# Patient Record
Sex: MALE | Race: BLACK OR AFRICAN AMERICAN | NOT HISPANIC OR LATINO | Employment: OTHER | URBAN - METROPOLITAN AREA
[De-identification: names, ages, dates, MRNs, and addresses within clinical notes are randomized per-mention and may not be internally consistent; named-entity substitution may affect disease eponyms.]

---

## 2017-01-06 ENCOUNTER — OFFICE VISIT (OUTPATIENT)
Dept: SLEEP MEDICINE | Facility: CLINIC | Age: 67
End: 2017-01-06
Payer: MEDICARE

## 2017-01-06 VITALS
SYSTOLIC BLOOD PRESSURE: 114 MMHG | BODY MASS INDEX: 40.16 KG/M2 | WEIGHT: 265 LBS | DIASTOLIC BLOOD PRESSURE: 71 MMHG | HEART RATE: 58 BPM | HEIGHT: 68 IN

## 2017-01-06 DIAGNOSIS — G47.33 OBSTRUCTIVE SLEEP APNEA-HYPOPNEA SYNDROME: Primary | ICD-10-CM

## 2017-01-06 PROCEDURE — 3074F SYST BP LT 130 MM HG: CPT | Mod: S$GLB,,, | Performed by: NURSE PRACTITIONER

## 2017-01-06 PROCEDURE — 99499 UNLISTED E&M SERVICE: CPT | Mod: S$GLB,,, | Performed by: NURSE PRACTITIONER

## 2017-01-06 PROCEDURE — 99213 OFFICE O/P EST LOW 20 MIN: CPT | Mod: S$GLB,,, | Performed by: NURSE PRACTITIONER

## 2017-01-06 PROCEDURE — 3078F DIAST BP <80 MM HG: CPT | Mod: S$GLB,,, | Performed by: NURSE PRACTITIONER

## 2017-01-06 PROCEDURE — 99999 PR PBB SHADOW E&M-EST. PATIENT-LVL III: CPT | Mod: PBBFAC,,, | Performed by: NURSE PRACTITIONER

## 2017-01-06 PROCEDURE — 1157F ADVNC CARE PLAN IN RCRD: CPT | Mod: S$GLB,,, | Performed by: NURSE PRACTITIONER

## 2017-01-06 PROCEDURE — 1160F RVW MEDS BY RX/DR IN RCRD: CPT | Mod: S$GLB,,, | Performed by: NURSE PRACTITIONER

## 2017-01-06 PROCEDURE — 1126F AMNT PAIN NOTED NONE PRSNT: CPT | Mod: S$GLB,,, | Performed by: NURSE PRACTITIONER

## 2017-01-06 PROCEDURE — 1159F MED LIST DOCD IN RCRD: CPT | Mod: S$GLB,,, | Performed by: NURSE PRACTITIONER

## 2017-01-06 NOTE — PROGRESS NOTES
" Domonique Kellogg a 65-year-old male returns today for the management of obstructive sleep apnea. Last seen 16:    16:  Since last seen, he has been setup with apap (12/11/15). Acclimated very quickly to it. Using FFM. Denies mask leaks or oral or nasal drying. Using it faithfully every night. No more snoring or witnessed apneic pauses. Snoring heard only once in interim had cold symptoms. He does feel more refreshed since beginning therapy. No excessively sleepiness. He power naps occasionally during the daytime, mainly when sedentary or after lunch.  ESS=7    Interrogation: new machine condition. AHI 6.7-7.2, 30d avg 6.7h/n. Simplus mask M, 90%-tile 30d 14.1, 1d 11cm. 100% mask fit. Heat at 3      INTERVAL HISTORY:  17: He continues to use fixed cpap 13cm. AHI <5-6 with 4-8hr sleep (more TST when off). Wants to chagne from FFM due to nasal mask due to hair loss from mask straps. Symptoms continue to be resolved using cpap. Using nightly. 11# gain.     Interrogation- good machine condition, 30davg 6.9h/n. 100% mask fit. AHI 3.5, 30/30d>4h. Manometer 13cm      PS/24/15: AHI was 16.0 with an oxygen shivam of 83.0%. The supine AHI was 34.6 and the REM AHI was 40.6. The patient did not qualify for a split night study due to an insufficient number of events in the first half of the study. HR 40's.       REVIEW OF SYSTEMS: Sleep related symptoms as per HPI, otherwise a balance review of 10-systems is negative.   Denies interval medical change        PHYSICAL EXAM:     Visit Vitals    /71 (BP Location: Right arm, Patient Position: Sitting, BP Method: Automatic)    Pulse (!) 58    Ht 5' 8" (1.727 m)    Wt 120.2 kg (264 lb 15.9 oz)    BMI 40.29 kg/m2   GENERAL: W/D, obese male, well groomed       Impression:    RAHEEL, moderate--remains adherent with improvement of his symptoms  Medical comorbidities of HTN, CAD,  obesity (BMI>30).    PLAN:   1.Continue cpap 13cm.  Continue ex cellent nightly use (hard " to ever get more than 7h/sleep). New mask/supplies via [facility]. Discussed the etiology of obstructive sleep apnea as well as the potential ramifications of untreated sleep apnea, which could include daytime sleepiness, hypertension, heart disease and/or stroke.   2. RTC 12-mos adherence monitoring, SOONER if needed.

## 2017-01-06 NOTE — MR AVS SNAPSHOT
[facility]  [address]  [facility] [address]  Phone: [phone]                  Domonique Kellogg   2017 10:20 AM   Office Visit    Description:  Male : 1950   Provider:  Shilpa Martinez NP   Department:  [facility]           Reason for Visit     Sleep Apnea           Diagnoses this Visit        Comments    Obstructive sleep apnea-hypopnea syndrome    -  Primary            To Do List           Goals (5 Years of Data)     None      Follow-Up and Disposition     Return in about 1 year (around 2018).      [facility]     [facility] Nurse Care [facility] -  Assistance  Registered nurses in the [facility] provide clinical advisement, health education, appointment booking, and other advisory services.  Call for this free service at [phone].             Medications           Message regarding Medications     Verify the changes and/or additions to your medication regime listed below are the same as discussed with your clinician today.  If any of these changes or additions are incorrect, please notify your healthcare provider.             Verify that the below list of medications is an accurate representation of the medications you are currently taking.  If none reported, the list may be blank. If incorrect, please contact your healthcare provider. Carry this list with you in case of emergency.           Current Medications     aspirin (ECOTRIN) 81 MG EC tablet Take 81 mg by mouth once daily.    hydrochlorothiazide (HYDRODIURIL) 25 MG tablet Take 1 tablet (25 mg total) by mouth once daily.    potassium chloride SA (K-DUR,KLOR-CON) 20 MEQ tablet Take 1 tablet (20 mEq total) by mouth once daily.    pravastatin (PRAVACHOL) 20 MG tablet Take 1 tablet (20 mg total) by mouth once daily.           Clinical Reference Information           Vital Signs - Last Recorded  Most recent update: 2017 10:33 AM by Meli Khanna MA     "BP Pulse Ht Wt BMI    114/71 (BP Location: Right arm, Patient Position: Sitting, BP Method: Automatic) (!) 58 5' 8" (1.727 m) 120.2 kg (264 lb 15.9 oz) 40.29 kg/m2      Blood Pressure          Most Recent Value    BP  114/71      Allergies as of 1/6/2017     Indomethacin    Adhesive      Immunizations Administered on Date of Encounter - 1/6/2017     None      Orders Placed During Today's Visit      Normal Orders This Visit    CPAP/BIPAP SUPPLIES       "

## 2017-02-10 ENCOUNTER — PATIENT MESSAGE (OUTPATIENT)
Dept: RESEARCH | Facility: HOSPITAL | Age: 67
End: 2017-02-10

## 2017-02-20 ENCOUNTER — PATIENT MESSAGE (OUTPATIENT)
Dept: INTERNAL MEDICINE | Facility: CLINIC | Age: 67
End: 2017-02-20

## 2017-02-20 RX ORDER — HYDROCHLOROTHIAZIDE 25 MG/1
TABLET ORAL
Qty: 30 TABLET | Refills: 11 | Status: SHIPPED | OUTPATIENT
Start: 2017-02-20 | End: 2017-12-14 | Stop reason: SDUPTHER

## 2017-02-20 RX ORDER — POTASSIUM CHLORIDE 20 MEQ/1
TABLET, EXTENDED RELEASE ORAL
Qty: 30 TABLET | Refills: 11 | Status: SHIPPED | OUTPATIENT
Start: 2017-02-20 | End: 2017-12-14 | Stop reason: SDUPTHER

## 2017-02-21 ENCOUNTER — PATIENT MESSAGE (OUTPATIENT)
Dept: INTERNAL MEDICINE | Facility: CLINIC | Age: 67
End: 2017-02-21

## 2017-03-22 RX ORDER — PRAVASTATIN SODIUM 20 MG/1
TABLET ORAL
Qty: 30 TABLET | Refills: 11 | Status: SHIPPED | OUTPATIENT
Start: 2017-03-22 | End: 2017-12-14 | Stop reason: SDUPTHER

## 2017-04-01 ENCOUNTER — OFFICE VISIT (OUTPATIENT)
Dept: INTERNAL MEDICINE | Facility: CLINIC | Age: 67
End: 2017-04-01
Payer: MEDICARE

## 2017-04-01 VITALS
WEIGHT: 261.69 LBS | HEIGHT: 68 IN | TEMPERATURE: 98 F | SYSTOLIC BLOOD PRESSURE: 135 MMHG | BODY MASS INDEX: 39.66 KG/M2 | DIASTOLIC BLOOD PRESSURE: 75 MMHG | HEART RATE: 60 BPM | OXYGEN SATURATION: 98 %

## 2017-04-01 DIAGNOSIS — R20.2 PARESTHESIA OF RIGHT LEG: Primary | ICD-10-CM

## 2017-04-01 PROCEDURE — 1160F RVW MEDS BY RX/DR IN RCRD: CPT | Mod: S$GLB,,, | Performed by: INTERNAL MEDICINE

## 2017-04-01 PROCEDURE — 3075F SYST BP GE 130 - 139MM HG: CPT | Mod: S$GLB,,, | Performed by: INTERNAL MEDICINE

## 2017-04-01 PROCEDURE — 99213 OFFICE O/P EST LOW 20 MIN: CPT | Mod: S$GLB,,, | Performed by: INTERNAL MEDICINE

## 2017-04-01 PROCEDURE — 3078F DIAST BP <80 MM HG: CPT | Mod: S$GLB,,, | Performed by: INTERNAL MEDICINE

## 2017-04-01 PROCEDURE — 99999 PR PBB SHADOW E&M-EST. PATIENT-LVL III: CPT | Mod: PBBFAC,,, | Performed by: INTERNAL MEDICINE

## 2017-04-01 PROCEDURE — 1125F AMNT PAIN NOTED PAIN PRSNT: CPT | Mod: S$GLB,,, | Performed by: INTERNAL MEDICINE

## 2017-04-01 PROCEDURE — 1157F ADVNC CARE PLAN IN RCRD: CPT | Mod: S$GLB,,, | Performed by: INTERNAL MEDICINE

## 2017-04-01 PROCEDURE — 1159F MED LIST DOCD IN RCRD: CPT | Mod: S$GLB,,, | Performed by: INTERNAL MEDICINE

## 2017-04-01 NOTE — MR AVS SNAPSHOT
[facility]  [address]  [facility] [address]  Phone: [phone]  Fax: [phone]                  Domonique Kellogg   2017 3:20 PM   Office Visit    Description:  Male : 1950   Provider:  Kia Person MD   Department:  [facility]           Reason for Visit     Numbness           Diagnoses this Visit        Comments    Paresthesia of right leg    -  Primary            To Do List           Goals (5 Years of Data)     None      [facility]     [facility] Nurse Care Line -  Assistance  Unless otherwise directed by your provider, please contact [facility], our nurse care line that is available for  assistance.     Registered nurses in the [facility] provide: appointment scheduling, clinical advisement, health education, and other advisory services.  Call: [phone] (toll free)               Medications           Message regarding Medications     Verify the changes and/or additions to your medication regime listed below are the same as discussed with your clinician today.  If any of these changes or additions are incorrect, please notify your healthcare provider.             Verify that the below list of medications is an accurate representation of the medications you are currently taking.  If none reported, the list may be blank. If incorrect, please contact your healthcare provider. Carry this list with you in case of emergency.           Current Medications     aspirin (ECOTRIN) 81 MG EC tablet Take 81 mg by mouth once daily.    hydrochlorothiazide (HYDRODIURIL) 25 MG tablet take 1 tablet by mouth once daily    potassium chloride SA (K-DUR,KLOR-CON) 20 MEQ tablet take 1 tablet by mouth once daily    pravastatin (PRAVACHOL) 20 MG tablet take 1 tablet by mouth once daily           Clinical Reference Information           Your Vitals Were     BP Pulse Temp Height Weight SpO2    135/75 60 98.4 °F (36.9 °C) (Oral)  "5' 8" (1.727 m) 118.7 kg (261 lb 11 oz) 98%    BMI                39.79 kg/m2          Blood Pressure          Most Recent Value    BP  135/75      Allergies as of 4/1/2017     Indomethacin    Adhesive      Immunizations Administered on Date of Encounter - 4/1/2017     None      Orders Placed During Today's Visit     Future Labs/Procedures Expected by Expires    EMG w/ Ultrasound  As directed 6/30/2017      Language Assistance Services     ATTENTION: Language assistance services are available, free of charge. Please call [phone].      ATENCIÓN: Si habla español, tiene a hart disposición servicios gratuitos de asistencia lingüística. Llame al [phone].     DAGOBERTO Ý: N?u b?n nói Ti?ng Vi?t, có các d?ch v? h? tr? ngôn ng? mi?n phí dành cho b?n. G?i s? [phone].         [facility] complies with applicable Federal civil rights laws and does not discriminate on the basis of race, color, national origin, age, disability, or sex.        "

## 2017-04-01 NOTE — PROGRESS NOTES
Subjective:       Patient ID: Domonique Kellogg is a 66 y.o. male.    Chief Complaint: Numbness (Right Thigh)    HPI Comments: Worried about area of decreased sensation and tingling in mid anterolateral right thigh for the last 2 weeks.  No known injury    Review of Systems   Constitutional: Negative for activity change, appetite change and fever.   HENT: Negative for congestion, postnasal drip and sore throat.    Respiratory: Negative for cough, shortness of breath and wheezing.    Cardiovascular: Negative for chest pain and palpitations.   Gastrointestinal: Negative for abdominal pain, blood in stool, constipation, diarrhea, nausea and vomiting.   Genitourinary: Negative for decreased urine volume, difficulty urinating, flank pain and frequency.   Musculoskeletal: Negative for arthralgias.   Neurological: Negative for dizziness, weakness and headaches.       Objective:      Physical Exam   Musculoskeletal:        Legs:      Assessment:       1. Paresthesia of right leg        Plan:   Domonique was seen today for numbness.    Diagnoses and all orders for this visit:    Paresthesia of right leg  Comments:  ?superficial femoral nerve?  Orders:  -     EMG w/ Ultrasound; Future

## 2017-04-04 ENCOUNTER — PROCEDURE VISIT (OUTPATIENT)
Dept: NEUROLOGY | Facility: CLINIC | Age: 67
End: 2017-04-04
Payer: MEDICARE

## 2017-04-04 DIAGNOSIS — R20.2 PARESTHESIA OF RIGHT LEG: ICD-10-CM

## 2017-04-04 PROCEDURE — 95909 NRV CNDJ TST 5-6 STUDIES: CPT | Mod: S$GLB,,,

## 2017-04-04 PROCEDURE — 95886 MUSC TEST DONE W/N TEST COMP: CPT | Mod: S$GLB,,,

## 2017-04-04 NOTE — PROCEDURES
Procedures     See the copy of this report that has been scanned into patient's Epic Chart.     STEVE JEFFERSON III, MD

## 2017-04-06 ENCOUNTER — PATIENT MESSAGE (OUTPATIENT)
Dept: INTERNAL MEDICINE | Facility: CLINIC | Age: 67
End: 2017-04-06

## 2017-04-18 ENCOUNTER — PATIENT MESSAGE (OUTPATIENT)
Dept: INTERNAL MEDICINE | Facility: CLINIC | Age: 67
End: 2017-04-18

## 2017-04-18 DIAGNOSIS — R20.2 PARESTHESIA OF RIGHT LEG: Primary | ICD-10-CM

## 2017-04-20 ENCOUNTER — TELEPHONE (OUTPATIENT)
Dept: INTERNAL MEDICINE | Facility: CLINIC | Age: 67
End: 2017-04-20

## 2017-04-20 NOTE — TELEPHONE ENCOUNTER
Spoke with patient who stated that he would call to schedule an appt with neurology when he has his appt. Schedule with him.

## 2017-05-02 ENCOUNTER — OFFICE VISIT (OUTPATIENT)
Dept: NEUROLOGY | Facility: CLINIC | Age: 67
End: 2017-05-02
Payer: MEDICARE

## 2017-05-02 VITALS
HEIGHT: 68 IN | WEIGHT: 262.38 LBS | SYSTOLIC BLOOD PRESSURE: 133 MMHG | HEART RATE: 62 BPM | DIASTOLIC BLOOD PRESSURE: 78 MMHG | BODY MASS INDEX: 39.76 KG/M2

## 2017-05-02 DIAGNOSIS — G57.11 LATERAL FEMORAL CUTANEOUS ENTRAPMENT SYNDROME, RIGHT: ICD-10-CM

## 2017-05-02 PROBLEM — G57.10 LATERAL FEMORAL CUTANEOUS ENTRAPMENT SYNDROME: Status: ACTIVE | Noted: 2017-05-02

## 2017-05-02 PROCEDURE — 1159F MED LIST DOCD IN RCRD: CPT | Mod: S$GLB,,, | Performed by: PSYCHIATRY & NEUROLOGY

## 2017-05-02 PROCEDURE — 3078F DIAST BP <80 MM HG: CPT | Mod: S$GLB,,, | Performed by: PSYCHIATRY & NEUROLOGY

## 2017-05-02 PROCEDURE — 99999 PR PBB SHADOW E&M-EST. PATIENT-LVL III: CPT | Mod: PBBFAC,,, | Performed by: PSYCHIATRY & NEUROLOGY

## 2017-05-02 PROCEDURE — 3075F SYST BP GE 130 - 139MM HG: CPT | Mod: S$GLB,,, | Performed by: PSYCHIATRY & NEUROLOGY

## 2017-05-02 PROCEDURE — 99214 OFFICE O/P EST MOD 30 MIN: CPT | Mod: S$GLB,,, | Performed by: PSYCHIATRY & NEUROLOGY

## 2017-05-02 PROCEDURE — 1160F RVW MEDS BY RX/DR IN RCRD: CPT | Mod: S$GLB,,, | Performed by: PSYCHIATRY & NEUROLOGY

## 2017-05-02 RX ORDER — AMITRIPTYLINE HYDROCHLORIDE 50 MG/1
50 TABLET, FILM COATED ORAL NIGHTLY
Qty: 30 TABLET | Refills: 11 | Status: SHIPPED | OUTPATIENT
Start: 2017-05-02 | End: 2017-10-19

## 2017-05-02 RX ORDER — AMITRIPTYLINE HYDROCHLORIDE 25 MG/1
25 TABLET, FILM COATED ORAL NIGHTLY PRN
COMMUNITY
Start: 2017-05-02 | End: 2017-05-02

## 2017-05-02 NOTE — LETTER
May 2, 2017      Nicolas Pacheco MD  [address]  [place] [address]           [facility]  [address]  [facility] [address]  Phone: [phone]          Patient: Domonique Kellogg   MR Number: [id number]   YOB: 1950   Date of Visit: 5/2/2017       Dear Dr. Nicolas Pacheco:    Thank you for referring Domonique Kellogg to me for evaluation. Attached you will find relevant portions of my assessment and plan of care.    If you have questions, please do not hesitate to call me. I look forward to following Domonique Kellogg along with you.    Sincerely,    Rashida Hodges MD    Enclosure  CC:  No Recipients    If you would like to receive this communication electronically, please contact [email or web address] or [phone] to request more information on [facility] Link access.    For providers and/or their staff who would like to refer a patient to [facility], please contact us through our one-stop-shop provider referral line, [facility], at [phone].    If you feel you have received this communication in error or would no longer like to receive these types of communications, please e-mail [email or web address]          negative...

## 2017-05-02 NOTE — PROGRESS NOTES
Subjective:       Patient ID: Domonique Kellogg is a 67 y.o. male.    Chief Complaint:  Consult (Paresthesia of right leg ) and Numbness      History of Present Illness  HPI  Additional Complaints:  Neuropathy  He describes symptoms of numbness, burning, lancinating pain and hypersensitivity. Onset of symptoms was sudden, related to inversion. Mechanism of injury: no specific activity. Symptoms are currently of moderate and transient severity. Symptoms occur during sleep and last minutes. The patient denies cramping. Symptoms are worse in the right lower extremity. He also describes autonomic symptoms of  No autonomic symptoms. He denies  orthostasis, bloating, nausea, early satiety, diarrhea, constipation, dry eyes and dry mouth, blurred vision, lack of sweating and sexual dysfunction. Previous treatment has included none. Not diabetic. Has sleep apnea and is on CPAP with good results. Meralgia Paresthetica awakens him from sleep.        Review of Systems  Review of Systems   Constitutional: Negative.    HENT: Negative.    Eyes: Negative.    Respiratory: Negative.    Cardiovascular: Negative.    Gastrointestinal: Negative.    Endocrine: Negative.    Genitourinary: Negative.    Musculoskeletal: Negative.    Skin: Negative.    Allergic/Immunologic: Negative.    Neurological: Positive for numbness.   Hematological: Negative.    Psychiatric/Behavioral: Negative.        Objective:      Neurologic Exam     Mental Status   Oriented to person, place, and time.   Registration: recalls 3 of 3 objects. Recall at 5 minutes: recalls 3 of 3 objects. Follows 3 step commands.   Attention: normal. Concentration: normal.   Speech: speech is normal   Level of consciousness: alert  Knowledge: good.   Able to name object. Able to read. Able to repeat. Able to write. Normal comprehension.     Cranial Nerves   Cranial nerves II through XII intact.     CN III, IV, VI   Pupils are equal, round, and reactive to light.  Extraocular motions  are normal.     Motor Exam   Muscle bulk: normal  Overall muscle tone: normal    Strength   Strength 5/5 throughout.     Sensory Exam   Light touch normal.   Vibration normal.   Proprioception normal.   Pinprick normal.   Sensory deficit distribution on right: lateral cutaneous thigh    Gait, Coordination, and Reflexes     Gait  Gait: normal    Coordination   Romberg: negative  Finger to nose coordination: normal  Heel to shin coordination: normal  Tandem walking coordination: normal    Tremor   Resting tremor: absent  Intention tremor: absent  Action tremor: absent    Reflexes   Reflexes 2+ except as noted.   Right plantar: normal  Left plantar: normal  Right Schofield: absent  Left Schofield: absent  Right ankle clonus: absent  Left ankle clonus: absent  Right pendular knee jerk: absent  Left pendular knee jerk: absent      Physical Exam   Constitutional: He is oriented to person, place, and time. He appears well-developed and well-nourished.   HENT:   Head: Normocephalic and atraumatic.   Right Ear: External ear normal.   Left Ear: External ear normal.   Eyes: Conjunctivae and EOM are normal. Pupils are equal, round, and reactive to light.   Neck: Normal range of motion. Neck supple.   Cardiovascular: Normal rate, regular rhythm and normal heart sounds.    Pulmonary/Chest: Effort normal.   Musculoskeletal: Normal range of motion.   Neurological: He is alert and oriented to person, place, and time. He has normal strength and normal reflexes. He has a normal Finger-Nose-Finger Test, a normal Heel to Shin Test, a normal Romberg Test and a normal Tandem Gait Test. Gait normal.   Skin: Skin is warm and dry.   Psychiatric: His speech is normal.   Vitals reviewed.        Assessment:        1. Lateral femoral cutaneous entrapment syndrome, right          Plan:     Elavil 25 mg po q hs.    RTC 6 weeks.

## 2017-08-16 ENCOUNTER — PATIENT MESSAGE (OUTPATIENT)
Dept: INTERNAL MEDICINE | Facility: CLINIC | Age: 67
End: 2017-08-16

## 2017-08-16 DIAGNOSIS — Z00.00 ANNUAL PHYSICAL EXAM: Primary | ICD-10-CM

## 2017-08-16 DIAGNOSIS — E78.5 HYPERLIPIDEMIA, UNSPECIFIED HYPERLIPIDEMIA TYPE: ICD-10-CM

## 2017-08-16 DIAGNOSIS — I10 HYPERTENSION, ESSENTIAL: ICD-10-CM

## 2017-08-16 DIAGNOSIS — Z12.5 PROSTATE CANCER SCREENING: ICD-10-CM

## 2017-08-17 NOTE — TELEPHONE ENCOUNTER
Lab appt has been scheduled for pt. Pt has been notified and verbalized understanding of appt details.

## 2017-09-02 ENCOUNTER — PATIENT MESSAGE (OUTPATIENT)
Dept: INTERNAL MEDICINE | Facility: CLINIC | Age: 67
End: 2017-09-02

## 2017-10-17 ENCOUNTER — LAB VISIT (OUTPATIENT)
Dept: LAB | Facility: OTHER | Age: 67
End: 2017-10-17
Attending: FAMILY MEDICINE
Payer: MEDICARE

## 2017-10-17 DIAGNOSIS — Z00.00 ANNUAL PHYSICAL EXAM: ICD-10-CM

## 2017-10-17 DIAGNOSIS — I10 HYPERTENSION, ESSENTIAL: ICD-10-CM

## 2017-10-17 DIAGNOSIS — Z12.5 PROSTATE CANCER SCREENING: ICD-10-CM

## 2017-10-17 DIAGNOSIS — E78.5 HYPERLIPIDEMIA, UNSPECIFIED HYPERLIPIDEMIA TYPE: ICD-10-CM

## 2017-10-17 LAB
ALBUMIN SERPL BCP-MCNC: 4 G/DL
ALP SERPL-CCNC: 60 U/L
ALT SERPL W/O P-5'-P-CCNC: 26 U/L
ANION GAP SERPL CALC-SCNC: 9 MMOL/L
AST SERPL-CCNC: 29 U/L
BILIRUB SERPL-MCNC: 0.6 MG/DL
BUN SERPL-MCNC: 12 MG/DL
CALCIUM SERPL-MCNC: 10.1 MG/DL
CHLORIDE SERPL-SCNC: 106 MMOL/L
CHOLEST SERPL-MCNC: 151 MG/DL
CHOLEST/HDLC SERPL: 2.8 {RATIO}
CO2 SERPL-SCNC: 28 MMOL/L
COMPLEXED PSA SERPL-MCNC: 1.7 NG/ML
CREAT SERPL-MCNC: 1 MG/DL
EST. GFR  (AFRICAN AMERICAN): >60 ML/MIN/1.73 M^2
EST. GFR  (NON AFRICAN AMERICAN): >60 ML/MIN/1.73 M^2
GLUCOSE SERPL-MCNC: 108 MG/DL
HDLC SERPL-MCNC: 53 MG/DL
HDLC SERPL: 35.1 %
LDLC SERPL CALC-MCNC: 86.6 MG/DL
NONHDLC SERPL-MCNC: 98 MG/DL
POTASSIUM SERPL-SCNC: 3.5 MMOL/L
PROT SERPL-MCNC: 7.8 G/DL
SODIUM SERPL-SCNC: 143 MMOL/L
TRIGL SERPL-MCNC: 57 MG/DL

## 2017-10-17 PROCEDURE — 84153 ASSAY OF PSA TOTAL: CPT

## 2017-10-17 PROCEDURE — 80053 COMPREHEN METABOLIC PANEL: CPT

## 2017-10-17 PROCEDURE — 36415 COLL VENOUS BLD VENIPUNCTURE: CPT

## 2017-10-17 PROCEDURE — 80061 LIPID PANEL: CPT

## 2017-10-19 ENCOUNTER — OFFICE VISIT (OUTPATIENT)
Dept: INTERNAL MEDICINE | Facility: CLINIC | Age: 67
End: 2017-10-19
Payer: MEDICARE

## 2017-10-19 ENCOUNTER — PATIENT MESSAGE (OUTPATIENT)
Dept: INTERNAL MEDICINE | Facility: CLINIC | Age: 67
End: 2017-10-19

## 2017-10-19 ENCOUNTER — OFFICE VISIT (OUTPATIENT)
Dept: INTERNAL MEDICINE | Facility: CLINIC | Age: 67
End: 2017-10-19
Attending: FAMILY MEDICINE
Payer: MEDICARE

## 2017-10-19 ENCOUNTER — IMMUNIZATION (OUTPATIENT)
Dept: INTERNAL MEDICINE | Facility: CLINIC | Age: 67
End: 2017-10-19
Payer: MEDICARE

## 2017-10-19 VITALS
TEMPERATURE: 98 F | HEIGHT: 68 IN | WEIGHT: 263 LBS | HEART RATE: 59 BPM | BODY MASS INDEX: 39.86 KG/M2 | OXYGEN SATURATION: 98 % | DIASTOLIC BLOOD PRESSURE: 62 MMHG | SYSTOLIC BLOOD PRESSURE: 108 MMHG

## 2017-10-19 VITALS
TEMPERATURE: 98 F | HEIGHT: 68 IN | BODY MASS INDEX: 39.92 KG/M2 | SYSTOLIC BLOOD PRESSURE: 118 MMHG | DIASTOLIC BLOOD PRESSURE: 62 MMHG | HEART RATE: 71 BPM | WEIGHT: 263.44 LBS | OXYGEN SATURATION: 97 %

## 2017-10-19 DIAGNOSIS — E66.01 MORBID OBESITY DUE TO EXCESS CALORIES: ICD-10-CM

## 2017-10-19 DIAGNOSIS — Z00.00 ANNUAL PHYSICAL EXAM: Primary | ICD-10-CM

## 2017-10-19 DIAGNOSIS — G47.33 OSA (OBSTRUCTIVE SLEEP APNEA): ICD-10-CM

## 2017-10-19 DIAGNOSIS — G57.11 MERALGIA PARESTHETICA OF RIGHT SIDE: ICD-10-CM

## 2017-10-19 DIAGNOSIS — I25.83 CORONARY ARTERY DISEASE DUE TO LIPID RICH PLAQUE: ICD-10-CM

## 2017-10-19 DIAGNOSIS — M47.816 SPONDYLOSIS OF LUMBAR REGION WITHOUT MYELOPATHY OR RADICULOPATHY: ICD-10-CM

## 2017-10-19 DIAGNOSIS — E78.5 HYPERLIPIDEMIA, UNSPECIFIED HYPERLIPIDEMIA TYPE: ICD-10-CM

## 2017-10-19 DIAGNOSIS — M17.11 PRIMARY OSTEOARTHRITIS OF RIGHT KNEE: ICD-10-CM

## 2017-10-19 DIAGNOSIS — I27.20 PULMONARY HYPERTENSION: ICD-10-CM

## 2017-10-19 DIAGNOSIS — Z00.00 ENCOUNTER FOR PREVENTIVE HEALTH EXAMINATION: Primary | ICD-10-CM

## 2017-10-19 DIAGNOSIS — I77.9 BILATERAL CAROTID ARTERY DISEASE: ICD-10-CM

## 2017-10-19 DIAGNOSIS — I10 HYPERTENSION, ESSENTIAL: ICD-10-CM

## 2017-10-19 DIAGNOSIS — I25.10 CORONARY ARTERY DISEASE DUE TO LIPID RICH PLAQUE: ICD-10-CM

## 2017-10-19 DIAGNOSIS — Z87.898 H/O SYNCOPE: ICD-10-CM

## 2017-10-19 DIAGNOSIS — I67.2 CEREBRAL ATHEROSCLEROSIS: ICD-10-CM

## 2017-10-19 PROCEDURE — 99397 PER PM REEVAL EST PAT 65+ YR: CPT | Mod: S$GLB,,, | Performed by: FAMILY MEDICINE

## 2017-10-19 PROCEDURE — 99999 PR PBB SHADOW E&M-EST. PATIENT-LVL III: CPT | Mod: PBBFAC,,, | Performed by: FAMILY MEDICINE

## 2017-10-19 PROCEDURE — 99499 UNLISTED E&M SERVICE: CPT | Mod: S$GLB,,, | Performed by: FAMILY MEDICINE

## 2017-10-19 PROCEDURE — 99999 PR PBB SHADOW E&M-EST. PATIENT-LVL IV: CPT | Mod: PBBFAC,,, | Performed by: NURSE PRACTITIONER

## 2017-10-19 PROCEDURE — G0008 ADMIN INFLUENZA VIRUS VAC: HCPCS | Mod: S$GLB,,, | Performed by: FAMILY MEDICINE

## 2017-10-19 PROCEDURE — G0439 PPPS, SUBSEQ VISIT: HCPCS | Mod: S$GLB,,, | Performed by: NURSE PRACTITIONER

## 2017-10-19 PROCEDURE — 90662 IIV NO PRSV INCREASED AG IM: CPT | Mod: S$GLB,,, | Performed by: FAMILY MEDICINE

## 2017-10-19 PROCEDURE — 99499 UNLISTED E&M SERVICE: CPT | Mod: S$GLB,,, | Performed by: NURSE PRACTITIONER

## 2017-10-19 NOTE — PATIENT INSTRUCTIONS
Counseling and Referral of Other Preventative  (Italic type indicates deductible and co-insurance are waived)    Patient Name: Domonique Kellogg  Today's Date: 10/19/2017      SERVICE LIMITATIONS RECOMMENDATION    Vaccines    · Pneumococcal (once after 65)    · Influenza (annually)    · Hepatitis B (if medium/high risk)    · Prevnar 13      Hepatitis B medium/high risk factors:       - End-stage renal disease       - Hemophiliacs who received Factor VII or         IX concentrates       - Clients of institutions for the mentally             retarded       - Persons who live in the same house as          a HepB carrier       - Homosexual men       - Illicit injectable drug abusers     Pneumococcal: Done, no repeat necessary     Influenza: Done, repeat in one year     Hepatitis B: N/A     Prevnar 13: Done, no repeat necessary    Prostate cancer screening (annually to age 75)     Prostate specific antigen (PSA) Shared decision making with Provider. Sometimes a co-pay may be required if the patient decides to have this test. The USPSTF no longer recommends prostate cancer screening routinely in medicine: every 1 year    Colorectal cancer screening (to age 75)    · Fecal occult blood test (annual)  · Flexible sigmoidoscopy (5y)  · Screening colonoscopy (10y)  · Barium enema   Last done 2011, recommend to repeat every 10  years    Diabetes self-management training (no USPSTF recommendations)  Requires referral by treating physician for patient with diabetes or renal disease. 10 hours of initial DSMT sessions of no less than 30 minutes each in a continuous 12-month period. 2 hours of follow-up DSMT in subsequent years.  N/A    Glaucoma screening (no USPSTF recommendation)  Diabetes mellitus, family history   , age 50 or over    American, age 65 or over  Last done 12/2016, recommend to repeat every 1  years    Medical nutrition therapy for diabetes or renal disease (no recommended schedule)  Requires  referral by treating physician for patient with diabetes or renal disease or kidney transplant within the past 3 years.  Can be provided in same year as diabetes self-management training (DSMT), and CMS recommends medical nutrition therapy take place after DSMT. Up to 3 hours for initial year and 2 hours in subsequent years.  N/A    Cardiovascular screening blood tests (every 5 years)  · Fasting lipid panel  Order as a panel if possible  Done this year, repeat every year    Diabetes screening tests (at least every 3 years, Medicare covers annually or at 6-month intervals for prediabetic patients)  · Fasting blood sugar (FBS) or glucose tolerance test (GTT)  Patient must be diagnosed with one of the following:       - Hypertension       - Dyslipidemia       - Obesity (BMI 30kg/m2)       - Previous elevated impaired FBS or GTT       ... or any two of the following:       - Overweight (BMI 25 but <30)       - Family history of diabetes       - Age 65 or older       - History of gestational diabetes or birth of baby weighing more than 9 pounds  Done this year, repeat every year    Abdominal aortic aneurysm screening (once)  · Sonogram   Limited to patients who meet one of the following criteria:       - Men who are 65-75 years old and have smoked more than 100 cigarette in their lifetime       - Anyone with a family history of abdominal aortic aneurysm       - Anyone recommended for screening by the USPSTF  Done, no repeat necessary    HIV screening (annually for increased risk patients)  · HIV-1 and HIV-2 by EIA, or FARZAD, rapid antibody test or oral mucosa transudate  Patients must be at increased risk for HIV infection per USPSTF guidelines or pregnant. Tests covered annually for patient at increased risk or as requested by the patient. Pregnant patients may receive up to 3 tests during pregnancy.  Risks discussed, screening is not recommended    Smoking cessation counseling (up to 8 sessions per year)  Patients must  be asymptomatic of tobacco-related conditions to receive as a preventative service.  Non-smoker    Subsequent annual wellness visit  At least 12 months since last AWV  Return in one year     The following information is provided to all patients.  This information is to help you find resources for any of the problems found today that may be affecting your health:                Living healthy guide: www.[facility].[place].[facility]      Understanding Diabetes: www.diabetes.org      Eating healthy: www.cdc.gov/healthyweight      CDC home safety checklist: www.cdc.gov/steadi/patient.html      Agency on Aging: www.goea.[place].[facility]      Alcoholics anonymous (AA): www.aa.org      Physical Activity: www.kali.nih.gov/ly2gdvv      Tobacco use: www.quitwithusla.org

## 2017-10-19 NOTE — PROGRESS NOTES
Subjective:       Patient ID: Domonique Kellogg is a 67 y.o. male.    Chief Complaint: No chief complaint on file.    Established patient for an annual wellness check/physical exam. No specific complaints, please see ROS.        Review of Systems   Constitutional: Negative for activity change, chills, fatigue, fever and unexpected weight change.   HENT: Negative for congestion, hearing loss, rhinorrhea and trouble swallowing.    Eyes: Negative for discharge, redness and visual disturbance.   Respiratory: Negative for cough, chest tightness, shortness of breath and wheezing.    Cardiovascular: Negative for chest pain, palpitations and leg swelling.   Gastrointestinal: Negative for abdominal pain, blood in stool, constipation, diarrhea and vomiting.   Endocrine: Negative for polydipsia and polyuria.   Genitourinary: Negative for difficulty urinating, hematuria and urgency.   Musculoskeletal: Positive for arthralgias and gait problem. Negative for back pain, joint swelling, myalgias and neck pain.   Skin: Negative for color change and rash.   Neurological: Negative for tremors, speech difficulty, weakness, numbness and headaches.   Hematological: Negative for adenopathy. Does not bruise/bleed easily.   Psychiatric/Behavioral: Negative for behavioral problems, confusion, dysphoric mood and sleep disturbance. The patient is not nervous/anxious.        Objective:      Physical Exam   Constitutional: He is oriented to person, place, and time. He appears well-developed and well-nourished. No distress.   HENT:   Head: Normocephalic.   Right Ear: Tympanic membrane, external ear and ear canal normal.   Left Ear: Tympanic membrane, external ear and ear canal normal.   Mouth/Throat: Oropharynx is clear and moist.   Eyes: Pupils are equal, round, and reactive to light.   Neck: Normal range of motion. Neck supple. Carotid bruit is not present. No thyromegaly present.   Cardiovascular: Normal rate, regular rhythm, normal heart  sounds and intact distal pulses.  Exam reveals no gallop and no friction rub.    No murmur heard.  Pulmonary/Chest: Effort normal and breath sounds normal.   Abdominal: Soft. Bowel sounds are normal. He exhibits no distension and no mass. There is no tenderness. There is no rebound and no guarding.   Musculoskeletal: He exhibits no edema.        Right knee: He exhibits decreased range of motion. Tenderness found. Medial joint line tenderness noted.        Right lower leg: He exhibits no edema.        Left lower leg: He exhibits no edema.   Lymphadenopathy:     He has no cervical adenopathy.   Neurological: He is alert and oriented to person, place, and time. He has normal strength. He displays normal reflexes. No cranial nerve deficit or sensory deficit. Coordination and gait normal.   Skin: Skin is warm and dry. No rash noted.   Psychiatric: He has a normal mood and affect. His behavior is normal. Judgment and thought content normal.   Nursing note and vitals reviewed.      Assessment:       1. Annual physical exam    2. Coronary artery disease due to lipid rich plaque    3. Bilateral carotid artery disease    4. Hypertension, essential    5. Hyperlipidemia, unspecified hyperlipidemia type    6. Meralgia paresthetica of right side    7. Morbid obesity due to excess calories    8. Primary osteoarthritis of right knee    9. RAHEEL (obstructive sleep apnea)    10. Cerebral atherosclerosis         Plan:   Diagnoses and all orders for this visit:    Annual physical exam    Coronary artery disease due to lipid rich plaque    Bilateral carotid artery disease  -     CAR Ultrasound doppler carotid bliateral; Future    Hypertension, essential    Hyperlipidemia, unspecified hyperlipidemia type    Meralgia paresthetica of right side    Morbid obesity due to excess calories    Primary osteoarthritis of right knee  -     Prior Authorization Order    RAHEEL (obstructive sleep apnea)    Cerebral atherosclerosis   -     CAR Ultrasound  doppler carotid bliateral; Future      See meds, orders, follow up, routing and instructions sections of encounter.  A 67-year-old established male patient, is in for his annual physical   examination, complaining of right knee pain described as stiffness.  In 1996, he   had right knee surgery.  He has had viscosupplementation shots in the past.  He   had x-rays in 2013 demonstrating medial greater than lateral joint space   narrowing and significant osteoarthritis.  I suggest activity modification for   the time being.  We can do viscosupplementation shots again.  Orthopedic   consult, if not improving, see orders.  I did discuss his laboratory with him.      GUERLINE/JUANY  dd: 10/19/2017 12:43:18 (CDT)  td: 10/20/2017 01:15:58 (CDT)  Doc ID   #[id number]  Job ID #[id number]    CC:

## 2017-10-19 NOTE — PROGRESS NOTES
"Domonique Kellogg presented for a  Medicare AWV and comprehensive Health Risk Assessment today. The following components were reviewed and updated:    · Medical history  · Family History  · Social history  · Allergies and Current Medications  · Health Risk Assessment  · Health Maintenance  · Care Team     ** See Completed Assessments for Annual Wellness Visit within the encounter summary.**       The following assessments were completed:  · Living Situation  · CAGE  · Depression Screening  · Timed Get Up and Go  · Whisper Test  · Cognitive Function Screening  · Nutrition Screening  · ADL Screening  · PAQ Screening    Vitals:    10/19/17 1114   BP: 108/62   Pulse: (!) 59   Temp: 97.9 °F (36.6 °C)   SpO2: 98%   Weight: 119.3 kg (263 lb 0.1 oz)   Height: 5' 8" (1.727 m)     Body mass index is 39.99 kg/m².  Physical Exam   Constitutional: He is oriented to person, place, and time. He appears well-developed.   obese   HENT:   Head: Normocephalic and atraumatic.   Nose: Nose normal.   Eyes: Conjunctivae and EOM are normal.   Neck: Normal range of motion. Neck supple.   Cardiovascular: Regular rhythm, normal heart sounds and intact distal pulses.  Bradycardia present.    Pulmonary/Chest: Effort normal and breath sounds normal.   Musculoskeletal: Normal range of motion.   Neurological: He is alert and oriented to person, place, and time.   Skin: Skin is warm and dry.   Psychiatric: He has a normal mood and affect. His behavior is normal. Judgment and thought content normal.   Nursing note and vitals reviewed.        Diagnoses and health risks identified today and associated recommendations/orders:    1. Encounter for preventive health examination  Assessment performed. Health maintenance updated. Chart review completed.    2. Bilateral carotid artery disease  Impression:   RIGHT SIDE:   1 - 39 % right ICA stenosis.   Heterogeneous plaque in the right internal carotid artery.   Antegrade flow in the right vertebral artery. "     LEFT SIDE:  1 - 39% left ICA stenosis.   Heterogeneous plaque in the left internal carotid artery.   Antegrade flow in the left vertebral artery.  Chronic, stable on medication. Followed by PCP.    3. Morbid obesity due to excess calories  Stable. Discussed exercise and diet. Walks daily. Followed by PCP.    4. Coronary artery disease due to lipid rich plaque  Stable. Chronic. Followed by PCP.    5. Hyperlipidemia, unspecified hyperlipidemia type  Chronic. Stable on medication regimen. Followed by PCP.    6. Hypertension, essential  Chronic. Stable on medication regimen. Followed by PCP.    7. Pulmonary hypertension  Chronic. Stable. Followed by PCP.    8. Primary osteoarthritis of right knee  Chronic. Stable. Followed by PCP.  Is considering knee injections.    9. H/O syncope  Stable. Followed by PCP.    10. RAHEEL (obstructive sleep apnea)  Stable. Followed by PCP.    11. Spondylosis of lumbar region without myelopathy or radiculopathy  Chronic. Followed by Sports Medicine.      Provided [place] with a 5-10 year written screening schedule and personal prevention plan. Recommendations were developed using the USPSTF age appropriate recommendations. Education, counseling, and referrals were provided as needed. After Visit Summary printed and given to patient which includes a list of additional screenings\tests needed.    Return for follow up with Primary Care Provider as instructed, ;sooner if problems, HRA in 1 year.    LAVONNE Izquierdo

## 2017-10-19 NOTE — PATIENT INSTRUCTIONS
Flu shot today        Information about cholesterol, high blood pressure and healthy diet and activity recommendations can be found at the following links on the Internet.    http://www.nhlbi.nih.gov/health/health-topics/topics/hbc    http://www.nhlbi.nih.gov/health/educational/lose_wt/index.htm    http://www.nhlbi.nih.gov/files/docs/public/heart/hbp_low.pdf

## 2017-10-23 ENCOUNTER — CLINICAL SUPPORT (OUTPATIENT)
Dept: CARDIOLOGY | Facility: CLINIC | Age: 67
End: 2017-10-23
Attending: FAMILY MEDICINE
Payer: MEDICARE

## 2017-10-23 DIAGNOSIS — I77.9 BILATERAL CAROTID ARTERY DISEASE: ICD-10-CM

## 2017-10-23 DIAGNOSIS — I67.2 CEREBRAL ATHEROSCLEROSIS: ICD-10-CM

## 2017-10-23 LAB — INTERNAL CAROTID STENOSIS: NORMAL

## 2017-10-23 PROCEDURE — 93880 EXTRACRANIAL BILAT STUDY: CPT | Mod: S$GLB,,, | Performed by: INTERNAL MEDICINE

## 2017-11-08 ENCOUNTER — OFFICE VISIT (OUTPATIENT)
Dept: INTERNAL MEDICINE | Facility: CLINIC | Age: 67
End: 2017-11-08
Attending: FAMILY MEDICINE
Payer: MEDICARE

## 2017-11-08 VITALS
WEIGHT: 262.5 LBS | HEART RATE: 56 BPM | HEIGHT: 68 IN | SYSTOLIC BLOOD PRESSURE: 126 MMHG | BODY MASS INDEX: 39.78 KG/M2 | OXYGEN SATURATION: 97 % | DIASTOLIC BLOOD PRESSURE: 70 MMHG

## 2017-11-08 DIAGNOSIS — M17.11 PRIMARY OSTEOARTHRITIS OF RIGHT KNEE: Primary | ICD-10-CM

## 2017-11-08 PROCEDURE — 20610 DRAIN/INJ JOINT/BURSA W/O US: CPT | Mod: RT,S$GLB,, | Performed by: FAMILY MEDICINE

## 2017-11-08 PROCEDURE — 99499 UNLISTED E&M SERVICE: CPT | Mod: S$GLB,,, | Performed by: FAMILY MEDICINE

## 2017-11-08 PROCEDURE — 99999 PR PBB SHADOW E&M-EST. PATIENT-LVL III: CPT | Mod: PBBFAC,,, | Performed by: FAMILY MEDICINE

## 2017-11-08 RX ORDER — HYALURONATE SODIUM 20 MG/2 ML
20 SYRINGE (ML) INTRAARTICULAR WEEKLY
Status: COMPLETED | OUTPATIENT
Start: 2017-11-08 | End: 2017-11-24

## 2017-11-08 RX ADMIN — Medication 20 MG: at 02:11

## 2017-11-08 NOTE — PROGRESS NOTES
Subjective:       Patient ID: Domonique Kellogg is a 67 y.o. male.    Chief Complaint: Follow-up    HPI  Review of Systems   Constitutional: Negative for activity change, chills, fatigue, fever and unexpected weight change.   HENT: Negative for congestion, hearing loss, rhinorrhea and trouble swallowing.    Eyes: Negative for discharge, redness and visual disturbance.   Respiratory: Negative for cough, chest tightness, shortness of breath and wheezing.    Cardiovascular: Negative for chest pain, palpitations and leg swelling.   Gastrointestinal: Negative for abdominal pain, blood in stool, constipation, diarrhea and vomiting.   Endocrine: Negative for polydipsia and polyuria.   Genitourinary: Negative for difficulty urinating, hematuria and urgency.   Musculoskeletal: Positive for arthralgias. Negative for back pain, gait problem, joint swelling, myalgias and neck pain.   Skin: Negative for color change and rash.   Neurological: Negative for tremors, speech difficulty, weakness, numbness and headaches.   Hematological: Negative for adenopathy. Does not bruise/bleed easily.   Psychiatric/Behavioral: Negative for behavioral problems, confusion, dysphoric mood and sleep disturbance. The patient is not nervous/anxious.        Objective:      Physical Exam   Constitutional: He is oriented to person, place, and time. He appears well-developed and well-nourished. No distress.   Neck: Neck supple.   Pulmonary/Chest: Effort normal.   Musculoskeletal: He exhibits no edema.        Right lower leg: He exhibits no edema.        Left lower leg: He exhibits no edema.   Neurological: He is alert and oriented to person, place, and time.   Skin: Skin is warm and dry. No rash noted.   Psychiatric: He has a normal mood and affect. His behavior is normal. Judgment and thought content normal.   Nursing note and vitals reviewed.      Assessment:       1. Primary osteoarthritis of right knee        Plan:   Domonique was seen today for  follow-up.    Diagnoses and all orders for this visit:    Primary osteoarthritis of right knee    Other orders  -     EUFLEXXA 10 mg/mL(mw 2.4 -3.6 million) injection Syrg 20 mg; Inject 2 mLs (20 mg total) into the articular space once a week.      See meds, orders, follow up, routing and instructions sections of encounter.      BRIEF HISTORY:    Patient presents for therapeutic injections in the right Knee(s) for OA. No complaints expressed at this time. The patient was counseled about risks and benefits of knee Visco-supplementation and other injections in general, including but not limited to pain, infection even that requiring surgery to clean out, failure to provide relief, transient flare, tissue damage. Patient expressed understanding, was given the opportunity to ask questions and any questions answered and consented verbally. Time out performed for localization, medication and patient identification using multiple identifiers.    Procedure Note:    Following a standard, sterile 2-antiseptic prep and negative arthrocentesis, Euflexxa was instilled in the right knee(s) with no immediate discomfort. The procedure was tolerated well with no immediate adverse effects.    Follow up for next in series as scheduled.

## 2017-11-14 ENCOUNTER — PATIENT MESSAGE (OUTPATIENT)
Dept: INTERNAL MEDICINE | Facility: CLINIC | Age: 67
End: 2017-11-14

## 2017-11-14 ENCOUNTER — TELEPHONE (OUTPATIENT)
Dept: INTERNAL MEDICINE | Facility: CLINIC | Age: 67
End: 2017-11-14

## 2017-11-14 ENCOUNTER — OFFICE VISIT (OUTPATIENT)
Dept: INTERNAL MEDICINE | Facility: CLINIC | Age: 67
End: 2017-11-14
Payer: MEDICARE

## 2017-11-14 VITALS
WEIGHT: 258.63 LBS | SYSTOLIC BLOOD PRESSURE: 129 MMHG | DIASTOLIC BLOOD PRESSURE: 69 MMHG | BODY MASS INDEX: 39.2 KG/M2 | HEART RATE: 73 BPM | HEIGHT: 68 IN | TEMPERATURE: 100 F | RESPIRATION RATE: 16 BRPM

## 2017-11-14 DIAGNOSIS — I10 HTN, GOAL BELOW 130/80: ICD-10-CM

## 2017-11-14 DIAGNOSIS — J20.9 ACUTE BRONCHITIS, UNSPECIFIED ORGANISM: ICD-10-CM

## 2017-11-14 DIAGNOSIS — B97.89 VIRAL SINUSITIS: Primary | ICD-10-CM

## 2017-11-14 DIAGNOSIS — J32.9 VIRAL SINUSITIS: Primary | ICD-10-CM

## 2017-11-14 PROCEDURE — 99499 UNLISTED E&M SERVICE: CPT | Mod: S$GLB,,, | Performed by: INTERNAL MEDICINE

## 2017-11-14 PROCEDURE — 99999 PR PBB SHADOW E&M-EST. PATIENT-LVL III: CPT | Mod: PBBFAC,,, | Performed by: INTERNAL MEDICINE

## 2017-11-14 PROCEDURE — 96372 THER/PROPH/DIAG INJ SC/IM: CPT | Mod: S$GLB,,, | Performed by: INTERNAL MEDICINE

## 2017-11-14 PROCEDURE — 99214 OFFICE O/P EST MOD 30 MIN: CPT | Mod: 25,S$GLB,, | Performed by: INTERNAL MEDICINE

## 2017-11-14 RX ORDER — TRIAMCINOLONE ACETONIDE 40 MG/ML
40 INJECTION, SUSPENSION INTRA-ARTICULAR; INTRAMUSCULAR
Status: COMPLETED | OUTPATIENT
Start: 2017-11-14 | End: 2017-11-14

## 2017-11-14 RX ORDER — OSELTAMIVIR PHOSPHATE 75 MG/1
75 CAPSULE ORAL 2 TIMES DAILY
Qty: 10 CAPSULE | Refills: 0 | Status: SHIPPED | OUTPATIENT
Start: 2017-11-14 | End: 2017-11-19

## 2017-11-14 RX ADMIN — TRIAMCINOLONE ACETONIDE 40 MG: 40 INJECTION, SUSPENSION INTRA-ARTICULAR; INTRAMUSCULAR at 01:11

## 2017-11-14 NOTE — TELEPHONE ENCOUNTER
----- Message from Jaime Capps sent at 11/14/2017  9:59 AM CST -----  Contact: self/ [phone] home   Pt is calling to check the status of the request made this morning via the [facility] portal.  The pt would like to speak with Dr. Marlow today, if possible.  Please call and advise.    Thank you

## 2017-11-14 NOTE — TELEPHONE ENCOUNTER
----- Message from German Murillo sent at 11/14/2017 10:34 AM CST -----  Contact: [phone]  Patient stated he would see someone today.  We are out of urgent appointment today at the moment. Please call and advise , Thanks

## 2017-11-14 NOTE — PROGRESS NOTES
Subjective:       Patient ID: Domonique Kellogg is a 67 y.o. male.    Chief Complaint: URI    HPI   Pt with HTN is here for evaluation of 24 hours of slight worsening sinus/chest congestion, dry cough, post nasal drip, SOB, fevers, body aches. He has not tried any OTC medications for relief.   Review of Systems   Constitutional: Negative for activity change, appetite change, chills, diaphoresis, fatigue, fever and unexpected weight change.   HENT: Positive for congestion, postnasal drip, sinus pain, sinus pressure and sore throat. Negative for rhinorrhea, sneezing, trouble swallowing and voice change.    Respiratory: Positive for cough. Negative for shortness of breath and wheezing.    Cardiovascular: Negative for chest pain, palpitations and leg swelling.   Gastrointestinal: Negative for abdominal pain, blood in stool, constipation, diarrhea, nausea and vomiting.   Genitourinary: Negative for dysuria.   Musculoskeletal: Negative for arthralgias and myalgias.   Skin: Negative for rash and wound.   Allergic/Immunologic: Negative for environmental allergies and food allergies.   Hematological: Negative for adenopathy. Does not bruise/bleed easily.       Objective:      Physical Exam   Constitutional: He is oriented to person, place, and time. He appears well-developed and well-nourished. No distress.   HENT:   Head: Normocephalic and atraumatic.   Right Ear: External ear normal.   Left Ear: External ear normal.   Nose: Mucosal edema and rhinorrhea present.   Mouth/Throat: Oropharynx is clear and moist. No oropharyngeal exudate.   Eyes: Conjunctivae and EOM are normal. Pupils are equal, round, and reactive to light. Right eye exhibits no discharge. Left eye exhibits no discharge. No scleral icterus.   Neck: Normal range of motion. Neck supple. No JVD present.   Cardiovascular: Normal rate, regular rhythm and normal heart sounds.    No murmur heard.  Pulmonary/Chest: Effort normal and breath sounds normal. No respiratory  distress. He has no wheezes. He has no rales.   Abdominal: Soft. Bowel sounds are normal. There is no tenderness.   Musculoskeletal: He exhibits no edema.   Lymphadenopathy:     He has no cervical adenopathy.   Neurological: He is alert and oriented to person, place, and time.   Skin: Skin is warm and dry. No rash noted. He is not diaphoretic. No pallor.       Assessment:       1. Viral sinusitis    2. Acute bronchitis, unspecified organism    3. HTN, goal below 130/80        Plan:    1. Rx Tamiflu, Kenalog 40 mg IM       No decongestants 2/2 HTN   2. May use Mucinex DM PRN    3. Stable, CPT

## 2017-11-15 ENCOUNTER — PATIENT MESSAGE (OUTPATIENT)
Dept: INTERNAL MEDICINE | Facility: CLINIC | Age: 67
End: 2017-11-15

## 2017-11-15 RX ORDER — DOXYCYCLINE 100 MG/1
100 CAPSULE ORAL 2 TIMES DAILY
Qty: 14 CAPSULE | Refills: 0 | Status: SHIPPED | OUTPATIENT
Start: 2017-11-15 | End: 2018-01-09

## 2017-11-16 ENCOUNTER — TELEPHONE (OUTPATIENT)
Dept: INTERNAL MEDICINE | Facility: CLINIC | Age: 67
End: 2017-11-16

## 2017-11-16 ENCOUNTER — PATIENT MESSAGE (OUTPATIENT)
Dept: INTERNAL MEDICINE | Facility: CLINIC | Age: 67
End: 2017-11-16

## 2017-11-16 NOTE — TELEPHONE ENCOUNTER
----- Message from Jamila Conley sent at 11/16/2017 11:34 AM CST -----  Contact: Self [phone]  Pt would like to speak with the nurse regarding his medicine and did not give any further details,please call

## 2017-11-17 ENCOUNTER — TELEPHONE (OUTPATIENT)
Dept: INTERNAL MEDICINE | Facility: CLINIC | Age: 67
End: 2017-11-17

## 2017-11-17 ENCOUNTER — OFFICE VISIT (OUTPATIENT)
Dept: INTERNAL MEDICINE | Facility: CLINIC | Age: 67
End: 2017-11-17
Attending: FAMILY MEDICINE
Payer: MEDICARE

## 2017-11-17 DIAGNOSIS — M17.11 PRIMARY OSTEOARTHRITIS OF RIGHT KNEE: Primary | ICD-10-CM

## 2017-11-17 PROCEDURE — 20610 DRAIN/INJ JOINT/BURSA W/O US: CPT | Mod: 50,S$GLB,, | Performed by: FAMILY MEDICINE

## 2017-11-17 PROCEDURE — 99499 UNLISTED E&M SERVICE: CPT | Mod: S$GLB,,, | Performed by: FAMILY MEDICINE

## 2017-11-17 PROCEDURE — 99999 PR PBB SHADOW E&M-EST. PATIENT-LVL I: CPT | Mod: PBBFAC,,, | Performed by: FAMILY MEDICINE

## 2017-11-17 RX ADMIN — Medication 20 MG: at 05:11

## 2017-11-17 NOTE — TELEPHONE ENCOUNTER
----- Message from Sandie Bradford sent at 11/17/2017  1:27 PM CST -----  Contact: self/[phone]  Patient called in regards needing to talk with Dr Pacheco medical assistant about if he can coming earlier.   Please call and advise.         Thank you!!!

## 2017-11-17 NOTE — PROGRESS NOTES
BRIEF HISTORY:    Patient presents for therapeutic injections in the right Knee(s) for OA. No complaints expressed at this time. The patient was counseled about risks and benefits of knee Visco-supplementation and other injections in general, including but not limited to pain, infection even that requiring surgery to clean out, failure to provide relief, transient flare, tissue damage. Patient expressed understanding, was given the opportunity to ask questions and any questions answered and consented verbally. Time out performed for localization, medication and patient identification using multiple identifiers.    Procedure Note:    Following a standard, sterile 2-antiseptic prep and negative arthrocentesis, Euflexxa was instilled in the right knee(s) with no immediate discomfort. The procedure was tolerated well with no immediate adverse effects.    Follow up for 3rd in series as scheduled.

## 2017-11-24 ENCOUNTER — OFFICE VISIT (OUTPATIENT)
Dept: INTERNAL MEDICINE | Facility: CLINIC | Age: 67
End: 2017-11-24
Attending: FAMILY MEDICINE
Payer: MEDICARE

## 2017-11-24 DIAGNOSIS — M17.11 PRIMARY OSTEOARTHRITIS OF RIGHT KNEE: Primary | ICD-10-CM

## 2017-11-24 PROCEDURE — 99499 UNLISTED E&M SERVICE: CPT | Mod: S$GLB,,, | Performed by: FAMILY MEDICINE

## 2017-11-24 PROCEDURE — 20610 DRAIN/INJ JOINT/BURSA W/O US: CPT | Mod: RT,S$GLB,, | Performed by: FAMILY MEDICINE

## 2017-11-24 PROCEDURE — 99999 PR PBB SHADOW E&M-EST. PATIENT-LVL II: CPT | Mod: PBBFAC,,, | Performed by: FAMILY MEDICINE

## 2017-11-24 RX ADMIN — Medication 20 MG: at 10:11

## 2017-11-24 NOTE — PROGRESS NOTES
Subjective:       Patient ID: Domonique Kellogg is a 67 y.o. male.    Chief Complaint: Injections    HPI  Review of Systems   Constitutional: Negative for chills, fatigue and fever.   HENT: Negative for congestion and trouble swallowing.    Eyes: Negative for redness.   Respiratory: Negative for cough, chest tightness and shortness of breath.    Cardiovascular: Negative for chest pain, palpitations and leg swelling.   Gastrointestinal: Negative for abdominal pain and blood in stool.   Genitourinary: Negative for hematuria.   Musculoskeletal: Negative for arthralgias, back pain, gait problem, joint swelling, myalgias and neck pain.   Skin: Negative for color change and rash.   Neurological: Negative for tremors, speech difficulty, weakness, numbness and headaches.   Hematological: Negative for adenopathy. Does not bruise/bleed easily.   Psychiatric/Behavioral: Negative for behavioral problems, confusion and sleep disturbance. The patient is not nervous/anxious.        Objective:      Physical Exam   Constitutional: He is oriented to person, place, and time. He appears well-developed and well-nourished. No distress.   Neck: Neck supple.   Pulmonary/Chest: Effort normal.   Musculoskeletal: He exhibits no edema.        Right lower leg: He exhibits no edema.        Left lower leg: He exhibits no edema.   Neurological: He is alert and oriented to person, place, and time.   Skin: Skin is warm and dry. No rash noted.   Psychiatric: He has a normal mood and affect. His behavior is normal. Judgment and thought content normal.   Nursing note and vitals reviewed.      Assessment:       1. Primary osteoarthritis of right knee        Plan:   Domonique was seen today for injections.    Diagnoses and all orders for this visit:    Primary osteoarthritis of right knee      See meds, orders, follow up, routing and instructions sections of encounter.    BRIEF HISTORY:    Patient presents for therapeutic injections in the right Knee(s)  for OA. No complaints expressed at this time. The patient was counseled about risks and benefits of knee Visco-supplementation and other injections in general, including but not limited to pain, infection even that requiring surgery to clean out, failure to provide relief, transient flare, tissue damage. Patient expressed understanding, was given the opportunity to ask questions and any questions answered and consented verbally. Time out performed for localization, medication and patient identification using multiple identifiers.    Procedure Note:    Following a standard, sterile 2-antiseptic prep and negative arthrocentesis, Euflexxa was instilled in the right knee(s) with no immediate discomfort. The procedure was tolerated well with no immediate adverse effects.    Follow up prn.

## 2017-12-12 ENCOUNTER — PATIENT MESSAGE (OUTPATIENT)
Dept: INTERNAL MEDICINE | Facility: CLINIC | Age: 67
End: 2017-12-12

## 2017-12-14 RX ORDER — PRAVASTATIN SODIUM 20 MG/1
20 TABLET ORAL DAILY
Qty: 90 TABLET | Refills: 3 | Status: SHIPPED | OUTPATIENT
Start: 2017-12-14 | End: 2018-10-09

## 2017-12-14 RX ORDER — HYDROCHLOROTHIAZIDE 25 MG/1
25 TABLET ORAL DAILY
Qty: 90 TABLET | Refills: 3 | Status: SHIPPED | OUTPATIENT
Start: 2017-12-14 | End: 2018-05-25

## 2017-12-14 RX ORDER — POTASSIUM CHLORIDE 20 MEQ/1
20 TABLET, EXTENDED RELEASE ORAL DAILY
Qty: 90 TABLET | Refills: 3 | Status: SHIPPED | OUTPATIENT
Start: 2017-12-14 | End: 2018-12-19 | Stop reason: SDUPTHER

## 2018-01-02 ENCOUNTER — PATIENT MESSAGE (OUTPATIENT)
Dept: SLEEP MEDICINE | Facility: CLINIC | Age: 68
End: 2018-01-02

## 2018-01-09 ENCOUNTER — OFFICE VISIT (OUTPATIENT)
Dept: SLEEP MEDICINE | Facility: CLINIC | Age: 68
End: 2018-01-09
Payer: MEDICARE

## 2018-01-09 VITALS
DIASTOLIC BLOOD PRESSURE: 74 MMHG | BODY MASS INDEX: 39.36 KG/M2 | WEIGHT: 259.69 LBS | HEIGHT: 68 IN | SYSTOLIC BLOOD PRESSURE: 122 MMHG | HEART RATE: 76 BPM

## 2018-01-09 DIAGNOSIS — I25.83 CORONARY ARTERY DISEASE DUE TO LIPID RICH PLAQUE: ICD-10-CM

## 2018-01-09 DIAGNOSIS — I10 HYPERTENSION, ESSENTIAL: ICD-10-CM

## 2018-01-09 DIAGNOSIS — I25.10 CORONARY ARTERY DISEASE DUE TO LIPID RICH PLAQUE: ICD-10-CM

## 2018-01-09 DIAGNOSIS — G47.33 OBSTRUCTIVE SLEEP APNEA: Primary | ICD-10-CM

## 2018-01-09 PROCEDURE — 99214 OFFICE O/P EST MOD 30 MIN: CPT | Mod: S$GLB,,, | Performed by: NURSE PRACTITIONER

## 2018-01-09 PROCEDURE — 99499 UNLISTED E&M SERVICE: CPT | Mod: S$GLB,,, | Performed by: NURSE PRACTITIONER

## 2018-01-09 PROCEDURE — 99999 PR PBB SHADOW E&M-EST. PATIENT-LVL III: CPT | Mod: PBBFAC,,, | Performed by: NURSE PRACTITIONER

## 2018-01-09 NOTE — PROGRESS NOTES
CPAP set up Date :17  Overall CPAP use: Nightly  Is CPAP helping?  Yes, he sleeps much better with cpap.  Mask Style, Comfort, fit, chin strap:  Using a ffm Simplus medium. The straps are causing him some issues with his hair.  Pressure Tolerance:  Ok  Humidification: Uses nightly, no heat, setting is on 0.  No issues with oral or nasal drying    CPAP Device interrogation last 30 days:   Machine type : Dream station  Machine condition:  Good  Pressure setting and range: Cpap 13    Ramp 20/6.5   Flex 3  Total usage: 1176.6 hours     Blower  1176.8     Hours  Average daily usage 30 days:  7.3  Days > 4 Hours: 30 days  Predicted AHI: 3.3  Large leak 100 %  Periodic breathin %  Manometer reading 13.1 cm H20  Feels that on/off button at times is hard to operate.

## 2018-01-09 NOTE — PROGRESS NOTES
Domonique Kellogg a 67-year-old male returns today for the management of obstructive sleep apnea. Last seen 17    He continues to use cpap 13cm nightly. contniued hair loss top of head from mask straps. Denies oral drying or nasal drying, adds water but keeps heat level at 0. Sleeps better using therapy. No breakthrough snoring or apneic pauses. ESS=8. Denies interval medical change. Lost 5# in interim. BP stable. On button hard to turn machine on intermittently.     CPAP set up Date :17  Overall CPAP use: Nightly  Is CPAP helping?  Yes, he sleeps much better with cpap.  Mask Style, Comfort, fit, chin strap:  Using a ffm Simplus medium. The straps are causing him some issues with his hair.  Pressure Tolerance:  Ok  Humidification: Uses nightly, no heat, setting is on 0.  No issues with oral or nasal drying   filter dirty  CPAP Device interrogation last 30 days:   Machine type : Dream station  Machine condition:  Good  Pressure setting and range: Cpap 13    Ramp 20/6.5   Flex 3  Total usage: 1176.6 hours     Blower  1176.8     Hours  Average daily usage 30 days:  7.3  Days > 4 Hours: 30 days  Predicted AHI: 3.3  Large leak 100 %  Periodic breathin %  Manometer reading 13.1 cm H20  Feels that on/off button at times is hard to operate.      HISTORY:  16:  Since last seen, he has been setup with apap (12/11/15). Acclimated very quickly to it. Using FFM. Denies mask leaks or oral or nasal drying. Using it faithfully every night. No more snoring or witnessed apneic pauses. Snoring heard only once in interim had cold symptoms. He does feel more refreshed since beginning therapy. No excessively sleepiness. He power naps occasionally during the daytime, mainly when sedentary or after lunch.  ESS=7    Interrogation: new machine condition. AHI 6.7-7.2, 30d avg 6.7h/n. Simplus mask M, 90%-tile 30d 14.1, 1d 11cm. 100% mask fit. Heat at 3    17: He continues to use fixed cpap 13cm. AHI <5-6 with 4-8hr sleep  "(more TST when off). Wants to chagne from FFM due to nasal mask due to hair loss from mask straps. Symptoms continue to be resolved using cpap. Using nightly. 11# gain.     Interrogation- good machine condition, 30davg 6.9h/n. 100% mask fit. AHI 3.5, 30/30d>4h. Manometer 13cm      PS/24/15: AHI was 16.0 with an oxygen shivam of 83.0%. The supine AHI was 34.6 and the REM AHI was 40.6. The patient did not qualify for a split night study due to an insufficient number of events in the first half of the study. HR 40's. APAP >>CPAP      REVIEW OF SYSTEMS: Sleep related symptoms as per HPI, wgt loss, otherwise a balance review of 10-systems is negative.   Denies interval medical change        PHYSICAL EXAM:     /74   Pulse 76   Ht 5' 8" (1.727 m)   Wt 117.8 kg (259 lb 11.2 oz)   BMI 39.49 kg/m²    GENERAL: W/D, obese male, well groomed       Impression:    RAHEEL, moderate--remains adherent with improvement of his symptoms AHI<5.   Medical comorbidities of HTN, CAD,  obesity (BMI>30).    PLAN:   1.Continue cpap 13cm.  Continue excellent nightly use, supplies via [facility]. Discussed the effectiveness of therapy as well as the potential ramifications of untreated sleep apnea, which could include daytime sleepiness, hypertension, heart disease and/or stroke. Change filter and consider Carlie view mask, less top head straps  2. RTC 12-mos adherence monitoring, SOONER if needed. See cardiology as advised re: CAD (remain ASA/statin, last visit ) and PCP re: HTN mgt. Encouraged continued weight loss efforts for potential improvement of RAHEEL and overall health benefits         "

## 2018-01-11 ENCOUNTER — PATIENT MESSAGE (OUTPATIENT)
Dept: INTERNAL MEDICINE | Facility: CLINIC | Age: 68
End: 2018-01-11

## 2018-01-18 ENCOUNTER — PATIENT MESSAGE (OUTPATIENT)
Dept: INTERNAL MEDICINE | Facility: CLINIC | Age: 68
End: 2018-01-18

## 2018-02-06 ENCOUNTER — PATIENT MESSAGE (OUTPATIENT)
Dept: INTERNAL MEDICINE | Facility: CLINIC | Age: 68
End: 2018-02-06

## 2018-02-06 DIAGNOSIS — I10 HYPERTENSION, ESSENTIAL: Primary | ICD-10-CM

## 2018-02-09 ENCOUNTER — TELEPHONE (OUTPATIENT)
Dept: INTERNAL MEDICINE | Facility: CLINIC | Age: 68
End: 2018-02-09

## 2018-02-09 NOTE — TELEPHONE ENCOUNTER
Called pt no answer left message on voicemail requesting a call back in regards to scheduling lab appointment

## 2018-02-09 NOTE — TELEPHONE ENCOUNTER
----- Message from Sabra Mon sent at 2/7/2018  3:20 PM CST -----  Contact: self/[phone]  Pt is returning a call from the office. Please call and advise.    Thank You

## 2018-02-12 ENCOUNTER — LAB VISIT (OUTPATIENT)
Dept: LAB | Facility: HOSPITAL | Age: 68
End: 2018-02-12
Attending: FAMILY MEDICINE
Payer: MEDICARE

## 2018-02-12 DIAGNOSIS — I10 HYPERTENSION, ESSENTIAL: ICD-10-CM

## 2018-02-12 LAB
ANION GAP SERPL CALC-SCNC: 11 MMOL/L
BUN SERPL-MCNC: 14 MG/DL
CALCIUM SERPL-MCNC: 10.2 MG/DL
CHLORIDE SERPL-SCNC: 104 MMOL/L
CO2 SERPL-SCNC: 24 MMOL/L
CREAT SERPL-MCNC: 1 MG/DL
EST. GFR  (AFRICAN AMERICAN): >60 ML/MIN/1.73 M^2
EST. GFR  (NON AFRICAN AMERICAN): >60 ML/MIN/1.73 M^2
GLUCOSE SERPL-MCNC: 82 MG/DL
POTASSIUM SERPL-SCNC: 3.7 MMOL/L
SODIUM SERPL-SCNC: 139 MMOL/L

## 2018-02-12 PROCEDURE — 80048 BASIC METABOLIC PNL TOTAL CA: CPT

## 2018-02-12 PROCEDURE — 36415 COLL VENOUS BLD VENIPUNCTURE: CPT

## 2018-02-13 ENCOUNTER — PATIENT MESSAGE (OUTPATIENT)
Dept: INTERNAL MEDICINE | Facility: CLINIC | Age: 68
End: 2018-02-13

## 2018-04-04 ENCOUNTER — OFFICE VISIT (OUTPATIENT)
Dept: OPTOMETRY | Facility: CLINIC | Age: 68
End: 2018-04-04
Payer: MEDICARE

## 2018-04-04 DIAGNOSIS — I10 HYPERTENSION, ESSENTIAL: Primary | ICD-10-CM

## 2018-04-04 DIAGNOSIS — H25.13 NS (NUCLEAR SCLEROSIS), BILATERAL: ICD-10-CM

## 2018-04-04 DIAGNOSIS — H25.013 CORTICAL AGE-RELATED CATARACT OF BOTH EYES: ICD-10-CM

## 2018-04-04 DIAGNOSIS — H52.4 PRESBYOPIA: ICD-10-CM

## 2018-04-04 PROCEDURE — 99499 UNLISTED E&M SERVICE: CPT | Mod: S$GLB,,, | Performed by: OPTOMETRIST

## 2018-04-04 PROCEDURE — 92014 COMPRE OPH EXAM EST PT 1/>: CPT | Mod: S$GLB,,, | Performed by: OPTOMETRIST

## 2018-04-04 PROCEDURE — 99999 PR PBB SHADOW E&M-EST. PATIENT-LVL II: CPT | Mod: PBBFAC,,, | Performed by: OPTOMETRIST

## 2018-04-04 RX ORDER — MAGNESIUM 250 MG
TABLET ORAL ONCE
COMMUNITY
End: 2018-05-25

## 2018-04-04 NOTE — PROGRESS NOTES
HPI     Last eye exam was 12/28/16 with Dr. Daly.  Patient states decrease in distance > near vision with current glasses.   Hasn't updated glasses in over 3 years.  Patient denies diplopia, headaches, flashes/floaters, and pain.      Last edited by Sadaf Montano on 4/4/2018  3:06 PM. (History)            Assessment /Plan     For exam results, see Encounter Report.    Hypertension, essential    Cortical age-related cataract of both eyes    NS (nuclear sclerosis), bilateral    Presbyopia            1.  No retinopathy--monitor yearly.  BP control.  2-3.  Educated on cataracts and affects on vision.  Consult Dr. Hays for cataract surgery.  4.   Continue w/ current rx

## 2018-04-05 ENCOUNTER — PATIENT MESSAGE (OUTPATIENT)
Dept: INTERNAL MEDICINE | Facility: CLINIC | Age: 68
End: 2018-04-05

## 2018-04-05 DIAGNOSIS — H26.9 CATARACT, UNSPECIFIED CATARACT TYPE, UNSPECIFIED LATERALITY: Primary | ICD-10-CM

## 2018-05-08 ENCOUNTER — OFFICE VISIT (OUTPATIENT)
Dept: OPHTHALMOLOGY | Facility: CLINIC | Age: 68
End: 2018-05-08
Attending: OPHTHALMOLOGY
Payer: MEDICARE

## 2018-05-08 DIAGNOSIS — H25.13 NUCLEAR SCLEROSIS, BILATERAL: Primary | ICD-10-CM

## 2018-05-08 PROCEDURE — 99999 PR PBB SHADOW E&M-EST. PATIENT-LVL II: CPT | Mod: PBBFAC,,, | Performed by: OPHTHALMOLOGY

## 2018-05-08 PROCEDURE — 92136 OPHTHALMIC BIOMETRY: CPT | Mod: RT,S$GLB,, | Performed by: OPHTHALMOLOGY

## 2018-05-08 PROCEDURE — 92014 COMPRE OPH EXAM EST PT 1/>: CPT | Mod: S$GLB,,, | Performed by: OPHTHALMOLOGY

## 2018-05-08 RX ORDER — MOXIFLOXACIN 5 MG/ML
1 SOLUTION/ DROPS OPHTHALMIC
Status: CANCELLED | OUTPATIENT
Start: 2018-05-08

## 2018-05-08 RX ORDER — TROPICAMIDE 10 MG/ML
1 SOLUTION/ DROPS OPHTHALMIC
Status: CANCELLED | OUTPATIENT
Start: 2018-05-08

## 2018-05-08 RX ORDER — PHENYLEPHRINE HYDROCHLORIDE 25 MG/ML
1 SOLUTION/ DROPS OPHTHALMIC
Status: CANCELLED | OUTPATIENT
Start: 2018-05-08

## 2018-05-08 RX ORDER — TETRACAINE HYDROCHLORIDE 5 MG/ML
1 SOLUTION OPHTHALMIC
Status: CANCELLED | OUTPATIENT
Start: 2018-05-08

## 2018-05-08 NOTE — PROGRESS NOTES
HPI     Referred by Dr Mccoy    No eyedrops  No eye surgery    Pt referred by Dr Mccoy for cataract eval.  Pt states fuzzy VA OU off and   on. Pt states he has difficulty with glare when driving.      Last edited by Katelin Garcia MA on 5/8/2018  3:00 PM. (History)              Assessment /Plan     For exam results, see Encounter Report.    Nuclear sclerosis, bilateral      Visually Significant Cataract: Patient reports decreased vision consistent with the clinical amount of lenticular opacity, which reaches the level of visual significance and affects activities of daily living. Risks, benefits, and alternatives to cataract surgery were discussed and the consent reviewed. IOL options were discussed, including ATIOLs and the associated side effects and additional patient cost associated with them.   IOL Selections:   Right eye  IOL: pcboo 17.0     Left eye  IOL: pcboo 15,5    Pt wishes to have right eye done first.  OK with glasses if needed

## 2018-05-08 NOTE — LETTER
May 8, 2018      Nicolas Pacheco MD  [address]  [place] [address]           [facility]  [address]  [place] [address]  Phone: [phone]  Fax: [phone]          Patient: Domonique Kellogg   MR Number: [id number]   YOB: 1950   Date of Visit: 5/8/2018       Dear Dr. Nicolas Pacheco:    Thank you for referring Domonique Kellogg to me for evaluation. Attached you will find relevant portions of my assessment and plan of care.    If you have questions, please do not hesitate to call me. I look forward to following Domonique Kellogg along with you.    Sincerely,    Priscilla Hays MD    Enclosure  CC:  No Recipients    If you would like to receive this communication electronically, please contact [email or web address] or [phone] to request more information on [employer] Link access.    For providers and/or their staff who would like to refer a patient to [facility], please contact us through our one-stop-shop provider referral line, [facility] [place], at [phone].    If you feel you have received this communication in error or would no longer like to receive these types of communications, please e-mail [email or web address]

## 2018-05-10 ENCOUNTER — PATIENT MESSAGE (OUTPATIENT)
Dept: INTERNAL MEDICINE | Facility: CLINIC | Age: 68
End: 2018-05-10

## 2018-05-10 DIAGNOSIS — K62.5 RECTAL BLEEDING: Primary | ICD-10-CM

## 2018-05-23 ENCOUNTER — TELEPHONE (OUTPATIENT)
Dept: OPHTHALMOLOGY | Facility: CLINIC | Age: 68
End: 2018-05-23

## 2018-05-23 DIAGNOSIS — H25.11 NUCLEAR SCLEROTIC CATARACT OF RIGHT EYE: Primary | ICD-10-CM

## 2018-05-24 ENCOUNTER — TELEPHONE (OUTPATIENT)
Dept: OPHTHALMOLOGY | Facility: CLINIC | Age: 68
End: 2018-05-24

## 2018-05-24 ENCOUNTER — PES CALL (OUTPATIENT)
Dept: ADMINISTRATIVE | Facility: CLINIC | Age: 68
End: 2018-05-24

## 2018-05-24 DIAGNOSIS — H25.12 NUCLEAR SCLEROTIC CATARACT OF LEFT EYE: Primary | ICD-10-CM

## 2018-05-24 NOTE — PROGRESS NOTES
bv  CRS Office Visit History and Physical    Referring Md:   Nicolas Marlow Md  [address] [place] [address]    SUBJECTIVE:     Chief Complaint: rectal bleeding    History of Present Illness:  Patient is a 68 y.o. male presents with rectal bleeding. The patient is a new patient to this practice.   Course is as follows:  Intermittent blood on the toilet paper after wiping when constipated.  Denies pain.  This is happened 3 or 4 times over the past few months.  No mass or protrusion.  No family history of colorectal cancer.  No family history of inflammatory bowel disease.  He has intermittent constipation.  With constipation, he often spend prolonged period of time on the toilet.    Last Colonoscopy: 2011:  Normal.  10 year followup recommended    Review of patient's allergies indicates:   Allergen Reactions    Indomethacin      Headache dizziness    Adhesive Rash       Past Medical History:   Diagnosis Date    Anticoagulant long-term use     Arthritis     CAD (coronary artery disease) 3/2/2015    non-obstructive per [facility]     CAD (coronary artery disease) 3/26/2015    Cataract     Dry eye syndrome     Hypertension     Seizures      Past Surgical History:   Procedure Laterality Date    KNEE SURGERY      WISDOM TOOTH EXTRACTION       Family History   Problem Relation Age of Onset    Cancer Mother         pancreatic    Diabetes Mother     Hypertension Mother     Heart disease Father     No Known Problems Sister     Cancer Maternal Grandmother     Heart disease Paternal Grandfather     Heart disease Brother     No Known Problems Daughter     No Known Problems Son     No Known Problems Sister     No Known Problems Sister     No Known Problems Sister     No Known Problems Daughter     Blindness Neg Hx     Macular degeneration Neg Hx     Retinal detachment Neg Hx     Glaucoma Neg Hx      Social History   Substance Use Topics    Smoking status: Former Smoker     Quit date:  "1/1/1987    Smokeless tobacco: Never Used      Comment: quit 1987    Alcohol use 0.6 - 1.2 oz/week     1 - 2 Glasses of wine per week      Comment: 1-2 glasses wine several times weekly        Review of Systems:  Review of Systems   Constitutional: Negative for chills, diaphoresis, fever, malaise/fatigue and weight loss.   HENT: Negative for congestion.    Respiratory: Negative for shortness of breath.    Cardiovascular: Negative for chest pain and leg swelling.   Gastrointestinal: Positive for blood in stool and constipation. Negative for abdominal pain, nausea and vomiting.   Genitourinary: Negative for dysuria.   Musculoskeletal: Negative for back pain and myalgias.   Skin: Negative for rash.   Neurological: Negative for dizziness and weakness.   Endo/Heme/Allergies: Does not bruise/bleed easily.   Psychiatric/Behavioral: Negative for depression.       OBJECTIVE:     Vital Signs (Most Recent)  /73 (BP Location: Right arm, Patient Position: Sitting, BP Method: Large (Automatic))   Pulse (!) 52   Ht 5' 8" (1.727 m)   Wt 119.9 kg (264 lb 5.3 oz)   BMI 40.19 kg/m²     Physical Exam:  General: Black or  male in no distress   Neuro: alert and oriented x 4.  Moves all extremities.     HEENT: no icterus.  Trachea midline  Respiratory: respirations are even and unlabored  Cardiac: regular rate  Abdomen: soft, nontender, no masses  Extremities: Warm dry and intact  Skin: no rashes  Anorectal: External exam was normal.  Digital exam was performed and was normal.  No masses. Anoscopy was then performed. Grade 1 internal hemorrhoids seen in the right posterior location.  Otherwise normal.  No active bleeding    Labs: none    Imaging: none      ASSESSMENT/PLAN:     Domonique was seen today for rectal bleeding.    Diagnoses and all orders for this visit:    Grade I hemorrhoids    Rectal bleeding        68-year-old gentleman with intermittent rectal bleeding associated constipation grade 1 hemorrhoids. "  We discussed management of his constipation with increased fiber intake and avoid straining on the toilet.  He can follow-up with me as needed    HARRIETT Caceres MD  Staff Surgeon  Colon & Rectal Surgery

## 2018-05-25 ENCOUNTER — OFFICE VISIT (OUTPATIENT)
Dept: SURGERY | Facility: CLINIC | Age: 68
End: 2018-05-25
Attending: FAMILY MEDICINE
Payer: MEDICARE

## 2018-05-25 VITALS
HEART RATE: 52 BPM | DIASTOLIC BLOOD PRESSURE: 73 MMHG | BODY MASS INDEX: 40.06 KG/M2 | HEIGHT: 68 IN | WEIGHT: 264.31 LBS | SYSTOLIC BLOOD PRESSURE: 131 MMHG

## 2018-05-25 DIAGNOSIS — K62.5 RECTAL BLEEDING: ICD-10-CM

## 2018-05-25 DIAGNOSIS — K64.0 GRADE I HEMORRHOIDS: Primary | ICD-10-CM

## 2018-05-25 PROCEDURE — 99203 OFFICE O/P NEW LOW 30 MIN: CPT | Mod: 25,S$GLB,, | Performed by: COLON & RECTAL SURGERY

## 2018-05-25 PROCEDURE — 46600 DIAGNOSTIC ANOSCOPY SPX: CPT | Mod: S$GLB,,, | Performed by: COLON & RECTAL SURGERY

## 2018-05-25 PROCEDURE — 99999 PR PBB SHADOW E&M-EST. PATIENT-LVL III: CPT | Mod: PBBFAC,,, | Performed by: COLON & RECTAL SURGERY

## 2018-05-25 PROCEDURE — 3075F SYST BP GE 130 - 139MM HG: CPT | Mod: CPTII,S$GLB,, | Performed by: COLON & RECTAL SURGERY

## 2018-05-25 PROCEDURE — 3078F DIAST BP <80 MM HG: CPT | Mod: CPTII,S$GLB,, | Performed by: COLON & RECTAL SURGERY

## 2018-05-25 RX ORDER — HYDROCHLOROTHIAZIDE 12.5 MG/1
12.5 TABLET ORAL DAILY
COMMUNITY
End: 2018-12-20 | Stop reason: SDUPTHER

## 2018-05-25 NOTE — LETTER
May 25, 2018      Nicolas Pacheco MD  [address]  [facility] [address]           [facility]  [address]  [facility] [address]  Phone: [phone]          Patient: Domonique Kellogg   MR Number: [id number]   YOB: 1950   Date of Visit: 5/25/2018       Dear Dr. Nicolas Pacheco:    Thank you for referring Domonique Kellogg to me for evaluation. Attached you will find relevant portions of my assessment and plan of care.    If you have questions, please do not hesitate to call me. I look forward to following Domonique Kellogg along with you.    Sincerely,    Mickey Caceres MD    Enclosure  CC:  No Recipients    If you would like to receive this communication electronically, please contact [email or web address] or [phone] to request more information on [employer] Link access.    For providers and/or their staff who would like to refer a patient to [facility], please contact us through our one-stop-shop provider referral line, [facility], at [phone].    If you feel you have received this communication in error or would no longer like to receive these types of communications, please e-mail [email or web address]

## 2018-05-26 ENCOUNTER — PATIENT MESSAGE (OUTPATIENT)
Dept: SURGERY | Facility: CLINIC | Age: 68
End: 2018-05-26

## 2018-06-06 ENCOUNTER — OFFICE VISIT (OUTPATIENT)
Dept: INTERNAL MEDICINE | Facility: CLINIC | Age: 68
End: 2018-06-06
Payer: MEDICARE

## 2018-06-06 VITALS
OXYGEN SATURATION: 96 % | SYSTOLIC BLOOD PRESSURE: 126 MMHG | HEART RATE: 66 BPM | BODY MASS INDEX: 40.03 KG/M2 | TEMPERATURE: 98 F | DIASTOLIC BLOOD PRESSURE: 80 MMHG | HEIGHT: 68 IN | WEIGHT: 264.13 LBS

## 2018-06-06 DIAGNOSIS — E78.5 HYPERLIPIDEMIA, UNSPECIFIED HYPERLIPIDEMIA TYPE: ICD-10-CM

## 2018-06-06 DIAGNOSIS — I25.83 CORONARY ARTERY DISEASE DUE TO LIPID RICH PLAQUE: ICD-10-CM

## 2018-06-06 DIAGNOSIS — I10 HYPERTENSION, ESSENTIAL: ICD-10-CM

## 2018-06-06 DIAGNOSIS — M17.11 PRIMARY OSTEOARTHRITIS OF RIGHT KNEE: ICD-10-CM

## 2018-06-06 DIAGNOSIS — Z00.00 ENCOUNTER FOR PREVENTIVE HEALTH EXAMINATION: Primary | ICD-10-CM

## 2018-06-06 DIAGNOSIS — I25.10 CORONARY ARTERY DISEASE DUE TO LIPID RICH PLAQUE: ICD-10-CM

## 2018-06-06 DIAGNOSIS — E66.01 CLASS 2 SEVERE OBESITY DUE TO EXCESS CALORIES WITH SERIOUS COMORBIDITY AND BODY MASS INDEX (BMI) OF 39.0 TO 39.9 IN ADULT: ICD-10-CM

## 2018-06-06 DIAGNOSIS — I27.20 PULMONARY HYPERTENSION: ICD-10-CM

## 2018-06-06 DIAGNOSIS — I77.9 BILATERAL CAROTID ARTERY DISEASE: ICD-10-CM

## 2018-06-06 DIAGNOSIS — G47.33 OSA (OBSTRUCTIVE SLEEP APNEA): ICD-10-CM

## 2018-06-06 DIAGNOSIS — M47.816 SPONDYLOSIS OF LUMBAR REGION WITHOUT MYELOPATHY OR RADICULOPATHY: ICD-10-CM

## 2018-06-06 DIAGNOSIS — Z87.898 H/O SYNCOPE: ICD-10-CM

## 2018-06-06 PROCEDURE — G0439 PPPS, SUBSEQ VISIT: HCPCS | Mod: S$GLB,,, | Performed by: NURSE PRACTITIONER

## 2018-06-06 PROCEDURE — 3079F DIAST BP 80-89 MM HG: CPT | Mod: CPTII,S$GLB,, | Performed by: NURSE PRACTITIONER

## 2018-06-06 PROCEDURE — 99499 UNLISTED E&M SERVICE: CPT | Mod: S$GLB,,, | Performed by: NURSE PRACTITIONER

## 2018-06-06 PROCEDURE — 99999 PR PBB SHADOW E&M-EST. PATIENT-LVL IV: CPT | Mod: PBBFAC,,, | Performed by: NURSE PRACTITIONER

## 2018-06-06 PROCEDURE — 3074F SYST BP LT 130 MM HG: CPT | Mod: CPTII,S$GLB,, | Performed by: NURSE PRACTITIONER

## 2018-06-06 NOTE — PROGRESS NOTES
"Domonique Kellogg presented for a  Medicare AWV and comprehensive Health Risk Assessment today. The following components were reviewed and updated:    · Medical history  · Family History  · Social history  · Allergies and Current Medications  · Health Risk Assessment  · Health Maintenance  · Care Team     ** See Completed Assessments for Annual Wellness Visit within the encounter summary.**       The following assessments were completed:  · Living Situation  · CAGE  · Depression Screening  · Timed Get Up and Go  · Whisper Test  · Cognitive Function Screening  ·   ·   · Nutrition Screening  · ADL Screening  · PAQ Screening    Vitals:    06/06/18 1559   BP: 126/80   Pulse: 66   Temp: 98.3 °F (36.8 °C)   TempSrc: Oral   SpO2: 96%   Weight: 119.8 kg (264 lb 1.8 oz)   Height: 5' 8" (1.727 m)     Body mass index is 40.16 kg/m².  Physical Exam   Constitutional: He is oriented to person, place, and time. He appears well-developed and well-nourished.   HENT:   Head: Normocephalic and atraumatic.   Nose: Nose normal.   Eyes: Conjunctivae and EOM are normal.   Cardiovascular: Normal rate, regular rhythm, normal heart sounds and intact distal pulses.    Pulmonary/Chest: Effort normal and breath sounds normal.   Musculoskeletal: Normal range of motion.   Neurological: He is alert and oriented to person, place, and time.   Skin: Skin is warm and dry.   Psychiatric: He has a normal mood and affect. His behavior is normal. Judgment and thought content normal.   Nursing note and vitals reviewed.        Diagnoses and health risks identified today and associated recommendations/orders:    1. Encounter for preventive health examination  Assessment performed. Health maintenance updated. Chart review completed.    2. Bilateral carotid artery disease  Chronic. Stable on current regimen. Followed by PCP.    3. Class 2 severe obesity due to excess calories with serious comorbidity and body mass index (BMI) of 39.0 to 39.9 in adult  Chronic. " Stable on current regimen. Followed by PCP.    4. Coronary artery disease due to lipid rich plaque  Chronic. Stable. Followed by Cardiology.  5. Hyperlipidemia, unspecified hyperlipidemia type  Chronic. Stable on current regimen. Followed by PCP.    6. Hypertension, essential  Chronic. Stable on current regimen. Followed by PCP.    7. Primary osteoarthritis of right knee  Chronic. Followed by PCP.    8. Pulmonary hypertension  Chronic. Stable. Followed by Cardiology.    9. H/O syncope  Stable. Followed by PCP.    10. RAHEEL (obstructive sleep apnea)  Stable. Followed by Sleep Medicine.    11. Spondylosis of lumbar region without myelopathy or radiculopathy  Chronic. Stable. Followed by PCP.      Provided Domonique with a 5-10 year written screening schedule and personal prevention plan. Recommendations were developed using the USPSTF age appropriate recommendations. Education, counseling, and referrals were provided as needed. After Visit Summary printed and given to patient which includes a list of additional screenings\tests needed.    Follow-up for follow up with Primary Care Provider as instructed, ;sooner if problems, HRA in 1 year.    LAVONNE Izquierdo

## 2018-06-06 NOTE — PATIENT INSTRUCTIONS
Counseling and Referral of Other Preventative  (Italic type indicates deductible and co-insurance are waived)    Patient Name: Domonique Kellogg  Today's Date: 6/6/2018    Health Maintenance       Date Due Completion Date    Influenza Vaccine 08/01/2018 10/19/2017    High Dose Statin 05/25/2019 5/25/2018    Colonoscopy 02/14/2021 2/14/2011    Lipid Panel 10/17/2022 10/17/2017    TETANUS VACCINE 03/03/2026 3/3/2016        No orders of the defined types were placed in this encounter.    The following information is provided to all patients.  This information is to help you find resources for any of the problems found today that may be affecting your health:                Living healthy guide: www.[facility].[place].gov      Understanding Diabetes: www.diabetes.org      Eating healthy: www.cdc.gov/healthyweight      [facility] home safety checklist: www.cdc.gov/steadi/patient.html      Agency on Aging: www.goea.[place].[facility]      Alcoholics anonymous (AA): www.aa.org      Physical Activity: www.kali.nih.gov/uw6rzov      Tobacco use: www.quitwithusla.org

## 2018-07-05 ENCOUNTER — TELEPHONE (OUTPATIENT)
Dept: OPHTHALMOLOGY | Facility: CLINIC | Age: 68
End: 2018-07-05

## 2018-07-05 NOTE — TELEPHONE ENCOUNTER
----- Message from Halle Williamson MA sent at 7/5/2018 10:07 AM CDT -----  Melecio at [facility], has ? Re: prednisolone r/x-is allergic to indomethacin- is this ok?-call him at [phone]

## 2018-07-12 ENCOUNTER — TELEPHONE (OUTPATIENT)
Dept: OPTOMETRY | Facility: CLINIC | Age: 68
End: 2018-07-12

## 2018-07-12 NOTE — TELEPHONE ENCOUNTER
Spoke with the patient and gave surgery arrival time 10:00am, also do not eat anything after 9:00pm the night before surgery. Patient may have water, gatorade, or powerade from 9:00pm til you leave your home the morning of surgery.

## 2018-07-16 ENCOUNTER — ANESTHESIA EVENT (OUTPATIENT)
Dept: SURGERY | Facility: OTHER | Age: 68
End: 2018-07-16
Payer: MEDICARE

## 2018-07-16 ENCOUNTER — HOSPITAL ENCOUNTER (OUTPATIENT)
Facility: OTHER | Age: 68
Discharge: HOME | End: 2018-07-16
Attending: OPHTHALMOLOGY | Admitting: OPHTHALMOLOGY
Payer: MEDICARE

## 2018-07-16 ENCOUNTER — SURGERY (OUTPATIENT)
Age: 68
End: 2018-07-16

## 2018-07-16 ENCOUNTER — ANESTHESIA (OUTPATIENT)
Dept: SURGERY | Facility: OTHER | Age: 68
End: 2018-07-16
Payer: MEDICARE

## 2018-07-16 VITALS
HEIGHT: 68 IN | SYSTOLIC BLOOD PRESSURE: 113 MMHG | WEIGHT: 264 LBS | BODY MASS INDEX: 40.01 KG/M2 | RESPIRATION RATE: 16 BRPM | HEART RATE: 49 BPM | OXYGEN SATURATION: 99 % | DIASTOLIC BLOOD PRESSURE: 65 MMHG | TEMPERATURE: 98 F

## 2018-07-16 DIAGNOSIS — H25.13 NUCLEAR SCLEROSIS, BILATERAL: ICD-10-CM

## 2018-07-16 PROCEDURE — 66984 XCAPSL CTRC RMVL W/O ECP: CPT | Mod: RT,,, | Performed by: OPHTHALMOLOGY

## 2018-07-16 PROCEDURE — 37000008 HC ANESTHESIA 1ST 15 MINUTES: Performed by: OPHTHALMOLOGY

## 2018-07-16 PROCEDURE — 71000015 HC POSTOP RECOV 1ST HR: Performed by: OPHTHALMOLOGY

## 2018-07-16 PROCEDURE — 37000009 HC ANESTHESIA EA ADD 15 MINS: Performed by: OPHTHALMOLOGY

## 2018-07-16 PROCEDURE — V2632 POST CHMBR INTRAOCULAR LENS: HCPCS | Performed by: OPHTHALMOLOGY

## 2018-07-16 PROCEDURE — 36000707: Performed by: OPHTHALMOLOGY

## 2018-07-16 PROCEDURE — 36000706: Performed by: OPHTHALMOLOGY

## 2018-07-16 PROCEDURE — 63600175 PHARM REV CODE 636 W HCPCS: Performed by: NURSE ANESTHETIST, CERTIFIED REGISTERED

## 2018-07-16 PROCEDURE — 25000003 PHARM REV CODE 250: Performed by: OPHTHALMOLOGY

## 2018-07-16 DEVICE — LENS IOL ITEC PRELOAD 17.0D: Type: IMPLANTABLE DEVICE | Site: EYE | Status: FUNCTIONAL

## 2018-07-16 RX ORDER — MOXIFLOXACIN 5 MG/ML
1 SOLUTION/ DROPS OPHTHALMIC
Status: COMPLETED | OUTPATIENT
Start: 2018-07-16 | End: 2018-07-16

## 2018-07-16 RX ORDER — PHENYLEPHRINE HYDROCHLORIDE 25 MG/ML
1 SOLUTION/ DROPS OPHTHALMIC
Status: DISCONTINUED | OUTPATIENT
Start: 2018-07-16 | End: 2018-07-16 | Stop reason: HOSPADM

## 2018-07-16 RX ORDER — MOXIFLOXACIN 5 MG/ML
1 SOLUTION/ DROPS OPHTHALMIC
Status: DISCONTINUED | OUTPATIENT
Start: 2018-07-16 | End: 2018-07-16 | Stop reason: HOSPADM

## 2018-07-16 RX ORDER — LIDOCAINE HYDROCHLORIDE 40 MG/ML
INJECTION, SOLUTION RETROBULBAR
Status: DISCONTINUED | OUTPATIENT
Start: 2018-07-16 | End: 2018-07-16 | Stop reason: HOSPADM

## 2018-07-16 RX ORDER — MIDAZOLAM HYDROCHLORIDE 1 MG/ML
INJECTION INTRAMUSCULAR; INTRAVENOUS
Status: DISCONTINUED | OUTPATIENT
Start: 2018-07-16 | End: 2018-07-16

## 2018-07-16 RX ORDER — TETRACAINE HYDROCHLORIDE 5 MG/ML
1 SOLUTION OPHTHALMIC
Status: DISCONTINUED | OUTPATIENT
Start: 2018-07-16 | End: 2018-07-16 | Stop reason: HOSPADM

## 2018-07-16 RX ORDER — TETRACAINE HYDROCHLORIDE 5 MG/ML
SOLUTION OPHTHALMIC
Status: DISCONTINUED | OUTPATIENT
Start: 2018-07-16 | End: 2018-07-16 | Stop reason: HOSPADM

## 2018-07-16 RX ORDER — TROPICAMIDE 10 MG/ML
1 SOLUTION/ DROPS OPHTHALMIC
Status: DISCONTINUED | OUTPATIENT
Start: 2018-07-16 | End: 2018-07-16 | Stop reason: HOSPADM

## 2018-07-16 RX ORDER — ACETAMINOPHEN 325 MG/1
650 TABLET ORAL EVERY 4 HOURS PRN
Status: DISCONTINUED | OUTPATIENT
Start: 2018-07-16 | End: 2018-07-16 | Stop reason: HOSPADM

## 2018-07-16 RX ORDER — PROPARACAINE HYDROCHLORIDE 5 MG/ML
1 SOLUTION/ DROPS OPHTHALMIC
Status: DISCONTINUED | OUTPATIENT
Start: 2018-07-16 | End: 2018-07-16 | Stop reason: HOSPADM

## 2018-07-16 RX ORDER — MOXIFLOXACIN 5 MG/ML
SOLUTION/ DROPS OPHTHALMIC
Status: DISCONTINUED | OUTPATIENT
Start: 2018-07-16 | End: 2018-07-16 | Stop reason: HOSPADM

## 2018-07-16 RX ADMIN — MIDAZOLAM HYDROCHLORIDE 1 MG: 1 INJECTION, SOLUTION INTRAMUSCULAR; INTRAVENOUS at 12:07

## 2018-07-16 RX ADMIN — MIDAZOLAM HYDROCHLORIDE 2 MG: 1 INJECTION, SOLUTION INTRAMUSCULAR; INTRAVENOUS at 12:07

## 2018-07-16 RX ADMIN — MOXIFLOXACIN 1 DROP: 5 SOLUTION/ DROPS OPHTHALMIC at 01:07

## 2018-07-16 RX ADMIN — LIDOCAINE HYDROCHLORIDE 2 DROP: 40 INJECTION, SOLUTION RETROBULBAR; TOPICAL at 12:07

## 2018-07-16 RX ADMIN — PHENYLEPHRINE HYDROCHLORIDE 1 DROP: 25 SOLUTION/ DROPS OPHTHALMIC at 10:07

## 2018-07-16 RX ADMIN — TETRACAINE HYDROCHLORIDE 1 DROP: 5 SOLUTION OPHTHALMIC at 10:07

## 2018-07-16 RX ADMIN — MOXIFLOXACIN 1 DROP: 5 SOLUTION/ DROPS OPHTHALMIC at 10:07

## 2018-07-16 RX ADMIN — BALANCED SALT SOLUTION ENRICHED WITH BICARBONATE, DEXTROSE, AND GLUTATHIONE 500 ML: KIT at 12:07

## 2018-07-16 RX ADMIN — TROPICAMIDE 1 DROP: 10 SOLUTION/ DROPS OPHTHALMIC at 10:07

## 2018-07-16 RX ADMIN — TETRACAINE HYDROCHLORIDE 2 DROP: 5 SOLUTION OPHTHALMIC at 12:07

## 2018-07-16 RX ADMIN — MOXIFLOXACIN HYDROCHLORIDE 1 DROP: 5 SOLUTION/ DROPS OPHTHALMIC at 12:07

## 2018-07-16 NOTE — DISCHARGE INSTRUCTIONS
Priscilla Hays MD  [facility]  Department of Ophthalmology      AFTER: Cataract Surgery:  Relax at home and DO NOT exert yourself for the rest of the day.  Plan to see Dr. Hays tomorrow at the eye clinic:   [facility] [address]  [place] [address]    Refer to attached eye drop instruction sheet     Precautions:  DO NOT rub your eye.  You may resume moderate activity the day after surgery.  Wear protective sunglasses during the day and a shield at night for 1(one) week.  If you have pain, redness and decreased vision, call Dr. Hays (or the on-call doctor after hours) @[phone].            Home Care Instructions for Eye Surgeries    1. ACTIVITY:  Limit your activity today. Relax at home and DO NOT exert yourself for the rest of the day. Increase activity gradually. You may return to work or school as directed by your physician.    2. DIET:  Drink plenty of fluids. Resume your normal diet unless instructed otherwise.    3. PAIN:  Expect a moderate amount of pain. If a prescription for pain is not sent home with you, you may take your commonly used pain reliever as directed. If this is not sufficient, call your physician. You may resume any other prescription medication unless otherwise directed by your physician.     Discuss any problem with your physician as soon as it arises. Do not Delay.      EMERGENCY- If you are unable to contact your physician, please go to the nearest Emergency Room.       Anesthesia: Monitored Anesthesia Care (MAC)    Anesthesia Safety  · Have an adult family member or friend drive you home after the procedure.  · For the first 24 hours after your surgery:  · Do not drive or use heavy equipment.  · Do not make important decisions or sign documents.  · Avoid alcohol.  · Have someone stay with you, if possible. They can watch for problems and help keep you safe.

## 2018-07-16 NOTE — ANESTHESIA PREPROCEDURE EVALUATION
07/16/2018  Domonique Kellogg is a 68 y.o., male.    Anesthesia Evaluation    I have reviewed the Patient Summary Reports.    I have reviewed the Nursing Notes.   I have reviewed the Medications.     Review of Systems  Anesthesia Hx:  No problems with previous Anesthesia  Denies Family Hx of Anesthesia complications.   Denies Personal Hx of Anesthesia complications.   Social:  Non-Smoker    Hematology/Oncology:  Hematology Normal   Oncology Normal     EENT/Dental:EENT/Dental Normal   Cardiovascular:   Exercise tolerance: good Hypertension CAD      Pulmonary:   Sleep Apnea    Renal/:  Renal/ Normal     Musculoskeletal:   Arthritis     Neurological:   Denies Seizures.    Endocrine:  Endocrine Normal    Dermatological:  Skin Normal    Psych:  Psychiatric Normal           Physical Exam  General:  Morbid Obesity    Airway/Jaw/Neck:  Airway Findings: Mouth Opening: Normal Tongue: Normal  General Airway Assessment: Adult  Mallampati: I  TM Distance: Normal, at least 6 cm  Jaw/Neck Findings:  Neck ROM: Normal ROM      Dental:  Dental Findings: In tact             Anesthesia Plan  Type of Anesthesia, risks & benefits discussed:  Anesthesia Type:  MAC  Patient's Preference:   Intra-op Monitoring Plan:   Intra-op Monitoring Plan Comments:   Post Op Pain Control Plan:   Post Op Pain Control Plan Comments:   Induction:    Beta Blocker:         Informed Consent: Patient understands risks and agrees with Anesthesia plan.  Questions answered. Anesthesia consent signed with patient.  ASA Score: 3     Day of Surgery Review of History & Physical:    H&P update referred to the surgeon.         Ready For Surgery From Anesthesia Perspective.

## 2018-07-16 NOTE — TRANSFER OF CARE
"Anesthesia Transfer of Care Note    Patient: Domonique Kellogg    Procedure(s) Performed: Procedure(s) (LRB):  PHACOEMULSIFICATION-ASPIRATION-CATARACT (Right)  INSERTION-INTRAOCULAR LENS (IOL) (Right)    Patient location: [facility]    Anesthesia Type: MAC    Transport from OR: Transported from OR on room air with adequate spontaneous ventilation    Post pain: adequate analgesia    Post assessment: no apparent anesthetic complications and tolerated procedure well    Post vital signs: stable    Level of consciousness: awake, alert and oriented    Nausea/Vomiting: no nausea/vomiting    Complications: none    Transfer of care protocol was followed      Last vitals:   Visit Vitals  /73 (BP Location: Right arm, Patient Position: Lying)   Pulse (!) 54   Temp 36.7 °C (98.1 °F) (Oral)   Resp 18   Ht 5' 8" (1.727 m)   Wt 119.7 kg (264 lb)   SpO2 97%   BMI 40.14 kg/m²     "

## 2018-07-16 NOTE — OP NOTE
SURGEON:  Priscilla Hays M.D.    PREOPERATIVE DIAGNOSIS:    Nuclear Sclerotic Cataract Right Eye    POSTOPERATIVE DIAGNOSIS:    Nuclear Sclerotic Cataract Right Eye    PROCEDURES:    Phacoemulsification with  intraocular lens, Right eye (44803)    DATE OF SURGERY: 07/16/2018    IMPLANT: pcboo 17.0    ANESTHESIA:  MAC with topical Lidocaine    COMPLICATIONS:  None    ESTIMATED BLOOD LOSS: None    SPECIMENS: None    INDICATIONS:    The patient has a history of painless progressive visual loss and  difficulty with activities of daily living secondary to cataract formation.  After a thorough discussion of the risks, benefits, and alternatives to cataract surgery, including, but not limited to, the rare risks of infection, retinal detachment, hemorrhage, need for additional surgery, loss of vision, and even loss of the eye, the patient voices understanding and desires to proceed.    DESCRIPTION OF PROCEDURE:    The patients IOL calculations were reviewed, and the lens selection confirmed.   After verification and marking of the proper eye in the preop holding area, the patient was brought to the operating room in supine position where the eye was prepped and draped in standard sterile fashion with 5% Betadine and a lid speculum placed in the eye.   Topical 4% Lidocaine was used in addition to the preoperative anesthesia and the procedure was begun by the creation of a paracentesis incision through which viscoelastic was used to fill the anterior chamber.  Next, a keratome blade was used to create a triplanar temporal clear corneal incision and a cystotome and Utrata forceps used to fashion a continuous curvilinear capsulorrhexis.  Hydrodissection was carried out using the Schuler hydrodissection cannula and the nucleus was found to be mobile.  Phacoemulsification of the nucleus was carried out using a quick chop technique, and all remaining epinuclear and cortical material was removed.  The eye was then reformed with  Viscoelastic and the  intraocular lens was implanted into the capsular bag.  All remaining viscoelastics were removed from the eye and at the end of the case the pupil was round, the lens was well-centered within the capsular bag and all wounds were found to be water tight.  Drops of Vigamox and Pred Forte were instilled and a shield was placed over the eye. The patient will follow up with Dr. Hays in the morning.

## 2018-07-16 NOTE — PLAN OF CARE
Domonique Kellogg has met all discharge criteria from Phase II. Vital Signs are stable, ambulating  without difficulty. Discharge instructions given, patient verbalized understanding. Discharged from facility via wheelchair in stable condition.

## 2018-07-16 NOTE — ANESTHESIA POSTPROCEDURE EVALUATION
"Anesthesia Post Evaluation    Patient: Domonique Kellogg    Procedure(s) Performed: Procedure(s) (LRB):  PHACOEMULSIFICATION-ASPIRATION-CATARACT (Right)  INSERTION-INTRAOCULAR LENS (IOL) (Right)    Final Anesthesia Type: MAC  Patient location during evaluation: [facility]  Patient participation: Yes- Able to Participate  Level of consciousness: awake and alert and oriented  Post-procedure vital signs: reviewed and not stable  Pain management: adequate  Airway patency: patent  PONV status at discharge: No PONV  Anesthetic complications: no      Cardiovascular status: hemodynamically stable  Respiratory status: room air, spontaneous ventilation and unassisted  Hydration status: euvolemic  Follow-up not needed.        Visit Vitals  /73 (BP Location: Right arm, Patient Position: Lying)   Pulse (!) 54   Temp 36.7 °C (98.1 °F) (Oral)   Resp 18   Ht 5' 8" (1.727 m)   Wt 119.7 kg (264 lb)   SpO2 97%   BMI 40.14 kg/m²       Pain/Lalo Score: Pain Assessment Performed: Yes (7/16/2018 10:25 AM)  Presence of Pain: denies (7/16/2018 10:25 AM)      "

## 2018-07-17 ENCOUNTER — OFFICE VISIT (OUTPATIENT)
Dept: OPHTHALMOLOGY | Facility: CLINIC | Age: 68
End: 2018-07-17
Attending: OPHTHALMOLOGY
Payer: MEDICARE

## 2018-07-17 DIAGNOSIS — Z98.890 POST-OPERATIVE STATE: Primary | ICD-10-CM

## 2018-07-17 PROCEDURE — 99024 POSTOP FOLLOW-UP VISIT: CPT | Mod: S$GLB,,, | Performed by: OPHTHALMOLOGY

## 2018-07-17 PROCEDURE — 99999 PR PBB SHADOW E&M-EST. PATIENT-LVL II: CPT | Mod: PBBFAC,,, | Performed by: OPHTHALMOLOGY

## 2018-07-17 NOTE — PROGRESS NOTES
HPI     1 Day post op CE with IOL OD (7/16/18).  Pt states thaty vision is doing well in OD. Pt admits to seeing a   flickering inn the OD corner but denies any floaters. No pain or   discomfort OU.    Pt confirms using PGB TID OD.     Last edited by Libia Rueda on 7/17/2018  4:08 PM. (History)            Assessment /Plan     For exam results, see Encounter Report.    Post-operative state      Slit lamp exam:  L/L: nl  K: clear, wound sealed  AC: 1+ cell  Lens: IOL centered and stable    POD1 s/p Phaco/IOL  Appropriate precautions and post op medications reviewed.  Patient instructed to call or come in if symptoms of redness, decreased vision, or pain are experienced.

## 2018-08-07 ENCOUNTER — OFFICE VISIT (OUTPATIENT)
Dept: OPHTHALMOLOGY | Facility: CLINIC | Age: 68
End: 2018-08-07
Attending: OPHTHALMOLOGY
Payer: MEDICARE

## 2018-08-07 DIAGNOSIS — H25.13 NUCLEAR SCLEROSIS, BILATERAL: Primary | ICD-10-CM

## 2018-08-07 DIAGNOSIS — Z98.890 POST-OPERATIVE STATE: ICD-10-CM

## 2018-08-07 PROCEDURE — 99999 PR PBB SHADOW E&M-EST. PATIENT-LVL II: CPT | Mod: PBBFAC,,, | Performed by: OPHTHALMOLOGY

## 2018-08-07 PROCEDURE — 99024 POSTOP FOLLOW-UP VISIT: CPT | Mod: S$GLB,,, | Performed by: OPHTHALMOLOGY

## 2018-08-07 RX ORDER — TETRACAINE HYDROCHLORIDE 5 MG/ML
1 SOLUTION OPHTHALMIC
Status: CANCELLED | OUTPATIENT
Start: 2018-08-07

## 2018-08-07 RX ORDER — MOXIFLOXACIN 5 MG/ML
1 SOLUTION/ DROPS OPHTHALMIC
Status: CANCELLED | OUTPATIENT
Start: 2018-08-07

## 2018-08-07 RX ORDER — PHENYLEPHRINE HYDROCHLORIDE 25 MG/ML
1 SOLUTION/ DROPS OPHTHALMIC
Status: CANCELLED | OUTPATIENT
Start: 2018-08-07

## 2018-08-07 RX ORDER — TROPICAMIDE 10 MG/ML
1 SOLUTION/ DROPS OPHTHALMIC
Status: CANCELLED | OUTPATIENT
Start: 2018-08-07

## 2018-08-07 NOTE — H&P (VIEW-ONLY)
HPI     3 wk post op CE with IOL OD (7/16/18).    Pt states that vision is doing well OD. No flashes or floaters OU. No pain   but did have FB sensation this morning when he woke up.     Pt confirms palinh   PGB TID OD     Last edited by Libia Rueda on 8/7/2018  3:41 PM. (History)            Assessment /Plan     For exam results, see Encounter Report.    Nuclear sclerosis, bilateral    Post-operative state      Slit lamp exam:  L/L: nl  K: clear, wound sealed  AC: trace cell  Iris/Lens: IOL centered and stable    POW1 s/p phaco: Surgery healing well with no signs of infection or abnormal inflammation.    Patient wishes to proceed with surgery in the second eye. Risks, benefits, alternatives reviewed. IOL selection reviewed.     Left eye  IOL: pcboo 15.5    OK with glasses if needed

## 2018-08-09 ENCOUNTER — TELEPHONE (OUTPATIENT)
Dept: OPTOMETRY | Facility: CLINIC | Age: 68
End: 2018-08-09

## 2018-08-09 NOTE — TELEPHONE ENCOUNTER
Left a voicemail for the patient giving surgery arrival time 11:00am, also do not eat anything after 9:00pm the night before surgery. Patient may have water, Gatorade, or Powerade from 9:00pm until he/she leave their home the morning of surgery.

## 2018-08-13 ENCOUNTER — ANESTHESIA (OUTPATIENT)
Dept: SURGERY | Facility: OTHER | Age: 68
End: 2018-08-13
Payer: MEDICARE

## 2018-08-13 ENCOUNTER — ANESTHESIA EVENT (OUTPATIENT)
Dept: SURGERY | Facility: OTHER | Age: 68
End: 2018-08-13
Payer: MEDICARE

## 2018-08-13 ENCOUNTER — HOSPITAL ENCOUNTER (OUTPATIENT)
Facility: OTHER | Age: 68
Discharge: HOME | End: 2018-08-13
Attending: OPHTHALMOLOGY | Admitting: OPHTHALMOLOGY
Payer: MEDICARE

## 2018-08-13 VITALS
RESPIRATION RATE: 16 BRPM | HEART RATE: 54 BPM | TEMPERATURE: 98 F | WEIGHT: 264 LBS | BODY MASS INDEX: 40.01 KG/M2 | HEIGHT: 68 IN | SYSTOLIC BLOOD PRESSURE: 119 MMHG | DIASTOLIC BLOOD PRESSURE: 69 MMHG | OXYGEN SATURATION: 95 %

## 2018-08-13 DIAGNOSIS — H25.13 NUCLEAR SCLEROSIS, BILATERAL: ICD-10-CM

## 2018-08-13 PROCEDURE — 66984 XCAPSL CTRC RMVL W/O ECP: CPT | Mod: 79,LT,, | Performed by: OPHTHALMOLOGY

## 2018-08-13 PROCEDURE — 37000009 HC ANESTHESIA EA ADD 15 MINS: Performed by: OPHTHALMOLOGY

## 2018-08-13 PROCEDURE — 36000706: Performed by: OPHTHALMOLOGY

## 2018-08-13 PROCEDURE — 63600175 PHARM REV CODE 636 W HCPCS: Performed by: NURSE ANESTHETIST, CERTIFIED REGISTERED

## 2018-08-13 PROCEDURE — V2632 POST CHMBR INTRAOCULAR LENS: HCPCS | Performed by: OPHTHALMOLOGY

## 2018-08-13 PROCEDURE — 25000003 PHARM REV CODE 250: Performed by: OPHTHALMOLOGY

## 2018-08-13 PROCEDURE — 71000015 HC POSTOP RECOV 1ST HR: Performed by: OPHTHALMOLOGY

## 2018-08-13 PROCEDURE — 36000707: Performed by: OPHTHALMOLOGY

## 2018-08-13 PROCEDURE — 37000008 HC ANESTHESIA 1ST 15 MINUTES: Performed by: OPHTHALMOLOGY

## 2018-08-13 DEVICE — LENS IOL ITEC PRELOAD 15.5D: Type: IMPLANTABLE DEVICE | Site: EYE | Status: FUNCTIONAL

## 2018-08-13 RX ORDER — PROPARACAINE HYDROCHLORIDE 5 MG/ML
1 SOLUTION/ DROPS OPHTHALMIC
Status: DISCONTINUED | OUTPATIENT
Start: 2018-08-13 | End: 2018-08-13 | Stop reason: HOSPADM

## 2018-08-13 RX ORDER — TETRACAINE HYDROCHLORIDE 5 MG/ML
1 SOLUTION OPHTHALMIC
Status: COMPLETED | OUTPATIENT
Start: 2018-08-13 | End: 2018-08-13

## 2018-08-13 RX ORDER — LIDOCAINE HYDROCHLORIDE 20 MG/ML
INJECTION, SOLUTION EPIDURAL; INFILTRATION; INTRACAUDAL; PERINEURAL
Status: DISCONTINUED | OUTPATIENT
Start: 2018-08-13 | End: 2018-08-13 | Stop reason: HOSPADM

## 2018-08-13 RX ORDER — TROPICAMIDE 10 MG/ML
1 SOLUTION/ DROPS OPHTHALMIC
Status: COMPLETED | OUTPATIENT
Start: 2018-08-13 | End: 2018-08-13

## 2018-08-13 RX ORDER — MOXIFLOXACIN 5 MG/ML
1 SOLUTION/ DROPS OPHTHALMIC
Status: COMPLETED | OUTPATIENT
Start: 2018-08-13 | End: 2018-08-13

## 2018-08-13 RX ORDER — MOXIFLOXACIN 5 MG/ML
SOLUTION/ DROPS OPHTHALMIC
Status: DISCONTINUED | OUTPATIENT
Start: 2018-08-13 | End: 2018-08-13 | Stop reason: HOSPADM

## 2018-08-13 RX ORDER — ACETAMINOPHEN 325 MG/1
650 TABLET ORAL EVERY 4 HOURS PRN
Status: DISCONTINUED | OUTPATIENT
Start: 2018-08-13 | End: 2018-08-13 | Stop reason: HOSPADM

## 2018-08-13 RX ORDER — TETRACAINE HYDROCHLORIDE 5 MG/ML
SOLUTION OPHTHALMIC
Status: DISCONTINUED | OUTPATIENT
Start: 2018-08-13 | End: 2018-08-13 | Stop reason: HOSPADM

## 2018-08-13 RX ORDER — PHENYLEPHRINE HYDROCHLORIDE 25 MG/ML
1 SOLUTION/ DROPS OPHTHALMIC
Status: COMPLETED | OUTPATIENT
Start: 2018-08-13 | End: 2018-08-13

## 2018-08-13 RX ORDER — MIDAZOLAM HYDROCHLORIDE 1 MG/ML
INJECTION INTRAMUSCULAR; INTRAVENOUS
Status: DISCONTINUED | OUTPATIENT
Start: 2018-08-13 | End: 2018-08-13

## 2018-08-13 RX ADMIN — MOXIFLOXACIN 1 DROP: 5 SOLUTION/ DROPS OPHTHALMIC at 02:08

## 2018-08-13 RX ADMIN — PHENYLEPHRINE HYDROCHLORIDE 1 DROP: 25 SOLUTION/ DROPS OPHTHALMIC at 12:08

## 2018-08-13 RX ADMIN — TROPICAMIDE 1 DROP: 10 SOLUTION/ DROPS OPHTHALMIC at 12:08

## 2018-08-13 RX ADMIN — TETRACAINE HYDROCHLORIDE 1 DROP: 5 SOLUTION OPHTHALMIC at 12:08

## 2018-08-13 RX ADMIN — MIDAZOLAM HYDROCHLORIDE 1 MG: 1 INJECTION, SOLUTION INTRAMUSCULAR; INTRAVENOUS at 01:08

## 2018-08-13 RX ADMIN — MOXIFLOXACIN 1 DROP: 5 SOLUTION/ DROPS OPHTHALMIC at 12:08

## 2018-08-13 RX ADMIN — MIDAZOLAM HYDROCHLORIDE 2 MG: 1 INJECTION, SOLUTION INTRAMUSCULAR; INTRAVENOUS at 01:08

## 2018-08-13 NOTE — DISCHARGE SUMMARY
Outcome: Successful outpatient ophthalmic surgical procedure  Preprinted Instructions given to patient.  Regular diet.  Activity: No restrictions  Meds: see Med Rec  Condition: stable  Follow up: 1 day with Dr Hays  Disposition: Home  Diagnosis: s/p eye surgery

## 2018-08-13 NOTE — ANESTHESIA POSTPROCEDURE EVALUATION
"Anesthesia Post Evaluation    Patient: Domonique Kellogg    Procedure(s) Performed: Procedure(s) (LRB):  PHACOEMULSIFICATION, CATARACT (Left)  INSERTION, INTRAOCULAR LENS PROSTHESIS (Left)    Final Anesthesia Type: MAC  Patient location during evaluation: [facility]  Patient participation: Yes- Able to Participate  Level of consciousness: awake and alert  Post-procedure vital signs: reviewed and stable  Pain management: adequate  Airway patency: patent  PONV status at discharge: No PONV  Anesthetic complications: no      Cardiovascular status: blood pressure returned to baseline  Respiratory status: unassisted  Hydration status: euvolemic  Follow-up not needed.        Visit Vitals  /69 (BP Location: Left arm, Patient Position: Sitting)   Pulse (!) 49   Temp 36.6 °C (97.8 °F) (Oral)   Resp 16   Ht 5' 8" (1.727 m)   Wt 119.7 kg (264 lb)   SpO2 (!) 94%   BMI 40.14 kg/m²       Pain/Lalo Score: Pain Assessment Performed: Yes (8/13/2018 12:45 PM)  Presence of Pain: denies (8/13/2018 12:45 PM)        "

## 2018-08-13 NOTE — ANESTHESIA POSTPROCEDURE EVALUATION
"Anesthesia Post Evaluation    Patient: Domonique Kellogg    Procedure(s) Performed: Procedure(s) (LRB):  PHACOEMULSIFICATION, CATARACT (Left)  INSERTION, INTRAOCULAR LENS PROSTHESIS (Left)    Final Anesthesia Type: MAC  Patient location during evaluation: [facility]  Patient participation: Yes- Able to Participate  Level of consciousness: awake and alert  Post-procedure vital signs: reviewed and stable  Pain management: adequate  Airway patency: patent  PONV status at discharge: No PONV  Anesthetic complications: no      Cardiovascular status: blood pressure returned to baseline  Respiratory status: unassisted  Hydration status: euvolemic  Follow-up not needed.        Visit Vitals  /69 (BP Location: Left arm, Patient Position: Sitting)   Pulse (!) 49   Temp 36.6 °C (97.8 °F) (Oral)   Resp 16   Ht 5' 8" (1.727 m)   Wt 119.7 kg (264 lb)   SpO2 (!) 94%   BMI 40.14 kg/m²       Pain/Lalo Score: Pain Assessment Performed: Yes (8/13/2018 12:45 PM)  Presence of Pain: denies (8/13/2018 12:45 PM)        "

## 2018-08-13 NOTE — ANESTHESIA PREPROCEDURE EVALUATION
08/13/2018  Domonique Kellogg is a 68 y.o., male.    Pre-op Assessment    I have reviewed the Patient Summary Reports.     I have reviewed the Nursing Notes.   I have reviewed the Medications.     Review of Systems  Anesthesia Hx:  No problems with previous Anesthesia  Denies Family Hx of Anesthesia complications.   Denies Personal Hx of Anesthesia complications.   Social:  Non-Smoker    Hematology/Oncology:  Hematology Normal   Oncology Normal     EENT/Dental:EENT/Dental Normal   Cardiovascular:   Exercise tolerance: good Hypertension CAD      Pulmonary:   Sleep Apnea    Renal/:  Renal/ Normal     Musculoskeletal:   Arthritis     Neurological:   Denies Seizures.    Endocrine:  Endocrine Normal    Dermatological:  Skin Normal    Psych:  Psychiatric Normal           Physical Exam  General:  Morbid Obesity    Airway/Jaw/Neck:  Airway Findings: Mouth Opening: Normal Tongue: Normal  General Airway Assessment: Adult  Mallampati: I  TM Distance: Normal, at least 6 cm  Jaw/Neck Findings:  Neck ROM: Normal ROM      Dental:  Dental Findings: In tact             Anesthesia Plan  Type of Anesthesia, risks & benefits discussed:  Anesthesia Type:  MAC  Patient's Preference:   Intra-op Monitoring Plan:   Intra-op Monitoring Plan Comments:   Post Op Pain Control Plan:   Post Op Pain Control Plan Comments:   Induction:    Beta Blocker:         Informed Consent: Patient understands risks and agrees with Anesthesia plan.  Questions answered. Anesthesia consent signed with patient.  ASA Score: 3     Day of Surgery Review of History & Physical:    H&P update referred to the surgeon.         Ready For Surgery From Anesthesia Perspective.

## 2018-08-13 NOTE — OP NOTE
SURGEON:  Priscilla Hays M.D.    PREOPERATIVE DIAGNOSIS:    Nuclear Sclerotic Cataract Left Eye    POSTOPERATIVE DIAGNOSIS:    Nuclear Sclerotic Cataract Left Eye    PROCEDURES:    Phacoemulsification with  intraocular lens, Left eye (80041)    DATE OF SURGERY: 08/13/2018    IMPLANT: pcboo 15.5    ANESTHESIA:  MAC with topical Lidocaine    COMPLICATIONS:  None    ESTIMATED BLOOD LOSS: None    SPECIMENS: None    INDICATIONS:    The patient has a history of painless progressive visual loss and  difficulty with activities of daily living secondary to cataract formation.  After a thorough discussion of the risks, benefits, and alternatives to cataract surgery, including, but not limited to, the rare risks of infection, retinal detachment, hemorrhage, need for additional surgery, loss of vision, and even loss of the eye, the patient voices understanding and desires to proceed.    DESCRIPTION OF PROCEDURE:    The patients IOL calculations were reviewed, and the lens selection confirmed.   After verification and marking of the proper eye in the preop holding area, the patient was brought to the operating room in supine position where the eye was prepped and draped in standard sterile fashion with 5% Betadine and a lid speculum placed in the eye.   Topical 4% Lidocaine was used in addition to the preoperative anesthesia and the procedure was begun by the creation of a paracentesis incision through which viscoelastic was used to fill the anterior chamber.  Next, a keratome blade was used to create a triplanar temporal clear corneal incision and a cystotome and Utrata forceps used to fashion a continuous curvilinear capsulorrhexis.  Hydrodissection was carried out using the Schuler hydrodissection cannula and the nucleus was found to be mobile.  Phacoemulsification of the nucleus was carried out using a quick chop technique, and all remaining epinuclear and cortical material was removed.  The eye was then reformed with  Viscoelastic and the  intraocular lens was implanted into the capsular bag.  All remaining viscoelastics were removed from the eye and at the end of the case the pupil was round, the lens was well-centered within the capsular bag and all wounds were found to be water tight.  Drops of Vigamox and Pred Forte were instilled and a shield was placed over the eye. The patient will follow up with Dr. Hays in the morning.

## 2018-08-13 NOTE — ANESTHESIA POSTPROCEDURE EVALUATION
"Anesthesia Post Evaluation    Patient: Domonique Kellogg    Procedure(s) Performed: Procedure(s) (LRB):  PHACOEMULSIFICATION, CATARACT (Left)  INSERTION, INTRAOCULAR LENS PROSTHESIS (Left)    [facility] Anesthesia Post Op Evaluation    Visit Vitals  /69 (BP Location: Left arm, Patient Position: Sitting)   Pulse (!) 49   Temp 36.6 °C (97.8 °F) (Oral)   Resp 16   Ht 5' 8" (1.727 m)   Wt 119.7 kg (264 lb)   SpO2 (!) 94%   BMI 40.14 kg/m²       Pain/Lalo Score: Pain Assessment Performed: Yes (8/13/2018 12:45 PM)  Presence of Pain: denies (8/13/2018 12:45 PM)        "

## 2018-08-14 ENCOUNTER — OFFICE VISIT (OUTPATIENT)
Dept: OPHTHALMOLOGY | Facility: CLINIC | Age: 68
End: 2018-08-14
Attending: OPHTHALMOLOGY
Payer: MEDICARE

## 2018-08-14 DIAGNOSIS — Z98.890 POST-OPERATIVE STATE: Primary | ICD-10-CM

## 2018-08-14 DIAGNOSIS — H25.12 NUCLEAR SCLEROTIC CATARACT OF LEFT EYE: ICD-10-CM

## 2018-08-14 PROCEDURE — 99024 POSTOP FOLLOW-UP VISIT: CPT | Mod: S$GLB,,, | Performed by: OPHTHALMOLOGY

## 2018-08-14 PROCEDURE — 99999 PR PBB SHADOW E&M-EST. PATIENT-LVL III: CPT | Mod: PBBFAC,,, | Performed by: OPHTHALMOLOGY

## 2018-08-14 NOTE — PROGRESS NOTES
HPI     POD 1 Phaco w/IOL OS August 13, 2018    MEDS:    Pred/Gati/Nepaf TID OS    Patient states he is doing well with no complaints.    Last edited by Gregoria Burroughs on 8/14/2018  1:04 PM. (History)            Assessment /Plan     For exam results, see Encounter Report.    Post-operative state    Nuclear sclerotic cataract of left eye      Slit lamp exam:  L/L: nl  K: clear, wound sealed  AC: 1+ cell  Lens: IOL centered and stable    POD1 s/p Phaco/IOL  Appropriate precautions and post op medications reviewed.  Patient instructed to call or come in if symptoms of redness, decreased vision, or pain are experienced.

## 2018-08-21 ENCOUNTER — PATIENT MESSAGE (OUTPATIENT)
Dept: OPHTHALMOLOGY | Facility: CLINIC | Age: 68
End: 2018-08-21

## 2018-08-21 ENCOUNTER — PATIENT MESSAGE (OUTPATIENT)
Dept: SLEEP MEDICINE | Facility: CLINIC | Age: 68
End: 2018-08-21

## 2018-09-14 ENCOUNTER — OFFICE VISIT (OUTPATIENT)
Dept: OPTOMETRY | Facility: CLINIC | Age: 68
End: 2018-09-14
Payer: MEDICARE

## 2018-09-14 DIAGNOSIS — Z98.41 S/P BILATERAL CATARACT EXTRACTION: Primary | ICD-10-CM

## 2018-09-14 DIAGNOSIS — Z98.42 S/P BILATERAL CATARACT EXTRACTION: Primary | ICD-10-CM

## 2018-09-14 PROCEDURE — 92134 CPTRZ OPH DX IMG PST SGM RTA: CPT | Mod: PBBFAC | Performed by: OPTOMETRIST

## 2018-09-14 PROCEDURE — 99999 PR PBB SHADOW E&M-EST. PATIENT-LVL II: CPT | Mod: PBBFAC,,, | Performed by: OPTOMETRIST

## 2018-09-14 PROCEDURE — 99024 POSTOP FOLLOW-UP VISIT: CPT | Mod: ,,, | Performed by: OPTOMETRIST

## 2018-09-14 PROCEDURE — 99212 OFFICE O/P EST SF 10 MIN: CPT | Mod: PBBFAC,25 | Performed by: OPTOMETRIST

## 2018-09-14 RX ORDER — HYDROCHLOROTHIAZIDE 25 MG/1
25 TABLET ORAL DAILY
COMMUNITY
Start: 2018-09-07 | End: 2018-10-26 | Stop reason: DRUGHIGH

## 2018-09-14 RX ORDER — CALCIUM CARBONATE/VITAMIN D3 500-10/5ML
LIQUID (ML) ORAL
COMMUNITY
Start: 2018-06-25 | End: 2019-10-10

## 2018-09-14 NOTE — PROGRESS NOTES
HPI     Post-op Evaluation      Additional comments: 1 month phaco OS              Comments     Last eye exam was 8/14/18 with Dr. Hays.  Patient happy with overall vision since cataract sx. Has been using old   glasses and would like to get an updated glasses rx today. Patient states   sees parentheses temporal OU sometimes. Also OU feel dry and tender to   touch.     07/16/2018 IMPLANT: pcboo 17.0 OD  08/13/2018 IMPLANT: pcboo 15.5 OS          Last edited by Sadaf Montano on 9/14/2018  7:54 AM. (History)            Assessment /Plan     For exam results, see Encounter Report.    S/P bilateral cataract extraction  -     OCT- Retina          1.  Pt doing well.  Bifocal rx given.  Eye health normal OU.  Recommend artificial tears as needed.  RTC 1 year for routine exam.

## 2018-09-19 ENCOUNTER — PATIENT MESSAGE (OUTPATIENT)
Dept: OPTOMETRY | Facility: CLINIC | Age: 68
End: 2018-09-19

## 2018-09-28 ENCOUNTER — PATIENT MESSAGE (OUTPATIENT)
Dept: INTERNAL MEDICINE | Facility: CLINIC | Age: 68
End: 2018-09-28

## 2018-09-28 DIAGNOSIS — Z12.5 PROSTATE CANCER SCREENING: ICD-10-CM

## 2018-09-28 DIAGNOSIS — E78.5 HYPERLIPIDEMIA, UNSPECIFIED HYPERLIPIDEMIA TYPE: ICD-10-CM

## 2018-09-28 DIAGNOSIS — I10 HYPERTENSION, ESSENTIAL: ICD-10-CM

## 2018-09-28 DIAGNOSIS — Z00.00 ANNUAL PHYSICAL EXAM: Primary | ICD-10-CM

## 2018-10-04 ENCOUNTER — LAB VISIT (OUTPATIENT)
Dept: LAB | Facility: HOSPITAL | Age: 68
End: 2018-10-04
Attending: FAMILY MEDICINE
Payer: MEDICARE

## 2018-10-04 DIAGNOSIS — Z00.00 ANNUAL PHYSICAL EXAM: ICD-10-CM

## 2018-10-04 DIAGNOSIS — E78.5 HYPERLIPIDEMIA, UNSPECIFIED HYPERLIPIDEMIA TYPE: ICD-10-CM

## 2018-10-04 DIAGNOSIS — I10 HYPERTENSION, ESSENTIAL: ICD-10-CM

## 2018-10-04 DIAGNOSIS — Z12.5 PROSTATE CANCER SCREENING: ICD-10-CM

## 2018-10-04 LAB
ALBUMIN SERPL BCP-MCNC: 4 G/DL
ALP SERPL-CCNC: 53 U/L
ALT SERPL W/O P-5'-P-CCNC: 30 U/L
ANION GAP SERPL CALC-SCNC: 9 MMOL/L
AST SERPL-CCNC: 30 U/L
BILIRUB SERPL-MCNC: 0.8 MG/DL
BUN SERPL-MCNC: 13 MG/DL
CALCIUM SERPL-MCNC: 10.3 MG/DL
CHLORIDE SERPL-SCNC: 105 MMOL/L
CHOLEST SERPL-MCNC: 147 MG/DL
CHOLEST/HDLC SERPL: 2.8 {RATIO}
CO2 SERPL-SCNC: 26 MMOL/L
COMPLEXED PSA SERPL-MCNC: 1.3 NG/ML
CREAT SERPL-MCNC: 1 MG/DL
EST. GFR  (AFRICAN AMERICAN): >60 ML/MIN/1.73 M^2
EST. GFR  (NON AFRICAN AMERICAN): >60 ML/MIN/1.73 M^2
GLUCOSE SERPL-MCNC: 97 MG/DL
HDLC SERPL-MCNC: 52 MG/DL
HDLC SERPL: 35.4 %
LDLC SERPL CALC-MCNC: 80.2 MG/DL
NONHDLC SERPL-MCNC: 95 MG/DL
POTASSIUM SERPL-SCNC: 4 MMOL/L
PROT SERPL-MCNC: 7.5 G/DL
SODIUM SERPL-SCNC: 140 MMOL/L
TRIGL SERPL-MCNC: 74 MG/DL

## 2018-10-04 PROCEDURE — 80061 LIPID PANEL: CPT

## 2018-10-04 PROCEDURE — 84153 ASSAY OF PSA TOTAL: CPT

## 2018-10-04 PROCEDURE — 80053 COMPREHEN METABOLIC PANEL: CPT

## 2018-10-04 PROCEDURE — 36415 COLL VENOUS BLD VENIPUNCTURE: CPT

## 2018-10-09 ENCOUNTER — OFFICE VISIT (OUTPATIENT)
Dept: INTERNAL MEDICINE | Facility: CLINIC | Age: 68
End: 2018-10-09
Attending: FAMILY MEDICINE
Payer: MEDICARE

## 2018-10-09 ENCOUNTER — IMMUNIZATION (OUTPATIENT)
Dept: INTERNAL MEDICINE | Facility: CLINIC | Age: 68
End: 2018-10-09
Payer: MEDICARE

## 2018-10-09 VITALS
OXYGEN SATURATION: 97 % | HEIGHT: 68 IN | SYSTOLIC BLOOD PRESSURE: 118 MMHG | HEART RATE: 61 BPM | WEIGHT: 261.69 LBS | DIASTOLIC BLOOD PRESSURE: 70 MMHG | TEMPERATURE: 98 F | BODY MASS INDEX: 39.66 KG/M2

## 2018-10-09 DIAGNOSIS — E78.5 HYPERLIPIDEMIA, UNSPECIFIED HYPERLIPIDEMIA TYPE: ICD-10-CM

## 2018-10-09 DIAGNOSIS — I67.2 CEREBRAL ATHEROSCLEROSIS: ICD-10-CM

## 2018-10-09 DIAGNOSIS — I77.9 BILATERAL CAROTID ARTERY DISEASE, UNSPECIFIED TYPE: ICD-10-CM

## 2018-10-09 DIAGNOSIS — I25.10 CORONARY ARTERY DISEASE DUE TO LIPID RICH PLAQUE: ICD-10-CM

## 2018-10-09 DIAGNOSIS — N40.0 BENIGN PROSTATIC HYPERPLASIA, UNSPECIFIED WHETHER LOWER URINARY TRACT SYMPTOMS PRESENT: ICD-10-CM

## 2018-10-09 DIAGNOSIS — I10 HYPERTENSION, ESSENTIAL: ICD-10-CM

## 2018-10-09 DIAGNOSIS — Z00.00 ANNUAL PHYSICAL EXAM: Primary | ICD-10-CM

## 2018-10-09 DIAGNOSIS — I25.83 CORONARY ARTERY DISEASE DUE TO LIPID RICH PLAQUE: ICD-10-CM

## 2018-10-09 LAB
BILIRUB UR QL STRIP: NEGATIVE
CLARITY UR REFRACT.AUTO: CLEAR
COLOR UR AUTO: NORMAL
GLUCOSE UR QL STRIP: NEGATIVE
HGB UR QL STRIP: NEGATIVE
KETONES UR QL STRIP: NEGATIVE
LEUKOCYTE ESTERASE UR QL STRIP: NEGATIVE
MICROSCOPIC COMMENT: NORMAL
NITRITE UR QL STRIP: NEGATIVE
PH UR STRIP: 5 [PH] (ref 5–8)
PROT UR QL STRIP: NEGATIVE
SP GR UR STRIP: 1 (ref 1–1.03)
URN SPEC COLLECT METH UR: NORMAL
UROBILINOGEN UR STRIP-ACNC: NEGATIVE EU/DL

## 2018-10-09 PROCEDURE — 99214 OFFICE O/P EST MOD 30 MIN: CPT | Mod: PBBFAC | Performed by: FAMILY MEDICINE

## 2018-10-09 PROCEDURE — 81001 URINALYSIS AUTO W/SCOPE: CPT

## 2018-10-09 PROCEDURE — 3078F DIAST BP <80 MM HG: CPT | Mod: CPTII,,, | Performed by: FAMILY MEDICINE

## 2018-10-09 PROCEDURE — 90662 IIV NO PRSV INCREASED AG IM: CPT | Mod: PBBFAC

## 2018-10-09 PROCEDURE — 99397 PER PM REEVAL EST PAT 65+ YR: CPT | Mod: S$PBB,,, | Performed by: FAMILY MEDICINE

## 2018-10-09 PROCEDURE — 99999 PR PBB SHADOW E&M-EST. PATIENT-LVL IV: CPT | Mod: PBBFAC,,, | Performed by: FAMILY MEDICINE

## 2018-10-09 PROCEDURE — 3074F SYST BP LT 130 MM HG: CPT | Mod: CPTII,,, | Performed by: FAMILY MEDICINE

## 2018-10-09 RX ORDER — PRAVASTATIN SODIUM 40 MG/1
40 TABLET ORAL DAILY
Qty: 90 TABLET | Refills: 1 | Status: SHIPPED | OUTPATIENT
Start: 2018-10-09 | End: 2019-04-22 | Stop reason: SDUPTHER

## 2018-10-09 NOTE — PROGRESS NOTES
Subjective:       Patient ID: Domonique Kellogg is a 68 y.o. male.    Chief Complaint: Annual Exam    Established patient for an annual wellness check/physical exam and also chronic disease management. Specific complaints - see dictation and please see ROS.  P, S, Fm, Soc Hx's; Meds, allergies reviewed and reconciled.  Health maintenance file reviewed and addressed items due.        Review of Systems   Constitutional: Negative for activity change, chills, fatigue, fever and unexpected weight change.   HENT: Negative for congestion, hearing loss, rhinorrhea and trouble swallowing.    Eyes: Negative for discharge, redness and visual disturbance.   Respiratory: Negative for cough, chest tightness, shortness of breath and wheezing.    Cardiovascular: Negative for chest pain, palpitations and leg swelling.   Gastrointestinal: Negative for abdominal pain, blood in stool, constipation, diarrhea and vomiting.   Endocrine: Negative for polydipsia and polyuria.   Genitourinary: Positive for difficulty urinating and urgency. Negative for hematuria.   Musculoskeletal: Negative for arthralgias, back pain, gait problem, joint swelling, myalgias and neck pain.   Skin: Negative for color change and rash.   Neurological: Negative for tremors, speech difficulty, weakness, numbness and headaches.   Hematological: Negative for adenopathy. Does not bruise/bleed easily.   Psychiatric/Behavioral: Negative for behavioral problems, confusion, dysphoric mood and sleep disturbance. The patient is not nervous/anxious.        Objective:      Physical Exam   Genitourinary:             Assessment:       1. Annual physical exam    2. Hypertension, essential    3. Hyperlipidemia, unspecified hyperlipidemia type    4. Bilateral carotid artery disease, unspecified type    5. Coronary artery disease due to lipid rich plaque    6. Cerebral atherosclerosis     7. Benign prostatic hyperplasia, unspecified whether lower urinary tract symptoms present         Plan:   Domonique was seen today for annual exam.    Diagnoses and all orders for this visit:    Annual physical exam  -     Urinalysis Microscopic  -     Urinalysis    Hypertension, essential    Hyperlipidemia, unspecified hyperlipidemia type    Bilateral carotid artery disease, unspecified type  -     Ambulatory referral to Cardiology  -     CAR Ultrasound doppler carotid bilateral; Future    Coronary artery disease due to lipid rich plaque  -     Ambulatory referral to Cardiology    Cerebral atherosclerosis   -     CAR Ultrasound doppler carotid bilateral; Future    Benign prostatic hyperplasia, unspecified whether lower urinary tract symptoms present  -     Urinalysis Microscopic  -     Urinalysis    Other orders  -     pravastatin (PRAVACHOL) 40 MG tablet; Take 1 tablet (40 mg total) by mouth once daily.      See meds, orders, follow up, routing and instructions sections of encounter.  A 68-year-old established male patient.  He is in for an annual physical   examination.  He had multiple questions including his labs, nocturia.    In 2015, he had syncopal episode x2.  This prompted us to get an echo, which was   abnormal and he was referred for cath.  He had nonocclusive coronary artery   disease.  He had seen three different cardiologists either through getting a   cath or pretest and one pro-test, all were satisfied and stated no further   testing and at that time seems satisfied with his LDL cholesterol, being low on   pravastatin 20 mg.    The patient has been receiving insurance company flyers concerning high-dose   statin.  It is unclear why this is being sent out from the insurance company   when three cardiologists had not recommended it.  That said, I did discuss that   diagnosis is prompting that recommendation.  Given the confusion over this, I   would suggest increasing his pravastatin to 40 and also at his request   consulting with one of our cardiologists concerning the best way to manage this.     It may be in fact that they do recommend high-dose statin medication.  In any   event, he has no other new complaints at this time and continue his medications   with the changes noted.      GUERLINE/JUANY  dd: 10/09/2018 17:36:09 (CDT)  td: 10/10/2018 05:09:52 (CDT)  Doc ID   #[id number]  Job ID #[id number]    CC:

## 2018-10-09 NOTE — PATIENT INSTRUCTIONS
Flu shot today    Information about cholesterol, high blood pressure and healthy diet and activity recommendations can be found at the following links on the Internet:    http://www.nhlbi.nih.gov/health/health-topics/topics/hbc  http://www.nhlbi.nih.gov/health/educational/lose_wt/index.htm  Http://www.nhlbi.nih.gov/files/docs/public/heart/hbp_low.pdf  http://www.heart.org/HEARTORG/  http://diabetes.org/  https://www.cdc.gov/  https://healthfinder.gov/

## 2018-10-10 ENCOUNTER — CLINICAL SUPPORT (OUTPATIENT)
Dept: CARDIOLOGY | Facility: CLINIC | Age: 68
End: 2018-10-10
Attending: FAMILY MEDICINE
Payer: MEDICARE

## 2018-10-10 DIAGNOSIS — I77.9 BILATERAL CAROTID ARTERY DISEASE, UNSPECIFIED TYPE: ICD-10-CM

## 2018-10-10 DIAGNOSIS — I67.2 CEREBRAL ATHEROSCLEROSIS: ICD-10-CM

## 2018-10-10 LAB — INTERNAL CAROTID STENOSIS: NORMAL

## 2018-10-10 PROCEDURE — 93880 EXTRACRANIAL BILAT STUDY: CPT | Mod: PBBFAC | Performed by: INTERNAL MEDICINE

## 2018-10-26 ENCOUNTER — OFFICE VISIT (OUTPATIENT)
Dept: CARDIOLOGY | Facility: CLINIC | Age: 68
End: 2018-10-26
Attending: FAMILY MEDICINE
Payer: MEDICARE

## 2018-10-26 VITALS
HEIGHT: 68 IN | DIASTOLIC BLOOD PRESSURE: 76 MMHG | SYSTOLIC BLOOD PRESSURE: 116 MMHG | HEART RATE: 56 BPM | BODY MASS INDEX: 39.7 KG/M2 | WEIGHT: 261.94 LBS

## 2018-10-26 DIAGNOSIS — I25.10 ASCVD (ARTERIOSCLEROTIC CARDIOVASCULAR DISEASE): Primary | ICD-10-CM

## 2018-10-26 DIAGNOSIS — I65.21 CAROTID STENOSIS, ASYMPTOMATIC, RIGHT: ICD-10-CM

## 2018-10-26 DIAGNOSIS — F48.9 DEFERRED DIAGNOSIS ON AXIS I: ICD-10-CM

## 2018-10-26 DIAGNOSIS — I10 HYPERTENSION, ESSENTIAL: ICD-10-CM

## 2018-10-26 PROCEDURE — 93010 ELECTROCARDIOGRAM REPORT: CPT | Mod: ,,, | Performed by: INTERNAL MEDICINE

## 2018-10-26 PROCEDURE — 1101F PT FALLS ASSESS-DOCD LE1/YR: CPT | Mod: CPTII,,, | Performed by: INTERNAL MEDICINE

## 2018-10-26 PROCEDURE — 3078F DIAST BP <80 MM HG: CPT | Mod: CPTII,,, | Performed by: INTERNAL MEDICINE

## 2018-10-26 PROCEDURE — 3074F SYST BP LT 130 MM HG: CPT | Mod: CPTII,,, | Performed by: INTERNAL MEDICINE

## 2018-10-26 PROCEDURE — 99999 PR PBB SHADOW E&M-EST. PATIENT-LVL III: CPT | Mod: PBBFAC,,, | Performed by: INTERNAL MEDICINE

## 2018-10-26 PROCEDURE — 99213 OFFICE O/P EST LOW 20 MIN: CPT | Mod: PBBFAC,PO,25 | Performed by: INTERNAL MEDICINE

## 2018-10-26 PROCEDURE — 99214 OFFICE O/P EST MOD 30 MIN: CPT | Mod: S$PBB,,, | Performed by: INTERNAL MEDICINE

## 2018-10-26 PROCEDURE — 93005 ELECTROCARDIOGRAM TRACING: CPT | Mod: PBBFAC,PO | Performed by: FAMILY MEDICINE

## 2018-10-26 NOTE — LETTER
October 28, 2018      Nicolas Pacheco MD  [address]  [facility] [address]           [place] [facility] Cardiology  [address] [place] [address]  Phone: [phone]          Patient: Domonique Kellogg   MR Number: [id number]   YOB: 1950   Date of Visit: 10/26/2018       Dear Dr. Nicolas Pacheco:    Thank you for referring Domonique Kellogg to me for evaluation. Attached you will find relevant portions of my assessment and plan of care.    If you have questions, please do not hesitate to call me. I look forward to following Domonique Kellogg along with you.    Sincerely,    Marizol Montgomery MD    Enclosure  CC:  No Recipients    If you would like to receive this communication electronically, please contact [email or web address] or [phone] to request more information on [employer] access.    For providers and/or their staff who would like to refer a patient to [facility], please contact us through our one-stop-shop provider referral line, [facility], at [phone].    If you feel you have received this communication in error or would no longer like to receive these types of communications, please e-mail [email or web address]

## 2018-10-26 NOTE — PROGRESS NOTES
Subjective:     Patient ID:  Domonique Kellogg is a 68 y.o. male who presents for evaluation of Coronary Artery Disease and Carotid Artery Disease      Problem List:  CAD - luminal irregularities  R carotid artery - mild plaque  Hypercholesterolemia - mild    HPI:     Domonique Kellogg is being referred by Dr. Pacheco.  Following a coronary angiogram in 3/15 where he was found have luminal irregularities, he was started on 20 mg of pravastatin.  Recently his total cholesterol has been 127-151, LDL 71-86 mg/dl and HDL/total cholesterol 33-35%.  He also has a mild plaque in the right carotid artery.  He does not have a history of angina, TIA or stroke.  His insurance company suggested that he take a more potent statin.   Dr. Pacheco increased pravastatin from 20 mg to 40 mg.  He used to exercise regularly, walking 3.5-4 miles in 1 hour but stopped during the summer.      Review of Systems   Constitution: Positive for weight gain. Negative for weakness, malaise/fatigue and weight loss.   HENT: Negative for hearing loss and nosebleeds.    Eyes: Negative for visual disturbance.   Cardiovascular: Negative for chest pain, claudication, dyspnea on exertion, irregular heartbeat, leg swelling, orthopnea, palpitations, paroxysmal nocturnal dyspnea and syncope.   Respiratory: Negative for cough, hemoptysis, sputum production and wheezing.    Hematologic/Lymphatic: Does not bruise/bleed easily.   Musculoskeletal: Positive for arthritis and back pain. Negative for falls, joint pain, muscle cramps, muscle weakness and myalgias.   Gastrointestinal: Negative for abdominal pain, heartburn and melena.   Genitourinary: Negative for frequency, hematuria and nocturia.   Neurological: Negative for dizziness, headaches, light-headedness, loss of balance, numbness and paresthesias.   Psychiatric/Behavioral: Negative for depression. The patient is not nervous/anxious.          Current Outpatient Medications   Medication Sig    acetaminophen  (TYLENOL ORAL) Take 1 tablet by mouth once daily.     aspirin (ECOTRIN) 81 MG EC tablet Take 81 mg by mouth once daily.    hydroCHLOROthiazide (HYDRODIURIL) 12.5 MG Tab Take 12.5 mg by mouth once daily.    magnesium oxide 400 mg Cap     multivitamin with minerals tablet Take 1 tablet by mouth once daily.     potassium chloride SA (K-DUR,KLOR-CON) 20 MEQ tablet Take 1 tablet (20 mEq total) by mouth once daily.    pravastatin (PRAVACHOL) 40 MG tablet Take 1 tablet (40 mg total) by mouth once daily.           Past Medical History:   Diagnosis Date    Anticoagulant long-term use     Arthritis     CAD (coronary artery disease) 3/2/2015    non[facility]obstructive per [facility]     CAD (coronary artery disease) 3/26/2015    Cataract     Dry eye syndrome     Hypertension     Seizures       Past Surgical History:   Procedure Laterality Date    HEART CATH-LEFT Left 3/2/2015    [facility]    INSERTION, INTRAOCULAR LENS PROSTHESIS Left 8/13/2018    Performed by Priscilla [facility] at [facility] [place]    INSERTION-INTRAOCULAR LENS (IOL) Right 7/16/2018    Performed by [facility]iscilla [facility] at [facility] [place]    INTRAOCULAR PROSTHESES INSERTION Right 7/16/2018    Procedure: INSERTION-INTRAOCULAR LENS (IOL);  Surgeon: Priscilla Hays MD;  [facility];  Service: Ophthalmology;  Laterality: Right;    INTRAOCULAR PROSTHESES INSERTION Left 8/13/2018    Procedure: INSERTION, INTRAOCULAR LENS PROSTHESIS;  Surgeon: Priscilla Hays MD;  Location: [facility] [place];  Service: Ophthalmology;  Laterality: Left;    KNEE SURGERY      PHACOEMULSIFICATION OF CATARACT Right 7/16/2018    Procedure: PHACOEMULSIFICATION-ASPIRATION-CATARACT;  Surgeon: Priscilla Hays MD;  Location: [facility] [place];  Service: Ophthalmology;  Laterality: Right;    PHACOEMULSIFICATION OF CATARACT Left 8/13/2018    Procedure: PHACOEMULSIFICATION, CATARACT;  Surgeon: Priscilla Hays MD;  Location: [facility];  Service: Ophthalmology;  Laterality: Left;     "PHACOEMULSIFICATION, CATARACT Left 2018    Performed by [facility] at [facility]    PHACOEMULSIFICATION-ASPIRATION-CATARACT Right 2018    Performed by [facility] at [facility] [place]    WISDOM TOOTH EXTRACTION           Social History     Tobacco Use    Smoking status: Former Smoker     Last attempt to quit: 1987     Years since quittin.8    Smokeless tobacco: Never Used    Tobacco comment: quit    Substance Use Topics    Alcohol use: Yes     Alcohol/week: 0.6 - 1.2 oz     Types: 1 - 2 Glasses of wine per week     Comment: 1-2 glasses wine several times weekly    Drug use: No         Family History   Problem Relation Age of Onset    Cancer Mother         pancreatic    Diabetes Mother     Hypertension Mother     Heart disease Father     No Known Problems Sister     Cancer Maternal Grandmother     Heart disease Paternal Grandfather     Heart disease Brother          Objective:     Physical Exam   Constitutional: He is oriented to person, place, and time. He appears well-developed and well-nourished.   /76   Pulse (!) 56   Ht 5' 8" (1.727 m)   Wt 118.8 kg (261 lb 14.5 oz)   BMI 39.82 kg/m²      HENT:   Head: Normocephalic and atraumatic.   Neck: No JVD present. Carotid bruit is not present.   Cardiovascular: Normal rate, regular rhythm, S1 normal and S2 normal. Exam reveals no gallop.   No murmur heard.  Pulses:       Radial pulses are 2+ on the right side, and 2+ on the left side.        Posterior tibial pulses are 1+ on the right side, and 1+ on the left side.   Pulmonary/Chest: Effort normal. He has no wheezes. He has no rales. Chest wall is not dull to percussion.   Abdominal: Soft. There is no splenomegaly or hepatomegaly. There is no tenderness.   Musculoskeletal:        Right lower leg: He exhibits no edema.        Left lower leg: He exhibits no edema.   Neurological: He is alert and oriented to person, place, and time. Gait normal.   Skin: Skin is warm and dry. " No bruising noted. No cyanosis. Nails show no clubbing.   Psychiatric: He has a normal mood and affect. His speech is normal and behavior is normal. Judgment and thought content normal. Cognition and memory are normal.         Lab Results   Component Value Date    CHOL 147 10/04/2018    CHOL 151 10/17/2017    CHOL 127 10/18/2016     Lab Results   Component Value Date    HDL 52 10/04/2018    HDL 53 10/17/2017    HDL 44 10/18/2016     Lab Results   Component Value Date    LDLCALC 80.2 10/04/2018    LDLCALC 86.6 10/17/2017    LDLCALC 71.2 10/18/2016     Lab Results   Component Value Date    TRIG 74 10/04/2018    TRIG 57 10/17/2017    TRIG 59 10/18/2016     Lab Results   Component Value Date    CHOLHDL 35.4 10/04/2018    CHOLHDL 35.1 10/17/2017    CHOLHDL 34.6 10/18/2016     Lab Results   Component Value Date    ALT 30 10/04/2018     Lab Results   Component Value Date    ALT 30 10/04/2018    AST 30 10/04/2018    ALKPHOS 53 (L) 10/04/2018    BILITOT 0.8 10/04/2018      Lab Results   Component Value Date    CREATININE 1.0 10/04/2018    BUN 13 10/04/2018     10/04/2018    K 4.0 10/04/2018     10/04/2018    CO2 26 10/04/2018         Assessment and Plan:     1. ASCVD (arteriosclerotic cardiovascular disease)  No prior history of CAD or stroke.  The ACC/AHA guidelines do recommend a high potency statin such as atorvastatin 40 mg for patients with atherosclerotic cardiovascular disease, without taking the LDL cholesterol into consideration.  However in view of an excellent HDL/total cholesterol, I agree with Dr. Pacheco's choice of increasing pravastatin from 20 to 40 mg.    2. Hypertension, essential  Continue same meds.      3. Carotid stenosis, asymptomatic, right  Continue aspirin and 40 mg of pravastatin.      Follow-up if symptoms worsen or fail to improve.

## 2018-10-26 NOTE — PATIENT INSTRUCTIONS
LDL - bad type - improves with diet and medications: typically statins; most other medications that lower LDL have not been proven to prevent heart attacks.  May not improve significantly with exercise alone.  Ideally less than 100 mg/dl.   But the lower the better. Statins (cholesterol lowering meds) lower LDL. If you have coronary artery disease or blockages anywhere else in the body, it should be well below 70 mg/dl.    HDL - good type - improves with exercise.  Ideally greater than 50 mg/dl. The proportion of HDL to the total cholesterol is more important than the absolute HDL.  This means a HDL of 45 out of a total cholesterol of 130 mg'dl is pretty good, but the same HDL out of a total of  200 mg/dl is not quite as good. A level of 30% or higher is ideal.    TGs (triglycerides) - also bad - can change very quickly and considerably with certain foods. Improve with diet, exercise and high dose fish oil.  In some cases a low carbohydrate diet will lower TGs better than a low fat diet.  Ideal range  mg/dl.    Sugar, fat and cholesterol in food:     A sensible diet that limits the intake of sugars, saturated (bad) fats and trans fats while increasing the intake of unsaturated (good) fats and plant proteins is the basis of the current dietary recommendations.      We now recommend drastically reducing the intake of sugar. There is less emphasis on excluding fat.     Cholesterol in our food is no longer a significant concern, mainly because it is generally present in relatively small amounts. However please do not confuse this with the role of cholesterol in our blood and arteries. The liver converts certain foods into cholesterol.  It is this cholesterol and other fats that clogs up our arteries.       Most foods that are high in cholesterol are also high in saturated fat. But there is way more saturated fat than cholesterol in these foods. So its the saturated fat content that matters more than cholesterol.  On the other hand, there are a handful of foods that are high in cholesterol but do not contain much saturated fat: eggs, shrimp, crab legs and crawfish are OK to eat.       Saturated fat is the bad fat - you should limit your intake of this. Deep fried foods, meats and other animal fats are high in saturated fat. Cookies, donuts and most dessert and cakes are usually high in both saturated fat and sugar.       Unsaturated fat is the good fat. It contains the same number of calories as saturated fat but these fats do not get deposited in our arteries. The Mediterranean style diet encourages the intake of unsaturated fat - olive oil, avocado and unsalted nuts. So instead of baking a piece of fish, it may be better to pan-river it using olive oil.     You should eat a few servings of vegetables (and fruit as long as you are not diabetic) everyday. Substitute some plant proteins in place of meat: beans, lentils, quinoa and oatmeal. They are lean proteins.     Do not use stick butter or stick margarine. Butter that comes in a tub is soft butter. It consists of 1/2 butter and 1/2 vegetable oil., either canola or olive oil It is fine to use that.       Trans fats should definitely be avoided. Most foods that are labelled as containing 0 gms of trans fat may still contain several hundred milligrams of trans fat: creamer, margarine, dough, deep fried foods, ready made frosting, potato, corn and torilla chips, cakes, cookies, pie crusts and crackers containing shortening made with hydrogenated vegetable oil.

## 2018-11-14 ENCOUNTER — PATIENT MESSAGE (OUTPATIENT)
Dept: INTERNAL MEDICINE | Facility: CLINIC | Age: 68
End: 2018-11-14

## 2018-12-19 ENCOUNTER — PATIENT MESSAGE (OUTPATIENT)
Dept: INTERNAL MEDICINE | Facility: CLINIC | Age: 68
End: 2018-12-19

## 2018-12-19 RX ORDER — POTASSIUM CHLORIDE 20 MEQ/1
20 TABLET, EXTENDED RELEASE ORAL DAILY
Qty: 90 TABLET | Refills: 3 | Status: SHIPPED | OUTPATIENT
Start: 2018-12-19 | End: 2019-10-10

## 2018-12-20 ENCOUNTER — PATIENT MESSAGE (OUTPATIENT)
Dept: INTERNAL MEDICINE | Facility: CLINIC | Age: 68
End: 2018-12-20

## 2018-12-20 RX ORDER — HYDROCHLOROTHIAZIDE 25 MG/1
25 TABLET ORAL DAILY
Qty: 90 TABLET | Refills: 1 | Status: SHIPPED | OUTPATIENT
Start: 2018-12-20 | End: 2019-06-18 | Stop reason: SDUPTHER

## 2018-12-20 NOTE — TELEPHONE ENCOUNTER
Your requested medication was refilled and sent by our electronic prescription function. Please call and confirm with your pharmacy prior to picking up. Let us know if there are any problems with your refill.   Lakeisha

## 2019-01-08 ENCOUNTER — OFFICE VISIT (OUTPATIENT)
Dept: SLEEP MEDICINE | Facility: CLINIC | Age: 69
End: 2019-01-08
Payer: MEDICARE

## 2019-01-08 VITALS
HEIGHT: 68 IN | HEART RATE: 78 BPM | SYSTOLIC BLOOD PRESSURE: 126 MMHG | DIASTOLIC BLOOD PRESSURE: 72 MMHG | WEIGHT: 255.31 LBS | BODY MASS INDEX: 38.69 KG/M2

## 2019-01-08 DIAGNOSIS — I10 HYPERTENSION, ESSENTIAL: ICD-10-CM

## 2019-01-08 DIAGNOSIS — G47.33 OSA (OBSTRUCTIVE SLEEP APNEA): ICD-10-CM

## 2019-01-08 DIAGNOSIS — I25.10 CORONARY ARTERY DISEASE DUE TO LIPID RICH PLAQUE: Primary | ICD-10-CM

## 2019-01-08 DIAGNOSIS — I27.20 PULMONARY HYPERTENSION: ICD-10-CM

## 2019-01-08 DIAGNOSIS — I25.83 CORONARY ARTERY DISEASE DUE TO LIPID RICH PLAQUE: Primary | ICD-10-CM

## 2019-01-08 PROCEDURE — 99214 OFFICE O/P EST MOD 30 MIN: CPT | Mod: S$GLB,,, | Performed by: NURSE PRACTITIONER

## 2019-01-08 PROCEDURE — 3074F SYST BP LT 130 MM HG: CPT | Mod: CPTII,S$GLB,, | Performed by: NURSE PRACTITIONER

## 2019-01-08 PROCEDURE — 3074F PR MOST RECENT SYSTOLIC BLOOD PRESSURE < 130 MM HG: ICD-10-PCS | Mod: CPTII,S$GLB,, | Performed by: NURSE PRACTITIONER

## 2019-01-08 PROCEDURE — 3078F PR MOST RECENT DIASTOLIC BLOOD PRESSURE < 80 MM HG: ICD-10-PCS | Mod: CPTII,S$GLB,, | Performed by: NURSE PRACTITIONER

## 2019-01-08 PROCEDURE — 99499 RISK ADDL DX/OHS AUDIT: ICD-10-PCS | Mod: HCNC,S$GLB,, | Performed by: NURSE PRACTITIONER

## 2019-01-08 PROCEDURE — 99999 PR PBB SHADOW E&M-EST. PATIENT-LVL III: CPT | Mod: PBBFAC,,, | Performed by: NURSE PRACTITIONER

## 2019-01-08 PROCEDURE — 3078F DIAST BP <80 MM HG: CPT | Mod: CPTII,S$GLB,, | Performed by: NURSE PRACTITIONER

## 2019-01-08 PROCEDURE — 1101F PT FALLS ASSESS-DOCD LE1/YR: CPT | Mod: CPTII,S$GLB,, | Performed by: NURSE PRACTITIONER

## 2019-01-08 PROCEDURE — 99214 PR OFFICE/OUTPT VISIT, EST, LEVL IV, 30-39 MIN: ICD-10-PCS | Mod: S$GLB,,, | Performed by: NURSE PRACTITIONER

## 2019-01-08 PROCEDURE — 1101F PR PT FALLS ASSESS DOC 0-1 FALLS W/OUT INJ PAST YR: ICD-10-PCS | Mod: CPTII,S$GLB,, | Performed by: NURSE PRACTITIONER

## 2019-01-08 PROCEDURE — 99999 PR PBB SHADOW E&M-EST. PATIENT-LVL III: ICD-10-PCS | Mod: PBBFAC,,, | Performed by: NURSE PRACTITIONER

## 2019-01-08 PROCEDURE — 99499 UNLISTED E&M SERVICE: CPT | Mod: HCNC,S$GLB,, | Performed by: NURSE PRACTITIONER

## 2019-01-08 NOTE — PROGRESS NOTES
Domonique Kellogg a 67-year-old male returns today for the management of obstructive sleep apnea. Last seen 18    He continues to use cpap 13cm nightly. Snoring resolved.  Sleeps better using therapy. No apneic pauses. Denies interval medical change. Lost 4# in interim. BP stable. Once ahi was 10  CPAP set up Date :17  Overall CPAP use: Nightly  Is CPAP helping?  Yes, he sleeps much better with cpap.  Mask Style, Comfort, fit, chin strap:  Using a ffm Simplus medium  Pressure Tolerance:  Ok     filter clean  CPAP Device interrogation last 30 days:   Machine type : Dream station  Machine condition:  Good  Pressure setting and range: Cpap 13    Ramp 20/6.5   Flex 3  Average daily usage 30 days:  7.6  Days > 4 Hours: 30 days  Predicted AHI: 3.7  Large leak 100 %  Periodic breathin %  Manometer reading 13.1 cm H20      HISTORY:  16:  Since last seen, he has been setup with apap (12/11/15). Acclimated very quickly to it. Using FFM. Denies mask leaks or oral or nasal drying. Using it faithfully every night. No more snoring or witnessed apneic pauses. Snoring heard only once in interim had cold symptoms. He does feel more refreshed since beginning therapy. No excessively sleepiness. He power naps occasionally during the daytime, mainly when sedentary or after lunch.  ESS=7    Interrogation: new machine condition. AHI 6.7-7.2, 30d avg 6.7h/n. Simplus mask M, 90%-tile 30d 14.1, 1d 11cm. 100% mask fit. Heat at 3    17: He continues to use fixed cpap 13cm. AHI <5-6 with 4-8hr sleep (more TST when off). Wants to chagne from FFM due to nasal mask due to hair loss from mask straps. Symptoms continue to be resolved using cpap. Using nightly. 11# gain.     Interrogation- good machine condition, 30davg 6.9h/n. 100% mask fit. AHI 3.5, 30/30d>4h. Manometer 13cm      PS/24/15: AHI was 16.0 with an oxygen shivam of 83.0%. The supine AHI was 34.6 and the REM AHI was 40.6. The patient did not qualify for a split  "night study due to an insufficient number of events in the first half of the study. HR 40's. APAP >>CPAP      REVIEW OF SYSTEMS: Sleep related symptoms as per HPI, wgt loss, otherwise a balance review of 10-systems is negative.   Denies interval medical change        PHYSICAL EXAM:     /72   Pulse 78   Ht 5' 8" (1.727 m)   Wt 115.8 kg (255 lb 4.7 oz)   BMI 38.82 kg/m²    GENERAL: W/D, obese male, well groomed       Impression:    RAHEEL, moderate--remains adherent with improvement of his symptoms AHI<5.   Medical comorbidities of HTN (stable), CAD,  obesity (BMI>30), pulmonary hypertension.    PLAN:   1.Continue cpap 13cm.  Continue excellent nightly use, supplies via [facility]. Discussed the effectiveness of therapy as well as the potential ramifications of untreated sleep apnea, which could include daytime sleepiness, hypertension, heart disease and/or stroke.   2. RTC 12-mos adherence monitoring, SOONER if needed. See cardiology as advised re: CAD (reassess echo PAH?) and PCP re: HTN mgt.     "

## 2019-03-26 ENCOUNTER — TELEPHONE (OUTPATIENT)
Dept: INTERNAL MEDICINE | Facility: CLINIC | Age: 69
End: 2019-03-26

## 2019-03-26 NOTE — TELEPHONE ENCOUNTER
Spoke with  Valdez, Mr. Kellogg wife confirmed AWV appointment 03/27/19 @ 8am [facility]

## 2019-03-27 ENCOUNTER — OFFICE VISIT (OUTPATIENT)
Dept: INTERNAL MEDICINE | Facility: CLINIC | Age: 69
End: 2019-03-27
Payer: MEDICARE

## 2019-03-27 VITALS
HEART RATE: 78 BPM | WEIGHT: 262.38 LBS | DIASTOLIC BLOOD PRESSURE: 72 MMHG | BODY MASS INDEX: 39.76 KG/M2 | SYSTOLIC BLOOD PRESSURE: 108 MMHG | HEIGHT: 68 IN

## 2019-03-27 DIAGNOSIS — I77.9 BILATERAL CAROTID ARTERY DISEASE, UNSPECIFIED TYPE: ICD-10-CM

## 2019-03-27 DIAGNOSIS — I10 HYPERTENSION, ESSENTIAL: ICD-10-CM

## 2019-03-27 DIAGNOSIS — I27.9 CHRONIC PULMONARY HEART DISEASE: ICD-10-CM

## 2019-03-27 DIAGNOSIS — G47.33 OSA (OBSTRUCTIVE SLEEP APNEA): ICD-10-CM

## 2019-03-27 DIAGNOSIS — M47.816 SPONDYLOSIS OF LUMBAR REGION WITHOUT MYELOPATHY OR RADICULOPATHY: ICD-10-CM

## 2019-03-27 DIAGNOSIS — E66.01 CLASS 2 SEVERE OBESITY DUE TO EXCESS CALORIES WITH SERIOUS COMORBIDITY AND BODY MASS INDEX (BMI) OF 39.0 TO 39.9 IN ADULT: ICD-10-CM

## 2019-03-27 DIAGNOSIS — Z00.00 ENCOUNTER FOR PREVENTIVE HEALTH EXAMINATION: Primary | ICD-10-CM

## 2019-03-27 DIAGNOSIS — Z87.898 H/O SYNCOPE: ICD-10-CM

## 2019-03-27 DIAGNOSIS — I25.10 CORONARY ARTERY DISEASE INVOLVING NATIVE CORONARY ARTERY OF NATIVE HEART WITHOUT ANGINA PECTORIS: ICD-10-CM

## 2019-03-27 DIAGNOSIS — E78.5 HYPERLIPIDEMIA, UNSPECIFIED HYPERLIPIDEMIA TYPE: ICD-10-CM

## 2019-03-27 DIAGNOSIS — M17.11 PRIMARY OSTEOARTHRITIS OF RIGHT KNEE: ICD-10-CM

## 2019-03-27 PROBLEM — H25.13 NUCLEAR SCLEROSIS, BILATERAL: Status: RESOLVED | Noted: 2018-07-16 | Resolved: 2019-03-27

## 2019-03-27 PROCEDURE — 3074F SYST BP LT 130 MM HG: CPT | Mod: HCNC,CPTII,S$GLB, | Performed by: NURSE PRACTITIONER

## 2019-03-27 PROCEDURE — G0439 PR MEDICARE ANNUAL WELLNESS SUBSEQUENT VISIT: ICD-10-PCS | Mod: HCNC,S$GLB,, | Performed by: NURSE PRACTITIONER

## 2019-03-27 PROCEDURE — 3078F DIAST BP <80 MM HG: CPT | Mod: HCNC,CPTII,S$GLB, | Performed by: NURSE PRACTITIONER

## 2019-03-27 PROCEDURE — 99499 UNLISTED E&M SERVICE: CPT | Mod: HCNC,S$GLB,, | Performed by: NURSE PRACTITIONER

## 2019-03-27 PROCEDURE — 3078F PR MOST RECENT DIASTOLIC BLOOD PRESSURE < 80 MM HG: ICD-10-PCS | Mod: HCNC,CPTII,S$GLB, | Performed by: NURSE PRACTITIONER

## 2019-03-27 PROCEDURE — 99999 PR PBB SHADOW E&M-EST. PATIENT-LVL IV: ICD-10-PCS | Mod: PBBFAC,HCNC,, | Performed by: NURSE PRACTITIONER

## 2019-03-27 PROCEDURE — 3074F PR MOST RECENT SYSTOLIC BLOOD PRESSURE < 130 MM HG: ICD-10-PCS | Mod: HCNC,CPTII,S$GLB, | Performed by: NURSE PRACTITIONER

## 2019-03-27 PROCEDURE — G0439 PPPS, SUBSEQ VISIT: HCPCS | Mod: HCNC,S$GLB,, | Performed by: NURSE PRACTITIONER

## 2019-03-27 PROCEDURE — 99999 PR PBB SHADOW E&M-EST. PATIENT-LVL IV: CPT | Mod: PBBFAC,HCNC,, | Performed by: NURSE PRACTITIONER

## 2019-03-27 PROCEDURE — 99499 RISK ADDL DX/OHS AUDIT: ICD-10-PCS | Mod: HCNC,S$GLB,, | Performed by: NURSE PRACTITIONER

## 2019-03-27 RX ORDER — DEXTROMETHORPHAN HYDROBROMIDE, GUAIFENESIN 5; 100 MG/5ML; MG/5ML
1300 LIQUID ORAL DAILY
COMMUNITY
End: 2019-06-18

## 2019-03-27 NOTE — PROGRESS NOTES
"Domonique Kellogg presented for a  Medicare AWV and comprehensive Health Risk Assessment today. The following components were reviewed and updated:    · Medical history  · Family History  · Social history  · Allergies and Current Medications  · Health Risk Assessment  · Health Maintenance  · Care Team     ** See Completed Assessments for Annual Wellness Visit within the encounter summary.**       The following assessments were completed:  · Living Situation  · CAGE  · Depression Screening  · Timed Get Up and Go  · Whisper Test  · Cognitive Function Screening  ·   ·   ·   · Nutrition Screening  · ADL Screening  · PAQ Screening    Vitals:    03/27/19 0822   BP: 108/72   Pulse: 78   Weight: 119 kg (262 lb 5.6 oz)   Height: 5' 8" (1.727 m)     Body mass index is 39.89 kg/m².  Physical Exam   Constitutional: He is oriented to person, place, and time.   Obese   HENT:   Head: Normocephalic.   Cardiovascular: Normal rate and regular rhythm.   Pulmonary/Chest: Effort normal and breath sounds normal.   Abdominal: Soft. Bowel sounds are normal.   Obese   Musculoskeletal: Normal range of motion. He exhibits no edema.   Neurological: He is alert and oriented to person, place, and time.   Skin: Skin is warm and dry.   Psychiatric: He has a normal mood and affect.   Nursing note and vitals reviewed.        Diagnoses and health risks identified today and associated recommendations/orders:    1. Encounter for preventive health examination  Here for Health Risk Assessment/Annual Wellness Visit.  Health maintenance reviewed and updated. Follow up in one year.  Prescription given for Shingrix.    2. Hypertension, essential  Chronic, stable on current medications. Followed by PCP.    3. Coronary artery disease involving native coronary artery of native heart without angina pectoris  Chronic, stable on current medications. Followed by Cardiology, PCP.    4. Bilateral carotid artery disease, unspecified type  Chronic, stable on current " medications. 20-39% noted on U/S 10/10/18.Followed by PCP, Cardiology.    5. Hyperlipidemia, unspecified hyperlipidemia type  Chronic, stable on current medication. Followed by PCP, Cardiology.    6. Chronic pulmonary heart disease  Chronic, stable on current medications. Estimated PA Systolic pressure 41 noted on Stress ECHO 2/16/15. Followed by PCP.    7. Class 2 severe obesity due to excess calories with serious comorbidity and body mass index (BMI) of 39.0 to 39.9 in adult  Chronic, no recent weight loss. Reinforced diet/exercise per PCP recommendations. Followed by PCP.    8. H/O syncope  Stable. Followed by PCP.    9. RAHEEL (obstructive sleep apnea)  Chronic, stable with CPAP. Followed by PCP.    10. Primary osteoarthritis of right knee  Chronic, stable on current medications. Followed by PCP.    11. Spondylosis of lumbar region without myelopathy or radiculopathy  Chronic, stable on current medications. Followed by PCP.      Provided Domonique with a 5-10 year written screening schedule and personal prevention plan. Recommendations were developed using the USPSTF age appropriate recommendations. Education, counseling, and referrals were provided as needed. After Visit Summary printed and given to patient which includes a list of additional screenings\tests needed.    Follow up in about 6 months (around 10/9/2019).with PCP    Lula Ruiz NP

## 2019-03-27 NOTE — PATIENT INSTRUCTIONS
Counseling and Referral of Other Preventative  (Italic type indicates deductible and co-insurance are waived)    Patient Name: Domonique Kellogg  Today's Date: 3/27/2019    Health Maintenance       Date Due Completion Date    High Dose Statin 01/08/2020 1/8/2019    Aspirin/Antiplatelet Therapy 01/08/2020 1/8/2019    Colonoscopy 02/14/2021 2/14/2011    Lipid Panel 10/04/2023 10/4/2018    TETANUS VACCINE 03/03/2026 3/3/2016        Obtain new shingles vaccine -  SHINGRIX - when available    No orders of the defined types were placed in this encounter.    The following information is provided to all patients.  This information is to help you find resources for any of the problems found today that may be affecting your health:                Living healthy guide: www.[facility].[place].gov      Understanding Diabetes: www.diabetes.org      Eating healthy: www.cdc.gov/healthyweight      [facility] home safety checklist: www.cdc.gov/steadi/patient.html      Agency on Aging: www.goea.[place].gov      Alcoholics anonymous (AA): www.aa.org      Physical Activity: www.kali.nih.gov/vz3yich      Tobacco use: www.quitwithusla.org

## 2019-04-22 RX ORDER — PRAVASTATIN SODIUM 40 MG/1
TABLET ORAL
Qty: 90 TABLET | Refills: 1 | Status: SHIPPED | OUTPATIENT
Start: 2019-04-22 | End: 2019-10-22 | Stop reason: SDUPTHER

## 2019-05-20 ENCOUNTER — PATIENT MESSAGE (OUTPATIENT)
Dept: ADMINISTRATIVE | Facility: OTHER | Age: 69
End: 2019-05-20

## 2019-05-20 ENCOUNTER — PATIENT MESSAGE (OUTPATIENT)
Dept: INTERNAL MEDICINE | Facility: CLINIC | Age: 69
End: 2019-05-20

## 2019-05-20 DIAGNOSIS — I10 HYPERTENSION, ESSENTIAL: Primary | ICD-10-CM

## 2019-05-23 ENCOUNTER — PATIENT OUTREACH (OUTPATIENT)
Dept: OTHER | Facility: OTHER | Age: 69
End: 2019-05-23

## 2019-05-23 NOTE — LETTER
May 23, 2019     Domonique Kellogg  [address]  [facility] [address]       Dear Domonique,    Welcome to [facility]! Our goal is to make care effective, proactive and convenient by using data you send us from home to better treat your chronic conditions.          My name is Vero Abdi, and I am your dedicated Digital Medicine clinician. As an expert in medication management, I will help ensure that the medications you are taking continue to provide the intended benefits and help you reach your goals. You can reach me directly at [phone] or by sending me a message directly through your [facility] account.      I am Naseem Shelton and I will be your health . My job is to help you identify lifestyle changes to improve your disease control. We will talk about nutrition, exercise, and other ways you may be able to adjust your current habits to better your health. Additionally, we will help ensure you are completing the tests and screenings that are necessary to help manage your conditions. You can reach me directly at [phone] or by sending me a message directly through your [facility] account.    Most importantly, YOU are at the center of this team. Together, we will work to improve your overall health and encourage you to meet your goals for a healthier lifestyle.     What we expect from YOU:  · Please take frequent home blood pressure measurements. We ask that you take at least 1 blood pressure reading per week, but more information will better help us get you know you. Be sure you rest for a few minutes before taking the reading in a quiet, comfortable place.     Be available to receive phone calls or [facility] messages, when appropriate, from your care team. Please let us know if there are any specific days or times that work best for us to reach you via phone.     Complete routine tests and screenings. Dont worry, we will help keep you on track!           What you should  expect from your Digital Medicine Care Team:   We will work with you to create a personalized plan of care and provide you with encouragement and education, including regarding lifestyle changes, that could help you manage your disease states.     We will adjust your current medications, if needed, and continue to monitor your long-term progress.     We will provide you and your physician with monthly progress reports after you have been in the program for more than 30 days.     We will send you reminders through [facility] and text messages to help ensure you do not miss any testing deadlines to help manage your disease states.    You will be able to reach us by phone or through your [facility] account by clicking our names under Care Team on the right side of the home screen.    I look forward to working with you to achieve your blood pressure goals!    We look forward to working with you to help manage your health,    Sincerely,    Your Digital Medicine Team    Please visit our websites to learn more:   · Hypertension: [email or web address]      Remember, we are not available for emergencies. If you have an emergency, please contact your doctors office directly or call [facility] on-call ([phone] or [phone]) or [phone].

## 2019-05-23 NOTE — PROGRESS NOTES
Digital Medicine Enrollment Call    Introduced Mr. Domonique Kellogg to Digital Medicine.     Discussed program expectations and requirements.    Introduced digital medicine care team.     Reviewed the importance of self-monitoring for digital medicine participation.     Reviewed that the Digital Medicine team is not available for emergencies and instructed the patient to call 911 or [facility] ([phone] or [phone]) if one arises.    Pt reports that he and his wife are sharing the [employer] cuff. He states that the cuff is too small for him. Pt advised of the importance of the proper size of the cuff. He plans to return to the [facility] to discuss options. Pt does not want to purchase a new cuff.         Last 5 Patient Entered Readings                                      Current 30 Day Average: 128/79     Recent Readings 5/23/2019 5/22/2019 5/22/2019    SBP (mmHg) 129 127 120    DBP (mmHg) 79 78 80    Pulse 56 58 80

## 2019-05-29 ENCOUNTER — PATIENT MESSAGE (OUTPATIENT)
Dept: INTERNAL MEDICINE | Facility: CLINIC | Age: 69
End: 2019-05-29

## 2019-05-29 ENCOUNTER — PATIENT MESSAGE (OUTPATIENT)
Dept: SLEEP MEDICINE | Facility: CLINIC | Age: 69
End: 2019-05-29

## 2019-05-29 DIAGNOSIS — G47.33 OBSTRUCTIVE SLEEP APNEA: Primary | ICD-10-CM

## 2019-05-30 ENCOUNTER — TELEPHONE (OUTPATIENT)
Dept: INTERNAL MEDICINE | Facility: CLINIC | Age: 69
End: 2019-05-30

## 2019-05-30 NOTE — TELEPHONE ENCOUNTER
I have been having lower back pains for a couple of weeks now. I thought they were related to doing yard work a few weeks ago. However they have not stopped. I have done exercises that usually help but to no avail. I would like to schedule a visit to try to find out what is happening?     Additionally, my knee has become inflamed after walking several days in the last week. Can I get a referral to an Rheumatologist? My wife has been seeing Dr. Parks. She raves about her.     Scheduled pt for a  appt for tomorrow. Pt was given Rheumatology dept number for scheduling.

## 2019-05-31 ENCOUNTER — PATIENT OUTREACH (OUTPATIENT)
Dept: OTHER | Facility: OTHER | Age: 69
End: 2019-05-31

## 2019-05-31 ENCOUNTER — OFFICE VISIT (OUTPATIENT)
Dept: INTERNAL MEDICINE | Facility: CLINIC | Age: 69
End: 2019-05-31
Payer: MEDICARE

## 2019-05-31 VITALS
WEIGHT: 260 LBS | HEART RATE: 62 BPM | HEIGHT: 68 IN | TEMPERATURE: 99 F | OXYGEN SATURATION: 97 % | SYSTOLIC BLOOD PRESSURE: 124 MMHG | DIASTOLIC BLOOD PRESSURE: 84 MMHG | BODY MASS INDEX: 39.4 KG/M2

## 2019-05-31 DIAGNOSIS — M54.50 ACUTE BILATERAL LOW BACK PAIN WITHOUT SCIATICA: Primary | ICD-10-CM

## 2019-05-31 PROCEDURE — 3074F PR MOST RECENT SYSTOLIC BLOOD PRESSURE < 130 MM HG: ICD-10-PCS | Mod: HCNC,CPTII,S$GLB, | Performed by: INTERNAL MEDICINE

## 2019-05-31 PROCEDURE — 3074F SYST BP LT 130 MM HG: CPT | Mod: HCNC,CPTII,S$GLB, | Performed by: INTERNAL MEDICINE

## 2019-05-31 PROCEDURE — 99213 OFFICE O/P EST LOW 20 MIN: CPT | Mod: HCNC,S$GLB,, | Performed by: INTERNAL MEDICINE

## 2019-05-31 PROCEDURE — 99213 PR OFFICE/OUTPT VISIT, EST, LEVL III, 20-29 MIN: ICD-10-PCS | Mod: HCNC,S$GLB,, | Performed by: INTERNAL MEDICINE

## 2019-05-31 PROCEDURE — 99999 PR PBB SHADOW E&M-EST. PATIENT-LVL V: ICD-10-PCS | Mod: PBBFAC,HCNC,, | Performed by: INTERNAL MEDICINE

## 2019-05-31 PROCEDURE — 3079F DIAST BP 80-89 MM HG: CPT | Mod: HCNC,CPTII,S$GLB, | Performed by: INTERNAL MEDICINE

## 2019-05-31 PROCEDURE — 3079F PR MOST RECENT DIASTOLIC BLOOD PRESSURE 80-89 MM HG: ICD-10-PCS | Mod: HCNC,CPTII,S$GLB, | Performed by: INTERNAL MEDICINE

## 2019-05-31 PROCEDURE — 1101F PR PT FALLS ASSESS DOC 0-1 FALLS W/OUT INJ PAST YR: ICD-10-PCS | Mod: HCNC,CPTII,S$GLB, | Performed by: INTERNAL MEDICINE

## 2019-05-31 PROCEDURE — 99999 PR PBB SHADOW E&M-EST. PATIENT-LVL V: CPT | Mod: PBBFAC,HCNC,, | Performed by: INTERNAL MEDICINE

## 2019-05-31 PROCEDURE — 1101F PT FALLS ASSESS-DOCD LE1/YR: CPT | Mod: HCNC,CPTII,S$GLB, | Performed by: INTERNAL MEDICINE

## 2019-05-31 RX ORDER — TIZANIDINE 2 MG/1
4 TABLET ORAL EVERY 6 HOURS PRN
Qty: 40 TABLET | Refills: 1 | Status: SHIPPED | OUTPATIENT
Start: 2019-05-31 | End: 2019-06-10

## 2019-05-31 NOTE — PROGRESS NOTES
Subjective:       Patient ID: Domonique Kellogg is a 69 y.o. male.    Chief Complaint: Back Pain    69 year old man says he did yard work about 3 weeks ago and still has low back pain.  Usually his back recovers after 1-2 days when he does such activity.  Had MRI in 2014 that showed degenerative joint and disc disease but no nerve impingement    Review of Systems   Constitutional: Negative for activity change, appetite change and fever.   HENT: Negative for congestion, postnasal drip and sore throat.    Respiratory: Negative for cough, shortness of breath and wheezing.    Cardiovascular: Negative for chest pain and palpitations.   Gastrointestinal: Negative for abdominal pain, blood in stool, constipation, diarrhea, nausea and vomiting.   Genitourinary: Negative for decreased urine volume, difficulty urinating, flank pain and frequency.   Musculoskeletal: Negative for arthralgias.   Neurological: Negative for dizziness, weakness and headaches.       Objective:      Physical Exam   Musculoskeletal:        Arms:      Assessment:       1. Acute bilateral low back pain without sciatica        Plan:   Domonique was seen today for back pain.    Diagnoses and all orders for this visit:    Acute bilateral low back pain without sciatica  -     Ambulatory Consult to [facility]  -     Ambulatory consult to Physical Therapy    Other orders  -     tiZANidine (ZANAFLEX) 2 MG tablet; Take 2 tablets (4 mg total) by mouth every 6 (six) hours as needed.

## 2019-05-31 NOTE — PROGRESS NOTES
Last 5 Patient Entered Readings                                      Current 30 Day Average: 133/81     Recent Readings 5/31/2019 5/30/2019 5/30/2019 5/24/2019 5/24/2019    SBP (mmHg) 130 137 136 140 149    DBP (mmHg) 80 73 84 85 86    Pulse 53 58 59 57 58          Digital Medicine: Health  Follow Up    Lifestyle Modifications:    1.Dietary Modifications (Sodium intake <2,000mg/day, food labels, dining out): deferred    2.Physical Activity: deferred     3.Medication Therapy: Patient has been compliant with the medication regimen.    4.Patient has the following medication side effects/concerns:   (Frequency/Alleviating factors/Precipitating factors, etc.)     Patient was concerned that he is getting very different readings with the DM cuff compared to his other machine and the readings he gets in the physician's office. We discussed best practices around cuff technique and timing. He is using his wife's cuff instead of his own, and he will check the fit to see if it is ok (two fingers between the arm and the cuff), He also admitted to not always waiting 60 minutes after medication, or 5 minutes after the cuff is secured before taking his reading. I will check in with the patient in one week to see if his readings are more congruent with what his other readings have been.     Follow up with Bruno Domonique Kellogg completed. No further questions or concerns. Will continue to follow up to achieve health goals.

## 2019-06-07 ENCOUNTER — PATIENT OUTREACH (OUTPATIENT)
Dept: OTHER | Facility: OTHER | Age: 69
End: 2019-06-07

## 2019-06-07 NOTE — PROGRESS NOTES
Last 5 Patient Entered Readings                                      Current 30 Day Average: 134/81     Recent Readings 6/6/2019 5/31/2019 5/30/2019 5/30/2019 5/24/2019    SBP (mmHg) 140 130 137 136 140    DBP (mmHg) 80 80 73 84 85    Pulse 58 53 58 59 57        Attempted to call patient on preferred cell phone line - attempted 5 times but the phone didn't ring and disconnected me. DM has been having phone connectivity issues - I will try again later.  6/7 @ 3:29pm - spoke with patient briefly who reported that he was driving and asked if I could call him back in 20 minutes   6/7 @ 4:00pm - patient reports that he is still experiencing a 5-8 point difference in his DM cuff compared to his medline cuff, which he believes to be within range of the readings he has taken in the physician's office. He and his wife share the same cuff, it fits them both well, and she got a reading that was 20 points higher than her medline cuff. They are concerned that they are getting accurate readings. I offered to check in with the DM team to trouble shoot and to call him back in 1-2 business days.     6/11 @ 10:00am - spoke with patient who agreed to use his own, bigger cuff, for one week of at least readings every other day starting today, and check back in in a week, to determine if the readings are still very different from his other cuff.

## 2019-06-16 NOTE — PROGRESS NOTES
Subjective:      Patient ID: Domonique Kellogg is a 69 y.o. male.    Chief Complaint: Low-back Pain      HPI  ([place])    History of CAD, chronic pulmonary heart disease, obesity, HTN, hyperlipidemia, OA. History of lateral femoral cutaneous entrapment syndrome.    MRI of lumbar spine in 2014 showed lumbar spondylosis with no central/foraminal stenosis.     Seen by PCP on 5/31/19 and given zanaflex.     Chronic intermittent LBP x years. Had flare up of pain about 6 weeks ago after doing some yard work. He hac constant LBP with no leg pain. He's never had the pain last for so long. Stretching helped, but took more time than previously. Pain was worse with prolonged sitting. No numbness, tingling, or weakness. He has felt good in last week or so. He rates his pain as a 0 on a scale of 1-10, it was an 8 at its worst.     No PT, injections, or surgery on his back. He could not tolerate zanaflex. He takes prn motrin with improvement.       Review of Systems   Constitution: Negative for fever, malaise/fatigue, night sweats, weight gain and weight loss.   HENT: Negative for hearing loss, nosebleeds and odynophagia.    Eyes: Negative for blurred vision and double vision.   Cardiovascular: Negative for chest pain, irregular heartbeat and palpitations.   Respiratory: Negative for cough, hemoptysis, shortness of breath and wheezing.    Endocrine: Negative for cold intolerance and polydipsia.   Hematologic/Lymphatic: Does not bruise/bleed easily.   Skin: Negative for dry skin, poor wound healing, rash and suspicious lesions.   Musculoskeletal: Positive for back pain.        See HPI for pertinent positives.   Gastrointestinal: Negative for bloating, abdominal pain, constipation, diarrhea, hematochezia, melena, nausea and vomiting.   Genitourinary: Negative for bladder incontinence, dysuria, hematuria, hesitancy and incomplete emptying.   Neurological: Negative for disturbances in coordination, dizziness, focal weakness,  headaches, loss of balance, numbness, paresthesias, seizures and weakness.   Psychiatric/Behavioral: Negative for depression and hallucinations. The patient is not nervous/anxious.            Objective:        General: Domonique is well-developed, well-nourished, appears stated age, in no acute distress, alert and oriented to time, place and person.     General    Vitals reviewed.  Constitutional: He is oriented to person, place, and time. He appears well-developed and well-nourished.   Pulmonary/Chest: Effort normal.   Abdominal: He exhibits no distension.   Neurological: He is alert and oriented to person, place, and time.   Psychiatric: He has a normal mood and affect. His behavior is normal. Judgment and thought content normal.           Gait: normal, tandem walking is normal and he is able to heel/toe stand.     On exam of the lumbar spine, Inspection of back is normal, No tenderness noted    Skin in lumbar region is warm to the touch without visible rashes.     muscle tone normal without spasm, limited range of motion without pain  Patient denies pain with lumbar ROM.    Strength testing of the bilateral LEs shows  Right hip abduction:  +5/5  Left hip abduction:  +5/5  Right hip flexion:  +5/5  Left hip flexion:  +5/5  Right hip extensors:  +5/5  Left hip extensors:  +5/5  Right quadriceps:  +5/5  Left quadriceps:  +5/5  Right hamstring:  +5/5  Left hamstring:  +5/5  Right dorsiflexion:  +5/5  Left dorsiflexion:  +5/5  Right plantar flexion:  +5/5  Left plantar flexion:  +5/5   Right EHL:  +5/5   Left EHL:  +5/5    negative clonus of bilateral LEs.     negative straight leg raise on bilateral LEs.     DTRs:  Right patellar:  +2     Left patellar:  +2  Right achilles:  +2   Left achilles:  +2    Sensation is grossly intact in L2, L3, L4, L5, and S1 distribution.    Right hip has no pain with IR/ER. Left hip has no pain with IR/ER.      On exam of bilateral UEs, patient has full painfree ROM with no signs of  clubbing, laxity, cyanosis, edema, instability, weakness, or tenderness.         Assessment:       1. Bilateral low back pain without sciatica, unspecified chronicity    2. Spondylosis of lumbar region without myelopathy or radiculopathy           Plan:       Orders Placed This Encounter    Ambulatory referral to Physical Therapy - Lumbar       Chronic intermittent LBP x years. Had flare up of pain about 6 weeks ago after doing some yard work. He hac constant LBP with no leg pain. He's never had the pain last for so long. It has improved over last week or so and currently he has no pain. Known lumbar spondylosis from MRI in 2014 with no central or foraminal stenosis. Pain likely due to flare up of underlying degeneration with large myofascial component. Treatment options reviewed with patient and following plan made:     - PT for lumbar spine with good HEP. Internal [facility] orders done.   - Discussed lumbar XRs. Will hold for now since he has no pain. May revisit if pain returns.   - Stop zanaflex due to side effects.  - Continue prn OTC motrin. Take with food.     Follow-up: Follow up in 3 months (on 9/18/2019). If there are any questions prior to this, the patient was instructed to contact the office.

## 2019-06-18 ENCOUNTER — OFFICE VISIT (OUTPATIENT)
Dept: SPINE | Facility: CLINIC | Age: 69
End: 2019-06-18
Payer: MEDICARE

## 2019-06-18 VITALS
SYSTOLIC BLOOD PRESSURE: 134 MMHG | HEART RATE: 55 BPM | WEIGHT: 260 LBS | HEIGHT: 68 IN | DIASTOLIC BLOOD PRESSURE: 69 MMHG | BODY MASS INDEX: 39.4 KG/M2

## 2019-06-18 DIAGNOSIS — M47.816 SPONDYLOSIS OF LUMBAR REGION WITHOUT MYELOPATHY OR RADICULOPATHY: ICD-10-CM

## 2019-06-18 DIAGNOSIS — M54.50 BILATERAL LOW BACK PAIN WITHOUT SCIATICA, UNSPECIFIED CHRONICITY: Primary | ICD-10-CM

## 2019-06-18 PROCEDURE — 1101F PR PT FALLS ASSESS DOC 0-1 FALLS W/OUT INJ PAST YR: ICD-10-PCS | Mod: HCNC,CPTII,S$GLB, | Performed by: PHYSICIAN ASSISTANT

## 2019-06-18 PROCEDURE — 99204 OFFICE O/P NEW MOD 45 MIN: CPT | Mod: HCNC,S$GLB,, | Performed by: PHYSICIAN ASSISTANT

## 2019-06-18 PROCEDURE — 99204 PR OFFICE/OUTPT VISIT, NEW, LEVL IV, 45-59 MIN: ICD-10-PCS | Mod: HCNC,S$GLB,, | Performed by: PHYSICIAN ASSISTANT

## 2019-06-18 PROCEDURE — 99999 PR PBB SHADOW E&M-EST. PATIENT-LVL IV: ICD-10-PCS | Mod: PBBFAC,HCNC,, | Performed by: PHYSICIAN ASSISTANT

## 2019-06-18 PROCEDURE — 3075F PR MOST RECENT SYSTOLIC BLOOD PRESS GE 130-139MM HG: ICD-10-PCS | Mod: HCNC,CPTII,S$GLB, | Performed by: PHYSICIAN ASSISTANT

## 2019-06-18 PROCEDURE — 99999 PR PBB SHADOW E&M-EST. PATIENT-LVL IV: CPT | Mod: PBBFAC,HCNC,, | Performed by: PHYSICIAN ASSISTANT

## 2019-06-18 PROCEDURE — 3078F PR MOST RECENT DIASTOLIC BLOOD PRESSURE < 80 MM HG: ICD-10-PCS | Mod: HCNC,CPTII,S$GLB, | Performed by: PHYSICIAN ASSISTANT

## 2019-06-18 PROCEDURE — 3078F DIAST BP <80 MM HG: CPT | Mod: HCNC,CPTII,S$GLB, | Performed by: PHYSICIAN ASSISTANT

## 2019-06-18 PROCEDURE — 1101F PT FALLS ASSESS-DOCD LE1/YR: CPT | Mod: HCNC,CPTII,S$GLB, | Performed by: PHYSICIAN ASSISTANT

## 2019-06-18 PROCEDURE — 3075F SYST BP GE 130 - 139MM HG: CPT | Mod: HCNC,CPTII,S$GLB, | Performed by: PHYSICIAN ASSISTANT

## 2019-06-18 RX ORDER — HYDROCHLOROTHIAZIDE 25 MG/1
25 TABLET ORAL DAILY
Qty: 90 TABLET | Refills: 0 | Status: SHIPPED | OUTPATIENT
Start: 2019-06-18 | End: 2019-09-07 | Stop reason: SDUPTHER

## 2019-06-18 RX ORDER — IBUPROFEN 200 MG
400 TABLET ORAL EVERY 6 HOURS PRN
COMMUNITY
End: 2020-07-21

## 2019-06-18 NOTE — PATIENT INSTRUCTIONS
It was a pleasure to meet you today!    Your back pain was likely due to flare up of the wear and tear (arthritis) in your back. Going to physical therapy to strengthen and stretch out the muscles in your back will help prevent future pain.     I put orders in for [facility]. They should call you to set up. If not, call [phone].     Okay to hold off on xrays for now, we can always get later if needed.     I will see you back in 3 months. Email me if you need anything prior to this visit and you can cancel it if you are doing well.     Nela

## 2019-06-18 NOTE — LETTER
June 18, 2019      Kia Person MD  [address]  [facility] [address]           [address]  [address], [address]  [facility] [address]  Phone: [phone]  Fax: [phone]          Patient: Domonique Kellogg   MR Number: [id number]   YOB: 1950   Date of Visit: 6/18/2019       Dear Dr. Kia Person:    Thank you for referring Domonique Kellogg to me for evaluation. Attached you will find relevant portions of my assessment and plan of care.    If you have questions, please do not hesitate to call me. I look forward to following Domonique Kellogg along with you.    Sincerely,    CAROLINA Cadena  CC:  No Recipients    If you would like to receive this communication electronically, please contact [email or web address] or [phone] to request more information on [facility] access.    For providers and/or their staff who would like to refer a patient to [facility], please contact us through our one-stop-shop provider referral line, [facility] [place], at [phone].    If you feel you have received this communication in error or would no longer like to receive these types of communications, please e-mail [email or web address]

## 2019-06-24 ENCOUNTER — PATIENT OUTREACH (OUTPATIENT)
Dept: OTHER | Facility: OTHER | Age: 69
End: 2019-06-24

## 2019-06-24 NOTE — PROGRESS NOTES
"Last 5 Patient Entered Readings                                      Current 30 Day Average: 135/76     Recent Readings 6/21/2019 6/19/2019 6/18/2019 6/11/2019 6/6/2019    SBP (mmHg) 132 135 134 134 140    DBP (mmHg) 70 75 69 78 80    Pulse 61 57 80 48 58          Spoke with patient briefly who has taken more readings with his digital medicine cuff and is feeling more confident about using the cuff, though he still feels unsure about the accuracy of the manual cuffs versus the machine cuffs. He does feel pleased that his recent readings have been "around the low 130's over the under 80's". He discussed that he wants to be more "aggressive" about taking more frequent readings using his digital medicine cuff.  "

## 2019-07-02 ENCOUNTER — TELEPHONE (OUTPATIENT)
Dept: REHABILITATION | Facility: HOSPITAL | Age: 69
End: 2019-07-02

## 2019-07-03 ENCOUNTER — CLINICAL SUPPORT (OUTPATIENT)
Dept: REHABILITATION | Facility: HOSPITAL | Age: 69
End: 2019-07-03
Payer: MEDICARE

## 2019-07-03 DIAGNOSIS — M54.50 CHRONIC BILATERAL LOW BACK PAIN WITHOUT SCIATICA: ICD-10-CM

## 2019-07-03 DIAGNOSIS — M62.81 WEAKNESS OF TRUNK MUSCULATURE: ICD-10-CM

## 2019-07-03 DIAGNOSIS — M53.86 DECREASED ROM OF LUMBAR SPINE: ICD-10-CM

## 2019-07-03 DIAGNOSIS — G89.29 CHRONIC BILATERAL LOW BACK PAIN WITHOUT SCIATICA: ICD-10-CM

## 2019-07-03 PROCEDURE — 97161 PT EVAL LOW COMPLEX 20 MIN: CPT | Mod: HCNC,PO

## 2019-07-03 PROCEDURE — 97110 THERAPEUTIC EXERCISES: CPT | Mod: HCNC,PO

## 2019-07-03 PROCEDURE — G8979 MOBILITY GOAL STATUS: HCPCS | Mod: CI,HCNC,PO

## 2019-07-03 PROCEDURE — G8978 MOBILITY CURRENT STATUS: HCPCS | Mod: CJ,HCNC,PO

## 2019-07-03 NOTE — PLAN OF CARE
[facility]  Physical Therapy Initial Evaluation    Name: Domonique Kellogg  Clinic Number: [id number]    Therapy Diagnosis:   Encounter Diagnoses   Name Primary?    Weakness of trunk musculature     Chronic bilateral low back pain without sciatica     Decreased ROM of lumbar spine      Physician: Nela Pond, CAROLINA    Physician Orders: PT Eval and Treat   Medical Diagnosis from Referral: lumbar spondylosis  Evaluation Date: 7/3/2019  Authorization Period Expiration: 12/31/2019  Plan of Care Expiration: 8/14/2019  Visit # / Visits authorized: 1/ 20    Time In: 9:30  Time Out: 10:05  Total Billable Time: 35 minutes    Precautions: Standard    Subjective   Date of onset: mid-May while doing yardwork, performed repetitive stooping and bending and the pain persisted for 2-3 weeks before going way, whereas previously flare-ups would only last 3-4 days.   History of current condition - Domonique reports: hx of LBP that would intermittently flare-up.  When LBP occurs, pain is described as pain across his low back, feels like a pulled muscle, denies LE pain/parasthesias.  Had difficulty standing up straight after getting out of bed and was unable to find a comfortable position to sit or lie in.       Medical History:   Past Medical History:   Diagnosis Date    Anticoagulant long-term use     Arthritis     CAD (coronary artery disease) 3/2/2015    [facility]obstruct[facility] per [facility]     CAD (coronary artery disease) 3/26/2015    Cataract     Dry eye syndrome     Hypertension     Obesity     Seizures     Sleep apnea     + CPAP       Surgical History:   Domonique Kellogg  has a past surgical history that includes Knee surgery; [place] tooth extraction; Phacoemulsification of cataract (Right, 7/16/2018); Intraocular prosthesis insertion (Right, 7/16/2018); Phacoemulsification of cataract (Left, 8/13/2018); Intraocular prosthesis insertion (Left, 8/13/2018); and Eye surgery.    Medications:   Domonique has  a current medication list which includes the following prescription(s): aspirin, hydrochlorothiazide, ibuprofen, magnesium oxide, multivitamin with minerals, potassium chloride sa, and pravastatin.    Allergies:   Review of patient's allergies indicates:   Allergen Reactions    Indomethacin      Headache dizziness    Adhesive Rash        Significant medical hx: chronic R knee pain, previous surgery in 1990's, average pain 6/10  Prior Therapy: none  Social History: lives at home with spouse, one flight of stairs (18 steps), doesn't use reciprocal gait with descending due to R knee pain  Occupation: part-time work, sedentary at computer  Prior Level of Function: no pain or limitation with all daily activity; used to play golf but stopped due to LBP (hasn't played since 2014)  Current Level of Function:     Stairs: no limitation  Sitting: no limitation  Walking: can tolerate one hour without increased LBP  Bending/lifting: increased pain with repetition (I.e. Yardwork)      Pain:  Current 0/10, worst 8/10, best 0/10   Easing Factors: prone extensions    Pts goals: wants to be able to do heavier physical activity without worrying about causing increased LBP    Objective       Observation/Posture:  R knee remains slightly flexed through all phases of gait, genuvarum R; Increased lumbar lordosis in standing, uneven skin folds    Lumbar AROM:    Percent Tolerance   Flexion   100    Extension 75 Pain middle lsp   Left Side Bending 50 Pain L   Right Side Bending 50  Pain R   Left rotation 75 Pain L   Right Rotation 75      LOWER EXTREMITY STRENGTH:   RIGHT LEFT   Quadriceps 4/5 5/5   Hamstrings 4/5 5/5     Hip Flexion 4+/5 5/5   Hip Abduction 4/5 4+/5   Hip IR 5/5 5/5   Hip ER 5/5 5/5   Hip Ext 4/5 4+/5       Dermatomes: Sensation: Light Touch: Intact  Flexibility: moderate tightness B hip flexors, moderate tightness R HS and gastroc  Palpation: denies tenderness throughout B lsp; illiac crest equal in standing    Special  "Tests:   Left Right   Slump - -   SLR - -         CMS Impairment/Limitation/Restriction for FOTO lumbar Survey    Therapist reviewed FOTO scores for Domonique Kellogg on 7/3/2019.   FOTO documents entered into [employer] - see Media section.    Limitation Score: 28%  Category: Mobility    Current : CJ = at least 20% but < 40% impaired, limited or restricted  Goal: CI = at least 1% but < 20% impaired, limited or restricted         TREATMENT   Treatment Time In: 10:05  Treatment Time Out: 10:30  Total Treatment time separate from Evaluation: 25 minutes    Domonique received therapeutic exercises to develop strength, endurance, ROM, flexibility, posture and core stabilization for 25 minutes including:    LTR x 2 minutes  Pelvic tilt 2 minutes with 5" hold  Piriformis stretch 3 x 30" B  Supine hip flexor stretch 1 minute B  Quad sets on R 5" hold x 20  Seated HSS x 1 minute      Education provided:   - importance of consistency with HEP    Written Home Exercises Provided: yes.  Exercises were reviewed and Domonique was able to demonstrate them prior to the end of the session.  Domonique demonstrated good  understanding of the education provided.     See EMR under Patient Instructions for exercises provided 7/3/2019.    Assessment   Domonique is a 69 y.o. male referred to outpatient Physical Therapy with a medical diagnosis of spondylosis of lumbar spine. Pt presents with mild functional limitations secondary to chronic LBP that will get exacerbated intermittently. Pt demo mild ROM limitations, moderate core strength deficits, and postural imbalances with increased tightness B hip flexors and R HS that is also affected by R knee pain/tightness. Patient will benefit from progressive therex to improve lumopelvic mobility and core strength with additional stretching/strengthening of R LE due to lack of TKE related to previous R knee injury. Pt selina initial therex well and demo good understanding of HEP.    Pt prognosis is Good.   Pt " will benefit from skilled outpatient Physical Therapy to address the deficits stated above and in the chart below, provide pt/family education, and to maximize pt's level of independence.     Plan of care discussed with patient: Yes  Pt's spiritual, cultural and educational needs considered and patient is agreeable to the plan of care and goals as stated below:     Anticipated Barriers for therapy: none    Medical Necessity is demonstrated by the following  History  Co-morbidities and personal factors that may impact the plan of care Co-morbidities:   See PMH above    Personal Factors:   no deficits     low   Examination  Body Structures and Functions, activity limitations and participation restrictions that may impact the plan of care Body Regions:   back  lower extremities  trunk    Body Systems:    gross symmetry  ROM  strength  gait    Participation Restrictions:   none    Activity limitations:   Learning and applying knowledge  no deficits    General Tasks and Commands  no deficits    Communication  no deficits    Mobility  lifting and carrying objects  walking  driving (bike, car, motorcycle)    Self care  no deficits    Domestic Life  no deficits    Interactions/Relationships  no deficits    Life Areas  no deficits    Community and Social Life  no deficits         low   Clinical Presentation stable and uncomplicated low   Decision Making/ Complexity Score: low     Goals:  Short Term Goals:   1. Patient to consistently report 0/10 pain at rest within 3 weeks   2. Patient able to tolerate at least 15 minutes sustained standing/walking with low back pain less than 3/10 within 3 weeks to improve selina to functional activity    Long Term Goals:  1. Patient to demonstrate TKE R LE consistently at rest within 6 weeks for improved mechanics with ambulation  2. Patient able to tolerate at least 60 minutes sustained standing/walking with LBP less than 2/10 within 6 weeks for improved functional mobility  3. Increase B LE  strength to 5/5 throughout within 6 weeks  4.  Patient able to lift 15# from floor to waist without increased pain or compensation within 8 weeks to return to prior level of function  5. Patient able to perform yardwork with good body mechanics and without increased pain within 6 weeks    Plan   Plan of care Certification: 7/3/2019 to 8/14/2019    Outpatient Physical Therapy 2 times weekly for 6 weeks to include the following interventions: Manual Therapy and Therapeutic Exercise.     Belén Ortiz, PT

## 2019-07-05 ENCOUNTER — CLINICAL SUPPORT (OUTPATIENT)
Dept: REHABILITATION | Facility: HOSPITAL | Age: 69
End: 2019-07-05
Payer: MEDICARE

## 2019-07-05 DIAGNOSIS — G89.29 CHRONIC BILATERAL LOW BACK PAIN WITHOUT SCIATICA: ICD-10-CM

## 2019-07-05 DIAGNOSIS — M54.50 CHRONIC BILATERAL LOW BACK PAIN WITHOUT SCIATICA: ICD-10-CM

## 2019-07-05 DIAGNOSIS — M53.86 DECREASED ROM OF LUMBAR SPINE: ICD-10-CM

## 2019-07-05 DIAGNOSIS — M62.81 WEAKNESS OF TRUNK MUSCULATURE: ICD-10-CM

## 2019-07-05 PROCEDURE — 97110 THERAPEUTIC EXERCISES: CPT | Mod: HCNC,PO

## 2019-07-05 NOTE — PROGRESS NOTES
"  Physical Therapy Daily Treatment Note     Name: Domonique Kellogg  Clinic Number: [id number]    Therapy Diagnosis:   Encounter Diagnoses   Name Primary?    Weakness of trunk musculature     Chronic bilateral low back pain without sciatica     Decreased ROM of lumbar spine      Physician: Nela Pond PA-C    Visit Date: 7/5/2019       Physician Orders: PT Eval and Treat   Medical Diagnosis from Referral: lumbar spondylosis  Evaluation Date: 7/3/2019  Authorization Period Expiration: 12/31/2019  Plan of Care Expiration: 8/14/2019  Visit #/Visits authorized: 2/ 20    Time In: 1:00  Time Out: 1:53  Total Billable Time: 33 minutes    Precautions: Standard    Subjective     Pt reports: he's been doing his ex's and his R knee has been hurting more in the back of his knee.  Still walking for exercise most days and his knee will hurt after 1 mile but once he is finished, the pain will subside.   He was compliant with home exercise program.  Response to previous treatment: no soreness    Objective     Domonique received therapeutic exercises to develop strength, endurance, ROM, flexibility and core stabilization for 45 minutes including:    LTR x 2 minutes  Pelvic tilt 2 minutes with 5" hold  Piriformis stretch 3 x 30" B  Supine hip flexor stretch 2 minute B  Quad sets on R 5" hold x 20  Seated HSS x 1 minute  Pelvic tilt with march 2 x 10  Supine ball rolls  SLR abduction 3 x 10  Dead bug isometric 3 x 5 x 5" hold  Ball squats 2 x 10    Progression: seated UE/LE flexion, prone LE extension        Home Exercises Provided and Patient Education Provided     Education provided:   - options for hip flexor stretch    Written Home Exercises Provided: yes.  Exercises were reviewed and Domonique was able to demonstrate them prior to the end of the session.  Domonique demonstrated good  understanding of the education provided.     See EMR under Patient Instructions for exercises provided 7/5/2019.    Assessment     Patient with good " tolerance to core stabilization ex's.  Patient mitch Youssef is progressing well towards his goals.   Pt prognosis is Good.     Pt will continue to benefit from skilled outpatient physical therapy to address the deficits listed in the problem list box on initial evaluation, provide pt/family education and to maximize pt's level of independence in the home and community environment.     Pt's spiritual, cultural and educational needs considered and pt agreeable to plan of care and goals.     Anticipated barriers to physical therapy: none    Goals:   Short Term Goals:   1. Patient to consistently report 0/10 pain at rest within 3 weeks   2. Patient able to tolerate at least 15 minutes sustained standing/walking with low back pain less than 3/10 within 3 weeks to improve selina to functional activity     Long Term Goals:  1. Patient to demonstrate TKE R LE consistently at rest within 6 weeks for improved mechanics with ambulation  2. Patient able to tolerate at least 60 minutes sustained standing/walking with LBP less than 2/10 within 6 weeks for improved functional mobility  3. Increase B LE strength to 5/5 throughout within 6 weeks  4.  Patient able to lift 15# from floor to waist without increased pain or compensation within 8 weeks to return to prior level of function  5. Patient able to perform yardwork with good body mechanics and without increased pain within 6 weeks    Plan     Plan of care Certification: 7/3/2019 to 8/14/2019     Outpatient Physical Therapy 2 times weekly for 6 weeks to include the following interventions: Manual Therapy and Therapeutic Exercise.     Belén Ortiz, PT

## 2019-07-10 ENCOUNTER — CLINICAL SUPPORT (OUTPATIENT)
Dept: REHABILITATION | Facility: HOSPITAL | Age: 69
End: 2019-07-10
Payer: MEDICARE

## 2019-07-10 ENCOUNTER — PATIENT OUTREACH (OUTPATIENT)
Dept: OTHER | Facility: OTHER | Age: 69
End: 2019-07-10

## 2019-07-10 DIAGNOSIS — M54.50 CHRONIC BILATERAL LOW BACK PAIN WITHOUT SCIATICA: ICD-10-CM

## 2019-07-10 DIAGNOSIS — M62.81 WEAKNESS OF TRUNK MUSCULATURE: ICD-10-CM

## 2019-07-10 DIAGNOSIS — G89.29 CHRONIC BILATERAL LOW BACK PAIN WITHOUT SCIATICA: ICD-10-CM

## 2019-07-10 DIAGNOSIS — M53.86 DECREASED ROM OF LUMBAR SPINE: ICD-10-CM

## 2019-07-10 PROCEDURE — 97110 THERAPEUTIC EXERCISES: CPT | Mod: HCNC,PO

## 2019-07-10 NOTE — PROGRESS NOTES
"Last 5 Patient Entered Readings                                      Current 30 Day Average: 131/73     Recent Readings 7/5/2019 7/1/2019 6/21/2019 6/19/2019 6/18/2019    SBP (mmHg) 118 131 132 135 134    DBP (mmHg) 71 74 70 75 69    Pulse 59 58 61 57 80        Digital Medicine: Health  Follow Up    Lifestyle Modifications:    1.Dietary Modifications (Sodium intake <2,000mg/day, food labels, dining out): Patient has cut back on salty snacks and will snack on cashews. Tries to drink "plenty of water", although he admits that he probably doesn't drink 64 oz per day. He has cut back from drinking two "travel sized mugs" of coffee per day to "less" and we discussed that the caffeine can effect the systolic number, and that the other dietary choices often effect the diastolic value. He has "cut back on his cocktails", so that he waits until "after 9:00", and he "usually has less than two", when he does so.     2.Physical Activity: Patient and his wife usually walk Monday-Saturday for two miles, which takes then 45-60 minutes. We discussed the AHA recommendation to exercise at least 150 minutes per week to control the blood pressure.     3.Medication Therapy: Patient has been compliant with the medication regimen.    4.Patient has the following medication side effects/concerns: Patient did not mention any problems or concerns with his medications.  (Frequency/Alleviating factors/Precipitating factors, etc.)     Patient was "surprised" that his last digital medicine cuff reading was 118/71, as he discussed that the digital medicine cuff is "finally giving him readings" that are "readings that he is used to". We discussed that the digital medicine program goal is to manage his blood pressure below 130/80.     Follow up with Mr. Domonique Kellogg completed. No further questions or concerns. Will continue to follow up to achieve health goals.      "

## 2019-07-10 NOTE — PROGRESS NOTES
"  Physical Therapy Daily Treatment Note     Name: Domonique Kellogg  Clinic Number: [id number]    Therapy Diagnosis:   Encounter Diagnoses   Name Primary?    Weakness of trunk musculature     Chronic bilateral low back pain without sciatica     Decreased ROM of lumbar spine      Physician: Nela Pond PA-C    Visit Date: 7/10/2019       Physician Orders: PT Eval and Treat   Medical Diagnosis from Referral: lumbar spondylosis  Evaluation Date: 7/3/2019  Authorization Period Expiration: 12/31/2019  Plan of Care Expiration: 8/14/2019  Visit #/Visits authorized: 3/ 20    Time In: 3:25  Time Out:4:10  Total Billable Time: 45 minutes    Precautions: Standard    Subjective     Pt reports: he's been doing his ex's and his R knee has been hurting more in the back of his knee.  Still walking for exercise most days and his knee will hurt after 1 mile but once he is finished, the pain will subside.   He was compliant with home exercise program.  Response to previous treatment: no soreness    Objective     Domonique received therapeutic exercises to develop strength, endurance, ROM, flexibility and core stabilization for 45 minutes including:    LTR x 2 minutes  Piriformis stretch 3 x 30" B  Supine hip flexor stretch 2 minute B  Seated HSS x 1 minute  Pelvic tilt with march 2 x 10  SLR abduction 3 x 10  Dead bug isometric 2 x 10 x 5" hold  Ball squats 2 x 10  Supine abdominal isometric 5" hold x 2 minutes  Prone LE extension 2 x 10  Slow march on ball x 2 minutes  Prone quad stretch 2 x 1 minute B  UE/LE flexion on ball x 1 minutes    Home Exercises Provided and Patient Education Provided     Education provided:   - options for hip flexor stretch  -use of KT for R knee to decrease pain    Written Home Exercises Provided: yes.  Exercises were reviewed and Domonique was able to demonstrate them prior to the end of the session.  Domonique demonstrated good  understanding of the education provided.     See EMR under Patient " Instructions for exercises provided 7/5/2019.    Assessment     Patient with good tolerance to core stabilization ex's.  Patient with decreased quadricep flexibility R vs L  Domonique is progressing well towards his goals.   Pt prognosis is Good.     Pt will continue to benefit from skilled outpatient physical therapy to address the deficits listed in the problem list box on initial evaluation, provide pt/family education and to maximize pt's level of independence in the home and community environment.     Pt's spiritual, cultural and educational needs considered and pt agreeable to plan of care and goals.     Anticipated barriers to physical therapy: none    Goals:   Short Term Goals:   1. Patient to consistently report 0/10 pain at rest within 3 weeks   2. Patient able to tolerate at least 15 minutes sustained standing/walking with low back pain less than 3/10 within 3 weeks to improve selina to functional activity     Long Term Goals:  1. Patient to demonstrate TKE R LE consistently at rest within 6 weeks for improved mechanics with ambulation  2. Patient able to tolerate at least 60 minutes sustained standing/walking with LBP less than 2/10 within 6 weeks for improved functional mobility  3. Increase B LE strength to 5/5 throughout within 6 weeks  4.  Patient able to lift 15# from floor to waist without increased pain or compensation within 8 weeks to return to prior level of function  5. Patient able to perform yardwork with good body mechanics and without increased pain within 6 weeks    Plan     Plan of care Certification: 7/3/2019 to 8/14/2019     Outpatient Physical Therapy 2 times weekly for 6 weeks to include the following interventions: Manual Therapy and Therapeutic Exercise.     Belén Ortiz, PT

## 2019-07-12 ENCOUNTER — CLINICAL SUPPORT (OUTPATIENT)
Dept: REHABILITATION | Facility: HOSPITAL | Age: 69
End: 2019-07-12
Payer: MEDICARE

## 2019-07-12 DIAGNOSIS — G89.29 CHRONIC BILATERAL LOW BACK PAIN WITHOUT SCIATICA: ICD-10-CM

## 2019-07-12 DIAGNOSIS — M54.50 CHRONIC BILATERAL LOW BACK PAIN WITHOUT SCIATICA: ICD-10-CM

## 2019-07-12 DIAGNOSIS — M62.81 WEAKNESS OF TRUNK MUSCULATURE: ICD-10-CM

## 2019-07-12 DIAGNOSIS — M53.86 DECREASED ROM OF LUMBAR SPINE: ICD-10-CM

## 2019-07-12 PROCEDURE — 97110 THERAPEUTIC EXERCISES: CPT | Mod: HCNC,PO

## 2019-07-12 NOTE — PROGRESS NOTES
"  Physical Therapy Daily Treatment Note     Name: Domonique Kellogg  Clinic Number: [id number]    Therapy Diagnosis:   Encounter Diagnoses   Name Primary?    Weakness of trunk musculature     Chronic bilateral low back pain without sciatica     Decreased ROM of lumbar spine      Physician: Nela Pond PA-C    Visit Date: 7/12/2019       Physician Orders: PT Eval and Treat   Medical Diagnosis from Referral: lumbar spondylosis  Evaluation Date: 7/3/2019  Authorization Period Expiration: 12/31/2019  Plan of Care Expiration: 8/14/2019  Visit #/Visits authorized: 4/ 20    Time In: 7:55  Time Out:8:56  Total Billable Time: 35 minutes    Precautions: Standard    Subjective     Pt reports: his low back has only been hurting when he first gets out of bed in the morning. Rocktape seemed to help his knee.   He was compliant with home exercise program.  Response to previous treatment: no soreness or pain    Objective     Domonique received therapeutic exercises to develop strength, endurance, ROM, flexibility and core stabilization for 45 minutes including:    LTR x 2 minutes  Piriformis stretch 3 x 30" B  Supine hip flexor stretch 2 minute B  Seated HSS x 1 minute  Pelvic tilt with march x 15  SLR abduction 3 x 10  Dead bug isometric 2 x 10 x 5" hold  Ball squats 3 x 10  Prone LE extension 2 x 10  Prone quad stretch 2 x 1 minute B  UE/LE flexion on ball x 2 minutes  Supine heel digs 4 x 3  Standing oblique press on ball 10 x 3" hold    Home Exercises Provided and Patient Education Provided     Education provided:   - options for hip flexor stretch  -use of KT for R knee to decrease pain    Written Home Exercises Provided: yes.  Exercises were reviewed and Domonique was able to demonstrate them prior to the end of the session.  Domonique demonstrated good  understanding of the education provided.     See EMR under Patient Instructions for exercises provided 7/5/2019.    Assessment     Patient responding well to treatment with " good tolerance to progressive core strengthening/stabilization along with good return demonstration of HEPBruno Youssef is progressing well towards his goals.   Pt prognosis is Good.     Pt will continue to benefit from skilled outpatient physical therapy to address the deficits listed in the problem list box on initial evaluation, provide pt/family education and to maximize pt's level of independence in the home and community environment.     Pt's spiritual, cultural and educational needs considered and pt agreeable to plan of care and goals.     Anticipated barriers to physical therapy: none    Goals:   Short Term Goals:   1. Patient to consistently report 0/10 pain at rest within 3 weeks   2. Patient able to tolerate at least 15 minutes sustained standing/walking with low back pain less than 3/10 within 3 weeks to improve selina to functional activity     Long Term Goals:  1. Patient to demonstrate TKE R LE consistently at rest within 6 weeks for improved mechanics with ambulation  2. Patient able to tolerate at least 60 minutes sustained standing/walking with LBP less than 2/10 within 6 weeks for improved functional mobility  3. Increase B LE strength to 5/5 throughout within 6 weeks  4.  Patient able to lift 15# from floor to waist without increased pain or compensation within 8 weeks to return to prior level of function  5. Patient able to perform yardwork with good body mechanics and without increased pain within 6 weeks    Plan     Plan of care Certification: 7/3/2019 to 8/14/2019     Outpatient Physical Therapy 2 times weekly for 6 weeks to include the following interventions: Manual Therapy and Therapeutic Exercise.     Belén Ortiz, PT

## 2019-07-15 ENCOUNTER — CLINICAL SUPPORT (OUTPATIENT)
Dept: REHABILITATION | Facility: HOSPITAL | Age: 69
End: 2019-07-15
Payer: MEDICARE

## 2019-07-15 DIAGNOSIS — M53.86 DECREASED ROM OF LUMBAR SPINE: ICD-10-CM

## 2019-07-15 DIAGNOSIS — M54.50 CHRONIC BILATERAL LOW BACK PAIN WITHOUT SCIATICA: ICD-10-CM

## 2019-07-15 DIAGNOSIS — G89.29 CHRONIC BILATERAL LOW BACK PAIN WITHOUT SCIATICA: ICD-10-CM

## 2019-07-15 DIAGNOSIS — M62.81 WEAKNESS OF TRUNK MUSCULATURE: ICD-10-CM

## 2019-07-15 PROCEDURE — 97110 THERAPEUTIC EXERCISES: CPT | Mod: HCNC,PO

## 2019-07-15 NOTE — PROGRESS NOTES
"  Physical Therapy Daily Treatment Note     Name: Domonique Kellogg  Clinic Number: [id number]    Therapy Diagnosis:   Encounter Diagnoses   Name Primary?    Weakness of trunk musculature     Chronic bilateral low back pain without sciatica     Decreased ROM of lumbar spine      Physician: Nela Pond PA-C    Visit Date: 7/15/2019       Physician Orders: PT Eval and Treat   Medical Diagnosis from Referral: lumbar spondylosis  Evaluation Date: 7/3/2019  Authorization Period Expiration: 12/31/2019  Plan of Care Expiration: 8/14/2019  Visit #/Visits authorized: 5/ 20    Time In: 1:30  Time Out: 2:30  Total Billable Time: 60 minutes    Precautions: Standard    Subjective     Pt reports: he hasn't had any LBP in the last 2 days, including when he first gets out of bed in the morning.    He was compliant with home exercise program.  Response to previous treatment: no soreness or pain    Objective     Domonique received therapeutic exercises to develop strength, endurance, ROM, flexibility and core stabilization for 45 minutes including:    LTR x 2 minutes  Piriformis stretch 3 x 30" B  Supine hip flexor stretch 2 minute B  Seated HSS x 1 minute  Pelvic tilt with march x 15  SLR abduction 3 x 10 1#  Dead bug isometric 2 x 12 x 5" hold  Ball squats 3 x 10  Prone LE extension 2 x 10  Prone quad stretch 2 x 1 minute B  UE/LE flexion on ball x 2 minutes  Supine heel digs 3 x 5  Standing oblique press on ball 15 x 3" hold  Dead rows 8" x 15    Home Exercises Provided and Patient Education Provided     Education provided:   -proper lifting mechanics with neutral spine    Written Home Exercises Provided: yes.  Exercises were reviewed and Domonique was able to demonstrate them prior to the end of the session.  Domonique demonstrated good  understanding of the education provided.     See EMR under Patient Instructions for exercises provided 7/5/2019.    Assessment     Good mechanics demo with dead rows and pt demo good " understanding of body mechanics with lifting.    Domonique is progressing well towards his goals.   Pt prognosis is Good.     Pt will continue to benefit from skilled outpatient physical therapy to address the deficits listed in the problem list box on initial evaluation, provide pt/family education and to maximize pt's level of independence in the home and community environment.     Pt's spiritual, cultural and educational needs considered and pt agreeable to plan of care and goals.     Anticipated barriers to physical therapy: none    Goals:   Short Term Goals:   1. Patient to consistently report 0/10 pain at rest within 3 weeks   2. Patient able to tolerate at least 15 minutes sustained standing/walking with low back pain less than 3/10 within 3 weeks to improve selina to functional activity     Long Term Goals:  1. Patient to demonstrate TKE R LE consistently at rest within 6 weeks for improved mechanics with ambulation  2. Patient able to tolerate at least 60 minutes sustained standing/walking with LBP less than 2/10 within 6 weeks for improved functional mobility  3. Increase B LE strength to 5/5 throughout within 6 weeks  4.  Patient able to lift 15# from floor to waist without increased pain or compensation within 8 weeks to return to prior level of function  5. Patient able to perform yardwork with good body mechanics and without increased pain within 6 weeks    Plan     Plan of care Certification: 7/3/2019 to 8/14/2019     Outpatient Physical Therapy 2 times weekly for 6 weeks to include the following interventions: Manual Therapy and Therapeutic Exercise.     Belén Ortiz, PT

## 2019-07-17 ENCOUNTER — CLINICAL SUPPORT (OUTPATIENT)
Dept: REHABILITATION | Facility: HOSPITAL | Age: 69
End: 2019-07-17
Payer: MEDICARE

## 2019-07-17 DIAGNOSIS — M54.50 CHRONIC BILATERAL LOW BACK PAIN WITHOUT SCIATICA: ICD-10-CM

## 2019-07-17 DIAGNOSIS — G89.29 CHRONIC BILATERAL LOW BACK PAIN WITHOUT SCIATICA: ICD-10-CM

## 2019-07-17 DIAGNOSIS — M62.81 WEAKNESS OF TRUNK MUSCULATURE: ICD-10-CM

## 2019-07-17 DIAGNOSIS — M53.86 DECREASED ROM OF LUMBAR SPINE: ICD-10-CM

## 2019-07-17 PROCEDURE — 97110 THERAPEUTIC EXERCISES: CPT | Mod: HCNC,PO

## 2019-07-17 NOTE — PROGRESS NOTES
"  Physical Therapy Daily Treatment Note     Name: Domonique Kellogg  Clinic Number: [id number]    Therapy Diagnosis:   Encounter Diagnoses   Name Primary?    Weakness of trunk musculature     Chronic bilateral low back pain without sciatica     Decreased ROM of lumbar spine      Physician: Nela Pond PA-C    Visit Date: 7/17/2019       Physician Orders: PT Eval and Treat   Medical Diagnosis from Referral: lumbar spondylosis  Evaluation Date: 7/3/2019  Authorization Period Expiration: 12/31/2019  Plan of Care Expiration: 8/14/2019  Visit #/Visits authorized: 6/ 20    Time In: 1:35  Time Out: 2:30  Total Billable Time: 30 minutes    Precautions: Standard    Subjective     Pt reports: he hasn't had any LBP in the last 2 days, including when he first gets out of bed in the morning.    He was compliant with home exercise program.  Response to previous treatment: no soreness or pain    Objective     Domonique received therapeutic exercises to develop strength, endurance, ROM, flexibility and core stabilization for 45 minutes including:    LTR x 2 minutes  Piriformis stretch 3 x 30" B  Supine hip flexor stretch 2 minute B  Seated HSS x 1 minute  SLR abduction 3 x 10 1#  Dead bug isometric 2 x 12 x 5" hold  Ball squats 3 x 10  Prone LE extension 2 x 10  Prone quad stretch 2 x 1 minute B  UE/LE flexion on ball x 2 minutes  Supine heel digs 3 x 5  Standing oblique press on ball 15 x 3" hold  Dead rows 10# total 2 x 10    Home Exercises Provided and Patient Education Provided     Education provided:   -proper lifting mechanics with neutral spine    Written Home Exercises Provided: yes.  Exercises were reviewed and Domonique was able to demonstrate them prior to the end of the session.  Domonique demonstrated good  understanding of the education provided.     See EMR under Patient Instructions for exercises provided 7/5/2019.    Assessment     Patient with improved core stability with seated ex's on ball.  Continues to show " good motivation for exercise progression.    Domonique is progressing well towards his goals.   Pt prognosis is Good.     Pt will continue to benefit from skilled outpatient physical therapy to address the deficits listed in the problem list box on initial evaluation, provide pt/family education and to maximize pt's level of independence in the home and community environment.     Pt's spiritual, cultural and educational needs considered and pt agreeable to plan of care and goals.     Anticipated barriers to physical therapy: none    Goals:   Short Term Goals:   1. Patient to consistently report 0/10 pain at rest within 3 weeks   2. Patient able to tolerate at least 15 minutes sustained standing/walking with low back pain less than 3/10 within 3 weeks to improve selina to functional activity     Long Term Goals:  1. Patient to demonstrate TKE R LE consistently at rest within 6 weeks for improved mechanics with ambulation  2. Patient able to tolerate at least 60 minutes sustained standing/walking with LBP less than 2/10 within 6 weeks for improved functional mobility  3. Increase B LE strength to 5/5 throughout within 6 weeks  4.  Patient able to lift 15# from floor to waist without increased pain or compensation within 8 weeks to return to prior level of function  5. Patient able to perform yardwork with good body mechanics and without increased pain within 6 weeks    Plan     Plan of care Certification: 7/3/2019 to 8/14/2019     Outpatient Physical Therapy 2 times weekly for 6 weeks to include the following interventions: Manual Therapy and Therapeutic Exercise.     Belén Ortiz, PT

## 2019-07-22 ENCOUNTER — CLINICAL SUPPORT (OUTPATIENT)
Dept: REHABILITATION | Facility: HOSPITAL | Age: 69
End: 2019-07-22
Payer: MEDICARE

## 2019-07-22 DIAGNOSIS — M62.81 WEAKNESS OF TRUNK MUSCULATURE: ICD-10-CM

## 2019-07-22 DIAGNOSIS — G89.29 CHRONIC BILATERAL LOW BACK PAIN WITHOUT SCIATICA: ICD-10-CM

## 2019-07-22 DIAGNOSIS — M53.86 DECREASED ROM OF LUMBAR SPINE: ICD-10-CM

## 2019-07-22 DIAGNOSIS — M54.50 CHRONIC BILATERAL LOW BACK PAIN WITHOUT SCIATICA: ICD-10-CM

## 2019-07-22 PROCEDURE — 97110 THERAPEUTIC EXERCISES: CPT | Mod: HCNC,PO

## 2019-07-22 NOTE — PROGRESS NOTES
"  Physical Therapy Daily Treatment Note     Name: Domonique Kellogg  Clinic Number: [id number]    Therapy Diagnosis:   Encounter Diagnoses   Name Primary?    Weakness of trunk musculature     Chronic bilateral low back pain without sciatica     Decreased ROM of lumbar spine      Physician: Nela Pond PA-C    Visit Date: 7/22/2019       Physician Orders: PT Eval and Treat   Medical Diagnosis from Referral: lumbar spondylosis  Evaluation Date: 7/3/2019  Authorization Period Expiration: 12/31/2019  Plan of Care Expiration: 8/14/2019  Visit #/Visits authorized: 7/ 20    Time In: 1:35  Time Out: 2:30  Total Billable Time: 30 minutes    Precautions: Standard    Subjective     Pt reports: he hasn't had any LBP in the last 2 days, including when he first gets out of bed in the morning.    He was compliant with home exercise program.  Response to previous treatment: no soreness or pain    Objective     Domonique received therapeutic exercises to develop strength, endurance, ROM, flexibility and core stabilization for 45 minutes including:    LTR x 2 minutes  Piriformis stretch 3 x 30" B  Supine hip flexor stretch 2 minute B  Seated HSS x 1 minute  SLR abduction 3 x 10 1#  Ab iso with manual resistance 2 x 10 x 5" hold  Ball squats 3 x 10  Prone LE extension 2 x 10  Prone quad stretch 2 x 1 minute B  UE/LE flexion on ball x 2 minutes  Supine heel digs 3 x 5  Standing oblique press on ball 15 x 3" hold  Dead rows 10# total 2 x 10    Domonique received the following manual therapy techniques: Joint mobilizations were applied to the: lumbar spine for 10 minutes, including:    Lumbar AROM: c/o L lsp pain at range L rotation  PT performed MWM to L lsp in standing with active rotation      Home Exercises Provided and Patient Education Provided     Education provided:   -proper lifting mechanics with neutral spine    Written Home Exercises Provided: yes.  Exercises were reviewed and Domonique was able to demonstrate them prior to " the end of the session.  Domonique demonstrated good  understanding of the education provided.     See EMR under Patient Instructions for exercises provided 7/5/2019.    Assessment     Pt demo improved mobility with active rotation with decreased pain at end range following manual tx. Patient progressing well towards LTGs    Domonique is progressing well towards his goals.   Pt prognosis is Good.     Pt will continue to benefit from skilled outpatient physical therapy to address the deficits listed in the problem list box on initial evaluation, provide pt/family education and to maximize pt's level of independence in the home and community environment.     Pt's spiritual, cultural and educational needs considered and pt agreeable to plan of care and goals.     Anticipated barriers to physical therapy: none    Goals:   Short Term Goals:   1. Patient to consistently report 0/10 pain at rest within 3 weeks   2. Patient able to tolerate at least 15 minutes sustained standing/walking with low back pain less than 3/10 within 3 weeks to improve selina to functional activity     Long Term Goals:  1. Patient to demonstrate TKE R LE consistently at rest within 6 weeks for improved mechanics with ambulation  2. Patient able to tolerate at least 60 minutes sustained standing/walking with LBP less than 2/10 within 6 weeks for improved functional mobility  3. Increase B LE strength to 5/5 throughout within 6 weeks  4.  Patient able to lift 15# from floor to waist without increased pain or compensation within 8 weeks to return to prior level of function  5. Patient able to perform yardwork with good body mechanics and without increased pain within 6 weeks    Plan     Plan of care Certification: 7/3/2019 to 8/14/2019     Outpatient Physical Therapy 2 times weekly for 6 weeks to include the following interventions: Manual Therapy and Therapeutic Exercise.   Re-eval next session.    Belén Ortiz, PT

## 2019-07-24 ENCOUNTER — CLINICAL SUPPORT (OUTPATIENT)
Dept: REHABILITATION | Facility: HOSPITAL | Age: 69
End: 2019-07-24
Payer: MEDICARE

## 2019-07-24 DIAGNOSIS — M53.86 DECREASED ROM OF LUMBAR SPINE: ICD-10-CM

## 2019-07-24 DIAGNOSIS — M62.81 WEAKNESS OF TRUNK MUSCULATURE: ICD-10-CM

## 2019-07-24 DIAGNOSIS — M54.50 CHRONIC BILATERAL LOW BACK PAIN WITHOUT SCIATICA: ICD-10-CM

## 2019-07-24 DIAGNOSIS — G89.29 CHRONIC BILATERAL LOW BACK PAIN WITHOUT SCIATICA: ICD-10-CM

## 2019-07-24 PROCEDURE — G8979 MOBILITY GOAL STATUS: HCPCS | Mod: CJ,HCNC,PO

## 2019-07-24 PROCEDURE — 97140 MANUAL THERAPY 1/> REGIONS: CPT | Mod: HCNC,PO

## 2019-07-24 PROCEDURE — 97110 THERAPEUTIC EXERCISES: CPT | Mod: HCNC,PO

## 2019-07-24 PROCEDURE — G8978 MOBILITY CURRENT STATUS: HCPCS | Mod: CJ,HCNC,PO

## 2019-07-24 NOTE — PROGRESS NOTES
"  Physical Therapy Daily Treatment Note     Name: Domonique Kellogg  Clinic Number: [id number]    Therapy Diagnosis:   Encounter Diagnoses   Name Primary?    Weakness of trunk musculature     Chronic bilateral low back pain without sciatica     Decreased ROM of lumbar spine      Physician: Nela Pond PA-C    Visit Date: 7/24/2019       Physician Orders: PT Eval and Treat   Medical Diagnosis from Referral: lumbar spondylosis  Evaluation Date: 7/3/2019  Authorization Period Expiration: 12/31/2019  Plan of Care Expiration: 8/14/2019  Visit #/Visits authorized: 8/20    Time In: 1:35  Time Out: 2:30  Total Billable Time: 30 minutes    Precautions: Standard    Subjective     Pt reports: worst LBP this week was this morning 4/10 when getting out bed, subsided once he got up and started moving around. Patient states he hasn't been having that pain much at all in the last week.  States his R knee pain has been getting better.  Primary c/o is sit>stand after sustained stand. Has been taping his knee which is helping.    Stairs: no limitation  Sitting: no limitation  Walking: can tolerate one hour without increased LBP  Bending/lifting: increased pain with repetition (I.e. Yardwork)    He was compliant with home exercise program.  Response to previous treatment: no soreness or pain    Objective     Lumbar AROM:     Percent Tolerance   Flexion    100     Extension 75 Pain L    Left Side Bending 50 Pain L   Right Side Bending 50     Left rotation 75 Pain L   Right Rotation 75      * L LBP described as uncomfortable restriction       LOWER EXTREMITY STRENGTH:    RIGHT LEFT   Quadriceps 5/5 5/5   Hamstrings 5/5 5/5      Hip Flexion 5/5 5/5   Hip Abduction 4+/5 5/5   Hip IR 5/5 5/5   Hip ER 5/5 5/5   Hip Ext 5/5 5/5       Domonique received therapeutic exercises to develop strength, endurance, ROM, flexibility and core stabilization for 45 minutes including:    LTR x 2 minutes  Piriformis stretch 3 x 30" B  Supine hip flexor " "stretch 2 minute B  Seated HSS x 1 minute  SLR abduction 3 x 10 1#  Ab iso with manual resistance 2 x 10 x 5" hold  Ball squats 3 x 10  Prone LE extension 2 x 10  Prone quad stretch 2 x 1 minute B  UE/LE flexion on ball x 2 minutes  Supine heel digs 3 x 5  Standing oblique press on ball 15 x 3" hold  Dead rows 12# total 2 x 10    Domonique received the following manual therapy techniques: Joint mobilizations were applied to the: lumbar spine for 10 minutes, including:    Lumbar AROM: c/o L lsp pain at range L rotation  PT performed MWM to L lsp in standing with active rotation      Home Exercises Provided and Patient Education Provided     Education provided:   -proper lifting mechanics with neutral spine    Written Home Exercises Provided: yes.  Exercises were reviewed and Domonique was able to demonstrate them prior to the end of the session.  Domonique demonstrated good  understanding of the education provided.     See EMR under Patient Instructions for exercises provided 7/5/2019.    FOTO score: 31% impairment    Assessment     Pt has attended 8 sessions of physical therapy for treatment of LBP. Patient is responding well to treatment with improved strength and mobility and decreased pain with functional activity.  Pt continues with mild intermittent pain in L lsp, worse with L SB and rotation, with a mild segmental restriction persisting on L and will benefit from continued treatment x 3 more sessions for continued exercise progression and manual tx to decrease pain.    Domonique is progressing well towards his goals.   Pt prognosis is Good.     Pt will continue to benefit from skilled outpatient physical therapy to address the deficits listed in the problem list box on initial evaluation, provide pt/family education and to maximize pt's level of independence in the home and community environment.     Pt's spiritual, cultural and educational needs considered and pt agreeable to plan of care and goals.   "   Anticipated barriers to physical therapy: none    Goals:   Short Term Goals:   1. Patient to consistently report 0/10 pain at rest within 3 weeks  MET  2. Patient able to tolerate at least 15 minutes sustained standing/walking with low back pain less than 3/10 within 3 weeks to improve selina to functional activity MET     Long Term Goals:  1. Patient to demonstrate TKE R LE consistently at rest within 6 weeks for improved mechanics with ambulation IN PROGRESS  2. Patient able to tolerate at least 60 minutes sustained standing/walking with LBP less than 2/10 within 6 weeks for improved functional mobility MET  3. Increase B LE strength to 5/5 throughout within 6 weeks IN PROGRESS  4.  Patient able to lift 15# from floor to waist without increased pain or compensation within 8 weeks to return to prior level of function MET  5. Patient able to perform yardwork with good body mechanics and without increased pain within 6 weeks IN PROGRESS    Plan     Plan of care Certification: 7/3/2019 to 8/14/2019     Decrease freq to 1x/week for 3 more weeks then will plan to discharge to independent home exercise program at that time.    Belén Ortiz, PT

## 2019-07-26 ENCOUNTER — PATIENT OUTREACH (OUTPATIENT)
Dept: OTHER | Facility: OTHER | Age: 69
End: 2019-07-26

## 2019-07-26 NOTE — PROGRESS NOTES
"Last 5 Patient Entered Readings                                      Current 30 Day Average: 127/74     Recent Readings 7/25/2019 7/23/2019 7/19/2019 7/18/2019 7/10/2019    SBP (mmHg) 125 134 129 124 125    DBP (mmHg) 77 72 72 75 77    Pulse 54 54 53 53 52          LVM to complete coaching follow up call. Patient lvm while I was on the other line at 10:07am. Returned patient call@10:57am -     Digital Medicine: Health  Follow Up    Lifestyle Modifications:    1.Dietary Modifications (Sodium intake <2,000mg/day, food labels, dining out): Patient is still mindful of his diet, including waiting until around 9pm to have a cocktail, which is "usually one", as he "can sip a mayco for hours". We reviewed the aha guidelines for men - under 2 alcoholic drinks a day.     2.Physical Activity: Patient and his wife walk regularly at [employer] [place] starting at 6:45am and they are "back in the house by about 8:15". He discussed that he does his physical therapy exercises for his back at that time.     3.Medication Therapy: Patient has been compliant with the medication regimen.    4.Patient has the following medication side effects/concerns: Patient did not mention any concerns or problems with his medication.   (Frequency/Alleviating factors/Precipitating factors, etc.)     Patient discussed that he is "relaxed about things" and that he believes it effects his blood pressure. We discussed that stress management can effect the systolic value. He reports that he has a "positve outlook", he does crossword puzzles, and that he monitors his stress levels by using his personal device, as it measures his pulse rate/blood oxygen content and calculates it based on other variables. He reports that he "thinks that he was not comfortable with the digital medicine equipment at first but that he is now", and believes that it is making a difference in his average readings.     Follow up with Mr. Domonique Kellogg completed. No further questions " or concerns. Will continue to follow up to achieve health goals.

## 2019-07-30 NOTE — PROGRESS NOTES
"  Physical Therapy Daily Treatment Note     Name: Domonique Kellogg  Clinic Number: [id number]    Therapy Diagnosis:   Encounter Diagnoses   Name Primary?    Weakness of trunk musculature     Chronic bilateral low back pain without sciatica     Decreased ROM of lumbar spine      Physician: Nela Pond PA-C    Visit Date: 7/31/2019       Physician Orders: PT Eval and Treat   Medical Diagnosis from Referral: lumbar spondylosis  Evaluation Date: 7/3/2019  Authorization Period Expiration: 12/31/2019  Plan of Care Expiration: 8/14/2019  Visit #/Visits authorized: 9/20    Time In: 1:00  Time Out: 1:51  Total Billable Time: 25 minutes    Precautions: Standard    Subjective     Pt reports: that his symptoms are a lot better than when beginning therapy.  Pt is not having symptoms everyday but sometimes when he goes walking.    He was compliant with home exercise program.  Response to previous treatment: no soreness or pain    Objective     Domonique received therapeutic exercises to develop strength, endurance, ROM, flexibility and core stabilization for 51 minutes including:    LTR x 2 minutes-  Piriformis stretch 3 x 30" B-  Supine hip flexor stretch 2 minute B-  Seated HSS x 1 minute  SLR abduction 3 x 10 1#  Ab iso with manual resistance 2 x 10 x 5" hold  Ball squats 3 x 10   Prone LE extension 2 x 10  Prone quad stretch 2 x 1 minute B  UE/LE flexion on ball x 2 minutes  Supine heel digs 3 x 5  Standing oblique press on ball 15 x 3" hold  Dead rows 12# total 2 x 10    Domonique received the following manual therapy techniques: Joint mobilizations were applied to the: lumbar spine for 0 minutes, including: not performed today    Lumbar AROM: c/o L lsp pain at range L rotation  PT performed MWM to L lsp in standing with active rotation      Home Exercises Provided and Patient Education Provided     Education provided:   -proper lifting mechanics with neutral spine    Written Home Exercises Provided: yes.  Exercises were " reviewed and Domonique was able to demonstrate them prior to the end of the session.  Domonique demonstrated good  understanding of the education provided.     See EMR under Patient Instructions for exercises provided 7/5/2019.    FOTO score: 31% impairment    Assessment     Pt tolerated all interventions well without any complaints of pain or discomfort. Patient is responding well to treatment with improved strength and mobility and decreased pain with functional activity. Continue to progress based on pt's tolerance.   Domonique is progressing well towards his goals.   Pt prognosis is Good.     Pt will continue to benefit from skilled outpatient physical therapy to address the deficits listed in the problem list box on initial evaluation, provide pt/family education and to maximize pt's level of independence in the home and community environment.     Pt's spiritual, cultural and educational needs considered and pt agreeable to plan of care and goals.     Anticipated barriers to physical therapy: none    Goals:   Short Term Goals:   1. Patient to consistently report 0/10 pain at rest within 3 weeks  MET  2. Patient able to tolerate at least 15 minutes sustained standing/walking with low back pain less than 3/10 within 3 weeks to improve selina to functional activity MET     Long Term Goals:  1. Patient to demonstrate TKE R LE consistently at rest within 6 weeks for improved mechanics with ambulation IN PROGRESS  2. Patient able to tolerate at least 60 minutes sustained standing/walking with LBP less than 2/10 within 6 weeks for improved functional mobility MET  3. Increase B LE strength to 5/5 throughout within 6 weeks IN PROGRESS  4.  Patient able to lift 15# from floor to waist without increased pain or compensation within 8 weeks to return to prior level of function MET  5. Patient able to perform yardwork with good body mechanics and without increased pain within 6 weeks IN PROGRESS    Plan     Plan of care  Certification: 7/3/2019 to 8/14/2019     Decrease freq to 1x/week for 3 more weeks then will plan to discharge to independent home exercise program at that time.    Jake Webber, PT,DPT  7/31/2019

## 2019-07-31 ENCOUNTER — CLINICAL SUPPORT (OUTPATIENT)
Dept: REHABILITATION | Facility: HOSPITAL | Age: 69
End: 2019-07-31
Payer: MEDICARE

## 2019-07-31 DIAGNOSIS — M62.81 WEAKNESS OF TRUNK MUSCULATURE: ICD-10-CM

## 2019-07-31 DIAGNOSIS — M53.86 DECREASED ROM OF LUMBAR SPINE: ICD-10-CM

## 2019-07-31 DIAGNOSIS — M54.50 CHRONIC BILATERAL LOW BACK PAIN WITHOUT SCIATICA: ICD-10-CM

## 2019-07-31 DIAGNOSIS — G89.29 CHRONIC BILATERAL LOW BACK PAIN WITHOUT SCIATICA: ICD-10-CM

## 2019-07-31 PROCEDURE — 97110 THERAPEUTIC EXERCISES: CPT | Mod: HCNC,PO

## 2019-08-01 ENCOUNTER — TELEPHONE (OUTPATIENT)
Dept: OPTOMETRY | Facility: CLINIC | Age: 69
End: 2019-08-01

## 2019-08-01 NOTE — TELEPHONE ENCOUNTER
Called pt (8/1) to reschedule his appt. On 9/4/19 since  will be out. Pt stated he did not have his calendar in front of him so he will give us a call to reschedule his appt when ready.

## 2019-08-08 ENCOUNTER — CLINICAL SUPPORT (OUTPATIENT)
Dept: REHABILITATION | Facility: HOSPITAL | Age: 69
End: 2019-08-08
Payer: MEDICARE

## 2019-08-08 DIAGNOSIS — G89.29 CHRONIC BILATERAL LOW BACK PAIN WITHOUT SCIATICA: ICD-10-CM

## 2019-08-08 DIAGNOSIS — M54.50 CHRONIC BILATERAL LOW BACK PAIN WITHOUT SCIATICA: ICD-10-CM

## 2019-08-08 DIAGNOSIS — M53.86 DECREASED ROM OF LUMBAR SPINE: ICD-10-CM

## 2019-08-08 DIAGNOSIS — M62.81 WEAKNESS OF TRUNK MUSCULATURE: ICD-10-CM

## 2019-08-08 PROCEDURE — 97112 NEUROMUSCULAR REEDUCATION: CPT | Mod: HCNC,PO

## 2019-08-08 PROCEDURE — 97110 THERAPEUTIC EXERCISES: CPT | Mod: HCNC,PO

## 2019-08-08 PROCEDURE — 97150 GROUP THERAPEUTIC PROCEDURES: CPT | Mod: HCNC,PO

## 2019-08-08 NOTE — PROGRESS NOTES
"  Physical Therapy Daily Treatment Note     Name: Domonique Kellogg  Clinic Number: [id number]    Therapy Diagnosis:   Encounter Diagnoses   Name Primary?    Weakness of trunk musculature     Chronic bilateral low back pain without sciatica     Decreased ROM of lumbar spine      Physician: Nela Pond PA-C    Visit Date: 8/8/2019       Physician Orders: PT Eval and Treat   Medical Diagnosis from Referral: lumbar spondylosis  Evaluation Date: 7/3/2019  Authorization Period Expiration: 12/31/2019  Plan of Care Expiration: 8/14/2019  Visit #/Visits authorized: 10/20    Time In: 1:04  Time Out: 1:55  Total Billable Time: 51 minutes    Precautions: Standard    Subjective     Pt reports: knee sx noted this morning, inflammation s/p 1.5miles walking working hard on the "heel-ball-toe" and increase lBP (L) sx agg of fatigue s/p (R) circumduction swing.       He was compliant with home exercise program.  Response to previous treatment: no soreness or pain    Objective     Domonique received therapeutic exercises/NMRE to develop strength, endurance, ROM, flexibility and core stabilization for 51 minutes including:    LTR x 2 minutes-  Piriformis stretch 3 x 30" B HELD secondary to knee strain  Supine hip flexor stretch 2 minute B HELD  Seated HSS x 1 minute HELD  SLR abduction 3 x 10 1#; NMRE for gluteal MED isolation and core stabilization to prevent QL overuse  Ab iso with manual resistance 2 x 10 x 5" hold  Ball squats 3 x 10 MAX emphasis on need for equal LE wt distribution and core stabilization  Prone LE extension 2 x 10; modification prone physio core stabilization to prevent Lx hyperextension   Prone quad stretch 2 x 1 minute B  UE/LE flexion on ball x 2 minutes  Supine heel digs 3 x 5   Standing oblique press on ball 15 x 3"   Dead rows 12# total 2 x 10; core activation v/c as well as equal LE wt distribution   SLB 30sec     Domonique received the following manual therapy techniques: Joint mobilizations were applied " to the: lumbar spine for 0 minutes, including: not performed today  -pt education on skin prep techniques for home taping support to (R) knee     Lumbar AROM: c/o L lsp pain at range L rotation  PT performed MWM to L lsp in standing with active rotation      Home Exercises Provided and Patient Education Provided     Education provided:   -proper lifting mechanics with neutral spine    Written Home Exercises Provided: yes.  Exercises were reviewed and Domonique was able to demonstrate them prior to the end of the session.  Domonique demonstrated good  understanding of the education provided.     See EMR under Patient Instructions for exercises provided 7/5/2019.    FOTO score: 31% impairment    Assessment     Pt progressing extremely well with tx progression however LE alignment NMRE w/o (R) excessive ADD required throughout tx session as well as core stabilization corrections to prevent Lx hyperextension compensation.  Pt able to achieve pain free AMB w/ corrections above as well as increased gluteal control w/ modifications provided.        Domonique is progressing well towards his goals.   Pt prognosis is Good.     Pt will continue to benefit from skilled outpatient physical therapy to address the deficits listed in the problem list box on initial evaluation, provide pt/family education and to maximize pt's level of independence in the home and community environment.     Pt's spiritual, cultural and educational needs considered and pt agreeable to plan of care and goals.     Anticipated barriers to physical therapy: none    Goals:   Short Term Goals:   1. Patient to consistently report 0/10 pain at rest within 3 weeks  MET  2. Patient able to tolerate at least 15 minutes sustained standing/walking with low back pain less than 3/10 within 3 weeks to improve selina to functional activity MET     Long Term Goals:  1. Patient to demonstrate TKE R LE consistently at rest within 6 weeks for improved mechanics with ambulation  IN PROGRESS  2. Patient able to tolerate at least 60 minutes sustained standing/walking with LBP less than 2/10 within 6 weeks for improved functional mobility MET  3. Increase B LE strength to 5/5 throughout within 6 weeks IN PROGRESS  4.  Patient able to lift 15# from floor to waist without increased pain or compensation within 8 weeks to return to prior level of function MET  5. Patient able to perform yardwork with good body mechanics and without increased pain within 6 weeks IN PROGRESS    Plan     Plan of care Certification: 7/3/2019 to 8/14/2019     Decrease freq to 1x/week for 3 more weeks then will plan to discharge to independent home exercise program at that time.  Re-assessment by primary PT to ensure POC completion and goal achievement    Ned Salazar, PT,DPT  8/8/2019

## 2019-08-12 ENCOUNTER — PATIENT OUTREACH (OUTPATIENT)
Dept: OTHER | Facility: OTHER | Age: 69
End: 2019-08-12

## 2019-08-12 NOTE — PROGRESS NOTES
"Last 5 Patient Entered Readings                                      Current 30 Day Average: 130/73     Recent Readings 8/11/2019 8/5/2019 7/31/2019 7/25/2019 7/23/2019    SBP (mmHg) 134 130 131 125 134    DBP (mmHg) 64 79 75 77 72    Pulse 54 55 52 54 54        LVM to complete coaching follow up call. 8/14@9:45am - patient called lvm. 9:55am - returned patient's call -   Digital Medicine: Health  Follow Up    Lifestyle Modifications:    1.Dietary Modifications (Sodium intake <2,000mg/day, food labels, dining out): deferred    2.Physical Activity: Patient and his wife typically walk 45-60 minutes, 6 days a week in [place]. They enjoy going to the museum in the park.    3.Medication Therapy: Patient has been compliant with the medication regimen.    4.Patient has the following medication side effects/concerns: Patient didn't mention any problems or concerns.   (Frequency/Alleviating factors/Precipitating factors, etc.)     Patient mentioned that he got a text notification to re-take his blood pressure recently, and he realized that his cuff was place incorrectly on his arm, re-adjusted the cuff, and his reading was back within a "normal" range. He discussed that he was pleased to receive the text message. We talked about the meditation device "Muse" that measures brain waves and can be used as an assist to meditation - as Mr. Kellogg is interested in utilizing devices for his health and wellness.   Follow up with Mr. Domonique Kellogg completed. No further questions or concerns. Will continue to follow up to achieve health goals.      "

## 2019-08-14 ENCOUNTER — CLINICAL SUPPORT (OUTPATIENT)
Dept: REHABILITATION | Facility: HOSPITAL | Age: 69
End: 2019-08-14
Payer: MEDICARE

## 2019-08-14 ENCOUNTER — PATIENT MESSAGE (OUTPATIENT)
Dept: SPINE | Facility: CLINIC | Age: 69
End: 2019-08-14

## 2019-08-14 DIAGNOSIS — G89.29 CHRONIC BILATERAL LOW BACK PAIN WITHOUT SCIATICA: ICD-10-CM

## 2019-08-14 DIAGNOSIS — M62.81 WEAKNESS OF TRUNK MUSCULATURE: ICD-10-CM

## 2019-08-14 DIAGNOSIS — M53.86 DECREASED ROM OF LUMBAR SPINE: ICD-10-CM

## 2019-08-14 DIAGNOSIS — M54.50 CHRONIC BILATERAL LOW BACK PAIN WITHOUT SCIATICA: ICD-10-CM

## 2019-08-14 PROCEDURE — 97110 THERAPEUTIC EXERCISES: CPT | Mod: HCNC,PO

## 2019-08-14 PROCEDURE — 97140 MANUAL THERAPY 1/> REGIONS: CPT | Mod: HCNC,PO

## 2019-08-14 NOTE — PROGRESS NOTES
"  Physical Therapy Daily Treatment Note     Name: Domonique Kellogg  Clinic Number: [id number]    Therapy Diagnosis:   Encounter Diagnoses   Name Primary?    Weakness of trunk musculature     Chronic bilateral low back pain without sciatica     Decreased ROM of lumbar spine      Physician: Nela Pond PA-C    Visit Date: 8/14/2019       Physician Orders: PT Eval and Treat   Medical Diagnosis from Referral: lumbar spondylosis  Evaluation Date: 7/3/2019  Authorization Period Expiration: 12/31/2019  Plan of Care Expiration: 8/14/2019  Visit #/Visits authorized: 10/20    Time In: 1:04  Time Out: 1:55  Total Billable Time: 51 minutes    Precautions: Standard    Subjective     Pt reports: his back has been feeling better ,no longer having pain in L lsp.  Primary c/o at this time is R knee pain, has been trying to focus on the way he walks.    He was compliant with home exercise program.  Response to previous treatment: no soreness or pain    Objective     Domonique received therapeutic exercises/NMRE to develop strength, endurance, ROM, flexibility and core stabilization for 51 minutes including:    LTR x 2 minutes-  Piriformis stretch 3 x 30" B HELD secondary to knee strain  Supine hip flexor stretch 2 minute B HELD  Seated HSS x 1 minute HELD  SLR abduction 3 x 10 1#; NMRE for gluteal MED isolation and core stabilization to prevent QL overuse  Ab iso with manual resistance 2 x 10 x 5" hold  Ball squats 3 x 10 MAX emphasis on need for equal LE wt distribution and core stabilization  Prone LE extension 2 x 10; modification prone physio core stabilization to prevent Lx hyperextension   Prone quad stretch 2 x 1 minute B  UE/LE flexion on ball x 2 minutes  Supine heel digs 3 x 5   Standing oblique press on ball 15 x 3"   Dead rows 12# total 2 x 10; core activation v/c as well as equal LE wt distribution   SLB 30sec       Lumbar AROM: c/o L lsp pain at range L rotation  PT performed MWM to L lsp in standing with active " rotation-not performed    Manual tx to restore mobility and decrease pain x 8 minutes  MWM tib/fib IR with knee flexion f/b taping for carryover      Home Exercises Provided and Patient Education Provided     Education provided:   -recommended use of Spenco orthotics for B foot support    Written Home Exercises Provided: yes.  Exercises were reviewed and Domonique was able to demonstrate them prior to the end of the session.  Domonique demonstrated good  understanding of the education provided.     See EMR under Patient Instructions for exercises provided 7/5/2019.    FOTO score: 31% impairment    Assessment     Pt demo increased WB through lateral aspect of R foot during weight bearing as well as hindfoot pronation L foot and will benefit from obtaining and using orthotic inserts     Domonique is progressing well towards his goals.   Pt prognosis is Good.     Pt will continue to benefit from skilled outpatient physical therapy to address the deficits listed in the problem list box on initial evaluation, provide pt/family education and to maximize pt's level of independence in the home and community environment.     Pt's spiritual, cultural and educational needs considered and pt agreeable to plan of care and goals.     Anticipated barriers to physical therapy: none    Goals:   Short Term Goals:   1. Patient to consistently report 0/10 pain at rest within 3 weeks  MET  2. Patient able to tolerate at least 15 minutes sustained standing/walking with low back pain less than 3/10 within 3 weeks to improve selina to functional activity MET     Long Term Goals:  1. Patient to demonstrate TKE R LE consistently at rest within 6 weeks for improved mechanics with ambulation IN PROGRESS  2. Patient able to tolerate at least 60 minutes sustained standing/walking with LBP less than 2/10 within 6 weeks for improved functional mobility MET  3. Increase B LE strength to 5/5 throughout within 6 weeks IN PROGRESS  4.  Patient able to lift  15# from floor to waist without increased pain or compensation within 8 weeks to return to prior level of function MET  5. Patient able to perform yardwork with good body mechanics and without increased pain within 6 weeks IN PROGRESS    Plan     Plan of care Certification: 7/3/2019 to 8/14/2019     Decrease freq to 1x/week for 2 more weeks then will plan to discharge to independent home exercise program at that time.     Belén Ortiz, PT  8/14/2019

## 2019-08-21 ENCOUNTER — CLINICAL SUPPORT (OUTPATIENT)
Dept: REHABILITATION | Facility: HOSPITAL | Age: 69
End: 2019-08-21
Payer: MEDICARE

## 2019-08-21 DIAGNOSIS — G89.29 CHRONIC BILATERAL LOW BACK PAIN WITHOUT SCIATICA: ICD-10-CM

## 2019-08-21 DIAGNOSIS — M62.81 WEAKNESS OF TRUNK MUSCULATURE: ICD-10-CM

## 2019-08-21 DIAGNOSIS — M54.50 CHRONIC BILATERAL LOW BACK PAIN WITHOUT SCIATICA: ICD-10-CM

## 2019-08-21 DIAGNOSIS — M53.86 DECREASED ROM OF LUMBAR SPINE: ICD-10-CM

## 2019-08-21 PROCEDURE — 97110 THERAPEUTIC EXERCISES: CPT | Mod: HCNC,PO

## 2019-08-21 NOTE — PROGRESS NOTES
"  Physical Therapy Daily Treatment Note     Name: Domonique Kellogg  Clinic Number: [id number]    Therapy Diagnosis:   Encounter Diagnoses   Name Primary?    Weakness of trunk musculature     Chronic bilateral low back pain without sciatica     Decreased ROM of lumbar spine      Physician: Kia Person MD    Visit Date: 8/21/2019       Physician Orders: PT Eval and Treat   Medical Diagnosis from Referral: lumbar spondylosis  Evaluation Date: 7/3/2019  Authorization Period Expiration: 12/31/2019  Plan of Care Expiration: 8/14/2019  Visit #/Visits authorized: 12/20    Time In: 1:30  Time Out: 2:30  Total Billable Time: 30 minutes    Precautions: Standard    Subjective     Pt reports: hasn't been his ex's as much this past week.  Denies having LBP.    Objective     Domonique received therapeutic exercises/NMRE to develop strength, endurance, ROM, flexibility and core stabilization for 51 minutes including:    LTR x 2 minutes-  Piriformis stretch 3 x 30" B HELD secondary to knee strain  Supine hip flexor stretch 2 minute B HELD  Seated HSS x 1 minute HELD  SLR abduction 3 x 10 1#; NMRE for gluteal MED isolation and core stabilization to prevent QL overuse  Ab iso with manual resistance 2 x 10 x 5" hold  Ball squats 3 x 10 MAX emphasis on need for equal LE wt distribution and core stabilization  Prone LE extension 2 x 10; modification prone physio core stabilization to prevent Lx hyperextension   Prone quad stretch 2 x 1 minute B  UE/LE flexion on ball x 2 minutes  Supine heel digs 3 x 5   Standing oblique press on ball 15 x 3"   Dead rows 12# total 2 x 10; core activation v/c as well as equal LE wt distribution   SLB 30sec   Bridging 3 x 10      Manual tx to restore mobility and decrease pain x 8 minutes  Passive hip flexor stretch B  MWM tib/fib IR with knee flexion f/b taping for carryover      Home Exercises Provided and Patient Education Provided     Education provided:   -recommended use of Spenco orthotics for " B foot support    Written Home Exercises Provided: yes.  Exercises were reviewed and Domonique was able to demonstrate them prior to the end of the session.  Domonique demonstrated good  understanding of the education provided.     See EMR under Patient Instructions for exercises provided 7/5/2019.      Assessment     Lower abdominal strength steadily improving, tolerating functional activity well without LBP.     Domonique is progressing well towards his goals.   Pt prognosis is Good.     Pt will continue to benefit from skilled outpatient physical therapy to address the deficits listed in the problem list box on initial evaluation, provide pt/family education and to maximize pt's level of independence in the home and community environment.     Pt's spiritual, cultural and educational needs considered and pt agreeable to plan of care and goals.     Anticipated barriers to physical therapy: none    Goals:   Short Term Goals:   1. Patient to consistently report 0/10 pain at rest within 3 weeks  MET  2. Patient able to tolerate at least 15 minutes sustained standing/walking with low back pain less than 3/10 within 3 weeks to improve selina to functional activity MET     Long Term Goals:  1. Patient to demonstrate TKE R LE consistently at rest within 6 weeks for improved mechanics with ambulation IN PROGRESS  2. Patient able to tolerate at least 60 minutes sustained standing/walking with LBP less than 2/10 within 6 weeks for improved functional mobility MET  3. Increase B LE strength to 5/5 throughout within 6 weeks IN PROGRESS  4.  Patient able to lift 15# from floor to waist without increased pain or compensation within 8 weeks to return to prior level of function MET  5. Patient able to perform yardwork with good body mechanics and without increased pain within 6 weeks IN PROGRESS    Plan     Discharge to independent [facility] next session.     Belén Ortiz, PT  8/21/2019

## 2019-08-28 ENCOUNTER — CLINICAL SUPPORT (OUTPATIENT)
Dept: REHABILITATION | Facility: HOSPITAL | Age: 69
End: 2019-08-28
Payer: MEDICARE

## 2019-08-28 DIAGNOSIS — M62.81 WEAKNESS OF TRUNK MUSCULATURE: ICD-10-CM

## 2019-08-28 DIAGNOSIS — M53.86 DECREASED ROM OF LUMBAR SPINE: ICD-10-CM

## 2019-08-28 DIAGNOSIS — G89.29 CHRONIC BILATERAL LOW BACK PAIN WITHOUT SCIATICA: ICD-10-CM

## 2019-08-28 DIAGNOSIS — M54.50 CHRONIC BILATERAL LOW BACK PAIN WITHOUT SCIATICA: ICD-10-CM

## 2019-08-28 PROCEDURE — G8980 MOBILITY D/C STATUS: HCPCS | Mod: CI,HCNC,PO

## 2019-08-28 PROCEDURE — 97110 THERAPEUTIC EXERCISES: CPT | Mod: HCNC,PO

## 2019-08-28 PROCEDURE — G8979 MOBILITY GOAL STATUS: HCPCS | Mod: CI,HCNC,PO

## 2019-08-28 NOTE — PROGRESS NOTES
"  Physical Therapy Daily Treatment Note/DIsharge Note     Name: Domonique Kellogg  Clinic Number: [id number]    Therapy Diagnosis:   Encounter Diagnoses   Name Primary?    Weakness of trunk musculature     Chronic bilateral low back pain without sciatica     Decreased ROM of lumbar spine      Physician: Kia Person MD    Visit Date: 8/28/2019       Physician Orders: PT Eval and Treat   Medical Diagnosis from Referral: lumbar spondylosis  Evaluation Date: 7/3/2019  Authorization Period Expiration: 12/31/2019  Plan of Care Expiration: 8/14/2019  Visit #/Visits authorized: 12/20    Time In: 1:30  Time Out: 2:30  Total Billable Time: 30 minutes    Precautions: Standard    Subjective     Pt reports: no LBP in the past week including with daily walks.  He has been doing his ex's and feels they are helping. Also notes relief with KT to R knee and plans to continue to use on his own.    Objective           Lumbar AROM:     Percent Tolerance   Flexion    100     Extension 75    Left Side Bending 50    Right Side Bending 50      Left rotation 75 Pain L   Right Rotation 75      * L LBP described as uncomfortable restriction         LOWER EXTREMITY STRENGTH:    RIGHT LEFT   Quadriceps 5/5 5/5   Hamstrings 5/5 5/5      Hip Flexion 5/5 5/5   Hip Abduction 4+/5 5/5   Hip IR 5/5 5/5   Hip ER 5/5 5/5   Hip Ext 5/5 5/5      Palpation: moderate tightness R ITB  Mobility: B hip PROM limited IR and extension B, yet no pain at end range      Domonique received therapeutic exercises/NMRE to develop strength, endurance, ROM, flexibility and core stabilization for 58 minutes including:    LTR x 2 minutes-  Piriformis stretch 3 x 30" B HELD secondary to knee strain  Supine hip flexor stretch 2 minute B HELD  Seated HSS x 1 minute HELD  SLR abduction 3 x 10 1#; NMRE for gluteal MED isolation and core stabilization to prevent QL overuse  Ab iso with manual resistance 2 x 10 x 5" hold  Squat and reach red ball 2 x 10  Prone LE extension 2 x " "10; modification prone physio core stabilization to prevent Lx hyperextension   Prone quad stretch 2 x 1 minute B  UE/LE flexion on ball x 2 minutes  Supine heel digs 3 x 5   Standing oblique press on ball 15 x 3"   Dead rows 16# total 2 x 10; core activation v/c as well as equal LE wt distribution   Bridging 3 x 10      Manual tx to restore mobility and decrease pain x 8 minutes-not performed  Passive hip flexor stretch B-not performed  MWM tib/fib IR with knee flexion f/b taping for carryover    KT to increase R tib/fib IR, instructed pt for home      Home Exercises Provided and Patient Education Provided     Education provided:   -recommended use of Spenco orthotics for B foot support  -reviewed current HEP    Written Home Exercises Provided: yes.  Exercises were reviewed and Domonique was able to demonstrate them prior to the end of the session.  [place] demonstrated good  understanding of the education provided.     See EMR under Patient Instructions for exercises provided 7/5/2019.    FOTO score: 6% impairment    Assessment     Patient has attended 12 sessions of physical therapy and has made very good progress towards long term goals with elimination of LBP with functional activity, improved lumbar mobility, and increased core and LE strength.  He continues with mild L LBP and R knee pain consistent with OA.  He will benefit from continued stretching/strengthening as part of a home exercise program and pt shows good motivation to continue independently.       Goals:   Short Term Goals:   1. Patient to consistently report 0/10 pain at rest within 3 weeks  MET  2. Patient able to tolerate at least 15 minutes sustained standing/walking with low back pain less than 3/10 within 3 weeks to improve selina to functional activity MET     Long Term Goals:  1. Patient to demonstrate TKE R LE consistently at rest within 6 weeks for improved mechanics with ambulation MET  2. Patient able to tolerate at least 60 minutes " sustained standing/walking with LBP less than 2/10 within 6 weeks for improved functional mobility MET  3. Increase B LE strength to 5/5 throughout within 6 weeks IN PROGRESS  4.  Patient able to lift 15# from floor to waist without increased pain or compensation within 8 weeks to return to prior level of function MET  5. Patient able to perform yardwork with good body mechanics and without increased pain within 6 weeks MET    Plan     Discharge to independent [facility] this date.    Belén Ortiz, PT  8/28/2019

## 2019-09-01 ENCOUNTER — PATIENT MESSAGE (OUTPATIENT)
Dept: INTERNAL MEDICINE | Facility: CLINIC | Age: 69
End: 2019-09-01

## 2019-09-01 DIAGNOSIS — R73.9 ELEVATED BLOOD SUGAR: ICD-10-CM

## 2019-09-01 DIAGNOSIS — I10 HYPERTENSION, ESSENTIAL: ICD-10-CM

## 2019-09-01 DIAGNOSIS — Z00.00 ANNUAL PHYSICAL EXAM: Primary | ICD-10-CM

## 2019-09-01 DIAGNOSIS — E78.5 HYPERLIPIDEMIA, UNSPECIFIED HYPERLIPIDEMIA TYPE: ICD-10-CM

## 2019-09-01 DIAGNOSIS — Z12.5 PROSTATE CANCER SCREENING: ICD-10-CM

## 2019-09-01 DIAGNOSIS — R79.89 ABNORMAL COMPLETE BLOOD COUNT: ICD-10-CM

## 2019-09-05 ENCOUNTER — PATIENT OUTREACH (OUTPATIENT)
Dept: OTHER | Facility: OTHER | Age: 69
End: 2019-09-05

## 2019-09-05 NOTE — PROGRESS NOTES
Last 5 Patient Entered Readings                                      Current 30 Day Average: 130/77     Recent Readings 9/3/2019 8/28/2019 8/20/2019 8/19/2019 8/13/2019    SBP (mmHg) 110 131 128 146 128    DBP (mmHg) 64 85 77 81 74    Pulse 53 47 55 54 53        LVM to complete coaching follow up call

## 2019-09-08 RX ORDER — HYDROCHLOROTHIAZIDE 25 MG/1
25 TABLET ORAL DAILY
Qty: 90 TABLET | Refills: 0 | Status: SHIPPED | OUTPATIENT
Start: 2019-09-08 | End: 2019-09-17 | Stop reason: SDUPTHER

## 2019-09-11 ENCOUNTER — OFFICE VISIT (OUTPATIENT)
Dept: OPTOMETRY | Facility: CLINIC | Age: 69
End: 2019-09-11
Payer: MEDICARE

## 2019-09-11 DIAGNOSIS — Z96.1 PSEUDOPHAKIA OF BOTH EYES: ICD-10-CM

## 2019-09-11 DIAGNOSIS — I10 HYPERTENSION, ESSENTIAL: Primary | ICD-10-CM

## 2019-09-11 PROCEDURE — 92014 COMPRE OPH EXAM EST PT 1/>: CPT | Mod: HCNC,S$GLB,, | Performed by: OPTOMETRIST

## 2019-09-11 PROCEDURE — 99999 PR PBB SHADOW E&M-EST. PATIENT-LVL II: ICD-10-PCS | Mod: PBBFAC,HCNC,, | Performed by: OPTOMETRIST

## 2019-09-11 PROCEDURE — 99999 PR PBB SHADOW E&M-EST. PATIENT-LVL II: CPT | Mod: PBBFAC,HCNC,, | Performed by: OPTOMETRIST

## 2019-09-11 PROCEDURE — 92014 PR EYE EXAM, EST PATIENT,COMPREHESV: ICD-10-PCS | Mod: HCNC,S$GLB,, | Performed by: OPTOMETRIST

## 2019-09-11 RX ORDER — LANOLIN ALCOHOL/MO/W.PET/CERES
400 CREAM (GRAM) TOPICAL NIGHTLY
Status: ON HOLD | COMMUNITY
Start: 2019-07-01 | End: 2023-01-01 | Stop reason: HOSPADM

## 2019-09-11 NOTE — PROGRESS NOTES
HPI     Last eye exam was 9/14/18 with     Pt states his eyes are doing good, he states that he finds himself looking   at the bifocal aprt fo the glasses more.  Pt states he does not use any eye drops  Pt has had cataract sx OU.  Patient denies diplopia, headaches, flashes/floaters, and pain.      Hemoglobin A1C       Date                     Value               Ref Range             Status                01/14/2013               6.1                 4.0 - 6.2 %           Final                    Last edited by Erin Kellogg on 9/11/2019  9:02 AM. (History)            Assessment /Plan     For exam results, see Encounter Report.    Hypertension, essential    Pseudophakia of both eyes              1.  No retinopathy--monitor yearly.  BP control.  Eye health normal OU.  2.   Continue w/ current rx.  Educated pt on how to adjust using PAL with desktop computer.

## 2019-09-16 ENCOUNTER — PATIENT MESSAGE (OUTPATIENT)
Dept: INTERNAL MEDICINE | Facility: CLINIC | Age: 69
End: 2019-09-16

## 2019-09-17 RX ORDER — HYDROCHLOROTHIAZIDE 25 MG/1
25 TABLET ORAL DAILY
Qty: 90 TABLET | Refills: 0 | Status: SHIPPED | OUTPATIENT
Start: 2019-09-17 | End: 2020-01-15

## 2019-10-03 ENCOUNTER — LAB VISIT (OUTPATIENT)
Dept: LAB | Facility: OTHER | Age: 69
End: 2019-10-03
Payer: MEDICARE

## 2019-10-03 DIAGNOSIS — I10 HYPERTENSION, ESSENTIAL: ICD-10-CM

## 2019-10-03 DIAGNOSIS — E78.5 HYPERLIPIDEMIA, UNSPECIFIED HYPERLIPIDEMIA TYPE: ICD-10-CM

## 2019-10-03 DIAGNOSIS — R73.9 ELEVATED BLOOD SUGAR: ICD-10-CM

## 2019-10-03 DIAGNOSIS — Z12.5 PROSTATE CANCER SCREENING: ICD-10-CM

## 2019-10-03 DIAGNOSIS — Z00.00 ANNUAL PHYSICAL EXAM: ICD-10-CM

## 2019-10-03 LAB
ALBUMIN SERPL BCP-MCNC: 4 G/DL (ref 3.5–5.2)
ALP SERPL-CCNC: 56 U/L (ref 55–135)
ALT SERPL W/O P-5'-P-CCNC: 23 U/L (ref 10–44)
ANION GAP SERPL CALC-SCNC: 9 MMOL/L (ref 8–16)
AST SERPL-CCNC: 24 U/L (ref 10–40)
BILIRUB SERPL-MCNC: 0.8 MG/DL (ref 0.1–1)
BUN SERPL-MCNC: 11 MG/DL (ref 8–23)
CALCIUM SERPL-MCNC: 10.2 MG/DL (ref 8.7–10.5)
CHLORIDE SERPL-SCNC: 102 MMOL/L (ref 95–110)
CHOLEST SERPL-MCNC: 148 MG/DL (ref 120–199)
CHOLEST/HDLC SERPL: 3 {RATIO} (ref 2–5)
CO2 SERPL-SCNC: 26 MMOL/L (ref 23–29)
COMPLEXED PSA SERPL-MCNC: 1.7 NG/ML (ref 0–4)
CREAT SERPL-MCNC: 1 MG/DL (ref 0.5–1.4)
ERYTHROCYTE [DISTWIDTH] IN BLOOD BY AUTOMATED COUNT: 13.3 % (ref 11.5–14.5)
EST. GFR  (AFRICAN AMERICAN): >60 ML/MIN/1.73 M^2
EST. GFR  (NON AFRICAN AMERICAN): >60 ML/MIN/1.73 M^2
GLUCOSE SERPL-MCNC: 103 MG/DL (ref 70–110)
HCT VFR BLD AUTO: 41.3 % (ref 40–54)
HDLC SERPL-MCNC: 49 MG/DL (ref 40–75)
HDLC SERPL: 33.1 % (ref 20–50)
HGB BLD-MCNC: 13.2 G/DL (ref 14–18)
LDLC SERPL CALC-MCNC: 88 MG/DL (ref 63–159)
MCH RBC QN AUTO: 29.5 PG (ref 27–31)
MCHC RBC AUTO-ENTMCNC: 32 G/DL (ref 32–36)
MCV RBC AUTO: 92 FL (ref 82–98)
NONHDLC SERPL-MCNC: 99 MG/DL
PLATELET # BLD AUTO: 228 K/UL (ref 150–350)
PMV BLD AUTO: 11 FL (ref 9.2–12.9)
POTASSIUM SERPL-SCNC: 3.4 MMOL/L (ref 3.5–5.1)
PROT SERPL-MCNC: 7.4 G/DL (ref 6–8.4)
RBC # BLD AUTO: 4.48 M/UL (ref 4.6–6.2)
SODIUM SERPL-SCNC: 137 MMOL/L (ref 136–145)
TRIGL SERPL-MCNC: 55 MG/DL (ref 30–150)
WBC # BLD AUTO: 6.11 K/UL (ref 3.9–12.7)

## 2019-10-03 PROCEDURE — 84153 ASSAY OF PSA TOTAL: CPT | Mod: HCNC

## 2019-10-03 PROCEDURE — 36415 COLL VENOUS BLD VENIPUNCTURE: CPT | Mod: HCNC

## 2019-10-03 PROCEDURE — 80061 LIPID PANEL: CPT | Mod: HCNC

## 2019-10-03 PROCEDURE — 80053 COMPREHEN METABOLIC PANEL: CPT | Mod: HCNC

## 2019-10-03 PROCEDURE — 85027 COMPLETE CBC AUTOMATED: CPT | Mod: HCNC

## 2019-10-04 ENCOUNTER — IMMUNIZATION (OUTPATIENT)
Dept: PHARMACY | Facility: CLINIC | Age: 69
End: 2019-10-04
Payer: MEDICARE

## 2019-10-10 ENCOUNTER — OFFICE VISIT (OUTPATIENT)
Dept: INTERNAL MEDICINE | Facility: CLINIC | Age: 69
End: 2019-10-10
Attending: FAMILY MEDICINE
Payer: MEDICARE

## 2019-10-10 ENCOUNTER — HOSPITAL ENCOUNTER (OUTPATIENT)
Dept: RADIOLOGY | Facility: HOSPITAL | Age: 69
Discharge: HOME | End: 2019-10-10
Attending: FAMILY MEDICINE
Payer: MEDICARE

## 2019-10-10 VITALS
BODY MASS INDEX: 39.86 KG/M2 | WEIGHT: 263 LBS | DIASTOLIC BLOOD PRESSURE: 72 MMHG | HEIGHT: 68 IN | SYSTOLIC BLOOD PRESSURE: 130 MMHG | TEMPERATURE: 98 F | OXYGEN SATURATION: 97 % | HEART RATE: 59 BPM

## 2019-10-10 DIAGNOSIS — I10 HYPERTENSION, ESSENTIAL: ICD-10-CM

## 2019-10-10 DIAGNOSIS — E78.5 HYPERLIPIDEMIA, UNSPECIFIED HYPERLIPIDEMIA TYPE: ICD-10-CM

## 2019-10-10 DIAGNOSIS — M47.812 CERVICAL SPONDYLOSIS: ICD-10-CM

## 2019-10-10 DIAGNOSIS — I65.23 BILATERAL CAROTID ARTERY STENOSIS: ICD-10-CM

## 2019-10-10 DIAGNOSIS — Z00.00 ANNUAL PHYSICAL EXAM: Primary | ICD-10-CM

## 2019-10-10 DIAGNOSIS — I25.10 CAD IN NATIVE ARTERY: ICD-10-CM

## 2019-10-10 PROBLEM — M53.86 DECREASED ROM OF LUMBAR SPINE: Status: RESOLVED | Noted: 2019-07-03 | Resolved: 2019-10-10

## 2019-10-10 PROBLEM — M62.81 WEAKNESS OF TRUNK MUSCULATURE: Status: RESOLVED | Noted: 2019-07-03 | Resolved: 2019-10-10

## 2019-10-10 PROBLEM — G89.29 CHRONIC BILATERAL LOW BACK PAIN WITHOUT SCIATICA: Status: RESOLVED | Noted: 2019-07-03 | Resolved: 2019-10-10

## 2019-10-10 PROBLEM — M54.50 CHRONIC BILATERAL LOW BACK PAIN WITHOUT SCIATICA: Status: RESOLVED | Noted: 2019-07-03 | Resolved: 2019-10-10

## 2019-10-10 PROCEDURE — 99397 PER PM REEVAL EST PAT 65+ YR: CPT | Mod: HCNC,S$GLB,, | Performed by: FAMILY MEDICINE

## 2019-10-10 PROCEDURE — 72040 X-RAY EXAM NECK SPINE 2-3 VW: CPT | Mod: TC,HCNC

## 2019-10-10 PROCEDURE — 99397 PR PREVENTIVE VISIT,EST,65 & OVER: ICD-10-PCS | Mod: HCNC,S$GLB,, | Performed by: FAMILY MEDICINE

## 2019-10-10 PROCEDURE — 72040 X-RAY EXAM NECK SPINE 2-3 VW: CPT | Mod: 26,HCNC,, | Performed by: RADIOLOGY

## 2019-10-10 PROCEDURE — 3078F PR MOST RECENT DIASTOLIC BLOOD PRESSURE < 80 MM HG: ICD-10-PCS | Mod: HCNC,CPTII,S$GLB, | Performed by: FAMILY MEDICINE

## 2019-10-10 PROCEDURE — 99999 PR PBB SHADOW E&M-EST. PATIENT-LVL IV: ICD-10-PCS | Mod: PBBFAC,HCNC,, | Performed by: FAMILY MEDICINE

## 2019-10-10 PROCEDURE — 72040 XR CERVICAL SPINE AP LATERAL: ICD-10-PCS | Mod: 26,HCNC,, | Performed by: RADIOLOGY

## 2019-10-10 PROCEDURE — 3075F SYST BP GE 130 - 139MM HG: CPT | Mod: HCNC,CPTII,S$GLB, | Performed by: FAMILY MEDICINE

## 2019-10-10 PROCEDURE — 99999 PR PBB SHADOW E&M-EST. PATIENT-LVL IV: CPT | Mod: PBBFAC,HCNC,, | Performed by: FAMILY MEDICINE

## 2019-10-10 PROCEDURE — 3075F PR MOST RECENT SYSTOLIC BLOOD PRESS GE 130-139MM HG: ICD-10-PCS | Mod: HCNC,CPTII,S$GLB, | Performed by: FAMILY MEDICINE

## 2019-10-10 PROCEDURE — 3078F DIAST BP <80 MM HG: CPT | Mod: HCNC,CPTII,S$GLB, | Performed by: FAMILY MEDICINE

## 2019-10-10 RX ORDER — POTASSIUM CHLORIDE 20 MEQ/1
40 TABLET, EXTENDED RELEASE ORAL DAILY
Qty: 180 TABLET | Refills: 3 | Status: SHIPPED | OUTPATIENT
Start: 2019-10-10 | End: 2020-10-02

## 2019-10-10 NOTE — PATIENT INSTRUCTIONS
Carotid US in 1 year.    Physical Therapy/Occupational Therapy number to schedule appointments - [phone]      Information about cholesterol, high blood pressure and healthy diet and activity recommendations can be found at the following links on the Internet:    http://www.nhlbi.nih.gov/health/health-topics/topics/hbc  http://www.nhlbi.nih.gov/health/educational/lose_wt/index.htm  Http://www.nhlbi.nih.gov/files/docs/public/heart/hbp_low.pdf  http://www.heart.org/HEARTORG/  http://diabetes.org/  https://www.cdc.gov/  Https://healthfinder.gov/  https://health.gov/dietaryguidelines/2015/guidelines/  https://health.gov/paguidelines/second-edition/pdf/Physical_Activity_Guidelines_2nd_edition.pdf

## 2019-10-10 NOTE — PROGRESS NOTES
Subjective:       Patient ID: Domonique Kellogg is a 69 y.o. male.    Chief Complaint: Annual Exam    Established patient for an annual wellness check/physical exam and also chronic disease management. Specific complaints - see dictation and please see ROS.  P, S, Fm, Soc Hx's; Meds, allergies reviewed and reconciled.  Health maintenance file reviewed and addressed items due.    Neck pain and stiffness reported chronic.  Tingling in the right shoulder at times.  States it is worse after he gets fatigued from moving his neck around.  Symptoms are chronic.    Review of Systems   Constitutional: Positive for activity change. Negative for chills, fatigue, fever and unexpected weight change.   HENT: Negative for congestion, hearing loss, rhinorrhea and trouble swallowing.    Eyes: Negative for discharge, redness and visual disturbance.   Respiratory: Negative for cough, chest tightness, shortness of breath and wheezing.    Cardiovascular: Negative for chest pain, palpitations and leg swelling.   Gastrointestinal: Negative for abdominal pain, blood in stool, constipation, diarrhea and vomiting.   Endocrine: Negative for polydipsia and polyuria.   Genitourinary: Negative for difficulty urinating, hematuria and urgency.   Musculoskeletal: Positive for neck pain and neck stiffness. Negative for arthralgias, back pain, gait problem, joint swelling and myalgias.   Skin: Negative for color change and rash.   Neurological: Negative for tremors, speech difficulty, weakness, numbness and headaches.   Hematological: Negative for adenopathy. Does not bruise/bleed easily.   Psychiatric/Behavioral: Negative for behavioral problems, confusion, dysphoric mood and sleep disturbance. The patient is not nervous/anxious.        Objective:      Physical Exam   Constitutional: He is oriented to person, place, and time. He appears well-developed and well-nourished. No distress.   HENT:   Head: Normocephalic.   Right Ear: Tympanic membrane,  external ear and ear canal normal.   Left Ear: Tympanic membrane, external ear and ear canal normal.   Mouth/Throat: Oropharynx is clear and moist.   Eyes: Pupils are equal, round, and reactive to light.   Neck: Trachea normal, normal range of motion and full passive range of motion without pain. Neck supple. Carotid bruit is not present. No thyroid mass and no thyromegaly present.   Cardiovascular: Normal rate, regular rhythm, normal heart sounds and intact distal pulses. Exam reveals no gallop and no friction rub.   No murmur heard.  Pulmonary/Chest: Effort normal and breath sounds normal.   Abdominal: Soft. He exhibits no distension and no mass. There is no tenderness. There is no rebound and no guarding.   Musculoskeletal: Normal range of motion. He exhibits no edema or tenderness.        Right shoulder: He exhibits normal range of motion and no tenderness.        Left shoulder: He exhibits normal range of motion and no tenderness.        Cervical back: He exhibits normal range of motion and no tenderness.        Right hand: Normal sensation noted. Decreased sensation is not present in the ulnar distribution, is not present in the medial distribution and is not present in the radial distribution. Normal strength noted. He exhibits no finger abduction, no thumb/finger opposition and no wrist extension trouble.        Left hand: Normal sensation noted. Decreased sensation is not present in the ulnar distribution, is not present in the medial redistribution and is not present in the radial distribution. Normal strength noted. He exhibits no finger abduction, no thumb/finger opposition and no wrist extension trouble.   Lymphadenopathy:     He has no cervical adenopathy.   Neurological: He is alert and oriented to person, place, and time. He has normal strength. He displays normal reflexes. No cranial nerve deficit or sensory deficit. Coordination and gait normal.   Reflex Scores:       Tricep reflexes are 0 on the  right side and 0 on the left side.       Bicep reflexes are 0 on the right side and 0 on the left side.       Brachioradialis reflexes are 0 on the right side and 0 on the left side.  Skin: Skin is warm and dry. No rash noted.   Psychiatric: He has a normal mood and affect. His behavior is normal. Judgment and thought content normal.   Nursing note and vitals reviewed.      Assessment:       1. Annual physical exam    2. Hypertension, essential    3. Hyperlipidemia, unspecified hyperlipidemia type    4. CAD in native artery    5. Bilateral carotid artery stenosis    6. Cervical spondylosis        Plan:     Medication List with Changes/Refills   Current Medications    ASPIRIN (ECOTRIN) 81 MG EC TABLET    Take 81 mg by mouth once daily.    HYDROCHLOROTHIAZIDE (HYDRODIURIL) 25 MG TABLET    Take 1 tablet (25 mg total) by mouth once daily.    IBUPROFEN (ADVIL,MOTRIN) 200 MG TABLET    Take 200 mg by mouth every 6 (six) hours as needed for Pain.    MAGNESIUM OXIDE (MAG-OX) 400 MG (241.3 MG MAGNESIUM) TABLET        MULTIVITAMIN WITH MINERALS TABLET    Take 1 tablet by mouth once daily.     PRAVASTATIN (PRAVACHOL) 40 MG TABLET    TAKE 1 TABLET ONE TIME DAILY   Changed and/or Refilled Medications    Modified Medication Previous Medication    POTASSIUM CHLORIDE SA (K-DUR,KLOR-CON) 20 MEQ TABLET potassium chloride SA (K-DUR,KLOR-CON) 20 MEQ tablet       Take 2 tablets (40 mEq total) by mouth once daily.    Take 1 tablet (20 mEq total) by mouth once daily.   Discontinued Medications    MAGNESIUM OXIDE 400 MG ROBER Youssef was seen today for annual exam.    Diagnoses and all orders for this visit:    Annual physical exam    Hypertension, essential    Hyperlipidemia, unspecified hyperlipidemia type    CAD in native artery  Comments:  luminal irregularities previously evaluated by Dr. Montgomery    Bilateral carotid artery stenosis  -     CV Ultrasound Bilateral Doppler Carotid; Future    Cervical spondylosis  -     X-Ray  Cervical Spine AP And Lateral; Future  -     Ambulatory Referral to Physical/Occupational Therapy    Other orders  -     potassium chloride SA (K-DUR,KLOR-CON) 20 MEQ tablet; Take 2 tablets (40 mEq total) by mouth once daily.      See meds, orders, follow up, routing and instructions sections of encounter.    Lengthy discussion concerning test results.  His slightly low hemoglobin is present several years and he has had a workup in the past including up-to-date colonoscopy in 2011 with a follow-up 10 year recommendation, normal iron and ferritin in 2016 with a negative Hemoccult card.  Reassurance.    Increase potassium daily supplement of 40 mEq.

## 2019-10-21 ENCOUNTER — CLINICAL SUPPORT (OUTPATIENT)
Dept: REHABILITATION | Facility: HOSPITAL | Age: 69
End: 2019-10-21
Attending: FAMILY MEDICINE
Payer: MEDICARE

## 2019-10-21 DIAGNOSIS — R29.898 WEAKNESS OF BOTH UPPER EXTREMITIES: ICD-10-CM

## 2019-10-21 DIAGNOSIS — M53.82 DECREASED ROM OF INTERVERTEBRAL DISCS OF CERVICAL SPINE: ICD-10-CM

## 2019-10-21 PROCEDURE — 97161 PT EVAL LOW COMPLEX 20 MIN: CPT | Mod: HCNC,PO

## 2019-10-21 PROCEDURE — G8984 CARRY CURRENT STATUS: HCPCS | Mod: CJ,HCNC,PO

## 2019-10-21 PROCEDURE — 97110 THERAPEUTIC EXERCISES: CPT | Mod: HCNC,PO

## 2019-10-21 PROCEDURE — G8985 CARRY GOAL STATUS: HCPCS | Mod: CI,HCNC,PO

## 2019-10-21 NOTE — PLAN OF CARE
Physical Therapy Initial Evaluation     Name: Domonique Kellogg  [facility] Number: [id number]    Diagnosis:   Encounter Diagnoses   Name Primary?    Decreased ROM of intervertebral discs of cervical spine     Weakness of both upper extremities      Physician: Nicolas Pacheco MD  Treatment Orders: PT Eval and Treat  Past Medical History:   Diagnosis Date    Anticoagulant long-term use     Arthritis     CAD (coronary artery disease) 3/2/2015    [facility]     CAD (coronary artery disease) 3/26/2015    Cataract     Dry eye syndrome     Hypertension     Obesity     Seizures     Sleep apnea     + CPAP     Current Outpatient Medications   Medication Sig    aspirin (ECOTRIN) 81 MG EC tablet Take 81 mg by mouth once daily.    hydroCHLOROthiazide (HYDRODIURIL) 25 MG tablet Take 1 tablet (25 mg total) by mouth once daily.    ibuprofen (ADVIL,MOTRIN) 200 MG tablet Take 200 mg by mouth every 6 (six) hours as needed for Pain.    magnesium oxide (MAG-OX) 400 mg (241.3 mg magnesium) tablet     multivitamin with minerals tablet Take 1 tablet by mouth once daily.     potassium chloride SA (K-DUR,KLOR-CON) 20 MEQ tablet Take 2 tablets (40 mEq total) by mouth once daily.    pravastatin (PRAVACHOL) 40 MG tablet TAKE 1 TABLET ONE TIME DAILY     No current facility-administered medications for this visit.      Review of patient's allergies indicates:   Allergen Reactions    Indomethacin      Headache dizziness    Adhesive Rash       Time In: 3:00  Time Out: 3:45    Visit amount:113.2  Total amount:~1513.2    Evaluation Date: 10/21/2019  Visit # authorized: 1/30 (14 total for the year)  Authorization period: 12/31/19  Plan of care Expiration: 1/21/2020 or 12 visits  MD referral: M47.812 (ICD-10-CM) - Cervical spondylosis    Subjective     Patient reports that his neck has been bothering him for a few months. He sits at a computer screen for many hours  everyday.  Overtime, when he holds his head up, he has a radiating pain mostly down RUE c/ occasional radiating down LUE.  Looking up causes discomfort.  No surgeries to neck.  No previous MVA.  Duration of symptoms normally last about 10 minutes. Pt normally sleeps on back c/ 1 pillow.      Diagnostic Imaging: see EMR for imaging    Pain Scale: Domonique rates pain on a scale of 0-10 to be 10 at worst; 3 currently; 0 at best .    Aggravating factors:   Looking up  Easing factors:  Hot showers and medication  Prior Therapy: Yes for low back  Functional Deficits Leading to Referral: pain and limitations with functional mobility   Prior functional status: Independent  Occupation:  Desk job                       Pts goals:  Decrease pain level or be able to alleviate pain rather quickly to return to ADLs.    Objective     Posture Alignment: slouched posture, rounded shoulders    Palpation: denies TTP    CERVICAL SPINE AROM:   Flexion: 75%   Extension: 75%   Left Sidebend: 50%   Right Sidebend: 50%   Left Rotation: 75%   Right Rotation: 75%     SEGMENTAL MOBILITY: hypomobile throughout cervical spine    UPPER EXTREMITY STRENGTH:   Left Right   Shoulder Flexion 4/5 4/5   Shoulder Abduction 4/5 4/5     Shoulder Internal Rotation 4/5 4/5   Shoulder External Rotation 4/5 4/5   Elbow Flexion 4+/5 4+/5   Elbow Extension 4+/5 4+/5     Dermatomes: Sensation: Light Touch: Intact    FLEXIBILITY: decreased UT and LS length    Special Tests:      Compression (-)   Distraction (-)     Pt/family was provided educational information, including: role of PT, goals for PT, scheduling - pt verbalized understanding. Discussed insurance limitations with pt.     TREATMENT     PT Evaluation Completed? Yes  Discussed Plan of Care with patient: Yes    Domonique received 8 minutes of therapeutic exercise including:    UT stretch  LS stretch  Cervical extensions c/ towel    Written Home Exercises Provided: Yes (see handout)   Domonique demo good  understanding of the education provided. Patient demo good return demo of skill of exercises.    Assessment     History  Co-morbidities and personal factors that may impact the plan of care Examination  Body Structures and Functions, activity limitations and participation restrictions that may impact the plan of care    Clinical Presentation   Co-morbidities:   see PMH above        Personal Factors:   no deficits Body Regions:   neck  upper extremities    Body Systems:    ROM  strength  motor control  motor learning            Participation Restrictions:   exercise     Activity limitations:   Mobility  lifting and carrying objects    Self care  no deficits    Domestic Life  doing house work (cleaning house, washing dishes, laundry)             stable and uncomplicated                      low   low  low Decision Making/ Complexity Score:  low     Pt is a 69 year old male that presents to outpatient PT c/ a medical diagnosis of cervical spondylosis.  Pt demonstrates decreased cervical ROM and BUE strength.  Compression and distraction special tests were both (-).  Pt is highly motivated to improve postural awareness and return to PLOF.    Pt prognosis is Good.  Pt will benefit from skilled outpatient physical therapy to address the above stated deficits, provide pt/family education and to maximize pt's level of independence.     Medical necessity is demonstrated by the following IMPAIRMENTS/PROBLEMS:  1. Increased Pain  2. Decreased Segmental Mobility & Decreased ROM  3. Decreased Core & BLE strength  4. Decreased Flexibility BLE  5. Decreased Tolerance to Functional Activities    Pt's spiritual, cultural and educational needs considered and pt agreeable to plan of care and goals as stated below:     Anticipated Barriers for physical therapy: none    Short Term GOALS: 4 visits. Pt agrees with goals set.  1. Patient demonstrates independence with HEP.   2. Patient demonstrates independence with Postural Awareness.    3. Patient demonstrates independence with body mechanics.     Long Term GOALS: 12 visits. Pt agrees with goals set.  1. Patient demonstrates increased cervical ROM by 25% in all planes to improve tolerance to functional activities.   2. Patient demonstrates increased strength BUE's to 5/5 or greater to improve tolerance to functional activities.   3. Patient demonstrates improved overall function per Neck Disability Index to 10% or less.     Functional Limitations Reports - G Codes  Category: Mobility  Tool: Neck disability index Survey  Score: 20% Limitation   TEST SCORE  Modifier  Impairment Limitation Restriction   0  CH  0 % impaired, limited or restricted   1-9  CI  @ least 1% but less than 20% impaired, limited or restricted   10- 19  CJ  @ least 20%<40% impaired, limited or restricted   20- 29  CK  @ least 40%<60% impaired, limited or restricted   30- 39  CL  @ least 60% <80% impaired, limited or restricted   40- 49  CM  @ least 80%<100% impaired limited or restricted   50/50  CN  100% impaired, limited or restricted     Current/  20% Limitation  Goal/ :  10% Limitation    PLAN     Outpatient physical therapy 2 times weekly to include: pt ed, hep, therapeutic exercises, neuromuscular re-education/ balance exercises, joint mobilizations, aquatic therapy and modalities prn. Cont PT for 12 visits. Pt may be seen by PTA as part of the rehabilitation team.     Therapist: Jake Webber, PT,DPT  10/21/2019     Agree with PT/OT assessment and approve continuation of care. MD Lakeisha, MA

## 2019-10-21 NOTE — PROGRESS NOTES
Physical Therapy Initial Evaluation     Name: Domonique Kellogg  [facility] Number: [id number]    Diagnosis:   Encounter Diagnoses   Name Primary?    Decreased ROM of intervertebral discs of cervical spine     Weakness of both upper extremities      Physician: Nicolas Pacheco MD  Treatment Orders: PT Eval and Treat  Past Medical History:   Diagnosis Date    Anticoagulant long-term use     Arthritis     CAD (coronary artery disease) 3/2/2015    non-obstructive per [facility]     CAD (coronary artery disease) 3/26/2015    Cataract     Dry eye syndrome     Hypertension     Obesity     Seizures     Sleep apnea     + CPAP     Current Outpatient Medications   Medication Sig    aspirin (ECOTRIN) 81 MG EC tablet Take 81 mg by mouth once daily.    hydroCHLOROthiazide (HYDRODIURIL) 25 MG tablet Take 1 tablet (25 mg total) by mouth once daily.    ibuprofen (ADVIL,MOTRIN) 200 MG tablet Take 200 mg by mouth every 6 (six) hours as needed for Pain.    magnesium oxide (MAG-OX) 400 mg (241.3 mg magnesium) tablet     multivitamin with minerals tablet Take 1 tablet by mouth once daily.     potassium chloride SA (K-DUR,KLOR-CON) 20 MEQ tablet Take 2 tablets (40 mEq total) by mouth once daily.    pravastatin (PRAVACHOL) 40 MG tablet TAKE 1 TABLET ONE TIME DAILY     No current facility-administered medications for this visit.      Review of patient's allergies indicates:   Allergen Reactions    Indomethacin      Headache dizziness    Adhesive Rash       Time In: 3:00  Time Out: 3:45    Visit amount:113.2  Total amount:~1513.2    Evaluation Date: 10/21/2019  Visit # authorized: 1/30 (14 total for the year)  Authorization period: 12/31/19  Plan of care Expiration: 1/21/2020 or 12 visits  MD referral: M47.812 (ICD-10-CM) - Cervical spondylosis    Subjective     Patient reports that his neck has been bothering him for a few months. He sits at a computer screen for many hours  everyday.  Overtime, when he holds his head up, he has a radiating pain mostly down RUE c/ occasional radiating down LUE.  Looking up causes discomfort.  No surgeries to neck.  No previous MVA.  Duration of symptoms normally last about 10 minutes. Pt normally sleeps on back c/ 1 pillow.      Diagnostic Imaging: see EMR for imaging    Pain Scale: Domonique rates pain on a scale of 0-10 to be 10 at worst; 3 currently; 0 at best .    Aggravating factors:   Looking up  Easing factors:  Hot showers and medication  Prior Therapy: Yes for low back  Functional Deficits Leading to Referral: pain and limitations with functional mobility   Prior functional status: Independent  Occupation:  Desk job                       Pts goals:  Decrease pain level or be able to alleviate pain rather quickly to return to ADLs.    Objective     Posture Alignment: slouched posture, rounded shoulders    Palpation: denies TTP    CERVICAL SPINE AROM:   Flexion: 75%   Extension: 75%   Left Sidebend: 50%   Right Sidebend: 50%   Left Rotation: 75%   Right Rotation: 75%     SEGMENTAL MOBILITY: hypomobile throughout cervical spine    UPPER EXTREMITY STRENGTH:   Left Right   Shoulder Flexion 4/5 4/5   Shoulder Abduction 4/5 4/5     Shoulder Internal Rotation 4/5 4/5   Shoulder External Rotation 4/5 4/5   Elbow Flexion 4+/5 4+/5   Elbow Extension 4+/5 4+/5     Dermatomes: Sensation: Light Touch: Intact    FLEXIBILITY: decreased UT and LS length    Special Tests:      Compression (-)   Distraction (-)     Pt/family was provided educational information, including: role of PT, goals for PT, scheduling - pt verbalized understanding. Discussed insurance limitations with pt.     TREATMENT     PT Evaluation Completed? Yes  Discussed Plan of Care with patient: Yes    Domonique received 8 minutes of therapeutic exercise including:    UT stretch  LS stretch  Cervical extensions c/ towel    Written Home Exercises Provided: Yes (see handout)   Domonique demo good  understanding of the education provided. Patient demo good return demo of skill of exercises.    Assessment     History  Co-morbidities and personal factors that may impact the plan of care Examination  Body Structures and Functions, activity limitations and participation restrictions that may impact the plan of care    Clinical Presentation   Co-morbidities:   see PMH above        Personal Factors:   no deficits Body Regions:   neck  upper extremities    Body Systems:    ROM  strength  motor control  motor learning            Participation Restrictions:   exercise     Activity limitations:   Mobility  lifting and carrying objects    Self care  no deficits    Domestic Life  doing house work (cleaning house, washing dishes, laundry)             stable and uncomplicated                      low   low  low Decision Making/ Complexity Score:  low     Pt is a 69 year old male that presents to outpatient PT c/ a medical diagnosis of cervical spondylosis.  Pt demonstrates decreased cervical ROM and BUE strength.  Compression and distraction special tests were both (-).  Pt is highly motivated to improve postural awareness and return to PLOF.    Pt prognosis is Good.  Pt will benefit from skilled outpatient physical therapy to address the above stated deficits, provide pt/family education and to maximize pt's level of independence.     Medical necessity is demonstrated by the following IMPAIRMENTS/PROBLEMS:  1. Increased Pain  2. Decreased Segmental Mobility & Decreased ROM  3. Decreased Core & BLE strength  4. Decreased Flexibility BLE  5. Decreased Tolerance to Functional Activities    Pt's spiritual, cultural and educational needs considered and pt agreeable to plan of care and goals as stated below:     Anticipated Barriers for physical therapy: none    Short Term GOALS: 4 visits. Pt agrees with goals set.  1. Patient demonstrates independence with HEP.   2. Patient demonstrates independence with Postural Awareness.    3. Patient demonstrates independence with body mechanics.     Long Term GOALS: 12 visits. Pt agrees with goals set.  1. Patient demonstrates increased cervical ROM by 25% in all planes to improve tolerance to functional activities.   2. Patient demonstrates increased strength BUE's to 5/5 or greater to improve tolerance to functional activities.   3. Patient demonstrates improved overall function per Neck Disability Index to 10% or less.     Functional Limitations Reports - G Codes  Category: Mobility  Tool: Neck disability index Survey  Score: 20% Limitation   TEST SCORE  Modifier  Impairment Limitation Restriction   0  CH  0 % impaired, limited or restricted   1-9  CI  @ least 1% but less than 20% impaired, limited or restricted   10- 19  CJ  @ least 20%<40% impaired, limited or restricted   20- 29  CK  @ least 40%<60% impaired, limited or restricted   30- 39  CL  @ least 60% <80% impaired, limited or restricted   40- 49  CM  @ least 80%<100% impaired limited or restricted   50/50  CN  100% impaired, limited or restricted     Current/  20% Limitation  Goal/ :  10% Limitation    PLAN     Outpatient physical therapy 2 times weekly to include: pt ed, hep, therapeutic exercises, neuromuscular re-education/ balance exercises, joint mobilizations, aquatic therapy and modalities prn. Cont PT for 12 visits. Pt may be seen by PTA as part of the rehabilitation team.     Therapist: Jake Webber, PT,DPT  10/21/2019

## 2019-10-22 RX ORDER — PRAVASTATIN SODIUM 40 MG/1
TABLET ORAL
Qty: 90 TABLET | Refills: 1 | Status: SHIPPED | OUTPATIENT
Start: 2019-10-22 | End: 2020-04-07

## 2019-10-28 ENCOUNTER — CLINICAL SUPPORT (OUTPATIENT)
Dept: REHABILITATION | Facility: HOSPITAL | Age: 69
End: 2019-10-28
Attending: FAMILY MEDICINE
Payer: MEDICARE

## 2019-10-28 DIAGNOSIS — R29.898 WEAKNESS OF BOTH UPPER EXTREMITIES: ICD-10-CM

## 2019-10-28 DIAGNOSIS — M53.82 DECREASED ROM OF INTERVERTEBRAL DISCS OF CERVICAL SPINE: ICD-10-CM

## 2019-10-28 PROCEDURE — 97110 THERAPEUTIC EXERCISES: CPT | Mod: HCNC,PO

## 2019-10-28 NOTE — PROGRESS NOTES
"                                                    Physical Therapy Daily Note     Name: Domonique Kellogg  Clinic Number: [id number]  Diagnosis:   Encounter Diagnoses   Name Primary?    Decreased ROM of intervertebral discs of cervical spine     Weakness of both upper extremities      Physician: Nela Pond PA-C  Precautions: standard    Time In: 10:00  Time Out: 10:53     Visit amount: 121.28  Total amount:~1634.48     Evaluation Date: 10/21/2019  Visit # authorized: 2/30 (15 total for the year)  Authorization period: 12/31/19  Plan of care Expiration: 1/21/2020 or 12 visits  MD referral: M47.812 (ICD-10-CM) - Cervical spondylosis      Subjective     Pt reports that he has been compliant c/ HEP.  He feels a little better compared to initial evaluation.   Pain Scale: Domonique rates pain at neck pain on a scale of 0-10 to be 4 currently.    Objective     Domonique received individual therapeutic exercises to develop strength, endurance, ROM, flexibility and posture for 53 minutes including:    UT stretch 3 x 30"  LS stretch 3 x 30"  Cervical extensions c/ towel 20x5"  Cervical rotations c/ towel 20x B  Scapular retractions 2 x 10  BUE ER c/ GTB 2 x 10  Ball up wall 20x5"  Doorway pec stretch 10x10"  Standing rows c/ GTB 2 x 10  Standing shoulder extensions c/ BTB 2 x 10    Written Home Exercises Provided: at eval,  Updated on 10/28/19  Pt demo good understanding of the education provided. Domonique demonstrated good return demonstration of activities.     Education provided re: importance of HEP and role of PT  Domonique verbalized good understanding of education provided.   No spiritual or educational barriers to learning provided    Assessment     Patient tolerated all interventions well without any complaints of pain or discomfort.  Continue to progress based on pt's tolerance.  PT added multiple exercises at today's session and provided pt c/ an updated HEP.  This is a 69 y.o. male referred to outpatient " physical therapy and presents with a medical diagnosis of cervical spondylosis and demonstrates limitations as described in the problem list. Pt prognosis is Good. Pt will continue to benefit from skilled outpatient physical therapy to address the deficits listed in the problem list, provide pt/family education and to maximize pt's level of independence in the home and community environment.     Short Term GOALS: 4 visits. Pt agrees with goals set.  1. Patient demonstrates independence with HEP.   2. Patient demonstrates independence with Postural Awareness.   3. Patient demonstrates independence with body mechanics.      Long Term GOALS: 12 visits. Pt agrees with goals set.  1. Patient demonstrates increased cervical ROM by 25% in all planes to improve tolerance to functional activities.   2. Patient demonstrates increased strength BUE's to 5/5 or greater to improve tolerance to functional activities.   3. Patient demonstrates improved overall function per Neck Disability Index to 10% or less.      Plan     Continue with established Plan of Care towards PT goals.    Therapist: Jake Webber, PT,DPT  10/28/2019

## 2019-10-31 ENCOUNTER — PATIENT MESSAGE (OUTPATIENT)
Dept: INTERNAL MEDICINE | Facility: CLINIC | Age: 69
End: 2019-10-31

## 2019-11-01 ENCOUNTER — NURSE TRIAGE (OUTPATIENT)
Dept: ADMINISTRATIVE | Facility: CLINIC | Age: 69
End: 2019-11-01

## 2019-11-01 ENCOUNTER — OFFICE VISIT (OUTPATIENT)
Dept: INTERNAL MEDICINE | Facility: CLINIC | Age: 69
End: 2019-11-01
Payer: MEDICARE

## 2019-11-01 ENCOUNTER — HOSPITAL ENCOUNTER (OUTPATIENT)
Dept: RADIOLOGY | Facility: HOSPITAL | Age: 69
Discharge: HOME | End: 2019-11-01
Attending: NURSE PRACTITIONER
Payer: MEDICARE

## 2019-11-01 ENCOUNTER — TELEPHONE (OUTPATIENT)
Dept: INTERNAL MEDICINE | Facility: CLINIC | Age: 69
End: 2019-11-01

## 2019-11-01 VITALS
HEIGHT: 68 IN | HEART RATE: 67 BPM | DIASTOLIC BLOOD PRESSURE: 76 MMHG | BODY MASS INDEX: 40.26 KG/M2 | WEIGHT: 265.63 LBS | SYSTOLIC BLOOD PRESSURE: 120 MMHG | OXYGEN SATURATION: 94 %

## 2019-11-01 DIAGNOSIS — R42 VERTIGO: Primary | ICD-10-CM

## 2019-11-01 DIAGNOSIS — R05.9 COUGH: ICD-10-CM

## 2019-11-01 DIAGNOSIS — R68.89 OTHER GENERAL SYMPTOMS AND SIGNS: ICD-10-CM

## 2019-11-01 DIAGNOSIS — R42 VERTIGO: ICD-10-CM

## 2019-11-01 LAB
BILIRUB UR QL STRIP: NEGATIVE
CLARITY UR REFRACT.AUTO: ABNORMAL
COLOR UR AUTO: YELLOW
GLUCOSE UR QL STRIP: NEGATIVE
HGB UR QL STRIP: NEGATIVE
KETONES UR QL STRIP: NEGATIVE
LEUKOCYTE ESTERASE UR QL STRIP: NEGATIVE
NITRITE UR QL STRIP: NEGATIVE
PH UR STRIP: 5 [PH] (ref 5–8)
PROT UR QL STRIP: NEGATIVE
SP GR UR STRIP: 1.02 (ref 1–1.03)
URN SPEC COLLECT METH UR: ABNORMAL

## 2019-11-01 PROCEDURE — 99214 PR OFFICE/OUTPT VISIT, EST, LEVL IV, 30-39 MIN: ICD-10-PCS | Mod: HCNC,S$GLB,, | Performed by: NURSE PRACTITIONER

## 2019-11-01 PROCEDURE — 71046 X-RAY EXAM CHEST 2 VIEWS: CPT | Mod: 26,HCNC,, | Performed by: RADIOLOGY

## 2019-11-01 PROCEDURE — 71046 X-RAY EXAM CHEST 2 VIEWS: CPT | Mod: TC,HCNC

## 2019-11-01 PROCEDURE — 93010 EKG 12-LEAD: ICD-10-PCS | Mod: HCNC,S$GLB,, | Performed by: INTERNAL MEDICINE

## 2019-11-01 PROCEDURE — 93005 EKG 12-LEAD: ICD-10-PCS | Mod: HCNC,S$GLB,, | Performed by: NURSE PRACTITIONER

## 2019-11-01 PROCEDURE — 1101F PR PT FALLS ASSESS DOC 0-1 FALLS W/OUT INJ PAST YR: ICD-10-PCS | Mod: HCNC,CPTII,S$GLB, | Performed by: NURSE PRACTITIONER

## 2019-11-01 PROCEDURE — 99999 PR PBB SHADOW E&M-EST. PATIENT-LVL IV: CPT | Mod: PBBFAC,HCNC,, | Performed by: NURSE PRACTITIONER

## 2019-11-01 PROCEDURE — 3078F DIAST BP <80 MM HG: CPT | Mod: HCNC,CPTII,S$GLB, | Performed by: NURSE PRACTITIONER

## 2019-11-01 PROCEDURE — 1101F PT FALLS ASSESS-DOCD LE1/YR: CPT | Mod: HCNC,CPTII,S$GLB, | Performed by: NURSE PRACTITIONER

## 2019-11-01 PROCEDURE — 71046 XR CHEST PA AND LATERAL: ICD-10-PCS | Mod: 26,HCNC,, | Performed by: RADIOLOGY

## 2019-11-01 PROCEDURE — 99999 PR PBB SHADOW E&M-EST. PATIENT-LVL IV: ICD-10-PCS | Mod: PBBFAC,HCNC,, | Performed by: NURSE PRACTITIONER

## 2019-11-01 PROCEDURE — 3074F SYST BP LT 130 MM HG: CPT | Mod: HCNC,CPTII,S$GLB, | Performed by: NURSE PRACTITIONER

## 2019-11-01 PROCEDURE — 87086 URINE CULTURE/COLONY COUNT: CPT | Mod: HCNC

## 2019-11-01 PROCEDURE — 81003 URINALYSIS AUTO W/O SCOPE: CPT | Mod: HCNC

## 2019-11-01 PROCEDURE — 3074F PR MOST RECENT SYSTOLIC BLOOD PRESSURE < 130 MM HG: ICD-10-PCS | Mod: HCNC,CPTII,S$GLB, | Performed by: NURSE PRACTITIONER

## 2019-11-01 PROCEDURE — 99214 OFFICE O/P EST MOD 30 MIN: CPT | Mod: HCNC,S$GLB,, | Performed by: NURSE PRACTITIONER

## 2019-11-01 PROCEDURE — 93005 ELECTROCARDIOGRAM TRACING: CPT | Mod: HCNC,S$GLB,, | Performed by: NURSE PRACTITIONER

## 2019-11-01 PROCEDURE — 3078F PR MOST RECENT DIASTOLIC BLOOD PRESSURE < 80 MM HG: ICD-10-PCS | Mod: HCNC,CPTII,S$GLB, | Performed by: NURSE PRACTITIONER

## 2019-11-01 PROCEDURE — 93010 ELECTROCARDIOGRAM REPORT: CPT | Mod: HCNC,S$GLB,, | Performed by: INTERNAL MEDICINE

## 2019-11-01 NOTE — PROGRESS NOTES
"INTERNAL MEDICINE CLINIC - SAME DAY APPOINTMENT  Progress Note    PRESENTING HISTORY     PCP: Nicolas Marlow MD  Chief Complaint/Reason for Visit:   No chief complaint on file.      History of Present Illness & ROS : Mr. Domonique Kellogg is a 69 y.o. male.    Here for UC visit.   Reports that started "feeling dizzy on yesterday and experiencing a cough".   Denies headaches or falls. Happens with position changes.   Denies SOB or chest discomforts.     He is here today with his wife, whom I saw in my clinic on yesterday.     Review of Systems:  Eyes: denies visual changes at this time denies floaters   ENT: no nasal congestion or sore throat  Cardiovascular: no chest pain or palpitations  Gastrointestinal: no nausea or vomiting, no abdominal pain or change in bowel habits  Genitourinary: no hematuria or dysuria; denies frequency  Hematologic/Lymphatic: no easy bruising or lymphadenopathy  Musculoskeletal: no arthralgias or myalgias  Neurological: no seizures or tremors  Endocrine: no heat or cold intolerance      PAST HISTORY:     Past Medical History:   Diagnosis Date    Anticoagulant long-term use     Arthritis     CAD (coronary artery disease) 3/2/2015    [facility]     CAD (coronary artery disease) 3/26/2015    Cataract     Dry eye syndrome     Hypertension     Obesity     Seizures     Sleep apnea     + CPAP       Past Surgical History:   Procedure Laterality Date    EYE SURGERY      INTRAOCULAR PROSTHESES INSERTION Right 7/16/2018    Procedure: INSERTION-INTRAOCULAR LENS (IOL);  Surgeon: Priscilla Hays MD;  Location: [facility];  Service: Ophthalmology;  Laterality: Right;    INTRAOCULAR PROSTHESES INSERTION Left 8/13/2018    Procedure: INSERTION, INTRAOCULAR LENS PROSTHESIS;  Surgeon: Priscilla Hays MD;  Location: [facility];  Service: Ophthalmology;  Laterality: Left;    KNEE SURGERY      PHACOEMULSIFICATION OF CATARACT Right 7/16/2018    Procedure: " PHACOEMULSIFICATION-ASPIRATION-CATARACT;  Surgeon: Priscilla Hays MD;  Location: [facility];  Service: Ophthalmology;  Laterality: Right;    PHACOEMULSIFICATION OF CATARACT Left 2018    Procedure: PHACOEMULSIFICATION, CATARACT;  Surgeon: Priscilla Hays MD;  Location: [facility];  Service: Ophthalmology;  Laterality: Left;    WISDOM TOOTH EXTRACTION         Family History   Problem Relation Age of Onset    Cancer Mother         pancreatic    Diabetes Mother     Hypertension Mother     Heart disease Father     No Known Problems Sister     Cancer Maternal Grandmother     Heart disease Paternal Grandfather     Heart disease Brother     No Known Problems Daughter     No Known Problems Son     No Known Problems Sister     No Known Problems Sister     No Known Problems Sister     No Known Problems Daughter     Blindness Neg Hx     Macular degeneration Neg Hx     Retinal detachment Neg Hx     Glaucoma Neg Hx        Social History     Socioeconomic History    Marital status:      Spouse name: Estrellita    Number of children: 2    Years of education: Not on file    Highest education level: Not on file   Occupational History     Employer: [employer]   Social Needs    Financial resource strain: Not hard at all    Food insecurity:     Worry: Never true     Inability: Never true    Transportation needs:     Medical: No     Non-medical: No   Tobacco Use    Smoking status: Former Smoker     Last attempt to quit: 1987     Years since quittin.8    Smokeless tobacco: Never Used    Tobacco comment: quit    Substance and Sexual Activity    Alcohol use: Yes     Alcohol/week: 1.0 - 2.0 standard drinks     Types: 1 - 2 Glasses of wine per week     Frequency: 4 or more times a week     Drinks per session: 1 or 2     Binge frequency: Never     Comment: 1-2 glasses wine several times weekly    Drug use: No    Sexual activity: Yes     Partners: Female   Lifestyle    Physical  activity:     Days per week: 6 days     Minutes per session: 50 min    Stress: Not at all   Relationships    Social connections:     Talks on phone: Twice a week     Gets together: Once a week     Attends Protestant service: Not on file     Active member of club or organization: Yes     Attends meetings of clubs or organizations: More than 4 times per year     Relationship status:    Other Topics Concern    Not on file   Social History Narrative    Not on file       MEDICATIONS & ALLERGIES:     Current Outpatient Medications on File Prior to Visit   Medication Sig Dispense Refill    aspirin (ECOTRIN) 81 MG EC tablet Take 81 mg by mouth once daily.      hydroCHLOROthiazide (HYDRODIURIL) 25 MG tablet Take 1 tablet (25 mg total) by mouth once daily. 90 tablet 0    ibuprofen (ADVIL,MOTRIN) 200 MG tablet Take 200 mg by mouth every 6 (six) hours as needed for Pain.      magnesium oxide (MAG-OX) 400 mg (241.3 mg magnesium) tablet       multivitamin with minerals tablet Take 1 tablet by mouth once daily.       potassium chloride SA (K-DUR,KLOR-CON) 20 MEQ tablet Take 2 tablets (40 mEq total) by mouth once daily. 180 tablet 3    pravastatin (PRAVACHOL) 40 MG tablet TAKE 1 TABLET EVERY DAY 90 tablet 1     No current facility-administered medications on file prior to visit.         Review of patient's allergies indicates:   Allergen Reactions    Indomethacin      Headache dizziness    Adhesive Rash       Medications Reconciliation:   I have reconciled the patient's home medications with the patient/family. I have updated all changes.  Refer to After-Visit Medication List.    OBJECTIVE:     Vital Signs:  There were no vitals filed for this visit.  Wt Readings from Last 1 Encounters:   10/10/19 0849 119.3 kg (263 lb 0.1 oz)     There is no height or weight on file to calculate BMI.       Wt Readings from Last 3 Encounters:   11/01/19 120.5 kg (265 lb 10.5 oz)   10/10/19 119.3 kg (263 lb 0.1 oz)   06/18/19  117.9 kg (260 lb)     Temp Readings from Last 3 Encounters:   10/10/19 98.4 °F (36.9 °C)   05/31/19 98.7 °F (37.1 °C)   10/09/18 98.3 °F (36.8 °C)     BP Readings from Last 3 Encounters:   11/01/19 120/76   10/10/19 130/72   06/18/19 134/69     Pulse Readings from Last 3 Encounters:   11/01/19 67   10/10/19 (!) 59   06/18/19 (!) 55       Physical Exam:  General: Well developed, well nourished. No distress.  HEENT: Head is normocephalic, atraumatic; ears are normal.   Eyes: Clear conjunctiva.  Neck: Supple, symmetrical neck; trachea midline.  Lungs: Clear to auscultation bilaterally and normal respiratory effort.  Cardiovascular: Heart with regular rate and rhythm. No murmurs, gallops or rubs  Extremities: No LE edema. Pulses 2+ and symmetric.   Abdomen: Abdomen is soft, non-tender non-distended with normal bowel sounds.  Skin: Skin color, texture, turgor normal. No rashes.  Musculoskeletal: Normal gait.   Lymph Nodes: No cervical or supraclavicular adenopathy.  Neurologic: Normal strength and tone. No focal numbness or weakness.   Psychiatric: Not depressed.        Laboratory  Lab Results   Component Value Date    WBC 6.11 10/03/2019    HGB 13.2 (L) 10/03/2019    HCT 41.3 10/03/2019     10/03/2019    CHOL 148 10/03/2019    TRIG 55 10/03/2019    HDL 49 10/03/2019    ALT 23 10/03/2019    AST 24 10/03/2019     10/03/2019    K 3.4 (L) 10/03/2019     10/03/2019    CREATININE 1.0 10/03/2019    BUN 11 10/03/2019    CO2 26 10/03/2019    TSH 2.483 10/21/2016    PSA 1.7 10/03/2019    INR 1.0 03/02/2015    HGBA1C 6.1 01/14/2013     Carotid US  RIGHT SIDE:   1 - 39 % right ICA stenosis.   Heterogeneous plaque in the right internal carotid artery.   Antegrade flow in the right vertebral artery.     LEFT SIDE:  1 - 39% left ICA stenosis.   Heterogeneous plaque in the left internal carotid artery.   Antegrade flow in the left vertebral artery.    Sonographer: CHRISTOPHER Miranda    Electronically Signed by:   Dmitry Oliva MD [[id number]]                        On: 02/23/2016 13:07        TTE  CONCLUSIONS     1 - Normal left ventricular systolic function (EF 60-65%).     2 - Normal left ventricular diastolic function.     3 - Normal right ventricular systolic function .     4 - Trivial to mild tricuspid regurgitation.     5 - Trivial mitral regurgitation.     6 - Pulmonary hypertension.     Positive stress echocardiographic study demonstrating an ischemic response involving the mid anteroseptum, apical septum, mid inferoseptum, apical inferior wall. Non-specific finding demonstrating failure to augment involving the basal anteroseptum,   basal inferoseptum which may be associated with ischemia; clinical correlation required.     Although these results suggest ischemia in a 1-2 vessel distribution, it should be noted that the specificity of stress echo is reduced in the setting of a hypertensive response.          This document has been electronically    SIGNED BY: Roel Perez MD On: 02/16/2015 14:14    ASSESSMENT & PLAN:     Here for Same Day Appt.   Seen by his est'd PCP, Dr. Marlow on 10/10/2019.       Acute Vertigo / Cough:   *Clinical exam unremarkable  History of bilateral CAD with luminal irregularities. Seen by Dr. Montgomery on 10/26/2018. On higher dose statin since, at 40 mg, agree. Lipid Panel on 10/03/2019 normal.     *On HCTZ with K for repletion   ? Vol Depletion  ? Electrolyte Imbalance  ? Urine related   ? Underlying arrhythmia  -     URINALYSIS  -     Urine culture  -     IN OFFICE EKG 12-LEAD (to Muse): NSR, chronic abnorm non specific T wave   -     Basic metabolic panel; Future; Expected date: 11/01/2019  -     CBC auto differential; Future; Expected date: 11/01/2019  -     X-Ray Chest PA And Lateral; Future; Expected date: 11/01/2019    *Needs US of Carotids in 10/2020, sooner if indicated. Refer to Cardiology if labs are non revealing. Has high risk factors.     *Advised to follow up with his PCP if  symptoms should worsen or not improving.     Future Appointments   Date Time Provider Department Center   11/1/2019 11:10 AM LAB, APPOINTMENT [facility] IN[facility]ED [facility] IM Abdiel Babb PCW   11/1/2019 11:30 AM [facility] XRIM1 485 LB LIMIT [facility] XRAY IM [facility]iel [facility] PC[facility]   11/4/2019  2:00 PM Jake Webber, PT [facility]   11/7/2019  2:00 PM Jake Webber, PT [facility]   11/11/2019  2:00 PM Jake Webber, PT [facility]   11/14/2019  2:00 PM Jake Webber, PT [facility]   11/18/2019  2:00 PM Jake Webber, PT [facility]        Medication List           Accurate as of November 1, 2019 11:07 AM. If you have any questions, ask your nurse or doctor.               CONTINUE taking these medications    aspirin 81 MG EC tablet  Commonly known as:  ECOTRIN     hydroCHLOROthiazide 25 MG tablet  Commonly known as:  HYDRODIURIL  Take 1 tablet (25 mg total) by mouth once daily.     ibuprofen 200 MG tablet  Commonly known as:  ADVIL,MOTRIN     magnesium oxide 400 mg (241.3 mg magnesium) tablet  Commonly known as:  MAG-OX     multivitamin with minerals tablet     potassium chloride SA 20 MEQ tablet  Commonly known as:  K-DUR,KLOR-CON  Take 2 tablets (40 mEq total) by mouth once daily.     pravastatin 40 MG tablet  Commonly known as:  PRAVACHOL  TAKE 1 TABLET EVERY DAY          Signing Physician:  LAVONNE Lomeli

## 2019-11-01 NOTE — TELEPHONE ENCOUNTER
Reason for Disposition   Caller has already spoken with the PCP (or office), and has no further questions    Protocols used: NO CONTACT OR DUPLICATE CONTACT CALL-A-OH    Pt stated he already has an appointment and is on his way now. No further questions or concerns needed at present time.

## 2019-11-01 NOTE — TELEPHONE ENCOUNTER
"Work up in clinic is non revealing for symptoms of "Vertigo and cough".   Had a lengthy d/w with the patient, reassurance given that based on the findings from today, there was nothing concerning; however, if symptoms should persist over the next 2-3 days, to let Dr. Marlow or myself know, and will ask for more input from Cardiology as he is with risk factors in regards.   Additionally, advised that if symptoms should worsen, he should report to the ED immediately.     He voiced an understanding and agreed to the plan.     SDJ  "

## 2019-11-02 LAB — BACTERIA UR CULT: NO GROWTH

## 2019-11-08 ENCOUNTER — PATIENT OUTREACH (OUTPATIENT)
Dept: OTHER | Facility: OTHER | Age: 69
End: 2019-11-08

## 2019-11-08 NOTE — PROGRESS NOTES
Digital Medicine: Clinician Introduction    Domonique Kellogg is a 69 y.o. male who is newly enrolled in the Digital Medicine Clinic.    The following information was reviewed and updated:  Preferred pharmacy   [id number] - [place], [place] - [address] [facility]  [address] [facility] [address] [facility] [address]  Phone: [phone] Fax: [phone]    [facility] - [place], [place] - [address] [facility]  [address] [address]  Phone: [phone] Fax: [phone]    [id number] - [place], [place] - [address] [facility] [address]  [address] [facility] [address] [address]  Phone: [phone] Fax: [phone]    [id number] - [place], [place] - [address]  [address] [facility] [address]  Phone: [phone] Fax: [phone]      Patient prefers a 90 days supply.     Review of patient's allergies indicates:   Allergen Reactions    Indomethacin      Headache dizziness    Adhesive Rash       The history is provided by the patient.     HYPERTENSION  Our goal is to get BP to consistently below 130/80mmHg and make the process convenient so patient can avoid extra trips to the office. Getting your blood pressure below 130/80mmHg (definition of control) will reduce your risk for heart attack, kidney failure, stroke and death (as well as kidney failure, eye disease, & dementia)      Reviewed non-pharmacologic therapies and impact on BP      Explained that we expect patient to obtain several blood pressures per week at random times of day.  Instructed patient not to allow anyone else to use phone and monitoring device.  Confirmed appropriate BP monitoring technique.      Explained to patient that the digital medicine team is not available for emergencies.  Patient will call [facility] on-call ([phone] or [phone]) or [phone] if needed.    Patient's BP goal is 130/80. Patients BP average is 137/77 mmHg, which  is above goal, per 2017 ACC/AHA Hypertension Guidelines.    Asymptomatic when BP is high. Also denies hypotensive s/sx.     BP is elevated above goal, patient explains neck pain to be a contributing factor. He is working with physical therapy to reduce this pain, states it is much better recently.             Med Review complete.    Allergies reviewed.      Last 5 Patient Entered Readings                                      Current 30 Day Average: 137/77     Recent Readings 11/4/2019 10/30/2019 10/30/2019 10/16/2019 10/4/2019    SBP (mmHg) 132 143 151 136 122    DBP (mmHg) 69 80 80 77 65    Pulse 57 55 51 53 60            INTERVENTION(S)  recommended diet modifications, recommended physical activity, reviewed monitoring technique and encouragement/support    PLAN  patient verbalizes understanding, Health  follow up and continue monitoring    Will give patient 2-3 months to achieve goal without medication adjustment. If goal is not achieved with next outreach - will consider addition of amlodipine.     Continue to monitor. Next outreach scheduled in 2-3 months.       There are no preventive care reminders to display for this patient.    Current Medication Regimen:  Hypertension Medications             hydroCHLOROthiazide (HYDRODIURIL) 25 MG tablet Take 1 tablet (25 mg total) by mouth once daily.            Reviewed the importance of self-monitoring, medication adherence, and that the health  can be used as a resource for lifestyle modifications to help reduce or maintain a healthy lifestyle.    Sent link to [facility] webpages and my contact information via [employer] for future questions. Follow up scheduled.         Screenings

## 2019-11-11 ENCOUNTER — PATIENT MESSAGE (OUTPATIENT)
Dept: INTERNAL MEDICINE | Facility: CLINIC | Age: 69
End: 2019-11-11

## 2019-11-11 ENCOUNTER — CLINICAL SUPPORT (OUTPATIENT)
Dept: REHABILITATION | Facility: HOSPITAL | Age: 69
End: 2019-11-11
Payer: MEDICARE

## 2019-11-11 DIAGNOSIS — R32 URINARY INCONTINENCE, UNSPECIFIED TYPE: Primary | ICD-10-CM

## 2019-11-11 DIAGNOSIS — M53.82 DECREASED ROM OF INTERVERTEBRAL DISCS OF CERVICAL SPINE: ICD-10-CM

## 2019-11-11 DIAGNOSIS — R29.898 WEAKNESS OF BOTH UPPER EXTREMITIES: ICD-10-CM

## 2019-11-11 PROCEDURE — 97110 THERAPEUTIC EXERCISES: CPT | Mod: HCNC,PO

## 2019-11-11 NOTE — PROGRESS NOTES
"                                                    Physical Therapy Daily Note     Name: Domonique Kellogg  Clinic Number: [id number]  Diagnosis:   Encounter Diagnoses   Name Primary?    Decreased ROM of intervertebral discs of cervical spine     Weakness of both upper extremities      Physician: Nela Pond PA-C  Precautions: standard    Time In: 2:00  Time Out: 2:50     Visit amount: 60.64  Total amount:~1695.12     Evaluation Date: 10/21/2019  Visit # authorized: 3/30 (16 total for the year)  Authorization period: 12/31/19  Plan of care Expiration: 1/21/2020 or 12 visits  MD referral: M47.812 (ICD-10-CM) - Cervical spondylosis      Subjective     Pt reports that he has been sick and has not performed any HEP. Pt has been experiencing vertigo since being sick.  Pain Scale: Domonique rates pain at neck pain on a scale of 0-10 to be 4 currently.    Objective     Domonique received individual therapeutic exercises to develop strength, endurance, ROM, flexibility and posture for 50 minutes including:    UT stretch 3 x 30"  LS stretch 3 x 30"  Cervical extensions c/ towel 20x5"  Cervical rotations c/ towel 20x B  Scapular retractions 2 x 10  BUE ER c/ GTB 2 x 10  Ball up wall 20x5"  Doorway pec stretch 10x10"  Standing rows c/ GTB 2 x 10  Standing shoulder extensions c/ BTB 2 x 10    Written Home Exercises Provided: at eval,  Updated on 10/28/19  Pt demo good understanding of the education provided. Domonique demonstrated good return demonstration of activities.     Education provided re: importance of HEP and role of PT  Domonique verbalized good understanding of education provided.   No spiritual or educational barriers to learning provided    Assessment     Patient tolerated all interventions well without any complaints of pain or discomfort. Continue to progress based on pt's tolerance.     This is a 69 y.o. male referred to outpatient physical therapy and presents with a medical diagnosis of cervical spondylosis " and demonstrates limitations as described in the problem list. Pt prognosis is Good. Pt will continue to benefit from skilled outpatient physical therapy to address the deficits listed in the problem list, provide pt/family education and to maximize pt's level of independence in the home and community environment.     Short Term GOALS: 4 visits. Pt agrees with goals set.  1. Patient demonstrates independence with HEP.   2. Patient demonstrates independence with Postural Awareness.   3. Patient demonstrates independence with body mechanics.      Long Term GOALS: 12 visits. Pt agrees with goals set.  1. Patient demonstrates increased cervical ROM by 25% in all planes to improve tolerance to functional activities.   2. Patient demonstrates increased strength BUE's to 5/5 or greater to improve tolerance to functional activities.   3. Patient demonstrates improved overall function per Neck Disability Index to 10% or less.      Plan     Continue with established Plan of Care towards PT goals.    Therapist: Jake Webber, PT,DPT  11/11/2019

## 2019-11-13 NOTE — PROGRESS NOTES
"                                                    Physical Therapy Daily Note     Name: Domonique Kellogg  Clinic Number: [id number]  Diagnosis:   Encounter Diagnoses   Name Primary?    Decreased ROM of intervertebral discs of cervical spine     Weakness of both upper extremities      Physician: Nela Pond PA-C  Precautions: standard    Time In: 1:52  Time Out: 2:52  1:1 treatment time: 30 minutes     Visit amount: 60.64  Total amount:~1755.76     Evaluation Date: 10/21/2019  Visit # authorized: 3/30 (16 total for the year)  Authorization period: 12/31/19  Plan of care Expiration: 1/21/2020 or 12 visits  MD referral: M47.812 (ICD-10-CM) - Cervical spondylosis      Subjective     Pt reports that he has not been experiencing any vertigo since last visit.  Pt reports that he fell over his dog today but he is ok.  Pain Scale: Domonique rates pain at neck pain on a scale of 0-10 to be 4 currently.    Objective     Domonique received individual therapeutic exercises to develop strength, endurance, ROM, flexibility and posture for 60 minutes including:    UT stretch 3 x 30"  LS stretch 3 x 30"  Cervical extensions c/ towel 20x5"  Cervical rotations c/ towel 20x B  Scapular retractions 2 x 10  BUE ER c/ GTB 2 x 10  Ball up wall 20x5"  Doorway pec stretch 10x10"  Standing rows c/ BTB 3 x 10  Standing shoulder extensions c/ BTB 3 x 10  Wall stars c/ OTB 1 x 10  Ball circles x 20 CW/CCW B  ER c/ OTB 2 x 10    Written Home Exercises Provided: at eval,  Updated on 10/28/19  Pt demo good understanding of the education provided. Domonique demonstrated good return demonstration of activities.     Education provided re: importance of HEP and role of PT  Domonique verbalized good understanding of education provided.   No spiritual or educational barriers to learning provided    Assessment     Patient tolerated all interventions well without any complaints of pain or discomfort. PT added exercises focusing on scapular stabilizers at " today's session.  Continue to progress based on pt's tolerance.     This is a 69 y.o. male referred to outpatient physical therapy and presents with a medical diagnosis of cervical spondylosis and demonstrates limitations as described in the problem list. Pt prognosis is Good. Pt will continue to benefit from skilled outpatient physical therapy to address the deficits listed in the problem list, provide pt/family education and to maximize pt's level of independence in the home and community environment.     Short Term GOALS: 4 visits. Pt agrees with goals set.  1. Patient demonstrates independence with HEP.   2. Patient demonstrates independence with Postural Awareness.   3. Patient demonstrates independence with body mechanics.      Long Term GOALS: 12 visits. Pt agrees with goals set.  1. Patient demonstrates increased cervical ROM by 25% in all planes to improve tolerance to functional activities.   2. Patient demonstrates increased strength BUE's to 5/5 or greater to improve tolerance to functional activities.   3. Patient demonstrates improved overall function per Neck Disability Index to 10% or less.      Plan     Continue with established Plan of Care towards PT goals.    Therapist: Jake Webber, PT,DPT  11/14/2019

## 2019-11-14 ENCOUNTER — CLINICAL SUPPORT (OUTPATIENT)
Dept: REHABILITATION | Facility: HOSPITAL | Age: 69
End: 2019-11-14
Payer: MEDICARE

## 2019-11-14 DIAGNOSIS — R29.898 WEAKNESS OF BOTH UPPER EXTREMITIES: ICD-10-CM

## 2019-11-14 DIAGNOSIS — M53.82 DECREASED ROM OF INTERVERTEBRAL DISCS OF CERVICAL SPINE: ICD-10-CM

## 2019-11-14 PROCEDURE — 97110 THERAPEUTIC EXERCISES: CPT | Mod: HCNC,PO

## 2019-11-14 NOTE — PROGRESS NOTES
"Digital Medicine: Health  Follow-Up    Checked in with patient briefly and requested more digital medicine readings - he agreed to take a blood pressure reading later today.              INTERVENTION(S)  reviewed monitoring technique    PLAN  patient verbalizes understanding and additional monitoring needed    Patient discussed concern of the reliability of the digital medicine cuff as he recently had a lower blood pressure reading during an office visit. He discussed that when he feels concerned about the reliability of the cuff that it "stresses him out" to take further readings. We discussed that he was feeling similarly when he first enrolled in the program, yet after a few weeks of feeling that way he admitted that he just needed to "get comfortable" with it, which then followed an average that was usually within the digital medicine program guidelines.       There are no preventive care reminders to display for this patient.    Last 5 Patient Entered Readings                                      Current 30 Day Average: 137/77     Recent Readings 11/4/2019 10/30/2019 10/30/2019 10/16/2019 10/4/2019    SBP (mmHg) 132 143 151 136 122    DBP (mmHg) 69 80 80 77 65    Pulse 57 55 51 53 60                  Screenings    SDOH  "

## 2019-11-15 ENCOUNTER — OFFICE VISIT (OUTPATIENT)
Dept: UROLOGY | Facility: CLINIC | Age: 69
End: 2019-11-15
Attending: FAMILY MEDICINE
Payer: MEDICARE

## 2019-11-15 VITALS
WEIGHT: 257.94 LBS | HEART RATE: 50 BPM | HEIGHT: 68 IN | SYSTOLIC BLOOD PRESSURE: 115 MMHG | BODY MASS INDEX: 39.09 KG/M2 | DIASTOLIC BLOOD PRESSURE: 72 MMHG

## 2019-11-15 DIAGNOSIS — N39.41 URGE INCONTINENCE: Primary | ICD-10-CM

## 2019-11-15 DIAGNOSIS — N13.8 BPH WITH URINARY OBSTRUCTION: ICD-10-CM

## 2019-11-15 DIAGNOSIS — N40.1 BPH WITH URINARY OBSTRUCTION: ICD-10-CM

## 2019-11-15 LAB
BILIRUB SERPL-MCNC: NORMAL MG/DL
BLOOD URINE, POC: NORMAL
COLOR, POC UA: YELLOW
GLUCOSE UR QL STRIP: NORMAL
KETONES UR QL STRIP: NORMAL
LEUKOCYTE ESTERASE URINE, POC: NORMAL
NITRITE, POC UA: NORMAL
PH, POC UA: 5
POC RESIDUAL URINE VOLUME: 18 ML (ref 0–100)
PROTEIN, POC: NORMAL
SPECIFIC GRAVITY, POC UA: 1.01
UROBILINOGEN, POC UA: NORMAL

## 2019-11-15 PROCEDURE — 1101F PT FALLS ASSESS-DOCD LE1/YR: CPT | Mod: HCNC,CPTII,S$GLB, | Performed by: NURSE PRACTITIONER

## 2019-11-15 PROCEDURE — 99999 PR PBB SHADOW E&M-EST. PATIENT-LVL IV: CPT | Mod: PBBFAC,HCNC,, | Performed by: NURSE PRACTITIONER

## 2019-11-15 PROCEDURE — 99214 OFFICE O/P EST MOD 30 MIN: CPT | Mod: HCNC,25,S$GLB, | Performed by: NURSE PRACTITIONER

## 2019-11-15 PROCEDURE — 81002 POCT URINE DIPSTICK WITHOUT MICROSCOPE: ICD-10-PCS | Mod: HCNC,S$GLB,, | Performed by: NURSE PRACTITIONER

## 2019-11-15 PROCEDURE — 3078F DIAST BP <80 MM HG: CPT | Mod: HCNC,CPTII,S$GLB, | Performed by: NURSE PRACTITIONER

## 2019-11-15 PROCEDURE — 1101F PR PT FALLS ASSESS DOC 0-1 FALLS W/OUT INJ PAST YR: ICD-10-PCS | Mod: HCNC,CPTII,S$GLB, | Performed by: NURSE PRACTITIONER

## 2019-11-15 PROCEDURE — 99214 PR OFFICE/OUTPT VISIT, EST, LEVL IV, 30-39 MIN: ICD-10-PCS | Mod: HCNC,25,S$GLB, | Performed by: NURSE PRACTITIONER

## 2019-11-15 PROCEDURE — 3074F SYST BP LT 130 MM HG: CPT | Mod: HCNC,CPTII,S$GLB, | Performed by: NURSE PRACTITIONER

## 2019-11-15 PROCEDURE — 81002 URINALYSIS NONAUTO W/O SCOPE: CPT | Mod: HCNC,S$GLB,, | Performed by: NURSE PRACTITIONER

## 2019-11-15 PROCEDURE — 99999 PR PBB SHADOW E&M-EST. PATIENT-LVL IV: ICD-10-PCS | Mod: PBBFAC,HCNC,, | Performed by: NURSE PRACTITIONER

## 2019-11-15 PROCEDURE — 51798 PR MEAS,POST-VOID RES,US,NON-IMAGING: ICD-10-PCS | Mod: HCNC,S$GLB,, | Performed by: NURSE PRACTITIONER

## 2019-11-15 PROCEDURE — 3078F PR MOST RECENT DIASTOLIC BLOOD PRESSURE < 80 MM HG: ICD-10-PCS | Mod: HCNC,CPTII,S$GLB, | Performed by: NURSE PRACTITIONER

## 2019-11-15 PROCEDURE — 51798 US URINE CAPACITY MEASURE: CPT | Mod: HCNC,S$GLB,, | Performed by: NURSE PRACTITIONER

## 2019-11-15 PROCEDURE — 3074F PR MOST RECENT SYSTOLIC BLOOD PRESSURE < 130 MM HG: ICD-10-PCS | Mod: HCNC,CPTII,S$GLB, | Performed by: NURSE PRACTITIONER

## 2019-11-15 NOTE — LETTER
November 15, 2019      Nicolas Pacheco MD  [address]  [facility] [address]           [facility] 4th [facility]  [address]  [facility] [address]  Phone: [phone]          Patient: Domonique Kellogg   MR Number: [id number]   YOB: 1950   Date of Visit: 11/15/2019       Dear Dr. Nicolas Pacheco:    Thank you for referring Domonique Kellogg to me for evaluation. Attached you will find relevant portions of my assessment and plan of care.    If you have questions, please do not hesitate to call me. I look forward to following Domonique Kellogg along with you.    Sincerely,    Missy Watts, JACK    Enclosure  CC:  No Recipients    If you would like to receive this communication electronically, please contact [email or web address] or [phone] to request more information on [email or web address] Link access.    For providers and/or their staff who would like to refer a patient to [facility], please contact us through our one-stop-shop provider referral line, [facility] [place], at [phone].    If you feel you have received this communication in error or would no longer like to receive these types of communications, please e-mail [email or web address]

## 2019-11-15 NOTE — PROGRESS NOTES
Subjective:       Patient ID: Domonique Kellogg is a 69 y.o. male.    Chief Complaint: Benign Prostatic Hypertrophy    Domonique Kellogg is a 69 y.o. Male new to our Urology Clinic.    He is here today due having some issues with some urge incontinence.  He reports that at times he will have some post void dribbling.   He will get a strong urge at times and unable to hold it back.  He is a coffee drinker and he takes HCTZ 25mg.     No dysuria/hematuria.  Nocturia 0-1x    No issue with ED                            PSA                      1.7                 10/03/2019                          Past Medical History:  No date: Anticoagulant long-term use  No date: Arthritis  3/2/2015: CAD (coronary artery disease)      Comment:  non-obstructive per [facility]   3/26/2015: CAD (coronary artery disease)  No date: Cataract  No date: Dry eye syndrome  No date: Hypertension  No date: Obesity  No date: Seizures  No date: Sleep apnea      Comment:  + CPAP    Past Surgical History:  No date: EYE SURGERY  7/16/2018: INTRAOCULAR PROSTHESES INSERTION; Right      Comment:  Procedure: INSERTION-INTRAOCULAR LENS (IOL);  Surgeon:                Priscilla Hays MD;  Location: [facility];  Service:                Ophthalmology;  Laterality: Right;  8/13/2018: INTRAOCULAR PROSTHESES INSERTION; Left      Comment:  Procedure: INSERTION, INTRAOCULAR LENS PROSTHESIS;                 Surgeon: Priscilla Hays MD;  Location: [facility];  Service:               Ophthalmology;  Laterality: Left;  No date: KNEE SURGERY  7/16/2018: PHACOEMULSIFICATION OF CATARACT; Right      Comment:  Procedure: PHACOEMULSIFICATION-ASPIRATION-CATARACT;                 Surgeon: Priscilla Hays MD;  Location: [facility];  Service:               Ophthalmology;  Laterality: Right;  8/13/2018: PHACOEMULSIFICATION OF CATARACT; Left      Comment:  Procedure: PHACOEMULSIFICATION, CATARACT;  Surgeon:                Priscilla Hays MD;  Location: [facility];  Service:                Ophthalmology;   Laterality: Left;  No date: WISDOM TOOTH EXTRACTION    Review of patient's family history indicates:  Problem: Cancer      Relation: Mother          Age of Onset: (Not Specified)          Comment: pancreatic  Problem: Diabetes      Relation: Mother          Age of Onset: (Not Specified)  Problem: Hypertension      Relation: Mother          Age of Onset: (Not Specified)  Problem: Heart disease      Relation: Father          Age of Onset: (Not Specified)  Problem: No Known Problems      Relation: Sister          Age of Onset: (Not Specified)  Problem: Cancer      Relation: Maternal Grandmother          Age of Onset: (Not Specified)  Problem: Heart disease      Relation: Paternal Grandfather          Age of Onset: (Not Specified)  Problem: Heart disease      Relation: Brother          Age of Onset: (Not Specified)  Problem: No Known Problems      Relation: Daughter          Age of Onset: (Not Specified)  Problem: No Known Problems      Relation: Son          Age of Onset: (Not Specified)  Problem: No Known Problems      Relation: Sister          Age of Onset: (Not Specified)  Problem: No Known Problems      Relation: Sister          Age of Onset: (Not Specified)  Problem: No Known Problems      Relation: Sister          Age of Onset: (Not Specified)  Problem: No Known Problems      Relation: Daughter          Age of Onset: (Not Specified)  Problem: Blindness      Relation: Neg Hx          Age of Onset: (Not Specified)  Problem: Macular degeneration      Relation: Neg Hx          Age of Onset: (Not Specified)  Problem: Retinal detachment      Relation: Neg Hx          Age of Onset: (Not Specified)  Problem: Glaucoma      Relation: Neg Hx          Age of Onset: (Not Specified)      Social History    Socioeconomic History      Marital status:       Spouse name: Estrellita      Number of children: 2      Years of education: Not on file      Highest education level: Not on file    Occupational History        Employer:  [employer]    Social Needs      Financial resource strain: Not hard at all      Food insecurity:        Worry: Never true        Inability: Never true      Transportation needs:        Medical: No        Non-medical: No    Tobacco Use      Smoking status: Former Smoker        Quit date: 1987        Years since quittin.8      Smokeless tobacco: Never Used      Tobacco comment: quit     Substance and Sexual Activity      Alcohol use: Yes        Alcohol/week: 1.0 - 2.0 standard drinks        Types: 1 - 2 Glasses of wine per week        Frequency: 4 or more times a week        Drinks per session: 1 or 2        Binge frequency: Never        Comment: 1-2 glasses wine several times weekly      Drug use: No      Sexual activity: Yes        Partners: Female    Lifestyle      Physical activity:        Days per week: 6 days        Minutes per session: 50 min      Stress: Not at all    Relationships      Social connections:        Talks on phone: Twice a week        Gets together: Once a week        Attends [employer] service: Not on file        Active member of club or organization: Yes        Attends meetings of clubs or organizations: More than 4 times per year        Relationship status:     Other Topics      Concerns:        Not on file    Social History Narrative      Not on file      Allergies:  Indomethacin and Adhesive    Medications:  Current Outpatient Medications:   aspirin (ECOTRIN) 81 MG EC tablet, Take 81 mg by mouth once daily., Disp: , Rfl:   hydroCHLOROthiazide (HYDRODIURIL) 25 MG tablet, Take 1 tablet (25 mg total) by mouth once daily., Disp: 90 tablet, Rfl: 0  ibuprofen (ADVIL,MOTRIN) 200 MG tablet, Take 200 mg by mouth every 6 (six) hours as needed for Pain., Disp: , Rfl:   magnesium oxide (MAG-OX) 400 mg (241.3 mg magnesium) tablet, , Disp: , Rfl:   multivitamin with minerals tablet, Take 1 tablet by mouth once daily. , Disp: , Rfl:   potassium chloride SA  (K-DUR,KLOR-CON) 20 MEQ tablet, Take 2 tablets (40 mEq total) by mouth once daily., Disp: 180 tablet, Rfl: 3  pravastatin (PRAVACHOL) 40 MG tablet, TAKE 1 TABLET EVERY DAY, Disp: 90 tablet, Rfl: 1          Review of Systems    Objective:      Physical Exam   Nursing note and vitals reviewed.  Constitutional: He is oriented to person, place, and time. Vital signs are normal. He appears well-developed and well-nourished. He is active and cooperative.  Non-toxic appearance. He does not have a sickly appearance.   Urine dipped clear of infection in the office today.  PVR in the office today by the nurse was 18.     HENT:   Head: Normocephalic and atraumatic.   Right Ear: External ear normal.   Left Ear: External ear normal.   Nose: Nose normal.   Mouth/Throat: Mucous membranes are normal.   Eyes: Conjunctivae and lids are normal. No scleral icterus.   Neck: Trachea normal, normal range of motion and full passive range of motion without pain. Neck supple. No JVD present. No tracheal deviation present.   Cardiovascular: Normal rate, S1 normal and S2 normal.    Pulmonary/Chest: Effort normal. No respiratory distress. He exhibits no tenderness.   Abdominal: Soft. Normal appearance. There is no hepatosplenomegaly. There is no tenderness. There is no CVA tenderness.   Genitourinary: Rectum normal, testes normal and penis normal. Rectal exam shows no external hemorrhoid, no mass and no tenderness. Prostate is enlarged. Prostate is not tender. Right testis shows no mass, no swelling and no tenderness. Left testis shows no mass, no swelling and no tenderness. Uncircumcised. No phimosis, paraphimosis, hypospadias, penile erythema or penile tenderness. No discharge found.       Musculoskeletal: Normal range of motion.   Neurological: He is alert and oriented to person, place, and time. He has normal strength.   Skin: Skin is warm, dry and intact.     Psychiatric: He has a normal mood and affect. His behavior is normal. Judgment  and thought content normal.       Assessment:       1. Urge incontinence    2. BPH with urinary obstruction        Plan:         I spent 30 minutes with the patient of which more than half was spent in direct consultation with the patient in regards to our treatment and plan.    Education and recommendations of today's plan of care including home remedies.  We discussed the office findings.  Discussed normal male anatomy; prostate gland and normal growth.  No sign of infection and emptying bladder well.  We reviewed the other contributory factors for his LUTS.  Diet modifications; reducing bladder irritants; more bathroom breaks especially after taking fluid pill.  We discussed kegel exercises to strengthen pelvic floor.  Reviewed medical management with alpha blockers or anticholinergics, but he not interested in medical management.  He going to try life styl management.

## 2019-11-15 NOTE — PATIENT INSTRUCTIONS
BPH (Enlarged Prostate)  The prostate is a gland at the base of the bladder. As some men get older, the prostate may get bigger in size. This problem is called benign prostatic hyperplasia (BPH). BPH puts pressure on the urethra. This is the tube that carries urine from the bladder to the penis. It may interfere with the flow of urine. It may also keep the bladder from emptying fully.    Symptoms of BPH include trouble starting urination and feeling as though the bladder isnt emptying all the way. It also includes a weak urine stream, dribbling and leaking of urine, and frequent and urgent urination (especially at night). BPH can increase the risk of urinary infections. It can also block off urine flow completely. If this occurs, a thin tube (catheter) may be passed into the bladder to help drain urine.  If symptoms are mild, no treatment may be needed right now. If symptoms are more severe, treatment is likely needed. The goal of treatment is to improve urine flow and reduce symptoms. Treatments can include medicine and procedures. Your healthcare provider will discuss treatment options with you as needed.  Home care  The following guidelines will help you care for yourself at home:  · Urinate as soon as you feel the urge. Don't try to hold your urine.  · Don't limit your fluid intake during the day. Drink 6 to 8 glasses of water or liquids a day. This prevents bacteria from building up in the bladder.  · Avoid drinking fluids after dinner to help reduce urination during the night.  · Avoid medicines that can worsen your symptoms. These include certain cold and allergy medicines and antidepressants. Diuretics used for high blood pressure can also worsen symptoms. Talk to your doctor about the medicines you take. Other choices may work better for you.  Prostate cancer screening  BPH does not increase the risk of prostate cancer. But because prostate cancer is a common cancer in men, screening is sometimes  recommended. This may help detect the cancer in its early stages when treatment is most effective. Factors that can increase the risk of prostate cancer include being -American or having a father or brother who had prostate cancer. A high-fat diet may also increase the risk of prostate cancer. Talk to your healthcare provider to see whether you should be screened for prostate cancer.  Follow-up care  Follow up with your healthcare provider, or as advised  To learn more, go to:  · National Kidney & Urologic Diseases Information Clearinghouse  kidney.niddk.nih.gov, 366.582.8712  When to seek medical advice  Call your healthcare provider right away if any of these occur:  · Fever of 100.4°F (38.0°C) or higher, or as advised  · Unable to pass urine for 8 hours  · Increasing pressure or pain in your bladder (lower abdomen)  · Blood in the urine  · Increasing low back pain, not related to injury  · Symptoms of urinary infection (increased urge to urinate, burning when passing urine, foul-smelling urine)  Date Last Reviewed: 7/1/2016 © 2000-2017 [employer]. [address] [place] [address]. All rights reserved. This information is not intended as a substitute for professional medical care. Always follow your healthcare professional's instructions.        Kegel Exercises  Kegel exercises dont need special clothing or equipment. Theyre easy to learn and simple to do. And if you do them right, no one can tell youre doing them, so they can be done almost anywhere. Your healthcare provider, nurse, or physical therapist can answer any questions you have and help you get started.    A weak pelvic floor   The pelvic floor muscles may weaken due to aging, pregnancy and vaginal childbirth, injury, surgery, chronic cough, or lack of exercise. If the pelvic floor is weak, your bladder and other pelvic organs may sag out of place. The urethra may also open too easily and allow urine to leak out. Kegel  exercises can help you strengthen your pelvic floor muscles. Then they can better support the pelvic organs and control urine flow.  How Kegel exercises are done  Try each of the Kegel exercises described below. When youre doing them, try not to move your leg, buttock, or stomach muscles.  · Contract as if you were stopping your urine stream. But do it when youre not urinating.  · Tighten your rectum as if trying not to pass gas. Contract your anus, but dont move your buttocks.  · You may place a finger or 2 in the vagina and squeeze your finger with your vagina to learn which muscles to tighten.  Try to hold each Kegel for a slow count to 5. You probably wont be able to hold them for that long at first. But keep practicing. It will get easier as your pelvic floor gets stronger. Eventually, special weights that you place in your vagina may be recommended to help make your Kegels even more effective. Visit your healthcare provider if you have difficulties doing Kegel exercises.  Helpful hints  Here are some tips to follow:  · Do your Kegels as often as you can. The more you do them, the faster youll feel the results.  · Pick an activity you do often as a reminder. For instance, do your Kegels every time you sit down.  · Tighten your pelvic floor before you sneeze, get up from a chair, cough, laugh, or lift. This protects your pelvic floor from injury and can help prevent urine leakage.   Date Last Reviewed: 8/5/2015  © 5175-7209 [employer]. [address] [place] [address], [place] [address]. All rights reserved. This information is not intended as a substitute for professional medical care. Always follow your healthcare professional's instructions.        Prostate Problems and Related Urinary Symptoms    Many men have problems with the prostate at some time in their lives. The prostate gland is part of the male reproductive system. Its located just below the bladder. The prostate surrounds the urethra  (the tube that carries urine and semen out of the body). The main function of the prostate gland is to add fluid to the semen. When problems occur in the prostate, the bladder and urethra are often affected as well. The most common prostate problems are described below.  BPH  BPH (benign prostatic hyperplasia) develops when changing hormone levels cause the prostate to grow larger. This often starts around age 50. Excess tissue can block the urethra, making it harder for urine to flow. The enlarged prostate can also press on the bladder, so you may need to urinate more often. Other symptoms include straining during urination, a weak urine stream, urinating more at night, incontinence, dribbling at the end of urination, and feeling that the bladder isnt emptying all the way. Note that BPH is not cancer and does not cause cancer.  How BPH affects the bladder  Pushing to urinate through a narrowed urethra can cause the bladder walls to thicken or stretch out of shape. A stretched bladder may have problems emptying all the way. If urine stays in the bladder longer than it should, you may develop an infection or bladder stones. Also, the kidneys cant drain properly into a bladder that doesnt empty completely. This can lead to kidney failure. Pressure from urine buildup can also cause leaking of urine (called overflow incontinence).  Other prostate problems  · Prostatitis is an infection or inflammation that causes the prostate to become painful and swollen. The swelling narrows the urethra and can block the bladder neck. Prostatitis can cause a burning sensation during urination. You may also feel pressure or pain in the genital area. In some cases, prostatitis can cause fever and chills, and can make you very sick.  · Cancer occurs when abnormal cells form a tumor (a lump of cells that grow uncontrolled). Some tumors can be felt during a physical exam, others cant. Prostate cancer often causes no symptoms at all,  especially in its early stages. Prostate symptoms are more likely to be caused by a problem that is NOT cancer.   Date Last Reviewed: 1/1/2017 © 2000-2017 [employer]. [address] [place] [address], [place] [address]. All rights reserved. This information is not intended as a substitute for professional medical care. Always follow your healthcare professional's instructions.        Urinary Incontinence (Male)    Urinary incontinence means not being able to control the release of urine from the bladder.  Causes  Common causes of urinary incontinence in men include:  · Infection  · Certain medicines  · Aging  · Poor pelvic muscle tone  · Bladder spasms  · Obesity  · Urinary retention  Nervous system diseases, diabetes, sleep apnea, urinary tract infections, prostate surgery, and pelvic trauma can also cause incontinence. Constipation and smoking have also been identified as risk factors.  Symptoms  · Urge incontinence (also called overactive bladder) is a sudden urge to urinate even though there may not be much urine in the bladder. The need to urinate often during the night is common. It is due to bladder spasms.  · Stress incontinence is involuntary urine leakage that can occur with sneezing, coughing, and other actions that put stress on the bladder.  Treatment  Treatment of urinary incontinence depends on the cause. Infections of the bladder are treated with antibiotics. Urinary retention is treated with a bladder catheter.  Home care  Follow these guidelines when caring for yourself at home:  · Avoid foods and drinks that may irritate the bladder. These include drinks containing alcohol, caffeinate, or carbonation; chocolate; and acidic fruits and juices).  · Limit fluid intake to 6 to 8 cups a day.  · Lose weight if you are overweight. This will reduce your symptoms.  · If needed, wear absorbent pads to catch urine. Change pads frequently to maintain hygiene and prevent skin and bladder  infections.  · Bathe daily to maintain good hygiene.  · If an antibiotic was prescribed to treat a bladder infection, be sure to take it until finished, even if you are feeling better before then. This is to make sure your infection has cleared.  · If a catheter was left in place, it is important to keep bacteria from getting into the collection bag. Do not disconnect the catheter from the collection bag.  · Use a leg band to secure the catheter drainage tube, so it does not pull on the catheter. Drain the collection bag when it becomes full using the drain spout at the bottom of the bag. Do not disconnect the bag from the catheter.  · Do not pull on or try to remove a catheter. The catheter must be removed by a healthcare provider.  Follow-up care  Follow up with your healthcare provider, or as advised.  When to seek medical advice  Call your healthcare provider right away if any of these occur:  · Fever over 100.4ºF (38°C), or as directed by your healthcare provider  · Bladder pain or fullness  · Abdominal swelling, nausea, or vomiting  · Back pain  · Weakness, dizziness, or fainting  · If a catheter was left in place, return if:  ¨ Catheter falls out  ¨ Catheter stops draining for 6 hours  Date Last Reviewed: 7/26/2015  © 0907-4030 [employer]. [address] [place] [address], [place] [address]. All rights reserved. This information is not intended as a substitute for professional medical care. Always follow your healthcare professional's instructions.

## 2019-11-18 ENCOUNTER — CLINICAL SUPPORT (OUTPATIENT)
Dept: REHABILITATION | Facility: HOSPITAL | Age: 69
End: 2019-11-18
Payer: MEDICARE

## 2019-11-18 DIAGNOSIS — R29.898 WEAKNESS OF BOTH UPPER EXTREMITIES: ICD-10-CM

## 2019-11-18 DIAGNOSIS — M53.82 DECREASED ROM OF INTERVERTEBRAL DISCS OF CERVICAL SPINE: ICD-10-CM

## 2019-11-18 PROCEDURE — 97110 THERAPEUTIC EXERCISES: CPT | Mod: HCNC,PO

## 2019-11-18 NOTE — PROGRESS NOTES
"                                                    Physical Therapy Daily Note     Name: Domonique Kellogg  Clinic Number: [id number]  Diagnosis:   Encounter Diagnoses   Name Primary?    Decreased ROM of intervertebral discs of cervical spine     Weakness of both upper extremities      Physician: Nela Pond PA-C  Precautions: standard    Time In: 1:45 PM  Time Out: 2:55 PM  1:1 treatment time: 30 minutes     Visit amount: 60.64  Total amount:~1816.4     Evaluation Date: 10/21/2019  Visit # authorized: 4/30 (17 total for the year)  Authorization period: 12/31/19  Plan of care Expiration: 1/21/2020 or 12 visits  MD referral: M47.812 (ICD-10-CM) - Cervical spondylosis      Subjective     Pt reports that he is feeling a little bit better.  He had some symptoms over the weekend while he was driving.  Pain Scale: Domonique rates pain at neck pain on a scale of 0-10 to be 0 currently.    Objective     Domonique received individual therapeutic exercises to develop strength, endurance, ROM, flexibility and posture for 70 minutes including:    UT stretch 3 x 30"  LS stretch 3 x 30"  Cervical extensions c/ towel 20x5"  Cervical rotations c/ towel 20x B  Scapular retractions 2 x 10  BUE ER c/ GTB 2 x 10  Ball up wall 20x5"  Doorway pec stretch 10x10"  Standing rows c/ BTB 2 x 10  Standing shoulder extensions c/ BTB 2 x 10  Wall stars c/ OTB 1 x 10  Ball circles x 20 CW/CCW B  ER c/ OTB 2 x 10    Written Home Exercises Provided: at eval,  Updated on 10/28/19  Pt demo good understanding of the education provided. Domonique demonstrated good return demonstration of activities.     Education provided re: importance of HEP and role of PT  Domonique verbalized good understanding of education provided.   No spiritual or educational barriers to learning provided    Assessment     Patient tolerated all interventions well without any complaints of pain or discomfort. PT provided pt c/ OTB to use c/ HEP.  Continue to progress based on " pt's tolerance.     This is a 69 y.o. male referred to outpatient physical therapy and presents with a medical diagnosis of cervical spondylosis and demonstrates limitations as described in the problem list. Pt prognosis is Good. Pt will continue to benefit from skilled outpatient physical therapy to address the deficits listed in the problem list, provide pt/family education and to maximize pt's level of independence in the home and community environment.     Short Term GOALS: 4 visits. Pt agrees with goals set.  1. Patient demonstrates independence with HEP.   2. Patient demonstrates independence with Postural Awareness.   3. Patient demonstrates independence with body mechanics.      Long Term GOALS: 12 visits. Pt agrees with goals set.  1. Patient demonstrates increased cervical ROM by 25% in all planes to improve tolerance to functional activities.   2. Patient demonstrates increased strength BUE's to 5/5 or greater to improve tolerance to functional activities.   3. Patient demonstrates improved overall function per Neck Disability Index to 10% or less.      Plan     Continue with established Plan of Care towards PT goals.    Therapist: Jake Webber, PT,DPT  11/18/2019

## 2019-11-26 ENCOUNTER — CLINICAL SUPPORT (OUTPATIENT)
Dept: REHABILITATION | Facility: HOSPITAL | Age: 69
End: 2019-11-26
Payer: MEDICARE

## 2019-11-26 DIAGNOSIS — R29.898 WEAKNESS OF BOTH UPPER EXTREMITIES: ICD-10-CM

## 2019-11-26 DIAGNOSIS — M53.82 DECREASED ROM OF INTERVERTEBRAL DISCS OF CERVICAL SPINE: ICD-10-CM

## 2019-11-26 PROCEDURE — 97110 THERAPEUTIC EXERCISES: CPT | Mod: HCNC,PO | Performed by: PHYSICAL THERAPIST

## 2019-11-26 NOTE — PROGRESS NOTES
"                                                    Physical Therapy Daily Note     Name: Domonique Kellogg  Clinic Number: [id number]  Diagnosis:   Encounter Diagnoses   Name Primary?    Decreased ROM of intervertebral discs of cervical spine     Weakness of both upper extremities      Physician: Nicolas Pacheco MD  Precautions: standard    Time In: 0858  Time Out: 1000  1:1 treatment time: 62 minutes    1:1 with PT x 30 min      Evaluation Date: 10/21/2019  Visit # authorized:  (17 total for the year)  Authorization period: 19  Plan of care Expiration: 2020 or 12 visits  MD referral: M47.812 (ICD-10-CM) - Cervical spondylosis      Subjective     Pt reports that he is seeing improvement.  He has been experiencing more stiffness today.  Patient reports a lot of time working at the computer.  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  .    Objective     AROM  Prior to treatment session  Left rotation WNL  Right rotation 30% limited with pain right upper trap region  Lateral bend 50% limited pain on right upper trap  Post treatment  Right rotation 20% limited   Left rotation full  Lateral bend 30% limited    Domonique received individual therapeutic exercises to develop strength, endurance, ROM, flexibility and posture for 60 minutes includin:1 with PT x 30 min  Verbal cuing throughout session to minimize upper trap recruitment, use of correct posture during exercise    UT stretch 3 x 30"  LS stretch 3 x 30"  Cervical extensions c/ towel 20x5"  Cervical rotations c/ towel 20x B  Scapular retractions 2 x 10  BUE ER c/ GTB 2 x 10  Ball up wall 20x5"  Doorway pec stretch 10x10"  Standing rows c/ BTB 2 x 10  Standing shoulder extensions c/ BTB 2 x 10  Wall stars c/ OTB 1 x 10  Ball circles x 20 CW/CCW B  ER c/ OTB 2 x 10    Written Home Exercises Provided: " at [facility],  Updated on 10/28/19  Pt demo good understanding of the education provided. Domonique demonstrated good return demonstration of activities.     Education provided re: importance of HEP and role of PT  Domonique verbalized good understanding of education provided.   No spiritual or educational barriers to learning provided    Assessment     Domonique tolerated treatment session well today, with decreased stiffness noted following treatment session. Patient encourage to incorporate exercises into routine of the day, with postural corrections throughout day.  Continue to progress based on pt's tolerance.     This is a 69 y.o. male referred to outpatient physical therapy and presents with a medical diagnosis of cervical spondylosis and demonstrates limitations as described in the problem list. Pt prognosis is Good. Pt will continue to benefit from skilled outpatient physical therapy to address the deficits listed in the problem list, provide pt/family education and to maximize pt's level of independence in the home and community environment.     Short Term GOALS: 4 visits. Pt agrees with goals set.  1. Patient demonstrates independence with HEP.   2. Patient demonstrates independence with Postural Awareness.   3. Patient demonstrates independence with body mechanics.      Long Term GOALS: 12 visits. Pt agrees with goals set.  1. Patient demonstrates increased cervical ROM by 25% in all planes to improve tolerance to functional activities.   2. Patient demonstrates increased strength BUE's to 5/5 or greater to improve tolerance to functional activities.   3. Patient demonstrates improved overall function per Neck Disability Index to 10% or less.      Plan     Continue with established Plan of Care towards PT goals.    Therapist: Sana Avelar, PT,DPT  11/26/2019

## 2019-12-02 NOTE — PROGRESS NOTES
"                                                    Physical Therapy Daily Note/ Discharge     Name: Domonique Kellogg  Clinic Number: [id number]  Diagnosis:   Encounter Diagnoses   Name Primary?    Decreased ROM of intervertebral discs of cervical spine     Weakness of both upper extremities      Physician: Nicolas Pacheco MD  Precautions: standard    Time In: 1:00  Time Out: 1:40  1:1 treatment time: 25 minutes      Evaluation Date: 10/21/2019  Visit # authorized: 6/30 (18 total for the year)  Authorization period: 12/31/19  Plan of care Expiration: 1/21/2020 or 12 visits  MD referral: M47.812 (ICD-10-CM) - Cervical spondylosis      Subjective     Pt reports that he has noticed improvements since beginning therapy.  He is aware of his posture and what induces pain.  He feels that it is hard to avoid certain postures due to working at a desk while at home.  He feels that he is independent c/ HEP and will continue at home.    Pain Scale: Domonique rates pain at neck pain on a scale of 0-10 to be 5 c/ certain movements    Objective     CERVICAL SPINE AROM:   Flexion: 100%   Extension: 100%   Left Sidebend: 75%   Right Sidebend: 75%   Left Rotation: 100%   Right Rotation: 100%       UPPER EXTREMITY STRENGTH:    Left Right   Shoulder Flexion 5/5 5/5   Shoulder Abduction 5/5 5/5      Shoulder Internal Rotation 5/5 5/5   Shoulder External Rotation 5/5 5/5   Elbow Flexion 5/5 5/5   Elbow Extension 5/5 5/5       Domonique received individual therapeutic exercises and assessment to develop strength, endurance, ROM, flexibility and posture for 40 minutes including:    Verbal cuing throughout session to minimize upper trap recruitment, use of correct posture during exercise    UT stretch 3 x 30"  LS stretch 3 x 30"  Cervical extensions c/ towel 20x5"  Cervical rotations c/ towel 20x B  Scapular retractions 2 x 10  BUE ER c/ GTB 2 x 10  Ball up wall 20x5"  Doorway pec stretch 10x10"  Standing rows c/ BTB 2 x 10  Standing " shoulder extensions c/ BTB 2 x 10  Wall stars c/ OTB 1 x 10  Ball circles x 20 CW/CCW B  ER c/ OTB 2 x 10    Written Home Exercises Provided: at eval,  Updated on 10/28/19  Pt demo good understanding of the education provided. Domonique demonstrated good return demonstration of activities.     Education provided re: importance of HEP and role of PT  Domonique verbalized good understanding of education provided.   No spiritual or educational barriers to learning provided    Assessment     Pt has met all goals established by PT and is fully independent c/ HEP.  Pt is now discharged from outpatient PT.  PT encouraged pt to contact PT c/ any questions or concerns.    This is a 69 y.o. male referred to outpatient physical therapy and presents with a medical diagnosis of cervical spondylosis and demonstrates limitations as described in the problem list. Pt prognosis is Good. Pt will continue to benefit from skilled outpatient physical therapy to address the deficits listed in the problem list, provide pt/family education and to maximize pt's level of independence in the home and community environment.     Short Term GOALS: 4 visits. Pt agrees with goals set.  1. Patient demonstrates independence with HEP. (Met 12/3/19)  2. Patient demonstrates independence with Postural Awareness. (Met 12/3/19)  3. Patient demonstrates independence with body mechanics. (Met 12/3/19)     Long Term GOALS: 12 visits. Pt agrees with goals set.  1. Patient demonstrates increased cervical ROM by 25% in all planes to improve tolerance to functional activities. (Met 12/3/19)  2. Patient demonstrates increased strength BUE's to 5/5 or greater to improve tolerance to functional activities. (Met 12/3/19)  3. Patient demonstrates improved overall function per Neck Disability Index to 10% or less. (Met 12/3/19)    NDI score 6% limitation on 12/3/19     Plan     Discharged from outpatient PT    Therapist: Jake Webber, PT,DPT  12/3/2019

## 2019-12-03 ENCOUNTER — CLINICAL SUPPORT (OUTPATIENT)
Dept: REHABILITATION | Facility: HOSPITAL | Age: 69
End: 2019-12-03
Payer: MEDICARE

## 2019-12-03 DIAGNOSIS — M53.82 DECREASED ROM OF INTERVERTEBRAL DISCS OF CERVICAL SPINE: ICD-10-CM

## 2019-12-03 DIAGNOSIS — R29.898 WEAKNESS OF BOTH UPPER EXTREMITIES: ICD-10-CM

## 2019-12-03 PROCEDURE — G8980 MOBILITY D/C STATUS: HCPCS | Mod: CI,HCNC,PO

## 2019-12-03 PROCEDURE — 97110 THERAPEUTIC EXERCISES: CPT | Mod: HCNC,PO

## 2019-12-03 PROCEDURE — G8978 MOBILITY CURRENT STATUS: HCPCS | Mod: CI,HCNC,PO

## 2019-12-03 PROCEDURE — G8979 MOBILITY GOAL STATUS: HCPCS | Mod: CI,HCNC,PO

## 2019-12-12 ENCOUNTER — PATIENT OUTREACH (OUTPATIENT)
Dept: OTHER | Facility: OTHER | Age: 69
End: 2019-12-12

## 2019-12-12 NOTE — PROGRESS NOTES
"Digital Medicine: Health  Follow-Up    Patient reports that he is still feeling apprehensive about the accuracy of his digital medicine cuff, as his recent office readings have been lower than his home readings, though he admits that he doesn't take it as often as he would like at home.    We reviewed standards for a good fit of the cuff, and he believes that he may have been making the cuff "too tight", so he plans to review that the next time he takes a reading. I also advised him to take the cuff to an office visit to compare readings, if it remains a concern.           INTERVENTION(S)  reviewed monitoring technique    PLAN  additional monitoring needed      There are no preventive care reminders to display for this patient.    Last 5 Patient Entered Readings                                      Current 30 Day Average: 138/78     Recent Readings 12/11/2019 11/27/2019 11/21/2019 11/21/2019 11/21/2019    SBP (mmHg) 144 142 128 152 132    DBP (mmHg) 76 77 79 84 69    Pulse 59 60 57 62 61                  Screenings    SDOH  "

## 2019-12-22 ENCOUNTER — PATIENT MESSAGE (OUTPATIENT)
Dept: INTERNAL MEDICINE | Facility: CLINIC | Age: 69
End: 2019-12-22

## 2019-12-22 DIAGNOSIS — S83.90XA SPRAIN OF KNEE, UNSPECIFIED LATERALITY, UNSPECIFIED LIGAMENT, INITIAL ENCOUNTER: Primary | ICD-10-CM

## 2019-12-23 NOTE — TELEPHONE ENCOUNTER
Please see referral orders and please call patient to schedule a consult with orthopedics. Thank you.

## 2019-12-24 ENCOUNTER — OFFICE VISIT (OUTPATIENT)
Dept: SPORTS MEDICINE | Facility: CLINIC | Age: 69
End: 2019-12-24
Attending: FAMILY MEDICINE
Payer: MEDICARE

## 2019-12-24 ENCOUNTER — HOSPITAL ENCOUNTER (OUTPATIENT)
Dept: RADIOLOGY | Facility: HOSPITAL | Age: 69
Discharge: HOME | End: 2019-12-24
Attending: PHYSICIAN ASSISTANT
Payer: MEDICARE

## 2019-12-24 VITALS
HEART RATE: 54 BPM | DIASTOLIC BLOOD PRESSURE: 71 MMHG | BODY MASS INDEX: 38.95 KG/M2 | WEIGHT: 257 LBS | SYSTOLIC BLOOD PRESSURE: 121 MMHG | HEIGHT: 68 IN

## 2019-12-24 DIAGNOSIS — S83.90XA SPRAIN OF KNEE, UNSPECIFIED LATERALITY, UNSPECIFIED LIGAMENT, INITIAL ENCOUNTER: ICD-10-CM

## 2019-12-24 DIAGNOSIS — M17.0 PRIMARY OSTEOARTHRITIS OF BOTH KNEES: ICD-10-CM

## 2019-12-24 DIAGNOSIS — M25.562 ACUTE PAIN OF LEFT KNEE: ICD-10-CM

## 2019-12-24 DIAGNOSIS — M22.42 CHONDROMALACIA OF PATELLA, LEFT: Primary | ICD-10-CM

## 2019-12-24 PROCEDURE — 3078F DIAST BP <80 MM HG: CPT | Mod: HCNC,CPTII,S$GLB, | Performed by: PHYSICIAN ASSISTANT

## 2019-12-24 PROCEDURE — 3074F SYST BP LT 130 MM HG: CPT | Mod: HCNC,CPTII,S$GLB, | Performed by: PHYSICIAN ASSISTANT

## 2019-12-24 PROCEDURE — 99999 PR PBB SHADOW E&M-EST. PATIENT-LVL III: CPT | Mod: PBBFAC,HCNC,, | Performed by: PHYSICIAN ASSISTANT

## 2019-12-24 PROCEDURE — 99204 OFFICE O/P NEW MOD 45 MIN: CPT | Mod: HCNC,S$GLB,, | Performed by: PHYSICIAN ASSISTANT

## 2019-12-24 PROCEDURE — 1159F MED LIST DOCD IN RCRD: CPT | Mod: HCNC,S$GLB,, | Performed by: PHYSICIAN ASSISTANT

## 2019-12-24 PROCEDURE — 1125F AMNT PAIN NOTED PAIN PRSNT: CPT | Mod: HCNC,S$GLB,, | Performed by: PHYSICIAN ASSISTANT

## 2019-12-24 PROCEDURE — 73564 XR KNEE ORTHO BILAT WITH FLEXION: ICD-10-PCS | Mod: 26,50,HCNC, | Performed by: RADIOLOGY

## 2019-12-24 PROCEDURE — 1159F PR MEDICATION LIST DOCUMENTED IN MEDICAL RECORD: ICD-10-PCS | Mod: HCNC,S$GLB,, | Performed by: PHYSICIAN ASSISTANT

## 2019-12-24 PROCEDURE — 3078F PR MOST RECENT DIASTOLIC BLOOD PRESSURE < 80 MM HG: ICD-10-PCS | Mod: HCNC,CPTII,S$GLB, | Performed by: PHYSICIAN ASSISTANT

## 2019-12-24 PROCEDURE — 73564 X-RAY EXAM KNEE 4 OR MORE: CPT | Mod: TC,50,HCNC

## 2019-12-24 PROCEDURE — 99999 PR PBB SHADOW E&M-EST. PATIENT-LVL III: ICD-10-PCS | Mod: PBBFAC,HCNC,, | Performed by: PHYSICIAN ASSISTANT

## 2019-12-24 PROCEDURE — 1125F PR PAIN SEVERITY QUANTIFIED, PAIN PRESENT: ICD-10-PCS | Mod: HCNC,S$GLB,, | Performed by: PHYSICIAN ASSISTANT

## 2019-12-24 PROCEDURE — 3074F PR MOST RECENT SYSTOLIC BLOOD PRESSURE < 130 MM HG: ICD-10-PCS | Mod: HCNC,CPTII,S$GLB, | Performed by: PHYSICIAN ASSISTANT

## 2019-12-24 PROCEDURE — 73564 X-RAY EXAM KNEE 4 OR MORE: CPT | Mod: 26,50,HCNC, | Performed by: RADIOLOGY

## 2019-12-24 PROCEDURE — 1101F PT FALLS ASSESS-DOCD LE1/YR: CPT | Mod: HCNC,CPTII,S$GLB, | Performed by: PHYSICIAN ASSISTANT

## 2019-12-24 PROCEDURE — 1101F PR PT FALLS ASSESS DOC 0-1 FALLS W/OUT INJ PAST YR: ICD-10-PCS | Mod: HCNC,CPTII,S$GLB, | Performed by: PHYSICIAN ASSISTANT

## 2019-12-24 PROCEDURE — 99204 PR OFFICE/OUTPT VISIT, NEW, LEVL IV, 45-59 MIN: ICD-10-PCS | Mod: HCNC,S$GLB,, | Performed by: PHYSICIAN ASSISTANT

## 2019-12-24 NOTE — PROGRESS NOTES
CC: LEFT knee pain    69 y.o. Male who presents as a new patient to me. Complaint is left knee pain x 6 days with an atraumatic onset.  Patient states on last Wednesday he rolled over in bed and felt a popping sensation in his left knee along with immediate pain. He states he continued to ambulate over the next 2 days with moderate pain in the left knee. He reports on Saturday was when his knee was at its worst pain after walking on it all day is well. He decided on Saturday to ice his knee 20 min on 2 hr off which provided significant relief of knee pain. On Sunday he reports significant improvement on his knee pain and swelling. His knee has continued to improve to this date, however he wanted to come get assess for a possible sprain of the knee.  Pain localizes anterior/patellar region of knee and medial joint line. Reports mild swelling. No prominent mechanical symptoms. Denies instability.  Worse with prolonged standing or ambulation. Better with rest, ice, and ibuprofen.  Here today to discuss diagnosis and treatment options.      REVIEW OF SYSTEMS:   Constitution: Negative. Negative for chills, fever and night sweats.    Hematologic/Lymphatic: Negative for bleeding problem. Does not bruise/bleed easily.   Skin: Negative for dry skin, itching and rash.   Musculoskeletal: Negative for falls. Positive for left knee pain and muscle weakness.     All other review of symptoms were reviewed and found to be noncontributory.     PAST MEDICAL HISTORY:   Past Medical History:   Diagnosis Date    Anticoagulant long-term use     Arthritis     CAD (coronary artery disease) 3/2/2015    non-obstructive per [facility]     CAD (coronary artery disease) 3/26/2015    Cataract     Dry eye syndrome     Hypertension     Obesity     Seizures     Sleep apnea     + CPAP       PAST SURGICAL HISTORY:   Past Surgical History:   Procedure Laterality Date    EYE SURGERY      INTRAOCULAR PROSTHESES INSERTION Right 7/16/2018     Procedure: INSERTION-INTRAOCULAR LENS (IOL);  Surgeon: Priscilla Hays MD;  Location: [facility];  Service: Ophthalmology;  Laterality: Right;    INTRAOCULAR PROSTHESES INSERTION Left 8/13/2018    Procedure: INSERTION, INTRAOCULAR LENS PROSTHESIS;  Surgeon: Priscilla Hays MD;  Location: [facility];  Service: Ophthalmology;  Laterality: Left;    KNEE SURGERY      PHACOEMULSIFICATION OF CATARACT Right 7/16/2018    Procedure: PHACOEMULSIFICATION-ASPIRATION-CATARACT;  Surgeon: Priscilla Hays MD;  Location: [facility];  Service: Ophthalmology;  Laterality: Right;    PHACOEMULSIFICATION OF CATARACT Left 8/13/2018    Procedure: PHACOEMULSIFICATION, CATARACT;  Surgeon: Priscilla Hays MD;  Location: [facility];  Service: Ophthalmology;  Laterality: Left;    WISDOM TOOTH EXTRACTION         FAMILY HISTORY:   Family History   Problem Relation Age of Onset    Cancer Mother         pancreatic    Diabetes Mother     Hypertension Mother     Heart disease Father     No Known Problems Sister     Cancer Maternal Grandmother     Heart disease Paternal Grandfather     Heart disease Brother     No Known Problems Daughter     No Known Problems Son     No Known Problems Sister     No Known Problems Sister     No Known Problems Sister     No Known Problems Daughter     Blindness Neg Hx     Macular degeneration Neg Hx     Retinal detachment Neg Hx     Glaucoma Neg Hx        SOCIAL HISTORY:   Social History     Socioeconomic History    Marital status:      Spouse name: Estrellita    Number of children: 2    Years of education: Not on file    Highest education level: Not on file   Occupational History     Employer: [employer]   Social Needs    Financial resource strain: Not hard at all    Food insecurity:     Worry: Never true     Inability: Never true    Transportation needs:     Medical: No     Non-medical: No   Tobacco Use    Smoking status: Former Smoker     Last attempt to quit: 1/1/1987     Years since  quittin.0    Smokeless tobacco: Never Used    Tobacco comment: quit    Substance and Sexual Activity    Alcohol use: Yes     Alcohol/week: 1.0 - 2.0 standard drinks     Types: 1 - 2 Glasses of wine per week     Frequency: 4 or more times a week     Drinks per session: 1 or 2     Binge frequency: Never     Comment: 1-2 glasses wine several times weekly    Drug use: No    Sexual activity: Yes     Partners: Female   Lifestyle    Physical activity:     Days per week: 6 days     Minutes per session: 50 min    Stress: Not at all   Relationships    Social connections:     Talks on phone: Twice a week     Gets together: Once a week     Attends Hinduism service: Not on file     Active member of club or organization: Yes     Attends meetings of clubs or organizations: More than 4 times per year     Relationship status:    Other Topics Concern    Not on file   Social History Narrative    Not on file       MEDICATIONS:     Current Outpatient Medications:     aspirin (ECOTRIN) 81 MG EC tablet, Take 81 mg by mouth once daily., Disp: , Rfl:     hydroCHLOROthiazide (HYDRODIURIL) 25 MG tablet, Take 1 tablet (25 mg total) by mouth once daily., Disp: 90 tablet, Rfl: 0    ibuprofen (ADVIL,MOTRIN) 200 MG tablet, Take 200 mg by mouth every 6 (six) hours as needed for Pain., Disp: , Rfl:     magnesium oxide (MAG-OX) 400 mg (241.3 mg magnesium) tablet, , Disp: , Rfl:     multivitamin with minerals tablet, Take 1 tablet by mouth once daily. , Disp: , Rfl:     potassium chloride SA (K-DUR,KLOR-CON) 20 MEQ tablet, Take 2 tablets (40 mEq total) by mouth once daily., Disp: 180 tablet, Rfl: 3    pravastatin (PRAVACHOL) 40 MG tablet, TAKE 1 TABLET EVERY DAY, Disp: 90 tablet, Rfl: 1    ALLERGIES:   Review of patient's allergies indicates:   Allergen Reactions    Indomethacin      Headache dizziness    Adhesive Rash        PHYSICAL EXAMINATION:  There were no vitals taken for this visit.  General: Well-developed  well-nourished 69 y.o. malein no acute distress   Cardiovascular: Regular rhythm by palpation of distal pulse, normal color and temperature, no concerning varicosities on symptomatic side   Lungs: No labored breathing or wheezing appreciated   Neuro: Alert and oriented ×3   Psychiatric: well oriented to person, place and time, demonstrates normal mood and affect   Skin: No rashes, lesions or ulcers, normal temperature, turgor, and texture on involved extremity    Ortho/SPM Exam  Intact extensor mechanism. Minimal suprapatellar swelling. Lateral patellar tracking. No patellar apprehension. Normal patellar mobility. Full extension. Flexion to 95.  mild pain with forced flexion. Prominent tenderness along the medial joint line. Negative Wilton's. Negative Lachman. Stable to varus/valgus stress testing at 0 and 30 deg. Negative posterior drawer. Ligamentously stable. Positive patellar grind.    IMAGING:  X-rays including standing, weight bearing AP and flexion bilateral knees, LEFT knee lateral and sunrise views ordered and images reviewed by me show:    Moderate narrowing of medial tibiofemoral joint space of left knee along with narrowing over patellofemoral joint space.  Right knee demonstrates severe medial tibiofemoral joint space narrowing and moderate narrowing of patellofemoral joint space as well. No evidence of fracture or dislocation.    ASSESSMENT:      ICD-10-CM ICD-9-CM   1. Chondromalacia of patella, left M22.42 717.7   2. Primary osteoarthritis of both knees M17.0 715.16   3. Acute pain of left knee M25.562 719.46       PLAN:       -Findings and treatment options were discussed with the patient  -patient is currently taking 1 over-the-counter ibuprofen per day.  I advised that he can take 2-3 ibuprofen per day with food.  -recommended rest ice and compression of knee. We discussed knee compression sleeve in clinic today.  -RTC 2 weeks, if symptoms persist we will proceed with cortisone injection of  left knee.  -All questions answered      Procedures

## 2019-12-24 NOTE — LETTER
December 24, 2019      Nicolas Pacheco MD  [address]  [facility] [address]           [facility]  [address]  [facility] [address]  Phone: [phone]          Patient: Domonique Kellogg   MR Number: [id number]   YOB: 1950   Date of Visit: 12/24/2019       Dear Dr. Nicolas Pacheco:    Thank you for referring Domonique Kellogg to me for evaluation. Attached you will find relevant portions of my assessment and plan of care.    If you have questions, please do not hesitate to call me. I look forward to following Domonique Kellogg along with you.    Sincerely,    Jakob Santiago PA-C    Enclosure  CC:  No Recipients    If you would like to receive this communication electronically, please contact [email or web address] or [phone] to request more information on [employer] access.    For providers and/or their staff who would like to refer a patient to [facility], please contact us through our one-stop-shop provider referral line, [facility], at [phone].    If you feel you have received this communication in error or would no longer like to receive these types of communications, please e-mail [email or web address]

## 2020-01-09 ENCOUNTER — TELEPHONE (OUTPATIENT)
Dept: SLEEP MEDICINE | Facility: CLINIC | Age: 70
End: 2020-01-09

## 2020-01-10 ENCOUNTER — OFFICE VISIT (OUTPATIENT)
Dept: SLEEP MEDICINE | Facility: CLINIC | Age: 70
End: 2020-01-10
Payer: MEDICARE

## 2020-01-10 VITALS
WEIGHT: 253.06 LBS | BODY MASS INDEX: 38.35 KG/M2 | DIASTOLIC BLOOD PRESSURE: 77 MMHG | SYSTOLIC BLOOD PRESSURE: 120 MMHG | HEART RATE: 48 BPM | HEIGHT: 68 IN

## 2020-01-10 DIAGNOSIS — G47.33 OSA (OBSTRUCTIVE SLEEP APNEA): Primary | ICD-10-CM

## 2020-01-10 PROCEDURE — 99213 OFFICE O/P EST LOW 20 MIN: CPT | Mod: HCNC,S$GLB,, | Performed by: INTERNAL MEDICINE

## 2020-01-10 PROCEDURE — 1101F PT FALLS ASSESS-DOCD LE1/YR: CPT | Mod: HCNC,CPTII,S$GLB, | Performed by: INTERNAL MEDICINE

## 2020-01-10 PROCEDURE — 1126F PR PAIN SEVERITY QUANTIFIED, NO PAIN PRESENT: ICD-10-PCS | Mod: HCNC,S$GLB,, | Performed by: INTERNAL MEDICINE

## 2020-01-10 PROCEDURE — 99999 PR PBB SHADOW E&M-EST. PATIENT-LVL III: ICD-10-PCS | Mod: PBBFAC,HCNC,, | Performed by: INTERNAL MEDICINE

## 2020-01-10 PROCEDURE — 1159F MED LIST DOCD IN RCRD: CPT | Mod: HCNC,S$GLB,, | Performed by: INTERNAL MEDICINE

## 2020-01-10 PROCEDURE — 99213 PR OFFICE/OUTPT VISIT, EST, LEVL III, 20-29 MIN: ICD-10-PCS | Mod: HCNC,S$GLB,, | Performed by: INTERNAL MEDICINE

## 2020-01-10 PROCEDURE — 3078F DIAST BP <80 MM HG: CPT | Mod: HCNC,CPTII,S$GLB, | Performed by: INTERNAL MEDICINE

## 2020-01-10 PROCEDURE — 3074F PR MOST RECENT SYSTOLIC BLOOD PRESSURE < 130 MM HG: ICD-10-PCS | Mod: HCNC,CPTII,S$GLB, | Performed by: INTERNAL MEDICINE

## 2020-01-10 PROCEDURE — 3078F PR MOST RECENT DIASTOLIC BLOOD PRESSURE < 80 MM HG: ICD-10-PCS | Mod: HCNC,CPTII,S$GLB, | Performed by: INTERNAL MEDICINE

## 2020-01-10 PROCEDURE — 1101F PR PT FALLS ASSESS DOC 0-1 FALLS W/OUT INJ PAST YR: ICD-10-PCS | Mod: HCNC,CPTII,S$GLB, | Performed by: INTERNAL MEDICINE

## 2020-01-10 PROCEDURE — 1159F PR MEDICATION LIST DOCUMENTED IN MEDICAL RECORD: ICD-10-PCS | Mod: HCNC,S$GLB,, | Performed by: INTERNAL MEDICINE

## 2020-01-10 PROCEDURE — 3074F SYST BP LT 130 MM HG: CPT | Mod: HCNC,CPTII,S$GLB, | Performed by: INTERNAL MEDICINE

## 2020-01-10 PROCEDURE — 1126F AMNT PAIN NOTED NONE PRSNT: CPT | Mod: HCNC,S$GLB,, | Performed by: INTERNAL MEDICINE

## 2020-01-10 PROCEDURE — 99999 PR PBB SHADOW E&M-EST. PATIENT-LVL III: CPT | Mod: PBBFAC,HCNC,, | Performed by: INTERNAL MEDICINE

## 2020-01-10 NOTE — PROGRESS NOTES
Subjective:       Patient ID: Domonique Kellogg is a 69 y.o. male.    Chief Complaint: No chief complaint on file.    HPI   Domonique Kellogg returns for PAP follow up for compliance documentation    Domonique Kellogg has a history of moderate Obstructive Sleep Apnea diagnosed in 2/2015 (AHI 16).   The last PAP titration was NA with AHI NA at NA cm H20.   Device is 2 year(s) old.   Current setting 13 cm H20.   DME is [facility].      Domonique Kellogg is using CPAP 100% of nights and averages 6 hours and 44 minutes.   Device use greater than 4 hours is 100%.   Patient does not have problems with the pressure setting.   Mask interface issues are not experienced.   A full face mask interface is used.   The patient does not experience side effects from therapy including mouth dryness.   The patient experiences the following benefits of therapy:  more rested, reduced morbidity, reduced accident risk, reduced mortality and reduced cardiovascular benefit   There are not equipment issues.  Estimated AHI 4.1    Scheduled Meds:  Continuous Infusions:  PRN Meds:.    Patient Active Problem List   Diagnosis    CTS (carpal tunnel syndrome)    Class 2 severe obesity due to excess calories with serious comorbidity and body mass index (BMI) of 39.0 to 39.9 in adult    H/O syncope    RAHEEL (obstructive sleep apnea)    CAD in native artery    Hypertension, essential    Hyperlipidemia    Chronic pulmonary heart disease    Bilateral carotid artery disease    Spondylosis of lumbar region without myelopathy or radiculopathy    Primary osteoarthritis of right knee    Lateral femoral cutaneous entrapment syndrome    Cervical spondylosis    Decreased ROM of intervertebral discs of cervical spine    Weakness of both upper extremities          RAHEEL (obstructive sleep apnea)                  Review of Systems    Objective:      Physical Exam    Assessment:       1. RAHEEL (obstructive sleep apnea)        Plan:            Importance of PAP  compliance discussed.   Care and use of equipment discussed.   Download and relevant sleep studies reviewed with patient   Changed to auto mode 9-15  Discussed therapeutic goals for positive airway pressure therapy(CPAP or BiPAP).  Ideal is usage 100% of nights for at least 6 hours per night.  Minimum usage is 70% of night for at least 4 hours per night used    The patient was given open opportunity to ask questions and/or express concerns about treatment plan.   All questions/concerns were discussed.      16-minute visit. >50% spent counseling patient and coordination of care.

## 2020-01-13 ENCOUNTER — PATIENT OUTREACH (OUTPATIENT)
Dept: OTHER | Facility: OTHER | Age: 70
End: 2020-01-13

## 2020-01-13 ENCOUNTER — PES CALL (OUTPATIENT)
Dept: ADMINISTRATIVE | Facility: CLINIC | Age: 70
End: 2020-01-13

## 2020-01-13 NOTE — PROGRESS NOTES
Digital Medicine: Health  Follow-Up    Patient reports that he feels more confident about the reliability of the digital medicine cuff and that he hopes it continues. He explained that he is now using the larger cuff (instead of the smaller one, which is his wife's), and notices that his home readings with the larger cuff are more similar to his office visit readings, which he notes are also taken with a digital cuff.           INTERVENTION(S)  encouragement/support, denied support and denied questions      There are no preventive care reminders to display for this patient.    Last 5 Patient Entered Readings                                      Current 30 Day Average: 132/77     Recent Readings 1/7/2020 1/2/2020 12/27/2019 12/17/2019 12/11/2019    SBP (mmHg) 123 126 133 147 144    DBP (mmHg) 73 75 77 82 76    Pulse 58 47 56 59 59                  Screenings    SDOH

## 2020-01-15 NOTE — PROGRESS NOTES
Refill Authorization Note     is requesting a refill authorization.    Brief assessment and rationale for refill: APPROVE: prr                                         Comments:   Requested Prescriptions   Pending Prescriptions Disp Refills    hydroCHLOROthiazide (HYDRODIURIL) 25 MG tablet [Pharmacy Med Name: HYDROCHLOROTHIAZIDE 25 MG Tablet] 90 tablet 1     Sig: TAKE 1 TABLET EVERY DAY       Diuretics Protocol Passed - 1/14/2020  7:02 PM        Passed - Visit with authorizing provider in past 12 months or upcoming 90 days         Passed - Blood Pressure below 139/89 on file in past 12 months      BP Readings from Last 3 Encounters:   01/10/20 120/77   12/24/19 121/71   11/15/19 115/72             Passed - Serum Potassium between 3.5 to 5.2 on file in the past 6 months     Lab Results   Component Value Date    K 3.8 11/01/2019    K 3.4 (L) 10/03/2019    K 4.0 10/04/2018                  Passed - Serum Creatinine less than 1.4 on file in the past 6 months     Lab Results   Component Value Date    CREATININE 1.2 11/01/2019    CREATININE 1.0 10/03/2019    CREATININE 1.0 10/04/2018     Lab Results   Component Value Date    ESTGFRAFRICA >60 11/01/2019    ESTGFRAFRICA >60 10/03/2019    ESTGFRAFRICA >60 10/04/2018               Passed - Serum Sodium between 130 to 148 on file in the past 6 months      Lab Results   Component Value Date     11/01/2019                  Appointments  past 12m or future 3m with PCP    Date Provider   Last Visit   10/10/2019 Nicolas Pacheco MD   Next Visit   Visit date not found Nicolas Pacheco MD

## 2020-01-16 RX ORDER — HYDROCHLOROTHIAZIDE 25 MG/1
TABLET ORAL
Qty: 90 TABLET | Refills: 1 | Status: SHIPPED | OUTPATIENT
Start: 2020-01-16 | End: 2020-06-01

## 2020-01-24 ENCOUNTER — PATIENT MESSAGE (OUTPATIENT)
Dept: SLEEP MEDICINE | Facility: CLINIC | Age: 70
End: 2020-01-24

## 2020-02-07 ENCOUNTER — PATIENT MESSAGE (OUTPATIENT)
Dept: INTERNAL MEDICINE | Facility: CLINIC | Age: 70
End: 2020-02-07

## 2020-02-07 NOTE — TELEPHONE ENCOUNTER
See MO message below. Called pt to rule out any chest pains or SOB. Pt denied both.   Pt says symptoms have been on going for 6 weeks. it feels like he slammed his thumb in the car door. last night was the first time he felt the numbing in the back on his hand.  He says he saw you for tingling in his neck and shoulder a while back. He had therapy appts that he went to but he says symptoms come back after driving for a while.     Offered appt with Dagmar Bradford for next week. Pt denied. He would like to see PCP only (did not have any availability for me)

## 2020-02-11 ENCOUNTER — PATIENT MESSAGE (OUTPATIENT)
Dept: ADMINISTRATIVE | Facility: OTHER | Age: 70
End: 2020-02-11

## 2020-02-17 ENCOUNTER — OFFICE VISIT (OUTPATIENT)
Dept: INTERNAL MEDICINE | Facility: CLINIC | Age: 70
End: 2020-02-17
Attending: FAMILY MEDICINE
Payer: MEDICARE

## 2020-02-17 VITALS
OXYGEN SATURATION: 98 % | SYSTOLIC BLOOD PRESSURE: 120 MMHG | HEIGHT: 68 IN | DIASTOLIC BLOOD PRESSURE: 60 MMHG | WEIGHT: 207.69 LBS | HEART RATE: 61 BPM | TEMPERATURE: 98 F | BODY MASS INDEX: 31.48 KG/M2

## 2020-02-17 DIAGNOSIS — R20.2 NUMBNESS AND TINGLING IN BOTH HANDS: Primary | ICD-10-CM

## 2020-02-17 DIAGNOSIS — M54.2 NECK PAIN: ICD-10-CM

## 2020-02-17 DIAGNOSIS — R20.0 NUMBNESS AND TINGLING IN BOTH HANDS: Primary | ICD-10-CM

## 2020-02-17 DIAGNOSIS — L98.9 SKIN LESION: ICD-10-CM

## 2020-02-17 DIAGNOSIS — M50.90 CERVICAL DISC DISORDER: ICD-10-CM

## 2020-02-17 PROCEDURE — 1126F PR PAIN SEVERITY QUANTIFIED, NO PAIN PRESENT: ICD-10-PCS | Mod: HCNC,S$GLB,, | Performed by: FAMILY MEDICINE

## 2020-02-17 PROCEDURE — 3074F SYST BP LT 130 MM HG: CPT | Mod: HCNC,CPTII,S$GLB, | Performed by: FAMILY MEDICINE

## 2020-02-17 PROCEDURE — 1101F PT FALLS ASSESS-DOCD LE1/YR: CPT | Mod: HCNC,CPTII,S$GLB, | Performed by: FAMILY MEDICINE

## 2020-02-17 PROCEDURE — 99214 OFFICE O/P EST MOD 30 MIN: CPT | Mod: HCNC,S$GLB,, | Performed by: FAMILY MEDICINE

## 2020-02-17 PROCEDURE — 1101F PR PT FALLS ASSESS DOC 0-1 FALLS W/OUT INJ PAST YR: ICD-10-PCS | Mod: HCNC,CPTII,S$GLB, | Performed by: FAMILY MEDICINE

## 2020-02-17 PROCEDURE — 3074F PR MOST RECENT SYSTOLIC BLOOD PRESSURE < 130 MM HG: ICD-10-PCS | Mod: HCNC,CPTII,S$GLB, | Performed by: FAMILY MEDICINE

## 2020-02-17 PROCEDURE — 1126F AMNT PAIN NOTED NONE PRSNT: CPT | Mod: HCNC,S$GLB,, | Performed by: FAMILY MEDICINE

## 2020-02-17 PROCEDURE — 99999 PR PBB SHADOW E&M-EST. PATIENT-LVL IV: CPT | Mod: PBBFAC,HCNC,, | Performed by: FAMILY MEDICINE

## 2020-02-17 PROCEDURE — 3078F PR MOST RECENT DIASTOLIC BLOOD PRESSURE < 80 MM HG: ICD-10-PCS | Mod: HCNC,CPTII,S$GLB, | Performed by: FAMILY MEDICINE

## 2020-02-17 PROCEDURE — 3078F DIAST BP <80 MM HG: CPT | Mod: HCNC,CPTII,S$GLB, | Performed by: FAMILY MEDICINE

## 2020-02-17 PROCEDURE — 99999 PR PBB SHADOW E&M-EST. PATIENT-LVL IV: ICD-10-PCS | Mod: PBBFAC,HCNC,, | Performed by: FAMILY MEDICINE

## 2020-02-17 PROCEDURE — 1159F PR MEDICATION LIST DOCUMENTED IN MEDICAL RECORD: ICD-10-PCS | Mod: HCNC,S$GLB,, | Performed by: FAMILY MEDICINE

## 2020-02-17 PROCEDURE — 99214 PR OFFICE/OUTPT VISIT, EST, LEVL IV, 30-39 MIN: ICD-10-PCS | Mod: HCNC,S$GLB,, | Performed by: FAMILY MEDICINE

## 2020-02-17 PROCEDURE — 1159F MED LIST DOCD IN RCRD: CPT | Mod: HCNC,S$GLB,, | Performed by: FAMILY MEDICINE

## 2020-02-17 NOTE — PROGRESS NOTES
Subjective:       Patient ID: Domonique Kellogg is a 69 y.o. male.    Chief Complaint: Establish Care (both hand/ thumbs feels numb, right shoudler tingling)    Neck Pain    This is a chronic problem. The current episode started more than 1 month ago. The problem occurs intermittently. The problem has been waxing and waning. The pain is present in the occipital region. The quality of the pain is described as aching. The pain is mild. The symptoms are aggravated by position (driving with arms up). Associated symptoms include numbness and tingling. Pertinent negatives include no chest pain, fever, headaches, trouble swallowing or weakness.    Six-month history of thumb tip tingling and numbness.  Also in the posterior neck area.  Notes that when he takes a shower sometimes he has stinging or dysesthesia in the neck.  Seems to be worse when he looks up.  No motor weakness described.  No incontinence.  Approximately 6 months duration.  Thumb tip tingling is aggravated by hot and cold.  No skin color changes and patient does not feel that he has any circulatory changes, noting that his capillary refill has been normal.    Review of Systems   Constitutional: Negative for activity change, chills, fatigue, fever and unexpected weight change.   HENT: Negative for congestion, hearing loss, rhinorrhea and trouble swallowing.    Eyes: Negative for discharge, redness and visual disturbance.   Respiratory: Negative for cough, chest tightness, shortness of breath and wheezing.    Cardiovascular: Negative for chest pain, palpitations and leg swelling.   Gastrointestinal: Negative for abdominal pain, blood in stool, constipation, diarrhea and vomiting.   Endocrine: Negative for polydipsia and polyuria.   Genitourinary: Negative for difficulty urinating, hematuria and urgency.   Musculoskeletal: Positive for arthralgias, neck pain and neck stiffness. Negative for back pain, gait problem, joint swelling and myalgias.   Skin: Negative  for color change and rash.   Neurological: Positive for tingling and numbness. Negative for tremors, speech difficulty, weakness and headaches.   Hematological: Negative for adenopathy. Does not bruise/bleed easily.   Psychiatric/Behavioral: Negative for behavioral problems, confusion, dysphoric mood and sleep disturbance. The patient is not nervous/anxious.        Objective:      Physical Exam   Constitutional: He is oriented to person, place, and time. He appears well-developed and well-nourished. No distress.   Neck: Trachea normal, normal range of motion and full passive range of motion without pain. Neck supple. No thyroid mass present.   Pulmonary/Chest: Effort normal.   Musculoskeletal: He exhibits no edema.        Right shoulder: He exhibits normal range of motion and no tenderness.        Left shoulder: He exhibits normal range of motion and no tenderness.        Cervical back: He exhibits normal range of motion and no tenderness.        Right hand: Normal sensation noted. Decreased sensation is not present in the ulnar distribution, is not present in the medial distribution and is not present in the radial distribution. Normal strength noted. He exhibits no finger abduction, no thumb/finger opposition and no wrist extension trouble.        Left hand: Normal sensation noted. Decreased sensation is not present in the ulnar distribution, is not present in the medial redistribution and is not present in the radial distribution. Normal strength noted. He exhibits no finger abduction, no thumb/finger opposition and no wrist extension trouble.   Lymphadenopathy:     He has no cervical adenopathy.   Neurological: He is alert and oriented to person, place, and time. He has normal strength. No cranial nerve deficit or sensory deficit. Coordination and gait normal.   Reflex Scores:       Tricep reflexes are 1+ on the right side and 1+ on the left side.       Bicep reflexes are 1+ on the right side and 1+ on the left  side.       Brachioradialis reflexes are 1+ on the right side and 1+ on the left side.  Neg Phalen's   Skin: Skin is warm and dry. No rash noted.        Psychiatric: He has a normal mood and affect. His behavior is normal. Judgment and thought content normal.   Nursing note and vitals reviewed.      Assessment:       1. Numbness and tingling in both hands    2. Cervical disc disorder    3. Neck pain    4. Skin lesion        Plan:     Medication List with Changes/Refills   Current Medications    ASPIRIN (ECOTRIN) 81 MG EC TABLET    Take 81 mg by mouth once daily.    HYDROCHLOROTHIAZIDE (HYDRODIURIL) 25 MG TABLET    TAKE 1 TABLET EVERY DAY    IBUPROFEN (ADVIL,MOTRIN) 200 MG TABLET    Take 200 mg by mouth every 6 (six) hours as needed for Pain.    MAGNESIUM OXIDE (MAG-OX) 400 MG (241.3 MG MAGNESIUM) TABLET        MULTIVITAMIN WITH MINERALS TABLET    Take 1 tablet by mouth once daily.     POTASSIUM CHLORIDE SA (K-DUR,KLOR-CON) 20 MEQ TABLET    Take 2 tablets (40 mEq total) by mouth once daily.    PRAVASTATIN (PRAVACHOL) 40 MG TABLET    TAKE 1 TABLET EVERY DAY     Domonique was seen today for establish care.    Diagnoses and all orders for this visit:    Numbness and tingling in both hands  -     EMG W/ ULTRASOUND AND NERVE CONDUCTION TEST 2 Extremities; Future    Cervical disc disorder  -     MRI Cervical Spine Without Contrast; Future  -     EMG W/ ULTRASOUND AND NERVE CONDUCTION TEST 2 Extremities; Future    Neck pain  -     MRI Cervical Spine Without Contrast; Future    Skin lesion  -     Ambulatory referral/consult to Dermatology; Future      See meds, orders, follow up, routing and instructions sections of encounter and AVS. Discussed with patient and provided on AVS.

## 2020-02-18 ENCOUNTER — TELEPHONE (OUTPATIENT)
Dept: INTERNAL MEDICINE | Facility: CLINIC | Age: 70
End: 2020-02-18

## 2020-02-18 ENCOUNTER — HOSPITAL ENCOUNTER (OUTPATIENT)
Dept: RADIOLOGY | Facility: OTHER | Age: 70
Discharge: HOME | End: 2020-02-18
Attending: FAMILY MEDICINE
Payer: MEDICARE

## 2020-02-18 ENCOUNTER — PROCEDURE VISIT (OUTPATIENT)
Dept: NEUROLOGY | Facility: CLINIC | Age: 70
End: 2020-02-18
Attending: FAMILY MEDICINE
Payer: MEDICARE

## 2020-02-18 DIAGNOSIS — M50.90 CERVICAL DISC DISORDER: ICD-10-CM

## 2020-02-18 DIAGNOSIS — R20.0 NUMBNESS AND TINGLING IN BOTH HANDS: ICD-10-CM

## 2020-02-18 DIAGNOSIS — R20.2 NUMBNESS AND TINGLING IN BOTH HANDS: ICD-10-CM

## 2020-02-18 DIAGNOSIS — M54.2 NECK PAIN: ICD-10-CM

## 2020-02-18 PROCEDURE — 72141 MRI NECK SPINE W/O DYE: CPT | Mod: TC,HCNC

## 2020-02-18 PROCEDURE — 72141 MRI NECK SPINE W/O DYE: CPT | Mod: 26,HCNC,, | Performed by: RADIOLOGY

## 2020-02-18 PROCEDURE — 72141 MRI CERVICAL SPINE WITHOUT CONTRAST: ICD-10-PCS | Mod: 26,HCNC,, | Performed by: RADIOLOGY

## 2020-02-18 PROCEDURE — 95913 PR NERVE CONDUCTION STUDY; 13 OR MORE STUDIES: ICD-10-PCS | Mod: HCNC,S$GLB,, | Performed by: PSYCHIATRY & NEUROLOGY

## 2020-02-18 PROCEDURE — 95886 MUSC TEST DONE W/N TEST COMP: CPT | Mod: HCNC,S$GLB,, | Performed by: PSYCHIATRY & NEUROLOGY

## 2020-02-18 PROCEDURE — 95886 PR EMG COMPLETE, W/ NERVE CONDUCTION STUDIES, 5+ MUSCLES: ICD-10-PCS | Mod: HCNC,S$GLB,, | Performed by: PSYCHIATRY & NEUROLOGY

## 2020-02-18 PROCEDURE — 95913 NRV CNDJ TEST 13/> STUDIES: CPT | Mod: HCNC,S$GLB,, | Performed by: PSYCHIATRY & NEUROLOGY

## 2020-02-18 NOTE — TELEPHONE ENCOUNTER
Dr Gurrola, called from Radiology patient with abnormal MRI of Cervical Spine.  Edema Signal Abnormal proximal cervicac cord.  Dr. Pacheco notified, states he will call the patient.

## 2020-02-18 NOTE — TELEPHONE ENCOUNTER
Call placed to Neurosurgery Department, spoke with TREVON who works with Dr. Agrawal.  They do not see spines.  Will try to get in touch with Dr. Vides and call me back.

## 2020-02-19 ENCOUNTER — TELEPHONE (OUTPATIENT)
Dept: INTERNAL MEDICINE | Facility: CLINIC | Age: 70
End: 2020-02-19

## 2020-02-19 DIAGNOSIS — M48.02 SPINAL STENOSIS OF CERVICAL REGION: ICD-10-CM

## 2020-02-19 DIAGNOSIS — M54.2 NECK PAIN: ICD-10-CM

## 2020-02-19 DIAGNOSIS — G95.89 CERVICAL CORD MYELOMALACIA: Primary | ICD-10-CM

## 2020-02-19 NOTE — TELEPHONE ENCOUNTER
Contacted pateint about the MRI and EMG/NCS - we received a panic call from radiology. Explained that the MRI showed edema in cervical cord and foraminal narrowing, not eplained by cervical disc or stenosis.    EMG shows acute on cjhronic radiculopathy, CTS and ulnar neuropathy. Seems out of proportion to his subjective complaint.    Moses's current sx of dyesthesia and thumb numbness going on for about 6 weeks.not particularly prgressive and no motor sx at this time. Had neck pain in the fall and was referred to PT with some releif.    I called NS earlier in day and they have him on schedule for next week.

## 2020-02-19 NOTE — TELEPHONE ENCOUNTER
----- Message from Ricardo Ness MA sent at 2/19/2020  8:09 AM CST -----  Contact: TREVON Ness MA / Neurosurgery   Good morning Dr Pacheco, Per Dr Vides's request could you enter an order for the contrast MRI and we can schedule it on this end prior to his visit. Let me know. TREVON    ----- Message -----  From: Fabiola Vides MD  Sent: 2/18/2020  10:36 PM CST  To: Ricardo Ness MA, Natali Mora MA    Can we please get MRI with contrast prior to his appointment with me? Recommended in read per Dr. Gurrola. Hopefully his PCP can order. Thanks!

## 2020-02-24 ENCOUNTER — TELEPHONE (OUTPATIENT)
Dept: INTERNAL MEDICINE | Facility: CLINIC | Age: 70
End: 2020-02-24

## 2020-02-24 DIAGNOSIS — M54.2 NECK PAIN: Primary | ICD-10-CM

## 2020-02-24 NOTE — TELEPHONE ENCOUNTER
Please advise. Msg from Catie.    Good afternoon Ms Salvador. My name is Parisa. I work in the MRI department at [facility]. Do you work with Dr. Pacheco? If so we have a patient coming Wednesday for an MRI. MRN: [id number] last name: Valdez.  We will need a new STAT (Lab order) Basic Metabolic Panel/Creatinine order for the patient. Thank you.    Arnaldo Martinez

## 2020-02-26 ENCOUNTER — PATIENT MESSAGE (OUTPATIENT)
Dept: NEUROSURGERY | Facility: CLINIC | Age: 70
End: 2020-02-26

## 2020-02-26 ENCOUNTER — TELEPHONE (OUTPATIENT)
Dept: NEUROSURGERY | Facility: CLINIC | Age: 70
End: 2020-02-26

## 2020-02-26 ENCOUNTER — HOSPITAL ENCOUNTER (OUTPATIENT)
Dept: RADIOLOGY | Facility: OTHER | Age: 70
Discharge: HOME | End: 2020-02-26
Attending: FAMILY MEDICINE
Payer: MEDICARE

## 2020-02-26 DIAGNOSIS — T50.8X5A CONTRAST DYE INDUCED NEPHROPATHY: Primary | ICD-10-CM

## 2020-02-26 DIAGNOSIS — M48.02 SPINAL STENOSIS OF CERVICAL REGION: ICD-10-CM

## 2020-02-26 DIAGNOSIS — N14.11 CONTRAST DYE INDUCED NEPHROPATHY: Primary | ICD-10-CM

## 2020-02-26 DIAGNOSIS — M54.2 NECK PAIN: ICD-10-CM

## 2020-02-26 PROCEDURE — A9585 GADOBUTROL INJECTION: HCPCS

## 2020-02-26 PROCEDURE — 25500020 PHARM REV CODE 255

## 2020-02-26 PROCEDURE — 72156 MRI CERVICAL SPINE DEMYELINATING W W/O CONTRAST: ICD-10-PCS | Mod: 26,,, | Performed by: RADIOLOGY

## 2020-02-26 PROCEDURE — 72156 MRI NECK SPINE W/O & W/DYE: CPT | Mod: 26,,, | Performed by: RADIOLOGY

## 2020-02-26 PROCEDURE — 72156 MRI NECK SPINE W/O & W/DYE: CPT | Mod: TC

## 2020-02-27 ENCOUNTER — PATIENT OUTREACH (OUTPATIENT)
Dept: OTHER | Facility: OTHER | Age: 70
End: 2020-02-27

## 2020-03-03 ENCOUNTER — OFFICE VISIT (OUTPATIENT)
Dept: INTERNAL MEDICINE | Facility: CLINIC | Age: 70
End: 2020-03-03
Payer: MEDICARE

## 2020-03-03 ENCOUNTER — TELEPHONE (OUTPATIENT)
Dept: NEUROSURGERY | Facility: CLINIC | Age: 70
End: 2020-03-03

## 2020-03-03 ENCOUNTER — OFFICE VISIT (OUTPATIENT)
Dept: NEUROSURGERY | Facility: CLINIC | Age: 70
End: 2020-03-03
Payer: MEDICARE

## 2020-03-03 VITALS
HEART RATE: 54 BPM | WEIGHT: 250.88 LBS | BODY MASS INDEX: 38.02 KG/M2 | DIASTOLIC BLOOD PRESSURE: 80 MMHG | HEIGHT: 68 IN | SYSTOLIC BLOOD PRESSURE: 132 MMHG

## 2020-03-03 VITALS
HEIGHT: 68 IN | SYSTOLIC BLOOD PRESSURE: 136 MMHG | WEIGHT: 250.69 LBS | BODY MASS INDEX: 37.99 KG/M2 | HEART RATE: 56 BPM | TEMPERATURE: 98 F | DIASTOLIC BLOOD PRESSURE: 76 MMHG

## 2020-03-03 DIAGNOSIS — I25.10 CAD IN NATIVE ARTERY: ICD-10-CM

## 2020-03-03 DIAGNOSIS — M47.816 SPONDYLOSIS OF LUMBAR REGION WITHOUT MYELOPATHY OR RADICULOPATHY: ICD-10-CM

## 2020-03-03 DIAGNOSIS — I27.9 CHRONIC PULMONARY HEART DISEASE: ICD-10-CM

## 2020-03-03 DIAGNOSIS — I77.9 BILATERAL CAROTID ARTERY DISEASE, UNSPECIFIED TYPE: ICD-10-CM

## 2020-03-03 DIAGNOSIS — M47.812 CERVICAL SPONDYLOSIS: Primary | ICD-10-CM

## 2020-03-03 DIAGNOSIS — E78.5 HYPERLIPIDEMIA, UNSPECIFIED HYPERLIPIDEMIA TYPE: ICD-10-CM

## 2020-03-03 DIAGNOSIS — I10 HYPERTENSION, ESSENTIAL: ICD-10-CM

## 2020-03-03 DIAGNOSIS — Z00.00 ENCOUNTER FOR PREVENTIVE HEALTH EXAMINATION: Primary | ICD-10-CM

## 2020-03-03 DIAGNOSIS — M50.00 CERVICAL DISC DISEASE WITH MYELOPATHY: ICD-10-CM

## 2020-03-03 DIAGNOSIS — M17.11 PRIMARY OSTEOARTHRITIS OF RIGHT KNEE: ICD-10-CM

## 2020-03-03 DIAGNOSIS — G47.33 OSA (OBSTRUCTIVE SLEEP APNEA): ICD-10-CM

## 2020-03-03 DIAGNOSIS — M47.812 CERVICAL SPONDYLOSIS: ICD-10-CM

## 2020-03-03 DIAGNOSIS — E66.01 CLASS 2 SEVERE OBESITY DUE TO EXCESS CALORIES WITH SERIOUS COMORBIDITY AND BODY MASS INDEX (BMI) OF 38.0 TO 38.9 IN ADULT: ICD-10-CM

## 2020-03-03 PROCEDURE — 99204 OFFICE O/P NEW MOD 45 MIN: CPT | Mod: HCNC,S$GLB,, | Performed by: NEUROLOGICAL SURGERY

## 2020-03-03 PROCEDURE — 1159F PR MEDICATION LIST DOCUMENTED IN MEDICAL RECORD: ICD-10-PCS | Mod: HCNC,S$GLB,, | Performed by: NEUROLOGICAL SURGERY

## 2020-03-03 PROCEDURE — 1101F PR PT FALLS ASSESS DOC 0-1 FALLS W/OUT INJ PAST YR: ICD-10-PCS | Mod: HCNC,CPTII,S$GLB, | Performed by: NEUROLOGICAL SURGERY

## 2020-03-03 PROCEDURE — 99999 PR PBB SHADOW E&M-EST. PATIENT-LVL III: ICD-10-PCS | Mod: PBBFAC,HCNC,, | Performed by: NEUROLOGICAL SURGERY

## 2020-03-03 PROCEDURE — 3075F SYST BP GE 130 - 139MM HG: CPT | Mod: HCNC,CPTII,S$GLB, | Performed by: NEUROLOGICAL SURGERY

## 2020-03-03 PROCEDURE — 3079F DIAST BP 80-89 MM HG: CPT | Mod: CPTII,S$GLB,, | Performed by: NURSE PRACTITIONER

## 2020-03-03 PROCEDURE — 3078F PR MOST RECENT DIASTOLIC BLOOD PRESSURE < 80 MM HG: ICD-10-PCS | Mod: HCNC,CPTII,S$GLB, | Performed by: NEUROLOGICAL SURGERY

## 2020-03-03 PROCEDURE — G0439 PR MEDICARE ANNUAL WELLNESS SUBSEQUENT VISIT: ICD-10-PCS | Mod: S$GLB,,, | Performed by: NURSE PRACTITIONER

## 2020-03-03 PROCEDURE — 99999 PR PBB SHADOW E&M-EST. PATIENT-LVL III: CPT | Mod: PBBFAC,HCNC,, | Performed by: NEUROLOGICAL SURGERY

## 2020-03-03 PROCEDURE — 99204 PR OFFICE/OUTPT VISIT, NEW, LEVL IV, 45-59 MIN: ICD-10-PCS | Mod: HCNC,S$GLB,, | Performed by: NEUROLOGICAL SURGERY

## 2020-03-03 PROCEDURE — 99999 PR PBB SHADOW E&M-EST. PATIENT-LVL IV: CPT | Mod: PBBFAC,,, | Performed by: NURSE PRACTITIONER

## 2020-03-03 PROCEDURE — 99999 PR PBB SHADOW E&M-EST. PATIENT-LVL IV: ICD-10-PCS | Mod: PBBFAC,,, | Performed by: NURSE PRACTITIONER

## 2020-03-03 PROCEDURE — 1125F PR PAIN SEVERITY QUANTIFIED, PAIN PRESENT: ICD-10-PCS | Mod: HCNC,S$GLB,, | Performed by: NEUROLOGICAL SURGERY

## 2020-03-03 PROCEDURE — G0439 PPPS, SUBSEQ VISIT: HCPCS | Mod: S$GLB,,, | Performed by: NURSE PRACTITIONER

## 2020-03-03 PROCEDURE — 1159F MED LIST DOCD IN RCRD: CPT | Mod: HCNC,S$GLB,, | Performed by: NEUROLOGICAL SURGERY

## 2020-03-03 PROCEDURE — 99499 RISK ADDL DX/OHS AUDIT: ICD-10-PCS | Mod: HCNC,S$GLB,, | Performed by: NURSE PRACTITIONER

## 2020-03-03 PROCEDURE — 99499 RISK ADDL DX/OHS AUDIT: ICD-10-PCS | Mod: HCNC,S$GLB,, | Performed by: NEUROLOGICAL SURGERY

## 2020-03-03 PROCEDURE — 99499 UNLISTED E&M SERVICE: CPT | Mod: HCNC,S$GLB,, | Performed by: NEUROLOGICAL SURGERY

## 2020-03-03 PROCEDURE — 3079F PR MOST RECENT DIASTOLIC BLOOD PRESSURE 80-89 MM HG: ICD-10-PCS | Mod: CPTII,S$GLB,, | Performed by: NURSE PRACTITIONER

## 2020-03-03 PROCEDURE — 3078F DIAST BP <80 MM HG: CPT | Mod: HCNC,CPTII,S$GLB, | Performed by: NEUROLOGICAL SURGERY

## 2020-03-03 PROCEDURE — 1101F PT FALLS ASSESS-DOCD LE1/YR: CPT | Mod: HCNC,CPTII,S$GLB, | Performed by: NEUROLOGICAL SURGERY

## 2020-03-03 PROCEDURE — 3075F SYST BP GE 130 - 139MM HG: CPT | Mod: CPTII,S$GLB,, | Performed by: NURSE PRACTITIONER

## 2020-03-03 PROCEDURE — 3075F PR MOST RECENT SYSTOLIC BLOOD PRESS GE 130-139MM HG: ICD-10-PCS | Mod: CPTII,S$GLB,, | Performed by: NURSE PRACTITIONER

## 2020-03-03 PROCEDURE — 99499 UNLISTED E&M SERVICE: CPT | Mod: HCNC,S$GLB,, | Performed by: NURSE PRACTITIONER

## 2020-03-03 PROCEDURE — 1125F AMNT PAIN NOTED PAIN PRSNT: CPT | Mod: HCNC,S$GLB,, | Performed by: NEUROLOGICAL SURGERY

## 2020-03-03 PROCEDURE — 3075F PR MOST RECENT SYSTOLIC BLOOD PRESS GE 130-139MM HG: ICD-10-PCS | Mod: HCNC,CPTII,S$GLB, | Performed by: NEUROLOGICAL SURGERY

## 2020-03-03 NOTE — LETTER
March 5, 2020      Nicolas Pacheco MD  [address]  [facility] [address]           [facility] - Neurosurgery 7th Fl  [address]  [facility] [address]  Phone: [phone]  Fax: [phone]          Patient: Domonique Kellogg   MR Number: [id number]   YOB: 1950   Date of Visit: 3/3/2020       Dear Dr. Nicolas Pacheco:    Thank you for referring Domonique Kellogg to me for evaluation. Attached you will find relevant portions of my assessment and plan of care.    If you have questions, please do not hesitate to call me. I look forward to following Domonique Kellogg along with you.    Sincerely,    Fabiola Vides MD    Enclosure  CC:  No Recipients    If you would like to receive this communication electronically, please contact [email or web address] or [phone] to request more information on [employer] Link access.    For providers and/or their staff who would like to refer a patient to [facility], please contact us through our one-stop-shop provider referral line, [facility], at [phone].    If you feel you have received this communication in error or would no longer like to receive these types of communications, please e-mail [email or web address]

## 2020-03-03 NOTE — PROGRESS NOTES
"Domonique Kellogg presented for a  Medicare AWV and comprehensive Health Risk Assessment today. The following components were reviewed and updated:    · Medical history  · Family History  · Social history  · Allergies and Current Medications  · Health Risk Assessment  · Health Maintenance  · Care Team     ** See Completed Assessments for Annual Wellness Visit within the encounter summary.**       The following assessments were completed:  · Living Situation  · CAGE  · Depression Screening  · Timed Get Up and Go  · Whisper Test  · Cognitive Function Screening  · Nutrition Screening  · ADL Screening  · PAQ Screening        Vitals:    03/03/20 0835   BP: 132/80   BP Location: Left arm   Patient Position: Sitting   Pulse: (!) 54   Weight: 113.8 kg (250 lb 14.1 oz)   Height: 5' 8" (1.727 m)     Body mass index is 38.15 kg/m².     Physical Exam   Constitutional: He is oriented to person, place, and time. He appears well-developed and well-nourished. No distress.   Obese stature   HENT:   Head: Normocephalic and atraumatic.   Eyes: Conjunctivae and EOM are normal. No scleral icterus.   Neck: Normal range of motion. Neck supple.   Cardiovascular: Normal rate, regular rhythm, normal heart sounds and intact distal pulses.   Pulmonary/Chest: Effort normal and breath sounds normal. No respiratory distress.   Abdominal: He exhibits no distension.   Musculoskeletal: Normal range of motion. He exhibits no edema.   Neurological: He is alert and oriented to person, place, and time.   Skin: Skin is warm and dry. He is not diaphoretic.   Psychiatric: He has a normal mood and affect. His behavior is normal.       Diagnoses and health risks identified today and associated recommendations/orders:    1. Encounter for preventive health examination  Assessment completed  Preventive health recommendations reviewed    2. Chronic pulmonary heart disease  Stable.  Seen on echo from 02/16/2015  Controlled with current medical therapy  Evaluated " by Cardiology  Followed by PCP.     3. CAD in native artery  Stable.   Controlled with current medical therapy  Evaluated by Cardiology  Followed by PCP.     4. Hyperlipidemia, unspecified hyperlipidemia type  Stable.   Controlled with current medical therapy  Followed by PCP.     5. Hypertension, essential  Stable.   Controlled with current medical therapy  Followed by PCP.     6. Primary osteoarthritis of right knee  Stable.   Controlled with current medical therapy  Followed by Orthopedics.     7. Class 2 severe obesity due to excess calories with serious comorbidity and body mass index (BMI) of 38.0 to 38.9 in adult  Stable.   Exercise encouraged  Followed by PCP.     8. RAHEEL (obstructive sleep apnea)  Stable.   Patient uses a CPAP machine  Followed by Sleep Medicine.     9. Spondylosis of lumbar region without myelopathy or radiculopathy  Stable.   Controlled with current medical therapy  Followed by Spine Services.     10. Cervical spondylosis  Stable.   Controlled with current medical therapy  Followed by Spine Services.     11. Bilateral carotid artery disease, unspecified type  Stable.  Seen on ultrasound from 10/10/2018  20-39% stenosis bilaterally  Controlled with current medical therapy  Followed by PCP.     Provided [place] with a 5-10 year written screening schedule and personal prevention plan. Recommendations were developed using the USPSTF age appropriate recommendations. Education, counseling, and referrals were provided as needed. After Visit Summary printed and given to patient which includes a list of additional screenings\tests needed.    Follow up in about 1 year (around 3/3/2021) for a routine visit with your primary care provider or sooner if problems arise. .    Dagmar Bradford NP

## 2020-03-03 NOTE — PATIENT INSTRUCTIONS
Counseling and Referral of Other Preventative  (Italic type indicates deductible and co-insurance are waived)    Patient Name: Domonique Kellogg  Today's Date: 3/3/2020    Health Maintenance       Date Due Completion Date    Shingles Vaccine (3 of 3) 12/26/2019 10/31/2019    Colonoscopy 02/14/2021 2/14/2011    High Dose Statin 03/03/2021 3/3/2020    Aspirin/Antiplatelet Therapy 03/03/2021 3/3/2020    Lipid Panel 10/03/2024 10/3/2019    TETANUS VACCINE 03/03/2026 3/3/2016        No orders of the defined types were placed in this encounter.    The following information is provided to all patients.  This information is to help you find resources for any of the problems found today that may be affecting your health:                Living healthy guide: www.[facility].[place].gov      Understanding Diabetes: www.diabetes.org      Eating healthy: www.cdc.gov/healthyweight      [facility] home safety checklist: www.cdc.gov/steadi/patient.html      Agency on Aging: www.goea.[place].gov      Alcoholics anonymous (AA): www.aa.org      Physical Activity: www.kali.nih.gov/kf3imwy      Tobacco use: www.quitwithusla.org

## 2020-03-04 ENCOUNTER — OFFICE VISIT (OUTPATIENT)
Dept: INTERNAL MEDICINE | Facility: CLINIC | Age: 70
End: 2020-03-04
Attending: FAMILY MEDICINE
Payer: MEDICARE

## 2020-03-04 ENCOUNTER — HOSPITAL ENCOUNTER (OUTPATIENT)
Dept: RADIOLOGY | Facility: HOSPITAL | Age: 70
Discharge: HOME | End: 2020-03-04
Attending: NEUROLOGICAL SURGERY
Payer: MEDICARE

## 2020-03-04 VITALS
BODY MASS INDEX: 38.32 KG/M2 | HEIGHT: 68 IN | TEMPERATURE: 98 F | OXYGEN SATURATION: 97 % | WEIGHT: 252.88 LBS | DIASTOLIC BLOOD PRESSURE: 60 MMHG | HEART RATE: 58 BPM | SYSTOLIC BLOOD PRESSURE: 114 MMHG

## 2020-03-04 DIAGNOSIS — Z01.818 PREOPERATIVE CLEARANCE: Primary | ICD-10-CM

## 2020-03-04 DIAGNOSIS — M47.812 CERVICAL SPONDYLOSIS: ICD-10-CM

## 2020-03-04 DIAGNOSIS — I77.9 BILATERAL CAROTID ARTERY DISEASE, UNSPECIFIED TYPE: ICD-10-CM

## 2020-03-04 DIAGNOSIS — R79.1 ABNORMAL COAGULATION PROFILE: ICD-10-CM

## 2020-03-04 DIAGNOSIS — E78.5 HYPERLIPIDEMIA, UNSPECIFIED HYPERLIPIDEMIA TYPE: ICD-10-CM

## 2020-03-04 DIAGNOSIS — I10 HYPERTENSION, ESSENTIAL: ICD-10-CM

## 2020-03-04 PROCEDURE — 99499 UNLISTED E&M SERVICE: CPT | Mod: HCNC,S$GLB,, | Performed by: FAMILY MEDICINE

## 2020-03-04 PROCEDURE — 99499 RISK ADDL DX/OHS AUDIT: ICD-10-PCS | Mod: HCNC,S$GLB,, | Performed by: FAMILY MEDICINE

## 2020-03-04 PROCEDURE — 72040 X-RAY EXAM NECK SPINE 2-3 VW: CPT | Mod: TC

## 2020-03-04 PROCEDURE — 72040 XR CERVICAL SPINE FLEXION  AND EXTENSION ONLY: ICD-10-PCS | Mod: 26,,, | Performed by: RADIOLOGY

## 2020-03-04 PROCEDURE — 72125 CT NECK SPINE W/O DYE: CPT | Mod: TC

## 2020-03-04 PROCEDURE — 99999 PR PBB SHADOW E&M-EST. PATIENT-LVL IV: CPT | Mod: PBBFAC,,, | Performed by: FAMILY MEDICINE

## 2020-03-04 PROCEDURE — 99214 OFFICE O/P EST MOD 30 MIN: CPT | Mod: HCNC,S$GLB,, | Performed by: FAMILY MEDICINE

## 2020-03-04 PROCEDURE — 1159F PR MEDICATION LIST DOCUMENTED IN MEDICAL RECORD: ICD-10-PCS | Mod: HCNC,S$GLB,, | Performed by: FAMILY MEDICINE

## 2020-03-04 PROCEDURE — 99214 PR OFFICE/OUTPT VISIT, EST, LEVL IV, 30-39 MIN: ICD-10-PCS | Mod: HCNC,S$GLB,, | Performed by: FAMILY MEDICINE

## 2020-03-04 PROCEDURE — 1126F AMNT PAIN NOTED NONE PRSNT: CPT | Mod: HCNC,S$GLB,, | Performed by: FAMILY MEDICINE

## 2020-03-04 PROCEDURE — 3078F PR MOST RECENT DIASTOLIC BLOOD PRESSURE < 80 MM HG: ICD-10-PCS | Mod: HCNC,CPTII,S$GLB, | Performed by: FAMILY MEDICINE

## 2020-03-04 PROCEDURE — 1101F PR PT FALLS ASSESS DOC 0-1 FALLS W/OUT INJ PAST YR: ICD-10-PCS | Mod: HCNC,CPTII,S$GLB, | Performed by: FAMILY MEDICINE

## 2020-03-04 PROCEDURE — 72125 CT NECK SPINE W/O DYE: CPT | Mod: 26,,, | Performed by: RADIOLOGY

## 2020-03-04 PROCEDURE — 3078F DIAST BP <80 MM HG: CPT | Mod: HCNC,CPTII,S$GLB, | Performed by: FAMILY MEDICINE

## 2020-03-04 PROCEDURE — 72125 CT CERVICAL SPINE WITHOUT CONTRAST: ICD-10-PCS | Mod: 26,,, | Performed by: RADIOLOGY

## 2020-03-04 PROCEDURE — 3074F SYST BP LT 130 MM HG: CPT | Mod: HCNC,CPTII,S$GLB, | Performed by: FAMILY MEDICINE

## 2020-03-04 PROCEDURE — 1101F PT FALLS ASSESS-DOCD LE1/YR: CPT | Mod: HCNC,CPTII,S$GLB, | Performed by: FAMILY MEDICINE

## 2020-03-04 PROCEDURE — 99999 PR PBB SHADOW E&M-EST. PATIENT-LVL IV: ICD-10-PCS | Mod: PBBFAC,,, | Performed by: FAMILY MEDICINE

## 2020-03-04 PROCEDURE — 1126F PR PAIN SEVERITY QUANTIFIED, NO PAIN PRESENT: ICD-10-PCS | Mod: HCNC,S$GLB,, | Performed by: FAMILY MEDICINE

## 2020-03-04 PROCEDURE — 3074F PR MOST RECENT SYSTOLIC BLOOD PRESSURE < 130 MM HG: ICD-10-PCS | Mod: HCNC,CPTII,S$GLB, | Performed by: FAMILY MEDICINE

## 2020-03-04 PROCEDURE — 1159F MED LIST DOCD IN RCRD: CPT | Mod: HCNC,S$GLB,, | Performed by: FAMILY MEDICINE

## 2020-03-04 PROCEDURE — 72040 X-RAY EXAM NECK SPINE 2-3 VW: CPT | Mod: 26,,, | Performed by: RADIOLOGY

## 2020-03-04 NOTE — PROGRESS NOTES
Subjective:       Patient ID: Domonique Kellogg is a 69 y.o. male.    Chief Complaint: Pre-op Exam    Patient referred for a preoperative clearance. No acute complaints today.     Specific complaints - see dictation and please see ROS.    P, S, Fm, Soc Hx's; Meds, allergies - reviewed and reconciled.    Health maintenance file reviewed and addressed items due.       Preoperative clearance for presumed cervical decompressive surgery.  No neuro surgery assessment available at this time.  Scans reviewed.  BP discussion concerning those findings with him today and my interpretation is that this is above typical MRI findings with concern of nerve damage.  Patient states he would like to get a 2nd opinion.  He wants to get various opinions about surgery, he seems a bit hesitant and asked about other options.  My feeling was that due to the possible myelopathy findings on MRI that there was some urgency but I want him to get all of the questions that he has answered.  I will place a consult as he requests.    He can easily walk several blocks and navigate stairs with no chest pain or dyspnea.  He had a syncopal episode in the past, negative cardiac catheterization.  Current medications are noted. Controlled hypertension.  Can easily perform 4 mets of activity without symptoms.    Review of Systems   Constitutional: Negative for chills, fatigue, fever and unexpected weight change.   HENT: Negative for congestion and trouble swallowing.    Eyes: Negative for redness and visual disturbance.   Respiratory: Negative for cough, chest tightness and shortness of breath.    Cardiovascular: Negative for chest pain, palpitations and leg swelling.   Gastrointestinal: Negative for abdominal pain and blood in stool.   Genitourinary: Negative for difficulty urinating and hematuria.   Musculoskeletal: Positive for arthralgias, neck pain and neck stiffness. Negative for back pain, gait problem, joint swelling and myalgias.   Skin: Negative  for color change and rash.   Neurological: Positive for numbness. Negative for tremors, speech difficulty, weakness and headaches.   Hematological: Negative for adenopathy. Does not bruise/bleed easily.   Psychiatric/Behavioral: Negative for behavioral problems, confusion and sleep disturbance. The patient is not nervous/anxious.        Objective:      Physical Exam   Constitutional: He is oriented to person, place, and time. He appears well-developed and well-nourished. No distress.   HENT:   Head: Normocephalic.   Right Ear: Tympanic membrane, external ear and ear canal normal.   Left Ear: Tympanic membrane, external ear and ear canal normal.   Nose: Nose normal.   Mouth/Throat: Uvula is midline, oropharynx is clear and moist and mucous membranes are normal. No oropharyngeal exudate.   Eyes: Conjunctivae are normal. Right eye exhibits no discharge. Left eye exhibits no discharge. No scleral icterus.   Neck: Normal range of motion. Neck supple. No thyromegaly present.   Cardiovascular: Normal rate, regular rhythm, normal heart sounds and intact distal pulses. Exam reveals no gallop and no friction rub.   No murmur heard.  Pulmonary/Chest: Effort normal. No respiratory distress. He has no wheezes. He has no rales. He exhibits no tenderness.   Abdominal: Soft. There is no tenderness.   Musculoskeletal: He exhibits no edema.   Lymphadenopathy:     He has no cervical adenopathy.   Neurological: He is alert and oriented to person, place, and time.   Skin: Skin is warm and dry. No rash noted. He is not diaphoretic.   Nursing note and vitals reviewed.      Assessment:       1. Preoperative clearance    2. Cervical spondylosis    3. Hypertension, essential    4. Hyperlipidemia, unspecified hyperlipidemia type    5. Abnormal coagulation profile     6. Bilateral carotid artery disease, unspecified type        Plan:     Medication List with Changes/Refills   Current Medications    ASPIRIN (ECOTRIN) 81 MG EC TABLET    Take 81  mg by mouth once daily.    HYDROCHLOROTHIAZIDE (HYDRODIURIL) 25 MG TABLET    TAKE 1 TABLET EVERY DAY    IBUPROFEN (ADVIL,MOTRIN) 200 MG TABLET    Take 200 mg by mouth every 6 (six) hours as needed for Pain.    MAGNESIUM OXIDE (MAG-OX) 400 MG (241.3 MG MAGNESIUM) TABLET        MULTIVITAMIN WITH MINERALS TABLET    Take 1 tablet by mouth once daily.     POTASSIUM CHLORIDE SA (K-DUR,KLOR-CON) 20 MEQ TABLET    Take 2 tablets (40 mEq total) by mouth once daily.    PRAVASTATIN (PRAVACHOL) 40 MG TABLET    TAKE 1 TABLET EVERY DAY     Domonique was seen today for pre-op exam.    Diagnoses and all orders for this visit:    Preoperative clearance  -     CBC Without Differential; Future  -     Comprehensive metabolic panel; Future  -     Protime-INR; Future  -     APTT; Future  -     Urinalysis; Future  -     Urinalysis Microscopic; Future  -     EKG 12-lead; Future  -     X-Ray Chest PA And Lateral; Future    Cervical spondylosis  -     Ambulatory referral/consult to [facility]; Future    Hypertension, essential    Hyperlipidemia, unspecified hyperlipidemia type    Abnormal coagulation profile   -     Protime-INR; Future  -     APTT; Future    Bilateral carotid artery disease, unspecified type      See meds, orders, follow up, routing and instructions sections of encounter and AVS. Discussed with patient and provided on AVS.    Clear for surgery and cc to referring physician, pending review of tests.

## 2020-03-05 ENCOUNTER — PATIENT MESSAGE (OUTPATIENT)
Dept: INTERNAL MEDICINE | Facility: CLINIC | Age: 70
End: 2020-03-05

## 2020-03-05 PROBLEM — M50.00 CERVICAL DISC DISEASE WITH MYELOPATHY: Status: ACTIVE | Noted: 2020-03-05

## 2020-03-05 NOTE — PROGRESS NOTES
Neurosurgery  History & Physical    SUBJECTIVE:     Chief Complaint:  Numbness and tingling in the left thumb and right shoulder    History of Present Illness:  Domonique Kellogg is a 69 y.o. male who presents with complaints of numbness in the tip of his left thumb and tingling in the right shoulder.  The symptoms have been present for about 4 months.  He denies any dropping objects, handwriting changes, or balance difficulties.  He does endorse some difficulty buttoning his shirt, but he notes that this is mostly because he has difficulty of feeling where the button is relative to the left thumb.  He also notes that his left thumb has increased sensation, particularly when it comes to hot and cold.  He endorses no pain.  His symptoms are intermittent.  They are made worse by neck flexion and neck extension.  They improve when he relaxes.  He denies any change in bowel or bladder habit.  He underwent physical therapy in October-November of this year; however, he did not have any improvement in his symptoms.  He has not had injections.  He has not had neck or back surgery previously.  He had a right knee surgery in 1997 with no anesthetic, infectious, or bleeding complications.      He does take ibuprofen and ASA 81 daily. He is a former smoker, having quit in 1986.     He had in the EMG nerve conduction study on February 18, 2020 that demonstrates right carpal tunnel syndrome, left ulnar neuropathy, and C5-C6 radiculopathy.  He has an MRI of the cervical spine that was obtained prior to today's consultation and is personally reviewed.  This demonstrates multilevel cervical spondylosis, worst at C3-C4 and C4-C5 where there are significant disc herniations resulting in myelomalacia.    Review of patient's allergies indicates:   Allergen Reactions    Indomethacin      Headache dizziness    Adhesive Rash       Current Outpatient Medications   Medication Sig Dispense Refill    aspirin (ECOTRIN) 81 MG EC tablet Take 81  mg by mouth once daily.      hydroCHLOROthiazide (HYDRODIURIL) 25 MG tablet TAKE 1 TABLET EVERY DAY 90 tablet 1    ibuprofen (ADVIL,MOTRIN) 200 MG tablet Take 200 mg by mouth every 6 (six) hours as needed for Pain.      magnesium oxide (MAG-OX) 400 mg (241.3 mg magnesium) tablet       multivitamin with minerals tablet Take 1 tablet by mouth once daily.       potassium chloride SA (K-DUR,KLOR-CON) 20 MEQ tablet Take 2 tablets (40 mEq total) by mouth once daily. 180 tablet 3    pravastatin (PRAVACHOL) 40 MG tablet TAKE 1 TABLET EVERY DAY 90 tablet 1     No current facility-administered medications for this visit.        Past Medical History:   Diagnosis Date    Anticoagulant long-term use     Arthritis     CAD (coronary artery disease) 3/2/2015    [facility]     CAD (coronary artery disease) 3/26/2015    Cataract     Dry eye syndrome     Hypertension     Obesity     Seizures     2011    Sleep apnea     + CPAP     Past Surgical History:   Procedure Laterality Date    EYE SURGERY      INTRAOCULAR PROSTHESES INSERTION Right 7/16/2018    Procedure: INSERTION-INTRAOCULAR LENS (IOL);  Surgeon: Priscilla Hays MD;  Location: [facility];  Service: Ophthalmology;  Laterality: Right;    INTRAOCULAR PROSTHESES INSERTION Left 8/13/2018    Procedure: INSERTION, INTRAOCULAR LENS PROSTHESIS;  Surgeon: Priscilla Hays MD;  Location: [facility] OR;  Service: Ophthalmology;  Laterality: Left;    KNEE SURGERY      PHACOEMULSIFICATION OF CATARACT Right 7/16/2018    Procedure: PHACOEMULSIFICATION-ASPIRATION-CATARACT;  Surgeon: Priscilla Hays MD;  Location: [facility] OR;  Service: Ophthalmology;  Laterality: Right;    PHACOEMULSIFICATION OF CATARACT Left 8/13/2018    Procedure: PHACOEMULSIFICATION, CATARACT;  Surgeon: Priscilla Hays MD;  Location: [facility];  Service: Ophthalmology;  Laterality: Left;    WISDOM TOOTH EXTRACTION       Family History     Problem Relation (Age of Onset)    Cancer Mother, Maternal Grandmother     Diabetes Mother    Heart disease Father, Paternal Grandfather, Brother    Hypertension Mother    No Known Problems Sister, Daughter, Son, Sister, Sister, Sister, Daughter        Social History     Socioeconomic History    Marital status:      Spouse name: Estrellita    Number of children: 2    Years of education: Not on file    Highest education level: Not on file   Occupational History     Employer: [employer]   Social Needs    Financial resource strain: Not hard at all    Food insecurity:     Worry: Never true     Inability: Never true    Transportation needs:     Medical: No     Non-medical: No   Tobacco Use    Smoking status: Former Smoker     Packs/day: 1.00     Years: 20.00     Pack years: 20.00     Last attempt to quit: 1987     Years since quittin.1    Smokeless tobacco: Never Used    Tobacco comment: quit    Substance and Sexual Activity    Alcohol use: Yes     Alcohol/week: 1.0 - 2.0 standard drinks     Types: 1 - 2 Glasses of wine per week     Frequency: 4 or more times a week     Drinks per session: 1 or 2     Binge frequency: Never     Comment: 1-2 glasses wine several times weekly    Drug use: No    Sexual activity: Yes     Partners: Female   Lifestyle    Physical activity:     Days per week: 6 days     Minutes per session: 50 min    Stress: Not at all   Relationships    Social connections:     Talks on phone: Twice a week     Gets together: Once a week     Attends Church service: Not on file     Active member of club or organization: Yes     Attends meetings of clubs or organizations: More than 4 times per year     Relationship status:    Other Topics Concern    Not on file   Social History Narrative    Not on file       Review of Systems   Constitutional: Negative for fever.   HENT: Negative for nosebleeds.    Eyes: Negative for visual disturbance.   Respiratory: Negative for shortness of breath.    Cardiovascular: Negative for chest pain.  "  Gastrointestinal: Negative for diarrhea.   Endocrine: Negative for cold intolerance.   Genitourinary: Positive for difficulty urinating and urgency.   Musculoskeletal: Negative for back pain.   Skin: Negative for color change.   Neurological: Positive for syncope and numbness.   Hematological: Does not bruise/bleed easily.   Psychiatric/Behavioral: The patient is not nervous/anxious.        OBJECTIVE:     Vital Signs  Temp: 98 °F (36.7 °C)  Pulse: (!) 56  BP: 136/76  Pain Score:   3  Height: 5' 8" (172.7 cm)  Weight: 113.7 kg (250 lb 10.6 oz)  Body mass index is 38.11 kg/m².      Physical Exam:    Constitutional: He appears well-developed and well-nourished. No distress.     Eyes: Pupils are equal, round, and reactive to light. EOM are normal.     Abdominal: Soft.     Skin: Skin displays no rash on extremities. Skin displays no lesions on extremities.     Psych/Behavior: He is alert. He is oriented to person, place, and time.     Musculoskeletal: Gait is normal.   L HI 4-/5  R WF 4/5   R delt 4/5      Neurological:        Coordination: He has normal finger to nose coordination.        Sensory: There is sensory deficit in the extremities. Sensory deficit is located left thumb.        DTRs:   + Schofield's bilaterally        Cranial nerves:   Right tongue deviation   Left nasolabial flattening     Pulmonary: symmetric expansion     Diagnostic Results:  Reviewed; as above     ASSESSMENT/PLAN:     Domonique Kellogg is a 69 y.o. male with significant cervical stenosis and associated myelomalacia who presents with complaints of numbness and tingling. Though he does not endorse significant clinical myelopathy, his exam reflects upper extremity weakness and hyperreflexia.     I have recommended we obtain a CT of the cervical spine and flexion/extension X-rays to assess for OPLL and dynamic instability and for operative planning purposes. Assuming there is no OPLL, I will recommend a C3-C4/C4-C5 ACDF to decompress and " stabilize the two most compressive levels with associated myelomalacia.     Mr. Kellogg is understandably hesitant about proceeding with surgery. I have emphasized to him that without it, I anticipate that he will have a gradual and progressive decline in his function. We discussed that it is imperative that he not have any falls or accidents, as these could leave him paralyzed or dead.     He will need to be off of ibuprofen prior to any surgery and for 6 months after any fusion. He will need to be off of aspirin for 5 days before surgery. He has multiple medical comorbidities and will thus require medical optimization and clearance.     We will plan to see him back in the next couple of weeks after the imaging has been completed to re-review surgical recommendations. I have encouraged him to contact the clinic with any question, concerns, or adverse clinical change in the interim.       Note generated with voice recognition software; please excuse any typographical errors.[email or web address]

## 2020-03-06 ENCOUNTER — PATIENT MESSAGE (OUTPATIENT)
Dept: NEUROSURGERY | Facility: CLINIC | Age: 70
End: 2020-03-06

## 2020-03-06 ENCOUNTER — PATIENT MESSAGE (OUTPATIENT)
Dept: INTERNAL MEDICINE | Facility: CLINIC | Age: 70
End: 2020-03-06

## 2020-03-06 ENCOUNTER — HOSPITAL ENCOUNTER (OUTPATIENT)
Dept: RADIOLOGY | Facility: OTHER | Age: 70
Discharge: HOME | End: 2020-03-06
Attending: FAMILY MEDICINE
Payer: MEDICARE

## 2020-03-06 DIAGNOSIS — Z01.818 PREOPERATIVE CLEARANCE: ICD-10-CM

## 2020-03-06 PROCEDURE — 71046 X-RAY EXAM CHEST 2 VIEWS: CPT | Mod: 26,HCNC,, | Performed by: RADIOLOGY

## 2020-03-06 PROCEDURE — 71046 X-RAY EXAM CHEST 2 VIEWS: CPT | Mod: TC,HCNC,FY

## 2020-03-06 PROCEDURE — 71046 XR CHEST PA AND LATERAL: ICD-10-PCS | Mod: 26,HCNC,, | Performed by: RADIOLOGY

## 2020-03-09 ENCOUNTER — PATIENT MESSAGE (OUTPATIENT)
Dept: NEUROSURGERY | Facility: CLINIC | Age: 70
End: 2020-03-09

## 2020-03-13 ENCOUNTER — TELEPHONE (OUTPATIENT)
Dept: NEUROSURGERY | Facility: CLINIC | Age: 70
End: 2020-03-13

## 2020-03-13 DIAGNOSIS — M50.00 CERVICAL DISC DISEASE WITH MYELOPATHY: ICD-10-CM

## 2020-03-13 DIAGNOSIS — M47.12 CERVICAL SPONDYLOSIS WITH MYELOPATHY: Primary | ICD-10-CM

## 2020-03-13 DIAGNOSIS — M47.812 CERVICAL SPONDYLOSIS: ICD-10-CM

## 2020-03-15 NOTE — PRE-PROCEDURE INSTRUCTIONS
Called Patient.  He had left a phone message asking which medications he should stop before surgery 3-23-20.  Medications reconciled.  Instructed to stop taking Aspirin, Ibuprofen, and his multivitamin.  Asked for his arrival time.  Explained that he would get a phone call on Friday, 3-20 with his arrival time.  Also asked if his surgery would still happen.  Explained that it is a day to day decision, but that he would receive a phone call if the surgery had to be cancelled.

## 2020-03-16 ENCOUNTER — PATIENT MESSAGE (OUTPATIENT)
Dept: SURGERY | Facility: HOSPITAL | Age: 70
End: 2020-03-16

## 2020-03-16 ENCOUNTER — PATIENT MESSAGE (OUTPATIENT)
Dept: INTERNAL MEDICINE | Facility: CLINIC | Age: 70
End: 2020-03-16

## 2020-03-16 DIAGNOSIS — Z01.818 PREOPERATIVE TESTING: Primary | ICD-10-CM

## 2020-03-16 NOTE — PRE-PROCEDURE INSTRUCTIONS
Patient stated has not had any problem with anesthesia in the past.  Has already gotten medical clearance from his PCP, Dr. Nicolas Marlow on 3/4. Will need poc appt and T&S. Our  will call to set up these appts.Preop instructions given. Hold asa, asa containing products, nsaids, vitamins and supplements one week prior to surgery.He said he is on asa 81 mg for prevention.Verbalizes understanding.

## 2020-03-16 NOTE — ANESTHESIA PAT ROS NOTE
03/16/2020  Domonique Kellogg is a 69 y.o., male.      Pre-op Assessment         Review of Systems  Anesthesia Hx:  No problems with previous Anesthesia  History of prior surgery of interest to airway management or planning: Previous anesthesia: MAC  [place] 2018 with MAC.  Procedure performed at an [facility]. Denies Family Hx of Anesthesia complications.   Denies Personal Hx of Anesthesia complications.   Social:  Former Smoker, Social Alcohol Use    Hematology/Oncology:  Hematology Normal   Oncology Normal     EENT/Dental:EENT/Dental Normal   Cardiovascular:    Denies Angina. hyperlipidemia  Functional Capacity good / => 4 METS  Coronary Artery Disease:  patient problem list.  Hypertension , Well Controlled on Rx , Recent typical home B/P of 120/70s   Pulmonary:   Denies Shortness of breath.  Denies Recent URI.  Obstructive Sleep Apnea (RAHEEL), CPAP used.   Renal/:  Renal/ Normal     Hepatic/GI:  Hepatic/GI Normal    Musculoskeletal:   Arthritis   Musculoskeletal General/Symptoms: neck pain, joint pain. Functional capacity is ambulatory without assistance.  Cervical Spine Disorder, Cervical Disc Disease, Spondylolisthesis    Neurological:  Neuro Symptoms of numbness Pain , onset is chronic , location of neck , alleviating factors are prn advil.   Endocrine:  Endocrine Normal  Metabolic Disorders, Obesity / BMI > 30  Psych:  Psychiatric Normal           Physical Exam     Dental:  Dental Findings: (per pt) In tact, Molar caps        Mental Status:  Mental Status Findings:  Cooperative, Alert and Oriented       3/17/20 POC appt completed by phone, T & S in process. PCP Dr Pacheco clearance in Epic.      Anesthesia Assessment: Preoperative EQUATION    Planned Procedure: Procedure(s) (LRB):  DISCECTOMY, SPINE, CERVICAL, ANTERIOR APPROACH, WITH FUSION C3-4, C4-5 (N/A)  Requested Anesthesia  Type:General  Surgeon: Fabiola Vides MD  Service: Neurosurgery  Known or anticipated Date of Surgery:3/23/2020,Date change 4/27/2020, Date change 7/6/2020    Surgeon notes: reviewed    Electronic QUestionnaire Assessment completed via nurse interview with patient.        Triage considerations:       Previous anesthesia records:MAC and No problems  8/13/2018 PHACOEMULSIFICATION, CATARACT (Left Eye) INSERTION, INTRAOCULAR LENS PROSTHESIS (Left Eye)   Airway/Jaw/Neck:  Airway Findings: Mouth Opening: Normal Tongue: Normal  General Airway Assessment: Adult  Mallampati: I  TM Distance: Normal, at least 6 cm  Jaw/Neck Findings:  Neck ROM: Normal ROM      Last PCP note: within 1 month , within [facility]   Subspecialty notes: Neurosurgery, Spine Service, Urology, SPORTS MEDICINE, SLEEP MEDICINE,& OPTOMETRY    Other important co-morbidities:PER Epic:  HLD, HTN, Obesity, RAHEEL and CAD,CAROTID ARTERY DISEASE      Tests already available:  Available tests,  within 1 month , 3-6 months ago , within [facility] . 3/4/2020 UA, MICRO UA, PTT, PT/INR, CMP, CBC, CT CERVICAL SPINE W/O CONTRAST, XRAY CERVICAL SPINE FLEX & EXT ONLY , 11/1/2019 EKG            Instructions given. (See in Nurse's note)    Optimization:  Anesthesia Preop Clinic Assessment  Indicated    Medical Opinion Indicated         Plan:    Testing:  T&S   Pre-anesthesia  visit       Visit focus: concerns in complex and/or prolonged anesthesia        Patient  has previously scheduled Medical Appointment:NONE    Navigation: Tests Scheduled.TBD                         Results will be tracked by Preop Clinic.     3/16 Medical clearance given by PCP, Dr. Nicolas Pacheco on 3/4.  3/25 Medical clearance given by PCP, Dr. Nicolas Pacheco on 3/24 for postponed surgery due to COVID-19,new date 4/27.  6/23 Labs resulted and noted.  7/2 Updated Medical clearance given by Dr. Nicolas Pacheco on 7/1.  Mirella Kaur RN BSN

## 2020-03-17 ENCOUNTER — LAB VISIT (OUTPATIENT)
Dept: LAB | Facility: HOSPITAL | Age: 70
End: 2020-03-17
Attending: ANESTHESIOLOGY
Payer: MEDICARE

## 2020-03-17 ENCOUNTER — TELEPHONE (OUTPATIENT)
Dept: PREADMISSION TESTING | Facility: HOSPITAL | Age: 70
End: 2020-03-17

## 2020-03-17 ENCOUNTER — TELEPHONE (OUTPATIENT)
Dept: NEUROSURGERY | Facility: CLINIC | Age: 70
End: 2020-03-17

## 2020-03-17 DIAGNOSIS — Z01.818 PREOPERATIVE TESTING: ICD-10-CM

## 2020-03-17 LAB
ABO + RH BLD: NORMAL
BLD GP AB SCN CELLS X3 SERPL QL: NORMAL

## 2020-03-17 PROCEDURE — 86850 RBC ANTIBODY SCREEN: CPT | Mod: HCNC

## 2020-03-17 PROCEDURE — 36415 COLL VENOUS BLD VENIPUNCTURE: CPT | Mod: HCNC,PN

## 2020-03-17 NOTE — TELEPHONE ENCOUNTER
----- Message from Mirella Kaur RN sent at 3/16/2020 11:26 AM CDT -----  Surgery 3/23  Please schedule poc appt and t&s.  Thanks!

## 2020-03-17 NOTE — TELEPHONE ENCOUNTER
I spoke with  as request and He informed me that he spoke with the proper party Anesthesia. I VU and inquired if anything more was needed. The pt Declined.

## 2020-03-19 ENCOUNTER — PATIENT MESSAGE (OUTPATIENT)
Dept: SURGERY | Facility: HOSPITAL | Age: 70
End: 2020-03-19

## 2020-03-19 NOTE — PROGRESS NOTES
I called the patient to discuss his clinical symptoms.  He states that he has had no progression of his myelopathic symptoms.  He has no progression of gait changes.  He has not had an increase in dropping objects.  The left thumb sensation is stable.  He has no new hand weakness.  He has no new neck pain.  He has no new upper lower extremity weakness.    Given that his symptoms are stable, and the request from the Department of Health that we delay any non-urgent surgeries, I think that it would be most appropriate to postpone his surgery at this time.  I have explained that we are currently planning to reschedule such procedures for May or June, but this may change as the pandemic situation progresses.  I have explained that it is imperative that he contact us in the interim if he has any progressive myelopathic changes.  He has expressed understanding and is agreement with doing so.  He has also expressed concern over catching the virus and is pleased not to be coming into an inpatient setting at this time.

## 2020-03-23 ENCOUNTER — PATIENT MESSAGE (OUTPATIENT)
Dept: SURGERY | Facility: HOSPITAL | Age: 70
End: 2020-03-23

## 2020-03-23 DIAGNOSIS — Z01.818 PREOPERATIVE TESTING: Primary | ICD-10-CM

## 2020-03-23 NOTE — PRE-PROCEDURE INSTRUCTIONS
Patient informed of new surgery date, was not aware that it was rescheduled. Reviewed Preop instructions. Hold asa, asa containing products, nsaids, vitamins and supplements one week prior to surgery.(4/20) Patient stated still has surgery instructions. Verbalizes understanding.

## 2020-03-24 ENCOUNTER — TELEPHONE (OUTPATIENT)
Dept: INTERNAL MEDICINE | Facility: CLINIC | Age: 70
End: 2020-03-24

## 2020-03-24 NOTE — TELEPHONE ENCOUNTER
----- Message from Mirella Kaur RN sent at 3/24/2020  9:10 AM CDT -----  Patient was cleared by you on 3/4 for cervical spine discectomy and fusion for 3/23 with DR. Vides. Surgery is postponed due to covid-19 until 4/27. ( over 30 days) Will you be able to extend his clearance? Please provide a note in epic. Thanks!

## 2020-03-24 NOTE — TELEPHONE ENCOUNTER
Clear for surgery 4/27 and cc to referring physician.    Surgery delayed due to COVID mandate.    I do not expect any of his chronic conditions to deteriorate between now and then.

## 2020-03-26 ENCOUNTER — TELEPHONE (OUTPATIENT)
Dept: DERMATOLOGY | Facility: CLINIC | Age: 70
End: 2020-03-26

## 2020-03-26 NOTE — TELEPHONE ENCOUNTER
Spoke with pt. Patient stated he wanted to come in for an office visit. Scheduled appt visit for 5/25/20 at 9 am.

## 2020-03-27 ENCOUNTER — TELEPHONE (OUTPATIENT)
Dept: NEUROSURGERY | Facility: CLINIC | Age: 70
End: 2020-03-27

## 2020-03-27 NOTE — TELEPHONE ENCOUNTER
I returned [facility] call and informed that the form will be reviewed on Monday by . If all is in accordance the form will be signed and faxed back. Natalia BROWNLEE and will wait to hear back from staff.  ----- Message from Marly Brown sent at 3/27/2020  2:00 PM CDT -----  Contact: Natalia ([employer]) @ [phone]  Calling to speak with someone regarding the collar ordered for the patient, calling to get the status of the form that the patient's insurance is requiring to have completed by Dr. Vides. Please call.

## 2020-04-08 RX ORDER — PRAVASTATIN SODIUM 40 MG/1
TABLET ORAL
Qty: 90 TABLET | Refills: 1 | Status: SHIPPED | OUTPATIENT
Start: 2020-04-08 | End: 2020-10-02

## 2020-04-08 NOTE — PROGRESS NOTES
Refill Authorization Note     is requesting a refill authorization.    Brief assessment and rationale for refill: APPROVE: prr                                         Comments:   Refill Center Care Gap Closure protocols temporarily suspended.   Requested Prescriptions   Pending Prescriptions Disp Refills    pravastatin (PRAVACHOL) 40 MG tablet [Pharmacy Med Name: PRAVASTATIN SODIUM 40 MG Tablet] 90 tablet 1     Sig: TAKE 1 TABLET EVERY DAY       Cardiovascular:  Antilipid - Statins Passed - 4/3/2020  3:28 PM        Passed - Patient is at least 18 years old        Passed - Office visit in past 12 months or future 90 days.     Recent Outpatient Visits            1 month ago Preoperative clearance    [facility] - Internal Medicine Nicolas Pacheco MD    1 month ago Cervical disc disease with myelopathy    [facility] - Neurosurgery 7th Fl Fabiola Vides MD    1 month ago Encounter for preventive health examination    [facility] - Internal Medicine Dagmar Bradford, NP    1 month ago Numbness and tingling in both hands    [facility] Internal Medicine Nicolas Pacheco MD    2 months ago RAHEEL (obstructive sleep apnea)    [facility] Sleep Medicine-[id number] Anabel Redman MD          Future Appointments              In 1 month Citlali Vazquez MD [facility] - Dermatology, [facility]                Passed - Lipid Panel completed in last 360 days     Lab Results   Component Value Date    CHOL 148 10/03/2019    HDL 49 10/03/2019    LDLCALC 88.0 10/03/2019    TRIG 55 10/03/2019             Passed - ALT is 94 or below and within 360 days     ALT   Date Value Ref Range Status   03/04/2020 17 10 - 44 U/L Final   10/03/2019 23 10 - 44 U/L Final   10/04/2018 30 10 - 44 U/L Final              Passed - AST is 54 or below and within 360 days     AST   Date Value Ref Range Status   03/04/2020 25 10 - 40 U/L Final   10/03/2019 24 10 - 40 U/L Final   10/04/2018 30 10 - 40 U/L Final               Appointments  past 12m or future 3m with  PCP    Date Provider   Last Visit   3/4/2020 Nicolas Pacheco MD   Next Visit   Visit date not found Nicolas Pacheco MD   .  ED visits in past 90 days: 0       Note composed:8:33 PM 04/07/2020

## 2020-04-16 DIAGNOSIS — M79.601 PAIN OF RIGHT UPPER EXTREMITY: ICD-10-CM

## 2020-04-16 DIAGNOSIS — Z01.818 PREOPERATIVE TESTING: Primary | ICD-10-CM

## 2020-04-24 NOTE — PROGRESS NOTES
Digital Medicine: Health  Follow-Up    Patient explained that he hasn't been taking his readings as frequently as he was but that he plans to take another reading shortly.    He explained that he has been consistently using his larger cuff size and making sure that it fits properly while keeping his arm at heart level, which he feels is giving him more accurate readings, although he still expresses concern that he has had readings during office visits that are lower.           INTERVENTION(S)  reviewed monitoring technique, encouragement/support, denied further coaching and denied questions      There are no preventive care reminders to display for this patient.    Last 5 Patient Entered Readings                                      Current 30 Day Average: 126/75     Recent Readings 4/16/2020 4/15/2020 4/15/2020 4/10/2020 4/6/2020    SBP (mmHg) 126 134 142 115 135    DBP (mmHg) 72 81 81 65 75    Pulse 53 46 49 51 59                  Screenings    SDOH

## 2020-05-20 ENCOUNTER — TELEPHONE (OUTPATIENT)
Dept: INTERNAL MEDICINE | Facility: CLINIC | Age: 70
End: 2020-05-20

## 2020-05-20 NOTE — TELEPHONE ENCOUNTER
Please see orders for patient's EKG and schedule after June 6th at Cardiology Department.  Thank you

## 2020-05-20 NOTE — TELEPHONE ENCOUNTER
Spoke with patient today.  He saw me for a preop on March 4th.  Nothing with his status has changed since that time.  He still needs an EKG and we will schedule that after June 6th, so that it is within 30 days of his surgical date.  I do not think he needs additional laboratory at this time.    Pending EKG, clear for surgery.

## 2020-05-20 NOTE — TELEPHONE ENCOUNTER
----- Message from Mirella Kaur RN sent at 5/18/2020  8:40 AM CDT -----  Patient was cleared by you on 3/4 for cervical spine discectomy and fusion for 3/23 with Dr. Vides. Surgery was postponed due to covid-19. It is rescheduled for 7/6. ( over 30 days) He will need a new medical clearance. Please schedule a preop clearance appt .   Thanks!

## 2020-05-21 ENCOUNTER — PATIENT MESSAGE (OUTPATIENT)
Dept: ADMINISTRATIVE | Facility: OTHER | Age: 70
End: 2020-05-21

## 2020-05-22 ENCOUNTER — TELEPHONE (OUTPATIENT)
Dept: INTERNAL MEDICINE | Facility: CLINIC | Age: 70
End: 2020-05-22

## 2020-06-01 RX ORDER — HYDROCHLOROTHIAZIDE 25 MG/1
TABLET ORAL
Qty: 90 TABLET | Refills: 1 | Status: SHIPPED | OUTPATIENT
Start: 2020-06-01 | End: 2020-11-02 | Stop reason: SDUPTHER

## 2020-06-01 NOTE — PROGRESS NOTES
Refill Authorization Note    is requesting a refill authorization.    Brief assessment and rationale for refill: APPROVE: PRR               Medication reconciliation completed: No                         Comments:      Requested Prescriptions   Pending Prescriptions Disp Refills    hydroCHLOROthiazide (HYDRODIURIL) 25 MG tablet [Pharmacy Med Name: HYDROCHLOROTHIAZIDE 25 MG Tablet] 90 tablet 1     Sig: TAKE 1 TABLET EVERY DAY       Cardiovascular: Diuretics - Thiazide Passed - 5/29/2020  6:24 PM        Passed - Patient is at least 18 years old        Passed - Last BP in normal range within 360 days.     BP Readings from Last 3 Encounters:   03/04/20 114/60   03/03/20 136/76   03/03/20 132/80              Passed - Office visit in past 12 months or future 90 days.     Recent Outpatient Visits            2 months ago Preoperative clearance    [facility] - Internal Medicine Nicolas Pacheco MD    3 months ago Cervical disc disease with myelopathy    [facility] - Neurosurgery 7th Fl Fabiola Vides MD    3 months ago Encounter for preventive health examination    [facility] - Internal Medicine Dagmar Bradford, NP    3 months ago Numbness and tingling in both hands    [facility] - Internal Medicine Nicolas Pacheco MD    4 months ago RAHEEL (obstructive sleep apnea)    [facility] Sleep Medicine-[id number] Anabel Redman MD          Future Appointments              In 1 week EKG, APPT [facility] - EKG, [facility]    In 1 week MD Abdiel Naq[facility] - Dermatology, [facility]    In 3 weeks LAB, APPOINTMENT [place] [facility]    In 1 month MD Abdiel Dominguez [facility] - Internal Medicine, [facility]                Passed - Ca in normal range and within 360 days     Calcium   Date Value Ref Range Status   03/04/2020 10.2 8.7 - 10.5 mg/dL Final   11/01/2019 10.2 8.7 - 10.5 mg/dL Final   10/03/2019 10.2 8.7 - 10.5 mg/dL Final              Passed - Cr is 1.3 or below and within 180 days      Creatinine   Date Value Ref Range Status   03/04/2020 1.0 0.5 - 1.4 mg/dL Final   02/26/2020 1.1 0.5 - 1.4 mg/dL Final   11/01/2019 1.2 0.5 - 1.4 mg/dL Final              Passed - K in normal range and within 180 days     Potassium   Date Value Ref Range Status   03/04/2020 3.5 3.5 - 5.1 mmol/L Final   11/01/2019 3.8 3.5 - 5.1 mmol/L Final   10/03/2019 3.4 (L) 3.5 - 5.1 mmol/L Final              Passed - Na is between 130 and 148 and within 180 days     Sodium   Date Value Ref Range Status   03/04/2020 142 136 - 145 mmol/L Final   11/01/2019 139 136 - 145 mmol/L Final   10/03/2019 137 136 - 145 mmol/L Final              Passed - eGFR within 180 days     eGFR if non    Date Value Ref Range Status   03/04/2020 >60.0 >60 mL/min/1.73 m^2 Final     Comment:     Calculation used to obtain the estimated glomerular filtration  rate (eGFR) is the CKD-EPI equation.      02/26/2020 >60 >60 mL/min/1.73 m^2 Final     Comment:     Calculation used to obtain the estimated glomerular filtration  rate (eGFR) is the CKD-EPI equation.      11/01/2019 >60 >60 mL/min/1.73 m^2 Final     Comment:     Calculation used to obtain the estimated glomerular filtration  rate (eGFR) is the CKD-EPI equation.        eGFR if    Date Value Ref Range Status   03/04/2020 >60.0 >60 mL/min/1.73 m^2 Final   02/26/2020 >60 >60 mL/min/1.73 m^2 Final   11/01/2019 >60 >60 mL/min/1.73 m^2 Final               Appointments  past 12m or future 3m with PCP    Date Provider   Last Visit   3/4/2020 Nicolas Pacheco MD   Next Visit   7/1/2020 Nicolas Pacheco MD   ED visits in past 90 days: 0     Note composed:8:13 AM 06/01/2020

## 2020-06-04 ENCOUNTER — PATIENT OUTREACH (OUTPATIENT)
Dept: OTHER | Facility: OTHER | Age: 70
End: 2020-06-04

## 2020-06-05 NOTE — PRE-PROCEDURE INSTRUCTIONS
Patient  Stated his medical condition and medications are not changed since we spoke on 4/16. Will need medical clearance from your PCP, Dr. Nicolas Pacheco. Has appt for 7/1. Will need labs scheduled for 6/22.Preop instructions given. Hold asa, asa containing products, nsaids, vitamins and supplements one week prior to surgery. Medication instructions given. Shower the night before surgery and the morning of surgery with an antibacterial soap( hibiclens or dial antibacterial soap).  Nothing on the skin once shower. Do not apply any deodorant, lotion, powder, perfume,or aftershave.  No jewelry going to surgery.  May have solid foods, gum, and hard candy until 8 hours before surgery/procedure time.  May have clear liquids( water, gatorade, powerade or apple juice) until 2 hours prior to surgery/procedure time.  No red drinks. If in doubt , drink water. Nothing to drink 2 hours before arrival time for surgery/procedure. If you are told to take medication in the morning of surgery, it may be taken with a sip of water. If your doctor tells you something different pertaining to when to stop eating or drinking, follow your doctor's instructions. Call for changes in status or questions. Stated knows where the surgery center is located Verbalizes understanding. (Sent preop and med instructions to my chart).

## 2020-06-11 ENCOUNTER — HOSPITAL ENCOUNTER (OUTPATIENT)
Dept: CARDIOLOGY | Facility: CLINIC | Age: 70
Discharge: HOME | End: 2020-06-11
Attending: FAMILY MEDICINE
Payer: MEDICARE

## 2020-06-11 ENCOUNTER — OFFICE VISIT (OUTPATIENT)
Dept: DERMATOLOGY | Facility: CLINIC | Age: 70
End: 2020-06-11
Attending: FAMILY MEDICINE
Payer: MEDICARE

## 2020-06-11 DIAGNOSIS — L82.1 SK (SEBORRHEIC KERATOSIS): Primary | ICD-10-CM

## 2020-06-11 DIAGNOSIS — D23.9 PAPILLOMA OF SKIN: ICD-10-CM

## 2020-06-11 DIAGNOSIS — L29.9 PRURITUS: ICD-10-CM

## 2020-06-11 DIAGNOSIS — Z01.818 PREOPERATIVE CLEARANCE: ICD-10-CM

## 2020-06-11 PROCEDURE — 1101F PR PT FALLS ASSESS DOC 0-1 FALLS W/OUT INJ PAST YR: ICD-10-PCS | Mod: HCNC,CPTII,S$GLB, | Performed by: DERMATOLOGY

## 2020-06-11 PROCEDURE — 93005 ELECTROCARDIOGRAM TRACING: CPT | Mod: HCNC,S$GLB,, | Performed by: FAMILY MEDICINE

## 2020-06-11 PROCEDURE — 99202 PR OFFICE/OUTPT VISIT, NEW, LEVL II, 15-29 MIN: ICD-10-PCS | Mod: HCNC,S$GLB,, | Performed by: DERMATOLOGY

## 2020-06-11 PROCEDURE — 1101F PT FALLS ASSESS-DOCD LE1/YR: CPT | Mod: HCNC,CPTII,S$GLB, | Performed by: DERMATOLOGY

## 2020-06-11 PROCEDURE — 99999 PR PBB SHADOW E&M-EST. PATIENT-LVL III: ICD-10-PCS | Mod: PBBFAC,HCNC,, | Performed by: DERMATOLOGY

## 2020-06-11 PROCEDURE — 93010 EKG 12-LEAD: ICD-10-PCS | Mod: HCNC,S$GLB,, | Performed by: INTERNAL MEDICINE

## 2020-06-11 PROCEDURE — 1125F AMNT PAIN NOTED PAIN PRSNT: CPT | Mod: HCNC,S$GLB,, | Performed by: DERMATOLOGY

## 2020-06-11 PROCEDURE — 99999 PR PBB SHADOW E&M-EST. PATIENT-LVL III: CPT | Mod: PBBFAC,HCNC,, | Performed by: DERMATOLOGY

## 2020-06-11 PROCEDURE — 99202 OFFICE O/P NEW SF 15 MIN: CPT | Mod: HCNC,S$GLB,, | Performed by: DERMATOLOGY

## 2020-06-11 PROCEDURE — 1159F MED LIST DOCD IN RCRD: CPT | Mod: HCNC,S$GLB,, | Performed by: DERMATOLOGY

## 2020-06-11 PROCEDURE — 1125F PR PAIN SEVERITY QUANTIFIED, PAIN PRESENT: ICD-10-PCS | Mod: HCNC,S$GLB,, | Performed by: DERMATOLOGY

## 2020-06-11 PROCEDURE — 1159F PR MEDICATION LIST DOCUMENTED IN MEDICAL RECORD: ICD-10-PCS | Mod: HCNC,S$GLB,, | Performed by: DERMATOLOGY

## 2020-06-11 PROCEDURE — 93010 ELECTROCARDIOGRAM REPORT: CPT | Mod: HCNC,S$GLB,, | Performed by: INTERNAL MEDICINE

## 2020-06-11 PROCEDURE — 93005 EKG 12-LEAD: ICD-10-PCS | Mod: HCNC,S$GLB,, | Performed by: FAMILY MEDICINE

## 2020-06-11 NOTE — LETTER
June 11, 2020      Nicolas Pacheco MD  [address]  [facility] [address]           [facility] - Dermatology  [address]  [facility] [address]  Phone: [phone]  Fax: [phone]          Patient: Domonique Kellogg   MR Number: [id number]   YOB: 1950   Date of Visit: 6/11/2020       Dear Dr. Nicolas Pacheco:    Thank you for referring Domonique Kellogg to me for evaluation. Attached you will find relevant portions of my assessment and plan of care.    If you have questions, please do not hesitate to call me. I look forward to following Domonique Kellogg along with you.    Sincerely,    Citlali Vazquez MD    Enclosure  CC:  No Recipients    If you would like to receive this communication electronically, please contact [email or web address] or [phone] to request more information on [employer] access.    For providers and/or their staff who would like to refer a patient to [facility], please contact us through our one-stop-shop provider referral line, [facility], at [phone].    If you feel you have received this communication in error or would no longer like to receive these types of communications, please e-mail [email or web address]

## 2020-06-11 NOTE — PROGRESS NOTES
Subjective:       Patient ID:  Domonique Kellogg is a 70 y.o. male who presents for   Chief Complaint   Patient presents with    Rash     back    Mole     right eyebrow     Patient complains of lesion(s)  Location: right lateral eyebrow  Duration: 10 years  Symptoms: growing x 2 - 3 years  Relieving factors/Previous treatments: none    Patient complains of lesion(s)  Location: rash on back  Duration: 2 months  Symptoms: itchy  Relieving factors/Previous treatments: benadryl cream or spray - helps with itching          Review of Systems   Skin: Positive for itching and rash.   Hematologic/Lymphatic: Does not bruise/bleed easily.   Allergic/Immunologic: Positive for environmental allergies (occ).        Objective:    Physical Exam   Constitutional: He appears well-developed and well-nourished. No distress.   Neurological: He is alert and oriented to person, place, and time. He is not disoriented.   Psychiatric: He has a normal mood and affect.   Skin:   Areas Examined (abnormalities noted in diagram):   Head / Face Inspection Performed  Chest / Axilla Inspection Performed  Back Inspection Performed                       Diagram Legend     Erythematous scaling macule/papule c/w actinic keratosis       Vascular papule c/w angioma      Pigmented verrucoid papule/plaque c/w seborrheic keratosis      Yellow umbilicated papule c/w sebaceous hyperplasia      Irregularly shaped tan macule c/w lentigo     1-2 mm smooth white papules consistent with Milia      Movable subcutaneous cyst with punctum c/w epidermal inclusion cyst      Subcutaneous movable cyst c/w pilar cyst      Firm pink to brown papule c/w dermatofibroma      Pedunculated fleshy papule(s) c/w skin tag(s)      Evenly pigmented macule c/w junctional nevus     Mildly variegated pigmented, slightly irregular-bordered macule c/w mildly atypical nevus      Flesh colored to evenly pigmented papule c/w intradermal nevus       Pink pearly papule/plaque c/w basal cell  carcinoma      Erythematous hyperkeratotic cursted plaque c/w SCC      Surgical scar with no sign of skin cancer recurrence      Open and closed comedones      Inflammatory papules and pustules      Verrucoid papule consistent consistent with wart     Erythematous eczematous patches and plaques     Dystrophic onycholytic nail with subungual debris c/w onychomycosis     Umbilicated papule    Erythematous-base heme-crusted tan verrucoid plaque consistent with inflamed seborrheic keratosis     Erythematous Silvery Scaling Plaque c/w Psoriasis     See annotation      Assessment / Plan:        SK (seborrheic keratosis)  -     Ambulatory referral/consult to Dermatology    These are benign inherited growths without a malignant potential. Reassurance given to patient. No treatment is necessary.       Pruritus - mid back   No cutaneous eruption  Recommend apply cerave anti itch lotion to skin as often as needed. May store in refrigerator for additional cooling properties.        Papilloma of skin - left axilla  Reassurance.              Follow up if symptoms worsen or fail to improve.

## 2020-06-11 NOTE — PATIENT INSTRUCTIONS
SEBORRHEIC KERATOSES        What causes seborrheic keratoses?    Seborrheic keratoses are harmless, common skin growths that first appear during adult life.  As time goes by, more growths appear.  Some persons have a very large number of them.  Seborrheic keratoses appear on both covered and uncovered parts of the body; they are not caused by sunlight.  The tendency to develop seborrheic keratoses is inherited.    Seborrheic keratoses are harmless and never become malignant.  They begin as slightly raised, light brown spots.  Gradually they thicken and take on a rough wartlike surface.  They slowly darken and may turn black.  These color changes are harmless.  Seborrheic keratoses are superficial and look as if they were stuck on the skin.  Persons who have had several seborrheic keratoses can usually recognize this type of benign growth.  However, if you are concerned or unsure about any growth, consult me.    Treatment    Seborrheic keratoses can easily be removed in the office.  The only reason for removing a seborrheic keratosis is your wish to get rid of it.    Recommend apply cerave anti itch lotion to skin as often as needed. May store in refrigerator for additional cooling properties.

## 2020-06-17 ENCOUNTER — PATIENT OUTREACH (OUTPATIENT)
Dept: ADMINISTRATIVE | Facility: HOSPITAL | Age: 70
End: 2020-06-17

## 2020-06-22 ENCOUNTER — LAB VISIT (OUTPATIENT)
Dept: LAB | Facility: HOSPITAL | Age: 70
End: 2020-06-22
Attending: ANESTHESIOLOGY
Payer: MEDICARE

## 2020-06-22 DIAGNOSIS — M79.601 PAIN OF RIGHT UPPER EXTREMITY: ICD-10-CM

## 2020-06-22 DIAGNOSIS — Z01.818 PREOPERATIVE TESTING: ICD-10-CM

## 2020-06-22 LAB
ABO + RH BLD: NORMAL
ANION GAP SERPL CALC-SCNC: 9 MMOL/L (ref 8–16)
BASOPHILS # BLD AUTO: 0.06 K/UL (ref 0–0.2)
BASOPHILS NFR BLD: 1.1 % (ref 0–1.9)
BLD GP AB SCN CELLS X3 SERPL QL: NORMAL
BUN SERPL-MCNC: 14 MG/DL (ref 8–23)
CALCIUM SERPL-MCNC: 10.4 MG/DL (ref 8.7–10.5)
CHLORIDE SERPL-SCNC: 105 MMOL/L (ref 95–110)
CO2 SERPL-SCNC: 26 MMOL/L (ref 23–29)
CREAT SERPL-MCNC: 1 MG/DL (ref 0.5–1.4)
DIFFERENTIAL METHOD: ABNORMAL
EOSINOPHIL # BLD AUTO: 0.3 K/UL (ref 0–0.5)
EOSINOPHIL NFR BLD: 5.9 % (ref 0–8)
ERYTHROCYTE [DISTWIDTH] IN BLOOD BY AUTOMATED COUNT: 13.8 % (ref 11.5–14.5)
EST. GFR  (AFRICAN AMERICAN): >60 ML/MIN/1.73 M^2
EST. GFR  (NON AFRICAN AMERICAN): >60 ML/MIN/1.73 M^2
GLUCOSE SERPL-MCNC: 114 MG/DL (ref 70–110)
HCT VFR BLD AUTO: 43.4 % (ref 40–54)
HGB BLD-MCNC: 13.5 G/DL (ref 14–18)
IMM GRANULOCYTES # BLD AUTO: 0.02 K/UL (ref 0–0.04)
IMM GRANULOCYTES NFR BLD AUTO: 0.4 % (ref 0–0.5)
INR PPP: 1 (ref 0.8–1.2)
LYMPHOCYTES # BLD AUTO: 1.9 K/UL (ref 1–4.8)
LYMPHOCYTES NFR BLD: 33.6 % (ref 18–48)
MCH RBC QN AUTO: 29.9 PG (ref 27–31)
MCHC RBC AUTO-ENTMCNC: 31.1 G/DL (ref 32–36)
MCV RBC AUTO: 96 FL (ref 82–98)
MONOCYTES # BLD AUTO: 0.4 K/UL (ref 0.3–1)
MONOCYTES NFR BLD: 7.7 % (ref 4–15)
NEUTROPHILS # BLD AUTO: 2.9 K/UL (ref 1.8–7.7)
NEUTROPHILS NFR BLD: 51.3 % (ref 38–73)
NRBC BLD-RTO: 0 /100 WBC
PLATELET # BLD AUTO: 250 K/UL (ref 150–350)
PMV BLD AUTO: 10.5 FL (ref 9.2–12.9)
POTASSIUM SERPL-SCNC: 4 MMOL/L (ref 3.5–5.1)
PROTHROMBIN TIME: 10.5 SEC (ref 9–12.5)
RBC # BLD AUTO: 4.52 M/UL (ref 4.6–6.2)
SODIUM SERPL-SCNC: 140 MMOL/L (ref 136–145)
WBC # BLD AUTO: 5.59 K/UL (ref 3.9–12.7)

## 2020-06-22 PROCEDURE — 80048 BASIC METABOLIC PNL TOTAL CA: CPT | Mod: HCNC

## 2020-06-22 PROCEDURE — 36415 COLL VENOUS BLD VENIPUNCTURE: CPT | Mod: HCNC

## 2020-06-22 PROCEDURE — 85610 PROTHROMBIN TIME: CPT | Mod: HCNC

## 2020-06-22 PROCEDURE — 86901 BLOOD TYPING SEROLOGIC RH(D): CPT | Mod: HCNC

## 2020-06-22 PROCEDURE — 85025 COMPLETE CBC W/AUTO DIFF WBC: CPT | Mod: HCNC

## 2020-07-01 ENCOUNTER — OFFICE VISIT (OUTPATIENT)
Dept: INTERNAL MEDICINE | Facility: CLINIC | Age: 70
End: 2020-07-01
Attending: FAMILY MEDICINE
Payer: MEDICARE

## 2020-07-01 ENCOUNTER — TELEPHONE (OUTPATIENT)
Dept: NEUROSURGERY | Facility: CLINIC | Age: 70
End: 2020-07-01

## 2020-07-01 VITALS
SYSTOLIC BLOOD PRESSURE: 126 MMHG | BODY MASS INDEX: 38.42 KG/M2 | WEIGHT: 253.5 LBS | DIASTOLIC BLOOD PRESSURE: 64 MMHG | HEIGHT: 68 IN | HEART RATE: 62 BPM | OXYGEN SATURATION: 97 %

## 2020-07-01 DIAGNOSIS — I77.9 BILATERAL CAROTID ARTERY DISEASE, UNSPECIFIED TYPE: ICD-10-CM

## 2020-07-01 DIAGNOSIS — Z01.818 PRE-OP TESTING: Primary | ICD-10-CM

## 2020-07-01 DIAGNOSIS — E78.5 HYPERLIPIDEMIA, UNSPECIFIED HYPERLIPIDEMIA TYPE: ICD-10-CM

## 2020-07-01 DIAGNOSIS — I10 HYPERTENSION, ESSENTIAL: ICD-10-CM

## 2020-07-01 DIAGNOSIS — Z01.818 PREOPERATIVE CLEARANCE: Primary | ICD-10-CM

## 2020-07-01 DIAGNOSIS — M50.00 CERVICAL DISC DISEASE WITH MYELOPATHY: ICD-10-CM

## 2020-07-01 DIAGNOSIS — Z78.9 KNOWN HEALTH PROBLEMS: NONE: ICD-10-CM

## 2020-07-01 DIAGNOSIS — I25.10 CAD IN NATIVE ARTERY: ICD-10-CM

## 2020-07-01 PROCEDURE — 1159F MED LIST DOCD IN RCRD: CPT | Mod: HCNC,S$GLB,, | Performed by: FAMILY MEDICINE

## 2020-07-01 PROCEDURE — 1101F PT FALLS ASSESS-DOCD LE1/YR: CPT | Mod: HCNC,CPTII,S$GLB, | Performed by: FAMILY MEDICINE

## 2020-07-01 PROCEDURE — 1159F PR MEDICATION LIST DOCUMENTED IN MEDICAL RECORD: ICD-10-PCS | Mod: HCNC,S$GLB,, | Performed by: FAMILY MEDICINE

## 2020-07-01 PROCEDURE — 99999 PR PBB SHADOW E&M-EST. PATIENT-LVL IV: CPT | Mod: PBBFAC,HCNC,, | Performed by: FAMILY MEDICINE

## 2020-07-01 PROCEDURE — 1125F PR PAIN SEVERITY QUANTIFIED, PAIN PRESENT: ICD-10-PCS | Mod: HCNC,S$GLB,, | Performed by: FAMILY MEDICINE

## 2020-07-01 PROCEDURE — 3078F PR MOST RECENT DIASTOLIC BLOOD PRESSURE < 80 MM HG: ICD-10-PCS | Mod: HCNC,CPTII,S$GLB, | Performed by: FAMILY MEDICINE

## 2020-07-01 PROCEDURE — 1101F PR PT FALLS ASSESS DOC 0-1 FALLS W/OUT INJ PAST YR: ICD-10-PCS | Mod: HCNC,CPTII,S$GLB, | Performed by: FAMILY MEDICINE

## 2020-07-01 PROCEDURE — 99999 PR PBB SHADOW E&M-EST. PATIENT-LVL IV: ICD-10-PCS | Mod: PBBFAC,HCNC,, | Performed by: FAMILY MEDICINE

## 2020-07-01 PROCEDURE — 99214 OFFICE O/P EST MOD 30 MIN: CPT | Mod: HCNC,S$GLB,, | Performed by: FAMILY MEDICINE

## 2020-07-01 PROCEDURE — 1125F AMNT PAIN NOTED PAIN PRSNT: CPT | Mod: HCNC,S$GLB,, | Performed by: FAMILY MEDICINE

## 2020-07-01 PROCEDURE — 3008F BODY MASS INDEX DOCD: CPT | Mod: HCNC,CPTII,S$GLB, | Performed by: FAMILY MEDICINE

## 2020-07-01 PROCEDURE — 3074F PR MOST RECENT SYSTOLIC BLOOD PRESSURE < 130 MM HG: ICD-10-PCS | Mod: HCNC,CPTII,S$GLB, | Performed by: FAMILY MEDICINE

## 2020-07-01 PROCEDURE — 3074F SYST BP LT 130 MM HG: CPT | Mod: HCNC,CPTII,S$GLB, | Performed by: FAMILY MEDICINE

## 2020-07-01 PROCEDURE — 3008F PR BODY MASS INDEX (BMI) DOCUMENTED: ICD-10-PCS | Mod: HCNC,CPTII,S$GLB, | Performed by: FAMILY MEDICINE

## 2020-07-01 PROCEDURE — 3078F DIAST BP <80 MM HG: CPT | Mod: HCNC,CPTII,S$GLB, | Performed by: FAMILY MEDICINE

## 2020-07-01 PROCEDURE — 99214 PR OFFICE/OUTPT VISIT, EST, LEVL IV, 30-39 MIN: ICD-10-PCS | Mod: HCNC,S$GLB,, | Performed by: FAMILY MEDICINE

## 2020-07-01 NOTE — PROGRESS NOTES
Subjective:       Patient ID: Domonique Kellogg is a 70 y.o. male.    Chief Complaint: Pre-op Exam (surgery [facility]han 7-6-2020)    Preoperative clearance for delayed cervical decompressive surgery.  Dr. Vides. We p(previously reviewed MRI scans.  Neuro sx have persisted. Concern for the myelopathy component of scans.    He can easily walk several blocks and navigate stairs with no chest pain or dyspnea.  He had a syncopal episode in the past, [id number]. Non-obstructive luminal irregularities on cardiac catheterization 2015.  Last Cardiology eval 2018 - Dr. Montgomery concurred w/ pravastatin vice hi dose statin based on LDL in the near 80. Current medications are noted. Controlled hypertension.  Can easily perform 4 mets of activity without symptoms - walks dog. 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       Review of Systems   Constitutional: Negative for activity change, chills, fatigue, fever and unexpected weight change.   HENT: Negative for congestion, hearing loss, rhinorrhea and trouble swallowing.    Eyes: Negative for discharge, redness and visual disturbance.   Respiratory: Negative for cough, chest tightness, shortness of breath and wheezing.    Cardiovascular: Negative for chest pain, palpitations and leg swelling.   Gastrointestinal: Negative for abdominal pain, blood in stool, constipation, diarrhea and vomiting.   Endocrine: Negative for polydipsia and polyuria.   Genitourinary: Negative for difficulty urinating, hematuria and urgency.   Musculoskeletal: Positive for arthralgias and neck pain. Negative for back pain, gait problem, joint swelling and myalgias.   Skin: Negative for color change and rash.   Neurological: Positive for numbness. Negative for tremors, speech difficulty, weakness and headaches.   Hematological: Negative for adenopathy. Does not bruise/bleed easily.   Psychiatric/Behavioral: Negative for behavioral problems, confusion, dysphoric mood and sleep disturbance. The patient is not nervous/anxious.         Objective:      Physical Exam    Assessment:       1. Preoperative clearance    2. Cervical disc disease with myelopathy    3. Hypertension, essential    4. Hyperlipidemia, unspecified hyperlipidemia type    5. CAD in native artery    6. Bilateral carotid artery disease, unspecified type        Plan:     Medication List with Changes/Refills   Current Medications    ASPIRIN (ECOTRIN) 81 MG EC TABLET    Take 81 mg by mouth every morning.     HYDROCHLOROTHIAZIDE (HYDRODIURIL) 25 MG TABLET    TAKE 1 TABLET EVERY DAY    IBUPROFEN (ADVIL,MOTRIN) 200 MG TABLET    Take 400 mg by mouth every 6 (six) hours as needed for Pain.     MAGNESIUM OXIDE (MAG-OX) 400 MG (241.3 MG MAGNESIUM) TABLET    Take 400 mg by mouth every morning.     MULTIVITAMIN WITH MINERALS TABLET    Take 1 tablet by mouth every morning.     POTASSIUM CHLORIDE SA (K-DUR,KLOR-CON) 20 MEQ TABLET    Take 2 tablets (40 mEq total) by mouth once daily.    PRAVASTATIN (PRAVACHOL) 40 MG TABLET    TAKE 1 TABLET EVERY DAY     Domonique was seen today for pre-op exam.    Diagnoses and all orders for this visit:    Preoperative clearance    Cervical disc disease with myelopathy    Hypertension, essential    Hyperlipidemia, unspecified hyperlipidemia type    CAD in native artery  Comments:  Cath 2015 on-obstructive, last cardiology visit 2018    Bilateral carotid artery disease, unspecified type  Comments:  due for surveillance US, con't statin and ASA      See meds, orders, follow up, routing and instructions sections of encounter and AVS. Discussed with patient and provided on AVS.    Clear for surgery and cc to referring physician. May discontinue ASA 5 days preop, but would resume immediately post op once hemostatis achieved.

## 2020-07-03 ENCOUNTER — LAB VISIT (OUTPATIENT)
Dept: URGENT CARE | Facility: CLINIC | Age: 70
End: 2020-07-03
Payer: MEDICARE

## 2020-07-03 VITALS — TEMPERATURE: 97 F

## 2020-07-03 DIAGNOSIS — Z01.818 PRE-OP TESTING: ICD-10-CM

## 2020-07-03 DIAGNOSIS — Z78.9 KNOWN HEALTH PROBLEMS: NONE: ICD-10-CM

## 2020-07-03 PROCEDURE — 99211 PR OFFICE/OUTPT VISIT, EST, LEVL I: ICD-10-PCS | Mod: S$GLB,,, | Performed by: FAMILY MEDICINE

## 2020-07-03 PROCEDURE — U0003 INFECTIOUS AGENT DETECTION BY NUCLEIC ACID (DNA OR RNA); SEVERE ACUTE RESPIRATORY SYNDROME CORONAVIRUS 2 (SARS-COV-2) (CORONAVIRUS DISEASE [COVID-19]), AMPLIFIED PROBE TECHNIQUE, MAKING USE OF HIGH THROUGHPUT TECHNOLOGIES AS DESCRIBED BY CMS-2020-01-R: HCPCS | Mod: HCNC

## 2020-07-03 PROCEDURE — 99211 OFF/OP EST MAY X REQ PHY/QHP: CPT | Mod: S$GLB,,, | Performed by: FAMILY MEDICINE

## 2020-07-04 LAB — SARS-COV-2 RNA RESP QL NAA+PROBE: NEGATIVE

## 2020-07-06 ENCOUNTER — ANESTHESIA (OUTPATIENT)
Dept: SURGERY | Facility: HOSPITAL | Age: 70
End: 2020-07-06
Payer: MEDICARE

## 2020-07-06 ENCOUNTER — HOSPITAL ENCOUNTER (OUTPATIENT)
Facility: HOSPITAL | Age: 70
Discharge: HOME | End: 2020-07-07
Attending: NEUROLOGICAL SURGERY | Admitting: NEUROLOGICAL SURGERY
Payer: MEDICARE

## 2020-07-06 ENCOUNTER — ANESTHESIA EVENT (OUTPATIENT)
Dept: SURGERY | Facility: HOSPITAL | Age: 70
End: 2020-07-06
Payer: MEDICARE

## 2020-07-06 DIAGNOSIS — M48.02 CERVICAL STENOSIS OF SPINAL CANAL: ICD-10-CM

## 2020-07-06 LAB
ABO + RH BLD: NORMAL
ANION GAP SERPL CALC-SCNC: 9 MMOL/L (ref 8–16)
APTT BLDCRRT: 27.5 SEC (ref 21–32)
BASOPHILS # BLD AUTO: 0.07 K/UL (ref 0–0.2)
BASOPHILS NFR BLD: 1.2 % (ref 0–1.9)
BLD GP AB SCN CELLS X3 SERPL QL: NORMAL
BUN SERPL-MCNC: 11 MG/DL (ref 8–23)
CALCIUM SERPL-MCNC: 9.8 MG/DL (ref 8.7–10.5)
CHLORIDE SERPL-SCNC: 106 MMOL/L (ref 95–110)
CO2 SERPL-SCNC: 25 MMOL/L (ref 23–29)
CREAT SERPL-MCNC: 1 MG/DL (ref 0.5–1.4)
DIFFERENTIAL METHOD: ABNORMAL
EOSINOPHIL # BLD AUTO: 0.3 K/UL (ref 0–0.5)
EOSINOPHIL NFR BLD: 5.3 % (ref 0–8)
ERYTHROCYTE [DISTWIDTH] IN BLOOD BY AUTOMATED COUNT: 13.3 % (ref 11.5–14.5)
EST. GFR  (AFRICAN AMERICAN): >60 ML/MIN/1.73 M^2
EST. GFR  (NON AFRICAN AMERICAN): >60 ML/MIN/1.73 M^2
GLUCOSE SERPL-MCNC: 115 MG/DL (ref 70–110)
HCT VFR BLD AUTO: 39.7 % (ref 40–54)
HGB BLD-MCNC: 12.6 G/DL (ref 14–18)
IMM GRANULOCYTES # BLD AUTO: 0.02 K/UL (ref 0–0.04)
IMM GRANULOCYTES NFR BLD AUTO: 0.4 % (ref 0–0.5)
INR PPP: 1 (ref 0.8–1.2)
LYMPHOCYTES # BLD AUTO: 1.9 K/UL (ref 1–4.8)
LYMPHOCYTES NFR BLD: 33.4 % (ref 18–48)
MCH RBC QN AUTO: 29.9 PG (ref 27–31)
MCHC RBC AUTO-ENTMCNC: 31.7 G/DL (ref 32–36)
MCV RBC AUTO: 94 FL (ref 82–98)
MONOCYTES # BLD AUTO: 0.4 K/UL (ref 0.3–1)
MONOCYTES NFR BLD: 7.8 % (ref 4–15)
NEUTROPHILS # BLD AUTO: 2.9 K/UL (ref 1.8–7.7)
NEUTROPHILS NFR BLD: 51.9 % (ref 38–73)
NRBC BLD-RTO: 0 /100 WBC
PLATELET # BLD AUTO: 255 K/UL (ref 150–350)
PMV BLD AUTO: 10.4 FL (ref 9.2–12.9)
POTASSIUM SERPL-SCNC: 3.2 MMOL/L (ref 3.5–5.1)
PROTHROMBIN TIME: 10.7 SEC (ref 9–12.5)
RBC # BLD AUTO: 4.21 M/UL (ref 4.6–6.2)
SODIUM SERPL-SCNC: 140 MMOL/L (ref 136–145)
WBC # BLD AUTO: 5.63 K/UL (ref 3.9–12.7)

## 2020-07-06 PROCEDURE — 36000710: Mod: HCNC | Performed by: NEUROLOGICAL SURGERY

## 2020-07-06 PROCEDURE — 85730 THROMBOPLASTIN TIME PARTIAL: CPT | Mod: HCNC

## 2020-07-06 PROCEDURE — 36620 INSERTION CATHETER ARTERY: CPT | Mod: 59,,, | Performed by: ANESTHESIOLOGY

## 2020-07-06 PROCEDURE — 94010 BREATHING CAPACITY TEST: CPT | Mod: HCNC

## 2020-07-06 PROCEDURE — C1729 CATH, DRAINAGE: HCPCS | Mod: HCNC | Performed by: NEUROLOGICAL SURGERY

## 2020-07-06 PROCEDURE — 37000008 HC ANESTHESIA 1ST 15 MINUTES: Mod: HCNC | Performed by: NEUROLOGICAL SURGERY

## 2020-07-06 PROCEDURE — D9220A PRA ANESTHESIA: Mod: HCNC,CRNA,, | Performed by: NURSE ANESTHETIST, CERTIFIED REGISTERED

## 2020-07-06 PROCEDURE — D9220A PRA ANESTHESIA: ICD-10-PCS | Mod: HCNC,ANES,, | Performed by: ANESTHESIOLOGY

## 2020-07-06 PROCEDURE — 22551 PR ARTHRODESIS ANT INTERBODY INC DISCECTOMY, CERVICAL BELOW C2: ICD-10-PCS | Mod: HCNC,,, | Performed by: NEUROLOGICAL SURGERY

## 2020-07-06 PROCEDURE — 71000015 HC POSTOP RECOV 1ST HR: Mod: HCNC | Performed by: NEUROLOGICAL SURGERY

## 2020-07-06 PROCEDURE — 22853 PR INSERT BIOMECH DEV W/INTERBODY ARTHRODESIS, EA CONTIGUOUS DEFECT: ICD-10-PCS | Mod: HCNC,,, | Performed by: NEUROLOGICAL SURGERY

## 2020-07-06 PROCEDURE — 63600175 PHARM REV CODE 636 W HCPCS: Mod: HCNC | Performed by: ANESTHESIOLOGY

## 2020-07-06 PROCEDURE — 25000003 PHARM REV CODE 250: Mod: HCNC | Performed by: ANESTHESIOLOGY

## 2020-07-06 PROCEDURE — 94002 VENT MGMT INPAT INIT DAY: CPT | Mod: HCNC

## 2020-07-06 PROCEDURE — 63600175 PHARM REV CODE 636 W HCPCS: Mod: HCNC | Performed by: STUDENT IN AN ORGANIZED HEALTH CARE EDUCATION/TRAINING PROGRAM

## 2020-07-06 PROCEDURE — 71000033 HC RECOVERY, INTIAL HOUR: Mod: HCNC | Performed by: NEUROLOGICAL SURGERY

## 2020-07-06 PROCEDURE — 20930 PR ALLOGRAFT FOR SPINE SURGERY ONLY MORSELIZED: ICD-10-PCS | Mod: HCNC,,, | Performed by: NEUROLOGICAL SURGERY

## 2020-07-06 PROCEDURE — 36000711: Mod: HCNC | Performed by: NEUROLOGICAL SURGERY

## 2020-07-06 PROCEDURE — 22853 INSJ BIOMECHANICAL DEVICE: CPT | Mod: HCNC,,, | Performed by: NEUROLOGICAL SURGERY

## 2020-07-06 PROCEDURE — 27201037 HC PRESSURE MONITORING SET UP: Mod: HCNC

## 2020-07-06 PROCEDURE — 27800903 OPTIME MED/SURG SUP & DEVICES OTHER IMPLANTS: Mod: HCNC | Performed by: NEUROLOGICAL SURGERY

## 2020-07-06 PROCEDURE — 22552 PR ARTHRODESIS ANT INTERBODY INC DISCECTOMY, CERVICAL BELOW C2 EACH ADDL: ICD-10-PCS | Mod: HCNC,,, | Performed by: NEUROLOGICAL SURGERY

## 2020-07-06 PROCEDURE — C1713 ANCHOR/SCREW BN/BN,TIS/BN: HCPCS | Mod: HCNC | Performed by: NEUROLOGICAL SURGERY

## 2020-07-06 PROCEDURE — 27201423 OPTIME MED/SURG SUP & DEVICES STERILE SUPPLY: Mod: HCNC | Performed by: NEUROLOGICAL SURGERY

## 2020-07-06 PROCEDURE — 22552 ARTHRD ANT NTRBD CERVICAL EA: CPT | Mod: HCNC,,, | Performed by: NEUROLOGICAL SURGERY

## 2020-07-06 PROCEDURE — 99900035 HC TECH TIME PER 15 MIN (STAT): Mod: HCNC

## 2020-07-06 PROCEDURE — 36620 PR INSERT CATH,ART,PERCUT,SHORTTERM: ICD-10-PCS | Mod: 59,,, | Performed by: ANESTHESIOLOGY

## 2020-07-06 PROCEDURE — 71000016 HC POSTOP RECOV ADDL HR: Mod: HCNC | Performed by: NEUROLOGICAL SURGERY

## 2020-07-06 PROCEDURE — 99900017 HC EXTUBATION W/PARAMETERS (STAT): Mod: HCNC

## 2020-07-06 PROCEDURE — 94150 VITAL CAPACITY TEST: CPT | Mod: HCNC

## 2020-07-06 PROCEDURE — 27000221 HC OXYGEN, UP TO 24 HOURS: Mod: HCNC

## 2020-07-06 PROCEDURE — 80048 BASIC METABOLIC PNL TOTAL CA: CPT | Mod: HCNC

## 2020-07-06 PROCEDURE — 25000003 PHARM REV CODE 250: Mod: HCNC | Performed by: NEUROLOGICAL SURGERY

## 2020-07-06 PROCEDURE — 51702 INSERT TEMP BLADDER CATH: CPT | Mod: HCNC | Performed by: NEUROLOGICAL SURGERY

## 2020-07-06 PROCEDURE — D9220A PRA ANESTHESIA: ICD-10-PCS | Mod: HCNC,CRNA,, | Performed by: NURSE ANESTHETIST, CERTIFIED REGISTERED

## 2020-07-06 PROCEDURE — 37000009 HC ANESTHESIA EA ADD 15 MINS: Mod: HCNC | Performed by: NEUROLOGICAL SURGERY

## 2020-07-06 PROCEDURE — 71000039 HC RECOVERY, EACH ADD'L HOUR: Mod: HCNC | Performed by: NEUROLOGICAL SURGERY

## 2020-07-06 PROCEDURE — 63600175 PHARM REV CODE 636 W HCPCS: Mod: HCNC | Performed by: NURSE ANESTHETIST, CERTIFIED REGISTERED

## 2020-07-06 PROCEDURE — 85025 COMPLETE CBC W/AUTO DIFF WBC: CPT | Mod: HCNC

## 2020-07-06 PROCEDURE — 22551 ARTHRD ANT NTRBDY CERVICAL: CPT | Mod: HCNC,,, | Performed by: NEUROLOGICAL SURGERY

## 2020-07-06 PROCEDURE — 25000003 PHARM REV CODE 250: Mod: HCNC | Performed by: NURSE ANESTHETIST, CERTIFIED REGISTERED

## 2020-07-06 PROCEDURE — D9220A PRA ANESTHESIA: Mod: HCNC,ANES,, | Performed by: ANESTHESIOLOGY

## 2020-07-06 PROCEDURE — 20930 SP BONE ALGRFT MORSEL ADD-ON: CPT | Mod: HCNC,,, | Performed by: NEUROLOGICAL SURGERY

## 2020-07-06 PROCEDURE — 85610 PROTHROMBIN TIME: CPT | Mod: HCNC

## 2020-07-06 PROCEDURE — 25000003 PHARM REV CODE 250: Mod: HCNC | Performed by: STUDENT IN AN ORGANIZED HEALTH CARE EDUCATION/TRAINING PROGRAM

## 2020-07-06 PROCEDURE — 86850 RBC ANTIBODY SCREEN: CPT | Mod: HCNC

## 2020-07-06 PROCEDURE — 63600175 PHARM REV CODE 636 W HCPCS: Mod: HCNC | Performed by: NEUROLOGICAL SURGERY

## 2020-07-06 DEVICE — ACTIFUSE SHAPE SM CYL 15X9MM: Type: IMPLANTABLE DEVICE | Site: SPINE CERVICAL | Status: FUNCTIONAL

## 2020-07-06 DEVICE — IMPLANTABLE DEVICE: Type: IMPLANTABLE DEVICE | Site: SPINE CERVICAL | Status: FUNCTIONAL

## 2020-07-06 RX ORDER — SODIUM CHLORIDE 9 MG/ML
INJECTION, SOLUTION INTRAVENOUS CONTINUOUS
Status: DISCONTINUED | OUTPATIENT
Start: 2020-07-06 | End: 2020-07-07

## 2020-07-06 RX ORDER — LIDOCAINE HYDROCHLORIDE 20 MG/ML
INJECTION INTRAVENOUS
Status: DISCONTINUED | OUTPATIENT
Start: 2020-07-06 | End: 2020-07-06

## 2020-07-06 RX ORDER — HYDROCHLOROTHIAZIDE 25 MG/1
25 TABLET ORAL DAILY
Status: DISCONTINUED | OUTPATIENT
Start: 2020-07-07 | End: 2020-07-07 | Stop reason: HOSPADM

## 2020-07-06 RX ORDER — LANOLIN ALCOHOL/MO/W.PET/CERES
400 CREAM (GRAM) TOPICAL EVERY MORNING
Status: DISCONTINUED | OUTPATIENT
Start: 2020-07-06 | End: 2020-07-07 | Stop reason: HOSPADM

## 2020-07-06 RX ORDER — MAG HYDROX/ALUMINUM HYD/SIMETH 200-200-20
30 SUSPENSION, ORAL (FINAL DOSE FORM) ORAL EVERY 4 HOURS PRN
Status: DISCONTINUED | OUTPATIENT
Start: 2020-07-06 | End: 2020-07-07 | Stop reason: HOSPADM

## 2020-07-06 RX ORDER — HEPARIN SODIUM 5000 [USP'U]/ML
5000 INJECTION, SOLUTION INTRAVENOUS; SUBCUTANEOUS EVERY 8 HOURS
Status: DISCONTINUED | OUTPATIENT
Start: 2020-07-07 | End: 2020-07-07 | Stop reason: HOSPADM

## 2020-07-06 RX ORDER — CEFAZOLIN SODIUM 1 G/3ML
2 INJECTION, POWDER, FOR SOLUTION INTRAMUSCULAR; INTRAVENOUS
Status: COMPLETED | OUTPATIENT
Start: 2020-07-06 | End: 2020-07-06

## 2020-07-06 RX ORDER — MUPIROCIN 20 MG/G
1 OINTMENT TOPICAL 2 TIMES DAILY
Status: DISCONTINUED | OUTPATIENT
Start: 2020-07-06 | End: 2020-07-06 | Stop reason: HOSPADM

## 2020-07-06 RX ORDER — GLYCOPYRROLATE 0.2 MG/ML
INJECTION INTRAMUSCULAR; INTRAVENOUS
Status: DISCONTINUED | OUTPATIENT
Start: 2020-07-06 | End: 2020-07-06

## 2020-07-06 RX ORDER — MORPHINE SULFATE 2 MG/ML
2 INJECTION, SOLUTION INTRAMUSCULAR; INTRAVENOUS
Status: DISCONTINUED | OUTPATIENT
Start: 2020-07-06 | End: 2020-07-07

## 2020-07-06 RX ORDER — LORAZEPAM 2 MG/ML
0.25 INJECTION INTRAMUSCULAR ONCE AS NEEDED
Status: DISCONTINUED | OUTPATIENT
Start: 2020-07-06 | End: 2020-07-06 | Stop reason: HOSPADM

## 2020-07-06 RX ORDER — MUPIROCIN 20 MG/G
OINTMENT TOPICAL
Status: DISCONTINUED | OUTPATIENT
Start: 2020-07-06 | End: 2020-07-06 | Stop reason: HOSPADM

## 2020-07-06 RX ORDER — LORAZEPAM 2 MG/ML
1 INJECTION INTRAMUSCULAR ONCE AS NEEDED
Status: COMPLETED | OUTPATIENT
Start: 2020-07-06 | End: 2020-07-06

## 2020-07-06 RX ORDER — MORPHINE SULFATE 2 MG/ML
2 INJECTION, SOLUTION INTRAMUSCULAR; INTRAVENOUS EVERY 4 HOURS PRN
Status: DISCONTINUED | OUTPATIENT
Start: 2020-07-06 | End: 2020-07-07

## 2020-07-06 RX ORDER — AMOXICILLIN 250 MG
2 CAPSULE ORAL NIGHTLY PRN
Status: DISCONTINUED | OUTPATIENT
Start: 2020-07-06 | End: 2020-07-07 | Stop reason: HOSPADM

## 2020-07-06 RX ORDER — BISACODYL 10 MG
10 SUPPOSITORY, RECTAL RECTAL DAILY
Status: DISCONTINUED | OUTPATIENT
Start: 2020-07-07 | End: 2020-07-07 | Stop reason: HOSPADM

## 2020-07-06 RX ORDER — METOCLOPRAMIDE HYDROCHLORIDE 5 MG/ML
10 INJECTION INTRAMUSCULAR; INTRAVENOUS EVERY 10 MIN PRN
Status: DISCONTINUED | OUTPATIENT
Start: 2020-07-06 | End: 2020-07-06 | Stop reason: HOSPADM

## 2020-07-06 RX ORDER — MIDAZOLAM HYDROCHLORIDE 1 MG/ML
INJECTION, SOLUTION INTRAMUSCULAR; INTRAVENOUS
Status: DISCONTINUED | OUTPATIENT
Start: 2020-07-06 | End: 2020-07-06

## 2020-07-06 RX ORDER — PROPOFOL 10 MG/ML
VIAL (ML) INTRAVENOUS
Status: DISCONTINUED | OUTPATIENT
Start: 2020-07-06 | End: 2020-07-06

## 2020-07-06 RX ORDER — ONDANSETRON 2 MG/ML
INJECTION INTRAMUSCULAR; INTRAVENOUS
Status: DISCONTINUED | OUTPATIENT
Start: 2020-07-06 | End: 2020-07-06

## 2020-07-06 RX ORDER — OXYCODONE AND ACETAMINOPHEN 5; 325 MG/1; MG/1
1 TABLET ORAL EVERY 4 HOURS PRN
Status: DISCONTINUED | OUTPATIENT
Start: 2020-07-06 | End: 2020-07-07 | Stop reason: HOSPADM

## 2020-07-06 RX ORDER — MUPIROCIN 20 MG/G
OINTMENT TOPICAL 2 TIMES DAILY
Status: DISCONTINUED | OUTPATIENT
Start: 2020-07-06 | End: 2020-07-07 | Stop reason: HOSPADM

## 2020-07-06 RX ORDER — ONDANSETRON 8 MG/1
8 TABLET, ORALLY DISINTEGRATING ORAL EVERY 6 HOURS PRN
Status: DISCONTINUED | OUTPATIENT
Start: 2020-07-06 | End: 2020-07-07 | Stop reason: HOSPADM

## 2020-07-06 RX ORDER — ROCURONIUM BROMIDE 10 MG/ML
INJECTION, SOLUTION INTRAVENOUS
Status: DISCONTINUED | OUTPATIENT
Start: 2020-07-06 | End: 2020-07-06

## 2020-07-06 RX ORDER — PROPOFOL 10 MG/ML
VIAL (ML) INTRAVENOUS CONTINUOUS PRN
Status: DISCONTINUED | OUTPATIENT
Start: 2020-07-06 | End: 2020-07-06

## 2020-07-06 RX ORDER — POTASSIUM CHLORIDE 20 MEQ/1
40 TABLET, EXTENDED RELEASE ORAL EVERY MORNING
Status: DISCONTINUED | OUTPATIENT
Start: 2020-07-07 | End: 2020-07-07 | Stop reason: HOSPADM

## 2020-07-06 RX ORDER — LIDOCAINE HYDROCHLORIDE 10 MG/ML
1 INJECTION, SOLUTION EPIDURAL; INFILTRATION; INTRACAUDAL; PERINEURAL ONCE
Status: COMPLETED | OUTPATIENT
Start: 2020-07-06 | End: 2020-07-06

## 2020-07-06 RX ORDER — DEXAMETHASONE SODIUM PHOSPHATE 4 MG/ML
INJECTION, SOLUTION INTRA-ARTICULAR; INTRALESIONAL; INTRAMUSCULAR; INTRAVENOUS; SOFT TISSUE
Status: DISCONTINUED | OUTPATIENT
Start: 2020-07-06 | End: 2020-07-06

## 2020-07-06 RX ORDER — BACITRACIN 50000 [IU]/1
INJECTION, POWDER, FOR SOLUTION INTRAMUSCULAR
Status: DISCONTINUED | OUTPATIENT
Start: 2020-07-06 | End: 2020-07-06 | Stop reason: HOSPADM

## 2020-07-06 RX ORDER — SODIUM CHLORIDE 0.9 % (FLUSH) 0.9 %
3 SYRINGE (ML) INJECTION
Status: DISCONTINUED | OUTPATIENT
Start: 2020-07-06 | End: 2020-07-06 | Stop reason: HOSPADM

## 2020-07-06 RX ORDER — LIDOCAINE HYDROCHLORIDE AND EPINEPHRINE 10; 10 MG/ML; UG/ML
INJECTION, SOLUTION INFILTRATION; PERINEURAL
Status: DISCONTINUED | OUTPATIENT
Start: 2020-07-06 | End: 2020-07-06 | Stop reason: HOSPADM

## 2020-07-06 RX ORDER — PRAVASTATIN SODIUM 40 MG/1
40 TABLET ORAL DAILY
Status: DISCONTINUED | OUTPATIENT
Start: 2020-07-07 | End: 2020-07-07 | Stop reason: HOSPADM

## 2020-07-06 RX ORDER — EPHEDRINE SULFATE 50 MG/ML
INJECTION, SOLUTION INTRAVENOUS
Status: DISCONTINUED | OUTPATIENT
Start: 2020-07-06 | End: 2020-07-06

## 2020-07-06 RX ORDER — CEFAZOLIN SODIUM 1 G/3ML
2 INJECTION, POWDER, FOR SOLUTION INTRAMUSCULAR; INTRAVENOUS
Status: COMPLETED | OUTPATIENT
Start: 2020-07-06 | End: 2020-07-07

## 2020-07-06 RX ORDER — METHYLPREDNISOLONE ACETATE 40 MG/ML
INJECTION, SUSPENSION INTRA-ARTICULAR; INTRALESIONAL; INTRAMUSCULAR; SOFT TISSUE
Status: DISCONTINUED | OUTPATIENT
Start: 2020-07-06 | End: 2020-07-06 | Stop reason: HOSPADM

## 2020-07-06 RX ORDER — SUCCINYLCHOLINE CHLORIDE 20 MG/ML
INJECTION INTRAMUSCULAR; INTRAVENOUS
Status: DISCONTINUED | OUTPATIENT
Start: 2020-07-06 | End: 2020-07-06

## 2020-07-06 RX ORDER — FENTANYL CITRATE 50 UG/ML
INJECTION, SOLUTION INTRAMUSCULAR; INTRAVENOUS
Status: DISCONTINUED | OUTPATIENT
Start: 2020-07-06 | End: 2020-07-06

## 2020-07-06 RX ORDER — HYDROMORPHONE HYDROCHLORIDE 1 MG/ML
0.2 INJECTION, SOLUTION INTRAMUSCULAR; INTRAVENOUS; SUBCUTANEOUS EVERY 5 MIN PRN
Status: DISCONTINUED | OUTPATIENT
Start: 2020-07-06 | End: 2020-07-06 | Stop reason: HOSPADM

## 2020-07-06 RX ADMIN — SODIUM CHLORIDE, SODIUM GLUCONATE, SODIUM ACETATE, POTASSIUM CHLORIDE, MAGNESIUM CHLORIDE, SODIUM PHOSPHATE, DIBASIC, AND POTASSIUM PHOSPHATE: .53; .5; .37; .037; .03; .012; .00082 INJECTION, SOLUTION INTRAVENOUS at 12:07

## 2020-07-06 RX ADMIN — LIDOCAINE HYDROCHLORIDE 2 MG: 10 INJECTION, SOLUTION EPIDURAL; INFILTRATION; INTRACAUDAL; PERINEURAL at 08:07

## 2020-07-06 RX ADMIN — DEXAMETHASONE SODIUM PHOSPHATE 12 MG: 4 INJECTION, SOLUTION INTRAMUSCULAR; INTRAVENOUS at 12:07

## 2020-07-06 RX ADMIN — HYDROMORPHONE HYDROCHLORIDE 0.2 MG: 1 INJECTION, SOLUTION INTRAMUSCULAR; INTRAVENOUS; SUBCUTANEOUS at 05:07

## 2020-07-06 RX ADMIN — OXYCODONE HYDROCHLORIDE AND ACETAMINOPHEN 1 TABLET: 5; 325 TABLET ORAL at 06:07

## 2020-07-06 RX ADMIN — EPHEDRINE SULFATE 10 MG: 50 INJECTION INTRAVENOUS at 12:07

## 2020-07-06 RX ADMIN — SUCCINYLCHOLINE CHLORIDE 80 MG: 20 INJECTION, SOLUTION INTRAMUSCULAR; INTRAVENOUS at 11:07

## 2020-07-06 RX ADMIN — GLYCOPYRROLATE 0.2 MG: 0.2 INJECTION, SOLUTION INTRAMUSCULAR; INTRAVENOUS at 03:07

## 2020-07-06 RX ADMIN — FENTANYL CITRATE 100 MCG: 50 INJECTION, SOLUTION INTRAMUSCULAR; INTRAVENOUS at 11:07

## 2020-07-06 RX ADMIN — GLYCOPYRROLATE 0.2 MG: 0.2 INJECTION, SOLUTION INTRAMUSCULAR; INTRAVENOUS at 12:07

## 2020-07-06 RX ADMIN — SODIUM CHLORIDE: 0.9 INJECTION, SOLUTION INTRAVENOUS at 08:07

## 2020-07-06 RX ADMIN — ROCURONIUM BROMIDE 5 MG: 10 INJECTION, SOLUTION INTRAVENOUS at 11:07

## 2020-07-06 RX ADMIN — SODIUM CHLORIDE 0.5 MCG/KG/MIN: 9 INJECTION, SOLUTION INTRAVENOUS at 12:07

## 2020-07-06 RX ADMIN — MUPIROCIN: 20 OINTMENT TOPICAL at 09:07

## 2020-07-06 RX ADMIN — PROPOFOL 50 MG: 10 INJECTION, EMULSION INTRAVENOUS at 01:07

## 2020-07-06 RX ADMIN — MUPIROCIN: 20 OINTMENT TOPICAL at 08:07

## 2020-07-06 RX ADMIN — CEFAZOLIN 3 G: 330 INJECTION, POWDER, FOR SOLUTION INTRAMUSCULAR; INTRAVENOUS at 12:07

## 2020-07-06 RX ADMIN — PROPOFOL 160 MG: 10 INJECTION, EMULSION INTRAVENOUS at 11:07

## 2020-07-06 RX ADMIN — LORAZEPAM 1 MG: 2 INJECTION INTRAMUSCULAR; INTRAVENOUS at 04:07

## 2020-07-06 RX ADMIN — EPHEDRINE SULFATE 5 MG: 50 INJECTION INTRAVENOUS at 12:07

## 2020-07-06 RX ADMIN — SUCCINYLCHOLINE CHLORIDE 120 MG: 20 INJECTION, SOLUTION INTRAMUSCULAR; INTRAVENOUS at 11:07

## 2020-07-06 RX ADMIN — ONDANSETRON 4 MG: 2 INJECTION, SOLUTION INTRAMUSCULAR; INTRAVENOUS at 03:07

## 2020-07-06 RX ADMIN — LIDOCAINE HYDROCHLORIDE 100 MG: 20 INJECTION, SOLUTION INTRAVENOUS at 11:07

## 2020-07-06 RX ADMIN — PROPOFOL 150 MCG/KG/MIN: 10 INJECTION, EMULSION INTRAVENOUS at 11:07

## 2020-07-06 RX ADMIN — REMIFENTANIL HYDROCHLORIDE 0.2 MCG/KG/MIN: 1 INJECTION, POWDER, LYOPHILIZED, FOR SOLUTION INTRAVENOUS at 11:07

## 2020-07-06 RX ADMIN — CEFAZOLIN 2 G: 1 INJECTION, POWDER, FOR SOLUTION INTRAMUSCULAR; INTRAVENOUS at 09:07

## 2020-07-06 RX ADMIN — PROPOFOL 40 MG: 10 INJECTION, EMULSION INTRAVENOUS at 12:07

## 2020-07-06 RX ADMIN — MIDAZOLAM HYDROCHLORIDE 2 MG: 1 INJECTION, SOLUTION INTRAMUSCULAR; INTRAVENOUS at 11:07

## 2020-07-06 NOTE — PROGRESS NOTES
Pt c/o having to pee and unable to do so. Dr. Porter notified and said to bladder scan and place riojas if scan shows >500ml. Bladder scan showed 599ml. Riojas placed with clear yellow urinary return. Dr. Vides came to bedside to see pt shortly after and said to leave riojas in place overnight and they will DC it in am.

## 2020-07-06 NOTE — H&P
History of Present Illness:  Domonique Kellogg is a 69 y.o. male who presents with complaints of numbness in the tip of his left thumb and tingling in the right shoulder.  The symptoms have been present for about 4 months.  He denies any dropping objects, handwriting changes, or balance difficulties.  He does endorse some difficulty buttoning his shirt, but he notes that this is mostly because he has difficulty of feeling where the button is relative to the left thumb.  He also notes that his left thumb has increased sensation, particularly when it comes to hot and cold.  He endorses no pain.  His symptoms are intermittent.  They are made worse by neck flexion and neck extension.  They improve when he relaxes.  He denies any change in bowel or bladder habit.  He underwent physical therapy in October-November of this year; however, he did not have any improvement in his symptoms.  He has not had injections.  He has not had neck or back surgery previously.  He had a right knee surgery in 1997 with no anesthetic, infectious, or bleeding complications.       He does take ibuprofen and ASA 81 daily. He is a former smoker, having quit in 1986.      He had in the EMG nerve conduction study on February 18, 2020 that demonstrates right carpal tunnel syndrome, left ulnar neuropathy, and C5-C6 radiculopathy.  He has an MRI of the cervical spine that was obtained prior to today's consultation and is personally reviewed.  This demonstrates multilevel cervical spondylosis, worst at C3-C4 and C4-C5 where there are significant disc herniations resulting in myelomalacia.           Review of patient's allergies indicates:   Allergen Reactions    Indomethacin         Headache dizziness    Adhesive Rash        Current Medications          Current Outpatient Medications   Medication Sig Dispense Refill    aspirin (ECOTRIN) 81 MG EC tablet Take 81 mg by mouth once daily.        hydroCHLOROthiazide (HYDRODIURIL) 25 MG tablet TAKE 1  TABLET EVERY DAY 90 tablet 1    ibuprofen (ADVIL,MOTRIN) 200 MG tablet Take 200 mg by mouth every 6 (six) hours as needed for Pain.        magnesium oxide (MAG-OX) 400 mg (241.3 mg magnesium) tablet          multivitamin with minerals tablet Take 1 tablet by mouth once daily.         potassium chloride SA (K-DUR,KLOR-CON) 20 MEQ tablet Take 2 tablets (40 mEq total) by mouth once daily. 180 tablet 3    pravastatin (PRAVACHOL) 40 MG tablet TAKE 1 TABLET EVERY DAY 90 tablet 1      No current facility-administered medications for this visit.                 Past Medical History:   Diagnosis Date    Anticoagulant long-term use      Arthritis      CAD (coronary artery disease) 3/2/2015     non-obstruct[facility] per [facility]     CAD (coronary artery disease) 3/26/2015    Cataract      Dry eye syndrome      Hypertension      Obesity      Seizures       2011    Sleep apnea       + CPAP     Past Surgical History:   Procedure Laterality Date    EYE SURGERY        INTRAOCULAR PROSTHESES INSERTION Right 7/16/2018     Procedure: INSERTION-INTRAOCULAR LENS (IOL);  Surgeon: Priscilla Hays MD;  Location: [facility];  Service: Ophthalmology;  Laterality: Right;    INTRAOCULAR PROSTHESES INSERTION Left 8/13/2018     Procedure: INSERTION, INTRAOCULAR LENS PROSTHESIS;  Surgeon: Priscilla Hays MD;  Location: [facility] OR;  Service: Ophthalmology;  Laterality: Left;    KNEE SURGERY        PHACOEMULSIFICATION OF CATARACT Right 7/16/2018     Procedure: PHACOEMULSIFICATION-ASPIRATION-CATARACT;  Surgeon: Priscilla Hays MD;  Location: [facility] OR;  Service: Ophthalmology;  Laterality: Right;    PHACOEMULSIFICATION OF CATARACT Left 8/13/2018     Procedure: PHACOEMULSIFICATION, CATARACT;  Surgeon: Priscilla Hays MD;  Location: [facility];  Service: Ophthalmology;  Laterality: Left;    WISDOM TOOTH EXTRACTION              Family History      Problem Relation (Age of Onset)     Cancer Mother, Maternal Grandmother     Diabetes Mother     Heart disease  Father, Paternal Grandfather, Brother     Hypertension Mother     No Known Problems Sister, Daughter, Son, Sister, Sister, Sister, Daughter         Social History            Socioeconomic History    Marital status:        Spouse name: Estrellita    Number of children: 2    Years of education: Not on file    Highest education level: Not on file   Occupational History       Employer: [employer]   Social Needs    Financial resource strain: Not hard at all    Food insecurity:       Worry: Never true       Inability: Never true    Transportation needs:       Medical: No       Non-medical: No   Tobacco Use    Smoking status: Former Smoker       Packs/day: 1.00       Years: 20.00       Pack years: 20.00       Last attempt to quit: 1987       Years since quittin.1    Smokeless tobacco: Never Used    Tobacco comment: quit    Substance and Sexual Activity    Alcohol use: Yes       Alcohol/week: 1.0 - 2.0 standard drinks       Types: 1 - 2 Glasses of wine per week       Frequency: 4 or more times a week       Drinks per session: 1 or 2       Binge frequency: Never       Comment: 1-2 glasses wine several times weekly    Drug use: No    Sexual activity: Yes       Partners: Female   Lifestyle    Physical activity:       Days per week: 6 days       Minutes per session: 50 min    Stress: Not at all   Relationships    Social connections:       Talks on phone: Twice a week       Gets together: Once a week       Attends [employer] service: Not on file       Active member of club or organization: Yes       Attends meetings of clubs or organizations: More than 4 times per year       Relationship status:    Other Topics Concern    Not on file   Social History Narrative    Not on file        Review of Systems   Constitutional: Negative for fever.   HENT: Negative for nosebleeds.    Eyes: Negative for visual disturbance.   Respiratory: Negative for shortness of breath.    Cardiovascular:  "Negative for chest pain.   Gastrointestinal: Negative for diarrhea.   Endocrine: Negative for cold intolerance.   Genitourinary: Positive for difficulty urinating and urgency.   Musculoskeletal: Negative for back pain.   Skin: Negative for color change.   Neurological: Positive for syncope and numbness.   Hematological: Does not bruise/bleed easily.   Psychiatric/Behavioral: The patient is not nervous/anxious.          OBJECTIVE:      Vital Signs  Temp: 98 °F (36.7 °C)  Pulse: (!) 56  BP: 136/76  Pain Score:   3  Height: 5' 8" (172.7 cm)  Weight: 113.7 kg (250 lb 10.6 oz)  Body mass index is 38.11 kg/m².        Physical Exam:     Constitutional: He appears well-developed and well-nourished. No distress.      Eyes: Pupils are equal, round, and reactive to light. EOM are normal.      Abdominal: Soft.      Skin: Skin displays no rash on extremities. Skin displays no lesions on extremities.     Psych/Behavior: He is alert. He is oriented to person, place, and time.     Musculoskeletal: Gait is normal.   L HI 4-/5  R WF 4/5   R delt 4/5      Neurological:        Coordination: He has normal finger to nose coordination.        Sensory: There is sensory deficit in the extremities. Sensory deficit is located left thumb.        DTRs:   + Schofield's bilaterally        Cranial nerves:   Right tongue deviation   Left nasolabial flattening      Pulmonary: symmetric expansion      Diagnostic Results:  Reviewed; as above      ASSESSMENT/PLAN:      Domonique Kellogg is a 69 y.o. male with significant cervical stenosis and associated myelomalacia who presents with complaints of numbness and tingling. Though he does not endorse significant clinical myelopathy, his exam reflects upper extremity weakness and hyperreflexia.      I have recommended we obtain a CT of the cervical spine and flexion/extension X-rays to assess for OPLL and dynamic instability and for operative planning purposes. Assuming there is no OPLL, I will recommend a " C3-C4/C4-C5 ACDF to decompress and stabilize the two most compressive levels with associated myelomalacia.

## 2020-07-06 NOTE — ANESTHESIA PREPROCEDURE EVALUATION
07/06/2020  Domonique Kellogg is a 70 y.o., male.    Anesthesia Evaluation    I have reviewed the Patient Summary Reports.    I have reviewed the Nursing Notes. I have reviewed the NPO Status.   I have reviewed the Medications.     Review of Systems  Anesthesia Hx:  History of prior surgery of interest to airway management or planning: Previous anesthesia: General  Denies Personal Hx of Anesthesia complications.   Cardiovascular:   Hypertension CAD   hyperlipidemia ECG has been reviewed.    Pulmonary:   Sleep Apnea    Musculoskeletal:   Arthritis   Spine Disorders: cervical    Neurological:   Neuromuscular Disease, Seizures    Endocrine:  Metabolic Disorders, Obesity / BMI > 30    Patient Active Problem List   Diagnosis    CTS (carpal tunnel syndrome)    Class 2 severe obesity due to excess calories with serious comorbidity and body mass index (BMI) of 38.0 to 38.9 in adult    H/O syncope    RAHEEL (obstructive sleep apnea)    CAD in native artery    Hypertension, essential    Hyperlipidemia    Chronic pulmonary heart disease    Bilateral carotid artery disease    Spondylosis of lumbar region without myelopathy or radiculopathy    Primary osteoarthritis of right knee    Lateral femoral cutaneous entrapment syndrome    Cervical spondylosis    Decreased ROM of intervertebral discs of cervical spine    Weakness of both upper extremities    Cervical disc disease with myelopathy    Cervical stenosis of spinal canal     Past Medical History:   Diagnosis Date    Anticoagulant long-term use     Arthritis     CAD (coronary artery disease) 3/2/2015    [facility]obstructive per [facility]     CAD (coronary artery disease) 3/26/2015    Cataract     Dry eye syndrome     Hypertension     Obesity     Seizures     2011    Sleep apnea     + CPAP     Past Surgical History:   Procedure Laterality Date    EYE SURGERY       INTRAOCULAR PROSTHESES INSERTION Right 7/16/2018    Procedure: INSERTION-INTRAOCULAR LENS (IOL);  Surgeon: Priscilla Hays MD;  Location: [facility] OR;  Service: Ophthalmology;  Laterality: Right;    INTRAOCULAR PROSTHESES INSERTION Left 8/13/2018    Procedure: INSERTION, INTRAOCULAR LENS PROSTHESIS;  Surgeon: Priscilla Hays MD;  Location: [facility] OR;  Service: Ophthalmology;  Laterality: Left;    KNEE SURGERY      PHACOEMULSIFICATION OF CATARACT Right 7/16/2018    Procedure: PHACOEMULSIFICATION-ASPIRATION-CATARACT;  Surgeon: Priscilla Hays MD;  Location: [facility] OR;  Service: Ophthalmology;  Laterality: Right;    PHACOEMULSIFICATION OF CATARACT Left 8/13/2018    Procedure: PHACOEMULSIFICATION, CATARACT;  Surgeon: Priscilla Hays MD;  Location: [facility] OR;  Service: Ophthalmology;  Laterality: Left;    WISDOM TOOTH EXTRACTION           Physical Exam  General:  Obesity    Airway/Jaw/Neck:  Airway Findings: Mouth Opening: Normal Tongue: Normal  General Airway Assessment: Adult  Mallampati: II  TM Distance: Normal, at least 6 cm  Jaw/Neck Findings:  Neck ROM: Normal ROM      Dental:  Dental Findings: In tact   Chest/Lungs:  Chest/Lungs Findings: Clear to auscultation     Heart/Vascular:  Heart Findings: Rate: Normal  Rhythm: Regular Rhythm  Sounds: Normal        Mental Status:  Mental Status Findings:  Cooperative, Alert and Oriented         Anesthesia Plan  Type of Anesthesia, risks & benefits discussed:  Anesthesia Type:  general  Patient's Preference:   Intra-op Monitoring Plan: standard ASA monitors  Intra-op Monitoring Plan Comments:   Post Op Pain Control Plan: per primary service following discharge from PACU  Post Op Pain Control Plan Comments:   Induction:   IV  Beta Blocker:  Patient is not currently on a Beta-Blocker (No further documentation required).       Informed Consent: Patient understands risks and agrees with Anesthesia plan.  Questions answered. Anesthesia consent signed with patient.  ASA Score: 2      Day of Surgery Review of History & Physical:    H&P update referred to the surgeon.         Ready For Surgery From Anesthesia Perspective.

## 2020-07-06 NOTE — OP NOTE
[facility]  Neurosurgery  Operative Note    SUMMARY      Date of Procedure: 7/6/2020     Procedure: Procedure(s) (LRB):  DISCECTOMY, SPINE, CERVICAL, ANTERIOR APPROACH, WITH FUSION C3-4, C4-5 (N/A)     Surgeon(s) and Role:     * Fabiola Vides MD - Primary     * Cheo Silva DO - Resident - Assisting    Pre-Operative Diagnosis: Cervical disc disease with myelopathy [M50.00]  Cervical spondylosis [M47.812]  Cervical spondylosis with myelopathy [M47.12]    Post-Operative Diagnosis: Post-Op Diagnosis Codes:     * Cervical disc disease with myelopathy [M50.00]     * Cervical spondylosis [M47.812]     * Cervical spondylosis with myelopathy [M47.12]    Anesthesia: General    Technical Procedures Used:   . Placement of Hollis-Wells tongs and traction   2. Anterior approach to the cervical spine   3. Discectomy with decompression of PLL and foraminotomy, C3-C4  4. Placement of Zlink 12 x 15 x 6 mm 6 degree standalone cage   5. 4 x 16 mm variable angle SD screw superior and 4x 16 mm fixed angle SD screw inferior   6. Discectomy with decompression of PLL and foraminotomy, C4-C5  7. Placement of Zlink 12 x 15 x 6 mm 6 degree standalone cage   8. 4 x 14 mm variable angle SD screw superior and 4x 16 mm fixed angle SD screw inferior   9. Application of small Actifus shape (morselized allograft)   10. Use of intraoperative microscope   11. Use of intraoperative neuromonitoring    Indications:  Domonique Kellogg is a 69 y.o. male with significant cervical stenosis and associated myelomalacia who presents with complaints of numbness and tingling. Though he does not endorse significant clinical myelopathy, his exam reflects upper extremity weakness and hyperreflexia. I suspect that due to his focal kyphotic deformity, he may have instability at C3-C4 and C4-C5 that are resulting in oxidative stress on the cord.     We had a lengthy discussion about the risks, benefits, and alternatives to surgery. Risks  include, but are not limited to, bleeding, pain, infection, scarring, need for further/repeat procedure, death, paralysis, damage to vocal cords, damage to esophagus, difficulty swallowing, damage to trachea, stroke/damage to major blood vessels, leak of cerebrospinal fluid, María's syndrome, pseudoarthrosis, and hardware-related complications. Informed consent was obtained.    Description of the Procedure:   The patient was brought back to the operating room, and taking care not to let his MAPs fall below 85, general endotracheal anesthesia was induced by the anesthesia service. His head was carefully positioned on the [place] head gibbs, and Hollis-LoveSpace tongs were placed. At this point SSEP and MEP baselines were obtained. After those baselines were obtained, 20 lbs of traction were applied. SSEPs and MEPs were re-checked and found to be stable after this positioning. All pressure points were carefully padded. TEDs and SCDs were applied, and IV antibiotics and dexamethasone were administered.      The C-arm was brought in to localize the C4 vertebral body. The skin incision was marked in a Rosy line, and the patient was prepped and draped in the usual sterile fashion. 10cc of 1% lidocaine with epinephrine were used to infiltrate the skin. A 15 blade was used to incise the skin, and dissection was carried down to the level of the platysma with Bovie cautery. The platysma was then elevated and divided. We then undermined the platysma in both rostral and caudal direction, using bipolar cautery at 20 as needed.      The medial border of the sternocleidomastoid was identified, and we dissected dorsally along its edge, sweeping the omohyoid out of the way. The carotid was palpated and brought laterally. A Cloward retractor was used to gently retract the esophagus, and the spine was identified. A lateral X-ray confirmed our level. We next elevated the longus colli up bilaterally to be able to place the Trimline  retractor blades. These were successfully inserted into the self-retaining retractor. The endotracheal tube was deflated and reinflated.      We began at the C3-C4 level. The disc was removed with a combination of straight and upgoing curettes. We placed [place] pins to be parallel with the disc spaces. We used a disc-space  to achieve even distraction with the self-retainer. We visualized the uncovertebral joints bilaterally.  The PLL, which was partially ossified as expected, was then gently dissected, layer by layer, using a combination of blunt nerve hook and Kerrison #1 and 2 rongeur.  We also felt out the bilateral foramina and found that the C4 nerve roots were completely free. There was brisk venous bleeding throughout; meticulous hemostasis was obtained.      MEPs were evaluated and found to be stable. We then turned our attention to trialing the PEEK cages. The 6mm cage was a good fit and did not cause overdistraction of the facets per lateral X-ray. We thus proceeded with placing a 6mm cage with [place] distraction released. Motor evoked potentials were monitored at each phase and found to be stable. Lateral X-ray confirmed appropriate placement of the graft and subsequent placement of the screws which we did under direct and fluroscopic visualization, taking care to aim the screws medially. The retention locking plates were deployed over both screws. The [place] pins were removed under direct visualization, and Surgiflo was applied to the pin sites for hemostasis.      This same process was repeated at the C4-C5 level. Again, greater than normal venous bleeding was encountered, and care was taken to obtain hemostasis. The weighted traction was removed after the C4-C5 graft was placed, and MEPs remained stable.     We then removed the self-retaining retractors and obtained meticulous hemostasis. We irrigated copiously with antibiotic-containing solution. Depo-medrol was applied to the esophagus,  which was inspected and did not appear to be damaged. We performed a two-layer closure with 3-0 Vicryl inverted stitches in the platysma and subcutaneous layers, with a running subcuticular 4-0 Monocryl for the skin. A sterile dressing of dermabond was applied. All counts were correct x2.  Antibiotic-containing solution was used throughout the case.      The patient was then awakened by the anesthesia service and taken to the recovery area in satisfactory condition.       Complications: No    Estimated Blood Loss (EBL): 140 mL           Specimens:   Specimen (12h ago, onward)    None           Implants:   Implant Name Type Inv. Item Serial No.  Lot No. LRB No. Used Action   ACTIFUSE SHAPE SM CYL 15X9MM - [id number]  ACTIFUSE SHAPE SM CYL 15X9MM  [employer] [id number] N/A 1 Implanted   Interbody Plate 6mm    [employer]  N/A 2 Implanted   Zlink Spacer 12 x15 6 degrees, 6mm    [employer]  N/A 2 Implanted   VASD Screw 4.0 x16mm    [employer]  N/A 1 Implanted   FASD Screw 4.0 x16mm    [employer]  N/A 2 Implanted   VASD Screw 4.0 x14mm    [employer]  N/A 1 Implanted              Condition: Stable    Disposition: PACU - hemodynamically stable.    Attestation: I was present and scrubbed for the entire procedure.

## 2020-07-06 NOTE — PROGRESS NOTES
Pt and wife updated: Dr Vides in OR with pt before; projected end time for case before is 0955. No other needs voiced.

## 2020-07-06 NOTE — ANESTHESIA PROCEDURE NOTES
Arterial    Diagnosis: cervical spondylosis    Patient location during procedure: done in OR  Procedure start time: 7/6/2020 11:42 AM  Procedure end time: 7/6/2020 11:45 AM    Staffing  Authorizing Provider: Yair Arshad MD  Performing Provider: Yady Osuna CRNA    Anesthesiologist was present at the time of the procedure.  Arterial  Skin Prep: chlorhexidine gluconate  Local Infiltration: none  Orientation: left  Location: radial  Catheter Size: 22 G  Catheter placement by Anatomical landmarks. Heme positive aspiration all ports.Insertion Attempts: 1  Assessment  Dressing: secured with tape and tegaderm  Patient: Tolerated well

## 2020-07-06 NOTE — BRIEF OP NOTE
[facility]  Neurosurgery  Operative Note    SUMMARY      Date of Procedure: 7/6/2020     Procedure: Procedure(s) (LRB):  DISCECTOMY, SPINE, CERVICAL, ANTERIOR APPROACH, WITH FUSION C3-4, C4-5 (N/A)     Surgeon(s) and Role:     * Fabiola Vides MD - Primary     * Cheo Silva DO - Resident - Assisting        Pre-Operative Diagnosis: Cervical disc disease with myelopathy [M50.00]  Cervical spondylosis [M47.812]  Cervical spondylosis with myelopathy [M47.12]    Post-Operative Diagnosis: Post-Op Diagnosis Codes:     * Cervical disc disease with myelopathy [M50.00]     * Cervical spondylosis [M47.812]     * Cervical spondylosis with myelopathy [M47.12]    Anesthesia: General    Technical Procedures Used: na    Description of the Findings of the Procedure: C3-5 ACDF    Significant Surgical Tasks Conducted by the Assistant(s), if Applicable: NA    Complications: No    Estimated Blood Loss (EBL): 140 mL           Specimens:   Specimen (12h ago, onward)    None           Implants:   Implant Name Type Inv. Item Serial No.  Lot No. LRB No. Used Action   ACTIFUSE SHAPE SM CYL 15X9MM - [id number]  ACTIFUSE SHAPE SM CYL 15X9MM  [employer] [id number] N/A 1 Implanted   Interbody Plate 6mm    [employer]  N/A 2 Implanted   Zlink Spacer 12 x15 6 degrees, 6mm    [employer]  N/A 2 Implanted   VASD Screw 4.0 x16mm    [employer]  N/A 1 Implanted   FASD Screw 4.0 x16mm    [employer]  N/A 2 Implanted   VASD Screw 4.0 x14mm    [employer]  N/A 1 Implanted              Condition: Stable    Disposition: PACU - hemodynamically stable.    Attestation: I was present and scrubbed for the entire procedure.

## 2020-07-06 NOTE — TRANSFER OF CARE
"Anesthesia Transfer of Care Note    Patient: Domonique Kellogg    Procedure(s) Performed: Procedure(s) (LRB):  DISCECTOMY, SPINE, CERVICAL, ANTERIOR APPROACH, WITH FUSION C3-4, C4-5 (N/A)    Patient location: PACU    Anesthesia Type: general    Transport from OR: Transported from OR intubated on 100% O2 by AMBU with assisted ventilation    Post pain: adequate analgesia    Post assessment: no apparent anesthetic complications and tolerated procedure well    Post vital signs: stable    Level of consciousness: sedated    Nausea/Vomiting: no nausea/vomiting    Complications: none    Transfer of care protocol was followed      Last vitals: 07/06/2020 1620  Visit Vitals  /75   Pulse 77   Temp 97.9   Resp Vt 500, RR 13, Fi02 50% PS10, PEEP 5    Ht 5' 8" (1.727 m)   Wt 113.4 kg (250 lb)   SpO2 98%   BMI 38.01 kg/m²     "

## 2020-07-06 NOTE — ANESTHESIA PROCEDURE NOTES
Intubation  Performed by: Yady Osuna CRNA  Authorized by: Yair Arshad MD     Intubation:     Induction:  Intravenous    Intubated:  Postinduction    Mask Ventilation:  Moderately difficult with oral airway    Attempts:  More than 3    Attempted By:  CRNA    Method of Intubation:  Direct    Blade:  Kothari 2    Laryngeal View Grade: Grade IV - neither epiglottis nor glottis seen      Attempted By (2nd Attempt):  Staff anesthesiologist    Method of Intubation (2nd Attempt):  Video laryngoscopy and bougie    Blade (2nd Attempt):  Glidescope 3    Laryngeal View Grade (2nd Attempt): Grade III - only epiglottis visible      Attempted By (3rd Attempt):  Staff anesthesiologist    Method of Intubation (3rd Attempt):  Video laryngoscopy    Blade (3rd Attempt):  Stefanie 3    Laryngeal View Grade (3rd Attempt): Grade I - full view of cords      Intubation Comments:  Very poor neck extension and large tongue and epiglottis, poor view with DL on 1st attempt, esophageal intubation on 2nd attempt     Difficult Airway Encountered?: Yes      Future Airway Recommendations:  Very poor neck extension and large tongue and epiglottis, poor view with DL on 1st attempt, esophageal intubation on 2nd attempt     Complications:  Esophageal intubation - immediately recognized and removed    Airway Device:  Oral endotracheal tube    Airway Device Size:  7.5    Style/Cuff Inflation:  Cuffed    Secured at:  The lips    Placement Verified By:  Capnometry    Complicating Factors:  Large/floppy epiglottis, obesity, short neck, oropharyngeal edema or fat and poor neck/head extension    Findings Post-Intubation:  BS equal bilateral and atraumatic/condition of teeth unchanged

## 2020-07-07 ENCOUNTER — TELEPHONE (OUTPATIENT)
Dept: NEUROSURGERY | Facility: CLINIC | Age: 70
End: 2020-07-07

## 2020-07-07 VITALS
OXYGEN SATURATION: 96 % | TEMPERATURE: 98 F | RESPIRATION RATE: 18 BRPM | WEIGHT: 250 LBS | HEIGHT: 68 IN | BODY MASS INDEX: 37.89 KG/M2 | DIASTOLIC BLOOD PRESSURE: 68 MMHG | SYSTOLIC BLOOD PRESSURE: 134 MMHG | HEART RATE: 65 BPM

## 2020-07-07 DIAGNOSIS — M48.02 CERVICAL STENOSIS OF SPINAL CANAL: Primary | ICD-10-CM

## 2020-07-07 LAB
ANION GAP SERPL CALC-SCNC: 8 MMOL/L (ref 8–16)
BASOPHILS # BLD AUTO: 0.01 K/UL (ref 0–0.2)
BASOPHILS NFR BLD: 0.1 % (ref 0–1.9)
BUN SERPL-MCNC: 12 MG/DL (ref 8–23)
CALCIUM SERPL-MCNC: 9.1 MG/DL (ref 8.7–10.5)
CHLORIDE SERPL-SCNC: 104 MMOL/L (ref 95–110)
CO2 SERPL-SCNC: 26 MMOL/L (ref 23–29)
CREAT SERPL-MCNC: 0.9 MG/DL (ref 0.5–1.4)
DIFFERENTIAL METHOD: ABNORMAL
EOSINOPHIL # BLD AUTO: 0 K/UL (ref 0–0.5)
EOSINOPHIL NFR BLD: 0 % (ref 0–8)
ERYTHROCYTE [DISTWIDTH] IN BLOOD BY AUTOMATED COUNT: 13.3 % (ref 11.5–14.5)
EST. GFR  (AFRICAN AMERICAN): >60 ML/MIN/1.73 M^2
EST. GFR  (NON AFRICAN AMERICAN): >60 ML/MIN/1.73 M^2
GLUCOSE SERPL-MCNC: 160 MG/DL (ref 70–110)
HCT VFR BLD AUTO: 38.6 % (ref 40–54)
HGB BLD-MCNC: 12.3 G/DL (ref 14–18)
IMM GRANULOCYTES # BLD AUTO: 0.06 K/UL (ref 0–0.04)
IMM GRANULOCYTES NFR BLD AUTO: 0.5 % (ref 0–0.5)
LYMPHOCYTES # BLD AUTO: 0.8 K/UL (ref 1–4.8)
LYMPHOCYTES NFR BLD: 6.2 % (ref 18–48)
MCH RBC QN AUTO: 30.8 PG (ref 27–31)
MCHC RBC AUTO-ENTMCNC: 31.9 G/DL (ref 32–36)
MCV RBC AUTO: 97 FL (ref 82–98)
MONOCYTES # BLD AUTO: 0.3 K/UL (ref 0.3–1)
MONOCYTES NFR BLD: 2.2 % (ref 4–15)
NEUTROPHILS # BLD AUTO: 11 K/UL (ref 1.8–7.7)
NEUTROPHILS NFR BLD: 91 % (ref 38–73)
NRBC BLD-RTO: 0 /100 WBC
PLATELET # BLD AUTO: 249 K/UL (ref 150–350)
PMV BLD AUTO: 10.3 FL (ref 9.2–12.9)
POTASSIUM SERPL-SCNC: 3.4 MMOL/L (ref 3.5–5.1)
RBC # BLD AUTO: 3.99 M/UL (ref 4.6–6.2)
SODIUM SERPL-SCNC: 138 MMOL/L (ref 136–145)
WBC # BLD AUTO: 12.04 K/UL (ref 3.9–12.7)

## 2020-07-07 PROCEDURE — 99024 POSTOP FOLLOW-UP VISIT: CPT | Mod: HCNC,,, | Performed by: PHYSICIAN ASSISTANT

## 2020-07-07 PROCEDURE — 97165 OT EVAL LOW COMPLEX 30 MIN: CPT | Mod: HCNC

## 2020-07-07 PROCEDURE — 94761 N-INVAS EAR/PLS OXIMETRY MLT: CPT | Mod: HCNC

## 2020-07-07 PROCEDURE — 36415 COLL VENOUS BLD VENIPUNCTURE: CPT | Mod: HCNC

## 2020-07-07 PROCEDURE — 99024 PR POST-OP FOLLOW-UP VISIT: ICD-10-PCS | Mod: HCNC,,, | Performed by: PHYSICIAN ASSISTANT

## 2020-07-07 PROCEDURE — 63600175 PHARM REV CODE 636 W HCPCS: Mod: HCNC | Performed by: STUDENT IN AN ORGANIZED HEALTH CARE EDUCATION/TRAINING PROGRAM

## 2020-07-07 PROCEDURE — 85025 COMPLETE CBC W/AUTO DIFF WBC: CPT | Mod: HCNC

## 2020-07-07 PROCEDURE — 97161 PT EVAL LOW COMPLEX 20 MIN: CPT | Mod: HCNC

## 2020-07-07 PROCEDURE — 80048 BASIC METABOLIC PNL TOTAL CA: CPT | Mod: HCNC

## 2020-07-07 PROCEDURE — 97530 THERAPEUTIC ACTIVITIES: CPT | Mod: HCNC

## 2020-07-07 PROCEDURE — 25000003 PHARM REV CODE 250: Mod: HCNC | Performed by: STUDENT IN AN ORGANIZED HEALTH CARE EDUCATION/TRAINING PROGRAM

## 2020-07-07 RX ORDER — OXYCODONE AND ACETAMINOPHEN 5; 325 MG/1; MG/1
1 TABLET ORAL EVERY 6 HOURS PRN
Qty: 56 TABLET | Refills: 0 | Status: SHIPPED | OUTPATIENT
Start: 2020-07-07 | End: 2020-11-02 | Stop reason: ALTCHOICE

## 2020-07-07 RX ORDER — CEPHALEXIN 500 MG/1
500 CAPSULE ORAL 4 TIMES DAILY
Qty: 20 CAPSULE | Refills: 0 | Status: SHIPPED | OUTPATIENT
Start: 2020-07-07 | End: 2020-07-12

## 2020-07-07 RX ADMIN — PRAVASTATIN SODIUM 40 MG: 40 TABLET ORAL at 09:07

## 2020-07-07 RX ADMIN — HEPARIN SODIUM 5000 UNITS: 5000 INJECTION INTRAVENOUS; SUBCUTANEOUS at 06:07

## 2020-07-07 RX ADMIN — CEFAZOLIN 2 G: 1 INJECTION, POWDER, FOR SOLUTION INTRAMUSCULAR; INTRAVENOUS at 04:07

## 2020-07-07 RX ADMIN — BISACODYL 10 MG: 10 SUPPOSITORY RECTAL at 09:07

## 2020-07-07 RX ADMIN — Medication 400 MG: at 09:07

## 2020-07-07 RX ADMIN — POTASSIUM CHLORIDE 40 MEQ: 1500 TABLET, EXTENDED RELEASE ORAL at 06:07

## 2020-07-07 RX ADMIN — HYDROCHLOROTHIAZIDE 25 MG: 25 TABLET ORAL at 09:07

## 2020-07-07 RX ADMIN — ALUMINUM HYDROXIDE, MAGNESIUM HYDROXIDE, AND SIMETHICONE 30 ML: 200; 200; 20 SUSPENSION ORAL at 03:07

## 2020-07-07 RX ADMIN — HEPARIN SODIUM 5000 UNITS: 5000 INJECTION INTRAVENOUS; SUBCUTANEOUS at 01:07

## 2020-07-07 RX ADMIN — MUPIROCIN: 20 OINTMENT TOPICAL at 09:07

## 2020-07-07 NOTE — ANESTHESIA RELEASE NOTE
"Anesthesia Release from PACU Note    Patient: Domonique Kellogg    Procedure(s) Performed: Procedure(s) (LRB):  DISCECTOMY, SPINE, CERVICAL, ANTERIOR APPROACH, WITH FUSION C3-4, C4-5 (N/A)    Anesthesia type: general    Post pain: Adequate analgesia    Post assessment: no apparent anesthetic complications    Last Vitals:   Visit Vitals  BP (!) 162/87   Pulse 64   Temp 36.6 °C (97.9 °F) (Temporal)   Resp 15   Ht 5' 8" (1.727 m)   Wt 113.4 kg (250 lb)   SpO2 97%   BMI 38.01 kg/m²       Post vital signs: stable    Level of consciousness: awake, alert  and oriented    Nausea/Vomiting: no nausea/no vomiting    Complications: none    Airway Patency: patent    Respiratory: spontaneous ventilation, nasal cannula    Cardiovascular: stable and blood pressure at baseline    Hydration: euvolemic  "

## 2020-07-07 NOTE — PT/OT/SLP EVAL
Occupational Therapy   Evaluation, Treatment, and Discharge Note    Name: Domonique Kellogg  MRN: [id number]  Admitting Diagnosis:  Cervical stenosis of spinal canal 1 Day Post-Op    Recommendations:     Discharge Recommendations: home  Discharge Equipment Recommendations:  shower chair  Barriers to discharge:  None    Assessment:     Domonique Kellogg is a 70 y.o. male with a medical diagnosis of Cervical stenosis of spinal canal. At this time, patient is functioning at their new baseline functioning and does not require further acute OT services.     Plan:     During this hospitalization, patient does not require further acute OT services.  Please re-consult if situation changes.    · Plan of Care Reviewed with: patient    Subjective     Chief Complaint: No complaints  Patient/Family Comments/goals: return home    Occupational Profile:  Living Environment: Pt lives w/ family in a h. No concerns  Previous level of function: Indep  Roles and Routines: N/A  Equipment Used at home:  none  Assistance upon Discharge: Pt has assistance upon D/C.     Pain/Comfort:  · Pain Rating 1: 0/10  · Pain Rating Post-Intervention 1: 0/10    Patients cultural, spiritual, Lutheran conflicts given the current situation:      Objective:     Communicated with: RN prior to session.  Patient found HOB elevated with hemovac, cervical collar upon OT entry to room.    General Precautions: Standard, fall   Orthopedic Precautions:spinal precautions   Braces: Aspen collar     Occupational Performance:    Bed Mobility:    · Patient completed Scooting/Bridging with supervision  · Patient completed Supine to Sit with supervision    Functional Mobility/Transfers:  · Patient completed Sit <> Stand Transfer with stand by assistance  with  no assistive device   · Functional Mobility: Pt ambulated ~100 ft at sba w/o AD.    Activities of Daily Living:  · Lower Body Dressing: supervision donned and doffed socks seated EOB using figure 4  technique.    Cognitive/Visual Perceptual:  Cognitive/Psychosocial Skills:     -       Oriented to: Person, Place, Time and Situation   -       Follows Commands/attention:Follows multistep  commands  -       Communication: clear/fluent  -       Memory: No Deficits noted  -       Safety awareness/insight to disability: intact   -       Mood/Affect/Coping skills/emotional control: Appropriate to situation  Visual/Perceptual:      -Intact      Physical Exam:  Balance:    -       sba for ambualtion   Postural examination/scapula alignment:    -       Rounded shoulders  Skin integrity: Visible skin intact  Upper Extremity Range of Motion:     -       Right Upper Extremity: WFL  -       Left Upper Extremity: WFL  Upper Extremity Strength:    -       Right Upper Extremity: WFL  -       Left Upper Extremity: WFL   Strength:    -       Right Upper Extremity: WFL  -       Left Upper Extremity: WFL  Fine Motor Coordination:    -       Intact  Gross motor coordination:   WFL    AMPAC 6 Click ADL:  AMPAC Total Score: 22    Treatment & Education:  Pt educated on LBD techniques, safey w/ ADLs, safety within the home, and D/C recs.   Education:    Patient left seated EOB with all lines intact, call button in reach and family present    GOALS:   Multidisciplinary Problems     Occupational Therapy Goals     Not on file          Multidisciplinary Problems (Resolved)        Problem: Occupational Therapy Goal    Goal Priority Disciplines Outcome Interventions   Occupational Therapy Goal   (Resolved)     OT, PT/OT Met    Description: Pt is not currently displaying a need for acute OT services. D/C acute OT services and recommend pt D/C home.                   History:     Past Medical History:   Diagnosis Date    Anticoagulant long-term use     Arthritis     CAD (coronary artery disease) 3/2/2015    [facility]obstruct[facility] per [facility]     CAD (coronary artery disease) 3/26/2015    Cataract     Dry eye syndrome     Hypertension      Obesity     Seizures     2011    Sleep apnea     + CPAP       Past Surgical History:   Procedure Laterality Date    ANTERIOR CERVICAL DISCECTOMY W/ FUSION N/A 7/6/2020    Procedure: DISCECTOMY, SPINE, CERVICAL, ANTERIOR APPROACH, WITH FUSION C3-4, C4-5;  Surgeon: Fabiola Vides MD;  Location: [address];  Service: Neurosurgery;  Laterality: N/A;  TORONTO III, ASA III, BLOOD TYPE & SCREEN, NEUROMONITORING EMG-MEP-SEP, SUPINE POSITION,BRACE MIAMI,REGULAR BED, HEADREST CASPAR, POSITION, MAP>85, RADIOLOGY C-ARM, SPECIAL EQUIPMENT [facility]    EYE SURGERY      INTRAOCULAR PROSTHESES INSERTION Right 7/16/2018    Procedure: INSERTION-INTRAOCULAR LENS (IOL);  Surgeon: Priscilla Hays MD;  Location: [facility];  Service: Ophthalmology;  Laterality: Right;    INTRAOCULAR PROSTHESES INSERTION Left 8/13/2018    Procedure: INSERTION, INTRAOCULAR LENS PROSTHESIS;  Surgeon: Pirscilla Hays MD;  Location: [facility];  Service: Ophthalmology;  Laterality: Left;    KNEE SURGERY      PHACOEMULSIFICATION OF CATARACT Right 7/16/2018    Procedure: PHACOEMULSIFICATION-ASPIRATION-CATARACT;  Surgeon: Priscilla Hays MD;  Location: [facility];  Service: Ophthalmology;  Laterality: Right;    PHACOEMULSIFICATION OF CATARACT Left 8/13/2018    Procedure: PHACOEMULSIFICATION, CATARACT;  Surgeon: Priscilla Hays MD;  Location: [facility];  Service: Ophthalmology;  Laterality: Left;    WISDOM TOOTH EXTRACTION         Time Tracking:     OT Date of Treatment: 07/07/20  OT Start Time: 1105  OT Stop Time: 1115  OT Total Time (min): 10 min    Billable Minutes:Evaluation 10 minutes    Cade Osorio, OT  7/7/2020

## 2020-07-07 NOTE — HPI
Domonique Kellogg is a 69 y.o. male who presents today for an elective C3-C5 ACDF. He reports numbness in the tip of his left thumb and tingling in the right shoulder.  The symptoms have been present for about 4 months.  He denies any dropping objects, handwriting changes, or balance difficulties.  He does endorse some difficulty buttoning his shirt, but he notes that this is mostly because he has difficulty of feeling where the button is relative to the left thumb.  He also notes that his left thumb has increased sensation, particularly when it comes to hot and cold.  He endorses no pain.  His symptoms are intermittent.  They are made worse by neck flexion and neck extension.  They improve when he relaxes.  He denies any change in bowel or bladder habit.  He underwent physical therapy in October-November of this year; however, he did not have any improvement in his symptoms.  He has not had injections.  He has not had neck or back surgery previously.  He had a right knee surgery in 1997 with no anesthetic, infectious, or bleeding complications.       He does take ibuprofen and ASA 81 daily. He is a former smoker, having quit in 1986.      He had in the EMG nerve conduction study on February 18, 2020 that demonstrates right carpal tunnel syndrome, left ulnar neuropathy, and C5-C6 radiculopathy.  He has an MRI of the cervical spine that was obtained prior to today's consultation and is personally reviewed.  This demonstrates multilevel cervical spondylosis, worst at C3-C4 and C4-C5 where there are significant disc herniations resulting in myelomalacia.

## 2020-07-07 NOTE — ANESTHESIA POSTPROCEDURE EVALUATION
Anesthesia Post Evaluation    Patient: Domonique Kellogg    Procedure(s) Performed: Procedure(s) (LRB):  DISCECTOMY, SPINE, CERVICAL, ANTERIOR APPROACH, WITH FUSION C3-4, C4-5 (N/A)    Final Anesthesia Type: general    Patient location during evaluation: PACU  Patient participation: Yes- Able to Participate  Level of consciousness: awake and alert  Post-procedure vital signs: reviewed and stable  Pain management: adequate  Airway patency: patent    PONV status at discharge: No PONV  Anesthetic complications: no      Cardiovascular status: stable  Respiratory status: spontaneous ventilation and nasal cannula  Hydration status: euvolemic  Follow-up not needed.          Vitals Value Taken Time   /87 07/06/20 1933   Temp 36.6 °C (97.9 °F) 07/06/20 1900   Pulse 59 07/06/20 2020   Resp 15 07/06/20 2020   SpO2 96 % 07/06/20 2020   Vitals shown include unvalidated device data.      No case tracking events are documented in the log.      Pain/Lalo Score: Pain Rating Prior to Med Admin: 2 (7/6/2020  7:00 PM)  Pain Rating Post Med Admin: 2 (7/6/2020  7:00 PM)  Lalo Score: 9 (7/6/2020  7:00 PM)

## 2020-07-07 NOTE — PLAN OF CARE
Problem: Adult Inpatient Plan of Care  Goal: Plan of Care Review  Outcome: Ongoing, Progressing     Problem: Adult Inpatient Plan of Care  Goal: Optimal Comfort and Wellbeing  Outcome: Ongoing, Progressing     Problem: Wound  Goal: Optimal Wound Healing  Outcome: Ongoing, Progressing

## 2020-07-07 NOTE — HOSPITAL COURSE
7/6: Presented to [facility] for elective C3-C5 ACDF. Underwent surgery without complication. Post-op he was admitted to the floor for drain management, post-op care. He required riojas placement overnight for urinary retention.   7/7: CORAL. AFVSS. Neuro exam stable. Reports continued numbness in his L thumb and mild posterior neck pain. He reports mild discomfort swallowing pills and eating solids, but tolerates liquids and cereals well. Riojas removed, reports voiding spontaneously s/p riojas removal. Denies new weakness or numbness. No vision changes, headaches, CP/SOB, N/V, or abdominal pain.

## 2020-07-07 NOTE — PT/OT/SLP EVAL
"Physical Therapy Evaluation and Discharge Note    Patient Name:  Domonique Kellogg   MRN:  [id number]    Recommendations:     Discharge Recommendations:  home   Discharge Equipment Recommendations: shower chair   Barriers to discharge: None    Assessment:     Domonique Kellogg is a 70 y.o. male admitted with a medical diagnosis of cervical stenosis. The patient reports numbness in L thumb, tingling that radiates from neck to R shoulder; both present prior to surgery. Intact to light touch BRET UE.  At this time, patient is functioning at their prior level of function and does not require further acute PT services.     Recent Surgery: Procedure(s) (LRB):  DISCECTOMY, SPINE, CERVICAL, ANTERIOR APPROACH, WITH FUSION C3-4, C4-5 (N/A) 1 Day Post-Op    Plan:     During this hospitalization, patient does not require further acute PT services.  Please re-consult if situation changes.      Subjective     Chief Complaint: "They brought me the wrong food this morning, Dr. Vides recommended softer food to start"  Patient/Family Comments/goals: return home  Pain/Comfort:  · Pain Rating 1: 0/10(mild soreness neck when supine, did not rate)  · Pain Addressed 1: Distraction    Patients cultural, spiritual, Catholic conflicts given the current situation: no    Living Environment:  The patient lives with his wife and 2 adult daughters in a 2SH (bed and bath on 1st floor), threshold to enter; WIS. 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 R handed.   Prior to admission, patients level of function was independent.  Equipment used at home: none.  DME owned (not currently used): none.  Upon discharge, patient will have assistance from wife and daughters.    Objective:     Communicated with RN prior to session.  Patient found HOB elevated with peripheral IV, cervical collar, hemovac upon PT entry to room.    General Precautions: Standard, fall   Orthopedic Precautions:spinal precautions   Braces: Aspen collar "     Exams:    Cognitive Exam  Patient is A&O x4 and follows 100% of one -step commands    Fine Motor Coordination - Heel to shin intact, fingertip to thumb intact   Postural Exam Patient presented with the following abnormalities:    -       Rounded shoulders  -       Forward head  -       Kyphosis   Sensation    -       Light touch intact BRET UE and LE, diminished sensation L thumb   Skin Integrity/Edema     -       Skin integrity: visibly intact, incision dressed  -       Edema: NA   R LE ROM WFL   R LE Strength 4-/5 hip flexion, 4/5 knee ext/flex, and ankle DF/PF   L LE ROM WFL   L LE Strength  4-/5 hip flexion, 4/5 knee ext/flex, and ankle DF/PF     Balance          Static Sitting independent    Dynamic Sitting independent    Static Standing independent    Dynamic Standing supervision assistance, withstood dynamic perturbations with gait, no sign of imbalance                  Functional Mobility:    Bed Mobility  Rolling to R: stand by assistance   Supine to Sit on the R side:  stand by assistance   Sit to supine: stand by assistance    Transfers Sit to Stand:  stand by assistance no AD   Gait  Gait Distance: 100 ft with no AD  Assistance Level:stand by assistance   Description:  knee varum, wide ERIKA, decreased reynold, flexed forwards at hips, decreased stride length and reynold          AM-PAC 6 CLICK MOBILITY  Total Score:24       Therapeutic Activities and Exercises:   Patient safe to ambulate independently.   Encouraged patient to ambulate ad gwen.   Reviewed use of c-collar, spinal precautions, log roll technique- verbalized and demo'd good understanding. Patient educated on role of therapy, goals of session, benefits of out of bed mobility. Patient agreeable to mobilize with therapy.  Discussed PT plan of care during hospitalization. Patient educated that they need to call for assistance to mobilize out of bed. Whiteboard updated as appropriate. Patient educated on how their diagnosis impacts their  mobility within PT scope of practice.     Attempted to find bedside chair for patient, no chairs available on floor.     AM-PAC 6 CLICK MOBILITY  Total Score:24     Patient left HOB elevated with all lines intact, call button in reach and bed alarm on.    GOALS:   Multidisciplinary Problems     Physical Therapy Goals     Not on file          Multidisciplinary Problems (Resolved)        Problem: Physical Therapy Goal    Goal Priority Disciplines Outcome Goal Variances Interventions   Physical Therapy Goal   (Resolved)     PT, PT/OT Met     Description: The patient is near his functional baseline. He is safe to ambulate independently. He is safe to discharge home with no post-acute PT needs.  Discharging acute PT. Recommend shower chair.   Mary Toledo, PT  7/7/2020                     History:     Past Medical History:   Diagnosis Date    Anticoagulant long-term use     Arthritis     CAD (coronary artery disease) 3/2/2015    [facility]obstructive per [facility]     CAD (coronary artery disease) 3/26/2015    Cataract     Dry eye syndrome     Hypertension     Obesity     Seizures     2011    Sleep apnea     + CPAP       Past Surgical History:   Procedure Laterality Date    ANTERIOR CERVICAL DISCECTOMY W/ FUSION N/A 7/6/2020    Procedure: DISCECTOMY, SPINE, CERVICAL, ANTERIOR APPROACH, WITH FUSION C3-4, C4-5;  Surgeon: Fabiola Vides MD;  Location: [address];  Service: Neurosurgery;  Laterality: N/A;  TORONTO III, ASA III, BLOOD TYPE & SCREEN, NEUROMONITORING EMG-MEP-SEP, SUPINE POSITION,BRACE MIAMI,REGULAR BED, HEADREST CASPAR, POSITION, MAP>85, RADIOLOGY C-ARM, SPECIAL EQUIPMENT [facility]    EYE SURGERY      INTRAOCULAR PROSTHESES INSERTION Right 7/16/2018    Procedure: INSERTION-INTRAOCULAR LENS (IOL);  Surgeon: Priscilla Hays MD;  Location: [facility];  Service: Ophthalmology;  Laterality: Right;    INTRAOCULAR PROSTHESES INSERTION Left 8/13/2018    Procedure: INSERTION, INTRAOCULAR LENS PROSTHESIS;  Surgeon:  Priscilla Hays MD;  Location: [facility];  Service: Ophthalmology;  Laterality: Left;    KNEE SURGERY      PHACOEMULSIFICATION OF CATARACT Right 7/16/2018    Procedure: PHACOEMULSIFICATION-ASPIRATION-CATARACT;  Surgeon: Priscilla Hays MD;  Location: [facility];  Service: Ophthalmology;  Laterality: Right;    PHACOEMULSIFICATION OF CATARACT Left 8/13/2018    Procedure: PHACOEMULSIFICATION, CATARACT;  Surgeon: Priscilla Hays MD;  Location: [facility];  Service: Ophthalmology;  Laterality: Left;    WISDOM TOOTH EXTRACTION         Time Tracking:     PT Received On: 07/07/20  PT Start Time: 0900     PT Stop Time: 0920  PT Total Time (min): 20 min     Billable Minutes: Evaluation 10 and Therapeutic Activity 10      Mary Toledo, PT  07/07/2020

## 2020-07-07 NOTE — PLAN OF CARE
Problem: Physical Therapy Goal  Goal: Physical Therapy Goal  Description: The patient is near his functional baseline. He is safe to ambulate independently. He is safe to discharge home with no post-acute PT needs.  Discharging acute PT. Recommend shower chair.   Mary Toledo, PT  7/7/2020    Outcome: Met

## 2020-07-07 NOTE — PLAN OF CARE
Problem: Occupational Therapy Goal  Goal: Occupational Therapy Goal  Description: Pt is not currently displaying a need for acute OT services. D/C acute OT services and recommend pt D/C home.  Outcome: Met Cade Osorio OTR/L  7/7/2020

## 2020-07-07 NOTE — NURSING TRANSFER
Nursing Transfer Note      7/6/2020     Transfer to room 947    Transfer via stretcher    Transfer with home cpap    Transported by pct    Chart send with patient: yes    Notified: family    Patient reassessed at: 2200

## 2020-07-07 NOTE — PLAN OF CARE
PCP ABNER FENG  PHARMACY [facility] MAIL ORDER  [facility] /[place] [place]   PT STATES THAT HIS WIFE WILL PROVIDE THE RIDE HOME  PT LIVES IN A 2 STORY HOUSE BUT HIS BEDROOM IS    07/07/20 1010   Discharge Assessment   Assessment Type Discharge Planning Assessment   Confirmed/corrected address and phone number on facesheet? Yes   Assessment information obtained from? Patient   Expected Length of Stay (days) 1   Communicated expected length of stay with patient/caregiver no   Prior to hospitilization cognitive status: Alert/Oriented   Prior to hospitalization functional status: Independent   Current cognitive status: Alert/Oriented   Current Functional Status: Independent   Lives With spouse   Able to Return to Prior Arrangements yes   Is patient able to care for self after discharge? Yes   Readmission Within the Last 30 Days no previous admission in last 30 days   Patient currently being followed by outpatient case management? No   Patient currently receives any other outside agency services? No   Equipment Currently Used at Home none   Do you have any problems affording any of your prescribed medications? No   Is the patient taking medications as prescribed? yes   Does the patient have transportation home? Yes   Transportation Anticipated family or friend will provide   Does the patient receive services at the [facility]? No   DME Needed Upon Discharge  none   Patient/Family in Agreement with Plan unable to assess   ON THE FIRST FLOOR

## 2020-07-07 NOTE — ANESTHESIA POSTPROCEDURE EVALUATION
Anesthesia Post Evaluation    Patient: Domonique Kellogg    Procedure(s) Performed: Procedure(s) (LRB):  DISCECTOMY, SPINE, CERVICAL, ANTERIOR APPROACH, WITH FUSION C3-4, C4-5 (N/A)    Final Anesthesia Type: general    Patient location during evaluation: PACU  Patient participation: Yes- Able to Participate  Level of consciousness: awake and alert  Post-procedure vital signs: reviewed and stable  Pain management: adequate  Airway patency: patent    PONV status at discharge: No PONV  Anesthetic complications: no      Cardiovascular status: blood pressure returned to baseline  Respiratory status: unassisted, spontaneous ventilation and room air  Hydration status: euvolemic            Vitals Value Taken Time   /87 07/06/20 1933   Temp 36.6 °C (97.9 °F) 07/06/20 1900   Pulse 60 07/06/20 2009   Resp 16 07/06/20 2009   SpO2 96 % 07/06/20 2009   Vitals shown include unvalidated device data.      No case tracking events are documented in the log.      Pain/Lalo Score: Pain Rating Prior to Med Admin: 2 (7/6/2020  7:00 PM)  Pain Rating Post Med Admin: 2 (7/6/2020  7:00 PM)  Lalo Score: 9 (7/6/2020  7:00 PM)

## 2020-07-07 NOTE — DISCHARGE SUMMARY
[facility]  Neurosurgery  Discharge Summary      Patient Name: Domonique Kellogg  MRN: [id number]  Admission Date: 7/6/2020  Hospital Length of Stay: 0 days  Discharge Date and Time:  07/07/2020 4:31 PM  Attending Physician: Fabiola Vides MD   Discharging Provider: Leta Guevara PA-C  Primary Care Provider: Nicolas Marlow MD    HPI:   Domonique Kellogg is a 69 y.o. male who presents today for an elective C3-C5 ACDF. He reports numbness in the tip of his left thumb and tingling in the right shoulder.  The symptoms have been present for about 4 months.  He denies any dropping objects, handwriting changes, or balance difficulties.  He does endorse some difficulty buttoning his shirt, but he notes that this is mostly because he has difficulty of feeling where the button is relative to the left thumb.  He also notes that his left thumb has increased sensation, particularly when it comes to hot and cold.  He endorses no pain.  His symptoms are intermittent.  They are made worse by neck flexion and neck extension.  They improve when he relaxes.  He denies any change in bowel or bladder habit.  He underwent physical therapy in October-November of this year; however, he did not have any improvement in his symptoms.  He has not had injections.  He has not had neck or back surgery previously.  He had a right knee surgery in 1997 with no anesthetic, infectious, or bleeding complications.       He does take ibuprofen and ASA 81 daily. He is a former smoker, having quit in 1986.      He had in the EMG nerve conduction study on February 18, 2020 that demonstrates right carpal tunnel syndrome, left ulnar neuropathy, and C5-C6 radiculopathy.  He has an MRI of the cervical spine that was obtained prior to today's consultation and is personally reviewed.  This demonstrates multilevel cervical spondylosis, worst at C3-C4 and C4-C5 where there are significant disc herniations resulting in  myelomalacia.    Procedure(s) (LRB):  DISCECTOMY, SPINE, CERVICAL, ANTERIOR APPROACH, WITH FUSION C3-4, C4-5 (N/A)     Hospital Course: 7/6: Presented to [facility] for elective C3-C5 ACDF. Underwent surgery without complication. Post-op he was admitted to the floor for drain management, post-op care. He required riojas placement overnight for urinary retention.   7/7: NAEON. AFVSS. Neuro exam stable. Reports continued numbness in his L thumb and mild posterior neck pain. He reports mild discomfort swallowing pills and eating solids, but tolerates liquids and cereals well. Riojas removed, reports voiding spontaneously s/p riojas removal. Denies new weakness or numbness. No vision changes, headaches, CP/SOB, N/V, or abdominal pain.     Consults: N/A    Significant Diagnostic Studies: Labs:   BMP:   Recent Labs   Lab 07/06/20  0835 07/07/20  0433   * 160*    138   K 3.2* 3.4*    104   CO2 25 26   BUN 11 12   CREATININE 1.0 0.9   CALCIUM 9.8 9.1   , CMP   Recent Labs   Lab 07/06/20  0835 07/07/20  0433    138   K 3.2* 3.4*    104   CO2 25 26   * 160*   BUN 11 12   CREATININE 1.0 0.9   CALCIUM 9.8 9.1   ANIONGAP 9 8   ESTGFRAFRICA >60.0 >60.0   EGFRNONAA >60.0 >60.0   , CBC   Recent Labs   Lab 07/06/20  0835 07/07/20  0433   WBC 5.63 12.04   HGB 12.6* 12.3*   HCT 39.7* 38.6*    249    and INR   Lab Results   Component Value Date    INR 1.0 07/06/2020    INR 1.0 06/22/2020    INR 1.0 03/04/2020     Radiology: Cervical spine xray    Pending Diagnostic Studies:     None        Final Active Diagnoses:    Diagnosis Date Noted POA    PRINCIPAL PROBLEM:  Cervical stenosis of spinal canal [M48.02] 07/06/2020 Yes      Problems Resolved During this Admission:      Discharged Condition: good    Disposition: Home or Self Care    Follow Up:  Follow-up Information     Nicolas Marlow MD.    Specialties: Family Medicine, Sports Medicine  Why: Outpatient Services  Contact information:  [address]  [facility] [id number]  [phone]                 Patient Instructions:      Notify your health care provider if you experience any of the following:  temperature >100.4     Notify your health care provider if you experience any of the following:  persistent nausea and vomiting or diarrhea     Notify your health care provider if you experience any of the following:  severe uncontrolled pain     Notify your health care provider if you experience any of the following:  redness, tenderness, or signs of infection (pain, swelling, redness, odor or green/yellow discharge around incision site)     Notify your health care provider if you experience any of the following:  difficulty breathing or increased cough     Notify your health care provider if you experience any of the following:  severe persistent headache     Notify your health care provider if you experience any of the following:  worsening rash     Notify your health care provider if you experience any of the following:  persistent dizziness, light-headedness, or visual disturbances     Notify your health care provider if you experience any of the following:  increased confusion or weakness     Activity as tolerated     Medications:  Reconciled Home Medications:      Medication List      START taking these medications    cephALEXin 500 MG capsule  Commonly known as: KEFLEX  Take 1 capsule (500 mg total) by mouth 4 (four) times daily. for 5 days     oxyCODONE-acetaminophen 5-325 mg per tablet  Commonly known as: PERCOCET  Take 1 tablet by mouth every 6 (six) hours as needed.        CHANGE how you take these medications    potassium chloride SA 20 MEQ tablet  Commonly known as: K-DUR,KLOR-CON  Take 2 tablets (40 mEq total) by mouth once daily.  What changed: when to take this        CONTINUE taking these medications    hydroCHLOROthiazide 25 MG tablet  Commonly known as: HYDRODIURIL  TAKE 1 TABLET EVERY DAY     magnesium oxide 400 mg (241.3 mg  magnesium) tablet  Commonly known as: MAG-OX  Take 400 mg by mouth every morning.     pravastatin 40 MG tablet  Commonly known as: PRAVACHOL  TAKE 1 TABLET EVERY DAY        STOP taking these medications    aspirin 81 MG EC tablet  Commonly known as: ECOTRIN        ASK your doctor about these medications    ibuprofen 200 MG tablet  Commonly known as: ADVIL,MOTRIN  Take 400 mg by mouth every 6 (six) hours as needed for Pain.     multivitamin with minerals tablet  Take 1 tablet by mouth every morning.            Leta Guevara PA-C  Neurosurgery  [facility]

## 2020-07-07 NOTE — ASSESSMENT & PLAN NOTE
Domonique Kellogg is a 69 y.o. male who presents today for an elective C3-C5 ACDF. He reports numbness in the tip of his left thumb and tingling in the right shoulder.  The symptoms have been present for about 4 months.  S/p C3-C5 ACDF 7/6.    Patient is s/p C3-C5 ACDF POD1.     --Neurologically stable   - Neuro checks q4h  --All labs and diagnostics personally reviewed.   --Post-op cervical spine xray pending. Will f/u.  --HV drain 35 cc serosanguinous output. No drain output this afternoon. Drain discontinued without complication. Patient tolerated well.  --Urinary: Fleming removed this morning. Urinating spontaneously s/p removal.   --DVT ppx: SQH/SCDs/TEDs  --Bowel regimen: bisacodyl daily, senna nightly PRN.   --HTN: HCTZ  --Activity: PT/OT rec safe to d/c home with no post-acute needs. Rec shower chair. Aspen brace to be used when out of bed. OK to remove while in bed.   --Diet: Mechanical soft  --Atelectasis ppx: IS every hour while awake  --Pain control: Well controlled with Percocet    Dispo: Medically stable for discharge to home. Incision care and activity recommendations reviewed. Plan of care discussed with patient and family, they voiced understanding. All questions were all answered. Follow-up in Neurosurgery clinic to be arranged.

## 2020-07-07 NOTE — PLAN OF CARE
Problem: Adult Inpatient Plan of Care  Goal: Plan of Care Review  7/7/2020 1143 by Nan Mathis, RN  Outcome: Ongoing, Progressing     Problem: Adult Inpatient Plan of Care  Goal: Optimal Comfort and Wellbeing  7/7/2020 1143 by Nan Mathis, RN  Outcome: Ongoing, Progressing

## 2020-07-07 NOTE — NURSING TRANSFER
Nursing Transfer Note      7/6/2020     Transfer to room 944    Transfer via stretcher    Transfer with belongings    Transported by transport    Medicines sent: iv fluids    Chart send with patient: yes    Notified: family    Patient reassessed at: 2000

## 2020-07-07 NOTE — TELEPHONE ENCOUNTER
I returned the pt wife and informed her that I would speak with  to see why the pt has not been discharged from the hospital. I spoke with  and Sujatha Singh will D/C the pt.  ----- Message from Marly Brown sent at 7/7/2020  4:17 PM CDT -----  Regarding: patient advice  Contact: Estrellita (wife) @ [phone]  Calling to get the status of the patient's discharge, please call.

## 2020-07-07 NOTE — DISCHARGE INSTRUCTIONS
Neurosurgery Patient Information      -No driving until cleared in your post-operative appointment. No driving while on narcotics.   -Do not take any OTC products containing acetaminophen at the same time as you take your narcotic pain medication. Medications that may contain acetaminophen include but are not limited to: Excedrin and other headache medications, arthritis medications, cold and sinus medications, etc. Please review the list of active ingredients in any OTC medication prior to taking it.  -Do not take any Aspirin or Aspirin-containing products for 2 weeks after surgery.  -Do not take any Aleve, Naprosyn, Naproxen, Ibuprofen, Advil or any other nonsteroidal anti-inflammatory drug (NSAID) for 2 weeks after surgery.  -Do not take any herbal supplements for 2 weeks after surgery.   -Do not consume any alcoholic beverages until released by your neurosurgeon  -Do not perform any excessive bending over or leaning forward as this is a fall hazard.  -Do not lift anything heavier than a gallon of milk until cleared in post-operative visit.     Contact the Neurosurgery Office immediately if:  If you begin to notice any neurologic changes such as:           -Sudden onset of lethargy or sleepiness           -Sudden confusion, trouble speaking, or understanding            -Sudden trouble seeing in one or both eyes            -Sudden trouble walking, dizziness, loss of coordination            -Sudden severe headache with no known cause            -Sudden onset of numbness or weakness       Wound Care:  Keep your incision open to air. You may shower on the 2nd day after your surgery. Please shower with baby shampoo, but do not take a bath. Keep the incision clean and dry at all times. Please cover the incision with saran wrap or other occlusive dressing while showering and REMOVE once you have completed taking your shower. Do not allow the force of water to hit the incision. If the incision gets damp, gently pat it  dry. Do not rub or scrub the incision. You cannot take a bath/swim/submerge the incision until 8 weeks after surgery.    The incision does not need to be cleaned with any water, soap, alcohol, peroxide, or other substance.    You have skin glue over your incision. Please do not pick at it or try to remove it. It will dissolve on its own.       You now have an implanted device in place. It is imperative that any infection (such as a urinary tract infection) be treated immediately so that it cannot get into your bloodstream. If an infection ends up in your blood, it may seed the device, thus requiring us to remove it. Call the Neurosurgery office or go to the Emergency Room for any signs of infection including: increased redness, drainage, pain or fever (temperature greater than or equal to 101.4).     Miscellaneous:  -Wear your brace when up out of bed. You can remove it while sleeping or while in bed.   -You have been discharged home on antibiotics and it is very important that you complete the course of antibiotics as instructed.   Keflex 500 QID   -Follow up with Dr. Vides in 2 weeks for a wound check and again in 6 weeks for repeat xrays. Appointment will be mailed to you.      Neurosurgery Office: [phone]

## 2020-07-07 NOTE — PROGRESS NOTES
[facility]  Neurosurgery  Progress Note    Subjective:     History of Present Illness: Domonique Kellogg is a 69 y.o. male who presents today for an elective C3-C5 ACDF. He reports numbness in the tip of his left thumb and tingling in the right shoulder.  The symptoms have been present for about 4 months.  He denies any dropping objects, handwriting changes, or balance difficulties.  He does endorse some difficulty buttoning his shirt, but he notes that this is mostly because he has difficulty of feeling where the button is relative to the left thumb.  He also notes that his left thumb has increased sensation, particularly when it comes to hot and cold.  He endorses no pain.  His symptoms are intermittent.  They are made worse by neck flexion and neck extension.  They improve when he relaxes.  He denies any change in bowel or bladder habit.  He underwent physical therapy in October-November of this year; however, he did not have any improvement in his symptoms.  He has not had injections.  He has not had neck or back surgery previously.  He had a right knee surgery in 1997 with no anesthetic, infectious, or bleeding complications.       He does take ibuprofen and ASA 81 daily. He is a former smoker, having quit in 1986.      He had in the EMG nerve conduction study on February 18, 2020 that demonstrates right carpal tunnel syndrome, left ulnar neuropathy, and C5-C6 radiculopathy.  He has an MRI of the cervical spine that was obtained prior to today's consultation and is personally reviewed.  This demonstrates multilevel cervical spondylosis, worst at C3-C4 and C4-C5 where there are significant disc herniations resulting in myelomalacia.    Post-Op Info:  Procedure(s) (LRB):  DISCECTOMY, SPINE, CERVICAL, ANTERIOR APPROACH, WITH FUSION C3-4, C4-5 (N/A)   1 Day Post-Op     Interval History: NAEON. AFVSS. Neuro exam stable. Reports continued numbness in his L thumb and mild posterior neck pain. He  reports mild discomfort swallowing pills and eating solids, but tolerates liquids and cereals well. Riojas removed, reports voiding spontaneously s/p riojas removal. Denies new weakness or numbness. No vision changes, headaches, CP/SOB, N/V, or abdominal pain.     Medications:  Continuous Infusions:  Scheduled Meds:   bisacodyL  10 mg Rectal Daily    heparin (porcine)  5,000 Units Subcutaneous Q8H    hydroCHLOROthiazide  25 mg Oral Daily    magnesium oxide  400 mg Oral QAM    mupirocin   Nasal BID    potassium chloride SA  40 mEq Oral QAM    pravastatin  40 mg Oral Daily     PRN Meds:aluminum-magnesium hydroxide-simethicone, ondansetron, oxyCODONE-acetaminophen, promethazine (PHENERGAN) IVPB, senna-docusate 8.6-50 mg     Review of Systems  Objective:     Weight: 113.4 kg (250 lb)  Body mass index is 38.01 kg/m².  Vital Signs (Most Recent):  Temp: 98.6 °F (37 °C) (07/07/20 0829)  Pulse: 66 (07/07/20 0829)  Resp: 18 (07/07/20 0829)  BP: (!) 140/70 (07/07/20 0829)  SpO2: 98 % (07/07/20 0829) Vital Signs (24h Range):  Temp:  [96.2 °F (35.7 °C)-98.6 °F (37 °C)] 98.6 °F (37 °C)  Pulse:  [57-81] 66  Resp:  [13-32] 18  SpO2:  [92 %-100 %] 98 %  BP: (116-167)/(63-93) 140/70  Arterial Line BP: (132-179)/() 132/98     Date 07/07/20 0700 - 07/08/20 0659   Shift 9287-4592 3808-1881 8044-4207 24 Hour Total   INTAKE   Shift Total(mL/kg)       OUTPUT   Urine(mL/kg/hr) 800   800   Shift Total(mL/kg) 800(7.1)   800(7.1)   Weight (kg) 113.4 113.4 113.4 113.4              Vent Mode: SIMV  Oxygen Concentration (%):  [50] 50  Resp Rate Total:  [22 br/min] 22 br/min  Vt Set:  [500 mL] 500 mL  PEEP/CPAP:  [5 cmH20] 5 cmH20  Pressure Support:  [10 cmH20] 10 cmH20  Mean Airway Pressure:  [9.8 cmH20-9.9 cmH20] 9.8 cmH20         Closed/Suction Drain 07/06/20 1514 Right Neck Accordion 10 Fr. (Active)   Site Description Healing 07/06/20 2230   Drainage Bloody 07/06/20 2230   Status Suction-low intermittent 07/06/20 2230   Output (mL)  30 mL 07/07/20 0600       Neurosurgery Physical Exam  General: well developed, well nourished, no distress.   Head: normocephalic.  Neck: Aspen collar in place.    Neurologic: Alert and oriented. Thought content appropriate.  GCS: E4 V5 M6. GCS Total: 15  Mental Status: Awake, Alert, Oriented x 4  Language: No aphasia  Speech: No dysarthria  Cranial nerves: face symmetric, tongue midline, CN II-XII grossly intact.   Eyes: pupils equal, round, reactive to light with accomodation, EOMI.   Pulmonary: normal respirations, no signs of respiratory distress  Abdomen: soft, non-distended, not tender to palpation    Sensory: Numbness to L thumb. O/w intact to light touch throughout.  Motor Strength: Moves all extremities spontaneously with good tone.  No abnormal movements seen.     Strength  Deltoids Triceps Biceps Wrist Extension Wrist Flexion Hand    Upper: R 5/5 5/5 5/5 5/5 5/5 5/5    L 5/5 5/5 5/5 5/5 5/5 5/5     Iliopsoas Quadriceps Knee  Flexion Tibialis  anterior Gastro- cnemius EHL   Lower: R 5/5 5/5 5/5 5/5 5/5 5/5    L 5/5 5/5 5/5 5/5 5/5 5/5     Vascular: No LE edema.   Skin: Skin is warm, dry and intact.    Reflexes:   Schofield's: Negative.     Cerebellar:   Finger-to-nose: intact bilaterally   Pronator drift: absent bilaterally      Incision: c/d/i with skin edges well approximated with dermabond. No surrounding erythema or edema. No drainage from incision. No palpable hematoma or underlying fluid collection.    HV Drain: 35cc serosanguinous drainage over 24 hrs.       Significant Labs:  Recent Labs   Lab 07/06/20  0835 07/07/20  0433   * 160*    138   K 3.2* 3.4*    104   CO2 25 26   BUN 11 12   CREATININE 1.0 0.9   CALCIUM 9.8 9.1     Recent Labs   Lab 07/06/20  0835 07/07/20  0433   WBC 5.63 12.04   HGB 12.6* 12.3*   HCT 39.7* 38.6*    249     Recent Labs   Lab 07/06/20  0835   INR 1.0   APTT 27.5     Microbiology Results (last 7 days)     ** No results found for the last 168  hours. **        All pertinent labs from the last 24 hours have been reviewed.    Significant Diagnostics:  I have reviewed all pertinent imaging results/findings within the past 24 hours.    Assessment/Plan:     * Cervical stenosis of spinal canal  Domonique Kellogg is a 69 y.o. male who presents today for an elective C3-C5 ACDF. He reports numbness in the tip of his left thumb and tingling in the right shoulder.  The symptoms have been present for about 4 months.  S/p C3-C5 ACDF 7/6.    Patient is s/p C3-C5 ACDF POD1.     --Neurologically stable   - Neuro checks q4h  --All labs and diagnostics personally reviewed.   --Post-op cervical spine xray pending. Will f/u.  --HV drain 35 cc serosanguinous output. No drain output this afternoon. Drain discontinued without complication. Patient tolerated well.  --Urinary: Fleming removed this morning. Urinating spontaneously s/p removal.   --DVT ppx: SQH/SCDs/TEDs  --Bowel regimen: bisacodyl daily, senna nightly PRN.   --HTN: HCTZ  --Activity: PT/OT rec safe to d/c home with no post-acute needs. Rec shower chair. Aspen brace to be used when out of bed. OK to remove while in bed.   --Diet: Mechanical soft  --Atelectasis ppx: IS every hour while awake  --Pain control: Well controlled with Percocet    Dispo: Medically stable for discharge to home. Incision care and activity recommendations reviewed. Plan of care discussed with patient and family, they voiced understanding. All questions were all answered. Follow-up in Neurosurgery clinic to be arranged.             Leta Guevara PA-C  Neurosurgery  [facility]

## 2020-07-07 NOTE — SUBJECTIVE & OBJECTIVE
Interval History: NAEON. AFVSS. Neuro exam stable. Reports continued numbness in his L thumb and mild posterior neck pain. He reports mild discomfort swallowing pills and eating solids, but tolerates liquids and cereals well. Riojas removed, reports voiding spontaneously s/p riojas removal. Denies new weakness or numbness. No vision changes, headaches, CP/SOB, N/V, or abdominal pain.     Medications:  Continuous Infusions:  Scheduled Meds:   bisacodyL  10 mg Rectal Daily    heparin (porcine)  5,000 Units Subcutaneous Q8H    hydroCHLOROthiazide  25 mg Oral Daily    magnesium oxide  400 mg Oral QAM    mupirocin   Nasal BID    potassium chloride SA  40 mEq Oral QAM    pravastatin  40 mg Oral Daily     PRN Meds:aluminum-magnesium hydroxide-simethicone, ondansetron, oxyCODONE-acetaminophen, promethazine (PHENERGAN) IVPB, senna-docusate 8.6-50 mg     Review of Systems  Objective:     Weight: 113.4 kg (250 lb)  Body mass index is 38.01 kg/m².  Vital Signs (Most Recent):  Temp: 98.6 °F (37 °C) (07/07/20 0829)  Pulse: 66 (07/07/20 0829)  Resp: 18 (07/07/20 0829)  BP: (!) 140/70 (07/07/20 0829)  SpO2: 98 % (07/07/20 0829) Vital Signs (24h Range):  Temp:  [96.2 °F (35.7 °C)-98.6 °F (37 °C)] 98.6 °F (37 °C)  Pulse:  [57-81] 66  Resp:  [13-32] 18  SpO2:  [92 %-100 %] 98 %  BP: (116-167)/(63-93) 140/70  Arterial Line BP: (132-179)/() 132/98     Date 07/07/20 0700 - 07/08/20 0659   Shift 7772-7461 2182-2661 4940-8830 24 Hour Total   INTAKE   Shift Total(mL/kg)       OUTPUT   Urine(mL/kg/hr) 800   800   Shift Total(mL/kg) 800(7.1)   800(7.1)   Weight (kg) 113.4 113.4 113.4 113.4              Vent Mode: SIMV  Oxygen Concentration (%):  [50] 50  Resp Rate Total:  [22 br/min] 22 br/min  Vt Set:  [500 mL] 500 mL  PEEP/CPAP:  [5 cmH20] 5 cmH20  Pressure Support:  [10 cmH20] 10 cmH20  Mean Airway Pressure:  [9.8 cmH20-9.9 cmH20] 9.8 cmH20         Closed/Suction Drain 07/06/20 1514 Right Neck Accordion 10 Fr. (Active)   Site  Description Healing 07/06/20 2230   Drainage Bloody 07/06/20 2230   Status Suction-low intermittent 07/06/20 2230   Output (mL) 30 mL 07/07/20 0600       Neurosurgery Physical Exam  General: well developed, well nourished, no distress.   Head: normocephalic.  Neck: Aspen collar in place.    Neurologic: Alert and oriented. Thought content appropriate.  GCS: E4 V5 M6. GCS Total: 15  Mental Status: Awake, Alert, Oriented x 4  Language: No aphasia  Speech: No dysarthria  Cranial nerves: face symmetric, tongue midline, CN II-XII grossly intact.   Eyes: pupils equal, round, reactive to light with accomodation, EOMI.   Pulmonary: normal respirations, no signs of respiratory distress  Abdomen: soft, non-distended, not tender to palpation    Sensory: Numbness to L thumb. O/w intact to light touch throughout.  Motor Strength: Moves all extremities spontaneously with good tone.  No abnormal movements seen.     Strength  Deltoids Triceps Biceps Wrist Extension Wrist Flexion Hand    Upper: R 5/5 5/5 5/5 5/5 5/5 5/5    L 5/5 5/5 5/5 5/5 5/5 5/5     Iliopsoas Quadriceps Knee  Flexion Tibialis  anterior Gastro- cnemius EHL   Lower: R 5/5 5/5 5/5 5/5 5/5 5/5    L 5/5 5/5 5/5 5/5 5/5 5/5     Vascular: No LE edema.   Skin: Skin is warm, dry and intact.    Reflexes:   Schofield's: Negative.     Cerebellar:   Finger-to-nose: intact bilaterally   Pronator drift: absent bilaterally      Incision: c/d/i with skin edges well approximated with dermabond. No surrounding erythema or edema. No drainage from incision. No palpable hematoma or underlying fluid collection.    HV Drain: 35cc serosanguinous drainage over 24 hrs.       Significant Labs:  Recent Labs   Lab 07/06/20  0835 07/07/20  0433   * 160*    138   K 3.2* 3.4*    104   CO2 25 26   BUN 11 12   CREATININE 1.0 0.9   CALCIUM 9.8 9.1     Recent Labs   Lab 07/06/20  0835 07/07/20  0433   WBC 5.63 12.04   HGB 12.6* 12.3*   HCT 39.7* 38.6*    249     Recent  Labs   Lab 07/06/20  0835   INR 1.0   APTT 27.5     Microbiology Results (last 7 days)     ** No results found for the last 168 hours. **        All pertinent labs from the last 24 hours have been reviewed.    Significant Diagnostics:  I have reviewed all pertinent imaging results/findings within the past 24 hours.

## 2020-07-08 ENCOUNTER — TELEPHONE (OUTPATIENT)
Dept: NEUROSURGERY | Facility: CLINIC | Age: 70
End: 2020-07-08

## 2020-07-08 NOTE — PLAN OF CARE
Patient discharged home.  The patient did not have any home needs.  Family provided transportation.  Neurosurgery clinic to schedule follow up appointment.    Future Appointments   Date Time Provider Department Center   7/21/2020 11:00 AM Rita Carias RN [address]   8/18/2020  1:00 PM Fabiola Vides MD [place] [address] [address]   11/2/2020  8:40 AM Nicolas Pacheco MD [facility]       07/08/20 0855   Final Note   Assessment Type Final Discharge Note   Anticipated Discharge Disposition Home   Hospital Follow Up  Appt(s) scheduled? No  (Neurosurgery clinic to schedule follow up appointment.)   Discharge plans and expectations educations in teach back method with documentation complete? Yes   Right Care Referral Info   Post Acute Recommendation No Care

## 2020-07-09 ENCOUNTER — TELEPHONE (OUTPATIENT)
Dept: NEUROSURGERY | Facility: CLINIC | Age: 70
End: 2020-07-09

## 2020-07-09 ENCOUNTER — PATIENT OUTREACH (OUTPATIENT)
Dept: OTHER | Facility: OTHER | Age: 70
End: 2020-07-09

## 2020-07-09 NOTE — PROGRESS NOTES
Digital Medicine: Clinician Follow-Up    Mr. Kellogg is doing well. Recently had surgery and is recovering form that. This is the reason for his reading lapse.     Denies hypertensive signs and symptoms. Felt better after surgery, but pain has since returned. He is hopeful it is just part of the healing process.     Endorses medication compliance.     BP is at goal.     The history is provided by the spouse and the patient.   Follow Up  Follow-up reason(s): reading review and routine education          INTERVENTION(S)  encouragement/support    PLAN  patient verbalizes understanding, Health  follow up and continue monitoring    Patient healing from surgery - BP controlled.     Will alert HC to reason for lapse in readings. Advised patient and his wife to inform me if they notice an upward trend.     Will continue to monitor.       There are no preventive care reminders to display for this patient.    Last 5 Patient Entered Readings                                      Current 30 Day Average: 130/71     Recent Readings 7/1/2020 6/19/2020 6/11/2020 5/22/2020 5/12/2020    SBP (mmHg) 126 143 121 134 134    DBP (mmHg) 64 82 66 71 78    Pulse 62 58 53 60 58             Hypertension Medications             hydroCHLOROthiazide (HYDRODIURIL) 25 MG tablet TAKE 1 TABLET EVERY DAY                 Screenings

## 2020-07-09 NOTE — TELEPHONE ENCOUNTER
"Returned call, spoke with patient. Patient wanted to know why he had staples in his head when he did not have surgery on his head. Patient instructed that surgical equipment designed to stabilize the head during his surgery, makes a small opening and the staples were used to close it after surgery. Patient asked if he had to keep them until his 2 week wound check appt. Per Dr Vides, patient instructed that she is ok with staple removal prior to that appt. Patient stated " ok, I understand now, I will wait to have them taken out"      ----- Message from Robin Wang sent at 7/9/2020  9:48 AM CDT -----  Contact: pt @ [phone]  Pt is asking to speak w/ you regarding portal message      "

## 2020-07-16 ENCOUNTER — TELEPHONE (OUTPATIENT)
Dept: NEUROSURGERY | Facility: CLINIC | Age: 70
End: 2020-07-16

## 2020-07-16 NOTE — TELEPHONE ENCOUNTER
I contacted  regarding his email sent to . I had Nurse Lizeth review the pt imaging sent in with his email. It was determined that the pt may need to reach out to his PCP for possible sinus evaluation. The pt stated he does not feel he needs to see  His pcp but was just concerned. I explained that if he starts complaining of trouble swallowing or breathing then please let us know

## 2020-07-21 ENCOUNTER — CLINICAL SUPPORT (OUTPATIENT)
Dept: NEUROSURGERY | Facility: CLINIC | Age: 70
End: 2020-07-21
Payer: MEDICARE

## 2020-07-21 VITALS — HEART RATE: 52 BPM | TEMPERATURE: 97 F | SYSTOLIC BLOOD PRESSURE: 126 MMHG | DIASTOLIC BLOOD PRESSURE: 73 MMHG

## 2020-07-21 PROCEDURE — 99999 PR PBB SHADOW E&M-EST. PATIENT-LVL III: ICD-10-PCS | Mod: PBBFAC,HCNC,,

## 2020-07-21 PROCEDURE — 99999 PR PBB SHADOW E&M-EST. PATIENT-LVL III: CPT | Mod: PBBFAC,HCNC,,

## 2020-07-21 RX ORDER — DEXTROMETHORPHAN HYDROBROMIDE, GUAIFENESIN 5; 100 MG/5ML; MG/5ML
LIQUID ORAL DAILY PRN
COMMUNITY
Start: 2020-02-03

## 2020-07-29 NOTE — PROGRESS NOTES
"2 week PO   Wound Check Visit     Mr Youssef ,  is a pleasant 69 y/o male who underwent a C3-4, C4-5 ACDF on 7/6/2020 by Dr Vides(For complete diagnosis and procedure, see OP note.) Presents to clinic today for 2 week post-op wound check.     Patient ambulates independently with steady gait and good balance      Patient is wearing Aspen collar. It is adjusted too tight. Pt reports brace compliance. Discussed rationale for brace, how to apply, adjust and check to make sure it is not to tight and the natural progression of nerve root healing. Patient return demonstrated proper adjustment of collar and how to assess to make sure it is not too tight, he stated "this feels so much more comfortable." Patient denies any difficulty swallowing.    Patient is alert and oriented x3, speech normal in context and clarity, memory intact grossly, thought content appropriate, PERRLA, 2mm bilat, motor strength- full proximally and distally, no involuntary movements or tremors, sensation intact to pain and light touch in BUE and BLE, cerebellar finger-to-nose intact, denies fevers, chills, night sweats, N/V, constipation, bladder issues. C/o intermittent numbness, on R thigh, reports soreness and the feeling like it's gone to sleep, it occurs randomly from time to time.       Patient rates current pain 2/10 on neck. Believes it is r/t wearing collar to tight. He reports he uses Tylenol for pain, but  it is very infrequent. pain is well managed.    R scalp surgical incision assessed, staples intact. C/D/I. Fully scabbed over. Staples removed using aseptic technique, patient tolerated well. No swelling, fluctuance, redness, drainage or other s/s infection observed. Patient instructed incision care, s/s of infection.          Anterior Neck surgical incision assessed, C/D/I. Edges well approximated. Small amount of Derma bond observed. No swelling, fluctuance, redness, drainage or other s/s infection observed.       Patient instructed " incision care, s/s of infection, infection prevention use of incentive spirometer, adequate diet and fluid intake, Perform hand hygiene frequently, wash hands with soap and water for at least 20 sec and/or use alcohol-based hand , covering all surfaces of your hands and rub them together until they feel dry, avoid touching your eyes, ears, nose and mouth with unwashed hands, avoid crowds, practice social distancing, wear mask when outside of home, avoid close contact with sick people and stay home if you are sick,except to get medical care      Patient instructed and given written copy of the following information:                 ACTIVITY RESTRICTIONS:   No lifting greater than 10 pounds     Avoid bending and twisting the area of your surgery more than 45 degrees from neutral position in any direction.      No driving or operating machinery: Until cleared by your surgeon and/or while taking narcotic pain medications or muscle relaxants      Wear your brace at all times except when flat in bed, showering, or eating.       Benefits of physical activity at this stage of recovery. Increase ambulation as tolerated. You should aim to walk a cumulative amount of about 2 miles per day.       Walk on paved surfaces only. It is okay to walk up and down stairs while holding side rail.      No sexual activity for 2-3 weeks.      MEDICATION/FOLLOW UP:      DO NOT TAKE ANY non-steroidal anti-inflammatory drugs (NSAIDS) for 12 weeks, or until physician has confirmed bone fusion. These medications include ibuprofen, Naprosyn, Aleve, Advil, Indocin, Mobic, or Celebrex.     For constipation take Docusate (Colace 100 mg), one capsule, daily as needed.  If Colace is ineffective, take MiraLAX as instructed on the directions label. Both available over the counter.     Begin weaning narcotic pain medicine use as soon as possible after surgery. Appropriate use of pain medications. Surgeons will not refill narcotics after 2 months  post-op.  Physicians need 3 days notice for pain medication to be refilled. Pain medication will only be refilled between 8AM and 5PM, Monday through Friday, due to FDA regulation of documentation.        No use of tobacco products, or any products containing nicotine.       WOUND CARE:      You may stop using Bacitracin ointment. Keep your incision open to air.       You may continue to shower. Have the force of the water hit you opposite of the incision. Do not scrub incision. Pat incision dry after showering.       You cannot submerge incision in water (no baths, swimming, or hot tubs) until 8 weeks after surgery, or until skin edges have completely healed.      You have Dermabond glue on your incision, do not pick at the glue! (Even after it begins to fall off!) The glue will eventually fall off completely on its own.       Importance and type of nutrition necessary for proper healing.     Call your doctor or go to the Emergency Room for any signs of infection, including increased redness, drainage, pain, fever (temperature ? 101.5 for 24 hours), night sweats, chills, any localized neurological changes, problems with speech, vision, numbness, tingling, weakness, severe headaches, or for other concerns.       Follow-up appts instructed and printed reminder handed to patient    All questions answered. Patient verbalized understanding of all instructions via teach back method and successfully return demonstrated how to attend virtual visit. Patient encouraged to call office with any additional concerns.      Rita Carias RN  Neurosurgery

## 2020-08-18 ENCOUNTER — OFFICE VISIT (OUTPATIENT)
Dept: NEUROSURGERY | Facility: CLINIC | Age: 70
End: 2020-08-18
Payer: MEDICARE

## 2020-08-18 ENCOUNTER — HOSPITAL ENCOUNTER (OUTPATIENT)
Dept: RADIOLOGY | Facility: HOSPITAL | Age: 70
Discharge: HOME | End: 2020-08-18
Attending: PHYSICIAN ASSISTANT
Payer: MEDICARE

## 2020-08-18 VITALS
SYSTOLIC BLOOD PRESSURE: 118 MMHG | HEIGHT: 68 IN | WEIGHT: 255.31 LBS | TEMPERATURE: 98 F | BODY MASS INDEX: 38.69 KG/M2 | HEART RATE: 48 BPM | DIASTOLIC BLOOD PRESSURE: 68 MMHG

## 2020-08-18 DIAGNOSIS — M50.00 CERVICAL DISC DISEASE WITH MYELOPATHY: Primary | ICD-10-CM

## 2020-08-18 DIAGNOSIS — M48.02 CERVICAL STENOSIS OF SPINAL CANAL: ICD-10-CM

## 2020-08-18 PROCEDURE — 99999 PR PBB SHADOW E&M-EST. PATIENT-LVL IV: CPT | Mod: PBBFAC,HCNC,, | Performed by: NEUROLOGICAL SURGERY

## 2020-08-18 PROCEDURE — 72040 X-RAY EXAM NECK SPINE 2-3 VW: CPT | Mod: TC,HCNC

## 2020-08-18 PROCEDURE — 99024 POSTOP FOLLOW-UP VISIT: CPT | Mod: HCNC,S$GLB,, | Performed by: NEUROLOGICAL SURGERY

## 2020-08-18 PROCEDURE — 99024 PR POST-OP FOLLOW-UP VISIT: ICD-10-PCS | Mod: HCNC,S$GLB,, | Performed by: NEUROLOGICAL SURGERY

## 2020-08-18 PROCEDURE — 72040 XR CERVICAL SPINE AP LATERAL: ICD-10-PCS | Mod: 26,HCNC,, | Performed by: RADIOLOGY

## 2020-08-18 PROCEDURE — 72040 X-RAY EXAM NECK SPINE 2-3 VW: CPT | Mod: 26,HCNC,, | Performed by: RADIOLOGY

## 2020-08-18 PROCEDURE — 99999 PR PBB SHADOW E&M-EST. PATIENT-LVL IV: ICD-10-PCS | Mod: PBBFAC,HCNC,, | Performed by: NEUROLOGICAL SURGERY

## 2020-08-19 NOTE — PROGRESS NOTES
Neurosurgery  Follow-up    SUBJECTIVE:     Chief Complaint:  Numbness and tingling in the left thumb and right shoulder    History of Present Illness:  Domonique Kellogg is a 70 y.o. male who presents with complaints of numbness in the tip of his left thumb and tingling in the right shoulder.  The symptoms have been present for about 4 months.  He denies any dropping objects, handwriting changes, or balance difficulties.  He does endorse some difficulty buttoning his shirt, but he notes that this is mostly because he has difficulty of feeling where the button is relative to the left thumb.  He also notes that his left thumb has increased sensation, particularly when it comes to hot and cold.  He endorses no pain.  His symptoms are intermittent.  They are made worse by neck flexion and neck extension.  They improve when he relaxes.  He denies any change in bowel or bladder habit.  He underwent physical therapy in October-November of this year; however, he did not have any improvement in his symptoms.  He has not had injections.  He has not had neck or back surgery previously.  He had a right knee surgery in 1997 with no anesthetic, infectious, or bleeding complications.      He does take ibuprofen and ASA 81 daily. He is a former smoker, having quit in 1986.     He had in the EMG nerve conduction study on February 18, 2020 that demonstrates right carpal tunnel syndrome, left ulnar neuropathy, and C5-C6 radiculopathy.  He has an MRI of the cervical spine that was obtained prior to today's consultation and is personally reviewed.  This demonstrates multilevel cervical spondylosis, worst at C3-C4 and C4-C5 where there are significant disc herniations resulting in myelomalacia.    As of 8/18/20, the patient underwent C3-C4/C4-C5 on July 6, 2020. Overall, the patient has been doing well since then. He has had interval improvement in his strength and pain. He still has numbness of the left thumb that has not changed. He  "reports complete resolution of his preoperative neck pain. He reports a little discomfort from where the collar hits interscapularly. He says that he is swallowing food and drink well, but he sometimes has a "catching" feeling with pills. He denies any fever or chills. There has been no drainage from his incision. He feels that his hands and arms are stronger.     Review of patient's allergies indicates:   Allergen Reactions    Indomethacin      Headache dizziness    Adhesive Rash       Current Outpatient Medications   Medication Sig Dispense Refill    acetaminophen (TYLENOL ARTHRITIS PAIN) 650 MG TbSR       hydroCHLOROthiazide (HYDRODIURIL) 25 MG tablet TAKE 1 TABLET EVERY DAY 90 tablet 1    magnesium oxide (MAG-OX) 400 mg (241.3 mg magnesium) tablet Take 400 mg by mouth every morning.       multivitamin with minerals tablet Take 1 tablet by mouth every morning.       potassium chloride SA (K-DUR,KLOR-CON) 20 MEQ tablet Take 2 tablets (40 mEq total) by mouth once daily. (Patient taking differently: Take 40 mEq by mouth every morning. ) 180 tablet 3    pravastatin (PRAVACHOL) 40 MG tablet TAKE 1 TABLET EVERY DAY 90 tablet 1    oxyCODONE-acetaminophen (PERCOCET) 5-325 mg per tablet Take 1 tablet by mouth every 6 (six) hours as needed. 56 tablet 0     No current facility-administered medications for this visit.        Past Medical History:   Diagnosis Date    Anticoagulant long-term use     Arthritis     CAD (coronary artery disease) 3/2/2015    non-obstructive per [facility]     CAD (coronary artery disease) 3/26/2015    Cataract     Dry eye syndrome     Hypertension     Obesity     Seizures     2011    Sleep apnea     + CPAP     Past Surgical History:   Procedure Laterality Date    ANTERIOR CERVICAL DISCECTOMY W/ FUSION N/A 7/6/2020    Procedure: DISCECTOMY, SPINE, CERVICAL, ANTERIOR APPROACH, WITH FUSION C3-4, C4-5;  Surgeon: Fabiola Vides MD;  Location: [facility] OR [address] [place] [address];  Service: Neurosurgery;  " Laterality: N/A;  TORONTO III, ASA III, BLOOD TYPE & SCREEN, NEUROMONITORING EMG-MEP-SEP, SUPINE POSITION,BRACE [place],REGULAR BED, HEADREST CASPAR, POSITION, MAP>85, RADIOLOGY C-ARM, SPECIAL EQUIPMENT VIRGIL TYLER    EYE SURGERY      INTRAOCULAR PROSTHESES INSERTION Right 2018    Procedure: INSERTION-INTRAOCULAR LENS (IOL);  Surgeon: Priscilla Hays MD;  Location: [facility] OR;  Service: Ophthalmology;  Laterality: Right;    INTRAOCULAR PROSTHESES INSERTION Left 2018    Procedure: INSERTION, INTRAOCULAR LENS PROSTHESIS;  Surgeon: Priscilla Hays MD;  Location: [facility] OR;  Service: Ophthalmology;  Laterality: Left;    KNEE SURGERY      PHACOEMULSIFICATION OF CATARACT Right 2018    Procedure: PHACOEMULSIFICATION-ASPIRATION-CATARACT;  Surgeon: Priscilla Hays MD;  Location: [facility] OR;  Service: Ophthalmology;  Laterality: Right;    PHACOEMULSIFICATION OF CATARACT Left 2018    Procedure: PHACOEMULSIFICATION, CATARACT;  Surgeon: Priscilla Hays MD;  Location: [facility] OR;  Service: Ophthalmology;  Laterality: Left;    WISDOM TOOTH EXTRACTION       Family History     Problem Relation (Age of Onset)    Cancer Mother, Maternal Grandmother    Diabetes Mother    Heart disease Father, Paternal Grandfather, Brother    Hypertension Mother    No Known Problems Sister, Daughter, Son, Sister, Sister, Sister, Daughter        Social History     Socioeconomic History    Marital status:      Spouse name: Estrellita    Number of children: 2    Years of education: Not on file    Highest education level: Not on file   Occupational History     Employer: [employer]   Social Needs    Financial resource strain: Not hard at all    Food insecurity     Worry: Never true     Inability: Never true    Transportation needs     Medical: No     Non-medical: No   Tobacco Use    Smoking status: Former Smoker     Packs/day: 1.00     Years: 20.00     Pack years: 20.00     Quit date: 1987     Years since quittin.6  "   Smokeless tobacco: Never Used    Tobacco comment: quit 1987   Substance and Sexual Activity    Alcohol use: Yes     Alcohol/week: 1.0 - 2.0 standard drinks     Types: 1 - 2 Glasses of wine per week     Frequency: 4 or more times a week     Drinks per session: 1 or 2     Binge frequency: Never     Comment: 1-2 glasses wine several times weekly    Drug use: No    Sexual activity: Yes     Partners: Female   Lifestyle    Physical activity     Days per week: 6 days     Minutes per session: 50 min    Stress: Not at all   Relationships    Social connections     Talks on phone: Twice a week     Gets together: Once a week     Attends Bahai service: Not on file     Active member of club or organization: Yes     Attends meetings of clubs or organizations: More than 4 times per year     Relationship status:    Other Topics Concern    Not on file   Social History Narrative    Not on file       Review of Systems   Constitutional: Negative for fever.   HENT: Negative for nosebleeds.    Eyes: Negative for visual disturbance.   Respiratory: Negative for shortness of breath.    Cardiovascular: Negative for chest pain.   Gastrointestinal: Negative for diarrhea.   Endocrine: Negative for cold intolerance.   Genitourinary: Positive for difficulty urinating and urgency.   Musculoskeletal: Negative for back pain.   Skin: Negative for color change.   Neurological: Positive for numbness.   Hematological: Does not bruise/bleed easily.   Psychiatric/Behavioral: The patient is not nervous/anxious.        OBJECTIVE:     Vital Signs  Temp: 98.1 °F (36.7 °C)  Pulse: (!) 48  BP: 118/68  Pain Score: 0-No pain  Height: 5' 8" (172.7 cm)  Weight: 115.8 kg (255 lb 4.7 oz)  Body mass index is 38.82 kg/m².      Physical Exam:    Constitutional: He appears well-developed and well-nourished. No distress.     Eyes: Pupils are equal, round, and reactive to light. EOM are normal.     Abdominal: Soft.     Skin: Skin displays no rash on " extremities. Skin displays no lesions on extremities.   Incision well healed with edges opposed     Psych/Behavior: He is alert. He is oriented to person, place, and time.     Musculoskeletal: Gait is normal.      Comments: L HI 4+/5  R WF 4+/5   R delt 4+/5      Neurological:        Coordination: He has normal finger to nose coordination.        Sensory: There is sensory deficit in the extremities. Sensory deficit is located left thumb.        DTRs:   + Schofield's bilaterally        Cranial nerves:   Right tongue deviation   Left nasolabial flattening     Pulmonary: symmetric expansion     Diagnostic Results:  Xrays show stable positioning of hardware     ASSESSMENT/PLAN:     Domonique Kellogg is a 70 y.o. male with significant cervical stenosis and associated myelomalacia now s/p C3-C4/C4-C5 ACDF. Overall, he has been doing well with improvement in pain and function.     He is interested in discontinuing his collar. I have informed him that he may do so, since he is now 6 weeks out from surgery. I have encouraged him to continue use of his bone growth stimulator for 6 months. I have cleared him to resume intercourse, though I have encouraged him to do so carefully.     He will continue to monitor his swallowing and notify us if he has any increased difficulties or fevers.     We will also have him begin PT, now that he is 6 weeks post-op. I would like to see him back in about 6 weeks to see how he is progressing, then again at 6 months post-op with a CT scan of the cervical spine.     Note generated with voice recognition software; please excuse any typographical errors.

## 2020-08-21 ENCOUNTER — TELEPHONE (OUTPATIENT)
Dept: NEUROSURGERY | Facility: CLINIC | Age: 70
End: 2020-08-21

## 2020-08-21 DIAGNOSIS — M47.812 CERVICAL SPONDYLOSIS: ICD-10-CM

## 2020-08-21 DIAGNOSIS — M50.00 CERVICAL DISC DISEASE WITH MYELOPATHY: Primary | ICD-10-CM

## 2020-08-30 ENCOUNTER — PATIENT MESSAGE (OUTPATIENT)
Dept: INTERNAL MEDICINE | Facility: CLINIC | Age: 70
End: 2020-08-30

## 2020-09-04 ENCOUNTER — CLINICAL SUPPORT (OUTPATIENT)
Dept: REHABILITATION | Facility: OTHER | Age: 70
End: 2020-09-04
Attending: NEUROLOGICAL SURGERY
Payer: MEDICARE

## 2020-09-04 DIAGNOSIS — M50.00 CERVICAL DISC DISEASE WITH MYELOPATHY: ICD-10-CM

## 2020-09-04 DIAGNOSIS — M47.812 CERVICAL SPONDYLOSIS: ICD-10-CM

## 2020-09-04 PROCEDURE — 97161 PT EVAL LOW COMPLEX 20 MIN: CPT | Mod: HCNC,PN | Performed by: PHYSICAL THERAPIST

## 2020-09-04 PROCEDURE — 97110 THERAPEUTIC EXERCISES: CPT | Mod: HCNC,PN | Performed by: PHYSICAL THERAPIST

## 2020-09-04 NOTE — PLAN OF CARE
[facility]  Physical Therapy Initial Evaluation    Date: 9/4/2020   Name: Domonique Kellogg  Clinic Number: [id number]    Therapy Diagnosis:   1. Cervical disc disease with myelopathy  Ambulatory referral/consult to Physical/Occupational Therapy   2. Cervical spondylosis  Ambulatory referral/consult to Physical/Occupational Therapy     Physician: Fabiola Vides MD    Physician Orders: PT Eval and Treat   Neck   Shoulder   Medical Diagnosis from Referral: M50.00 (ICD-10-CM) - Cervical disc disease with myelopathy M47.812 (ICD-10-CM) - Cervical spondylosis   Evaluation Date: 9/4/2020  Authorization Period Expiration: 9/15/2020  Plan of Care Expiration: 11/30/2020  Visit # / Visits authorized: 1/ 1    Time In: 0855 am  Time Out: 0935 am  Total Appointment Time (timed & untimed codes): 40 minutes    Precautions: Standard, s/p ACDF    Subjective   Date of onset: 7/6/2020    DISCECTOMY, SPINE, CERVICAL, ANTERIOR APPROACH, WITH FUSION C3-4, C4-5 (N/A)      History of current condition - Domonique reports: Chronic neck pain and underwent surgery 2 months ago. He was in cervical collar for 6 weeks. Since removing the brace, he's noticed difficulty with turning his head all direction and driving with arms on steering wheel. Numbness R side of jaw bone numb and having a feeling of something in his throat causing him to swallow constantly. Denies any changes in handwriting, imbalance, N/V, speech difficulties, radicular symptoms.      Medical History:   Past Medical History:   Diagnosis Date    Anticoagulant long-term use     Arthritis     CAD (coronary artery disease) 3/2/2015    non[facility]obstruct[facility]     CAD (coronary artery disease) 3/26/2015    Cataract     Dry eye syndrome     Hypertension     Obesity     Seizures     2011    Sleep apnea     + CPAP       Surgical History:   Domonique Kellogg  has a past surgical history that includes Knee surgery; [place] tooth extraction;  Phacoemulsification of cataract (Right, 7/16/2018); Intraocular prosthesis insertion (Right, 7/16/2018); Phacoemulsification of cataract (Left, 8/13/2018); Intraocular prosthesis insertion (Left, 8/13/2018); Eye surgery; and Anterior cervical discectomy w/ fusion (N/A, 7/6/2020).    Medications:   Domonique has a current medication list which includes the following prescription(s): acetaminophen, hydrochlorothiazide, magnesium oxide, multivitamin with minerals, oxycodone-acetaminophen, potassium chloride sa, and pravastatin.    Allergies:   Review of patient's allergies indicates:   Allergen Reactions    Indomethacin      Headache dizziness    Adhesive Rash        Imaging, xrays: FINDINGS:  Postoperative changes of ACDF at C3/C4 and C4/C5 level with associated disc replacement.  The position and alignment is satisfactory and unchanged as compared to the previous study       Prior Therapy: yes, in 2019 for neck  ocial History: lives at home with spouse, one flight of stairs (18 steps), lives in [facility] and would like to transfer to [facility] when open  Occupation: part-time work, sedentary at computer  Prior Level of Function: no pain or limitation with all daily activity; used to play golf but stopped due to LBP (hasn't played since 2014)  Current Level of Function: walking 3 times per day, not able to lift water cases and needing help from wife to do the grocery shopping    Pain:  Current 5/10, worst 7/10, best 0/10   Location: bilateral neck  Description: soreness  Aggravating Factors: with movement   Easing Factors: rest    Patients goals: To be able to turn his all the way and to not have pain    Objective     Observation: incision anterior neck well healed without any sings of increased warmth, swelling    Posture: rounded shoulders    Cervical Range of Motion:    Degrees Pain   Flexion 60 Pulling posterior cervical spine     Extension 60 Painful occiput region     Right Rotation 40 *     Left  "Rotation 40 *     Right Side Bending 25 *   Left Side Bending 20 *      Shoulder Range of Motion:   Shoulder Left Right   Flexion 140 140   Abduction 150 150     Strength:  Cervical MMT   Flexion 3/5   Extension 3+/5   Right Side Bend 3+/5   Left Side Bend 3+/5     Upper Extremity Strength  (R) UE  (L) UE    Shoulder flexion: 5/5 Shoulder flexion: 5/5   Shoulder Abduction: 5/5 Shoulder abduction: 4+/5*   Shoulder ER 5/5 Shoulder ER 5/5   Shoulder IR 5/5 Shoulder IR 5/5   Elbow flexion: 5/5 Elbow flexion: 5/5   Elbow extension: 5/5 Elbow extension: 5/5   Wrist flexion: 5/5 Wrist flexion: 5/5   Wrist extension: 5/5 Wrist extension: 5/5    5/5 : 5/5     Opponens: 4/5 B  Lumbricals: 5/5 B    LE MMT:  Knee extension: 5/5 B  Knee flexion: 5/5 B  Dorsiflexion: 5/5 B      Special Tests:  Romberg balance : normal    DTR:  Symmetrical and 3+ triceps, 2+ BR and biceps    Joint Mobility: NT    Palpation: TTP L upper trapezius      Sensation: grossly intact all dermatomes BUE;s    Flexibility: tightness through pectorals      Limitation/Restriction for FOTO cervical Survey    Therapist reviewed FOTO scores for Domonique Kellogg on 9/4/2020.   FOTO documents entered into EPIC - see Media section.    Limitation Score: 50%         TREATMENT   Treatment Time In: 9:25 am  Treatment Time Out: 9:35 am  Total Treatment time (time-based codes) separate from Evaluation: 10 minutes    Domonique received therapeutic exercises to develop strength, posture and core stabilization for 10 minutes including:    Supine chin nods 3 sec hold with verbal cues for symone deep cervical neck flexors without compensatory SCM  SLR x 10 ea  Scapula retractions 5 sec x 10  Corner stretch 30" x 3    Home Exercises and Patient Education Provided    Education provided:   - HEP, expectations for ROM recovery, importance of continued walking program    Written Home Exercises Provided: yes.  Exercises were reviewed and Domonique was able to demonstrate " them prior to the end of the session.  Domonique demonstrated good  understanding of the education provided.     See EMR under Patient Instructions for exercises provided 9/4/2020.    Assessment   Domonique is a 70 y.o. male referred to outpatient Physical Therapy with a medical diagnosis of Cervical disc disease with myelopathy s/p C3-4, C4-5 7/6/2020. Patient presents with decreased cervical ROM consistent with fusion changes, decreased soft tissue mobility upper trapezius SCM/ levator scapulae, poor postural awareness. Pt with full strength in extremities with exception of thumb opposition and L upper traps.     Patient prognosis is Good.   Patientt will benefit from skilled outpatient Physical Therapy to address the deficits stated above and in the chart below, provide patient /family education, and to maximize patientt's level of independence.     Plan of care discussed with patient: Yes  Patient's spiritual, cultural and educational needs considered and patient is agreeable to the plan of care and goals as stated below:     Anticipated Barriers for therapy: none    Medical Necessity is demonstrated by the following  History  Co-morbidities and personal factors that may impact the plan of care Co-morbidities:   Cervical fusion  High BMI    Personal Factors:   Lives in [facility], transportation may be an issue     high   Examination  Body Structures and Functions, activity limitations and participation restrictions that may impact the plan of care Body Regions:   neck  upper extremities    Body Systems:    ROM  strength  motor control    Participation Restrictions:   Turning head, overhead activities    Activity limitations:   Learning and applying knowledge  no deficits    General Tasks and Commands  no deficits    Communication  no deficits    Mobility  driving (bike, car, motorcycle)  Lifting    Self care  no deficits    Domestic Life  shopping  doing house work (cleaning house, washing dishes,  laundry)    Interactions/Relationships  no deficits    Life Areas  no deficits    Community and Social Life  recreation and leisure         high   Clinical Presentation stable and uncomplicated low   Decision Making/ Complexity Score: low     Goals:  Short Term Goals: 6 weeks   1. Patient to demonstrate independence with postural awareness for improved management of condition  2. Patient to report decreased pain in neck by 30% or greater with ADL's    Long Term Goals: 12 weeks   1. Patient to be independent with home exercise program for improved self management of condition  2. Patient to have decreased subjective report of disability as noted by a score of <40% on the FOTO cervical questionnaire   3. Patient to increased strength in L shoulder to 5/5 or greater for improved performance of ADL's   4. Patient to report being able to drive with little to no difficulty       Plan   Plan of care Certification: 9/4/2020 to 11/30/2020.    Outpatient Physical Therapy 1 times weekly for 12 weeks to include the following interventions: Manual Therapy, Moist Heat/ Ice, Neuromuscular Re-ed, Patient Education, Therapeutic Activites, Therapeutic Exercise and dry needling.     Lexus Gaitan, PT

## 2020-09-09 ENCOUNTER — CLINICAL SUPPORT (OUTPATIENT)
Dept: REHABILITATION | Facility: OTHER | Age: 70
End: 2020-09-09
Attending: NEUROLOGICAL SURGERY
Payer: MEDICARE

## 2020-09-09 DIAGNOSIS — M47.812 CERVICAL SPONDYLOSIS: Primary | ICD-10-CM

## 2020-09-09 DIAGNOSIS — M50.00 CERVICAL DISC DISEASE WITH MYELOPATHY: ICD-10-CM

## 2020-09-09 PROCEDURE — 97110 THERAPEUTIC EXERCISES: CPT | Mod: HCNC,PN,CQ

## 2020-09-09 NOTE — PROGRESS NOTES
Physical Therapy Daily Treatment Note     Name: Domonique Kellogg  Clinic Number: [id number]    Therapy Diagnosis:   Encounter Diagnoses   Name Primary?    Cervical spondylosis Yes    Cervical disc disease with myelopathy      Physician: Fabiola Vides MD    Visit Date: 9/9/2020    Physician Orders: PT Eval and Treat   Neck   Shoulder   Medical Diagnosis from Referral: M50.00 (ICD-10-CM) - Cervical disc disease with myelopathy M47.812 (ICD-10-CM) - Cervical spondylosis   Evaluation Date: 9/4/2020  Authorization Period Expiration: 9/15/2020  Plan of Care Expiration: 11/30/2020  Visit # / Visits authorized: 2/ 1 (pend auth )    Time In: 1015  Time Out: 1100  Total Billable Time: 45 minutes    Precautions: Standard, s/p ACDF      Subjective     Pt reports: feeling well today. States he was a little sore in his neck over the weekend as he was doing a lot of reaching and looking up. States he felt like he may have over did it however he is feeling much better today.   He was compliant with home exercise program.  Response to previous treatment: Tolerated well  Functional change: NA    Prior Level of Function: no pain or limitation with all daily activity; used to play golf but stopped due to LBP (hasn't played since 2014)  Current Level of Function: walking 3 times per day, not able to lift water cases and needing help from wife to do the grocery shopping     Pain:  Current 5/10, worst 7/10, best 0/10   Location: bilateral neck  Description: soreness  Aggravating Factors: with movement   Easing Factors: rest     Patients goals: To be able to turn his all the way and to not have pain      Objective     Domonique received therapeutic exercises to develop strength, posture and core stabilization for 45 minutes including:     +Recumbent bike 5 minutes (for joint lubrication and to increase blood flow -seat position 13 )    Supine chin nods 20x 3 sec hold - verbal cues for symone deep cervical neck flexors without  "compensatory SCM  SLR x 20 ea   Scapula retractions 5 sec x 15   Corner stretch 30" x 3  +St hip abd 20x - focused on posture  +St hip flex 20x -focused on posture  +Supine no moines 20x3"  +Serratus punch w/ wand 20x  +BKFO with TrA otb 20x3"  +add cervical isometrics next visit      Domonique received the following manual therapy techniques: Myofacial release and Soft tissue Mobilization were applied to the: 00 for 00  minutes, including:  NP      Domonique participated in dynamic functional therapeutic activities to improve functional performance for 00  minutes, including:  NP      Domonique received hot pack for 00 minutes to NP.      **Taken at Initial Evaluation**                  9/4/2020    Posture: rounded shoulders     Cervical Range of Motion:     Degrees Pain   Flexion 60 Pulling posterior cervical spine      Extension 60 Painful occiput region      Right Rotation 40 *      Left Rotation 40 *      Right Side Bending 25 *   Left Side Bending 20 *      Shoulder Range of Motion:   Shoulder Left Right   Flexion 140 140   Abduction 150 150      Strength:  Cervical MMT   Flexion 3/5   Extension 3+/5   Right Side Bend 3+/5   Left Side Bend 3+/5      Upper Extremity Strength  (R) UE   (L) UE     Shoulder flexion: 5/5 Shoulder flexion: 5/5   Shoulder Abduction: 5/5 Shoulder abduction: 4+/5*   Shoulder ER 5/5 Shoulder ER 5/5   Shoulder IR 5/5 Shoulder IR 5/5   Elbow flexion: 5/5 Elbow flexion: 5/5   Elbow extension: 5/5 Elbow extension: 5/5   Wrist flexion: 5/5 Wrist flexion: 5/5   Wrist extension: 5/5 Wrist extension: 5/5    5/5 : 5/5      Opponens: 4/5 B  Lumbricals: 5/5 B     LE MMT:  Knee extension: 5/5 B  Knee flexion: 5/5 B  Dorsiflexion: 5/5 B    Home Exercises Provided and Patient Education Provided     Education provided:   - Posture, bed mobility for decreased pain and exercise rationale.      Written Home Exercises Provided: yes.  Exercises were reviewed and Domonique was able to demonstrate them " prior to the end of the session.  Domonique demonstrated good  understanding of the education provided.     See EMR under Patient Instructions for exercises provided 9/9/2020 and prior visit.    Assessment     Pt tolerated exercise well. Emphasis was focused on core strength and improving postural awaness when standing/sitting. Pt presents with forward head and rounded shoulders in sitting/standing postures. Muscle weakness with cervical paraspinals most notably with splenius cervicis/splenius captious. Pt education regarding rolling to side for improved posture and decreased pain after witnessing the pt transitioning  from supine to sit. Pt requires verbal/tactile cues to initiate exercise and to correct form. PT/PTA face to face conference with evaluating therapist, concerning pt status/TX.     Domonique is progressing well towards his goals.   Patient prognosis is Good.       Pt will continue to benefit from skilled outpatient physical therapy to address the deficits listed in the problem list box on initial evaluation, provide pt/family education and to maximize pt's level of independence in the home and community environment.     Pt's spiritual, cultural and educational needs considered and pt agreeable to plan of care and goals.     Anticipated Barriers for therapy: none    Goals:  Short Term Goals: 6 weeks   1. Patient to demonstrate independence with postural awareness for improved management of condition  2. Patient to report decreased pain in neck by 30% or greater with ADL's     Long Term Goals: 12 weeks   1. Patient to be independent with home exercise program for improved self management of condition  2. Patient to have decreased subjective report of disability as noted by a score of <40% on the FOTO cervical questionnaire   3. Patient to increased strength in L shoulder to 5/5 or greater for improved performance of ADL's   4. Patient to report being able to drive with little to no difficulty           Plan     Plan of care Certification: 9/4/2020 to 11/30/2020.    Continue with current POC for further strengthening, flexibility, improve ROM and ADL performance. Will progress postural stabilization there-ex as tolerated.        Outpatient Physical Therapy 1 times weekly for 12 weeks to include the following interventions: Manual Therapy, Moist Heat/ Ice, Neuromuscular Re-ed, Patient Education, Therapeutic Activites, Therapeutic Exercise and dry needling.       Cale Wright, PTA

## 2020-09-15 ENCOUNTER — PATIENT OUTREACH (OUTPATIENT)
Dept: ADMINISTRATIVE | Facility: OTHER | Age: 70
End: 2020-09-15

## 2020-09-15 NOTE — PROGRESS NOTES
Health Maintenance Due   Topic Date Due    Shingles Vaccine (3 of 3) 12/26/2019    Influenza Vaccine (1) 08/01/2020     Updates were requested from care everywhere.  Chart was reviewed for overdue Proactive [facility] Encounters (JANN) topics (CRS, Breast Cancer Screening, Eye exam)  Health Maintenance has been updated.  LINKS immunization registry triggered.  Immunizations were reconciled.

## 2020-09-16 ENCOUNTER — OFFICE VISIT (OUTPATIENT)
Dept: OPTOMETRY | Facility: CLINIC | Age: 70
End: 2020-09-16
Payer: MEDICARE

## 2020-09-16 DIAGNOSIS — H52.4 HYPEROPIA WITH ASTIGMATISM AND PRESBYOPIA, BILATERAL: ICD-10-CM

## 2020-09-16 DIAGNOSIS — Z96.1 PSEUDOPHAKIA OF BOTH EYES: ICD-10-CM

## 2020-09-16 DIAGNOSIS — H52.03 HYPEROPIA WITH ASTIGMATISM AND PRESBYOPIA, BILATERAL: ICD-10-CM

## 2020-09-16 DIAGNOSIS — I10 HYPERTENSION, ESSENTIAL: Primary | ICD-10-CM

## 2020-09-16 DIAGNOSIS — H52.203 HYPEROPIA WITH ASTIGMATISM AND PRESBYOPIA, BILATERAL: ICD-10-CM

## 2020-09-16 DIAGNOSIS — H04.123 DRY EYE SYNDROME OF BOTH EYES: ICD-10-CM

## 2020-09-16 PROCEDURE — 92014 COMPRE OPH EXAM EST PT 1/>: CPT | Mod: HCNC,S$GLB,, | Performed by: OPTOMETRIST

## 2020-09-16 PROCEDURE — 92015 DETERMINE REFRACTIVE STATE: CPT | Mod: HCNC,S$GLB,, | Performed by: OPTOMETRIST

## 2020-09-16 PROCEDURE — 99999 PR PBB SHADOW E&M-EST. PATIENT-LVL III: CPT | Mod: PBBFAC,HCNC,, | Performed by: OPTOMETRIST

## 2020-09-16 PROCEDURE — 99999 PR PBB SHADOW E&M-EST. PATIENT-LVL III: ICD-10-PCS | Mod: PBBFAC,HCNC,, | Performed by: OPTOMETRIST

## 2020-09-16 PROCEDURE — 92015 PR REFRACTION: ICD-10-PCS | Mod: HCNC,S$GLB,, | Performed by: OPTOMETRIST

## 2020-09-16 PROCEDURE — 92014 PR EYE EXAM, EST PATIENT,COMPREHESV: ICD-10-PCS | Mod: HCNC,S$GLB,, | Performed by: OPTOMETRIST

## 2020-09-16 NOTE — PROGRESS NOTES
HPI     Last eye exam was 9/11/19 with   Pt here for routine eye exam.    Pt states he thinks he needs a new glasses rx.  Patient denies diplopia, headaches, flashes/floaters, and pain.  systane PRN OU, and cataract sx OU.        Last edited by Erin Kellogg on 9/16/2020 10:22 AM. (History)            Assessment /Plan     For exam results, see Encounter Report.    Hypertension, essential    Dry eye syndrome of both eyes    Pseudophakia of both eyes    Hyperopia with astigmatism and presbyopia, bilateral            1.  No retinopathy--monitor yearly.  BP control.  Eye health normal OU.  2.  Recommend artificial tears as needed.  3-4.  Bifocal rx given

## 2020-09-18 ENCOUNTER — TELEPHONE (OUTPATIENT)
Dept: NEUROSURGERY | Facility: CLINIC | Age: 70
End: 2020-09-18

## 2020-09-18 ENCOUNTER — PATIENT MESSAGE (OUTPATIENT)
Dept: NEUROSURGERY | Facility: CLINIC | Age: 70
End: 2020-09-18

## 2020-09-18 DIAGNOSIS — R13.10 DYSPHAGIA, UNSPECIFIED TYPE: ICD-10-CM

## 2020-09-18 DIAGNOSIS — R68.89 OTHER GENERAL SYMPTOMS AND SIGNS: ICD-10-CM

## 2020-09-18 DIAGNOSIS — Z98.1 STATUS POST CERVICAL SPINAL FUSION: Primary | ICD-10-CM

## 2020-09-22 NOTE — PROGRESS NOTES
Digital Medicine: Health  Follow-Up    The history is provided by the patient.             Reason for review: Blood pressure not at goal        Topics Covered on Call: physical activity and frequency of readings, surgery recovery, cuff accuracy    Additional Follow-up details: Mr. Kellogg discussed that he still notices a marked difference between office/vs digital medicine cuff readings, though he concedes that we are working effectively together to manage his blood pressure.    He explained that he has not been taking as many readings as he would like, but he plans to resume taking them more frequently - as least 3 a week.          Diet-Not assessed          Physical Activity-Change      Additional physical activity details: Mr. Kellogg has not been walking as much as he is recovering from surgery, though he has been doing physical therapy, which he reports is helpful. We discussed that the change in exercise routine may contribute to his higher average systolic value recently.      Medication Adherence-Medication Adherence not addressed.      Substance, Sleep, Stress-Not assessed      Additional monitoring needed.       Patient verbalizes understanding.      Explained the importance of self-monitoring and medication adherence. Encouraged the patient to communicate with their health  for lifestyle modifications to help improve or maintain a healthy lifestyle.            There are no preventive care reminders to display for this patient.    Last 5 Patient Entered Readings                                      Current 30 Day Average: 133/68     Recent Readings 9/10/2020 9/9/2020 8/18/2020 8/17/2020 8/10/2020    SBP (mmHg) 134 131 118 127 129    DBP (mmHg) 67 69 68 75 70    Pulse 52 57 48 51 52

## 2020-09-23 ENCOUNTER — CLINICAL SUPPORT (OUTPATIENT)
Dept: REHABILITATION | Facility: OTHER | Age: 70
End: 2020-09-23
Attending: NEUROLOGICAL SURGERY
Payer: MEDICARE

## 2020-09-23 DIAGNOSIS — M54.2 NECK PAIN: ICD-10-CM

## 2020-09-23 PROCEDURE — 97110 THERAPEUTIC EXERCISES: CPT | Mod: HCNC,PN | Performed by: PHYSICAL THERAPIST

## 2020-09-23 NOTE — PROGRESS NOTES
"    Physical Therapy Daily Treatment Note     Name: Domonique Kellogg  Clinic Number: [id number]    Therapy Diagnosis:   Encounter Diagnosis   Name Primary?    Neck pain      Physician: Fabiola Vides MD    Visit Date: 9/23/2020    Physician Orders: PT Eval and Treat   Neck   Shoulder   Medical Diagnosis from Referral: M50.00 (ICD-10-CM) - Cervical disc disease with myelopathy M47.812 (ICD-10-CM) - Cervical spondylosis   Evaluation Date: 9/4/2020  Authorization Period Expiration:12/31/2020  Plan of Care Expiration: 11/30/2020  Visit # / Visits authorized: 2/ 12    Time In: 0945  Time Out: 1025  Total Billable Time: 40 minutes    Precautions: Standard, s/p ACDF      Subjective     Pt reports: More soreness in his knee today with OA. Has not been consistent with his HEP. His daughter was in an accident recently.   He was compliant with home exercise program.  Response to previous treatment: Tolerated well  Functional change: NA    Prior Level of Function: no pain or limitation with all daily activity; used to play golf but stopped due to LBP (hasn't played since 2014)  Current Level of Function: walking 3 times per day, not able to lift water cases and needing help from wife to do the grocery shopping     Pain:  Current 5/10, worst 7/10, best 0/10   Location: bilateral neck  Description: soreness  Aggravating Factors: with movement   Easing Factors: rest     Patients goals: To be able to turn his all the way and to not have pain      Objective     Domonique received therapeutic exercises to develop strength, posture and core stabilization for 40 minutes including:     Recumbent bike 8 minutes (for joint lubrication and to increase blood flow -seat position 13 )    Supine chin nods 20x 3 sec hold   SLR x 20 ea   Scapula retractions 5 sec x 15   Corner stretch 30" x 3  S/L hip abd 20x - focused on posture  St marches 20x -focused on posture  Supine no moines 20x3"  Serratus punch w/ wand 20x  BKFO with TrA otb " "20x3"  cervical isometrics 5 sec x 10 ea      Domonique received the following manual therapy techniques: Myofacial release and Soft tissue Mobilization were applied to the: 00 for 00  minutes, including:  NP      Domonique participated in dynamic functional therapeutic activities to improve functional performance for 00  minutes, including:  NP      Domonique received hot pack for 00 minutes to NP.      **Taken at Initial Evaluation**                  9/4/2020    Posture: rounded shoulders     Cervical Range of Motion:     Degrees Pain   Flexion 60 Pulling posterior cervical spine      Extension 60 Painful occiput region      Right Rotation 40 *      Left Rotation 40 *      Right Side Bending 25 *   Left Side Bending 20 *      Shoulder Range of Motion:   Shoulder Left Right   Flexion 140 140   Abduction 150 150      Strength:  Cervical MMT   Flexion 3/5   Extension 3+/5   Right Side Bend 3+/5   Left Side Bend 3+/5      Upper Extremity Strength  (R) UE   (L) UE     Shoulder flexion: 5/5 Shoulder flexion: 5/5   Shoulder Abduction: 5/5 Shoulder abduction: 4+/5*   Shoulder ER 5/5 Shoulder ER 5/5   Shoulder IR 5/5 Shoulder IR 5/5   Elbow flexion: 5/5 Elbow flexion: 5/5   Elbow extension: 5/5 Elbow extension: 5/5   Wrist flexion: 5/5 Wrist flexion: 5/5   Wrist extension: 5/5 Wrist extension: 5/5    5/5 : 5/5      Opponens: 4/5 B  Lumbricals: 5/5 B     LE MMT:  Knee extension: 5/5 B  Knee flexion: 5/5 B  Dorsiflexion: 5/5 B    Home Exercises Provided and Patient Education Provided     Education provided:   - Posture, bed mobility for decreased pain and exercise rationale.      Written Home Exercises Provided: yes.  Exercises were reviewed and Domonique was able to demonstrate them prior to the end of the session.  Domonique demonstrated good  understanding of the education provided.     See EMR under Patient Instructions for exercises provided 9/9/2020 and prior visit.    Assessment     Pt presents to clinic with " antalgic gait and decreased stance RLE. Good tolerance to gentle ROM and strengthening exercises without exacerbation of symptoms. Progressing with gentle isometrics cervical musculature this visit.     Domonique is progressing well towards his goals.   Patient prognosis is Good.       Pt will continue to benefit from skilled outpatient physical therapy to address the deficits listed in the problem list box on initial evaluation, provide pt/family education and to maximize pt's level of independence in the home and community environment.     Pt's spiritual, cultural and educational needs considered and pt agreeable to plan of care and goals.     Anticipated Barriers for therapy: none    Goals:  Short Term Goals: 6 weeks   1. Patient to demonstrate independence with postural awareness for improved management of condition  2. Patient to report decreased pain in neck by 30% or greater with ADL's     Long Term Goals: 12 weeks   1. Patient to be independent with home exercise program for improved self management of condition  2. Patient to have decreased subjective report of disability as noted by a score of <40% on the FOTO cervical questionnaire   3. Patient to increased strength in L shoulder to 5/5 or greater for improved performance of ADL's   4. Patient to report being able to drive with little to no difficulty          Plan     Plan of care Certification: 9/4/2020 to 11/30/2020.    Continue with current POC for further strengthening, flexibility, improve ROM and ADL performance. Will progress postural stabilization there-ex as tolerated.        Outpatient Physical Therapy 1 times weekly for 12 weeks to include the following interventions: Manual Therapy, Moist Heat/ Ice, Neuromuscular Re-ed, Patient Education, Therapeutic Activites, Therapeutic Exercise and dry needling.       Lexus Gaitan, PT

## 2020-09-30 ENCOUNTER — CLINICAL SUPPORT (OUTPATIENT)
Dept: REHABILITATION | Facility: OTHER | Age: 70
End: 2020-09-30
Attending: NEUROLOGICAL SURGERY
Payer: MEDICARE

## 2020-09-30 DIAGNOSIS — M54.2 NECK PAIN: ICD-10-CM

## 2020-09-30 PROCEDURE — 97110 THERAPEUTIC EXERCISES: CPT | Mod: HCNC,PN | Performed by: PHYSICAL THERAPIST

## 2020-09-30 NOTE — PROGRESS NOTES
"    Physical Therapy Daily Treatment Note     Name: Domonique Kellogg  Clinic Number: [id number]    Therapy Diagnosis:   Encounter Diagnosis   Name Primary?    Neck pain      Physician: Fabiola Vides MD    Visit Date: 9/30/2020    Physician Orders: PT Eval and Treat   Neck   Shoulder   Medical Diagnosis from Referral: M50.00 (ICD-10-CM) - Cervical disc disease with myelopathy M47.812 (ICD-10-CM) - Cervical spondylosis   Evaluation Date: 9/4/2020  Authorization Period Expiration:12/31/2020  Plan of Care Expiration: 11/30/2020  Visit # / Visits authorized: 4/ 12    Time In: 0955  Time Out: 1045  Total Billable Time: 40 minutes    Precautions: Standard, s/p ACDF      Subjective     Pt reports: Increased pain R knee today. Increased pain with prolonged sitting or driving then going to walk. His neck is progressing as expected and not sure he needs more therapy for his neck. Plans to have his R knee seen rheumatologist but unable to get appointment until December.   He was compliant with home exercise program.  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 and needing help from wife to do the grocery shopping     Pain:  Current 8/10  Location: R posterior knee     Patients goals: To be able to turn his all the way and to not have pain      Objective     Domonique received therapeutic exercises to develop strength, posture and core stabilization for 40 minutes including:     Recumbent bike 8 minutes (for joint lubrication and to increase blood flow -seat position 13 )    Supine chin nods 20x 3 sec hold   SLR x 20 ea , TC's for TKE R  Scapula retractions 5 sec x 15   Corner stretch 30" x 3  S/L hip abd 20x - focused on posture  St marches 20x -focused on posture  Supine no moines 20x3", " "YTB  Serratus punch w/ wand 20x  BKFO with TrA otb 20x3"  cervical isometrics 5 sec x 10 ea      Domonique received the following manual therapy techniques: Myofacial release and Soft tissue Mobilization were applied to the: neck and knee for 05  minutes, including:  nael cream applied to upper traps and R knee for pain relief      Domonique participated in dynamic functional therapeutic activities to improve functional performance for 00  minutes, including:  NP      Domonique received hot pack for 00 minutes to NP.      9/30/2020    Posture: rounded shoulders     Cervical Range of Motion:     Degrees Pain   Flexion 60 Pulling posterior cervical spine      Extension 70       Right Rotation 50 *      Left Rotation 50       Right Side Bending 25 *   Left Side Bending 20 *      Shoulder Range of Motion:   Shoulder Left Right   Flexion 140 140   Abduction 150 150      Strength:  Cervical MMT   Flexion 4/5   Extension 5/5   Right Side Bend 4/5   Left Side Bend 4/5      Upper Extremity Strength  (R) UE   (L) UE     Shoulder flexion: 5/5 Shoulder flexion: 5/5   Shoulder Abduction: 5/5 Shoulder abduction: 5/5   Shoulder ER 5/5 Shoulder ER 5/5   Shoulder IR 5/5 Shoulder IR 5/5   Elbow flexion: 5/5 Elbow flexion: 5/5   Elbow extension: 5/5 Elbow extension: 5/5   Wrist flexion: 5/5 Wrist flexion: 5/5   Wrist extension: 5/5 Wrist extension: 5/5    5/5 : 5/5      Opponens: 4+/5 B  Lumbricals: 5/5 B        Home Exercises Provided and Patient Education Provided     Education provided:   - Recommended referral for orthopedics for persistent R knee pain     Written Home Exercises Provided: yes.  Exercises were reviewed and Domonique was able to demonstrate them prior to the end of the session.  Domonique demonstrated good  understanding of the education provided.     See EMR under Patient Instructions for exercises provided 9/9/2020 and prior visit.    Assessment     Pt progressing with decreased subjective report of cervical " pain and improvements in cervical and LUE strength. Pt has met short term goals and progressing towards long term. Pt requesting to continue HEP and tentative discharge. Recommended referral to orthopedics for persistent knee pain as pt's main complaint is decreased knee ROM, antalgic gait, and difficulty with transitions.     Domonique is progressing well towards his goals.   Patient prognosis is Good.       Pt will continue to benefit from skilled outpatient physical therapy to address the deficits listed in the problem list box on initial evaluation, provide pt/family education and to maximize pt's level of independence in the home and community environment.     Pt's spiritual, cultural and educational needs considered and pt agreeable to plan of care and goals.     Anticipated Barriers for therapy: none    Goals:  Short Term Goals: 6 weeks   1. Patient to demonstrate independence with postural awareness for improved management of condition- met  2. Patient to report decreased pain in neck by 30% or greater with ADL's- met     Long Term Goals: 12 weeks   1. Patient to be independent with home exercise program for improved self management of condition  2. Patient to have decreased subjective report of disability as noted by a score of <40% on the FOTO cervical questionnaire   3. Patient to increased strength in L shoulder to 5/5 or greater for improved performance of ADL's - met  4. Patient to report being able to drive with little to no difficulty - in progress         Plan     Patient to continue HEP and f/u if symptoms worsen or fail to improve      Lexus Gaitan, PT

## 2020-10-01 DIAGNOSIS — E78.5 HYPERLIPIDEMIA, UNSPECIFIED HYPERLIPIDEMIA TYPE: Primary | ICD-10-CM

## 2020-10-01 NOTE — TELEPHONE ENCOUNTER
Care Due:                  Date            Visit Type   Department     Provider  --------------------------------------------------------------------------------                                ESTABLISHED   [facility] INTERNAL  Last Visit: 07-      PATIENT      MEDICINE       ABNER FENG                              ESTABLISHED   [facility] INTERNAL  Next Visit: 11-      PATIENT      MEDICINE       ABNER FENG                                                            Last  Test          Frequency    Reason                     Performed    Due Date  --------------------------------------------------------------------------------    HDL.........  12 months..  pravastatin..............  10-   09-    LDL.........  12 months..  pravastatin..............  10-   09-    Total         12 months..  pravastatin..............  10-   09-  Cholesterol.    Triglyceride  12 months..  pravastatin..............  10-   09-  s...........    Powered by [employer]. Reference number: [id number]. 10/01/2020 4:16:10 PM   CDT

## 2020-10-02 NOTE — PROGRESS NOTES
Refill Routing Note   Medication(s) are not appropriate for processing by [facility]:       - Required laboratory values are abnormal  - Patient has been hospitalized since the last PCP visit     Will follow up with your staff to schedule labs after your decision.    Medication-related problems identified: Requires labs  Medication Therapy Plan: CDMR. (FOVS). NTBO(LIPIDS); K-LOW(3.4) NOT TO ORC STANDARDS ABNORMAL LABS, PLEASE ADVISE; HOSPITALIZATION(7/6/20-Cervical stenosis of spinal canal ), DEFER TO YOU  Medication reconciliation completed: No      Automatic Epic Generated Protocol Data:        Requested Prescriptions   Pending Prescriptions Disp Refills    potassium chloride SA (K-DUR,KLOR-CON) 20 MEQ tablet [Pharmacy Med Name: POTASSIUM CHLORIDE ER 20 MEQ Tablet Extended Release] 180 tablet 1     Sig: TAKE 2 TABLETS (40 MEQ TOTAL) BY MOUTH ONCE DAILY.       Endocrinology:  Minerals - Potassium Supplementation Failed - 10/2/2020  8:33 AM        Failed - K in normal range and within 180 days     Potassium   Date Value Ref Range Status   07/07/2020 3.4 (L) 3.5 - 5.1 mmol/L Final   07/06/2020 3.2 (L) 3.5 - 5.1 mmol/L Final   06/22/2020 4.0 3.5 - 5.1 mmol/L Final              Passed - Patient is at least 18 years old        Passed - Office Visit within last 12 months or future 90 days.     Recent Outpatient Visits            2 weeks ago Hypertension, essential    [facility] [address] Laura R Nadine, OD    1 month ago Cervical disc disease with myelopathy    [facility] - Neurosurgery 7th Fl Fabiola Vides MD    3 months ago Preoperative clearance    [facility] Primary Care [facility] Nicolas Pacheco MD    3 months ago SK (seborrheic keratosis)    [facility] Dermatology 11th Fl Citlali Vazquez MD    7 months ago Preoperative clearance    [facility] Care [facility] Nicolas Pacheco MD          Future Appointments              In 4 days Fabiola Vides MD [facility] - Neurosurgery 7th Fl,  [facility]    In 1 month MD [facility], [facility]                Passed - Cr is 1.3 or below and within 180 days     Creatinine   Date Value Ref Range Status   07/07/2020 0.9 0.5 - 1.4 mg/dL Final   07/06/2020 1.0 0.5 - 1.4 mg/dL Final   06/22/2020 1.0 0.5 - 1.4 mg/dL Final              Passed - eGFR within 180 days     eGFR if non    Date Value Ref Range Status   07/07/2020 >60.0 >60 mL/min/1.73 m^2 Final     Comment:     Calculation used to obtain the estimated glomerular filtration  rate (eGFR) is the CKD-EPI equation.      07/06/2020 >60.0 >60 mL/min/1.73 m^2 Final     Comment:     Calculation used to obtain the estimated glomerular filtration  rate (eGFR) is the CKD-EPI equation.      06/22/2020 >60.0 >60 mL/min/1.73 m^2 Final     Comment:     Calculation used to obtain the estimated glomerular filtration  rate (eGFR) is the CKD-EPI equation.        eGFR if    Date Value Ref Range Status   07/07/2020 >60.0 >60 mL/min/1.73 m^2 Final   07/06/2020 >60.0 >60 mL/min/1.73 m^2 Final   06/22/2020 >60.0 >60 mL/min/1.73 m^2 Final                pravastatin (PRAVACHOL) 40 MG tablet [Pharmacy Med Name: PRAVASTATIN SODIUM 40 MG Tablet] 90 tablet 0     Sig: TAKE 1 TABLET EVERY DAY       Cardiovascular:  Antilipid - Statins Failed - 10/1/2020  4:15 PM        Failed - Total Cholesterol within 360 days     Cholesterol   Date Value Ref Range Status   10/03/2019 148 120 - 199 mg/dL Final     Comment:     The National Cholesterol Education Program (NCEP) has set the  following guidelines (reference ranges) for Cholesterol:  Optimal.....................<200 mg/dL  Borderline High.............200-239 mg/dL  High........................> or = 240 mg/dL     10/04/2018 147 120 - 199 mg/dL Final     Comment:     The National Cholesterol Education Program (NCEP) has set the  following guidelines (reference ranges) for  Cholesterol:  Optimal.....................<200 mg/dL  Borderline High.............200-239 mg/dL  High........................> or = 240 mg/dL     10/17/2017 151 120 - 199 mg/dL Final     Comment:     The National Cholesterol Education Program (NCEP) has set the  following guidelines (reference ranges) for Cholesterol:  Optimal.....................<200 mg/dL  Borderline High.............200-239 mg/dL  High........................> or = 240 mg/dL                Failed - LDL within 360 days     LDL Cholesterol   Date Value Ref Range Status   10/03/2019 88.0 63.0 - 159.0 mg/dL Final     Comment:     The National Cholesterol Education Program (NCEP) has set the  following guidelines (reference values) for LDL Cholesterol:  Optimal.......................<130 mg/dL  Borderline High...............130-159 mg/dL  High..........................160-189 mg/dL  Very High.....................>190 mg/dL              Failed - HDL within 360 days     HDL   Date Value Ref Range Status   10/03/2019 49 40 - 75 mg/dL Final     Comment:     The National Cholesterol Education Program (NCEP) has set the  following guidelines (reference values) for HDL Cholesterol:  Low...............<40 mg/dL  Optimal...........>60 mg/dL              Failed - Triglycerides within 360 days     Triglycerides   Date Value Ref Range Status   10/03/2019 55 30 - 150 mg/dL Final     Comment:     The National Cholesterol Education Program (NCEP) has set the  following guidelines (reference values) for triglycerides:  Normal......................<150 mg/dL  Borderline High.............150-199 mg/dL  High........................200-499 mg/dL     10/04/2018 74 30 - 150 mg/dL Final     Comment:     The National Cholesterol Education Program (NCEP) has set the  following guidelines (reference values) for triglycerides:  Normal......................<150 mg/dL  Borderline High.............150-199 mg/dL  High........................200-499 mg/dL     10/17/2017 57 30 - 150  mg/dL Final     Comment:     The National Cholesterol Education Program (NCEP) has set the  following guidelines (reference values) for triglycerides:  Normal......................<150 mg/dL  Borderline High.............150-199 mg/dL  High........................200-499 mg/dL                Passed - Patient is at least 18 years old        Passed - Office Visit within last 12 months or future 90 days.     Recent Outpatient Visits            2 weeks ago Hypertension, essential    [facility] [address] Mccoy, OD    1 month ago Cervical disc disease with myelopathy    [facility] - Neurosurgery 7th Fl Fabiola Vides MD    3 months ago Preoperative clearance    [facility] Primary Care [facility] Nicolas Pacheco MD    3 months ago SK (seborrheic keratosis)    [facility] Dermatology 11th Fl Citlali Vazquez MD    7 months ago Preoperative clearance    [facility] Nicolas Pacheco MD          Future Appointments              In 4 days Fabiola Vides MD [facility]    In 1 month Nicolas Pacheco MD [facility], Abdiel [facility]                Passed - ALT is 94 or below and within 360 days     ALT   Date Value Ref Range Status   03/04/2020 17 10 - 44 U/L Final   10/03/2019 23 10 - 44 U/L Final   10/04/2018 30 10 - 44 U/L Final              Passed - AST is 54 or below and within 360 days     AST   Date Value Ref Range Status   03/04/2020 25 10 - 40 U/L Final   10/03/2019 24 10 - 40 U/L Final   10/04/2018 30 10 - 40 U/L Final                    Appointments  past 12m or future 3m with PCP    Date Provider   Last Visit   7/1/2020 Nicolas Pacheco MD   Next Visit   11/2/2020 Nicolas Pacheco MD   ED visits in past 90 days: 0     Note composed:8:40 AM 10/02/2020

## 2020-10-03 RX ORDER — PRAVASTATIN SODIUM 40 MG/1
TABLET ORAL
Qty: 90 TABLET | Refills: 0 | Status: SHIPPED | OUTPATIENT
Start: 2020-10-03 | End: 2020-11-02 | Stop reason: SDUPTHER

## 2020-10-03 RX ORDER — POTASSIUM CHLORIDE 20 MEQ/1
40 TABLET, EXTENDED RELEASE ORAL DAILY
Qty: 180 TABLET | Refills: 0 | Status: SHIPPED | OUTPATIENT
Start: 2020-10-03 | End: 2020-11-02 | Stop reason: SDUPTHER

## 2020-10-05 NOTE — TELEPHONE ENCOUNTER
Provider Staff:     Please schedule patient for Labs (LIPIDS)    Thanks!  [facility]     Appointments  past 12m or future 3m with PCP    Date Provider   Last Visit   7/1/2020 Nicolas Pacheco MD   Next Visit   11/2/2020 Nicolas Pacheco MD     Note composed:9:28 AM 10/05/2020

## 2020-10-06 ENCOUNTER — OFFICE VISIT (OUTPATIENT)
Dept: NEUROSURGERY | Facility: CLINIC | Age: 70
End: 2020-10-06
Payer: MEDICARE

## 2020-10-06 ENCOUNTER — TELEPHONE (OUTPATIENT)
Dept: NEUROSURGERY | Facility: CLINIC | Age: 70
End: 2020-10-06

## 2020-10-06 VITALS
HEIGHT: 68 IN | WEIGHT: 255.75 LBS | DIASTOLIC BLOOD PRESSURE: 65 MMHG | HEART RATE: 67 BPM | SYSTOLIC BLOOD PRESSURE: 137 MMHG | BODY MASS INDEX: 38.76 KG/M2

## 2020-10-06 DIAGNOSIS — M50.00 CERVICAL DISC DISEASE WITH MYELOPATHY: Primary | ICD-10-CM

## 2020-10-06 DIAGNOSIS — Z98.1 STATUS POST CERVICAL SPINAL FUSION: Primary | ICD-10-CM

## 2020-10-06 PROCEDURE — 99024 PR POST-OP FOLLOW-UP VISIT: ICD-10-PCS | Mod: HCNC,S$GLB,, | Performed by: NEUROLOGICAL SURGERY

## 2020-10-06 PROCEDURE — 99999 PR PBB SHADOW E&M-EST. PATIENT-LVL III: CPT | Mod: PBBFAC,HCNC,, | Performed by: NEUROLOGICAL SURGERY

## 2020-10-06 PROCEDURE — 99024 POSTOP FOLLOW-UP VISIT: CPT | Mod: HCNC,S$GLB,, | Performed by: NEUROLOGICAL SURGERY

## 2020-10-06 PROCEDURE — 99999 PR PBB SHADOW E&M-EST. PATIENT-LVL III: ICD-10-PCS | Mod: PBBFAC,HCNC,, | Performed by: NEUROLOGICAL SURGERY

## 2020-10-07 ENCOUNTER — TELEPHONE (OUTPATIENT)
Dept: SPEECH THERAPY | Facility: HOSPITAL | Age: 70
End: 2020-10-07

## 2020-10-07 NOTE — PROGRESS NOTES
Neurosurgery  Follow-up    SUBJECTIVE:     Chief Complaint:  Numbness and tingling in the left thumb and right shoulder    History of Present Illness:  Domonique Kellogg is a 70 y.o. male who presents with complaints of numbness in the tip of his left thumb and tingling in the right shoulder.  The symptoms have been present for about 4 months.  He denies any dropping objects, handwriting changes, or balance difficulties.  He does endorse some difficulty buttoning his shirt, but he notes that this is mostly because he has difficulty of feeling where the button is relative to the left thumb.  He also notes that his left thumb has increased sensation, particularly when it comes to hot and cold.  He endorses no pain.  His symptoms are intermittent.  They are made worse by neck flexion and neck extension.  They improve when he relaxes.  He denies any change in bowel or bladder habit.  He underwent physical therapy in October-November of this year; however, he did not have any improvement in his symptoms.  He has not had injections.  He has not had neck or back surgery previously.  He had a right knee surgery in 1997 with no anesthetic, infectious, or bleeding complications.      He does take ibuprofen and ASA 81 daily. He is a former smoker, having quit in 1986.     He had in the EMG nerve conduction study on February 18, 2020 that demonstrates right carpal tunnel syndrome, left ulnar neuropathy, and C5-C6 radiculopathy.  He has an MRI of the cervical spine that was obtained prior to today's consultation and is personally reviewed.  This demonstrates multilevel cervical spondylosis, worst at C3-C4 and C4-C5 where there are significant disc herniations resulting in myelomalacia.    As of 8/18/20, the patient underwent C3-C4/C4-C5 on July 6, 2020. Overall, the patient has been doing well since then. He has had interval improvement in his strength and pain. He still has numbness of the left thumb that has not changed. He  "reports complete resolution of his preoperative neck pain. He reports a little discomfort from where the collar hits interscapularly. He says that he is swallowing food and drink well, but he sometimes has a "catching" feeling with pills. He denies any fever or chills. There has been no drainage from his incision. He feels that his hands and arms are stronger.     As of 10/6/20, the patient reports that he has been stable overall. He has enjoyed being out of the collar, and he feels that he is improving WRT physical therapy, which he has found helpful. He still feels that he has things catching in his throat from time to time, and he also feels that he is salivating and burping more than he did in the past. His wife notes that he used to salivate more before starting use of his CPAP.     He also reports that he recently pulled his right hamstring.     Review of patient's allergies indicates:   Allergen Reactions    Indomethacin      Headache dizziness    Adhesive Rash       Current Outpatient Medications   Medication Sig Dispense Refill    acetaminophen (TYLENOL ARTHRITIS PAIN) 650 MG TbSR       hydroCHLOROthiazide (HYDRODIURIL) 25 MG tablet TAKE 1 TABLET EVERY DAY 90 tablet 1    magnesium oxide (MAG-OX) 400 mg (241.3 mg magnesium) tablet Take 400 mg by mouth every morning.       multivitamin with minerals tablet Take 1 tablet by mouth every morning.       potassium chloride SA (K-DUR,KLOR-CON) 20 MEQ tablet TAKE 2 TABLETS (40 MEQ TOTAL) BY MOUTH ONCE DAILY. 180 tablet 0    pravastatin (PRAVACHOL) 40 MG tablet TAKE 1 TABLET EVERY DAY 90 tablet 0    flu vac 2020 65up-aehIY26V,PF, (FLUAD QUAD 2020-21,65Y UP,,PF,) 60 mcg (15 mcg x 4)/0.5 mL Syrg Inject 0.5 mLs into the muscle once. for 1 dose 0.5 mL 0    oxyCODONE-acetaminophen (PERCOCET) 5-325 mg per tablet Take 1 tablet by mouth every 6 (six) hours as needed. (Patient not taking: Reported on 10/6/2020) 56 tablet 0     No current facility-administered " medications for this visit.        Past Medical History:   Diagnosis Date    Anticoagulant long-term use     Arthritis     CAD (coronary artery disease) 3/2/2015    [facility]ob[facility]     CAD (coronary artery disease) 3/26/2015    Cataract     Dry eye syndrome     Hypertension     Obesity     Seizures     2011    Sleep apnea     + CPAP     Past Surgical History:   Procedure Laterality Date    ANTERIOR CERVICAL DISCECTOMY W/ FUSION N/A 7/6/2020    Procedure: DISCECTOMY, SPINE, CERVICAL, ANTERIOR APPROACH, WITH FUSION C3-4, C4-5;  Surgeon: Fabiola Vides MD;  Location: [address];  Service: Neurosurgery;  Laterality: N/A;  TORONTO III, ASA III, BLOOD TYPE & SCREEN, NEUROMONITORING EMG-MEP-SEP, SUPINE POSITION,BRACE MIAMI,REGULAR BED, HEADREST CASPAR, POSITION, MAP>85, RADIOLOGY C-ARM, SPECIAL EQUIPMENT [facility]    EYE SURGERY      INTRAOCULAR PROSTHESES INSERTION Right 7/16/2018    Procedure: INSERTION-INTRAOCULAR LENS (IOL);  Surgeon: Priscilla Hays MD;  Location: [facility];  Service: Ophthalmology;  Laterality: Right;    INTRAOCULAR PROSTHESES INSERTION Left 8/13/2018    Procedure: INSERTION, INTRAOCULAR LENS PROSTHESIS;  Surgeon: Priscilla Hays MD;  Location: [facility];  Service: Ophthalmology;  Laterality: Left;    KNEE SURGERY      PHACOEMULSIFICATION OF CATARACT Right 7/16/2018    Procedure: PHACOEMULSIFICATION-ASPIRATION-CATARACT;  Surgeon: Priscilla Hays MD;  Location: [facility];  Service: Ophthalmology;  Laterality: Right;    PHACOEMULSIFICATION OF CATARACT Left 8/13/2018    Procedure: PHACOEMULSIFICATION, CATARACT;  Surgeon: Priscilla Hays MD;  Location: [facility];  Service: Ophthalmology;  Laterality: Left;    WISDOM TOOTH EXTRACTION       Family History     Problem Relation (Age of Onset)    Cancer Mother, Maternal Grandmother    Diabetes Mother    Heart disease Father, Paternal Grandfather, Brother    Hypertension Mother    No Known Problems Sister, Daughter, Son, Sister, Sister,  Sister, Daughter        Social History     Socioeconomic History    Marital status:      Spouse name: Estrellita    Number of children: 2    Years of education: Not on file    Highest education level: Not on file   Occupational History     Employer: [employer]   Social Needs    Financial resource strain: Not hard at all    Food insecurity     Worry: Never true     Inability: Never true    Transportation needs     Medical: No     Non-medical: No   Tobacco Use    Smoking status: Former Smoker     Packs/day: 1.00     Years: 20.00     Pack years: 20.00     Quit date: 1987     Years since quittin.7    Smokeless tobacco: Never Used    Tobacco comment: quit    Substance and Sexual Activity    Alcohol use: Yes     Alcohol/week: 1.0 - 2.0 standard drinks     Types: 1 - 2 Glasses of wine per week     Frequency: 4 or more times a week     Drinks per session: 1 or 2     Binge frequency: Never     Comment: 1-2 glasses wine several times weekly    Drug use: No    Sexual activity: Yes     Partners: Female   Lifestyle    Physical activity     Days per week: 6 days     Minutes per session: 50 min    Stress: Not at all   Relationships    Social connections     Talks on phone: Twice a week     Gets together: Once a week     Attends Rastafarian service: Not on file     Active member of club or organization: Yes     Attends meetings of clubs or organizations: More than 4 times per year     Relationship status:    Other Topics Concern    Not on file   Social History Narrative    Not on file       Review of Systems   Constitutional: Negative for fever.   HENT: Negative for nosebleeds.    Eyes: Negative for visual disturbance.   Respiratory: Negative for shortness of breath.    Cardiovascular: Negative for chest pain.   Gastrointestinal: Negative for diarrhea.   Endocrine: Negative for cold intolerance.   Genitourinary: Positive for difficulty urinating and urgency.   Musculoskeletal:  "Negative for back pain.   Skin: Negative for color change.   Neurological: Positive for numbness.   Hematological: Does not bruise/bleed easily.   Psychiatric/Behavioral: The patient is not nervous/anxious.        OBJECTIVE:     Vital Signs  Pulse: 67  BP: 137/65  Pain Score: 0-No pain  Height: 5' 8" (172.7 cm)  Weight: 116 kg (255 lb 11.7 oz)  Body mass index is 38.88 kg/m².      Physical Exam:    Constitutional: He appears well-developed and well-nourished. No distress.     Eyes: Pupils are equal, round, and reactive to light. EOM are normal.     Abdominal: Soft.     Skin: Skin displays no rash on extremities. Skin displays no lesions on extremities.   Incision well healed with edges opposed     Psych/Behavior: He is alert. He is oriented to person, place, and time.     Musculoskeletal: Gait is normal.      Comments: L HI 4+/5  R WF 4+/5   R delt 4+/5      Neurological:        Coordination: He has normal finger to nose coordination.        Sensory: There is sensory deficit in the extremities. Sensory deficit is located left thumb.        DTRs:   + Schofield's bilaterally        Cranial nerves:   Right tongue deviation   Left nasolabial flattening     Pulmonary: symmetric expansion     Diagnostic Results:  No new    ASSESSMENT/PLAN:     Domonique Kellogg is a 70 y.o. male with significant cervical stenosis and associated myelomalacia now s/p C3-C4/C4-C5 ACDF. Overall, he has been doing well with improvement in pain and function.     He will continue to monitor his swallowing and notify us if he has any increased difficulties or fevers. We will obtain a MBSS to assess his complaints, with consideration of a referral to ENT pending the results.     We will also have him continue PT, since he has found this helpful. Regarding the hamstring, I have encouraged him to contact his PCP.     I will see him back in about 3 months with a CT scan of the cervical spine to assess for arthrodesis. I have encouraged him to contact " the clinic in the interim with any questions, concerns, or adverse clinical change.     Note generated with voice recognition software; please excuse any typographical errors.

## 2020-10-08 ENCOUNTER — IMMUNIZATION (OUTPATIENT)
Dept: PHARMACY | Facility: CLINIC | Age: 70
End: 2020-10-08
Payer: MEDICARE

## 2020-10-12 ENCOUNTER — TELEPHONE (OUTPATIENT)
Dept: SPEECH THERAPY | Facility: HOSPITAL | Age: 70
End: 2020-10-12

## 2020-10-12 NOTE — TELEPHONE ENCOUNTER
Returned pt call explained to him the appointments.  States understanding    ----- Message from Jacinda Carrasquillo sent at 10/12/2020 11:21 AM CDT -----  Pt is calling to see why he have to two appt for the same day at 2 pm and would like for the nurse to give him a call back

## 2020-10-20 ENCOUNTER — HOSPITAL ENCOUNTER (OUTPATIENT)
Dept: RADIOLOGY | Facility: HOSPITAL | Age: 70
Discharge: HOME | End: 2020-10-20
Attending: NEUROLOGICAL SURGERY
Payer: MEDICARE

## 2020-10-20 ENCOUNTER — CLINICAL SUPPORT (OUTPATIENT)
Dept: SPEECH THERAPY | Facility: HOSPITAL | Age: 70
End: 2020-10-20
Attending: NEUROLOGICAL SURGERY
Payer: MEDICARE

## 2020-10-20 DIAGNOSIS — R68.89 OTHER GENERAL SYMPTOMS AND SIGNS: ICD-10-CM

## 2020-10-20 DIAGNOSIS — Z98.1 STATUS POST CERVICAL SPINAL FUSION: ICD-10-CM

## 2020-10-20 DIAGNOSIS — R13.12 DYSPHAGIA, OROPHARYNGEAL: Primary | ICD-10-CM

## 2020-10-20 DIAGNOSIS — R13.10 DYSPHAGIA, UNSPECIFIED TYPE: ICD-10-CM

## 2020-10-20 PROCEDURE — 92611 MOTION FLUOROSCOPY/SWALLOW: CPT | Mod: GN,HCNC | Performed by: SPEECH-LANGUAGE PATHOLOGIST

## 2020-10-20 PROCEDURE — 74230 X-RAY XM SWLNG FUNCJ C+: CPT | Mod: TC,HCNC

## 2020-10-20 PROCEDURE — 74230 X-RAY XM SWLNG FUNCJ C+: CPT | Mod: 26,HCNC,, | Performed by: RADIOLOGY

## 2020-10-20 PROCEDURE — 74230 FL MODIFIED BARIUM SWALLOW SPEECH STUDY: ICD-10-PCS | Mod: 26,HCNC,, | Performed by: RADIOLOGY

## 2020-10-20 PROCEDURE — 25500020 PHARM REV CODE 255: Mod: HCNC | Performed by: NEUROLOGICAL SURGERY

## 2020-10-20 PROCEDURE — A9698 NON-RAD CONTRAST MATERIALNOC: HCPCS | Mod: HCNC | Performed by: NEUROLOGICAL SURGERY

## 2020-10-20 RX ADMIN — BARIUM SULFATE 80 ML: 0.81 POWDER, FOR SUSPENSION ORAL at 02:10

## 2020-10-23 ENCOUNTER — PATIENT MESSAGE (OUTPATIENT)
Dept: NEUROSURGERY | Facility: CLINIC | Age: 70
End: 2020-10-23

## 2020-10-23 NOTE — PROGRESS NOTES
MODIFIED BARIUM SWALLOW STUDY    REASON FOR REFERRAL:  Mr. Domonique Kellogg, age 70, was referred by Dr. Fabiola Vides, neurosurgeon,  for a Modified Barium Swallow Study to rule out aspiration, assess his overall swallowing function, and determine safest consistencies for oral intake.    Mr. Kellogg is s/p ACDF (C3-4-5) 7/6/20.  He reported a globus sensation which he localized to the area of his thyroid notch and which he has noted since surgery.  He denied coughing/choking with swallowig liquids or solids.  He endorsed a history of reflux, but stated that it does not typically bother him.  He is not using reflux meds, but does use reflux precautions.  He also reported a feeling of having excess saliva and feels as if his throat is filling.  He has belching which he associated with additional swallows to manage the saliva.      MEDICAL HISTORY:  Past Medical History:   Diagnosis Date    Anticoagulant long-term use     Arthritis     CAD (coronary artery disease) 3/2/2015    non-obstructive per [facility]     CAD (coronary artery disease) 3/26/2015    Cataract     Dry eye syndrome     Hypertension     Obesity     Seizures     2011    Sleep apnea     + CPAP        SURGICAL HISTORY:  Past Surgical History:   Procedure Laterality Date    ANTERIOR CERVICAL DISCECTOMY W/ FUSION N/A 7/6/2020    Procedure: DISCECTOMY, SPINE, CERVICAL, ANTERIOR APPROACH, WITH FUSION C3-4, C4-5;  Surgeon: Fabiola Vides MD;  Location: [address];  Service: Neurosurgery;  Laterality: N/A;  TORONTO III, ASA III, BLOOD TYPE & SCREEN, NEUROMONITORING EMG-MEP-SEP, SUPINE POSITION,BRACE MIAMI,REGULAR BED, HEADREST CASPAR, POSITION, MAP>85, RADIOLOGY C-ARM, SPECIAL EQUIPMENT [facility]    EYE SURGERY      INTRAOCULAR PROSTHESES INSERTION Right 7/16/2018    Procedure: INSERTION-INTRAOCULAR LENS (IOL);  Surgeon: Priscilla Hays MD;  Location: [facility];  Service: Ophthalmology;  Laterality: Right;    INTRAOCULAR PROSTHESES INSERTION Left  8/13/2018    Procedure: INSERTION, INTRAOCULAR LENS PROSTHESIS;  Surgeon: Priscilla Hays MD;  Location: [facility];  Service: Ophthalmology;  Laterality: Left;    KNEE SURGERY      PHACOEMULSIFICATION OF CATARACT Right 7/16/2018    Procedure: PHACOEMULSIFICATION-ASPIRATION-CATARACT;  Surgeon: Priscilla Hays MD;  Location: [facility];  Service: Ophthalmology;  Laterality: Right;    PHACOEMULSIFICATION OF CATARACT Left 8/13/2018    Procedure: PHACOEMULSIFICATION, CATARACT;  Surgeon: Priscilla Hays MD;  Location: [facility];  Service: Ophthalmology;  Laterality: Left;    WISDOM TOOTH EXTRACTION         SWALLOWING HISTORY:  As above.  Takes a regular consistency diet with thin liquids.  Takes pills with liquids.    FAMILY HISTORY:  Family History   Problem Relation Age of Onset    Cancer Mother         pancreatic    Diabetes Mother     Hypertension Mother     Heart disease Father     No Known Problems Sister     Cancer Maternal Grandmother     Heart disease Paternal Grandfather     Heart disease Brother     No Known Problems Daughter     No Known Problems Son     No Known Problems Sister     No Known Problems Sister     No Known Problems Sister     No Known Problems Daughter     Blindness Neg Hx     Macular degeneration Neg Hx     Retinal detachment Neg Hx     Glaucoma Neg Hx     Melanoma Neg Hx       BEHAVIOR:  Domonique Kellogg was a very pleasant man who had normal affect and social interaction.  He was able to fully cooperate during the study.  Results of today's assessment were considered indicative of his current levels of swallowing functioning.      HEARING:  Subjectively, within normal limits.     ORAL PERIPHERAL:   Informal examination of the oral mechanism revealed structures and functioning within normal limits for swallowing and speech purposes.    Voice quality was within normal limits with no wet quality before, during, or after the study.      TEST FINDINGS:   Mr. Kellogg was seen in  Radiology with the Radiologist for a Modified Barium Swallow Study.  He was seated on a stool for a left lateral videofluoroscopic view.      Consistencies assessed using radiopaque barium contrast:  thin (single and continuous swallows via open cup),   thin puree (1-teaspoon bolus) via spoon,   thick puree (1-teaspoon bolus) via spoon,   solid (half cracker boluses) by Mr. Kellogg' hand, and   13 mm barium tablet with water.    Strategies:  None.    Phases:  Oral:  Mr. Kellogg was able to obtain liquid and strip utensils well with no loss of material from the oral cavity.  He moved boluses through the oral cavity with appropriate transit time.  There was no pooling of liquids in the mouth.  Swallow reflex was triggered within normal limits.    Pharyngeal:  Boluses moved through the pharyngeal phase with no laryngeal penetration and no aspiration and no nasal regurgitation.     - Timely initiation  - Adequate soft palate elevation  - Adequate laryngeal elevation and anterior hyoid excursion  - Adequate tongue base retraction  - Complete epiglottic inversion  - Complete laryngeal vestibular closure  - Adequate pharyngeal stripping wave  - Adequate PE segment opening.  -  No to trace pharyngeal residue with any consistency.    Cervical Esophageal:  Boluses entered the upper esophagus within normal limits.  While no obstruction was noted, a prominence consistency with a cricopharyngeal bar was observed at about C5-6.  The ACDF hardware was evident at C3-4-5, but did not appear to protrude into the pharynx or cause obstruction.    Rosenbeck 8-point Penetration-Aspiration Scale:  1 - Material does not enter airway.      IMPRESSIONS:   This 70 y.o. man appears to present with  1.  Oropharyngeal and cervical esophageal phases of swallowing within normal limits for all consistencies.  2.  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  Non-obstructive during study, but this may be the source of his globus sensation.  3.   S/p ACDF (C3-4-5) 7/6/20.  4.  Sense that he has excess saliva.      RECOMMENDATIONS/PLAN OF CARE:   It is felt that Mr. Kellogg would benefit from  1.  Continuation of his current regular consistency diet with thin liquids using the following strategies and common aspiration precautions, including, but not limited to   A.  Appropriate upright seating for all eating and drinking.   B.  Liquid washes as needed.   C.  Thorough chewing of solids.   D.  Monitoring for any signs/symptoms of aspiration (such as wet/gurgly voice that does not clear with coughing, inability to make any voice sounds, any persistent coughing with oral intake, otherwise unexplained fever, unexplained increased or new difficulty or discomfort breathing, unexplained increase in sleepiness/lethargy/significant fatigue, unexplained increase or new onset confusion or change in cognitive functioning, or any other unexplained change in health or well-being that could be related to swallowing).  2.  Consider consultation with Dr. Pako Henry re: cricopharyngeal bar management.   3.  Repeat MBSS as needed.  4.  Follow up with Dr. Vides as directed.

## 2020-10-23 NOTE — PLAN OF CARE
IMPRESSIONS:   This 70 y.o. man appears to present with  1.  Oropharyngeal and cervical esophageal phases of swallowing within normal limits for all consistencies.  2.  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  Non-obstructive during study, but this may be the source of his globus sensation.  3.  S/p ACDF (C3-4-5) 7/6/20.  4.  Sense that he has excess saliva.      RECOMMENDATIONS/PLAN OF CARE:   It is felt that Mr. Kellogg would benefit from  1.  Continuation of his current regular consistency diet with thin liquids using the following strategies and common aspiration precautions, including, but not limited to   A.  Appropriate upright seating for all eating and drinking.   B.  Liquid washes as needed.   C.  Thorough chewing of solids.   D.  Monitoring for any signs/symptoms of aspiration (such as wet/gurgly voice that does not clear with coughing, inability to make any voice sounds, any persistent coughing with oral intake, otherwise unexplained fever, unexplained increased or new difficulty or discomfort breathing, unexplained increase in sleepiness/lethargy/significant fatigue, unexplained increase or new onset confusion or change in cognitive functioning, or any other unexplained change in health or well-being that could be related to swallowing).  2.  Consider consultation with Dr. Pako Henry re: cricopharyngeal bar management.   3.  Repeat MBSS as needed.  4.  Follow up with Dr. Vides as directed.

## 2020-10-26 ENCOUNTER — PATIENT MESSAGE (OUTPATIENT)
Dept: INTERNAL MEDICINE | Facility: CLINIC | Age: 70
End: 2020-10-26

## 2020-10-26 DIAGNOSIS — E78.5 HYPERLIPIDEMIA, UNSPECIFIED HYPERLIPIDEMIA TYPE: ICD-10-CM

## 2020-10-26 DIAGNOSIS — Z00.00 ANNUAL PHYSICAL EXAM: Primary | ICD-10-CM

## 2020-10-26 DIAGNOSIS — Z12.5 PROSTATE CANCER SCREENING: ICD-10-CM

## 2020-10-26 DIAGNOSIS — R73.9 ELEVATED BLOOD SUGAR: ICD-10-CM

## 2020-10-26 NOTE — TELEPHONE ENCOUNTER
Patient is scheduled for labs on 10- (lipid, BMP) is there any other labs you would like to add to the patients appointment?    Please advise

## 2020-10-30 ENCOUNTER — LAB VISIT (OUTPATIENT)
Dept: LAB | Facility: HOSPITAL | Age: 70
End: 2020-10-30
Attending: FAMILY MEDICINE
Payer: MEDICARE

## 2020-10-30 DIAGNOSIS — M54.2 NECK PAIN: ICD-10-CM

## 2020-10-30 DIAGNOSIS — R73.9 ELEVATED BLOOD SUGAR: ICD-10-CM

## 2020-10-30 DIAGNOSIS — Z00.00 ANNUAL PHYSICAL EXAM: ICD-10-CM

## 2020-10-30 DIAGNOSIS — E78.5 HYPERLIPIDEMIA, UNSPECIFIED HYPERLIPIDEMIA TYPE: ICD-10-CM

## 2020-10-30 DIAGNOSIS — Z12.5 PROSTATE CANCER SCREENING: ICD-10-CM

## 2020-10-30 LAB
ALBUMIN SERPL BCP-MCNC: 4.1 G/DL (ref 3.5–5.2)
ALP SERPL-CCNC: 91 U/L (ref 55–135)
ALT SERPL W/O P-5'-P-CCNC: 15 U/L (ref 10–44)
ANION GAP SERPL CALC-SCNC: 8 MMOL/L (ref 8–16)
ANION GAP SERPL CALC-SCNC: 8 MMOL/L (ref 8–16)
AST SERPL-CCNC: 24 U/L (ref 10–40)
BILIRUB SERPL-MCNC: 0.9 MG/DL (ref 0.1–1)
BUN SERPL-MCNC: 14 MG/DL (ref 8–23)
BUN SERPL-MCNC: 14 MG/DL (ref 8–23)
CALCIUM SERPL-MCNC: 10.4 MG/DL (ref 8.7–10.5)
CALCIUM SERPL-MCNC: 10.4 MG/DL (ref 8.7–10.5)
CHLORIDE SERPL-SCNC: 104 MMOL/L (ref 95–110)
CHLORIDE SERPL-SCNC: 104 MMOL/L (ref 95–110)
CHOLEST SERPL-MCNC: 141 MG/DL (ref 120–199)
CHOLEST/HDLC SERPL: 3 {RATIO} (ref 2–5)
CO2 SERPL-SCNC: 28 MMOL/L (ref 23–29)
CO2 SERPL-SCNC: 28 MMOL/L (ref 23–29)
COMPLEXED PSA SERPL-MCNC: 1.6 NG/ML (ref 0–4)
CREAT SERPL-MCNC: 1 MG/DL (ref 0.5–1.4)
CREAT SERPL-MCNC: 1 MG/DL (ref 0.5–1.4)
EST. GFR  (AFRICAN AMERICAN): >60 ML/MIN/1.73 M^2
EST. GFR  (AFRICAN AMERICAN): >60 ML/MIN/1.73 M^2
EST. GFR  (NON AFRICAN AMERICAN): >60 ML/MIN/1.73 M^2
EST. GFR  (NON AFRICAN AMERICAN): >60 ML/MIN/1.73 M^2
ESTIMATED AVG GLUCOSE: 126 MG/DL (ref 68–131)
GLUCOSE SERPL-MCNC: 109 MG/DL (ref 70–110)
GLUCOSE SERPL-MCNC: 109 MG/DL (ref 70–110)
HBA1C MFR BLD HPLC: 6 % (ref 4–5.6)
HDLC SERPL-MCNC: 47 MG/DL (ref 40–75)
HDLC SERPL: 33.3 % (ref 20–50)
LDLC SERPL CALC-MCNC: 83.2 MG/DL (ref 63–159)
NONHDLC SERPL-MCNC: 94 MG/DL
POTASSIUM SERPL-SCNC: 3.5 MMOL/L (ref 3.5–5.1)
POTASSIUM SERPL-SCNC: 3.5 MMOL/L (ref 3.5–5.1)
PROT SERPL-MCNC: 7.7 G/DL (ref 6–8.4)
SODIUM SERPL-SCNC: 140 MMOL/L (ref 136–145)
SODIUM SERPL-SCNC: 140 MMOL/L (ref 136–145)
TRIGL SERPL-MCNC: 54 MG/DL (ref 30–150)

## 2020-10-30 PROCEDURE — 80061 LIPID PANEL: CPT | Mod: HCNC

## 2020-10-30 PROCEDURE — 80053 COMPREHEN METABOLIC PANEL: CPT | Mod: HCNC

## 2020-10-30 PROCEDURE — 83036 HEMOGLOBIN GLYCOSYLATED A1C: CPT | Mod: HCNC

## 2020-10-30 PROCEDURE — 84153 ASSAY OF PSA TOTAL: CPT | Mod: HCNC

## 2020-10-30 PROCEDURE — 36415 COLL VENOUS BLD VENIPUNCTURE: CPT | Mod: HCNC

## 2020-11-02 ENCOUNTER — OFFICE VISIT (OUTPATIENT)
Dept: INTERNAL MEDICINE | Facility: CLINIC | Age: 70
End: 2020-11-02
Attending: FAMILY MEDICINE
Payer: MEDICARE

## 2020-11-02 VITALS
HEART RATE: 64 BPM | WEIGHT: 254.63 LBS | SYSTOLIC BLOOD PRESSURE: 122 MMHG | HEIGHT: 68 IN | DIASTOLIC BLOOD PRESSURE: 68 MMHG | OXYGEN SATURATION: 98 % | BODY MASS INDEX: 38.59 KG/M2

## 2020-11-02 DIAGNOSIS — E78.5 HYPERLIPIDEMIA, UNSPECIFIED HYPERLIPIDEMIA TYPE: ICD-10-CM

## 2020-11-02 DIAGNOSIS — R13.10 DYSPHAGIA, UNSPECIFIED TYPE: ICD-10-CM

## 2020-11-02 DIAGNOSIS — G56.00 CARPAL TUNNEL SYNDROME, UNSPECIFIED LATERALITY: ICD-10-CM

## 2020-11-02 DIAGNOSIS — Z00.00 ANNUAL PHYSICAL EXAM: Primary | ICD-10-CM

## 2020-11-02 DIAGNOSIS — I65.23 BILATERAL CAROTID ARTERY STENOSIS: ICD-10-CM

## 2020-11-02 DIAGNOSIS — I10 HYPERTENSION, ESSENTIAL: ICD-10-CM

## 2020-11-02 DIAGNOSIS — R73.03 PREDIABETES: ICD-10-CM

## 2020-11-02 DIAGNOSIS — M17.11 PRIMARY OSTEOARTHRITIS OF RIGHT KNEE: ICD-10-CM

## 2020-11-02 PROBLEM — R29.898 WEAKNESS OF BOTH UPPER EXTREMITIES: Status: RESOLVED | Noted: 2019-10-21 | Resolved: 2020-11-02

## 2020-11-02 PROBLEM — M54.2 NECK PAIN: Status: RESOLVED | Noted: 2020-09-23 | Resolved: 2020-11-02

## 2020-11-02 PROCEDURE — 99999 PR PBB SHADOW E&M-EST. PATIENT-LVL V: CPT | Mod: PBBFAC,HCNC,, | Performed by: FAMILY MEDICINE

## 2020-11-02 PROCEDURE — 99397 PR PREVENTIVE VISIT,EST,65 & OVER: ICD-10-PCS | Mod: HCNC,S$GLB,, | Performed by: FAMILY MEDICINE

## 2020-11-02 PROCEDURE — 99499 RISK ADDL DX/OHS AUDIT: ICD-10-PCS | Mod: S$GLB,,, | Performed by: FAMILY MEDICINE

## 2020-11-02 PROCEDURE — 3074F PR MOST RECENT SYSTOLIC BLOOD PRESSURE < 130 MM HG: ICD-10-PCS | Mod: HCNC,CPTII,S$GLB, | Performed by: FAMILY MEDICINE

## 2020-11-02 PROCEDURE — 3078F PR MOST RECENT DIASTOLIC BLOOD PRESSURE < 80 MM HG: ICD-10-PCS | Mod: HCNC,CPTII,S$GLB, | Performed by: FAMILY MEDICINE

## 2020-11-02 PROCEDURE — 3078F DIAST BP <80 MM HG: CPT | Mod: HCNC,CPTII,S$GLB, | Performed by: FAMILY MEDICINE

## 2020-11-02 PROCEDURE — 99499 UNLISTED E&M SERVICE: CPT | Mod: S$GLB,,, | Performed by: FAMILY MEDICINE

## 2020-11-02 PROCEDURE — 99397 PER PM REEVAL EST PAT 65+ YR: CPT | Mod: HCNC,S$GLB,, | Performed by: FAMILY MEDICINE

## 2020-11-02 PROCEDURE — 3074F SYST BP LT 130 MM HG: CPT | Mod: HCNC,CPTII,S$GLB, | Performed by: FAMILY MEDICINE

## 2020-11-02 PROCEDURE — 99999 PR PBB SHADOW E&M-EST. PATIENT-LVL V: ICD-10-PCS | Mod: PBBFAC,HCNC,, | Performed by: FAMILY MEDICINE

## 2020-11-02 RX ORDER — PRAVASTATIN SODIUM 40 MG/1
40 TABLET ORAL DAILY
Qty: 90 TABLET | Refills: 3 | Status: SHIPPED | OUTPATIENT
Start: 2020-11-02 | End: 2021-11-06 | Stop reason: SDUPTHER

## 2020-11-02 RX ORDER — POTASSIUM CHLORIDE 20 MEQ/1
40 TABLET, EXTENDED RELEASE ORAL DAILY
Qty: 180 TABLET | Refills: 3 | Status: SHIPPED | OUTPATIENT
Start: 2020-11-02 | End: 2021-12-03 | Stop reason: SDUPTHER

## 2020-11-02 RX ORDER — HYDROCHLOROTHIAZIDE 25 MG/1
25 TABLET ORAL DAILY
Qty: 90 TABLET | Refills: 3 | Status: SHIPPED | OUTPATIENT
Start: 2020-11-02 | End: 2021-12-03 | Stop reason: SDUPTHER

## 2020-11-02 NOTE — PROGRESS NOTES
Subjective:       Patient ID: Domonique Kellogg is a 70 y.o. male.    Chief Complaint: Annual Exam    Established patient for an annual wellness check/physical exam and also chronic disease management. Specific complaints - see dictation and please see ROS.  P, S, Fm, Soc Hx's; Meds, allergies reviewed and reconciled.  Health maintenance file reviewed and addressed items due.    We discussed his recent neck surgery.  From the standpoint of neck pain that seems to be better.  He is concerned that there is some swallowing difficulty his neurosurgeon did not feel that was related to the incision.  He also notes some numbness under the chin which is likely from the surgical incision.  Seems to be in the throat area.  Does not sound like he has reflux any significant distal food difficulty.  He had a swallowing study.    Review of Systems   Constitutional: Negative for activity change, chills, fatigue, fever and unexpected weight change.   HENT: Positive for trouble swallowing. Negative for congestion, hearing loss and rhinorrhea.    Eyes: Negative for discharge, redness and visual disturbance.   Respiratory: Negative for cough, chest tightness, shortness of breath and wheezing.    Cardiovascular: Negative for chest pain, palpitations and leg swelling.   Gastrointestinal: Negative for abdominal pain, blood in stool, constipation, diarrhea and vomiting.   Endocrine: Negative for polydipsia and polyuria.   Genitourinary: Negative for difficulty urinating, hematuria and urgency.   Musculoskeletal: Positive for arthralgias and neck pain. Negative for back pain, gait problem, joint swelling and myalgias.   Skin: Negative for color change and rash.   Neurological: Positive for numbness. Negative for tremors, speech difficulty, weakness and headaches.   Hematological: Negative for adenopathy. Does not bruise/bleed easily.   Psychiatric/Behavioral: Negative for behavioral problems, confusion, dysphoric mood and sleep disturbance.  The patient is not nervous/anxious.        Objective:      Physical Exam  Vitals signs and nursing note reviewed.   Constitutional:       Appearance: He is well-developed. He is not diaphoretic.   Eyes:      General: No scleral icterus.  Neck:      Musculoskeletal: Normal range of motion and neck supple.      Thyroid: No thyromegaly.      Vascular: No carotid bruit or JVD.      Trachea: No tracheal deviation.   Cardiovascular:      Rate and Rhythm: Normal rate and regular rhythm.      Heart sounds: Normal heart sounds. No murmur. No friction rub. No gallop.    Pulmonary:      Effort: Pulmonary effort is normal. No respiratory distress.      Breath sounds: Normal breath sounds. No wheezing or rales.   Abdominal:      General: There is no distension.      Palpations: Abdomen is soft. There is no mass.      Tenderness: There is no abdominal tenderness. There is no guarding or rebound.   Lymphadenopathy:      Cervical: No cervical adenopathy.   Skin:     General: Skin is warm and dry.      Findings: No erythema or rash.   Neurological:      Mental Status: He is alert and oriented to person, place, and time.      Cranial Nerves: No cranial nerve deficit.      Motor: No tremor.      Coordination: Coordination normal.      Gait: Gait normal.   Psychiatric:         Behavior: Behavior normal.         Thought Content: Thought content normal.         Judgment: Judgment normal.         Assessment:       1. Annual physical exam    2. Carpal tunnel syndrome, unspecified laterality    3. Hypertension, essential    4. Hyperlipidemia, unspecified hyperlipidemia type    5. Primary osteoarthritis of right knee    6. Bilateral carotid artery stenosis    7. Dysphagia, unspecified type    8. Prediabetes        Plan:     Medication List with Changes/Refills   Current Medications    ACETAMINOPHEN (TYLENOL ARTHRITIS PAIN) 650 MG TBSR        MAGNESIUM OXIDE (MAG-OX) 400 MG (241.3 MG MAGNESIUM) TABLET    Take 400 mg by mouth every morning.      MULTIVITAMIN WITH MINERALS TABLET    Take 1 tablet by mouth every morning.    Changed and/or Refilled Medications    Modified Medication Previous Medication    HYDROCHLOROTHIAZIDE (HYDRODIURIL) 25 MG TABLET hydroCHLOROthiazide (HYDRODIURIL) 25 MG tablet       Take 1 tablet (25 mg total) by mouth once daily.    TAKE 1 TABLET EVERY DAY    POTASSIUM CHLORIDE SA (K-DUR,KLOR-CON) 20 MEQ TABLET potassium chloride SA (K-DUR,KLOR-CON) 20 MEQ tablet       Take 2 tablets (40 mEq total) by mouth once daily.    TAKE 2 TABLETS (40 MEQ TOTAL) BY MOUTH ONCE DAILY.    PRAVASTATIN (PRAVACHOL) 40 MG TABLET pravastatin (PRAVACHOL) 40 MG tablet       Take 1 tablet (40 mg total) by mouth once daily.    TAKE 1 TABLET EVERY DAY   Discontinued Medications    OXYCODONE-ACETAMINOPHEN (PERCOCET) 5-325 MG PER TABLET    Take 1 tablet by mouth every 6 (six) hours as needed.     Domonique was seen today for annual exam.    Diagnoses and all orders for this visit:    Annual physical exam    Carpal tunnel syndrome, unspecified laterality  -     Ambulatory referral/consult to Orthopedics; Future    Hypertension, essential  -     hydroCHLOROthiazide (HYDRODIURIL) 25 MG tablet; Take 1 tablet (25 mg total) by mouth once daily.  -     potassium chloride SA (K-DUR,KLOR-CON) 20 MEQ tablet; Take 2 tablets (40 mEq total) by mouth once daily.    Hyperlipidemia, unspecified hyperlipidemia type  -     pravastatin (PRAVACHOL) 40 MG tablet; Take 1 tablet (40 mg total) by mouth once daily.    Primary osteoarthritis of right knee    Bilateral carotid artery stenosis  -     US Carotid Bilateral; Future    Dysphagia, unspecified type  -     Ambulatory referral/consult to ENT; Future  -     US Soft Tissue Head Neck Thyroid; Future    Prediabetes      See meds, orders, follow up, routing and instructions sections of encounter and AVS. Discussed with patient and provided on AVS.    Discussed diet and exercise and links provided on AVS for detailed  information.    Follow-up on previous EMG nerve conduction study with CTS.  I recommend we start with a ENT consult for dysphagia and ultrasound.  Consider GI consult if symptoms worsen or at the requested ENT following their workup.  Did not sound necessarily distal esophageal in nature at this time.    Diabetes Management Status    Statin: Taking  ACE/ARB: Not taking    Screening or Prevention Patient's value Goal Complete/Controlled?   HgA1C Testing and Control   Lab Results   Component Value Date    HGBA1C 6.0 (H) 10/30/2020      Annually/Less than 8% Yes   Lipid profile : 10/30/2020 Annually Yes   LDL control Lab Results   Component Value Date    LDLCALC 83.2 10/30/2020    Annually/Less than 100 mg/dl  Yes   Nephropathy screening No results found for: LABMICR  Lab Results   Component Value Date    PROTEINUA Negative 03/04/2020    Annually Yes   Blood pressure BP Readings from Last 1 Encounters:   11/02/20 122/68    Less than 140/90 Yes   Dilated retinal exam Most Recent Eye Exam Date: Not Found Annually Yes   Foot exam   Most Recent Foot Exam Date: Not Found Annually Yes

## 2020-11-03 ENCOUNTER — HOSPITAL ENCOUNTER (OUTPATIENT)
Dept: RADIOLOGY | Facility: HOSPITAL | Age: 70
Discharge: HOME | End: 2020-11-03
Attending: FAMILY MEDICINE
Payer: MEDICARE

## 2020-11-03 DIAGNOSIS — R13.10 DYSPHAGIA, UNSPECIFIED TYPE: ICD-10-CM

## 2020-11-03 DIAGNOSIS — I65.23 BILATERAL CAROTID ARTERY STENOSIS: ICD-10-CM

## 2020-11-03 PROCEDURE — 93880 EXTRACRANIAL BILAT STUDY: CPT | Mod: 26,59,HCNC, | Performed by: RADIOLOGY

## 2020-11-03 PROCEDURE — 93880 US CAROTID BILATERAL: ICD-10-PCS | Mod: 26,59,HCNC, | Performed by: RADIOLOGY

## 2020-11-03 PROCEDURE — 76536 US EXAM OF HEAD AND NECK: CPT | Mod: TC,HCNC

## 2020-11-03 PROCEDURE — 76536 US EXAM OF HEAD AND NECK: CPT | Mod: 26,HCNC,, | Performed by: RADIOLOGY

## 2020-11-03 PROCEDURE — 76536 US SOFT TISSUE HEAD NECK THYROID: ICD-10-PCS | Mod: 26,HCNC,, | Performed by: RADIOLOGY

## 2020-11-03 PROCEDURE — 93880 EXTRACRANIAL BILAT STUDY: CPT | Mod: TC,HCNC,59

## 2020-11-04 ENCOUNTER — LAB VISIT (OUTPATIENT)
Dept: SURGERY | Facility: CLINIC | Age: 70
End: 2020-11-04
Payer: MEDICARE

## 2020-11-04 DIAGNOSIS — Z03.818 ENCOUNTER FOR OBSERVATION FOR SUSPECTED EXPOSURE TO OTHER BIOLOGICAL AGENTS RULED OUT: ICD-10-CM

## 2020-11-04 PROCEDURE — U0003 INFECTIOUS AGENT DETECTION BY NUCLEIC ACID (DNA OR RNA); SEVERE ACUTE RESPIRATORY SYNDROME CORONAVIRUS 2 (SARS-COV-2) (CORONAVIRUS DISEASE [COVID-19]), AMPLIFIED PROBE TECHNIQUE, MAKING USE OF HIGH THROUGHPUT TECHNOLOGIES AS DESCRIBED BY CMS-2020-01-R: HCPCS | Mod: HCNC

## 2020-11-05 ENCOUNTER — PATIENT MESSAGE (OUTPATIENT)
Dept: INTERNAL MEDICINE | Facility: CLINIC | Age: 70
End: 2020-11-05

## 2020-11-05 LAB — SARS-COV-2 RNA RESP QL NAA+PROBE: NOT DETECTED

## 2020-11-05 NOTE — TELEPHONE ENCOUNTER
To: Nicolas Marlow MD  Subject: RE: Test Results    I have seen the Thyroid reslts already.  I asked about the Carotid  bilateral test.  Those rusts came in today.  I don't know what they mean though.

## 2020-11-06 ENCOUNTER — TELEPHONE (OUTPATIENT)
Dept: INTERNAL MEDICINE | Facility: CLINIC | Age: 70
End: 2020-11-06

## 2020-11-06 ENCOUNTER — OFFICE VISIT (OUTPATIENT)
Dept: OTOLARYNGOLOGY | Facility: CLINIC | Age: 70
End: 2020-11-06
Attending: FAMILY MEDICINE
Payer: MEDICARE

## 2020-11-06 ENCOUNTER — PATIENT MESSAGE (OUTPATIENT)
Dept: ORTHOPEDICS | Facility: CLINIC | Age: 70
End: 2020-11-06

## 2020-11-06 ENCOUNTER — TELEPHONE (OUTPATIENT)
Dept: ORTHOPEDICS | Facility: CLINIC | Age: 70
End: 2020-11-06

## 2020-11-06 VITALS
WEIGHT: 254 LBS | HEART RATE: 55 BPM | SYSTOLIC BLOOD PRESSURE: 117 MMHG | BODY MASS INDEX: 38.62 KG/M2 | DIASTOLIC BLOOD PRESSURE: 75 MMHG

## 2020-11-06 DIAGNOSIS — M79.642 BILATERAL HAND PAIN: Primary | ICD-10-CM

## 2020-11-06 DIAGNOSIS — R13.14 DYSPHAGIA, PHARYNGOESOPHAGEAL: Primary | ICD-10-CM

## 2020-11-06 DIAGNOSIS — M79.641 BILATERAL HAND PAIN: Primary | ICD-10-CM

## 2020-11-06 DIAGNOSIS — R09.A2 GLOBUS SYNDROME: ICD-10-CM

## 2020-11-06 DIAGNOSIS — F45.8 AEROPHAGIA: ICD-10-CM

## 2020-11-06 DIAGNOSIS — R13.10 DYSPHAGIA, UNSPECIFIED TYPE: ICD-10-CM

## 2020-11-06 PROCEDURE — 3008F PR BODY MASS INDEX (BMI) DOCUMENTED: ICD-10-PCS | Mod: HCNC,CPTII,S$GLB, | Performed by: OTOLARYNGOLOGY

## 2020-11-06 PROCEDURE — 99999 PR PBB SHADOW E&M-EST. PATIENT-LVL IV: ICD-10-PCS | Mod: PBBFAC,HCNC,, | Performed by: OTOLARYNGOLOGY

## 2020-11-06 PROCEDURE — 99203 OFFICE O/P NEW LOW 30 MIN: CPT | Mod: 25,HCNC,S$GLB, | Performed by: OTOLARYNGOLOGY

## 2020-11-06 PROCEDURE — 3078F DIAST BP <80 MM HG: CPT | Mod: HCNC,CPTII,S$GLB, | Performed by: OTOLARYNGOLOGY

## 2020-11-06 PROCEDURE — 1126F PR PAIN SEVERITY QUANTIFIED, NO PAIN PRESENT: ICD-10-PCS | Mod: HCNC,S$GLB,, | Performed by: OTOLARYNGOLOGY

## 2020-11-06 PROCEDURE — 1126F AMNT PAIN NOTED NONE PRSNT: CPT | Mod: HCNC,S$GLB,, | Performed by: OTOLARYNGOLOGY

## 2020-11-06 PROCEDURE — 3074F SYST BP LT 130 MM HG: CPT | Mod: HCNC,CPTII,S$GLB, | Performed by: OTOLARYNGOLOGY

## 2020-11-06 PROCEDURE — 1101F PR PT FALLS ASSESS DOC 0-1 FALLS W/OUT INJ PAST YR: ICD-10-PCS | Mod: HCNC,CPTII,S$GLB, | Performed by: OTOLARYNGOLOGY

## 2020-11-06 PROCEDURE — 31575 PR LARYNGOSCOPY, FLEXIBLE; DIAGNOSTIC: ICD-10-PCS | Mod: HCNC,S$GLB,, | Performed by: OTOLARYNGOLOGY

## 2020-11-06 PROCEDURE — 3074F PR MOST RECENT SYSTOLIC BLOOD PRESSURE < 130 MM HG: ICD-10-PCS | Mod: HCNC,CPTII,S$GLB, | Performed by: OTOLARYNGOLOGY

## 2020-11-06 PROCEDURE — 3008F BODY MASS INDEX DOCD: CPT | Mod: HCNC,CPTII,S$GLB, | Performed by: OTOLARYNGOLOGY

## 2020-11-06 PROCEDURE — 99203 PR OFFICE/OUTPT VISIT, NEW, LEVL III, 30-44 MIN: ICD-10-PCS | Mod: 25,HCNC,S$GLB, | Performed by: OTOLARYNGOLOGY

## 2020-11-06 PROCEDURE — 99999 PR PBB SHADOW E&M-EST. PATIENT-LVL IV: CPT | Mod: PBBFAC,HCNC,, | Performed by: OTOLARYNGOLOGY

## 2020-11-06 PROCEDURE — 31575 DIAGNOSTIC LARYNGOSCOPY: CPT | Mod: HCNC,S$GLB,, | Performed by: OTOLARYNGOLOGY

## 2020-11-06 PROCEDURE — 3078F PR MOST RECENT DIASTOLIC BLOOD PRESSURE < 80 MM HG: ICD-10-PCS | Mod: HCNC,CPTII,S$GLB, | Performed by: OTOLARYNGOLOGY

## 2020-11-06 PROCEDURE — 1159F MED LIST DOCD IN RCRD: CPT | Mod: HCNC,S$GLB,, | Performed by: OTOLARYNGOLOGY

## 2020-11-06 PROCEDURE — 1159F PR MEDICATION LIST DOCUMENTED IN MEDICAL RECORD: ICD-10-PCS | Mod: HCNC,S$GLB,, | Performed by: OTOLARYNGOLOGY

## 2020-11-06 PROCEDURE — 1101F PT FALLS ASSESS-DOCD LE1/YR: CPT | Mod: HCNC,CPTII,S$GLB, | Performed by: OTOLARYNGOLOGY

## 2020-11-06 NOTE — LETTER
November 6, 2020      Nicolas Pacheco MD  [address]  [facility] [address]           [facility]  [address], [facility] [address]  [facility] [address]  Phone: [phone]  Fax: [phone]          Patient: Domonique Kellogg   MR Number: [id number]   YOB: 1950   Date of Visit: 11/6/2020       Dear Dr. Nicolas Pacheco:    Thank you for referring Domonique Kellogg to me for evaluation. Attached you will find relevant portions of my assessment and plan of care.    If you have questions, please do not hesitate to call me. I look forward to following Domonique Kellogg along with you.    Sincerely,    Pako Henry MD    Enclosure  CC:  No Recipients    If you would like to receive this communication electronically, please contact [email or web address] or [phone] to request more information on [employer] [employer] access.    For providers and/or their staff who would like to refer a patient to [facility], please contact us through our one-stop-shop provider referral line, [facility], at [phone].    If you feel you have received this communication in error or would no longer like to receive these types of communications, please e-mail [email or web address]

## 2020-11-06 NOTE — PROGRESS NOTES
[facility]  Department of Otorhinolaryngology and Communication Sciences    Domonique Kellogg is a 70 y.o. male who [facility] for consultation at the kind request of Dr. Nicolas Pacheco regarding throat/dysphagia symptoms following spine surgery.    He complains of globus, prompting the sensed need to swallow frequently, and at times the sensation of swallowing air. Onset was sudden. Duration is almost 4 months, beginning fairly acutely following right sided ACDF in July. Time course is constant. Symptoms are stable. He denies any exacerbating factors. He denies any alleviating factors. Associated symptoms include prior pill dysphagia. He was having difficulty with his potassium pill right after surgery, but this has resolved. He denies any dysphagia to solid foods or liquids. He denies typical GE reflux symptoms unless he drinks carbonated beverages. However, he does report frequent belching after repetitive dry swallows.    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    He had an MBSS 10/20/2020. Findings were WFL, regular diet, thin liquids. Noted was a nonobstructive CP prominence.    Past Medical History  He has a past medical history of Anticoagulant long-term use, Arthritis, CAD (coronary artery disease), CAD (coronary artery disease), Cataract, Dry eye syndrome, Hypertension, Obesity, Seizures, and Sleep apnea.    Past Surgical History  He has a past surgical history that includes Knee surgery; [place] tooth extraction; Phacoemulsification of cataract (Right, 7/16/2018); Intraocular prosthesis insertion (Right, 7/16/2018); Phacoemulsification of cataract (Left, 8/13/2018); Intraocular prosthesis insertion (Left, 8/13/2018); Eye surgery; and Anterior cervical discectomy w/ fusion (N/A, 7/6/2020).    Family History  His family history includes Cancer in his maternal grandmother and mother; Diabetes in his mother; Heart disease in his brother, father, and  paternal grandfather; Hypertension in his mother; No Known Problems in his daughter, daughter, sister, sister, sister, sister, and son.    Social History  He reports that he quit smoking about 33 years ago. He has a 20.00 pack-year smoking history. He has never used smokeless tobacco. He reports current alcohol use of about 1.0 - 2.0 standard drinks of alcohol per week. He reports that he does not use drugs.    Allergies  He is allergic to indomethacin and adhesive.    Medications  He has a current medication list which includes the following prescription(s): acetaminophen, hydrochlorothiazide, magnesium oxide, multivitamin with minerals, potassium chloride sa, and pravastatin.    Review of Systems   Constitutional: Negative.  Negative for fever.   HENT: Negative.  Negative for sore throat.    Eyes: Negative.  Negative for visual disturbance.   Respiratory: Negative for wheezing.    Cardiovascular: Negative.  Negative for chest pain.   Gastrointestinal: Negative.  Negative for nausea.   Endocrine: Negative.    Genitourinary: Negative.    Musculoskeletal: Negative for arthralgias.   Skin: Negative.  Negative for rash.   Allergic/Immunologic: Negative.    Neurological: Negative.  Negative for tremors.   Hematological: Negative.  Does not bruise/bleed easily.   Psychiatric/Behavioral: Negative.  The patient is not nervous/anxious.           Objective:     /75   Pulse (!) 55   Wt 115.2 kg (253 lb 15.5 oz)   BMI 38.62 kg/m²      Physical Exam    Constitutional: comfortable, well dressed  Psychiatric: appropriate affect  Respiratory: comfortably breathing, symmetric chest rise, no stridor  Voice: normal for age/gender  Cardiovascular: upper extremities non-edematous  Lymphatic: no cervical lymphadenopathy  Neurologic: alert and oriented to time, place, person, and situation; cranial nerves 3-12 grossly intact  Head: normocephalic  Eyes: conjunctivae and sclerae clear  Ears: normal pinnae, normal external auditory  canals, tympanic membranes intact  Nose: mucosa pink and noncongested, no masses, no mucopurulence, no polyps  Oral cavity / oropharynx: no mucosal lesions  Neck: soft, full range of motion, laryngotracheal complex palpable with appropriate landmarks, larynx elevates on swallowing; surgical scar present in right neck  Indirect laryngoscopy: limited due to gag    Procedure  Flexible Laryngoscopy (58124): Laryngoscopy is indicated for assessment of upper aerodigestive structure and function. This was carried out transnasally with a distal chip videoendoscope. After verbal consent was obtained, the patient was positioned and the nose was topically decongested with 1% phenylephrine and topically anesthetized with 4% lidocaine. The endoscope was passed through the most patent nasal cavity and positioned to image the nasopharynx, larynx, and hypopharynx in detail. The following features were examined: nasopharyngeal, laryngeal, hypopharyngeal masses; velopharyngeal strength, closure, and symmetry of motion; vocal fold range and symmetry of motion; laryngeal mucosal edema, erythema, inflammation, and hydration; salivary pooling; and gross laryngeal sensation. The equipment was removed. The patient tolerated the procedure well without complication. All findings were normal except:  - overhanging epiglottis  - mild diffuse pharyngeal cobblestoning  - mild attenuation of right pharyngeal squeeze  - no pooling  - vocal fold motion intact    Data Reviewed  see HPI      Assessment:     Domonique Kellogg is a 70 y.o. male with globus following ACDF. Some of his reported symptoms and/or physical exam findings may be consistent with allergic rhinitis, reflux, and/or functional aerophagia.     Plan:        I had a discussion with the patient regarding his condition and the further workup and management options.      Reassurance was provided. I counseled him that the sensory nerve fibers of his throat may simply be making him aware of  the hardware. He may wish to consider measure to mitigate any contributions from relfux and/or allergic rhinitis, or even SLP treatment to consider strategies for management of aerophagia.    He will follow up with me in the future on an as-needed basis.    All questions were answered, and the patient is in agreement with the above.     Pako Henry M.D.  [facility]  Department of Otorhinolaryngology and Communication Sciences

## 2020-11-06 NOTE — TELEPHONE ENCOUNTER
LVM for patient. Informed of X-rays scheduled prior to appointment.      Sarita Marie MS, [facility]  Orthopedic Clinical Assistant to Dr. Ross Dunbar [facility]

## 2020-11-06 NOTE — PATIENT INSTRUCTIONS
Consider treatment for reflux for allergies        ACID REFLUX   What is acid reflux?    When we eat, food passes from the throat and into the stomach through a tube called the esophagus. At the bottom of the esophagus is a ring of muscles that acts as a valve between the esophagus and stomach, called the lower esophageal sphincter. Smoking, alcohol, and certain types of food may weaken the sphincter, so it may stop closing properly. The contents in the stomach then may leak back, or reflux, into the esophagus. This problem is called gastroesophageal reflux disease (GERD). Symptoms of GERD include heartburn, belching, regurgitation of stomach contents, and swallowing difficulties.    Sometimes, the stomach acid travels up through the esophagus and spills into the larynx or pharynx (voice box). This is called laryngopharyngeal reflux (LPR) and is irritating to the vocal folds and surrounding tissues. Often, patients with LPR do not experience heartburn as a symptom. More commonly, symptoms of LPR include hoarseness, excessive mucous resulting in frequent throat clearing, post-nasal drip, coughing, throat soreness or burning, choking episodes, difficulty swallowing, and sensation of a lump in the throat.     How is acid reflux treated?   Treatment for acid reflux can involve any combination of medication, lifestyle modifications, and surgery.   · Medications. Your doctor may prescribe a proton pump inhibitor (PPI) or an H2 blocker. If you are prescribed a PPI, take in on an empty stomach in the morning 30 minutes prior to eating breakfast. Keep in mind that it may take 4-6 weeks before symptoms begin to resolve, so do not stop medications without consulting your doctor.   · Lifestyle and dietary modifications. Eat smaller meals at a slower pace. Avoid over-eating. If you are overweight, try to lose weight. Do not lie down or exercise directly after eating; eat your last meal of the day at least 2-3 hours prior to  going to sleep. Avoid tight-fitting clothes. If you are a smoker, reduce or quit smoking. Elevate your head of bed 4-6 inches by putting phone books under the legs at the head of your bed or buy a wedge pillow, but do not use more than two regular pillows as this causes the body to curl and compresses your stomach.     Food group Foods to avoid to reduce reflux   Beverages  Whole milk, 2% milk, chocolate milk/hot chocolate, alcohol, coffee (regular and decaf), caffeinated tea, mint tea, carbonated beverages, citrus juice    Breads/grains Commercial sweet rolls, doughnuts, croissants, and other high-fat pastries    Fruits and vegetables Fried or cream-style vegetables, tomatoes, tomato-based products, citrus fruits, hot peppers    Soups and seasonings Cream, cheese, tomato-based soups, vinegar    Meats and proteins Fatty or fried meat/fish, lynn, sausage, pepperoni, lunch meat, fried eggs    Fats and oil Lard, lynn drippings, salt pork, meat drippings, gravies, highly seasoned salad dressings, nuts    Sweets/desserts Anything made with or from chocolate, peppermint, spearmint, whole milk, or cream; high-fat pastries, gum, hard candy

## 2020-11-09 ENCOUNTER — HOSPITAL ENCOUNTER (OUTPATIENT)
Dept: RADIOLOGY | Facility: OTHER | Age: 70
Discharge: HOME | End: 2020-11-09
Attending: ORTHOPAEDIC SURGERY
Payer: MEDICARE

## 2020-11-09 ENCOUNTER — OFFICE VISIT (OUTPATIENT)
Dept: ORTHOPEDICS | Facility: CLINIC | Age: 70
End: 2020-11-09
Payer: MEDICARE

## 2020-11-09 VITALS
HEIGHT: 68 IN | DIASTOLIC BLOOD PRESSURE: 84 MMHG | SYSTOLIC BLOOD PRESSURE: 137 MMHG | HEART RATE: 54 BPM | WEIGHT: 253 LBS | BODY MASS INDEX: 38.34 KG/M2

## 2020-11-09 DIAGNOSIS — M79.641 BILATERAL HAND PAIN: ICD-10-CM

## 2020-11-09 DIAGNOSIS — G56.00 CARPAL TUNNEL SYNDROME, UNSPECIFIED LATERALITY: ICD-10-CM

## 2020-11-09 DIAGNOSIS — G56.02 CARPAL TUNNEL SYNDROME OF LEFT WRIST: Primary | ICD-10-CM

## 2020-11-09 DIAGNOSIS — M79.642 BILATERAL HAND PAIN: ICD-10-CM

## 2020-11-09 PROCEDURE — 3079F DIAST BP 80-89 MM HG: CPT | Mod: HCNC,CPTII,S$GLB, | Performed by: ORTHOPAEDIC SURGERY

## 2020-11-09 PROCEDURE — 3008F PR BODY MASS INDEX (BMI) DOCUMENTED: ICD-10-PCS | Mod: HCNC,CPTII,S$GLB, | Performed by: ORTHOPAEDIC SURGERY

## 2020-11-09 PROCEDURE — 73130 XR HAND COMPLETE 3 VIEWS BILATERAL: ICD-10-PCS | Mod: 26,50,HCNC, | Performed by: RADIOLOGY

## 2020-11-09 PROCEDURE — 99999 PR PBB SHADOW E&M-EST. PATIENT-LVL III: CPT | Mod: PBBFAC,HCNC,, | Performed by: ORTHOPAEDIC SURGERY

## 2020-11-09 PROCEDURE — 73130 X-RAY EXAM OF HAND: CPT | Mod: 26,50,HCNC, | Performed by: RADIOLOGY

## 2020-11-09 PROCEDURE — 3075F SYST BP GE 130 - 139MM HG: CPT | Mod: HCNC,CPTII,S$GLB, | Performed by: ORTHOPAEDIC SURGERY

## 2020-11-09 PROCEDURE — 99204 PR OFFICE/OUTPT VISIT, NEW, LEVL IV, 45-59 MIN: ICD-10-PCS | Mod: HCNC,S$GLB,, | Performed by: ORTHOPAEDIC SURGERY

## 2020-11-09 PROCEDURE — 1101F PT FALLS ASSESS-DOCD LE1/YR: CPT | Mod: HCNC,CPTII,S$GLB, | Performed by: ORTHOPAEDIC SURGERY

## 2020-11-09 PROCEDURE — 1159F PR MEDICATION LIST DOCUMENTED IN MEDICAL RECORD: ICD-10-PCS | Mod: HCNC,S$GLB,, | Performed by: ORTHOPAEDIC SURGERY

## 2020-11-09 PROCEDURE — 3075F PR MOST RECENT SYSTOLIC BLOOD PRESS GE 130-139MM HG: ICD-10-PCS | Mod: HCNC,CPTII,S$GLB, | Performed by: ORTHOPAEDIC SURGERY

## 2020-11-09 PROCEDURE — 1101F PR PT FALLS ASSESS DOC 0-1 FALLS W/OUT INJ PAST YR: ICD-10-PCS | Mod: HCNC,CPTII,S$GLB, | Performed by: ORTHOPAEDIC SURGERY

## 2020-11-09 PROCEDURE — 1159F MED LIST DOCD IN RCRD: CPT | Mod: HCNC,S$GLB,, | Performed by: ORTHOPAEDIC SURGERY

## 2020-11-09 PROCEDURE — 3079F PR MOST RECENT DIASTOLIC BLOOD PRESSURE 80-89 MM HG: ICD-10-PCS | Mod: HCNC,CPTII,S$GLB, | Performed by: ORTHOPAEDIC SURGERY

## 2020-11-09 PROCEDURE — 3008F BODY MASS INDEX DOCD: CPT | Mod: HCNC,CPTII,S$GLB, | Performed by: ORTHOPAEDIC SURGERY

## 2020-11-09 PROCEDURE — 99999 PR PBB SHADOW E&M-EST. PATIENT-LVL III: ICD-10-PCS | Mod: PBBFAC,HCNC,, | Performed by: ORTHOPAEDIC SURGERY

## 2020-11-09 PROCEDURE — 73130 X-RAY EXAM OF HAND: CPT | Mod: TC,50,HCNC,FY

## 2020-11-09 PROCEDURE — 99204 OFFICE O/P NEW MOD 45 MIN: CPT | Mod: HCNC,S$GLB,, | Performed by: ORTHOPAEDIC SURGERY

## 2020-11-09 NOTE — PROGRESS NOTES
[facility]  History & Physical  Orthopedics    SUBJECTIVE:      COVID-19 attestation:  This patient was treated during the COVID-19 pandemic.  This was discussed with the patient, they are aware of our current policies and procedures, were given the option of delaying their visit and or switching to a virtual visit, delaying their surgery when applicable, and they elect to proceed.    Chief Complaint:  Left thumb numbness    Referring Provider: Nicolas Pacheco MD     History of Present Illness:  Patient is a 70 y.o. right hand dominant male who presents today with complaints of numbness to the left thumb.  The patient notes he has had constant numbness and tingling to the left thumb distal to the IP joint for the last 8-10 months.  Of note he is status post cervical spinal surgery in July of this year.  He denies any numbness or tingling elsewhere and has no other complaints today.  He presents for initial evaluation.     The patient is a/an retired.    Onset of symptoms/DOI was 8-10 months ago.    Symptoms are aggravated by activity and movement.    Symptoms are alleviated by rest.    Symptoms consist of Numbness and tingling.    The patient rates their pain as minimal    Attempted treatment(s) and/or interventions include rest, activity modification and immobilization.     The patient denies any fevers, chills, N/V, D/C and presents for evaluation.       Past Medical History:   Diagnosis Date    Anticoagulant long-term use     Arthritis     CAD (coronary artery disease) 3/2/2015    [facility]obstruct[facility]     CAD (coronary artery disease) 3/26/2015    Cataract     Dry eye syndrome     Hypertension     Obesity     Seizures     2011    Sleep apnea     + CPAP     Past Surgical History:   Procedure Laterality Date    ANTERIOR CERVICAL DISCECTOMY W/ FUSION N/A 7/6/2020    Procedure: DISCECTOMY, SPINE, CERVICAL, ANTERIOR APPROACH, WITH FUSION C3-4, C4-5;  Surgeon: Fabiola Vides MD;   [facility];  Service: Neurosurgery;  Laterality: N/A;  TORONTO III, ASA III, BLOOD TYPE & SCREEN, NEUROMONITORING EMG-MEP-SEP, SUPINE POSITION,BRACE [place],REGULAR BED, HEADREST CASPAR, POSITION, MAP>85, RADIOLOGY C-ARM, SPECIAL EQUIPMENT VIRGIL TYLER    EYE SURGERY      INTRAOCULAR PROSTHESES INSERTION Right 7/16/2018    Procedure: INSERTION-INTRAOCULAR LENS (IOL);  Surgeon: Priscilla Hays MD;  Location: [facility];  Service: Ophthalmology;  Laterality: Right;    INTRAOCULAR PROSTHESES INSERTION Left 8/13/2018    Procedure: INSERTION, INTRAOCULAR LENS PROSTHESIS;  Surgeon: Priscilla Hays MD;  Location: [facility] OR;  Service: Ophthalmology;  Laterality: Left;    KNEE SURGERY      PHACOEMULSIFICATION OF CATARACT Right 7/16/2018    Procedure: PHACOEMULSIFICATION-ASPIRATION-CATARACT;  Surgeon: Priscilla Hays MD;  Location: [facility];  Service: Ophthalmology;  Laterality: Right;    PHACOEMULSIFICATION OF CATARACT Left 8/13/2018    Procedure: PHACOEMULSIFICATION, CATARACT;  Surgeon: Priscilla Hays MD;  Location: [facility];  Service: Ophthalmology;  Laterality: Left;    WISDOM TOOTH EXTRACTION       Review of patient's allergies indicates:   Allergen Reactions    Indomethacin      Headache dizziness    Adhesive Rash     Social History     Social History Narrative    Not on file     Family History   Problem Relation Age of Onset    Cancer Mother         pancreatic    Diabetes Mother     Hypertension Mother     Heart disease Father     No Known Problems Sister     Cancer Maternal Grandmother     Heart disease Paternal Grandfather     Heart disease Brother     No Known Problems Daughter     No Known Problems Son     No Known Problems Sister     No Known Problems Sister     No Known Problems Sister     No Known Problems Daughter     Blindness Neg Hx     Macular degeneration Neg Hx     Retinal detachment Neg Hx     Glaucoma Neg Hx     Melanoma Neg Hx          Current Outpatient Medications:      "acetaminophen (TYLENOL ARTHRITIS PAIN) 650 MG TbSR, , Disp: , Rfl:     hydroCHLOROthiazide (HYDRODIURIL) 25 MG tablet, Take 1 tablet (25 mg total) by mouth once daily., Disp: 90 tablet, Rfl: 3    magnesium oxide (MAG-OX) 400 mg (241.3 mg magnesium) tablet, Take 400 mg by mouth every morning. , Disp: , Rfl:     multivitamin with minerals tablet, Take 1 tablet by mouth every morning. , Disp: , Rfl:     potassium chloride SA (K-DUR,KLOR-CON) 20 MEQ tablet, Take 2 tablets (40 mEq total) by mouth once daily., Disp: 180 tablet, Rfl: 3    pravastatin (PRAVACHOL) 40 MG tablet, Take 1 tablet (40 mg total) by mouth once daily., Disp: 90 tablet, Rfl: 3      Review of Systems:  Constitutional: no fever or chills  Eyes: no visual changes  ENT: no nasal congestion or sore throat  Respiratory: no cough or shortness of breath  Cardiovascular: no chest pain  Gastrointestinal: no nausea or vomiting, tolerating diet  Musculoskeletal: numbness/tingling    OBJECTIVE:      Vital Signs (Most Recent):  Vitals:    11/09/20 0927   BP: 137/84   Pulse: (!) 54   Weight: 114.8 kg (253 lb)   Height: 5' 8" (1.727 m)     Body mass index is 38.47 kg/m².      Physical Exam:  Constitutional: The patient appears well-developed and well-nourished. No distress.   Head: Normocephalic and atraumatic.   Nose: Nose normal.   Eyes: Conjunctivae and EOM are normal.   Neck: No tracheal deviation present.   Cardiovascular: Normal rate and intact distal pulses.    Pulmonary/Chest: Effort normal. No respiratory distress.   Abdominal: There is no guarding.   Neurological: The patient is alert.   Psychiatric: The patient has a normal mood and affect.     Bilateral Hand/Wrist Examination:    Observation/Inspection:  Swelling  none    Deformity  none  Discoloration  none     Scars   none    Atrophy  none    HAND/WRIST EXAMINATION:  Finkelstein's Test   Neg  WHAT Test    Neg  Snuff box tenderness   Neg  Mcclure's Test    Neg  Hook of Hamate Tenderness  Neg  CMC " grind    Neg  Circumduction test   Neg    Neurovascular Exam:  Digits WWP, brisk CR < 3s throughout  NVI motor/LTS to M/R/U nerves, radial pulse 2+  Tinel's Test - Carpal Tunnel  Neg  Tinel's Test - Cubital Tunnel  Neg  Phalen's Test    Neg  Median Nerve Compression Test mildly positive bilaterally    ROM hand/wrist/elbow full, painless    Diagnostic Results:     Xray -  x-rays bilateral hands demonstrates some degenerative changes with no acute fractures or dislocations  EMG - Impression   This is an abnormal study. There is electrophysiologic evidence of:   1. A moderate severity right carpal tunnel syndrome;   2. A left ulnar nerve compression across the elbow (left cubital tunnel syndrome) without evidence of denervation in any ulnar-innervated muscles in the left forearm or hand;   3. Acute on chronic denervation in the right C5 and/or C6 myotomes suggestive of radiculopathies at those levels.       ASSESSMENT/PLAN:      70 y.o. yo male with left thumb numbness  Plan:  Treatment options discussed.  At this time, the patient does not have any electrodiagnostic evidence in an EMG performed February of this year of left-sided carpal tunnel syndrome.  He is really only presenting complaint is numbness in the left thumb.  He does have a mildly positive Durkan's test bilaterally.  We discussed treatment options.  He would like to observe his symptoms for now which I feel is reasonable given the discrepancy between his clinical complaints, exam, and electrodiagnostic study.  He will have a new EMG performed in February to determine if he has developed any electrodiagnostic evidence of carpal tunnel syndrome and follow up with me thereafter.  Continue carpal tunnel braces in the meantime.        Kiran Law M.D.     Please be aware that this note has been generated with the assistance of bailey voice-to-text.  Please excuse any spelling or grammatical errors.

## 2020-11-09 NOTE — LETTER
November 9, 2020      Nicolas Pacheco MD  [address]  [facility] [address]           [place] [address]  [address], [address]  [facility] [address]  Phone: [phone]          Patient: Domonique Kellogg   MR Number: [id number]   YOB: 1950   Date of Visit: 11/9/2020       Dear Dr. Nicolas Pacheco:    Thank you for referring Domonique Kellogg to me for evaluation. Attached you will find relevant portions of my assessment and plan of care.    If you have questions, please do not hesitate to call me. I look forward to following Domonique Kellogg along with you.    Sincerely,    Kiran Law MD    Enclosure  CC:  No Recipients    If you would like to receive this communication electronically, please contact [email or web address] or [phone] to request more information on [employer] access.    For providers and/or their staff who would like to refer a patient to [facility], please contact us through our one-stop-shop provider referral line, [facility], at [phone].    If you feel you have received this communication in error or would no longer like to receive these types of communications, please e-mail [email or web address]

## 2020-11-23 ENCOUNTER — PATIENT MESSAGE (OUTPATIENT)
Dept: INTERNAL MEDICINE | Facility: CLINIC | Age: 70
End: 2020-11-23

## 2020-11-27 NOTE — TELEPHONE ENCOUNTER
Please call patient and schedule patient for an appointment with me or LAVONNE Mari, soon. Thank you.

## 2020-11-27 NOTE — TELEPHONE ENCOUNTER
Spoke with patient and he was driving at the time of the call, once he is home he will call the office to schedule appointment with pcp crispin Leavitt for neck pain.

## 2020-11-30 NOTE — TELEPHONE ENCOUNTER
Spoke with patient and he stated that he will be 30 mins late for appointment on 12-3-2020. Stated that spouse has an appointment on the same date and they are sharing a car. Spouses appointment is not at [facility].

## 2020-12-01 ENCOUNTER — PATIENT OUTREACH (OUTPATIENT)
Dept: OTHER | Facility: OTHER | Age: 70
End: 2020-12-01

## 2020-12-01 NOTE — PROGRESS NOTES
Digital Medicine: Health  Follow-Up    The history is provided by the patient.             Reason for review: Blood pressure at goal        Topics Covered on Call: physical activity    Additional Follow-up details: Mr. Kellogg explained that he has been noting that his office readings are slightly variable, but that he considers them generally lower than his home readings. We discussed that lately he has been getting more variability both for his in-office readings and for his in-home readings.     We reviewed that his monthly average was within the digital medicine program goal of under 130/80.            Diet-no change to diet    No change to diet.        Physical Activity-Change      Additional physical activity details: He explained that he and his wife are not walking as often in the park, but that he takes his dogs out for walks 3-4 times a day, which he described as a similar amount of activity.       Medication Adherence-Medication Adherence not addressed.      Substance, Sleep, Stress-Not assessed      Continue current diet/physical activity routine.       Patient verbalizes understanding.      Explained the importance of self-monitoring and medication adherence. Encouraged the patient to communicate with their health  for lifestyle modifications to help improve or maintain a healthy lifestyle.               There are no preventive care reminders to display for this patient.      Last 5 Patient Entered Readings                                      Current 30 Day Average: 128/78     Recent Readings 11/24/2020 11/9/2020 11/6/2020 11/2/2020 10/21/2020    SBP (mmHg) 139 134 117 122 120    DBP (mmHg) 84 84 75 68 77    Pulse 49 54 55 64 53

## 2020-12-03 ENCOUNTER — HOSPITAL ENCOUNTER (OUTPATIENT)
Dept: RADIOLOGY | Facility: HOSPITAL | Age: 70
Discharge: HOME | End: 2020-12-03
Attending: FAMILY MEDICINE
Payer: MEDICARE

## 2020-12-03 ENCOUNTER — OFFICE VISIT (OUTPATIENT)
Dept: INTERNAL MEDICINE | Facility: CLINIC | Age: 70
End: 2020-12-03
Attending: FAMILY MEDICINE
Payer: MEDICARE

## 2020-12-03 VITALS
OXYGEN SATURATION: 98 % | WEIGHT: 254.19 LBS | BODY MASS INDEX: 38.52 KG/M2 | HEART RATE: 49 BPM | SYSTOLIC BLOOD PRESSURE: 120 MMHG | HEIGHT: 68 IN | DIASTOLIC BLOOD PRESSURE: 76 MMHG

## 2020-12-03 DIAGNOSIS — R73.03 PREDIABETES: ICD-10-CM

## 2020-12-03 DIAGNOSIS — M79.672 LEFT FOOT PAIN: ICD-10-CM

## 2020-12-03 DIAGNOSIS — R13.10 DYSPHAGIA, UNSPECIFIED TYPE: ICD-10-CM

## 2020-12-03 DIAGNOSIS — I10 HYPERTENSION, ESSENTIAL: ICD-10-CM

## 2020-12-03 DIAGNOSIS — G56.00 CARPAL TUNNEL SYNDROME, UNSPECIFIED LATERALITY: ICD-10-CM

## 2020-12-03 DIAGNOSIS — B35.1 ONYCHOMYCOSIS: ICD-10-CM

## 2020-12-03 DIAGNOSIS — M54.2 NECK PAIN: Primary | ICD-10-CM

## 2020-12-03 PROCEDURE — 99214 PR OFFICE/OUTPT VISIT, EST, LEVL IV, 30-39 MIN: ICD-10-PCS | Mod: HCNC,S$GLB,, | Performed by: FAMILY MEDICINE

## 2020-12-03 PROCEDURE — 3074F SYST BP LT 130 MM HG: CPT | Mod: HCNC,CPTII,S$GLB, | Performed by: FAMILY MEDICINE

## 2020-12-03 PROCEDURE — 3078F DIAST BP <80 MM HG: CPT | Mod: HCNC,CPTII,S$GLB, | Performed by: FAMILY MEDICINE

## 2020-12-03 PROCEDURE — 1101F PT FALLS ASSESS-DOCD LE1/YR: CPT | Mod: HCNC,CPTII,S$GLB, | Performed by: FAMILY MEDICINE

## 2020-12-03 PROCEDURE — 1159F MED LIST DOCD IN RCRD: CPT | Mod: HCNC,S$GLB,, | Performed by: FAMILY MEDICINE

## 2020-12-03 PROCEDURE — 99999 PR PBB SHADOW E&M-EST. PATIENT-LVL IV: CPT | Mod: PBBFAC,HCNC,, | Performed by: FAMILY MEDICINE

## 2020-12-03 PROCEDURE — 99214 OFFICE O/P EST MOD 30 MIN: CPT | Mod: HCNC,S$GLB,, | Performed by: FAMILY MEDICINE

## 2020-12-03 PROCEDURE — 1125F PR PAIN SEVERITY QUANTIFIED, PAIN PRESENT: ICD-10-PCS | Mod: HCNC,S$GLB,, | Performed by: FAMILY MEDICINE

## 2020-12-03 PROCEDURE — 3008F PR BODY MASS INDEX (BMI) DOCUMENTED: ICD-10-PCS | Mod: HCNC,CPTII,S$GLB, | Performed by: FAMILY MEDICINE

## 2020-12-03 PROCEDURE — 3288F PR FALLS RISK ASSESSMENT DOCUMENTED: ICD-10-PCS | Mod: HCNC,CPTII,S$GLB, | Performed by: FAMILY MEDICINE

## 2020-12-03 PROCEDURE — 3074F PR MOST RECENT SYSTOLIC BLOOD PRESSURE < 130 MM HG: ICD-10-PCS | Mod: HCNC,CPTII,S$GLB, | Performed by: FAMILY MEDICINE

## 2020-12-03 PROCEDURE — 73630 X-RAY EXAM OF FOOT: CPT | Mod: TC,HCNC,LT

## 2020-12-03 PROCEDURE — 1101F PR PT FALLS ASSESS DOC 0-1 FALLS W/OUT INJ PAST YR: ICD-10-PCS | Mod: HCNC,CPTII,S$GLB, | Performed by: FAMILY MEDICINE

## 2020-12-03 PROCEDURE — 1125F AMNT PAIN NOTED PAIN PRSNT: CPT | Mod: HCNC,S$GLB,, | Performed by: FAMILY MEDICINE

## 2020-12-03 PROCEDURE — 3078F PR MOST RECENT DIASTOLIC BLOOD PRESSURE < 80 MM HG: ICD-10-PCS | Mod: HCNC,CPTII,S$GLB, | Performed by: FAMILY MEDICINE

## 2020-12-03 PROCEDURE — 99999 PR PBB SHADOW E&M-EST. PATIENT-LVL IV: ICD-10-PCS | Mod: PBBFAC,HCNC,, | Performed by: FAMILY MEDICINE

## 2020-12-03 PROCEDURE — 1159F PR MEDICATION LIST DOCUMENTED IN MEDICAL RECORD: ICD-10-PCS | Mod: HCNC,S$GLB,, | Performed by: FAMILY MEDICINE

## 2020-12-03 PROCEDURE — 3288F FALL RISK ASSESSMENT DOCD: CPT | Mod: HCNC,CPTII,S$GLB, | Performed by: FAMILY MEDICINE

## 2020-12-03 PROCEDURE — 73630 X-RAY EXAM OF FOOT: CPT | Mod: 26,HCNC,LT, | Performed by: RADIOLOGY

## 2020-12-03 PROCEDURE — 3008F BODY MASS INDEX DOCD: CPT | Mod: HCNC,CPTII,S$GLB, | Performed by: FAMILY MEDICINE

## 2020-12-03 PROCEDURE — 73630 XR FOOT COMPLETE 3 VIEW LEFT: ICD-10-PCS | Mod: 26,HCNC,LT, | Performed by: RADIOLOGY

## 2020-12-03 NOTE — PROGRESS NOTES
Subjective:       Patient ID: Domonique Kellogg is a 70 y.o. male.    Chief Complaint: Neck Pain (back of neck )    Patient want to see me for neck pain.  Occipital cervical.  Radiating to the posterior scalp.  He had neck surgery on July 6th.  This is nonradiating pain.  Not particularly severe.  Somewhat bothersome at night he states.    Sec complaint of left lateral foot pain.  This occurs when he wears shoes but also barefooted to a lesser degree.  No injury reported.  Not described definitely is pain but somewhat of a pressure sensation.  Denied definite numbness tingling or weakness.  Thinks that he walks with his foot tilted somewhat.    Review of Systems   Constitutional: Negative for chills, fatigue, fever and unexpected weight change.   HENT: Positive for trouble swallowing. Negative for congestion.    Eyes: Negative for redness and visual disturbance.   Respiratory: Negative for cough, chest tightness and shortness of breath.    Cardiovascular: Negative for chest pain, palpitations and leg swelling.   Gastrointestinal: Negative for abdominal pain and blood in stool.   Genitourinary: Negative for difficulty urinating and hematuria.   Musculoskeletal: Positive for arthralgias, gait problem and neck pain. Negative for back pain, joint swelling and myalgias.   Skin: Negative for color change and rash.   Neurological: Negative for tremors, speech difficulty, weakness, numbness and headaches.   Hematological: Negative for adenopathy. Does not bruise/bleed easily.   Psychiatric/Behavioral: Negative for behavioral problems, confusion and sleep disturbance. The patient is not nervous/anxious.        Objective:      Physical Exam  Vitals signs and nursing note reviewed.   Constitutional:       General: He is not in acute distress.     Appearance: He is well-developed.   Neck:      Musculoskeletal: Neck supple.   Pulmonary:      Effort: Pulmonary effort is normal.   Musculoskeletal:      Right lower leg: No edema.       Left lower leg: No edema.        Feet:    Feet:      Right foot:      Protective Sensation: 0 sites tested. 0 sites sensed.      Skin integrity: Dry skin present. No skin breakdown.      Toenail Condition: Right toenails are abnormally thick.      Left foot:      Protective Sensation: 0 sites tested. 0 sites sensed.      Skin integrity: Dry skin present. No skin breakdown.   Skin:     General: Skin is warm and dry.      Findings: No rash.   Neurological:      Mental Status: He is alert and oriented to person, place, and time.   Psychiatric:         Behavior: Behavior normal.         Thought Content: Thought content normal.         Judgment: Judgment normal.         Assessment:       1. Neck pain    2. Left foot pain    3. Dysphagia, unspecified type    4. Onychomycosis    5. Carpal tunnel syndrome, unspecified laterality    6. Hypertension, essential    7. Prediabetes        Plan:     Medication List with Changes/Refills   Current Medications    ACETAMINOPHEN (TYLENOL ARTHRITIS PAIN) 650 MG TBSR        HYDROCHLOROTHIAZIDE (HYDRODIURIL) 25 MG TABLET    Take 1 tablet (25 mg total) by mouth once daily.    MAGNESIUM OXIDE (MAG-OX) 400 MG (241.3 MG MAGNESIUM) TABLET    Take 400 mg by mouth every morning.     MULTIVITAMIN WITH MINERALS TABLET    Take 1 tablet by mouth every morning.     POTASSIUM CHLORIDE SA (K-DUR,KLOR-CON) 20 MEQ TABLET    Take 2 tablets (40 mEq total) by mouth once daily.    PRAVASTATIN (PRAVACHOL) 40 MG TABLET    Take 1 tablet (40 mg total) by mouth once daily.     Domonique was seen today for neck pain.    Diagnoses and all orders for this visit:    Neck pain    Left foot pain  -     X-Ray Foot Complete Left; Future  -     Ambulatory referral/consult to Podiatry; Future    Dysphagia, unspecified type    Onychomycosis    Carpal tunnel syndrome, unspecified laterality    Hypertension, essential    Prediabetes      See meds, orders, follow up, routing and instructions sections of encounter and AVS.  Discussed with patient and provided on AVS.    No definite acute foot abnormality is noted.  With recent neck surgery I would not want refer to physical therapy without a re-evaluation from his neurosurgeon.  He has had a chronic globus sensation.  This seems to be in the throat area.  He had an extensive workup including ENT consult, speech therapy consult.  She describes a cricopharyngeal bar but no other gross abnormalities were noted.  This is not seem to be indigestion or food sticking distally.  He declined a GI consult at this time.  Theoretically this could be from hardware as mentioned by his ENT.  I reviewed his neck x-rays today and there does not appear to be any anterior screw extrusion from his cervical procedure.    Recommend we observe foot abnormality for the time being.  He was concerned about toenails as well.  Will ask our podiatrist to take a look.

## 2020-12-10 ENCOUNTER — PATIENT MESSAGE (OUTPATIENT)
Dept: PODIATRY | Facility: CLINIC | Age: 70
End: 2020-12-10

## 2020-12-14 ENCOUNTER — PES CALL (OUTPATIENT)
Dept: ADMINISTRATIVE | Facility: CLINIC | Age: 70
End: 2020-12-14

## 2020-12-16 ENCOUNTER — OFFICE VISIT (OUTPATIENT)
Dept: PODIATRY | Facility: CLINIC | Age: 70
End: 2020-12-16
Payer: MEDICARE

## 2020-12-16 VITALS
BODY MASS INDEX: 39.26 KG/M2 | SYSTOLIC BLOOD PRESSURE: 139 MMHG | DIASTOLIC BLOOD PRESSURE: 73 MMHG | HEART RATE: 53 BPM | HEIGHT: 68 IN | WEIGHT: 259.06 LBS

## 2020-12-16 DIAGNOSIS — M79.672 LEFT FOOT PAIN: ICD-10-CM

## 2020-12-16 DIAGNOSIS — M79.2 NEURITIS: Primary | ICD-10-CM

## 2020-12-16 PROCEDURE — 3075F SYST BP GE 130 - 139MM HG: CPT | Mod: HCNC,CPTII,S$GLB, | Performed by: PODIATRIST

## 2020-12-16 PROCEDURE — 3008F BODY MASS INDEX DOCD: CPT | Mod: HCNC,CPTII,S$GLB, | Performed by: PODIATRIST

## 2020-12-16 PROCEDURE — 99203 OFFICE O/P NEW LOW 30 MIN: CPT | Mod: HCNC,S$GLB,, | Performed by: PODIATRIST

## 2020-12-16 PROCEDURE — 1159F PR MEDICATION LIST DOCUMENTED IN MEDICAL RECORD: ICD-10-PCS | Mod: HCNC,S$GLB,, | Performed by: PODIATRIST

## 2020-12-16 PROCEDURE — 99999 PR PBB SHADOW E&M-EST. PATIENT-LVL III: ICD-10-PCS | Mod: PBBFAC,HCNC,, | Performed by: PODIATRIST

## 2020-12-16 PROCEDURE — 3075F PR MOST RECENT SYSTOLIC BLOOD PRESS GE 130-139MM HG: ICD-10-PCS | Mod: HCNC,CPTII,S$GLB, | Performed by: PODIATRIST

## 2020-12-16 PROCEDURE — 1125F PR PAIN SEVERITY QUANTIFIED, PAIN PRESENT: ICD-10-PCS | Mod: HCNC,S$GLB,, | Performed by: PODIATRIST

## 2020-12-16 PROCEDURE — 3008F PR BODY MASS INDEX (BMI) DOCUMENTED: ICD-10-PCS | Mod: HCNC,CPTII,S$GLB, | Performed by: PODIATRIST

## 2020-12-16 PROCEDURE — 99999 PR PBB SHADOW E&M-EST. PATIENT-LVL III: CPT | Mod: PBBFAC,HCNC,, | Performed by: PODIATRIST

## 2020-12-16 PROCEDURE — 3078F PR MOST RECENT DIASTOLIC BLOOD PRESSURE < 80 MM HG: ICD-10-PCS | Mod: HCNC,CPTII,S$GLB, | Performed by: PODIATRIST

## 2020-12-16 PROCEDURE — 3078F DIAST BP <80 MM HG: CPT | Mod: HCNC,CPTII,S$GLB, | Performed by: PODIATRIST

## 2020-12-16 PROCEDURE — 1159F MED LIST DOCD IN RCRD: CPT | Mod: HCNC,S$GLB,, | Performed by: PODIATRIST

## 2020-12-16 PROCEDURE — 1125F AMNT PAIN NOTED PAIN PRSNT: CPT | Mod: HCNC,S$GLB,, | Performed by: PODIATRIST

## 2020-12-16 PROCEDURE — 99203 PR OFFICE/OUTPT VISIT, NEW, LEVL III, 30-44 MIN: ICD-10-PCS | Mod: HCNC,S$GLB,, | Performed by: PODIATRIST

## 2021-01-03 ENCOUNTER — PATIENT MESSAGE (OUTPATIENT)
Dept: INTERNAL MEDICINE | Facility: CLINIC | Age: 71
End: 2021-01-03

## 2021-01-27 ENCOUNTER — TELEPHONE (OUTPATIENT)
Dept: INTERNAL MEDICINE | Facility: CLINIC | Age: 71
End: 2021-01-27

## 2021-01-27 ENCOUNTER — OFFICE VISIT (OUTPATIENT)
Dept: INTERNAL MEDICINE | Facility: CLINIC | Age: 71
End: 2021-01-27
Payer: MEDICARE

## 2021-01-27 VITALS
HEIGHT: 68 IN | BODY MASS INDEX: 37.99 KG/M2 | DIASTOLIC BLOOD PRESSURE: 86 MMHG | SYSTOLIC BLOOD PRESSURE: 124 MMHG | WEIGHT: 250.69 LBS | HEART RATE: 66 BPM | OXYGEN SATURATION: 98 %

## 2021-01-27 DIAGNOSIS — I27.9 CHRONIC PULMONARY HEART DISEASE: ICD-10-CM

## 2021-01-27 DIAGNOSIS — E66.01 CLASS 2 SEVERE OBESITY DUE TO EXCESS CALORIES WITH SERIOUS COMORBIDITY AND BODY MASS INDEX (BMI) OF 38.0 TO 38.9 IN ADULT: ICD-10-CM

## 2021-01-27 DIAGNOSIS — I77.9 BILATERAL CAROTID ARTERY DISEASE, UNSPECIFIED TYPE: ICD-10-CM

## 2021-01-27 DIAGNOSIS — G47.33 OSA (OBSTRUCTIVE SLEEP APNEA): ICD-10-CM

## 2021-01-27 DIAGNOSIS — I25.10 CAD IN NATIVE ARTERY: ICD-10-CM

## 2021-01-27 DIAGNOSIS — R73.03 PREDIABETES: ICD-10-CM

## 2021-01-27 DIAGNOSIS — Z20.822 ENCOUNTER FOR LABORATORY TESTING FOR COVID-19 VIRUS: ICD-10-CM

## 2021-01-27 DIAGNOSIS — I10 HYPERTENSION, ESSENTIAL: ICD-10-CM

## 2021-01-27 DIAGNOSIS — Z00.00 ENCOUNTER FOR PREVENTIVE HEALTH EXAMINATION: Primary | ICD-10-CM

## 2021-01-27 DIAGNOSIS — E78.2 MIXED HYPERLIPIDEMIA: ICD-10-CM

## 2021-01-27 DIAGNOSIS — G95.89 OTHER SPECIFIED DISEASES OF SPINAL CORD: ICD-10-CM

## 2021-01-27 PROCEDURE — G0439 PR MEDICARE ANNUAL WELLNESS SUBSEQUENT VISIT: ICD-10-PCS | Mod: HCNC,S$GLB,, | Performed by: NURSE PRACTITIONER

## 2021-01-27 PROCEDURE — 3079F DIAST BP 80-89 MM HG: CPT | Mod: HCNC,CPTII,S$GLB, | Performed by: NURSE PRACTITIONER

## 2021-01-27 PROCEDURE — G0439 PPPS, SUBSEQ VISIT: HCPCS | Mod: HCNC,S$GLB,, | Performed by: NURSE PRACTITIONER

## 2021-01-27 PROCEDURE — 3079F PR MOST RECENT DIASTOLIC BLOOD PRESSURE 80-89 MM HG: ICD-10-PCS | Mod: HCNC,CPTII,S$GLB, | Performed by: NURSE PRACTITIONER

## 2021-01-27 PROCEDURE — 3074F SYST BP LT 130 MM HG: CPT | Mod: HCNC,CPTII,S$GLB, | Performed by: NURSE PRACTITIONER

## 2021-01-27 PROCEDURE — 1126F PR PAIN SEVERITY QUANTIFIED, NO PAIN PRESENT: ICD-10-PCS | Mod: HCNC,S$GLB,, | Performed by: NURSE PRACTITIONER

## 2021-01-27 PROCEDURE — 3074F PR MOST RECENT SYSTOLIC BLOOD PRESSURE < 130 MM HG: ICD-10-PCS | Mod: HCNC,CPTII,S$GLB, | Performed by: NURSE PRACTITIONER

## 2021-01-27 PROCEDURE — 1158F ADVNC CARE PLAN TLK DOCD: CPT | Mod: HCNC,S$GLB,, | Performed by: NURSE PRACTITIONER

## 2021-01-27 PROCEDURE — 1158F PR ADVANCE CARE PLANNING DISCUSS DOCUMENTED IN MEDICAL RECORD: ICD-10-PCS | Mod: HCNC,S$GLB,, | Performed by: NURSE PRACTITIONER

## 2021-01-27 PROCEDURE — 99999 PR PBB SHADOW E&M-EST. PATIENT-LVL III: ICD-10-PCS | Mod: PBBFAC,HCNC,, | Performed by: NURSE PRACTITIONER

## 2021-01-27 PROCEDURE — 3008F BODY MASS INDEX DOCD: CPT | Mod: HCNC,CPTII,S$GLB, | Performed by: NURSE PRACTITIONER

## 2021-01-27 PROCEDURE — 99999 PR PBB SHADOW E&M-EST. PATIENT-LVL III: CPT | Mod: PBBFAC,HCNC,, | Performed by: NURSE PRACTITIONER

## 2021-01-27 PROCEDURE — 1126F AMNT PAIN NOTED NONE PRSNT: CPT | Mod: HCNC,S$GLB,, | Performed by: NURSE PRACTITIONER

## 2021-01-27 PROCEDURE — 3008F PR BODY MASS INDEX (BMI) DOCUMENTED: ICD-10-PCS | Mod: HCNC,CPTII,S$GLB, | Performed by: NURSE PRACTITIONER

## 2021-01-28 ENCOUNTER — TELEPHONE (OUTPATIENT)
Dept: INTERNAL MEDICINE | Facility: CLINIC | Age: 71
End: 2021-01-28

## 2021-01-28 DIAGNOSIS — Z20.822 EXPOSURE TO COVID-19 VIRUS: ICD-10-CM

## 2021-02-01 ENCOUNTER — PATIENT MESSAGE (OUTPATIENT)
Dept: INTERNAL MEDICINE | Facility: CLINIC | Age: 71
End: 2021-02-01

## 2021-02-02 ENCOUNTER — LAB VISIT (OUTPATIENT)
Dept: INTERNAL MEDICINE | Facility: CLINIC | Age: 71
End: 2021-02-02
Attending: FAMILY MEDICINE
Payer: MEDICARE

## 2021-02-02 DIAGNOSIS — Z20.822 ENCOUNTER FOR LABORATORY TESTING FOR COVID-19 VIRUS: ICD-10-CM

## 2021-02-02 PROCEDURE — U0003 INFECTIOUS AGENT DETECTION BY NUCLEIC ACID (DNA OR RNA); SEVERE ACUTE RESPIRATORY SYNDROME CORONAVIRUS 2 (SARS-COV-2) (CORONAVIRUS DISEASE [COVID-19]), AMPLIFIED PROBE TECHNIQUE, MAKING USE OF HIGH THROUGHPUT TECHNOLOGIES AS DESCRIBED BY CMS-2020-01-R: HCPCS | Mod: HCNC

## 2021-02-03 LAB — SARS-COV-2 RNA RESP QL NAA+PROBE: NOT DETECTED

## 2021-03-11 ENCOUNTER — OFFICE VISIT (OUTPATIENT)
Dept: NEUROSURGERY | Facility: CLINIC | Age: 71
End: 2021-03-11
Payer: MEDICARE

## 2021-03-11 ENCOUNTER — HOSPITAL ENCOUNTER (OUTPATIENT)
Dept: RADIOLOGY | Facility: HOSPITAL | Age: 71
Discharge: HOME | End: 2021-03-11
Attending: NEUROLOGICAL SURGERY
Payer: MEDICARE

## 2021-03-11 VITALS — HEART RATE: 47 BPM | DIASTOLIC BLOOD PRESSURE: 68 MMHG | SYSTOLIC BLOOD PRESSURE: 109 MMHG

## 2021-03-11 DIAGNOSIS — Z98.1 STATUS POST CERVICAL SPINAL FUSION: Primary | ICD-10-CM

## 2021-03-11 DIAGNOSIS — Z98.1 STATUS POST CERVICAL SPINAL FUSION: ICD-10-CM

## 2021-03-11 PROCEDURE — 99213 OFFICE O/P EST LOW 20 MIN: CPT | Mod: S$GLB,,, | Performed by: NEUROLOGICAL SURGERY

## 2021-03-11 PROCEDURE — 3074F PR MOST RECENT SYSTOLIC BLOOD PRESSURE < 130 MM HG: ICD-10-PCS | Mod: CPTII,S$GLB,, | Performed by: NEUROLOGICAL SURGERY

## 2021-03-11 PROCEDURE — 3078F PR MOST RECENT DIASTOLIC BLOOD PRESSURE < 80 MM HG: ICD-10-PCS | Mod: CPTII,S$GLB,, | Performed by: NEUROLOGICAL SURGERY

## 2021-03-11 PROCEDURE — 99999 PR PBB SHADOW E&M-EST. PATIENT-LVL III: ICD-10-PCS | Mod: PBBFAC,,, | Performed by: NEUROLOGICAL SURGERY

## 2021-03-11 PROCEDURE — 99499 RISK ADDL DX/OHS AUDIT: ICD-10-PCS | Mod: HCNC,S$GLB,, | Performed by: NEUROLOGICAL SURGERY

## 2021-03-11 PROCEDURE — 72125 CT NECK SPINE W/O DYE: CPT | Mod: TC

## 2021-03-11 PROCEDURE — 99213 PR OFFICE/OUTPT VISIT, EST, LEVL III, 20-29 MIN: ICD-10-PCS | Mod: S$GLB,,, | Performed by: NEUROLOGICAL SURGERY

## 2021-03-11 PROCEDURE — 99999 PR PBB SHADOW E&M-EST. PATIENT-LVL III: CPT | Mod: PBBFAC,,, | Performed by: NEUROLOGICAL SURGERY

## 2021-03-11 PROCEDURE — 72125 CT CERVICAL SPINE WITHOUT CONTRAST: ICD-10-PCS | Mod: 26,,, | Performed by: RADIOLOGY

## 2021-03-11 PROCEDURE — 3078F DIAST BP <80 MM HG: CPT | Mod: CPTII,S$GLB,, | Performed by: NEUROLOGICAL SURGERY

## 2021-03-11 PROCEDURE — 1159F PR MEDICATION LIST DOCUMENTED IN MEDICAL RECORD: ICD-10-PCS | Mod: S$GLB,,, | Performed by: NEUROLOGICAL SURGERY

## 2021-03-11 PROCEDURE — 72125 CT NECK SPINE W/O DYE: CPT | Mod: 26,,, | Performed by: RADIOLOGY

## 2021-03-11 PROCEDURE — 1159F MED LIST DOCD IN RCRD: CPT | Mod: S$GLB,,, | Performed by: NEUROLOGICAL SURGERY

## 2021-03-11 PROCEDURE — 99499 UNLISTED E&M SERVICE: CPT | Mod: HCNC,S$GLB,, | Performed by: NEUROLOGICAL SURGERY

## 2021-03-11 PROCEDURE — 3074F SYST BP LT 130 MM HG: CPT | Mod: CPTII,S$GLB,, | Performed by: NEUROLOGICAL SURGERY

## 2021-04-01 ENCOUNTER — TELEPHONE (OUTPATIENT)
Dept: NEUROSURGERY | Facility: CLINIC | Age: 71
End: 2021-04-01

## 2021-04-01 DIAGNOSIS — Z98.1 STATUS POST CERVICAL SPINAL FUSION: Primary | ICD-10-CM

## 2021-04-26 ENCOUNTER — DOCUMENTATION ONLY (OUTPATIENT)
Dept: NEUROLOGY | Facility: CLINIC | Age: 71
End: 2021-04-26

## 2021-04-29 ENCOUNTER — TELEPHONE (OUTPATIENT)
Dept: NEUROLOGY | Facility: CLINIC | Age: 71
End: 2021-04-29

## 2021-05-15 ENCOUNTER — PATIENT MESSAGE (OUTPATIENT)
Dept: INTERNAL MEDICINE | Facility: CLINIC | Age: 71
End: 2021-05-15

## 2021-05-15 DIAGNOSIS — Z12.11 COLON CANCER SCREENING: Primary | ICD-10-CM

## 2021-05-20 ENCOUNTER — PATIENT MESSAGE (OUTPATIENT)
Dept: ADMINISTRATIVE | Facility: OTHER | Age: 71
End: 2021-05-20

## 2021-07-12 ENCOUNTER — PATIENT MESSAGE (OUTPATIENT)
Dept: SLEEP MEDICINE | Facility: CLINIC | Age: 71
End: 2021-07-12

## 2021-07-22 ENCOUNTER — PATIENT MESSAGE (OUTPATIENT)
Dept: NEUROSURGERY | Facility: CLINIC | Age: 71
End: 2021-07-22

## 2021-07-23 ENCOUNTER — PATIENT MESSAGE (OUTPATIENT)
Dept: NEUROSURGERY | Facility: CLINIC | Age: 71
End: 2021-07-23

## 2021-07-23 ENCOUNTER — TELEPHONE (OUTPATIENT)
Dept: NEUROSURGERY | Facility: CLINIC | Age: 71
End: 2021-07-23

## 2021-07-26 ENCOUNTER — TELEPHONE (OUTPATIENT)
Dept: NEUROSURGERY | Facility: CLINIC | Age: 71
End: 2021-07-26

## 2021-08-02 ENCOUNTER — PATIENT MESSAGE (OUTPATIENT)
Dept: SLEEP MEDICINE | Facility: CLINIC | Age: 71
End: 2021-08-02

## 2021-09-03 ENCOUNTER — PATIENT MESSAGE (OUTPATIENT)
Dept: NEUROSURGERY | Facility: CLINIC | Age: 71
End: 2021-09-03

## 2021-09-14 ENCOUNTER — PATIENT MESSAGE (OUTPATIENT)
Dept: INTERNAL MEDICINE | Facility: CLINIC | Age: 71
End: 2021-09-14

## 2021-09-14 DIAGNOSIS — K52.9 CHRONIC DIARRHEA: Primary | ICD-10-CM

## 2021-09-15 ENCOUNTER — OFFICE VISIT (OUTPATIENT)
Dept: GASTROENTEROLOGY | Facility: CLINIC | Age: 71
End: 2021-09-15
Payer: MEDICARE

## 2021-09-15 ENCOUNTER — LAB VISIT (OUTPATIENT)
Dept: LAB | Facility: HOSPITAL | Age: 71
End: 2021-09-15
Attending: INTERNAL MEDICINE
Payer: MEDICARE

## 2021-09-15 ENCOUNTER — PATIENT MESSAGE (OUTPATIENT)
Dept: GASTROENTEROLOGY | Facility: CLINIC | Age: 71
End: 2021-09-15

## 2021-09-15 ENCOUNTER — PATIENT MESSAGE (OUTPATIENT)
Dept: SLEEP MEDICINE | Facility: CLINIC | Age: 71
End: 2021-09-15

## 2021-09-15 VITALS
HEIGHT: 68 IN | HEART RATE: 65 BPM | BODY MASS INDEX: 35.99 KG/M2 | DIASTOLIC BLOOD PRESSURE: 73 MMHG | SYSTOLIC BLOOD PRESSURE: 122 MMHG | WEIGHT: 237.44 LBS | RESPIRATION RATE: 16 BRPM

## 2021-09-15 DIAGNOSIS — R63.4 WEIGHT LOSS: ICD-10-CM

## 2021-09-15 DIAGNOSIS — K52.9 CHRONIC DIARRHEA: ICD-10-CM

## 2021-09-15 DIAGNOSIS — D50.9 IRON DEFICIENCY ANEMIA, UNSPECIFIED IRON DEFICIENCY ANEMIA TYPE: ICD-10-CM

## 2021-09-15 DIAGNOSIS — R63.4 WEIGHT LOSS: Primary | ICD-10-CM

## 2021-09-15 LAB
ALBUMIN SERPL BCP-MCNC: 3.7 G/DL (ref 3.5–5.2)
ALP SERPL-CCNC: 316 U/L (ref 55–135)
ALT SERPL W/O P-5'-P-CCNC: 38 U/L (ref 10–44)
ANION GAP SERPL CALC-SCNC: 10 MMOL/L (ref 8–16)
AST SERPL-CCNC: 61 U/L (ref 10–40)
BASOPHILS # BLD AUTO: 0.07 K/UL (ref 0–0.2)
BASOPHILS NFR BLD: 0.8 % (ref 0–1.9)
BILIRUB SERPL-MCNC: 1.1 MG/DL (ref 0.1–1)
BUN SERPL-MCNC: 15 MG/DL (ref 8–23)
CALCIUM SERPL-MCNC: 10.4 MG/DL (ref 8.7–10.5)
CHLORIDE SERPL-SCNC: 107 MMOL/L (ref 95–110)
CO2 SERPL-SCNC: 22 MMOL/L (ref 23–29)
CREAT SERPL-MCNC: 1.1 MG/DL (ref 0.5–1.4)
DIFFERENTIAL METHOD: ABNORMAL
EOSINOPHIL # BLD AUTO: 0.1 K/UL (ref 0–0.5)
EOSINOPHIL NFR BLD: 1.2 % (ref 0–8)
ERYTHROCYTE [DISTWIDTH] IN BLOOD BY AUTOMATED COUNT: 14.6 % (ref 11.5–14.5)
EST. GFR  (AFRICAN AMERICAN): >60 ML/MIN/1.73 M^2
EST. GFR  (NON AFRICAN AMERICAN): >60 ML/MIN/1.73 M^2
FERRITIN SERPL-MCNC: 270 NG/ML (ref 20–300)
GLUCOSE SERPL-MCNC: 103 MG/DL (ref 70–110)
HCT VFR BLD AUTO: 39.4 % (ref 40–54)
HGB BLD-MCNC: 13 G/DL (ref 14–18)
IGA SERPL-MCNC: 231 MG/DL (ref 40–350)
IMM GRANULOCYTES # BLD AUTO: 0.03 K/UL (ref 0–0.04)
IMM GRANULOCYTES NFR BLD AUTO: 0.3 % (ref 0–0.5)
IRON SERPL-MCNC: 57 UG/DL (ref 45–160)
LIPASE SERPL-CCNC: 23 U/L (ref 4–60)
LYMPHOCYTES # BLD AUTO: 1.5 K/UL (ref 1–4.8)
LYMPHOCYTES NFR BLD: 17 % (ref 18–48)
MCH RBC QN AUTO: 31.3 PG (ref 27–31)
MCHC RBC AUTO-ENTMCNC: 33 G/DL (ref 32–36)
MCV RBC AUTO: 95 FL (ref 82–98)
MONOCYTES # BLD AUTO: 0.7 K/UL (ref 0.3–1)
MONOCYTES NFR BLD: 7.6 % (ref 4–15)
NEUTROPHILS # BLD AUTO: 6.6 K/UL (ref 1.8–7.7)
NEUTROPHILS NFR BLD: 73.1 % (ref 38–73)
NRBC BLD-RTO: 0 /100 WBC
PLATELET # BLD AUTO: 268 K/UL (ref 150–450)
PMV BLD AUTO: 10.2 FL (ref 9.2–12.9)
POTASSIUM SERPL-SCNC: 3.7 MMOL/L (ref 3.5–5.1)
PROT SERPL-MCNC: 7.8 G/DL (ref 6–8.4)
RBC # BLD AUTO: 4.16 M/UL (ref 4.6–6.2)
SATURATED IRON: 22 % (ref 20–50)
SODIUM SERPL-SCNC: 139 MMOL/L (ref 136–145)
TOTAL IRON BINDING CAPACITY: 259 UG/DL (ref 250–450)
TRANSFERRIN SERPL-MCNC: 175 MG/DL (ref 200–375)
WBC # BLD AUTO: 8.99 K/UL (ref 3.9–12.7)

## 2021-09-15 PROCEDURE — 83690 ASSAY OF LIPASE: CPT | Performed by: INTERNAL MEDICINE

## 2021-09-15 PROCEDURE — 1159F MED LIST DOCD IN RCRD: CPT | Mod: CPTII,S$GLB,, | Performed by: INTERNAL MEDICINE

## 2021-09-15 PROCEDURE — 3008F BODY MASS INDEX DOCD: CPT | Mod: CPTII,S$GLB,, | Performed by: INTERNAL MEDICINE

## 2021-09-15 PROCEDURE — 3074F SYST BP LT 130 MM HG: CPT | Mod: CPTII,S$GLB,, | Performed by: INTERNAL MEDICINE

## 2021-09-15 PROCEDURE — 1101F PT FALLS ASSESS-DOCD LE1/YR: CPT | Mod: CPTII,S$GLB,, | Performed by: INTERNAL MEDICINE

## 2021-09-15 PROCEDURE — 3288F FALL RISK ASSESSMENT DOCD: CPT | Mod: CPTII,S$GLB,, | Performed by: INTERNAL MEDICINE

## 2021-09-15 PROCEDURE — 86682 HELMINTH ANTIBODY: CPT | Performed by: INTERNAL MEDICINE

## 2021-09-15 PROCEDURE — 99999 PR PBB SHADOW E&M-EST. PATIENT-LVL IV: ICD-10-PCS | Mod: PBBFAC,,, | Performed by: INTERNAL MEDICINE

## 2021-09-15 PROCEDURE — 82728 ASSAY OF FERRITIN: CPT | Performed by: INTERNAL MEDICINE

## 2021-09-15 PROCEDURE — 1159F PR MEDICATION LIST DOCUMENTED IN MEDICAL RECORD: ICD-10-PCS | Mod: CPTII,S$GLB,, | Performed by: INTERNAL MEDICINE

## 2021-09-15 PROCEDURE — 3074F PR MOST RECENT SYSTOLIC BLOOD PRESSURE < 130 MM HG: ICD-10-PCS | Mod: CPTII,S$GLB,, | Performed by: INTERNAL MEDICINE

## 2021-09-15 PROCEDURE — 99204 PR OFFICE/OUTPT VISIT, NEW, LEVL IV, 45-59 MIN: ICD-10-PCS | Mod: S$GLB,,, | Performed by: INTERNAL MEDICINE

## 2021-09-15 PROCEDURE — 85025 COMPLETE CBC W/AUTO DIFF WBC: CPT | Performed by: INTERNAL MEDICINE

## 2021-09-15 PROCEDURE — 3008F PR BODY MASS INDEX (BMI) DOCUMENTED: ICD-10-PCS | Mod: CPTII,S$GLB,, | Performed by: INTERNAL MEDICINE

## 2021-09-15 PROCEDURE — 84466 ASSAY OF TRANSFERRIN: CPT | Performed by: INTERNAL MEDICINE

## 2021-09-15 PROCEDURE — 99999 PR PBB SHADOW E&M-EST. PATIENT-LVL IV: CPT | Mod: PBBFAC,,, | Performed by: INTERNAL MEDICINE

## 2021-09-15 PROCEDURE — 86753 PROTOZOA ANTIBODY NOS: CPT | Performed by: INTERNAL MEDICINE

## 2021-09-15 PROCEDURE — 80053 COMPREHEN METABOLIC PANEL: CPT | Performed by: INTERNAL MEDICINE

## 2021-09-15 PROCEDURE — 1126F AMNT PAIN NOTED NONE PRSNT: CPT | Mod: CPTII,S$GLB,, | Performed by: INTERNAL MEDICINE

## 2021-09-15 PROCEDURE — 1126F PR PAIN SEVERITY QUANTIFIED, NO PAIN PRESENT: ICD-10-PCS | Mod: CPTII,S$GLB,, | Performed by: INTERNAL MEDICINE

## 2021-09-15 PROCEDURE — 3288F PR FALLS RISK ASSESSMENT DOCUMENTED: ICD-10-PCS | Mod: CPTII,S$GLB,, | Performed by: INTERNAL MEDICINE

## 2021-09-15 PROCEDURE — 83516 IMMUNOASSAY NONANTIBODY: CPT | Performed by: INTERNAL MEDICINE

## 2021-09-15 PROCEDURE — 1101F PR PT FALLS ASSESS DOC 0-1 FALLS W/OUT INJ PAST YR: ICD-10-PCS | Mod: CPTII,S$GLB,, | Performed by: INTERNAL MEDICINE

## 2021-09-15 PROCEDURE — 99204 OFFICE O/P NEW MOD 45 MIN: CPT | Mod: S$GLB,,, | Performed by: INTERNAL MEDICINE

## 2021-09-15 PROCEDURE — 3078F PR MOST RECENT DIASTOLIC BLOOD PRESSURE < 80 MM HG: ICD-10-PCS | Mod: CPTII,S$GLB,, | Performed by: INTERNAL MEDICINE

## 2021-09-15 PROCEDURE — 36415 COLL VENOUS BLD VENIPUNCTURE: CPT | Performed by: INTERNAL MEDICINE

## 2021-09-15 PROCEDURE — 82784 ASSAY IGA/IGD/IGG/IGM EACH: CPT | Performed by: INTERNAL MEDICINE

## 2021-09-15 PROCEDURE — 3078F DIAST BP <80 MM HG: CPT | Mod: CPTII,S$GLB,, | Performed by: INTERNAL MEDICINE

## 2021-09-17 ENCOUNTER — LAB VISIT (OUTPATIENT)
Dept: LAB | Facility: HOSPITAL | Age: 71
End: 2021-09-17
Payer: MEDICARE

## 2021-09-17 DIAGNOSIS — K52.9 CHRONIC DIARRHEA: ICD-10-CM

## 2021-09-17 DIAGNOSIS — R63.4 WEIGHT LOSS: ICD-10-CM

## 2021-09-17 DIAGNOSIS — D50.9 IRON DEFICIENCY ANEMIA, UNSPECIFIED IRON DEFICIENCY ANEMIA TYPE: ICD-10-CM

## 2021-09-17 LAB — STRONGYLOIDES ANTIBODY IGG: POSITIVE

## 2021-09-17 PROCEDURE — 87324 CLOSTRIDIUM AG IA: CPT | Performed by: INTERNAL MEDICINE

## 2021-09-17 PROCEDURE — 82656 EL-1 FECAL QUAL/SEMIQ: CPT | Performed by: INTERNAL MEDICINE

## 2021-09-17 PROCEDURE — 89125 SPECIMEN FAT STAIN: CPT | Performed by: INTERNAL MEDICINE

## 2021-09-17 PROCEDURE — 87015 SPECIMEN INFECT AGNT CONCNTJ: CPT | Mod: 59 | Performed by: INTERNAL MEDICINE

## 2021-09-17 PROCEDURE — 87045 FECES CULTURE AEROBIC BACT: CPT | Performed by: INTERNAL MEDICINE

## 2021-09-17 PROCEDURE — 87427 SHIGA-LIKE TOXIN AG IA: CPT | Mod: 59 | Performed by: INTERNAL MEDICINE

## 2021-09-17 PROCEDURE — 87449 NOS EACH ORGANISM AG IA: CPT | Performed by: INTERNAL MEDICINE

## 2021-09-17 PROCEDURE — 87046 STOOL CULTR AEROBIC BACT EA: CPT | Performed by: INTERNAL MEDICINE

## 2021-09-17 PROCEDURE — 87329 GIARDIA AG IA: CPT | Performed by: INTERNAL MEDICINE

## 2021-09-17 PROCEDURE — 87177 OVA AND PARASITES SMEARS: CPT | Performed by: INTERNAL MEDICINE

## 2021-09-17 PROCEDURE — 87209 SMEAR COMPLEX STAIN: CPT | Performed by: INTERNAL MEDICINE

## 2021-09-18 DIAGNOSIS — R19.7 DIARRHEA IN ADULT PATIENT: ICD-10-CM

## 2021-09-18 DIAGNOSIS — R63.4 WEIGHT LOSS, ABNORMAL: Primary | ICD-10-CM

## 2021-09-18 DIAGNOSIS — R79.89 ELEVATED LFTS: Primary | ICD-10-CM

## 2021-09-18 DIAGNOSIS — R74.8 ALKALINE PHOSPHATASE ELEVATION: ICD-10-CM

## 2021-09-18 DIAGNOSIS — K58.2 IRRITABLE BOWEL SYNDROME WITH BOTH CONSTIPATION AND DIARRHEA: Primary | ICD-10-CM

## 2021-09-18 DIAGNOSIS — R63.4 WEIGHT LOSS, NON-INTENTIONAL: ICD-10-CM

## 2021-09-18 DIAGNOSIS — R79.89 LFT ELEVATION: Primary | ICD-10-CM

## 2021-09-18 DIAGNOSIS — R63.4 WEIGHT LOSS: ICD-10-CM

## 2021-09-20 ENCOUNTER — LAB VISIT (OUTPATIENT)
Dept: LAB | Facility: HOSPITAL | Age: 71
End: 2021-09-20
Attending: INTERNAL MEDICINE
Payer: MEDICARE

## 2021-09-20 ENCOUNTER — TELEPHONE (OUTPATIENT)
Dept: GASTROENTEROLOGY | Facility: CLINIC | Age: 71
End: 2021-09-20

## 2021-09-20 ENCOUNTER — DOCUMENTATION ONLY (OUTPATIENT)
Dept: GASTROENTEROLOGY | Facility: CLINIC | Age: 71
End: 2021-09-20

## 2021-09-20 DIAGNOSIS — R63.4 WEIGHT LOSS, ABNORMAL: ICD-10-CM

## 2021-09-20 DIAGNOSIS — R19.7 DIARRHEA IN ADULT PATIENT: ICD-10-CM

## 2021-09-20 DIAGNOSIS — R79.89 LFT ELEVATION: ICD-10-CM

## 2021-09-20 DIAGNOSIS — R63.4 WEIGHT LOSS: ICD-10-CM

## 2021-09-20 DIAGNOSIS — R79.89 LFTS ABNORMAL: Primary | ICD-10-CM

## 2021-09-20 DIAGNOSIS — R74.8 ALKALINE PHOSPHATASE ELEVATION: ICD-10-CM

## 2021-09-20 DIAGNOSIS — R79.89 ELEVATED LFTS: ICD-10-CM

## 2021-09-20 DIAGNOSIS — B78.9 INFECTION DUE TO STRONGYLOIDES: Primary | ICD-10-CM

## 2021-09-20 DIAGNOSIS — R74.8 ELEVATED ALKALINE PHOSPHATASE LEVEL: ICD-10-CM

## 2021-09-20 LAB
ALBUMIN SERPL BCP-MCNC: 3.6 G/DL (ref 3.5–5.2)
ALP SERPL-CCNC: 325 U/L (ref 55–135)
ALT SERPL W/O P-5'-P-CCNC: 40 U/L (ref 10–44)
AST SERPL-CCNC: 62 U/L (ref 10–40)
BACTERIA STL CULT: NORMAL
BILIRUB DIRECT SERPL-MCNC: 0.7 MG/DL (ref 0.1–0.3)
BILIRUB SERPL-MCNC: 1.4 MG/DL (ref 0.1–1)
CERULOPLASMIN SERPL-MCNC: 41 MG/DL (ref 15–45)
CRYPTOSP AG STL QL IA: NEGATIVE
E COLI SXT1 STL QL IA: NEGATIVE
E COLI SXT2 STL QL IA: NEGATIVE
G LAMBLIA AG STL QL IA: NEGATIVE
GGT SERPL-CCNC: 1292 U/L (ref 8–55)
HBV SURFACE AG SERPL QL IA: NEGATIVE
HCV AB SERPL QL IA: NEGATIVE
HEPATITIS A ANTIBODY, IGG: NEGATIVE
IGA SERPL-MCNC: 230 MG/DL (ref 40–350)
IGG SERPL-MCNC: 1221 MG/DL (ref 650–1600)
PROT SERPL-MCNC: 7.4 G/DL (ref 6–8.4)
TTG IGA SER-ACNC: 5 UNITS

## 2021-09-20 PROCEDURE — 86235 NUCLEAR ANTIGEN ANTIBODY: CPT | Mod: 91 | Performed by: INTERNAL MEDICINE

## 2021-09-20 PROCEDURE — 82977 ASSAY OF GGT: CPT | Performed by: INTERNAL MEDICINE

## 2021-09-20 PROCEDURE — 86706 HEP B SURFACE ANTIBODY: CPT | Performed by: INTERNAL MEDICINE

## 2021-09-20 PROCEDURE — 80076 HEPATIC FUNCTION PANEL: CPT | Performed by: INTERNAL MEDICINE

## 2021-09-20 PROCEDURE — 87340 HEPATITIS B SURFACE AG IA: CPT | Performed by: INTERNAL MEDICINE

## 2021-09-20 PROCEDURE — 82784 ASSAY IGA/IGD/IGG/IGM EACH: CPT | Mod: 59 | Performed by: INTERNAL MEDICINE

## 2021-09-20 PROCEDURE — 83516 IMMUNOASSAY NONANTIBODY: CPT | Performed by: INTERNAL MEDICINE

## 2021-09-20 PROCEDURE — 86256 FLUORESCENT ANTIBODY TITER: CPT | Performed by: INTERNAL MEDICINE

## 2021-09-20 PROCEDURE — 82784 ASSAY IGA/IGD/IGG/IGM EACH: CPT | Performed by: INTERNAL MEDICINE

## 2021-09-20 PROCEDURE — 86038 ANTINUCLEAR ANTIBODIES: CPT | Performed by: INTERNAL MEDICINE

## 2021-09-20 PROCEDURE — 86039 ANTINUCLEAR ANTIBODIES (ANA): CPT | Performed by: INTERNAL MEDICINE

## 2021-09-20 PROCEDURE — 86235 NUCLEAR ANTIGEN ANTIBODY: CPT | Mod: 59 | Performed by: INTERNAL MEDICINE

## 2021-09-20 PROCEDURE — 86803 HEPATITIS C AB TEST: CPT | Performed by: INTERNAL MEDICINE

## 2021-09-20 PROCEDURE — 86376 MICROSOMAL ANTIBODY EACH: CPT | Performed by: INTERNAL MEDICINE

## 2021-09-20 PROCEDURE — 82390 ASSAY OF CERULOPLASMIN: CPT | Performed by: INTERNAL MEDICINE

## 2021-09-20 PROCEDURE — 86790 VIRUS ANTIBODY NOS: CPT | Performed by: INTERNAL MEDICINE

## 2021-09-20 RX ORDER — IVERMECTIN 3 MG/1
21 TABLET ORAL ONCE
Qty: 7 TABLET | Refills: 0 | Status: SHIPPED | OUTPATIENT
Start: 2021-09-20 | End: 2021-09-20

## 2021-09-21 ENCOUNTER — DOCUMENTATION ONLY (OUTPATIENT)
Dept: TRANSPLANT | Facility: CLINIC | Age: 71
End: 2021-09-21

## 2021-09-21 ENCOUNTER — PATIENT MESSAGE (OUTPATIENT)
Dept: GASTROENTEROLOGY | Facility: CLINIC | Age: 71
End: 2021-09-21

## 2021-09-21 ENCOUNTER — TELEPHONE (OUTPATIENT)
Dept: ENDOSCOPY | Facility: HOSPITAL | Age: 71
End: 2021-09-21

## 2021-09-21 LAB
ANA PATTERN 1: NORMAL
ANA SER QL IF: POSITIVE
ANA TITR SER IF: NORMAL {TITER}
E HISTOLYT AB SER IA-ACNC: NEGATIVE
FAT STL SUDAN IV STN: NORMAL

## 2021-09-22 ENCOUNTER — OFFICE VISIT (OUTPATIENT)
Dept: OPTOMETRY | Facility: CLINIC | Age: 71
End: 2021-09-22
Payer: MEDICARE

## 2021-09-22 ENCOUNTER — HOSPITAL ENCOUNTER (OUTPATIENT)
Dept: RADIOLOGY | Facility: OTHER | Age: 71
Discharge: HOME | End: 2021-09-22
Attending: INTERNAL MEDICINE
Payer: MEDICARE

## 2021-09-22 DIAGNOSIS — H52.4 HYPEROPIA WITH ASTIGMATISM AND PRESBYOPIA, BILATERAL: ICD-10-CM

## 2021-09-22 DIAGNOSIS — K52.9 CHRONIC DIARRHEA: ICD-10-CM

## 2021-09-22 DIAGNOSIS — I10 HYPERTENSION, ESSENTIAL: Primary | ICD-10-CM

## 2021-09-22 DIAGNOSIS — H52.03 HYPEROPIA WITH ASTIGMATISM AND PRESBYOPIA, BILATERAL: ICD-10-CM

## 2021-09-22 DIAGNOSIS — R63.4 WEIGHT LOSS: ICD-10-CM

## 2021-09-22 DIAGNOSIS — H52.203 HYPEROPIA WITH ASTIGMATISM AND PRESBYOPIA, BILATERAL: ICD-10-CM

## 2021-09-22 DIAGNOSIS — D50.9 IRON DEFICIENCY ANEMIA, UNSPECIFIED IRON DEFICIENCY ANEMIA TYPE: ICD-10-CM

## 2021-09-22 DIAGNOSIS — Z96.1 PSEUDOPHAKIA OF BOTH EYES: ICD-10-CM

## 2021-09-22 LAB
HBV SURFACE AB SER QL IA: NEGATIVE
HBV SURFACE AB SERPL IA-ACNC: <3 MIU/ML
MITOCHONDRIA AB TITR SER IF: NORMAL {TITER}
O+P STL MICRO: NORMAL
SMOOTH MUSCLE AB TITR SER IF: NORMAL {TITER}

## 2021-09-22 PROCEDURE — 1159F MED LIST DOCD IN RCRD: CPT | Mod: CPTII,S$GLB,, | Performed by: OPTOMETRIST

## 2021-09-22 PROCEDURE — 92015 PR REFRACTION: ICD-10-PCS | Mod: S$GLB,,, | Performed by: OPTOMETRIST

## 2021-09-22 PROCEDURE — 1126F PR PAIN SEVERITY QUANTIFIED, NO PAIN PRESENT: ICD-10-PCS | Mod: CPTII,S$GLB,, | Performed by: OPTOMETRIST

## 2021-09-22 PROCEDURE — 92014 COMPRE OPH EXAM EST PT 1/>: CPT | Mod: S$GLB,,, | Performed by: OPTOMETRIST

## 2021-09-22 PROCEDURE — 99999 PR PBB SHADOW E&M-EST. PATIENT-LVL III: CPT | Mod: PBBFAC,,, | Performed by: OPTOMETRIST

## 2021-09-22 PROCEDURE — 76705 ECHO EXAM OF ABDOMEN: CPT | Mod: TC

## 2021-09-22 PROCEDURE — 1101F PR PT FALLS ASSESS DOC 0-1 FALLS W/OUT INJ PAST YR: ICD-10-PCS | Mod: CPTII,S$GLB,, | Performed by: OPTOMETRIST

## 2021-09-22 PROCEDURE — 76705 US ABDOMEN LIMITED: ICD-10-PCS | Mod: 26,,, | Performed by: RADIOLOGY

## 2021-09-22 PROCEDURE — 1159F PR MEDICATION LIST DOCUMENTED IN MEDICAL RECORD: ICD-10-PCS | Mod: CPTII,S$GLB,, | Performed by: OPTOMETRIST

## 2021-09-22 PROCEDURE — 3288F FALL RISK ASSESSMENT DOCD: CPT | Mod: CPTII,S$GLB,, | Performed by: OPTOMETRIST

## 2021-09-22 PROCEDURE — 1101F PT FALLS ASSESS-DOCD LE1/YR: CPT | Mod: CPTII,S$GLB,, | Performed by: OPTOMETRIST

## 2021-09-22 PROCEDURE — 92015 DETERMINE REFRACTIVE STATE: CPT | Mod: S$GLB,,, | Performed by: OPTOMETRIST

## 2021-09-22 PROCEDURE — 3288F PR FALLS RISK ASSESSMENT DOCUMENTED: ICD-10-PCS | Mod: CPTII,S$GLB,, | Performed by: OPTOMETRIST

## 2021-09-22 PROCEDURE — 92014 PR EYE EXAM, EST PATIENT,COMPREHESV: ICD-10-PCS | Mod: S$GLB,,, | Performed by: OPTOMETRIST

## 2021-09-22 PROCEDURE — 1126F AMNT PAIN NOTED NONE PRSNT: CPT | Mod: CPTII,S$GLB,, | Performed by: OPTOMETRIST

## 2021-09-22 PROCEDURE — 76705 ECHO EXAM OF ABDOMEN: CPT | Mod: 26,,, | Performed by: RADIOLOGY

## 2021-09-22 PROCEDURE — 99999 PR PBB SHADOW E&M-EST. PATIENT-LVL III: ICD-10-PCS | Mod: PBBFAC,,, | Performed by: OPTOMETRIST

## 2021-09-22 RX ORDER — IVERMECTIN 3 MG/1
TABLET ORAL
COMMUNITY
Start: 2021-09-21 | End: 2021-09-23

## 2021-09-23 ENCOUNTER — OFFICE VISIT (OUTPATIENT)
Dept: GASTROENTEROLOGY | Facility: CLINIC | Age: 71
End: 2021-09-23
Payer: MEDICARE

## 2021-09-23 ENCOUNTER — PATIENT MESSAGE (OUTPATIENT)
Dept: MEDSURG UNIT | Facility: HOSPITAL | Age: 71
End: 2021-09-23

## 2021-09-23 ENCOUNTER — PATIENT MESSAGE (OUTPATIENT)
Dept: GASTROENTEROLOGY | Facility: CLINIC | Age: 71
End: 2021-09-23

## 2021-09-23 ENCOUNTER — TELEPHONE (OUTPATIENT)
Dept: HEPATOLOGY | Facility: CLINIC | Age: 71
End: 2021-09-23

## 2021-09-23 DIAGNOSIS — R63.4 WEIGHT LOSS: ICD-10-CM

## 2021-09-23 DIAGNOSIS — K58.2 IRRITABLE BOWEL SYNDROME WITH BOTH CONSTIPATION AND DIARRHEA: ICD-10-CM

## 2021-09-23 DIAGNOSIS — R19.7 DIARRHEA IN ADULT PATIENT: ICD-10-CM

## 2021-09-23 DIAGNOSIS — R79.89 LFTS ABNORMAL: ICD-10-CM

## 2021-09-23 DIAGNOSIS — K76.9 LIVER LESION: Primary | ICD-10-CM

## 2021-09-23 LAB
ANTI SM ANTIBODY: 0.08 RATIO (ref 0–0.99)
ANTI SM/RNP ANTIBODY: 0.08 RATIO (ref 0–0.99)
ANTI-SM INTERPRETATION: NEGATIVE
ANTI-SM/RNP INTERPRETATION: NEGATIVE
ANTI-SSA ANTIBODY: 0.06 RATIO (ref 0–0.99)
ANTI-SSA INTERPRETATION: NEGATIVE
ANTI-SSB ANTIBODY: 0.06 RATIO (ref 0–0.99)
ANTI-SSB INTERPRETATION: NEGATIVE
CYCLOSPORA STL SAFRANIN STN: NORMAL
DSDNA AB SER-ACNC: NORMAL [IU]/ML
ELASTASE 1, FECAL: >500 MCG/G
LKM AB SER-ACNC: 1.7 UNITS
TTG IGA SER-ACNC: 5 UNITS

## 2021-09-23 PROCEDURE — 1160F RVW MEDS BY RX/DR IN RCRD: CPT | Mod: HCNC,CPTII,95, | Performed by: INTERNAL MEDICINE

## 2021-09-23 PROCEDURE — 99214 PR OFFICE/OUTPT VISIT, EST, LEVL IV, 30-39 MIN: ICD-10-PCS | Mod: HCNC,95,, | Performed by: INTERNAL MEDICINE

## 2021-09-23 PROCEDURE — 99214 OFFICE O/P EST MOD 30 MIN: CPT | Mod: HCNC,95,, | Performed by: INTERNAL MEDICINE

## 2021-09-23 PROCEDURE — 1159F MED LIST DOCD IN RCRD: CPT | Mod: HCNC,CPTII,95, | Performed by: INTERNAL MEDICINE

## 2021-09-23 PROCEDURE — 1159F PR MEDICATION LIST DOCUMENTED IN MEDICAL RECORD: ICD-10-PCS | Mod: HCNC,CPTII,95, | Performed by: INTERNAL MEDICINE

## 2021-09-23 PROCEDURE — 1160F PR REVIEW ALL MEDS BY PRESCRIBER/CLIN PHARMACIST DOCUMENTED: ICD-10-PCS | Mod: HCNC,CPTII,95, | Performed by: INTERNAL MEDICINE

## 2021-09-24 ENCOUNTER — TELEPHONE (OUTPATIENT)
Dept: HEPATOLOGY | Facility: CLINIC | Age: 71
End: 2021-09-24

## 2021-09-24 ENCOUNTER — LAB VISIT (OUTPATIENT)
Dept: LAB | Facility: HOSPITAL | Age: 71
End: 2021-09-24
Payer: MEDICARE

## 2021-09-24 ENCOUNTER — OFFICE VISIT (OUTPATIENT)
Dept: HEPATOLOGY | Facility: CLINIC | Age: 71
End: 2021-09-24
Payer: MEDICARE

## 2021-09-24 DIAGNOSIS — Z12.11 SPECIAL SCREENING FOR MALIGNANT NEOPLASM OF COLON: Primary | ICD-10-CM

## 2021-09-24 DIAGNOSIS — D50.9 IRON DEFICIENCY ANEMIA, UNSPECIFIED IRON DEFICIENCY ANEMIA TYPE: ICD-10-CM

## 2021-09-24 DIAGNOSIS — Z01.812 PRE-PROCEDURE LAB EXAM: ICD-10-CM

## 2021-09-24 DIAGNOSIS — R63.4 WEIGHT LOSS: ICD-10-CM

## 2021-09-24 DIAGNOSIS — R79.89 LFTS ABNORMAL: ICD-10-CM

## 2021-09-24 DIAGNOSIS — Z12.11 SPECIAL SCREENING FOR MALIGNANT NEOPLASMS, COLON: Primary | ICD-10-CM

## 2021-09-24 DIAGNOSIS — K52.9 CHRONIC DIARRHEA: ICD-10-CM

## 2021-09-24 DIAGNOSIS — K76.9 LIVER DISEASE, UNSPECIFIED: ICD-10-CM

## 2021-09-24 DIAGNOSIS — K76.9 LIVER LESION: ICD-10-CM

## 2021-09-24 PROCEDURE — 1160F PR REVIEW ALL MEDS BY PRESCRIBER/CLIN PHARMACIST DOCUMENTED: ICD-10-PCS | Mod: HCNC,CPTII,95, | Performed by: STUDENT IN AN ORGANIZED HEALTH CARE EDUCATION/TRAINING PROGRAM

## 2021-09-24 PROCEDURE — 1159F PR MEDICATION LIST DOCUMENTED IN MEDICAL RECORD: ICD-10-PCS | Mod: HCNC,CPTII,95, | Performed by: STUDENT IN AN ORGANIZED HEALTH CARE EDUCATION/TRAINING PROGRAM

## 2021-09-24 PROCEDURE — 1160F RVW MEDS BY RX/DR IN RCRD: CPT | Mod: HCNC,CPTII,95, | Performed by: STUDENT IN AN ORGANIZED HEALTH CARE EDUCATION/TRAINING PROGRAM

## 2021-09-24 PROCEDURE — 87798 DETECT AGENT NOS DNA AMP: CPT | Mod: HCNC | Performed by: INTERNAL MEDICINE

## 2021-09-24 PROCEDURE — 87177 OVA AND PARASITES SMEARS: CPT | Mod: HCNC | Performed by: INTERNAL MEDICINE

## 2021-09-24 PROCEDURE — 87209 SMEAR COMPLEX STAIN: CPT | Mod: HCNC | Performed by: INTERNAL MEDICINE

## 2021-09-24 PROCEDURE — 99203 PR OFFICE/OUTPT VISIT, NEW, LEVL III, 30-44 MIN: ICD-10-PCS | Mod: HCNC,95,, | Performed by: STUDENT IN AN ORGANIZED HEALTH CARE EDUCATION/TRAINING PROGRAM

## 2021-09-24 PROCEDURE — 99203 OFFICE O/P NEW LOW 30 MIN: CPT | Mod: HCNC,95,, | Performed by: STUDENT IN AN ORGANIZED HEALTH CARE EDUCATION/TRAINING PROGRAM

## 2021-09-24 PROCEDURE — 1159F MED LIST DOCD IN RCRD: CPT | Mod: HCNC,CPTII,95, | Performed by: STUDENT IN AN ORGANIZED HEALTH CARE EDUCATION/TRAINING PROGRAM

## 2021-09-24 RX ORDER — SODIUM, POTASSIUM,MAG SULFATES 17.5-3.13G
1 SOLUTION, RECONSTITUTED, ORAL ORAL DAILY
Qty: 1 KIT | Refills: 0 | Status: SHIPPED | OUTPATIENT
Start: 2021-09-24 | End: 2021-09-26

## 2021-09-27 ENCOUNTER — PATIENT MESSAGE (OUTPATIENT)
Dept: OPTOMETRY | Facility: CLINIC | Age: 71
End: 2021-09-27

## 2021-09-27 ENCOUNTER — PATIENT MESSAGE (OUTPATIENT)
Dept: GASTROENTEROLOGY | Facility: CLINIC | Age: 71
End: 2021-09-27

## 2021-09-28 LAB
ENCEPHALITOZOON BIENEUSI: NEGATIVE
ENCEPHALITOZOON SPECIES: NEGATIVE
MICROSPORIDIA SPECIMEN SOURCE: NORMAL
O+P STL MICRO: NORMAL

## 2021-09-29 ENCOUNTER — HOSPITAL ENCOUNTER (OUTPATIENT)
Dept: PREADMISSION TESTING | Facility: OTHER | Age: 71
Discharge: HOME | End: 2021-09-29
Attending: ANESTHESIOLOGY
Payer: MEDICARE

## 2021-09-29 DIAGNOSIS — Z01.812 PRE-PROCEDURE LAB EXAM: ICD-10-CM

## 2021-09-29 LAB
SARS-COV-2 RNA RESP QL NAA+PROBE: NOT DETECTED
SARS-COV-2- CYCLE NUMBER: NORMAL

## 2021-09-29 PROCEDURE — U0003 INFECTIOUS AGENT DETECTION BY NUCLEIC ACID (DNA OR RNA); SEVERE ACUTE RESPIRATORY SYNDROME CORONAVIRUS 2 (SARS-COV-2) (CORONAVIRUS DISEASE [COVID-19]), AMPLIFIED PROBE TECHNIQUE, MAKING USE OF HIGH THROUGHPUT TECHNOLOGIES AS DESCRIBED BY CMS-2020-01-R: HCPCS | Mod: HCNC | Performed by: FAMILY MEDICINE

## 2021-09-29 PROCEDURE — U0005 INFEC AGEN DETEC AMPLI PROBE: HCPCS | Performed by: FAMILY MEDICINE

## 2021-10-01 ENCOUNTER — ANESTHESIA (OUTPATIENT)
Dept: ENDOSCOPY | Facility: HOSPITAL | Age: 71
End: 2021-10-01
Payer: MEDICARE

## 2021-10-01 ENCOUNTER — HOSPITAL ENCOUNTER (OUTPATIENT)
Facility: HOSPITAL | Age: 71
Discharge: HOME | End: 2021-10-01
Attending: INTERNAL MEDICINE | Admitting: INTERNAL MEDICINE
Payer: MEDICARE

## 2021-10-01 ENCOUNTER — ANESTHESIA EVENT (OUTPATIENT)
Dept: ENDOSCOPY | Facility: HOSPITAL | Age: 71
End: 2021-10-01
Payer: MEDICARE

## 2021-10-01 VITALS
HEART RATE: 55 BPM | DIASTOLIC BLOOD PRESSURE: 74 MMHG | RESPIRATION RATE: 18 BRPM | WEIGHT: 230 LBS | SYSTOLIC BLOOD PRESSURE: 115 MMHG | TEMPERATURE: 98 F | HEIGHT: 68 IN | BODY MASS INDEX: 34.86 KG/M2 | OXYGEN SATURATION: 96 %

## 2021-10-01 DIAGNOSIS — R63.4 WEIGHT LOSS: ICD-10-CM

## 2021-10-01 DIAGNOSIS — K76.9 LIVER LESION: Primary | ICD-10-CM

## 2021-10-01 PROCEDURE — 43239 PR EGD, FLEX, W/BIOPSY, SGL/MULTI: ICD-10-PCS | Mod: 51,HCNC,GC, | Performed by: INTERNAL MEDICINE

## 2021-10-01 PROCEDURE — 45380 PR COLONOSCOPY,BIOPSY: ICD-10-PCS | Mod: 59,HCNC,, | Performed by: INTERNAL MEDICINE

## 2021-10-01 PROCEDURE — E9220 PRA ENDO ANESTHESIA: ICD-10-PCS | Mod: HCNC,,, | Performed by: NURSE ANESTHETIST, CERTIFIED REGISTERED

## 2021-10-01 PROCEDURE — 27201012 HC FORCEPS, HOT/COLD, DISP: Mod: HCNC | Performed by: INTERNAL MEDICINE

## 2021-10-01 PROCEDURE — 43239 EGD BIOPSY SINGLE/MULTIPLE: CPT | Mod: HCNC | Performed by: INTERNAL MEDICINE

## 2021-10-01 PROCEDURE — 88305 TISSUE EXAM BY PATHOLOGIST: CPT | Mod: 26,HCNC,, | Performed by: PATHOLOGY

## 2021-10-01 PROCEDURE — 45380 COLONOSCOPY AND BIOPSY: CPT | Mod: 59,HCNC,, | Performed by: INTERNAL MEDICINE

## 2021-10-01 PROCEDURE — 88341 IMHCHEM/IMCYTCHM EA ADD ANTB: CPT | Mod: 26,HCNC,, | Performed by: PATHOLOGY

## 2021-10-01 PROCEDURE — 37000008 HC ANESTHESIA 1ST 15 MINUTES: Mod: HCNC | Performed by: INTERNAL MEDICINE

## 2021-10-01 PROCEDURE — 25000003 PHARM REV CODE 250: Mod: HCNC | Performed by: INTERNAL MEDICINE

## 2021-10-01 PROCEDURE — 88342 IMHCHEM/IMCYTCHM 1ST ANTB: CPT | Mod: HCNC | Performed by: PATHOLOGY

## 2021-10-01 PROCEDURE — 88341 IMHCHEM/IMCYTCHM EA ADD ANTB: CPT | Mod: 59,HCNC | Performed by: PATHOLOGY

## 2021-10-01 PROCEDURE — 45385 PR COLONOSCOPY,REMV LESN,SNARE: ICD-10-PCS | Mod: HCNC,GC,, | Performed by: INTERNAL MEDICINE

## 2021-10-01 PROCEDURE — 88312 PR  SPECIAL STAINS,GROUP I: ICD-10-PCS | Mod: 26,HCNC,, | Performed by: PATHOLOGY

## 2021-10-01 PROCEDURE — 27201089 HC SNARE, DISP (ANY): Mod: HCNC | Performed by: INTERNAL MEDICINE

## 2021-10-01 PROCEDURE — 43239 EGD BIOPSY SINGLE/MULTIPLE: CPT | Mod: 51,HCNC,GC, | Performed by: INTERNAL MEDICINE

## 2021-10-01 PROCEDURE — 88305 TISSUE EXAM BY PATHOLOGIST: CPT | Mod: 59,HCNC | Performed by: PATHOLOGY

## 2021-10-01 PROCEDURE — 88312 SPECIAL STAINS GROUP 1: CPT | Mod: HCNC | Performed by: PATHOLOGY

## 2021-10-01 PROCEDURE — 45380 COLONOSCOPY AND BIOPSY: CPT | Mod: HCNC | Performed by: INTERNAL MEDICINE

## 2021-10-01 PROCEDURE — 88341 PR IHC OR ICC EACH ADD'L SINGLE ANTIBODY  STAINPR: ICD-10-PCS | Mod: 26,HCNC,, | Performed by: PATHOLOGY

## 2021-10-01 PROCEDURE — 88305 TISSUE EXAM BY PATHOLOGIST: ICD-10-PCS | Mod: 26,HCNC,, | Performed by: PATHOLOGY

## 2021-10-01 PROCEDURE — 25000003 PHARM REV CODE 250: Mod: HCNC | Performed by: NURSE ANESTHETIST, CERTIFIED REGISTERED

## 2021-10-01 PROCEDURE — 45385 COLONOSCOPY W/LESION REMOVAL: CPT | Mod: HCNC,GC,, | Performed by: INTERNAL MEDICINE

## 2021-10-01 PROCEDURE — 88342 IMHCHEM/IMCYTCHM 1ST ANTB: CPT | Mod: 26,HCNC,, | Performed by: PATHOLOGY

## 2021-10-01 PROCEDURE — 37000009 HC ANESTHESIA EA ADD 15 MINS: Mod: HCNC | Performed by: INTERNAL MEDICINE

## 2021-10-01 PROCEDURE — 88342 CHG IMMUNOCYTOCHEMISTRY: ICD-10-PCS | Mod: 26,HCNC,, | Performed by: PATHOLOGY

## 2021-10-01 PROCEDURE — 45385 COLONOSCOPY W/LESION REMOVAL: CPT | Mod: HCNC | Performed by: INTERNAL MEDICINE

## 2021-10-01 PROCEDURE — E9220 PRA ENDO ANESTHESIA: HCPCS | Mod: HCNC,,, | Performed by: NURSE ANESTHETIST, CERTIFIED REGISTERED

## 2021-10-01 PROCEDURE — 88312 SPECIAL STAINS GROUP 1: CPT | Mod: 26,HCNC,, | Performed by: PATHOLOGY

## 2021-10-01 PROCEDURE — 63600175 PHARM REV CODE 636 W HCPCS: Mod: HCNC | Performed by: NURSE ANESTHETIST, CERTIFIED REGISTERED

## 2021-10-01 RX ORDER — SODIUM CHLORIDE 9 MG/ML
INJECTION, SOLUTION INTRAVENOUS CONTINUOUS
Status: DISCONTINUED | OUTPATIENT
Start: 2021-10-01 | End: 2021-10-01 | Stop reason: HOSPADM

## 2021-10-01 RX ORDER — PROPOFOL 10 MG/ML
VIAL (ML) INTRAVENOUS CONTINUOUS PRN
Status: DISCONTINUED | OUTPATIENT
Start: 2021-10-01 | End: 2021-10-01

## 2021-10-01 RX ORDER — PROPOFOL 10 MG/ML
VIAL (ML) INTRAVENOUS
Status: DISCONTINUED | OUTPATIENT
Start: 2021-10-01 | End: 2021-10-01

## 2021-10-01 RX ORDER — LIDOCAINE HYDROCHLORIDE 20 MG/ML
INJECTION INTRAVENOUS
Status: DISCONTINUED | OUTPATIENT
Start: 2021-10-01 | End: 2021-10-01

## 2021-10-01 RX ADMIN — LIDOCAINE HYDROCHLORIDE 100 MG: 20 INJECTION, SOLUTION INTRAVENOUS at 01:10

## 2021-10-01 RX ADMIN — SODIUM CHLORIDE: 0.9 INJECTION, SOLUTION INTRAVENOUS at 12:10

## 2021-10-01 RX ADMIN — GLYCOPYRROLATE 0.1 MG: 0.2 INJECTION, SOLUTION INTRAMUSCULAR; INTRAVITREAL at 01:10

## 2021-10-01 RX ADMIN — PROPOFOL 20 MG: 10 INJECTION, EMULSION INTRAVENOUS at 01:10

## 2021-10-01 RX ADMIN — PROPOFOL 60 MG: 10 INJECTION, EMULSION INTRAVENOUS at 01:10

## 2021-10-01 RX ADMIN — Medication 175 MCG/KG/MIN: at 01:10

## 2021-10-03 ENCOUNTER — PATIENT MESSAGE (OUTPATIENT)
Dept: INFECTIOUS DISEASES | Facility: CLINIC | Age: 71
End: 2021-10-03

## 2021-10-04 ENCOUNTER — PATIENT MESSAGE (OUTPATIENT)
Dept: GASTROENTEROLOGY | Facility: CLINIC | Age: 71
End: 2021-10-04

## 2021-10-04 ENCOUNTER — HOSPITAL ENCOUNTER (OUTPATIENT)
Dept: RADIOLOGY | Facility: HOSPITAL | Age: 71
Discharge: HOME | End: 2021-10-04
Attending: STUDENT IN AN ORGANIZED HEALTH CARE EDUCATION/TRAINING PROGRAM
Payer: MEDICARE

## 2021-10-04 DIAGNOSIS — K76.9 LIVER LESION: ICD-10-CM

## 2021-10-04 DIAGNOSIS — K76.9 LIVER DISEASE, UNSPECIFIED: ICD-10-CM

## 2021-10-04 DIAGNOSIS — R79.89 LFTS ABNORMAL: ICD-10-CM

## 2021-10-04 PROCEDURE — A9585 GADOBUTROL INJECTION: HCPCS | Mod: HCNC | Performed by: STUDENT IN AN ORGANIZED HEALTH CARE EDUCATION/TRAINING PROGRAM

## 2021-10-04 PROCEDURE — G1004 CDSM NDSC: HCPCS | Mod: ,,, | Performed by: INTERNAL MEDICINE

## 2021-10-04 PROCEDURE — 25500020 PHARM REV CODE 255: Mod: HCNC | Performed by: STUDENT IN AN ORGANIZED HEALTH CARE EDUCATION/TRAINING PROGRAM

## 2021-10-04 PROCEDURE — G1004 MRI ABDOMEN W WO CONTRAST: ICD-10-PCS | Mod: ,,, | Performed by: INTERNAL MEDICINE

## 2021-10-04 PROCEDURE — 74183 MRI ABD W/O CNTR FLWD CNTR: CPT | Mod: 26,HCNC,ME, | Performed by: INTERNAL MEDICINE

## 2021-10-04 PROCEDURE — G1004 CDSM NDSC: HCPCS

## 2021-10-04 PROCEDURE — 74183 MRI ABDOMEN W WO CONTRAST: ICD-10-PCS | Mod: 26,HCNC,ME, | Performed by: INTERNAL MEDICINE

## 2021-10-04 RX ORDER — GADOBUTROL 604.72 MG/ML
10 INJECTION INTRAVENOUS
Status: COMPLETED | OUTPATIENT
Start: 2021-10-04 | End: 2021-10-04

## 2021-10-04 RX ADMIN — GADOBUTROL 10 ML: 604.72 INJECTION INTRAVENOUS at 06:10

## 2021-10-05 ENCOUNTER — OFFICE VISIT (OUTPATIENT)
Dept: INFECTIOUS DISEASES | Facility: CLINIC | Age: 71
End: 2021-10-05
Payer: MEDICARE

## 2021-10-05 DIAGNOSIS — R63.4 WEIGHT LOSS, NON-INTENTIONAL: ICD-10-CM

## 2021-10-05 DIAGNOSIS — R19.7 DIARRHEA IN ADULT PATIENT: ICD-10-CM

## 2021-10-05 DIAGNOSIS — B78.9 INFECTION DUE TO STRONGYLOIDES: ICD-10-CM

## 2021-10-05 PROCEDURE — 1160F RVW MEDS BY RX/DR IN RCRD: CPT | Mod: HCNC,CPTII,95, | Performed by: INTERNAL MEDICINE

## 2021-10-05 PROCEDURE — 1159F PR MEDICATION LIST DOCUMENTED IN MEDICAL RECORD: ICD-10-PCS | Mod: HCNC,CPTII,95, | Performed by: INTERNAL MEDICINE

## 2021-10-05 PROCEDURE — 99203 PR OFFICE/OUTPT VISIT, NEW, LEVL III, 30-44 MIN: ICD-10-PCS | Mod: HCNC,95,, | Performed by: INTERNAL MEDICINE

## 2021-10-05 PROCEDURE — 99203 OFFICE O/P NEW LOW 30 MIN: CPT | Mod: HCNC,95,, | Performed by: INTERNAL MEDICINE

## 2021-10-05 PROCEDURE — 1160F PR REVIEW ALL MEDS BY PRESCRIBER/CLIN PHARMACIST DOCUMENTED: ICD-10-PCS | Mod: HCNC,CPTII,95, | Performed by: INTERNAL MEDICINE

## 2021-10-05 PROCEDURE — 1159F MED LIST DOCD IN RCRD: CPT | Mod: HCNC,CPTII,95, | Performed by: INTERNAL MEDICINE

## 2021-10-07 ENCOUNTER — TELEPHONE (OUTPATIENT)
Dept: INTERVENTIONAL RADIOLOGY/VASCULAR | Facility: CLINIC | Age: 71
End: 2021-10-07

## 2021-10-08 ENCOUNTER — PATIENT MESSAGE (OUTPATIENT)
Dept: INFECTIOUS DISEASES | Facility: CLINIC | Age: 71
End: 2021-10-08

## 2021-10-08 LAB
FINAL PATHOLOGIC DIAGNOSIS: NORMAL
GROSS: NORMAL
Lab: NORMAL

## 2021-10-11 ENCOUNTER — CLINICAL SUPPORT (OUTPATIENT)
Dept: INFECTIOUS DISEASES | Facility: CLINIC | Age: 71
End: 2021-10-11
Payer: MEDICARE

## 2021-10-11 ENCOUNTER — TELEPHONE (OUTPATIENT)
Dept: INFECTIOUS DISEASES | Facility: CLINIC | Age: 71
End: 2021-10-11

## 2021-10-11 DIAGNOSIS — Z00.00 PREVENTATIVE HEALTH CARE: Primary | ICD-10-CM

## 2021-10-11 DIAGNOSIS — K76.9 LIVER LESION: ICD-10-CM

## 2021-10-11 PROCEDURE — 90471 HEPATITIS A VACCINE ADULT IM: ICD-10-PCS | Mod: HCNC,S$GLB,, | Performed by: INTERNAL MEDICINE

## 2021-10-11 PROCEDURE — 90739 HEPATITIS B (RECOMBINANT) ADJUVANTED, 2 DOSE: ICD-10-PCS | Mod: HCNC,S$GLB,, | Performed by: INTERNAL MEDICINE

## 2021-10-11 PROCEDURE — G0010 ADMIN HEPATITIS B VACCINE: HCPCS | Mod: 59,HCNC,S$GLB, | Performed by: INTERNAL MEDICINE

## 2021-10-11 PROCEDURE — G0010 HEPATITIS B (RECOMBINANT) ADJUVANTED, 2 DOSE: ICD-10-PCS | Mod: 59,HCNC,S$GLB, | Performed by: INTERNAL MEDICINE

## 2021-10-11 PROCEDURE — 90632 HEPATITIS A VACCINE ADULT IM: ICD-10-PCS | Mod: HCNC,S$GLB,, | Performed by: INTERNAL MEDICINE

## 2021-10-11 PROCEDURE — 90632 HEPA VACCINE ADULT IM: CPT | Mod: HCNC,S$GLB,, | Performed by: INTERNAL MEDICINE

## 2021-10-11 PROCEDURE — 90739 HEPB VACC 2/4 DOSE ADULT IM: CPT | Mod: HCNC,S$GLB,, | Performed by: INTERNAL MEDICINE

## 2021-10-11 PROCEDURE — 90471 IMMUNIZATION ADMIN: CPT | Mod: HCNC,S$GLB,, | Performed by: INTERNAL MEDICINE

## 2021-10-13 ENCOUNTER — PATIENT MESSAGE (OUTPATIENT)
Dept: GASTROENTEROLOGY | Facility: CLINIC | Age: 71
End: 2021-10-13
Payer: MEDICARE

## 2021-10-14 ENCOUNTER — CLINICAL SUPPORT (OUTPATIENT)
Dept: INTERVENTIONAL RADIOLOGY/VASCULAR | Facility: CLINIC | Age: 71
End: 2021-10-14
Payer: MEDICARE

## 2021-10-14 DIAGNOSIS — K76.9 LIVER LESION: Primary | ICD-10-CM

## 2021-10-14 PROCEDURE — 99214 PR OFFICE/OUTPT VISIT, EST, LEVL IV, 30-39 MIN: ICD-10-PCS | Mod: 95,,, | Performed by: FAMILY MEDICINE

## 2021-10-14 PROCEDURE — 99214 OFFICE O/P EST MOD 30 MIN: CPT | Mod: 95,,, | Performed by: FAMILY MEDICINE

## 2021-10-15 ENCOUNTER — PATIENT MESSAGE (OUTPATIENT)
Dept: INTERNAL MEDICINE | Facility: CLINIC | Age: 71
End: 2021-10-15
Payer: MEDICARE

## 2021-10-16 DIAGNOSIS — K21.00 GASTROESOPHAGEAL REFLUX DISEASE WITH ESOPHAGITIS WITHOUT HEMORRHAGE: Primary | ICD-10-CM

## 2021-10-16 RX ORDER — PANTOPRAZOLE SODIUM 40 MG/1
40 TABLET, DELAYED RELEASE ORAL DAILY
Qty: 90 TABLET | Refills: 3 | Status: SHIPPED | OUTPATIENT
Start: 2021-10-16 | End: 2021-12-23

## 2021-10-18 ENCOUNTER — PATIENT MESSAGE (OUTPATIENT)
Dept: SLEEP MEDICINE | Facility: CLINIC | Age: 71
End: 2021-10-18
Payer: MEDICARE

## 2021-10-19 ENCOUNTER — TELEPHONE (OUTPATIENT)
Dept: INTERVENTIONAL RADIOLOGY/VASCULAR | Facility: CLINIC | Age: 71
End: 2021-10-19

## 2021-10-21 ENCOUNTER — PATIENT MESSAGE (OUTPATIENT)
Dept: GASTROENTEROLOGY | Facility: CLINIC | Age: 71
End: 2021-10-21
Payer: MEDICARE

## 2021-10-22 ENCOUNTER — TELEPHONE (OUTPATIENT)
Dept: INTERVENTIONAL RADIOLOGY/VASCULAR | Facility: HOSPITAL | Age: 71
End: 2021-10-22

## 2021-10-22 ENCOUNTER — TELEPHONE (OUTPATIENT)
Dept: SURGERY | Facility: CLINIC | Age: 71
End: 2021-10-22

## 2021-10-22 ENCOUNTER — OFFICE VISIT (OUTPATIENT)
Dept: GASTROENTEROLOGY | Facility: CLINIC | Age: 71
End: 2021-10-22
Payer: MEDICARE

## 2021-10-22 ENCOUNTER — PATIENT MESSAGE (OUTPATIENT)
Dept: GASTROENTEROLOGY | Facility: CLINIC | Age: 71
End: 2021-10-22

## 2021-10-22 ENCOUNTER — PATIENT MESSAGE (OUTPATIENT)
Dept: HEPATOLOGY | Facility: CLINIC | Age: 71
End: 2021-10-22
Payer: MEDICARE

## 2021-10-22 DIAGNOSIS — R63.4 WEIGHT LOSS: ICD-10-CM

## 2021-10-22 DIAGNOSIS — K76.9 LIVER LESION: Primary | ICD-10-CM

## 2021-10-22 DIAGNOSIS — K21.00 GASTROESOPHAGEAL REFLUX DISEASE WITH ESOPHAGITIS WITHOUT HEMORRHAGE: ICD-10-CM

## 2021-10-22 PROCEDURE — 99213 PR OFFICE/OUTPT VISIT, EST, LEVL III, 20-29 MIN: ICD-10-PCS | Mod: HCNC,95,, | Performed by: INTERNAL MEDICINE

## 2021-10-22 PROCEDURE — 99213 OFFICE O/P EST LOW 20 MIN: CPT | Mod: HCNC,95,, | Performed by: INTERNAL MEDICINE

## 2021-10-25 ENCOUNTER — TELEPHONE (OUTPATIENT)
Dept: HEMATOLOGY/ONCOLOGY | Facility: CLINIC | Age: 71
End: 2021-10-25
Payer: MEDICARE

## 2021-10-26 ENCOUNTER — PATIENT MESSAGE (OUTPATIENT)
Dept: INTERNAL MEDICINE | Facility: CLINIC | Age: 71
End: 2021-10-26
Payer: MEDICARE

## 2021-10-26 ENCOUNTER — PATIENT MESSAGE (OUTPATIENT)
Dept: GASTROENTEROLOGY | Facility: CLINIC | Age: 71
End: 2021-10-26
Payer: MEDICARE

## 2021-10-28 ENCOUNTER — PATIENT MESSAGE (OUTPATIENT)
Dept: INTERNAL MEDICINE | Facility: CLINIC | Age: 71
End: 2021-10-28
Payer: MEDICARE

## 2021-10-28 DIAGNOSIS — R73.03 PREDIABETES: ICD-10-CM

## 2021-10-28 DIAGNOSIS — K76.9 LIVER DISEASE: ICD-10-CM

## 2021-10-28 DIAGNOSIS — K76.9 LIVER LESION: ICD-10-CM

## 2021-10-28 DIAGNOSIS — K52.9 CHRONIC DIARRHEA: Primary | ICD-10-CM

## 2021-10-28 DIAGNOSIS — I12.9 HYPERTENSIVE CHRONIC KIDNEY DISEASE WITH STAGE 1 THROUGH STAGE 4 CHRONIC KIDNEY DISEASE, OR UNSPECIFIED CHRONIC KIDNEY DISEASE: ICD-10-CM

## 2021-11-03 ENCOUNTER — PATIENT MESSAGE (OUTPATIENT)
Dept: HEPATOLOGY | Facility: CLINIC | Age: 71
End: 2021-11-03
Payer: MEDICARE

## 2021-11-03 ENCOUNTER — PATIENT MESSAGE (OUTPATIENT)
Dept: INTERNAL MEDICINE | Facility: CLINIC | Age: 71
End: 2021-11-03
Payer: MEDICARE

## 2021-11-05 DIAGNOSIS — E78.5 HYPERLIPIDEMIA, UNSPECIFIED HYPERLIPIDEMIA TYPE: ICD-10-CM

## 2021-11-07 RX ORDER — PRAVASTATIN SODIUM 40 MG/1
40 TABLET ORAL DAILY
Qty: 90 TABLET | Refills: 0 | Status: SHIPPED | OUTPATIENT
Start: 2021-11-07 | End: 2022-01-18

## 2021-11-08 ENCOUNTER — PATIENT MESSAGE (OUTPATIENT)
Dept: INTERNAL MEDICINE | Facility: CLINIC | Age: 71
End: 2021-11-08
Payer: MEDICARE

## 2021-11-08 DIAGNOSIS — Z00.00 ANNUAL PHYSICAL EXAM: Primary | ICD-10-CM

## 2021-11-08 DIAGNOSIS — I10 HYPERTENSION, ESSENTIAL: ICD-10-CM

## 2021-11-08 DIAGNOSIS — E78.5 HYPERLIPIDEMIA, UNSPECIFIED HYPERLIPIDEMIA TYPE: ICD-10-CM

## 2021-11-08 DIAGNOSIS — R79.89 ABNORMAL COMPLETE BLOOD COUNT: ICD-10-CM

## 2021-11-08 DIAGNOSIS — Z12.5 PROSTATE CANCER SCREENING: ICD-10-CM

## 2021-11-10 ENCOUNTER — TELEPHONE (OUTPATIENT)
Dept: HEMATOLOGY/ONCOLOGY | Facility: CLINIC | Age: 71
End: 2021-11-10
Payer: MEDICARE

## 2021-11-10 ENCOUNTER — PATIENT MESSAGE (OUTPATIENT)
Dept: GASTROENTEROLOGY | Facility: CLINIC | Age: 71
End: 2021-11-10
Payer: MEDICARE

## 2021-11-11 ENCOUNTER — CLINICAL SUPPORT (OUTPATIENT)
Dept: INFECTIOUS DISEASES | Facility: CLINIC | Age: 71
End: 2021-11-11
Payer: MEDICARE

## 2021-11-11 DIAGNOSIS — Z00.00 PREVENTATIVE HEALTH CARE: ICD-10-CM

## 2021-11-11 DIAGNOSIS — K76.9 LIVER LESION: ICD-10-CM

## 2021-11-11 PROCEDURE — G0010 ADMIN HEPATITIS B VACCINE: HCPCS | Mod: HCNC,S$GLB,, | Performed by: INTERNAL MEDICINE

## 2021-11-11 PROCEDURE — G0009 ADMIN PNEUMOCOCCAL VACCINE: HCPCS | Mod: HCNC,S$GLB,, | Performed by: INTERNAL MEDICINE

## 2021-11-11 PROCEDURE — G0010 PNEUMOCOCCAL POLYSACCHARIDE VACCINE 23-VALENT =>2YO SQ IM: ICD-10-PCS | Mod: HCNC,S$GLB,, | Performed by: INTERNAL MEDICINE

## 2021-11-11 PROCEDURE — G0009 PR ADMIN PNEUMOCOCCAL VACCINE: ICD-10-PCS | Mod: HCNC,S$GLB,, | Performed by: INTERNAL MEDICINE

## 2021-11-11 PROCEDURE — 90732 PNEUMOCOCCAL POLYSACCHARIDE VACCINE 23-VALENT =>2YO SQ IM: ICD-10-PCS | Mod: HCNC,S$GLB,, | Performed by: INTERNAL MEDICINE

## 2021-11-11 PROCEDURE — 90739 PR HEPATITIS B VACCINE, ADULT, IM, 2 DOSE: ICD-10-PCS | Mod: HCNC,S$GLB,, | Performed by: INTERNAL MEDICINE

## 2021-11-11 PROCEDURE — 90739 HEPB VACC 2/4 DOSE ADULT IM: CPT | Mod: HCNC,S$GLB,, | Performed by: INTERNAL MEDICINE

## 2021-11-11 PROCEDURE — 90732 PPSV23 VACC 2 YRS+ SUBQ/IM: CPT | Mod: HCNC,S$GLB,, | Performed by: INTERNAL MEDICINE

## 2021-11-12 ENCOUNTER — PATIENT MESSAGE (OUTPATIENT)
Dept: INTERNAL MEDICINE | Facility: CLINIC | Age: 71
End: 2021-11-12
Payer: MEDICARE

## 2021-11-13 RX ORDER — ONDANSETRON 4 MG/1
4-8 TABLET, FILM COATED ORAL EVERY 8 HOURS PRN
Qty: 30 TABLET | Refills: 0 | Status: SHIPPED | OUTPATIENT
Start: 2021-11-13 | End: 2022-03-04 | Stop reason: SDUPTHER

## 2021-11-15 ENCOUNTER — LAB VISIT (OUTPATIENT)
Dept: LAB | Facility: HOSPITAL | Age: 71
End: 2021-11-15
Attending: FAMILY MEDICINE
Payer: MEDICARE

## 2021-11-15 DIAGNOSIS — I10 HYPERTENSION, ESSENTIAL: ICD-10-CM

## 2021-11-15 DIAGNOSIS — Z00.00 ANNUAL PHYSICAL EXAM: ICD-10-CM

## 2021-11-15 DIAGNOSIS — R79.89 ABNORMAL COMPLETE BLOOD COUNT: ICD-10-CM

## 2021-11-15 DIAGNOSIS — E78.5 HYPERLIPIDEMIA, UNSPECIFIED HYPERLIPIDEMIA TYPE: ICD-10-CM

## 2021-11-15 DIAGNOSIS — Z12.5 PROSTATE CANCER SCREENING: ICD-10-CM

## 2021-11-15 LAB
ALBUMIN SERPL BCP-MCNC: 3.3 G/DL (ref 3.5–5.2)
ALP SERPL-CCNC: 259 U/L (ref 55–135)
ALT SERPL W/O P-5'-P-CCNC: 28 U/L (ref 10–44)
ANION GAP SERPL CALC-SCNC: 9 MMOL/L (ref 8–16)
AST SERPL-CCNC: 67 U/L (ref 10–40)
BILIRUB SERPL-MCNC: 1.6 MG/DL (ref 0.1–1)
BUN SERPL-MCNC: 15 MG/DL (ref 8–23)
CALCIUM SERPL-MCNC: 10.7 MG/DL (ref 8.7–10.5)
CHLORIDE SERPL-SCNC: 103 MMOL/L (ref 95–110)
CHOLEST SERPL-MCNC: 139 MG/DL (ref 120–199)
CHOLEST/HDLC SERPL: 3.9 {RATIO} (ref 2–5)
CO2 SERPL-SCNC: 26 MMOL/L (ref 23–29)
COMPLEXED PSA SERPL-MCNC: 1.4 NG/ML (ref 0–4)
CREAT SERPL-MCNC: 0.9 MG/DL (ref 0.5–1.4)
ERYTHROCYTE [DISTWIDTH] IN BLOOD BY AUTOMATED COUNT: 13.6 % (ref 11.5–14.5)
EST. GFR  (AFRICAN AMERICAN): >60 ML/MIN/1.73 M^2
EST. GFR  (NON AFRICAN AMERICAN): >60 ML/MIN/1.73 M^2
GLUCOSE SERPL-MCNC: 83 MG/DL (ref 70–110)
HCT VFR BLD AUTO: 37.6 % (ref 40–54)
HDLC SERPL-MCNC: 36 MG/DL (ref 40–75)
HDLC SERPL: 25.9 % (ref 20–50)
HGB BLD-MCNC: 12.3 G/DL (ref 14–18)
LDLC SERPL CALC-MCNC: 92.6 MG/DL (ref 63–159)
MCH RBC QN AUTO: 32.6 PG (ref 27–31)
MCHC RBC AUTO-ENTMCNC: 32.7 G/DL (ref 32–36)
MCV RBC AUTO: 100 FL (ref 82–98)
NONHDLC SERPL-MCNC: 103 MG/DL
PLATELET # BLD AUTO: 267 K/UL (ref 150–450)
PMV BLD AUTO: 11.3 FL (ref 9.2–12.9)
POTASSIUM SERPL-SCNC: 4.1 MMOL/L (ref 3.5–5.1)
PROT SERPL-MCNC: 7.6 G/DL (ref 6–8.4)
RBC # BLD AUTO: 3.77 M/UL (ref 4.6–6.2)
SODIUM SERPL-SCNC: 138 MMOL/L (ref 136–145)
TRIGL SERPL-MCNC: 52 MG/DL (ref 30–150)
WBC # BLD AUTO: 9.79 K/UL (ref 3.9–12.7)

## 2021-11-15 PROCEDURE — 36415 COLL VENOUS BLD VENIPUNCTURE: CPT | Mod: HCNC | Performed by: FAMILY MEDICINE

## 2021-11-15 PROCEDURE — 85027 COMPLETE CBC AUTOMATED: CPT | Mod: HCNC | Performed by: FAMILY MEDICINE

## 2021-11-15 PROCEDURE — 80061 LIPID PANEL: CPT | Mod: HCNC | Performed by: FAMILY MEDICINE

## 2021-11-15 PROCEDURE — 80053 COMPREHEN METABOLIC PANEL: CPT | Mod: HCNC | Performed by: FAMILY MEDICINE

## 2021-11-15 PROCEDURE — 84153 ASSAY OF PSA TOTAL: CPT | Mod: HCNC | Performed by: FAMILY MEDICINE

## 2021-11-16 RX ORDER — HEPARIN SODIUM 200 [USP'U]/100ML
1000 INJECTION, SOLUTION INTRAVENOUS CONTINUOUS
Status: CANCELLED | OUTPATIENT
Start: 2021-11-16

## 2021-11-17 ENCOUNTER — TELEPHONE (OUTPATIENT)
Dept: ENDOSCOPY | Facility: HOSPITAL | Age: 71
End: 2021-11-17
Payer: MEDICARE

## 2021-11-18 ENCOUNTER — OFFICE VISIT (OUTPATIENT)
Dept: INTERNAL MEDICINE | Facility: CLINIC | Age: 71
End: 2021-11-18
Attending: FAMILY MEDICINE
Payer: MEDICARE

## 2021-11-18 ENCOUNTER — LAB VISIT (OUTPATIENT)
Dept: LAB | Facility: HOSPITAL | Age: 71
End: 2021-11-18
Attending: FAMILY MEDICINE
Payer: MEDICARE

## 2021-11-18 VITALS
HEART RATE: 63 BPM | BODY MASS INDEX: 34.41 KG/M2 | DIASTOLIC BLOOD PRESSURE: 72 MMHG | SYSTOLIC BLOOD PRESSURE: 118 MMHG | HEIGHT: 68 IN | WEIGHT: 227.06 LBS | OXYGEN SATURATION: 98 %

## 2021-11-18 DIAGNOSIS — R79.9 ABNORMAL BLOOD CHEMISTRY: ICD-10-CM

## 2021-11-18 DIAGNOSIS — I25.10 CAD IN NATIVE ARTERY: ICD-10-CM

## 2021-11-18 DIAGNOSIS — R73.9 ELEVATED BLOOD SUGAR: ICD-10-CM

## 2021-11-18 DIAGNOSIS — E78.5 HYPERLIPIDEMIA, UNSPECIFIED HYPERLIPIDEMIA TYPE: ICD-10-CM

## 2021-11-18 DIAGNOSIS — K76.9 LIVER LESION: ICD-10-CM

## 2021-11-18 DIAGNOSIS — I10 HYPERTENSION, ESSENTIAL: ICD-10-CM

## 2021-11-18 DIAGNOSIS — R73.03 PREDIABETES: ICD-10-CM

## 2021-11-18 DIAGNOSIS — Z00.00 ANNUAL PHYSICAL EXAM: Primary | ICD-10-CM

## 2021-11-18 LAB
CA-I BLDV-SCNC: 1.33 MMOL/L (ref 1.06–1.42)
ESTIMATED AVG GLUCOSE: 126 MG/DL (ref 68–131)
HBA1C MFR BLD: 6 % (ref 4–5.6)

## 2021-11-18 PROCEDURE — 99499 RISK ADDL DX/OHS AUDIT: ICD-10-PCS | Mod: S$GLB,,, | Performed by: FAMILY MEDICINE

## 2021-11-18 PROCEDURE — 99499 UNLISTED E&M SERVICE: CPT | Mod: S$GLB,,, | Performed by: FAMILY MEDICINE

## 2021-11-18 PROCEDURE — 3288F FALL RISK ASSESSMENT DOCD: CPT | Mod: HCNC,CPTII,S$GLB, | Performed by: FAMILY MEDICINE

## 2021-11-18 PROCEDURE — 1101F PR PT FALLS ASSESS DOC 0-1 FALLS W/OUT INJ PAST YR: ICD-10-PCS | Mod: HCNC,CPTII,S$GLB, | Performed by: FAMILY MEDICINE

## 2021-11-18 PROCEDURE — 1159F PR MEDICATION LIST DOCUMENTED IN MEDICAL RECORD: ICD-10-PCS | Mod: HCNC,CPTII,S$GLB, | Performed by: FAMILY MEDICINE

## 2021-11-18 PROCEDURE — 1126F PR PAIN SEVERITY QUANTIFIED, NO PAIN PRESENT: ICD-10-PCS | Mod: HCNC,CPTII,S$GLB, | Performed by: FAMILY MEDICINE

## 2021-11-18 PROCEDURE — 1160F PR REVIEW ALL MEDS BY PRESCRIBER/CLIN PHARMACIST DOCUMENTED: ICD-10-PCS | Mod: HCNC,CPTII,S$GLB, | Performed by: FAMILY MEDICINE

## 2021-11-18 PROCEDURE — 3008F BODY MASS INDEX DOCD: CPT | Mod: HCNC,CPTII,S$GLB, | Performed by: FAMILY MEDICINE

## 2021-11-18 PROCEDURE — 99214 OFFICE O/P EST MOD 30 MIN: CPT | Mod: HCNC,S$GLB,, | Performed by: FAMILY MEDICINE

## 2021-11-18 PROCEDURE — 1159F MED LIST DOCD IN RCRD: CPT | Mod: HCNC,CPTII,S$GLB, | Performed by: FAMILY MEDICINE

## 2021-11-18 PROCEDURE — 99999 PR PBB SHADOW E&M-EST. PATIENT-LVL III: CPT | Mod: PBBFAC,HCNC,, | Performed by: FAMILY MEDICINE

## 2021-11-18 PROCEDURE — 3074F PR MOST RECENT SYSTOLIC BLOOD PRESSURE < 130 MM HG: ICD-10-PCS | Mod: HCNC,CPTII,S$GLB, | Performed by: FAMILY MEDICINE

## 2021-11-18 PROCEDURE — 83036 HEMOGLOBIN GLYCOSYLATED A1C: CPT | Mod: HCNC | Performed by: FAMILY MEDICINE

## 2021-11-18 PROCEDURE — 99999 PR PBB SHADOW E&M-EST. PATIENT-LVL III: ICD-10-PCS | Mod: PBBFAC,HCNC,, | Performed by: FAMILY MEDICINE

## 2021-11-18 PROCEDURE — 3078F PR MOST RECENT DIASTOLIC BLOOD PRESSURE < 80 MM HG: ICD-10-PCS | Mod: HCNC,CPTII,S$GLB, | Performed by: FAMILY MEDICINE

## 2021-11-18 PROCEDURE — 36415 COLL VENOUS BLD VENIPUNCTURE: CPT | Mod: HCNC | Performed by: FAMILY MEDICINE

## 2021-11-18 PROCEDURE — 1126F AMNT PAIN NOTED NONE PRSNT: CPT | Mod: HCNC,CPTII,S$GLB, | Performed by: FAMILY MEDICINE

## 2021-11-18 PROCEDURE — 1101F PT FALLS ASSESS-DOCD LE1/YR: CPT | Mod: HCNC,CPTII,S$GLB, | Performed by: FAMILY MEDICINE

## 2021-11-18 PROCEDURE — 1160F RVW MEDS BY RX/DR IN RCRD: CPT | Mod: HCNC,CPTII,S$GLB, | Performed by: FAMILY MEDICINE

## 2021-11-18 PROCEDURE — 3288F PR FALLS RISK ASSESSMENT DOCUMENTED: ICD-10-PCS | Mod: HCNC,CPTII,S$GLB, | Performed by: FAMILY MEDICINE

## 2021-11-18 PROCEDURE — 82330 ASSAY OF CALCIUM: CPT | Mod: HCNC | Performed by: FAMILY MEDICINE

## 2021-11-18 PROCEDURE — 99214 PR OFFICE/OUTPT VISIT, EST, LEVL IV, 30-39 MIN: ICD-10-PCS | Mod: HCNC,S$GLB,, | Performed by: FAMILY MEDICINE

## 2021-11-18 PROCEDURE — 3078F DIAST BP <80 MM HG: CPT | Mod: HCNC,CPTII,S$GLB, | Performed by: FAMILY MEDICINE

## 2021-11-18 PROCEDURE — 3008F PR BODY MASS INDEX (BMI) DOCUMENTED: ICD-10-PCS | Mod: HCNC,CPTII,S$GLB, | Performed by: FAMILY MEDICINE

## 2021-11-18 PROCEDURE — 3074F SYST BP LT 130 MM HG: CPT | Mod: HCNC,CPTII,S$GLB, | Performed by: FAMILY MEDICINE

## 2021-11-19 ENCOUNTER — DOCUMENTATION ONLY (OUTPATIENT)
Dept: PREADMISSION TESTING | Facility: HOSPITAL | Age: 71
End: 2021-11-19
Payer: MEDICARE

## 2021-11-19 ENCOUNTER — PATIENT MESSAGE (OUTPATIENT)
Dept: INTERNAL MEDICINE | Facility: CLINIC | Age: 71
End: 2021-11-19
Payer: MEDICARE

## 2021-11-19 ENCOUNTER — ANESTHESIA EVENT (OUTPATIENT)
Dept: INTERVENTIONAL RADIOLOGY/VASCULAR | Facility: HOSPITAL | Age: 71
End: 2021-11-19
Payer: MEDICARE

## 2021-11-19 ENCOUNTER — PATIENT MESSAGE (OUTPATIENT)
Dept: GASTROENTEROLOGY | Facility: CLINIC | Age: 71
End: 2021-11-19
Payer: MEDICARE

## 2021-11-21 ENCOUNTER — PATIENT MESSAGE (OUTPATIENT)
Dept: GASTROENTEROLOGY | Facility: CLINIC | Age: 71
End: 2021-11-21
Payer: MEDICARE

## 2021-11-22 ENCOUNTER — TELEPHONE (OUTPATIENT)
Dept: INTERNAL MEDICINE | Facility: CLINIC | Age: 71
End: 2021-11-22
Payer: MEDICARE

## 2021-11-22 ENCOUNTER — ANESTHESIA (OUTPATIENT)
Dept: INTERVENTIONAL RADIOLOGY/VASCULAR | Facility: HOSPITAL | Age: 71
End: 2021-11-22
Payer: MEDICARE

## 2021-11-22 ENCOUNTER — HOSPITAL ENCOUNTER (OUTPATIENT)
Dept: INTERVENTIONAL RADIOLOGY/VASCULAR | Facility: HOSPITAL | Age: 71
Discharge: HOME | End: 2021-11-22
Attending: FAMILY MEDICINE
Payer: MEDICARE

## 2021-11-22 ENCOUNTER — HOSPITAL ENCOUNTER (OUTPATIENT)
Dept: RADIOLOGY | Facility: HOSPITAL | Age: 71
Discharge: HOME | End: 2021-11-22
Attending: FAMILY MEDICINE
Payer: MEDICARE

## 2021-11-22 VITALS
HEART RATE: 59 BPM | DIASTOLIC BLOOD PRESSURE: 61 MMHG | SYSTOLIC BLOOD PRESSURE: 110 MMHG | TEMPERATURE: 98 F | RESPIRATION RATE: 18 BRPM | OXYGEN SATURATION: 95 %

## 2021-11-22 DIAGNOSIS — K76.9 LIVER LESION: ICD-10-CM

## 2021-11-22 LAB
ALBUMIN SERPL BCP-MCNC: 2.2 G/DL (ref 3.5–5.2)
ALBUMIN SERPL BCP-MCNC: 3.1 G/DL (ref 3.5–5.2)
ALP SERPL-CCNC: 191 U/L (ref 55–135)
ALP SERPL-CCNC: 281 U/L (ref 55–135)
ALT SERPL W/O P-5'-P-CCNC: 19 U/L (ref 10–44)
ALT SERPL W/O P-5'-P-CCNC: 27 U/L (ref 10–44)
ANION GAP SERPL CALC-SCNC: 5 MMOL/L (ref 8–16)
ANION GAP SERPL CALC-SCNC: 7 MMOL/L (ref 8–16)
AST SERPL-CCNC: 51 U/L (ref 10–40)
AST SERPL-CCNC: 72 U/L (ref 10–40)
BASOPHILS # BLD AUTO: 0.1 K/UL (ref 0–0.2)
BASOPHILS NFR BLD: 1 % (ref 0–1.9)
BILIRUB DIRECT SERPL-MCNC: 0.4 MG/DL (ref 0.1–0.3)
BILIRUB SERPL-MCNC: 0.7 MG/DL (ref 0.1–1)
BILIRUB SERPL-MCNC: 1.2 MG/DL (ref 0.1–1)
BUN SERPL-MCNC: 10 MG/DL (ref 8–23)
BUN SERPL-MCNC: 14 MG/DL (ref 8–23)
CALCIUM SERPL-MCNC: 7.2 MG/DL (ref 8.7–10.5)
CALCIUM SERPL-MCNC: 9.6 MG/DL (ref 8.7–10.5)
CHLORIDE SERPL-SCNC: 107 MMOL/L (ref 95–110)
CHLORIDE SERPL-SCNC: 119 MMOL/L (ref 95–110)
CO2 SERPL-SCNC: 19 MMOL/L (ref 23–29)
CO2 SERPL-SCNC: 27 MMOL/L (ref 23–29)
CREAT SERPL-MCNC: 0.6 MG/DL (ref 0.5–1.4)
CREAT SERPL-MCNC: 0.9 MG/DL (ref 0.5–1.4)
DIFFERENTIAL METHOD: ABNORMAL
EOSINOPHIL # BLD AUTO: 0.2 K/UL (ref 0–0.5)
EOSINOPHIL NFR BLD: 1.5 % (ref 0–8)
ERYTHROCYTE [DISTWIDTH] IN BLOOD BY AUTOMATED COUNT: 13.6 % (ref 11.5–14.5)
EST. GFR  (AFRICAN AMERICAN): >60 ML/MIN/1.73 M^2
EST. GFR  (AFRICAN AMERICAN): >60 ML/MIN/1.73 M^2
EST. GFR  (NON AFRICAN AMERICAN): >60 ML/MIN/1.73 M^2
EST. GFR  (NON AFRICAN AMERICAN): >60 ML/MIN/1.73 M^2
GLUCOSE SERPL-MCNC: 66 MG/DL (ref 70–110)
GLUCOSE SERPL-MCNC: 89 MG/DL (ref 70–110)
HCT VFR BLD AUTO: 37.1 % (ref 40–54)
HGB BLD-MCNC: 12 G/DL (ref 14–18)
IMM GRANULOCYTES # BLD AUTO: 0.03 K/UL (ref 0–0.04)
IMM GRANULOCYTES NFR BLD AUTO: 0.3 % (ref 0–0.5)
INR PPP: 1.2 (ref 0.8–1.2)
LYMPHOCYTES # BLD AUTO: 1.8 K/UL (ref 1–4.8)
LYMPHOCYTES NFR BLD: 18.7 % (ref 18–48)
MCH RBC QN AUTO: 31.7 PG (ref 27–31)
MCHC RBC AUTO-ENTMCNC: 32.3 G/DL (ref 32–36)
MCV RBC AUTO: 98 FL (ref 82–98)
MONOCYTES # BLD AUTO: 0.9 K/UL (ref 0.3–1)
MONOCYTES NFR BLD: 8.6 % (ref 4–15)
NEUTROPHILS # BLD AUTO: 6.9 K/UL (ref 1.8–7.7)
NEUTROPHILS NFR BLD: 69.9 % (ref 38–73)
NRBC BLD-RTO: 0 /100 WBC
PLATELET # BLD AUTO: 332 K/UL (ref 150–450)
PMV BLD AUTO: 11 FL (ref 9.2–12.9)
POCT GLUCOSE: 100 MG/DL (ref 70–110)
POTASSIUM SERPL-SCNC: 2.5 MMOL/L (ref 3.5–5.1)
POTASSIUM SERPL-SCNC: 4 MMOL/L (ref 3.5–5.1)
PROT SERPL-MCNC: 4.9 G/DL (ref 6–8.4)
PROT SERPL-MCNC: 6.4 G/DL (ref 6–8.4)
PROTHROMBIN TIME: 13.1 SEC (ref 9–12.5)
RBC # BLD AUTO: 3.78 M/UL (ref 4.6–6.2)
SODIUM SERPL-SCNC: 141 MMOL/L (ref 136–145)
SODIUM SERPL-SCNC: 143 MMOL/L (ref 136–145)
WBC # BLD AUTO: 9.84 K/UL (ref 3.9–12.7)

## 2021-11-22 PROCEDURE — 36245 PR INS CATH ABD/L-EXT ART 1ST ORDER: ICD-10-PCS | Mod: 59,HCNC,, | Performed by: RADIOLOGY

## 2021-11-22 PROCEDURE — 75726 ARTERY X-RAYS ABDOMEN: CPT | Mod: 26,59,HCNC, | Performed by: RADIOLOGY

## 2021-11-22 PROCEDURE — 36247 INS CATH ABD/L-EXT ART 3RD: CPT | Mod: 51,HCNC,LT, | Performed by: RADIOLOGY

## 2021-11-22 PROCEDURE — 25000003 PHARM REV CODE 250: Mod: HCNC | Performed by: FAMILY MEDICINE

## 2021-11-22 PROCEDURE — 75774 ARTERY X-RAY EACH VESSEL: CPT | Mod: TC,59,HCNC | Performed by: RADIOLOGY

## 2021-11-22 PROCEDURE — 88333 PR  INTRAOPERATIVE CYTO PATH CONSULT, INITIAL SITE: ICD-10-PCS | Mod: 26,HCNC,, | Performed by: PATHOLOGY

## 2021-11-22 PROCEDURE — 76937 US GUIDE VASCULAR ACCESS: CPT | Mod: 26,HCNC,, | Performed by: RADIOLOGY

## 2021-11-22 PROCEDURE — 37000008 HC ANESTHESIA 1ST 15 MINUTES: Mod: HCNC

## 2021-11-22 PROCEDURE — 47000 IR BIOPSY LIVER: ICD-10-PCS | Mod: HCNC,,, | Performed by: RADIOLOGY

## 2021-11-22 PROCEDURE — 27201068 IR EMBOLIZATION COMP FOR TUMOR_ORGAN ISCHEMIA_INFARC: Mod: HCNC

## 2021-11-22 PROCEDURE — 88341 IMHCHEM/IMCYTCHM EA ADD ANTB: CPT | Mod: 59,HCNC | Performed by: PATHOLOGY

## 2021-11-22 PROCEDURE — G0269 OCCLUSIVE DEVICE IN VEIN ART: HCPCS | Mod: HCNC | Performed by: RADIOLOGY

## 2021-11-22 PROCEDURE — 88342 IMHCHEM/IMCYTCHM 1ST ANTB: CPT | Mod: HCNC | Performed by: PATHOLOGY

## 2021-11-22 PROCEDURE — 36245 INS CATH ABD/L-EXT ART 1ST: CPT | Mod: 59,HCNC,, | Performed by: RADIOLOGY

## 2021-11-22 PROCEDURE — 76942 ECHO GUIDE FOR BIOPSY: CPT | Mod: TC,HCNC | Performed by: RADIOLOGY

## 2021-11-22 PROCEDURE — 76377 3D RENDER W/INTRP POSTPROCES: CPT | Mod: 26,59,HCNC, | Performed by: RADIOLOGY

## 2021-11-22 PROCEDURE — D9220A PRA ANESTHESIA: ICD-10-PCS | Mod: HCNC,CRNA,, | Performed by: NURSE ANESTHETIST, CERTIFIED REGISTERED

## 2021-11-22 PROCEDURE — 88342 IMHCHEM/IMCYTCHM 1ST ANTB: CPT | Mod: 26,HCNC,, | Performed by: PATHOLOGY

## 2021-11-22 PROCEDURE — 25000003 PHARM REV CODE 250: Mod: HCNC | Performed by: NURSE ANESTHETIST, CERTIFIED REGISTERED

## 2021-11-22 PROCEDURE — 77290 THER RAD SIMULAJ FIELD CPLX: CPT | Mod: TC,HCNC | Performed by: RADIOLOGY

## 2021-11-22 PROCEDURE — 78201 LIVER IMAGING STATIC ONLY: CPT | Mod: 26,59,HCNC, | Performed by: RADIOLOGY

## 2021-11-22 PROCEDURE — 88333 PATH CONSLTJ SURG CYTO XM 1: CPT | Mod: HCNC | Performed by: PATHOLOGY

## 2021-11-22 PROCEDURE — 88341 PR IHC OR ICC EACH ADD'L SINGLE ANTIBODY  STAINPR: ICD-10-PCS | Mod: 26,HCNC,, | Performed by: PATHOLOGY

## 2021-11-22 PROCEDURE — 80053 COMPREHEN METABOLIC PANEL: CPT | Mod: 91,HCNC | Performed by: ANESTHESIOLOGY

## 2021-11-22 PROCEDURE — 47000 NEEDLE BIOPSY OF LIVER PERQ: CPT | Mod: HCNC,,, | Performed by: RADIOLOGY

## 2021-11-22 PROCEDURE — 37000009 HC ANESTHESIA EA ADD 15 MINS: Mod: HCNC

## 2021-11-22 PROCEDURE — 75774 PR  ANGIO EA ADDNL SELECTV VESSEL: ICD-10-PCS | Mod: 26,59,HCNC, | Performed by: RADIOLOGY

## 2021-11-22 PROCEDURE — 82962 GLUCOSE BLOOD TEST: CPT | Mod: HCNC

## 2021-11-22 PROCEDURE — 85610 PROTHROMBIN TIME: CPT | Mod: HCNC | Performed by: FAMILY MEDICINE

## 2021-11-22 PROCEDURE — 82248 BILIRUBIN DIRECT: CPT | Mod: HCNC | Performed by: ANESTHESIOLOGY

## 2021-11-22 PROCEDURE — 36247 INS CATH ABD/L-EXT ART 3RD: CPT | Mod: HCNC,LT | Performed by: RADIOLOGY

## 2021-11-22 PROCEDURE — 88333 PATH CONSLTJ SURG CYTO XM 1: CPT | Mod: 26,HCNC,, | Performed by: PATHOLOGY

## 2021-11-22 PROCEDURE — 88307 PR  SURG PATH,LEVEL V: ICD-10-PCS | Mod: 26,HCNC,, | Performed by: PATHOLOGY

## 2021-11-22 PROCEDURE — 78830 NM SPECT/CT SINGLE AREA: ICD-10-PCS | Mod: 26,HCNC,, | Performed by: RADIOLOGY

## 2021-11-22 PROCEDURE — 76942 IR BIOPSY LIVER: ICD-10-PCS | Mod: 26,59,HCNC, | Performed by: RADIOLOGY

## 2021-11-22 PROCEDURE — D9220A PRA ANESTHESIA: Mod: HCNC,ANES,, | Performed by: ANESTHESIOLOGY

## 2021-11-22 PROCEDURE — 85025 COMPLETE CBC W/AUTO DIFF WBC: CPT | Mod: HCNC | Performed by: FAMILY MEDICINE

## 2021-11-22 PROCEDURE — 36247 PR PLACE CATH SUBSUBSELECT ART,ABD/PEL: ICD-10-PCS | Mod: 51,HCNC,LT, | Performed by: RADIOLOGY

## 2021-11-22 PROCEDURE — 88342 CHG IMMUNOCYTOCHEMISTRY: ICD-10-PCS | Mod: 26,HCNC,, | Performed by: PATHOLOGY

## 2021-11-22 PROCEDURE — 88341 IMHCHEM/IMCYTCHM EA ADD ANTB: CPT | Mod: 26,HCNC,, | Performed by: PATHOLOGY

## 2021-11-22 PROCEDURE — 78830 RP LOCLZJ TUM SPECT W/CT 1: CPT | Mod: TC,HCNC

## 2021-11-22 PROCEDURE — 78201 NM LIVER IMAGING STATIC PRE Y-90 EMBOLIZATION: ICD-10-PCS | Mod: 26,59,HCNC, | Performed by: RADIOLOGY

## 2021-11-22 PROCEDURE — D9220A PRA ANESTHESIA: Mod: HCNC,CRNA,, | Performed by: NURSE ANESTHETIST, CERTIFIED REGISTERED

## 2021-11-22 PROCEDURE — 94761 N-INVAS EAR/PLS OXIMETRY MLT: CPT | Mod: HCNC

## 2021-11-22 PROCEDURE — 76377 3D RENDER W/INTRP POSTPROCES: CPT | Mod: TC,59,HCNC | Performed by: RADIOLOGY

## 2021-11-22 PROCEDURE — 75726 CHG ANGIO VISCERAL SELECTV/SUBSELEC: ICD-10-PCS | Mod: 26,59,HCNC, | Performed by: RADIOLOGY

## 2021-11-22 PROCEDURE — 63600175 PHARM REV CODE 636 W HCPCS: Mod: HCNC | Performed by: NURSE ANESTHETIST, CERTIFIED REGISTERED

## 2021-11-22 PROCEDURE — 88307 TISSUE EXAM BY PATHOLOGIST: CPT | Mod: HCNC | Performed by: PATHOLOGY

## 2021-11-22 PROCEDURE — 88307 TISSUE EXAM BY PATHOLOGIST: CPT | Mod: 26,HCNC,, | Performed by: PATHOLOGY

## 2021-11-22 PROCEDURE — 78201 LIVER IMAGING STATIC ONLY: CPT | Mod: TC,HCNC

## 2021-11-22 PROCEDURE — D9220A PRA ANESTHESIA: ICD-10-PCS | Mod: HCNC,ANES,, | Performed by: ANESTHESIOLOGY

## 2021-11-22 PROCEDURE — 77290 THER RAD SIMULAJ FIELD CPLX: CPT | Mod: 26,HCNC,, | Performed by: RADIOLOGY

## 2021-11-22 PROCEDURE — 76937 PR  US GUIDE, VASCULAR ACCESS: ICD-10-PCS | Mod: 26,HCNC,, | Performed by: RADIOLOGY

## 2021-11-22 PROCEDURE — 77290 PR  SET RADN THERAPY FIELD COMPLEX: ICD-10-PCS | Mod: 26,HCNC,, | Performed by: RADIOLOGY

## 2021-11-22 PROCEDURE — 76380 CAT SCAN FOLLOW-UP STUDY: CPT | Mod: 26,HCNC,, | Performed by: RADIOLOGY

## 2021-11-22 PROCEDURE — 25500020 PHARM REV CODE 255: Mod: HCNC | Performed by: ANESTHESIOLOGY

## 2021-11-22 PROCEDURE — 76380 PR  CT SCAN,LIMITED/LOCALIZED F/U STUDY: ICD-10-PCS | Mod: 26,HCNC,, | Performed by: RADIOLOGY

## 2021-11-22 PROCEDURE — 76937 US GUIDE VASCULAR ACCESS: CPT | Mod: TC,HCNC | Performed by: RADIOLOGY

## 2021-11-22 PROCEDURE — 76377 PR  3D RENDERING W/ IMAGE POSTPROCESS: ICD-10-PCS | Mod: 26,59,HCNC, | Performed by: RADIOLOGY

## 2021-11-22 PROCEDURE — 75726 ARTERY X-RAYS ABDOMEN: CPT | Mod: TC,59,HCNC | Performed by: RADIOLOGY

## 2021-11-22 PROCEDURE — 78830 RP LOCLZJ TUM SPECT W/CT 1: CPT | Mod: 26,HCNC,, | Performed by: RADIOLOGY

## 2021-11-22 PROCEDURE — 75774 ARTERY X-RAY EACH VESSEL: CPT | Mod: 26,59,HCNC, | Performed by: RADIOLOGY

## 2021-11-22 PROCEDURE — C1894 INTRO/SHEATH, NON-LASER: HCPCS | Mod: HCNC

## 2021-11-22 PROCEDURE — 76380 CAT SCAN FOLLOW-UP STUDY: CPT | Mod: TC,HCNC | Performed by: RADIOLOGY

## 2021-11-22 PROCEDURE — 36245 INS CATH ABD/L-EXT ART 1ST: CPT | Mod: 59,HCNC,RT | Performed by: RADIOLOGY

## 2021-11-22 RX ORDER — FENTANYL CITRATE 50 UG/ML
INJECTION, SOLUTION INTRAMUSCULAR; INTRAVENOUS
Status: DISCONTINUED | OUTPATIENT
Start: 2021-11-22 | End: 2021-11-22

## 2021-11-22 RX ORDER — ONDANSETRON 2 MG/ML
4 INJECTION INTRAMUSCULAR; INTRAVENOUS EVERY 6 HOURS PRN
Status: DISCONTINUED | OUTPATIENT
Start: 2021-11-22 | End: 2021-11-23 | Stop reason: HOSPADM

## 2021-11-22 RX ORDER — ACETAMINOPHEN 325 MG/1
650 TABLET ORAL EVERY 6 HOURS PRN
Status: DISCONTINUED | OUTPATIENT
Start: 2021-11-22 | End: 2021-11-23 | Stop reason: HOSPADM

## 2021-11-22 RX ORDER — ROCURONIUM BROMIDE 10 MG/ML
INJECTION, SOLUTION INTRAVENOUS
Status: DISCONTINUED | OUTPATIENT
Start: 2021-11-22 | End: 2021-11-22

## 2021-11-22 RX ORDER — LIDOCAINE HYDROCHLORIDE 10 MG/ML
1 INJECTION, SOLUTION EPIDURAL; INFILTRATION; INTRACAUDAL; PERINEURAL ONCE
Status: DISCONTINUED | OUTPATIENT
Start: 2021-11-22 | End: 2021-11-23 | Stop reason: HOSPADM

## 2021-11-22 RX ORDER — MIDAZOLAM HYDROCHLORIDE 1 MG/ML
INJECTION INTRAMUSCULAR; INTRAVENOUS
Status: DISCONTINUED | OUTPATIENT
Start: 2021-11-22 | End: 2021-11-22

## 2021-11-22 RX ORDER — SODIUM CHLORIDE 9 MG/ML
INJECTION, SOLUTION INTRAVENOUS CONTINUOUS
Status: DISCONTINUED | OUTPATIENT
Start: 2021-11-22 | End: 2021-11-23 | Stop reason: HOSPADM

## 2021-11-22 RX ORDER — LIDOCAINE HYDROCHLORIDE 20 MG/ML
INJECTION, SOLUTION EPIDURAL; INFILTRATION; INTRACAUDAL; PERINEURAL
Status: DISCONTINUED | OUTPATIENT
Start: 2021-11-22 | End: 2021-11-22

## 2021-11-22 RX ORDER — DEXAMETHASONE SODIUM PHOSPHATE 4 MG/ML
INJECTION, SOLUTION INTRA-ARTICULAR; INTRALESIONAL; INTRAMUSCULAR; INTRAVENOUS; SOFT TISSUE
Status: DISCONTINUED | OUTPATIENT
Start: 2021-11-22 | End: 2021-11-22

## 2021-11-22 RX ORDER — ONDANSETRON 2 MG/ML
INJECTION INTRAMUSCULAR; INTRAVENOUS
Status: DISCONTINUED | OUTPATIENT
Start: 2021-11-22 | End: 2021-11-22

## 2021-11-22 RX ORDER — PROPOFOL 10 MG/ML
VIAL (ML) INTRAVENOUS
Status: DISCONTINUED | OUTPATIENT
Start: 2021-11-22 | End: 2021-11-22

## 2021-11-22 RX ORDER — SUCCINYLCHOLINE CHLORIDE 20 MG/ML
INJECTION INTRAMUSCULAR; INTRAVENOUS
Status: DISCONTINUED | OUTPATIENT
Start: 2021-11-22 | End: 2021-11-22

## 2021-11-22 RX ORDER — EPHEDRINE SULFATE 50 MG/ML
INJECTION, SOLUTION INTRAVENOUS
Status: DISCONTINUED | OUTPATIENT
Start: 2021-11-22 | End: 2021-11-22

## 2021-11-22 RX ORDER — NEOSTIGMINE METHYLSULFATE 0.5 MG/ML
INJECTION, SOLUTION INTRAVENOUS
Status: DISCONTINUED | OUTPATIENT
Start: 2021-11-22 | End: 2021-11-22

## 2021-11-22 RX ORDER — PHENYLEPHRINE HCL IN 0.9% NACL 1 MG/10 ML
SYRINGE (ML) INTRAVENOUS
Status: DISCONTINUED | OUTPATIENT
Start: 2021-11-22 | End: 2021-11-22

## 2021-11-22 RX ADMIN — IOHEXOL 120 ML: 300 INJECTION, SOLUTION INTRAVENOUS at 10:11

## 2021-11-22 RX ADMIN — FENTANYL CITRATE 50 MCG: 50 INJECTION, SOLUTION INTRAMUSCULAR; INTRAVENOUS at 10:11

## 2021-11-22 RX ADMIN — Medication 100 MCG: at 09:11

## 2021-11-22 RX ADMIN — EPHEDRINE SULFATE 5 MG: 50 INJECTION INTRAVENOUS at 09:11

## 2021-11-22 RX ADMIN — NEOSTIGMINE METHYLSULFATE 3 MG: 0.5 INJECTION INTRAVENOUS at 11:11

## 2021-11-22 RX ADMIN — EPHEDRINE SULFATE 5 MG: 50 INJECTION INTRAVENOUS at 10:11

## 2021-11-22 RX ADMIN — SUCCINYLCHOLINE CHLORIDE 140 MG: 20 INJECTION, SOLUTION INTRAMUSCULAR; INTRAVENOUS at 09:11

## 2021-11-22 RX ADMIN — MIDAZOLAM 1 MG: 1 INJECTION INTRAMUSCULAR; INTRAVENOUS at 08:11

## 2021-11-22 RX ADMIN — LIDOCAINE HYDROCHLORIDE 100 MG: 20 INJECTION, SOLUTION EPIDURAL; INFILTRATION; INTRACAUDAL at 09:11

## 2021-11-22 RX ADMIN — ROCURONIUM BROMIDE 5 MG: 10 INJECTION, SOLUTION INTRAVENOUS at 09:11

## 2021-11-22 RX ADMIN — SODIUM CHLORIDE: 0.9 INJECTION, SOLUTION INTRAVENOUS at 08:11

## 2021-11-22 RX ADMIN — ROCURONIUM BROMIDE 45 MG: 10 INJECTION, SOLUTION INTRAVENOUS at 09:11

## 2021-11-22 RX ADMIN — DEXAMETHASONE SODIUM PHOSPHATE 4 MG: 4 INJECTION INTRA-ARTICULAR; INTRALESIONAL; INTRAMUSCULAR; INTRAVENOUS; SOFT TISSUE at 09:11

## 2021-11-22 RX ADMIN — GLYCOPYRROLATE 0.6 MG: 0.2 INJECTION, SOLUTION INTRAMUSCULAR; INTRAVITREAL at 11:11

## 2021-11-22 RX ADMIN — PROPOFOL 20 MG: 10 INJECTION, EMULSION INTRAVENOUS at 10:11

## 2021-11-22 RX ADMIN — ONDANSETRON 4 MG: 2 INJECTION INTRAMUSCULAR; INTRAVENOUS at 10:11

## 2021-11-22 RX ADMIN — Medication 50 MCG: at 09:11

## 2021-11-22 RX ADMIN — EPHEDRINE SULFATE 5 MG: 50 INJECTION INTRAVENOUS at 11:11

## 2021-11-22 RX ADMIN — PROPOFOL 80 MG: 10 INJECTION, EMULSION INTRAVENOUS at 09:11

## 2021-11-22 RX ADMIN — PROPOFOL 30 MG: 10 INJECTION, EMULSION INTRAVENOUS at 10:11

## 2021-11-23 ENCOUNTER — PATIENT MESSAGE (OUTPATIENT)
Dept: INTERNAL MEDICINE | Facility: CLINIC | Age: 71
End: 2021-11-23
Payer: MEDICARE

## 2021-11-23 ENCOUNTER — PATIENT MESSAGE (OUTPATIENT)
Dept: HEMATOLOGY/ONCOLOGY | Facility: CLINIC | Age: 71
End: 2021-11-23
Payer: MEDICARE

## 2021-11-29 ENCOUNTER — OFFICE VISIT (OUTPATIENT)
Dept: HEMATOLOGY/ONCOLOGY | Facility: CLINIC | Age: 71
End: 2021-11-29
Payer: MEDICARE

## 2021-11-29 ENCOUNTER — TELEPHONE (OUTPATIENT)
Dept: SURGERY | Facility: CLINIC | Age: 71
End: 2021-11-29
Payer: MEDICARE

## 2021-11-29 ENCOUNTER — LAB VISIT (OUTPATIENT)
Dept: LAB | Facility: HOSPITAL | Age: 71
End: 2021-11-29
Attending: INTERNAL MEDICINE
Payer: MEDICARE

## 2021-11-29 VITALS
WEIGHT: 234.19 LBS | OXYGEN SATURATION: 99 % | HEART RATE: 54 BPM | BODY MASS INDEX: 35.49 KG/M2 | RESPIRATION RATE: 18 BRPM | SYSTOLIC BLOOD PRESSURE: 134 MMHG | DIASTOLIC BLOOD PRESSURE: 73 MMHG | HEIGHT: 68 IN

## 2021-11-29 DIAGNOSIS — R63.4 WEIGHT LOSS: ICD-10-CM

## 2021-11-29 DIAGNOSIS — C22.1 INTRAHEPATIC CHOLANGIOCARCINOMA: Primary | ICD-10-CM

## 2021-11-29 DIAGNOSIS — R19.7 DIARRHEA, UNSPECIFIED TYPE: ICD-10-CM

## 2021-11-29 DIAGNOSIS — D84.81 IMMUNODEFICIENCY SECONDARY TO NEOPLASM: ICD-10-CM

## 2021-11-29 DIAGNOSIS — D49.9 IMMUNODEFICIENCY SECONDARY TO NEOPLASM: ICD-10-CM

## 2021-11-29 DIAGNOSIS — C22.1 INTRAHEPATIC CHOLANGIOCARCINOMA: ICD-10-CM

## 2021-11-29 LAB
ALBUMIN SERPL BCP-MCNC: 3.5 G/DL (ref 3.5–5.2)
ALP SERPL-CCNC: 409 U/L (ref 55–135)
ALT SERPL W/O P-5'-P-CCNC: 55 U/L (ref 10–44)
ANION GAP SERPL CALC-SCNC: 9 MMOL/L (ref 8–16)
AST SERPL-CCNC: 99 U/L (ref 10–40)
BASOPHILS # BLD AUTO: 0.08 K/UL (ref 0–0.2)
BASOPHILS NFR BLD: 0.7 % (ref 0–1.9)
BILIRUB SERPL-MCNC: 1.4 MG/DL (ref 0.1–1)
BUN SERPL-MCNC: 13 MG/DL (ref 8–23)
CALCIUM SERPL-MCNC: 10.1 MG/DL (ref 8.7–10.5)
CANCER AG19-9 SERPL-ACNC: 563.3 U/ML (ref 0–40)
CHLORIDE SERPL-SCNC: 107 MMOL/L (ref 95–110)
CO2 SERPL-SCNC: 25 MMOL/L (ref 23–29)
CREAT SERPL-MCNC: 0.8 MG/DL (ref 0.5–1.4)
DIFFERENTIAL METHOD: ABNORMAL
EOSINOPHIL # BLD AUTO: 0.2 K/UL (ref 0–0.5)
EOSINOPHIL NFR BLD: 1.4 % (ref 0–8)
ERYTHROCYTE [DISTWIDTH] IN BLOOD BY AUTOMATED COUNT: 14.5 % (ref 11.5–14.5)
EST. GFR  (AFRICAN AMERICAN): >60 ML/MIN/1.73 M^2
EST. GFR  (NON AFRICAN AMERICAN): >60 ML/MIN/1.73 M^2
GLUCOSE SERPL-MCNC: 87 MG/DL (ref 70–110)
HCT VFR BLD AUTO: 38.6 % (ref 40–54)
HGB BLD-MCNC: 12.3 G/DL (ref 14–18)
IMM GRANULOCYTES # BLD AUTO: 0.03 K/UL (ref 0–0.04)
IMM GRANULOCYTES NFR BLD AUTO: 0.3 % (ref 0–0.5)
LYMPHOCYTES # BLD AUTO: 1.9 K/UL (ref 1–4.8)
LYMPHOCYTES NFR BLD: 17.5 % (ref 18–48)
MCH RBC QN AUTO: 31.9 PG (ref 27–31)
MCHC RBC AUTO-ENTMCNC: 31.9 G/DL (ref 32–36)
MCV RBC AUTO: 100 FL (ref 82–98)
MONOCYTES # BLD AUTO: 0.9 K/UL (ref 0.3–1)
MONOCYTES NFR BLD: 8.2 % (ref 4–15)
NEUTROPHILS # BLD AUTO: 7.7 K/UL (ref 1.8–7.7)
NEUTROPHILS NFR BLD: 71.9 % (ref 38–73)
NRBC BLD-RTO: 0 /100 WBC
PLATELET # BLD AUTO: 291 K/UL (ref 150–450)
PMV BLD AUTO: 10.6 FL (ref 9.2–12.9)
POTASSIUM SERPL-SCNC: 4.1 MMOL/L (ref 3.5–5.1)
PROT SERPL-MCNC: 7 G/DL (ref 6–8.4)
RBC # BLD AUTO: 3.85 M/UL (ref 4.6–6.2)
SODIUM SERPL-SCNC: 141 MMOL/L (ref 136–145)
WBC # BLD AUTO: 10.72 K/UL (ref 3.9–12.7)

## 2021-11-29 PROCEDURE — 99205 OFFICE O/P NEW HI 60 MIN: CPT | Mod: HCNC,S$GLB,, | Performed by: INTERNAL MEDICINE

## 2021-11-29 PROCEDURE — 99999 PR PBB SHADOW E&M-EST. PATIENT-LVL V: CPT | Mod: PBBFAC,HCNC,, | Performed by: INTERNAL MEDICINE

## 2021-11-29 PROCEDURE — 86301 IMMUNOASSAY TUMOR CA 19-9: CPT | Mod: HCNC | Performed by: INTERNAL MEDICINE

## 2021-11-29 PROCEDURE — 99205 PR OFFICE/OUTPT VISIT, NEW, LEVL V, 60-74 MIN: ICD-10-PCS | Mod: HCNC,S$GLB,, | Performed by: INTERNAL MEDICINE

## 2021-11-29 PROCEDURE — 80053 COMPREHEN METABOLIC PANEL: CPT | Mod: HCNC | Performed by: INTERNAL MEDICINE

## 2021-11-29 PROCEDURE — 99499 UNLISTED E&M SERVICE: CPT | Mod: S$GLB,,, | Performed by: INTERNAL MEDICINE

## 2021-11-29 PROCEDURE — 85025 COMPLETE CBC W/AUTO DIFF WBC: CPT | Mod: HCNC | Performed by: INTERNAL MEDICINE

## 2021-11-29 PROCEDURE — 99999 PR PBB SHADOW E&M-EST. PATIENT-LVL V: ICD-10-PCS | Mod: PBBFAC,HCNC,, | Performed by: INTERNAL MEDICINE

## 2021-11-29 PROCEDURE — 99499 RISK ADDL DX/OHS AUDIT: ICD-10-PCS | Mod: S$GLB,,, | Performed by: INTERNAL MEDICINE

## 2021-11-29 RX ORDER — DEXAMETHASONE 4 MG/1
8 TABLET ORAL DAILY
Qty: 120 TABLET | Refills: 0 | Status: SHIPPED | OUTPATIENT
Start: 2021-11-29 | End: 2022-03-04

## 2021-11-29 RX ORDER — DIPHENOXYLATE HYDROCHLORIDE AND ATROPINE SULFATE 2.5; .025 MG/1; MG/1
1 TABLET ORAL 4 TIMES DAILY PRN
Qty: 120 TABLET | Refills: 1 | Status: SHIPPED | OUTPATIENT
Start: 2021-11-29 | End: 2021-11-29

## 2021-11-29 RX ORDER — PROMETHAZINE HYDROCHLORIDE 12.5 MG/1
12.5 TABLET ORAL EVERY 6 HOURS PRN
Qty: 60 TABLET | Refills: 2 | Status: SHIPPED | OUTPATIENT
Start: 2021-11-29 | End: 2022-01-01

## 2021-11-29 RX ORDER — DIPHENOXYLATE HYDROCHLORIDE AND ATROPINE SULFATE 2.5; .025 MG/1; MG/1
1 TABLET ORAL 4 TIMES DAILY PRN
Qty: 120 TABLET | Refills: 1 | Status: SHIPPED | OUTPATIENT
Start: 2021-11-29 | End: 2022-03-04

## 2021-11-29 RX ORDER — PROCHLORPERAZINE MALEATE 10 MG
10 TABLET ORAL EVERY 6 HOURS PRN
Qty: 60 TABLET | Refills: 1 | Status: SHIPPED | OUTPATIENT
Start: 2021-11-29 | End: 2022-01-01

## 2021-11-29 RX ORDER — LIDOCAINE AND PRILOCAINE 25; 25 MG/G; MG/G
CREAM TOPICAL ONCE
Qty: 30 G | Refills: 2 | Status: SHIPPED | OUTPATIENT
Start: 2021-11-29 | End: 2021-11-30

## 2021-11-30 ENCOUNTER — PATIENT MESSAGE (OUTPATIENT)
Dept: HEMATOLOGY/ONCOLOGY | Facility: CLINIC | Age: 71
End: 2021-11-30
Payer: MEDICARE

## 2021-11-30 ENCOUNTER — PATIENT MESSAGE (OUTPATIENT)
Dept: ADMINISTRATIVE | Facility: OTHER | Age: 71
End: 2021-11-30
Payer: MEDICARE

## 2021-11-30 ENCOUNTER — TELEPHONE (OUTPATIENT)
Dept: HEMATOLOGY/ONCOLOGY | Facility: CLINIC | Age: 71
End: 2021-11-30
Payer: MEDICARE

## 2021-12-01 ENCOUNTER — TELEPHONE (OUTPATIENT)
Dept: HEMATOLOGY/ONCOLOGY | Facility: CLINIC | Age: 71
End: 2021-12-01
Payer: MEDICARE

## 2021-12-01 ENCOUNTER — TELEPHONE (OUTPATIENT)
Dept: INTERNAL MEDICINE | Facility: CLINIC | Age: 71
End: 2021-12-01
Payer: MEDICARE

## 2021-12-01 ENCOUNTER — PATIENT MESSAGE (OUTPATIENT)
Dept: ADMINISTRATIVE | Facility: OTHER | Age: 71
End: 2021-12-01
Payer: MEDICARE

## 2021-12-02 ENCOUNTER — TELEPHONE (OUTPATIENT)
Dept: ENDOSCOPY | Facility: HOSPITAL | Age: 71
End: 2021-12-02
Payer: MEDICARE

## 2021-12-02 ENCOUNTER — PATIENT MESSAGE (OUTPATIENT)
Dept: ADMINISTRATIVE | Facility: OTHER | Age: 71
End: 2021-12-02
Payer: MEDICARE

## 2021-12-02 ENCOUNTER — PATIENT MESSAGE (OUTPATIENT)
Dept: ENDOSCOPY | Facility: HOSPITAL | Age: 71
End: 2021-12-02
Payer: MEDICARE

## 2021-12-02 ENCOUNTER — TELEPHONE (OUTPATIENT)
Dept: HEMATOLOGY/ONCOLOGY | Facility: CLINIC | Age: 71
End: 2021-12-02
Payer: MEDICARE

## 2021-12-02 ENCOUNTER — TELEPHONE (OUTPATIENT)
Dept: SURGERY | Facility: CLINIC | Age: 71
End: 2021-12-02
Payer: MEDICARE

## 2021-12-02 ENCOUNTER — PATIENT MESSAGE (OUTPATIENT)
Dept: SURGERY | Facility: CLINIC | Age: 71
End: 2021-12-02
Payer: MEDICARE

## 2021-12-02 ENCOUNTER — CLINICAL SUPPORT (OUTPATIENT)
Dept: HEMATOLOGY/ONCOLOGY | Facility: CLINIC | Age: 71
End: 2021-12-02
Payer: MEDICARE

## 2021-12-02 ENCOUNTER — ANESTHESIA EVENT (OUTPATIENT)
Dept: SURGERY | Facility: HOSPITAL | Age: 71
End: 2021-12-02
Payer: MEDICARE

## 2021-12-03 ENCOUNTER — HOSPITAL ENCOUNTER (OUTPATIENT)
Facility: HOSPITAL | Age: 71
Discharge: HOME | End: 2021-12-03
Attending: SURGERY | Admitting: SURGERY
Payer: MEDICARE

## 2021-12-03 ENCOUNTER — PATIENT MESSAGE (OUTPATIENT)
Dept: INTERNAL MEDICINE | Facility: CLINIC | Age: 71
End: 2021-12-03
Payer: MEDICARE

## 2021-12-03 ENCOUNTER — ANESTHESIA (OUTPATIENT)
Dept: SURGERY | Facility: HOSPITAL | Age: 71
End: 2021-12-03
Payer: MEDICARE

## 2021-12-03 ENCOUNTER — PATIENT MESSAGE (OUTPATIENT)
Dept: HEMATOLOGY/ONCOLOGY | Facility: CLINIC | Age: 71
End: 2021-12-03
Payer: MEDICARE

## 2021-12-03 VITALS
RESPIRATION RATE: 18 BRPM | BODY MASS INDEX: 33.91 KG/M2 | DIASTOLIC BLOOD PRESSURE: 73 MMHG | HEART RATE: 51 BPM | WEIGHT: 223 LBS | OXYGEN SATURATION: 99 % | SYSTOLIC BLOOD PRESSURE: 120 MMHG | TEMPERATURE: 97 F

## 2021-12-03 DIAGNOSIS — C22.1 CHOLANGIOCARCINOMA: ICD-10-CM

## 2021-12-03 DIAGNOSIS — I10 HYPERTENSION, ESSENTIAL: ICD-10-CM

## 2021-12-03 PROCEDURE — D9220A PRA ANESTHESIA: ICD-10-PCS | Mod: HCNC,ANES,, | Performed by: ANESTHESIOLOGY

## 2021-12-03 PROCEDURE — 36561 PR INSERT TUNNELED CV CATH WITH PORT: ICD-10-PCS | Mod: HCNC,RT,, | Performed by: SURGERY

## 2021-12-03 PROCEDURE — 36000706: Mod: HCNC | Performed by: SURGERY

## 2021-12-03 PROCEDURE — 77001 CHG FLUOROGUIDE CNTRL VEN ACCESS,PLACE,REPLACE,REMOVE: ICD-10-PCS | Mod: 26,HCNC,, | Performed by: SURGERY

## 2021-12-03 PROCEDURE — C1788 PORT, INDWELLING, IMP: HCPCS | Mod: HCNC | Performed by: SURGERY

## 2021-12-03 PROCEDURE — 71000015 HC POSTOP RECOV 1ST HR: Mod: HCNC | Performed by: SURGERY

## 2021-12-03 PROCEDURE — 25000003 PHARM REV CODE 250: Mod: HCNC | Performed by: SURGERY

## 2021-12-03 PROCEDURE — 71000016 HC POSTOP RECOV ADDL HR: Mod: HCNC | Performed by: SURGERY

## 2021-12-03 PROCEDURE — D9220A PRA ANESTHESIA: ICD-10-PCS | Mod: HCNC,CRNA,, | Performed by: STUDENT IN AN ORGANIZED HEALTH CARE EDUCATION/TRAINING PROGRAM

## 2021-12-03 PROCEDURE — 63600175 PHARM REV CODE 636 W HCPCS: Mod: HCNC | Performed by: SURGERY

## 2021-12-03 PROCEDURE — D9220A PRA ANESTHESIA: Mod: HCNC,CRNA,, | Performed by: STUDENT IN AN ORGANIZED HEALTH CARE EDUCATION/TRAINING PROGRAM

## 2021-12-03 PROCEDURE — 71000044 HC DOSC ROUTINE RECOVERY FIRST HOUR: Mod: HCNC | Performed by: SURGERY

## 2021-12-03 PROCEDURE — 36000707: Mod: HCNC | Performed by: SURGERY

## 2021-12-03 PROCEDURE — 36561 INSERT TUNNELED CV CATH: CPT | Mod: HCNC,RT,, | Performed by: SURGERY

## 2021-12-03 PROCEDURE — 37000009 HC ANESTHESIA EA ADD 15 MINS: Mod: HCNC | Performed by: SURGERY

## 2021-12-03 PROCEDURE — 25000003 PHARM REV CODE 250: Mod: HCNC | Performed by: STUDENT IN AN ORGANIZED HEALTH CARE EDUCATION/TRAINING PROGRAM

## 2021-12-03 PROCEDURE — 77001 FLUOROGUIDE FOR VEIN DEVICE: CPT | Mod: 26,HCNC,, | Performed by: SURGERY

## 2021-12-03 PROCEDURE — 37000008 HC ANESTHESIA 1ST 15 MINUTES: Mod: HCNC | Performed by: SURGERY

## 2021-12-03 PROCEDURE — 63600175 PHARM REV CODE 636 W HCPCS: Mod: HCNC | Performed by: STUDENT IN AN ORGANIZED HEALTH CARE EDUCATION/TRAINING PROGRAM

## 2021-12-03 PROCEDURE — D9220A PRA ANESTHESIA: Mod: HCNC,ANES,, | Performed by: ANESTHESIOLOGY

## 2021-12-03 DEVICE — KIT POWERPORT SINGLE 8FR: Type: IMPLANTABLE DEVICE | Site: CHEST  WALL | Status: FUNCTIONAL

## 2021-12-03 RX ORDER — SODIUM CHLORIDE 9 MG/ML
INJECTION, SOLUTION INTRAVENOUS CONTINUOUS
Status: DISCONTINUED | OUTPATIENT
Start: 2021-12-03 | End: 2021-12-03 | Stop reason: HOSPADM

## 2021-12-03 RX ORDER — OXYCODONE AND ACETAMINOPHEN 5; 325 MG/1; MG/1
1 TABLET ORAL EVERY 6 HOURS PRN
Qty: 6 TABLET | Refills: 0 | Status: SHIPPED | OUTPATIENT
Start: 2021-12-03 | End: 2022-03-04

## 2021-12-03 RX ORDER — ROCURONIUM BROMIDE 10 MG/ML
INJECTION, SOLUTION INTRAVENOUS
Status: DISCONTINUED | OUTPATIENT
Start: 2021-12-03 | End: 2021-12-03

## 2021-12-03 RX ORDER — CEFAZOLIN SODIUM 1 G/3ML
INJECTION, POWDER, FOR SOLUTION INTRAMUSCULAR; INTRAVENOUS
Status: DISCONTINUED | OUTPATIENT
Start: 2021-12-03 | End: 2021-12-03

## 2021-12-03 RX ORDER — HYDROCODONE BITARTRATE AND ACETAMINOPHEN 5; 325 MG/1; MG/1
1 TABLET ORAL EVERY 4 HOURS PRN
Status: DISCONTINUED | OUTPATIENT
Start: 2021-12-03 | End: 2021-12-03 | Stop reason: HOSPADM

## 2021-12-03 RX ORDER — PROPOFOL 10 MG/ML
VIAL (ML) INTRAVENOUS
Status: DISCONTINUED | OUTPATIENT
Start: 2021-12-03 | End: 2021-12-03

## 2021-12-03 RX ORDER — PHENYLEPHRINE HYDROCHLORIDE 10 MG/ML
INJECTION INTRAVENOUS
Status: DISCONTINUED | OUTPATIENT
Start: 2021-12-03 | End: 2021-12-03

## 2021-12-03 RX ORDER — LIDOCAINE HYDROCHLORIDE 10 MG/ML
INJECTION, SOLUTION EPIDURAL; INFILTRATION; INTRACAUDAL; PERINEURAL
Status: DISCONTINUED | OUTPATIENT
Start: 2021-12-03 | End: 2021-12-03 | Stop reason: HOSPADM

## 2021-12-03 RX ORDER — LIDOCAINE HYDROCHLORIDE 10 MG/ML
1 INJECTION, SOLUTION EPIDURAL; INFILTRATION; INTRACAUDAL; PERINEURAL ONCE
Status: DISCONTINUED | OUTPATIENT
Start: 2021-12-03 | End: 2021-12-03 | Stop reason: HOSPADM

## 2021-12-03 RX ORDER — POTASSIUM CHLORIDE 20 MEQ/1
40 TABLET, EXTENDED RELEASE ORAL DAILY
Qty: 180 TABLET | Refills: 3 | Status: SHIPPED | OUTPATIENT
Start: 2021-12-03 | End: 2023-01-01

## 2021-12-03 RX ORDER — ONDANSETRON 2 MG/ML
INJECTION INTRAMUSCULAR; INTRAVENOUS
Status: DISCONTINUED | OUTPATIENT
Start: 2021-12-03 | End: 2021-12-03

## 2021-12-03 RX ORDER — HYDROCHLOROTHIAZIDE 25 MG/1
25 TABLET ORAL DAILY
Qty: 90 TABLET | Refills: 3 | Status: SHIPPED | OUTPATIENT
Start: 2021-12-03 | End: 2021-12-10

## 2021-12-03 RX ORDER — FENTANYL CITRATE 50 UG/ML
INJECTION, SOLUTION INTRAMUSCULAR; INTRAVENOUS
Status: DISCONTINUED | OUTPATIENT
Start: 2021-12-03 | End: 2021-12-03

## 2021-12-03 RX ORDER — ONDANSETRON 2 MG/ML
4 INJECTION INTRAMUSCULAR; INTRAVENOUS DAILY PRN
Status: DISCONTINUED | OUTPATIENT
Start: 2021-12-03 | End: 2021-12-03 | Stop reason: HOSPADM

## 2021-12-03 RX ORDER — FENTANYL CITRATE 50 UG/ML
25 INJECTION, SOLUTION INTRAMUSCULAR; INTRAVENOUS EVERY 5 MIN PRN
Status: DISCONTINUED | OUTPATIENT
Start: 2021-12-03 | End: 2021-12-03 | Stop reason: HOSPADM

## 2021-12-03 RX ORDER — HEPARIN SODIUM (PORCINE) LOCK FLUSH IV SOLN 100 UNIT/ML 100 UNIT/ML
SOLUTION INTRAVENOUS
Status: DISCONTINUED | OUTPATIENT
Start: 2021-12-03 | End: 2021-12-03 | Stop reason: HOSPADM

## 2021-12-03 RX ORDER — MIDAZOLAM HYDROCHLORIDE 1 MG/ML
INJECTION, SOLUTION INTRAMUSCULAR; INTRAVENOUS
Status: DISCONTINUED | OUTPATIENT
Start: 2021-12-03 | End: 2021-12-03

## 2021-12-03 RX ORDER — PROCHLORPERAZINE EDISYLATE 5 MG/ML
5 INJECTION INTRAMUSCULAR; INTRAVENOUS EVERY 30 MIN PRN
Status: DISCONTINUED | OUTPATIENT
Start: 2021-12-03 | End: 2021-12-03 | Stop reason: HOSPADM

## 2021-12-03 RX ORDER — SUCCINYLCHOLINE CHLORIDE 20 MG/ML
INJECTION INTRAMUSCULAR; INTRAVENOUS
Status: DISCONTINUED | OUTPATIENT
Start: 2021-12-03 | End: 2021-12-03

## 2021-12-03 RX ORDER — LIDOCAINE HYDROCHLORIDE 20 MG/ML
INJECTION INTRAVENOUS
Status: DISCONTINUED | OUTPATIENT
Start: 2021-12-03 | End: 2021-12-03

## 2021-12-03 RX ADMIN — ROCURONIUM BROMIDE 10 MG: 10 INJECTION, SOLUTION INTRAVENOUS at 03:12

## 2021-12-03 RX ADMIN — CEFAZOLIN 2 G: 330 INJECTION, POWDER, FOR SOLUTION INTRAMUSCULAR; INTRAVENOUS at 03:12

## 2021-12-03 RX ADMIN — FENTANYL CITRATE 100 MCG: 50 INJECTION, SOLUTION INTRAMUSCULAR; INTRAVENOUS at 03:12

## 2021-12-03 RX ADMIN — ONDANSETRON 4 MG: 2 INJECTION INTRAMUSCULAR; INTRAVENOUS at 03:12

## 2021-12-03 RX ADMIN — SODIUM CHLORIDE: 0.9 INJECTION, SOLUTION INTRAVENOUS at 03:12

## 2021-12-03 RX ADMIN — SUCCINYLCHOLINE CHLORIDE 120 MG: 20 INJECTION, SOLUTION INTRAMUSCULAR; INTRAVENOUS at 03:12

## 2021-12-03 RX ADMIN — PROPOFOL 150 MG: 10 INJECTION, EMULSION INTRAVENOUS at 03:12

## 2021-12-03 RX ADMIN — MIDAZOLAM HYDROCHLORIDE 2 MG: 1 INJECTION, SOLUTION INTRAMUSCULAR; INTRAVENOUS at 03:12

## 2021-12-03 RX ADMIN — LIDOCAINE HYDROCHLORIDE 40 MG: 20 INJECTION, SOLUTION INTRAVENOUS at 03:12

## 2021-12-03 RX ADMIN — PHENYLEPHRINE HYDROCHLORIDE 100 MCG: 10 INJECTION INTRAVENOUS at 03:12

## 2021-12-04 ENCOUNTER — PATIENT MESSAGE (OUTPATIENT)
Dept: SURGERY | Facility: CLINIC | Age: 71
End: 2021-12-04
Payer: MEDICARE

## 2021-12-05 ENCOUNTER — PATIENT MESSAGE (OUTPATIENT)
Dept: HEMATOLOGY/ONCOLOGY | Facility: CLINIC | Age: 71
End: 2021-12-05
Payer: MEDICARE

## 2021-12-06 ENCOUNTER — HOSPITAL ENCOUNTER (OUTPATIENT)
Dept: RADIOLOGY | Facility: HOSPITAL | Age: 71
Discharge: HOME | End: 2021-12-06
Attending: INTERNAL MEDICINE
Payer: MEDICARE

## 2021-12-06 ENCOUNTER — TELEPHONE (OUTPATIENT)
Dept: SURGERY | Facility: CLINIC | Age: 71
End: 2021-12-06
Payer: MEDICARE

## 2021-12-06 DIAGNOSIS — C22.1 INTRAHEPATIC CHOLANGIOCARCINOMA: ICD-10-CM

## 2021-12-06 LAB
FINAL PATHOLOGIC DIAGNOSIS: NORMAL
GROSS: NORMAL
GUARDANT 360: NORMAL
Lab: NORMAL
MICROSCOPIC EXAM: NORMAL
POCT GLUCOSE: 86 MG/DL (ref 70–110)
SUPPLEMENTAL DIAGNOSIS: NORMAL

## 2021-12-06 PROCEDURE — A9698 NON-RAD CONTRAST MATERIALNOC: HCPCS | Mod: HCNC | Performed by: INTERNAL MEDICINE

## 2021-12-06 PROCEDURE — 78815 PET IMAGE W/CT SKULL-THIGH: CPT | Mod: TC,HCNC,PI

## 2021-12-06 PROCEDURE — 78815 PET IMAGE W/CT SKULL-THIGH: CPT | Mod: 26,PI,HCNC, | Performed by: RADIOLOGY

## 2021-12-06 PROCEDURE — 25500020 PHARM REV CODE 255: Mod: HCNC | Performed by: INTERNAL MEDICINE

## 2021-12-06 PROCEDURE — 78815 NM PET CT ROUTINE: ICD-10-PCS | Mod: 26,PI,HCNC, | Performed by: RADIOLOGY

## 2021-12-06 RX ADMIN — IOHEXOL 1000 ML: 9 SOLUTION ORAL at 08:12

## 2021-12-07 ENCOUNTER — INFUSION (OUTPATIENT)
Dept: INFUSION THERAPY | Facility: HOSPITAL | Age: 71
End: 2021-12-07
Payer: MEDICARE

## 2021-12-07 ENCOUNTER — CLINICAL SUPPORT (OUTPATIENT)
Dept: HEMATOLOGY/ONCOLOGY | Facility: CLINIC | Age: 71
End: 2021-12-07
Payer: MEDICARE

## 2021-12-07 ENCOUNTER — TELEPHONE (OUTPATIENT)
Dept: INFUSION THERAPY | Facility: HOSPITAL | Age: 71
End: 2021-12-07

## 2021-12-07 ENCOUNTER — OFFICE VISIT (OUTPATIENT)
Dept: HEMATOLOGY/ONCOLOGY | Facility: CLINIC | Age: 71
End: 2021-12-07
Payer: MEDICARE

## 2021-12-07 ENCOUNTER — PATIENT MESSAGE (OUTPATIENT)
Dept: HEMATOLOGY/ONCOLOGY | Facility: CLINIC | Age: 71
End: 2021-12-07

## 2021-12-07 VITALS
RESPIRATION RATE: 18 BRPM | HEART RATE: 68 BPM | BODY MASS INDEX: 34.79 KG/M2 | SYSTOLIC BLOOD PRESSURE: 132 MMHG | WEIGHT: 228.81 LBS | TEMPERATURE: 99 F | DIASTOLIC BLOOD PRESSURE: 66 MMHG

## 2021-12-07 VITALS
HEIGHT: 68 IN | TEMPERATURE: 98 F | SYSTOLIC BLOOD PRESSURE: 123 MMHG | BODY MASS INDEX: 34.69 KG/M2 | HEART RATE: 64 BPM | RESPIRATION RATE: 18 BRPM | OXYGEN SATURATION: 97 % | WEIGHT: 228.88 LBS | DIASTOLIC BLOOD PRESSURE: 69 MMHG

## 2021-12-07 DIAGNOSIS — I25.10 CAD IN NATIVE ARTERY: ICD-10-CM

## 2021-12-07 DIAGNOSIS — C22.1 INTRAHEPATIC CHOLANGIOCARCINOMA: Primary | ICD-10-CM

## 2021-12-07 DIAGNOSIS — D49.9 IMMUNODEFICIENCY SECONDARY TO NEOPLASM: ICD-10-CM

## 2021-12-07 DIAGNOSIS — R63.4 WEIGHT LOSS: ICD-10-CM

## 2021-12-07 DIAGNOSIS — I81 PORTAL VEIN THROMBOSIS: ICD-10-CM

## 2021-12-07 DIAGNOSIS — Z71.3 NUTRITIONAL COUNSELING: ICD-10-CM

## 2021-12-07 DIAGNOSIS — D84.81 IMMUNODEFICIENCY SECONDARY TO NEOPLASM: ICD-10-CM

## 2021-12-07 DIAGNOSIS — E80.6 HYPERBILIRUBINEMIA: ICD-10-CM

## 2021-12-07 DIAGNOSIS — C78.7 SECONDARY MALIGNANT NEOPLASM OF LIVER: ICD-10-CM

## 2021-12-07 DIAGNOSIS — I10 HYPERTENSION, ESSENTIAL: ICD-10-CM

## 2021-12-07 PROCEDURE — 99215 PR OFFICE/OUTPT VISIT, EST, LEVL V, 40-54 MIN: ICD-10-PCS | Mod: HCNC,S$GLB,, | Performed by: INTERNAL MEDICINE

## 2021-12-07 PROCEDURE — 97802 PR MED NUTR THER, 1ST, INDIV, EA 15 MIN: ICD-10-PCS | Mod: HCNC,S$GLB,, | Performed by: DIETITIAN, REGISTERED

## 2021-12-07 PROCEDURE — 96417 CHEMO IV INFUS EACH ADDL SEQ: CPT

## 2021-12-07 PROCEDURE — 99499 UNLISTED E&M SERVICE: CPT | Mod: S$GLB,,, | Performed by: INTERNAL MEDICINE

## 2021-12-07 PROCEDURE — 99999 PR PBB SHADOW E&M-EST. PATIENT-LVL I: ICD-10-PCS | Mod: PBBFAC,,, | Performed by: DIETITIAN, REGISTERED

## 2021-12-07 PROCEDURE — 96375 TX/PRO/DX INJ NEW DRUG ADDON: CPT

## 2021-12-07 PROCEDURE — 99999 PR PBB SHADOW E&M-EST. PATIENT-LVL IV: ICD-10-PCS | Mod: PBBFAC,,, | Performed by: INTERNAL MEDICINE

## 2021-12-07 PROCEDURE — 96413 CHEMO IV INFUSION 1 HR: CPT

## 2021-12-07 PROCEDURE — 25000003 PHARM REV CODE 250: Performed by: INTERNAL MEDICINE

## 2021-12-07 PROCEDURE — 99215 OFFICE O/P EST HI 40 MIN: CPT | Mod: HCNC,S$GLB,, | Performed by: INTERNAL MEDICINE

## 2021-12-07 PROCEDURE — 96366 THER/PROPH/DIAG IV INF ADDON: CPT

## 2021-12-07 PROCEDURE — 99499 RISK ADDL DX/OHS AUDIT: ICD-10-PCS | Mod: S$GLB,,, | Performed by: INTERNAL MEDICINE

## 2021-12-07 PROCEDURE — 96367 TX/PROPH/DG ADDL SEQ IV INF: CPT

## 2021-12-07 PROCEDURE — 63600175 PHARM REV CODE 636 W HCPCS: Mod: JG | Performed by: INTERNAL MEDICINE

## 2021-12-07 PROCEDURE — A4216 STERILE WATER/SALINE, 10 ML: HCPCS | Performed by: INTERNAL MEDICINE

## 2021-12-07 PROCEDURE — 97802 MEDICAL NUTRITION INDIV IN: CPT | Mod: HCNC,S$GLB,, | Performed by: DIETITIAN, REGISTERED

## 2021-12-07 PROCEDURE — 99999 PR PBB SHADOW E&M-EST. PATIENT-LVL I: CPT | Mod: PBBFAC,,, | Performed by: DIETITIAN, REGISTERED

## 2021-12-07 PROCEDURE — 96376 TX/PRO/DX INJ SAME DRUG ADON: CPT

## 2021-12-07 PROCEDURE — 96365 THER/PROPH/DIAG IV INF INIT: CPT

## 2021-12-07 PROCEDURE — 96361 HYDRATE IV INFUSION ADD-ON: CPT

## 2021-12-07 PROCEDURE — 99999 PR PBB SHADOW E&M-EST. PATIENT-LVL IV: CPT | Mod: PBBFAC,,, | Performed by: INTERNAL MEDICINE

## 2021-12-07 RX ORDER — HEPARIN 100 UNIT/ML
500 SYRINGE INTRAVENOUS
Status: DISCONTINUED | OUTPATIENT
Start: 2021-12-07 | End: 2021-12-07 | Stop reason: HOSPADM

## 2021-12-07 RX ORDER — HEPARIN 100 UNIT/ML
500 SYRINGE INTRAVENOUS
Status: CANCELLED | OUTPATIENT
Start: 2021-12-07

## 2021-12-07 RX ORDER — SODIUM CHLORIDE 0.9 % (FLUSH) 0.9 %
10 SYRINGE (ML) INJECTION
Status: DISCONTINUED | OUTPATIENT
Start: 2021-12-07 | End: 2021-12-07 | Stop reason: HOSPADM

## 2021-12-07 RX ORDER — SODIUM CHLORIDE 0.9 % (FLUSH) 0.9 %
10 SYRINGE (ML) INJECTION
Status: CANCELLED | OUTPATIENT
Start: 2021-12-07

## 2021-12-07 RX ADMIN — Medication 10 ML: at 02:12

## 2021-12-07 RX ADMIN — SODIUM CHLORIDE 500 ML: 0.9 INJECTION, SOLUTION INTRAVENOUS at 11:12

## 2021-12-07 RX ADMIN — APREPITANT 130 MG: 130 INJECTION, EMULSION INTRAVENOUS at 12:12

## 2021-12-07 RX ADMIN — HEPARIN 500 UNITS: 100 SYRINGE at 02:12

## 2021-12-07 RX ADMIN — MAGNESIUM SULFATE 500 ML/HR: 500 INJECTION, SOLUTION INTRAMUSCULAR; INTRAVENOUS at 09:12

## 2021-12-07 RX ADMIN — CISPLATIN 57 MG: 1 INJECTION INTRAVENOUS at 12:12

## 2021-12-07 RX ADMIN — GEMCITABINE HYDROCHLORIDE 1800 MG: 1 INJECTION, SOLUTION INTRAVENOUS at 12:12

## 2021-12-07 RX ADMIN — PALONOSETRON HYDROCHLORIDE 0.25 MG: 0.25 INJECTION, SOLUTION INTRAVENOUS at 11:12

## 2021-12-08 ENCOUNTER — PATIENT MESSAGE (OUTPATIENT)
Dept: GASTROENTEROLOGY | Facility: CLINIC | Age: 71
End: 2021-12-08
Payer: MEDICARE

## 2021-12-08 DIAGNOSIS — I10 HYPERTENSION, ESSENTIAL: ICD-10-CM

## 2021-12-09 DIAGNOSIS — K76.9 LIVER LESION: Primary | ICD-10-CM

## 2021-12-09 RX ORDER — FENTANYL CITRATE 50 UG/ML
50 INJECTION, SOLUTION INTRAMUSCULAR; INTRAVENOUS
Status: CANCELLED | OUTPATIENT
Start: 2021-12-09

## 2021-12-09 RX ORDER — MIDAZOLAM HYDROCHLORIDE 1 MG/ML
1 INJECTION INTRAMUSCULAR; INTRAVENOUS
Status: CANCELLED | OUTPATIENT
Start: 2021-12-09

## 2021-12-10 ENCOUNTER — PATIENT MESSAGE (OUTPATIENT)
Dept: HEMATOLOGY/ONCOLOGY | Facility: CLINIC | Age: 71
End: 2021-12-10
Payer: MEDICARE

## 2021-12-10 DIAGNOSIS — R60.0 BILATERAL LEG EDEMA: Primary | ICD-10-CM

## 2021-12-10 RX ORDER — HYDROCHLOROTHIAZIDE 25 MG/1
25 TABLET ORAL DAILY
Qty: 90 TABLET | Refills: 3 | Status: SHIPPED | OUTPATIENT
Start: 2021-12-10 | End: 2023-01-01

## 2021-12-13 ENCOUNTER — HOSPITAL ENCOUNTER (OUTPATIENT)
Dept: CARDIOLOGY | Facility: HOSPITAL | Age: 71
Discharge: HOME | End: 2021-12-13
Attending: INTERNAL MEDICINE
Payer: MEDICARE

## 2021-12-13 VITALS
WEIGHT: 228 LBS | HEART RATE: 67 BPM | HEIGHT: 68 IN | BODY MASS INDEX: 34.56 KG/M2 | DIASTOLIC BLOOD PRESSURE: 66 MMHG | SYSTOLIC BLOOD PRESSURE: 118 MMHG

## 2021-12-13 DIAGNOSIS — R60.0 BILATERAL LEG EDEMA: ICD-10-CM

## 2021-12-13 LAB
ASCENDING AORTA: 2.77 CM
AV INDEX (PROSTH): 0.85
AV MEAN GRADIENT: 6 MMHG
AV PEAK GRADIENT: 10 MMHG
AV VALVE AREA: 2.71 CM2
AV VELOCITY RATIO: 0.89
BSA FOR ECHO PROCEDURE: 2.23 M2
CV ECHO LV RWT: 0.31 CM
DOP CALC AO PEAK VEL: 1.6 M/S
DOP CALC AO VTI: 29.59 CM
DOP CALC LVOT AREA: 3.2 CM2
DOP CALC LVOT DIAMETER: 2.02 CM
DOP CALC LVOT PEAK VEL: 1.42 M/S
DOP CALC LVOT STROKE VOLUME: 80.21 CM3
DOP CALCLVOT PEAK VEL VTI: 25.04 CM
E WAVE DECELERATION TIME: 204.5 MSEC
E/A RATIO: 1.14
E/E' RATIO: 7 M/S
ECHO LV POSTERIOR WALL: 0.78 CM (ref 0.6–1.1)
EJECTION FRACTION: 60 %
FRACTIONAL SHORTENING: 39 % (ref 28–44)
INTERVENTRICULAR SEPTUM: 0.89 CM (ref 0.6–1.1)
IVRT: 91.34 MSEC
LA MAJOR: 5.39 CM
LA MINOR: 5.42 CM
LA WIDTH: 4.65 CM
LEFT ATRIUM SIZE: 3.85 CM
LEFT ATRIUM VOLUME INDEX MOD: 38 ML/M2
LEFT ATRIUM VOLUME INDEX: 38.1 ML/M2
LEFT ATRIUM VOLUME MOD: 82.08 CM3
LEFT ATRIUM VOLUME: 82.25 CM3
LEFT INTERNAL DIMENSION IN SYSTOLE: 3.07 CM (ref 2.1–4)
LEFT VENTRICLE DIASTOLIC VOLUME INDEX: 55.92 ML/M2
LEFT VENTRICLE DIASTOLIC VOLUME: 120.79 ML
LEFT VENTRICLE MASS INDEX: 68 G/M2
LEFT VENTRICLE SYSTOLIC VOLUME INDEX: 17.1 ML/M2
LEFT VENTRICLE SYSTOLIC VOLUME: 36.92 ML
LEFT VENTRICULAR INTERNAL DIMENSION IN DIASTOLE: 5.05 CM (ref 3.5–6)
LEFT VENTRICULAR MASS: 145.93 G
LV LATERAL E/E' RATIO: 5.6 M/S
LV SEPTAL E/E' RATIO: 9.33 M/S
MV PEAK A VEL: 0.49 M/S
MV PEAK E VEL: 0.56 M/S
MV STENOSIS PRESSURE HALF TIME: 59.3 MS
MV VALVE AREA P 1/2 METHOD: 3.71 CM2
PISA TR MAX VEL: 2.36 M/S
PULM VEIN S/D RATIO: 1.13
PV PEAK D VEL: 0.76 M/S
PV PEAK S VEL: 0.86 M/S
RA MAJOR: 4.53 CM
RA PRESSURE: 3 MMHG
RA WIDTH: 3.48 CM
RIGHT VENTRICULAR END-DIASTOLIC DIMENSION: 3.5 CM
SINUS: 3 CM
STJ: 2.59 CM
TDI LATERAL: 0.1 M/S
TDI SEPTAL: 0.06 M/S
TDI: 0.08 M/S
TR MAX PG: 22 MMHG
TRICUSPID ANNULAR PLANE SYSTOLIC EXCURSION: 2.1 CM
TV REST PULMONARY ARTERY PRESSURE: 25 MMHG

## 2021-12-13 PROCEDURE — 93306 TTE W/DOPPLER COMPLETE: CPT | Mod: 26,HCNC,, | Performed by: INTERNAL MEDICINE

## 2021-12-13 PROCEDURE — 93306 TTE W/DOPPLER COMPLETE: CPT | Mod: HCNC

## 2021-12-13 PROCEDURE — 93306 ECHO (CUPID ONLY): ICD-10-PCS | Mod: 26,HCNC,, | Performed by: INTERNAL MEDICINE

## 2021-12-14 ENCOUNTER — PATIENT MESSAGE (OUTPATIENT)
Dept: HEMATOLOGY/ONCOLOGY | Facility: CLINIC | Age: 71
End: 2021-12-14
Payer: MEDICARE

## 2021-12-14 DIAGNOSIS — G89.3 CANCER RELATED PAIN: Primary | ICD-10-CM

## 2021-12-14 RX ORDER — OXYCODONE HYDROCHLORIDE 5 MG/1
5 TABLET ORAL EVERY 6 HOURS PRN
Qty: 90 TABLET | Refills: 0 | Status: SHIPPED | OUTPATIENT
Start: 2021-12-14 | End: 2021-12-17

## 2021-12-15 ENCOUNTER — LAB VISIT (OUTPATIENT)
Dept: LAB | Facility: HOSPITAL | Age: 71
End: 2021-12-15
Payer: MEDICARE

## 2021-12-15 DIAGNOSIS — K76.9 LIVER LESION: ICD-10-CM

## 2021-12-15 LAB
ALBUMIN SERPL BCP-MCNC: 2.8 G/DL (ref 3.5–5.2)
ALP SERPL-CCNC: 499 U/L (ref 55–135)
ALT SERPL W/O P-5'-P-CCNC: 64 U/L (ref 10–44)
ANION GAP SERPL CALC-SCNC: 11 MMOL/L (ref 8–16)
AST SERPL-CCNC: 73 U/L (ref 10–40)
BASOPHILS # BLD AUTO: 0.03 K/UL (ref 0–0.2)
BASOPHILS NFR BLD: 0.4 % (ref 0–1.9)
BILIRUB DIRECT SERPL-MCNC: 0.9 MG/DL (ref 0.1–0.3)
BILIRUB SERPL-MCNC: 1.4 MG/DL (ref 0.1–1)
BUN SERPL-MCNC: 15 MG/DL (ref 8–23)
CALCIUM SERPL-MCNC: 9.8 MG/DL (ref 8.7–10.5)
CHLORIDE SERPL-SCNC: 102 MMOL/L (ref 95–110)
CO2 SERPL-SCNC: 21 MMOL/L (ref 23–29)
CREAT SERPL-MCNC: 0.9 MG/DL (ref 0.5–1.4)
DIFFERENTIAL METHOD: ABNORMAL
EOSINOPHIL # BLD AUTO: 0.1 K/UL (ref 0–0.5)
EOSINOPHIL NFR BLD: 0.9 % (ref 0–8)
ERYTHROCYTE [DISTWIDTH] IN BLOOD BY AUTOMATED COUNT: 14.5 % (ref 11.5–14.5)
EST. GFR  (AFRICAN AMERICAN): >60 ML/MIN/1.73 M^2
EST. GFR  (NON AFRICAN AMERICAN): >60 ML/MIN/1.73 M^2
GLUCOSE SERPL-MCNC: 101 MG/DL (ref 70–110)
HCT VFR BLD AUTO: 32.3 % (ref 40–54)
HGB BLD-MCNC: 10.8 G/DL (ref 14–18)
IMM GRANULOCYTES # BLD AUTO: 0.06 K/UL (ref 0–0.04)
IMM GRANULOCYTES NFR BLD AUTO: 0.8 % (ref 0–0.5)
INR PPP: 1.2 (ref 0.8–1.2)
LYMPHOCYTES # BLD AUTO: 1.9 K/UL (ref 1–4.8)
LYMPHOCYTES NFR BLD: 23.6 % (ref 18–48)
MCH RBC QN AUTO: 32.2 PG (ref 27–31)
MCHC RBC AUTO-ENTMCNC: 33.4 G/DL (ref 32–36)
MCV RBC AUTO: 96 FL (ref 82–98)
MONOCYTES # BLD AUTO: 0.8 K/UL (ref 0.3–1)
MONOCYTES NFR BLD: 10.4 % (ref 4–15)
NEUTROPHILS # BLD AUTO: 5 K/UL (ref 1.8–7.7)
NEUTROPHILS NFR BLD: 63.9 % (ref 38–73)
NRBC BLD-RTO: 0 /100 WBC
PLATELET # BLD AUTO: 146 K/UL (ref 150–450)
PMV BLD AUTO: 10.4 FL (ref 9.2–12.9)
POTASSIUM SERPL-SCNC: 4 MMOL/L (ref 3.5–5.1)
PROT SERPL-MCNC: 6.9 G/DL (ref 6–8.4)
PROTHROMBIN TIME: 12.8 SEC (ref 9–12.5)
RBC # BLD AUTO: 3.35 M/UL (ref 4.6–6.2)
SODIUM SERPL-SCNC: 134 MMOL/L (ref 136–145)
WBC # BLD AUTO: 7.88 K/UL (ref 3.9–12.7)

## 2021-12-15 PROCEDURE — 85025 COMPLETE CBC W/AUTO DIFF WBC: CPT | Mod: HCNC | Performed by: FAMILY MEDICINE

## 2021-12-15 PROCEDURE — 80053 COMPREHEN METABOLIC PANEL: CPT | Mod: HCNC | Performed by: FAMILY MEDICINE

## 2021-12-15 PROCEDURE — 82248 BILIRUBIN DIRECT: CPT | Mod: HCNC | Performed by: FAMILY MEDICINE

## 2021-12-15 PROCEDURE — 85610 PROTHROMBIN TIME: CPT | Mod: HCNC | Performed by: FAMILY MEDICINE

## 2021-12-15 PROCEDURE — 36415 COLL VENOUS BLD VENIPUNCTURE: CPT | Mod: HCNC,PN | Performed by: FAMILY MEDICINE

## 2021-12-16 ENCOUNTER — ANESTHESIA EVENT (OUTPATIENT)
Dept: INTERVENTIONAL RADIOLOGY/VASCULAR | Facility: HOSPITAL | Age: 71
End: 2021-12-16
Payer: MEDICARE

## 2021-12-16 ENCOUNTER — HOSPITAL ENCOUNTER (OUTPATIENT)
Dept: INTERVENTIONAL RADIOLOGY/VASCULAR | Facility: HOSPITAL | Age: 71
Discharge: HOME | End: 2021-12-16
Attending: FAMILY MEDICINE
Payer: MEDICARE

## 2021-12-16 VITALS
RESPIRATION RATE: 18 BRPM | HEIGHT: 68 IN | WEIGHT: 228 LBS | TEMPERATURE: 98 F | DIASTOLIC BLOOD PRESSURE: 77 MMHG | SYSTOLIC BLOOD PRESSURE: 142 MMHG | OXYGEN SATURATION: 96 % | HEART RATE: 57 BPM | BODY MASS INDEX: 34.56 KG/M2

## 2021-12-16 DIAGNOSIS — K76.9 LIVER LESION: ICD-10-CM

## 2021-12-16 PROCEDURE — 76377 3D RENDER W/INTRP POSTPROCES: CPT | Mod: 26,HCNC,, | Performed by: RADIOLOGY

## 2021-12-16 PROCEDURE — 76377 PR  3D RENDERING W/ IMAGE POSTPROCESS: ICD-10-PCS | Mod: 26,HCNC,, | Performed by: RADIOLOGY

## 2021-12-16 PROCEDURE — 99153 MOD SED SAME PHYS/QHP EA: CPT | Mod: HCNC | Performed by: RADIOLOGY

## 2021-12-16 PROCEDURE — 63600175 PHARM REV CODE 636 W HCPCS: Mod: HCNC | Performed by: RADIOLOGY

## 2021-12-16 PROCEDURE — 76377 3D RENDER W/INTRP POSTPROCES: CPT | Mod: TC,HCNC | Performed by: RADIOLOGY

## 2021-12-16 PROCEDURE — 75774 ARTERY X-RAY EACH VESSEL: CPT | Mod: 26,59,HCNC, | Performed by: RADIOLOGY

## 2021-12-16 PROCEDURE — 37000009 HC ANESTHESIA EA ADD 15 MINS: Mod: HCNC

## 2021-12-16 PROCEDURE — 36247 INS CATH ABD/L-EXT ART 3RD: CPT | Mod: HCNC,RT | Performed by: RADIOLOGY

## 2021-12-16 PROCEDURE — D9220A PRA ANESTHESIA: ICD-10-PCS | Mod: HCNC,CRNA,, | Performed by: NURSE ANESTHETIST, CERTIFIED REGISTERED

## 2021-12-16 PROCEDURE — 36248 INS CATH ABD/L-EXT ART ADDL: CPT | Mod: 59,HCNC | Performed by: RADIOLOGY

## 2021-12-16 PROCEDURE — 75774 ARTERY X-RAY EACH VESSEL: CPT | Mod: TC,59,HCNC | Performed by: RADIOLOGY

## 2021-12-16 PROCEDURE — 25500020 PHARM REV CODE 255: Mod: HCNC | Performed by: FAMILY MEDICINE

## 2021-12-16 PROCEDURE — 27200996 IR EMBOLIZATION COMP FOR TUMOR_ORGAN ISCHEMIA_INFARC: Mod: HCNC

## 2021-12-16 PROCEDURE — D9220A PRA ANESTHESIA: Mod: HCNC,CRNA,, | Performed by: NURSE ANESTHETIST, CERTIFIED REGISTERED

## 2021-12-16 PROCEDURE — G0269 OCCLUSIVE DEVICE IN VEIN ART: HCPCS | Mod: HCNC | Performed by: RADIOLOGY

## 2021-12-16 PROCEDURE — 36247 PR PLACE CATH SUBSUBSELECT ART,ABD/PEL: ICD-10-PCS | Mod: 51,HCNC,RT, | Performed by: RADIOLOGY

## 2021-12-16 PROCEDURE — 76937 US GUIDE VASCULAR ACCESS: CPT | Mod: 26,HCNC,, | Performed by: RADIOLOGY

## 2021-12-16 PROCEDURE — 96420 CHEMO IA PUSH TECNIQUE: CPT | Mod: HCNC | Performed by: RADIOLOGY

## 2021-12-16 PROCEDURE — 63600175 PHARM REV CODE 636 W HCPCS: Mod: HCNC | Performed by: NURSE ANESTHETIST, CERTIFIED REGISTERED

## 2021-12-16 PROCEDURE — 76380 CAT SCAN FOLLOW-UP STUDY: CPT | Mod: TC,59,HCNC | Performed by: RADIOLOGY

## 2021-12-16 PROCEDURE — 75726 ARTERY X-RAYS ABDOMEN: CPT | Mod: 26,59,HCNC, | Performed by: RADIOLOGY

## 2021-12-16 PROCEDURE — 99152 MOD SED SAME PHYS/QHP 5/>YRS: CPT | Mod: HCNC,,, | Performed by: RADIOLOGY

## 2021-12-16 PROCEDURE — 37000008 HC ANESTHESIA 1ST 15 MINUTES: Mod: HCNC

## 2021-12-16 PROCEDURE — 36247 INS CATH ABD/L-EXT ART 3RD: CPT | Mod: 51,HCNC,RT, | Performed by: RADIOLOGY

## 2021-12-16 PROCEDURE — 37243 VASC EMBOLIZE/OCCLUDE ORGAN: CPT | Mod: HCNC | Performed by: RADIOLOGY

## 2021-12-16 PROCEDURE — C1894 INTRO/SHEATH, NON-LASER: HCPCS | Mod: HCNC

## 2021-12-16 PROCEDURE — 37243 IR EMBOLIZATION COMP FOR TUMOR_ORGAN ISCHEMIA_INFARC: ICD-10-PCS | Mod: HCNC,,, | Performed by: RADIOLOGY

## 2021-12-16 PROCEDURE — 76380 CAT SCAN FOLLOW-UP STUDY: CPT | Mod: 26,59,HCNC, | Performed by: RADIOLOGY

## 2021-12-16 PROCEDURE — 76937 PR  US GUIDE, VASCULAR ACCESS: ICD-10-PCS | Mod: 26,HCNC,, | Performed by: RADIOLOGY

## 2021-12-16 PROCEDURE — 75726 CHG ANGIO VISCERAL SELECTV/SUBSELEC: ICD-10-PCS | Mod: 26,59,HCNC, | Performed by: RADIOLOGY

## 2021-12-16 PROCEDURE — 99152 MOD SED SAME PHYS/QHP 5/>YRS: CPT | Mod: HCNC | Performed by: RADIOLOGY

## 2021-12-16 PROCEDURE — 63600175 PHARM REV CODE 636 W HCPCS: Mod: HCNC | Performed by: PHYSICIAN ASSISTANT

## 2021-12-16 PROCEDURE — 76937 US GUIDE VASCULAR ACCESS: CPT | Mod: TC,HCNC | Performed by: RADIOLOGY

## 2021-12-16 PROCEDURE — 63600175 PHARM REV CODE 636 W HCPCS

## 2021-12-16 PROCEDURE — 75774 PR  ANGIO EA ADDNL SELECTV VESSEL: ICD-10-PCS | Mod: 26,59,HCNC, | Performed by: RADIOLOGY

## 2021-12-16 PROCEDURE — D9220A PRA ANESTHESIA: Mod: HCNC,ANES,, | Performed by: ANESTHESIOLOGY

## 2021-12-16 PROCEDURE — 25000003 PHARM REV CODE 250: Mod: HCNC | Performed by: PHYSICIAN ASSISTANT

## 2021-12-16 PROCEDURE — 99152 PR MOD CONSCIOUS SEDATION, SAME PHYS, 5+ YRS, FIRST 15 MIN: ICD-10-PCS | Mod: HCNC,,, | Performed by: RADIOLOGY

## 2021-12-16 PROCEDURE — 25000003 PHARM REV CODE 250: Mod: HCNC | Performed by: NURSE ANESTHETIST, CERTIFIED REGISTERED

## 2021-12-16 PROCEDURE — 36248 PR PR INS CATH ABD/L-EXT ART ADDL 2ND ORD/3RD ORD/BYD: ICD-10-PCS | Mod: 59,HCNC,, | Performed by: RADIOLOGY

## 2021-12-16 PROCEDURE — D9220A PRA ANESTHESIA: ICD-10-PCS | Mod: HCNC,ANES,, | Performed by: ANESTHESIOLOGY

## 2021-12-16 PROCEDURE — 36248 INS CATH ABD/L-EXT ART ADDL: CPT | Mod: 59,HCNC,, | Performed by: RADIOLOGY

## 2021-12-16 PROCEDURE — 75726 ARTERY X-RAYS ABDOMEN: CPT | Mod: TC,59,HCNC | Performed by: RADIOLOGY

## 2021-12-16 PROCEDURE — 76380 PR  CT SCAN,LIMITED/LOCALIZED F/U STUDY: ICD-10-PCS | Mod: 26,59,HCNC, | Performed by: RADIOLOGY

## 2021-12-16 RX ORDER — NEOSTIGMINE METHYLSULFATE 0.5 MG/ML
INJECTION, SOLUTION INTRAVENOUS
Status: DISCONTINUED | OUTPATIENT
Start: 2021-12-16 | End: 2021-12-16

## 2021-12-16 RX ORDER — LIDOCAINE HYDROCHLORIDE 20 MG/ML
INJECTION INTRAVENOUS
Status: DISCONTINUED | OUTPATIENT
Start: 2021-12-16 | End: 2021-12-16

## 2021-12-16 RX ORDER — ROCURONIUM BROMIDE 10 MG/ML
INJECTION, SOLUTION INTRAVENOUS
Status: DISCONTINUED | OUTPATIENT
Start: 2021-12-16 | End: 2021-12-16

## 2021-12-16 RX ORDER — SODIUM CHLORIDE 0.9 % (FLUSH) 0.9 %
10 SYRINGE (ML) INJECTION
Status: DISCONTINUED | OUTPATIENT
Start: 2021-12-16 | End: 2021-12-17 | Stop reason: HOSPADM

## 2021-12-16 RX ORDER — FENTANYL CITRATE 50 UG/ML
INJECTION, SOLUTION INTRAMUSCULAR; INTRAVENOUS
Status: DISCONTINUED | OUTPATIENT
Start: 2021-12-16 | End: 2021-12-16

## 2021-12-16 RX ORDER — DIPHENHYDRAMINE HYDROCHLORIDE 50 MG/ML
50 INJECTION INTRAMUSCULAR; INTRAVENOUS ONCE
Status: COMPLETED | OUTPATIENT
Start: 2021-12-16 | End: 2021-12-16

## 2021-12-16 RX ORDER — FENTANYL CITRATE 50 UG/ML
25 INJECTION, SOLUTION INTRAMUSCULAR; INTRAVENOUS EVERY 5 MIN PRN
Status: DISCONTINUED | OUTPATIENT
Start: 2021-12-16 | End: 2021-12-17 | Stop reason: HOSPADM

## 2021-12-16 RX ORDER — MIDAZOLAM HYDROCHLORIDE 1 MG/ML
INJECTION, SOLUTION INTRAMUSCULAR; INTRAVENOUS
Status: DISCONTINUED | OUTPATIENT
Start: 2021-12-16 | End: 2021-12-16

## 2021-12-16 RX ORDER — PHENYLEPHRINE HYDROCHLORIDE 10 MG/ML
INJECTION INTRAVENOUS
Status: DISCONTINUED | OUTPATIENT
Start: 2021-12-16 | End: 2021-12-16

## 2021-12-16 RX ORDER — ONDANSETRON 2 MG/ML
INJECTION INTRAMUSCULAR; INTRAVENOUS
Status: DISCONTINUED | OUTPATIENT
Start: 2021-12-16 | End: 2021-12-16

## 2021-12-16 RX ORDER — PROPOFOL 10 MG/ML
VIAL (ML) INTRAVENOUS
Status: DISCONTINUED | OUTPATIENT
Start: 2021-12-16 | End: 2021-12-16

## 2021-12-16 RX ORDER — LIDOCAINE HYDROCHLORIDE 10 MG/ML
1 INJECTION, SOLUTION EPIDURAL; INFILTRATION; INTRACAUDAL; PERINEURAL ONCE AS NEEDED
Status: DISCONTINUED | OUTPATIENT
Start: 2021-12-16 | End: 2021-12-17 | Stop reason: HOSPADM

## 2021-12-16 RX ORDER — ONDANSETRON 2 MG/ML
4 INJECTION INTRAMUSCULAR; INTRAVENOUS EVERY 6 HOURS PRN
Status: DISCONTINUED | OUTPATIENT
Start: 2021-12-16 | End: 2021-12-17 | Stop reason: HOSPADM

## 2021-12-16 RX ORDER — SODIUM CHLORIDE 9 MG/ML
INJECTION, SOLUTION INTRAVENOUS CONTINUOUS
Status: DISCONTINUED | OUTPATIENT
Start: 2021-12-16 | End: 2021-12-17 | Stop reason: HOSPADM

## 2021-12-16 RX ORDER — OXYCODONE HYDROCHLORIDE 5 MG/1
5 TABLET ORAL EVERY 4 HOURS PRN
Status: DISCONTINUED | OUTPATIENT
Start: 2021-12-16 | End: 2021-12-17 | Stop reason: HOSPADM

## 2021-12-16 RX ADMIN — AMPICILLIN SODIUM AND SULBACTAM SODIUM 3 G: 2; 1 INJECTION, POWDER, FOR SOLUTION INTRAMUSCULAR; INTRAVENOUS at 02:12

## 2021-12-16 RX ADMIN — FENTANYL CITRATE 50 MCG: 50 INJECTION, SOLUTION INTRAMUSCULAR; INTRAVENOUS at 01:12

## 2021-12-16 RX ADMIN — ROCURONIUM BROMIDE 40 MG: 10 INJECTION, SOLUTION INTRAVENOUS at 01:12

## 2021-12-16 RX ADMIN — PHENYLEPHRINE HYDROCHLORIDE 100 MCG: 10 INJECTION INTRAVENOUS at 02:12

## 2021-12-16 RX ADMIN — IOHEXOL 135 ML: 300 INJECTION, SOLUTION INTRAVENOUS at 03:12

## 2021-12-16 RX ADMIN — ONDANSETRON 4 MG: 2 INJECTION INTRAMUSCULAR; INTRAVENOUS at 05:12

## 2021-12-16 RX ADMIN — HYDROCORTISONE SODIUM SUCCINATE 200 MG: 100 INJECTION, POWDER, FOR SOLUTION INTRAMUSCULAR; INTRAVENOUS at 02:12

## 2021-12-16 RX ADMIN — GLYCOPYRROLATE 0.2 MG: 0.2 INJECTION, SOLUTION INTRAMUSCULAR; INTRAVENOUS at 02:12

## 2021-12-16 RX ADMIN — MIDAZOLAM HYDROCHLORIDE 2 MG: 1 INJECTION, SOLUTION INTRAMUSCULAR; INTRAVENOUS at 01:12

## 2021-12-16 RX ADMIN — NEOSTIGMINE METHYLSULFATE 5 MG: 0.5 INJECTION INTRAVENOUS at 03:12

## 2021-12-16 RX ADMIN — DIPHENHYDRAMINE HYDROCHLORIDE 50 MG: 50 INJECTION, SOLUTION INTRAMUSCULAR; INTRAVENOUS at 02:12

## 2021-12-16 RX ADMIN — FENTANYL CITRATE 50 MCG: 50 INJECTION, SOLUTION INTRAMUSCULAR; INTRAVENOUS at 03:12

## 2021-12-16 RX ADMIN — LIDOCAINE HYDROCHLORIDE 60 MG: 20 INJECTION, SOLUTION INTRAVENOUS at 01:12

## 2021-12-16 RX ADMIN — PROPOFOL 150 MG: 10 INJECTION, EMULSION INTRAVENOUS at 01:12

## 2021-12-16 RX ADMIN — ROCURONIUM BROMIDE 10 MG: 10 INJECTION, SOLUTION INTRAVENOUS at 02:12

## 2021-12-16 RX ADMIN — GLYCOPYRROLATE 0.6 MG: 0.2 INJECTION, SOLUTION INTRAMUSCULAR; INTRAVENOUS at 03:12

## 2021-12-16 RX ADMIN — SODIUM CHLORIDE: 0.9 INJECTION, SOLUTION INTRAVENOUS at 01:12

## 2021-12-16 RX ADMIN — ONDANSETRON 4 MG: 2 INJECTION INTRAMUSCULAR; INTRAVENOUS at 03:12

## 2021-12-17 ENCOUNTER — PATIENT MESSAGE (OUTPATIENT)
Dept: HEMATOLOGY/ONCOLOGY | Facility: CLINIC | Age: 71
End: 2021-12-17
Payer: MEDICARE

## 2021-12-17 ENCOUNTER — NURSE TRIAGE (OUTPATIENT)
Dept: ADMINISTRATIVE | Facility: CLINIC | Age: 71
End: 2021-12-17
Payer: MEDICARE

## 2021-12-17 DIAGNOSIS — C22.0 HCC (HEPATOCELLULAR CARCINOMA): ICD-10-CM

## 2021-12-17 DIAGNOSIS — K76.9 LIVER LESION: Primary | ICD-10-CM

## 2021-12-17 DIAGNOSIS — C22.1 INTRAHEPATIC CHOLANGIOCARCINOMA: Primary | ICD-10-CM

## 2021-12-17 DIAGNOSIS — G89.3 CANCER RELATED PAIN: ICD-10-CM

## 2021-12-17 RX ORDER — OXYCODONE HYDROCHLORIDE 5 MG/1
5 TABLET ORAL EVERY 6 HOURS PRN
Qty: 120 TABLET | Refills: 0 | Status: SHIPPED | OUTPATIENT
Start: 2021-12-17 | End: 2022-03-04

## 2021-12-18 ENCOUNTER — HOSPITAL ENCOUNTER (OUTPATIENT)
Facility: HOSPITAL | Age: 71
Discharge: HOME | End: 2021-12-20
Attending: EMERGENCY MEDICINE | Admitting: EMERGENCY MEDICINE
Payer: MEDICARE

## 2021-12-18 DIAGNOSIS — R07.9 CHEST PAIN: ICD-10-CM

## 2021-12-18 DIAGNOSIS — D72.829 LEUKOCYTOSIS, UNSPECIFIED TYPE: ICD-10-CM

## 2021-12-18 DIAGNOSIS — R50.9 FEVER: ICD-10-CM

## 2021-12-18 DIAGNOSIS — A41.9 SEPSIS, DUE TO UNSPECIFIED ORGANISM, UNSPECIFIED WHETHER ACUTE ORGAN DYSFUNCTION PRESENT: Primary | ICD-10-CM

## 2021-12-18 DIAGNOSIS — R79.89 ABNORMAL LFTS: ICD-10-CM

## 2021-12-18 PROBLEM — R50.82 POST-PROCEDURAL FEVER: Status: ACTIVE | Noted: 2021-12-18

## 2021-12-18 PROBLEM — R74.01 TRANSAMINITIS: Status: ACTIVE | Noted: 2021-12-18

## 2021-12-18 LAB
ALBUMIN SERPL BCP-MCNC: 3.2 G/DL (ref 3.5–5.2)
ALP SERPL-CCNC: 603 U/L (ref 55–135)
ALT SERPL W/O P-5'-P-CCNC: 477 U/L (ref 10–44)
ANION GAP SERPL CALC-SCNC: 17 MMOL/L (ref 8–16)
AST SERPL-CCNC: 984 U/L (ref 10–40)
BASOPHILS # BLD AUTO: 0.01 K/UL (ref 0–0.2)
BASOPHILS NFR BLD: 0.1 % (ref 0–1.9)
BILIRUB SERPL-MCNC: 1.8 MG/DL (ref 0.1–1)
BILIRUB UR QL STRIP: NEGATIVE
BUN SERPL-MCNC: 11 MG/DL (ref 8–23)
CALCIUM SERPL-MCNC: 9.3 MG/DL (ref 8.7–10.5)
CHLORIDE SERPL-SCNC: 100 MMOL/L (ref 95–110)
CLARITY UR REFRACT.AUTO: CLEAR
CO2 SERPL-SCNC: 20 MMOL/L (ref 23–29)
COLOR UR AUTO: YELLOW
CREAT SERPL-MCNC: 0.9 MG/DL (ref 0.5–1.4)
CTP QC/QA: YES
CTP QC/QA: YES
DIFFERENTIAL METHOD: ABNORMAL
EOSINOPHIL # BLD AUTO: 0 K/UL (ref 0–0.5)
EOSINOPHIL NFR BLD: 0 % (ref 0–8)
ERYTHROCYTE [DISTWIDTH] IN BLOOD BY AUTOMATED COUNT: 14.4 % (ref 11.5–14.5)
EST. GFR  (AFRICAN AMERICAN): >60 ML/MIN/1.73 M^2
EST. GFR  (NON AFRICAN AMERICAN): >60 ML/MIN/1.73 M^2
GLUCOSE SERPL-MCNC: 111 MG/DL (ref 70–110)
GLUCOSE UR QL STRIP: NEGATIVE
HCT VFR BLD AUTO: 35.4 % (ref 40–54)
HGB BLD-MCNC: 11.8 G/DL (ref 14–18)
HGB UR QL STRIP: ABNORMAL
IMM GRANULOCYTES # BLD AUTO: 0.09 K/UL (ref 0–0.04)
IMM GRANULOCYTES NFR BLD AUTO: 0.6 % (ref 0–0.5)
INR PPP: 1.4 (ref 0.8–1.2)
KETONES UR QL STRIP: NEGATIVE
LACTATE SERPL-SCNC: 1.8 MMOL/L (ref 0.5–2.2)
LEUKOCYTE ESTERASE UR QL STRIP: NEGATIVE
LIPASE SERPL-CCNC: 10 U/L (ref 4–60)
LYMPHOCYTES # BLD AUTO: 0.8 K/UL (ref 1–4.8)
LYMPHOCYTES NFR BLD: 5.1 % (ref 18–48)
MCH RBC QN AUTO: 31.6 PG (ref 27–31)
MCHC RBC AUTO-ENTMCNC: 33.3 G/DL (ref 32–36)
MCV RBC AUTO: 95 FL (ref 82–98)
MICROSCOPIC COMMENT: NORMAL
MONOCYTES # BLD AUTO: 1.2 K/UL (ref 0.3–1)
MONOCYTES NFR BLD: 8 % (ref 4–15)
NEUTROPHILS # BLD AUTO: 13.3 K/UL (ref 1.8–7.7)
NEUTROPHILS NFR BLD: 86.2 % (ref 38–73)
NITRITE UR QL STRIP: NEGATIVE
NRBC BLD-RTO: 0 /100 WBC
PH UR STRIP: 5 [PH] (ref 5–8)
PLATELET # BLD AUTO: 201 K/UL (ref 150–450)
PMV BLD AUTO: 10.9 FL (ref 9.2–12.9)
POC MOLECULAR INFLUENZA A AGN: NEGATIVE
POC MOLECULAR INFLUENZA B AGN: NEGATIVE
POTASSIUM SERPL-SCNC: 3.8 MMOL/L (ref 3.5–5.1)
PROT SERPL-MCNC: 6.8 G/DL (ref 6–8.4)
PROT UR QL STRIP: NEGATIVE
PROTHROMBIN TIME: 14.6 SEC (ref 9–12.5)
RBC # BLD AUTO: 3.74 M/UL (ref 4.6–6.2)
RBC #/AREA URNS AUTO: 3 /HPF (ref 0–4)
SARS-COV-2 RDRP RESP QL NAA+PROBE: NEGATIVE
SODIUM SERPL-SCNC: 137 MMOL/L (ref 136–145)
SP GR UR STRIP: 1.01 (ref 1–1.03)
SQUAMOUS #/AREA URNS AUTO: 0 /HPF
TROPONIN I SERPL DL<=0.01 NG/ML-MCNC: 0.06 NG/ML (ref 0–0.03)
URN SPEC COLLECT METH UR: ABNORMAL
WBC # BLD AUTO: 15.4 K/UL (ref 3.9–12.7)
WBC #/AREA URNS AUTO: 1 /HPF (ref 0–5)

## 2021-12-18 PROCEDURE — 99285 EMERGENCY DEPT VISIT HI MDM: CPT | Mod: 25,HCNC,CS

## 2021-12-18 PROCEDURE — 25000003 PHARM REV CODE 250: Mod: HCNC

## 2021-12-18 PROCEDURE — 63600175 PHARM REV CODE 636 W HCPCS: Mod: HCNC | Performed by: EMERGENCY MEDICINE

## 2021-12-18 PROCEDURE — 85025 COMPLETE CBC W/AUTO DIFF WBC: CPT | Mod: HCNC | Performed by: EMERGENCY MEDICINE

## 2021-12-18 PROCEDURE — 99285 EMERGENCY DEPT VISIT HI MDM: CPT | Mod: HCNC,GC,CS, | Performed by: EMERGENCY MEDICINE

## 2021-12-18 PROCEDURE — 25500020 PHARM REV CODE 255: Mod: HCNC | Performed by: EMERGENCY MEDICINE

## 2021-12-18 PROCEDURE — 63600175 PHARM REV CODE 636 W HCPCS: Mod: HCNC | Performed by: STUDENT IN AN ORGANIZED HEALTH CARE EDUCATION/TRAINING PROGRAM

## 2021-12-18 PROCEDURE — 87502 INFLUENZA DNA AMP PROBE: CPT | Mod: HCNC

## 2021-12-18 PROCEDURE — 93010 ELECTROCARDIOGRAM REPORT: CPT | Mod: HCNC,,, | Performed by: INTERNAL MEDICINE

## 2021-12-18 PROCEDURE — 85610 PROTHROMBIN TIME: CPT | Mod: HCNC

## 2021-12-18 PROCEDURE — 84484 ASSAY OF TROPONIN QUANT: CPT | Mod: HCNC

## 2021-12-18 PROCEDURE — 96366 THER/PROPH/DIAG IV INF ADDON: CPT

## 2021-12-18 PROCEDURE — 25000003 PHARM REV CODE 250: Mod: HCNC | Performed by: STUDENT IN AN ORGANIZED HEALTH CARE EDUCATION/TRAINING PROGRAM

## 2021-12-18 PROCEDURE — G0378 HOSPITAL OBSERVATION PER HR: HCPCS | Mod: HCNC

## 2021-12-18 PROCEDURE — 96367 TX/PROPH/DG ADDL SEQ IV INF: CPT | Mod: HCNC

## 2021-12-18 PROCEDURE — 83690 ASSAY OF LIPASE: CPT | Mod: HCNC | Performed by: EMERGENCY MEDICINE

## 2021-12-18 PROCEDURE — 96361 HYDRATE IV INFUSION ADD-ON: CPT

## 2021-12-18 PROCEDURE — 87040 BLOOD CULTURE FOR BACTERIA: CPT | Mod: 59,HCNC | Performed by: EMERGENCY MEDICINE

## 2021-12-18 PROCEDURE — 99219 PR INITIAL OBSERVATION CARE,LEVL II: ICD-10-PCS | Mod: HCNC,,, | Performed by: INTERNAL MEDICINE

## 2021-12-18 PROCEDURE — U0002 COVID-19 LAB TEST NON-CDC: HCPCS | Mod: HCNC | Performed by: EMERGENCY MEDICINE

## 2021-12-18 PROCEDURE — 81001 URINALYSIS AUTO W/SCOPE: CPT | Mod: HCNC | Performed by: EMERGENCY MEDICINE

## 2021-12-18 PROCEDURE — 80053 COMPREHEN METABOLIC PANEL: CPT | Mod: HCNC | Performed by: EMERGENCY MEDICINE

## 2021-12-18 PROCEDURE — 99285 PR EMERGENCY DEPT VISIT,LEVEL V: ICD-10-PCS | Mod: HCNC,GC,CS, | Performed by: EMERGENCY MEDICINE

## 2021-12-18 PROCEDURE — 96361 HYDRATE IV INFUSION ADD-ON: CPT | Mod: HCNC

## 2021-12-18 PROCEDURE — 96365 THER/PROPH/DIAG IV INF INIT: CPT | Mod: HCNC

## 2021-12-18 PROCEDURE — 83605 ASSAY OF LACTIC ACID: CPT | Mod: HCNC | Performed by: EMERGENCY MEDICINE

## 2021-12-18 PROCEDURE — 99219 PR INITIAL OBSERVATION CARE,LEVL II: CPT | Mod: HCNC,,, | Performed by: INTERNAL MEDICINE

## 2021-12-18 PROCEDURE — 96360 HYDRATION IV INFUSION INIT: CPT | Mod: 59

## 2021-12-18 PROCEDURE — 93010 EKG 12-LEAD: ICD-10-PCS | Mod: HCNC,,, | Performed by: INTERNAL MEDICINE

## 2021-12-18 PROCEDURE — 63600175 PHARM REV CODE 636 W HCPCS: Mod: HCNC

## 2021-12-18 PROCEDURE — 93005 ELECTROCARDIOGRAM TRACING: CPT | Mod: HCNC

## 2021-12-18 RX ORDER — GLUCAGON 1 MG
1 KIT INJECTION
Status: DISCONTINUED | OUTPATIENT
Start: 2021-12-18 | End: 2021-12-20 | Stop reason: HOSPADM

## 2021-12-18 RX ORDER — PROCHLORPERAZINE EDISYLATE 5 MG/ML
5 INJECTION INTRAMUSCULAR; INTRAVENOUS EVERY 6 HOURS PRN
Status: DISCONTINUED | OUTPATIENT
Start: 2021-12-18 | End: 2021-12-20 | Stop reason: HOSPADM

## 2021-12-18 RX ORDER — ACETAMINOPHEN 500 MG
1000 TABLET ORAL
Status: COMPLETED | OUTPATIENT
Start: 2021-12-18 | End: 2021-12-18

## 2021-12-18 RX ORDER — IBUPROFEN 200 MG
24 TABLET ORAL
Status: DISCONTINUED | OUTPATIENT
Start: 2021-12-18 | End: 2021-12-20 | Stop reason: HOSPADM

## 2021-12-18 RX ORDER — SODIUM CHLORIDE 0.9 % (FLUSH) 0.9 %
10 SYRINGE (ML) INJECTION EVERY 12 HOURS PRN
Status: DISCONTINUED | OUTPATIENT
Start: 2021-12-18 | End: 2021-12-20 | Stop reason: HOSPADM

## 2021-12-18 RX ORDER — INSULIN ASPART 100 [IU]/ML
0-5 INJECTION, SOLUTION INTRAVENOUS; SUBCUTANEOUS
Status: DISCONTINUED | OUTPATIENT
Start: 2021-12-18 | End: 2021-12-20 | Stop reason: HOSPADM

## 2021-12-18 RX ORDER — NALOXONE HCL 0.4 MG/ML
0.02 VIAL (ML) INJECTION
Status: DISCONTINUED | OUTPATIENT
Start: 2021-12-18 | End: 2021-12-20 | Stop reason: HOSPADM

## 2021-12-18 RX ORDER — PANTOPRAZOLE SODIUM 40 MG/1
40 TABLET, DELAYED RELEASE ORAL DAILY
Status: DISCONTINUED | OUTPATIENT
Start: 2021-12-18 | End: 2021-12-20 | Stop reason: HOSPADM

## 2021-12-18 RX ORDER — HYDROCHLOROTHIAZIDE 25 MG/1
25 TABLET ORAL DAILY
Status: DISCONTINUED | OUTPATIENT
Start: 2021-12-19 | End: 2021-12-20 | Stop reason: HOSPADM

## 2021-12-18 RX ORDER — IBUPROFEN 200 MG
16 TABLET ORAL
Status: DISCONTINUED | OUTPATIENT
Start: 2021-12-18 | End: 2021-12-20 | Stop reason: HOSPADM

## 2021-12-18 RX ORDER — ACETAMINOPHEN 500 MG
1000 TABLET ORAL
Status: DISCONTINUED | OUTPATIENT
Start: 2021-12-18 | End: 2021-12-18

## 2021-12-18 RX ORDER — ONDANSETRON 8 MG/1
8 TABLET, ORALLY DISINTEGRATING ORAL EVERY 8 HOURS PRN
Status: DISCONTINUED | OUTPATIENT
Start: 2021-12-18 | End: 2021-12-20 | Stop reason: HOSPADM

## 2021-12-18 RX ADMIN — APIXABAN 5 MG: 5 TABLET, FILM COATED ORAL at 11:12

## 2021-12-18 RX ADMIN — SODIUM CHLORIDE, SODIUM LACTATE, POTASSIUM CHLORIDE, AND CALCIUM CHLORIDE 1000 ML: .6; .31; .03; .02 INJECTION, SOLUTION INTRAVENOUS at 03:12

## 2021-12-18 RX ADMIN — IOHEXOL 100 ML: 350 INJECTION, SOLUTION INTRAVENOUS at 07:12

## 2021-12-18 RX ADMIN — PANTOPRAZOLE SODIUM 40 MG: 40 TABLET, DELAYED RELEASE ORAL at 11:12

## 2021-12-18 RX ADMIN — VANCOMYCIN HYDROCHLORIDE 1500 MG: 1.5 INJECTION, POWDER, LYOPHILIZED, FOR SOLUTION INTRAVENOUS at 05:12

## 2021-12-18 RX ADMIN — ACETAMINOPHEN 1000 MG: 500 TABLET ORAL at 04:12

## 2021-12-18 RX ADMIN — PIPERACILLIN SODIUM AND TAZOBACTAM SODIUM 4.5 G: 4; .5 INJECTION, POWDER, FOR SOLUTION INTRAVENOUS at 02:12

## 2021-12-18 RX ADMIN — SODIUM CHLORIDE, SODIUM LACTATE, POTASSIUM CHLORIDE, AND CALCIUM CHLORIDE 1000 ML: .6; .31; .03; .02 INJECTION, SOLUTION INTRAVENOUS at 11:12

## 2021-12-18 RX ADMIN — APIXABAN 5 MG: 5 TABLET, FILM COATED ORAL at 08:12

## 2021-12-18 RX ADMIN — PIPERACILLIN SODIUM AND TAZOBACTAM SODIUM 4.5 G: 4; .5 INJECTION, POWDER, FOR SOLUTION INTRAVENOUS at 09:12

## 2021-12-18 RX ADMIN — PIPERACILLIN SODIUM AND TAZOBACTAM SODIUM 4.5 G: 4; .5 INJECTION, POWDER, FOR SOLUTION INTRAVENOUS at 04:12

## 2021-12-19 LAB
ALBUMIN SERPL BCP-MCNC: 2.7 G/DL (ref 3.5–5.2)
ALP SERPL-CCNC: 494 U/L (ref 55–135)
ALT SERPL W/O P-5'-P-CCNC: 458 U/L (ref 10–44)
ANION GAP SERPL CALC-SCNC: 12 MMOL/L (ref 8–16)
AST SERPL-CCNC: 709 U/L (ref 10–40)
BACTERIA #/AREA URNS AUTO: ABNORMAL /HPF
BASOPHILS # BLD AUTO: 0.01 K/UL (ref 0–0.2)
BASOPHILS NFR BLD: 0.1 % (ref 0–1.9)
BILIRUB SERPL-MCNC: 2.3 MG/DL (ref 0.1–1)
BILIRUB UR QL STRIP: NEGATIVE
BUN SERPL-MCNC: 13 MG/DL (ref 8–23)
CALCIUM SERPL-MCNC: 9.2 MG/DL (ref 8.7–10.5)
CHLORIDE SERPL-SCNC: 101 MMOL/L (ref 95–110)
CLARITY UR REFRACT.AUTO: ABNORMAL
CO2 SERPL-SCNC: 25 MMOL/L (ref 23–29)
COLOR UR AUTO: YELLOW
CREAT SERPL-MCNC: 0.9 MG/DL (ref 0.5–1.4)
DIFFERENTIAL METHOD: ABNORMAL
EOSINOPHIL # BLD AUTO: 0 K/UL (ref 0–0.5)
EOSINOPHIL NFR BLD: 0 % (ref 0–8)
ERYTHROCYTE [DISTWIDTH] IN BLOOD BY AUTOMATED COUNT: 14.6 % (ref 11.5–14.5)
EST. GFR  (AFRICAN AMERICAN): >60 ML/MIN/1.73 M^2
EST. GFR  (NON AFRICAN AMERICAN): >60 ML/MIN/1.73 M^2
GLUCOSE SERPL-MCNC: 95 MG/DL (ref 70–110)
GLUCOSE UR QL STRIP: NEGATIVE
HCT VFR BLD AUTO: 31.8 % (ref 40–54)
HGB BLD-MCNC: 10.7 G/DL (ref 14–18)
HGB UR QL STRIP: ABNORMAL
IMM GRANULOCYTES # BLD AUTO: 0.1 K/UL (ref 0–0.04)
IMM GRANULOCYTES NFR BLD AUTO: 0.8 % (ref 0–0.5)
KETONES UR QL STRIP: NEGATIVE
LEUKOCYTE ESTERASE UR QL STRIP: NEGATIVE
LYMPHOCYTES # BLD AUTO: 0.9 K/UL (ref 1–4.8)
LYMPHOCYTES NFR BLD: 6.9 % (ref 18–48)
MCH RBC QN AUTO: 31.8 PG (ref 27–31)
MCHC RBC AUTO-ENTMCNC: 33.6 G/DL (ref 32–36)
MCV RBC AUTO: 94 FL (ref 82–98)
MICROSCOPIC COMMENT: ABNORMAL
MONOCYTES # BLD AUTO: 0.8 K/UL (ref 0.3–1)
MONOCYTES NFR BLD: 6.7 % (ref 4–15)
NEUTROPHILS # BLD AUTO: 10.5 K/UL (ref 1.8–7.7)
NEUTROPHILS NFR BLD: 85.5 % (ref 38–73)
NITRITE UR QL STRIP: NEGATIVE
NRBC BLD-RTO: 0 /100 WBC
PH UR STRIP: 5 [PH] (ref 5–8)
PLATELET # BLD AUTO: 194 K/UL (ref 150–450)
PMV BLD AUTO: 10.4 FL (ref 9.2–12.9)
POTASSIUM SERPL-SCNC: 3.5 MMOL/L (ref 3.5–5.1)
PROT SERPL-MCNC: 6 G/DL (ref 6–8.4)
PROT UR QL STRIP: NEGATIVE
RBC # BLD AUTO: 3.37 M/UL (ref 4.6–6.2)
RBC #/AREA URNS AUTO: 12 /HPF (ref 0–4)
SODIUM SERPL-SCNC: 138 MMOL/L (ref 136–145)
SP GR UR STRIP: 1.02 (ref 1–1.03)
TROPONIN I SERPL DL<=0.01 NG/ML-MCNC: 0.05 NG/ML (ref 0–0.03)
URN SPEC COLLECT METH UR: ABNORMAL
WBC # BLD AUTO: 12.3 K/UL (ref 3.9–12.7)
WBC #/AREA URNS AUTO: 1 /HPF (ref 0–5)

## 2021-12-19 PROCEDURE — 99225 PR SUBSEQUENT OBSERVATION CARE,LEVEL II: ICD-10-PCS | Mod: HCNC,,, | Performed by: INTERNAL MEDICINE

## 2021-12-19 PROCEDURE — 80053 COMPREHEN METABOLIC PANEL: CPT | Mod: HCNC

## 2021-12-19 PROCEDURE — 36415 COLL VENOUS BLD VENIPUNCTURE: CPT | Mod: HCNC

## 2021-12-19 PROCEDURE — 85025 COMPLETE CBC W/AUTO DIFF WBC: CPT | Mod: HCNC

## 2021-12-19 PROCEDURE — 96366 THER/PROPH/DIAG IV INF ADDON: CPT

## 2021-12-19 PROCEDURE — 81001 URINALYSIS AUTO W/SCOPE: CPT | Mod: HCNC | Performed by: STUDENT IN AN ORGANIZED HEALTH CARE EDUCATION/TRAINING PROGRAM

## 2021-12-19 PROCEDURE — 25000003 PHARM REV CODE 250: Mod: HCNC | Performed by: STUDENT IN AN ORGANIZED HEALTH CARE EDUCATION/TRAINING PROGRAM

## 2021-12-19 PROCEDURE — 84484 ASSAY OF TROPONIN QUANT: CPT | Mod: HCNC

## 2021-12-19 PROCEDURE — 63600175 PHARM REV CODE 636 W HCPCS: Mod: HCNC

## 2021-12-19 PROCEDURE — 96376 TX/PRO/DX INJ SAME DRUG ADON: CPT

## 2021-12-19 PROCEDURE — G0378 HOSPITAL OBSERVATION PER HR: HCPCS | Mod: HCNC

## 2021-12-19 PROCEDURE — 99225 PR SUBSEQUENT OBSERVATION CARE,LEVEL II: CPT | Mod: HCNC,,, | Performed by: INTERNAL MEDICINE

## 2021-12-19 PROCEDURE — 25000003 PHARM REV CODE 250: Mod: HCNC

## 2021-12-19 RX ORDER — METRONIDAZOLE 250 MG/1
500 TABLET ORAL 3 TIMES DAILY
Qty: 30 TABLET | Refills: 0 | Status: SHIPPED | OUTPATIENT
Start: 2021-12-19 | End: 2022-03-04

## 2021-12-19 RX ORDER — CIPROFLOXACIN 500 MG/1
500 TABLET ORAL 2 TIMES DAILY
Qty: 10 TABLET | Refills: 0 | Status: SHIPPED | OUTPATIENT
Start: 2021-12-19 | End: 2022-03-04 | Stop reason: ALTCHOICE

## 2021-12-19 RX ORDER — POLYETHYLENE GLYCOL 3350 17 G/17G
17 POWDER, FOR SOLUTION ORAL 2 TIMES DAILY
Status: DISCONTINUED | OUTPATIENT
Start: 2021-12-19 | End: 2021-12-20 | Stop reason: HOSPADM

## 2021-12-19 RX ORDER — AMOXICILLIN 250 MG
1 CAPSULE ORAL DAILY
Status: DISCONTINUED | OUTPATIENT
Start: 2021-12-19 | End: 2021-12-20

## 2021-12-19 RX ADMIN — POLYETHYLENE GLYCOL 3350 17 G: 17 POWDER, FOR SOLUTION ORAL at 07:12

## 2021-12-19 RX ADMIN — PIPERACILLIN SODIUM AND TAZOBACTAM SODIUM 4.5 G: 4; .5 INJECTION, POWDER, FOR SOLUTION INTRAVENOUS at 06:12

## 2021-12-19 RX ADMIN — PIPERACILLIN SODIUM AND TAZOBACTAM SODIUM 4.5 G: 4; .5 INJECTION, POWDER, FOR SOLUTION INTRAVENOUS at 09:12

## 2021-12-19 RX ADMIN — PIPERACILLIN SODIUM AND TAZOBACTAM SODIUM 4.5 G: 4; .5 INJECTION, POWDER, FOR SOLUTION INTRAVENOUS at 01:12

## 2021-12-19 RX ADMIN — APIXABAN 5 MG: 5 TABLET, FILM COATED ORAL at 09:12

## 2021-12-19 RX ADMIN — APIXABAN 5 MG: 5 TABLET, FILM COATED ORAL at 07:12

## 2021-12-19 RX ADMIN — VANCOMYCIN HYDROCHLORIDE 1500 MG: 1.5 INJECTION, POWDER, LYOPHILIZED, FOR SOLUTION INTRAVENOUS at 04:12

## 2021-12-19 RX ADMIN — SENNOSIDES AND DOCUSATE SODIUM 1 TABLET: 50; 8.6 TABLET ORAL at 07:12

## 2021-12-19 RX ADMIN — HYDROCHLOROTHIAZIDE 25 MG: 25 TABLET ORAL at 09:12

## 2021-12-20 VITALS
HEART RATE: 77 BPM | DIASTOLIC BLOOD PRESSURE: 65 MMHG | OXYGEN SATURATION: 93 % | WEIGHT: 221.88 LBS | TEMPERATURE: 99 F | RESPIRATION RATE: 17 BRPM | HEIGHT: 68 IN | SYSTOLIC BLOOD PRESSURE: 117 MMHG | BODY MASS INDEX: 33.63 KG/M2

## 2021-12-20 LAB — VANCOMYCIN TROUGH SERPL-MCNC: <1.4 UG/ML (ref 10–22)

## 2021-12-20 PROCEDURE — 99217 PR OBSERVATION CARE DISCHARGE: CPT | Mod: HCNC,,, | Performed by: INTERNAL MEDICINE

## 2021-12-20 PROCEDURE — 80202 ASSAY OF VANCOMYCIN: CPT | Mod: HCNC

## 2021-12-20 PROCEDURE — 99217 PR OBSERVATION CARE DISCHARGE: ICD-10-PCS | Mod: HCNC,,, | Performed by: INTERNAL MEDICINE

## 2021-12-20 PROCEDURE — 96376 TX/PRO/DX INJ SAME DRUG ADON: CPT

## 2021-12-20 PROCEDURE — 25000003 PHARM REV CODE 250: Mod: HCNC

## 2021-12-20 PROCEDURE — 96366 THER/PROPH/DIAG IV INF ADDON: CPT

## 2021-12-20 PROCEDURE — G0378 HOSPITAL OBSERVATION PER HR: HCPCS | Mod: HCNC

## 2021-12-20 PROCEDURE — 36415 COLL VENOUS BLD VENIPUNCTURE: CPT | Mod: HCNC

## 2021-12-20 PROCEDURE — 63600175 PHARM REV CODE 636 W HCPCS: Mod: HCNC

## 2021-12-20 RX ORDER — AMOXICILLIN 250 MG
2 CAPSULE ORAL 2 TIMES DAILY
Status: DISCONTINUED | OUTPATIENT
Start: 2021-12-20 | End: 2021-12-20 | Stop reason: HOSPADM

## 2021-12-20 RX ORDER — LACTULOSE 10 G/15ML
30 SOLUTION ORAL 3 TIMES DAILY
Status: DISCONTINUED | OUTPATIENT
Start: 2021-12-20 | End: 2021-12-20 | Stop reason: HOSPADM

## 2021-12-20 RX ADMIN — VANCOMYCIN HYDROCHLORIDE 1500 MG: 1.5 INJECTION, POWDER, LYOPHILIZED, FOR SOLUTION INTRAVENOUS at 05:12

## 2021-12-20 RX ADMIN — HYDROCHLOROTHIAZIDE 25 MG: 25 TABLET ORAL at 09:12

## 2021-12-20 RX ADMIN — PIPERACILLIN SODIUM AND TAZOBACTAM SODIUM 4.5 G: 4; .5 INJECTION, POWDER, FOR SOLUTION INTRAVENOUS at 06:12

## 2021-12-20 RX ADMIN — PANTOPRAZOLE SODIUM 40 MG: 40 TABLET, DELAYED RELEASE ORAL at 09:12

## 2021-12-20 RX ADMIN — APIXABAN 5 MG: 5 TABLET, FILM COATED ORAL at 09:12

## 2021-12-21 ENCOUNTER — PATIENT MESSAGE (OUTPATIENT)
Dept: INTERNAL MEDICINE | Facility: CLINIC | Age: 71
End: 2021-12-21
Payer: MEDICARE

## 2021-12-21 ENCOUNTER — DOCUMENTATION ONLY (OUTPATIENT)
Dept: HEMATOLOGY/ONCOLOGY | Facility: CLINIC | Age: 71
End: 2021-12-21
Payer: MEDICARE

## 2021-12-21 ENCOUNTER — OFFICE VISIT (OUTPATIENT)
Dept: HEMATOLOGY/ONCOLOGY | Facility: CLINIC | Age: 71
End: 2021-12-21
Payer: MEDICARE

## 2021-12-21 ENCOUNTER — INFUSION (OUTPATIENT)
Dept: INFUSION THERAPY | Facility: HOSPITAL | Age: 71
End: 2021-12-21
Payer: MEDICARE

## 2021-12-21 VITALS
WEIGHT: 224.19 LBS | HEIGHT: 68 IN | DIASTOLIC BLOOD PRESSURE: 65 MMHG | SYSTOLIC BLOOD PRESSURE: 103 MMHG | HEART RATE: 68 BPM | RESPIRATION RATE: 18 BRPM | BODY MASS INDEX: 33.98 KG/M2 | OXYGEN SATURATION: 98 % | TEMPERATURE: 98 F

## 2021-12-21 VITALS
HEIGHT: 68 IN | SYSTOLIC BLOOD PRESSURE: 105 MMHG | HEART RATE: 80 BPM | TEMPERATURE: 98 F | OXYGEN SATURATION: 96 % | BODY MASS INDEX: 33.98 KG/M2 | WEIGHT: 224.19 LBS | RESPIRATION RATE: 18 BRPM | DIASTOLIC BLOOD PRESSURE: 58 MMHG

## 2021-12-21 DIAGNOSIS — I25.10 CAD IN NATIVE ARTERY: ICD-10-CM

## 2021-12-21 DIAGNOSIS — D84.81 IMMUNODEFICIENCY SECONDARY TO NEOPLASM: ICD-10-CM

## 2021-12-21 DIAGNOSIS — C78.7 SECONDARY MALIGNANT NEOPLASM OF LIVER: ICD-10-CM

## 2021-12-21 DIAGNOSIS — I10 HYPERTENSION, ESSENTIAL: ICD-10-CM

## 2021-12-21 DIAGNOSIS — G89.3 CANCER RELATED PAIN: ICD-10-CM

## 2021-12-21 DIAGNOSIS — I81 PORTAL VEIN THROMBOSIS: ICD-10-CM

## 2021-12-21 DIAGNOSIS — E80.6 HYPERBILIRUBINEMIA: ICD-10-CM

## 2021-12-21 DIAGNOSIS — C22.1 INTRAHEPATIC CHOLANGIOCARCINOMA: Primary | ICD-10-CM

## 2021-12-21 DIAGNOSIS — D49.9 IMMUNODEFICIENCY SECONDARY TO NEOPLASM: ICD-10-CM

## 2021-12-21 PROCEDURE — 99215 PR OFFICE/OUTPT VISIT, EST, LEVL V, 40-54 MIN: ICD-10-PCS | Mod: S$GLB,,, | Performed by: PHYSICIAN ASSISTANT

## 2021-12-21 PROCEDURE — 96413 CHEMO IV INFUSION 1 HR: CPT | Mod: HCNC

## 2021-12-21 PROCEDURE — 99499 UNLISTED E&M SERVICE: CPT | Mod: S$GLB,,, | Performed by: PHYSICIAN ASSISTANT

## 2021-12-21 PROCEDURE — 63600175 PHARM REV CODE 636 W HCPCS: Mod: HCNC | Performed by: INTERNAL MEDICINE

## 2021-12-21 PROCEDURE — 96375 TX/PRO/DX INJ NEW DRUG ADDON: CPT | Mod: HCNC

## 2021-12-21 PROCEDURE — 96417 CHEMO IV INFUS EACH ADDL SEQ: CPT | Mod: HCNC

## 2021-12-21 PROCEDURE — 96367 TX/PROPH/DG ADDL SEQ IV INF: CPT | Mod: HCNC

## 2021-12-21 PROCEDURE — 25000003 PHARM REV CODE 250: Mod: HCNC | Performed by: INTERNAL MEDICINE

## 2021-12-21 PROCEDURE — 96368 THER/DIAG CONCURRENT INF: CPT | Mod: HCNC

## 2021-12-21 PROCEDURE — 99215 OFFICE O/P EST HI 40 MIN: CPT | Mod: S$GLB,,, | Performed by: PHYSICIAN ASSISTANT

## 2021-12-21 PROCEDURE — 99999 PR PBB SHADOW E&M-EST. PATIENT-LVL V: ICD-10-PCS | Mod: PBBFAC,HCNC,, | Performed by: PHYSICIAN ASSISTANT

## 2021-12-21 PROCEDURE — 99999 PR PBB SHADOW E&M-EST. PATIENT-LVL V: CPT | Mod: PBBFAC,HCNC,, | Performed by: PHYSICIAN ASSISTANT

## 2021-12-21 PROCEDURE — 96361 HYDRATE IV INFUSION ADD-ON: CPT | Mod: HCNC

## 2021-12-21 PROCEDURE — A4216 STERILE WATER/SALINE, 10 ML: HCPCS | Mod: HCNC | Performed by: INTERNAL MEDICINE

## 2021-12-21 PROCEDURE — 99499 RISK ADDL DX/OHS AUDIT: ICD-10-PCS | Mod: S$GLB,,, | Performed by: PHYSICIAN ASSISTANT

## 2021-12-21 RX ORDER — SODIUM CHLORIDE AND POTASSIUM CHLORIDE 150; 900 MG/100ML; MG/100ML
INJECTION, SOLUTION INTRAVENOUS ONCE
Status: COMPLETED | OUTPATIENT
Start: 2021-12-21 | End: 2021-12-21

## 2021-12-21 RX ORDER — HEPARIN 100 UNIT/ML
500 SYRINGE INTRAVENOUS
Status: CANCELLED | OUTPATIENT
Start: 2021-12-21

## 2021-12-21 RX ORDER — SODIUM CHLORIDE 0.9 % (FLUSH) 0.9 %
10 SYRINGE (ML) INJECTION
Status: CANCELLED | OUTPATIENT
Start: 2021-12-21

## 2021-12-21 RX ORDER — HEPARIN 100 UNIT/ML
500 SYRINGE INTRAVENOUS
Status: DISCONTINUED | OUTPATIENT
Start: 2021-12-21 | End: 2021-12-21 | Stop reason: HOSPADM

## 2021-12-21 RX ORDER — MAGNESIUM SULFATE 1 G/100ML
1 INJECTION INTRAVENOUS ONCE
Status: COMPLETED | OUTPATIENT
Start: 2021-12-21 | End: 2021-12-21

## 2021-12-21 RX ORDER — SODIUM CHLORIDE 0.9 % (FLUSH) 0.9 %
10 SYRINGE (ML) INJECTION
Status: DISCONTINUED | OUTPATIENT
Start: 2021-12-21 | End: 2021-12-21 | Stop reason: HOSPADM

## 2021-12-21 RX ADMIN — PALONOSETRON HYDROCHLORIDE 0.25 MG: 0.25 INJECTION, SOLUTION INTRAVENOUS at 11:12

## 2021-12-21 RX ADMIN — SODIUM CHLORIDE 500 ML: 0.9 INJECTION, SOLUTION INTRAVENOUS at 01:12

## 2021-12-21 RX ADMIN — APREPITANT 130 MG: 130 INJECTION, EMULSION INTRAVENOUS at 11:12

## 2021-12-21 RX ADMIN — POTASSIUM CHLORIDE AND SODIUM CHLORIDE: 900; 150 INJECTION, SOLUTION INTRAVENOUS at 10:12

## 2021-12-21 RX ADMIN — HEPARIN 500 UNITS: 100 SYRINGE at 02:12

## 2021-12-21 RX ADMIN — MAGNESIUM SULFATE HEPTAHYDRATE 1 G: 1 INJECTION, SOLUTION INTRAVENOUS at 10:12

## 2021-12-21 RX ADMIN — CISPLATIN 57 MG: 1 INJECTION INTRAVENOUS at 12:12

## 2021-12-21 RX ADMIN — Medication 10 ML: at 02:12

## 2021-12-21 RX ADMIN — GEMCITABINE HYDROCHLORIDE 1800 MG: 1 INJECTION, SOLUTION INTRAVENOUS at 12:12

## 2021-12-21 RX ADMIN — SODIUM CHLORIDE: 0.9 INJECTION, SOLUTION INTRAVENOUS at 09:12

## 2021-12-22 ENCOUNTER — PATIENT MESSAGE (OUTPATIENT)
Dept: HEMATOLOGY/ONCOLOGY | Facility: CLINIC | Age: 71
End: 2021-12-22
Payer: MEDICARE

## 2021-12-23 ENCOUNTER — PATIENT MESSAGE (OUTPATIENT)
Dept: GASTROENTEROLOGY | Facility: CLINIC | Age: 71
End: 2021-12-23
Payer: MEDICARE

## 2021-12-23 DIAGNOSIS — K21.9 GASTROESOPHAGEAL REFLUX DISEASE, UNSPECIFIED WHETHER ESOPHAGITIS PRESENT: Primary | ICD-10-CM

## 2021-12-23 LAB
BACTERIA BLD CULT: NORMAL
BACTERIA BLD CULT: NORMAL

## 2021-12-23 RX ORDER — RABEPRAZOLE SODIUM 20 MG/1
20 TABLET, DELAYED RELEASE ORAL DAILY
Qty: 90 TABLET | Refills: 3 | Status: SHIPPED | OUTPATIENT
Start: 2021-12-23 | End: 2023-01-01

## 2021-12-24 ENCOUNTER — PATIENT MESSAGE (OUTPATIENT)
Dept: HEMATOLOGY/ONCOLOGY | Facility: CLINIC | Age: 71
End: 2021-12-24
Payer: MEDICARE

## 2021-12-26 ENCOUNTER — PATIENT MESSAGE (OUTPATIENT)
Dept: GASTROENTEROLOGY | Facility: CLINIC | Age: 71
End: 2021-12-26
Payer: MEDICARE

## 2021-12-27 ENCOUNTER — TELEPHONE (OUTPATIENT)
Dept: GASTROENTEROLOGY | Facility: CLINIC | Age: 71
End: 2021-12-27
Payer: MEDICARE

## 2021-12-28 ENCOUNTER — OFFICE VISIT (OUTPATIENT)
Dept: SURGERY | Facility: CLINIC | Age: 71
End: 2021-12-28
Payer: MEDICARE

## 2021-12-28 VITALS
SYSTOLIC BLOOD PRESSURE: 106 MMHG | BODY MASS INDEX: 33.96 KG/M2 | DIASTOLIC BLOOD PRESSURE: 65 MMHG | WEIGHT: 223.31 LBS | HEART RATE: 69 BPM

## 2021-12-28 DIAGNOSIS — K62.89 RECTAL PAIN: Primary | ICD-10-CM

## 2021-12-28 PROCEDURE — 3044F PR MOST RECENT HEMOGLOBIN A1C LEVEL <7.0%: ICD-10-PCS | Mod: HCNC,CPTII,S$GLB, | Performed by: NURSE PRACTITIONER

## 2021-12-28 PROCEDURE — 1159F MED LIST DOCD IN RCRD: CPT | Mod: HCNC,CPTII,S$GLB, | Performed by: NURSE PRACTITIONER

## 2021-12-28 PROCEDURE — 3074F PR MOST RECENT SYSTOLIC BLOOD PRESSURE < 130 MM HG: ICD-10-PCS | Mod: HCNC,CPTII,S$GLB, | Performed by: NURSE PRACTITIONER

## 2021-12-28 PROCEDURE — 99999 PR PBB SHADOW E&M-EST. PATIENT-LVL III: ICD-10-PCS | Mod: PBBFAC,HCNC,, | Performed by: NURSE PRACTITIONER

## 2021-12-28 PROCEDURE — 99999 PR PBB SHADOW E&M-EST. PATIENT-LVL III: CPT | Mod: PBBFAC,HCNC,, | Performed by: NURSE PRACTITIONER

## 2021-12-28 PROCEDURE — 99204 OFFICE O/P NEW MOD 45 MIN: CPT | Mod: HCNC,S$GLB,, | Performed by: NURSE PRACTITIONER

## 2021-12-28 PROCEDURE — 3008F BODY MASS INDEX DOCD: CPT | Mod: HCNC,CPTII,S$GLB, | Performed by: NURSE PRACTITIONER

## 2021-12-28 PROCEDURE — 99204 PR OFFICE/OUTPT VISIT, NEW, LEVL IV, 45-59 MIN: ICD-10-PCS | Mod: HCNC,S$GLB,, | Performed by: NURSE PRACTITIONER

## 2021-12-28 PROCEDURE — 3044F HG A1C LEVEL LT 7.0%: CPT | Mod: HCNC,CPTII,S$GLB, | Performed by: NURSE PRACTITIONER

## 2021-12-28 PROCEDURE — 1159F PR MEDICATION LIST DOCUMENTED IN MEDICAL RECORD: ICD-10-PCS | Mod: HCNC,CPTII,S$GLB, | Performed by: NURSE PRACTITIONER

## 2021-12-28 PROCEDURE — 3078F PR MOST RECENT DIASTOLIC BLOOD PRESSURE < 80 MM HG: ICD-10-PCS | Mod: HCNC,CPTII,S$GLB, | Performed by: NURSE PRACTITIONER

## 2021-12-28 PROCEDURE — 3008F PR BODY MASS INDEX (BMI) DOCUMENTED: ICD-10-PCS | Mod: HCNC,CPTII,S$GLB, | Performed by: NURSE PRACTITIONER

## 2021-12-28 PROCEDURE — 3074F SYST BP LT 130 MM HG: CPT | Mod: HCNC,CPTII,S$GLB, | Performed by: NURSE PRACTITIONER

## 2021-12-28 PROCEDURE — 3078F DIAST BP <80 MM HG: CPT | Mod: HCNC,CPTII,S$GLB, | Performed by: NURSE PRACTITIONER

## 2022-01-01 ENCOUNTER — IMMUNIZATION (OUTPATIENT)
Dept: INTERNAL MEDICINE | Facility: CLINIC | Age: 72
End: 2022-01-01
Payer: MEDICARE

## 2022-01-01 ENCOUNTER — LAB VISIT (OUTPATIENT)
Dept: LAB | Facility: HOSPITAL | Age: 72
End: 2022-01-01
Attending: INTERNAL MEDICINE
Payer: MEDICARE

## 2022-01-01 ENCOUNTER — PATIENT MESSAGE (OUTPATIENT)
Dept: HEMATOLOGY/ONCOLOGY | Facility: CLINIC | Age: 72
End: 2022-01-01
Payer: MEDICARE

## 2022-01-01 ENCOUNTER — HOSPITAL ENCOUNTER (OUTPATIENT)
Dept: INTERVENTIONAL RADIOLOGY/VASCULAR | Facility: HOSPITAL | Age: 72
Discharge: HOME | End: 2022-07-19
Attending: FAMILY MEDICINE
Payer: MEDICARE

## 2022-01-01 ENCOUNTER — ANESTHESIA EVENT (OUTPATIENT)
Dept: INTERVENTIONAL RADIOLOGY/VASCULAR | Facility: HOSPITAL | Age: 72
End: 2022-01-01
Payer: MEDICARE

## 2022-01-01 ENCOUNTER — TELEPHONE (OUTPATIENT)
Dept: HEMATOLOGY/ONCOLOGY | Facility: CLINIC | Age: 72
End: 2022-01-01
Payer: MEDICARE

## 2022-01-01 ENCOUNTER — PATIENT MESSAGE (OUTPATIENT)
Dept: ENDOSCOPY | Facility: HOSPITAL | Age: 72
End: 2022-01-01
Payer: MEDICARE

## 2022-01-01 ENCOUNTER — TELEPHONE (OUTPATIENT)
Dept: PALLIATIVE MEDICINE | Facility: CLINIC | Age: 72
End: 2022-01-01
Payer: MEDICARE

## 2022-01-01 ENCOUNTER — PATIENT MESSAGE (OUTPATIENT)
Dept: INTERNAL MEDICINE | Facility: CLINIC | Age: 72
End: 2022-01-01

## 2022-01-01 ENCOUNTER — OFFICE VISIT (OUTPATIENT)
Dept: HEMATOLOGY/ONCOLOGY | Facility: CLINIC | Age: 72
End: 2022-01-01
Payer: MEDICARE

## 2022-01-01 ENCOUNTER — INFUSION (OUTPATIENT)
Dept: INFUSION THERAPY | Facility: HOSPITAL | Age: 72
End: 2022-01-01
Payer: MEDICARE

## 2022-01-01 ENCOUNTER — DOCUMENTATION ONLY (OUTPATIENT)
Dept: PREADMISSION TESTING | Facility: HOSPITAL | Age: 72
End: 2022-01-01
Payer: MEDICARE

## 2022-01-01 ENCOUNTER — LAB VISIT (OUTPATIENT)
Dept: LAB | Facility: HOSPITAL | Age: 72
End: 2022-01-01
Payer: MEDICARE

## 2022-01-01 ENCOUNTER — ANESTHESIA EVENT (OUTPATIENT)
Dept: ENDOSCOPY | Facility: HOSPITAL | Age: 72
End: 2022-01-01
Payer: MEDICARE

## 2022-01-01 ENCOUNTER — OUTPATIENT CASE MANAGEMENT (OUTPATIENT)
Dept: ADMINISTRATIVE | Facility: OTHER | Age: 72
End: 2022-01-01
Payer: MEDICARE

## 2022-01-01 ENCOUNTER — HOSPITAL ENCOUNTER (OUTPATIENT)
Dept: RADIOLOGY | Facility: HOSPITAL | Age: 72
Discharge: HOME | End: 2022-09-02
Attending: FAMILY MEDICINE
Payer: MEDICARE

## 2022-01-01 ENCOUNTER — HOSPITAL ENCOUNTER (OUTPATIENT)
Dept: RADIOLOGY | Facility: HOSPITAL | Age: 72
Discharge: HOME | End: 2022-06-19
Attending: PHYSICIAN ASSISTANT
Payer: MEDICARE

## 2022-01-01 ENCOUNTER — PATIENT MESSAGE (OUTPATIENT)
Dept: HEMATOLOGY/ONCOLOGY | Facility: CLINIC | Age: 72
End: 2022-01-01

## 2022-01-01 ENCOUNTER — PATIENT MESSAGE (OUTPATIENT)
Dept: GASTROENTEROLOGY | Facility: CLINIC | Age: 72
End: 2022-01-01
Payer: MEDICARE

## 2022-01-01 ENCOUNTER — TELEPHONE (OUTPATIENT)
Dept: GASTROENTEROLOGY | Facility: CLINIC | Age: 72
End: 2022-01-01
Payer: MEDICARE

## 2022-01-01 ENCOUNTER — PATIENT MESSAGE (OUTPATIENT)
Dept: INTERNAL MEDICINE | Facility: CLINIC | Age: 72
End: 2022-01-01
Payer: MEDICARE

## 2022-01-01 ENCOUNTER — PATIENT MESSAGE (OUTPATIENT)
Dept: SURGERY | Facility: CLINIC | Age: 72
End: 2022-01-01
Payer: MEDICARE

## 2022-01-01 ENCOUNTER — PATIENT OUTREACH (OUTPATIENT)
Dept: ADMINISTRATIVE | Facility: HOSPITAL | Age: 72
End: 2022-01-01
Payer: MEDICARE

## 2022-01-01 ENCOUNTER — PATIENT MESSAGE (OUTPATIENT)
Dept: NEUROLOGY | Facility: CLINIC | Age: 72
End: 2022-01-01
Payer: MEDICARE

## 2022-01-01 ENCOUNTER — HOSPITAL ENCOUNTER (OUTPATIENT)
Dept: INTERVENTIONAL RADIOLOGY/VASCULAR | Facility: HOSPITAL | Age: 72
Discharge: HOME | End: 2022-09-28
Attending: RADIOLOGY
Payer: MEDICARE

## 2022-01-01 ENCOUNTER — LAB VISIT (OUTPATIENT)
Dept: LAB | Facility: HOSPITAL | Age: 72
End: 2022-01-01
Attending: FAMILY MEDICINE
Payer: MEDICARE

## 2022-01-01 ENCOUNTER — ANESTHESIA (OUTPATIENT)
Dept: SURGERY | Facility: HOSPITAL | Age: 72
DRG: 338 | End: 2022-01-01
Payer: MEDICARE

## 2022-01-01 ENCOUNTER — OFFICE VISIT (OUTPATIENT)
Dept: NEUROLOGY | Facility: CLINIC | Age: 72
End: 2022-01-01
Attending: FAMILY MEDICINE
Payer: MEDICARE

## 2022-01-01 ENCOUNTER — INFUSION (OUTPATIENT)
Dept: INFUSION THERAPY | Facility: HOSPITAL | Age: 72
End: 2022-01-01
Attending: INTERNAL MEDICINE
Payer: MEDICARE

## 2022-01-01 ENCOUNTER — OFFICE VISIT (OUTPATIENT)
Dept: SURGERY | Facility: CLINIC | Age: 72
End: 2022-01-01
Payer: MEDICARE

## 2022-01-01 ENCOUNTER — PATIENT MESSAGE (OUTPATIENT)
Dept: ADMINISTRATIVE | Facility: OTHER | Age: 72
End: 2022-01-01
Payer: MEDICARE

## 2022-01-01 ENCOUNTER — TELEPHONE (OUTPATIENT)
Dept: ENDOSCOPY | Facility: HOSPITAL | Age: 72
End: 2022-01-01
Payer: MEDICARE

## 2022-01-01 ENCOUNTER — TELEPHONE (OUTPATIENT)
Dept: INTERNAL MEDICINE | Facility: CLINIC | Age: 72
End: 2022-01-01
Payer: MEDICARE

## 2022-01-01 ENCOUNTER — ANESTHESIA (OUTPATIENT)
Dept: ENDOSCOPY | Facility: HOSPITAL | Age: 72
End: 2022-01-01
Payer: MEDICARE

## 2022-01-01 ENCOUNTER — CLINICAL SUPPORT (OUTPATIENT)
Dept: INFECTIOUS DISEASES | Facility: CLINIC | Age: 72
End: 2022-01-01
Payer: MEDICARE

## 2022-01-01 ENCOUNTER — OFFICE VISIT (OUTPATIENT)
Dept: INTERVENTIONAL RADIOLOGY/VASCULAR | Facility: CLINIC | Age: 72
End: 2022-01-01
Payer: MEDICARE

## 2022-01-01 ENCOUNTER — CLINICAL SUPPORT (OUTPATIENT)
Dept: REHABILITATION | Facility: OTHER | Age: 72
End: 2022-01-01
Attending: INTERNAL MEDICINE
Payer: MEDICARE

## 2022-01-01 ENCOUNTER — OFFICE VISIT (OUTPATIENT)
Dept: PALLIATIVE MEDICINE | Facility: CLINIC | Age: 72
End: 2022-01-01
Payer: MEDICARE

## 2022-01-01 ENCOUNTER — HOSPITAL ENCOUNTER (OUTPATIENT)
Dept: INTERVENTIONAL RADIOLOGY/VASCULAR | Facility: HOSPITAL | Age: 72
Discharge: HOME | End: 2022-04-19
Attending: FAMILY MEDICINE
Payer: MEDICARE

## 2022-01-01 ENCOUNTER — OFFICE VISIT (OUTPATIENT)
Dept: PSYCHIATRY | Facility: CLINIC | Age: 72
End: 2022-01-01
Payer: MEDICARE

## 2022-01-01 ENCOUNTER — HOSPITAL ENCOUNTER (INPATIENT)
Facility: HOSPITAL | Age: 72
LOS: 4 days | Discharge: HOME HEALTH | DRG: 338 | End: 2022-11-06
Attending: EMERGENCY MEDICINE | Admitting: SURGERY
Payer: MEDICARE

## 2022-01-01 ENCOUNTER — PATIENT MESSAGE (OUTPATIENT)
Dept: PHARMACY | Facility: CLINIC | Age: 72
End: 2022-01-01
Payer: MEDICARE

## 2022-01-01 ENCOUNTER — CLINICAL SUPPORT (OUTPATIENT)
Dept: GASTROENTEROLOGY | Facility: CLINIC | Age: 72
End: 2022-01-01
Payer: MEDICARE

## 2022-01-01 ENCOUNTER — OFFICE VISIT (OUTPATIENT)
Dept: INTERNAL MEDICINE | Facility: CLINIC | Age: 72
End: 2022-01-01
Attending: FAMILY MEDICINE
Payer: MEDICARE

## 2022-01-01 ENCOUNTER — HOSPITAL ENCOUNTER (OUTPATIENT)
Dept: INTERVENTIONAL RADIOLOGY/VASCULAR | Facility: HOSPITAL | Age: 72
Discharge: HOME | End: 2022-05-18
Attending: FAMILY MEDICINE
Payer: MEDICARE

## 2022-01-01 ENCOUNTER — PATIENT MESSAGE (OUTPATIENT)
Dept: PALLIATIVE MEDICINE | Facility: CLINIC | Age: 72
End: 2022-01-01

## 2022-01-01 ENCOUNTER — HOSPITAL ENCOUNTER (OUTPATIENT)
Dept: RADIOLOGY | Facility: HOSPITAL | Age: 72
Discharge: HOME | End: 2022-10-19
Attending: INTERNAL MEDICINE
Payer: MEDICARE

## 2022-01-01 ENCOUNTER — HOSPITAL ENCOUNTER (EMERGENCY)
Facility: HOSPITAL | Age: 72
Discharge: HOME | End: 2022-05-11
Attending: EMERGENCY MEDICINE
Payer: MEDICARE

## 2022-01-01 ENCOUNTER — TELEPHONE (OUTPATIENT)
Dept: RESEARCH | Facility: HOSPITAL | Age: 72
End: 2022-01-01
Payer: MEDICARE

## 2022-01-01 ENCOUNTER — TELEPHONE (OUTPATIENT)
Dept: NEUROLOGY | Facility: CLINIC | Age: 72
End: 2022-01-01
Payer: MEDICARE

## 2022-01-01 ENCOUNTER — PATIENT MESSAGE (OUTPATIENT)
Dept: GASTROENTEROLOGY | Facility: CLINIC | Age: 72
End: 2022-01-01

## 2022-01-01 ENCOUNTER — HOSPITAL ENCOUNTER (OUTPATIENT)
Facility: HOSPITAL | Age: 72
Discharge: HOME | End: 2022-04-05
Attending: INTERNAL MEDICINE | Admitting: INTERNAL MEDICINE
Payer: MEDICARE

## 2022-01-01 ENCOUNTER — OFFICE VISIT (OUTPATIENT)
Dept: INTERNAL MEDICINE | Facility: CLINIC | Age: 72
End: 2022-01-01
Payer: MEDICARE

## 2022-01-01 ENCOUNTER — OFFICE VISIT (OUTPATIENT)
Dept: GASTROENTEROLOGY | Facility: CLINIC | Age: 72
End: 2022-01-01
Payer: MEDICARE

## 2022-01-01 ENCOUNTER — ANESTHESIA EVENT (OUTPATIENT)
Dept: SURGERY | Facility: HOSPITAL | Age: 72
DRG: 338 | End: 2022-01-01
Payer: MEDICARE

## 2022-01-01 ENCOUNTER — HOSPITAL ENCOUNTER (OUTPATIENT)
Facility: HOSPITAL | Age: 72
Discharge: HOME | End: 2022-06-07
Attending: INTERNAL MEDICINE | Admitting: INTERNAL MEDICINE
Payer: MEDICARE

## 2022-01-01 ENCOUNTER — PATIENT MESSAGE (OUTPATIENT)
Dept: PALLIATIVE MEDICINE | Facility: CLINIC | Age: 72
End: 2022-01-01
Payer: MEDICARE

## 2022-01-01 ENCOUNTER — HOSPITAL ENCOUNTER (OUTPATIENT)
Dept: INTERVENTIONAL RADIOLOGY/VASCULAR | Facility: HOSPITAL | Age: 72
Discharge: HOME | End: 2022-08-02
Attending: FAMILY MEDICINE
Payer: MEDICARE

## 2022-01-01 ENCOUNTER — HOSPITAL ENCOUNTER (OUTPATIENT)
Dept: RADIOLOGY | Facility: HOSPITAL | Age: 72
Discharge: HOME | End: 2022-06-09
Attending: PHYSICIAN ASSISTANT
Payer: MEDICARE

## 2022-01-01 ENCOUNTER — PATIENT OUTREACH (OUTPATIENT)
Dept: ADMINISTRATIVE | Facility: CLINIC | Age: 72
End: 2022-01-01
Payer: MEDICARE

## 2022-01-01 ENCOUNTER — DOCUMENTATION ONLY (OUTPATIENT)
Dept: HEMATOLOGY/ONCOLOGY | Facility: CLINIC | Age: 72
End: 2022-01-01
Payer: MEDICARE

## 2022-01-01 ENCOUNTER — HOSPITAL ENCOUNTER (OUTPATIENT)
Dept: RADIOLOGY | Facility: HOSPITAL | Age: 72
Discharge: HOME | End: 2022-06-15
Attending: PHYSICIAN ASSISTANT
Payer: MEDICARE

## 2022-01-01 VITALS
TEMPERATURE: 98 F | SYSTOLIC BLOOD PRESSURE: 149 MMHG | HEIGHT: 68 IN | WEIGHT: 222.69 LBS | BODY MASS INDEX: 33.75 KG/M2 | RESPIRATION RATE: 18 BRPM | DIASTOLIC BLOOD PRESSURE: 81 MMHG | HEART RATE: 72 BPM

## 2022-01-01 VITALS
RESPIRATION RATE: 18 BRPM | BODY MASS INDEX: 34.63 KG/M2 | SYSTOLIC BLOOD PRESSURE: 133 MMHG | TEMPERATURE: 99 F | OXYGEN SATURATION: 99 % | HEIGHT: 68 IN | WEIGHT: 228.5 LBS | DIASTOLIC BLOOD PRESSURE: 64 MMHG | HEART RATE: 74 BPM

## 2022-01-01 VITALS
SYSTOLIC BLOOD PRESSURE: 124 MMHG | HEART RATE: 70 BPM | BODY MASS INDEX: 34.15 KG/M2 | WEIGHT: 225.31 LBS | DIASTOLIC BLOOD PRESSURE: 74 MMHG | HEIGHT: 68 IN

## 2022-01-01 VITALS
SYSTOLIC BLOOD PRESSURE: 102 MMHG | HEART RATE: 74 BPM | RESPIRATION RATE: 18 BRPM | WEIGHT: 220.88 LBS | TEMPERATURE: 98 F | BODY MASS INDEX: 33.48 KG/M2 | HEIGHT: 68 IN | HEART RATE: 74 BPM | WEIGHT: 214.06 LBS | DIASTOLIC BLOOD PRESSURE: 56 MMHG | RESPIRATION RATE: 18 BRPM | OXYGEN SATURATION: 99 % | DIASTOLIC BLOOD PRESSURE: 68 MMHG | TEMPERATURE: 99 F | HEIGHT: 68 IN | SYSTOLIC BLOOD PRESSURE: 130 MMHG | BODY MASS INDEX: 32.44 KG/M2

## 2022-01-01 VITALS
BODY MASS INDEX: 33.34 KG/M2 | TEMPERATURE: 98 F | DIASTOLIC BLOOD PRESSURE: 82 MMHG | SYSTOLIC BLOOD PRESSURE: 163 MMHG | WEIGHT: 220 LBS | HEIGHT: 68 IN | OXYGEN SATURATION: 96 % | RESPIRATION RATE: 19 BRPM | HEART RATE: 57 BPM

## 2022-01-01 VITALS
DIASTOLIC BLOOD PRESSURE: 67 MMHG | BODY MASS INDEX: 34.08 KG/M2 | OXYGEN SATURATION: 97 % | HEART RATE: 85 BPM | RESPIRATION RATE: 18 BRPM | HEIGHT: 68 IN | SYSTOLIC BLOOD PRESSURE: 135 MMHG | TEMPERATURE: 99 F | WEIGHT: 224.88 LBS

## 2022-01-01 VITALS
RESPIRATION RATE: 18 BRPM | BODY MASS INDEX: 33.65 KG/M2 | HEART RATE: 63 BPM | TEMPERATURE: 97 F | OXYGEN SATURATION: 98 % | DIASTOLIC BLOOD PRESSURE: 92 MMHG | HEIGHT: 68 IN | SYSTOLIC BLOOD PRESSURE: 151 MMHG | WEIGHT: 222 LBS

## 2022-01-01 VITALS
DIASTOLIC BLOOD PRESSURE: 83 MMHG | TEMPERATURE: 99 F | BODY MASS INDEX: 33.08 KG/M2 | RESPIRATION RATE: 18 BRPM | SYSTOLIC BLOOD PRESSURE: 137 MMHG | HEART RATE: 73 BPM | WEIGHT: 218.25 LBS | OXYGEN SATURATION: 98 % | HEIGHT: 68 IN

## 2022-01-01 VITALS
DIASTOLIC BLOOD PRESSURE: 60 MMHG | WEIGHT: 221.56 LBS | HEIGHT: 68 IN | RESPIRATION RATE: 18 BRPM | BODY MASS INDEX: 33.58 KG/M2 | HEART RATE: 72 BPM | OXYGEN SATURATION: 97 % | TEMPERATURE: 99 F | SYSTOLIC BLOOD PRESSURE: 120 MMHG

## 2022-01-01 VITALS
OXYGEN SATURATION: 96 % | WEIGHT: 218.81 LBS | SYSTOLIC BLOOD PRESSURE: 137 MMHG | SYSTOLIC BLOOD PRESSURE: 141 MMHG | DIASTOLIC BLOOD PRESSURE: 75 MMHG | DIASTOLIC BLOOD PRESSURE: 69 MMHG | SYSTOLIC BLOOD PRESSURE: 138 MMHG | HEART RATE: 72 BPM | HEIGHT: 68 IN | RESPIRATION RATE: 18 BRPM | TEMPERATURE: 99 F | TEMPERATURE: 99 F | RESPIRATION RATE: 16 BRPM | BODY MASS INDEX: 33.4 KG/M2 | HEART RATE: 67 BPM | DIASTOLIC BLOOD PRESSURE: 70 MMHG | WEIGHT: 220.38 LBS | HEART RATE: 69 BPM | TEMPERATURE: 98 F | OXYGEN SATURATION: 97 % | BODY MASS INDEX: 33.16 KG/M2 | HEIGHT: 68 IN | RESPIRATION RATE: 16 BRPM

## 2022-01-01 VITALS
HEIGHT: 68 IN | OXYGEN SATURATION: 96 % | WEIGHT: 218.5 LBS | TEMPERATURE: 98 F | SYSTOLIC BLOOD PRESSURE: 116 MMHG | DIASTOLIC BLOOD PRESSURE: 58 MMHG | SYSTOLIC BLOOD PRESSURE: 133 MMHG | RESPIRATION RATE: 18 BRPM | TEMPERATURE: 98 F | OXYGEN SATURATION: 99 % | BODY MASS INDEX: 33.53 KG/M2 | SYSTOLIC BLOOD PRESSURE: 110 MMHG | BODY MASS INDEX: 33.12 KG/M2 | HEIGHT: 68 IN | DIASTOLIC BLOOD PRESSURE: 66 MMHG | DIASTOLIC BLOOD PRESSURE: 57 MMHG | WEIGHT: 221.25 LBS | OXYGEN SATURATION: 98 % | HEART RATE: 84 BPM | HEART RATE: 89 BPM | HEART RATE: 84 BPM | RESPIRATION RATE: 18 BRPM | RESPIRATION RATE: 20 BRPM | TEMPERATURE: 99 F

## 2022-01-01 VITALS
DIASTOLIC BLOOD PRESSURE: 60 MMHG | OXYGEN SATURATION: 97 % | BODY MASS INDEX: 33.78 KG/M2 | HEIGHT: 68 IN | TEMPERATURE: 98 F | SYSTOLIC BLOOD PRESSURE: 106 MMHG | WEIGHT: 222.88 LBS | HEART RATE: 86 BPM | RESPIRATION RATE: 18 BRPM

## 2022-01-01 VITALS
RESPIRATION RATE: 18 BRPM | TEMPERATURE: 99 F | HEART RATE: 64 BPM | OXYGEN SATURATION: 98 % | WEIGHT: 222 LBS | DIASTOLIC BLOOD PRESSURE: 78 MMHG | HEART RATE: 62 BPM | OXYGEN SATURATION: 99 % | SYSTOLIC BLOOD PRESSURE: 151 MMHG | BODY MASS INDEX: 33.75 KG/M2 | SYSTOLIC BLOOD PRESSURE: 149 MMHG | DIASTOLIC BLOOD PRESSURE: 86 MMHG | RESPIRATION RATE: 18 BRPM | TEMPERATURE: 98 F

## 2022-01-01 VITALS
SYSTOLIC BLOOD PRESSURE: 116 MMHG | SYSTOLIC BLOOD PRESSURE: 113 MMHG | TEMPERATURE: 98 F | OXYGEN SATURATION: 97 % | RESPIRATION RATE: 18 BRPM | WEIGHT: 222.69 LBS | BODY MASS INDEX: 33.75 KG/M2 | RESPIRATION RATE: 18 BRPM | DIASTOLIC BLOOD PRESSURE: 61 MMHG | HEIGHT: 68 IN | HEART RATE: 83 BPM | DIASTOLIC BLOOD PRESSURE: 67 MMHG | OXYGEN SATURATION: 97 % | HEART RATE: 74 BPM | TEMPERATURE: 99 F

## 2022-01-01 VITALS
HEIGHT: 68 IN | SYSTOLIC BLOOD PRESSURE: 157 MMHG | DIASTOLIC BLOOD PRESSURE: 75 MMHG | OXYGEN SATURATION: 97 % | RESPIRATION RATE: 13 BRPM | BODY MASS INDEX: 33.34 KG/M2 | TEMPERATURE: 99 F | WEIGHT: 220 LBS | HEART RATE: 56 BPM

## 2022-01-01 VITALS
HEART RATE: 75 BPM | RESPIRATION RATE: 20 BRPM | SYSTOLIC BLOOD PRESSURE: 154 MMHG | HEIGHT: 68 IN | BODY MASS INDEX: 33.34 KG/M2 | DIASTOLIC BLOOD PRESSURE: 77 MMHG | TEMPERATURE: 98 F | OXYGEN SATURATION: 96 % | WEIGHT: 220 LBS

## 2022-01-01 VITALS
TEMPERATURE: 98 F | SYSTOLIC BLOOD PRESSURE: 151 MMHG | WEIGHT: 219.69 LBS | HEART RATE: 65 BPM | DIASTOLIC BLOOD PRESSURE: 72 MMHG | HEIGHT: 68 IN | BODY MASS INDEX: 33.3 KG/M2 | RESPIRATION RATE: 16 BRPM

## 2022-01-01 VITALS
WEIGHT: 222.69 LBS | HEART RATE: 74 BPM | BODY MASS INDEX: 33.34 KG/M2 | RESPIRATION RATE: 18 BRPM | HEART RATE: 104 BPM | DIASTOLIC BLOOD PRESSURE: 58 MMHG | OXYGEN SATURATION: 92 % | RESPIRATION RATE: 18 BRPM | SYSTOLIC BLOOD PRESSURE: 110 MMHG | TEMPERATURE: 98 F | SYSTOLIC BLOOD PRESSURE: 120 MMHG | HEIGHT: 68 IN | BODY MASS INDEX: 33.86 KG/M2 | DIASTOLIC BLOOD PRESSURE: 63 MMHG | TEMPERATURE: 97 F | WEIGHT: 220 LBS

## 2022-01-01 VITALS
DIASTOLIC BLOOD PRESSURE: 70 MMHG | HEIGHT: 68 IN | BODY MASS INDEX: 33.69 KG/M2 | WEIGHT: 222.31 LBS | RESPIRATION RATE: 18 BRPM | TEMPERATURE: 98 F | SYSTOLIC BLOOD PRESSURE: 134 MMHG | HEART RATE: 74 BPM

## 2022-01-01 VITALS
OXYGEN SATURATION: 94 % | RESPIRATION RATE: 18 BRPM | DIASTOLIC BLOOD PRESSURE: 69 MMHG | TEMPERATURE: 99 F | HEART RATE: 77 BPM | SYSTOLIC BLOOD PRESSURE: 133 MMHG

## 2022-01-01 VITALS
BODY MASS INDEX: 33.54 KG/M2 | SYSTOLIC BLOOD PRESSURE: 132 MMHG | WEIGHT: 221.31 LBS | DIASTOLIC BLOOD PRESSURE: 68 MMHG | HEIGHT: 68 IN | HEART RATE: 77 BPM | DIASTOLIC BLOOD PRESSURE: 70 MMHG | TEMPERATURE: 99 F | WEIGHT: 221.31 LBS | BODY MASS INDEX: 33.54 KG/M2 | HEART RATE: 74 BPM | OXYGEN SATURATION: 97 % | TEMPERATURE: 98 F | RESPIRATION RATE: 18 BRPM | RESPIRATION RATE: 18 BRPM | SYSTOLIC BLOOD PRESSURE: 155 MMHG | HEIGHT: 68 IN

## 2022-01-01 VITALS
DIASTOLIC BLOOD PRESSURE: 86 MMHG | WEIGHT: 220.69 LBS | BODY MASS INDEX: 33.45 KG/M2 | SYSTOLIC BLOOD PRESSURE: 162 MMHG | HEIGHT: 68 IN | HEART RATE: 82 BPM

## 2022-01-01 VITALS
HEIGHT: 68 IN | BODY MASS INDEX: 33.34 KG/M2 | SYSTOLIC BLOOD PRESSURE: 104 MMHG | OXYGEN SATURATION: 100 % | WEIGHT: 220 LBS | TEMPERATURE: 98 F | HEART RATE: 70 BPM | DIASTOLIC BLOOD PRESSURE: 59 MMHG | RESPIRATION RATE: 16 BRPM

## 2022-01-01 VITALS
OXYGEN SATURATION: 96 % | SYSTOLIC BLOOD PRESSURE: 158 MMHG | HEIGHT: 68 IN | HEART RATE: 60 BPM | BODY MASS INDEX: 33.34 KG/M2 | RESPIRATION RATE: 18 BRPM | TEMPERATURE: 97 F | DIASTOLIC BLOOD PRESSURE: 87 MMHG | WEIGHT: 220 LBS

## 2022-01-01 VITALS
RESPIRATION RATE: 18 BRPM | TEMPERATURE: 99 F | SYSTOLIC BLOOD PRESSURE: 128 MMHG | BODY MASS INDEX: 33.59 KG/M2 | DIASTOLIC BLOOD PRESSURE: 66 MMHG | HEART RATE: 74 BPM | WEIGHT: 220.88 LBS

## 2022-01-01 VITALS
HEART RATE: 75 BPM | RESPIRATION RATE: 18 BRPM | TEMPERATURE: 98 F | DIASTOLIC BLOOD PRESSURE: 73 MMHG | SYSTOLIC BLOOD PRESSURE: 143 MMHG | OXYGEN SATURATION: 96 %

## 2022-01-01 VITALS
TEMPERATURE: 99 F | HEART RATE: 73 BPM | SYSTOLIC BLOOD PRESSURE: 131 MMHG | DIASTOLIC BLOOD PRESSURE: 63 MMHG | RESPIRATION RATE: 18 BRPM

## 2022-01-01 VITALS
HEIGHT: 68 IN | OXYGEN SATURATION: 99 % | HEART RATE: 66 BPM | DIASTOLIC BLOOD PRESSURE: 66 MMHG | SYSTOLIC BLOOD PRESSURE: 116 MMHG | TEMPERATURE: 99 F | RESPIRATION RATE: 18 BRPM | WEIGHT: 222.44 LBS | BODY MASS INDEX: 33.71 KG/M2

## 2022-01-01 VITALS
RESPIRATION RATE: 18 BRPM | TEMPERATURE: 98 F | SYSTOLIC BLOOD PRESSURE: 133 MMHG | HEART RATE: 66 BPM | DIASTOLIC BLOOD PRESSURE: 62 MMHG

## 2022-01-01 VITALS
OXYGEN SATURATION: 99 % | TEMPERATURE: 67 F | DIASTOLIC BLOOD PRESSURE: 57 MMHG | DIASTOLIC BLOOD PRESSURE: 76 MMHG | TEMPERATURE: 98 F | RESPIRATION RATE: 18 BRPM | HEART RATE: 66 BPM | RESPIRATION RATE: 18 BRPM | HEART RATE: 67 BPM | SYSTOLIC BLOOD PRESSURE: 141 MMHG | SYSTOLIC BLOOD PRESSURE: 115 MMHG

## 2022-01-01 VITALS
HEART RATE: 71 BPM | TEMPERATURE: 98 F | BODY MASS INDEX: 32.75 KG/M2 | WEIGHT: 215.38 LBS | DIASTOLIC BLOOD PRESSURE: 67 MMHG | RESPIRATION RATE: 16 BRPM | OXYGEN SATURATION: 98 % | SYSTOLIC BLOOD PRESSURE: 111 MMHG

## 2022-01-01 VITALS
SYSTOLIC BLOOD PRESSURE: 139 MMHG | HEART RATE: 75 BPM | DIASTOLIC BLOOD PRESSURE: 78 MMHG | TEMPERATURE: 98 F | RESPIRATION RATE: 18 BRPM | OXYGEN SATURATION: 98 %

## 2022-01-01 VITALS — RESPIRATION RATE: 18 BRPM | HEART RATE: 81 BPM | DIASTOLIC BLOOD PRESSURE: 76 MMHG | SYSTOLIC BLOOD PRESSURE: 118 MMHG

## 2022-01-01 VITALS
DIASTOLIC BLOOD PRESSURE: 68 MMHG | BODY MASS INDEX: 33.34 KG/M2 | SYSTOLIC BLOOD PRESSURE: 130 MMHG | OXYGEN SATURATION: 99 % | TEMPERATURE: 98 F | HEART RATE: 64 BPM | HEIGHT: 68 IN | RESPIRATION RATE: 14 BRPM | WEIGHT: 220 LBS

## 2022-01-01 VITALS
OXYGEN SATURATION: 96 % | SYSTOLIC BLOOD PRESSURE: 121 MMHG | DIASTOLIC BLOOD PRESSURE: 61 MMHG | TEMPERATURE: 99 F | BODY MASS INDEX: 34.1 KG/M2 | RESPIRATION RATE: 18 BRPM | HEIGHT: 68 IN | HEART RATE: 79 BPM | WEIGHT: 225 LBS

## 2022-01-01 VITALS
OXYGEN SATURATION: 99 % | TEMPERATURE: 99 F | WEIGHT: 223 LBS | BODY MASS INDEX: 33.8 KG/M2 | HEART RATE: 80 BPM | SYSTOLIC BLOOD PRESSURE: 132 MMHG | DIASTOLIC BLOOD PRESSURE: 82 MMHG | HEIGHT: 68 IN | RESPIRATION RATE: 18 BRPM

## 2022-01-01 VITALS
RESPIRATION RATE: 18 BRPM | HEART RATE: 73 BPM | WEIGHT: 219.38 LBS | DIASTOLIC BLOOD PRESSURE: 58 MMHG | OXYGEN SATURATION: 97 % | HEIGHT: 68 IN | BODY MASS INDEX: 33.25 KG/M2 | TEMPERATURE: 99 F | SYSTOLIC BLOOD PRESSURE: 118 MMHG

## 2022-01-01 VITALS
OXYGEN SATURATION: 97 % | HEIGHT: 68 IN | DIASTOLIC BLOOD PRESSURE: 74 MMHG | WEIGHT: 230.81 LBS | WEIGHT: 229 LBS | HEART RATE: 75 BPM | BODY MASS INDEX: 34.98 KG/M2 | SYSTOLIC BLOOD PRESSURE: 131 MMHG | HEART RATE: 83 BPM | SYSTOLIC BLOOD PRESSURE: 118 MMHG | HEIGHT: 68 IN | BODY MASS INDEX: 34.71 KG/M2 | DIASTOLIC BLOOD PRESSURE: 78 MMHG

## 2022-01-01 VITALS
DIASTOLIC BLOOD PRESSURE: 61 MMHG | WEIGHT: 222.31 LBS | HEIGHT: 68 IN | BODY MASS INDEX: 33.08 KG/M2 | SYSTOLIC BLOOD PRESSURE: 154 MMHG | BODY MASS INDEX: 33.69 KG/M2 | RESPIRATION RATE: 18 BRPM | DIASTOLIC BLOOD PRESSURE: 77 MMHG | HEART RATE: 78 BPM | TEMPERATURE: 99 F | OXYGEN SATURATION: 98 % | SYSTOLIC BLOOD PRESSURE: 121 MMHG | HEIGHT: 68 IN | HEART RATE: 68 BPM | TEMPERATURE: 98 F | RESPIRATION RATE: 18 BRPM | WEIGHT: 218.25 LBS

## 2022-01-01 VITALS
HEIGHT: 68 IN | BODY MASS INDEX: 33.75 KG/M2 | RESPIRATION RATE: 18 BRPM | DIASTOLIC BLOOD PRESSURE: 66 MMHG | WEIGHT: 222.69 LBS | TEMPERATURE: 98 F | SYSTOLIC BLOOD PRESSURE: 124 MMHG | HEART RATE: 63 BPM

## 2022-01-01 VITALS — DIASTOLIC BLOOD PRESSURE: 64 MMHG | HEART RATE: 65 BPM | SYSTOLIC BLOOD PRESSURE: 119 MMHG | RESPIRATION RATE: 18 BRPM

## 2022-01-01 VITALS — DIASTOLIC BLOOD PRESSURE: 67 MMHG | SYSTOLIC BLOOD PRESSURE: 128 MMHG | HEART RATE: 66 BPM | RESPIRATION RATE: 18 BRPM

## 2022-01-01 DIAGNOSIS — C22.1 INTRAHEPATIC CHOLANGIOCARCINOMA: ICD-10-CM

## 2022-01-01 DIAGNOSIS — C22.1 INTRAHEPATIC CHOLANGIOCARCINOMA: Primary | ICD-10-CM

## 2022-01-01 DIAGNOSIS — I10 HYPERTENSION, ESSENTIAL: ICD-10-CM

## 2022-01-01 DIAGNOSIS — R11.0 NAUSEA: ICD-10-CM

## 2022-01-01 DIAGNOSIS — G89.3 CANCER RELATED PAIN: ICD-10-CM

## 2022-01-01 DIAGNOSIS — R06.00 DYSPNEA, UNSPECIFIED TYPE: ICD-10-CM

## 2022-01-01 DIAGNOSIS — D84.81 IMMUNODEFICIENCY SECONDARY TO NEOPLASM: ICD-10-CM

## 2022-01-01 DIAGNOSIS — R29.898 WEAKNESS OF LOWER EXTREMITY, UNSPECIFIED LATERALITY: ICD-10-CM

## 2022-01-01 DIAGNOSIS — T45.1X5A CHEMOTHERAPY-INDUCED THROMBOCYTOPENIA: ICD-10-CM

## 2022-01-01 DIAGNOSIS — Z79.899 IMMUNODEFICIENCY SECONDARY TO CHEMOTHERAPY: ICD-10-CM

## 2022-01-01 DIAGNOSIS — R63.0 ANOREXIA: ICD-10-CM

## 2022-01-01 DIAGNOSIS — I82.412 ACUTE DEEP VEIN THROMBOSIS (DVT) OF FEMORAL VEIN OF LEFT LOWER EXTREMITY: ICD-10-CM

## 2022-01-01 DIAGNOSIS — D64.9 ANEMIA, UNSPECIFIED TYPE: ICD-10-CM

## 2022-01-01 DIAGNOSIS — K76.9 LIVER LESION: ICD-10-CM

## 2022-01-01 DIAGNOSIS — E83.52 HYPERCALCEMIA OF MALIGNANCY: ICD-10-CM

## 2022-01-01 DIAGNOSIS — I25.10 CAD IN NATIVE ARTERY: ICD-10-CM

## 2022-01-01 DIAGNOSIS — T45.1X5A ANTINEOPLASTIC CHEMOTHERAPY INDUCED ANEMIA: ICD-10-CM

## 2022-01-01 DIAGNOSIS — C78.7 SECONDARY MALIGNANT NEOPLASM OF LIVER: ICD-10-CM

## 2022-01-01 DIAGNOSIS — R60.9 EDEMA, UNSPECIFIED TYPE: ICD-10-CM

## 2022-01-01 DIAGNOSIS — R10.9 ABDOMINAL PAIN, UNSPECIFIED ABDOMINAL LOCATION: ICD-10-CM

## 2022-01-01 DIAGNOSIS — I81 PORTAL VEIN THROMBOSIS: ICD-10-CM

## 2022-01-01 DIAGNOSIS — R19.5 FECAL OCCULT BLOOD TEST POSITIVE: Primary | ICD-10-CM

## 2022-01-01 DIAGNOSIS — R73.03 PREDIABETES: ICD-10-CM

## 2022-01-01 DIAGNOSIS — R53.83 FATIGUE, UNSPECIFIED TYPE: ICD-10-CM

## 2022-01-01 DIAGNOSIS — D64.81 ANEMIA DUE TO ANTINEOPLASTIC CHEMOTHERAPY: ICD-10-CM

## 2022-01-01 DIAGNOSIS — D69.59 CHEMOTHERAPY-INDUCED THROMBOCYTOPENIA: ICD-10-CM

## 2022-01-01 DIAGNOSIS — M47.812 CERVICAL SPONDYLOSIS: ICD-10-CM

## 2022-01-01 DIAGNOSIS — R68.89 RIGORS: ICD-10-CM

## 2022-01-01 DIAGNOSIS — D84.821 IMMUNODEFICIENCY SECONDARY TO CHEMOTHERAPY: ICD-10-CM

## 2022-01-01 DIAGNOSIS — D69.6 THROMBOCYTOPENIA: ICD-10-CM

## 2022-01-01 DIAGNOSIS — K59.00 CONSTIPATION, UNSPECIFIED CONSTIPATION TYPE: ICD-10-CM

## 2022-01-01 DIAGNOSIS — G89.3 CANCER-RELATED PAIN: ICD-10-CM

## 2022-01-01 DIAGNOSIS — K21.9 GERD (GASTROESOPHAGEAL REFLUX DISEASE): ICD-10-CM

## 2022-01-01 DIAGNOSIS — D49.9 IMMUNODEFICIENCY SECONDARY TO NEOPLASM: ICD-10-CM

## 2022-01-01 DIAGNOSIS — K92.1 BLOOD IN STOOL: Primary | ICD-10-CM

## 2022-01-01 DIAGNOSIS — M50.00 CERVICAL DISC DISEASE WITH MYELOPATHY: ICD-10-CM

## 2022-01-01 DIAGNOSIS — Z12.11 SPECIAL SCREENING FOR MALIGNANT NEOPLASMS, COLON: Primary | ICD-10-CM

## 2022-01-01 DIAGNOSIS — T45.1X5A ADVERSE EFFECT OF CHEMOTHERAPY, INITIAL ENCOUNTER: ICD-10-CM

## 2022-01-01 DIAGNOSIS — E80.6 HYPERBILIRUBINEMIA: ICD-10-CM

## 2022-01-01 DIAGNOSIS — G62.9 NEUROPATHY: ICD-10-CM

## 2022-01-01 DIAGNOSIS — D64.9 SEVERE ANEMIA: ICD-10-CM

## 2022-01-01 DIAGNOSIS — D64.81 ANTINEOPLASTIC CHEMOTHERAPY INDUCED ANEMIA: ICD-10-CM

## 2022-01-01 DIAGNOSIS — N17.9 AKI (ACUTE KIDNEY INJURY): ICD-10-CM

## 2022-01-01 DIAGNOSIS — R73.9 ELEVATED BLOOD SUGAR: ICD-10-CM

## 2022-01-01 DIAGNOSIS — T45.1X5A IMMUNODEFICIENCY SECONDARY TO CHEMOTHERAPY: ICD-10-CM

## 2022-01-01 DIAGNOSIS — N18.31 STAGE 3A CHRONIC KIDNEY DISEASE: ICD-10-CM

## 2022-01-01 DIAGNOSIS — I81 PORTAL VEIN THROMBOSIS: Primary | ICD-10-CM

## 2022-01-01 DIAGNOSIS — I82.412 ACUTE DEEP VEIN THROMBOSIS (DVT) OF LEFT FEMORAL VEIN: ICD-10-CM

## 2022-01-01 DIAGNOSIS — Z63.0 MARITAL CONFLICT: ICD-10-CM

## 2022-01-01 DIAGNOSIS — N40.1 BENIGN PROSTATIC HYPERPLASIA WITH URINARY FREQUENCY: ICD-10-CM

## 2022-01-01 DIAGNOSIS — D53.9 NUTRITIONAL ANEMIA: ICD-10-CM

## 2022-01-01 DIAGNOSIS — Z79.01 LONG TERM (CURRENT) USE OF ANTICOAGULANTS: ICD-10-CM

## 2022-01-01 DIAGNOSIS — T45.1X5A ANEMIA DUE TO ANTINEOPLASTIC CHEMOTHERAPY: ICD-10-CM

## 2022-01-01 DIAGNOSIS — R53.1 WEAKNESS GENERALIZED: ICD-10-CM

## 2022-01-01 DIAGNOSIS — I10 ESSENTIAL HYPERTENSION: ICD-10-CM

## 2022-01-01 DIAGNOSIS — R19.7 DIARRHEA, UNSPECIFIED TYPE: ICD-10-CM

## 2022-01-01 DIAGNOSIS — F43.23 ADJUSTMENT DISORDER WITH MIXED ANXIETY AND DEPRESSED MOOD: ICD-10-CM

## 2022-01-01 DIAGNOSIS — K64.8 BLEEDING INTERNAL HEMORRHOIDS: ICD-10-CM

## 2022-01-01 DIAGNOSIS — R53.0 NEOPLASTIC (MALIGNANT) RELATED FATIGUE: ICD-10-CM

## 2022-01-01 DIAGNOSIS — R53.81 DEBILITY: ICD-10-CM

## 2022-01-01 DIAGNOSIS — Z51.5 ENCOUNTER FOR PALLIATIVE CARE: ICD-10-CM

## 2022-01-01 DIAGNOSIS — C22.1 CHOLANGIOCARCINOMA: ICD-10-CM

## 2022-01-01 DIAGNOSIS — E83.42 HYPOMAGNESEMIA: ICD-10-CM

## 2022-01-01 DIAGNOSIS — D50.0 ANEMIA DUE TO CHRONIC BLOOD LOSS: ICD-10-CM

## 2022-01-01 DIAGNOSIS — G57.11 MERALGIA PARESTHETICA OF RIGHT SIDE: Primary | ICD-10-CM

## 2022-01-01 DIAGNOSIS — R10.9 ABDOMINAL PAIN, UNSPECIFIED ABDOMINAL LOCATION: Primary | ICD-10-CM

## 2022-01-01 DIAGNOSIS — C22.1 CHOLANGIOCARCINOMA: Primary | ICD-10-CM

## 2022-01-01 DIAGNOSIS — I27.9 CHRONIC PULMONARY HEART DISEASE: ICD-10-CM

## 2022-01-01 DIAGNOSIS — E78.5 HYPERLIPIDEMIA, UNSPECIFIED HYPERLIPIDEMIA TYPE: ICD-10-CM

## 2022-01-01 DIAGNOSIS — Z92.21 HISTORY OF CHEMOTHERAPY: ICD-10-CM

## 2022-01-01 DIAGNOSIS — R19.5 POSITIVE FECAL OCCULT BLOOD TEST: Primary | ICD-10-CM

## 2022-01-01 DIAGNOSIS — D63.0 ANEMIA IN NEOPLASTIC DISEASE: Primary | ICD-10-CM

## 2022-01-01 DIAGNOSIS — I82.412 ACUTE DEEP VEIN THROMBOSIS (DVT) OF FEMORAL VEIN OF LEFT LOWER EXTREMITY: Primary | ICD-10-CM

## 2022-01-01 DIAGNOSIS — N40.0 BENIGN PROSTATIC HYPERPLASIA, UNSPECIFIED WHETHER LOWER URINARY TRACT SYMPTOMS PRESENT: ICD-10-CM

## 2022-01-01 DIAGNOSIS — Z71.89 ADVANCED CARE PLANNING/COUNSELING DISCUSSION: ICD-10-CM

## 2022-01-01 DIAGNOSIS — I82.412 FEMORAL VEIN THROMBOSIS, LEFT: Primary | ICD-10-CM

## 2022-01-01 DIAGNOSIS — T45.1X5A CHEMOTHERAPY-INDUCED NEUROPATHY: ICD-10-CM

## 2022-01-01 DIAGNOSIS — E66.9 CLASS 1 OBESITY WITH SERIOUS COMORBIDITY AND BODY MASS INDEX (BMI) OF 33.0 TO 33.9 IN ADULT, UNSPECIFIED OBESITY TYPE: ICD-10-CM

## 2022-01-01 DIAGNOSIS — K62.5 RECTAL BLEEDING: Primary | ICD-10-CM

## 2022-01-01 DIAGNOSIS — Z51.5 ENCOUNTER FOR PALLIATIVE CARE: Primary | ICD-10-CM

## 2022-01-01 DIAGNOSIS — Z48.89 POSTOPERATIVE VISIT: Primary | ICD-10-CM

## 2022-01-01 DIAGNOSIS — Z79.01 CHRONIC ANTICOAGULATION: ICD-10-CM

## 2022-01-01 DIAGNOSIS — T45.1X5A ANTINEOPLASTIC CHEMOTHERAPY INDUCED ANEMIA: Primary | ICD-10-CM

## 2022-01-01 DIAGNOSIS — K35.211 ACUTE APPENDICITIS WITH PERFORATION, GENERALIZED PERITONITIS, AND ABSCESS, WITHOUT GANGRENE: Primary | ICD-10-CM

## 2022-01-01 DIAGNOSIS — K64.4 EXTERNAL HEMORRHOID: ICD-10-CM

## 2022-01-01 DIAGNOSIS — F43.22 ADJUSTMENT DISORDER WITH ANXIETY: Primary | ICD-10-CM

## 2022-01-01 DIAGNOSIS — R06.02 SHORTNESS OF BREATH ON EXERTION: ICD-10-CM

## 2022-01-01 DIAGNOSIS — G62.0 CHEMOTHERAPY-INDUCED NEUROPATHY: ICD-10-CM

## 2022-01-01 DIAGNOSIS — R20.2 NUMBNESS AND TINGLING OF LOWER EXTREMITY: ICD-10-CM

## 2022-01-01 DIAGNOSIS — G60.9 HEREDITARY AND IDIOPATHIC NEUROPATHY, UNSPECIFIED: ICD-10-CM

## 2022-01-01 DIAGNOSIS — R35.0 BENIGN PROSTATIC HYPERPLASIA WITH URINARY FREQUENCY: ICD-10-CM

## 2022-01-01 DIAGNOSIS — K21.9 GASTROESOPHAGEAL REFLUX DISEASE, UNSPECIFIED WHETHER ESOPHAGITIS PRESENT: ICD-10-CM

## 2022-01-01 DIAGNOSIS — M48.02 CERVICAL STENOSIS OF SPINAL CANAL: ICD-10-CM

## 2022-01-01 DIAGNOSIS — K42.0 INCARCERATED UMBILICAL HERNIA: ICD-10-CM

## 2022-01-01 DIAGNOSIS — D64.9 CHRONIC ANEMIA: ICD-10-CM

## 2022-01-01 DIAGNOSIS — R19.5 POSITIVE FECAL OCCULT BLOOD TEST: ICD-10-CM

## 2022-01-01 DIAGNOSIS — G57.11 MERALGIA PARESTHETICA OF RIGHT SIDE: ICD-10-CM

## 2022-01-01 DIAGNOSIS — K92.2 GASTROINTESTINAL HEMORRHAGE, UNSPECIFIED GASTROINTESTINAL HEMORRHAGE TYPE: ICD-10-CM

## 2022-01-01 DIAGNOSIS — K92.2 GASTROINTESTINAL HEMORRHAGE, UNSPECIFIED GASTROINTESTINAL HEMORRHAGE TYPE: Primary | ICD-10-CM

## 2022-01-01 DIAGNOSIS — D63.0 ANEMIA IN NEOPLASTIC DISEASE: ICD-10-CM

## 2022-01-01 DIAGNOSIS — E78.2 MIXED HYPERLIPIDEMIA: ICD-10-CM

## 2022-01-01 DIAGNOSIS — K42.9 UMBILICAL HERNIA WITHOUT OBSTRUCTION AND WITHOUT GANGRENE: ICD-10-CM

## 2022-01-01 DIAGNOSIS — D64.81 ANTINEOPLASTIC CHEMOTHERAPY INDUCED ANEMIA: Primary | ICD-10-CM

## 2022-01-01 DIAGNOSIS — R20.0 NUMBNESS AND TINGLING OF LOWER EXTREMITY: Primary | ICD-10-CM

## 2022-01-01 DIAGNOSIS — R35.0 INCREASED URINARY FREQUENCY: ICD-10-CM

## 2022-01-01 DIAGNOSIS — R20.0 NUMBNESS AND TINGLING OF LOWER EXTREMITY: ICD-10-CM

## 2022-01-01 DIAGNOSIS — K92.1 BLOOD IN STOOL: ICD-10-CM

## 2022-01-01 DIAGNOSIS — M25.561 RIGHT KNEE PAIN, UNSPECIFIED CHRONICITY: Primary | ICD-10-CM

## 2022-01-01 DIAGNOSIS — Z23 NEED FOR VACCINATION: Primary | ICD-10-CM

## 2022-01-01 DIAGNOSIS — R53.1 WEAKNESS: ICD-10-CM

## 2022-01-01 DIAGNOSIS — I82.412 DEEP VEIN THROMBOSIS (DVT) OF FEMORAL VEIN OF LEFT LOWER EXTREMITY, UNSPECIFIED CHRONICITY: ICD-10-CM

## 2022-01-01 DIAGNOSIS — R20.2 NUMBNESS AND TINGLING OF LOWER EXTREMITY: Primary | ICD-10-CM

## 2022-01-01 LAB
ABO + RH BLD: NORMAL
ALBUMIN SERPL BCP-MCNC: 2 G/DL (ref 3.5–5.2)
ALBUMIN SERPL BCP-MCNC: 2 G/DL (ref 3.5–5.2)
ALBUMIN SERPL BCP-MCNC: 2.1 G/DL (ref 3.5–5.2)
ALBUMIN SERPL BCP-MCNC: 2.1 G/DL (ref 3.5–5.2)
ALBUMIN SERPL BCP-MCNC: 2.2 G/DL (ref 3.5–5.2)
ALBUMIN SERPL BCP-MCNC: 2.5 G/DL (ref 3.5–5.2)
ALBUMIN SERPL BCP-MCNC: 2.6 G/DL (ref 3.5–5.2)
ALBUMIN SERPL BCP-MCNC: 2.7 G/DL (ref 3.5–5.2)
ALBUMIN SERPL BCP-MCNC: 2.8 G/DL (ref 3.5–5.2)
ALBUMIN SERPL BCP-MCNC: 2.9 G/DL (ref 3.5–5.2)
ALBUMIN SERPL BCP-MCNC: 3.1 G/DL (ref 3.5–5.2)
ALP SERPL-CCNC: 216 U/L (ref 55–135)
ALP SERPL-CCNC: 264 U/L (ref 55–135)
ALP SERPL-CCNC: 279 U/L (ref 55–135)
ALP SERPL-CCNC: 352 U/L (ref 55–135)
ALP SERPL-CCNC: 355 U/L (ref 55–135)
ALP SERPL-CCNC: 366 U/L (ref 55–135)
ALP SERPL-CCNC: 378 U/L (ref 55–135)
ALP SERPL-CCNC: 382 U/L (ref 55–135)
ALP SERPL-CCNC: 382 U/L (ref 55–135)
ALP SERPL-CCNC: 383 U/L (ref 55–135)
ALP SERPL-CCNC: 383 U/L (ref 55–135)
ALP SERPL-CCNC: 386 U/L (ref 55–135)
ALP SERPL-CCNC: 400 U/L (ref 55–135)
ALP SERPL-CCNC: 408 U/L (ref 55–135)
ALP SERPL-CCNC: 418 U/L (ref 55–135)
ALP SERPL-CCNC: 419 U/L (ref 55–135)
ALP SERPL-CCNC: 443 U/L (ref 55–135)
ALP SERPL-CCNC: 443 U/L (ref 55–135)
ALP SERPL-CCNC: 444 U/L (ref 55–135)
ALP SERPL-CCNC: 445 U/L (ref 55–135)
ALP SERPL-CCNC: 488 U/L (ref 55–135)
ALP SERPL-CCNC: 648 U/L (ref 55–135)
ALT SERPL W/O P-5'-P-CCNC: 159 U/L (ref 10–44)
ALT SERPL W/O P-5'-P-CCNC: 170 U/L (ref 10–44)
ALT SERPL W/O P-5'-P-CCNC: 18 U/L (ref 10–44)
ALT SERPL W/O P-5'-P-CCNC: 28 U/L (ref 10–44)
ALT SERPL W/O P-5'-P-CCNC: 30 U/L (ref 10–44)
ALT SERPL W/O P-5'-P-CCNC: 31 U/L (ref 10–44)
ALT SERPL W/O P-5'-P-CCNC: 32 U/L (ref 10–44)
ALT SERPL W/O P-5'-P-CCNC: 32 U/L (ref 10–44)
ALT SERPL W/O P-5'-P-CCNC: 33 U/L (ref 10–44)
ALT SERPL W/O P-5'-P-CCNC: 34 U/L (ref 10–44)
ALT SERPL W/O P-5'-P-CCNC: 37 U/L (ref 10–44)
ALT SERPL W/O P-5'-P-CCNC: 38 U/L (ref 10–44)
ALT SERPL W/O P-5'-P-CCNC: 39 U/L (ref 10–44)
ALT SERPL W/O P-5'-P-CCNC: 40 U/L (ref 10–44)
ALT SERPL W/O P-5'-P-CCNC: 42 U/L (ref 10–44)
ALT SERPL W/O P-5'-P-CCNC: 43 U/L (ref 10–44)
ALT SERPL W/O P-5'-P-CCNC: 44 U/L (ref 10–44)
ALT SERPL W/O P-5'-P-CCNC: 77 U/L (ref 10–44)
ALT SERPL W/O P-5'-P-CCNC: 86 U/L (ref 10–44)
ALT SERPL W/O P-5'-P-CCNC: 90 U/L (ref 10–44)
ALT SERPL W/O P-5'-P-CCNC: 90 U/L (ref 10–44)
ALT SERPL W/O P-5'-P-CCNC: 96 U/L (ref 10–44)
ANION GAP SERPL CALC-SCNC: 10 MMOL/L (ref 8–16)
ANION GAP SERPL CALC-SCNC: 11 MMOL/L (ref 8–16)
ANION GAP SERPL CALC-SCNC: 12 MMOL/L (ref 8–16)
ANION GAP SERPL CALC-SCNC: 12 MMOL/L (ref 8–16)
ANION GAP SERPL CALC-SCNC: 13 MMOL/L (ref 8–16)
ANION GAP SERPL CALC-SCNC: 6 MMOL/L (ref 8–16)
ANION GAP SERPL CALC-SCNC: 7 MMOL/L (ref 8–16)
ANION GAP SERPL CALC-SCNC: 8 MMOL/L (ref 8–16)
ANION GAP SERPL CALC-SCNC: 9 MMOL/L (ref 8–16)
ANISOCYTOSIS BLD QL SMEAR: SLIGHT
APTT BLDCRRT: 26.5 SEC (ref 21–32)
AST SERPL-CCNC: 178 U/L (ref 10–40)
AST SERPL-CCNC: 23 U/L (ref 10–40)
AST SERPL-CCNC: 31 U/L (ref 10–40)
AST SERPL-CCNC: 34 U/L (ref 10–40)
AST SERPL-CCNC: 39 U/L (ref 10–40)
AST SERPL-CCNC: 40 U/L (ref 10–40)
AST SERPL-CCNC: 41 U/L (ref 10–40)
AST SERPL-CCNC: 41 U/L (ref 10–40)
AST SERPL-CCNC: 42 U/L (ref 10–40)
AST SERPL-CCNC: 46 U/L (ref 10–40)
AST SERPL-CCNC: 46 U/L (ref 10–40)
AST SERPL-CCNC: 47 U/L (ref 10–40)
AST SERPL-CCNC: 53 U/L (ref 10–40)
AST SERPL-CCNC: 56 U/L (ref 10–40)
AST SERPL-CCNC: 59 U/L (ref 10–40)
AST SERPL-CCNC: 64 U/L (ref 10–40)
AST SERPL-CCNC: 68 U/L (ref 10–40)
AST SERPL-CCNC: 69 U/L (ref 10–40)
AST SERPL-CCNC: 70 U/L (ref 10–40)
AST SERPL-CCNC: 75 U/L (ref 10–40)
AST SERPL-CCNC: 80 U/L (ref 10–40)
AST SERPL-CCNC: 92 U/L (ref 10–40)
BASOPHILS # BLD AUTO: 0 K/UL (ref 0–0.2)
BASOPHILS # BLD AUTO: 0 K/UL (ref 0–0.2)
BASOPHILS # BLD AUTO: 0.01 K/UL (ref 0–0.2)
BASOPHILS # BLD AUTO: 0.02 K/UL (ref 0–0.2)
BASOPHILS # BLD AUTO: 0.03 K/UL (ref 0–0.2)
BASOPHILS # BLD AUTO: 0.04 K/UL (ref 0–0.2)
BASOPHILS # BLD AUTO: 0.05 K/UL (ref 0–0.2)
BASOPHILS NFR BLD: 0 % (ref 0–1.9)
BASOPHILS NFR BLD: 0.1 % (ref 0–1.9)
BASOPHILS NFR BLD: 0.2 % (ref 0–1.9)
BASOPHILS NFR BLD: 0.3 % (ref 0–1.9)
BASOPHILS NFR BLD: 0.4 % (ref 0–1.9)
BASOPHILS NFR BLD: 0.4 % (ref 0–1.9)
BASOPHILS NFR BLD: 0.5 % (ref 0–1.9)
BASOPHILS NFR BLD: 0.5 % (ref 0–1.9)
BASOPHILS NFR BLD: 0.6 % (ref 0–1.9)
BASOPHILS NFR BLD: 0.7 % (ref 0–1.9)
BILIRUB DIRECT SERPL-MCNC: 0.8 MG/DL (ref 0.1–0.3)
BILIRUB SERPL-MCNC: 0.5 MG/DL (ref 0.1–1)
BILIRUB SERPL-MCNC: 0.6 MG/DL (ref 0.1–1)
BILIRUB SERPL-MCNC: 0.7 MG/DL (ref 0.1–1)
BILIRUB SERPL-MCNC: 0.7 MG/DL (ref 0.1–1)
BILIRUB SERPL-MCNC: 0.8 MG/DL (ref 0.1–1)
BILIRUB SERPL-MCNC: 0.8 MG/DL (ref 0.1–1)
BILIRUB SERPL-MCNC: 1.1 MG/DL (ref 0.1–1)
BILIRUB SERPL-MCNC: 1.1 MG/DL (ref 0.1–1)
BILIRUB SERPL-MCNC: 1.2 MG/DL (ref 0.1–1)
BILIRUB SERPL-MCNC: 1.3 MG/DL (ref 0.1–1)
BILIRUB SERPL-MCNC: 1.4 MG/DL (ref 0.1–1)
BILIRUB SERPL-MCNC: 1.5 MG/DL (ref 0.1–1)
BILIRUB SERPL-MCNC: 1.6 MG/DL (ref 0.1–1)
BILIRUB SERPL-MCNC: 1.7 MG/DL (ref 0.1–1)
BILIRUB SERPL-MCNC: 1.9 MG/DL (ref 0.1–1)
BILIRUB SERPL-MCNC: 2 MG/DL (ref 0.1–1)
BILIRUB SERPL-MCNC: 2.3 MG/DL (ref 0.1–1)
BILIRUB SERPL-MCNC: 3 MG/DL (ref 0.1–1)
BILIRUB SERPL-MCNC: 3.5 MG/DL (ref 0.1–1)
BILIRUB SERPL-MCNC: 3.6 MG/DL (ref 0.1–1)
BILIRUB SERPL-MCNC: 3.6 MG/DL (ref 0.1–1)
BILIRUB SERPL-MCNC: 3.7 MG/DL (ref 0.1–1)
BILIRUB UR QL STRIP: NEGATIVE
BILIRUB UR QL STRIP: NEGATIVE
BLD GP AB SCN CELLS X3 SERPL QL: NORMAL
BLD PROD TYP BPU: NORMAL
BLOOD UNIT EXPIRATION DATE: NORMAL
BLOOD UNIT TYPE CODE: 7300
BLOOD UNIT TYPE: NORMAL
BUN SERPL-MCNC: 11 MG/DL (ref 8–23)
BUN SERPL-MCNC: 15 MG/DL (ref 8–23)
BUN SERPL-MCNC: 17 MG/DL (ref 8–23)
BUN SERPL-MCNC: 17 MG/DL (ref 8–23)
BUN SERPL-MCNC: 18 MG/DL (ref 8–23)
BUN SERPL-MCNC: 20 MG/DL (ref 8–23)
BUN SERPL-MCNC: 20 MG/DL (ref 8–23)
BUN SERPL-MCNC: 22 MG/DL (ref 8–23)
BUN SERPL-MCNC: 22 MG/DL (ref 8–23)
BUN SERPL-MCNC: 23 MG/DL (ref 8–23)
BUN SERPL-MCNC: 26 MG/DL (ref 8–23)
BUN SERPL-MCNC: 28 MG/DL (ref 8–23)
BUN SERPL-MCNC: 29 MG/DL (ref 8–23)
BUN SERPL-MCNC: 30 MG/DL (ref 8–23)
BUN SERPL-MCNC: 30 MG/DL (ref 8–23)
BUN SERPL-MCNC: 32 MG/DL (ref 8–23)
BUN SERPL-MCNC: 36 MG/DL (ref 8–23)
BUN SERPL-MCNC: 37 MG/DL (ref 6–30)
BUN SERPL-MCNC: 37 MG/DL (ref 8–23)
BUN SERPL-MCNC: 38 MG/DL (ref 8–23)
BUN SERPL-MCNC: 39 MG/DL (ref 8–23)
BURR CELLS BLD QL SMEAR: ABNORMAL
BURR CELLS BLD QL SMEAR: ABNORMAL
CALCIUM SERPL-MCNC: 10 MG/DL (ref 8.7–10.5)
CALCIUM SERPL-MCNC: 10 MG/DL (ref 8.7–10.5)
CALCIUM SERPL-MCNC: 10.1 MG/DL (ref 8.7–10.5)
CALCIUM SERPL-MCNC: 10.1 MG/DL (ref 8.7–10.5)
CALCIUM SERPL-MCNC: 10.2 MG/DL (ref 8.7–10.5)
CALCIUM SERPL-MCNC: 10.4 MG/DL (ref 8.7–10.5)
CALCIUM SERPL-MCNC: 9.4 MG/DL (ref 8.7–10.5)
CALCIUM SERPL-MCNC: 9.5 MG/DL (ref 8.7–10.5)
CALCIUM SERPL-MCNC: 9.6 MG/DL (ref 8.7–10.5)
CALCIUM SERPL-MCNC: 9.7 MG/DL (ref 8.7–10.5)
CALCIUM SERPL-MCNC: 9.8 MG/DL (ref 8.7–10.5)
CALCIUM SERPL-MCNC: 9.8 MG/DL (ref 8.7–10.5)
CANCER AG19-9 SERPL-ACNC: 227.8 U/ML (ref 0–40)
CANCER AG19-9 SERPL-ACNC: 264.5 U/ML (ref 0–40)
CANCER AG19-9 SERPL-ACNC: 301.9 U/ML (ref 0–40)
CANCER AG19-9 SERPL-ACNC: 324 U/ML (ref 0–40)
CANCER AG19-9 SERPL-ACNC: 398.6 U/ML (ref 0–40)
CANCER AG19-9 SERPL-ACNC: 472.4 U/ML (ref 0–40)
CANCER AG19-9 SERPL-ACNC: 524.8 U/ML (ref 0–40)
CANCER AG19-9 SERPL-ACNC: 551.6 U/ML (ref 0–40)
CANCER AG19-9 SERPL-ACNC: 582.4 U/ML (ref 0–40)
CANCER AG19-9 SERPL-ACNC: 590.2 U/ML (ref 0–40)
CANCER AG19-9 SERPL-ACNC: 592.5 U/ML (ref 0–40)
CANCER AG19-9 SERPL-ACNC: 610.7 U/ML (ref 0–40)
CANCER AG19-9 SERPL-ACNC: 773 U/ML (ref 0–40)
CHLORIDE SERPL-SCNC: 100 MMOL/L (ref 95–110)
CHLORIDE SERPL-SCNC: 101 MMOL/L (ref 95–110)
CHLORIDE SERPL-SCNC: 102 MMOL/L (ref 95–110)
CHLORIDE SERPL-SCNC: 102 MMOL/L (ref 95–110)
CHLORIDE SERPL-SCNC: 103 MMOL/L (ref 95–110)
CHLORIDE SERPL-SCNC: 103 MMOL/L (ref 95–110)
CHLORIDE SERPL-SCNC: 104 MMOL/L (ref 95–110)
CHLORIDE SERPL-SCNC: 105 MMOL/L (ref 95–110)
CHLORIDE SERPL-SCNC: 106 MMOL/L (ref 95–110)
CHLORIDE SERPL-SCNC: 107 MMOL/L (ref 95–110)
CHLORIDE SERPL-SCNC: 108 MMOL/L (ref 95–110)
CHLORIDE SERPL-SCNC: 108 MMOL/L (ref 95–110)
CHLORIDE SERPL-SCNC: 111 MMOL/L (ref 95–110)
CLARITY UR REFRACT.AUTO: CLEAR
CLARITY UR REFRACT.AUTO: CLEAR
CO2 SERPL-SCNC: 19 MMOL/L (ref 23–29)
CO2 SERPL-SCNC: 19 MMOL/L (ref 23–29)
CO2 SERPL-SCNC: 23 MMOL/L (ref 23–29)
CO2 SERPL-SCNC: 24 MMOL/L (ref 23–29)
CO2 SERPL-SCNC: 25 MMOL/L (ref 23–29)
CO2 SERPL-SCNC: 26 MMOL/L (ref 23–29)
CO2 SERPL-SCNC: 27 MMOL/L (ref 23–29)
CO2 SERPL-SCNC: 29 MMOL/L (ref 23–29)
CO2 SERPL-SCNC: 29 MMOL/L (ref 23–29)
CODING SYSTEM: NORMAL
COLOR UR AUTO: YELLOW
COLOR UR AUTO: YELLOW
CREAT SERPL-MCNC: 0.9 MG/DL (ref 0.5–1.4)
CREAT SERPL-MCNC: 1 MG/DL (ref 0.5–1.4)
CREAT SERPL-MCNC: 1.2 MG/DL (ref 0.5–1.4)
CREAT SERPL-MCNC: 1.3 MG/DL (ref 0.5–1.4)
CREAT SERPL-MCNC: 1.3 MG/DL (ref 0.5–1.4)
CREAT SERPL-MCNC: 1.4 MG/DL (ref 0.5–1.4)
CREAT SERPL-MCNC: 1.5 MG/DL (ref 0.5–1.4)
CREAT SERPL-MCNC: 1.6 MG/DL (ref 0.5–1.4)
CREAT SERPL-MCNC: 1.7 MG/DL (ref 0.5–1.4)
CREAT SERPL-MCNC: 1.8 MG/DL (ref 0.5–1.4)
CTP QC/QA: YES
DACRYOCYTES BLD QL SMEAR: ABNORMAL
DIFFERENTIAL METHOD: ABNORMAL
DISPENSE STATUS: NORMAL
EOSINOPHIL # BLD AUTO: 0 K/UL (ref 0–0.5)
EOSINOPHIL # BLD AUTO: 0.1 K/UL (ref 0–0.5)
EOSINOPHIL NFR BLD: 0 % (ref 0–8)
EOSINOPHIL NFR BLD: 0.1 % (ref 0–8)
EOSINOPHIL NFR BLD: 0.3 % (ref 0–8)
EOSINOPHIL NFR BLD: 0.4 % (ref 0–8)
EOSINOPHIL NFR BLD: 0.5 % (ref 0–8)
EOSINOPHIL NFR BLD: 0.6 % (ref 0–8)
EOSINOPHIL NFR BLD: 0.6 % (ref 0–8)
EOSINOPHIL NFR BLD: 0.7 % (ref 0–8)
EOSINOPHIL NFR BLD: 0.8 % (ref 0–8)
EOSINOPHIL NFR BLD: 1.1 % (ref 0–8)
EOSINOPHIL NFR BLD: 1.3 % (ref 0–8)
ERYTHROCYTE [DISTWIDTH] IN BLOOD BY AUTOMATED COUNT: 18.7 % (ref 11.5–14.5)
ERYTHROCYTE [DISTWIDTH] IN BLOOD BY AUTOMATED COUNT: 18.9 % (ref 11.5–14.5)
ERYTHROCYTE [DISTWIDTH] IN BLOOD BY AUTOMATED COUNT: 19.2 % (ref 11.5–14.5)
ERYTHROCYTE [DISTWIDTH] IN BLOOD BY AUTOMATED COUNT: 19.2 % (ref 11.5–14.5)
ERYTHROCYTE [DISTWIDTH] IN BLOOD BY AUTOMATED COUNT: 19.6 % (ref 11.5–14.5)
ERYTHROCYTE [DISTWIDTH] IN BLOOD BY AUTOMATED COUNT: 19.9 % (ref 11.5–14.5)
ERYTHROCYTE [DISTWIDTH] IN BLOOD BY AUTOMATED COUNT: 20.1 % (ref 11.5–14.5)
ERYTHROCYTE [DISTWIDTH] IN BLOOD BY AUTOMATED COUNT: 20.2 % (ref 11.5–14.5)
ERYTHROCYTE [DISTWIDTH] IN BLOOD BY AUTOMATED COUNT: 20.5 % (ref 11.5–14.5)
ERYTHROCYTE [DISTWIDTH] IN BLOOD BY AUTOMATED COUNT: 20.7 % (ref 11.5–14.5)
ERYTHROCYTE [DISTWIDTH] IN BLOOD BY AUTOMATED COUNT: 20.7 % (ref 11.5–14.5)
ERYTHROCYTE [DISTWIDTH] IN BLOOD BY AUTOMATED COUNT: 21 % (ref 11.5–14.5)
ERYTHROCYTE [DISTWIDTH] IN BLOOD BY AUTOMATED COUNT: 21 % (ref 11.5–14.5)
ERYTHROCYTE [DISTWIDTH] IN BLOOD BY AUTOMATED COUNT: 21.1 % (ref 11.5–14.5)
ERYTHROCYTE [DISTWIDTH] IN BLOOD BY AUTOMATED COUNT: 21.2 % (ref 11.5–14.5)
ERYTHROCYTE [DISTWIDTH] IN BLOOD BY AUTOMATED COUNT: 21.4 % (ref 11.5–14.5)
ERYTHROCYTE [DISTWIDTH] IN BLOOD BY AUTOMATED COUNT: 21.6 % (ref 11.5–14.5)
ERYTHROCYTE [DISTWIDTH] IN BLOOD BY AUTOMATED COUNT: 21.9 % (ref 11.5–14.5)
ERYTHROCYTE [DISTWIDTH] IN BLOOD BY AUTOMATED COUNT: 22.1 % (ref 11.5–14.5)
ERYTHROCYTE [DISTWIDTH] IN BLOOD BY AUTOMATED COUNT: 22.4 % (ref 11.5–14.5)
ERYTHROCYTE [DISTWIDTH] IN BLOOD BY AUTOMATED COUNT: 22.6 % (ref 11.5–14.5)
ERYTHROCYTE [DISTWIDTH] IN BLOOD BY AUTOMATED COUNT: 22.7 % (ref 11.5–14.5)
EST. GFR  (AFRICAN AMERICAN): >60 ML/MIN/1.73 M^2
EST. GFR  (NO RACE VARIABLE): 39.3 ML/MIN/1.73 M^2
EST. GFR  (NO RACE VARIABLE): 42 ML/MIN/1.73 M^2
EST. GFR  (NO RACE VARIABLE): 48.9 ML/MIN/1.73 M^2
EST. GFR  (NO RACE VARIABLE): 53.1 ML/MIN/1.73 M^2
EST. GFR  (NO RACE VARIABLE): 58 ML/MIN/1.73 M^2
EST. GFR  (NO RACE VARIABLE): 58 ML/MIN/1.73 M^2
EST. GFR  (NO RACE VARIABLE): >60 ML/MIN/1.73 M^2
EST. GFR  (NON AFRICAN AMERICAN): 59.6 ML/MIN/1.73 M^2
EST. GFR  (NON AFRICAN AMERICAN): >60 ML/MIN/1.73 M^2
ESTIMATED AVG GLUCOSE: 114 MG/DL (ref 68–131)
FINAL PATHOLOGIC DIAGNOSIS: NORMAL
FINAL PATHOLOGIC DIAGNOSIS: NORMAL
GLUCOSE SERPL-MCNC: 110 MG/DL (ref 70–110)
GLUCOSE SERPL-MCNC: 111 MG/DL (ref 70–110)
GLUCOSE SERPL-MCNC: 112 MG/DL (ref 70–110)
GLUCOSE SERPL-MCNC: 119 MG/DL (ref 70–110)
GLUCOSE SERPL-MCNC: 119 MG/DL (ref 70–110)
GLUCOSE SERPL-MCNC: 121 MG/DL (ref 70–110)
GLUCOSE SERPL-MCNC: 128 MG/DL (ref 70–110)
GLUCOSE SERPL-MCNC: 129 MG/DL (ref 70–110)
GLUCOSE SERPL-MCNC: 132 MG/DL (ref 70–110)
GLUCOSE SERPL-MCNC: 133 MG/DL (ref 70–110)
GLUCOSE SERPL-MCNC: 135 MG/DL (ref 70–110)
GLUCOSE SERPL-MCNC: 151 MG/DL (ref 70–110)
GLUCOSE SERPL-MCNC: 152 MG/DL (ref 70–110)
GLUCOSE SERPL-MCNC: 154 MG/DL (ref 70–110)
GLUCOSE SERPL-MCNC: 169 MG/DL (ref 70–110)
GLUCOSE SERPL-MCNC: 174 MG/DL (ref 70–110)
GLUCOSE SERPL-MCNC: 175 MG/DL (ref 70–110)
GLUCOSE SERPL-MCNC: 177 MG/DL (ref 70–110)
GLUCOSE SERPL-MCNC: 181 MG/DL (ref 70–110)
GLUCOSE SERPL-MCNC: 188 MG/DL (ref 70–110)
GLUCOSE SERPL-MCNC: 189 MG/DL (ref 70–110)
GLUCOSE SERPL-MCNC: 86 MG/DL (ref 70–110)
GLUCOSE SERPL-MCNC: 98 MG/DL (ref 70–110)
GLUCOSE UR QL STRIP: NEGATIVE
GLUCOSE UR QL STRIP: NEGATIVE
GROSS: NORMAL
HBA1C MFR BLD: 5.6 % (ref 4–5.6)
HCT VFR BLD AUTO: 20.7 % (ref 40–54)
HCT VFR BLD AUTO: 21.8 % (ref 40–54)
HCT VFR BLD AUTO: 23 % (ref 40–54)
HCT VFR BLD AUTO: 23.2 % (ref 40–54)
HCT VFR BLD AUTO: 23.8 % (ref 40–54)
HCT VFR BLD AUTO: 24.3 % (ref 40–54)
HCT VFR BLD AUTO: 24.4 % (ref 40–54)
HCT VFR BLD AUTO: 24.5 % (ref 40–54)
HCT VFR BLD AUTO: 24.5 % (ref 40–54)
HCT VFR BLD AUTO: 24.6 % (ref 40–54)
HCT VFR BLD AUTO: 25.2 % (ref 40–54)
HCT VFR BLD AUTO: 25.3 % (ref 40–54)
HCT VFR BLD AUTO: 25.7 % (ref 40–54)
HCT VFR BLD AUTO: 25.9 % (ref 40–54)
HCT VFR BLD AUTO: 26.3 % (ref 40–54)
HCT VFR BLD AUTO: 26.3 % (ref 40–54)
HCT VFR BLD AUTO: 26.4 % (ref 40–54)
HCT VFR BLD AUTO: 26.5 % (ref 40–54)
HCT VFR BLD AUTO: 27.2 % (ref 40–54)
HCT VFR BLD AUTO: 27.5 % (ref 40–54)
HCT VFR BLD CALC: 19 %PCV (ref 36–54)
HCV AB SERPL QL IA: NORMAL
HGB BLD-MCNC: 6.7 G/DL (ref 14–18)
HGB BLD-MCNC: 6.8 G/DL (ref 14–18)
HGB BLD-MCNC: 7.1 G/DL (ref 14–18)
HGB BLD-MCNC: 7.1 G/DL (ref 14–18)
HGB BLD-MCNC: 7.5 G/DL (ref 14–18)
HGB BLD-MCNC: 7.6 G/DL (ref 14–18)
HGB BLD-MCNC: 7.8 G/DL (ref 14–18)
HGB BLD-MCNC: 7.8 G/DL (ref 14–18)
HGB BLD-MCNC: 7.9 G/DL (ref 14–18)
HGB BLD-MCNC: 7.9 G/DL (ref 14–18)
HGB BLD-MCNC: 8 G/DL (ref 14–18)
HGB BLD-MCNC: 8.2 G/DL (ref 14–18)
HGB BLD-MCNC: 8.2 G/DL (ref 14–18)
HGB BLD-MCNC: 8.3 G/DL (ref 14–18)
HGB BLD-MCNC: 8.4 G/DL (ref 14–18)
HGB BLD-MCNC: 8.5 G/DL (ref 14–18)
HGB BLD-MCNC: 8.6 G/DL (ref 14–18)
HGB BLD-MCNC: 8.7 G/DL (ref 14–18)
HGB BLD-MCNC: 8.7 G/DL (ref 14–18)
HGB BLD-MCNC: 8.8 G/DL (ref 14–18)
HGB UR QL STRIP: NEGATIVE
HGB UR QL STRIP: NEGATIVE
HIV 1+2 AB+HIV1 P24 AG SERPL QL IA: NORMAL
HYPOCHROMIA BLD QL SMEAR: ABNORMAL
IMM GRANULOCYTES # BLD AUTO: 0.03 K/UL (ref 0–0.04)
IMM GRANULOCYTES # BLD AUTO: 0.04 K/UL (ref 0–0.04)
IMM GRANULOCYTES # BLD AUTO: 0.04 K/UL (ref 0–0.04)
IMM GRANULOCYTES # BLD AUTO: 0.06 K/UL (ref 0–0.04)
IMM GRANULOCYTES # BLD AUTO: 0.07 K/UL (ref 0–0.04)
IMM GRANULOCYTES # BLD AUTO: 0.08 K/UL (ref 0–0.04)
IMM GRANULOCYTES # BLD AUTO: 0.09 K/UL (ref 0–0.04)
IMM GRANULOCYTES # BLD AUTO: 0.1 K/UL (ref 0–0.04)
IMM GRANULOCYTES # BLD AUTO: 0.11 K/UL (ref 0–0.04)
IMM GRANULOCYTES # BLD AUTO: 0.12 K/UL (ref 0–0.04)
IMM GRANULOCYTES # BLD AUTO: 0.18 K/UL (ref 0–0.04)
IMM GRANULOCYTES # BLD AUTO: 0.24 K/UL (ref 0–0.04)
IMM GRANULOCYTES # BLD AUTO: 0.24 K/UL (ref 0–0.04)
IMM GRANULOCYTES # BLD AUTO: 0.34 K/UL (ref 0–0.04)
IMM GRANULOCYTES # BLD AUTO: 0.38 K/UL (ref 0–0.04)
IMM GRANULOCYTES # BLD AUTO: 0.5 K/UL (ref 0–0.04)
IMM GRANULOCYTES # BLD AUTO: 0.53 K/UL (ref 0–0.04)
IMM GRANULOCYTES # BLD AUTO: ABNORMAL K/UL (ref 0–0.04)
IMM GRANULOCYTES # BLD AUTO: ABNORMAL K/UL (ref 0–0.04)
IMM GRANULOCYTES NFR BLD AUTO: 0.4 % (ref 0–0.5)
IMM GRANULOCYTES NFR BLD AUTO: 0.5 % (ref 0–0.5)
IMM GRANULOCYTES NFR BLD AUTO: 0.5 % (ref 0–0.5)
IMM GRANULOCYTES NFR BLD AUTO: 0.7 % (ref 0–0.5)
IMM GRANULOCYTES NFR BLD AUTO: 0.8 % (ref 0–0.5)
IMM GRANULOCYTES NFR BLD AUTO: 0.8 % (ref 0–0.5)
IMM GRANULOCYTES NFR BLD AUTO: 0.9 % (ref 0–0.5)
IMM GRANULOCYTES NFR BLD AUTO: 1 % (ref 0–0.5)
IMM GRANULOCYTES NFR BLD AUTO: 1.2 % (ref 0–0.5)
IMM GRANULOCYTES NFR BLD AUTO: 1.5 % (ref 0–0.5)
IMM GRANULOCYTES NFR BLD AUTO: 1.6 % (ref 0–0.5)
IMM GRANULOCYTES NFR BLD AUTO: 1.7 % (ref 0–0.5)
IMM GRANULOCYTES NFR BLD AUTO: 1.8 % (ref 0–0.5)
IMM GRANULOCYTES NFR BLD AUTO: 2.3 % (ref 0–0.5)
IMM GRANULOCYTES NFR BLD AUTO: 2.5 % (ref 0–0.5)
IMM GRANULOCYTES NFR BLD AUTO: 3.1 % (ref 0–0.5)
IMM GRANULOCYTES NFR BLD AUTO: 3.4 % (ref 0–0.5)
IMM GRANULOCYTES NFR BLD AUTO: 3.8 % (ref 0–0.5)
IMM GRANULOCYTES NFR BLD AUTO: ABNORMAL % (ref 0–0.5)
IMM GRANULOCYTES NFR BLD AUTO: ABNORMAL % (ref 0–0.5)
INR PPP: 1.2 (ref 0.8–1.2)
INR PPP: 1.2 (ref 0.8–1.2)
KETONES UR QL STRIP: NEGATIVE
KETONES UR QL STRIP: NEGATIVE
LACTATE SERPL-SCNC: 2.1 MMOL/L (ref 0.5–2.2)
LEUKOCYTE ESTERASE UR QL STRIP: NEGATIVE
LEUKOCYTE ESTERASE UR QL STRIP: NEGATIVE
LIPASE SERPL-CCNC: 26 U/L (ref 4–60)
LYMPHOCYTES # BLD AUTO: 0.4 K/UL (ref 1–4.8)
LYMPHOCYTES # BLD AUTO: 0.9 K/UL (ref 1–4.8)
LYMPHOCYTES # BLD AUTO: 1 K/UL (ref 1–4.8)
LYMPHOCYTES # BLD AUTO: 1.1 K/UL (ref 1–4.8)
LYMPHOCYTES # BLD AUTO: 1.1 K/UL (ref 1–4.8)
LYMPHOCYTES # BLD AUTO: 1.2 K/UL (ref 1–4.8)
LYMPHOCYTES # BLD AUTO: 1.3 K/UL (ref 1–4.8)
LYMPHOCYTES # BLD AUTO: 1.4 K/UL (ref 1–4.8)
LYMPHOCYTES # BLD AUTO: 1.6 K/UL (ref 1–4.8)
LYMPHOCYTES # BLD AUTO: 1.9 K/UL (ref 1–4.8)
LYMPHOCYTES NFR BLD: 10.8 % (ref 18–48)
LYMPHOCYTES NFR BLD: 11.1 % (ref 18–48)
LYMPHOCYTES NFR BLD: 12.1 % (ref 18–48)
LYMPHOCYTES NFR BLD: 12.3 % (ref 18–48)
LYMPHOCYTES NFR BLD: 13 % (ref 18–48)
LYMPHOCYTES NFR BLD: 13.3 % (ref 18–48)
LYMPHOCYTES NFR BLD: 13.3 % (ref 18–48)
LYMPHOCYTES NFR BLD: 15 % (ref 18–48)
LYMPHOCYTES NFR BLD: 16.1 % (ref 18–48)
LYMPHOCYTES NFR BLD: 20.5 % (ref 18–48)
LYMPHOCYTES NFR BLD: 22.2 % (ref 18–48)
LYMPHOCYTES NFR BLD: 24.3 % (ref 18–48)
LYMPHOCYTES NFR BLD: 29.1 % (ref 18–48)
LYMPHOCYTES NFR BLD: 29.5 % (ref 18–48)
LYMPHOCYTES NFR BLD: 31.1 % (ref 18–48)
LYMPHOCYTES NFR BLD: 44 % (ref 18–48)
LYMPHOCYTES NFR BLD: 7.2 % (ref 18–48)
LYMPHOCYTES NFR BLD: 8.1 % (ref 18–48)
LYMPHOCYTES NFR BLD: 8.2 % (ref 18–48)
LYMPHOCYTES NFR BLD: 8.5 % (ref 18–48)
LYMPHOCYTES NFR BLD: 9.9 % (ref 18–48)
LYMPHOCYTES NFR BLD: 9.9 % (ref 18–48)
Lab: NORMAL
Lab: NORMAL
MAGNESIUM SERPL-MCNC: 1.3 MG/DL (ref 1.6–2.6)
MAGNESIUM SERPL-MCNC: 1.4 MG/DL (ref 1.6–2.6)
MAGNESIUM SERPL-MCNC: 1.5 MG/DL (ref 1.6–2.6)
MAGNESIUM SERPL-MCNC: 1.6 MG/DL (ref 1.6–2.6)
MAGNESIUM SERPL-MCNC: 1.7 MG/DL (ref 1.6–2.6)
MAGNESIUM SERPL-MCNC: 1.7 MG/DL (ref 1.6–2.6)
MAGNESIUM SERPL-MCNC: 1.8 MG/DL (ref 1.6–2.6)
MAGNESIUM SERPL-MCNC: 1.8 MG/DL (ref 1.6–2.6)
MAGNESIUM SERPL-MCNC: 1.9 MG/DL (ref 1.6–2.6)
MCH RBC QN AUTO: 28.5 PG (ref 27–31)
MCH RBC QN AUTO: 29.5 PG (ref 27–31)
MCH RBC QN AUTO: 29.5 PG (ref 27–31)
MCH RBC QN AUTO: 29.6 PG (ref 27–31)
MCH RBC QN AUTO: 29.8 PG (ref 27–31)
MCH RBC QN AUTO: 30.1 PG (ref 27–31)
MCH RBC QN AUTO: 30.3 PG (ref 27–31)
MCH RBC QN AUTO: 30.5 PG (ref 27–31)
MCH RBC QN AUTO: 30.9 PG (ref 27–31)
MCH RBC QN AUTO: 31 PG (ref 27–31)
MCH RBC QN AUTO: 31.6 PG (ref 27–31)
MCH RBC QN AUTO: 31.7 PG (ref 27–31)
MCH RBC QN AUTO: 32.1 PG (ref 27–31)
MCH RBC QN AUTO: 32.2 PG (ref 27–31)
MCH RBC QN AUTO: 32.3 PG (ref 27–31)
MCH RBC QN AUTO: 32.4 PG (ref 27–31)
MCH RBC QN AUTO: 32.4 PG (ref 27–31)
MCH RBC QN AUTO: 32.5 PG (ref 27–31)
MCH RBC QN AUTO: 32.7 PG (ref 27–31)
MCH RBC QN AUTO: 32.9 PG (ref 27–31)
MCH RBC QN AUTO: 33.1 PG (ref 27–31)
MCH RBC QN AUTO: 34.2 PG (ref 27–31)
MCHC RBC AUTO-ENTMCNC: 29.3 G/DL (ref 32–36)
MCHC RBC AUTO-ENTMCNC: 30.6 G/DL (ref 32–36)
MCHC RBC AUTO-ENTMCNC: 30.9 G/DL (ref 32–36)
MCHC RBC AUTO-ENTMCNC: 31 G/DL (ref 32–36)
MCHC RBC AUTO-ENTMCNC: 31 G/DL (ref 32–36)
MCHC RBC AUTO-ENTMCNC: 31.1 G/DL (ref 32–36)
MCHC RBC AUTO-ENTMCNC: 31.1 G/DL (ref 32–36)
MCHC RBC AUTO-ENTMCNC: 31.2 G/DL (ref 32–36)
MCHC RBC AUTO-ENTMCNC: 31.3 G/DL (ref 32–36)
MCHC RBC AUTO-ENTMCNC: 31.5 G/DL (ref 32–36)
MCHC RBC AUTO-ENTMCNC: 31.6 G/DL (ref 32–36)
MCHC RBC AUTO-ENTMCNC: 31.9 G/DL (ref 32–36)
MCHC RBC AUTO-ENTMCNC: 32.1 G/DL (ref 32–36)
MCHC RBC AUTO-ENTMCNC: 32.1 G/DL (ref 32–36)
MCHC RBC AUTO-ENTMCNC: 32.2 G/DL (ref 32–36)
MCHC RBC AUTO-ENTMCNC: 32.4 G/DL (ref 32–36)
MCHC RBC AUTO-ENTMCNC: 33.1 G/DL (ref 32–36)
MCHC RBC AUTO-ENTMCNC: 33.1 G/DL (ref 32–36)
MCHC RBC AUTO-ENTMCNC: 33.2 G/DL (ref 32–36)
MCHC RBC AUTO-ENTMCNC: 33.6 G/DL (ref 32–36)
MCHC RBC AUTO-ENTMCNC: 33.7 G/DL (ref 32–36)
MCV RBC AUTO: 100 FL (ref 82–98)
MCV RBC AUTO: 102 FL (ref 82–98)
MCV RBC AUTO: 103 FL (ref 82–98)
MCV RBC AUTO: 103 FL (ref 82–98)
MCV RBC AUTO: 107 FL (ref 82–98)
MCV RBC AUTO: 91 FL (ref 82–98)
MCV RBC AUTO: 93 FL (ref 82–98)
MCV RBC AUTO: 94 FL (ref 82–98)
MCV RBC AUTO: 95 FL (ref 82–98)
MCV RBC AUTO: 97 FL (ref 82–98)
MCV RBC AUTO: 98 FL (ref 82–98)
METAMYELOCYTES NFR BLD MANUAL: 3 %
METAMYELOCYTES NFR BLD MANUAL: 4 %
MONOCYTES # BLD AUTO: 0.1 K/UL (ref 0.3–1)
MONOCYTES # BLD AUTO: 0.2 K/UL (ref 0.3–1)
MONOCYTES # BLD AUTO: 0.4 K/UL (ref 0.3–1)
MONOCYTES # BLD AUTO: 0.4 K/UL (ref 0.3–1)
MONOCYTES # BLD AUTO: 0.5 K/UL (ref 0.3–1)
MONOCYTES # BLD AUTO: 0.5 K/UL (ref 0.3–1)
MONOCYTES # BLD AUTO: 0.6 K/UL (ref 0.3–1)
MONOCYTES # BLD AUTO: 0.7 K/UL (ref 0.3–1)
MONOCYTES # BLD AUTO: 0.8 K/UL (ref 0.3–1)
MONOCYTES # BLD AUTO: 0.8 K/UL (ref 0.3–1)
MONOCYTES # BLD AUTO: 0.9 K/UL (ref 0.3–1)
MONOCYTES # BLD AUTO: 1 K/UL (ref 0.3–1)
MONOCYTES # BLD AUTO: 1.1 K/UL (ref 0.3–1)
MONOCYTES # BLD AUTO: 1.1 K/UL (ref 0.3–1)
MONOCYTES # BLD AUTO: 1.4 K/UL (ref 0.3–1)
MONOCYTES # BLD AUTO: 1.6 K/UL (ref 0.3–1)
MONOCYTES # BLD AUTO: 1.6 K/UL (ref 0.3–1)
MONOCYTES # BLD AUTO: 1.7 K/UL (ref 0.3–1)
MONOCYTES NFR BLD: 10 % (ref 4–15)
MONOCYTES NFR BLD: 10 % (ref 4–15)
MONOCYTES NFR BLD: 10.2 % (ref 4–15)
MONOCYTES NFR BLD: 10.9 % (ref 4–15)
MONOCYTES NFR BLD: 11.1 % (ref 4–15)
MONOCYTES NFR BLD: 11.2 % (ref 4–15)
MONOCYTES NFR BLD: 12.7 % (ref 4–15)
MONOCYTES NFR BLD: 13 % (ref 4–15)
MONOCYTES NFR BLD: 13.5 % (ref 4–15)
MONOCYTES NFR BLD: 15.4 % (ref 4–15)
MONOCYTES NFR BLD: 18.7 % (ref 4–15)
MONOCYTES NFR BLD: 2.1 % (ref 4–15)
MONOCYTES NFR BLD: 2.7 % (ref 4–15)
MONOCYTES NFR BLD: 3.1 % (ref 4–15)
MONOCYTES NFR BLD: 6.7 % (ref 4–15)
MONOCYTES NFR BLD: 6.9 % (ref 4–15)
MONOCYTES NFR BLD: 7.1 % (ref 4–15)
MONOCYTES NFR BLD: 8 % (ref 4–15)
MONOCYTES NFR BLD: 8.3 % (ref 4–15)
MONOCYTES NFR BLD: 9.1 % (ref 4–15)
MONOCYTES NFR BLD: 9.3 % (ref 4–15)
MONOCYTES NFR BLD: 9.3 % (ref 4–15)
MYELOCYTES NFR BLD MANUAL: 5 %
NEUTROPHILS # BLD AUTO: 10.3 K/UL (ref 1.8–7.7)
NEUTROPHILS # BLD AUTO: 10.9 K/UL (ref 1.8–7.7)
NEUTROPHILS # BLD AUTO: 11.4 K/UL (ref 1.8–7.7)
NEUTROPHILS # BLD AUTO: 12.6 K/UL (ref 1.8–7.7)
NEUTROPHILS # BLD AUTO: 13.3 K/UL (ref 1.8–7.7)
NEUTROPHILS # BLD AUTO: 13.5 K/UL (ref 1.8–7.7)
NEUTROPHILS # BLD AUTO: 2.1 K/UL (ref 1.8–7.7)
NEUTROPHILS # BLD AUTO: 2.2 K/UL (ref 1.8–7.7)
NEUTROPHILS # BLD AUTO: 2.4 K/UL (ref 1.8–7.7)
NEUTROPHILS # BLD AUTO: 3.2 K/UL (ref 1.8–7.7)
NEUTROPHILS # BLD AUTO: 3.5 K/UL (ref 1.8–7.7)
NEUTROPHILS # BLD AUTO: 3.6 K/UL (ref 1.8–7.7)
NEUTROPHILS # BLD AUTO: 4.1 K/UL (ref 1.8–7.7)
NEUTROPHILS # BLD AUTO: 4.2 K/UL (ref 1.8–7.7)
NEUTROPHILS # BLD AUTO: 4.9 K/UL (ref 1.8–7.7)
NEUTROPHILS # BLD AUTO: 5.8 K/UL (ref 1.8–7.7)
NEUTROPHILS # BLD AUTO: 5.9 K/UL (ref 1.8–7.7)
NEUTROPHILS # BLD AUTO: 6.4 K/UL (ref 1.8–7.7)
NEUTROPHILS # BLD AUTO: 6.4 K/UL (ref 1.8–7.7)
NEUTROPHILS # BLD AUTO: 7.2 K/UL (ref 1.8–7.7)
NEUTROPHILS # BLD AUTO: 8 K/UL (ref 1.8–7.7)
NEUTROPHILS # BLD AUTO: 8.2 K/UL (ref 1.8–7.7)
NEUTROPHILS NFR BLD: 37 % (ref 38–73)
NEUTROPHILS NFR BLD: 54.9 % (ref 38–73)
NEUTROPHILS NFR BLD: 56.7 % (ref 38–73)
NEUTROPHILS NFR BLD: 59.6 % (ref 38–73)
NEUTROPHILS NFR BLD: 63.9 % (ref 38–73)
NEUTROPHILS NFR BLD: 64 % (ref 38–73)
NEUTROPHILS NFR BLD: 64.5 % (ref 38–73)
NEUTROPHILS NFR BLD: 67 % (ref 38–73)
NEUTROPHILS NFR BLD: 71.3 % (ref 38–73)
NEUTROPHILS NFR BLD: 72.6 % (ref 38–73)
NEUTROPHILS NFR BLD: 72.8 % (ref 38–73)
NEUTROPHILS NFR BLD: 73.8 % (ref 38–73)
NEUTROPHILS NFR BLD: 74.6 % (ref 38–73)
NEUTROPHILS NFR BLD: 75 % (ref 38–73)
NEUTROPHILS NFR BLD: 75.7 % (ref 38–73)
NEUTROPHILS NFR BLD: 77.2 % (ref 38–73)
NEUTROPHILS NFR BLD: 77.2 % (ref 38–73)
NEUTROPHILS NFR BLD: 79.5 % (ref 38–73)
NEUTROPHILS NFR BLD: 81.9 % (ref 38–73)
NEUTROPHILS NFR BLD: 83.6 % (ref 38–73)
NEUTROPHILS NFR BLD: 86.9 % (ref 38–73)
NEUTROPHILS NFR BLD: 88.4 % (ref 38–73)
NEUTS BAND NFR BLD MANUAL: 3 %
NEUTS BAND NFR BLD MANUAL: 4 %
NITRITE UR QL STRIP: NEGATIVE
NITRITE UR QL STRIP: NEGATIVE
NRBC BLD-RTO: 0 /100 WBC
NRBC BLD-RTO: 1 /100 WBC
NRBC BLD-RTO: 1 /100 WBC
NUM UNITS TRANS PACKED RBC: NORMAL
OVALOCYTES BLD QL SMEAR: ABNORMAL
PH UR STRIP: 5 [PH] (ref 5–8)
PH UR STRIP: 6 [PH] (ref 5–8)
PHOSPHATE SERPL-MCNC: 2.3 MG/DL (ref 2.7–4.5)
PHOSPHATE SERPL-MCNC: 2.9 MG/DL (ref 2.7–4.5)
PHOSPHATE SERPL-MCNC: 3.5 MG/DL (ref 2.7–4.5)
PHOSPHATE SERPL-MCNC: 3.6 MG/DL (ref 2.7–4.5)
PLATELET # BLD AUTO: 115 K/UL (ref 150–450)
PLATELET # BLD AUTO: 118 K/UL (ref 150–450)
PLATELET # BLD AUTO: 121 K/UL (ref 150–450)
PLATELET # BLD AUTO: 124 K/UL (ref 150–450)
PLATELET # BLD AUTO: 126 K/UL (ref 150–450)
PLATELET # BLD AUTO: 133 K/UL (ref 150–450)
PLATELET # BLD AUTO: 153 K/UL (ref 150–450)
PLATELET # BLD AUTO: 155 K/UL (ref 150–450)
PLATELET # BLD AUTO: 157 K/UL (ref 150–450)
PLATELET # BLD AUTO: 162 K/UL (ref 150–450)
PLATELET # BLD AUTO: 167 K/UL (ref 150–450)
PLATELET # BLD AUTO: 172 K/UL (ref 150–450)
PLATELET # BLD AUTO: 176 K/UL (ref 150–450)
PLATELET # BLD AUTO: 179 K/UL (ref 150–450)
PLATELET # BLD AUTO: 188 K/UL (ref 150–450)
PLATELET # BLD AUTO: 191 K/UL (ref 150–450)
PLATELET # BLD AUTO: 191 K/UL (ref 150–450)
PLATELET # BLD AUTO: 239 K/UL (ref 150–450)
PLATELET # BLD AUTO: 261 K/UL (ref 150–450)
PLATELET # BLD AUTO: 78 K/UL (ref 150–450)
PLATELET # BLD AUTO: 85 K/UL (ref 150–450)
PLATELET # BLD AUTO: 97 K/UL (ref 150–450)
PLATELET # BLD AUTO: 99 K/UL (ref 150–450)
PLATELET # BLD AUTO: ABNORMAL K/UL (ref 150–450)
PLATELET BLD QL SMEAR: ABNORMAL
PMV BLD AUTO: 10.4 FL (ref 9.2–12.9)
PMV BLD AUTO: 10.5 FL (ref 9.2–12.9)
PMV BLD AUTO: 10.7 FL (ref 9.2–12.9)
PMV BLD AUTO: 10.7 FL (ref 9.2–12.9)
PMV BLD AUTO: 10.8 FL (ref 9.2–12.9)
PMV BLD AUTO: 10.8 FL (ref 9.2–12.9)
PMV BLD AUTO: 11 FL (ref 9.2–12.9)
PMV BLD AUTO: 11.1 FL (ref 9.2–12.9)
PMV BLD AUTO: 11.2 FL (ref 9.2–12.9)
PMV BLD AUTO: 11.2 FL (ref 9.2–12.9)
PMV BLD AUTO: 11.3 FL (ref 9.2–12.9)
PMV BLD AUTO: 11.4 FL (ref 9.2–12.9)
PMV BLD AUTO: 11.6 FL (ref 9.2–12.9)
PMV BLD AUTO: 11.7 FL (ref 9.2–12.9)
PMV BLD AUTO: 11.8 FL (ref 9.2–12.9)
PMV BLD AUTO: 12 FL (ref 9.2–12.9)
PMV BLD AUTO: 12 FL (ref 9.2–12.9)
PMV BLD AUTO: 12.2 FL (ref 9.2–12.9)
PMV BLD AUTO: 12.2 FL (ref 9.2–12.9)
PMV BLD AUTO: 12.4 FL (ref 9.2–12.9)
PMV BLD AUTO: 13.3 FL (ref 9.2–12.9)
PMV BLD AUTO: ABNORMAL FL (ref 9.2–12.9)
POC IONIZED CALCIUM: 1.32 MMOL/L (ref 1.06–1.42)
POC TCO2 (MEASURED): 25 MMOL/L (ref 23–29)
POIKILOCYTOSIS BLD QL SMEAR: SLIGHT
POLYCHROMASIA BLD QL SMEAR: ABNORMAL
POTASSIUM BLD-SCNC: 4 MMOL/L (ref 3.5–5.1)
POTASSIUM SERPL-SCNC: 3.4 MMOL/L (ref 3.5–5.1)
POTASSIUM SERPL-SCNC: 3.5 MMOL/L (ref 3.5–5.1)
POTASSIUM SERPL-SCNC: 3.6 MMOL/L (ref 3.5–5.1)
POTASSIUM SERPL-SCNC: 3.7 MMOL/L (ref 3.5–5.1)
POTASSIUM SERPL-SCNC: 3.8 MMOL/L (ref 3.5–5.1)
POTASSIUM SERPL-SCNC: 4 MMOL/L (ref 3.5–5.1)
POTASSIUM SERPL-SCNC: 4.1 MMOL/L (ref 3.5–5.1)
POTASSIUM SERPL-SCNC: 4.2 MMOL/L (ref 3.5–5.1)
POTASSIUM SERPL-SCNC: 4.3 MMOL/L (ref 3.5–5.1)
POTASSIUM SERPL-SCNC: 4.6 MMOL/L (ref 3.5–5.1)
PROT SERPL-MCNC: 5.6 G/DL (ref 6–8.4)
PROT SERPL-MCNC: 5.8 G/DL (ref 6–8.4)
PROT SERPL-MCNC: 6.1 G/DL (ref 6–8.4)
PROT SERPL-MCNC: 6.1 G/DL (ref 6–8.4)
PROT SERPL-MCNC: 6.2 G/DL (ref 6–8.4)
PROT SERPL-MCNC: 6.2 G/DL (ref 6–8.4)
PROT SERPL-MCNC: 6.3 G/DL (ref 6–8.4)
PROT SERPL-MCNC: 6.3 G/DL (ref 6–8.4)
PROT SERPL-MCNC: 6.4 G/DL (ref 6–8.4)
PROT SERPL-MCNC: 6.4 G/DL (ref 6–8.4)
PROT SERPL-MCNC: 6.5 G/DL (ref 6–8.4)
PROT SERPL-MCNC: 6.5 G/DL (ref 6–8.4)
PROT SERPL-MCNC: 6.6 G/DL (ref 6–8.4)
PROT SERPL-MCNC: 6.7 G/DL (ref 6–8.4)
PROT SERPL-MCNC: 6.7 G/DL (ref 6–8.4)
PROT SERPL-MCNC: 6.8 G/DL (ref 6–8.4)
PROT SERPL-MCNC: 6.8 G/DL (ref 6–8.4)
PROT SERPL-MCNC: 6.9 G/DL (ref 6–8.4)
PROT UR QL STRIP: NEGATIVE
PROT UR QL STRIP: NEGATIVE
PROTHROMBIN TIME: 11.9 SEC (ref 9–12.5)
PROTHROMBIN TIME: 11.9 SEC (ref 9–12.5)
RBC # BLD AUTO: 2.07 M/UL (ref 4.6–6.2)
RBC # BLD AUTO: 2.23 M/UL (ref 4.6–6.2)
RBC # BLD AUTO: 2.28 M/UL (ref 4.6–6.2)
RBC # BLD AUTO: 2.38 M/UL (ref 4.6–6.2)
RBC # BLD AUTO: 2.42 M/UL (ref 4.6–6.2)
RBC # BLD AUTO: 2.43 M/UL (ref 4.6–6.2)
RBC # BLD AUTO: 2.45 M/UL (ref 4.6–6.2)
RBC # BLD AUTO: 2.46 M/UL (ref 4.6–6.2)
RBC # BLD AUTO: 2.47 M/UL (ref 4.6–6.2)
RBC # BLD AUTO: 2.49 M/UL (ref 4.6–6.2)
RBC # BLD AUTO: 2.51 M/UL (ref 4.6–6.2)
RBC # BLD AUTO: 2.52 M/UL (ref 4.6–6.2)
RBC # BLD AUTO: 2.57 M/UL (ref 4.6–6.2)
RBC # BLD AUTO: 2.59 M/UL (ref 4.6–6.2)
RBC # BLD AUTO: 2.59 M/UL (ref 4.6–6.2)
RBC # BLD AUTO: 2.66 M/UL (ref 4.6–6.2)
RBC # BLD AUTO: 2.69 M/UL (ref 4.6–6.2)
RBC # BLD AUTO: 2.7 M/UL (ref 4.6–6.2)
RBC # BLD AUTO: 2.77 M/UL (ref 4.6–6.2)
RBC # BLD AUTO: 2.78 M/UL (ref 4.6–6.2)
RBC # BLD AUTO: 2.78 M/UL (ref 4.6–6.2)
RBC # BLD AUTO: 2.92 M/UL (ref 4.6–6.2)
SAMPLE: ABNORMAL
SARS-COV-2 RDRP RESP QL NAA+PROBE: NEGATIVE
SCHISTOCYTES BLD QL SMEAR: ABNORMAL
SCHISTOCYTES BLD QL SMEAR: ABNORMAL
SMUDGE CELLS BLD QL SMEAR: PRESENT
SODIUM BLD-SCNC: 141 MMOL/L (ref 136–145)
SODIUM SERPL-SCNC: 136 MMOL/L (ref 136–145)
SODIUM SERPL-SCNC: 136 MMOL/L (ref 136–145)
SODIUM SERPL-SCNC: 137 MMOL/L (ref 136–145)
SODIUM SERPL-SCNC: 138 MMOL/L (ref 136–145)
SODIUM SERPL-SCNC: 139 MMOL/L (ref 136–145)
SODIUM SERPL-SCNC: 140 MMOL/L (ref 136–145)
SODIUM SERPL-SCNC: 141 MMOL/L (ref 136–145)
SODIUM SERPL-SCNC: 142 MMOL/L (ref 136–145)
SODIUM SERPL-SCNC: 143 MMOL/L (ref 136–145)
SODIUM SERPL-SCNC: 144 MMOL/L (ref 136–145)
SODIUM SERPL-SCNC: 144 MMOL/L (ref 136–145)
SP GR UR STRIP: 1.01 (ref 1–1.03)
SP GR UR STRIP: >=1.03 (ref 1–1.03)
SPHEROCYTES BLD QL SMEAR: ABNORMAL
SPHEROCYTES BLD QL SMEAR: ABNORMAL
TARGETS BLD QL SMEAR: ABNORMAL
TARGETS BLD QL SMEAR: ABNORMAL
TSH SERPL DL<=0.005 MIU/L-ACNC: 1.56 UIU/ML (ref 0.4–4)
TSH SERPL DL<=0.005 MIU/L-ACNC: 1.78 UIU/ML (ref 0.4–4)
TSH SERPL DL<=0.005 MIU/L-ACNC: 2.04 UIU/ML (ref 0.4–4)
TSH SERPL DL<=0.005 MIU/L-ACNC: 2.12 UIU/ML (ref 0.4–4)
TSH SERPL DL<=0.005 MIU/L-ACNC: 2.47 UIU/ML (ref 0.4–4)
URN SPEC COLLECT METH UR: ABNORMAL
URN SPEC COLLECT METH UR: NORMAL
VIT B12 SERPL-MCNC: 1188 PG/ML (ref 210–950)
WBC # BLD AUTO: 10.34 K/UL (ref 3.9–12.7)
WBC # BLD AUTO: 12.48 K/UL (ref 3.9–12.7)
WBC # BLD AUTO: 13.79 K/UL (ref 3.9–12.7)
WBC # BLD AUTO: 14.48 K/UL (ref 3.9–12.7)
WBC # BLD AUTO: 14.53 K/UL (ref 3.9–12.7)
WBC # BLD AUTO: 15.23 K/UL (ref 3.9–12.7)
WBC # BLD AUTO: 15.39 K/UL (ref 3.9–12.7)
WBC # BLD AUTO: 16.23 K/UL (ref 3.9–12.7)
WBC # BLD AUTO: 2.39 K/UL (ref 3.9–12.7)
WBC # BLD AUTO: 3.49 K/UL (ref 3.9–12.7)
WBC # BLD AUTO: 3.79 K/UL (ref 3.9–12.7)
WBC # BLD AUTO: 3.95 K/UL (ref 3.9–12.7)
WBC # BLD AUTO: 5.07 K/UL (ref 3.9–12.7)
WBC # BLD AUTO: 5.52 K/UL (ref 3.9–12.7)
WBC # BLD AUTO: 5.57 K/UL (ref 3.9–12.7)
WBC # BLD AUTO: 5.7 K/UL (ref 3.9–12.7)
WBC # BLD AUTO: 5.72 K/UL (ref 3.9–12.7)
WBC # BLD AUTO: 6.69 K/UL (ref 3.9–12.7)
WBC # BLD AUTO: 7.46 K/UL (ref 3.9–12.7)
WBC # BLD AUTO: 7.81 K/UL (ref 3.9–12.7)
WBC # BLD AUTO: 8.92 K/UL (ref 3.9–12.7)
WBC # BLD AUTO: 9.04 K/UL (ref 3.9–12.7)
WBC # BLD AUTO: 9.07 K/UL (ref 3.9–12.7)
WBC # BLD AUTO: 9.07 K/UL (ref 3.9–12.7)
WBC TOXIC VACUOLES BLD QL SMEAR: ABNORMAL

## 2022-01-01 PROCEDURE — 96367 TX/PROPH/DG ADDL SEQ IV INF: CPT

## 2022-01-01 PROCEDURE — 97116 GAIT TRAINING THERAPY: CPT

## 2022-01-01 PROCEDURE — 37000009 HC ANESTHESIA EA ADD 15 MINS

## 2022-01-01 PROCEDURE — 3075F SYST BP GE 130 - 139MM HG: CPT | Mod: CPTII,S$GLB,, | Performed by: REGISTERED NURSE

## 2022-01-01 PROCEDURE — 1101F PR PT FALLS ASSESS DOC 0-1 FALLS W/OUT INJ PAST YR: ICD-10-PCS | Mod: CPTII,S$GLB,, | Performed by: SURGERY

## 2022-01-01 PROCEDURE — 90832 PR PSYCHOTHERAPY W/PATIENT, 30 MIN: ICD-10-PCS | Mod: S$GLB,,, | Performed by: PSYCHOLOGIST

## 2022-01-01 PROCEDURE — 80053 COMPREHEN METABOLIC PANEL: CPT | Mod: 91 | Performed by: SURGERY

## 2022-01-01 PROCEDURE — 1159F PR MEDICATION LIST DOCUMENTED IN MEDICAL RECORD: ICD-10-PCS | Mod: CPTII,95,, | Performed by: FAMILY MEDICINE

## 2022-01-01 PROCEDURE — 99999 PR PBB SHADOW E&M-EST. PATIENT-LVL I: ICD-10-PCS | Mod: PBBFAC,,, | Performed by: PSYCHOLOGIST

## 2022-01-01 PROCEDURE — 36245 PR INS CATH ABD/L-EXT ART 1ST ORDER: ICD-10-PCS | Mod: 59,,, | Performed by: RADIOLOGY

## 2022-01-01 PROCEDURE — 1126F AMNT PAIN NOTED NONE PRSNT: CPT | Mod: CPTII,S$GLB,, | Performed by: INTERNAL MEDICINE

## 2022-01-01 PROCEDURE — 36415 COLL VENOUS BLD VENIPUNCTURE: CPT | Performed by: NURSE PRACTITIONER

## 2022-01-01 PROCEDURE — 3075F PR MOST RECENT SYSTOLIC BLOOD PRESS GE 130-139MM HG: ICD-10-PCS | Mod: CPTII,S$GLB,, | Performed by: SURGERY

## 2022-01-01 PROCEDURE — C1894 INTRO/SHEATH, NON-LASER: HCPCS

## 2022-01-01 PROCEDURE — 76380 PR  CT SCAN,LIMITED/LOCALIZED F/U STUDY: ICD-10-PCS | Mod: 26,59,, | Performed by: RADIOLOGY

## 2022-01-01 PROCEDURE — 3077F PR MOST RECENT SYSTOLIC BLOOD PRESSURE >= 140 MM HG: ICD-10-PCS | Mod: CPTII,S$GLB,, | Performed by: INTERNAL MEDICINE

## 2022-01-01 PROCEDURE — 80053 COMPREHEN METABOLIC PANEL: CPT | Performed by: INTERNAL MEDICINE

## 2022-01-01 PROCEDURE — 71000015 HC POSTOP RECOV 1ST HR: Performed by: SURGERY

## 2022-01-01 PROCEDURE — 99999 PR PBB SHADOW E&M-EST. PATIENT-LVL IV: ICD-10-PCS | Mod: PBBFAC,,, | Performed by: INTERNAL MEDICINE

## 2022-01-01 PROCEDURE — 3288F FALL RISK ASSESSMENT DOCD: CPT | Mod: CPTII,S$GLB,, | Performed by: INTERNAL MEDICINE

## 2022-01-01 PROCEDURE — 1159F PR MEDICATION LIST DOCUMENTED IN MEDICAL RECORD: ICD-10-PCS | Mod: CPTII,S$GLB,, | Performed by: SURGERY

## 2022-01-01 PROCEDURE — 96375 TX/PRO/DX INJ NEW DRUG ADDON: CPT

## 2022-01-01 PROCEDURE — 36430 TRANSFUSION BLD/BLD COMPNT: CPT

## 2022-01-01 PROCEDURE — 83735 ASSAY OF MAGNESIUM: CPT | Performed by: INTERNAL MEDICINE

## 2022-01-01 PROCEDURE — 63600175 PHARM REV CODE 636 W HCPCS: Performed by: NURSE ANESTHETIST, CERTIFIED REGISTERED

## 2022-01-01 PROCEDURE — 96366 THER/PROPH/DIAG IV INF ADDON: CPT

## 2022-01-01 PROCEDURE — 3078F PR MOST RECENT DIASTOLIC BLOOD PRESSURE < 80 MM HG: ICD-10-PCS | Mod: HCNC,CPTII,S$GLB, | Performed by: PHYSICIAN ASSISTANT

## 2022-01-01 PROCEDURE — 74183 MRI ABDOMEN-PELVIS W W/O CONTRAST (XPD): ICD-10-PCS | Mod: 26,,, | Performed by: INTERNAL MEDICINE

## 2022-01-01 PROCEDURE — 99213 PR OFFICE/OUTPT VISIT, EST, LEVL III, 20-29 MIN: ICD-10-PCS | Mod: 95,,, | Performed by: FAMILY MEDICINE

## 2022-01-01 PROCEDURE — 96413 CHEMO IV INFUSION 1 HR: CPT

## 2022-01-01 PROCEDURE — 3008F BODY MASS INDEX DOCD: CPT | Mod: CPTII,S$GLB,, | Performed by: INTERNAL MEDICINE

## 2022-01-01 PROCEDURE — 3077F PR MOST RECENT SYSTOLIC BLOOD PRESSURE >= 140 MM HG: ICD-10-PCS | Mod: CPTII,S$GLB,, | Performed by: FAMILY MEDICINE

## 2022-01-01 PROCEDURE — 85025 COMPLETE CBC W/AUTO DIFF WBC: CPT | Performed by: STUDENT IN AN ORGANIZED HEALTH CARE EDUCATION/TRAINING PROGRAM

## 2022-01-01 PROCEDURE — 1159F PR MEDICATION LIST DOCUMENTED IN MEDICAL RECORD: ICD-10-PCS | Mod: CPTII,S$GLB,, | Performed by: INTERNAL MEDICINE

## 2022-01-01 PROCEDURE — 25000003 PHARM REV CODE 250: Performed by: INTERNAL MEDICINE

## 2022-01-01 PROCEDURE — 3078F DIAST BP <80 MM HG: CPT | Mod: CPTII,S$GLB,, | Performed by: INTERNAL MEDICINE

## 2022-01-01 PROCEDURE — 90694 FLU VACCINE - QUADRIVALENT - ADJUVANTED: ICD-10-PCS | Mod: S$GLB,,, | Performed by: INTERNAL MEDICINE

## 2022-01-01 PROCEDURE — 76377 PR  3D RENDERING W/ IMAGE POSTPROCESS: ICD-10-PCS | Mod: 26,,, | Performed by: RADIOLOGY

## 2022-01-01 PROCEDURE — 99499 RISK ADDL DX/OHS AUDIT: ICD-10-PCS | Mod: 95,,, | Performed by: FAMILY MEDICINE

## 2022-01-01 PROCEDURE — 82607 VITAMIN B-12: CPT | Performed by: PHYSICIAN ASSISTANT

## 2022-01-01 PROCEDURE — 3075F SYST BP GE 130 - 139MM HG: CPT | Mod: CPTII,S$GLB,, | Performed by: INTERNAL MEDICINE

## 2022-01-01 PROCEDURE — A4216 STERILE WATER/SALINE, 10 ML: HCPCS | Performed by: INTERNAL MEDICINE

## 2022-01-01 PROCEDURE — 3288F PR FALLS RISK ASSESSMENT DOCUMENTED: ICD-10-PCS | Mod: CPTII,S$GLB,, | Performed by: INTERNAL MEDICINE

## 2022-01-01 PROCEDURE — 80053 COMPREHEN METABOLIC PANEL: CPT | Performed by: STUDENT IN AN ORGANIZED HEALTH CARE EDUCATION/TRAINING PROGRAM

## 2022-01-01 PROCEDURE — 3044F PR MOST RECENT HEMOGLOBIN A1C LEVEL <7.0%: ICD-10-PCS | Mod: CPTII,S$GLB,, | Performed by: INTERNAL MEDICINE

## 2022-01-01 PROCEDURE — 1101F PT FALLS ASSESS-DOCD LE1/YR: CPT | Mod: CPTII,S$GLB,, | Performed by: REGISTERED NURSE

## 2022-01-01 PROCEDURE — 36248 INS CATH ABD/L-EXT ART ADDL: CPT | Mod: LT,,, | Performed by: RADIOLOGY

## 2022-01-01 PROCEDURE — 1160F RVW MEDS BY RX/DR IN RCRD: CPT | Mod: CPTII,95,, | Performed by: INTERNAL MEDICINE

## 2022-01-01 PROCEDURE — 83036 HEMOGLOBIN GLYCOSYLATED A1C: CPT | Performed by: FAMILY MEDICINE

## 2022-01-01 PROCEDURE — 76937 US GUIDE VASCULAR ACCESS: CPT | Mod: 26,,, | Performed by: RADIOLOGY

## 2022-01-01 PROCEDURE — 99214 OFFICE O/P EST MOD 30 MIN: CPT | Mod: S$GLB,,, | Performed by: NURSE PRACTITIONER

## 2022-01-01 PROCEDURE — 96376 TX/PRO/DX INJ SAME DRUG ADON: CPT

## 2022-01-01 PROCEDURE — 1160F RVW MEDS BY RX/DR IN RCRD: CPT | Mod: CPTII,S$GLB,, | Performed by: NURSE PRACTITIONER

## 2022-01-01 PROCEDURE — 75774 PR  ANGIO EA ADDNL SELECTV VESSEL: ICD-10-PCS | Mod: 26,59,ICN, | Performed by: RADIOLOGY

## 2022-01-01 PROCEDURE — 99215 OFFICE O/P EST HI 40 MIN: CPT | Mod: S$GLB,,, | Performed by: INTERNAL MEDICINE

## 2022-01-01 PROCEDURE — 1160F PR REVIEW ALL MEDS BY PRESCRIBER/CLIN PHARMACIST DOCUMENTED: ICD-10-PCS | Mod: CPTII,95,, | Performed by: STUDENT IN AN ORGANIZED HEALTH CARE EDUCATION/TRAINING PROGRAM

## 2022-01-01 PROCEDURE — 99499 UNLISTED E&M SERVICE: CPT | Mod: HCNC,S$GLB,, | Performed by: INTERNAL MEDICINE

## 2022-01-01 PROCEDURE — 3075F PR MOST RECENT SYSTOLIC BLOOD PRESS GE 130-139MM HG: ICD-10-PCS | Mod: CPTII,S$GLB,, | Performed by: INTERNAL MEDICINE

## 2022-01-01 PROCEDURE — 1126F AMNT PAIN NOTED NONE PRSNT: CPT | Mod: CPTII,S$GLB,, | Performed by: PHYSICIAN ASSISTANT

## 2022-01-01 PROCEDURE — 99214 PR OFFICE/OUTPT VISIT, EST, LEVL IV, 30-39 MIN: ICD-10-PCS | Mod: 95,,, | Performed by: NURSE PRACTITIONER

## 2022-01-01 PROCEDURE — 75774 PR  ANGIO EA ADDNL SELECTV VESSEL: ICD-10-PCS | Mod: 26,59,, | Performed by: RADIOLOGY

## 2022-01-01 PROCEDURE — 96365 THER/PROPH/DIAG IV INF INIT: CPT

## 2022-01-01 PROCEDURE — 1160F RVW MEDS BY RX/DR IN RCRD: CPT | Mod: CPTII,95,, | Performed by: FAMILY MEDICINE

## 2022-01-01 PROCEDURE — 76377 3D RENDER W/INTRP POSTPROCES: CPT | Mod: 26,,, | Performed by: RADIOLOGY

## 2022-01-01 PROCEDURE — 1159F MED LIST DOCD IN RCRD: CPT | Mod: CPTII,95,, | Performed by: PHYSICIAN ASSISTANT

## 2022-01-01 PROCEDURE — 1101F PT FALLS ASSESS-DOCD LE1/YR: CPT | Mod: CPTII,S$GLB,, | Performed by: INTERNAL MEDICINE

## 2022-01-01 PROCEDURE — 99999 PR PBB SHADOW E&M-EST. PATIENT-LVL V: CPT | Mod: PBBFAC,,, | Performed by: INTERNAL MEDICINE

## 2022-01-01 PROCEDURE — 63600175 PHARM REV CODE 636 W HCPCS: Performed by: INTERNAL MEDICINE

## 2022-01-01 PROCEDURE — 80047 BASIC METABLC PNL IONIZED CA: CPT

## 2022-01-01 PROCEDURE — G0269 OCCLUSIVE DEVICE IN VEIN ART: HCPCS | Performed by: RADIOLOGY

## 2022-01-01 PROCEDURE — 99214 PR OFFICE/OUTPT VISIT, EST, LEVL IV, 30-39 MIN: ICD-10-PCS | Mod: 95,,, | Performed by: FAMILY MEDICINE

## 2022-01-01 PROCEDURE — 1159F MED LIST DOCD IN RCRD: CPT | Mod: CPTII,S$GLB,, | Performed by: INTERNAL MEDICINE

## 2022-01-01 PROCEDURE — 99499 RISK ADDL DX/OHS AUDIT: ICD-10-PCS | Mod: 95,,, | Performed by: NURSE PRACTITIONER

## 2022-01-01 PROCEDURE — 99024 POSTOP FOLLOW-UP VISIT: CPT | Mod: S$GLB,,, | Performed by: SURGERY

## 2022-01-01 PROCEDURE — 1160F PR REVIEW ALL MEDS BY PRESCRIBER/CLIN PHARMACIST DOCUMENTED: ICD-10-PCS | Mod: CPTII,S$GLB,, | Performed by: NURSE PRACTITIONER

## 2022-01-01 PROCEDURE — 97530 THERAPEUTIC ACTIVITIES: CPT

## 2022-01-01 PROCEDURE — 99999 PR PBB SHADOW E&M-EST. PATIENT-LVL IV: CPT | Mod: PBBFAC,,, | Performed by: PHYSICIAN ASSISTANT

## 2022-01-01 PROCEDURE — 88305 TISSUE EXAM BY PATHOLOGIST: CPT | Mod: 26,,, | Performed by: PATHOLOGY

## 2022-01-01 PROCEDURE — 76380 CAT SCAN FOLLOW-UP STUDY: CPT | Mod: 26,59,, | Performed by: RADIOLOGY

## 2022-01-01 PROCEDURE — S0030 INJECTION, METRONIDAZOLE: HCPCS | Performed by: STUDENT IN AN ORGANIZED HEALTH CARE EDUCATION/TRAINING PROGRAM

## 2022-01-01 PROCEDURE — 99999 PR PBB SHADOW E&M-EST. PATIENT-LVL IV: CPT | Mod: PBBFAC,,, | Performed by: INTERNAL MEDICINE

## 2022-01-01 PROCEDURE — 1160F PR REVIEW ALL MEDS BY PRESCRIBER/CLIN PHARMACIST DOCUMENTED: ICD-10-PCS | Mod: CPTII,S$GLB,, | Performed by: INTERNAL MEDICINE

## 2022-01-01 PROCEDURE — 36415 COLL VENOUS BLD VENIPUNCTURE: CPT | Performed by: INTERNAL MEDICINE

## 2022-01-01 PROCEDURE — D9220A PRA ANESTHESIA: ICD-10-PCS | Mod: CRNA,,, | Performed by: NURSE ANESTHETIST, CERTIFIED REGISTERED

## 2022-01-01 PROCEDURE — 63600175 PHARM REV CODE 636 W HCPCS: Performed by: STUDENT IN AN ORGANIZED HEALTH CARE EDUCATION/TRAINING PROGRAM

## 2022-01-01 PROCEDURE — 85025 COMPLETE CBC W/AUTO DIFF WBC: CPT | Mod: 91 | Performed by: STUDENT IN AN ORGANIZED HEALTH CARE EDUCATION/TRAINING PROGRAM

## 2022-01-01 PROCEDURE — 80053 COMPREHEN METABOLIC PANEL: CPT | Performed by: PHYSICIAN ASSISTANT

## 2022-01-01 PROCEDURE — 1101F PR PT FALLS ASSESS DOC 0-1 FALLS W/OUT INJ PAST YR: ICD-10-PCS | Mod: CPTII,S$GLB,, | Performed by: INTERNAL MEDICINE

## 2022-01-01 PROCEDURE — 96361 HYDRATE IV INFUSION ADD-ON: CPT

## 2022-01-01 PROCEDURE — 99284 PR EMERGENCY DEPT VISIT,LEVEL IV: ICD-10-PCS | Mod: ,,, | Performed by: PHYSICIAN ASSISTANT

## 2022-01-01 PROCEDURE — D9220A PRA ANESTHESIA: Mod: ANES,,, | Performed by: ANESTHESIOLOGY

## 2022-01-01 PROCEDURE — 3044F PR MOST RECENT HEMOGLOBIN A1C LEVEL <7.0%: ICD-10-PCS | Mod: CPTII,95,, | Performed by: FAMILY MEDICINE

## 2022-01-01 PROCEDURE — 86301 IMMUNOASSAY TUMOR CA 19-9: CPT | Performed by: INTERNAL MEDICINE

## 2022-01-01 PROCEDURE — 3074F SYST BP LT 130 MM HG: CPT | Mod: CPTII,S$GLB,, | Performed by: INTERNAL MEDICINE

## 2022-01-01 PROCEDURE — 99499 RISK ADDL DX/OHS AUDIT: ICD-10-PCS | Mod: S$GLB,,, | Performed by: INTERNAL MEDICINE

## 2022-01-01 PROCEDURE — 76937 US GUIDE VASCULAR ACCESS: CPT | Mod: TC | Performed by: RADIOLOGY

## 2022-01-01 PROCEDURE — 93010 ELECTROCARDIOGRAM REPORT: CPT | Mod: ,,, | Performed by: INTERNAL MEDICINE

## 2022-01-01 PROCEDURE — 44970 LAPAROSCOPY APPENDECTOMY: CPT | Mod: 59,,, | Performed by: SURGERY

## 2022-01-01 PROCEDURE — 88304 TISSUE EXAM BY PATHOLOGIST: CPT | Mod: 26,,, | Performed by: PATHOLOGY

## 2022-01-01 PROCEDURE — P9040 RBC LEUKOREDUCED IRRADIATED: HCPCS | Performed by: PHYSICIAN ASSISTANT

## 2022-01-01 PROCEDURE — 1111F PR DISCHARGE MEDS RECONCILED W/ CURRENT OUTPATIENT MED LIST: ICD-10-PCS | Mod: CPTII,S$GLB,, | Performed by: PHYSICIAN ASSISTANT

## 2022-01-01 PROCEDURE — 96415 CHEMO IV INFUSION ADDL HR: CPT

## 2022-01-01 PROCEDURE — 1101F PR PT FALLS ASSESS DOC 0-1 FALLS W/OUT INJ PAST YR: ICD-10-PCS | Mod: HCNC,CPTII,S$GLB, | Performed by: PHYSICIAN ASSISTANT

## 2022-01-01 PROCEDURE — 25500020 PHARM REV CODE 255: Performed by: RADIOLOGY

## 2022-01-01 PROCEDURE — 37000008 HC ANESTHESIA 1ST 15 MINUTES: Performed by: INTERNAL MEDICINE

## 2022-01-01 PROCEDURE — 25000003 PHARM REV CODE 250: Performed by: NURSE ANESTHETIST, CERTIFIED REGISTERED

## 2022-01-01 PROCEDURE — 93970 US LOWER EXTREMITY VEINS BILATERAL: ICD-10-PCS | Mod: 26,,, | Performed by: RADIOLOGY

## 2022-01-01 PROCEDURE — 3074F PR MOST RECENT SYSTOLIC BLOOD PRESSURE < 130 MM HG: ICD-10-PCS | Mod: CPTII,S$GLB,, | Performed by: PHYSICIAN ASSISTANT

## 2022-01-01 PROCEDURE — 1160F PR REVIEW ALL MEDS BY PRESCRIBER/CLIN PHARMACIST DOCUMENTED: ICD-10-PCS | Mod: CPTII,95,, | Performed by: FAMILY MEDICINE

## 2022-01-01 PROCEDURE — 43239 PR EGD, FLEX, W/BIOPSY, SGL/MULTI: ICD-10-PCS | Mod: ,,, | Performed by: INTERNAL MEDICINE

## 2022-01-01 PROCEDURE — 96420 CHEMO IA PUSH TECNIQUE: CPT | Performed by: RADIOLOGY

## 2022-01-01 PROCEDURE — 99214 OFFICE O/P EST MOD 30 MIN: CPT | Mod: 95,,, | Performed by: FAMILY MEDICINE

## 2022-01-01 PROCEDURE — P9040 RBC LEUKOREDUCED IRRADIATED: HCPCS | Performed by: INTERNAL MEDICINE

## 2022-01-01 PROCEDURE — 3044F PR MOST RECENT HEMOGLOBIN A1C LEVEL <7.0%: ICD-10-PCS | Mod: CPTII,S$GLB,, | Performed by: REGISTERED NURSE

## 2022-01-01 PROCEDURE — 11000001 HC ACUTE MED/SURG PRIVATE ROOM

## 2022-01-01 PROCEDURE — 99499 UNLISTED E&M SERVICE: CPT | Mod: 95,,, | Performed by: PHYSICIAN ASSISTANT

## 2022-01-01 PROCEDURE — 97165 OT EVAL LOW COMPLEX 30 MIN: CPT

## 2022-01-01 PROCEDURE — 99999 PR PBB SHADOW E&M-EST. PATIENT-LVL III: ICD-10-PCS | Mod: PBBFAC,,, | Performed by: SURGERY

## 2022-01-01 PROCEDURE — 3044F HG A1C LEVEL LT 7.0%: CPT | Mod: CPTII,S$GLB,, | Performed by: PHYSICIAN ASSISTANT

## 2022-01-01 PROCEDURE — 72197 MRI PELVIS W/O & W/DYE: CPT | Mod: 26,,, | Performed by: RADIOLOGY

## 2022-01-01 PROCEDURE — 99999 PR PBB SHADOW E&M-EST. PATIENT-LVL III: CPT | Mod: PBBFAC,,, | Performed by: SURGERY

## 2022-01-01 PROCEDURE — 96368 THER/DIAG CONCURRENT INF: CPT

## 2022-01-01 PROCEDURE — 99215 PR OFFICE/OUTPT VISIT, EST, LEVL V, 40-54 MIN: ICD-10-PCS | Mod: 95,,, | Performed by: NURSE PRACTITIONER

## 2022-01-01 PROCEDURE — 1125F AMNT PAIN NOTED PAIN PRSNT: CPT | Mod: CPTII,S$GLB,, | Performed by: NURSE PRACTITIONER

## 2022-01-01 PROCEDURE — 36247 PR PLACE CATH SUBSUBSELECT ART,ABD/PEL: ICD-10-PCS | Mod: 51,,, | Performed by: RADIOLOGY

## 2022-01-01 PROCEDURE — 85025 COMPLETE CBC W/AUTO DIFF WBC: CPT | Performed by: INTERNAL MEDICINE

## 2022-01-01 PROCEDURE — 72197 MRI ABDOMEN-PELVIS W W/O CONTRAST (XPD): ICD-10-PCS | Mod: 26,,, | Performed by: RADIOLOGY

## 2022-01-01 PROCEDURE — 99215 OFFICE O/P EST HI 40 MIN: CPT | Mod: 95,,, | Performed by: STUDENT IN AN ORGANIZED HEALTH CARE EDUCATION/TRAINING PROGRAM

## 2022-01-01 PROCEDURE — D9220A PRA ANESTHESIA: Mod: CRNA,,, | Performed by: NURSE ANESTHETIST, CERTIFIED REGISTERED

## 2022-01-01 PROCEDURE — 88304 PR  SURG PATH,LEVEL III: ICD-10-PCS | Mod: 26,,, | Performed by: PATHOLOGY

## 2022-01-01 PROCEDURE — 99215 OFFICE O/P EST HI 40 MIN: CPT | Mod: S$GLB,,, | Performed by: REGISTERED NURSE

## 2022-01-01 PROCEDURE — C1729 CATH, DRAINAGE: HCPCS | Performed by: SURGERY

## 2022-01-01 PROCEDURE — 45378 DIAGNOSTIC COLONOSCOPY: CPT | Mod: ,,, | Performed by: INTERNAL MEDICINE

## 2022-01-01 PROCEDURE — C1760 CLOSURE DEV, VASC: HCPCS

## 2022-01-01 PROCEDURE — 85027 COMPLETE CBC AUTOMATED: CPT | Performed by: PHYSICIAN ASSISTANT

## 2022-01-01 PROCEDURE — 99223 PR INITIAL HOSPITAL CARE,LEVL III: ICD-10-PCS | Mod: GC,,, | Performed by: INTERNAL MEDICINE

## 2022-01-01 PROCEDURE — 3079F PR MOST RECENT DIASTOLIC BLOOD PRESSURE 80-89 MM HG: ICD-10-PCS | Mod: CPTII,S$GLB,, | Performed by: REGISTERED NURSE

## 2022-01-01 PROCEDURE — 1159F PR MEDICATION LIST DOCUMENTED IN MEDICAL RECORD: ICD-10-PCS | Mod: CPTII,S$GLB,, | Performed by: PSYCHOLOGIST

## 2022-01-01 PROCEDURE — 99214 PR OFFICE/OUTPT VISIT, EST, LEVL IV, 30-39 MIN: ICD-10-PCS | Mod: 95,,, | Performed by: PHYSICIAN ASSISTANT

## 2022-01-01 PROCEDURE — 1101F PR PT FALLS ASSESS DOC 0-1 FALLS W/OUT INJ PAST YR: ICD-10-PCS | Mod: CPTII,S$GLB,, | Performed by: PHYSICIAN ASSISTANT

## 2022-01-01 PROCEDURE — 1159F PR MEDICATION LIST DOCUMENTED IN MEDICAL RECORD: ICD-10-PCS | Mod: HCNC,CPTII,S$GLB, | Performed by: PHYSICIAN ASSISTANT

## 2022-01-01 PROCEDURE — D9220A PRA ANESTHESIA: ICD-10-PCS | Mod: ANES,,, | Performed by: ANESTHESIOLOGY

## 2022-01-01 PROCEDURE — D9220A PRA ANESTHESIA: ICD-10-PCS | Mod: ANES,,, | Performed by: STUDENT IN AN ORGANIZED HEALTH CARE EDUCATION/TRAINING PROGRAM

## 2022-01-01 PROCEDURE — 99215 PR OFFICE/OUTPT VISIT, EST, LEVL V, 40-54 MIN: ICD-10-PCS | Mod: S$GLB,,, | Performed by: INTERNAL MEDICINE

## 2022-01-01 PROCEDURE — 3008F PR BODY MASS INDEX (BMI) DOCUMENTED: ICD-10-PCS | Mod: CPTII,S$GLB,, | Performed by: PHYSICIAN ASSISTANT

## 2022-01-01 PROCEDURE — 99284 PR EMERGENCY DEPT VISIT,LEVEL IV: ICD-10-PCS | Mod: CS,,, | Performed by: PHYSICIAN ASSISTANT

## 2022-01-01 PROCEDURE — 75726 ARTERY X-RAYS ABDOMEN: CPT | Mod: TC,59 | Performed by: RADIOLOGY

## 2022-01-01 PROCEDURE — 3079F DIAST BP 80-89 MM HG: CPT | Mod: CPTII,S$GLB,, | Performed by: REGISTERED NURSE

## 2022-01-01 PROCEDURE — 1160F PR REVIEW ALL MEDS BY PRESCRIBER/CLIN PHARMACIST DOCUMENTED: ICD-10-PCS | Mod: CPTII,S$GLB,, | Performed by: FAMILY MEDICINE

## 2022-01-01 PROCEDURE — 25000003 PHARM REV CODE 250: Performed by: RADIOLOGY

## 2022-01-01 PROCEDURE — 3008F PR BODY MASS INDEX (BMI) DOCUMENTED: ICD-10-PCS | Mod: CPTII,S$GLB,, | Performed by: INTERNAL MEDICINE

## 2022-01-01 PROCEDURE — 99233 PR SUBSEQUENT HOSPITAL CARE,LEVL III: ICD-10-PCS | Mod: 57,,, | Performed by: STUDENT IN AN ORGANIZED HEALTH CARE EDUCATION/TRAINING PROGRAM

## 2022-01-01 PROCEDURE — 25000003 PHARM REV CODE 250: Performed by: PHYSICIAN ASSISTANT

## 2022-01-01 PROCEDURE — 1160F RVW MEDS BY RX/DR IN RCRD: CPT | Mod: CPTII,S$GLB,, | Performed by: INTERNAL MEDICINE

## 2022-01-01 PROCEDURE — 99999 PR PBB SHADOW E&M-EST. PATIENT-LVL V: ICD-10-PCS | Mod: PBBFAC,,, | Performed by: INTERNAL MEDICINE

## 2022-01-01 PROCEDURE — 96415 CHEMO IV INFUSION ADDL HR: CPT | Mod: HCNC

## 2022-01-01 PROCEDURE — 99213 OFFICE O/P EST LOW 20 MIN: CPT | Mod: S$GLB,,, | Performed by: INTERNAL MEDICINE

## 2022-01-01 PROCEDURE — 96413 CHEMO IV INFUSION 1 HR: CPT | Mod: HCNC

## 2022-01-01 PROCEDURE — 1126F PR PAIN SEVERITY QUANTIFIED, NO PAIN PRESENT: ICD-10-PCS | Mod: CPTII,S$GLB,, | Performed by: PHYSICIAN ASSISTANT

## 2022-01-01 PROCEDURE — C1982 CATH, PRESSURE,VALVE-OCCLU: HCPCS

## 2022-01-01 PROCEDURE — 3078F PR MOST RECENT DIASTOLIC BLOOD PRESSURE < 80 MM HG: ICD-10-PCS | Mod: CPTII,S$GLB,, | Performed by: INTERNAL MEDICINE

## 2022-01-01 PROCEDURE — 99499 RISK ADDL DX/OHS AUDIT: ICD-10-PCS | Mod: 95,,, | Performed by: PHYSICIAN ASSISTANT

## 2022-01-01 PROCEDURE — 3044F PR MOST RECENT HEMOGLOBIN A1C LEVEL <7.0%: ICD-10-PCS | Mod: HCNC,CPTII,S$GLB, | Performed by: PHYSICIAN ASSISTANT

## 2022-01-01 PROCEDURE — 77263 THER RADIOLOGY TX PLNG CPLX: CPT | Mod: ,,, | Performed by: RADIOLOGY

## 2022-01-01 PROCEDURE — 75774 ARTERY X-RAY EACH VESSEL: CPT | Mod: TC | Performed by: RADIOLOGY

## 2022-01-01 PROCEDURE — 91312 COVID-19, MRNA, LNP-S, BIVALENT BOOSTER, PF, 30 MCG/0.3 ML DOSE: ICD-10-PCS | Mod: S$GLB,,, | Performed by: INTERNAL MEDICINE

## 2022-01-01 PROCEDURE — 96417 CHEMO IV INFUS EACH ADDL SEQ: CPT

## 2022-01-01 PROCEDURE — 3288F FALL RISK ASSESSMENT DOCD: CPT | Mod: CPTII,S$GLB,, | Performed by: PHYSICIAN ASSISTANT

## 2022-01-01 PROCEDURE — 36247 INS CATH ABD/L-EXT ART 3RD: CPT | Mod: 59,RT

## 2022-01-01 PROCEDURE — 99499 UNLISTED E&M SERVICE: CPT | Mod: S$GLB,,, | Performed by: INTERNAL MEDICINE

## 2022-01-01 PROCEDURE — 74183 MRI ABDOMEN-PELVIS W W/O CONTRAST (XPD): ICD-10-PCS | Mod: 26,,, | Performed by: RADIOLOGY

## 2022-01-01 PROCEDURE — 71250 CT THORAX DX C-: CPT | Mod: TC

## 2022-01-01 PROCEDURE — 3078F DIAST BP <80 MM HG: CPT | Mod: HCNC,CPTII,S$GLB, | Performed by: PHYSICIAN ASSISTANT

## 2022-01-01 PROCEDURE — 3075F SYST BP GE 130 - 139MM HG: CPT | Mod: CPTII,S$GLB,, | Performed by: SURGERY

## 2022-01-01 PROCEDURE — 1160F RVW MEDS BY RX/DR IN RCRD: CPT | Mod: CPTII,S$GLB,, | Performed by: FAMILY MEDICINE

## 2022-01-01 PROCEDURE — 3044F PR MOST RECENT HEMOGLOBIN A1C LEVEL <7.0%: ICD-10-PCS | Mod: CPTII,S$GLB,, | Performed by: PHYSICIAN ASSISTANT

## 2022-01-01 PROCEDURE — 1125F PR PAIN SEVERITY QUANTIFIED, PAIN PRESENT: ICD-10-PCS | Mod: CPTII,S$GLB,, | Performed by: NURSE PRACTITIONER

## 2022-01-01 PROCEDURE — 3044F HG A1C LEVEL LT 7.0%: CPT | Mod: CPTII,S$GLB,, | Performed by: REGISTERED NURSE

## 2022-01-01 PROCEDURE — 96411 CHEMO IV PUSH ADDL DRUG: CPT

## 2022-01-01 PROCEDURE — 99499 RISK ADDL DX/OHS AUDIT: ICD-10-PCS | Mod: S$GLB,,, | Performed by: PHYSICIAN ASSISTANT

## 2022-01-01 PROCEDURE — 25000003 PHARM REV CODE 250: Performed by: STUDENT IN AN ORGANIZED HEALTH CARE EDUCATION/TRAINING PROGRAM

## 2022-01-01 PROCEDURE — 37000008 HC ANESTHESIA 1ST 15 MINUTES

## 2022-01-01 PROCEDURE — 36415 COLL VENOUS BLD VENIPUNCTURE: CPT | Mod: PN | Performed by: FAMILY MEDICINE

## 2022-01-01 PROCEDURE — 99213 OFFICE O/P EST LOW 20 MIN: CPT | Mod: 95,,, | Performed by: FAMILY MEDICINE

## 2022-01-01 PROCEDURE — A4550 SURGICAL TRAYS: HCPCS

## 2022-01-01 PROCEDURE — 99214 OFFICE O/P EST MOD 30 MIN: CPT | Mod: 95,,, | Performed by: PHYSICIAN ASSISTANT

## 2022-01-01 PROCEDURE — 3044F HG A1C LEVEL LT 7.0%: CPT | Mod: CPTII,S$GLB,, | Performed by: INTERNAL MEDICINE

## 2022-01-01 PROCEDURE — 3079F DIAST BP 80-89 MM HG: CPT | Mod: CPTII,S$GLB,, | Performed by: NURSE PRACTITIONER

## 2022-01-01 PROCEDURE — 3044F HG A1C LEVEL LT 7.0%: CPT | Mod: HCNC,CPTII,S$GLB, | Performed by: PHYSICIAN ASSISTANT

## 2022-01-01 PROCEDURE — 90632 HEPATITIS A VACCINE ADULT IM: ICD-10-PCS | Mod: S$GLB,,, | Performed by: INTERNAL MEDICINE

## 2022-01-01 PROCEDURE — 86920 COMPATIBILITY TEST SPIN: CPT | Performed by: PHYSICIAN ASSISTANT

## 2022-01-01 PROCEDURE — 36415 COLL VENOUS BLD VENIPUNCTURE: CPT | Performed by: STUDENT IN AN ORGANIZED HEALTH CARE EDUCATION/TRAINING PROGRAM

## 2022-01-01 PROCEDURE — 99999 PR PBB SHADOW E&M-EST. PATIENT-LVL IV: CPT | Mod: PBBFAC,HCNC,, | Performed by: PHYSICIAN ASSISTANT

## 2022-01-01 PROCEDURE — 85610 PROTHROMBIN TIME: CPT | Performed by: FAMILY MEDICINE

## 2022-01-01 PROCEDURE — 99284 EMERGENCY DEPT VISIT MOD MDM: CPT | Mod: ,,, | Performed by: PHYSICIAN ASSISTANT

## 2022-01-01 PROCEDURE — 3008F PR BODY MASS INDEX (BMI) DOCUMENTED: ICD-10-PCS | Mod: CPTII,S$GLB,, | Performed by: FAMILY MEDICINE

## 2022-01-01 PROCEDURE — 96360 HYDRATION IV INFUSION INIT: CPT

## 2022-01-01 PROCEDURE — 99999 PR PBB SHADOW E&M-EST. PATIENT-LVL V: CPT | Mod: PBBFAC,,, | Performed by: FAMILY MEDICINE

## 2022-01-01 PROCEDURE — 76380 CAT SCAN FOLLOW-UP STUDY: CPT | Mod: TC,59 | Performed by: RADIOLOGY

## 2022-01-01 PROCEDURE — 3079F DIAST BP 80-89 MM HG: CPT | Mod: CPTII,S$GLB,, | Performed by: FAMILY MEDICINE

## 2022-01-01 PROCEDURE — 63600175 PHARM REV CODE 636 W HCPCS: Performed by: FAMILY MEDICINE

## 2022-01-01 PROCEDURE — 99215 OFFICE O/P EST HI 40 MIN: CPT | Mod: S$GLB,,, | Performed by: PHYSICIAN ASSISTANT

## 2022-01-01 PROCEDURE — 86301 IMMUNOASSAY TUMOR CA 19-9: CPT | Performed by: FAMILY MEDICINE

## 2022-01-01 PROCEDURE — 36415 COLL VENOUS BLD VENIPUNCTURE: CPT | Performed by: SURGERY

## 2022-01-01 PROCEDURE — 3074F PR MOST RECENT SYSTOLIC BLOOD PRESSURE < 130 MM HG: ICD-10-PCS | Mod: CPTII,S$GLB,, | Performed by: INTERNAL MEDICINE

## 2022-01-01 PROCEDURE — 1160F RVW MEDS BY RX/DR IN RCRD: CPT | Mod: CPTII,95,, | Performed by: STUDENT IN AN ORGANIZED HEALTH CARE EDUCATION/TRAINING PROGRAM

## 2022-01-01 PROCEDURE — 1160F RVW MEDS BY RX/DR IN RCRD: CPT | Mod: CPTII,95,, | Performed by: PHYSICIAN ASSISTANT

## 2022-01-01 PROCEDURE — 3008F PR BODY MASS INDEX (BMI) DOCUMENTED: ICD-10-PCS | Mod: HCNC,CPTII,S$GLB, | Performed by: PHYSICIAN ASSISTANT

## 2022-01-01 PROCEDURE — G0378 HOSPITAL OBSERVATION PER HR: HCPCS

## 2022-01-01 PROCEDURE — 86803 HEPATITIS C AB TEST: CPT | Performed by: PHYSICIAN ASSISTANT

## 2022-01-01 PROCEDURE — 99213 PR OFFICE/OUTPT VISIT, EST, LEVL III, 20-29 MIN: ICD-10-PCS | Mod: S$GLB,,, | Performed by: INTERNAL MEDICINE

## 2022-01-01 PROCEDURE — 99213 PR OFFICE/OUTPT VISIT, EST, LEVL III, 20-29 MIN: ICD-10-PCS | Mod: S$GLB,,, | Performed by: FAMILY MEDICINE

## 2022-01-01 PROCEDURE — 37000009 HC ANESTHESIA EA ADD 15 MINS: Performed by: INTERNAL MEDICINE

## 2022-01-01 PROCEDURE — 1101F PT FALLS ASSESS-DOCD LE1/YR: CPT | Mod: CPTII,S$GLB,, | Performed by: SURGERY

## 2022-01-01 PROCEDURE — 93970 EXTREMITY STUDY: CPT | Mod: TC

## 2022-01-01 PROCEDURE — 90694 VACC AIIV4 NO PRSRV 0.5ML IM: CPT | Mod: S$GLB,,, | Performed by: INTERNAL MEDICINE

## 2022-01-01 PROCEDURE — 36247 INS CATH ABD/L-EXT ART 3RD: CPT | Mod: RT | Performed by: RADIOLOGY

## 2022-01-01 PROCEDURE — 3078F PR MOST RECENT DIASTOLIC BLOOD PRESSURE < 80 MM HG: ICD-10-PCS | Mod: CPTII,S$GLB,, | Performed by: SURGERY

## 2022-01-01 PROCEDURE — 99215 PR OFFICE/OUTPT VISIT, EST, LEVL V, 40-54 MIN: ICD-10-PCS | Mod: 95,,, | Performed by: INTERNAL MEDICINE

## 2022-01-01 PROCEDURE — 86900 BLOOD TYPING SEROLOGIC ABO: CPT | Performed by: INTERNAL MEDICINE

## 2022-01-01 PROCEDURE — 99024 PR POST-OP FOLLOW-UP VISIT: ICD-10-PCS | Mod: S$GLB,,, | Performed by: SURGERY

## 2022-01-01 PROCEDURE — 36247 INS CATH ABD/L-EXT ART 3RD: CPT | Performed by: RADIOLOGY

## 2022-01-01 PROCEDURE — 88305 TISSUE EXAM BY PATHOLOGIST: CPT | Performed by: PATHOLOGY

## 2022-01-01 PROCEDURE — 71250 CT CHEST WITHOUT CONTRAST: ICD-10-PCS | Mod: 26,,, | Performed by: RADIOLOGY

## 2022-01-01 PROCEDURE — 1160F PR REVIEW ALL MEDS BY PRESCRIBER/CLIN PHARMACIST DOCUMENTED: ICD-10-PCS | Mod: CPTII,95,, | Performed by: PHYSICIAN ASSISTANT

## 2022-01-01 PROCEDURE — 1125F PR PAIN SEVERITY QUANTIFIED, PAIN PRESENT: ICD-10-PCS | Mod: CPTII,S$GLB,, | Performed by: INTERNAL MEDICINE

## 2022-01-01 PROCEDURE — 3044F PR MOST RECENT HEMOGLOBIN A1C LEVEL <7.0%: ICD-10-PCS | Mod: CPTII,95,, | Performed by: NURSE PRACTITIONER

## 2022-01-01 PROCEDURE — 84443 ASSAY THYROID STIM HORMONE: CPT | Performed by: INTERNAL MEDICINE

## 2022-01-01 PROCEDURE — 75774 ARTERY X-RAY EACH VESSEL: CPT | Mod: 26,59,, | Performed by: RADIOLOGY

## 2022-01-01 PROCEDURE — 49587 PR REPAIR UMBILICAL HERN,5+Y/O,STRANG: CPT | Mod: ,,, | Performed by: SURGERY

## 2022-01-01 PROCEDURE — 82272 OCCULT BLD FECES 1-3 TESTS: CPT

## 2022-01-01 PROCEDURE — 1101F PT FALLS ASSESS-DOCD LE1/YR: CPT | Mod: CPTII,S$GLB,, | Performed by: NURSE PRACTITIONER

## 2022-01-01 PROCEDURE — 25000003 PHARM REV CODE 250: Mod: HCNC | Performed by: PHYSICIAN ASSISTANT

## 2022-01-01 PROCEDURE — 1126F PR PAIN SEVERITY QUANTIFIED, NO PAIN PRESENT: ICD-10-PCS | Mod: CPTII,S$GLB,, | Performed by: INTERNAL MEDICINE

## 2022-01-01 PROCEDURE — 1125F AMNT PAIN NOTED PAIN PRSNT: CPT | Mod: CPTII,S$GLB,, | Performed by: INTERNAL MEDICINE

## 2022-01-01 PROCEDURE — 27201012 HC FORCEPS, HOT/COLD, DISP: Performed by: INTERNAL MEDICINE

## 2022-01-01 PROCEDURE — 83735 ASSAY OF MAGNESIUM: CPT | Performed by: STUDENT IN AN ORGANIZED HEALTH CARE EDUCATION/TRAINING PROGRAM

## 2022-01-01 PROCEDURE — E9220 PRA ENDO ANESTHESIA: HCPCS | Mod: ,,, | Performed by: NURSE ANESTHETIST, CERTIFIED REGISTERED

## 2022-01-01 PROCEDURE — 1126F PR PAIN SEVERITY QUANTIFIED, NO PAIN PRESENT: ICD-10-PCS | Mod: CPTII,S$GLB,, | Performed by: SURGERY

## 2022-01-01 PROCEDURE — 85025 COMPLETE CBC W/AUTO DIFF WBC: CPT | Performed by: PHYSICIAN ASSISTANT

## 2022-01-01 PROCEDURE — 75726 CHG ANGIO VISCERAL SELECTV/SUBSELEC: ICD-10-PCS | Mod: 26,59,, | Performed by: RADIOLOGY

## 2022-01-01 PROCEDURE — 1101F PR PT FALLS ASSESS DOC 0-1 FALLS W/OUT INJ PAST YR: ICD-10-PCS | Mod: CPTII,S$GLB,, | Performed by: NURSE PRACTITIONER

## 2022-01-01 PROCEDURE — 75726 ARTERY X-RAYS ABDOMEN: CPT | Mod: 26,59,, | Performed by: RADIOLOGY

## 2022-01-01 PROCEDURE — 99214 OFFICE O/P EST MOD 30 MIN: CPT | Mod: 95,,, | Performed by: NURSE PRACTITIONER

## 2022-01-01 PROCEDURE — 3008F BODY MASS INDEX DOCD: CPT | Mod: CPTII,S$GLB,, | Performed by: PHYSICIAN ASSISTANT

## 2022-01-01 PROCEDURE — 71000033 HC RECOVERY, INTIAL HOUR: Performed by: SURGERY

## 2022-01-01 PROCEDURE — 25500020 PHARM REV CODE 255: Performed by: FAMILY MEDICINE

## 2022-01-01 PROCEDURE — 36247 INS CATH ABD/L-EXT ART 3RD: CPT | Mod: 51,,, | Performed by: RADIOLOGY

## 2022-01-01 PROCEDURE — 76937 PR  US GUIDE, VASCULAR ACCESS: ICD-10-PCS | Mod: 26,ICN,, | Performed by: RADIOLOGY

## 2022-01-01 PROCEDURE — 81003 URINALYSIS AUTO W/O SCOPE: CPT | Performed by: PHYSICIAN ASSISTANT

## 2022-01-01 PROCEDURE — 1159F MED LIST DOCD IN RCRD: CPT | Mod: CPTII,95,, | Performed by: STUDENT IN AN ORGANIZED HEALTH CARE EDUCATION/TRAINING PROGRAM

## 2022-01-01 PROCEDURE — 49587 PR REPAIR UMBILICAL HERN,5+Y/O,STRANG: ICD-10-PCS | Mod: ,,, | Performed by: SURGERY

## 2022-01-01 PROCEDURE — 37243 VASC EMBOLIZE/OCCLUDE ORGAN: CPT | Performed by: RADIOLOGY

## 2022-01-01 PROCEDURE — 96416 CHEMO PROLONG INFUSE W/PUMP: CPT | Mod: HCNC

## 2022-01-01 PROCEDURE — 88305 TISSUE EXAM BY PATHOLOGIST: ICD-10-PCS | Mod: 26,,, | Performed by: PATHOLOGY

## 2022-01-01 PROCEDURE — 76377 3D RENDER W/INTRP POSTPROCES: CPT | Mod: 26,ICN,, | Performed by: RADIOLOGY

## 2022-01-01 PROCEDURE — 36248 INS CATH ABD/L-EXT ART ADDL: CPT | Mod: ,,, | Performed by: RADIOLOGY

## 2022-01-01 PROCEDURE — 36247 INS CATH ABD/L-EXT ART 3RD: CPT | Mod: 51,ICN,, | Performed by: RADIOLOGY

## 2022-01-01 PROCEDURE — 3078F DIAST BP <80 MM HG: CPT | Mod: CPTII,S$GLB,, | Performed by: PHYSICIAN ASSISTANT

## 2022-01-01 PROCEDURE — 85025 COMPLETE CBC W/AUTO DIFF WBC: CPT | Performed by: SURGERY

## 2022-01-01 PROCEDURE — 3288F PR FALLS RISK ASSESSMENT DOCUMENTED: ICD-10-PCS | Mod: CPTII,S$GLB,, | Performed by: REGISTERED NURSE

## 2022-01-01 PROCEDURE — 3078F PR MOST RECENT DIASTOLIC BLOOD PRESSURE < 80 MM HG: ICD-10-PCS | Mod: CPTII,S$GLB,, | Performed by: PHYSICIAN ASSISTANT

## 2022-01-01 PROCEDURE — 63600175 PHARM REV CODE 636 W HCPCS

## 2022-01-01 PROCEDURE — 36247 PR PLACE CATH SUBSUBSELECT ART,ABD/PEL: ICD-10-PCS | Mod: 51,RT,, | Performed by: RADIOLOGY

## 2022-01-01 PROCEDURE — 36247 INS CATH ABD/L-EXT ART 3RD: CPT | Mod: 51,RT,, | Performed by: RADIOLOGY

## 2022-01-01 PROCEDURE — 36248 INS CATH ABD/L-EXT ART ADDL: CPT | Mod: LT | Performed by: RADIOLOGY

## 2022-01-01 PROCEDURE — G0008 FLU VACCINE - QUADRIVALENT - ADJUVANTED: ICD-10-PCS | Mod: S$GLB,,, | Performed by: INTERNAL MEDICINE

## 2022-01-01 PROCEDURE — 1159F PR MEDICATION LIST DOCUMENTED IN MEDICAL RECORD: ICD-10-PCS | Mod: CPTII,95,, | Performed by: INTERNAL MEDICINE

## 2022-01-01 PROCEDURE — 84100 ASSAY OF PHOSPHORUS: CPT | Performed by: SURGERY

## 2022-01-01 PROCEDURE — D9220A PRA ANESTHESIA: Mod: ANES,,, | Performed by: STUDENT IN AN ORGANIZED HEALTH CARE EDUCATION/TRAINING PROGRAM

## 2022-01-01 PROCEDURE — 36248 INS CATH ABD/L-EXT ART ADDL: CPT | Mod: ICN,,, | Performed by: RADIOLOGY

## 2022-01-01 PROCEDURE — 3288F PR FALLS RISK ASSESSMENT DOCUMENTED: ICD-10-PCS | Mod: CPTII,S$GLB,, | Performed by: PHYSICIAN ASSISTANT

## 2022-01-01 PROCEDURE — 71250 CT THORAX DX C-: CPT | Mod: 26,,, | Performed by: RADIOLOGY

## 2022-01-01 PROCEDURE — 90837 PR PSYCHOTHERAPY W/PATIENT, 60 MIN: ICD-10-PCS | Mod: S$GLB,,, | Performed by: PSYCHOLOGIST

## 2022-01-01 PROCEDURE — 37243 IR EMBOLIZATION COMP FOR TUMOR_ORGAN ISCHEMIA_INFARC: ICD-10-PCS | Mod: ,,, | Performed by: RADIOLOGY

## 2022-01-01 PROCEDURE — 99284 EMERGENCY DEPT VISIT MOD MDM: CPT | Mod: CS,,, | Performed by: PHYSICIAN ASSISTANT

## 2022-01-01 PROCEDURE — 99999 PR PBB SHADOW E&M-EST. PATIENT-LVL I: CPT | Mod: PBBFAC,,, | Performed by: PSYCHOLOGIST

## 2022-01-01 PROCEDURE — 36000708 HC OR TIME LEV III 1ST 15 MIN: Performed by: SURGERY

## 2022-01-01 PROCEDURE — 85007 BL SMEAR W/DIFF WBC COUNT: CPT | Performed by: STUDENT IN AN ORGANIZED HEALTH CARE EDUCATION/TRAINING PROGRAM

## 2022-01-01 PROCEDURE — U0002 COVID-19 LAB TEST NON-CDC: HCPCS | Performed by: PHYSICIAN ASSISTANT

## 2022-01-01 PROCEDURE — 88302 PR  SURG PATH,LEVEL II: ICD-10-PCS | Mod: 26,,, | Performed by: PATHOLOGY

## 2022-01-01 PROCEDURE — 1159F MED LIST DOCD IN RCRD: CPT | Mod: CPTII,95,, | Performed by: FAMILY MEDICINE

## 2022-01-01 PROCEDURE — 1159F PR MEDICATION LIST DOCUMENTED IN MEDICAL RECORD: ICD-10-PCS | Mod: CPTII,S$GLB,, | Performed by: FAMILY MEDICINE

## 2022-01-01 PROCEDURE — 25500020 PHARM REV CODE 255: Performed by: PHYSICIAN ASSISTANT

## 2022-01-01 PROCEDURE — 77263 PR  RADIATION THERAPY PLAN COMPLEX: ICD-10-PCS | Mod: ,,, | Performed by: RADIOLOGY

## 2022-01-01 PROCEDURE — 76377 3D RENDER W/INTRP POSTPROCES: CPT | Mod: TC | Performed by: RADIOLOGY

## 2022-01-01 PROCEDURE — 3079F PR MOST RECENT DIASTOLIC BLOOD PRESSURE 80-89 MM HG: ICD-10-PCS | Mod: CPTII,S$GLB,, | Performed by: NURSE PRACTITIONER

## 2022-01-01 PROCEDURE — 1111F DSCHRG MED/CURRENT MED MERGE: CPT | Mod: CPTII,95,, | Performed by: NURSE PRACTITIONER

## 2022-01-01 PROCEDURE — 90837 PSYTX W PT 60 MINUTES: CPT | Mod: S$GLB,,, | Performed by: PSYCHOLOGIST

## 2022-01-01 PROCEDURE — 99284 EMERGENCY DEPT VISIT MOD MDM: CPT | Mod: 25

## 2022-01-01 PROCEDURE — 83690 ASSAY OF LIPASE: CPT | Performed by: PHYSICIAN ASSISTANT

## 2022-01-01 PROCEDURE — 1159F MED LIST DOCD IN RCRD: CPT | Mod: CPTII,S$GLB,, | Performed by: NURSE PRACTITIONER

## 2022-01-01 PROCEDURE — 36000709 HC OR TIME LEV III EA ADD 15 MIN: Performed by: SURGERY

## 2022-01-01 PROCEDURE — 96374 THER/PROPH/DIAG INJ IV PUSH: CPT

## 2022-01-01 PROCEDURE — 3288F PR FALLS RISK ASSESSMENT DOCUMENTED: ICD-10-PCS | Mod: CPTII,S$GLB,, | Performed by: NURSE PRACTITIONER

## 2022-01-01 PROCEDURE — 83605 ASSAY OF LACTIC ACID: CPT | Performed by: PHYSICIAN ASSISTANT

## 2022-01-01 PROCEDURE — 93005 ELECTROCARDIOGRAM TRACING: CPT

## 2022-01-01 PROCEDURE — 25000003 PHARM REV CODE 250

## 2022-01-01 PROCEDURE — 99213 OFFICE O/P EST LOW 20 MIN: CPT | Mod: 95,,, | Performed by: PHYSICIAN ASSISTANT

## 2022-01-01 PROCEDURE — 37000008 HC ANESTHESIA 1ST 15 MINUTES: Performed by: SURGERY

## 2022-01-01 PROCEDURE — 37000009 HC ANESTHESIA EA ADD 15 MINS: Performed by: SURGERY

## 2022-01-01 PROCEDURE — 90632 HEPA VACCINE ADULT IM: CPT | Mod: S$GLB,,, | Performed by: INTERNAL MEDICINE

## 2022-01-01 PROCEDURE — 44970 PR LAP,APPENDECTOMY: ICD-10-PCS | Mod: 59,,, | Performed by: SURGERY

## 2022-01-01 PROCEDURE — 99999 PR PBB SHADOW E&M-EST. PATIENT-LVL V: ICD-10-PCS | Mod: PBBFAC,,, | Performed by: PHYSICIAN ASSISTANT

## 2022-01-01 PROCEDURE — 3074F SYST BP LT 130 MM HG: CPT | Mod: CPTII,S$GLB,, | Performed by: PHYSICIAN ASSISTANT

## 2022-01-01 PROCEDURE — 87389 HIV-1 AG W/HIV-1&-2 AB AG IA: CPT | Performed by: PHYSICIAN ASSISTANT

## 2022-01-01 PROCEDURE — 86920 COMPATIBILITY TEST SPIN: CPT | Performed by: INTERNAL MEDICINE

## 2022-01-01 PROCEDURE — 1159F MED LIST DOCD IN RCRD: CPT | Mod: CPTII,S$GLB,, | Performed by: REGISTERED NURSE

## 2022-01-01 PROCEDURE — 75774 ARTERY X-RAY EACH VESSEL: CPT | Mod: 26,59,ICN, | Performed by: RADIOLOGY

## 2022-01-01 PROCEDURE — 72197 MRI PELVIS W/O & W/DYE: CPT | Mod: 26,,, | Performed by: INTERNAL MEDICINE

## 2022-01-01 PROCEDURE — 1159F MED LIST DOCD IN RCRD: CPT | Mod: CPTII,S$GLB,, | Performed by: PSYCHOLOGIST

## 2022-01-01 PROCEDURE — 1101F PT FALLS ASSESS-DOCD LE1/YR: CPT | Mod: HCNC,CPTII,S$GLB, | Performed by: PHYSICIAN ASSISTANT

## 2022-01-01 PROCEDURE — 99499 UNLISTED E&M SERVICE: CPT | Mod: HCNC,S$GLB,, | Performed by: PHYSICIAN ASSISTANT

## 2022-01-01 PROCEDURE — 99999 PR PBB SHADOW E&M-EST. PATIENT-LVL V: CPT | Mod: PBBFAC,,, | Performed by: PHYSICIAN ASSISTANT

## 2022-01-01 PROCEDURE — 27201423 OPTIME MED/SURG SUP & DEVICES STERILE SUPPLY: Performed by: SURGERY

## 2022-01-01 PROCEDURE — 77263 PR  RADIATION THERAPY PLAN COMPLEX: ICD-10-PCS | Mod: ICN,,, | Performed by: RADIOLOGY

## 2022-01-01 PROCEDURE — 99499 RISK ADDL DX/OHS AUDIT: ICD-10-PCS | Mod: 95,,, | Performed by: INTERNAL MEDICINE

## 2022-01-01 PROCEDURE — 1159F PR MEDICATION LIST DOCUMENTED IN MEDICAL RECORD: ICD-10-PCS | Mod: CPTII,S$GLB,, | Performed by: PHYSICIAN ASSISTANT

## 2022-01-01 PROCEDURE — 86850 RBC ANTIBODY SCREEN: CPT | Performed by: INTERNAL MEDICINE

## 2022-01-01 PROCEDURE — 43239 EGD BIOPSY SINGLE/MULTIPLE: CPT | Mod: ,,, | Performed by: INTERNAL MEDICINE

## 2022-01-01 PROCEDURE — 85025 COMPLETE CBC W/AUTO DIFF WBC: CPT | Performed by: FAMILY MEDICINE

## 2022-01-01 PROCEDURE — 76937 PR  US GUIDE, VASCULAR ACCESS: ICD-10-PCS | Mod: 26,,, | Performed by: RADIOLOGY

## 2022-01-01 PROCEDURE — 3044F HG A1C LEVEL LT 7.0%: CPT | Mod: CPTII,95,, | Performed by: NURSE PRACTITIONER

## 2022-01-01 PROCEDURE — 3044F HG A1C LEVEL LT 7.0%: CPT | Mod: CPTII,S$GLB,, | Performed by: SURGERY

## 2022-01-01 PROCEDURE — 3044F HG A1C LEVEL LT 7.0%: CPT | Mod: CPTII,S$GLB,, | Performed by: NURSE PRACTITIONER

## 2022-01-01 PROCEDURE — 99214 PR OFFICE/OUTPT VISIT, EST, LEVL IV, 30-39 MIN: ICD-10-PCS | Mod: S$GLB,,, | Performed by: NURSE PRACTITIONER

## 2022-01-01 PROCEDURE — 82248 BILIRUBIN DIRECT: CPT | Performed by: FAMILY MEDICINE

## 2022-01-01 PROCEDURE — 99215 PR OFFICE/OUTPT VISIT, EST, LEVL V, 40-54 MIN: ICD-10-PCS | Mod: S$GLB,,, | Performed by: REGISTERED NURSE

## 2022-01-01 PROCEDURE — 36248 PR PR INS CATH ABD/L-EXT ART ADDL 2ND ORD/3RD ORD/BYD: ICD-10-PCS | Mod: LT,,, | Performed by: RADIOLOGY

## 2022-01-01 PROCEDURE — 3288F FALL RISK ASSESSMENT DOCD: CPT | Mod: CPTII,S$GLB,, | Performed by: SURGERY

## 2022-01-01 PROCEDURE — 86850 RBC ANTIBODY SCREEN: CPT | Performed by: PHYSICIAN ASSISTANT

## 2022-01-01 PROCEDURE — 99499 UNLISTED E&M SERVICE: CPT | Mod: S$GLB,,, | Performed by: PHYSICIAN ASSISTANT

## 2022-01-01 PROCEDURE — 1159F PR MEDICATION LIST DOCUMENTED IN MEDICAL RECORD: ICD-10-PCS | Mod: CPTII,S$GLB,, | Performed by: NURSE PRACTITIONER

## 2022-01-01 PROCEDURE — 36415 COLL VENOUS BLD VENIPUNCTURE: CPT | Mod: PN | Performed by: INTERNAL MEDICINE

## 2022-01-01 PROCEDURE — 99499 UNLISTED E&M SERVICE: CPT | Mod: 95,,, | Performed by: NURSE PRACTITIONER

## 2022-01-01 PROCEDURE — 99999 PR PBB SHADOW E&M-EST. PATIENT-LVL IV: CPT | Mod: PBBFAC,,, | Performed by: REGISTERED NURSE

## 2022-01-01 PROCEDURE — 45378 PR COLONOSCOPY,DIAGNOSTIC: ICD-10-PCS | Mod: ,,, | Performed by: INTERNAL MEDICINE

## 2022-01-01 PROCEDURE — 85027 COMPLETE CBC AUTOMATED: CPT | Performed by: STUDENT IN AN ORGANIZED HEALTH CARE EDUCATION/TRAINING PROGRAM

## 2022-01-01 PROCEDURE — 84100 ASSAY OF PHOSPHORUS: CPT | Performed by: STUDENT IN AN ORGANIZED HEALTH CARE EDUCATION/TRAINING PROGRAM

## 2022-01-01 PROCEDURE — 0124A COVID-19, MRNA, LNP-S, BIVALENT BOOSTER, PF, 30 MCG/0.3 ML DOSE: CPT | Mod: CV19,PBBFAC | Performed by: INTERNAL MEDICINE

## 2022-01-01 PROCEDURE — 3078F DIAST BP <80 MM HG: CPT | Mod: CPTII,S$GLB,, | Performed by: SURGERY

## 2022-01-01 PROCEDURE — 3075F SYST BP GE 130 - 139MM HG: CPT | Mod: CPTII,S$GLB,, | Performed by: NURSE PRACTITIONER

## 2022-01-01 PROCEDURE — 1159F PR MEDICATION LIST DOCUMENTED IN MEDICAL RECORD: ICD-10-PCS | Mod: CPTII,95,, | Performed by: STUDENT IN AN ORGANIZED HEALTH CARE EDUCATION/TRAINING PROGRAM

## 2022-01-01 PROCEDURE — 76377 PR  3D RENDERING W/ IMAGE POSTPROCESS: ICD-10-PCS | Mod: 26,ICN,, | Performed by: RADIOLOGY

## 2022-01-01 PROCEDURE — 0124A PR IMMUNIZ ADMIN, SARS-COV- 2 COVID-19 VACCINE, 30MCG/0.3ML, BIVALENT, BOOSTER DOSE: CPT | Mod: S$GLB,,, | Performed by: INTERNAL MEDICINE

## 2022-01-01 PROCEDURE — 99233 SBSQ HOSP IP/OBS HIGH 50: CPT | Mod: 57,,, | Performed by: STUDENT IN AN ORGANIZED HEALTH CARE EDUCATION/TRAINING PROGRAM

## 2022-01-01 PROCEDURE — 3008F BODY MASS INDEX DOCD: CPT | Mod: HCNC,CPTII,S$GLB, | Performed by: PHYSICIAN ASSISTANT

## 2022-01-01 PROCEDURE — 47382 IR RF ABLATION LIVER TUMORS: ICD-10-PCS | Mod: ,,, | Performed by: RADIOLOGY

## 2022-01-01 PROCEDURE — 1159F MED LIST DOCD IN RCRD: CPT | Mod: CPTII,S$GLB,, | Performed by: SURGERY

## 2022-01-01 PROCEDURE — 72197 MRI PELVIS W/O & W/DYE: CPT | Mod: TC

## 2022-01-01 PROCEDURE — P9016 RBC LEUKOCYTES REDUCED: HCPCS | Performed by: PHYSICIAN ASSISTANT

## 2022-01-01 PROCEDURE — 99999 PR PBB SHADOW E&M-EST. PATIENT-LVL IV: ICD-10-PCS | Mod: PBBFAC,HCNC,, | Performed by: PHYSICIAN ASSISTANT

## 2022-01-01 PROCEDURE — 75726 CHG ANGIO VISCERAL SELECTV/SUBSELEC: ICD-10-PCS | Mod: 26,59,ICN, | Performed by: RADIOLOGY

## 2022-01-01 PROCEDURE — 85610 PROTHROMBIN TIME: CPT | Performed by: PHYSICIAN ASSISTANT

## 2022-01-01 PROCEDURE — 1126F AMNT PAIN NOTED NONE PRSNT: CPT | Mod: CPTII,S$GLB,, | Performed by: REGISTERED NURSE

## 2022-01-01 PROCEDURE — 90832 PSYTX W PT 30 MINUTES: CPT | Mod: S$GLB,,, | Performed by: PSYCHOLOGIST

## 2022-01-01 PROCEDURE — 36248 PR PR INS CATH ABD/L-EXT ART ADDL 2ND ORD/3RD ORD/BYD: ICD-10-PCS | Mod: ,,, | Performed by: RADIOLOGY

## 2022-01-01 PROCEDURE — 96416 CHEMO PROLONG INFUSE W/PUMP: CPT

## 2022-01-01 PROCEDURE — 1111F PR DISCHARGE MEDS RECONCILED W/ CURRENT OUTPATIENT MED LIST: ICD-10-PCS | Mod: CPTII,S$GLB,, | Performed by: INTERNAL MEDICINE

## 2022-01-01 PROCEDURE — 99213 PR OFFICE/OUTPT VISIT, EST, LEVL III, 20-29 MIN: ICD-10-PCS | Mod: 95,,, | Performed by: PHYSICIAN ASSISTANT

## 2022-01-01 PROCEDURE — 0124A PR IMMUNIZ ADMIN, SARS-COV- 2 COVID-19 VACCINE, 30MCG/0.3ML, BIVALENT, BOOSTER DOSE: ICD-10-PCS | Mod: S$GLB,,, | Performed by: INTERNAL MEDICINE

## 2022-01-01 PROCEDURE — 99215 PR OFFICE/OUTPT VISIT, EST, LEVL V, 40-54 MIN: ICD-10-PCS | Mod: S$GLB,,, | Performed by: PHYSICIAN ASSISTANT

## 2022-01-01 PROCEDURE — G0008 ADMIN INFLUENZA VIRUS VAC: HCPCS | Mod: S$GLB,,, | Performed by: INTERNAL MEDICINE

## 2022-01-01 PROCEDURE — 77013 CT GUIDE FOR TISSUE ABLATION: CPT | Mod: 26,,, | Performed by: RADIOLOGY

## 2022-01-01 PROCEDURE — 36247 PR PLACE CATH SUBSUBSELECT ART,ABD/PEL: ICD-10-PCS | Mod: 59,,, | Performed by: RADIOLOGY

## 2022-01-01 PROCEDURE — 80053 COMPREHEN METABOLIC PANEL: CPT | Performed by: FAMILY MEDICINE

## 2022-01-01 PROCEDURE — 1159F MED LIST DOCD IN RCRD: CPT | Mod: HCNC,CPTII,S$GLB, | Performed by: PHYSICIAN ASSISTANT

## 2022-01-01 PROCEDURE — 96360 HYDRATION IV INFUSION INIT: CPT | Mod: HCNC

## 2022-01-01 PROCEDURE — 1159F PR MEDICATION LIST DOCUMENTED IN MEDICAL RECORD: ICD-10-PCS | Mod: CPTII,S$GLB,, | Performed by: REGISTERED NURSE

## 2022-01-01 PROCEDURE — 36248 PR PR INS CATH ABD/L-EXT ART ADDL 2ND ORD/3RD ORD/BYD: ICD-10-PCS | Mod: ICN,,, | Performed by: RADIOLOGY

## 2022-01-01 PROCEDURE — 77263 THER RADIOLOGY TX PLNG CPLX: CPT | Mod: ICN,,, | Performed by: RADIOLOGY

## 2022-01-01 PROCEDURE — 3008F BODY MASS INDEX DOCD: CPT | Mod: CPTII,S$GLB,, | Performed by: FAMILY MEDICINE

## 2022-01-01 PROCEDURE — 99499 RISK ADDL DX/OHS AUDIT: ICD-10-PCS | Mod: HCNC,S$GLB,, | Performed by: PHYSICIAN ASSISTANT

## 2022-01-01 PROCEDURE — 96361 HYDRATE IV INFUSION ADD-ON: CPT | Mod: HCNC

## 2022-01-01 PROCEDURE — 36247 PR PLACE CATH SUBSUBSELECT ART,ABD/PEL: ICD-10-PCS | Mod: 51,ICN,, | Performed by: RADIOLOGY

## 2022-01-01 PROCEDURE — 3008F PR BODY MASS INDEX (BMI) DOCUMENTED: ICD-10-PCS | Mod: CPTII,S$GLB,, | Performed by: NURSE PRACTITIONER

## 2022-01-01 PROCEDURE — 1159F PR MEDICATION LIST DOCUMENTED IN MEDICAL RECORD: ICD-10-PCS | Mod: CPTII,95,, | Performed by: PHYSICIAN ASSISTANT

## 2022-01-01 PROCEDURE — 99213 OFFICE O/P EST LOW 20 MIN: CPT | Mod: S$GLB,,, | Performed by: FAMILY MEDICINE

## 2022-01-01 PROCEDURE — 76937 US GUIDE VASCULAR ACCESS: CPT | Mod: 26,ICN,, | Performed by: RADIOLOGY

## 2022-01-01 PROCEDURE — 1111F PR DISCHARGE MEDS RECONCILED W/ CURRENT OUTPATIENT MED LIST: ICD-10-PCS | Mod: CPTII,95,, | Performed by: NURSE PRACTITIONER

## 2022-01-01 PROCEDURE — 93010 EKG 12-LEAD: ICD-10-PCS | Mod: ,,, | Performed by: INTERNAL MEDICINE

## 2022-01-01 PROCEDURE — 72197 MRI ABDOMEN-PELVIS W W/O CONTRAST (XPD): ICD-10-PCS | Mod: 26,,, | Performed by: INTERNAL MEDICINE

## 2022-01-01 PROCEDURE — 36415 COLL VENOUS BLD VENIPUNCTURE: CPT | Performed by: PHYSICIAN ASSISTANT

## 2022-01-01 PROCEDURE — 3077F SYST BP >= 140 MM HG: CPT | Mod: CPTII,S$GLB,, | Performed by: FAMILY MEDICINE

## 2022-01-01 PROCEDURE — 85730 THROMBOPLASTIN TIME PARTIAL: CPT | Performed by: PHYSICIAN ASSISTANT

## 2022-01-01 PROCEDURE — 1159F MED LIST DOCD IN RCRD: CPT | Mod: CPTII,95,, | Performed by: INTERNAL MEDICINE

## 2022-01-01 PROCEDURE — 75726 ARTERY X-RAYS ABDOMEN: CPT | Mod: TC | Performed by: RADIOLOGY

## 2022-01-01 PROCEDURE — 99999 PR PBB SHADOW E&M-EST. PATIENT-LVL IV: ICD-10-PCS | Mod: PBBFAC,,, | Performed by: PHYSICIAN ASSISTANT

## 2022-01-01 PROCEDURE — 99499 UNLISTED E&M SERVICE: CPT | Mod: 95,,, | Performed by: FAMILY MEDICINE

## 2022-01-01 PROCEDURE — 96367 TX/PROPH/DG ADDL SEQ IV INF: CPT | Mod: HCNC

## 2022-01-01 PROCEDURE — C1733 CATH, EP, OTHR THAN COOL-TIP: HCPCS

## 2022-01-01 PROCEDURE — 1101F PT FALLS ASSESS-DOCD LE1/YR: CPT | Mod: CPTII,S$GLB,, | Performed by: PHYSICIAN ASSISTANT

## 2022-01-01 PROCEDURE — 3079F PR MOST RECENT DIASTOLIC BLOOD PRESSURE 80-89 MM HG: ICD-10-PCS | Mod: CPTII,S$GLB,, | Performed by: FAMILY MEDICINE

## 2022-01-01 PROCEDURE — 99212 PR OFFICE/OUTPT VISIT, EST, LEVL II, 10-19 MIN: ICD-10-PCS | Mod: 95,,, | Performed by: RADIOLOGY

## 2022-01-01 PROCEDURE — 76380 CAT SCAN FOLLOW-UP STUDY: CPT | Mod: 26,59,ICN, | Performed by: RADIOLOGY

## 2022-01-01 PROCEDURE — 1159F MED LIST DOCD IN RCRD: CPT | Mod: CPTII,S$GLB,, | Performed by: PHYSICIAN ASSISTANT

## 2022-01-01 PROCEDURE — 83735 ASSAY OF MAGNESIUM: CPT | Performed by: SURGERY

## 2022-01-01 PROCEDURE — 25000003 PHARM REV CODE 250: Performed by: SURGERY

## 2022-01-01 PROCEDURE — 99223 1ST HOSP IP/OBS HIGH 75: CPT | Mod: GC,,, | Performed by: INTERNAL MEDICINE

## 2022-01-01 PROCEDURE — 77013 IR RF ABLATION LIVER TUMORS: ICD-10-PCS | Mod: 26,,, | Performed by: RADIOLOGY

## 2022-01-01 PROCEDURE — 36247 INS CATH ABD/L-EXT ART 3RD: CPT | Mod: 59,LT | Performed by: RADIOLOGY

## 2022-01-01 PROCEDURE — 97161 PT EVAL LOW COMPLEX 20 MIN: CPT | Mod: PN

## 2022-01-01 PROCEDURE — 76380 PR  CT SCAN,LIMITED/LOCALIZED F/U STUDY: ICD-10-PCS | Mod: 26,59,ICN, | Performed by: RADIOLOGY

## 2022-01-01 PROCEDURE — 99499 UNLISTED E&M SERVICE: CPT | Mod: HCNC,95,, | Performed by: FAMILY MEDICINE

## 2022-01-01 PROCEDURE — 3288F PR FALLS RISK ASSESSMENT DOCUMENTED: ICD-10-PCS | Mod: CPTII,S$GLB,, | Performed by: SURGERY

## 2022-01-01 PROCEDURE — 25000003 PHARM REV CODE 250: Performed by: FAMILY MEDICINE

## 2022-01-01 PROCEDURE — 74183 MRI ABD W/O CNTR FLWD CNTR: CPT | Mod: 26,,, | Performed by: INTERNAL MEDICINE

## 2022-01-01 PROCEDURE — 86901 BLOOD TYPING SEROLOGIC RH(D): CPT | Performed by: INTERNAL MEDICINE

## 2022-01-01 PROCEDURE — 99999 PR PBB SHADOW E&M-EST. PATIENT-LVL V: ICD-10-PCS | Mod: PBBFAC,,, | Performed by: NURSE PRACTITIONER

## 2022-01-01 PROCEDURE — 3008F PR BODY MASS INDEX (BMI) DOCUMENTED: ICD-10-PCS | Mod: CPTII,S$GLB,, | Performed by: REGISTERED NURSE

## 2022-01-01 PROCEDURE — 3075F PR MOST RECENT SYSTOLIC BLOOD PRESS GE 130-139MM HG: ICD-10-PCS | Mod: CPTII,S$GLB,, | Performed by: REGISTERED NURSE

## 2022-01-01 PROCEDURE — 1160F PR REVIEW ALL MEDS BY PRESCRIBER/CLIN PHARMACIST DOCUMENTED: ICD-10-PCS | Mod: CPTII,S$GLB,, | Performed by: REGISTERED NURSE

## 2022-01-01 PROCEDURE — 3044F HG A1C LEVEL LT 7.0%: CPT | Mod: CPTII,95,, | Performed by: FAMILY MEDICINE

## 2022-01-01 PROCEDURE — 75774 ARTERY X-RAY EACH VESSEL: CPT | Mod: TC,59 | Performed by: RADIOLOGY

## 2022-01-01 PROCEDURE — 3075F PR MOST RECENT SYSTOLIC BLOOD PRESS GE 130-139MM HG: ICD-10-PCS | Mod: CPTII,S$GLB,, | Performed by: NURSE PRACTITIONER

## 2022-01-01 PROCEDURE — 75726 ARTERY X-RAYS ABDOMEN: CPT | Mod: 26,59,ICN, | Performed by: RADIOLOGY

## 2022-01-01 PROCEDURE — 97110 THERAPEUTIC EXERCISES: CPT | Mod: CQ

## 2022-01-01 PROCEDURE — 85007 BL SMEAR W/DIFF WBC COUNT: CPT | Performed by: NURSE PRACTITIONER

## 2022-01-01 PROCEDURE — 99212 OFFICE O/P EST SF 10 MIN: CPT | Mod: 95,,, | Performed by: RADIOLOGY

## 2022-01-01 PROCEDURE — 74183 MRI ABD W/O CNTR FLWD CNTR: CPT | Mod: 26,,, | Performed by: STUDENT IN AN ORGANIZED HEALTH CARE EDUCATION/TRAINING PROGRAM

## 2022-01-01 PROCEDURE — 25500020 PHARM REV CODE 255: Performed by: EMERGENCY MEDICINE

## 2022-01-01 PROCEDURE — 99285 EMERGENCY DEPT VISIT HI MDM: CPT | Mod: 25

## 2022-01-01 PROCEDURE — 1160F PR REVIEW ALL MEDS BY PRESCRIBER/CLIN PHARMACIST DOCUMENTED: ICD-10-PCS | Mod: CPTII,95,, | Performed by: INTERNAL MEDICINE

## 2022-01-01 PROCEDURE — 36245 INS CATH ABD/L-EXT ART 1ST: CPT | Mod: 59,,, | Performed by: RADIOLOGY

## 2022-01-01 PROCEDURE — 1126F AMNT PAIN NOTED NONE PRSNT: CPT | Mod: HCNC,CPTII,S$GLB, | Performed by: PHYSICIAN ASSISTANT

## 2022-01-01 PROCEDURE — 90471 IMMUNIZATION ADMIN: CPT | Mod: S$GLB,,, | Performed by: INTERNAL MEDICINE

## 2022-01-01 PROCEDURE — 76380 CAT SCAN FOLLOW-UP STUDY: CPT | Mod: TC | Performed by: RADIOLOGY

## 2022-01-01 PROCEDURE — 3008F PR BODY MASS INDEX (BMI) DOCUMENTED: ICD-10-PCS | Mod: CPTII,S$GLB,, | Performed by: SURGERY

## 2022-01-01 PROCEDURE — 99999 PR PBB SHADOW E&M-EST. PATIENT-LVL V: CPT | Mod: PBBFAC,,, | Performed by: NURSE PRACTITIONER

## 2022-01-01 PROCEDURE — 1126F PR PAIN SEVERITY QUANTIFIED, NO PAIN PRESENT: ICD-10-PCS | Mod: HCNC,CPTII,S$GLB, | Performed by: PHYSICIAN ASSISTANT

## 2022-01-01 PROCEDURE — 88304 TISSUE EXAM BY PATHOLOGIST: CPT | Performed by: PATHOLOGY

## 2022-01-01 PROCEDURE — A9585 GADOBUTROL INJECTION: HCPCS | Performed by: FAMILY MEDICINE

## 2022-01-01 PROCEDURE — 99999 PR PBB SHADOW E&M-EST. PATIENT-LVL I: ICD-10-PCS | Mod: PBBFAC,,,

## 2022-01-01 PROCEDURE — 63600175 PHARM REV CODE 636 W HCPCS: Performed by: PHYSICIAN ASSISTANT

## 2022-01-01 PROCEDURE — 96368 THER/DIAG CONCURRENT INF: CPT | Mod: HCNC

## 2022-01-01 PROCEDURE — 36247 INS CATH ABD/L-EXT ART 3RD: CPT | Mod: 59,,, | Performed by: RADIOLOGY

## 2022-01-01 PROCEDURE — 99215 PR OFFICE/OUTPT VISIT, EST, LEVL V, 40-54 MIN: ICD-10-PCS | Mod: 95,,, | Performed by: STUDENT IN AN ORGANIZED HEALTH CARE EDUCATION/TRAINING PROGRAM

## 2022-01-01 PROCEDURE — 36248 INS CATH ABD/L-EXT ART ADDL: CPT | Mod: RT | Performed by: RADIOLOGY

## 2022-01-01 PROCEDURE — 36245 INS CATH ABD/L-EXT ART 1ST: CPT | Mod: 59 | Performed by: RADIOLOGY

## 2022-01-01 PROCEDURE — 3008F BODY MASS INDEX DOCD: CPT | Mod: CPTII,S$GLB,, | Performed by: NURSE PRACTITIONER

## 2022-01-01 PROCEDURE — 3044F PR MOST RECENT HEMOGLOBIN A1C LEVEL <7.0%: ICD-10-PCS | Mod: CPTII,S$GLB,, | Performed by: SURGERY

## 2022-01-01 PROCEDURE — 74183 MRI ABD W/O CNTR FLWD CNTR: CPT | Mod: 26,,, | Performed by: RADIOLOGY

## 2022-01-01 PROCEDURE — 43239 EGD BIOPSY SINGLE/MULTIPLE: CPT | Performed by: INTERNAL MEDICINE

## 2022-01-01 PROCEDURE — 3288F FALL RISK ASSESSMENT DOCD: CPT | Mod: CPTII,S$GLB,, | Performed by: REGISTERED NURSE

## 2022-01-01 PROCEDURE — 1126F AMNT PAIN NOTED NONE PRSNT: CPT | Mod: CPTII,S$GLB,, | Performed by: SURGERY

## 2022-01-01 PROCEDURE — 86901 BLOOD TYPING SEROLOGIC RH(D): CPT | Performed by: PHYSICIAN ASSISTANT

## 2022-01-01 PROCEDURE — 45378 DIAGNOSTIC COLONOSCOPY: CPT | Performed by: INTERNAL MEDICINE

## 2022-01-01 PROCEDURE — 1159F MED LIST DOCD IN RCRD: CPT | Mod: CPTII,S$GLB,, | Performed by: FAMILY MEDICINE

## 2022-01-01 PROCEDURE — 37243 IR EMBOLIZATION COMP FOR TUMOR_ORGAN ISCHEMIA_INFARC: ICD-10-PCS | Mod: ICN,,, | Performed by: RADIOLOGY

## 2022-01-01 PROCEDURE — 99215 PR OFFICE/OUTPT VISIT, EST, LEVL V, 40-54 MIN: ICD-10-PCS | Mod: HCNC,S$GLB,, | Performed by: PHYSICIAN ASSISTANT

## 2022-01-01 PROCEDURE — 1160F RVW MEDS BY RX/DR IN RCRD: CPT | Mod: CPTII,S$GLB,, | Performed by: REGISTERED NURSE

## 2022-01-01 PROCEDURE — 36000 PLACE NEEDLE IN VEIN: CPT

## 2022-01-01 PROCEDURE — 88302 TISSUE EXAM BY PATHOLOGIST: CPT | Mod: 26,,, | Performed by: PATHOLOGY

## 2022-01-01 PROCEDURE — 25500020 PHARM REV CODE 255: Performed by: INTERNAL MEDICINE

## 2022-01-01 PROCEDURE — 1160F PR REVIEW ALL MEDS BY PRESCRIBER/CLIN PHARMACIST DOCUMENTED: ICD-10-PCS | Mod: CPTII,S$GLB,, | Performed by: PHYSICIAN ASSISTANT

## 2022-01-01 PROCEDURE — 99999 PR PBB SHADOW E&M-EST. PATIENT-LVL V: ICD-10-PCS | Mod: PBBFAC,,, | Performed by: FAMILY MEDICINE

## 2022-01-01 PROCEDURE — 3008F BODY MASS INDEX DOCD: CPT | Mod: CPTII,S$GLB,, | Performed by: REGISTERED NURSE

## 2022-01-01 PROCEDURE — 99215 OFFICE O/P EST HI 40 MIN: CPT | Mod: 95,,, | Performed by: NURSE PRACTITIONER

## 2022-01-01 PROCEDURE — 85027 COMPLETE CBC AUTOMATED: CPT | Performed by: NURSE PRACTITIONER

## 2022-01-01 PROCEDURE — 93970 EXTREMITY STUDY: CPT | Mod: 26,,, | Performed by: RADIOLOGY

## 2022-01-01 PROCEDURE — 63600175 PHARM REV CODE 636 W HCPCS: Mod: JG | Performed by: INTERNAL MEDICINE

## 2022-01-01 PROCEDURE — 99999 PR PBB SHADOW E&M-EST. PATIENT-LVL IV: ICD-10-PCS | Mod: PBBFAC,,, | Performed by: REGISTERED NURSE

## 2022-01-01 PROCEDURE — 91312 COVID-19, MRNA, LNP-S, BIVALENT BOOSTER, PF, 30 MCG/0.3 ML DOSE: CPT | Mod: S$GLB,,, | Performed by: INTERNAL MEDICINE

## 2022-01-01 PROCEDURE — 99999 PR PBB SHADOW E&M-EST. PATIENT-LVL I: CPT | Mod: PBBFAC,,,

## 2022-01-01 PROCEDURE — A9585 GADOBUTROL INJECTION: HCPCS | Performed by: INTERNAL MEDICINE

## 2022-01-01 PROCEDURE — E9220 PRA ENDO ANESTHESIA: ICD-10-PCS | Mod: ,,, | Performed by: NURSE ANESTHETIST, CERTIFIED REGISTERED

## 2022-01-01 PROCEDURE — 36415 COLL VENOUS BLD VENIPUNCTURE: CPT | Mod: PN | Performed by: PHYSICIAN ASSISTANT

## 2022-01-01 PROCEDURE — 99499 RISK ADDL DX/OHS AUDIT: ICD-10-PCS | Mod: HCNC,S$GLB,, | Performed by: INTERNAL MEDICINE

## 2022-01-01 PROCEDURE — 3044F PR MOST RECENT HEMOGLOBIN A1C LEVEL <7.0%: ICD-10-PCS | Mod: CPTII,S$GLB,, | Performed by: NURSE PRACTITIONER

## 2022-01-01 PROCEDURE — 86900 BLOOD TYPING SEROLOGIC ABO: CPT | Performed by: PHYSICIAN ASSISTANT

## 2022-01-01 PROCEDURE — A9585 GADOBUTROL INJECTION: HCPCS | Performed by: PHYSICIAN ASSISTANT

## 2022-01-01 PROCEDURE — 3008F BODY MASS INDEX DOCD: CPT | Mod: CPTII,S$GLB,, | Performed by: SURGERY

## 2022-01-01 PROCEDURE — 99215 OFFICE O/P EST HI 40 MIN: CPT | Mod: HCNC,S$GLB,, | Performed by: PHYSICIAN ASSISTANT

## 2022-01-01 PROCEDURE — 1160F RVW MEDS BY RX/DR IN RCRD: CPT | Mod: CPTII,S$GLB,, | Performed by: PHYSICIAN ASSISTANT

## 2022-01-01 PROCEDURE — 97161 PT EVAL LOW COMPLEX 20 MIN: CPT

## 2022-01-01 PROCEDURE — 81003 URINALYSIS AUTO W/O SCOPE: CPT | Performed by: NURSE PRACTITIONER

## 2022-01-01 PROCEDURE — 63600175 PHARM REV CODE 636 W HCPCS: Mod: HCNC | Performed by: PHYSICIAN ASSISTANT

## 2022-01-01 PROCEDURE — 1101F PR PT FALLS ASSESS DOC 0-1 FALLS W/OUT INJ PAST YR: ICD-10-PCS | Mod: CPTII,S$GLB,, | Performed by: REGISTERED NURSE

## 2022-01-01 PROCEDURE — 1160F RVW MEDS BY RX/DR IN RCRD: CPT | Mod: HCNC,CPTII,S$GLB, | Performed by: PHYSICIAN ASSISTANT

## 2022-01-01 PROCEDURE — 1111F DSCHRG MED/CURRENT MED MERGE: CPT | Mod: CPTII,S$GLB,, | Performed by: INTERNAL MEDICINE

## 2022-01-01 PROCEDURE — 3074F SYST BP LT 130 MM HG: CPT | Mod: HCNC,CPTII,S$GLB, | Performed by: PHYSICIAN ASSISTANT

## 2022-01-01 PROCEDURE — 36248 INS CATH ABD/L-EXT ART ADDL: CPT | Performed by: RADIOLOGY

## 2022-01-01 PROCEDURE — 1126F PR PAIN SEVERITY QUANTIFIED, NO PAIN PRESENT: ICD-10-PCS | Mod: CPTII,S$GLB,, | Performed by: REGISTERED NURSE

## 2022-01-01 PROCEDURE — 99215 OFFICE O/P EST HI 40 MIN: CPT | Mod: 95,,, | Performed by: INTERNAL MEDICINE

## 2022-01-01 PROCEDURE — 1160F PR REVIEW ALL MEDS BY PRESCRIBER/CLIN PHARMACIST DOCUMENTED: ICD-10-PCS | Mod: HCNC,CPTII,S$GLB, | Performed by: PHYSICIAN ASSISTANT

## 2022-01-01 PROCEDURE — 3288F FALL RISK ASSESSMENT DOCD: CPT | Mod: HCNC,CPTII,S$GLB, | Performed by: PHYSICIAN ASSISTANT

## 2022-01-01 PROCEDURE — C1887 CATHETER, GUIDING: HCPCS

## 2022-01-01 PROCEDURE — 36247 INS CATH ABD/L-EXT ART 3RD: CPT | Mod: LT

## 2022-01-01 PROCEDURE — 97110 THERAPEUTIC EXERCISES: CPT | Mod: PN

## 2022-01-01 PROCEDURE — 74183 MRI ABD W/O CNTR FLWD CNTR: CPT | Mod: TC

## 2022-01-01 PROCEDURE — 99499 UNLISTED E&M SERVICE: CPT | Mod: 95,,, | Performed by: INTERNAL MEDICINE

## 2022-01-01 PROCEDURE — 3288F PR FALLS RISK ASSESSMENT DOCUMENTED: ICD-10-PCS | Mod: HCNC,CPTII,S$GLB, | Performed by: PHYSICIAN ASSISTANT

## 2022-01-01 PROCEDURE — 1111F DSCHRG MED/CURRENT MED MERGE: CPT | Mod: CPTII,S$GLB,, | Performed by: PHYSICIAN ASSISTANT

## 2022-01-01 PROCEDURE — 90471 HEPATITIS A VACCINE ADULT IM: ICD-10-PCS | Mod: S$GLB,,, | Performed by: INTERNAL MEDICINE

## 2022-01-01 PROCEDURE — 88302 TISSUE EXAM BY PATHOLOGIST: CPT | Performed by: PATHOLOGY

## 2022-01-01 PROCEDURE — 3074F PR MOST RECENT SYSTOLIC BLOOD PRESSURE < 130 MM HG: ICD-10-PCS | Mod: HCNC,CPTII,S$GLB, | Performed by: PHYSICIAN ASSISTANT

## 2022-01-01 PROCEDURE — 3077F SYST BP >= 140 MM HG: CPT | Mod: CPTII,S$GLB,, | Performed by: INTERNAL MEDICINE

## 2022-01-01 PROCEDURE — 74183 MRI ABDOMEN W WO CONTRAST: ICD-10-PCS | Mod: 26,,, | Performed by: STUDENT IN AN ORGANIZED HEALTH CARE EDUCATION/TRAINING PROGRAM

## 2022-01-01 PROCEDURE — 80053 COMPREHEN METABOLIC PANEL: CPT | Performed by: SURGERY

## 2022-01-01 PROCEDURE — 47382 PERCUT ABLATE LIVER RF: CPT | Performed by: RADIOLOGY

## 2022-01-01 PROCEDURE — 3288F FALL RISK ASSESSMENT DOCD: CPT | Mod: CPTII,S$GLB,, | Performed by: NURSE PRACTITIONER

## 2022-01-01 RX ORDER — PROPOFOL 10 MG/ML
VIAL (ML) INTRAVENOUS
Status: DISCONTINUED | OUTPATIENT
Start: 2022-01-01 | End: 2022-01-01

## 2022-01-01 RX ORDER — MORPHINE SULFATE 15 MG/1
15 TABLET ORAL EVERY 6 HOURS PRN
Qty: 120 TABLET | Refills: 0 | Status: SHIPPED | OUTPATIENT
Start: 2022-01-01 | End: 2022-01-01 | Stop reason: DRUGHIGH

## 2022-01-01 RX ORDER — SODIUM CHLORIDE 0.9 % (FLUSH) 0.9 %
10 SYRINGE (ML) INJECTION
Status: CANCELLED | OUTPATIENT
Start: 2022-01-01

## 2022-01-01 RX ORDER — HEPARIN 100 UNIT/ML
500 SYRINGE INTRAVENOUS
Status: DISCONTINUED | OUTPATIENT
Start: 2022-01-01 | End: 2022-01-01 | Stop reason: HOSPADM

## 2022-01-01 RX ORDER — ROCURONIUM BROMIDE 10 MG/ML
INJECTION, SOLUTION INTRAVENOUS
Status: DISCONTINUED | OUTPATIENT
Start: 2022-01-01 | End: 2022-01-01

## 2022-01-01 RX ORDER — OXYCODONE HYDROCHLORIDE 5 MG/1
5 TABLET ORAL EVERY 4 HOURS PRN
Status: DISCONTINUED | OUTPATIENT
Start: 2022-01-01 | End: 2022-01-01 | Stop reason: HOSPADM

## 2022-01-01 RX ORDER — ACETAMINOPHEN 325 MG/1
650 TABLET ORAL
Status: DISCONTINUED | OUTPATIENT
Start: 2022-01-01 | End: 2022-01-01 | Stop reason: HOSPADM

## 2022-01-01 RX ORDER — ONDANSETRON 4 MG/1
4 TABLET, ORALLY DISINTEGRATING ORAL EVERY 6 HOURS PRN
Qty: 28 TABLET | Refills: 0 | Status: SHIPPED | OUTPATIENT
Start: 2022-01-01 | End: 2022-01-01 | Stop reason: SDUPTHER

## 2022-01-01 RX ORDER — HYDROCODONE BITARTRATE AND ACETAMINOPHEN 500; 5 MG/1; MG/1
TABLET ORAL ONCE
Status: CANCELLED | OUTPATIENT
Start: 2022-01-01 | End: 2022-01-01

## 2022-01-01 RX ORDER — HYDROCODONE BITARTRATE AND ACETAMINOPHEN 500; 5 MG/1; MG/1
TABLET ORAL ONCE
Status: COMPLETED | OUTPATIENT
Start: 2022-01-01 | End: 2022-01-01

## 2022-01-01 RX ORDER — ONDANSETRON 2 MG/ML
4 INJECTION INTRAMUSCULAR; INTRAVENOUS EVERY 6 HOURS PRN
Status: DISCONTINUED | OUTPATIENT
Start: 2022-01-01 | End: 2022-01-01 | Stop reason: HOSPADM

## 2022-01-01 RX ORDER — SODIUM CHLORIDE 0.9 % (FLUSH) 0.9 %
10 SYRINGE (ML) INJECTION
Status: DISCONTINUED | OUTPATIENT
Start: 2022-01-01 | End: 2022-01-01 | Stop reason: HOSPADM

## 2022-01-01 RX ORDER — ONDANSETRON 4 MG/1
4 TABLET, ORALLY DISINTEGRATING ORAL EVERY 6 HOURS PRN
Qty: 1 TABLET | Refills: 0 | Status: SHIPPED | OUTPATIENT
Start: 2022-01-01 | End: 2022-01-01 | Stop reason: SDUPTHER

## 2022-01-01 RX ORDER — ONDANSETRON 4 MG/1
4 TABLET, ORALLY DISINTEGRATING ORAL EVERY 8 HOURS PRN
Qty: 1 TABLET | Refills: 0 | Status: SHIPPED | OUTPATIENT
Start: 2022-01-01 | End: 2022-01-01

## 2022-01-01 RX ORDER — FUROSEMIDE 10 MG/ML
40 INJECTION INTRAMUSCULAR; INTRAVENOUS ONCE
Status: COMPLETED | OUTPATIENT
Start: 2022-01-01 | End: 2022-01-01

## 2022-01-01 RX ORDER — MAGNESIUM SULFATE HEPTAHYDRATE 40 MG/ML
2 INJECTION, SOLUTION INTRAVENOUS
Status: COMPLETED | OUTPATIENT
Start: 2022-01-01 | End: 2022-01-01

## 2022-01-01 RX ORDER — PROPOFOL 10 MG/ML
VIAL (ML) INTRAVENOUS CONTINUOUS PRN
Status: DISCONTINUED | OUTPATIENT
Start: 2022-01-01 | End: 2022-01-01

## 2022-01-01 RX ORDER — PHENYLEPHRINE HCL IN 0.9% NACL 1 MG/10 ML
SYRINGE (ML) INTRAVENOUS
Status: DISCONTINUED | OUTPATIENT
Start: 2022-01-01 | End: 2022-01-01

## 2022-01-01 RX ORDER — HEPARIN 100 UNIT/ML
500 SYRINGE INTRAVENOUS
Status: CANCELLED | OUTPATIENT
Start: 2022-01-01

## 2022-01-01 RX ORDER — DIPHENHYDRAMINE HYDROCHLORIDE 50 MG/ML
50 INJECTION INTRAMUSCULAR; INTRAVENOUS ONCE AS NEEDED
Status: DISCONTINUED | OUTPATIENT
Start: 2022-01-01 | End: 2022-01-01 | Stop reason: HOSPADM

## 2022-01-01 RX ORDER — ONDANSETRON 2 MG/ML
INJECTION INTRAMUSCULAR; INTRAVENOUS
Status: DISCONTINUED | OUTPATIENT
Start: 2022-01-01 | End: 2022-01-01

## 2022-01-01 RX ORDER — CIPROFLOXACIN 500 MG/1
750 TABLET ORAL EVERY 8 HOURS
Qty: 14 TABLET | Refills: 0 | Status: SHIPPED | OUTPATIENT
Start: 2022-01-01 | End: 2022-01-01

## 2022-01-01 RX ORDER — DULOXETIN HYDROCHLORIDE 20 MG/1
20 CAPSULE, DELAYED RELEASE ORAL DAILY
Status: DISCONTINUED | OUTPATIENT
Start: 2022-01-01 | End: 2022-01-01 | Stop reason: HOSPADM

## 2022-01-01 RX ORDER — MAGNESIUM SULFATE HEPTAHYDRATE 40 MG/ML
2 INJECTION, SOLUTION INTRAVENOUS
Status: CANCELLED
Start: 2022-01-01

## 2022-01-01 RX ORDER — DEXAMETHASONE SODIUM PHOSPHATE 4 MG/ML
INJECTION, SOLUTION INTRA-ARTICULAR; INTRALESIONAL; INTRAMUSCULAR; INTRAVENOUS; SOFT TISSUE
Status: DISCONTINUED | OUTPATIENT
Start: 2022-01-01 | End: 2022-01-01

## 2022-01-01 RX ORDER — AMOXICILLIN AND CLAVULANATE POTASSIUM 875; 125 MG/1; MG/1
1 TABLET, FILM COATED ORAL 2 TIMES DAILY
Qty: 7 TABLET | Refills: 0 | Status: SHIPPED | OUTPATIENT
Start: 2022-01-01 | End: 2022-01-01 | Stop reason: SDUPTHER

## 2022-01-01 RX ORDER — OXYCODONE AND ACETAMINOPHEN 5; 325 MG/1; MG/1
1 TABLET ORAL EVERY 6 HOURS PRN
Qty: 20 TABLET | Refills: 0 | Status: SHIPPED | OUTPATIENT
Start: 2022-01-01 | End: 2022-01-01

## 2022-01-01 RX ORDER — BUPIVACAINE HYDROCHLORIDE 5 MG/ML
INJECTION, SOLUTION EPIDURAL; INTRACAUDAL
Status: DISCONTINUED | OUTPATIENT
Start: 2022-01-01 | End: 2022-01-01 | Stop reason: HOSPADM

## 2022-01-01 RX ORDER — MAGNESIUM SULFATE HEPTAHYDRATE 40 MG/ML
2 INJECTION, SOLUTION INTRAVENOUS
Status: CANCELLED | OUTPATIENT
Start: 2022-01-01

## 2022-01-01 RX ORDER — ONDANSETRON 2 MG/ML
4 INJECTION INTRAMUSCULAR; INTRAVENOUS DAILY PRN
Status: DISCONTINUED | OUTPATIENT
Start: 2022-01-01 | End: 2022-01-01 | Stop reason: HOSPADM

## 2022-01-01 RX ORDER — EPHEDRINE SULFATE 50 MG/ML
INJECTION, SOLUTION INTRAVENOUS
Status: DISCONTINUED | OUTPATIENT
Start: 2022-01-01 | End: 2022-01-01

## 2022-01-01 RX ORDER — DIPHENHYDRAMINE HYDROCHLORIDE 50 MG/ML
50 INJECTION INTRAMUSCULAR; INTRAVENOUS ONCE
Status: COMPLETED | OUTPATIENT
Start: 2022-01-01 | End: 2022-01-01

## 2022-01-01 RX ORDER — DICYCLOMINE HYDROCHLORIDE 10 MG/1
10 CAPSULE ORAL
Qty: 120 CAPSULE | Refills: 0 | Status: SHIPPED | OUTPATIENT
Start: 2022-01-01 | End: 2022-01-01

## 2022-01-01 RX ORDER — MAGNESIUM SULFATE HEPTAHYDRATE 40 MG/ML
2 INJECTION, SOLUTION INTRAVENOUS ONCE
Status: COMPLETED | OUTPATIENT
Start: 2022-01-01 | End: 2022-01-01

## 2022-01-01 RX ORDER — LIDOCAINE HYDROCHLORIDE 10 MG/ML
INJECTION INFILTRATION; PERINEURAL CODE/TRAUMA/SEDATION MEDICATION
Status: COMPLETED | OUTPATIENT
Start: 2022-01-01 | End: 2022-01-01

## 2022-01-01 RX ORDER — ALPRAZOLAM 0.5 MG/1
TABLET ORAL
COMMUNITY
Start: 2022-01-01 | End: 2022-01-01 | Stop reason: CLARIF

## 2022-01-01 RX ORDER — LIDOCAINE HYDROCHLORIDE 10 MG/ML
1 INJECTION, SOLUTION EPIDURAL; INFILTRATION; INTRACAUDAL; PERINEURAL ONCE AS NEEDED
Status: DISCONTINUED | OUTPATIENT
Start: 2022-01-01 | End: 2022-01-01 | Stop reason: HOSPADM

## 2022-01-01 RX ORDER — LIDOCAINE HYDROCHLORIDE 20 MG/ML
INJECTION INTRAVENOUS
Status: DISCONTINUED | OUTPATIENT
Start: 2022-01-01 | End: 2022-01-01

## 2022-01-01 RX ORDER — FENTANYL CITRATE 50 UG/ML
INJECTION, SOLUTION INTRAMUSCULAR; INTRAVENOUS
Status: DISCONTINUED | OUTPATIENT
Start: 2022-01-01 | End: 2022-01-01

## 2022-01-01 RX ORDER — GADOBUTROL 604.72 MG/ML
10 INJECTION INTRAVENOUS
Status: COMPLETED | OUTPATIENT
Start: 2022-01-01 | End: 2022-01-01

## 2022-01-01 RX ORDER — METHYLPREDNISOLONE SOD SUCC 125 MG
VIAL (EA) INJECTION
Status: COMPLETED
Start: 2022-01-01 | End: 2022-01-01

## 2022-01-01 RX ORDER — DIPHENHYDRAMINE HYDROCHLORIDE 50 MG/ML
50 INJECTION INTRAMUSCULAR; INTRAVENOUS ONCE AS NEEDED
Status: CANCELLED | OUTPATIENT
Start: 2022-01-01

## 2022-01-01 RX ORDER — METHYLPREDNISOLONE SOD SUCC 125 MG
125 VIAL (EA) INJECTION
Status: COMPLETED | OUTPATIENT
Start: 2022-01-01 | End: 2022-01-01

## 2022-01-01 RX ORDER — HYDROMORPHONE HYDROCHLORIDE 1 MG/ML
0.2 INJECTION, SOLUTION INTRAMUSCULAR; INTRAVENOUS; SUBCUTANEOUS EVERY 5 MIN PRN
Status: DISCONTINUED | OUTPATIENT
Start: 2022-01-01 | End: 2022-01-01 | Stop reason: HOSPADM

## 2022-01-01 RX ORDER — SODIUM CHLORIDE 9 MG/ML
INJECTION, SOLUTION INTRAVENOUS
Status: CANCELLED
Start: 2022-01-01

## 2022-01-01 RX ORDER — PRAVASTATIN SODIUM 40 MG/1
40 TABLET ORAL DAILY
Status: DISCONTINUED | OUTPATIENT
Start: 2022-01-01 | End: 2022-01-01

## 2022-01-01 RX ORDER — HYDROCODONE BITARTRATE AND ACETAMINOPHEN 500; 5 MG/1; MG/1
TABLET ORAL ONCE
Status: DISCONTINUED | OUTPATIENT
Start: 2022-01-01 | End: 2022-01-01 | Stop reason: HOSPADM

## 2022-01-01 RX ORDER — POLYETHYLENE GLYCOL 3350 17 G/17G
17 POWDER, FOR SOLUTION ORAL DAILY
Refills: 0 | COMMUNITY
Start: 2022-01-01 | End: 2022-01-01

## 2022-01-01 RX ORDER — DIPHENHYDRAMINE HCL 25 MG
25 CAPSULE ORAL
Status: DISCONTINUED | OUTPATIENT
Start: 2022-01-01 | End: 2022-01-01 | Stop reason: HOSPADM

## 2022-01-01 RX ORDER — HEPARIN SODIUM 200 [USP'U]/100ML
1000 INJECTION, SOLUTION INTRAVENOUS CONTINUOUS
Status: CANCELLED | OUTPATIENT
Start: 2022-01-01

## 2022-01-01 RX ORDER — TAMSULOSIN HYDROCHLORIDE 0.4 MG/1
0.4 CAPSULE ORAL NIGHTLY
Status: DISCONTINUED | OUTPATIENT
Start: 2022-01-01 | End: 2022-01-01 | Stop reason: HOSPADM

## 2022-01-01 RX ORDER — SUCCINYLCHOLINE CHLORIDE 20 MG/ML
INJECTION INTRAMUSCULAR; INTRAVENOUS
Status: DISCONTINUED | OUTPATIENT
Start: 2022-01-01 | End: 2022-01-01

## 2022-01-01 RX ORDER — MIDAZOLAM HYDROCHLORIDE 1 MG/ML
INJECTION INTRAMUSCULAR; INTRAVENOUS
Status: DISCONTINUED | OUTPATIENT
Start: 2022-01-01 | End: 2022-01-01

## 2022-01-01 RX ORDER — SODIUM, POTASSIUM,MAG SULFATES 17.5-3.13G
SOLUTION, RECONSTITUTED, ORAL ORAL
Qty: 1 KIT | Refills: 0 | Status: SHIPPED | OUTPATIENT
Start: 2022-01-01 | End: 2022-01-01

## 2022-01-01 RX ORDER — MAGNESIUM SULFATE HEPTAHYDRATE 40 MG/ML
4 INJECTION, SOLUTION INTRAVENOUS
Status: CANCELLED
Start: 2022-01-01

## 2022-01-01 RX ORDER — AMOXICILLIN AND CLAVULANATE POTASSIUM 500; 125 MG/1; MG/1
1 TABLET, FILM COATED ORAL 2 TIMES DAILY
Qty: 14 TABLET | Refills: 0 | Status: SHIPPED | OUTPATIENT
Start: 2022-01-01 | End: 2022-01-01

## 2022-01-01 RX ORDER — HYDROCORTISONE 1 %
CREAM (GRAM) TOPICAL 2 TIMES DAILY
Qty: 30 G | Refills: 1 | Status: ON HOLD | OUTPATIENT
Start: 2022-01-01 | End: 2023-01-01 | Stop reason: HOSPADM

## 2022-01-01 RX ORDER — LIDOCAINE HYDROCHLORIDE 10 MG/ML
1 INJECTION, SOLUTION EPIDURAL; INFILTRATION; INTRACAUDAL; PERINEURAL ONCE
Status: DISCONTINUED | OUTPATIENT
Start: 2022-01-01 | End: 2022-01-01 | Stop reason: HOSPADM

## 2022-01-01 RX ORDER — MEPERIDINE HYDROCHLORIDE 50 MG/ML
25 INJECTION INTRAMUSCULAR; INTRAVENOUS; SUBCUTANEOUS ONCE
Status: COMPLETED | OUTPATIENT
Start: 2022-01-01 | End: 2022-01-01

## 2022-01-01 RX ORDER — OXYCODONE AND ACETAMINOPHEN 5; 325 MG/1; MG/1
1 TABLET ORAL EVERY 4 HOURS PRN
Qty: 20 TABLET | Refills: 0 | Status: SHIPPED | OUTPATIENT
Start: 2022-01-01 | End: 2022-01-01 | Stop reason: SINTOL

## 2022-01-01 RX ORDER — ACETAMINOPHEN 325 MG/1
650 TABLET ORAL
Status: CANCELLED | OUTPATIENT
Start: 2022-01-01

## 2022-01-01 RX ORDER — METRONIDAZOLE 500 MG/1
500 TABLET ORAL EVERY 8 HOURS
Status: DISCONTINUED | OUTPATIENT
Start: 2022-01-01 | End: 2022-01-01 | Stop reason: HOSPADM

## 2022-01-01 RX ORDER — EPINEPHRINE 0.3 MG/.3ML
0.3 INJECTION SUBCUTANEOUS ONCE AS NEEDED
Status: CANCELLED | OUTPATIENT
Start: 2022-01-01

## 2022-01-01 RX ORDER — CEFAZOLIN SODIUM 1 G/50ML
1 SOLUTION INTRAVENOUS
Status: COMPLETED | OUTPATIENT
Start: 2022-01-01 | End: 2022-01-01

## 2022-01-01 RX ORDER — METRONIDAZOLE 500 MG/100ML
500 INJECTION, SOLUTION INTRAVENOUS
Status: DISCONTINUED | OUTPATIENT
Start: 2022-01-01 | End: 2022-01-01

## 2022-01-01 RX ORDER — ONDANSETRON 4 MG/1
4 TABLET, FILM COATED ORAL EVERY 8 HOURS PRN
Qty: 20 TABLET | Refills: 0 | Status: SHIPPED | OUTPATIENT
Start: 2022-01-01 | End: 2022-01-01

## 2022-01-01 RX ORDER — MIDAZOLAM HYDROCHLORIDE 1 MG/ML
1 INJECTION INTRAMUSCULAR; INTRAVENOUS
Status: CANCELLED | OUTPATIENT
Start: 2022-01-01

## 2022-01-01 RX ORDER — NALOXONE HYDROCHLORIDE 4 MG/.1ML
1 SPRAY NASAL ONCE
Qty: 1 EACH | Refills: 0 | Status: SHIPPED | OUTPATIENT
Start: 2022-01-01 | End: 2022-01-01

## 2022-01-01 RX ORDER — DEXMEDETOMIDINE HYDROCHLORIDE 100 UG/ML
INJECTION, SOLUTION INTRAVENOUS
Status: DISCONTINUED | OUTPATIENT
Start: 2022-01-01 | End: 2022-01-01

## 2022-01-01 RX ORDER — HEPARIN SODIUM 200 [USP'U]/100ML
1000 INJECTION, SOLUTION INTRAVENOUS CONTINUOUS
Status: ACTIVE | OUTPATIENT
Start: 2022-01-01 | End: 2022-01-01

## 2022-01-01 RX ORDER — MIDAZOLAM HYDROCHLORIDE 1 MG/ML
INJECTION, SOLUTION INTRAMUSCULAR; INTRAVENOUS
Status: DISCONTINUED | OUTPATIENT
Start: 2022-01-01 | End: 2022-01-01

## 2022-01-01 RX ORDER — ACETAMINOPHEN 325 MG/1
650 TABLET ORAL
Status: COMPLETED | OUTPATIENT
Start: 2022-01-01 | End: 2022-01-01

## 2022-01-01 RX ORDER — PANTOPRAZOLE SODIUM 40 MG/1
40 TABLET, DELAYED RELEASE ORAL DAILY
Status: DISCONTINUED | OUTPATIENT
Start: 2022-01-01 | End: 2022-01-01 | Stop reason: HOSPADM

## 2022-01-01 RX ORDER — AMOXICILLIN AND CLAVULANATE POTASSIUM 875; 125 MG/1; MG/1
1 TABLET, FILM COATED ORAL 2 TIMES DAILY
Qty: 7 TABLET | Refills: 0 | Status: SHIPPED | OUTPATIENT
Start: 2022-01-01 | End: 2022-01-01

## 2022-01-01 RX ORDER — ENOXAPARIN SODIUM 100 MG/ML
40 INJECTION SUBCUTANEOUS EVERY 24 HOURS
Status: DISCONTINUED | OUTPATIENT
Start: 2022-01-01 | End: 2022-01-01

## 2022-01-01 RX ORDER — MEPERIDINE HYDROCHLORIDE 50 MG/ML
INJECTION INTRAMUSCULAR; INTRAVENOUS; SUBCUTANEOUS
Status: COMPLETED
Start: 2022-01-01 | End: 2022-01-01

## 2022-01-01 RX ORDER — AMOXICILLIN 250 MG
1 CAPSULE ORAL DAILY
Status: DISCONTINUED | OUTPATIENT
Start: 2022-01-01 | End: 2022-01-01 | Stop reason: HOSPADM

## 2022-01-01 RX ORDER — NEOSTIGMINE METHYLSULFATE 0.5 MG/ML
INJECTION, SOLUTION INTRAVENOUS
Status: DISCONTINUED | OUTPATIENT
Start: 2022-01-01 | End: 2022-01-01

## 2022-01-01 RX ORDER — FENTANYL CITRATE 50 UG/ML
INJECTION, SOLUTION INTRAMUSCULAR; INTRAVENOUS
Status: COMPLETED
Start: 2022-01-01 | End: 2022-01-01

## 2022-01-01 RX ORDER — EPINEPHRINE 0.3 MG/.3ML
0.3 INJECTION SUBCUTANEOUS ONCE AS NEEDED
Status: DISCONTINUED | OUTPATIENT
Start: 2022-01-01 | End: 2022-01-01 | Stop reason: HOSPADM

## 2022-01-01 RX ORDER — PHENYLEPHRINE HYDROCHLORIDE 10 MG/ML
INJECTION INTRAVENOUS
Status: DISCONTINUED | OUTPATIENT
Start: 2022-01-01 | End: 2022-01-01

## 2022-01-01 RX ORDER — POLYETHYLENE GLYCOL 3350 17 G/17G
17 POWDER, FOR SOLUTION ORAL DAILY
Status: DISCONTINUED | OUTPATIENT
Start: 2022-01-01 | End: 2022-01-01 | Stop reason: HOSPADM

## 2022-01-01 RX ORDER — CEFEPIME HYDROCHLORIDE 1 G/50ML
2 INJECTION, SOLUTION INTRAVENOUS
Status: DISCONTINUED | OUTPATIENT
Start: 2022-01-01 | End: 2022-01-01 | Stop reason: HOSPADM

## 2022-01-01 RX ORDER — SODIUM CHLORIDE 9 MG/ML
INJECTION, SOLUTION INTRAVENOUS CONTINUOUS
Status: DISCONTINUED | OUTPATIENT
Start: 2022-01-01 | End: 2022-01-01 | Stop reason: HOSPADM

## 2022-01-01 RX ORDER — SODIUM CHLORIDE 0.9 % (FLUSH) 0.9 %
3 SYRINGE (ML) INJECTION
Status: DISCONTINUED | OUTPATIENT
Start: 2022-01-01 | End: 2022-01-01 | Stop reason: HOSPADM

## 2022-01-01 RX ORDER — DIPHENHYDRAMINE HCL 25 MG
25 CAPSULE ORAL
Status: CANCELLED | OUTPATIENT
Start: 2022-01-01

## 2022-01-01 RX ORDER — HEPARIN SODIUM 200 [USP'U]/100ML
1000 INJECTION, SOLUTION INTRAVENOUS CONTINUOUS
Status: DISCONTINUED | OUTPATIENT
Start: 2022-01-01 | End: 2022-01-01 | Stop reason: HOSPADM

## 2022-01-01 RX ORDER — LIDOCAINE HCL/PF 100 MG/5ML
SYRINGE (ML) INTRAVENOUS
Status: DISCONTINUED | OUTPATIENT
Start: 2022-01-01 | End: 2022-01-01

## 2022-01-01 RX ORDER — OXYCODONE HYDROCHLORIDE 5 MG/1
5 CAPSULE ORAL EVERY 4 HOURS PRN
Qty: 20 CAPSULE | Refills: 0 | Status: SHIPPED | OUTPATIENT
Start: 2022-01-01 | End: 2022-01-01

## 2022-01-01 RX ORDER — HYDROCODONE BITARTRATE AND ACETAMINOPHEN 500; 5 MG/1; MG/1
TABLET ORAL
Status: DISCONTINUED | OUTPATIENT
Start: 2022-01-01 | End: 2022-01-01 | Stop reason: HOSPADM

## 2022-01-01 RX ORDER — SODIUM CHLORIDE 9 MG/ML
INJECTION, SOLUTION INTRAVENOUS CONTINUOUS PRN
Status: DISCONTINUED | OUTPATIENT
Start: 2022-01-01 | End: 2022-01-01

## 2022-01-01 RX ORDER — SODIUM CHLORIDE 9 MG/ML
INJECTION, SOLUTION INTRAVENOUS
Status: COMPLETED | OUTPATIENT
Start: 2022-01-01 | End: 2022-01-01

## 2022-01-01 RX ORDER — ENOXAPARIN SODIUM 100 MG/ML
100 INJECTION SUBCUTANEOUS EVERY 12 HOURS
Status: DISCONTINUED | OUTPATIENT
Start: 2022-01-01 | End: 2022-01-01 | Stop reason: HOSPADM

## 2022-01-01 RX ORDER — FINASTERIDE 5 MG/1
5 TABLET, FILM COATED ORAL DAILY
Status: DISCONTINUED | OUTPATIENT
Start: 2022-01-01 | End: 2022-01-01 | Stop reason: HOSPADM

## 2022-01-01 RX ORDER — SODIUM CHLORIDE, SODIUM LACTATE, POTASSIUM CHLORIDE, CALCIUM CHLORIDE 600; 310; 30; 20 MG/100ML; MG/100ML; MG/100ML; MG/100ML
INJECTION, SOLUTION INTRAVENOUS CONTINUOUS
Status: DISCONTINUED | OUTPATIENT
Start: 2022-01-01 | End: 2022-01-01 | Stop reason: HOSPADM

## 2022-01-01 RX ORDER — SODIUM CHLORIDE 9 MG/ML
75 INJECTION, SOLUTION INTRAVENOUS CONTINUOUS
Status: DISCONTINUED | OUTPATIENT
Start: 2022-01-01 | End: 2022-01-01 | Stop reason: HOSPADM

## 2022-01-01 RX ORDER — FENTANYL CITRATE 50 UG/ML
25 INJECTION, SOLUTION INTRAMUSCULAR; INTRAVENOUS EVERY 5 MIN PRN
Status: DISCONTINUED | OUTPATIENT
Start: 2022-01-01 | End: 2022-01-01 | Stop reason: HOSPADM

## 2022-01-01 RX ORDER — HEPARIN SODIUM 200 [USP'U]/100ML
INJECTION, SOLUTION INTRAVENOUS
Status: DISCONTINUED | OUTPATIENT
Start: 2022-01-01 | End: 2022-01-01 | Stop reason: HOSPADM

## 2022-01-01 RX ORDER — GABAPENTIN 300 MG/1
300 CAPSULE ORAL NIGHTLY
Qty: 30 CAPSULE | Refills: 11 | Status: SHIPPED | OUTPATIENT
Start: 2022-01-01 | End: 2023-11-18

## 2022-01-01 RX ORDER — ACETAMINOPHEN 325 MG/1
650 TABLET ORAL EVERY 8 HOURS PRN
Status: DISCONTINUED | OUTPATIENT
Start: 2022-01-01 | End: 2022-01-01 | Stop reason: HOSPADM

## 2022-01-01 RX ORDER — MAGNESIUM SULFATE HEPTAHYDRATE 40 MG/ML
4 INJECTION, SOLUTION INTRAVENOUS
Status: COMPLETED | OUTPATIENT
Start: 2022-01-01 | End: 2022-01-01

## 2022-01-01 RX ORDER — OXYCODONE HYDROCHLORIDE 5 MG/1
5 TABLET ORAL EVERY 4 HOURS PRN
Qty: 20 TABLET | Refills: 0 | Status: SHIPPED | OUTPATIENT
Start: 2022-01-01 | End: 2022-01-01

## 2022-01-01 RX ORDER — IPRATROPIUM BROMIDE AND ALBUTEROL SULFATE 2.5; .5 MG/3ML; MG/3ML
3 SOLUTION RESPIRATORY (INHALATION) EVERY 6 HOURS PRN
Status: DISCONTINUED | OUTPATIENT
Start: 2022-01-01 | End: 2022-01-01 | Stop reason: HOSPADM

## 2022-01-01 RX ORDER — FENTANYL CITRATE 50 UG/ML
50 INJECTION, SOLUTION INTRAMUSCULAR; INTRAVENOUS
Status: CANCELLED | OUTPATIENT
Start: 2022-01-01

## 2022-01-01 RX ORDER — PROCHLORPERAZINE EDISYLATE 5 MG/ML
5 INJECTION INTRAMUSCULAR; INTRAVENOUS EVERY 30 MIN PRN
Status: DISCONTINUED | OUTPATIENT
Start: 2022-01-01 | End: 2022-01-01 | Stop reason: HOSPADM

## 2022-01-01 RX ORDER — SODIUM CHLORIDE 0.9 % (FLUSH) 0.9 %
3 SYRINGE (ML) INJECTION
Status: DISCONTINUED | OUTPATIENT
Start: 2022-01-01 | End: 2022-01-01

## 2022-01-01 RX ORDER — DIPHENHYDRAMINE HYDROCHLORIDE 50 MG/ML
INJECTION INTRAMUSCULAR; INTRAVENOUS
Status: COMPLETED
Start: 2022-01-01 | End: 2022-01-01

## 2022-01-01 RX ORDER — LIDOCAINE HYDROCHLORIDE 10 MG/ML
INJECTION INFILTRATION; PERINEURAL
Status: DISCONTINUED | OUTPATIENT
Start: 2022-01-01 | End: 2022-01-01 | Stop reason: HOSPADM

## 2022-01-01 RX ORDER — PROCHLORPERAZINE EDISYLATE 5 MG/ML
5 INJECTION INTRAMUSCULAR; INTRAVENOUS EVERY 6 HOURS PRN
Status: DISCONTINUED | OUTPATIENT
Start: 2022-01-01 | End: 2022-01-01 | Stop reason: HOSPADM

## 2022-01-01 RX ORDER — LIDOCAINE HYDROCHLORIDE 20 MG/ML
SOLUTION OROPHARYNGEAL EVERY 6 HOURS
Qty: 100 ML | Refills: 0 | Status: SHIPPED | OUTPATIENT
Start: 2022-01-01 | End: 2022-01-01

## 2022-01-01 RX ORDER — HALOPERIDOL 5 MG/ML
0.5 INJECTION INTRAMUSCULAR EVERY 10 MIN PRN
Status: DISCONTINUED | OUTPATIENT
Start: 2022-01-01 | End: 2022-01-01 | Stop reason: HOSPADM

## 2022-01-01 RX ORDER — TALC
6 POWDER (GRAM) TOPICAL NIGHTLY PRN
Status: DISCONTINUED | OUTPATIENT
Start: 2022-01-01 | End: 2022-01-01 | Stop reason: HOSPADM

## 2022-01-01 RX ORDER — METRONIDAZOLE 500 MG/1
500 TABLET ORAL EVERY 8 HOURS
Qty: 9 TABLET | Refills: 0 | Status: SHIPPED | OUTPATIENT
Start: 2022-01-01 | End: 2022-01-01

## 2022-01-01 RX ORDER — OXYCODONE HYDROCHLORIDE 10 MG/1
10 TABLET ORAL EVERY 4 HOURS PRN
Status: DISCONTINUED | OUTPATIENT
Start: 2022-01-01 | End: 2022-01-01 | Stop reason: HOSPADM

## 2022-01-01 RX ORDER — AMOXICILLIN AND CLAVULANATE POTASSIUM 875; 125 MG/1; MG/1
1 TABLET, FILM COATED ORAL 2 TIMES DAILY
Qty: 14 TABLET | Refills: 0 | Status: SHIPPED | OUTPATIENT
Start: 2022-01-01 | End: 2022-01-01

## 2022-01-01 RX ORDER — MAGNESIUM SULFATE HEPTAHYDRATE 40 MG/ML
2 INJECTION, SOLUTION INTRAVENOUS
Status: DISPENSED | OUTPATIENT
Start: 2022-01-01 | End: 2022-01-01

## 2022-01-01 RX ORDER — DULOXETIN HYDROCHLORIDE 20 MG/1
20 CAPSULE, DELAYED RELEASE ORAL DAILY
Qty: 90 CAPSULE | Refills: 3 | Status: SHIPPED | OUTPATIENT
Start: 2022-01-01 | End: 2023-10-21

## 2022-01-01 RX ORDER — POTASSIUM CHLORIDE 20 MEQ/1
20 TABLET, EXTENDED RELEASE ORAL ONCE
Status: COMPLETED | OUTPATIENT
Start: 2022-01-01 | End: 2022-01-01

## 2022-01-01 RX ORDER — ONDANSETRON 4 MG/1
4 TABLET, FILM COATED ORAL EVERY 6 HOURS PRN
Qty: 20 TABLET | Refills: 0 | Status: SHIPPED | OUTPATIENT
Start: 2022-01-01 | End: 2022-01-01

## 2022-01-01 RX ORDER — OXYCODONE HYDROCHLORIDE 5 MG/1
5 TABLET ORAL EVERY 4 HOURS PRN
Qty: 10 TABLET | Refills: 0 | Status: SHIPPED | OUTPATIENT
Start: 2022-01-01 | End: 2022-01-01

## 2022-01-01 RX ORDER — FENTANYL CITRATE 50 UG/ML
25 INJECTION, SOLUTION INTRAMUSCULAR; INTRAVENOUS EVERY 5 MIN PRN
Status: COMPLETED | OUTPATIENT
Start: 2022-01-01 | End: 2022-01-01

## 2022-01-01 RX ADMIN — APREPITANT 130 MG: 130 INJECTION, EMULSION INTRAVENOUS at 12:09

## 2022-01-01 RX ADMIN — Medication 10 ML: at 02:07

## 2022-01-01 RX ADMIN — GEMCITABINE HYDROCHLORIDE 1695 MG: 1 INJECTION, SOLUTION INTRAVENOUS at 02:10

## 2022-01-01 RX ADMIN — ROCURONIUM BROMIDE 50 MG: 10 INJECTION, SOLUTION INTRAVENOUS at 03:04

## 2022-01-01 RX ADMIN — PALONOSETRON 0.25 MG: 0.25 INJECTION, SOLUTION INTRAVENOUS at 09:07

## 2022-01-01 RX ADMIN — MEPERIDINE HYDROCHLORIDE 25 MG: 50 INJECTION INTRAMUSCULAR; INTRAVENOUS; SUBCUTANEOUS at 01:09

## 2022-01-01 RX ADMIN — NEOSTIGMINE METHYLSULFATE 5 MG: 0.5 INJECTION, SOLUTION INTRAVENOUS at 08:11

## 2022-01-01 RX ADMIN — DEXMEDETOMIDINE HYDROCHLORIDE 8 MCG: 100 INJECTION, SOLUTION INTRAVENOUS at 01:09

## 2022-01-01 RX ADMIN — ONDANSETRON 4 MG: 2 INJECTION, SOLUTION INTRAMUSCULAR; INTRAVENOUS at 11:05

## 2022-01-01 RX ADMIN — DEXAMETHASONE SODIUM PHOSPHATE 0.25 MG: 4 INJECTION, SOLUTION INTRA-ARTICULAR; INTRALESIONAL; INTRAMUSCULAR; INTRAVENOUS; SOFT TISSUE at 12:09

## 2022-01-01 RX ADMIN — HEPARIN SODIUM 1000 UNITS/HR: 200 INJECTION, SOLUTION INTRAVENOUS at 12:09

## 2022-01-01 RX ADMIN — FENTANYL CITRATE 25 MCG: 50 INJECTION INTRAMUSCULAR; INTRAVENOUS at 03:07

## 2022-01-01 RX ADMIN — CISPLATIN 45 MG: 1 INJECTION INTRAVENOUS at 12:06

## 2022-01-01 RX ADMIN — SODIUM CHLORIDE 500 ML: 0.9 INJECTION, SOLUTION INTRAVENOUS at 08:05

## 2022-01-01 RX ADMIN — SODIUM CHLORIDE: 0.9 INJECTION, SOLUTION INTRAVENOUS at 12:05

## 2022-01-01 RX ADMIN — APREPITANT 130 MG: 130 INJECTION, EMULSION INTRAVENOUS at 11:10

## 2022-01-01 RX ADMIN — APREPITANT 130 MG: 130 INJECTION, EMULSION INTRAVENOUS at 11:07

## 2022-01-01 RX ADMIN — EPHEDRINE SULFATE 10 MG: 50 INJECTION INTRAVENOUS at 12:09

## 2022-01-01 RX ADMIN — DIPHENHYDRAMINE HYDROCHLORIDE 50 MG: 50 INJECTION INTRAMUSCULAR; INTRAVENOUS at 11:09

## 2022-01-01 RX ADMIN — SUGAMMADEX 200 MG: 100 INJECTION, SOLUTION INTRAVENOUS at 03:07

## 2022-01-01 RX ADMIN — PALONOSETRON 0.25 MG: 0.25 INJECTION, SOLUTION INTRAVENOUS at 08:05

## 2022-01-01 RX ADMIN — MAGNESIUM SULFATE HEPTAHYDRATE 250 ML/HR: 500 INJECTION, SOLUTION INTRAMUSCULAR; INTRAVENOUS at 09:07

## 2022-01-01 RX ADMIN — SODIUM CHLORIDE 500 ML: 0.9 INJECTION, SOLUTION INTRAVENOUS at 12:09

## 2022-01-01 RX ADMIN — PALONOSETRON 0.25 MG: 0.25 INJECTION, SOLUTION INTRAVENOUS at 11:07

## 2022-01-01 RX ADMIN — APREPITANT 130 MG: 130 INJECTION, EMULSION INTRAVENOUS at 02:10

## 2022-01-01 RX ADMIN — SODIUM CHLORIDE: 0.9 INJECTION, SOLUTION INTRAVENOUS at 06:11

## 2022-01-01 RX ADMIN — DEXAMETHASONE SODIUM PHOSPHATE 0.25 MG: 4 INJECTION, SOLUTION INTRA-ARTICULAR; INTRALESIONAL; INTRAMUSCULAR; INTRAVENOUS; SOFT TISSUE at 12:11

## 2022-01-01 RX ADMIN — DEXAMETHASONE SODIUM PHOSPHATE 0.25 MG: 4 INJECTION, SOLUTION INTRA-ARTICULAR; INTRALESIONAL; INTRAMUSCULAR; INTRAVENOUS; SOFT TISSUE at 03:12

## 2022-01-01 RX ADMIN — FENTANYL CITRATE 100 MCG: 50 INJECTION, SOLUTION INTRAMUSCULAR; INTRAVENOUS at 06:11

## 2022-01-01 RX ADMIN — PROPOFOL 200 MG: 10 INJECTION, EMULSION INTRAVENOUS at 06:11

## 2022-01-01 RX ADMIN — IOHEXOL 55 ML: 300 INJECTION, SOLUTION INTRAVENOUS at 10:08

## 2022-01-01 RX ADMIN — SODIUM CHLORIDE 500 ML: 0.9 INJECTION, SOLUTION INTRAVENOUS at 01:04

## 2022-01-01 RX ADMIN — HEPARIN SODIUM (PORCINE) LOCK FLUSH IV SOLN 100 UNIT/ML 500 UNITS: 100 SOLUTION at 04:09

## 2022-01-01 RX ADMIN — APREPITANT 130 MG: 130 INJECTION, EMULSION INTRAVENOUS at 08:06

## 2022-01-01 RX ADMIN — CISPLATIN 57 MG: 1 INJECTION INTRAVENOUS at 11:04

## 2022-01-01 RX ADMIN — DEXAMETHASONE SODIUM PHOSPHATE 0.25 MG: 4 INJECTION, SOLUTION INTRA-ARTICULAR; INTRALESIONAL; INTRAMUSCULAR; INTRAVENOUS; SOFT TISSUE at 01:09

## 2022-01-01 RX ADMIN — DULOXETINE HYDROCHLORIDE 20 MG: 20 CAPSULE, DELAYED RELEASE ORAL at 08:11

## 2022-01-01 RX ADMIN — METRONIDAZOLE 500 MG: 500 INJECTION, SOLUTION INTRAVENOUS at 11:11

## 2022-01-01 RX ADMIN — DIPHENHYDRAMINE HYDROCHLORIDE 50 MG: 50 INJECTION, SOLUTION INTRAMUSCULAR; INTRAVENOUS at 01:04

## 2022-01-01 RX ADMIN — FINASTERIDE 5 MG: 5 TABLET, FILM COATED ORAL at 09:11

## 2022-01-01 RX ADMIN — ROCURONIUM BROMIDE 10 MG: 10 INJECTION INTRAVENOUS at 12:09

## 2022-01-01 RX ADMIN — HEPARIN SODIUM (PORCINE) LOCK FLUSH IV SOLN 100 UNIT/ML 500 UNITS: 100 SOLUTION at 03:05

## 2022-01-01 RX ADMIN — LEUCOVORIN CALCIUM 890 MG: 500 INJECTION, POWDER, LYOPHILIZED, FOR SOLUTION INTRAMUSCULAR; INTRAVENOUS at 01:11

## 2022-01-01 RX ADMIN — Medication 150 MCG/KG/MIN: at 01:06

## 2022-01-01 RX ADMIN — PALONOSETRON HYDROCHLORIDE 0.25 MG: 0.25 INJECTION, SOLUTION INTRAVENOUS at 10:05

## 2022-01-01 RX ADMIN — SODIUM CHLORIDE: 0.9 INJECTION, SOLUTION INTRAVENOUS at 09:10

## 2022-01-01 RX ADMIN — PANTOPRAZOLE SODIUM 40 MG: 40 TABLET, DELAYED RELEASE ORAL at 09:11

## 2022-01-01 RX ADMIN — GEMCITABINE HYDROCHLORIDE 2200 MG: 1 INJECTION, SOLUTION INTRAVENOUS at 09:05

## 2022-01-01 RX ADMIN — DOXORUBICIN HYDROCHLORIDE 50 MG: 2 INJECTION, SOLUTION INTRAVENOUS at 12:09

## 2022-01-01 RX ADMIN — SODIUM CHLORIDE: 0.9 INJECTION, SOLUTION INTRAVENOUS at 08:04

## 2022-01-01 RX ADMIN — PROPOFOL 100 MG: 10 INJECTION, EMULSION INTRAVENOUS at 01:06

## 2022-01-01 RX ADMIN — FENTANYL CITRATE 25 MCG: 50 INJECTION, SOLUTION INTRAMUSCULAR; INTRAVENOUS at 08:08

## 2022-01-01 RX ADMIN — GEMCITABINE HYDROCHLORIDE 1800 MG: 1 INJECTION, SOLUTION INTRAVENOUS at 01:08

## 2022-01-01 RX ADMIN — METRONIDAZOLE 500 MG: 500 INJECTION, SOLUTION INTRAVENOUS at 01:11

## 2022-01-01 RX ADMIN — SODIUM CHLORIDE 1000 ML: 9 INJECTION, SOLUTION INTRAVENOUS at 01:12

## 2022-01-01 RX ADMIN — PALONOSETRON HYDROCHLORIDE 0.25 MG: 0.25 INJECTION, SOLUTION INTRAVENOUS at 11:04

## 2022-01-01 RX ADMIN — SODIUM CHLORIDE 500 ML: 0.9 INJECTION, SOLUTION INTRAVENOUS at 01:07

## 2022-01-01 RX ADMIN — SODIUM CHLORIDE: 9 INJECTION, SOLUTION INTRAVENOUS at 09:06

## 2022-01-01 RX ADMIN — MAGNESIUM SULFATE HEPTAHYDRATE 2 G: 40 INJECTION, SOLUTION INTRAVENOUS at 02:09

## 2022-01-01 RX ADMIN — DEXMEDETOMIDINE HYDROCHLORIDE 4 MCG: 100 INJECTION, SOLUTION INTRAVENOUS at 01:09

## 2022-01-01 RX ADMIN — ROCURONIUM BROMIDE 20 MG: 10 INJECTION, SOLUTION INTRAVENOUS at 09:08

## 2022-01-01 RX ADMIN — METHYLPREDNISOLONE SODIUM SUCCINATE 125 MG: 125 INJECTION, POWDER, FOR SOLUTION INTRAMUSCULAR; INTRAVENOUS at 01:09

## 2022-01-01 RX ADMIN — SODIUM CHLORIDE: 0.9 INJECTION, SOLUTION INTRAVENOUS at 08:08

## 2022-01-01 RX ADMIN — LIDOCAINE HYDROCHLORIDE 5 ML: 10 INJECTION, SOLUTION INFILTRATION; PERINEURAL at 11:05

## 2022-01-01 RX ADMIN — SODIUM CHLORIDE: 0.9 INJECTION, SOLUTION INTRAVENOUS at 02:04

## 2022-01-01 RX ADMIN — ONDANSETRON 4 MG: 2 INJECTION INTRAMUSCULAR; INTRAVENOUS at 08:08

## 2022-01-01 RX ADMIN — MAGNESIUM SULFATE HEPTAHYDRATE 250 ML/HR: 500 INJECTION, SOLUTION INTRAMUSCULAR; INTRAVENOUS at 09:06

## 2022-01-01 RX ADMIN — PROPOFOL 150 MG: 10 INJECTION, EMULSION INTRAVENOUS at 02:07

## 2022-01-01 RX ADMIN — FINASTERIDE 5 MG: 5 TABLET, FILM COATED ORAL at 08:11

## 2022-01-01 RX ADMIN — APREPITANT 130 MG: 130 INJECTION, EMULSION INTRAVENOUS at 10:07

## 2022-01-01 RX ADMIN — HEPARIN 500 UNITS: 100 SYRINGE at 11:06

## 2022-01-01 RX ADMIN — HEPARIN SODIUM (PORCINE) LOCK FLUSH IV SOLN 100 UNIT/ML 500 UNITS: 100 SOLUTION at 02:07

## 2022-01-01 RX ADMIN — SODIUM CHLORIDE: 0.9 INJECTION, SOLUTION INTRAVENOUS at 01:07

## 2022-01-01 RX ADMIN — TAMSULOSIN HYDROCHLORIDE 0.4 MG: 0.4 CAPSULE ORAL at 08:11

## 2022-01-01 RX ADMIN — MAGNESIUM SULFATE HEPTAHYDRATE 250 ML/HR: 500 INJECTION, SOLUTION INTRAMUSCULAR; INTRAVENOUS at 09:09

## 2022-01-01 RX ADMIN — SUGAMMADEX 300 MG: 100 INJECTION, SOLUTION INTRAVENOUS at 10:08

## 2022-01-01 RX ADMIN — GEMCITABINE HYDROCHLORIDE 1800 MG: 1 INJECTION, SOLUTION INTRAVENOUS at 11:07

## 2022-01-01 RX ADMIN — APREPITANT 130 MG: 130 INJECTION, EMULSION INTRAVENOUS at 11:04

## 2022-01-01 RX ADMIN — METRONIDAZOLE 500 MG: 500 INJECTION, SOLUTION INTRAVENOUS at 02:11

## 2022-01-01 RX ADMIN — HEPARIN SODIUM (PORCINE) LOCK FLUSH IV SOLN 100 UNIT/ML 500 UNITS: 100 SOLUTION at 01:07

## 2022-01-01 RX ADMIN — FENTANYL CITRATE 50 MCG: 50 INJECTION, SOLUTION INTRAMUSCULAR; INTRAVENOUS at 03:04

## 2022-01-01 RX ADMIN — SODIUM CHLORIDE: 0.9 INJECTION, SOLUTION INTRAVENOUS at 10:05

## 2022-01-01 RX ADMIN — SODIUM CHLORIDE: 0.9 INJECTION, SOLUTION INTRAVENOUS at 07:04

## 2022-01-01 RX ADMIN — Medication 10 ML: at 03:05

## 2022-01-01 RX ADMIN — CISPLATIN 57 MG: 1 INJECTION INTRAVENOUS at 12:05

## 2022-01-01 RX ADMIN — PHENYLEPHRINE HYDROCHLORIDE 200 MCG: 10 INJECTION INTRAVENOUS at 07:11

## 2022-01-01 RX ADMIN — SODIUM CHLORIDE: 0.9 INJECTION, SOLUTION INTRAVENOUS at 11:09

## 2022-01-01 RX ADMIN — MAGNESIUM SULFATE HEPTAHYDRATE 250 ML/HR: 500 INJECTION, SOLUTION INTRAMUSCULAR; INTRAVENOUS at 10:09

## 2022-01-01 RX ADMIN — SODIUM CHLORIDE 75 ML/HR: 0.9 INJECTION, SOLUTION INTRAVENOUS at 11:09

## 2022-01-01 RX ADMIN — PHENYLEPHRINE HYDROCHLORIDE 100 MCG: 10 INJECTION INTRAVENOUS at 01:06

## 2022-01-01 RX ADMIN — APREPITANT 130 MG: 130 INJECTION, EMULSION INTRAVENOUS at 01:08

## 2022-01-01 RX ADMIN — HEPARIN 500 UNITS: 100 SYRINGE at 04:08

## 2022-01-01 RX ADMIN — IOHEXOL 100 ML: 350 INJECTION, SOLUTION INTRAVENOUS at 03:11

## 2022-01-01 RX ADMIN — SODIUM CHLORIDE, SODIUM LACTATE, POTASSIUM CHLORIDE, AND CALCIUM CHLORIDE: 600; 310; 30; 20 INJECTION, SOLUTION INTRAVENOUS at 10:11

## 2022-01-01 RX ADMIN — PIPERACILLIN SODIUM AND TAZOBACTAM SODIUM 4.5 G: 4; .5 INJECTION, POWDER, LYOPHILIZED, FOR SOLUTION INTRAVENOUS at 05:11

## 2022-01-01 RX ADMIN — ONDANSETRON 4 MG: 2 INJECTION INTRAMUSCULAR; INTRAVENOUS at 08:11

## 2022-01-01 RX ADMIN — LIDOCAINE HYDROCHLORIDE 100 MG: 20 INJECTION, SOLUTION INTRAVENOUS at 03:04

## 2022-01-01 RX ADMIN — SODIUM CHLORIDE: 9 INJECTION, SOLUTION INTRAVENOUS at 02:12

## 2022-01-01 RX ADMIN — HYDROMORPHONE HYDROCHLORIDE 0.2 MG: 1 INJECTION, SOLUTION INTRAMUSCULAR; INTRAVENOUS; SUBCUTANEOUS at 05:07

## 2022-01-01 RX ADMIN — SODIUM CHLORIDE: 0.9 INJECTION, SOLUTION INTRAVENOUS at 07:08

## 2022-01-01 RX ADMIN — MAGNESIUM SULFATE HEPTAHYDRATE 250 ML/HR: 500 INJECTION, SOLUTION INTRAMUSCULAR; INTRAVENOUS at 12:10

## 2022-01-01 RX ADMIN — PANTOPRAZOLE SODIUM 40 MG: 40 TABLET, DELAYED RELEASE ORAL at 08:11

## 2022-01-01 RX ADMIN — CEFEPIME 2 G: 2 INJECTION, POWDER, FOR SOLUTION INTRAVENOUS at 12:11

## 2022-01-01 RX ADMIN — GADOBUTROL 10 ML: 604.72 INJECTION INTRAVENOUS at 01:06

## 2022-01-01 RX ADMIN — DURVALUMAB 1500 MG: 500 INJECTION, SOLUTION INTRAVENOUS at 11:09

## 2022-01-01 RX ADMIN — METRONIDAZOLE 500 MG: 500 INJECTION, SOLUTION INTRAVENOUS at 06:11

## 2022-01-01 RX ADMIN — ACETAMINOPHEN 650 MG: 325 TABLET ORAL at 11:05

## 2022-01-01 RX ADMIN — DEXAMETHASONE SODIUM PHOSPHATE 4 MG: 4 INJECTION INTRA-ARTICULAR; INTRALESIONAL; INTRAMUSCULAR; INTRAVENOUS; SOFT TISSUE at 08:11

## 2022-01-01 RX ADMIN — ROCURONIUM BROMIDE 10 MG: 10 INJECTION INTRAVENOUS at 06:11

## 2022-01-01 RX ADMIN — APREPITANT 130 MG: 130 INJECTION, EMULSION INTRAVENOUS at 08:05

## 2022-01-01 RX ADMIN — ROCURONIUM BROMIDE 40 MG: 10 INJECTION, SOLUTION INTRAVENOUS at 02:07

## 2022-01-01 RX ADMIN — SODIUM CHLORIDE: 0.9 INJECTION, SOLUTION INTRAVENOUS at 10:11

## 2022-01-01 RX ADMIN — Medication 80 MG: at 11:05

## 2022-01-01 RX ADMIN — PROPOFOL 200 MCG/KG/MIN: 10 INJECTION, EMULSION INTRAVENOUS at 07:04

## 2022-01-01 RX ADMIN — HEPARIN 500 UNITS: 100 SYRINGE at 01:03

## 2022-01-01 RX ADMIN — CISPLATIN 57 MG: 1 INJECTION INTRAVENOUS at 10:05

## 2022-01-01 RX ADMIN — Medication 10 ML: at 08:12

## 2022-01-01 RX ADMIN — ROCURONIUM BROMIDE 20 MG: 10 INJECTION, SOLUTION INTRAVENOUS at 11:05

## 2022-01-01 RX ADMIN — Medication 10 ML: at 01:11

## 2022-01-01 RX ADMIN — METRONIDAZOLE 500 MG: 500 INJECTION, SOLUTION INTRAVENOUS at 04:11

## 2022-01-01 RX ADMIN — SODIUM CHLORIDE 500 ML: 0.9 INJECTION, SOLUTION INTRAVENOUS at 03:10

## 2022-01-01 RX ADMIN — SODIUM CHLORIDE, SODIUM LACTATE, POTASSIUM CHLORIDE, AND CALCIUM CHLORIDE: 600; 310; 30; 20 INJECTION, SOLUTION INTRAVENOUS at 12:11

## 2022-01-01 RX ADMIN — TAMSULOSIN HYDROCHLORIDE 0.4 MG: 0.4 CAPSULE ORAL at 10:11

## 2022-01-01 RX ADMIN — GEMCITABINE HYDROCHLORIDE 1695 MG: 1 INJECTION, SOLUTION INTRAVENOUS at 02:09

## 2022-01-01 RX ADMIN — CISPLATIN 57 MG: 1 INJECTION INTRAVENOUS at 12:03

## 2022-01-01 RX ADMIN — ENOXAPARIN SODIUM 40 MG: 100 INJECTION SUBCUTANEOUS at 04:11

## 2022-01-01 RX ADMIN — CEFEPIME 2 G: 2 INJECTION, POWDER, FOR SOLUTION INTRAVENOUS at 09:11

## 2022-01-01 RX ADMIN — GADOBUTROL 10 ML: 604.72 INJECTION INTRAVENOUS at 11:10

## 2022-01-01 RX ADMIN — SODIUM CHLORIDE: 0.9 INJECTION, SOLUTION INTRAVENOUS at 10:08

## 2022-01-01 RX ADMIN — SODIUM CHLORIDE: 9 INJECTION, SOLUTION INTRAVENOUS at 08:07

## 2022-01-01 RX ADMIN — CEFEPIME 2 G: 2 INJECTION, POWDER, FOR SOLUTION INTRAVENOUS at 08:11

## 2022-01-01 RX ADMIN — MAGNESIUM SULFATE HEPTAHYDRATE 2 G: 40 INJECTION, SOLUTION INTRAVENOUS at 09:10

## 2022-01-01 RX ADMIN — FLUOROURACIL 4460 MG: 50 INJECTION, SOLUTION INTRAVENOUS at 03:11

## 2022-01-01 RX ADMIN — FENTANYL CITRATE 50 MCG: 50 INJECTION, SOLUTION INTRAMUSCULAR; INTRAVENOUS at 02:07

## 2022-01-01 RX ADMIN — GEMCITABINE HYDROCHLORIDE 1695 MG: 1 INJECTION, SOLUTION INTRAVENOUS at 01:09

## 2022-01-01 RX ADMIN — PROPOFOL 150 MG: 10 INJECTION, EMULSION INTRAVENOUS at 08:08

## 2022-01-01 RX ADMIN — PROPOFOL 150 MG: 10 INJECTION, EMULSION INTRAVENOUS at 11:05

## 2022-01-01 RX ADMIN — DEXAMETHASONE SODIUM PHOSPHATE 0.25 MG: 4 INJECTION, SOLUTION INTRA-ARTICULAR; INTRALESIONAL; INTRAMUSCULAR; INTRAVENOUS; SOFT TISSUE at 02:10

## 2022-01-01 RX ADMIN — AMPICILLIN AND SULBACTAM 3 G: 2; 1 INJECTION, POWDER, FOR SOLUTION INTRAMUSCULAR; INTRAVENOUS at 11:09

## 2022-01-01 RX ADMIN — SODIUM CHLORIDE 500 ML: 0.9 INJECTION, SOLUTION INTRAVENOUS at 12:07

## 2022-01-01 RX ADMIN — GEMCITABINE HYDROCHLORIDE 2260 MG: 1 INJECTION, SOLUTION INTRAVENOUS at 12:04

## 2022-01-01 RX ADMIN — HEPARIN 500 UNITS: 100 SYRINGE at 01:11

## 2022-01-01 RX ADMIN — Medication 10 ML: at 01:05

## 2022-01-01 RX ADMIN — SUGAMMADEX 200 MG: 100 INJECTION, SOLUTION INTRAVENOUS at 09:11

## 2022-01-01 RX ADMIN — NEOSTIGMINE METHYLSULFATE 5 MG: 0.5 INJECTION, SOLUTION INTRAVENOUS at 01:09

## 2022-01-01 RX ADMIN — AMPICILLIN AND SULBACTAM 3 G: 2; 1 INJECTION, POWDER, FOR SOLUTION INTRAMUSCULAR; INTRAVENOUS at 07:08

## 2022-01-01 RX ADMIN — IOHEXOL 70 ML: 300 INJECTION, SOLUTION INTRAVENOUS at 01:09

## 2022-01-01 RX ADMIN — APREPITANT 130 MG: 130 INJECTION, EMULSION INTRAVENOUS at 10:05

## 2022-01-01 RX ADMIN — APREPITANT 130 MG: 130 INJECTION, EMULSION INTRAVENOUS at 11:03

## 2022-01-01 RX ADMIN — DEXAMETHASONE SODIUM PHOSPHATE 4 MG: 4 INJECTION, SOLUTION INTRAMUSCULAR; INTRAVENOUS at 02:07

## 2022-01-01 RX ADMIN — DULOXETINE HYDROCHLORIDE 20 MG: 20 CAPSULE, DELAYED RELEASE ORAL at 09:11

## 2022-01-01 RX ADMIN — GEMCITABINE HYDROCHLORIDE 2200 MG: 1 INJECTION, SOLUTION INTRAVENOUS at 11:03

## 2022-01-01 RX ADMIN — LEUCOVORIN CALCIUM 890 MG: 500 INJECTION, POWDER, LYOPHILIZED, FOR SOLUTION INTRAMUSCULAR; INTRAVENOUS at 03:12

## 2022-01-01 RX ADMIN — LEUCOVORIN CALCIUM 890 MG: 200 INJECTION, POWDER, LYOPHILIZED, FOR SUSPENSION INTRAMUSCULAR; INTRAVENOUS at 02:12

## 2022-01-01 RX ADMIN — HEPARIN 500 UNITS: 100 SYRINGE at 01:05

## 2022-01-01 RX ADMIN — HEPARIN 500 UNITS: 100 SYRINGE at 04:09

## 2022-01-01 RX ADMIN — CEFEPIME 2 G: 2 INJECTION, POWDER, FOR SOLUTION INTRAVENOUS at 11:11

## 2022-01-01 RX ADMIN — DEXTROSE: 50 INJECTION, SOLUTION INTRAVENOUS at 02:12

## 2022-01-01 RX ADMIN — GEMCITABINE HYDROCHLORIDE 2200 MG: 1 INJECTION, SOLUTION INTRAVENOUS at 11:07

## 2022-01-01 RX ADMIN — MAGNESIUM SULFATE HEPTAHYDRATE 2 G: 40 INJECTION, SOLUTION INTRAVENOUS at 11:08

## 2022-01-01 RX ADMIN — FENTANYL CITRATE 100 MCG: 50 INJECTION, SOLUTION INTRAMUSCULAR; INTRAVENOUS at 11:05

## 2022-01-01 RX ADMIN — SUCCINYLCHOLINE CHLORIDE 120 MG: 20 INJECTION, SOLUTION INTRAMUSCULAR; INTRAVENOUS at 11:05

## 2022-01-01 RX ADMIN — PALONOSETRON 0.25 MG: 0.25 INJECTION, SOLUTION INTRAVENOUS at 08:06

## 2022-01-01 RX ADMIN — PHENYLEPHRINE HYDROCHLORIDE 100 MCG: 10 INJECTION INTRAVENOUS at 03:04

## 2022-01-01 RX ADMIN — SUCCINYLCHOLINE CHLORIDE 160 MG: 20 INJECTION, SOLUTION INTRAMUSCULAR; INTRAVENOUS at 06:11

## 2022-01-01 RX ADMIN — Medication 10 ML: at 04:08

## 2022-01-01 RX ADMIN — ROCURONIUM BROMIDE 40 MG: 10 INJECTION INTRAVENOUS at 11:09

## 2022-01-01 RX ADMIN — SODIUM CHLORIDE 500 ML: 0.9 INJECTION, SOLUTION INTRAVENOUS at 02:11

## 2022-01-01 RX ADMIN — PALONOSETRON 0.25 MG: 0.25 INJECTION, SOLUTION INTRAVENOUS at 11:06

## 2022-01-01 RX ADMIN — DOXORUBICIN HYDROCHLORIDE 50 MG: 2 INJECTION, SOLUTION INTRAVENOUS at 09:08

## 2022-01-01 RX ADMIN — SODIUM CHLORIDE, SODIUM GLUCONATE, SODIUM ACETATE, POTASSIUM CHLORIDE, MAGNESIUM CHLORIDE, SODIUM PHOSPHATE, DIBASIC, AND POTASSIUM PHOSPHATE: .53; .5; .37; .037; .03; .012; .00082 INJECTION, SOLUTION INTRAVENOUS at 06:11

## 2022-01-01 RX ADMIN — OXALIPLATIN 190 MG: 5 INJECTION, SOLUTION, CONCENTRATE INTRAVENOUS at 03:12

## 2022-01-01 RX ADMIN — LIDOCAINE HYDROCHLORIDE 30 MG: 20 INJECTION, SOLUTION INTRAVENOUS at 08:08

## 2022-01-01 RX ADMIN — DIPHENHYDRAMINE HYDROCHLORIDE 50 MG: 50 INJECTION, SOLUTION INTRAMUSCULAR; INTRAVENOUS at 11:05

## 2022-01-01 RX ADMIN — DOXORUBICIN HYDROCHLORIDE 50 MG: 2 INJECTION, SOLUTION INTRAVENOUS at 12:05

## 2022-01-01 RX ADMIN — SODIUM CHLORIDE: 9 INJECTION, SOLUTION INTRAVENOUS at 09:03

## 2022-01-01 RX ADMIN — DURVALUMAB 1500 MG: 500 INJECTION, SOLUTION INTRAVENOUS at 11:08

## 2022-01-01 RX ADMIN — HYDROCORTISONE SODIUM SUCCINATE 200 MG: 100 INJECTION, POWDER, FOR SOLUTION INTRAMUSCULAR; INTRAVENOUS at 07:08

## 2022-01-01 RX ADMIN — DEXAMETHASONE SODIUM PHOSPHATE 0.25 MG: 4 INJECTION, SOLUTION INTRA-ARTICULAR; INTRALESIONAL; INTRAMUSCULAR; INTRAVENOUS; SOFT TISSUE at 11:10

## 2022-01-01 RX ADMIN — CISPLATIN 50 MG: 1 INJECTION, SOLUTION INTRAVENOUS at 02:09

## 2022-01-01 RX ADMIN — Medication 200 MCG: at 11:09

## 2022-01-01 RX ADMIN — SODIUM CHLORIDE, SODIUM GLUCONATE, SODIUM ACETATE, POTASSIUM CHLORIDE, MAGNESIUM CHLORIDE, SODIUM PHOSPHATE, DIBASIC, AND POTASSIUM PHOSPHATE: .53; .5; .37; .037; .03; .012; .00082 INJECTION, SOLUTION INTRAVENOUS at 10:08

## 2022-01-01 RX ADMIN — SODIUM CHLORIDE 500 ML: 0.9 INJECTION, SOLUTION INTRAVENOUS at 01:03

## 2022-01-01 RX ADMIN — CISPLATIN 45 MG: 1 INJECTION INTRAVENOUS at 01:09

## 2022-01-01 RX ADMIN — MIDAZOLAM HYDROCHLORIDE 2 MG: 1 INJECTION, SOLUTION INTRAMUSCULAR; INTRAVENOUS at 06:11

## 2022-01-01 RX ADMIN — DIPHENHYDRAMINE HYDROCHLORIDE 50 MG: 50 INJECTION, SOLUTION INTRAMUSCULAR; INTRAVENOUS at 07:08

## 2022-01-01 RX ADMIN — APREPITANT 130 MG: 130 INJECTION, EMULSION INTRAVENOUS at 12:08

## 2022-01-01 RX ADMIN — SODIUM CHLORIDE, SODIUM GLUCONATE, SODIUM ACETATE, POTASSIUM CHLORIDE, MAGNESIUM CHLORIDE, SODIUM PHOSPHATE, DIBASIC, AND POTASSIUM PHOSPHATE: .53; .5; .37; .037; .03; .012; .00082 INJECTION, SOLUTION INTRAVENOUS at 09:11

## 2022-01-01 RX ADMIN — Medication 100 MCG: at 11:05

## 2022-01-01 RX ADMIN — HYDROCORTISONE SODIUM SUCCINATE 200 MG: 100 INJECTION, POWDER, FOR SOLUTION INTRAMUSCULAR; INTRAVENOUS at 11:09

## 2022-01-01 RX ADMIN — DEXAMETHASONE SODIUM PHOSPHATE 4 MG: 4 INJECTION, SOLUTION INTRAMUSCULAR; INTRAVENOUS at 03:04

## 2022-01-01 RX ADMIN — OXALIPLATIN 145 MG: 5 INJECTION, SOLUTION, CONCENTRATE INTRAVENOUS at 02:12

## 2022-01-01 RX ADMIN — GEMCITABINE HYDROCHLORIDE 1695 MG: 1 INJECTION, SOLUTION INTRAVENOUS at 12:10

## 2022-01-01 RX ADMIN — ROCURONIUM BROMIDE 50 MG: 10 INJECTION, SOLUTION INTRAVENOUS at 08:08

## 2022-01-01 RX ADMIN — MAGNESIUM SULFATE HEPTAHYDRATE 2 G: 40 INJECTION, SOLUTION INTRAVENOUS at 01:06

## 2022-01-01 RX ADMIN — HEPARIN 500 UNITS: 100 SYRINGE at 04:10

## 2022-01-01 RX ADMIN — Medication 125 MG: at 01:09

## 2022-01-01 RX ADMIN — POTASSIUM CHLORIDE 20 MEQ: 1500 TABLET, EXTENDED RELEASE ORAL at 09:11

## 2022-01-01 RX ADMIN — POTASSIUM CHLORIDE 250 ML/HR: 2 INJECTION, SOLUTION, CONCENTRATE INTRAVENOUS at 08:06

## 2022-01-01 RX ADMIN — CISPLATIN 45 MG: 1 INJECTION INTRAVENOUS at 12:07

## 2022-01-01 RX ADMIN — DEXTROSE MONOHYDRATE: 5 INJECTION, SOLUTION INTRAVENOUS at 01:12

## 2022-01-01 RX ADMIN — LIDOCAINE HYDROCHLORIDE 80 MG: 20 INJECTION INTRAVENOUS at 11:09

## 2022-01-01 RX ADMIN — ONDANSETRON 4 MG: 2 INJECTION INTRAMUSCULAR; INTRAVENOUS at 02:07

## 2022-01-01 RX ADMIN — SODIUM CHLORIDE: 9 INJECTION, SOLUTION INTRAVENOUS at 11:05

## 2022-01-01 RX ADMIN — DEXAMETHASONE SODIUM PHOSPHATE 0.25 MG: 4 INJECTION, SOLUTION INTRA-ARTICULAR; INTRALESIONAL; INTRAMUSCULAR; INTRAVENOUS; SOFT TISSUE at 12:08

## 2022-01-01 RX ADMIN — LIDOCAINE HYDROCHLORIDE 5 ML: 10 INJECTION, SOLUTION INFILTRATION; PERINEURAL at 04:04

## 2022-01-01 RX ADMIN — FENTANYL CITRATE 75 MCG: 50 INJECTION, SOLUTION INTRAMUSCULAR; INTRAVENOUS at 08:08

## 2022-01-01 RX ADMIN — MAGNESIUM SULFATE HEPTAHYDRATE 250 ML/HR: 500 INJECTION, SOLUTION INTRAMUSCULAR; INTRAVENOUS at 08:07

## 2022-01-01 RX ADMIN — SODIUM CHLORIDE 500 ML: 0.9 INJECTION, SOLUTION INTRAVENOUS at 10:06

## 2022-01-01 RX ADMIN — SODIUM CHLORIDE 500 ML: 0.9 INJECTION, SOLUTION INTRAVENOUS at 03:08

## 2022-01-01 RX ADMIN — SODIUM CHLORIDE: 0.9 INJECTION, SOLUTION INTRAVENOUS at 11:06

## 2022-01-01 RX ADMIN — GEMCITABINE HYDROCHLORIDE 2200 MG: 1 INJECTION, SOLUTION INTRAVENOUS at 12:06

## 2022-01-01 RX ADMIN — HEPARIN 500 UNITS: 100 SYRINGE at 08:12

## 2022-01-01 RX ADMIN — PROPOFOL 180 MG: 10 INJECTION, EMULSION INTRAVENOUS at 11:09

## 2022-01-01 RX ADMIN — PROPOFOL 150 MG: 10 INJECTION, EMULSION INTRAVENOUS at 03:04

## 2022-01-01 RX ADMIN — PIPERACILLIN SODIUM AND TAZOBACTAM SODIUM 4.5 G: 4; .5 INJECTION, POWDER, LYOPHILIZED, FOR SOLUTION INTRAVENOUS at 07:11

## 2022-01-01 RX ADMIN — SUGAMMADEX 200 MG: 100 INJECTION, SOLUTION INTRAVENOUS at 05:04

## 2022-01-01 RX ADMIN — ONDANSETRON 4 MG: 2 INJECTION INTRAMUSCULAR; INTRAVENOUS at 12:09

## 2022-01-01 RX ADMIN — ROCURONIUM BROMIDE 10 MG: 10 INJECTION, SOLUTION INTRAVENOUS at 11:05

## 2022-01-01 RX ADMIN — AMPICILLIN SODIUM AND SULBACTAM SODIUM 3 G: 2; 1 INJECTION, POWDER, FOR SOLUTION INTRAMUSCULAR; INTRAVENOUS at 11:05

## 2022-01-01 RX ADMIN — DEXAMETHASONE SODIUM PHOSPHATE 0.25 MG: 4 INJECTION, SOLUTION INTRA-ARTICULAR; INTRALESIONAL; INTRAMUSCULAR; INTRAVENOUS; SOFT TISSUE at 01:08

## 2022-01-01 RX ADMIN — LIDOCAINE HYDROCHLORIDE 80 MG: 20 INJECTION, SOLUTION INTRAVENOUS at 02:07

## 2022-01-01 RX ADMIN — HEPARIN 500 UNITS: 100 SYRINGE at 12:10

## 2022-01-01 RX ADMIN — ENOXAPARIN SODIUM 100 MG: 100 INJECTION SUBCUTANEOUS at 11:11

## 2022-01-01 RX ADMIN — POTASSIUM CHLORIDE 250 ML/HR: 2 INJECTION, SOLUTION, CONCENTRATE INTRAVENOUS at 10:05

## 2022-01-01 RX ADMIN — Medication 10 ML: at 02:04

## 2022-01-01 RX ADMIN — MAGNESIUM SULFATE HEPTAHYDRATE 2 G: 40 INJECTION, SOLUTION INTRAVENOUS at 03:10

## 2022-01-01 RX ADMIN — PALONOSETRON HYDROCHLORIDE 0.25 MG: 0.25 INJECTION, SOLUTION INTRAVENOUS at 08:05

## 2022-01-01 RX ADMIN — POTASSIUM CHLORIDE 250 ML/HR: 2 INJECTION, SOLUTION, CONCENTRATE INTRAVENOUS at 08:05

## 2022-01-01 RX ADMIN — Medication 200 MCG: at 02:07

## 2022-01-01 RX ADMIN — LIDOCAINE HYDROCHLORIDE 2 ML: 10 INJECTION, SOLUTION INFILTRATION; PERINEURAL at 12:09

## 2022-01-01 RX ADMIN — MIDAZOLAM HYDROCHLORIDE 2 MG: 1 INJECTION, SOLUTION INTRAMUSCULAR; INTRAVENOUS at 11:05

## 2022-01-01 RX ADMIN — GLYCOPYRROLATE 0.8 MG: 0.2 INJECTION INTRAMUSCULAR; INTRAVENOUS at 08:11

## 2022-01-01 RX ADMIN — FUROSEMIDE 40 MG: 10 INJECTION, SOLUTION INTRAMUSCULAR; INTRAVENOUS at 03:11

## 2022-01-01 RX ADMIN — METRONIDAZOLE 500 MG: 500 INJECTION, SOLUTION INTRAVENOUS at 10:11

## 2022-01-01 RX ADMIN — Medication 100 MG: at 07:04

## 2022-01-01 RX ADMIN — ONDANSETRON 4 MG: 2 INJECTION, SOLUTION INTRAMUSCULAR; INTRAVENOUS at 04:04

## 2022-01-01 RX ADMIN — HEPARIN 500 UNITS: 100 SYRINGE at 02:07

## 2022-01-01 RX ADMIN — PHENYLEPHRINE HYDROCHLORIDE 200 MCG: 10 INJECTION INTRAVENOUS at 06:11

## 2022-01-01 RX ADMIN — CISPLATIN 57 MG: 1 INJECTION INTRAVENOUS at 09:05

## 2022-01-01 RX ADMIN — HEPARIN 500 UNITS: 100 SYRINGE at 02:04

## 2022-01-01 RX ADMIN — LIDOCAINE HYDROCHLORIDE 100 MG: 20 INJECTION INTRAVENOUS at 06:11

## 2022-01-01 RX ADMIN — IOHEXOL 65 ML: 300 INJECTION, SOLUTION INTRAVENOUS at 12:05

## 2022-01-01 RX ADMIN — CEFEPIME 2 G: 2 INJECTION, POWDER, FOR SOLUTION INTRAVENOUS at 10:11

## 2022-01-01 RX ADMIN — SODIUM CHLORIDE 500 ML: 0.9 INJECTION, SOLUTION INTRAVENOUS at 03:09

## 2022-01-01 RX ADMIN — GEMCITABINE HYDROCHLORIDE 2200 MG: 1 INJECTION, SOLUTION INTRAVENOUS at 11:05

## 2022-01-01 RX ADMIN — GEMCITABINE HYDROCHLORIDE 2200 MG: 1 INJECTION, SOLUTION INTRAVENOUS at 09:06

## 2022-01-01 RX ADMIN — Medication 10 ML: at 04:10

## 2022-01-01 RX ADMIN — HYDROCORTISONE SODIUM SUCCINATE 200 MG: 100 INJECTION, POWDER, FOR SOLUTION INTRAMUSCULAR; INTRAVENOUS at 01:04

## 2022-01-01 RX ADMIN — SODIUM CHLORIDE: 0.9 INJECTION, SOLUTION INTRAVENOUS at 02:08

## 2022-01-01 RX ADMIN — LIDOCAINE HYDROCHLORIDE 2 ML: 10 INJECTION, SOLUTION INFILTRATION; PERINEURAL at 09:08

## 2022-01-01 RX ADMIN — FENTANYL CITRATE 50 MCG: 50 INJECTION, SOLUTION INTRAMUSCULAR; INTRAVENOUS at 11:09

## 2022-01-01 RX ADMIN — LIDOCAINE HYDROCHLORIDE 50 MG: 20 INJECTION, SOLUTION INTRAVENOUS at 01:06

## 2022-01-01 RX ADMIN — MAGNESIUM SULFATE 2 G: 2 INJECTION INTRAVENOUS at 09:11

## 2022-01-01 RX ADMIN — METRONIDAZOLE 500 MG: 500 INJECTION, SOLUTION INTRAVENOUS at 09:11

## 2022-01-01 RX ADMIN — HEPARIN SODIUM (PORCINE) LOCK FLUSH IV SOLN 100 UNIT/ML 500 UNITS: 100 SOLUTION at 02:04

## 2022-01-01 RX ADMIN — PALONOSETRON 0.25 MG: 0.25 INJECTION, SOLUTION INTRAVENOUS at 10:07

## 2022-01-01 RX ADMIN — DIPHENHYDRAMINE HYDROCHLORIDE 50 MG: 50 INJECTION INTRAMUSCULAR; INTRAVENOUS at 07:08

## 2022-01-01 RX ADMIN — SODIUM CHLORIDE 500 ML: 0.9 INJECTION, SOLUTION INTRAVENOUS at 12:05

## 2022-01-01 RX ADMIN — ACETAMINOPHEN 650 MG: 325 TABLET ORAL at 01:08

## 2022-01-01 RX ADMIN — APREPITANT 130 MG: 130 INJECTION, EMULSION INTRAVENOUS at 11:06

## 2022-01-01 RX ADMIN — FLUOROURACIL 4460 MG: 50 INJECTION, SOLUTION INTRAVENOUS at 05:12

## 2022-01-01 RX ADMIN — AMPICILLIN SODIUM AND SULBACTAM SODIUM 3 G: 2; 1 INJECTION, POWDER, FOR SOLUTION INTRAMUSCULAR; INTRAVENOUS at 01:04

## 2022-01-01 RX ADMIN — MAGNESIUM SULFATE HEPTAHYDRATE 4 G: 40 INJECTION, SOLUTION INTRAVENOUS at 10:11

## 2022-01-01 RX ADMIN — FLUOROURACIL 4460 MG: 50 INJECTION, SOLUTION INTRAVENOUS at 04:12

## 2022-01-01 RX ADMIN — SODIUM CHLORIDE: 9 INJECTION, SOLUTION INTRAVENOUS at 01:08

## 2022-01-01 RX ADMIN — SODIUM CHLORIDE: 9 INJECTION, SOLUTION INTRAVENOUS at 08:08

## 2022-01-01 RX ADMIN — MAGNESIUM SULFATE HEPTAHYDRATE 2 G: 40 INJECTION, SOLUTION INTRAVENOUS at 03:08

## 2022-01-01 RX ADMIN — Medication 10 ML: at 01:03

## 2022-01-01 RX ADMIN — DEXAMETHASONE SODIUM PHOSPHATE 0.25 MG: 4 INJECTION, SOLUTION INTRA-ARTICULAR; INTRALESIONAL; INTRAMUSCULAR; INTRAVENOUS; SOFT TISSUE at 01:12

## 2022-01-01 RX ADMIN — SODIUM CHLORIDE 500 ML: 9 INJECTION, SOLUTION INTRAVENOUS at 01:06

## 2022-01-01 RX ADMIN — GLYCOPYRROLATE 0.6 MG: 0.2 INJECTION, SOLUTION INTRAMUSCULAR; INTRAVITREAL at 01:09

## 2022-01-01 RX ADMIN — PALONOSETRON HYDROCHLORIDE 0.25 MG: 0.25 INJECTION, SOLUTION INTRAVENOUS at 11:03

## 2022-01-01 RX ADMIN — PROPOFOL 100 MG: 10 INJECTION, EMULSION INTRAVENOUS at 07:04

## 2022-01-01 RX ADMIN — CISPLATIN 57 MG: 1 INJECTION INTRAVENOUS at 01:08

## 2022-01-01 RX ADMIN — Medication 100 MCG: at 11:09

## 2022-01-01 RX ADMIN — APREPITANT 130 MG: 130 INJECTION, EMULSION INTRAVENOUS at 01:09

## 2022-01-01 RX ADMIN — FENTANYL CITRATE 50 MCG: 50 INJECTION, SOLUTION INTRAMUSCULAR; INTRAVENOUS at 12:09

## 2022-01-01 RX ADMIN — Medication 10 ML: at 04:09

## 2022-01-01 RX ADMIN — OXALIPLATIN 190 MG: 5 INJECTION, SOLUTION, CONCENTRATE INTRAVENOUS at 01:11

## 2022-01-01 RX ADMIN — SODIUM CHLORIDE, SODIUM LACTATE, POTASSIUM CHLORIDE, AND CALCIUM CHLORIDE: 600; 310; 30; 20 INJECTION, SOLUTION INTRAVENOUS at 01:11

## 2022-01-01 RX ADMIN — GEMCITABINE HYDROCHLORIDE 2200 MG: 200 INJECTION, POWDER, LYOPHILIZED, FOR SOLUTION INTRAVENOUS at 11:04

## 2022-01-01 RX ADMIN — MAGNESIUM SULFATE HEPTAHYDRATE 250 ML/HR: 500 INJECTION, SOLUTION INTRAMUSCULAR; INTRAVENOUS at 09:08

## 2022-01-01 RX ADMIN — POTASSIUM CHLORIDE 250 ML/HR: 149 INJECTION, SOLUTION, CONCENTRATE INTRAVENOUS at 09:03

## 2022-01-01 RX ADMIN — POTASSIUM CHLORIDE 250 ML/HR: 149 INJECTION, SOLUTION, CONCENTRATE INTRAVENOUS at 09:04

## 2022-01-01 RX ADMIN — LIDOCAINE HYDROCHLORIDE 50 MG: 20 INJECTION INTRAVENOUS at 08:11

## 2022-01-01 RX ADMIN — HYDROCORTISONE SODIUM SUCCINATE 200 MG: 100 INJECTION, POWDER, FOR SOLUTION INTRAMUSCULAR; INTRAVENOUS at 11:05

## 2022-01-01 RX ADMIN — GLYCOPYRROLATE 0.1 MG: 0.2 INJECTION, SOLUTION INTRAMUSCULAR; INTRAVITREAL at 03:04

## 2022-01-01 RX ADMIN — GADOBUTROL 10 ML: 604.72 INJECTION INTRAVENOUS at 11:09

## 2022-01-01 RX ADMIN — SODIUM CHLORIDE: 9 INJECTION, SOLUTION INTRAVENOUS at 12:10

## 2022-01-01 RX ADMIN — ROCURONIUM BROMIDE 40 MG: 10 INJECTION INTRAVENOUS at 07:11

## 2022-01-01 RX ADMIN — CISPLATIN 45 MG: 1 INJECTION INTRAVENOUS at 09:06

## 2022-01-01 RX ADMIN — PHENYLEPHRINE HYDROCHLORIDE 200 MCG: 10 INJECTION INTRAVENOUS at 01:06

## 2022-01-01 RX ADMIN — MIDAZOLAM HYDROCHLORIDE 1 MG: 1 INJECTION, SOLUTION INTRAMUSCULAR; INTRAVENOUS at 02:04

## 2022-01-01 RX ADMIN — CEFAZOLIN SODIUM 1 G: 1 SOLUTION INTRAVENOUS at 02:07

## 2022-01-01 RX ADMIN — CISPLATIN 57 MG: 1 INJECTION INTRAVENOUS at 12:04

## 2022-01-01 RX ADMIN — EPHEDRINE SULFATE 10 MG: 50 INJECTION INTRAVENOUS at 11:09

## 2022-01-01 RX ADMIN — CISPLATIN 57 MG: 1 INJECTION INTRAVENOUS at 11:07

## 2022-01-01 RX ADMIN — ENOXAPARIN SODIUM 100 MG: 100 INJECTION SUBCUTANEOUS at 09:11

## 2022-01-01 RX ADMIN — GEMCITABINE HYDROCHLORIDE 1695 MG: 1 INJECTION, SOLUTION INTRAVENOUS at 01:08

## 2022-01-01 RX ADMIN — HEPARIN SODIUM IN SODIUM CHLORIDE 1000 UNITS/HR: 200 INJECTION INTRAVENOUS at 12:05

## 2022-01-01 RX ADMIN — SUGAMMADEX 200 MG: 100 INJECTION, SOLUTION INTRAVENOUS at 12:05

## 2022-01-01 SDOH — SOCIAL DETERMINANTS OF HEALTH (SDOH): PROBLEMS IN RELATIONSHIP WITH SPOUSE OR PARTNER: Z63.0

## 2022-01-02 ENCOUNTER — PATIENT MESSAGE (OUTPATIENT)
Dept: SURGERY | Facility: CLINIC | Age: 72
End: 2022-01-02
Payer: MEDICARE

## 2022-01-02 ENCOUNTER — PATIENT MESSAGE (OUTPATIENT)
Dept: ENDOSCOPY | Facility: HOSPITAL | Age: 72
End: 2022-01-02
Payer: MEDICARE

## 2022-01-02 ENCOUNTER — TELEPHONE (OUTPATIENT)
Dept: ENDOSCOPY | Facility: HOSPITAL | Age: 72
End: 2022-01-02
Payer: MEDICARE

## 2022-01-03 ENCOUNTER — PATIENT MESSAGE (OUTPATIENT)
Dept: HEMATOLOGY/ONCOLOGY | Facility: CLINIC | Age: 72
End: 2022-01-03
Payer: MEDICARE

## 2022-01-03 ENCOUNTER — OFFICE VISIT (OUTPATIENT)
Dept: INTERNAL MEDICINE | Facility: CLINIC | Age: 72
End: 2022-01-03
Attending: FAMILY MEDICINE
Payer: MEDICARE

## 2022-01-03 ENCOUNTER — LAB VISIT (OUTPATIENT)
Dept: LAB | Facility: HOSPITAL | Age: 72
End: 2022-01-03
Attending: INTERNAL MEDICINE
Payer: MEDICARE

## 2022-01-03 VITALS
HEIGHT: 68 IN | DIASTOLIC BLOOD PRESSURE: 60 MMHG | BODY MASS INDEX: 33.25 KG/M2 | HEART RATE: 67 BPM | OXYGEN SATURATION: 99 % | SYSTOLIC BLOOD PRESSURE: 118 MMHG | WEIGHT: 219.38 LBS

## 2022-01-03 DIAGNOSIS — D49.9 IMMUNODEFICIENCY SECONDARY TO NEOPLASM: ICD-10-CM

## 2022-01-03 DIAGNOSIS — D84.81 IMMUNODEFICIENCY SECONDARY TO NEOPLASM: ICD-10-CM

## 2022-01-03 DIAGNOSIS — R31.9 HEMATURIA, UNSPECIFIED TYPE: ICD-10-CM

## 2022-01-03 DIAGNOSIS — C22.1 INTRAHEPATIC CHOLANGIOCARCINOMA: ICD-10-CM

## 2022-01-03 DIAGNOSIS — K60.2 ANAL FISSURE: Primary | ICD-10-CM

## 2022-01-03 DIAGNOSIS — R39.198 DIFFICULTY URINATING: ICD-10-CM

## 2022-01-03 DIAGNOSIS — E78.5 HYPERLIPIDEMIA, UNSPECIFIED HYPERLIPIDEMIA TYPE: ICD-10-CM

## 2022-01-03 DIAGNOSIS — I10 HYPERTENSION, ESSENTIAL: ICD-10-CM

## 2022-01-03 DIAGNOSIS — R50.9 FEVER OF UNKNOWN ORIGIN (FUO): ICD-10-CM

## 2022-01-03 PROBLEM — K76.9 LIVER LESION: Status: RESOLVED | Noted: 2021-10-01 | Resolved: 2022-01-03

## 2022-01-03 PROBLEM — R73.03 PREDIABETES: Status: RESOLVED | Noted: 2020-11-02 | Resolved: 2022-01-03

## 2022-01-03 PROBLEM — R13.10 DYSPHAGIA: Status: RESOLVED | Noted: 2020-11-02 | Resolved: 2022-01-03

## 2022-01-03 LAB
ALBUMIN SERPL BCP-MCNC: 2.4 G/DL (ref 3.5–5.2)
ALP SERPL-CCNC: 896 U/L (ref 55–135)
ALT SERPL W/O P-5'-P-CCNC: 48 U/L (ref 10–44)
ANION GAP SERPL CALC-SCNC: 10 MMOL/L (ref 8–16)
AST SERPL-CCNC: 59 U/L (ref 10–40)
BASOPHILS # BLD AUTO: 0.04 K/UL (ref 0–0.2)
BASOPHILS NFR BLD: 0.8 % (ref 0–1.9)
BILIRUB SERPL-MCNC: 2 MG/DL (ref 0.1–1)
BUN SERPL-MCNC: 12 MG/DL (ref 8–23)
CALCIUM SERPL-MCNC: 9 MG/DL (ref 8.7–10.5)
CANCER AG19-9 SERPL-ACNC: 729.7 U/ML (ref 0–40)
CHLORIDE SERPL-SCNC: 102 MMOL/L (ref 95–110)
CO2 SERPL-SCNC: 24 MMOL/L (ref 23–29)
CREAT SERPL-MCNC: 0.7 MG/DL (ref 0.5–1.4)
DIFFERENTIAL METHOD: ABNORMAL
EOSINOPHIL # BLD AUTO: 0 K/UL (ref 0–0.5)
EOSINOPHIL NFR BLD: 0.2 % (ref 0–8)
ERYTHROCYTE [DISTWIDTH] IN BLOOD BY AUTOMATED COUNT: 16.2 % (ref 11.5–14.5)
EST. GFR  (AFRICAN AMERICAN): >60 ML/MIN/1.73 M^2
EST. GFR  (NON AFRICAN AMERICAN): >60 ML/MIN/1.73 M^2
GLUCOSE SERPL-MCNC: 118 MG/DL (ref 70–110)
HCT VFR BLD AUTO: 27.1 % (ref 40–54)
HGB BLD-MCNC: 8.9 G/DL (ref 14–18)
IMM GRANULOCYTES # BLD AUTO: 0.04 K/UL (ref 0–0.04)
IMM GRANULOCYTES NFR BLD AUTO: 0.8 % (ref 0–0.5)
LYMPHOCYTES # BLD AUTO: 1.3 K/UL (ref 1–4.8)
LYMPHOCYTES NFR BLD: 26.6 % (ref 18–48)
MAGNESIUM SERPL-MCNC: 1.7 MG/DL (ref 1.6–2.6)
MCH RBC QN AUTO: 31.3 PG (ref 27–31)
MCHC RBC AUTO-ENTMCNC: 32.8 G/DL (ref 32–36)
MCV RBC AUTO: 95 FL (ref 82–98)
MONOCYTES # BLD AUTO: 0.8 K/UL (ref 0.3–1)
MONOCYTES NFR BLD: 15.2 % (ref 4–15)
NEUTROPHILS # BLD AUTO: 2.8 K/UL (ref 1.8–7.7)
NEUTROPHILS NFR BLD: 56.4 % (ref 38–73)
NRBC BLD-RTO: 0 /100 WBC
PLATELET # BLD AUTO: 385 K/UL (ref 150–450)
PMV BLD AUTO: 10 FL (ref 9.2–12.9)
POTASSIUM SERPL-SCNC: 4 MMOL/L (ref 3.5–5.1)
PROT SERPL-MCNC: 5.7 G/DL (ref 6–8.4)
RBC # BLD AUTO: 2.84 M/UL (ref 4.6–6.2)
SODIUM SERPL-SCNC: 136 MMOL/L (ref 136–145)
WBC # BLD AUTO: 5 K/UL (ref 3.9–12.7)

## 2022-01-03 PROCEDURE — 1160F PR REVIEW ALL MEDS BY PRESCRIBER/CLIN PHARMACIST DOCUMENTED: ICD-10-PCS | Mod: HCNC,CPTII,S$GLB, | Performed by: FAMILY MEDICINE

## 2022-01-03 PROCEDURE — 1159F PR MEDICATION LIST DOCUMENTED IN MEDICAL RECORD: ICD-10-PCS | Mod: HCNC,CPTII,S$GLB, | Performed by: FAMILY MEDICINE

## 2022-01-03 PROCEDURE — 85025 COMPLETE CBC W/AUTO DIFF WBC: CPT | Mod: HCNC | Performed by: INTERNAL MEDICINE

## 2022-01-03 PROCEDURE — 1126F PR PAIN SEVERITY QUANTIFIED, NO PAIN PRESENT: ICD-10-PCS | Mod: HCNC,CPTII,S$GLB, | Performed by: FAMILY MEDICINE

## 2022-01-03 PROCEDURE — 83735 ASSAY OF MAGNESIUM: CPT | Mod: HCNC | Performed by: INTERNAL MEDICINE

## 2022-01-03 PROCEDURE — 1101F PT FALLS ASSESS-DOCD LE1/YR: CPT | Mod: HCNC,CPTII,S$GLB, | Performed by: FAMILY MEDICINE

## 2022-01-03 PROCEDURE — 99214 PR OFFICE/OUTPT VISIT, EST, LEVL IV, 30-39 MIN: ICD-10-PCS | Mod: HCNC,S$GLB,, | Performed by: FAMILY MEDICINE

## 2022-01-03 PROCEDURE — 99499 UNLISTED E&M SERVICE: CPT | Mod: S$GLB,,, | Performed by: FAMILY MEDICINE

## 2022-01-03 PROCEDURE — 99214 OFFICE O/P EST MOD 30 MIN: CPT | Mod: HCNC,S$GLB,, | Performed by: FAMILY MEDICINE

## 2022-01-03 PROCEDURE — 3288F PR FALLS RISK ASSESSMENT DOCUMENTED: ICD-10-PCS | Mod: HCNC,CPTII,S$GLB, | Performed by: FAMILY MEDICINE

## 2022-01-03 PROCEDURE — 3078F DIAST BP <80 MM HG: CPT | Mod: HCNC,CPTII,S$GLB, | Performed by: FAMILY MEDICINE

## 2022-01-03 PROCEDURE — 1160F RVW MEDS BY RX/DR IN RCRD: CPT | Mod: HCNC,CPTII,S$GLB, | Performed by: FAMILY MEDICINE

## 2022-01-03 PROCEDURE — 3288F FALL RISK ASSESSMENT DOCD: CPT | Mod: HCNC,CPTII,S$GLB, | Performed by: FAMILY MEDICINE

## 2022-01-03 PROCEDURE — 1126F AMNT PAIN NOTED NONE PRSNT: CPT | Mod: HCNC,CPTII,S$GLB, | Performed by: FAMILY MEDICINE

## 2022-01-03 PROCEDURE — 3008F PR BODY MASS INDEX (BMI) DOCUMENTED: ICD-10-PCS | Mod: HCNC,CPTII,S$GLB, | Performed by: FAMILY MEDICINE

## 2022-01-03 PROCEDURE — 1159F MED LIST DOCD IN RCRD: CPT | Mod: HCNC,CPTII,S$GLB, | Performed by: FAMILY MEDICINE

## 2022-01-03 PROCEDURE — 3008F BODY MASS INDEX DOCD: CPT | Mod: HCNC,CPTII,S$GLB, | Performed by: FAMILY MEDICINE

## 2022-01-03 PROCEDURE — 1101F PR PT FALLS ASSESS DOC 0-1 FALLS W/OUT INJ PAST YR: ICD-10-PCS | Mod: HCNC,CPTII,S$GLB, | Performed by: FAMILY MEDICINE

## 2022-01-03 PROCEDURE — 99499 RISK ADDL DX/OHS AUDIT: ICD-10-PCS | Mod: S$GLB,,, | Performed by: FAMILY MEDICINE

## 2022-01-03 PROCEDURE — 3078F PR MOST RECENT DIASTOLIC BLOOD PRESSURE < 80 MM HG: ICD-10-PCS | Mod: HCNC,CPTII,S$GLB, | Performed by: FAMILY MEDICINE

## 2022-01-03 PROCEDURE — 99999 PR PBB SHADOW E&M-EST. PATIENT-LVL III: ICD-10-PCS | Mod: PBBFAC,HCNC,, | Performed by: FAMILY MEDICINE

## 2022-01-03 PROCEDURE — 99999 PR PBB SHADOW E&M-EST. PATIENT-LVL III: CPT | Mod: PBBFAC,HCNC,, | Performed by: FAMILY MEDICINE

## 2022-01-03 PROCEDURE — 3074F SYST BP LT 130 MM HG: CPT | Mod: HCNC,CPTII,S$GLB, | Performed by: FAMILY MEDICINE

## 2022-01-03 PROCEDURE — 36415 COLL VENOUS BLD VENIPUNCTURE: CPT | Mod: HCNC | Performed by: INTERNAL MEDICINE

## 2022-01-03 PROCEDURE — 86301 IMMUNOASSAY TUMOR CA 19-9: CPT | Mod: HCNC | Performed by: INTERNAL MEDICINE

## 2022-01-03 PROCEDURE — 3074F PR MOST RECENT SYSTOLIC BLOOD PRESSURE < 130 MM HG: ICD-10-PCS | Mod: HCNC,CPTII,S$GLB, | Performed by: FAMILY MEDICINE

## 2022-01-03 PROCEDURE — 80053 COMPREHEN METABOLIC PANEL: CPT | Mod: HCNC | Performed by: INTERNAL MEDICINE

## 2022-01-03 NOTE — PROGRESS NOTES
"Subjective:       Patient ID: Domonique Kellogg is a 71 y.o. male.    Chief Complaint: Follow-up (hospital)    Patient presents for a post hospitalization follow up visit. Current complaints addressed in the ROS section. Reviewed the pertinent chart data including (but not limited to) labs, imaging, cardiovascular, procedures and available ER/DC Summary and inpatient provider notes.    Copy D/C Summary:    Admission Date: 12/18/2021  Hospital Length of Stay: 0 days  Discharge Date and Time:  12/20/2021 10:41 AM  Attending Physician: Malka Raines MD   Discharging Provider: Dc Cummings MD  Primary Care Provider: Nicolas Marlow MD     HPI: This is a 71 year old gentleman with newly diagnosed cholangiocarcinoma s/p 1 cycle of gemcitabien cisplatin and IR embolization on 12/16/2021, HTN, and HLD presents to the ED with fevers. Patient had a recent chemoembolization with IR and was instructed to return to the emergency department if he had fevers at home.  At home, he had a fever of 100.8. He also states he had some vomiting after his procedure and then yesterday morning but attributes it to reflux. He denies any chest pain, shortness of breath, abdominal pain, diarrhea, dysuria, or nausea/vomiting.      In the ED, patient had a T Max of 101. Cultures drawn. UA was negative. CBC showed an elevated WBC. CMP showed elevated LFTs. Patient admitted to Medical Oncology for further management.      Oncology history: Per Dr. Young's note 12/7/2021  Advanced intrahepatic cholangiocarcinoma, MSI-low, negative for FGFR2 fusion or IDH1/IDH2 mutations, diagnosed on 11/22/2021.      Molecular Profile:  Guardant 360 11/29/21: +CCND1 amplification. VUS: CATRACHITO [id number]. MSI-high not detected.      Mr Kellogg is a 70 yo man with CAD, HTN, seizures in 2011 who initially saw me on 11/29/21 for further management of cholangiocarcinoma. He presented with weight loss and diarrhea since July. US 9/22/21 showed "Enlarged, heterogeneous " "appearance of the liver likely due to multiple underlying hepatic lesions largest measuring 4.8 cm."  MRI abdomen w/wo contrast 10/4/21: "Multiple liver lesions measuring up to 13.3 cm in the right hepatic lobe.  Differential diagnosis includes metastases, fibrolamellar hepatocellular carcinoma, or atypical focal nodular hyperplasia.  Consider biopsy for further evaluation. Thrombosis of the anterior branch of the right portal vein and left hepatic vein.  Nonvisualization of the middle and right hepatic veins, which may be thrombosed as well. Prominent periportal lymph node, which is nonspecific."  EGD and colonoscopy on 10/11/21 were negative for primary malignancies.   He underwent liver lesion biopsy and Y90 mapping on 11/22/21. Pathology showed adenocarcinoma: "Biopsy of the liver mass shows a malignant neoplasm in a fibrotic stroma. Glandular architecture is readily identified with several foci of intraluminal necrosis. Scattered mitotic figures are seen. Neoplastic cells show the following immunophenotype:   Positive: AE1/AE3, CK7, CDX2   Negative: Chromogranin, Synaptophysin, TTF, CK5/6, CK20, HepPar1   The morphology and immunophenotype are compatible with adenocarcinoma. Considerations for a primary site include pancreaticobiliary (including intrahepatic cholangiocarcinoma) as well as upper gastrointestinal. Clinical and radiologic correlation is suggested."  Patient presents today with wife for further management. No pain. Initial weight loss has somewhat stabilized. Still has diarrhea. Has not tried imodium yet. +nervous  Mother had kidney cancer at age 64. Maternal grandmother had liver cancer in her 60s. Patient has three children, two daughters and one son.   Discussed palliative chemo with cis/gem  2. PET scan 12/6/21 did not show extrahepatic metastases.   3. Cis/gem to be started on 12/7/21        * No surgery found *      Hospital Course: Pt admitted to med onc on BS with vanc/zosyn. Cultures NGT, " pt remains afebrile and feeling well, transitioning to complete a 7 day total abx course with cipro/flagyl. Feel he is ok to discharge as he has labs scheduled for tomorrow and an apt with his oncologist on Tuesday. Given strict precautions to return if re-fevering. Patient remained after discharge 2/2 to [facility]. Had bowel movement in am.        Review of Systems   Constitutional: Positive for fatigue and unexpected weight change. Negative for chills and fever.   HENT: Negative for congestion and trouble swallowing.    Eyes: Negative for redness and visual disturbance.   Respiratory: Negative for cough, chest tightness and shortness of breath.    Cardiovascular: Negative for chest pain, palpitations and leg swelling.   Gastrointestinal: Negative for abdominal pain and blood in stool.   Genitourinary: Positive for enuresis and frequency. Negative for difficulty urinating and hematuria.   Musculoskeletal: Negative for arthralgias, back pain, gait problem, joint swelling, myalgias and neck pain.   Skin: Negative for color change and rash.   Neurological: Negative for tremors, speech difficulty, weakness, numbness and headaches.   Hematological: Negative for adenopathy. Does not bruise/bleed easily.   Psychiatric/Behavioral: Negative for behavioral problems, confusion and sleep disturbance. The patient is not nervous/anxious.        Objective:      Physical Exam  Vitals and nursing note reviewed.   Constitutional:       General: He is not in acute distress.     Appearance: He is well-developed and well-nourished.   Pulmonary:      Effort: Pulmonary effort is normal.   Musculoskeletal:         General: No edema.      Cervical back: Neck supple.      Right lower leg: No edema.      Left lower leg: No edema.   Skin:     General: Skin is warm and dry.      Findings: No rash.   Neurological:      Mental Status: He is alert and oriented to person, place, and time.   Psychiatric:         Mood and Affect: Mood and affect  normal.         Behavior: Behavior normal.         Thought Content: Thought content normal.         Judgment: Judgment normal.         Assessment:       1. Anal fissure    2. Difficulty urinating    3. Fever of unknown origin (FUO)    4. Intrahepatic cholangiocarcinoma    5. Immunodeficiency secondary to neoplasm    6. Hypertension, essential    7. Hyperlipidemia, unspecified hyperlipidemia type    8. Hematuria, unspecified type        Plan:     Medication List with Changes/Refills   Current Medications    ACETAMINOPHEN (TYLENOL) 650 MG TBSR    Take by mouth daily as needed.    APIXABAN (ELIQUIS) 5 MG TAB    Take 1 tablet (5 mg total) by mouth 2 (two) times daily.    CIPROFLOXACIN HCL (CIPRO) 500 MG TABLET    Take 1 tablet (500 mg total) by mouth 2 (two) times daily.    DEXAMETHASONE (DECADRON) 4 MG TAB    Take 2 tablets (8 mg total) by mouth once daily. Take on days 2 and 3 after chemo, with food    DILTIAZEM 2% LIDOCAINE 5% CREAM    Apply pea size amount topically 3 (three) times daily.    DIPHENOXYLATE-ATROPINE 2.5-0.025 MG (LOMOTIL) 2.5-0.025 MG PER TABLET    Take 1 tablet by mouth 4 (four) times daily as needed for Diarrhea.    HYDROCHLOROTHIAZIDE (HYDRODIURIL) 25 MG TABLET    TAKE 1 TABLET (25 MG TOTAL) BY MOUTH ONCE DAILY.    MAGNESIUM OXIDE (MAG-OX) 400 MG (241.3 MG MAGNESIUM) TABLET    Take 400 mg by mouth every morning.     METRONIDAZOLE (FLAGYL) 250 MG TABLET    Take 2 tablets (500 mg total) by mouth 3 (three) times daily.    MULTIVITAMIN WITH MINERALS TABLET    Take 1 tablet by mouth every morning.     ONDANSETRON (ZOFRAN) 4 MG TABLET    Take 1-2 tablets (4-8 mg total) by mouth every 8 (eight) hours as needed for Nausea.    OXYCODONE (ROXICODONE) 5 MG IMMEDIATE RELEASE TABLET    Take 1 tablet (5 mg total) by mouth every 6 (six) hours as needed for Pain. Take 3-4 tab (15-20 mg) by mouth every 6 hours as needed for pain    OXYCODONE-ACETAMINOPHEN (PERCOCET) 5-325 MG PER TABLET    Take 1 tablet by mouth  every 6 (six) hours as needed for Pain.    POTASSIUM CHLORIDE SA (K-DUR,KLOR-CON) 20 MEQ TABLET    Take 2 tablets (40 mEq total) by mouth once daily.    PRAVASTATIN (PRAVACHOL) 40 MG TABLET    TAKE 1 TABLET (40 MG TOTAL) BY MOUTH ONCE DAILY.    PROCHLORPERAZINE (COMPAZINE) 10 MG TABLET    Take 1 tablet (10 mg total) by mouth every 6 (six) hours as needed (nausea).    PROMETHAZINE (PHENERGAN) 12.5 MG TAB    Take 1 tablet (12.5 mg total) by mouth every 6 (six) hours as needed (nausea).    RABEPRAZOLE (ACIPHEX) 20 MG TABLET    Take 1 tablet (20 mg total) by mouth once daily.     Domonique was seen today for follow-up.    Diagnoses and all orders for this visit:    Anal fissure    Difficulty urinating  -     Urinalysis; Future  -     Urinalysis Microscopic; Future  -     Urine culture; Future    Fever of unknown origin (FUO)    Intrahepatic cholangiocarcinoma    Immunodeficiency secondary to neoplasm    Hypertension, essential    Hyperlipidemia, unspecified hyperlipidemia type    Hematuria, unspecified type  -     Ambulatory referral/consult to Urology; Future      See meds, orders, follow up, routing and instructions sections of encounter and AVS. Discussed with patient and provided on AVS.    Lab Results   Component Value Date     01/03/2022    K 4.0 01/03/2022     01/03/2022    BUN 12 01/03/2022    CREATININE 0.7 01/03/2022     (H) 01/03/2022    HGBA1C 6.0 (H) 11/18/2021    MG 1.7 01/03/2022    AST 59 (H) 01/03/2022    ALT 48 (H) 01/03/2022    ALBUMIN 2.4 (L) 01/03/2022    PROT 5.7 (L) 01/03/2022    BILITOT 2.0 (H) 01/03/2022    CHOL 139 11/15/2021    HDL 36 (L) 11/15/2021    LDLCALC 92.6 11/15/2021    TRIG 52 11/15/2021    WBC 5.00 01/03/2022    HGB 8.9 (L) 01/03/2022    HCT 27.1 (L) 01/03/2022     01/03/2022    PSA 1.4 11/15/2021    TSH 2.483 10/21/2016    URICACID 8.2 (H) 03/18/2015         Patient presents today with his wife for a hospitalization follow-up.  He is currently undergoing  chemotherapy for his cholangiocarcinoma and developed a fever.  I am not sure definitive source was identified.  Urinalysis did not necessarily suggest infection but had a few red cells.  His white count at 15 has normalized.  He is anemic and electrolytes are currently normal.  Renal function maintained.  Abnormal liver function tests are noted.  He had a 2nd interventional radiology procedure, documentation is difficult to find.  Continues to have weight loss, though it seems to vary from day-to-day a bit.    Presents with wife today.  One of his primary complaint is urinary frequency.  Some urgency as well.  He had seen a urologist in the past and was given Kegel exercises.  This seems to be progressive at this time.  No definite back pain or fever present at this time.  No definite abdominal pain.    Will go ahead and check a urinalysis to rule out infection.  Urology consult.  He has close follow ups with his Hematology Oncology team.  He was recently placed on anticoagulation concerning his intra-abdominal thromboses.  Will continue to monitor closely with approximately 4 month follow-up. Or

## 2022-01-03 NOTE — TELEPHONE ENCOUNTER
----- Message from Pamela Vanegas sent at 1/2/2022 11:07 AM CST -----  pt needs call back regarding having covid test done before 1/6 procedure..[phone] (home)

## 2022-01-03 NOTE — PATIENT INSTRUCTIONS
Your vitamin D level was a little bit low. I recommend taking an over-the-counter vitamin D3 supplement, 1000 - 2000 units daily.

## 2022-01-04 ENCOUNTER — OFFICE VISIT (OUTPATIENT)
Dept: HEMATOLOGY/ONCOLOGY | Facility: CLINIC | Age: 72
End: 2022-01-04
Payer: MEDICARE

## 2022-01-04 ENCOUNTER — PATIENT MESSAGE (OUTPATIENT)
Dept: ENDOSCOPY | Facility: HOSPITAL | Age: 72
End: 2022-01-04
Payer: MEDICARE

## 2022-01-04 ENCOUNTER — INFUSION (OUTPATIENT)
Dept: INFUSION THERAPY | Facility: HOSPITAL | Age: 72
End: 2022-01-04
Payer: MEDICARE

## 2022-01-04 ENCOUNTER — TELEPHONE (OUTPATIENT)
Dept: ENDOSCOPY | Facility: HOSPITAL | Age: 72
End: 2022-01-04
Payer: MEDICARE

## 2022-01-04 VITALS
TEMPERATURE: 99 F | OXYGEN SATURATION: 99 % | DIASTOLIC BLOOD PRESSURE: 58 MMHG | SYSTOLIC BLOOD PRESSURE: 120 MMHG | RESPIRATION RATE: 16 BRPM | WEIGHT: 217.31 LBS | BODY MASS INDEX: 33.04 KG/M2 | HEART RATE: 80 BPM

## 2022-01-04 VITALS
HEIGHT: 68 IN | SYSTOLIC BLOOD PRESSURE: 112 MMHG | OXYGEN SATURATION: 98 % | BODY MASS INDEX: 32.93 KG/M2 | TEMPERATURE: 98 F | DIASTOLIC BLOOD PRESSURE: 56 MMHG | WEIGHT: 217.31 LBS | HEART RATE: 75 BPM | RESPIRATION RATE: 18 BRPM

## 2022-01-04 DIAGNOSIS — Z01.818 PRE-OP TESTING: ICD-10-CM

## 2022-01-04 DIAGNOSIS — C22.1 INTRAHEPATIC CHOLANGIOCARCINOMA: Primary | ICD-10-CM

## 2022-01-04 DIAGNOSIS — I10 ESSENTIAL HYPERTENSION: ICD-10-CM

## 2022-01-04 DIAGNOSIS — I81 PORTAL VEIN THROMBOSIS: ICD-10-CM

## 2022-01-04 DIAGNOSIS — D84.821 IMMUNODEFICIENCY SECONDARY TO CHEMOTHERAPY: ICD-10-CM

## 2022-01-04 DIAGNOSIS — C78.7 SECONDARY MALIGNANT NEOPLASM OF LIVER: ICD-10-CM

## 2022-01-04 DIAGNOSIS — T45.1X5A IMMUNODEFICIENCY SECONDARY TO CHEMOTHERAPY: ICD-10-CM

## 2022-01-04 DIAGNOSIS — D49.9 IMMUNODEFICIENCY SECONDARY TO NEOPLASM: ICD-10-CM

## 2022-01-04 DIAGNOSIS — Z79.899 IMMUNODEFICIENCY SECONDARY TO CHEMOTHERAPY: ICD-10-CM

## 2022-01-04 DIAGNOSIS — D84.81 IMMUNODEFICIENCY SECONDARY TO NEOPLASM: ICD-10-CM

## 2022-01-04 DIAGNOSIS — N40.0 BENIGN PROSTATIC HYPERPLASIA WITHOUT LOWER URINARY TRACT SYMPTOMS: ICD-10-CM

## 2022-01-04 PROCEDURE — 3008F BODY MASS INDEX DOCD: CPT | Mod: HCNC,CPTII,S$GLB, | Performed by: INTERNAL MEDICINE

## 2022-01-04 PROCEDURE — 99215 OFFICE O/P EST HI 40 MIN: CPT | Mod: HCNC,S$GLB,, | Performed by: INTERNAL MEDICINE

## 2022-01-04 PROCEDURE — 99999 PR PBB SHADOW E&M-EST. PATIENT-LVL IV: ICD-10-PCS | Mod: PBBFAC,HCNC,, | Performed by: INTERNAL MEDICINE

## 2022-01-04 PROCEDURE — 96365 THER/PROPH/DIAG IV INF INIT: CPT | Mod: HCNC

## 2022-01-04 PROCEDURE — 3074F PR MOST RECENT SYSTOLIC BLOOD PRESSURE < 130 MM HG: ICD-10-PCS | Mod: HCNC,CPTII,S$GLB, | Performed by: INTERNAL MEDICINE

## 2022-01-04 PROCEDURE — 3074F SYST BP LT 130 MM HG: CPT | Mod: HCNC,CPTII,S$GLB, | Performed by: INTERNAL MEDICINE

## 2022-01-04 PROCEDURE — 1160F PR REVIEW ALL MEDS BY PRESCRIBER/CLIN PHARMACIST DOCUMENTED: ICD-10-PCS | Mod: HCNC,CPTII,S$GLB, | Performed by: INTERNAL MEDICINE

## 2022-01-04 PROCEDURE — 3008F PR BODY MASS INDEX (BMI) DOCUMENTED: ICD-10-PCS | Mod: HCNC,CPTII,S$GLB, | Performed by: INTERNAL MEDICINE

## 2022-01-04 PROCEDURE — 1160F RVW MEDS BY RX/DR IN RCRD: CPT | Mod: HCNC,CPTII,S$GLB, | Performed by: INTERNAL MEDICINE

## 2022-01-04 PROCEDURE — 99499 RISK ADDL DX/OHS AUDIT: ICD-10-PCS | Mod: S$GLB,,, | Performed by: INTERNAL MEDICINE

## 2022-01-04 PROCEDURE — 25000003 PHARM REV CODE 250: Mod: HCNC | Performed by: INTERNAL MEDICINE

## 2022-01-04 PROCEDURE — 96375 TX/PRO/DX INJ NEW DRUG ADDON: CPT | Mod: HCNC

## 2022-01-04 PROCEDURE — 1101F PT FALLS ASSESS-DOCD LE1/YR: CPT | Mod: HCNC,CPTII,S$GLB, | Performed by: INTERNAL MEDICINE

## 2022-01-04 PROCEDURE — 1125F PR PAIN SEVERITY QUANTIFIED, PAIN PRESENT: ICD-10-PCS | Mod: HCNC,CPTII,S$GLB, | Performed by: INTERNAL MEDICINE

## 2022-01-04 PROCEDURE — 99499 UNLISTED E&M SERVICE: CPT | Mod: S$GLB,,, | Performed by: INTERNAL MEDICINE

## 2022-01-04 PROCEDURE — 1159F PR MEDICATION LIST DOCUMENTED IN MEDICAL RECORD: ICD-10-PCS | Mod: HCNC,CPTII,S$GLB, | Performed by: INTERNAL MEDICINE

## 2022-01-04 PROCEDURE — 99999 PR PBB SHADOW E&M-EST. PATIENT-LVL IV: CPT | Mod: PBBFAC,HCNC,, | Performed by: INTERNAL MEDICINE

## 2022-01-04 PROCEDURE — 1159F MED LIST DOCD IN RCRD: CPT | Mod: HCNC,CPTII,S$GLB, | Performed by: INTERNAL MEDICINE

## 2022-01-04 PROCEDURE — 1101F PR PT FALLS ASSESS DOC 0-1 FALLS W/OUT INJ PAST YR: ICD-10-PCS | Mod: HCNC,CPTII,S$GLB, | Performed by: INTERNAL MEDICINE

## 2022-01-04 PROCEDURE — 63600175 PHARM REV CODE 636 W HCPCS: Mod: HCNC | Performed by: INTERNAL MEDICINE

## 2022-01-04 PROCEDURE — 96417 CHEMO IV INFUS EACH ADDL SEQ: CPT | Mod: HCNC

## 2022-01-04 PROCEDURE — 3288F PR FALLS RISK ASSESSMENT DOCUMENTED: ICD-10-PCS | Mod: HCNC,CPTII,S$GLB, | Performed by: INTERNAL MEDICINE

## 2022-01-04 PROCEDURE — 3078F DIAST BP <80 MM HG: CPT | Mod: HCNC,CPTII,S$GLB, | Performed by: INTERNAL MEDICINE

## 2022-01-04 PROCEDURE — 1125F AMNT PAIN NOTED PAIN PRSNT: CPT | Mod: HCNC,CPTII,S$GLB, | Performed by: INTERNAL MEDICINE

## 2022-01-04 PROCEDURE — 99215 PR OFFICE/OUTPT VISIT, EST, LEVL V, 40-54 MIN: ICD-10-PCS | Mod: HCNC,S$GLB,, | Performed by: INTERNAL MEDICINE

## 2022-01-04 PROCEDURE — 96366 THER/PROPH/DIAG IV INF ADDON: CPT | Mod: HCNC

## 2022-01-04 PROCEDURE — 3078F PR MOST RECENT DIASTOLIC BLOOD PRESSURE < 80 MM HG: ICD-10-PCS | Mod: HCNC,CPTII,S$GLB, | Performed by: INTERNAL MEDICINE

## 2022-01-04 PROCEDURE — 3288F FALL RISK ASSESSMENT DOCD: CPT | Mod: HCNC,CPTII,S$GLB, | Performed by: INTERNAL MEDICINE

## 2022-01-04 PROCEDURE — 96361 HYDRATE IV INFUSION ADD-ON: CPT | Mod: HCNC

## 2022-01-04 PROCEDURE — 96367 TX/PROPH/DG ADDL SEQ IV INF: CPT

## 2022-01-04 PROCEDURE — 96413 CHEMO IV INFUSION 1 HR: CPT | Mod: HCNC

## 2022-01-04 RX ORDER — HEPARIN 100 UNIT/ML
500 SYRINGE INTRAVENOUS
Status: CANCELLED | OUTPATIENT
Start: 2022-01-04

## 2022-01-04 RX ORDER — HEPARIN 100 UNIT/ML
500 SYRINGE INTRAVENOUS
Status: DISCONTINUED | OUTPATIENT
Start: 2022-01-04 | End: 2022-01-04 | Stop reason: HOSPADM

## 2022-01-04 RX ORDER — TAMSULOSIN HYDROCHLORIDE 0.4 MG/1
0.4 CAPSULE ORAL DAILY
Qty: 30 CAPSULE | Refills: 0 | Status: SHIPPED | OUTPATIENT
Start: 2022-01-04 | End: 2022-01-26

## 2022-01-04 RX ORDER — SODIUM CHLORIDE AND POTASSIUM CHLORIDE 150; 900 MG/100ML; MG/100ML
500 INJECTION, SOLUTION INTRAVENOUS CONTINUOUS
Status: ACTIVE | OUTPATIENT
Start: 2022-01-04 | End: 2022-01-04

## 2022-01-04 RX ORDER — SODIUM CHLORIDE 0.9 % (FLUSH) 0.9 %
10 SYRINGE (ML) INJECTION
Status: CANCELLED | OUTPATIENT
Start: 2022-01-04

## 2022-01-04 RX ORDER — MAGNESIUM SULFATE 1 G/100ML
1 INJECTION INTRAVENOUS ONCE
Status: COMPLETED | OUTPATIENT
Start: 2022-01-04 | End: 2022-01-04

## 2022-01-04 RX ORDER — SODIUM CHLORIDE 0.9 % (FLUSH) 0.9 %
10 SYRINGE (ML) INJECTION
Status: DISCONTINUED | OUTPATIENT
Start: 2022-01-04 | End: 2022-01-04 | Stop reason: HOSPADM

## 2022-01-04 RX ADMIN — POTASSIUM CHLORIDE AND SODIUM CHLORIDE 500 ML/HR: 900; 150 INJECTION, SOLUTION INTRAVENOUS at 10:01

## 2022-01-04 RX ADMIN — APREPITANT 130 MG: 130 INJECTION, EMULSION INTRAVENOUS at 11:01

## 2022-01-04 RX ADMIN — GEMCITABINE HYDROCHLORIDE 2200 MG: 1 INJECTION, SOLUTION INTRAVENOUS at 12:01

## 2022-01-04 RX ADMIN — CISPLATIN 57 MG: 1 INJECTION INTRAVENOUS at 12:01

## 2022-01-04 RX ADMIN — HEPARIN 500 UNITS: 100 SYRINGE at 02:01

## 2022-01-04 RX ADMIN — MAGNESIUM SULFATE HEPTAHYDRATE 1 G: 1 INJECTION, SOLUTION INTRAVENOUS at 10:01

## 2022-01-04 RX ADMIN — POTASSIUM CHLORIDE AND SODIUM CHLORIDE 500 ML/HR: 900; 150 INJECTION, SOLUTION INTRAVENOUS at 01:01

## 2022-01-04 RX ADMIN — PALONOSETRON HYDROCHLORIDE 0.25 MG: 0.25 INJECTION, SOLUTION INTRAVENOUS at 11:01

## 2022-01-04 RX ADMIN — SODIUM CHLORIDE: 0.9 INJECTION, SOLUTION INTRAVENOUS at 09:01

## 2022-01-04 NOTE — TELEPHONE ENCOUNTER
Karla Amaral RN  to [facility]      1/4/22 10:32 AM  Yes, please hold for 48 hours.     HERMILO Acosta                1/4/22 10:23 AM  You routed this conversation to [facility] Staff  Claudia Young MD        [facility]    1/4/22 10:23 AM  Note  Patient needs to be scheduled for EGD. Patient is currently on eliquis. Per endoscopy protocol, eliquis needs to be held x2 days prior to procedure. Okay to hold? Please advise. Thanks.

## 2022-01-04 NOTE — Clinical Note
Get CBC, CMP, magnesium on 1/12, see NP on 1/13 then get cis/gem. Get CBC, CMP, magnesium, CA 19-9 on 1/26, see me on 1/27 then get cis/gem

## 2022-01-04 NOTE — PROGRESS NOTES
"                                                         PROGRESS NOTE    Subjective:       Patient ID: Domonique Kellogg is a 71 y.o. male.    Chief Complaint: follow up for cholangiocarcinoma    Diagnosis:  Advanced intrahepatic cholangiocarcinoma, MSI-low, negative for FGFR2 fusion or IDH1/IDH2 mutations, diagnosed on 11/22/2021.     Molecular Profile:  Guardant 360 11/29/21: +CCND1 amplification. VUS: CATRACHITO [id number]. MSI-high not detected.     Oncologic History:  1. Mr Kellogg is a 70 yo man with CAD, HTN, seizures in 2011 who initially saw me on 11/29/21 for further management of cholangiocarcinoma. He presented with weight loss and diarrhea since July. US 9/22/21 showed "Enlarged, heterogeneous appearance of the liver likely due to multiple underlying hepatic lesions largest measuring 4.8 cm."  MRI abdomen w/wo contrast 10/4/21: "Multiple liver lesions measuring up to 13.3 cm in the right hepatic lobe.  Differential diagnosis includes metastases, fibrolamellar hepatocellular carcinoma, or atypical focal nodular hyperplasia.  Consider biopsy for further evaluation. Thrombosis of the anterior branch of the right portal vein and left hepatic vein.  Nonvisualization of the middle and right hepatic veins, which may be thrombosed as well. Prominent periportal lymph node, which is nonspecific."  EGD and colonoscopy on 10/11/21 were negative for primary malignancies.   He underwent liver lesion biopsy and Y90 mapping on 11/22/21. Pathology showed adenocarcinoma: "Biopsy of the liver mass shows a malignant neoplasm in a fibrotic stroma. Glandular architecture is readily identified with several foci of intraluminal necrosis. Scattered mitotic figures are seen. Neoplastic cells show the following immunophenotype:   Positive: AE1/AE3, CK7, CDX2   Negative: Chromogranin, Synaptophysin, TTF, CK5/6, CK20, HepPar1   The morphology and immunophenotype are compatible with adenocarcinoma. Considerations for a primary site include " "pancreaticobiliary (including intrahepatic cholangiocarcinoma) as well as upper gastrointestinal. Clinical and radiologic correlation is suggested."  Patient presents today with wife for further management. No pain. Initial weight loss has somewhat stabilized. Still has diarrhea. Has not tried imodium yet. +nervous  Mother had kidney cancer at age 64. Maternal grandmother had liver cancer in her 60s. Patient has three children, two daughters and one son.   Discussed palliative chemo with cis/gem  2. PET scan 21 did not show extrahepatic metastases.   3. Cis/gem started on 21, s/p 1 cycle  4. S/p TACE on 2021    Interval History:   Mr Kellogg returns for cycle 2 cis/gem. Tolerated it well overall. Had leg swelling after fluids with day 1. It is better now after fluid reduction. Noted some numbness in the thighs after TACE, improving. +urinary hesitancy. Scheduled for EGD tomorrow to check if esophagitis healing. He is concerned about being put under sedation multiple times over the past few months.     ECO    ROS:   A ten-point system review is obtained and negative except for what was stated in the Interval History.     Physical Examination:   Vital signs reviewed.   General: well hydrated, well developed, in no acute distress  HEENT: normocephalic, PERRLA, EOMI, +icteric sclerae, oropharynx clear  Neck: supple, no JVD, thyromegaly, cervical or supraclavicular lymphadenopathy  Lungs: clear breath sounds bilaterally, no wheezing, rales, or rhonchi  Heart: RRR, no M/R/G  Abdomen: soft, no tenderness, non-distended, no hepatosplenomegaly, mass, or hernia. BS present  Extremities: no clubbing, cyanosis, or edema  Skin: no rash, ulcer, or open wounds  Neuro: alert and oriented x 4, no focal neuro deficit  Psych: pleasant and appropriate mood and affect    Objective:     Laboratory Data:  Labs reviewed. Bilirubin 2.0    Imaging Data:  PET 21:  In this patient with known intrahepatic " cholangiocarcinoma, there are multifocal hypermetabolic hepatic lesions in both hepatic lobes.  No evidence of distant metastasis.     Lipomatosis of the ileocecal valve and proximal cecum.     Fat containing umbilical hernia.    Assessment and Plan:     1. Intrahepatic cholangiocarcinoma    2. Secondary malignant neoplasm of liver    3. 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 Benign prostatic hyperplasia without lower urinary tract symptoms    6. Portal vein thrombosis    7. Essential hypertension      1-4  - Mr Kellogg is a 70 yo man with advanced intrahepatic cholangiocarcinoma with multiple liver lesions. MSI-low, FGFR2 fusion and IDH1/2 mutation negative.   - reviewed lab results with patient and wife. Liver function is improving since started treatment  - continue with cis/gem. Cycle 2 day 1 today  - return next week for cycle 2 day 8 if chemo room available. If not then in 2 weeks  - scheduled for post-TACE MRI and f/u with IR on 1/18    5.  - symptoms suggestive of BPH  - flomax prescribed. Fall precaution given    6.  - reviewed with radiology. [place] thrombus  - c/w eliquis 5 mg bid  - messaged  re resource for copay    7.  - BP controlled  - c/w current medication    Follow-up:     RTC next week for cycle 2 day 8  Knows to call in the interval if any problems arise.    I spent 45 minutes reviewing the chart, interpreting laboratory result and imaging data, coordinating patient's care, with at least 50% of the time on face-to-face counseling.       Electronically signed by Claudia Young

## 2022-01-04 NOTE — TELEPHONE ENCOUNTER
Patient needs to be scheduled for EGD. Patient is currently on eliquis. Per endoscopy protocol, eliquis needs to be held x2 days prior to procedure. Okay to hold? Please advise. Thanks.

## 2022-01-05 ENCOUNTER — PATIENT MESSAGE (OUTPATIENT)
Dept: INTERNAL MEDICINE | Facility: CLINIC | Age: 72
End: 2022-01-05
Payer: MEDICARE

## 2022-01-05 ENCOUNTER — PATIENT MESSAGE (OUTPATIENT)
Dept: HEMATOLOGY/ONCOLOGY | Facility: CLINIC | Age: 72
End: 2022-01-05
Payer: MEDICARE

## 2022-01-06 ENCOUNTER — DOCUMENTATION ONLY (OUTPATIENT)
Dept: HEMATOLOGY/ONCOLOGY | Facility: CLINIC | Age: 72
End: 2022-01-06
Payer: MEDICARE

## 2022-01-06 NOTE — PROGRESS NOTES
"Social Work Consult    Name:Domonique Kellogg  : 1950  MRN: [id number]    Referral:Medication Assistance     Dr. Young sent consult requesting assistance in helping patient pay for his $47 monthly payment for Eliquis prescription.     SW reached out to retail pharmacy, they explained that they had already provided him with a free trial and could not repeat but will forward request to Pharmacy Patient Assistance. In turn Pharmacy Patient Assistance will reach out to patient.     SW spoke to patient via phone, [phone]. Pt confirmed "these are things you cant plan in your budget" explained that he should expect a call from patient assistance. SW also provided his phone number and asked patient to call if he had not heard from patient assistance by early/mid next week.   "

## 2022-01-07 ENCOUNTER — TELEPHONE (OUTPATIENT)
Dept: HEMATOLOGY/ONCOLOGY | Facility: CLINIC | Age: 72
End: 2022-01-07
Payer: MEDICARE

## 2022-01-07 ENCOUNTER — TELEPHONE (OUTPATIENT)
Dept: PHARMACY | Facility: CLINIC | Age: 72
End: 2022-01-07
Payer: MEDICARE

## 2022-01-07 NOTE — NURSING
Oncology Navigation   Intake  Date of Diagnosis: 2021  Cancer Type: GI  Internal / External Referral: Internal  Referral Source: [place]  Date of Referral: 2021  Initial Nurse Navigator Contact: 2021  Referral to Initial Contact Timeline (days): 0  Date Worked: 2022  First Appointment Available: 2021  Appointment Date: 2021  First Available Date vs. Scheduled Date (days): 4     Treatment  Current Status: Active    Surgery: N/A    Medical Oncologist: Hector  Consult Date: 2021  Chemotherapy: Initiated  Chemotherapy Regimen: Cis/[place]  Immunotherapy: N/A  Oral Therapy: N/A    Radiation Therapy: N/A    Procedures: Biopsy; PET scan; MRI; EUS / EGD  Biopsy Schedule Date: 2021  EUS / EGD: EGD-cscope  EUS / EGD Date: 10/11/2021  MRI Schedule Date: 10/4/2021  PET Scan Schedule Date: 2021    General Referrals: Social work; Other; Dietitian; Psychology  Dietitian Name: EMMA Lewis  Dietitian Referral Date: 2021  Social Work: Alexander  Social Work Referral Date: 2022  Palliative Care Referral Date: 2021  Other Referral: pharmacy assistance          Support Systems: Spouse/significant other     Acuity  Stage: III-IV  Systemic Treatment - predicted or initiated: More than one treatment modality concurrently (chemotherapy, radiation, etc.) (+2)  Treatment Tolerability: Minimal symptoms  ECO-3 (+1)  Comorbidities in Medical History: 3-5 (+1)  Hospitalization Within the Past Month: None   Needed: No  Support: Patient reports adequate support system  Verbalizes Financial Concerns: No  Transportation: Adequate transportation for treatment  Psychological Factors (+1 each): Seeing oncology psychology  History of noncompliance/frequent no shows and cancellations: No  Verbalizes the need for more education: No  Navigation Acuity: 8     Follow Up  Follow up in about 1 month (around 2022).

## 2022-01-07 NOTE — TELEPHONE ENCOUNTER
Informed patient I need a copy of his Social Security Award Letter and EOB or pharmacy printout showing he has met the 3% out of pocket expense.  I provide him with my contact information for when he has met the 3%.

## 2022-01-08 ENCOUNTER — PATIENT MESSAGE (OUTPATIENT)
Dept: HEMATOLOGY/ONCOLOGY | Facility: CLINIC | Age: 72
End: 2022-01-08
Payer: MEDICARE

## 2022-01-10 ENCOUNTER — TELEPHONE (OUTPATIENT)
Dept: PALLIATIVE MEDICINE | Facility: CLINIC | Age: 72
End: 2022-01-10
Payer: MEDICARE

## 2022-01-11 ENCOUNTER — TELEPHONE (OUTPATIENT)
Dept: INFUSION THERAPY | Facility: HOSPITAL | Age: 72
End: 2022-01-11
Payer: MEDICARE

## 2022-01-11 DIAGNOSIS — E78.5 HYPERLIPIDEMIA, UNSPECIFIED HYPERLIPIDEMIA TYPE: ICD-10-CM

## 2022-01-11 NOTE — TELEPHONE ENCOUNTER
No new care gaps identified.  Powered by marshall[employer]. Reference number: [id number].   1/11/2022 3:10:17 PM CST

## 2022-01-12 ENCOUNTER — LAB VISIT (OUTPATIENT)
Dept: LAB | Facility: HOSPITAL | Age: 72
End: 2022-01-12
Attending: INTERNAL MEDICINE
Payer: MEDICARE

## 2022-01-12 DIAGNOSIS — C22.1 INTRAHEPATIC CHOLANGIOCARCINOMA: ICD-10-CM

## 2022-01-12 LAB
ALBUMIN SERPL BCP-MCNC: 2.7 G/DL (ref 3.5–5.2)
ALP SERPL-CCNC: 820 U/L (ref 55–135)
ALT SERPL W/O P-5'-P-CCNC: 85 U/L (ref 10–44)
ANION GAP SERPL CALC-SCNC: 8 MMOL/L (ref 8–16)
AST SERPL-CCNC: 57 U/L (ref 10–40)
BILIRUB SERPL-MCNC: 1.1 MG/DL (ref 0.1–1)
BUN SERPL-MCNC: 14 MG/DL (ref 8–23)
CALCIUM SERPL-MCNC: 9.4 MG/DL (ref 8.7–10.5)
CHLORIDE SERPL-SCNC: 105 MMOL/L (ref 95–110)
CO2 SERPL-SCNC: 27 MMOL/L (ref 23–29)
CREAT SERPL-MCNC: 0.8 MG/DL (ref 0.5–1.4)
ERYTHROCYTE [DISTWIDTH] IN BLOOD BY AUTOMATED COUNT: 16.5 % (ref 11.5–14.5)
EST. GFR  (AFRICAN AMERICAN): >60 ML/MIN/1.73 M^2
EST. GFR  (NON AFRICAN AMERICAN): >60 ML/MIN/1.73 M^2
GLUCOSE SERPL-MCNC: 115 MG/DL (ref 70–110)
HCT VFR BLD AUTO: 26.9 % (ref 40–54)
HGB BLD-MCNC: 8.8 G/DL (ref 14–18)
IMM GRANULOCYTES # BLD AUTO: 1.42 K/UL (ref 0–0.04)
MAGNESIUM SERPL-MCNC: 1.7 MG/DL (ref 1.6–2.6)
MCH RBC QN AUTO: 31.4 PG (ref 27–31)
MCHC RBC AUTO-ENTMCNC: 32.7 G/DL (ref 32–36)
MCV RBC AUTO: 96 FL (ref 82–98)
NEUTROPHILS # BLD AUTO: 7.5 K/UL (ref 1.8–7.7)
PLATELET # BLD AUTO: 281 K/UL (ref 150–450)
PMV BLD AUTO: 9 FL (ref 9.2–12.9)
POTASSIUM SERPL-SCNC: 4.5 MMOL/L (ref 3.5–5.1)
PROT SERPL-MCNC: 6 G/DL (ref 6–8.4)
RBC # BLD AUTO: 2.8 M/UL (ref 4.6–6.2)
SODIUM SERPL-SCNC: 140 MMOL/L (ref 136–145)
WBC # BLD AUTO: 12.11 K/UL (ref 3.9–12.7)

## 2022-01-12 PROCEDURE — 83735 ASSAY OF MAGNESIUM: CPT | Mod: HCNC | Performed by: INTERNAL MEDICINE

## 2022-01-12 PROCEDURE — 80053 COMPREHEN METABOLIC PANEL: CPT | Mod: HCNC | Performed by: PHYSICIAN ASSISTANT

## 2022-01-12 PROCEDURE — 85027 COMPLETE CBC AUTOMATED: CPT | Mod: HCNC | Performed by: PHYSICIAN ASSISTANT

## 2022-01-12 PROCEDURE — 36415 COLL VENOUS BLD VENIPUNCTURE: CPT | Mod: HCNC | Performed by: INTERNAL MEDICINE

## 2022-01-13 ENCOUNTER — INFUSION (OUTPATIENT)
Dept: INFUSION THERAPY | Facility: HOSPITAL | Age: 72
End: 2022-01-13
Payer: MEDICARE

## 2022-01-13 ENCOUNTER — OFFICE VISIT (OUTPATIENT)
Dept: HEMATOLOGY/ONCOLOGY | Facility: CLINIC | Age: 72
End: 2022-01-13
Payer: MEDICARE

## 2022-01-13 VITALS
TEMPERATURE: 99 F | RESPIRATION RATE: 16 BRPM | HEIGHT: 68 IN | BODY MASS INDEX: 32.07 KG/M2 | OXYGEN SATURATION: 100 % | SYSTOLIC BLOOD PRESSURE: 123 MMHG | WEIGHT: 211.63 LBS | DIASTOLIC BLOOD PRESSURE: 58 MMHG | HEART RATE: 72 BPM

## 2022-01-13 VITALS
DIASTOLIC BLOOD PRESSURE: 58 MMHG | HEART RATE: 69 BPM | HEIGHT: 68 IN | RESPIRATION RATE: 18 BRPM | BODY MASS INDEX: 32.07 KG/M2 | SYSTOLIC BLOOD PRESSURE: 118 MMHG | OXYGEN SATURATION: 98 % | TEMPERATURE: 98 F | WEIGHT: 211.63 LBS

## 2022-01-13 DIAGNOSIS — I81 PORTAL VEIN THROMBOSIS: ICD-10-CM

## 2022-01-13 DIAGNOSIS — T45.1X5A IMMUNODEFICIENCY SECONDARY TO CHEMOTHERAPY: ICD-10-CM

## 2022-01-13 DIAGNOSIS — D49.9 IMMUNODEFICIENCY SECONDARY TO NEOPLASM: ICD-10-CM

## 2022-01-13 DIAGNOSIS — D84.81 IMMUNODEFICIENCY SECONDARY TO NEOPLASM: ICD-10-CM

## 2022-01-13 DIAGNOSIS — C78.7 SECONDARY MALIGNANT NEOPLASM OF LIVER: ICD-10-CM

## 2022-01-13 DIAGNOSIS — R63.4 WEIGHT LOSS: ICD-10-CM

## 2022-01-13 DIAGNOSIS — C22.1 INTRAHEPATIC CHOLANGIOCARCINOMA: Primary | ICD-10-CM

## 2022-01-13 DIAGNOSIS — I10 ESSENTIAL HYPERTENSION: ICD-10-CM

## 2022-01-13 DIAGNOSIS — Z79.899 IMMUNODEFICIENCY SECONDARY TO CHEMOTHERAPY: ICD-10-CM

## 2022-01-13 DIAGNOSIS — D84.821 IMMUNODEFICIENCY SECONDARY TO CHEMOTHERAPY: ICD-10-CM

## 2022-01-13 DIAGNOSIS — N40.0 BENIGN PROSTATIC HYPERPLASIA WITHOUT LOWER URINARY TRACT SYMPTOMS: ICD-10-CM

## 2022-01-13 PROCEDURE — 63600175 PHARM REV CODE 636 W HCPCS: Mod: HCNC | Performed by: STUDENT IN AN ORGANIZED HEALTH CARE EDUCATION/TRAINING PROGRAM

## 2022-01-13 PROCEDURE — 96413 CHEMO IV INFUSION 1 HR: CPT | Mod: HCNC

## 2022-01-13 PROCEDURE — 96361 HYDRATE IV INFUSION ADD-ON: CPT

## 2022-01-13 PROCEDURE — 99215 PR OFFICE/OUTPT VISIT, EST, LEVL V, 40-54 MIN: ICD-10-PCS | Mod: HCNC,S$GLB,, | Performed by: PHYSICIAN ASSISTANT

## 2022-01-13 PROCEDURE — 99215 OFFICE O/P EST HI 40 MIN: CPT | Mod: HCNC,S$GLB,, | Performed by: PHYSICIAN ASSISTANT

## 2022-01-13 PROCEDURE — 99999 PR PBB SHADOW E&M-EST. PATIENT-LVL V: ICD-10-PCS | Mod: PBBFAC,HCNC,, | Performed by: PHYSICIAN ASSISTANT

## 2022-01-13 PROCEDURE — 96367 TX/PROPH/DG ADDL SEQ IV INF: CPT | Mod: HCNC

## 2022-01-13 PROCEDURE — 99499 RISK ADDL DX/OHS AUDIT: ICD-10-PCS | Mod: S$GLB,,, | Performed by: PHYSICIAN ASSISTANT

## 2022-01-13 PROCEDURE — 99499 UNLISTED E&M SERVICE: CPT | Mod: S$GLB,,, | Performed by: PHYSICIAN ASSISTANT

## 2022-01-13 PROCEDURE — A4216 STERILE WATER/SALINE, 10 ML: HCPCS | Mod: HCNC | Performed by: STUDENT IN AN ORGANIZED HEALTH CARE EDUCATION/TRAINING PROGRAM

## 2022-01-13 PROCEDURE — 25000003 PHARM REV CODE 250: Mod: HCNC | Performed by: STUDENT IN AN ORGANIZED HEALTH CARE EDUCATION/TRAINING PROGRAM

## 2022-01-13 PROCEDURE — 96375 TX/PRO/DX INJ NEW DRUG ADDON: CPT | Mod: HCNC

## 2022-01-13 PROCEDURE — 99999 PR PBB SHADOW E&M-EST. PATIENT-LVL V: CPT | Mod: PBBFAC,HCNC,, | Performed by: PHYSICIAN ASSISTANT

## 2022-01-13 PROCEDURE — 96360 HYDRATION IV INFUSION INIT: CPT | Mod: HCNC

## 2022-01-13 PROCEDURE — 96417 CHEMO IV INFUS EACH ADDL SEQ: CPT | Mod: HCNC

## 2022-01-13 RX ORDER — HEPARIN 100 UNIT/ML
500 SYRINGE INTRAVENOUS
Status: DISCONTINUED | OUTPATIENT
Start: 2022-01-13 | End: 2022-01-13 | Stop reason: HOSPADM

## 2022-01-13 RX ORDER — SODIUM CHLORIDE 0.9 % (FLUSH) 0.9 %
10 SYRINGE (ML) INJECTION
Status: DISCONTINUED | OUTPATIENT
Start: 2022-01-13 | End: 2022-01-13 | Stop reason: HOSPADM

## 2022-01-13 RX ORDER — POTASSIUM CHLORIDE 7.45 MG/ML
10 INJECTION INTRAVENOUS
Status: COMPLETED | OUTPATIENT
Start: 2022-01-13 | End: 2022-01-13

## 2022-01-13 RX ORDER — POTASSIUM CHLORIDE 7.45 MG/ML
INJECTION INTRAVENOUS
Status: DISCONTINUED
Start: 2022-01-13 | End: 2022-01-13 | Stop reason: HOSPADM

## 2022-01-13 RX ADMIN — HEPARIN 500 UNITS: 100 SYRINGE at 04:01

## 2022-01-13 RX ADMIN — POTASSIUM CHLORIDE 10 MEQ: 7.46 INJECTION, SOLUTION INTRAVENOUS at 01:01

## 2022-01-13 RX ADMIN — MAGNESIUM SULFATE HEPTAHYDRATE 500 ML/HR: 500 INJECTION, SOLUTION INTRAMUSCULAR; INTRAVENOUS at 11:01

## 2022-01-13 RX ADMIN — PALONOSETRON HYDROCHLORIDE 0.25 MG: 0.25 INJECTION, SOLUTION INTRAVENOUS at 02:01

## 2022-01-13 RX ADMIN — CISPLATIN 57 MG: 1 INJECTION INTRAVENOUS at 03:01

## 2022-01-13 RX ADMIN — Medication 10 ML: at 04:01

## 2022-01-13 RX ADMIN — APREPITANT 130 MG: 130 INJECTION, EMULSION INTRAVENOUS at 02:01

## 2022-01-13 RX ADMIN — SODIUM CHLORIDE: 0.9 INJECTION, SOLUTION INTRAVENOUS at 11:01

## 2022-01-13 RX ADMIN — GEMCITABINE HYDROCHLORIDE 2200 MG: 1 INJECTION, SOLUTION INTRAVENOUS at 02:01

## 2022-01-13 RX ADMIN — SODIUM CHLORIDE 500 ML: 0.9 INJECTION, SOLUTION INTRAVENOUS at 03:01

## 2022-01-13 RX ADMIN — POTASSIUM CHLORIDE 10 MEQ: 7.46 INJECTION, SOLUTION INTRAVENOUS at 12:01

## 2022-01-13 NOTE — PROGRESS NOTES
"                                                         PROGRESS NOTE    Subjective:       Patient ID: Domonique Kellogg is a 71 y.o. male.    Chief Complaint: follow up for cholangiocarcinoma    Diagnosis:  Advanced intrahepatic cholangiocarcinoma, MSI-low, negative for FGFR2 fusion or IDH1/IDH2 mutations, diagnosed on 11/22/2021.     Molecular Profile:  Guardant 360 11/29/21: +CCND1 amplification. VUS: CATRCAHITO [id number]. MSI-high not detected.     Oncologic History copied from medical chart:   1. Mr Kellogg is a 70 yo man with CAD, HTN, seizures in 2011 who initially saw me on 11/29/21 for further management of cholangiocarcinoma. He presented with weight loss and diarrhea since July. US 9/22/21 showed "Enlarged, heterogeneous appearance of the liver likely due to multiple underlying hepatic lesions largest measuring 4.8 cm."  MRI abdomen w/wo contrast 10/4/21: "Multiple liver lesions measuring up to 13.3 cm in the right hepatic lobe.  Differential diagnosis includes metastases, fibrolamellar hepatocellular carcinoma, or atypical focal nodular hyperplasia.  Consider biopsy for further evaluation. Thrombosis of the anterior branch of the right portal vein and left hepatic vein.  Nonvisualization of the middle and right hepatic veins, which may be thrombosed as well. Prominent periportal lymph node, which is nonspecific."  EGD and colonoscopy on 10/11/21 were negative for primary malignancies.   He underwent liver lesion biopsy and Y90 mapping on 11/22/21. Pathology showed adenocarcinoma: "Biopsy of the liver mass shows a malignant neoplasm in a fibrotic stroma. Glandular architecture is readily identified with several foci of intraluminal necrosis. Scattered mitotic figures are seen. Neoplastic cells show the following immunophenotype:   Positive: AE1/AE3, CK7, CDX2   Negative: Chromogranin, Synaptophysin, TTF, CK5/6, CK20, HepPar1   The morphology and immunophenotype are compatible with adenocarcinoma. Considerations for " "a primary site include pancreaticobiliary (including intrahepatic cholangiocarcinoma) as well as upper gastrointestinal. Clinical and radiologic correlation is suggested."  Patient presents today with wife for further management. No pain. Initial weight loss has somewhat stabilized. Still has diarrhea. Has not tried imodium yet. +nervous  Mother had kidney cancer at age 64. Maternal grandmother had liver cancer in her 60s. Patient has three children, two daughters and one son.   Discussed palliative chemo with cis/gem  2. PET scan 21 did not show extrahepatic metastases.   3. Cis/gem started on 21, s/p 1 cycle  4. S/p TACE on 2021    Interval History:   Mr Kellogg returns for cycle 2 day 8 cis/gem. Tolerated it well overall. Reports taste changes and weight loss. He is also cold all of the time. His appetite is good. Diarrhea and nausea are better controlled. His urinating well and taking Flomax. He does have increased frequency due to the medication. He is drinking water. No changes to his hearing. He tries to be active around the home, walking his dog. No changes to his hearing. Presents with his wife.     Scheduled for repeat EGD on 2022    ECO    ROS:   A ten-point system review is obtained and negative except for what was stated in the Interval History.     Physical Examination:   Vital signs reviewed.   General: well hydrated, well developed, in no acute distress  HEENT: normocephalic, PERRLA, EOMI, +icteric sclerae, oropharynx clear  Neck: supple, no JVD, thyromegaly, cervical or supraclavicular lymphadenopathy  Lungs: clear breath sounds bilaterally, no wheezing, rales, or rhonchi  Heart: RRR, no M/R/G  Abdomen: soft, no tenderness, non-distended, no mass, or hernia. BS present  Extremities: no clubbing, cyanosis, or edema  Skin: no rash, ulcer, or open wounds  Neuro: alert and oriented x 4, no focal neuro deficit  Psych: pleasant and appropriate mood and affect    Objective: "     Laboratory Data:  Labs reviewed. Bilirubin 1.1. CBC unremarkable. Magnesium normal    Imaging Data:  PET 12/6/21:  In this patient with known intrahepatic cholangiocarcinoma, there are multifocal hypermetabolic hepatic lesions in both hepatic lobes.  No evidence of distant metastasis.     Lipomatosis of the ileocecal valve and proximal cecum.     Fat containing umbilical hernia.    CT abdomen/pelvis:   12/18/2021:    Impression:     1. Large heterogeneous mass in the right hepatic lobe compatible with patient's known history of cholangiocarcinoma post recent chemoembolization. Stable hypodensity in the left hepatic lobe compatible with an additional focus of carcinoma.  2. Tumor thrombus within the right and left portal veins and the main portal vein extending to the confluence.  3. Right lower lobe perifissural nodule measuring 4 mm, stable from prior.  Follow-up according to oncology protocol.  4. Trace perihepatic and intrapelvic free fluid.  5. Prostatomegaly.       Assessment and Plan:     1. Intrahepatic cholangiocarcinoma    2. Secondary malignant neoplasm of liver    3. 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 Benign prostatic hyperplasia without lower urinary tract symptoms    6. Portal vein thrombosis    7. Essential hypertension    8. Weight loss      1-4  - Mr Kellogg is a 72 yo man with advanced intrahepatic cholangiocarcinoma with multiple liver lesions. MSI-low, FGFR2 fusion and IDH1/2 mutation negative.   - He is doing well today and tolerating treatment well. We reviewed lab results with patient and wife. Liver function is improving since started treatment. ALT has increased slightly this visit. Bilirubin improved to 1.1 today  - continue with cis/gem. Lab results and BP are good for Cycle 2 day 8 today  - return to clinic in 2 weeks for cycle 3 day 1  - scheduled for post-TACE MRI and f/u with IR on 1/18    5.  - Improving. C/w flomax  -  Reiterated fall precautions    6.  - reviewed previously with radiology. [place] thrombus  - c/w eliquis 5 mg bid    7.  - BP controlled  - c/w current medication    8.   - His appetite is good. I have encouraged him to increase his protein and calorie intake to help increase his weight.   - Will also have him see our nutritionist in the future if his weight continues to decrease.     Follow-up:     RTC in 2 weeks for cycle 3 day 1  Knows to call in the interval if any problems arise.    Pte and family members displayed understanding of the above encounter and treatment plan. All thoughtful questions were answered to their satisfaction. Pte was advised to notify the care team or proceed to the ER if signs and symptoms worsen.     NEO Ervin, PA-C  Physician Assistant Certified  Dept of Hematology/Oncology  PA-C to Dr. Young, Dr. Lane and Dr. Bourgeois    Electronically signed by Gertrude Ni

## 2022-01-13 NOTE — Clinical Note
Please schedule cbc, cmp, magnesium, clinic visit with Dr. Young/Demi and cycle 3 day 1 of [place]/Cis in 2 weeks, 1/27/2022

## 2022-01-13 NOTE — PLAN OF CARE
Pt received IVFs, Gemzar & Cisplatin today and tolerated well, without complications. Educated patient about IVFs, Gemzar & Cisplatin (indications, side effects, possible reactions, chemotherapy precautions) and verbalized understanding.  VSS. CW port positive for blood return, saline flushed, Heparin flush instilled to dwell and de accessed prior to DC. Pt DC with no distress noted, ambulated off unit w/o event, via self, pleased.

## 2022-01-17 ENCOUNTER — PATIENT MESSAGE (OUTPATIENT)
Dept: INTERNAL MEDICINE | Facility: CLINIC | Age: 72
End: 2022-01-17
Payer: MEDICARE

## 2022-01-17 DIAGNOSIS — E78.5 HYPERLIPIDEMIA, UNSPECIFIED HYPERLIPIDEMIA TYPE: ICD-10-CM

## 2022-01-18 ENCOUNTER — HOSPITAL ENCOUNTER (OUTPATIENT)
Dept: RADIOLOGY | Facility: HOSPITAL | Age: 72
Discharge: HOME | End: 2022-01-18
Attending: FAMILY MEDICINE
Payer: MEDICARE

## 2022-01-18 DIAGNOSIS — K76.9 LIVER LESION: ICD-10-CM

## 2022-01-18 PROCEDURE — A9585 GADOBUTROL INJECTION: HCPCS | Mod: HCNC | Performed by: FAMILY MEDICINE

## 2022-01-18 PROCEDURE — 74183 MRI ABD W/O CNTR FLWD CNTR: CPT | Mod: 26,HCNC,, | Performed by: RADIOLOGY

## 2022-01-18 PROCEDURE — 25500020 PHARM REV CODE 255: Mod: HCNC | Performed by: FAMILY MEDICINE

## 2022-01-18 PROCEDURE — 74183 MRI ABD W/O CNTR FLWD CNTR: CPT | Mod: TC,HCNC

## 2022-01-18 PROCEDURE — 74183 MRI ABDOMEN W WO CONTRAST: ICD-10-PCS | Mod: 26,HCNC,, | Performed by: RADIOLOGY

## 2022-01-18 RX ORDER — GADOBUTROL 604.72 MG/ML
10 INJECTION INTRAVENOUS
Status: COMPLETED | OUTPATIENT
Start: 2022-01-18 | End: 2022-01-18

## 2022-01-18 RX ORDER — PRAVASTATIN SODIUM 40 MG/1
40 TABLET ORAL DAILY
Qty: 90 TABLET | Refills: 3 | Status: SHIPPED | OUTPATIENT
Start: 2022-01-18 | End: 2023-01-01

## 2022-01-18 RX ORDER — PRAVASTATIN SODIUM 40 MG/1
40 TABLET ORAL DAILY
Qty: 90 TABLET | Refills: 3 | Status: SHIPPED | OUTPATIENT
Start: 2022-01-18 | End: 2022-01-18 | Stop reason: SDUPTHER

## 2022-01-18 RX ADMIN — GADOBUTROL 10 ML: 604.72 INJECTION INTRAVENOUS at 10:01

## 2022-01-18 NOTE — TELEPHONE ENCOUNTER
No new care gaps identified.  Powered by [employer]. Reference number: [id number].   1/18/2022 5:17:25 PM CST

## 2022-01-19 ENCOUNTER — PATIENT MESSAGE (OUTPATIENT)
Dept: HEMATOLOGY/ONCOLOGY | Facility: CLINIC | Age: 72
End: 2022-01-19
Payer: MEDICARE

## 2022-01-23 ENCOUNTER — PATIENT MESSAGE (OUTPATIENT)
Dept: HEMATOLOGY/ONCOLOGY | Facility: CLINIC | Age: 72
End: 2022-01-23
Payer: MEDICARE

## 2022-01-24 ENCOUNTER — PATIENT MESSAGE (OUTPATIENT)
Dept: HEMATOLOGY/ONCOLOGY | Facility: CLINIC | Age: 72
End: 2022-01-24
Payer: MEDICARE

## 2022-01-24 DIAGNOSIS — R35.0 URINARY FREQUENCY: Primary | ICD-10-CM

## 2022-01-26 ENCOUNTER — LAB VISIT (OUTPATIENT)
Dept: LAB | Facility: HOSPITAL | Age: 72
End: 2022-01-26
Attending: INTERNAL MEDICINE
Payer: MEDICARE

## 2022-01-26 ENCOUNTER — CLINICAL SUPPORT (OUTPATIENT)
Dept: INTERVENTIONAL RADIOLOGY/VASCULAR | Facility: CLINIC | Age: 72
End: 2022-01-26
Payer: MEDICARE

## 2022-01-26 DIAGNOSIS — C22.1 INTRAHEPATIC CHOLANGIOCARCINOMA: ICD-10-CM

## 2022-01-26 DIAGNOSIS — K76.9 LIVER LESION: Primary | ICD-10-CM

## 2022-01-26 DIAGNOSIS — C22.1 CHOLANGIOCARCINOMA: ICD-10-CM

## 2022-01-26 DIAGNOSIS — Z01.818 PRE-OP TESTING: ICD-10-CM

## 2022-01-26 LAB
ALBUMIN SERPL BCP-MCNC: 2.6 G/DL (ref 3.5–5.2)
ALP SERPL-CCNC: 393 U/L (ref 55–135)
ALT SERPL W/O P-5'-P-CCNC: 25 U/L (ref 10–44)
ANION GAP SERPL CALC-SCNC: 7 MMOL/L (ref 8–16)
AST SERPL-CCNC: 32 U/L (ref 10–40)
BASOPHILS # BLD AUTO: 0.08 K/UL (ref 0–0.2)
BASOPHILS NFR BLD: 0.8 % (ref 0–1.9)
BILIRUB SERPL-MCNC: 0.7 MG/DL (ref 0.1–1)
BUN SERPL-MCNC: 16 MG/DL (ref 8–23)
CALCIUM SERPL-MCNC: 10.1 MG/DL (ref 8.7–10.5)
CANCER AG19-9 SERPL-ACNC: 552.2 U/ML (ref 0–40)
CHLORIDE SERPL-SCNC: 108 MMOL/L (ref 95–110)
CO2 SERPL-SCNC: 28 MMOL/L (ref 23–29)
CREAT SERPL-MCNC: 0.8 MG/DL (ref 0.5–1.4)
DIFFERENTIAL METHOD: ABNORMAL
EOSINOPHIL # BLD AUTO: 0.1 K/UL (ref 0–0.5)
EOSINOPHIL NFR BLD: 1.2 % (ref 0–8)
ERYTHROCYTE [DISTWIDTH] IN BLOOD BY AUTOMATED COUNT: 17.6 % (ref 11.5–14.5)
EST. GFR  (AFRICAN AMERICAN): >60 ML/MIN/1.73 M^2
EST. GFR  (NON AFRICAN AMERICAN): >60 ML/MIN/1.73 M^2
GLUCOSE SERPL-MCNC: 98 MG/DL (ref 70–110)
HCT VFR BLD AUTO: 26.7 % (ref 40–54)
HGB BLD-MCNC: 8.2 G/DL (ref 14–18)
IMM GRANULOCYTES # BLD AUTO: 0.13 K/UL (ref 0–0.04)
IMM GRANULOCYTES NFR BLD AUTO: 1.3 % (ref 0–0.5)
LYMPHOCYTES # BLD AUTO: 1.6 K/UL (ref 1–4.8)
LYMPHOCYTES NFR BLD: 16.2 % (ref 18–48)
MAGNESIUM SERPL-MCNC: 1.4 MG/DL (ref 1.6–2.6)
MCH RBC QN AUTO: 31.4 PG (ref 27–31)
MCHC RBC AUTO-ENTMCNC: 30.7 G/DL (ref 32–36)
MCV RBC AUTO: 102 FL (ref 82–98)
MONOCYTES # BLD AUTO: 0.9 K/UL (ref 0.3–1)
MONOCYTES NFR BLD: 9.2 % (ref 4–15)
NEUTROPHILS # BLD AUTO: 7.2 K/UL (ref 1.8–7.7)
NEUTROPHILS NFR BLD: 71.3 % (ref 38–73)
NRBC BLD-RTO: 0 /100 WBC
PLATELET # BLD AUTO: 253 K/UL (ref 150–450)
PMV BLD AUTO: 10.2 FL (ref 9.2–12.9)
POTASSIUM SERPL-SCNC: 4.2 MMOL/L (ref 3.5–5.1)
PROT SERPL-MCNC: 6.7 G/DL (ref 6–8.4)
RBC # BLD AUTO: 2.61 M/UL (ref 4.6–6.2)
SODIUM SERPL-SCNC: 143 MMOL/L (ref 136–145)
WBC # BLD AUTO: 10.05 K/UL (ref 3.9–12.7)

## 2022-01-26 PROCEDURE — 36415 COLL VENOUS BLD VENIPUNCTURE: CPT | Mod: HCNC,PN | Performed by: INTERNAL MEDICINE

## 2022-01-26 PROCEDURE — 85025 COMPLETE CBC W/AUTO DIFF WBC: CPT | Mod: HCNC | Performed by: INTERNAL MEDICINE

## 2022-01-26 PROCEDURE — 83735 ASSAY OF MAGNESIUM: CPT | Mod: HCNC | Performed by: INTERNAL MEDICINE

## 2022-01-26 PROCEDURE — 99214 PR OFFICE/OUTPT VISIT, EST, LEVL IV, 30-39 MIN: ICD-10-PCS | Mod: 95,CS,, | Performed by: PHYSICIAN ASSISTANT

## 2022-01-26 PROCEDURE — 80053 COMPREHEN METABOLIC PANEL: CPT | Mod: HCNC | Performed by: INTERNAL MEDICINE

## 2022-01-26 PROCEDURE — 99214 OFFICE O/P EST MOD 30 MIN: CPT | Mod: 95,CS,, | Performed by: PHYSICIAN ASSISTANT

## 2022-01-26 PROCEDURE — 86301 IMMUNOASSAY TUMOR CA 19-9: CPT | Mod: HCNC | Performed by: INTERNAL MEDICINE

## 2022-01-26 NOTE — PROGRESS NOTES
"Subjective:       Patient ID: Domonique Kellogg is a 71 y.o. male.    Chief Complaint: Cancer    72 y/o returns with cholangiocarcinoma and multiple liver lesions to IR clinic virtually for review of post procedure imaging.  MRI obtained 1/18/22.  Reports he had quite a bit of "stomach pain" the night after the procedure.  The pain started to subside the following morning.  He reports he had some constipation.  That took several days after the procedure to resolve.  He also reports he had fever to 100.4 at home.  He presented to the ED 12/18 and was admitted to the hospital and imaging performed.  He reports he has been feeling fine since his bowels started moving and he was d/c home on 12/20.      S/p Liver bx and Y90 mapping 11/22/21. At mapping it was determined too much radiation may be needed to achieve control. Plan changed to start w TACE to shrink and then try to Y90.   S/p TACE 12/16/21    He follows with Dr. Young in hem/onc.  Looks like he just finished cycle 2 of cis/gem.    History per Initial intake interview:  "patient referred to Interventional Radiology by John Alonzo MD for evaluation of a liver mass. Mass was identified during workup for elevated LFTs. An MRI obtained on 10/4/2021 noted "Multiple liver lesions measuring up to 13.3 cm in the right hepatic lobe."    He reports continued fatigue.  Denies worsening since procedure but reports it's consistent. Reports labile weight and some intermittent LE swelling. Denies abdominal pain and distention currently.  Denies fever and URI sx.      Review of Systems   Constitutional: Positive for activity change and unexpected weight change. Negative for appetite change and fever.   HENT: Negative for rhinorrhea, sinus pressure/congestion and sore throat.    Respiratory: Negative for cough, shortness of breath and wheezing.    Gastrointestinal: Positive for change in bowel habit, constipation, diarrhea and change in bowel habit. Negative for abdominal " distention and abdominal pain.   Genitourinary: Positive for frequency and urgency.         Objective:      Physical Exam  Constitutional:       Appearance: Normal appearance.   HENT:      Head: Normocephalic and atraumatic.   Eyes:      Extraocular Movements: Extraocular movements intact.   Pulmonary:      Effort: Pulmonary effort is normal.   Neurological:      Mental Status: He is alert and oriented to person, place, and time.   Psychiatric:         Mood and Affect: Mood normal.         Behavior: Behavior normal.         Thought Content: Thought content normal.         Judgment: Judgment normal.           IMAGING    MRI ABDOMEN W WO CONTRAST 1/18/22     CLINICAL HISTORY:  HCC s/p TACE;  Liver disease, unspecified     TECHNIQUE:  Multiplanar multisequence images of the abdomen before and after administration of 10 mL Gadavist intravenous contrast.     COMPARISON:  MRI 10/04/2021, CT 12/18/2021     FINDINGS:  Inferior Thorax: Unremarkable.     Liver: Upper limit of normal in size.  Numerous scattered heterogeneously enhancing hepatic lesions, several of which have increased in size compared to prior examination and others are new.  Dominant lesion encompassing the majority of the right hepatic lobe measures approximately 13.3 x 12.5 cm, similar to prior exam.  New enhancing left hepatic lobe lesion measuring 2.2 cm (series 11, image 28).  Several additional new enhancing and diffusion restricting lesions identified.  Increased size of a left lobe lesion measuring 2.3 cm (series 11, image 44), previously 1.2 cm.  Increased filling defect within the portal venous system involving the left, right, and main portal veins concerning for progression of portal venous thrombosis.  Collateral vessels identified at the teresa hepatis.  Persistent filling defect within the central hepatic veins concerning for thrombosis.     Gallbladder: No gallstones.     Bile Ducts: No significant intrahepatic or extrahepatic biliary ductal  dilatation.     Pancreas: No mass or ductal dilatation.     Spleen: Unremarkable.     Adrenals: Unremarkable.     Kidneys/Ureters: Bilateral renal cysts.  No solid enhancing renal mass.  No hydronephrosis.     GI Tract/Mesentery: No evidence of bowel obstruction or inflammation.     Peritoneal Space: No ascites.     Lymph nodes: Stable prominent periportal lymph node measuring 1.3 cm (series 13, image 54).     Abdominal wall: Unremarkable.     Vasculature: No aneurysm.     Bones: No significant abnormality.     Impression:     1. Patient with reported cholangiocarcinoma.  Interval progression of hepatic tumor burden with multiple enlarging and new hepatic lesions.  2. Progression of portal venous thrombosis involving the left, right, and main portal veins.  Persistent filling defect within the central hepatic veins concerning for thrombosis.  3. Stable prominent periportal lymph node.  4. Additional findings as above.  This report was flagged in Epic as abnormal.     Electronically signed by resident: Trung Gaston  Date:                                            01/18/2022  Time:                                           10:58     Electronically signed by: Naseem Lujan MD  Date:                                            01/18/2022  Time:                                           11:37    PATHOLOGY 11/22/21    Assessment:       Problem List Items Addressed This Visit    None     Visit Diagnoses     Liver lesion    -  Primary    Cholangiocarcinoma        Relevant Orders    IR Embolization for Tumor_Organ Ischemia    Comprehensive Metabolic Panel    Bilirubin, Direct    CBC Auto Differential    Protime-INR    MRI Abdomen W WO Contrast    Pre-op testing        Relevant Orders    COVID-19 Routine Screening          Plan:       The patient location is: [place]  The chief complaint leading to consultation is: cancer, follow up after TACE    Visit type: audiovisual    Face to Face time with patient: 30  40 minutes  of total time spent on the encounter, which includes face to face time and non-face to face time preparing to see the patient (eg, review of tests), Obtaining and/or reviewing separately obtained history, Documenting clinical information in the electronic or other health record, Independently interpreting results (not separately reported) and communicating results to the patient/family/caregiver, or Care coordination (not separately reported).         Each patient to whom he or she provides medical services by telemedicine is:  (1) informed of the relationship between the physician and patient and the respective role of any other health care provider with respect to management of the patient; and (2) notified that he or she may decline to receive medical services by telemedicine and may withdraw from such care at any time.    Notes:   Case discussed with Simon Fitzgerald MD.  Impression/plan:  Partial response. Needs additional TACE. Should also continue with systemic therapy ordered by Dr. Young.\    Chemoembolization procedure discussed in detail with the patient including risks (including, but not limited to, pain, bleeding, infection, damage to nearby structures, and the need for additional procedures), benefits, potential complications, usual pre and post procedure course, as well as potential to develop post-embolization syndrome. Utilized images from the patient's chart, the internet, and illustrations to help explain procedure. The patient voices understanding and all questions have been answered.  The patient agrees to proceed as planned. Pre-procedure handout with clinic phone number provided.    Procedure scheduled 2/14/22 with anesthesia d/t patient history of RAHEEL. Discussed GETA could have contributed to post procedure constipation. Recommend fluids, fiber, and avoidance of opiates prior to procedure if possible to try to lessen constipation post procedure. OK to continue eliquis for procedure.      Plan for  MRI abdomen w wo contrast 1 mo after TACE.  Ordered today.      Shae Chester PA-C  Interventional Radiology  Clinic [phone]

## 2022-01-26 NOTE — LETTER
January 26, 2022    Domonique RUSSO [id number]  [facility] [address]             [facility] 6[address]  [address]  [facility] [address]  Phone: [phone] PRE-PROCEDURE INSTRUCTIONS    Your procedure with Interventional Radiology is scheduled for Feb 14, 2022. Please arrive by 9:30am.    You must check-in and receive a wristband before going to your procedure. Your check-in location is 2nd floor day  surgery center.    **Do not eat or drink anything between midnight and the time of your procedure. This includes gum, mints, and candy lemon drops.    **Do not smoke or drink alcoholic beverages 24 hours prior to your procedure.    **If you wear contact lenses, dentures, hearing aids, or glasses, bring a container to put them in during the procedure and give them to a family member for safekeeping.    **If you have been diagnosed with sleep apnea please bring your CPAP machine.    **If your doctor has scheduled you for an overnight stay, bring a small overnight bag with any personal items that you may need.    **Make arrangements in advance for transportation home by a responsible adult. It is not safe to drive a vehicle during the 24 hours following the procedure.    **All [facility] facilities and properties are tobacco free. Smoking is NOT allowed.    PLEASE NOTE: The procedure schedule has many variables which affect the time of your procedure. Family members should be available if your surgery time changes.    If you have any questions about these instructions call Interventional Radiology at [phone] Monday - Friday between 8:00am and 4:00pm or [phone] (ask for interventional radiology resident) for after hours.

## 2022-01-27 ENCOUNTER — PATIENT MESSAGE (OUTPATIENT)
Dept: HEMATOLOGY/ONCOLOGY | Facility: CLINIC | Age: 72
End: 2022-01-27

## 2022-01-27 ENCOUNTER — OFFICE VISIT (OUTPATIENT)
Dept: HEMATOLOGY/ONCOLOGY | Facility: CLINIC | Age: 72
End: 2022-01-27
Payer: MEDICARE

## 2022-01-27 ENCOUNTER — INFUSION (OUTPATIENT)
Dept: INFUSION THERAPY | Facility: HOSPITAL | Age: 72
End: 2022-01-27
Payer: MEDICARE

## 2022-01-27 VITALS
BODY MASS INDEX: 32.83 KG/M2 | HEART RATE: 71 BPM | OXYGEN SATURATION: 100 % | SYSTOLIC BLOOD PRESSURE: 128 MMHG | WEIGHT: 216.63 LBS | RESPIRATION RATE: 18 BRPM | HEIGHT: 68 IN | DIASTOLIC BLOOD PRESSURE: 60 MMHG | TEMPERATURE: 98 F

## 2022-01-27 VITALS — HEART RATE: 66 BPM | SYSTOLIC BLOOD PRESSURE: 109 MMHG | DIASTOLIC BLOOD PRESSURE: 58 MMHG

## 2022-01-27 DIAGNOSIS — C22.1 INTRAHEPATIC CHOLANGIOCARCINOMA: Primary | ICD-10-CM

## 2022-01-27 DIAGNOSIS — I10 ESSENTIAL HYPERTENSION: ICD-10-CM

## 2022-01-27 DIAGNOSIS — D84.821 IMMUNODEFICIENCY SECONDARY TO CHEMOTHERAPY: ICD-10-CM

## 2022-01-27 DIAGNOSIS — T45.1X5A IMMUNODEFICIENCY SECONDARY TO CHEMOTHERAPY: ICD-10-CM

## 2022-01-27 DIAGNOSIS — I81 PORTAL VEIN THROMBOSIS: ICD-10-CM

## 2022-01-27 DIAGNOSIS — I25.10 CAD IN NATIVE ARTERY: ICD-10-CM

## 2022-01-27 DIAGNOSIS — D84.81 IMMUNODEFICIENCY SECONDARY TO NEOPLASM: ICD-10-CM

## 2022-01-27 DIAGNOSIS — E83.42 HYPOMAGNESEMIA: ICD-10-CM

## 2022-01-27 DIAGNOSIS — I77.9 BILATERAL CAROTID ARTERY DISEASE, UNSPECIFIED TYPE: ICD-10-CM

## 2022-01-27 DIAGNOSIS — G89.3 CANCER RELATED PAIN: ICD-10-CM

## 2022-01-27 DIAGNOSIS — Z79.899 IMMUNODEFICIENCY SECONDARY TO CHEMOTHERAPY: ICD-10-CM

## 2022-01-27 DIAGNOSIS — D49.9 IMMUNODEFICIENCY SECONDARY TO NEOPLASM: ICD-10-CM

## 2022-01-27 PROCEDURE — 99999 PR PBB SHADOW E&M-EST. PATIENT-LVL V: CPT | Mod: PBBFAC,HCNC,, | Performed by: INTERNAL MEDICINE

## 2022-01-27 PROCEDURE — 1101F PT FALLS ASSESS-DOCD LE1/YR: CPT | Mod: HCNC,CPTII,S$GLB, | Performed by: INTERNAL MEDICINE

## 2022-01-27 PROCEDURE — 3288F FALL RISK ASSESSMENT DOCD: CPT | Mod: HCNC,CPTII,S$GLB, | Performed by: INTERNAL MEDICINE

## 2022-01-27 PROCEDURE — 96417 CHEMO IV INFUS EACH ADDL SEQ: CPT | Mod: HCNC

## 2022-01-27 PROCEDURE — 99499 UNLISTED E&M SERVICE: CPT | Mod: S$GLB,,, | Performed by: INTERNAL MEDICINE

## 2022-01-27 PROCEDURE — 3074F PR MOST RECENT SYSTOLIC BLOOD PRESSURE < 130 MM HG: ICD-10-PCS | Mod: HCNC,CPTII,S$GLB, | Performed by: INTERNAL MEDICINE

## 2022-01-27 PROCEDURE — 1160F RVW MEDS BY RX/DR IN RCRD: CPT | Mod: HCNC,CPTII,S$GLB, | Performed by: INTERNAL MEDICINE

## 2022-01-27 PROCEDURE — 3008F BODY MASS INDEX DOCD: CPT | Mod: HCNC,CPTII,S$GLB, | Performed by: INTERNAL MEDICINE

## 2022-01-27 PROCEDURE — 1159F PR MEDICATION LIST DOCUMENTED IN MEDICAL RECORD: ICD-10-PCS | Mod: HCNC,CPTII,S$GLB, | Performed by: INTERNAL MEDICINE

## 2022-01-27 PROCEDURE — 99999 PR PBB SHADOW E&M-EST. PATIENT-LVL V: ICD-10-PCS | Mod: PBBFAC,HCNC,, | Performed by: INTERNAL MEDICINE

## 2022-01-27 PROCEDURE — 96375 TX/PRO/DX INJ NEW DRUG ADDON: CPT | Mod: HCNC

## 2022-01-27 PROCEDURE — 99215 OFFICE O/P EST HI 40 MIN: CPT | Mod: HCNC,S$GLB,, | Performed by: INTERNAL MEDICINE

## 2022-01-27 PROCEDURE — 96413 CHEMO IV INFUSION 1 HR: CPT | Mod: HCNC

## 2022-01-27 PROCEDURE — 3008F PR BODY MASS INDEX (BMI) DOCUMENTED: ICD-10-PCS | Mod: HCNC,CPTII,S$GLB, | Performed by: INTERNAL MEDICINE

## 2022-01-27 PROCEDURE — 1160F PR REVIEW ALL MEDS BY PRESCRIBER/CLIN PHARMACIST DOCUMENTED: ICD-10-PCS | Mod: HCNC,CPTII,S$GLB, | Performed by: INTERNAL MEDICINE

## 2022-01-27 PROCEDURE — 1126F AMNT PAIN NOTED NONE PRSNT: CPT | Mod: HCNC,CPTII,S$GLB, | Performed by: INTERNAL MEDICINE

## 2022-01-27 PROCEDURE — 3288F PR FALLS RISK ASSESSMENT DOCUMENTED: ICD-10-PCS | Mod: HCNC,CPTII,S$GLB, | Performed by: INTERNAL MEDICINE

## 2022-01-27 PROCEDURE — 3074F SYST BP LT 130 MM HG: CPT | Mod: HCNC,CPTII,S$GLB, | Performed by: INTERNAL MEDICINE

## 2022-01-27 PROCEDURE — 96367 TX/PROPH/DG ADDL SEQ IV INF: CPT | Mod: HCNC

## 2022-01-27 PROCEDURE — 63600175 PHARM REV CODE 636 W HCPCS: Mod: HCNC | Performed by: INTERNAL MEDICINE

## 2022-01-27 PROCEDURE — 1126F PR PAIN SEVERITY QUANTIFIED, NO PAIN PRESENT: ICD-10-PCS | Mod: HCNC,CPTII,S$GLB, | Performed by: INTERNAL MEDICINE

## 2022-01-27 PROCEDURE — 96366 THER/PROPH/DIAG IV INF ADDON: CPT | Mod: HCNC

## 2022-01-27 PROCEDURE — 1159F MED LIST DOCD IN RCRD: CPT | Mod: HCNC,CPTII,S$GLB, | Performed by: INTERNAL MEDICINE

## 2022-01-27 PROCEDURE — 96361 HYDRATE IV INFUSION ADD-ON: CPT | Mod: HCNC

## 2022-01-27 PROCEDURE — 99215 PR OFFICE/OUTPT VISIT, EST, LEVL V, 40-54 MIN: ICD-10-PCS | Mod: HCNC,S$GLB,, | Performed by: INTERNAL MEDICINE

## 2022-01-27 PROCEDURE — 99499 RISK ADDL DX/OHS AUDIT: ICD-10-PCS | Mod: S$GLB,,, | Performed by: INTERNAL MEDICINE

## 2022-01-27 PROCEDURE — 25000003 PHARM REV CODE 250: Mod: HCNC | Performed by: INTERNAL MEDICINE

## 2022-01-27 PROCEDURE — 3078F DIAST BP <80 MM HG: CPT | Mod: HCNC,CPTII,S$GLB, | Performed by: INTERNAL MEDICINE

## 2022-01-27 PROCEDURE — A4216 STERILE WATER/SALINE, 10 ML: HCPCS | Mod: HCNC | Performed by: INTERNAL MEDICINE

## 2022-01-27 PROCEDURE — 3078F PR MOST RECENT DIASTOLIC BLOOD PRESSURE < 80 MM HG: ICD-10-PCS | Mod: HCNC,CPTII,S$GLB, | Performed by: INTERNAL MEDICINE

## 2022-01-27 PROCEDURE — 1101F PR PT FALLS ASSESS DOC 0-1 FALLS W/OUT INJ PAST YR: ICD-10-PCS | Mod: HCNC,CPTII,S$GLB, | Performed by: INTERNAL MEDICINE

## 2022-01-27 RX ORDER — HEPARIN 100 UNIT/ML
500 SYRINGE INTRAVENOUS
Status: DISCONTINUED | OUTPATIENT
Start: 2022-01-27 | End: 2022-01-27 | Stop reason: HOSPADM

## 2022-01-27 RX ORDER — MAGNESIUM SULFATE HEPTAHYDRATE 40 MG/ML
2 INJECTION, SOLUTION INTRAVENOUS
Status: CANCELLED
Start: 2022-01-27

## 2022-01-27 RX ORDER — MAGNESIUM SULFATE HEPTAHYDRATE 40 MG/ML
2 INJECTION, SOLUTION INTRAVENOUS
Status: COMPLETED | OUTPATIENT
Start: 2022-01-27 | End: 2022-01-27

## 2022-01-27 RX ORDER — SODIUM CHLORIDE 0.9 % (FLUSH) 0.9 %
10 SYRINGE (ML) INJECTION
Status: DISCONTINUED | OUTPATIENT
Start: 2022-01-27 | End: 2022-01-27 | Stop reason: HOSPADM

## 2022-01-27 RX ORDER — POTASSIUM CHLORIDE 7.45 MG/ML
10 INJECTION INTRAVENOUS
Status: COMPLETED | OUTPATIENT
Start: 2022-01-27 | End: 2022-01-27

## 2022-01-27 RX ORDER — HEPARIN 100 UNIT/ML
500 SYRINGE INTRAVENOUS
Status: CANCELLED | OUTPATIENT
Start: 2022-01-27

## 2022-01-27 RX ORDER — SODIUM CHLORIDE 0.9 % (FLUSH) 0.9 %
10 SYRINGE (ML) INJECTION
Status: CANCELLED | OUTPATIENT
Start: 2022-01-27

## 2022-01-27 RX ADMIN — POTASSIUM CHLORIDE 10 MEQ: 10 INJECTION, SOLUTION INTRAVENOUS at 10:01

## 2022-01-27 RX ADMIN — MAGNESIUM SULFATE HEPTAHYDRATE 2 G: 40 INJECTION, SOLUTION INTRAVENOUS at 02:01

## 2022-01-27 RX ADMIN — POTASSIUM CHLORIDE 10 MEQ: 10 INJECTION, SOLUTION INTRAVENOUS at 11:01

## 2022-01-27 RX ADMIN — SODIUM CHLORIDE 500 ML: 0.9 INJECTION, SOLUTION INTRAVENOUS at 02:01

## 2022-01-27 RX ADMIN — GEMCITABINE HYDROCHLORIDE 2200 MG: 1 INJECTION, SOLUTION INTRAVENOUS at 12:01

## 2022-01-27 RX ADMIN — HEPARIN 500 UNITS: 100 SYRINGE at 04:01

## 2022-01-27 RX ADMIN — MAGNESIUM SULFATE HEPTAHYDRATE 250 ML/HR: 500 INJECTION, SOLUTION INTRAMUSCULAR; INTRAVENOUS at 10:01

## 2022-01-27 RX ADMIN — SODIUM CHLORIDE: 0.9 INJECTION, SOLUTION INTRAVENOUS at 10:01

## 2022-01-27 RX ADMIN — APREPITANT 130 MG: 130 INJECTION, EMULSION INTRAVENOUS at 12:01

## 2022-01-27 RX ADMIN — CISPLATIN 57 MG: 1 INJECTION INTRAVENOUS at 01:01

## 2022-01-27 RX ADMIN — Medication 10 ML: at 04:01

## 2022-01-27 NOTE — PROGRESS NOTES
"                                                         PROGRESS NOTE    Subjective:       Patient ID: Domonique Kellogg is a 71 y.o. male.    Chief Complaint: follow up for cholangiocarcinoma    Diagnosis:  Advanced intrahepatic cholangiocarcinoma, MSI-low, negative for FGFR2 fusion or IDH1/IDH2 mutations, diagnosed on 11/22/2021.     Molecular Profile:  Guardant 360 11/29/21: +CCND1 amplification. VUS: CATRACHITO [id number]. MSI-high not detected.     Oncologic History:  1. Mr Kellogg is a 70 yo man with CAD, HTN, seizures in 2011 who initially saw me on 11/29/21 for further management of cholangiocarcinoma. He presented with weight loss and diarrhea since July. US 9/22/21 showed "Enlarged, heterogeneous appearance of the liver likely due to multiple underlying hepatic lesions largest measuring 4.8 cm."  MRI abdomen w/wo contrast 10/4/21: "Multiple liver lesions measuring up to 13.3 cm in the right hepatic lobe.  Differential diagnosis includes metastases, fibrolamellar hepatocellular carcinoma, or atypical focal nodular hyperplasia.  Consider biopsy for further evaluation. Thrombosis of the anterior branch of the right portal vein and left hepatic vein.  Nonvisualization of the middle and right hepatic veins, which may be thrombosed as well. Prominent periportal lymph node, which is nonspecific."  EGD and colonoscopy on 10/11/21 were negative for primary malignancies.   He underwent liver lesion biopsy and Y90 mapping on 11/22/21. Pathology showed adenocarcinoma: "Biopsy of the liver mass shows a malignant neoplasm in a fibrotic stroma. Glandular architecture is readily identified with several foci of intraluminal necrosis. Scattered mitotic figures are seen. Neoplastic cells show the following immunophenotype:   Positive: AE1/AE3, CK7, CDX2   Negative: Chromogranin, Synaptophysin, TTF, CK5/6, CK20, HepPar1   The morphology and immunophenotype are compatible with adenocarcinoma. Considerations for a primary site include " "pancreaticobiliary (including intrahepatic cholangiocarcinoma) as well as upper gastrointestinal. Clinical and radiologic correlation is suggested."  Patient presents today with wife for further management. No pain. Initial weight loss has somewhat stabilized. Still has diarrhea. Has not tried imodium yet. +nervous  Mother had kidney cancer at age 64. Maternal grandmother had liver cancer in her 60s. Patient has three children, two daughters and one son.   Discussed palliative chemo with cis/gem  2. PET scan 21 did not show extrahepatic metastases.   3. Cis/gem started on 21, s/p 2 cycles  4. S/p TACE on 2021    Interval History:   Mr Kellogg returns for cycle 3 cis/gem. Tolerated it well overall. Was not able to urinate. I gave him flomax. He had very frequent urination after taking Flomax. He stopped flomax and is doing better. He is holding off on seeing urology right now as he is doing okay.     ECO    ROS:   A ten-point system review is obtained and negative except for what was stated in the Interval History.     Physical Examination:   Vital signs reviewed.   General: well hydrated, well developed, in no acute distress  HEENT: normocephalic, PERRLA, EOMI, anicteric sclerae, oropharynx clear  Neck: supple, no JVD, thyromegaly, cervical or supraclavicular lymphadenopathy  Lungs: clear breath sounds bilaterally, no wheezing, rales, or rhonchi  Heart: RRR, no M/R/G  Abdomen: soft, no tenderness, non-distended, no hepatosplenomegaly, mass, or hernia. BS present  Extremities: no clubbing, cyanosis, or edema  Skin: no rash, ulcer, or open wounds  Neuro: alert and oriented x 4, no focal neuro deficit  Psych: pleasant and appropriate mood and affect    Objective:     Laboratory Data:  Labs reviewed. CA 19-9 improved from 782 to 552. Bilirubin has improved to normal. Mg 1.4    Imaging Data:  PET 21:  In this patient with known intrahepatic cholangiocarcinoma, there are multifocal hypermetabolic " hepatic lesions in both hepatic lobes.  No evidence of distant metastasis.     Lipomatosis of the ileocecal valve and proximal cecum.     Fat containing umbilical hernia.    MRI Abdomen 1/18/22:  1. Patient with reported cholangiocarcinoma.  Interval progression of hepatic tumor burden with multiple enlarging and new hepatic lesions.  2. Progression of portal venous thrombosis involving the left, right, and main portal veins.  Persistent filling defect within the central hepatic veins concerning for thrombosis.  3. Stable prominent periportal lymph node.    Assessment and Plan:     1. 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 Immunodeficiency secondary to neoplasm    4. Hypomagnesemia    5. Cancer related pain    6. Essential hypertension    7. CAD in native artery    8. Bilateral carotid artery disease, unspecified type    9. Portal vein thrombosis      1-3  - Mr Kellogg is a 70 yo man with advanced intrahepatic cholangiocarcinoma with multiple liver lesions. MSI-low, FGFR2 fusion and IDH1/2 mutation negative. Started on cis/gem on 12/7/21, s/p 2 cycles. S/p TACE on 12/16/2021  - MRI abdomen 1/18/22 showed progression. Reviewed scan and lab results with patient and wife. MRI on 1/18/22 was compared to MRI from 10/4/21. He started chemo two months after the MRI in October. CA 19-9 decreased nicely since he started on chemo. Tumor likely progressed before he started chemo but seems to be doing well since we got chemo  - continue with cis/gem. C3D1 today  - RTC next week for C3D8  - scheduled for repeat TACE on 2/14. Will start cycle 4 day 1 on 2/22  - restaging scan in 2 months    4.  - Mg 1.4. 2/2 cisplatin  - increase IV magnesium with fluid today  - monitor  - patient also taking oral magnesium at home    5.  - no pain now but had pain after TACE  - oxycodone prn    6.  - BP controlled  - c/w current medication    7.8  - on eliquis and provachol    9.  - reviewed with  radiology. [place] thrombus  - c/w eliquis 5 mg bid    Follow-up:     RTC next week for cycle 3 day 8  Knows to call in the interval if any problems arise.    I spent 45 minutes reviewing the chart, interpreting laboratory result and imaging data, coordinating patient's care, with at least 50% of the time on face-to-face counseling.       Electronically signed by Claudia Young

## 2022-01-27 NOTE — Clinical Note
CBC, CMP, CA 19-9, magnesium on 2/2, see BRADLEY on 2/3 then get cis/gem. CBC, CMP, CA 19-9, Magnesium on 2/21, see me on 2/22 then get cis/gem. CBC, CMP, CA 19-9, magnesium on 2/28, see BRADLEY on 3/1 then get cis/gem

## 2022-01-28 ENCOUNTER — TELEPHONE (OUTPATIENT)
Dept: UROLOGY | Facility: CLINIC | Age: 72
End: 2022-01-28
Payer: MEDICARE

## 2022-01-28 ENCOUNTER — TELEPHONE (OUTPATIENT)
Dept: HEMATOLOGY/ONCOLOGY | Facility: CLINIC | Age: 72
End: 2022-01-28
Payer: MEDICARE

## 2022-01-28 NOTE — PLAN OF CARE
Patient meets parameters for treatment today. Patient tolerated Gemzar, Cisplatin and pre and post hydration with electorlytes  with VSS and no complications. Patient instructed to call clinic with any problems or concerns. Patient verbalize understanding. AVS given and patient discharged home.

## 2022-01-30 ENCOUNTER — PATIENT MESSAGE (OUTPATIENT)
Dept: HEMATOLOGY/ONCOLOGY | Facility: CLINIC | Age: 72
End: 2022-01-30
Payer: MEDICARE

## 2022-02-01 NOTE — NURSING
Pt escorted off the unit   Subjective:      Hong Gordon is a 7 y.o. male here with mother. Patient brought in for Rash      History of Present Illness:  HPI 8 yo with rash on left side of nose and right ear lobe last few days. Sib with several lesions as well.  No fever. Honey crusted.     Review of Systems   Constitutional: Negative for activity change, appetite change and fever.   HENT: Negative for congestion, ear pain, rhinorrhea and sore throat.    Respiratory: Negative for cough and shortness of breath.    Gastrointestinal: Negative for abdominal pain, diarrhea and vomiting.   Genitourinary: Negative for decreased urine volume.   Skin: Positive for rash and wound.   Psychiatric/Behavioral: Negative for sleep disturbance.       Objective:     Physical Exam  Vitals reviewed.   Constitutional:       General: He is active.      Appearance: He is well-developed and well-nourished.   HENT:      Head: Atraumatic.      Right Ear: Tympanic membrane normal.      Left Ear: Tympanic membrane normal.      Nose: No nasal discharge.      Mouth/Throat:      Mouth: Mucous membranes are moist.      Pharynx: Oropharynx is clear. Normal.      Tonsils: No tonsillar exudate.   Eyes:      General:         Right eye: No discharge.         Left eye: No discharge.      Conjunctiva/sclera: Conjunctivae normal.   Cardiovascular:      Rate and Rhythm: Normal rate and regular rhythm.   Pulmonary:      Effort: Pulmonary effort is normal. No respiratory distress.      Breath sounds: Normal breath sounds.   Abdominal:      General: Bowel sounds are normal.      Palpations: Abdomen is soft. There is no hepatosplenomegaly.      Tenderness: There is no abdominal tenderness.   Musculoskeletal:         General: Normal range of motion.      Cervical back: Neck supple.   Lymphadenopathy:      Cervical: No neck adenopathy.   Skin:     General: Skin is warm.      Findings: Rash: 1 cm crusty  honey colored lesion right ear lobe and left nare.   Neurological:      Mental  Status: He is alert.         Assessment:        1. Impetigo         Plan:        Hong was seen today for rash.    Diagnoses and all orders for this visit:    Impetigo  -     cephALEXin (KEFLEX) 250 mg/5 mL suspension; Take 6.7 mLs (335 mg total) by mouth every 8 (eight) hours. for 7 days

## 2022-02-02 ENCOUNTER — LAB VISIT (OUTPATIENT)
Dept: LAB | Facility: HOSPITAL | Age: 72
End: 2022-02-02
Attending: INTERNAL MEDICINE
Payer: MEDICARE

## 2022-02-02 DIAGNOSIS — C22.1 INTRAHEPATIC CHOLANGIOCARCINOMA: ICD-10-CM

## 2022-02-02 LAB
ALBUMIN SERPL BCP-MCNC: 2.8 G/DL (ref 3.5–5.2)
ALP SERPL-CCNC: 291 U/L (ref 55–135)
ALT SERPL W/O P-5'-P-CCNC: 31 U/L (ref 10–44)
ANION GAP SERPL CALC-SCNC: 9 MMOL/L (ref 8–16)
AST SERPL-CCNC: 37 U/L (ref 10–40)
BILIRUB SERPL-MCNC: 0.6 MG/DL (ref 0.1–1)
BUN SERPL-MCNC: 21 MG/DL (ref 8–23)
CALCIUM SERPL-MCNC: 9.9 MG/DL (ref 8.7–10.5)
CANCER AG19-9 SERPL-ACNC: 703.3 U/ML (ref 0–40)
CHLORIDE SERPL-SCNC: 104 MMOL/L (ref 95–110)
CO2 SERPL-SCNC: 24 MMOL/L (ref 23–29)
CREAT SERPL-MCNC: 0.8 MG/DL (ref 0.5–1.4)
ERYTHROCYTE [DISTWIDTH] IN BLOOD BY AUTOMATED COUNT: 17.4 % (ref 11.5–14.5)
EST. GFR  (AFRICAN AMERICAN): >60 ML/MIN/1.73 M^2
EST. GFR  (NON AFRICAN AMERICAN): >60 ML/MIN/1.73 M^2
GLUCOSE SERPL-MCNC: 104 MG/DL (ref 70–110)
HCT VFR BLD AUTO: 27.1 % (ref 40–54)
HGB BLD-MCNC: 8.4 G/DL (ref 14–18)
IMM GRANULOCYTES # BLD AUTO: 0.2 K/UL (ref 0–0.04)
MAGNESIUM SERPL-MCNC: 1.6 MG/DL (ref 1.6–2.6)
MCH RBC QN AUTO: 30.8 PG (ref 27–31)
MCHC RBC AUTO-ENTMCNC: 31 G/DL (ref 32–36)
MCV RBC AUTO: 99 FL (ref 82–98)
NEUTROPHILS # BLD AUTO: 3.1 K/UL (ref 1.8–7.7)
PLATELET # BLD AUTO: 384 K/UL (ref 150–450)
PMV BLD AUTO: 9.7 FL (ref 9.2–12.9)
POTASSIUM SERPL-SCNC: 3.8 MMOL/L (ref 3.5–5.1)
PROT SERPL-MCNC: 6.3 G/DL (ref 6–8.4)
RBC # BLD AUTO: 2.73 M/UL (ref 4.6–6.2)
SODIUM SERPL-SCNC: 137 MMOL/L (ref 136–145)
WBC # BLD AUTO: 4.94 K/UL (ref 3.9–12.7)

## 2022-02-02 PROCEDURE — 86301 IMMUNOASSAY TUMOR CA 19-9: CPT | Mod: HCNC | Performed by: INTERNAL MEDICINE

## 2022-02-02 PROCEDURE — 36415 COLL VENOUS BLD VENIPUNCTURE: CPT | Mod: HCNC,PN | Performed by: INTERNAL MEDICINE

## 2022-02-02 PROCEDURE — 85027 COMPLETE CBC AUTOMATED: CPT | Mod: HCNC | Performed by: PHYSICIAN ASSISTANT

## 2022-02-02 PROCEDURE — 83735 ASSAY OF MAGNESIUM: CPT | Mod: HCNC | Performed by: PHYSICIAN ASSISTANT

## 2022-02-02 PROCEDURE — 80053 COMPREHEN METABOLIC PANEL: CPT | Mod: HCNC | Performed by: PHYSICIAN ASSISTANT

## 2022-02-03 ENCOUNTER — INFUSION (OUTPATIENT)
Dept: INFUSION THERAPY | Facility: HOSPITAL | Age: 72
End: 2022-02-03
Payer: MEDICARE

## 2022-02-03 ENCOUNTER — OFFICE VISIT (OUTPATIENT)
Dept: HEMATOLOGY/ONCOLOGY | Facility: CLINIC | Age: 72
End: 2022-02-03
Payer: MEDICARE

## 2022-02-03 VITALS
RESPIRATION RATE: 18 BRPM | TEMPERATURE: 98 F | HEIGHT: 68 IN | HEART RATE: 70 BPM | SYSTOLIC BLOOD PRESSURE: 116 MMHG | DIASTOLIC BLOOD PRESSURE: 59 MMHG | OXYGEN SATURATION: 98 % | BODY MASS INDEX: 33.63 KG/M2 | WEIGHT: 221.88 LBS

## 2022-02-03 VITALS — RESPIRATION RATE: 18 BRPM | DIASTOLIC BLOOD PRESSURE: 70 MMHG | SYSTOLIC BLOOD PRESSURE: 128 MMHG | HEART RATE: 62 BPM

## 2022-02-03 DIAGNOSIS — D84.81 IMMUNODEFICIENCY SECONDARY TO NEOPLASM: ICD-10-CM

## 2022-02-03 DIAGNOSIS — I10 ESSENTIAL HYPERTENSION: ICD-10-CM

## 2022-02-03 DIAGNOSIS — E83.42 HYPOMAGNESEMIA: ICD-10-CM

## 2022-02-03 DIAGNOSIS — G89.3 CANCER RELATED PAIN: ICD-10-CM

## 2022-02-03 DIAGNOSIS — T45.1X5A IMMUNODEFICIENCY SECONDARY TO CHEMOTHERAPY: ICD-10-CM

## 2022-02-03 DIAGNOSIS — I81 PORTAL VEIN THROMBOSIS: ICD-10-CM

## 2022-02-03 DIAGNOSIS — D84.821 IMMUNODEFICIENCY SECONDARY TO CHEMOTHERAPY: ICD-10-CM

## 2022-02-03 DIAGNOSIS — C22.1 INTRAHEPATIC CHOLANGIOCARCINOMA: Primary | ICD-10-CM

## 2022-02-03 DIAGNOSIS — I25.10 CAD IN NATIVE ARTERY: ICD-10-CM

## 2022-02-03 DIAGNOSIS — I77.9 BILATERAL CAROTID ARTERY DISEASE, UNSPECIFIED TYPE: ICD-10-CM

## 2022-02-03 DIAGNOSIS — Z79.899 IMMUNODEFICIENCY SECONDARY TO CHEMOTHERAPY: ICD-10-CM

## 2022-02-03 DIAGNOSIS — D49.9 IMMUNODEFICIENCY SECONDARY TO NEOPLASM: ICD-10-CM

## 2022-02-03 PROCEDURE — 96360 HYDRATION IV INFUSION INIT: CPT | Mod: HCNC

## 2022-02-03 PROCEDURE — 96367 TX/PROPH/DG ADDL SEQ IV INF: CPT | Mod: HCNC

## 2022-02-03 PROCEDURE — 99499 UNLISTED E&M SERVICE: CPT | Mod: S$GLB,,, | Performed by: PHYSICIAN ASSISTANT

## 2022-02-03 PROCEDURE — A4216 STERILE WATER/SALINE, 10 ML: HCPCS | Mod: HCNC | Performed by: INTERNAL MEDICINE

## 2022-02-03 PROCEDURE — 99999 PR PBB SHADOW E&M-EST. PATIENT-LVL V: CPT | Mod: PBBFAC,HCNC,, | Performed by: PHYSICIAN ASSISTANT

## 2022-02-03 PROCEDURE — 63600175 PHARM REV CODE 636 W HCPCS: Mod: HCNC | Performed by: INTERNAL MEDICINE

## 2022-02-03 PROCEDURE — 96417 CHEMO IV INFUS EACH ADDL SEQ: CPT | Mod: HCNC

## 2022-02-03 PROCEDURE — 99499 RISK ADDL DX/OHS AUDIT: ICD-10-PCS | Mod: S$GLB,,, | Performed by: PHYSICIAN ASSISTANT

## 2022-02-03 PROCEDURE — 99215 PR OFFICE/OUTPT VISIT, EST, LEVL V, 40-54 MIN: ICD-10-PCS | Mod: S$GLB,,, | Performed by: PHYSICIAN ASSISTANT

## 2022-02-03 PROCEDURE — 99999 PR PBB SHADOW E&M-EST. PATIENT-LVL V: ICD-10-PCS | Mod: PBBFAC,HCNC,, | Performed by: PHYSICIAN ASSISTANT

## 2022-02-03 PROCEDURE — 99215 OFFICE O/P EST HI 40 MIN: CPT | Mod: S$GLB,,, | Performed by: PHYSICIAN ASSISTANT

## 2022-02-03 PROCEDURE — 96413 CHEMO IV INFUSION 1 HR: CPT | Mod: HCNC

## 2022-02-03 PROCEDURE — 96365 THER/PROPH/DIAG IV INF INIT: CPT | Mod: 59,HCNC

## 2022-02-03 PROCEDURE — 96375 TX/PRO/DX INJ NEW DRUG ADDON: CPT | Mod: HCNC

## 2022-02-03 PROCEDURE — 25000003 PHARM REV CODE 250: Mod: HCNC | Performed by: INTERNAL MEDICINE

## 2022-02-03 RX ORDER — HEPARIN 100 UNIT/ML
500 SYRINGE INTRAVENOUS
Status: DISCONTINUED | OUTPATIENT
Start: 2022-02-03 | End: 2022-02-03 | Stop reason: HOSPADM

## 2022-02-03 RX ORDER — POTASSIUM CHLORIDE 7.45 MG/ML
10 INJECTION INTRAVENOUS
Status: COMPLETED | OUTPATIENT
Start: 2022-02-03 | End: 2022-02-03

## 2022-02-03 RX ORDER — SODIUM CHLORIDE 0.9 % (FLUSH) 0.9 %
10 SYRINGE (ML) INJECTION
Status: DISCONTINUED | OUTPATIENT
Start: 2022-02-03 | End: 2022-02-03 | Stop reason: HOSPADM

## 2022-02-03 RX ORDER — SODIUM CHLORIDE 0.9 % (FLUSH) 0.9 %
10 SYRINGE (ML) INJECTION
Status: CANCELLED | OUTPATIENT
Start: 2022-02-03

## 2022-02-03 RX ORDER — HEPARIN 100 UNIT/ML
500 SYRINGE INTRAVENOUS
Status: CANCELLED | OUTPATIENT
Start: 2022-02-03

## 2022-02-03 RX ADMIN — APREPITANT 130 MG: 130 INJECTION, EMULSION INTRAVENOUS at 11:02

## 2022-02-03 RX ADMIN — MAGNESIUM SULFATE HEPTAHYDRATE 500 ML/HR: 500 INJECTION, SOLUTION INTRAMUSCULAR; INTRAVENOUS at 09:02

## 2022-02-03 RX ADMIN — POTASSIUM CHLORIDE 10 MEQ: 10 INJECTION, SOLUTION INTRAVENOUS at 10:02

## 2022-02-03 RX ADMIN — Medication 10 ML: at 02:02

## 2022-02-03 RX ADMIN — GEMCITABINE HYDROCHLORIDE 2200 MG: 1 INJECTION, SOLUTION INTRAVENOUS at 12:02

## 2022-02-03 RX ADMIN — PALONOSETRON HYDROCHLORIDE 0.25 MG: 0.25 INJECTION, SOLUTION INTRAVENOUS at 11:02

## 2022-02-03 RX ADMIN — SODIUM CHLORIDE 500 ML: 0.9 INJECTION, SOLUTION INTRAVENOUS at 01:02

## 2022-02-03 RX ADMIN — HEPARIN 500 UNITS: 100 SYRINGE at 02:02

## 2022-02-03 RX ADMIN — POTASSIUM CHLORIDE 10 MEQ: 10 INJECTION, SOLUTION INTRAVENOUS at 09:02

## 2022-02-03 RX ADMIN — SODIUM CHLORIDE: 0.9 INJECTION, SOLUTION INTRAVENOUS at 09:02

## 2022-02-03 RX ADMIN — CISPLATIN 57 MG: 1 INJECTION INTRAVENOUS at 12:02

## 2022-02-03 NOTE — PLAN OF CARE
1420-Pt tolerated Gemzar and Cisplatin infusion well, no complications or side effects, POC and meds discussed with pt, pt aware of upcoming appts, pt knows to call MD with any questions or concerns. Pt ambulated off unit, no distress noted.

## 2022-02-03 NOTE — Clinical Note
Please schedule cbc, cmp, ca 19-9, magnesium, clinic visit with Dr. Young and cycle 4 day 1 of Hanna/Cis on 2/21/2022. Thank you

## 2022-02-03 NOTE — PROGRESS NOTES
"                                                         PROGRESS NOTE    Subjective:       Patient ID: Domonique Kellogg is a 71 y.o. male.    Chief Complaint: follow up for cholangiocarcinoma    Diagnosis:  Advanced intrahepatic cholangiocarcinoma, MSI-low, negative for FGFR2 fusion or IDH1/IDH2 mutations, diagnosed on 11/22/2021.     Molecular Profile:  Guardant 360 11/29/21: +CCND1 amplification. VUS: CATRACHITO [id number]. MSI-high not detected.     Oncologic History copied from medical chart:  1. Mr Kellogg is a 70 yo man with CAD, HTN, seizures in 2011 who initially saw me on 11/29/21 for further management of cholangiocarcinoma. He presented with weight loss and diarrhea since July. US 9/22/21 showed "Enlarged, heterogeneous appearance of the liver likely due to multiple underlying hepatic lesions largest measuring 4.8 cm."  MRI abdomen w/wo contrast 10/4/21: "Multiple liver lesions measuring up to 13.3 cm in the right hepatic lobe.  Differential diagnosis includes metastases, fibrolamellar hepatocellular carcinoma, or atypical focal nodular hyperplasia.  Consider biopsy for further evaluation. Thrombosis of the anterior branch of the right portal vein and left hepatic vein.  Nonvisualization of the middle and right hepatic veins, which may be thrombosed as well. Prominent periportal lymph node, which is nonspecific."  EGD and colonoscopy on 10/11/21 were negative for primary malignancies.   He underwent liver lesion biopsy and Y90 mapping on 11/22/21. Pathology showed adenocarcinoma: "Biopsy of the liver mass shows a malignant neoplasm in a fibrotic stroma. Glandular architecture is readily identified with several foci of intraluminal necrosis. Scattered mitotic figures are seen. Neoplastic cells show the following immunophenotype:   Positive: AE1/AE3, CK7, CDX2   Negative: Chromogranin, Synaptophysin, TTF, CK5/6, CK20, HepPar1   The morphology and immunophenotype are compatible with adenocarcinoma. Considerations for " "a primary site include pancreaticobiliary (including intrahepatic cholangiocarcinoma) as well as upper gastrointestinal. Clinical and radiologic correlation is suggested."  Patient presents today with wife for further management. No pain. Initial weight loss has somewhat stabilized. Still has diarrhea. Has not tried imodium yet. +nervous  Mother had kidney cancer at age 64. Maternal grandmother had liver cancer in her 60s. Patient has three children, two daughters and one son.   Discussed palliative chemo with cis/gem  2. PET scan 21 did not show extrahepatic metastases.   3. Cis/gem started on 21, s/p 2 cycles  4. S/p TACE on 2021    Interval History:   Mr Kellogg returns for cycle 4 cis/gem. Continues to tolerate treatment well. Urinating well. He is eating and has a good appetite. No changes to his hearing. No fever, n/v, abdominal pain, constipation. Diarrhea has improved as well. Overall, continues to do very well. Presents with his wife.     Scheduled for repeat TACE procedure on 2022 and EGD on  2022    ECO    ROS:   A ten-point system review is obtained and negative except for what was stated in the Interval History.     Physical Examination:   Vital signs reviewed.   General: well hydrated, well developed, in no acute distress  HEENT: normocephalic, PERRLA, EOMI, anicteric sclerae, oropharynx clear  Neck: supple, no JVD, thyromegaly, cervical or supraclavicular lymphadenopathy  Lungs: clear breath sounds bilaterally, no wheezing, rales, or rhonchi  Heart: RRR, no M/R/G  Abdomen: soft, no tenderness, non-distended, no mass, or hernia. BS present  Extremities: no clubbing, cyanosis, or edema  Skin: no rash, ulcer, or open wounds  Neuro: alert and oriented x 4, no focal neuro deficit  Psych: pleasant and appropriate mood and affect    Objective:     Laboratory Data:  Labs reviewed. CA 19-9 increased again from 552 to 703, previously 782 to 552. Bilirubin has improved to normal. Mg " 1.6    Imaging Data:  PET 12/6/21:  In this patient with known intrahepatic cholangiocarcinoma, there are multifocal hypermetabolic hepatic lesions in both hepatic lobes.  No evidence of distant metastasis.     Lipomatosis of the ileocecal valve and proximal cecum.     Fat containing umbilical hernia.    MRI Abdomen 1/18/22:  1. Patient with reported cholangiocarcinoma.  Interval progression of hepatic tumor burden with multiple enlarging and new hepatic lesions.  2. Progression of portal venous thrombosis involving the left, right, and main portal veins.  Persistent filling defect within the central hepatic veins concerning for thrombosis.  3. Stable prominent periportal lymph node.    Assessment and Plan:     1. 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 Immunodeficiency secondary to neoplasm    4. Hypomagnesemia    5. Cancer related pain    6. Essential hypertension    7. CAD in native artery    8. Bilateral carotid artery disease, unspecified type    9. Portal vein thrombosis      1-3  - Mr Kellogg is a 72 yo man with advanced intrahepatic cholangiocarcinoma with multiple liver lesions. MSI-low, FGFR2 fusion and IDH1/2 mutation negative. Started on cis/gem on 12/7/21, s/p 2 cycles. S/p TACE on 12/16/2021  - MRI abdomen 1/18/22 showed progression. Reviewed scan and lab results with patient and wife. MRI on 1/18/22 was compared to MRI from 10/4/21. He started chemo two months after the MRI in October. CA 19-9 decreased nicely since he started on chemo. Tumor likely progressed before he started chemo but seems to be doing well since we got chemo  - He continues to do well with chemotherapy. He is eating and has a good appetite. No significant side effects from treatment. Diarrhea has improved. continue with cis/gem.   - C3D8 today  - RTC in 2 weeks for C4D1  - scheduled for repeat TACE on 2/14, will have EGD on 2/17as well. Will start cycle 4 day 1 on 2/22  - restaging  scan in approx 4-6 weeks    4.  - Mg 1.6. Normal today. 2/2 cisplatin  - monitor  - patient also taking oral magnesium at home. Will continue    5.  - no pain now but had pain after TACE  - oxycodone prn    6.  - BP controlled  - c/w current medication    7,8  - on eliquis and provachol.   - Hgb stable. No evidence of bleeding. Tolerating medication well.     9.  - reviewed with radiology. Winkler thrombus  - c/w eliquis 5 mg bid    Follow-up:     RTC in 2 weeks for cycle 4 day 1  Knows to call in the interval if any problems arise.    Pte and family members displayed understanding of the above encounter and treatment plan. All thoughtful questions were answered to their satisfaction. Pte was advised to notify the care team or proceed to the ER if signs and symptoms worsen.     NEO Ervin, PA-C  Physician Assistant Certified  Dept of Hematology/Oncology  PA-C to Dr. Young, Dr. Lane and Dr. Bourgeois  Electronically signed by Gertrude Ni

## 2022-02-09 ENCOUNTER — OFFICE VISIT (OUTPATIENT)
Dept: PSYCHIATRY | Facility: CLINIC | Age: 72
End: 2022-02-09
Payer: MEDICARE

## 2022-02-09 ENCOUNTER — PATIENT MESSAGE (OUTPATIENT)
Dept: PSYCHIATRY | Facility: CLINIC | Age: 72
End: 2022-02-09
Payer: MEDICARE

## 2022-02-09 DIAGNOSIS — F43.22 ADJUSTMENT DISORDER WITH ANXIETY: Primary | ICD-10-CM

## 2022-02-09 DIAGNOSIS — C78.7 SECONDARY MALIGNANT NEOPLASM OF LIVER: ICD-10-CM

## 2022-02-09 DIAGNOSIS — C22.1 INTRAHEPATIC CHOLANGIOCARCINOMA: ICD-10-CM

## 2022-02-09 PROCEDURE — 90791 PR PSYCHIATRIC DIAGNOSTIC EVALUATION: ICD-10-PCS | Mod: HCNC,S$GLB,, | Performed by: PSYCHOLOGIST

## 2022-02-09 PROCEDURE — 1159F PR MEDICATION LIST DOCUMENTED IN MEDICAL RECORD: ICD-10-PCS | Mod: HCNC,CPTII,S$GLB, | Performed by: PSYCHOLOGIST

## 2022-02-09 PROCEDURE — 1159F MED LIST DOCD IN RCRD: CPT | Mod: HCNC,CPTII,S$GLB, | Performed by: PSYCHOLOGIST

## 2022-02-09 PROCEDURE — 90791 PSYCH DIAGNOSTIC EVALUATION: CPT | Mod: HCNC,S$GLB,, | Performed by: PSYCHOLOGIST

## 2022-02-09 PROCEDURE — 99999 PR PBB SHADOW E&M-EST. PATIENT-LVL I: CPT | Mod: PBBFAC,HCNC,, | Performed by: PSYCHOLOGIST

## 2022-02-09 PROCEDURE — 99999 PR PBB SHADOW E&M-EST. PATIENT-LVL I: ICD-10-PCS | Mod: PBBFAC,HCNC,, | Performed by: PSYCHOLOGIST
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
## 2022-02-11 ENCOUNTER — DOCUMENTATION ONLY (OUTPATIENT)
Dept: PREADMISSION TESTING | Facility: HOSPITAL | Age: 72
End: 2022-02-11
Payer: MEDICARE

## 2022-02-11 NOTE — PRE-PROCEDURE INSTRUCTIONS
PRE-OP INSTRUCTIONS:Instructed patient to have nothing by mouth past midnight.It is ok to take AM medications with a few sips of water .Medication instructions for pm prior to and am of surgery reviewed.Instructed patient to avoid taking vitamins,supplements,aspirin or ibuprofen the am of surgery.    Patient denies any side effects or issues with anesthesia or sedation.     PATIENT HAS PORT RIGHT CHEST  PATIENT USES CPAP

## 2022-02-13 ENCOUNTER — ANESTHESIA EVENT (OUTPATIENT)
Dept: INTERVENTIONAL RADIOLOGY/VASCULAR | Facility: HOSPITAL | Age: 72
End: 2022-02-13
Payer: MEDICARE

## 2022-02-14 ENCOUNTER — HOSPITAL ENCOUNTER (OUTPATIENT)
Dept: INTERVENTIONAL RADIOLOGY/VASCULAR | Facility: HOSPITAL | Age: 72
Discharge: HOME | End: 2022-02-14
Attending: PHYSICIAN ASSISTANT
Payer: MEDICARE

## 2022-02-14 ENCOUNTER — ANESTHESIA (OUTPATIENT)
Dept: INTERVENTIONAL RADIOLOGY/VASCULAR | Facility: HOSPITAL | Age: 72
End: 2022-02-14
Payer: MEDICARE

## 2022-02-14 VITALS
RESPIRATION RATE: 19 BRPM | WEIGHT: 217 LBS | TEMPERATURE: 98 F | SYSTOLIC BLOOD PRESSURE: 146 MMHG | OXYGEN SATURATION: 99 % | HEART RATE: 60 BPM | BODY MASS INDEX: 32.89 KG/M2 | DIASTOLIC BLOOD PRESSURE: 75 MMHG | HEIGHT: 68 IN

## 2022-02-14 DIAGNOSIS — C22.1 CHOLANGIOCARCINOMA: Primary | ICD-10-CM

## 2022-02-14 DIAGNOSIS — C22.1 CHOLANGIOCARCINOMA: ICD-10-CM

## 2022-02-14 LAB
ALBUMIN SERPL BCP-MCNC: 2.8 G/DL (ref 3.5–5.2)
ALP SERPL-CCNC: 278 U/L (ref 55–135)
ALT SERPL W/O P-5'-P-CCNC: 21 U/L (ref 10–44)
ANION GAP SERPL CALC-SCNC: 12 MMOL/L (ref 8–16)
AST SERPL-CCNC: 34 U/L (ref 10–40)
BASOPHILS # BLD AUTO: 0.03 K/UL (ref 0–0.2)
BASOPHILS NFR BLD: 0.5 % (ref 0–1.9)
BILIRUB DIRECT SERPL-MCNC: 0.3 MG/DL (ref 0.1–0.3)
BILIRUB SERPL-MCNC: 0.6 MG/DL (ref 0.1–1)
BUN SERPL-MCNC: 16 MG/DL (ref 8–23)
CALCIUM SERPL-MCNC: 9.7 MG/DL (ref 8.7–10.5)
CHLORIDE SERPL-SCNC: 105 MMOL/L (ref 95–110)
CO2 SERPL-SCNC: 23 MMOL/L (ref 23–29)
CREAT SERPL-MCNC: 0.7 MG/DL (ref 0.5–1.4)
CTP QC/QA: YES
DIFFERENTIAL METHOD: ABNORMAL
EOSINOPHIL # BLD AUTO: 0 K/UL (ref 0–0.5)
EOSINOPHIL NFR BLD: 0.6 % (ref 0–8)
ERYTHROCYTE [DISTWIDTH] IN BLOOD BY AUTOMATED COUNT: 17.8 % (ref 11.5–14.5)
EST. GFR  (AFRICAN AMERICAN): >60 ML/MIN/1.73 M^2
EST. GFR  (NON AFRICAN AMERICAN): >60 ML/MIN/1.73 M^2
GLUCOSE SERPL-MCNC: 92 MG/DL (ref 70–110)
HCT VFR BLD AUTO: 24.9 % (ref 40–54)
HGB BLD-MCNC: 7.7 G/DL (ref 14–18)
IMM GRANULOCYTES # BLD AUTO: 0.03 K/UL (ref 0–0.04)
IMM GRANULOCYTES NFR BLD AUTO: 0.5 % (ref 0–0.5)
INR PPP: 1.2 (ref 0.8–1.2)
LYMPHOCYTES # BLD AUTO: 1.6 K/UL (ref 1–4.8)
LYMPHOCYTES NFR BLD: 23.8 % (ref 18–48)
MCH RBC QN AUTO: 30.3 PG (ref 27–31)
MCHC RBC AUTO-ENTMCNC: 30.9 G/DL (ref 32–36)
MCV RBC AUTO: 98 FL (ref 82–98)
MONOCYTES # BLD AUTO: 0.6 K/UL (ref 0.3–1)
MONOCYTES NFR BLD: 9 % (ref 4–15)
NEUTROPHILS # BLD AUTO: 4.3 K/UL (ref 1.8–7.7)
NEUTROPHILS NFR BLD: 65.6 % (ref 38–73)
NRBC BLD-RTO: 0 /100 WBC
PLATELET # BLD AUTO: 145 K/UL (ref 150–450)
PMV BLD AUTO: 10.9 FL (ref 9.2–12.9)
POTASSIUM SERPL-SCNC: 4.1 MMOL/L (ref 3.5–5.1)
PROT SERPL-MCNC: 6.7 G/DL (ref 6–8.4)
PROTHROMBIN TIME: 12.4 SEC (ref 9–12.5)
RBC # BLD AUTO: 2.54 M/UL (ref 4.6–6.2)
SARS-COV-2 AG RESP QL IA.RAPID: NEGATIVE
SODIUM SERPL-SCNC: 140 MMOL/L (ref 136–145)
WBC # BLD AUTO: 6.55 K/UL (ref 3.9–12.7)

## 2022-02-14 PROCEDURE — 80053 COMPREHEN METABOLIC PANEL: CPT | Mod: HCNC | Performed by: FAMILY MEDICINE

## 2022-02-14 PROCEDURE — 76937 PR  US GUIDE, VASCULAR ACCESS: ICD-10-PCS | Mod: 26,HCNC,, | Performed by: RADIOLOGY

## 2022-02-14 PROCEDURE — 37000009 HC ANESTHESIA EA ADD 15 MINS: Mod: HCNC

## 2022-02-14 PROCEDURE — 76380 PR  CT SCAN,LIMITED/LOCALIZED F/U STUDY: ICD-10-PCS | Mod: 26,59,HCNC, | Performed by: RADIOLOGY

## 2022-02-14 PROCEDURE — 75774 ARTERY X-RAY EACH VESSEL: CPT | Mod: 26,59,HCNC, | Performed by: RADIOLOGY

## 2022-02-14 PROCEDURE — C1887 CATHETER, GUIDING: HCPCS | Mod: HCNC

## 2022-02-14 PROCEDURE — D9220A PRA ANESTHESIA: Mod: HCNC,ANES,, | Performed by: ANESTHESIOLOGY

## 2022-02-14 PROCEDURE — 36248 PR PR INS CATH ABD/L-EXT ART ADDL 2ND ORD/3RD ORD/BYD: ICD-10-PCS | Mod: HCNC,RT,, | Performed by: RADIOLOGY

## 2022-02-14 PROCEDURE — C1760 CLOSURE DEV, VASC: HCPCS | Mod: HCNC

## 2022-02-14 PROCEDURE — G0269 OCCLUSIVE DEVICE IN VEIN ART: HCPCS | Mod: HCNC | Performed by: RADIOLOGY

## 2022-02-14 PROCEDURE — 85025 COMPLETE CBC W/AUTO DIFF WBC: CPT | Mod: HCNC | Performed by: FAMILY MEDICINE

## 2022-02-14 PROCEDURE — 36247 PR PLACE CATH SUBSUBSELECT ART,ABD/PEL: ICD-10-PCS | Mod: 51,HCNC,LT, | Performed by: RADIOLOGY

## 2022-02-14 PROCEDURE — 36248 INS CATH ABD/L-EXT ART ADDL: CPT | Mod: HCNC,RT,, | Performed by: RADIOLOGY

## 2022-02-14 PROCEDURE — 36248 INS CATH ABD/L-EXT ART ADDL: CPT | Mod: HCNC,RT | Performed by: RADIOLOGY

## 2022-02-14 PROCEDURE — 63600175 PHARM REV CODE 636 W HCPCS: Mod: HCNC | Performed by: NURSE ANESTHETIST, CERTIFIED REGISTERED

## 2022-02-14 PROCEDURE — 85610 PROTHROMBIN TIME: CPT | Mod: HCNC | Performed by: FAMILY MEDICINE

## 2022-02-14 PROCEDURE — 25000003 PHARM REV CODE 250: Mod: HCNC | Performed by: RADIOLOGY

## 2022-02-14 PROCEDURE — 76380 CAT SCAN FOLLOW-UP STUDY: CPT | Mod: TC,59,HCNC | Performed by: RADIOLOGY

## 2022-02-14 PROCEDURE — 36247 INS CATH ABD/L-EXT ART 3RD: CPT | Mod: HCNC,LT | Performed by: RADIOLOGY

## 2022-02-14 PROCEDURE — 75774 ARTERY X-RAY EACH VESSEL: CPT | Mod: TC,59,HCNC | Performed by: RADIOLOGY

## 2022-02-14 PROCEDURE — D9220A PRA ANESTHESIA: ICD-10-PCS | Mod: HCNC,CRNA,, | Performed by: NURSE ANESTHETIST, CERTIFIED REGISTERED

## 2022-02-14 PROCEDURE — 25000003 PHARM REV CODE 250: Mod: HCNC | Performed by: STUDENT IN AN ORGANIZED HEALTH CARE EDUCATION/TRAINING PROGRAM

## 2022-02-14 PROCEDURE — 36247 INS CATH ABD/L-EXT ART 3RD: CPT | Mod: 51,HCNC,LT, | Performed by: RADIOLOGY

## 2022-02-14 PROCEDURE — 75726 ARTERY X-RAYS ABDOMEN: CPT | Mod: 26,59,HCNC, | Performed by: RADIOLOGY

## 2022-02-14 PROCEDURE — 75726 CHG ANGIO VISCERAL SELECTV/SUBSELEC: ICD-10-PCS | Mod: 26,59,HCNC, | Performed by: RADIOLOGY

## 2022-02-14 PROCEDURE — 37243 VASC EMBOLIZE/OCCLUDE ORGAN: CPT | Mod: HCNC | Performed by: RADIOLOGY

## 2022-02-14 PROCEDURE — 25000003 PHARM REV CODE 250: Mod: HCNC | Performed by: NURSE ANESTHETIST, CERTIFIED REGISTERED

## 2022-02-14 PROCEDURE — 63600175 PHARM REV CODE 636 W HCPCS: Mod: HCNC | Performed by: RADIOLOGY

## 2022-02-14 PROCEDURE — 76377 3D RENDER W/INTRP POSTPROCES: CPT | Mod: TC,HCNC | Performed by: RADIOLOGY

## 2022-02-14 PROCEDURE — 37243 IR EMBOLIZATION COMP FOR TUMOR_ORGAN ISCHEMIA_INFARC: ICD-10-PCS | Mod: HCNC,,, | Performed by: RADIOLOGY

## 2022-02-14 PROCEDURE — 94761 N-INVAS EAR/PLS OXIMETRY MLT: CPT | Mod: HCNC

## 2022-02-14 PROCEDURE — 82248 BILIRUBIN DIRECT: CPT | Mod: HCNC | Performed by: FAMILY MEDICINE

## 2022-02-14 PROCEDURE — 76377 PR  3D RENDERING W/ IMAGE POSTPROCESS: ICD-10-PCS | Mod: 26,HCNC,, | Performed by: RADIOLOGY

## 2022-02-14 PROCEDURE — 76377 3D RENDER W/INTRP POSTPROCES: CPT | Mod: 26,HCNC,, | Performed by: RADIOLOGY

## 2022-02-14 PROCEDURE — 76937 US GUIDE VASCULAR ACCESS: CPT | Mod: 26,HCNC,, | Performed by: RADIOLOGY

## 2022-02-14 PROCEDURE — 63600175 PHARM REV CODE 636 W HCPCS: Mod: HCNC | Performed by: FAMILY MEDICINE

## 2022-02-14 PROCEDURE — 75774 PR  ANGIO EA ADDNL SELECTV VESSEL: ICD-10-PCS | Mod: 26,59,HCNC, | Performed by: RADIOLOGY

## 2022-02-14 PROCEDURE — D9220A PRA ANESTHESIA: Mod: HCNC,CRNA,, | Performed by: NURSE ANESTHETIST, CERTIFIED REGISTERED

## 2022-02-14 PROCEDURE — 76937 US GUIDE VASCULAR ACCESS: CPT | Mod: TC,HCNC | Performed by: RADIOLOGY

## 2022-02-14 PROCEDURE — 63600175 PHARM REV CODE 636 W HCPCS: Mod: HCNC | Performed by: ANESTHESIOLOGY

## 2022-02-14 PROCEDURE — 37000008 HC ANESTHESIA 1ST 15 MINUTES: Mod: HCNC

## 2022-02-14 PROCEDURE — 76380 CAT SCAN FOLLOW-UP STUDY: CPT | Mod: 26,59,HCNC, | Performed by: RADIOLOGY

## 2022-02-14 PROCEDURE — 75726 ARTERY X-RAYS ABDOMEN: CPT | Mod: TC,59,HCNC | Performed by: RADIOLOGY

## 2022-02-14 PROCEDURE — D9220A PRA ANESTHESIA: ICD-10-PCS | Mod: HCNC,ANES,, | Performed by: ANESTHESIOLOGY

## 2022-02-14 RX ORDER — SODIUM CHLORIDE 9 MG/ML
INJECTION, SOLUTION INTRAVENOUS CONTINUOUS
Status: DISCONTINUED | OUTPATIENT
Start: 2022-02-14 | End: 2022-02-15 | Stop reason: HOSPADM

## 2022-02-14 RX ORDER — NEOSTIGMINE METHYLSULFATE 0.5 MG/ML
INJECTION, SOLUTION INTRAVENOUS
Status: DISCONTINUED | OUTPATIENT
Start: 2022-02-14 | End: 2022-02-14

## 2022-02-14 RX ORDER — VASOPRESSIN 20 [USP'U]/ML
INJECTION, SOLUTION INTRAMUSCULAR; SUBCUTANEOUS
Status: DISCONTINUED | OUTPATIENT
Start: 2022-02-14 | End: 2022-02-14

## 2022-02-14 RX ORDER — HEPARIN SODIUM 200 [USP'U]/100ML
INJECTION, SOLUTION INTRAVENOUS
Status: COMPLETED | OUTPATIENT
Start: 2022-02-14 | End: 2022-02-14

## 2022-02-14 RX ORDER — FENTANYL CITRATE 50 UG/ML
INJECTION, SOLUTION INTRAMUSCULAR; INTRAVENOUS
Status: DISCONTINUED | OUTPATIENT
Start: 2022-02-14 | End: 2022-02-14

## 2022-02-14 RX ORDER — LIDOCAINE HYDROCHLORIDE 10 MG/ML
1 INJECTION, SOLUTION EPIDURAL; INFILTRATION; INTRACAUDAL; PERINEURAL ONCE
Status: DISCONTINUED | OUTPATIENT
Start: 2022-02-14 | End: 2022-02-15 | Stop reason: HOSPADM

## 2022-02-14 RX ORDER — HEPARIN SODIUM 200 [USP'U]/100ML
1000 INJECTION, SOLUTION INTRAVENOUS CONTINUOUS
Status: DISCONTINUED | OUTPATIENT
Start: 2022-02-14 | End: 2022-02-15 | Stop reason: HOSPADM

## 2022-02-14 RX ORDER — LIDOCAINE HYDROCHLORIDE 10 MG/ML
INJECTION INFILTRATION; PERINEURAL CODE/TRAUMA/SEDATION MEDICATION
Status: COMPLETED | OUTPATIENT
Start: 2022-02-14 | End: 2022-02-14

## 2022-02-14 RX ORDER — HYDROMORPHONE HYDROCHLORIDE 1 MG/ML
0.2 INJECTION, SOLUTION INTRAMUSCULAR; INTRAVENOUS; SUBCUTANEOUS EVERY 5 MIN PRN
Status: DISCONTINUED | OUTPATIENT
Start: 2022-02-14 | End: 2022-02-15 | Stop reason: HOSPADM

## 2022-02-14 RX ORDER — OXYCODONE HYDROCHLORIDE 5 MG/1
5 TABLET ORAL EVERY 6 HOURS PRN
Qty: 20 TABLET | Refills: 0 | Status: SHIPPED | OUTPATIENT
Start: 2022-02-14 | End: 2022-01-01

## 2022-02-14 RX ORDER — PHENYLEPHRINE HYDROCHLORIDE 10 MG/ML
INJECTION INTRAVENOUS
Status: DISCONTINUED | OUTPATIENT
Start: 2022-02-14 | End: 2022-02-14

## 2022-02-14 RX ORDER — PROPOFOL 10 MG/ML
VIAL (ML) INTRAVENOUS
Status: DISCONTINUED | OUTPATIENT
Start: 2022-02-14 | End: 2022-02-14

## 2022-02-14 RX ORDER — EPHEDRINE SULFATE 50 MG/ML
INJECTION, SOLUTION INTRAVENOUS
Status: DISCONTINUED | OUTPATIENT
Start: 2022-02-14 | End: 2022-02-14

## 2022-02-14 RX ORDER — OXYCODONE HYDROCHLORIDE 5 MG/1
5 TABLET ORAL ONCE
Status: COMPLETED | OUTPATIENT
Start: 2022-02-14 | End: 2022-02-14

## 2022-02-14 RX ORDER — ONDANSETRON 2 MG/ML
4 INJECTION INTRAMUSCULAR; INTRAVENOUS EVERY 6 HOURS PRN
Status: DISCONTINUED | OUTPATIENT
Start: 2022-02-14 | End: 2022-02-15 | Stop reason: HOSPADM

## 2022-02-14 RX ORDER — LIDOCAINE HYDROCHLORIDE 20 MG/ML
INJECTION INTRAVENOUS
Status: DISCONTINUED | OUTPATIENT
Start: 2022-02-14 | End: 2022-02-14

## 2022-02-14 RX ORDER — LIDOCAINE HYDROCHLORIDE 10 MG/ML
1 INJECTION, SOLUTION EPIDURAL; INFILTRATION; INTRACAUDAL; PERINEURAL ONCE AS NEEDED
Status: DISCONTINUED | OUTPATIENT
Start: 2022-02-14 | End: 2022-02-15 | Stop reason: HOSPADM

## 2022-02-14 RX ORDER — DIPHENHYDRAMINE HYDROCHLORIDE 50 MG/ML
50 INJECTION INTRAMUSCULAR; INTRAVENOUS ONCE
Status: DISCONTINUED | OUTPATIENT
Start: 2022-02-14 | End: 2022-02-15 | Stop reason: HOSPADM

## 2022-02-14 RX ORDER — MEPERIDINE HYDROCHLORIDE 50 MG/ML
12.5 INJECTION INTRAMUSCULAR; INTRAVENOUS; SUBCUTANEOUS ONCE AS NEEDED
Status: DISCONTINUED | OUTPATIENT
Start: 2022-02-14 | End: 2022-02-15 | Stop reason: HOSPADM

## 2022-02-14 RX ORDER — MIDAZOLAM HYDROCHLORIDE 1 MG/ML
INJECTION, SOLUTION INTRAMUSCULAR; INTRAVENOUS
Status: DISCONTINUED | OUTPATIENT
Start: 2022-02-14 | End: 2022-02-14

## 2022-02-14 RX ORDER — ONDANSETRON 4 MG/1
4 TABLET, FILM COATED ORAL EVERY 6 HOURS PRN
Qty: 20 TABLET | Refills: 0 | Status: SHIPPED | OUTPATIENT
Start: 2022-02-14 | End: 2022-01-01 | Stop reason: SDUPTHER

## 2022-02-14 RX ORDER — AMOXICILLIN AND CLAVULANATE POTASSIUM 875; 125 MG/1; MG/1
1 TABLET, FILM COATED ORAL 2 TIMES DAILY
Qty: 14 TABLET | Refills: 0 | Status: SHIPPED | OUTPATIENT
Start: 2022-02-14 | End: 2022-03-04 | Stop reason: ALTCHOICE

## 2022-02-14 RX ORDER — LORAZEPAM 2 MG/ML
0.25 INJECTION INTRAMUSCULAR ONCE AS NEEDED
Status: DISCONTINUED | OUTPATIENT
Start: 2022-02-14 | End: 2022-02-15 | Stop reason: HOSPADM

## 2022-02-14 RX ORDER — ROCURONIUM BROMIDE 10 MG/ML
INJECTION, SOLUTION INTRAVENOUS
Status: DISCONTINUED | OUTPATIENT
Start: 2022-02-14 | End: 2022-02-14

## 2022-02-14 RX ADMIN — DOXORUBICIN HYDROCHLORIDE 50 MG: 2 INJECTION, SOLUTION INTRAVENOUS at 09:02

## 2022-02-14 RX ADMIN — LIDOCAINE HYDROCHLORIDE 1 ML: 10 INJECTION, SOLUTION INFILTRATION; PERINEURAL at 09:02

## 2022-02-14 RX ADMIN — LIDOCAINE HYDROCHLORIDE 100 MG: 20 INJECTION, SOLUTION INTRAVENOUS at 08:02

## 2022-02-14 RX ADMIN — PROPOFOL 150 MG: 10 INJECTION, EMULSION INTRAVENOUS at 08:02

## 2022-02-14 RX ADMIN — PHENYLEPHRINE HYDROCHLORIDE 100 MCG: 10 INJECTION INTRAVENOUS at 09:02

## 2022-02-14 RX ADMIN — MIDAZOLAM HYDROCHLORIDE 1 MG: 1 INJECTION, SOLUTION INTRAMUSCULAR; INTRAVENOUS at 08:02

## 2022-02-14 RX ADMIN — ROCURONIUM BROMIDE 50 MG: 10 INJECTION, SOLUTION INTRAVENOUS at 08:02

## 2022-02-14 RX ADMIN — PHENYLEPHRINE HYDROCHLORIDE 100 MCG: 10 INJECTION INTRAVENOUS at 08:02

## 2022-02-14 RX ADMIN — EPHEDRINE SULFATE 5 MG: 50 INJECTION INTRAVENOUS at 08:02

## 2022-02-14 RX ADMIN — EPHEDRINE SULFATE 10 MG: 50 INJECTION INTRAVENOUS at 08:02

## 2022-02-14 RX ADMIN — FENTANYL CITRATE 100 MCG: 50 INJECTION, SOLUTION INTRAMUSCULAR; INTRAVENOUS at 08:02

## 2022-02-14 RX ADMIN — HYDROCORTISONE SODIUM SUCCINATE 200 MG: 100 INJECTION, POWDER, FOR SOLUTION INTRAMUSCULAR; INTRAVENOUS at 09:02

## 2022-02-14 RX ADMIN — HYDROMORPHONE HYDROCHLORIDE 0.2 MG: 1 INJECTION, SOLUTION INTRAMUSCULAR; INTRAVENOUS; SUBCUTANEOUS at 10:02

## 2022-02-14 RX ADMIN — GLYCOPYRROLATE 0.5 MG: 0.2 INJECTION INTRAMUSCULAR; INTRAVENOUS at 09:02

## 2022-02-14 RX ADMIN — SODIUM CHLORIDE: 0.9 INJECTION, SOLUTION INTRAVENOUS at 08:02

## 2022-02-14 RX ADMIN — AMPICILLIN SODIUM AND SULBACTAM SODIUM 3 G: 2; 1 INJECTION, POWDER, FOR SOLUTION INTRAMUSCULAR; INTRAVENOUS at 08:02

## 2022-02-14 RX ADMIN — PROPOFOL 50 MG: 10 INJECTION, EMULSION INTRAVENOUS at 08:02

## 2022-02-14 RX ADMIN — HEPARIN SODIUM IN SODIUM CHLORIDE 1000 UNITS/HR: 200 INJECTION INTRAVENOUS at 09:02

## 2022-02-14 RX ADMIN — VASOPRESSIN 1 UNITS: 20 INJECTION INTRAVENOUS at 09:02

## 2022-02-14 RX ADMIN — OXYCODONE 5 MG: 5 TABLET ORAL at 01:02

## 2022-02-14 RX ADMIN — NEOSTIGMINE METHYLSULFATE 5 MG: 0.5 INJECTION INTRAVENOUS at 09:02

## 2022-02-14 RX ADMIN — GLYCOPYRROLATE 0.2 MG: 0.2 INJECTION INTRAMUSCULAR; INTRAVENOUS at 08:02

## 2022-02-14 RX ADMIN — PHENYLEPHRINE HYDROCHLORIDE 200 MCG: 10 INJECTION INTRAVENOUS at 08:02

## 2022-02-14 RX ADMIN — SODIUM CHLORIDE 0.2 MCG/KG/MIN: 9 INJECTION, SOLUTION INTRAVENOUS at 08:02

## 2022-02-14 NOTE — DISCHARGE SUMMARY
Radiology Discharge Summary      Hospital Course: No complications    Admit Date: 2/14/2022  Discharge Date: 02/14/2022     Instructions Given to Patient: Yes  Diet: Resume prior diet  Activity: activity as tolerated and no driving for today    Description of Condition on Discharge: Stable  Vital Signs (Most Recent): Temp: 98.4 °F (36.9 °C) (02/14/22 0721)  Pulse: 73 (02/14/22 0721)  Resp: 16 (02/14/22 1018)  BP: 129/66 (02/14/22 0744)  SpO2: 100 % (02/14/22 1018)    Discharge Disposition: Home    Discharge Diagnosis: Cholangiocarcinoma s/p TACE      Follow up: As scheduled    Simon Fitzgerald M.D.  Interventional Radiology  Department of Radiology  Pager: [phone]     4 years

## 2022-02-14 NOTE — DISCHARGE INSTRUCTIONS
For scheduling: Call Deedee at [phone]    For questions or concerns call: LUCA MON-FRI 8 AM- 5PM [phone]. Radiology resident on call [phone].    For immediate concerns that are not emergent, you may call our radiology clinic at: [phone]    Patient Education       Endovascular Embolization Discharge Instructions   About this topic   Endovascular embolization can treat damaged blood vessels. This procedure fills in or closes the damaged blood vessel. This can help to prevent bleeding or the blood vessel breaking open. Embolization may be used for conditions like:  · A weak blood vessel wall in your brain. This is a brain aneurysm.  · Tangled abnormal blood vessels. This is an arteriovenous malformation.  · A connection between two blood vessels that does not belong. This is an arteriovenous fistula.  What care is needed at home?   · Ask your doctor what you need to do when you go home. Make sure you ask questions if you do not understand what the doctor says. This way you will know what you need to do.  · Take your drugs as directed by your doctor. Follow your doctor's orders.  · Check your blood pressure and cholesterol level regularly. Follow your treatment plan to keep these in a healthy range.  · Talk to your doctor about how to care for your cut site. Ask your doctor about:  ? When you should change your bandages  ? When you may take a bath or shower  ? If you need to be careful with lifting things over 10 pounds (4.5 kg)  ? When you may go back to your normal activities like work, driving, or sex  · Always wash your hands before and after touching the wound or your dressing.  · Get lots of rest. Sleep when you are feeling tired. Avoid doing tiring activities.  What follow-up care is needed?   Your doctor may ask you to make visits to the office to check on your progress. Be sure to keep these visits.  What drugs may be needed?   The doctor may order drugs to:  · Help with pain  · Prevent  infection  · Prevent blood clots  What problems could happen?   · Blood clots  · Bleeding  · Infection  · Dislodging of coils or stents  · Stroke  · Death  When do I need to call the doctor?   Activate the emergency medical system right away if you have signs of a heart attack or stroke. Call 911 in the [place] or [place]. The sooner treatment begins, the better your chances for recovery. Call for emergency help right away if you have:  · Signs of heart attack:  ? Chest pain  ? Trouble breathing  ? Fast heartbeat  ? Feeling dizzy  · Signs of stroke:  ? Sudden numbness or weakness of the face, arm, or leg, especially on one side of the body  ? Sudden confusion, trouble speaking or understanding  ? Sudden trouble seeing in one or both eyes  ? Sudden trouble walking, dizziness, loss of balance or coordination  ? Sudden severe headache with no known cause  · A seizure  Call your doctor if you have:  · Signs of infection. These include a fever of 100.4°F (38°C) or higher, chills, wound that will not heal.  · Signs of wound infection. These include swelling, redness, warmth around the wound; too much pain when touched; yellowish, greenish, or bloody discharge; foul smell coming from the cut site; cut site opens up.  · Changes in physical strength, behavior, mental status, or eyesight  · Trouble controlling body movement or walking  · Problem controlling your bladder  Teach Back: Helping You Understand   The Teach Back Method helps you understand the information we are giving you. After you talk with the staff, tell them in your own words what you learned. This helps to make sure the staff has described each thing clearly. It also helps to explain things that may have been confusing. Before going home, make sure you can do these:  · I can tell you about my procedure.  · I can tell you how to care for my cut site.  · I can tell you what I will do if I have signs of a heart attack or stroke.  Where can I learn more?    [employer]  http://www.radiologyinfo.org/en/info.cfm?pg=dc-embol   Last Reviewed Date   2021-10-11  Consumer Information Use and Disclaimer   This information is not specific medical advice and does not replace information you receive from your health care provider. This is only a brief summary of general information. It does NOT include all information about conditions, illnesses, injuries, tests, procedures, treatments, therapies, discharge instructions or life-style choices that may apply to you. You must talk with your health care provider for complete information about your health and treatment options. This information should not be used to decide whether or not to accept your health care providers advice, instructions or recommendations. Only your health care provider has the knowledge and training to provide advice that is right for you.  Copyright   Copyright © 2021 [employer] and its affiliates and/or licensors. All rights reserved.

## 2022-02-14 NOTE — PLAN OF CARE
Pt arrived to [facility] for TACE with anesthesia, escorted to room by this RN and RENÉE Arndt who will assume care of pt during procedure. Pt oriented to unit and staff. Plan of care reviewed with patient, patient verbalizes understanding. Comfort measures utilized. Pt safely transferred from stretcher to procedural table. Fall risk reviewed with patient, fall risk interventions maintained with arm boards and direct observation/attendance. Blankets applied. Pt prepped and draped utilizing standard sterile technique. Patient placed on continuous monitoring, as required by sedation policy. Timeouts completed utilizing standard universal time-out, per department and facility policy. RN to remain at bedside, continuous monitoring maintained. Pt resting comfortably. Denies pain/discomfort. Will continue to monitor. See flow sheets for monitoring, medication administration, and updates.

## 2022-02-14 NOTE — NURSING TRANSFER
Nursing Transfer Note      2/14/2022     Transfer To: [facility]    Transfer via stretcher    Transported by Jagruti, RN  and PCT      Any special needs or follow-up needed: pt. To lay flat (per [id number] report) until noon    Chart send with patient: Yes        Upon arrival to floor:bedside report given to Shania. Groin assessed. No hematoma or irritation noted. Pt. Transferred with printed  perscriptions

## 2022-02-14 NOTE — ANESTHESIA PREPROCEDURE EVALUATION
02/14/2022  Domonique Kellogg is a 71 y.o., male.    Anesthesia Evaluation    I have reviewed the Patient Summary Reports.    I have reviewed the Nursing Notes.       Review of Systems  Anesthesia Hx:  No problems with previous Anesthesia    Hematology/Oncology:  Hematology Normal   Oncology Normal     EENT/Dental:EENT/Dental Normal   Cardiovascular:   Hypertension CAD      Pulmonary:   Sleep Apnea    Renal/:  Renal/ Normal     Hepatic/GI:   Liver Disease, Intrahepatic cholangiocarcinoma   Musculoskeletal:   Arthritis     Neurological:   Neuromuscular Disease, Seizures    Endocrine:  Endocrine Normal    Dermatological:  Skin Normal    Psych:   Psychiatric History          Physical Exam  General:  Obesity, Well nourished    Airway/Jaw/Neck:  Airway Findings: Mouth Opening: Normal Tongue: Normal  General Airway Assessment: Adult  Mallampati: II  TM Distance: Normal, at least 6 cm        Eyes/Ears/Nose:  EYES/EARS/NOSE FINDINGS: Normal   Dental:  Dental Findings: In tact   Chest/Lungs:  Chest/Lungs Clear    Heart/Vascular:  Heart Findings: Normal Heart murmur: negative Vascular Findings: Normal    Abdomen:  Abdomen Findings: Normal    Musculoskeletal:  Musculoskeletal Findings: Normal   Skin:  Skin Findings: Normal    Mental Status:  Mental Status Findings: Normal        Anesthesia Plan  Type of Anesthesia, risks & benefits discussed:  Anesthesia Type:  general    Patient's Preference:   Plan Factors:          Intra-op Monitoring Plan:   Intra-op Monitoring Plan Comments:   Post Op Pain Control Plan:   Post Op Pain Control Plan Comments:     Induction:   IV  Beta Blocker:  Patient is not currently on a Beta-Blocker (No further documentation required).       Informed Consent: Patient understands risks and agrees with Anesthesia plan.  Questions answered. Anesthesia consent signed with patient.  ASA Score: 3      Day of Surgery Review of History & Physical:    H&P update referred to the surgeon.         Ready For Surgery From Anesthesia Perspective.

## 2022-02-14 NOTE — H&P
Inpatient Radiology Pre-procedure Note    History of Present Illness:  Domonique Kellogg is a 71 y.o. male who presents for hepatic chemoembolization for cholangiocarcinoma.  Admission H&P reviewed.  Past Medical History:   Diagnosis Date    Adjustment disorder     Anticoagulant long-term use     Anxiety     Arthritis     CAD (coronary artery disease) 3/2/2015    non-obstructive per [facility]     CAD (coronary artery disease) 3/26/2015    Cataract     Dry eye syndrome     Hypertension     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/2021    Obesity     Seizures     2011    Sleep apnea     + CPAP    Sleep difficulties      Past Surgical History:   Procedure Laterality Date    ANTERIOR CERVICAL DISCECTOMY W/ FUSION N/A 7/6/2020    Procedure: DISCECTOMY, SPINE, CERVICAL, ANTERIOR APPROACH, WITH FUSION C3-4, C4-5;  Surgeon: Fabiola Vides MD;  Location: [facility] OR [address] [place] [address];  Service: Neurosurgery;  Laterality: N/A;  TORONTO III, ASA III, BLOOD TYPE & SCREEN, NEUROMONITORING EMG-MEP-SEP, SUPINE POSITION,BRACE [place],REGULAR BED, HEADREST CASPAR, POSITION, MAP>85, RADIOLOGY C-ARM, SPECIAL EQUIPMENT [facility]    COLONOSCOPY N/A 10/1/2021    Procedure: COLONOSCOPY;  Surgeon: Nicolas Alvarado MD;  [facility]  Service: Endoscopy;  Laterality: N/A;  EGD and colonoscopy for diarrhea and weight loss and Enlarged, heterogeneous appearance of the liver likely due to multiple underlying hepatic lesions largest measuring 4.8 cm.  Findings concerning for metastatic versus less likely primary hepatic neoplasm.  Recommend fur    ESOPHAGOGASTRODUODENOSCOPY N/A 10/1/2021    Procedure: EGD (ESOPHAGOGASTRODUODENOSCOPY);  Surgeon: Nicolas Alvarado MD;  Location: [facility] (4TH FLR);  Service: Endoscopy;  Laterality: N/A;  EGD and colonoscopy for diarrhea and weight loss and Enlarged, heterogeneous appearance of the liver likely due to multiple underlying hepatic lesions largest measuring 4.8 cm.  Findings concerning for  metastatic versus less likely primary hepatic neopl    EYE SURGERY      INSERTION OF VENOUS ACCESS PORT N/A 12/3/2021    Procedure: INSERTION, VENOUS ACCESS PORT;  Surgeon: Saurav Garcia MD;  Location: [address];  Service: General;  Laterality: N/A;    INTRAOCULAR PROSTHESES INSERTION Right 7/16/2018    Procedure: INSERTION-INTRAOCULAR LENS (IOL);  Surgeon: Priscilla Hays MD;  Location: [facility];  Service: Ophthalmology;  Laterality: Right;    INTRAOCULAR PROSTHESES INSERTION Left 8/13/2018    Procedure: INSERTION, INTRAOCULAR LENS PROSTHESIS;  Surgeon: Priscilla Hays MD;  Location: [facility];  Service: Ophthalmology;  Laterality: Left;    KNEE SURGERY      PHACOEMULSIFICATION OF CATARACT Right 7/16/2018    Procedure: PHACOEMULSIFICATION-ASPIRATION-CATARACT;  Surgeon: Priscilla Hays MD;  Location: [facility];  Service: Ophthalmology;  Laterality: Right;    PHACOEMULSIFICATION OF CATARACT Left 8/13/2018    Procedure: PHACOEMULSIFICATION, CATARACT;  Surgeon: Priscilla Hays MD;  Location: [facility];  Service: Ophthalmology;  Laterality: Left;    WISDOM TOOTH EXTRACTION         Review of Systems:   As documented in primary team H&P    Home Meds:   Prior to Admission medications    Medication Sig Start Date End Date Taking? Authorizing Provider   apixaban (ELIQUIS) 5 mg Tab Take 1 tablet (5 mg total) by mouth 2 (two) times daily. 12/5/21  Yes Dmitry Patterson MD   hydroCHLOROthiazide (HYDRODIURIL) 25 MG tablet TAKE 1 TABLET (25 MG TOTAL) BY MOUTH ONCE DAILY. 12/10/21  Yes Nicolas Pacheco MD   multivitamin with minerals tablet Take 1 tablet by mouth every morning.  7/25/18  Yes Historical Provider   RABEprazole (ACIPHEX) 20 mg tablet Take 1 tablet (20 mg total) by mouth once daily. 12/23/21 12/23/22 Yes Mejia Villafuerte MD   tamsulosin (FLOMAX) 0.4 mg Cap TAKE 1 CAPSULE BY MOUTH EVERY DAY 1/26/22  Yes Claudia Young MD   acetaminophen (TYLENOL) 650 MG TbSR Take by mouth daily as needed. 2/3/20   Historical  Provider   ciprofloxacin HCl (CIPRO) 500 MG tablet Take 1 tablet (500 mg total) by mouth 2 (two) times daily.  Patient not taking: No sig reported 12/19/21   Robbi Houston MD   dexAMETHasone (DECADRON) 4 MG Tab Take 2 tablets (8 mg total) by mouth once daily. Take on days 2 and 3 after chemo, with food  Patient not taking: No sig reported 11/29/21 11/29/22  Claudia Young MD   diltiaZEM 2% Lidocaine 5% Cream Apply pea size amount topically 3 (three) times daily.  Patient not taking: No sig reported 12/28/21   Shilpa Ahuja NP   diphenoxylate-atropine 2.5-0.025 mg (LOMOTIL) 2.5-0.025 mg per tablet Take 1 tablet by mouth 4 (four) times daily as needed for Diarrhea.  Patient not taking: No sig reported 11/29/21 11/29/22  Claudia Young MD   magnesium oxide (MAG-OX) 400 mg (241.3 mg magnesium) tablet Take 400 mg by mouth every evening. 7/1/19   Historical Provider   metroNIDAZOLE (FLAGYL) 250 MG tablet Take 2 tablets (500 mg total) by mouth 3 (three) times daily.  Patient not taking: No sig reported 12/19/21   Robbi Houston MD   ondansetron (ZOFRAN) 4 MG tablet Take 1-2 tablets (4-8 mg total) by mouth every 8 (eight) hours as needed for Nausea.  Patient not taking: No sig reported 11/13/21   Nicolas Pacheco MD   oxyCODONE (ROXICODONE) 5 MG immediate release tablet Take 1 tablet (5 mg total) by mouth every 6 (six) hours as needed for Pain. Take 3-4 tab (15-20 mg) by mouth every 6 hours as needed for pain  Patient not taking: No sig reported 12/17/21   Claudia Young MD   oxyCODONE-acetaminophen (PERCOCET) 5-325 mg per tablet Take 1 tablet by mouth every 6 (six) hours as needed for Pain.  Patient not taking: No sig reported 12/3/21   Dmitry Patterson MD   potassium chloride SA (K-DUR,KLOR-CON) 20 MEQ tablet Take 2 tablets (40 mEq total) by mouth once daily. 12/3/21   Nicolas Pacheco MD   pravastatin (PRAVACHOL) 40 MG tablet Take 1 tablet (40 mg total) by mouth once daily.  Patient taking differently:  Take 40 mg by mouth every evening. 1/18/22   Nicolas Pacheco MD   prochlorperazine (COMPAZINE) 10 MG tablet Take 1 tablet (10 mg total) by mouth every 6 (six) hours as needed (nausea).  Patient not taking: No sig reported 11/29/21 11/29/22  Claudia Young MD   promethazine (PHENERGAN) 12.5 MG Tab Take 1 tablet (12.5 mg total) by mouth every 6 (six) hours as needed (nausea).  Patient not taking: No sig reported 11/29/21   Claudia Young MD     Scheduled Meds:    diphenhydrAMINE  50 mg Intravenous Once    DOXOrubicin with LC beads chemoembolization  50 mg Intra-arterial Once    And    DOXOrubicin with LC beads chemoembolization  50 mg Intra-arterial Once    hydrocortisone sodium succinate  200 mg Intravenous Once    LIDOcaine (PF) 10 mg/ml (1%)  1 mL Intradermal Once     Continuous Infusions:    sodium chloride 0.9%      heparin (porcine)       PRN Meds:ampicillin-sulbactim (UNASYN) IVPB, LIDOcaine (PF) 10 mg/ml (1%)  Anticoagulants/Antiplatelets: Eliquis    Allergies:   Review of patient's allergies indicates:   Allergen Reactions    Indomethacin      Headache dizziness    Adhesive Rash     Sedation Hx: have not been any systemic reactions    Labs:  Recent Labs   Lab 02/14/22 0725   INR 1.2       Recent Labs   Lab 02/14/22 0725   WBC 6.55   HGB 7.7*   HCT 24.9*   MCV 98   *    No results for input(s): GLU, NA, K, CL, CO2, BUN, CREATININE, CALCIUM, MG, ALT, AST, ALBUMIN, BILITOT, BILIDIR in the last 168 hours.      Vitals:  Temp: 98.4 °F (36.9 °C) (02/14/22 0721)  Pulse: 73 (02/14/22 0721)  Resp: 18 (02/14/22 0721)  BP: 129/66 (02/14/22 0744)  SpO2: 100 % (02/14/22 0721)     Physical Exam:  ASA: 2  Mallampati: 2    General: no acute distress  Mental Status: alert and oriented to person, place and time  HEENT: normocephalic, atraumatic  Chest: unlabored breathing  Heart: regular heart rate  Abdomen: nondistended  Extremity: moves all extremities    Plan: hepatic chemoembolization.  Sedation Plan: per  anesthesia    Darron Mcgill MD MSCR  PGY-3 Radiology Resident

## 2022-02-14 NOTE — PLAN OF CARE
Patient and patient's wife received discharge instructions and prescriptions.  Patient and patient's wife verbalized understanding of all instructions given and all questions were addressed prior to patient's discharge.  Patient's vital signs are stable and within patient's baseline.  Patient tolerated clear liquids PO.  Patient voided 700 mL of clear urine without difficulty in post-op.  Patient states pain is tolerable.  Patient denies nausea and vomiting at this time.  Patient sat up at 1200 without any bleeding or hematoma from incision. After a few minutes, he then walked to the bathroom and back to his stretcher with no difficulty or complication from the site.   Patient meets all criteria for discharge at this time.

## 2022-02-14 NOTE — TRANSFER OF CARE
"Anesthesia Transfer of Care Note    Patient: Domonique Kellogg    Procedure(s) Performed: * No procedures listed *    Patient location: PACU    Anesthesia Type: general    Transport from OR: Transported from OR on 6-10 L/min O2 by face mask with adequate spontaneous ventilation    Post pain: adequate analgesia    Post assessment: no apparent anesthetic complications and tolerated procedure well    Post vital signs: stable    Level of consciousness: awake, oriented and alert    Nausea/Vomiting: no nausea/vomiting    Complications: none    Transfer of care protocol was followed      Last vitals:   Visit Vitals  /66   Pulse 73   Temp 36.9 °C (98.4 °F) (Skin)   Resp 18   Ht 5' 8" (1.727 m)   Wt 98.4 kg (217 lb)   SpO2 100%   BMI 32.99 kg/m²     "

## 2022-02-14 NOTE — NURSING
Liver cancer TACE complete. Pt tolerated well. VSS. No signs or symptoms of distress noted.Exoseal closure device in place. Hemostasis 1000. Pt to remain flat until 1200. Pt will be transferred to PACU bed after extubation/stabilization escorted by this RN and REÉNE Arndt who will give report.  Wife notified of procedure outcome per Dr Fitzgerald

## 2022-02-14 NOTE — PROCEDURES
Radiology Post-Procedure Note    Pre Op Diagnosis: Cholangiocarcinoma    Post Op Diagnosis: Same    Procedure: Chemoembolization    Procedure Performed by: Simon Fitzgerald MD    Written Informed Consent Obtained: Yes    Specimen Removed: None    Estimated Blood Loss: Minimal    Findings:     After placement of a right femoral artery sheath, a 5-Lithuanian catheter was inserted and angiography of the celiac artery and superior mesenteric artery for anatomic evaluation and localization of hepatic tumor.  A microcatheter was introduced into feeding arteries of a right hepatic lobe tumor and LC beads coated with doxorubicin were injected until near stagnant flow was achieved.  Post procedural angiography revealed no complications.    Right femoral artery angiogram was performed and the sheath was removed.  Hemostasis was achieved using Exoseal technique.  There was no hematoma at the time of hemostasis.    The patient tolerated procedure well.  Please see Imaging report for further details.    Simon Fitzgerald M.D.  Interventional Radiology  Department of Radiology  Pager: [phone]

## 2022-02-14 NOTE — ANESTHESIA POSTPROCEDURE EVALUATION
Anesthesia Post Evaluation    Patient: Domonique Kellogg    Procedure(s) Performed: * No procedures listed *    Final Anesthesia Type: general      Patient location during evaluation: PACU  Patient participation: Yes- Able to Participate  Level of consciousness: awake and alert  Post-procedure vital signs: reviewed and stable  Pain management: adequate  Airway patency: patent    PONV status at discharge: No PONV  Anesthetic complications: no      Cardiovascular status: blood pressure returned to baseline  Respiratory status: spontaneous ventilation and room air  Hydration status: euvolemic  Follow-up not needed.          Vitals Value Taken Time   /78 02/14/22 1336   Temp 36.6 °C (97.9 °F) 02/14/22 1300   Pulse 56 02/14/22 1340   Resp 28 02/14/22 1300   SpO2 100 % 02/14/22 1340   Vitals shown include unvalidated device data.      No case tracking events are documented in the log.      Pain/Lalo Score: Pain Rating Prior to Med Admin: 3 (2/14/2022 11:00 AM)  Lalo Score: 10 (2/14/2022  1:00 PM)

## 2022-02-14 NOTE — ANESTHESIA PROCEDURE NOTES
Intubation    Date/Time: 2/14/2022 8:38 AM  Performed by: Gutierrez Arndt CRNA  Authorized by: Roque Henao Jr., MD     Intubation:     Induction:  Intravenous    Intubated:  Postinduction    Mask Ventilation:  Easy mask    Attempts:  1    Attempted By:  Student    Method of Intubation:  Video laryngoscopy    Blade:  Bryant 3    Laryngeal View Grade: Grade I - full view of cords      Difficult Airway Encountered?: No      Complications:  None    Airway Device:  Oral endotracheal tube    Airway Device Size:  7.5    Style/Cuff Inflation:  Cuffed (inflated to minimal occlusive pressure)    Tube secured:  21    Secured at:  The lips    Placement Verified By:  Capnometry    Complicating Factors:  None    Findings Post-Intubation:  BS equal bilateral and atraumatic/condition of teeth unchanged

## 2022-02-15 ENCOUNTER — TELEPHONE (OUTPATIENT)
Dept: INTERVENTIONAL RADIOLOGY/VASCULAR | Facility: CLINIC | Age: 72
End: 2022-02-15
Payer: MEDICARE

## 2022-02-17 ENCOUNTER — PATIENT MESSAGE (OUTPATIENT)
Dept: ENDOSCOPY | Facility: HOSPITAL | Age: 72
End: 2022-02-17

## 2022-02-17 ENCOUNTER — TELEPHONE (OUTPATIENT)
Dept: ENDOSCOPY | Facility: HOSPITAL | Age: 72
End: 2022-02-17

## 2022-02-17 ENCOUNTER — HOSPITAL ENCOUNTER (OUTPATIENT)
Facility: HOSPITAL | Age: 72
Discharge: HOME | End: 2022-02-17
Attending: INTERNAL MEDICINE | Admitting: INTERNAL MEDICINE
Payer: MEDICARE

## 2022-02-17 DIAGNOSIS — K21.00 GASTROESOPHAGEAL REFLUX DISEASE WITH ESOPHAGITIS WITHOUT HEMORRHAGE: Primary | ICD-10-CM

## 2022-02-17 DIAGNOSIS — R63.4 WEIGHT LOSS: Primary | ICD-10-CM

## 2022-02-17 DIAGNOSIS — K21.9 GERD (GASTROESOPHAGEAL REFLUX DISEASE): ICD-10-CM

## 2022-02-17 PROCEDURE — 25000003 PHARM REV CODE 250: Mod: HCNC | Performed by: INTERNAL MEDICINE

## 2022-02-17 RX ORDER — SODIUM CHLORIDE 9 MG/ML
INJECTION, SOLUTION INTRAVENOUS CONTINUOUS
Status: DISCONTINUED | OUTPATIENT
Start: 2022-02-17 | End: 2022-02-17 | Stop reason: HOSPADM

## 2022-02-17 RX ADMIN — SODIUM CHLORIDE: 0.9 INJECTION, SOLUTION INTRAVENOUS at 01:02

## 2022-02-17 NOTE — TELEPHONE ENCOUNTER
----- Message from Sharmila Murillo sent at 2/17/2022  8:17 AM CST -----  Regarding: LABS  Contact: Self  Pt ask if he was suppose to fast prior to getting his labs done before his procedure Pt ask for a call      Contact info  [phone] (home)

## 2022-02-17 NOTE — DISCHARGE INSTRUCTIONS
Patient Education       Upper GI Endoscopy Discharge Instructions   About this topic   This procedure is done to view your upper gastrointestinal (GI) tract. This includes your throat and food pipe (esophagus). It also includes your stomach and the first part of the small bowel. Some people have this test for problems like coughing or throwing up blood. Other people may be having bad belly pain or blood in their stool. You may be having trouble swallowing or problems with acid reflux.  Doctors often use this test to look for problems like:  · Ulcers  · Cancer or tumor growths  · Internal bleeding  · Swelling  · Inflammation  · Infection  · Diallo's esophagus  · Gastroesophageal reflux disease or GERD  · Swallowing problems     What care is needed at home?   · Ask your doctor what you need to do when you go home. Make sure you ask questions if you do not understand what the doctor says. This way you will know what you need to do.  · Your throat may feel sore. Your belly may also feel bloated. Your doctor may give you drugs to help with pain.  · Talk to your doctor about when it is safe for you to go back to eating your normal diet and taking your drugs. You can drink fluid once the numbing drugs in your throat wear off.  What follow-up care is needed?   · Your doctor may ask you to make visits to the office to check on your progress. Be sure to keep these visits.  · The results of this test may help your doctor understand what kind of problem you have with your upper GI tract. Together you can make a plan for more care.  What drugs may be needed?   The doctor may order drugs to:  · Help with pain  · Decrease the acid in your stomach  Will physical activity be limited?   You may need to limit your activity for the rest of the day. After that, you will likely be able to go back to your normal activities.  What changes to diet are needed?   Soft foods like pudding or soups may be easier to eat at first. Ask your doctor  if you need to make any changes to your diet.  What problems could happen?   · Painful swallowing  · Upset stomach  · Injury to food pipe   · Throwing up  · Tear in the esophagus  When do I need to call the doctor?   · Signs of infection. These include a fever of 100.4°F (38°C) or higher, chills.  · Very bad belly pain  · Throwing up blood  · Your belly is hard and swollen  · Trouble swallowing or breathing  · Upset stomach and throwing up  · Bloody or black tarry stools  · Cough, shortness of breath, or chest pain  · A crunching feeling under the skin in your neck  · You are not feeling better in 2 to 3 days or you are feeling worse  Teach Back: Helping You Understand   The Teach Back Method helps you understand the information we are giving you. After you talk with the staff, tell them in your own words what you learned. This helps to make sure the staff has described each thing clearly. It also helps to explain things that may have been confusing. Before going home, make sure you can do these:  · I can tell you about my procedure.  · I can tell you what changes I need to make with my diet or drugs.  · I can tell you what I will do if I have very bad belly pain, throwing up blood, or my belly is hard and swollen.  Where can I learn more?   [employer]  https://www.gastro.org/practice-guidance/gi-patient-center/topic/upper-gi-endoscopy   Last Reviewed Date   2021-10-05  Consumer Information Use and Disclaimer   This information is not specific medical advice and does not replace information you receive from your health care provider. This is only a brief summary of general information. It does NOT include all information about conditions, illnesses, injuries, tests, procedures, treatments, therapies, discharge instructions or life-style choices that may apply to you. You must talk with your health care provider for complete information about your health and treatment options. This  information should not be used to decide whether or not to accept your health care providers advice, instructions or recommendations. Only your health care provider has the knowledge and training to provide advice that is right for you.  Copyright   Copyright © 2021 [employer] and its affiliates and/or licensors. All rights reserved.

## 2022-02-17 NOTE — H&P
nurse states pt did not hold his blood thinner, took it last night  WIll go to schedulers to reschedule and review instructions

## 2022-02-18 ENCOUNTER — PATIENT MESSAGE (OUTPATIENT)
Dept: HEMATOLOGY/ONCOLOGY | Facility: CLINIC | Age: 72
End: 2022-02-18
Payer: MEDICARE

## 2022-02-18 ENCOUNTER — TELEPHONE (OUTPATIENT)
Dept: PALLIATIVE MEDICINE | Facility: CLINIC | Age: 72
End: 2022-02-18
Payer: MEDICARE

## 2022-02-18 DIAGNOSIS — R31.9 HEMATURIA, UNSPECIFIED TYPE: Primary | ICD-10-CM

## 2022-02-21 ENCOUNTER — LAB VISIT (OUTPATIENT)
Dept: LAB | Facility: HOSPITAL | Age: 72
End: 2022-02-21
Payer: MEDICARE

## 2022-02-21 ENCOUNTER — OFFICE VISIT (OUTPATIENT)
Dept: PALLIATIVE MEDICINE | Facility: CLINIC | Age: 72
End: 2022-02-21
Payer: MEDICARE

## 2022-02-21 VITALS — HEART RATE: 78 BPM | DIASTOLIC BLOOD PRESSURE: 61 MMHG | SYSTOLIC BLOOD PRESSURE: 108 MMHG

## 2022-02-21 DIAGNOSIS — Z71.89 ADVANCED CARE PLANNING/COUNSELING DISCUSSION: ICD-10-CM

## 2022-02-21 DIAGNOSIS — R63.0 ANOREXIA: ICD-10-CM

## 2022-02-21 DIAGNOSIS — Z51.5 ENCOUNTER FOR PALLIATIVE CARE: ICD-10-CM

## 2022-02-21 DIAGNOSIS — C22.1 INTRAHEPATIC CHOLANGIOCARCINOMA: ICD-10-CM

## 2022-02-21 DIAGNOSIS — R53.0 NEOPLASTIC (MALIGNANT) RELATED FATIGUE: ICD-10-CM

## 2022-02-21 DIAGNOSIS — C22.1 INTRAHEPATIC CHOLANGIOCARCINOMA: Primary | ICD-10-CM

## 2022-02-21 LAB
ALBUMIN SERPL BCP-MCNC: 2.6 G/DL (ref 3.5–5.2)
ALP SERPL-CCNC: 308 U/L (ref 55–135)
ALT SERPL W/O P-5'-P-CCNC: 116 U/L (ref 10–44)
ANION GAP SERPL CALC-SCNC: 11 MMOL/L (ref 8–16)
ANISOCYTOSIS BLD QL SMEAR: SLIGHT
AST SERPL-CCNC: 108 U/L (ref 10–40)
BASOPHILS NFR BLD: 0 % (ref 0–1.9)
BILIRUB SERPL-MCNC: 1 MG/DL (ref 0.1–1)
BUN SERPL-MCNC: 13 MG/DL (ref 8–23)
CALCIUM SERPL-MCNC: 10.3 MG/DL (ref 8.7–10.5)
CANCER AG19-9 SERPL-ACNC: 684.4 U/ML (ref 0–40)
CHLORIDE SERPL-SCNC: 102 MMOL/L (ref 95–110)
CO2 SERPL-SCNC: 25 MMOL/L (ref 23–29)
CREAT SERPL-MCNC: 0.8 MG/DL (ref 0.5–1.4)
DIFFERENTIAL METHOD: ABNORMAL
EOSINOPHIL NFR BLD: 2 % (ref 0–8)
ERYTHROCYTE [DISTWIDTH] IN BLOOD BY AUTOMATED COUNT: 17.7 % (ref 11.5–14.5)
EST. GFR  (AFRICAN AMERICAN): >60 ML/MIN/1.73 M^2
EST. GFR  (NON AFRICAN AMERICAN): >60 ML/MIN/1.73 M^2
GLUCOSE SERPL-MCNC: 128 MG/DL (ref 70–110)
HCT VFR BLD AUTO: 27.9 % (ref 40–54)
HGB BLD-MCNC: 8.7 G/DL (ref 14–18)
HYPOCHROMIA BLD QL SMEAR: ABNORMAL
IMM GRANULOCYTES # BLD AUTO: ABNORMAL K/UL (ref 0–0.04)
IMM GRANULOCYTES NFR BLD AUTO: ABNORMAL % (ref 0–0.5)
LYMPHOCYTES NFR BLD: 11 % (ref 18–48)
MAGNESIUM SERPL-MCNC: 1.6 MG/DL (ref 1.6–2.6)
MCH RBC QN AUTO: 31.1 PG (ref 27–31)
MCHC RBC AUTO-ENTMCNC: 31.2 G/DL (ref 32–36)
MCV RBC AUTO: 100 FL (ref 82–98)
MONOCYTES NFR BLD: 11 % (ref 4–15)
NEUTROPHILS NFR BLD: 76 % (ref 38–73)
NRBC BLD-RTO: 0 /100 WBC
OVALOCYTES BLD QL SMEAR: ABNORMAL
PLATELET # BLD AUTO: 535 K/UL (ref 150–450)
PLATELET BLD QL SMEAR: ABNORMAL
PMV BLD AUTO: 9.2 FL (ref 9.2–12.9)
POIKILOCYTOSIS BLD QL SMEAR: SLIGHT
POLYCHROMASIA BLD QL SMEAR: ABNORMAL
POTASSIUM SERPL-SCNC: 3.4 MMOL/L (ref 3.5–5.1)
PROT SERPL-MCNC: 7 G/DL (ref 6–8.4)
RBC # BLD AUTO: 2.8 M/UL (ref 4.6–6.2)
SODIUM SERPL-SCNC: 138 MMOL/L (ref 136–145)
WBC # BLD AUTO: 9.74 K/UL (ref 3.9–12.7)

## 2022-02-21 PROCEDURE — 99497 PR ADVNCD CARE PLAN 30 MIN: ICD-10-PCS | Mod: S$GLB,,, | Performed by: STUDENT IN AN ORGANIZED HEALTH CARE EDUCATION/TRAINING PROGRAM

## 2022-02-21 PROCEDURE — 85027 COMPLETE CBC AUTOMATED: CPT | Performed by: INTERNAL MEDICINE

## 2022-02-21 PROCEDURE — 1159F MED LIST DOCD IN RCRD: CPT | Mod: CPTII,S$GLB,, | Performed by: STUDENT IN AN ORGANIZED HEALTH CARE EDUCATION/TRAINING PROGRAM

## 2022-02-21 PROCEDURE — 99204 OFFICE O/P NEW MOD 45 MIN: CPT | Mod: S$GLB,,, | Performed by: STUDENT IN AN ORGANIZED HEALTH CARE EDUCATION/TRAINING PROGRAM

## 2022-02-21 PROCEDURE — 1101F PT FALLS ASSESS-DOCD LE1/YR: CPT | Mod: CPTII,S$GLB,, | Performed by: STUDENT IN AN ORGANIZED HEALTH CARE EDUCATION/TRAINING PROGRAM

## 2022-02-21 PROCEDURE — 3078F PR MOST RECENT DIASTOLIC BLOOD PRESSURE < 80 MM HG: ICD-10-PCS | Mod: CPTII,S$GLB,, | Performed by: STUDENT IN AN ORGANIZED HEALTH CARE EDUCATION/TRAINING PROGRAM

## 2022-02-21 PROCEDURE — 1101F PR PT FALLS ASSESS DOC 0-1 FALLS W/OUT INJ PAST YR: ICD-10-PCS | Mod: CPTII,S$GLB,, | Performed by: STUDENT IN AN ORGANIZED HEALTH CARE EDUCATION/TRAINING PROGRAM

## 2022-02-21 PROCEDURE — 99999 PR PBB SHADOW E&M-EST. PATIENT-LVL IV: CPT | Mod: PBBFAC,,, | Performed by: STUDENT IN AN ORGANIZED HEALTH CARE EDUCATION/TRAINING PROGRAM

## 2022-02-21 PROCEDURE — 3074F SYST BP LT 130 MM HG: CPT | Mod: CPTII,S$GLB,, | Performed by: STUDENT IN AN ORGANIZED HEALTH CARE EDUCATION/TRAINING PROGRAM

## 2022-02-21 PROCEDURE — 86301 IMMUNOASSAY TUMOR CA 19-9: CPT | Performed by: INTERNAL MEDICINE

## 2022-02-21 PROCEDURE — 99497 ADVNCD CARE PLAN 30 MIN: CPT | Mod: S$GLB,,, | Performed by: STUDENT IN AN ORGANIZED HEALTH CARE EDUCATION/TRAINING PROGRAM

## 2022-02-21 PROCEDURE — 1160F PR REVIEW ALL MEDS BY PRESCRIBER/CLIN PHARMACIST DOCUMENTED: ICD-10-PCS | Mod: CPTII,S$GLB,, | Performed by: STUDENT IN AN ORGANIZED HEALTH CARE EDUCATION/TRAINING PROGRAM

## 2022-02-21 PROCEDURE — 3078F DIAST BP <80 MM HG: CPT | Mod: CPTII,S$GLB,, | Performed by: STUDENT IN AN ORGANIZED HEALTH CARE EDUCATION/TRAINING PROGRAM

## 2022-02-21 PROCEDURE — 1126F PR PAIN SEVERITY QUANTIFIED, NO PAIN PRESENT: ICD-10-PCS | Mod: CPTII,S$GLB,, | Performed by: STUDENT IN AN ORGANIZED HEALTH CARE EDUCATION/TRAINING PROGRAM

## 2022-02-21 PROCEDURE — 3288F FALL RISK ASSESSMENT DOCD: CPT | Mod: CPTII,S$GLB,, | Performed by: STUDENT IN AN ORGANIZED HEALTH CARE EDUCATION/TRAINING PROGRAM

## 2022-02-21 PROCEDURE — 3074F PR MOST RECENT SYSTOLIC BLOOD PRESSURE < 130 MM HG: ICD-10-PCS | Mod: CPTII,S$GLB,, | Performed by: STUDENT IN AN ORGANIZED HEALTH CARE EDUCATION/TRAINING PROGRAM

## 2022-02-21 PROCEDURE — 3288F PR FALLS RISK ASSESSMENT DOCUMENTED: ICD-10-PCS | Mod: CPTII,S$GLB,, | Performed by: STUDENT IN AN ORGANIZED HEALTH CARE EDUCATION/TRAINING PROGRAM

## 2022-02-21 PROCEDURE — 83735 ASSAY OF MAGNESIUM: CPT | Performed by: INTERNAL MEDICINE

## 2022-02-21 PROCEDURE — 1160F RVW MEDS BY RX/DR IN RCRD: CPT | Mod: CPTII,S$GLB,, | Performed by: STUDENT IN AN ORGANIZED HEALTH CARE EDUCATION/TRAINING PROGRAM

## 2022-02-21 PROCEDURE — 1126F AMNT PAIN NOTED NONE PRSNT: CPT | Mod: CPTII,S$GLB,, | Performed by: STUDENT IN AN ORGANIZED HEALTH CARE EDUCATION/TRAINING PROGRAM

## 2022-02-21 PROCEDURE — 36415 COLL VENOUS BLD VENIPUNCTURE: CPT | Performed by: INTERNAL MEDICINE

## 2022-02-21 PROCEDURE — 1159F PR MEDICATION LIST DOCUMENTED IN MEDICAL RECORD: ICD-10-PCS | Mod: CPTII,S$GLB,, | Performed by: STUDENT IN AN ORGANIZED HEALTH CARE EDUCATION/TRAINING PROGRAM

## 2022-02-21 PROCEDURE — 99999 PR PBB SHADOW E&M-EST. PATIENT-LVL IV: ICD-10-PCS | Mod: PBBFAC,,, | Performed by: STUDENT IN AN ORGANIZED HEALTH CARE EDUCATION/TRAINING PROGRAM

## 2022-02-21 PROCEDURE — 99204 PR OFFICE/OUTPT VISIT, NEW, LEVL IV, 45-59 MIN: ICD-10-PCS | Mod: S$GLB,,, | Performed by: STUDENT IN AN ORGANIZED HEALTH CARE EDUCATION/TRAINING PROGRAM

## 2022-02-21 PROCEDURE — 80053 COMPREHEN METABOLIC PANEL: CPT | Performed by: INTERNAL MEDICINE

## 2022-02-21 PROCEDURE — 85007 BL SMEAR W/DIFF WBC COUNT: CPT | Performed by: INTERNAL MEDICINE

## 2022-02-21 NOTE — Clinical Note
Good morning,    I saw mr. Youssef yesterday. Overall doing well. Bridget, do you have time to meet with this patient soon. He is having questions/concerns about what he is allowed to cook/eat. They have tried recipes in the binder but he is unable to tolerate some of the protein options.   Thanks, naina

## 2022-02-21 NOTE — PROGRESS NOTES
[facility]  Palliative Care   Psychosocial Assessment    Patient Name: Domonique Kellogg  MRN: [id number]  Palliative Care Provider:  Lidia Gordon MD   Primary Care Physician: Nicolas Marlow MD  Principal Problem: Intrahepatic cholangiocarcinoma     Reason for Referral: Advanced Care Planning and Psychosocial Support     Present during Interview: patient and spouse/SO.      Primary Language:English   Needed: no      Past Medical Situation:   PMH:   Past Medical History:   Diagnosis Date    Adjustment disorder     Anticoagulant long-term use     Anxiety     Arthritis     CAD (coronary artery disease) 3/2/2015    [facility]     Cataract     Dry eye syndrome     Hypertension     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/2021    Obesity     Seizures     2011    Sleep apnea     + CPAP    Sleep difficulties      Mental Health/Substance Use History: None identified   Risk of Abuse, neglect or exploitation:  None identified   Current or Previous Trauma and/or evidence of PTSD: None identified   Non-traditional Health practices: None identified     Understanding of diagnosis and prognosis: Good   Experience/Comfort level with health care system: Fair     Patients Mental Status: Alert and oriented to person, place, time and situation with stable mood     Socio-Economic Factors/Resources:  Address: [place] [address]  Phone Number: [phone] (home)     Marital Status:   Household composition: Patient, spouse and daughter   Children: Two daughters     Patient/Family perceptions about Caregiving Needs; availability and capacity: Patient independent at this time.  Patient reports having good support from his spouse, daughters, and friends.     Family Dynamics/Relationships:  Healthy     Patient/Family Strengths/Resilience: Patient and spouse openly express their concerns and advocate for patient's needs.  Patient exhibits  "good insight regarding his disease. The couple report they strive to have "strong annemarie and a positive attitude".   Patient/Family Coping: Appropriately     Activities of Daily Living: Independent   Support Systems-Family & Community (Home Health, HME etc):  No known community supports     Transportation:  yes    Work/Education History: retired   Self-Care Activities/Hobbies: Golfing, puzzles, walking dog, going to the park      History: no    Financial Resources:Medicare      Advanced Care Planning & Legal Concerns:   Advanced Directives/Living Will: no  [place]: no   Planning:  no    Power of : no    Emergency Contacts: Spouse/Estrellita Kellogg     Spirituality, Culture & Coping Mechanisms:  F- Annemarie and Belief: Jew     I - Importance: High     C - Community/Culture Values: Patient reports having great support from his Buddhist community      A - Address in Care: Needs met at this time       Goals/Hopes/Expectations: Patient hopes to remain active and surround himself with positive people.   Fears/Anxiety/Concerns: Patient acknowledges concern regarding how his family will cope upon his passing.         Complicated Bereavement Risk Assessment Tool (CBRAT)  Reference:  [facility] Clinical Practice Group (May 2016). Bereavement Risk Screening and Management Guidelines.  Retrieved from: [email or web address]/wp-content/uploads//RTDHL-Ecypqrstwre-Jmgqnapwc-and-Management-Guideline-2016.pdf      Bereaved Client Characteristics   ? Under 18      no  ? Was a Twin   no  ? Young Spouse   no  ? Elderly Spouse    yes  ? Isolated    no  ? Lacks Meaningful Social Support   no  ? Dissatisfied with help available during illness   no  ? New to [employer] [place] no  ? New to Decision-Making   no    Illness  ? Inherited Disorder   no  ? Stigmatized Disease in the family/community   no  ? Lengthy/Burdensome   no     Bereaved Client's History of " Loss   ? Cumulative Multiple Losses   no  ? Previous Mental Health Illnesses   no  ? Current Mental Health Illness   no  ? Other Significant Health Issues   no   ? Migrant/Refugee   no Death  ? Sudden or Unexpected   no  ? Traumatic Circumstances Associated with Death   no  ? Significant Cultural/Social Burdens as a result of Death   yes   Relationship with   ? Profound Lifelong Partner   yes  ? Highly Dependent    no  ? Antagonistic   no  ? Ambivalent   no  ? Deeply Connected   yes  ? Culturally Defined   yes   Risk Factors Scores  0-2  Low  3-5  Moderate  5+  High  All persons scoring moderate to high presume to be at risk**    (** It is acknowledged that protective factors and resilience may outweigh apparent risk factors.      Total Risk Factors Score:   Low to moderate       SW accompanied MD during initial visit with patient and spouse. Patient presents AAOx4 with stable mood.  Patient appears to have a good understanding of his illness.  Patient acknowledges his disease is not treatable and treatment will only aim to prolong his life.  Patient's spouse notes she and patient wish to remain positive and limit their interactions with those who may bring negative energy.  Patient reports having great support from his spouse, two daughters, friends, and [employer] community. Patient hopes to remain active and continue engaging in activities such as golfing, daily walks with his dog, and cooking. ACP pamphlet introduced to patient.  Patient reports he is familiar with this pamphlet and would intend to assign his spouse and two daughters as decision makers.  Patient and spouse deny psychosocial concerns at this time.  SW remains available to provide emotional support and psychosocial assistance. SW will continue to follow.    Pao Bob, [facility]  Outpatient   Palliative Medicine

## 2022-02-21 NOTE — PROGRESS NOTES
Consult Note  Palliative Care      Consult Requested By: Dr. Caludia Young  Reason for Consult: symptom management and ACP      ASSESSMENT/PLAN:     Plan/Recommendations:  Diagnoses and all orders for this visit:    Intrahepatic cholangiocarcinoma  - followed by Dr. Young  - currently on disease directed therapy    Encounter for palliative care/Advanced care planning  - patient decisional  - patient accompanied by his wife to clinic today  - no ACP documents uploaded into EMR  - philosophy of Palliative Medicine reviewed with patient and family today  - new patient folder given to and reviewed with patient and family today  - goals: life prolonging  - he spoke about understanding that the treatment is to control the disease at this time  - ACP booklet given to and reviewed with patient and family today including HCPOA and living will  - code status not specifically discussed today  - will follow up at future encounters for ACP booklet and code status    Anorexia  - patient reports his appetite is improving at this time  - he shared concerns about what to eat during this time   - he and his wife report using the recipes in the binder but he is not able to tolerate some of the protein options  - will reach out to nutrition to request additional support for patient  - no need for pharmacologic intervention at this time  - will continue to monitor    Neoplastic (malignant) related fatigue  - patient reporting mild fatigue  - he and his wife report good days and bad days at times  - patient plays games or puzzles to keep mentally stimulated  - he also walks his dog and will do exercises for physical strength  - discussed setting goals throughout the day including physical and nutritional  - no need pharmacologic intervention  - will continue to monitor    Understanding of illness/Prognosis: patient and family have good understanding of disease; prognosis is guarded    Goals of care: life prolonging    Follow up: ~ 8-10  weeks    Patient's encounter and above plan of care discussed with patient's oncologist and RD    SUBJECTIVE:     History of Present Illness:  Patient is a 71 y.o. year old male with arthritis, CAD, HTN, seizures in 2011, sleep apnea with CPAP, adjustment disorder, and intrahepatic cholangiocarcinoma presents to Palliative Medicine for symptom management and ACP. Please see oncology notes for full details of oncologic history and treatment course.    02/21/2022:  LA  reviewed and summarized:  12/14/2021 Oxycodone 5 mg Disp: 90 for 22 days  12/03/2021 Percocet 5-325 mg disp: 6 for 2 days    Patient presents today with his wife. Overall he feels well. Patient is not taking any pain medication at this time. He does have some very intermittent, short lasting pain in his RUQ that occurs with certain actions (eg. Sneezing). Patient had noticed changes in his taste and overall appetite. He has noted improvement recently. He is also having some mild fatigue. Patient and family open to learning about ACP.     Past Medical History:   Diagnosis Date    Adjustment disorder     Anticoagulant long-term use     Anxiety     Arthritis     CAD (coronary artery disease) 3/2/2015    non-obstructive per [facility]     Cataract     Dry eye syndrome     Hypertension     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/2021    Obesity     Seizures     2011    Sleep apnea     + CPAP    Sleep difficulties      Past Surgical History:   Procedure Laterality Date    ANTERIOR CERVICAL DISCECTOMY W/ FUSION N/A 7/6/2020    Procedure: DISCECTOMY, SPINE, CERVICAL, ANTERIOR APPROACH, WITH FUSION C3-4, C4-5;  Surgeon: Fabiola Vides MD;  Location: [facility] OR [address] [place] [address];  Service: Neurosurgery;  Laterality: N/A;  TORONTO III, ASA III, BLOOD TYPE & SCREEN, NEUROMONITORING EMG-MEP-SEP, SUPINE POSITION,BRACE [place]I,REGULAR BED, HEADREST CASPAR, POSITION, MAP>85, RADIOLOGY C-ARM, SPECIAL EQUIPMENT VIRGIL TYLER    COLONOSCOPY N/A 10/1/2021    Procedure: COLONOSCOPY;   Surgeon: Nicolas Alvarado MD;  Location: [facility] (4TH FLR);  Service: Endoscopy;  Laterality: N/A;  EGD and colonoscopy for diarrhea and weight loss and Enlarged, heterogeneous appearance of the liver likely due to multiple underlying hepatic lesions largest measuring 4.8 cm.  Findings concerning for metastatic versus less likely primary hepatic neoplasm.  Recommend furth    ESOPHAGOGASTRODUODENOSCOPY N/A 10/1/2021    Procedure: EGD (ESOPHAGOGASTRODUODENOSCOPY);  Surgeon: Nicolas Alvarado MD;  Location: [facility] ENDO (4TH FLR);  Service: Endoscopy;  Laterality: N/A;  EGD and colonoscopy for diarrhea and weight loss and Enlarged, heterogeneous appearance of the liver likely due to multiple underlying hepatic lesions largest measuring 4.8 cm.  Findings concerning for metastatic versus less likely primary hepatic neopl    EYE SURGERY      INSERTION OF VENOUS ACCESS PORT N/A 12/3/2021    Procedure: INSERTION, VENOUS ACCESS PORT;  Surgeon: Saurav Garcia MD;  Location: [facility] 2ND FLR;  Service: General;  Laterality: N/A;    INTRAOCULAR PROSTHESES INSERTION Right 7/16/2018    Procedure: INSERTION-INTRAOCULAR LENS (IOL);  Surgeon: Priscilla Hays MD;  Location: [facility];  Service: Ophthalmology;  Laterality: Right;    INTRAOCULAR PROSTHESES INSERTION Left 8/13/2018    Procedure: INSERTION, INTRAOCULAR LENS PROSTHESIS;  Surgeon: Priscilla Hays MD;  Location: [facility] OR;  Service: Ophthalmology;  Laterality: Left;    KNEE SURGERY      PHACOEMULSIFICATION OF CATARACT Right 7/16/2018    Procedure: PHACOEMULSIFICATION-ASPIRATION-CATARACT;  Surgeon: Priscilla Hays MD;  Location: [facility] OR;  Service: Ophthalmology;  Laterality: Right;    PHACOEMULSIFICATION OF CATARACT Left 8/13/2018    Procedure: PHACOEMULSIFICATION, CATARACT;  Surgeon: Priscilla Hays MD;  Location: [facility] OR;  Service: Ophthalmology;  Laterality: Left;    WISDOM TOOTH EXTRACTION       Family History   Problem Relation Age of Onset    Diabetes Mother      Hypertension Mother     Kidney cancer Mother 61    Cancer Mother         renal & pancreatic    Heart disease Father     No Known Problems Sister     Cancer Maternal Grandmother     Liver disease Maternal Grandmother     Heart disease Paternal Grandfather     Heart disease Brother     No Known Problems Daughter     No Known Problems Son     No Known Problems Sister     No Known Problems Sister     No Known Problems Sister     No Known Problems Daughter     Blindness Neg Hx     Macular degeneration Neg Hx     Retinal detachment Neg Hx     Glaucoma Neg Hx     Melanoma Neg Hx     Pancreatic cancer Neg Hx     Bladder Cancer Neg Hx     Uterine cancer Neg Hx     Ovarian cancer Neg Hx     Celiac disease Neg Hx     Cirrhosis Neg Hx     Colon cancer Neg Hx     Colon polyps Neg Hx     Crohn's disease Neg Hx     Esophageal cancer Neg Hx     Inflammatory bowel disease Neg Hx     Liver cancer Neg Hx     Rectal cancer Neg Hx     Stomach cancer Neg Hx     Ulcerative colitis Neg Hx      Review of patient's allergies indicates:   Allergen Reactions    Indomethacin      Headache dizziness    Adhesive Rash       Medications:    Current Outpatient Medications:     acetaminophen (TYLENOL) 650 MG TbSR, Take by mouth daily as needed., Disp: , Rfl:     amoxicillin-clavulanate 875-125mg (AUGMENTIN) 875-125 mg per tablet, Take 1 tablet by mouth 2 (two) times daily., Disp: 14 tablet, Rfl: 0    apixaban (ELIQUIS) 5 mg Tab, Take 1 tablet (5 mg total) by mouth 2 (two) times daily., Disp: 120 tablet, Rfl: 3    ciprofloxacin HCl (CIPRO) 500 MG tablet, Take 1 tablet (500 mg total) by mouth 2 (two) times daily., Disp: 10 tablet, Rfl: 0    dexAMETHasone (DECADRON) 4 MG Tab, Take 2 tablets (8 mg total) by mouth once daily. Take on days 2 and 3 after chemo, with food, Disp: 120 tablet, Rfl: 0    diltiaZEM 2% Lidocaine 5% Cream, Apply pea size amount topically 3 (three) times daily., Disp: 30 g, Rfl: 2     diphenoxylate-atropine 2.5-0.025 mg (LOMOTIL) 2.5-0.025 mg per tablet, Take 1 tablet by mouth 4 (four) times daily as needed for Diarrhea., Disp: 120 tablet, Rfl: 1    hydroCHLOROthiazide (HYDRODIURIL) 25 MG tablet, TAKE 1 TABLET (25 MG TOTAL) BY MOUTH ONCE DAILY., Disp: 90 tablet, Rfl: 3    magnesium oxide (MAG-OX) 400 mg (241.3 mg magnesium) tablet, Take 400 mg by mouth every evening., Disp: , Rfl:     metroNIDAZOLE (FLAGYL) 250 MG tablet, Take 2 tablets (500 mg total) by mouth 3 (three) times daily., Disp: 30 tablet, Rfl: 0    multivitamin with minerals tablet, Take 1 tablet by mouth every morning. , Disp: , Rfl:     ondansetron (ZOFRAN) 4 MG tablet, Take 1-2 tablets (4-8 mg total) by mouth every 8 (eight) hours as needed for Nausea., Disp: 30 tablet, Rfl: 0    ondansetron (ZOFRAN) 4 MG tablet, Take 1 tablet (4 mg total) by mouth every 6 (six) hours as needed for Nausea., Disp: 20 tablet, Rfl: 0    oxyCODONE (ROXICODONE) 5 MG immediate release tablet, Take 1 tablet (5 mg total) by mouth every 6 (six) hours as needed for Pain. Take 3-4 tab (15-20 mg) by mouth every 6 hours as needed for pain, Disp: 120 tablet, Rfl: 0    oxyCODONE (ROXICODONE) 5 MG immediate release tablet, Take 1 tablet (5 mg total) by mouth every 6 (six) hours as needed for Pain., Disp: 20 tablet, Rfl: 0    oxyCODONE-acetaminophen (PERCOCET) 5-325 mg per tablet, Take 1 tablet by mouth every 6 (six) hours as needed for Pain., Disp: 6 tablet, Rfl: 0    potassium chloride SA (K-DUR,KLOR-CON) 20 MEQ tablet, Take 2 tablets (40 mEq total) by mouth once daily., Disp: 180 tablet, Rfl: 3    pravastatin (PRAVACHOL) 40 MG tablet, Take 1 tablet (40 mg total) by mouth once daily. (Patient taking differently: Take 40 mg by mouth every evening.), Disp: 90 tablet, Rfl: 3    prochlorperazine (COMPAZINE) 10 MG tablet, Take 1 tablet (10 mg total) by mouth every 6 (six) hours as needed (nausea)., Disp: 60 tablet, Rfl: 1    promethazine (PHENERGAN) 12.5  MG Tab, Take 1 tablet (12.5 mg total) by mouth every 6 (six) hours as needed (nausea)., Disp: 60 tablet, Rfl: 2    RABEprazole (ACIPHEX) 20 mg tablet, Take 1 tablet (20 mg total) by mouth once daily., Disp: 90 tablet, Rfl: 3    tamsulosin (FLOMAX) 0.4 mg Cap, TAKE 1 CAPSULE BY MOUTH EVERY DAY, Disp: 30 capsule, Rfl: 0  No current facility-administered medications for this visit.    Facility-Administered Medications Ordered in Other Visits:     alteplase injection 2 mg, 2 mg, Intra-Catheter, PRN, Claudia Young MD    aprepitant (CINVANTI) injection 130 mg, 130 mg, Intravenous, 1 time in [facility], Claudia Young MD    CISplatin (PLATINOL) 25 mg/m2 = 57 mg in sodium chloride 0.9% 500 mL chemo infusion, 25 mg/m2 (Treatment Plan Recorded), Intravenous, 1 time in [facility], Claudia Young MD    gemcitabine (GEMZAR) 2,200 mg in sodium chloride 0.9% 250 mL chemo infusion, 2,200 mg, Intravenous, 1 time in [facility], Claudia Young MD    heparin, porcine (PF) 100 unit/mL injection flush 500 Units, 500 Units, Intravenous, PRN, Claudia Young MD    magnesium sulfate 2g in water 50mL IVPB (premix), 2 g, Intravenous, 1 time in [facility], Claudia Young MD    palonosetron 0.25mg/dexamethasone 12mg in NS IVPB, , Intravenous, 1 time in [facility], Claudia Young MD    sodium chloride 0.9% 250 mL flush bag, , [facility]    sodium chloride 0.9% 500 mL with magnesium sulfate 2 g, potassium chloride 20 mEq infusion, 250 mL/hr, [facility]venous[facility]    sodium chloride 0.9% bolus 500 mL, 500 mL, Intravenous, [facility]    sodium chloride 0.9% flush 10 mL, 10 mL, Intravenous, PRN, Claudia Young MD    OBJECTIVE:       ROS:  Review of Systems   Constitutional: Positive for activity change, appetite change and fatigue.   HENT: Negative.    Eyes: Negative.    Respiratory: Negative.    Cardiovascular: Negative.    Gastrointestinal: Positive for  abdominal pain (intermittent, none present at this time).   Genitourinary: Negative.    Musculoskeletal: Negative.    Skin: Negative.    Neurological: Negative.    Psychiatric/Behavioral: Negative.    All other systems reviewed and are negative.      Review of Symptoms    Symptom Assessment (ESAS 0-10 Scale)  Pain:  0  Dyspnea:  0  Anxiety:  0  Nausea:  0  Depression:  0  Anorexia:  4  Fatigue:  2  Insomnia:  0  Restlessness:  0  Agitation:  0     CAM / Delirium:  Negative  Constipation:  Negative  Diarrhea:  Negative    Bowel Management Plan (BMP):  No      Pain Assessment:  OME in 24 hours:  0  Location(s): none      Modified Isidoro Scale:  0    ECOG Performance Status ndGndrndanddndend:nd nd2nd Living Arrangements:  Lives with spouse    Psychosocial/Cultural: Patient lives with his wife and eldest daughter. He has a younger daughter that does not live with them anymore. Patient also has a dog named Chance    Spiritual:  F - Annemarie and Belief:  Cheondoism  I - Importance:  High  C - Community:  Yes; good support   A - Address in Care:  Needs met at this time      Advance Care Planning   Advance Directives:   Living Will: No    LaPOST: No    Do Not Resuscitate Status: No    Medical Power of : No    Agent's Name:  Estrellita Kellogg   Agent's Contact Number:  [phone]    Decision Making:  Patient answered questions and Family answered questions          Physical Exam:  Vitals: Pulse: 78 (02/21/22 0900)  BP: 108/61 (02/21/22 0900)  Physical Exam  Vitals reviewed.   Constitutional:       General: He is not in acute distress.     Appearance: He is not toxic-appearing.   HENT:      Head: Normocephalic and atraumatic.      Right Ear: External ear normal.      Left Ear: External ear normal.   Eyes:      General: No scleral icterus.        Right eye: No discharge.         Left eye: No discharge.   Neck:      Comments: Trachea midline  Pulmonary:      Effort: Pulmonary effort is normal. No respiratory distress.   Abdominal:       "General: There is no distension.   Musculoskeletal:         General: No deformity or signs of injury.      Cervical back: Normal range of motion.   Skin:     General: Skin is dry.      Findings: No rash.   Neurological:      Mental Status: He is alert and oriented to person, place, and time.      Gait: Gait normal.   Psychiatric:         Behavior: Behavior normal.         Thought Content: Thought content normal.         Judgment: Judgment normal.         Labs:  CBC:   WBC   Date Value Ref Range Status   02/21/2022 9.74 3.90 - 12.70 K/uL Final     Hemoglobin   Date Value Ref Range Status   02/21/2022 8.7 (L) 14.0 - 18.0 g/dL Final     Hematocrit   Date Value Ref Range Status   02/21/2022 27.9 (L) 40.0 - 54.0 % Final     MCV   Date Value Ref Range Status   02/21/2022 100 (H) 82 - 98 fL Final     Platelets   Date Value Ref Range Status   02/21/2022 535 (H) 150 - 450 K/uL Final       LFT:   Lab Results   Component Value Date     (H) 02/21/2022    GGT 1,292 (H) 09/20/2021    ALKPHOS 308 (H) 02/21/2022    BILITOT 1.0 02/21/2022       Albumin:   Albumin   Date Value Ref Range Status   02/21/2022 2.6 (L) 3.5 - 5.2 g/dL Final     Protein:   Total Protein   Date Value Ref Range Status   02/21/2022 7.0 6.0 - 8.4 g/dL Final       Radiology:I have reviewed all pertinent imaging results/findings within the past 24 hours.    12/18/2021 CT A/P: "1. Large heterogeneous mass in the right hepatic lobe compatible with patient's known history of cholangiocarcinoma post recent chemoembolization. Stable hypodensity in the left hepatic lobe compatible with an additional focus of carcinoma. 2. Tumor thrombus within the right and left portal veins and the main portal vein extending to the confluence. 3. Right lower lobe perifissural nodule measuring 4 mm, stable from prior.  Follow-up according to oncology protocol. 4. Trace perihepatic and intrapelvic free fluid. 5. Prostatomegaly."    Encounter occurred during period of COVID-19 " emergency. Encounter performed under the concurrent guidelines, limitations and protocols.    16 minutes spent in discussing ACP    Signature: Lidia Gordon MD

## 2022-02-22 ENCOUNTER — INFUSION (OUTPATIENT)
Dept: INFUSION THERAPY | Facility: HOSPITAL | Age: 72
End: 2022-02-22
Payer: MEDICARE

## 2022-02-22 ENCOUNTER — OFFICE VISIT (OUTPATIENT)
Dept: HEMATOLOGY/ONCOLOGY | Facility: CLINIC | Age: 72
End: 2022-02-22
Payer: MEDICARE

## 2022-02-22 ENCOUNTER — PATIENT MESSAGE (OUTPATIENT)
Dept: HEMATOLOGY/ONCOLOGY | Facility: CLINIC | Age: 72
End: 2022-02-22
Payer: MEDICARE

## 2022-02-22 VITALS
BODY MASS INDEX: 32.44 KG/M2 | HEART RATE: 84 BPM | OXYGEN SATURATION: 96 % | HEIGHT: 68 IN | RESPIRATION RATE: 18 BRPM | DIASTOLIC BLOOD PRESSURE: 55 MMHG | TEMPERATURE: 98 F | WEIGHT: 214.06 LBS | SYSTOLIC BLOOD PRESSURE: 117 MMHG

## 2022-02-22 VITALS
DIASTOLIC BLOOD PRESSURE: 56 MMHG | RESPIRATION RATE: 18 BRPM | SYSTOLIC BLOOD PRESSURE: 108 MMHG | HEART RATE: 71 BPM | TEMPERATURE: 98 F

## 2022-02-22 DIAGNOSIS — R74.01 TRANSAMINITIS: ICD-10-CM

## 2022-02-22 DIAGNOSIS — Z79.899 IMMUNODEFICIENCY SECONDARY TO CHEMOTHERAPY: ICD-10-CM

## 2022-02-22 DIAGNOSIS — I10 ESSENTIAL HYPERTENSION: ICD-10-CM

## 2022-02-22 DIAGNOSIS — D84.821 IMMUNODEFICIENCY SECONDARY TO CHEMOTHERAPY: ICD-10-CM

## 2022-02-22 DIAGNOSIS — C22.1 INTRAHEPATIC CHOLANGIOCARCINOMA: Primary | ICD-10-CM

## 2022-02-22 DIAGNOSIS — D84.81 IMMUNODEFICIENCY SECONDARY TO NEOPLASM: ICD-10-CM

## 2022-02-22 DIAGNOSIS — T45.1X5A IMMUNODEFICIENCY SECONDARY TO CHEMOTHERAPY: ICD-10-CM

## 2022-02-22 DIAGNOSIS — I25.10 CAD IN NATIVE ARTERY: ICD-10-CM

## 2022-02-22 DIAGNOSIS — I81 PORTAL VEIN THROMBOSIS: ICD-10-CM

## 2022-02-22 DIAGNOSIS — D49.9 IMMUNODEFICIENCY SECONDARY TO NEOPLASM: ICD-10-CM

## 2022-02-22 PROCEDURE — 99215 PR OFFICE/OUTPT VISIT, EST, LEVL V, 40-54 MIN: ICD-10-PCS | Mod: S$GLB,,, | Performed by: INTERNAL MEDICINE

## 2022-02-22 PROCEDURE — 96366 THER/PROPH/DIAG IV INF ADDON: CPT

## 2022-02-22 PROCEDURE — 1126F AMNT PAIN NOTED NONE PRSNT: CPT | Mod: CPTII,S$GLB,, | Performed by: INTERNAL MEDICINE

## 2022-02-22 PROCEDURE — 99215 OFFICE O/P EST HI 40 MIN: CPT | Mod: S$GLB,,, | Performed by: INTERNAL MEDICINE

## 2022-02-22 PROCEDURE — 96375 TX/PRO/DX INJ NEW DRUG ADDON: CPT

## 2022-02-22 PROCEDURE — 3074F PR MOST RECENT SYSTOLIC BLOOD PRESSURE < 130 MM HG: ICD-10-PCS | Mod: CPTII,S$GLB,, | Performed by: INTERNAL MEDICINE

## 2022-02-22 PROCEDURE — 1101F PT FALLS ASSESS-DOCD LE1/YR: CPT | Mod: CPTII,S$GLB,, | Performed by: INTERNAL MEDICINE

## 2022-02-22 PROCEDURE — 3288F PR FALLS RISK ASSESSMENT DOCUMENTED: ICD-10-PCS | Mod: CPTII,S$GLB,, | Performed by: INTERNAL MEDICINE

## 2022-02-22 PROCEDURE — 96367 TX/PROPH/DG ADDL SEQ IV INF: CPT

## 2022-02-22 PROCEDURE — 96417 CHEMO IV INFUS EACH ADDL SEQ: CPT

## 2022-02-22 PROCEDURE — 99999 PR PBB SHADOW E&M-EST. PATIENT-LVL V: ICD-10-PCS | Mod: PBBFAC,,, | Performed by: INTERNAL MEDICINE

## 2022-02-22 PROCEDURE — 1126F PR PAIN SEVERITY QUANTIFIED, NO PAIN PRESENT: ICD-10-PCS | Mod: CPTII,S$GLB,, | Performed by: INTERNAL MEDICINE

## 2022-02-22 PROCEDURE — 1160F RVW MEDS BY RX/DR IN RCRD: CPT | Mod: CPTII,S$GLB,, | Performed by: INTERNAL MEDICINE

## 2022-02-22 PROCEDURE — 96413 CHEMO IV INFUSION 1 HR: CPT

## 2022-02-22 PROCEDURE — 3008F BODY MASS INDEX DOCD: CPT | Mod: CPTII,S$GLB,, | Performed by: INTERNAL MEDICINE

## 2022-02-22 PROCEDURE — 1159F PR MEDICATION LIST DOCUMENTED IN MEDICAL RECORD: ICD-10-PCS | Mod: CPTII,S$GLB,, | Performed by: INTERNAL MEDICINE

## 2022-02-22 PROCEDURE — 3008F PR BODY MASS INDEX (BMI) DOCUMENTED: ICD-10-PCS | Mod: CPTII,S$GLB,, | Performed by: INTERNAL MEDICINE

## 2022-02-22 PROCEDURE — 3288F FALL RISK ASSESSMENT DOCD: CPT | Mod: CPTII,S$GLB,, | Performed by: INTERNAL MEDICINE

## 2022-02-22 PROCEDURE — 1101F PR PT FALLS ASSESS DOC 0-1 FALLS W/OUT INJ PAST YR: ICD-10-PCS | Mod: CPTII,S$GLB,, | Performed by: INTERNAL MEDICINE

## 2022-02-22 PROCEDURE — 3078F DIAST BP <80 MM HG: CPT | Mod: CPTII,S$GLB,, | Performed by: INTERNAL MEDICINE

## 2022-02-22 PROCEDURE — 3074F SYST BP LT 130 MM HG: CPT | Mod: CPTII,S$GLB,, | Performed by: INTERNAL MEDICINE

## 2022-02-22 PROCEDURE — 99499 RISK ADDL DX/OHS AUDIT: ICD-10-PCS | Mod: S$GLB,,, | Performed by: INTERNAL MEDICINE

## 2022-02-22 PROCEDURE — 25000003 PHARM REV CODE 250: Performed by: INTERNAL MEDICINE

## 2022-02-22 PROCEDURE — 99499 UNLISTED E&M SERVICE: CPT | Mod: S$GLB,,, | Performed by: INTERNAL MEDICINE

## 2022-02-22 PROCEDURE — 3078F PR MOST RECENT DIASTOLIC BLOOD PRESSURE < 80 MM HG: ICD-10-PCS | Mod: CPTII,S$GLB,, | Performed by: INTERNAL MEDICINE

## 2022-02-22 PROCEDURE — 1160F PR REVIEW ALL MEDS BY PRESCRIBER/CLIN PHARMACIST DOCUMENTED: ICD-10-PCS | Mod: CPTII,S$GLB,, | Performed by: INTERNAL MEDICINE

## 2022-02-22 PROCEDURE — 99999 PR PBB SHADOW E&M-EST. PATIENT-LVL V: CPT | Mod: PBBFAC,,, | Performed by: INTERNAL MEDICINE

## 2022-02-22 PROCEDURE — 1159F MED LIST DOCD IN RCRD: CPT | Mod: CPTII,S$GLB,, | Performed by: INTERNAL MEDICINE

## 2022-02-22 PROCEDURE — 63600175 PHARM REV CODE 636 W HCPCS: Performed by: INTERNAL MEDICINE

## 2022-02-22 RX ORDER — SODIUM CHLORIDE 0.9 % (FLUSH) 0.9 %
10 SYRINGE (ML) INJECTION
Status: DISCONTINUED | OUTPATIENT
Start: 2022-02-22 | End: 2022-02-22 | Stop reason: HOSPADM

## 2022-02-22 RX ORDER — SODIUM CHLORIDE 0.9 % (FLUSH) 0.9 %
10 SYRINGE (ML) INJECTION
Status: CANCELLED | OUTPATIENT
Start: 2022-02-22

## 2022-02-22 RX ORDER — HEPARIN 100 UNIT/ML
500 SYRINGE INTRAVENOUS
Status: DISCONTINUED | OUTPATIENT
Start: 2022-02-22 | End: 2022-02-22 | Stop reason: HOSPADM

## 2022-02-22 RX ORDER — MAGNESIUM SULFATE HEPTAHYDRATE 40 MG/ML
2 INJECTION, SOLUTION INTRAVENOUS
Status: CANCELLED
Start: 2022-02-22

## 2022-02-22 RX ORDER — HEPARIN 100 UNIT/ML
500 SYRINGE INTRAVENOUS
Status: CANCELLED | OUTPATIENT
Start: 2022-02-22

## 2022-02-22 RX ORDER — MAGNESIUM SULFATE HEPTAHYDRATE 40 MG/ML
2 INJECTION, SOLUTION INTRAVENOUS ONCE
Status: COMPLETED | OUTPATIENT
Start: 2022-02-22 | End: 2022-02-22

## 2022-02-22 RX ORDER — MAGNESIUM SULFATE HEPTAHYDRATE 40 MG/ML
2 INJECTION, SOLUTION INTRAVENOUS
Status: COMPLETED | OUTPATIENT
Start: 2022-02-22 | End: 2022-02-22

## 2022-02-22 RX ORDER — SODIUM CHLORIDE AND POTASSIUM CHLORIDE 150; 900 MG/100ML; MG/100ML
INJECTION, SOLUTION INTRAVENOUS CONTINUOUS
Status: DISCONTINUED | OUTPATIENT
Start: 2022-02-22 | End: 2022-02-22 | Stop reason: HOSPADM

## 2022-02-22 RX ADMIN — HEPARIN 500 UNITS: 100 SYRINGE at 04:02

## 2022-02-22 RX ADMIN — PALONOSETRON HYDROCHLORIDE 0.25 MG: 0.25 INJECTION, SOLUTION INTRAVENOUS at 12:02

## 2022-02-22 RX ADMIN — CISPLATIN 57 MG: 1 INJECTION INTRAVENOUS at 01:02

## 2022-02-22 RX ADMIN — APREPITANT 130 MG: 130 INJECTION, EMULSION INTRAVENOUS at 12:02

## 2022-02-22 RX ADMIN — SODIUM CHLORIDE AND POTASSIUM CHLORIDE: .9; .15 SOLUTION INTRAVENOUS at 10:02

## 2022-02-22 RX ADMIN — GEMCITABINE HYDROCHLORIDE 2200 MG: 1 INJECTION, SOLUTION INTRAVENOUS at 12:02

## 2022-02-22 RX ADMIN — SODIUM CHLORIDE: 0.9 INJECTION, SOLUTION INTRAVENOUS at 09:02

## 2022-02-22 RX ADMIN — MAGNESIUM SULFATE HEPTAHYDRATE 2 G: 40 INJECTION, SOLUTION INTRAVENOUS at 02:02

## 2022-02-22 RX ADMIN — MAGNESIUM SULFATE HEPTAHYDRATE 2 G: 40 INJECTION, SOLUTION INTRAVENOUS at 10:02

## 2022-02-22 NOTE — PROGRESS NOTES
"                                                         PROGRESS NOTE    Subjective:       Patient ID: Domonique Kellogg is a 71 y.o. male.    Chief Complaint: follow up for cholangiocarcinoma    Diagnosis:  Advanced intrahepatic cholangiocarcinoma, MSI-low, negative for FGFR2 fusion or IDH1/IDH2 mutations, diagnosed on 11/22/2021.     Molecular Profile:  Guardant 360 11/29/21: +CCND1 amplification. VUS: CATRACHITO [id number]. MSI-high not detected.     Oncologic History:  1. Mr Kellogg is a 72 yo man with CAD, HTN, seizures in 2011 who initially saw me on 11/29/21 for further management of cholangiocarcinoma. He presented with weight loss and diarrhea since July. US 9/22/21 showed "Enlarged, heterogeneous appearance of the liver likely due to multiple underlying hepatic lesions largest measuring 4.8 cm."  MRI abdomen w/wo contrast 10/4/21: "Multiple liver lesions measuring up to 13.3 cm in the right hepatic lobe.  Differential diagnosis includes metastases, fibrolamellar hepatocellular carcinoma, or atypical focal nodular hyperplasia.  Consider biopsy for further evaluation. Thrombosis of the anterior branch of the right portal vein and left hepatic vein.  Nonvisualization of the middle and right hepatic veins, which may be thrombosed as well. Prominent periportal lymph node, which is nonspecific."  EGD and colonoscopy on 10/11/21 were negative for primary malignancies.   He underwent liver lesion biopsy and Y90 mapping on 11/22/21. Pathology showed adenocarcinoma: "Biopsy of the liver mass shows a malignant neoplasm in a fibrotic stroma. Glandular architecture is readily identified with several foci of intraluminal necrosis. Scattered mitotic figures are seen. Neoplastic cells show the following immunophenotype:   Positive: AE1/AE3, CK7, CDX2   Negative: Chromogranin, Synaptophysin, TTF, CK5/6, CK20, HepPar1   The morphology and immunophenotype are compatible with adenocarcinoma. Considerations for a primary site include " "pancreaticobiliary (including intrahepatic cholangiocarcinoma) as well as upper gastrointestinal. Clinical and radiologic correlation is suggested."  Patient presents today with wife for further management. No pain. Initial weight loss has somewhat stabilized. Still has diarrhea. Has not tried imodium yet. +nervous  Mother had kidney cancer at age 64. Maternal grandmother had liver cancer in her 60s. Patient has three children, two daughters and one son.   Discussed palliative chemo with cis/gem  2. PET scan 21 did not show extrahepatic metastases.   3. Cis/gem started on 21, s/p 3 cycles  4. S/p TACE on 2021 then again on 22    Interval History:   Mr Kellogg returns for cycle 4 cis/gem. Tolerated it well overall. Had TACE on 22. Tolerated it better this time. Mild fever after TACE which has resolved.     ECO    ROS:   A ten-point system review is obtained and negative except for what was stated in the Interval History.     Physical Examination:   Vital signs reviewed.   General: well hydrated, well developed, in no acute distress  HEENT: normocephalic, PERRLA, EOMI, anicteric sclerae, oropharynx clear  Neck: supple, no JVD, thyromegaly, cervical or supraclavicular lymphadenopathy  Lungs: clear breath sounds bilaterally, no wheezing, rales, or rhonchi  Heart: RRR, no M/R/G  Abdomen: soft, no tenderness, non-distended, no hepatosplenomegaly, mass, or hernia. BS present  Extremities: no clubbing, cyanosis, or edema  Skin: no rash, ulcer, or open wounds  Neuro: alert and oriented x 4, no focal neuro deficit  Psych: pleasant and appropriate mood and affect    Objective:     Laboratory Data:  Labs reviewed. CA 19-9 684 stable    Imaging Data:  PET 21:  In this patient with known intrahepatic cholangiocarcinoma, there are multifocal hypermetabolic hepatic lesions in both hepatic lobes.  No evidence of distant metastasis.     Lipomatosis of the ileocecal valve and proximal cecum.     Fat " containing umbilical hernia.    MRI Abdomen 1/18/22:  1. Patient with reported cholangiocarcinoma.  Interval progression of hepatic tumor burden with multiple enlarging and new hepatic lesions.  2. Progression of portal venous thrombosis involving the left, right, and main portal veins.  Persistent filling defect within the central hepatic veins concerning for thrombosis.  3. Stable prominent periportal lymph node.    Assessment and Plan:     1. 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 Immunodeficiency secondary to neoplasm    4. Transaminitis    5. Portal vein thrombosis    6. Essential hypertension    7. CAD in native artery      1-3  - Mr Kellogg is a 72 yo man with advanced intrahepatic cholangiocarcinoma with multiple liver lesions. MSI-low, FGFR2 fusion and IDH1/2 mutation negative. Started on cis/gem on 12/7/21, s/p 2 cycles. S/p TACE on 12/16/2021  - doing well. Continue with treatment. Cycle 4 day 1 cis/gem today  - we spent a long time discussing recent TOPAZ trial showing survival benefit with additional durvalumab added to cis/gem. The side effects of durvalumab were discussed with patient, which include but are not limited to fatigue, weakness, decreased appetite, nausea, vomiting, diarrhea, skin reactions and rashes which can be severe, flu-like symptoms, fevers, infusion reactions, pain at the site of infusion, inflammation of organs and death.  Handouts provided to patient. Patient understands and agrees with getting durvalumab. Consent signed  - will add durvalumab to day 8 of each cycle. Will give it next week if approved  - scheduled for MRI abdomen by IR on 3/14. Will add CT chest to complete staging    4.  - worsened transaminitis likely due to TACE  - monitor    5.  - reviewed with radiology. Fallon thrombus  - c/w eliquis 5 mg bid    6.  - BP controlled  - c/w current medication    7.  - on eliquis and provachol    Follow-up:     RTC next week for  cycle 4 day 8. Add durvalumab if approved by then  Knows to call in the interval if any problems arise.    I spent 45 minutes reviewing the chart, interpreting laboratory result and imaging data, coordinating patient's care, with at least 50% of the time on face-to-face counseling.       Electronically signed by Claudia Young

## 2022-02-22 NOTE — PLAN OF CARE
Patient tolerated Potassium, magnesium, Gemzar, and Cisplatin with no complications. VSS. Pt instructed to call MD with any problems. NAD. Pt discharged home independently.

## 2022-02-22 NOTE — Clinical Note
Please expedite PA of durvalumab. Due for durvalumab on 3/2. Need to add CBC, CMP, CA 19-9, magnesium, on 2/28 (no lab for next chemo). Add CT chest on 3/14 (getting MRI that day). Get CBC, CMP, CA 19-9, magnesium on 3/14 as well. See me on 3/15 then get cis/gem. CBC, CMP, CA 19-9 on 3/21, see BRADLEY on 3/22 then get cis/gem/durvalumab

## 2022-02-24 ENCOUNTER — PATIENT MESSAGE (OUTPATIENT)
Dept: INTERNAL MEDICINE | Facility: CLINIC | Age: 72
End: 2022-02-24
Payer: MEDICARE

## 2022-02-24 ENCOUNTER — NURSE TRIAGE (OUTPATIENT)
Dept: ADMINISTRATIVE | Facility: CLINIC | Age: 72
End: 2022-02-24
Payer: MEDICARE

## 2022-02-24 ENCOUNTER — TELEPHONE (OUTPATIENT)
Dept: HEMATOLOGY/ONCOLOGY | Facility: CLINIC | Age: 72
End: 2022-02-24
Payer: MEDICARE

## 2022-02-25 ENCOUNTER — TELEPHONE (OUTPATIENT)
Dept: INTERNAL MEDICINE | Facility: CLINIC | Age: 72
End: 2022-02-25

## 2022-02-25 ENCOUNTER — LAB VISIT (OUTPATIENT)
Dept: LAB | Facility: HOSPITAL | Age: 72
End: 2022-02-25
Attending: FAMILY MEDICINE
Payer: MEDICARE

## 2022-02-25 ENCOUNTER — OFFICE VISIT (OUTPATIENT)
Dept: INTERNAL MEDICINE | Facility: CLINIC | Age: 72
End: 2022-02-25
Attending: FAMILY MEDICINE
Payer: MEDICARE

## 2022-02-25 ENCOUNTER — PATIENT MESSAGE (OUTPATIENT)
Dept: INTERNAL MEDICINE | Facility: CLINIC | Age: 72
End: 2022-02-25

## 2022-02-25 ENCOUNTER — TELEPHONE (OUTPATIENT)
Dept: HEMATOLOGY/ONCOLOGY | Facility: CLINIC | Age: 72
End: 2022-02-25
Payer: MEDICARE

## 2022-02-25 VITALS
HEART RATE: 68 BPM | OXYGEN SATURATION: 99 % | HEIGHT: 68 IN | DIASTOLIC BLOOD PRESSURE: 72 MMHG | SYSTOLIC BLOOD PRESSURE: 118 MMHG | WEIGHT: 219.56 LBS | BODY MASS INDEX: 33.28 KG/M2

## 2022-02-25 DIAGNOSIS — K92.1 BLOOD IN STOOL: ICD-10-CM

## 2022-02-25 DIAGNOSIS — K60.2 ANAL FISSURE: ICD-10-CM

## 2022-02-25 DIAGNOSIS — N40.0 BENIGN PROSTATIC HYPERPLASIA WITHOUT LOWER URINARY TRACT SYMPTOMS: ICD-10-CM

## 2022-02-25 DIAGNOSIS — R31.9 HEMATURIA, UNSPECIFIED TYPE: Primary | ICD-10-CM

## 2022-02-25 DIAGNOSIS — R31.9 HEMATURIA, UNSPECIFIED TYPE: ICD-10-CM

## 2022-02-25 LAB
ALBUMIN SERPL BCP-MCNC: 2.7 G/DL (ref 3.5–5.2)
ANION GAP SERPL CALC-SCNC: 6 MMOL/L (ref 8–16)
BUN SERPL-MCNC: 27 MG/DL (ref 8–23)
CALCIUM SERPL-MCNC: 10.9 MG/DL (ref 8.7–10.5)
CHLORIDE SERPL-SCNC: 109 MMOL/L (ref 95–110)
CO2 SERPL-SCNC: 27 MMOL/L (ref 23–29)
CREAT SERPL-MCNC: 0.8 MG/DL (ref 0.5–1.4)
ERYTHROCYTE [DISTWIDTH] IN BLOOD BY AUTOMATED COUNT: 18.2 % (ref 11.5–14.5)
EST. GFR  (AFRICAN AMERICAN): >60 ML/MIN/1.73 M^2
EST. GFR  (NON AFRICAN AMERICAN): >60 ML/MIN/1.73 M^2
GLUCOSE SERPL-MCNC: 118 MG/DL (ref 70–110)
HCT VFR BLD AUTO: 26.1 % (ref 40–54)
HGB BLD-MCNC: 8.3 G/DL (ref 14–18)
MCH RBC QN AUTO: 31.3 PG (ref 27–31)
MCHC RBC AUTO-ENTMCNC: 31.8 G/DL (ref 32–36)
MCV RBC AUTO: 99 FL (ref 82–98)
PHOSPHATE SERPL-MCNC: 2.4 MG/DL (ref 2.7–4.5)
PLATELET # BLD AUTO: 518 K/UL (ref 150–450)
PMV BLD AUTO: 9.7 FL (ref 9.2–12.9)
POTASSIUM SERPL-SCNC: 4.5 MMOL/L (ref 3.5–5.1)
RBC # BLD AUTO: 2.65 M/UL (ref 4.6–6.2)
SODIUM SERPL-SCNC: 142 MMOL/L (ref 136–145)
WBC # BLD AUTO: 13.55 K/UL (ref 3.9–12.7)

## 2022-02-25 PROCEDURE — 3074F PR MOST RECENT SYSTOLIC BLOOD PRESSURE < 130 MM HG: ICD-10-PCS | Mod: CPTII,S$GLB,, | Performed by: FAMILY MEDICINE

## 2022-02-25 PROCEDURE — 3008F PR BODY MASS INDEX (BMI) DOCUMENTED: ICD-10-PCS | Mod: CPTII,S$GLB,, | Performed by: FAMILY MEDICINE

## 2022-02-25 PROCEDURE — 80069 RENAL FUNCTION PANEL: CPT | Performed by: FAMILY MEDICINE

## 2022-02-25 PROCEDURE — 99999 PR PBB SHADOW E&M-EST. PATIENT-LVL III: CPT | Mod: PBBFAC,,, | Performed by: FAMILY MEDICINE

## 2022-02-25 PROCEDURE — 3074F SYST BP LT 130 MM HG: CPT | Mod: CPTII,S$GLB,, | Performed by: FAMILY MEDICINE

## 2022-02-25 PROCEDURE — 1126F AMNT PAIN NOTED NONE PRSNT: CPT | Mod: CPTII,S$GLB,, | Performed by: FAMILY MEDICINE

## 2022-02-25 PROCEDURE — 85027 COMPLETE CBC AUTOMATED: CPT | Performed by: FAMILY MEDICINE

## 2022-02-25 PROCEDURE — 1101F PT FALLS ASSESS-DOCD LE1/YR: CPT | Mod: CPTII,S$GLB,, | Performed by: FAMILY MEDICINE

## 2022-02-25 PROCEDURE — 99214 PR OFFICE/OUTPT VISIT, EST, LEVL IV, 30-39 MIN: ICD-10-PCS | Mod: S$GLB,,, | Performed by: FAMILY MEDICINE

## 2022-02-25 PROCEDURE — 3288F FALL RISK ASSESSMENT DOCD: CPT | Mod: CPTII,S$GLB,, | Performed by: FAMILY MEDICINE

## 2022-02-25 PROCEDURE — 36415 COLL VENOUS BLD VENIPUNCTURE: CPT | Mod: PN | Performed by: FAMILY MEDICINE

## 2022-02-25 PROCEDURE — 1101F PR PT FALLS ASSESS DOC 0-1 FALLS W/OUT INJ PAST YR: ICD-10-PCS | Mod: CPTII,S$GLB,, | Performed by: FAMILY MEDICINE

## 2022-02-25 PROCEDURE — 1159F MED LIST DOCD IN RCRD: CPT | Mod: CPTII,S$GLB,, | Performed by: FAMILY MEDICINE

## 2022-02-25 PROCEDURE — 1126F PR PAIN SEVERITY QUANTIFIED, NO PAIN PRESENT: ICD-10-PCS | Mod: CPTII,S$GLB,, | Performed by: FAMILY MEDICINE

## 2022-02-25 PROCEDURE — 1160F RVW MEDS BY RX/DR IN RCRD: CPT | Mod: CPTII,S$GLB,, | Performed by: FAMILY MEDICINE

## 2022-02-25 PROCEDURE — 3288F PR FALLS RISK ASSESSMENT DOCUMENTED: ICD-10-PCS | Mod: CPTII,S$GLB,, | Performed by: FAMILY MEDICINE

## 2022-02-25 PROCEDURE — 3008F BODY MASS INDEX DOCD: CPT | Mod: CPTII,S$GLB,, | Performed by: FAMILY MEDICINE

## 2022-02-25 PROCEDURE — 1160F PR REVIEW ALL MEDS BY PRESCRIBER/CLIN PHARMACIST DOCUMENTED: ICD-10-PCS | Mod: CPTII,S$GLB,, | Performed by: FAMILY MEDICINE

## 2022-02-25 PROCEDURE — 1159F PR MEDICATION LIST DOCUMENTED IN MEDICAL RECORD: ICD-10-PCS | Mod: CPTII,S$GLB,, | Performed by: FAMILY MEDICINE

## 2022-02-25 PROCEDURE — 3078F DIAST BP <80 MM HG: CPT | Mod: CPTII,S$GLB,, | Performed by: FAMILY MEDICINE

## 2022-02-25 PROCEDURE — 99999 PR PBB SHADOW E&M-EST. PATIENT-LVL III: ICD-10-PCS | Mod: PBBFAC,,, | Performed by: FAMILY MEDICINE

## 2022-02-25 PROCEDURE — 3078F PR MOST RECENT DIASTOLIC BLOOD PRESSURE < 80 MM HG: ICD-10-PCS | Mod: CPTII,S$GLB,, | Performed by: FAMILY MEDICINE

## 2022-02-25 PROCEDURE — 99214 OFFICE O/P EST MOD 30 MIN: CPT | Mod: S$GLB,,, | Performed by: FAMILY MEDICINE

## 2022-02-25 NOTE — TELEPHONE ENCOUNTER
Case discussed with Dr. Young.  She is aware of the hematuria.  I asked about the mild leukocytosis in the absence of drop in other blood counts.  She is not concerned about this in the absence of fever.  Urinalysis did not look like infection.  She tells me that patient's get steroids with chemotherapy and suspects that this leukocytosis is due to steroid.

## 2022-02-25 NOTE — TELEPHONE ENCOUNTER
Pt reports pink tinged urine at 8pm, no pain or fever (temp 98.0 oral), did have chemo last Thursday for Liver CA. Pt advised to see a MD within 4 hours (or PCP triage), call placed to on call MD at pt's request, Dr. Nicolas Craven contacted and informed of pt's symptoms and that pt does take 5mg Eliquis BID, wanting to know if pt needs to be seen tonight or can follow up in office. Dr. Craven advised pt to call clinic in the morning to schedule an appointment, but if any worsening issues arise he would need to go to ED tonight. Pt informed and encouraged to call back with any worsening symptoms or questions. Pt verbalized understanding.      Reason for Disposition   Taking Coumadin (warfarin) or other strong blood thinner, or known bleeding disorder (e.g., thrombocytopenia)    Additional Information   Negative: Shock suspected (e.g., cold/pale/clammy skin, too weak to stand, low BP, rapid pulse)   Negative: Sounds like a life-threatening emergency to the triager   Negative: Urinary catheter, questions about   Negative: Recent back or abdominal injury   Negative: Recent genital injury   Negative: [1] Unable to urinate (or only a few drops) > 4 hours AND [2] bladder feels very full (e.g., palpable bladder or strong urge to urinate)   Negative: Passing pure blood or large blood clots (i.e., size > a dime) (Exception: karol or small strands)   Negative: Fever > 100.5 F (38.1 C)   Negative: Patient sounds very sick or weak to the triager   Negative: Known sickle cell disease    Protocols used: ST URINE - BLOOD IN-A-

## 2022-02-25 NOTE — TELEPHONE ENCOUNTER
"----- Message from Lidia Mercer sent at 2/25/2022 12:08 PM CST -----         Consult/Advisory:      Name Of Caller:Domonique   Contact Preference?:[phone]      Provider Name: Hector  Does patient feel the need to be seen today?no       What is the nature of the call?:pt is calling regarding blood in urine      Additional Notes:  "Thank you for all that you do for our patients'"                           "

## 2022-02-25 NOTE — PROGRESS NOTES
Subjective:       Patient ID: Domonique Kellogg is a 71 y.o. male.    Chief Complaint: Follow-up    Established patient with a history of intrahepatic cholangiocarcinoma, currently followed in Hematology-Oncology.  Underwent embolization procedures, currently ending chemotherapy cycle, periodic imaging follow-ups.  Pink urine noted last night. No FC or abd/flank pain. Chronic frequency/urgency - no change.  Call me this morning to be seen.  Also with mild perianal pain and reported change in his bowel movements from soft and near liquid to hard with a history of an anal fissure couple of months ago.    MRI of abdomen reviewed January 18, 2022. No mention of renal abnormalities.  Likewise, previous imaging studies did not mention renal abnormalities.    History of urge incontinence last seen by urologist 11/15/2019.  On Flomax.  PSA normal 3 months ago.    Saw colon rectal surgery 12/28/2021 with an anal fissure.  Treated with topical.  Had upper and lower endoscopy 10/01/2021.    States he finished a course of antibiotics over the past couple of days.    Hematuria  This is a new problem. The current episode started yesterday. The problem has been waxing and waning since onset. He describes the hematuria as gross hematuria. He reports no clotting in his urine stream. He is experiencing no pain. He describes his urine color as light pink. Irritative symptoms include frequency, nocturia and urgency. Pertinent negatives include no abdominal pain, chills, dysuria, facial swelling, fever, flank pain, genital pain, hesitancy, inability to urinate, nausea, vomiting or sore throat. There is no history of  trauma, kidney stones, recent infection or sickle cell disease.     Review of Systems   Constitutional: Negative for appetite change, chills, diaphoresis, fatigue and fever.   HENT: Negative for congestion, facial swelling, postnasal drip, rhinorrhea, sore throat and trouble swallowing.    Eyes: Negative for visual  disturbance.   Respiratory: Negative for cough, choking, chest tightness, shortness of breath and wheezing.    Cardiovascular: Negative for chest pain and leg swelling.   Gastrointestinal: Positive for blood in stool. Negative for abdominal distention, abdominal pain, diarrhea, nausea and vomiting.        Minimal RUQ pain with a cough or sneeze (since Dec.)    Little bit of blood with wiping this am, had fissure in Dec.     Loose stools now hardening   Genitourinary: Positive for frequency, hematuria, nocturia and urgency. Negative for difficulty urinating, dysuria, flank pain and hesitancy.   Musculoskeletal: Negative for arthralgias and myalgias.   Skin: Negative for rash.   Neurological: Negative for weakness, light-headedness and headaches.   Psychiatric/Behavioral: Negative for confusion and dysphoric mood.       Objective:      Physical Exam  Vitals and nursing note reviewed.   Constitutional:       Appearance: He is well-developed. He is not diaphoretic.   HENT:      Head: Normocephalic and atraumatic.   Eyes:      General: No scleral icterus.     Conjunctiva/sclera: Conjunctivae normal.   Cardiovascular:      Rate and Rhythm: Normal rate and regular rhythm.      Heart sounds: Normal heart sounds. No murmur heard.    No friction rub. No gallop.   Pulmonary:      Effort: Pulmonary effort is normal. No respiratory distress.      Breath sounds: Normal breath sounds. No wheezing or rales.   Abdominal:      General: There is no distension.      Tenderness: There is no abdominal tenderness. There is no right CVA tenderness, left CVA tenderness, guarding or rebound.   Musculoskeletal:         General: No deformity.      Cervical back: Normal range of motion and neck supple.   Skin:     General: Skin is warm and dry.      Findings: No erythema or rash.   Neurological:      Mental Status: He is alert and oriented to person, place, and time.      Cranial Nerves: No cranial nerve deficit.      Motor: No tremor.       Coordination: Coordination normal.      Gait: Gait normal.   Psychiatric:         Behavior: Behavior normal.         Thought Content: Thought content normal.         Judgment: Judgment normal.         Assessment:       1. Hematuria, unspecified type    2. Benign prostatic hyperplasia without lower urinary tract symptoms    3. Blood in stool    4. Anal fissure        Plan:     Medication List with Changes/Refills   Current Medications    ACETAMINOPHEN (TYLENOL) 650 MG TBSR    Take by mouth daily as needed.    AMOXICILLIN-CLAVULANATE 875-125MG (AUGMENTIN) 875-125 MG PER TABLET    Take 1 tablet by mouth 2 (two) times daily.    APIXABAN (ELIQUIS) 5 MG TAB    Take 1 tablet (5 mg total) by mouth 2 (two) times daily.    CIPROFLOXACIN HCL (CIPRO) 500 MG TABLET    Take 1 tablet (500 mg total) by mouth 2 (two) times daily.    DEXAMETHASONE (DECADRON) 4 MG TAB    Take 2 tablets (8 mg total) by mouth once daily. Take on days 2 and 3 after chemo, with food    DILTIAZEM 2% LIDOCAINE 5% CREAM    Apply pea size amount topically 3 (three) times daily.    DIPHENOXYLATE-ATROPINE 2.5-0.025 MG (LOMOTIL) 2.5-0.025 MG PER TABLET    Take 1 tablet by mouth 4 (four) times daily as needed for Diarrhea.    HYDROCHLOROTHIAZIDE (HYDRODIURIL) 25 MG TABLET    TAKE 1 TABLET (25 MG TOTAL) BY MOUTH ONCE DAILY.    MAGNESIUM OXIDE (MAG-OX) 400 MG (241.3 MG MAGNESIUM) TABLET    Take 400 mg by mouth every evening.    METRONIDAZOLE (FLAGYL) 250 MG TABLET    Take 2 tablets (500 mg total) by mouth 3 (three) times daily.    MULTIVITAMIN WITH MINERALS TABLET    Take 1 tablet by mouth every morning.     ONDANSETRON (ZOFRAN) 4 MG TABLET    Take 1-2 tablets (4-8 mg total) by mouth every 8 (eight) hours as needed for Nausea.    ONDANSETRON (ZOFRAN) 4 MG TABLET    Take 1 tablet (4 mg total) by mouth every 6 (six) hours as needed for Nausea.    OXYCODONE (ROXICODONE) 5 MG IMMEDIATE RELEASE TABLET    Take 1 tablet (5 mg total) by mouth every 6 (six) hours as needed  for Pain. Take 3-4 tab (15-20 mg) by mouth every 6 hours as needed for pain    OXYCODONE (ROXICODONE) 5 MG IMMEDIATE RELEASE TABLET    Take 1 tablet (5 mg total) by mouth every 6 (six) hours as needed for Pain.    OXYCODONE-ACETAMINOPHEN (PERCOCET) 5-325 MG PER TABLET    Take 1 tablet by mouth every 6 (six) hours as needed for Pain.    POTASSIUM CHLORIDE SA (K-DUR,KLOR-CON) 20 MEQ TABLET    Take 2 tablets (40 mEq total) by mouth once daily.    PRAVASTATIN (PRAVACHOL) 40 MG TABLET    Take 1 tablet (40 mg total) by mouth once daily.    PROCHLORPERAZINE (COMPAZINE) 10 MG TABLET    Take 1 tablet (10 mg total) by mouth every 6 (six) hours as needed (nausea).    PROMETHAZINE (PHENERGAN) 12.5 MG TAB    Take 1 tablet (12.5 mg total) by mouth every 6 (six) hours as needed (nausea).    RABEPRAZOLE (ACIPHEX) 20 MG TABLET    Take 1 tablet (20 mg total) by mouth once daily.    TAMSULOSIN (FLOMAX) 0.4 MG CAP    TAKE 1 CAPSULE BY MOUTH EVERY DAY     Domonique was seen today for follow-up.    Diagnoses and all orders for this visit:    Hematuria, unspecified type  -     Urinalysis; Future  -     Urinalysis Microscopic; Future  -     Urine culture; Future  -     Ambulatory referral/consult to Urology; Future    Benign prostatic hyperplasia without lower urinary tract symptoms  -     Ambulatory referral/consult to Urology; Future    Blood in stool    Anal fissure      See meds, orders, follow up, routing and instructions sections of encounter and AVS. Discussed with patient and provided on AVS.    Observe perianal pain and will courtesy copy his colon rectal surgery nurse practitioner.  We can schedule a follow-up if needed.    Awaiting urinalysis report.  Will treat according to lab, culture.  Urology consult.    Patient is afebrile.  He understands his Hematology-Oncology instructions to go to the emergency room for any febrile illness.  His symptoms were essentially pain-free at this time.

## 2022-02-25 NOTE — Clinical Note
Want to let you know he may have a recurrent fissure.  We will watch over the weekend and he is supposed to let me know if things worsen.  Main complaint today was pink urine.  Thank you

## 2022-02-25 NOTE — TELEPHONE ENCOUNTER
With patient went home after appointment today had episode of hematuria that was more profound.  No clots.  No pain.  will check CBC and renal panel.  If this continues may need to refer to emergency room.  His urinalysis is currently pending.

## 2022-02-26 ENCOUNTER — PATIENT MESSAGE (OUTPATIENT)
Dept: HEMATOLOGY/ONCOLOGY | Facility: CLINIC | Age: 72
End: 2022-02-26
Payer: MEDICARE

## 2022-02-28 ENCOUNTER — PATIENT MESSAGE (OUTPATIENT)
Dept: HEMATOLOGY/ONCOLOGY | Facility: CLINIC | Age: 72
End: 2022-02-28
Payer: MEDICARE

## 2022-02-28 ENCOUNTER — LAB VISIT (OUTPATIENT)
Dept: LAB | Facility: HOSPITAL | Age: 72
End: 2022-02-28
Attending: INTERNAL MEDICINE
Payer: MEDICARE

## 2022-02-28 DIAGNOSIS — C22.1 INTRAHEPATIC CHOLANGIOCARCINOMA: ICD-10-CM

## 2022-02-28 LAB
ALBUMIN SERPL BCP-MCNC: 2.7 G/DL (ref 3.5–5.2)
ALP SERPL-CCNC: 352 U/L (ref 55–135)
ALT SERPL W/O P-5'-P-CCNC: 80 U/L (ref 10–44)
ANION GAP SERPL CALC-SCNC: 9 MMOL/L (ref 8–16)
ANISOCYTOSIS BLD QL SMEAR: SLIGHT
AST SERPL-CCNC: 76 U/L (ref 10–40)
BASOPHILS # BLD AUTO: 0.01 K/UL (ref 0–0.2)
BASOPHILS NFR BLD: 0.2 % (ref 0–1.9)
BILIRUB SERPL-MCNC: 0.8 MG/DL (ref 0.1–1)
BUN SERPL-MCNC: 21 MG/DL (ref 8–23)
CALCIUM SERPL-MCNC: 10.1 MG/DL (ref 8.7–10.5)
CANCER AG19-9 SERPL-ACNC: 548.3 U/ML (ref 0–40)
CHLORIDE SERPL-SCNC: 105 MMOL/L (ref 95–110)
CO2 SERPL-SCNC: 28 MMOL/L (ref 23–29)
CREAT SERPL-MCNC: 0.7 MG/DL (ref 0.5–1.4)
DIFFERENTIAL METHOD: ABNORMAL
EOSINOPHIL # BLD AUTO: 0 K/UL (ref 0–0.5)
EOSINOPHIL NFR BLD: 0.4 % (ref 0–8)
ERYTHROCYTE [DISTWIDTH] IN BLOOD BY AUTOMATED COUNT: 18.2 % (ref 11.5–14.5)
EST. GFR  (AFRICAN AMERICAN): >60 ML/MIN/1.73 M^2
EST. GFR  (NON AFRICAN AMERICAN): >60 ML/MIN/1.73 M^2
GIANT PLATELETS BLD QL SMEAR: PRESENT
GLUCOSE SERPL-MCNC: 96 MG/DL (ref 70–110)
HCT VFR BLD AUTO: 24.6 % (ref 40–54)
HGB BLD-MCNC: 7.9 G/DL (ref 14–18)
HYPOCHROMIA BLD QL SMEAR: ABNORMAL
IMM GRANULOCYTES # BLD AUTO: 0.19 K/UL (ref 0–0.04)
IMM GRANULOCYTES NFR BLD AUTO: 4.1 % (ref 0–0.5)
LYMPHOCYTES # BLD AUTO: 1.4 K/UL (ref 1–4.8)
LYMPHOCYTES NFR BLD: 29.1 % (ref 18–48)
MAGNESIUM SERPL-MCNC: 1.6 MG/DL (ref 1.6–2.6)
MCH RBC QN AUTO: 31.3 PG (ref 27–31)
MCHC RBC AUTO-ENTMCNC: 32.1 G/DL (ref 32–36)
MCV RBC AUTO: 98 FL (ref 82–98)
MONOCYTES # BLD AUTO: 0.3 K/UL (ref 0.3–1)
MONOCYTES NFR BLD: 6.3 % (ref 4–15)
NEUTROPHILS # BLD AUTO: 2.8 K/UL (ref 1.8–7.7)
NEUTROPHILS NFR BLD: 59.9 % (ref 38–73)
NRBC BLD-RTO: 0 /100 WBC
OVALOCYTES BLD QL SMEAR: ABNORMAL
PLATELET # BLD AUTO: 306 K/UL (ref 150–450)
PLATELET BLD QL SMEAR: ABNORMAL
PMV BLD AUTO: 9.6 FL (ref 9.2–12.9)
POIKILOCYTOSIS BLD QL SMEAR: SLIGHT
POLYCHROMASIA BLD QL SMEAR: ABNORMAL
POTASSIUM SERPL-SCNC: 4.1 MMOL/L (ref 3.5–5.1)
PROT SERPL-MCNC: 6.5 G/DL (ref 6–8.4)
RBC # BLD AUTO: 2.52 M/UL (ref 4.6–6.2)
SODIUM SERPL-SCNC: 142 MMOL/L (ref 136–145)
TARGETS BLD QL SMEAR: ABNORMAL
WBC # BLD AUTO: 4.64 K/UL (ref 3.9–12.7)

## 2022-02-28 PROCEDURE — 80053 COMPREHEN METABOLIC PANEL: CPT | Performed by: INTERNAL MEDICINE

## 2022-02-28 PROCEDURE — 85025 COMPLETE CBC W/AUTO DIFF WBC: CPT | Performed by: INTERNAL MEDICINE

## 2022-02-28 PROCEDURE — 83735 ASSAY OF MAGNESIUM: CPT | Performed by: INTERNAL MEDICINE

## 2022-02-28 PROCEDURE — 36415 COLL VENOUS BLD VENIPUNCTURE: CPT | Mod: PN | Performed by: INTERNAL MEDICINE

## 2022-02-28 PROCEDURE — 86301 IMMUNOASSAY TUMOR CA 19-9: CPT | Performed by: INTERNAL MEDICINE

## 2022-03-02 ENCOUNTER — OFFICE VISIT (OUTPATIENT)
Dept: HEMATOLOGY/ONCOLOGY | Facility: CLINIC | Age: 72
End: 2022-03-02
Payer: MEDICARE

## 2022-03-02 ENCOUNTER — INFUSION (OUTPATIENT)
Dept: INFUSION THERAPY | Facility: HOSPITAL | Age: 72
End: 2022-03-02
Payer: MEDICARE

## 2022-03-02 VITALS
HEIGHT: 68 IN | DIASTOLIC BLOOD PRESSURE: 62 MMHG | HEART RATE: 84 BPM | SYSTOLIC BLOOD PRESSURE: 127 MMHG | WEIGHT: 219.81 LBS | TEMPERATURE: 98 F | BODY MASS INDEX: 33.31 KG/M2

## 2022-03-02 VITALS
HEIGHT: 68 IN | RESPIRATION RATE: 18 BRPM | SYSTOLIC BLOOD PRESSURE: 120 MMHG | DIASTOLIC BLOOD PRESSURE: 70 MMHG | HEART RATE: 70 BPM | OXYGEN SATURATION: 99 % | BODY MASS INDEX: 33.31 KG/M2 | TEMPERATURE: 98 F | WEIGHT: 219.81 LBS

## 2022-03-02 DIAGNOSIS — C22.1 INTRAHEPATIC CHOLANGIOCARCINOMA: Primary | ICD-10-CM

## 2022-03-02 DIAGNOSIS — R31.9 HEMATURIA, UNSPECIFIED TYPE: ICD-10-CM

## 2022-03-02 DIAGNOSIS — T45.1X5A IMMUNODEFICIENCY SECONDARY TO CHEMOTHERAPY: ICD-10-CM

## 2022-03-02 DIAGNOSIS — I81 PORTAL VEIN THROMBOSIS: ICD-10-CM

## 2022-03-02 DIAGNOSIS — R74.01 TRANSAMINITIS: ICD-10-CM

## 2022-03-02 DIAGNOSIS — D84.81 IMMUNODEFICIENCY SECONDARY TO NEOPLASM: ICD-10-CM

## 2022-03-02 DIAGNOSIS — I25.10 CAD IN NATIVE ARTERY: ICD-10-CM

## 2022-03-02 DIAGNOSIS — D84.821 IMMUNODEFICIENCY SECONDARY TO CHEMOTHERAPY: ICD-10-CM

## 2022-03-02 DIAGNOSIS — I10 ESSENTIAL HYPERTENSION: ICD-10-CM

## 2022-03-02 DIAGNOSIS — D49.9 IMMUNODEFICIENCY SECONDARY TO NEOPLASM: ICD-10-CM

## 2022-03-02 DIAGNOSIS — Z79.899 IMMUNODEFICIENCY SECONDARY TO CHEMOTHERAPY: ICD-10-CM

## 2022-03-02 PROCEDURE — 3008F PR BODY MASS INDEX (BMI) DOCUMENTED: ICD-10-PCS | Mod: CPTII,S$GLB,, | Performed by: NURSE PRACTITIONER

## 2022-03-02 PROCEDURE — 96413 CHEMO IV INFUSION 1 HR: CPT

## 2022-03-02 PROCEDURE — 63600175 PHARM REV CODE 636 W HCPCS: Mod: JG | Performed by: INTERNAL MEDICINE

## 2022-03-02 PROCEDURE — 96376 TX/PRO/DX INJ SAME DRUG ADON: CPT

## 2022-03-02 PROCEDURE — 3008F BODY MASS INDEX DOCD: CPT | Mod: CPTII,S$GLB,, | Performed by: NURSE PRACTITIONER

## 2022-03-02 PROCEDURE — 1159F PR MEDICATION LIST DOCUMENTED IN MEDICAL RECORD: ICD-10-PCS | Mod: CPTII,S$GLB,, | Performed by: NURSE PRACTITIONER

## 2022-03-02 PROCEDURE — 99499 RISK ADDL DX/OHS AUDIT: ICD-10-PCS | Mod: S$GLB,,, | Performed by: NURSE PRACTITIONER

## 2022-03-02 PROCEDURE — 1160F RVW MEDS BY RX/DR IN RCRD: CPT | Mod: CPTII,S$GLB,, | Performed by: NURSE PRACTITIONER

## 2022-03-02 PROCEDURE — 96417 CHEMO IV INFUS EACH ADDL SEQ: CPT

## 2022-03-02 PROCEDURE — 96367 TX/PROPH/DG ADDL SEQ IV INF: CPT

## 2022-03-02 PROCEDURE — 1159F MED LIST DOCD IN RCRD: CPT | Mod: CPTII,S$GLB,, | Performed by: NURSE PRACTITIONER

## 2022-03-02 PROCEDURE — 99999 PR PBB SHADOW E&M-EST. PATIENT-LVL IV: ICD-10-PCS | Mod: PBBFAC,,, | Performed by: NURSE PRACTITIONER

## 2022-03-02 PROCEDURE — 25000003 PHARM REV CODE 250: Performed by: INTERNAL MEDICINE

## 2022-03-02 PROCEDURE — 1101F PR PT FALLS ASSESS DOC 0-1 FALLS W/OUT INJ PAST YR: ICD-10-PCS | Mod: CPTII,S$GLB,, | Performed by: NURSE PRACTITIONER

## 2022-03-02 PROCEDURE — 3078F DIAST BP <80 MM HG: CPT | Mod: CPTII,S$GLB,, | Performed by: NURSE PRACTITIONER

## 2022-03-02 PROCEDURE — 3288F FALL RISK ASSESSMENT DOCD: CPT | Mod: CPTII,S$GLB,, | Performed by: NURSE PRACTITIONER

## 2022-03-02 PROCEDURE — 96361 HYDRATE IV INFUSION ADD-ON: CPT

## 2022-03-02 PROCEDURE — 1101F PT FALLS ASSESS-DOCD LE1/YR: CPT | Mod: CPTII,S$GLB,, | Performed by: NURSE PRACTITIONER

## 2022-03-02 PROCEDURE — 1126F PR PAIN SEVERITY QUANTIFIED, NO PAIN PRESENT: ICD-10-PCS | Mod: CPTII,S$GLB,, | Performed by: NURSE PRACTITIONER

## 2022-03-02 PROCEDURE — 99215 OFFICE O/P EST HI 40 MIN: CPT | Mod: S$GLB,,, | Performed by: NURSE PRACTITIONER

## 2022-03-02 PROCEDURE — 1160F PR REVIEW ALL MEDS BY PRESCRIBER/CLIN PHARMACIST DOCUMENTED: ICD-10-PCS | Mod: CPTII,S$GLB,, | Performed by: NURSE PRACTITIONER

## 2022-03-02 PROCEDURE — 3074F SYST BP LT 130 MM HG: CPT | Mod: CPTII,S$GLB,, | Performed by: NURSE PRACTITIONER

## 2022-03-02 PROCEDURE — 99499 UNLISTED E&M SERVICE: CPT | Mod: S$GLB,,, | Performed by: NURSE PRACTITIONER

## 2022-03-02 PROCEDURE — 3078F PR MOST RECENT DIASTOLIC BLOOD PRESSURE < 80 MM HG: ICD-10-PCS | Mod: CPTII,S$GLB,, | Performed by: NURSE PRACTITIONER

## 2022-03-02 PROCEDURE — 99999 PR PBB SHADOW E&M-EST. PATIENT-LVL IV: CPT | Mod: PBBFAC,,, | Performed by: NURSE PRACTITIONER

## 2022-03-02 PROCEDURE — 1126F AMNT PAIN NOTED NONE PRSNT: CPT | Mod: CPTII,S$GLB,, | Performed by: NURSE PRACTITIONER

## 2022-03-02 PROCEDURE — 96360 HYDRATION IV INFUSION INIT: CPT

## 2022-03-02 PROCEDURE — 99215 PR OFFICE/OUTPT VISIT, EST, LEVL V, 40-54 MIN: ICD-10-PCS | Mod: S$GLB,,, | Performed by: NURSE PRACTITIONER

## 2022-03-02 PROCEDURE — 3288F PR FALLS RISK ASSESSMENT DOCUMENTED: ICD-10-PCS | Mod: CPTII,S$GLB,, | Performed by: NURSE PRACTITIONER

## 2022-03-02 PROCEDURE — 3074F PR MOST RECENT SYSTOLIC BLOOD PRESSURE < 130 MM HG: ICD-10-PCS | Mod: CPTII,S$GLB,, | Performed by: NURSE PRACTITIONER

## 2022-03-02 RX ORDER — MAGNESIUM SULFATE HEPTAHYDRATE 40 MG/ML
2 INJECTION, SOLUTION INTRAVENOUS
Status: COMPLETED | OUTPATIENT
Start: 2022-03-02 | End: 2022-03-02

## 2022-03-02 RX ORDER — HEPARIN 100 UNIT/ML
500 SYRINGE INTRAVENOUS
Status: CANCELLED | OUTPATIENT
Start: 2022-03-02

## 2022-03-02 RX ORDER — POTASSIUM CHLORIDE 7.45 MG/ML
10 INJECTION INTRAVENOUS
Status: DISPENSED | OUTPATIENT
Start: 2022-03-02 | End: 2022-03-02

## 2022-03-02 RX ORDER — SODIUM CHLORIDE 0.9 % (FLUSH) 0.9 %
10 SYRINGE (ML) INJECTION
Status: CANCELLED | OUTPATIENT
Start: 2022-03-02

## 2022-03-02 RX ORDER — MAGNESIUM SULFATE HEPTAHYDRATE 40 MG/ML
2 INJECTION, SOLUTION INTRAVENOUS
Status: CANCELLED
Start: 2022-03-02

## 2022-03-02 RX ORDER — HEPARIN 100 UNIT/ML
500 SYRINGE INTRAVENOUS
Status: DISCONTINUED | OUTPATIENT
Start: 2022-03-02 | End: 2022-03-02 | Stop reason: HOSPADM

## 2022-03-02 RX ORDER — SODIUM CHLORIDE 0.9 % (FLUSH) 0.9 %
10 SYRINGE (ML) INJECTION
Status: DISCONTINUED | OUTPATIENT
Start: 2022-03-02 | End: 2022-03-02 | Stop reason: HOSPADM

## 2022-03-02 RX ADMIN — PALONOSETRON HYDROCHLORIDE 0.25 MG: 0.25 INJECTION, SOLUTION INTRAVENOUS at 10:03

## 2022-03-02 RX ADMIN — SODIUM CHLORIDE 500 ML: 0.9 INJECTION, SOLUTION INTRAVENOUS at 01:03

## 2022-03-02 RX ADMIN — MAGNESIUM SULFATE HEPTAHYDRATE 2 G: 40 INJECTION, SOLUTION INTRAVENOUS at 01:03

## 2022-03-02 RX ADMIN — POTASSIUM CHLORIDE 10 MEQ: 10 INJECTION, SOLUTION INTRAVENOUS at 09:03

## 2022-03-02 RX ADMIN — APREPITANT 130 MG: 130 INJECTION, EMULSION INTRAVENOUS at 10:03

## 2022-03-02 RX ADMIN — GEMCITABINE HYDROCHLORIDE 2200 MG: 1 INJECTION, SOLUTION INTRAVENOUS at 11:03

## 2022-03-02 RX ADMIN — MAGNESIUM SULFATE HEPTAHYDRATE: 500 INJECTION, SOLUTION INTRAMUSCULAR; INTRAVENOUS at 09:03

## 2022-03-02 RX ADMIN — CISPLATIN 57 MG: 1 INJECTION INTRAVENOUS at 12:03

## 2022-03-02 RX ADMIN — HEPARIN 500 UNITS: 100 SYRINGE at 02:03

## 2022-03-02 NOTE — PROGRESS NOTES
"                                                         PROGRESS NOTE    Subjective:       Patient ID: Domonique Kellogg is a 71 y.o. male.    Chief Complaint: follow up for cholangiocarcinoma    Diagnosis:  Advanced intrahepatic cholangiocarcinoma, MSI-low, negative for FGFR2 fusion or IDH1/IDH2 mutations, diagnosed on 11/22/2021.     Molecular Profile:  Guardant 360 11/29/21: +CCND1 amplification. VUS: CATRACHITO [id number]. MSI-high not detected.     Oncologic History:  1. Mr Kellogg is a 70 yo man with CAD, HTN, seizures in 2011 who initially saw me on 11/29/21 for further management of cholangiocarcinoma. He presented with weight loss and diarrhea since July. US 9/22/21 showed "Enlarged, heterogeneous appearance of the liver likely due to multiple underlying hepatic lesions largest measuring 4.8 cm."  MRI abdomen w/wo contrast 10/4/21: "Multiple liver lesions measuring up to 13.3 cm in the right hepatic lobe.  Differential diagnosis includes metastases, fibrolamellar hepatocellular carcinoma, or atypical focal nodular hyperplasia.  Consider biopsy for further evaluation. Thrombosis of the anterior branch of the right portal vein and left hepatic vein.  Nonvisualization of the middle and right hepatic veins, which may be thrombosed as well. Prominent periportal lymph node, which is nonspecific."  EGD and colonoscopy on 10/11/21 were negative for primary malignancies.   He underwent liver lesion biopsy and Y90 mapping on 11/22/21. Pathology showed adenocarcinoma: "Biopsy of the liver mass shows a malignant neoplasm in a fibrotic stroma. Glandular architecture is readily identified with several foci of intraluminal necrosis. Scattered mitotic figures are seen. Neoplastic cells show the following immunophenotype:   Positive: AE1/AE3, CK7, CDX2   Negative: Chromogranin, Synaptophysin, TTF, CK5/6, CK20, HepPar1   The morphology and immunophenotype are compatible with adenocarcinoma. Considerations for a primary site include " "pancreaticobiliary (including intrahepatic cholangiocarcinoma) as well as upper gastrointestinal. Clinical and radiologic correlation is suggested."  Patient presents today with wife for further management. No pain. Initial weight loss has somewhat stabilized. Still has diarrhea. Has not tried imodium yet. +nervous  Mother had kidney cancer at age 64. Maternal grandmother had liver cancer in her 60s. Patient has three children, two daughters and one son.   Discussed palliative chemo with cis/gem  2. PET scan 21 did not show extrahepatic metastases.   3. Cis/gem started on 21, s/p 3 cycles  4. S/p TACE on 2021 then again on 22    Interval History:   Mr Kellogg returns for cycle 4, day 8 cis/gem. Tolerated it well overall. Had TACE on 22. Recently, reported hematuria and had to stop Eliquis. Has restarted at 2.5 mg dose with no further hematuria. Neuropathy is at baseline.     ECO    ROS:   A ten-point system review is obtained and negative except for what was stated in the Interval History.     Physical Examination:   Vital signs reviewed.   General: well hydrated, well developed, in no acute distress  HEENT: normocephalic, PERRLA, EOMI, anicteric sclerae, oropharynx clear  Neck: supple, no JVD, thyromegaly, cervical or supraclavicular lymphadenopathy  Lungs: clear breath sounds bilaterally, no wheezing, rales, or rhonchi  Heart: RRR, no M/R/G  Abdomen: soft, no tenderness, non-distended, no hepatosplenomegaly, mass, or hernia. BS present  Extremities: no clubbing, cyanosis, or edema  Skin: no rash, ulcer, or open wounds  Neuro: alert and oriented x 4, no focal neuro deficit  Psych: pleasant and appropriate mood and affect    Objective:     Laboratory Data:  Labs reviewed. CA 19-9 548, decreasing    Imaging Data:  PET 21:  In this patient with known intrahepatic cholangiocarcinoma, there are multifocal hypermetabolic hepatic lesions in both hepatic lobes.  No evidence of distant " metastasis.     Lipomatosis of the ileocecal valve and proximal cecum.     Fat containing umbilical hernia.    MRI Abdomen 1/18/22:  1. Patient with reported cholangiocarcinoma.  Interval progression of hepatic tumor burden with multiple enlarging and new hepatic lesions.  2. Progression of portal venous thrombosis involving the left, right, and main portal veins.  Persistent filling defect within the central hepatic veins concerning for thrombosis.  3. Stable prominent periportal lymph node.    Assessment and Plan:     1. 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 Immunodeficiency secondary to neoplasm    4. Transaminitis    5. Portal vein thrombosis    6. Essential hypertension    7. CAD in native artery    8. Hematuria, unspecified type      1-3  - Mr Kellogg is a 70 yo man with advanced intrahepatic cholangiocarcinoma with multiple liver lesions. MSI-low, FGFR2 fusion and IDH1/2 mutation negative. Started on cis/gem on 12/7/21, s/p 2 cycles. S/p TACE on 12/16/2021  - doing well. Continue with treatment. Cycle 4 day 8 cis/gem today  - we spent a long time discussing recent TOPAZ trial showing survival benefit with additional durvalumab added to cis/gem. Unfortunately, insurance will not cover the addition of Imfinzi as it has not been approved as standard of care treatment.  - scheduled for MRI abdomen by IR on 3/14. Will add CT chest to complete staging    4.  - transaminitis likely due to TACE  - improving, monitor    5.  - reviewed with radiology. Cobb thrombus  - c/w eliquis 5 mg bid    6.  - BP controlled  - c/w current medication    7.  - on eliquis and provachol    8.  - improved. Will f/u with Urology this week.  - Keep 2.5 mg dose of Eliquis.     Follow-up:     RTC in 2 weeks for cycle 5 day 1     I spent 45 minutes reviewing the chart, interpreting laboratory result and imaging data, coordinating patient's care, with at least 50% of the time on face-to-face  counseling.     Patient is in agreement with the proposed treatment plan. All questions were answered to the patient's satisfaction. Pt knows to call clinic if anything is needed before the next clinic visit.    Blanca Andrews, MSN, APRN, FNP-C  Hematology and Medical Oncology  Nurse Practitioner to Dr. Cale Lujan  Nurse Practitioner, [facility]            Route Chart for Scheduling    Med Onc Chart Routing      Follow up with physician 2 weeks. Needs provider appt prior to [place]/Cis all scheduled 3/15   Follow up with [facility]    Labs    Lab interval:  Keep current lab appts   Imaging    Pharmacy appointment    Other referrals          Treatment Plan Information   OP GEMCITABINE CISPLATIN Q3W + DURVALUMAB D8 C4+   Claudia Young MD   Upcoming Treatment Dates - OP GEMCITABINE CISPLATIN Q3W + DURVALUMAB D8 C4+    3/15/2022       Chemotherapy       gemcitabine (GEMZAR) 2,260 mg in sodium chloride 0.9% 250 mL chemo infusion       CISplatin (PLATINOL) 25 mg/m2 = 57 mg in sodium chloride 0.9% 500 mL chemo infusion       Pre-Hydration       sodium chloride 0.9% 500 mL with magnesium sulfate 2 g, potassium chloride 20 mEq infusion       Post-Hydration       magnesium sulfate 2g in water 50mL IVPB (premix)       sodium chloride 0.9% bolus 500 mL  3/22/2022       Chemotherapy       gemcitabine (GEMZAR) 2,260 mg in sodium chloride 0.9% 250 mL chemo infusion       CISplatin (PLATINOL) 25 mg/m2 = 57 mg in sodium chloride 0.9% 500 mL chemo infusion       Pre-Hydration       sodium chloride 0.9% 500 mL with magnesium sulfate 2 g, potassium chloride 20 mEq infusion       Post-Hydration       magnesium sulfate 2g in water 50mL IVPB (premix)       sodium chloride 0.9% bolus 500 mL  4/5/2022       Chemotherapy       gemcitabine (GEMZAR) 2,260 mg in sodium chloride 0.9% 250 mL chemo infusion       CISplatin (PLATINOL) 25 mg/m2 = 57 mg in sodium chloride 0.9% 500 mL chemo infusion       Pre-Hydration        sodium chloride 0.9% 500 mL with magnesium sulfate 2 g, potassium chloride 20 mEq infusion       Post-Hydration       magnesium sulfate 2g in water 50mL IVPB (premix)       sodium chloride 0.9% bolus 500 mL  4/12/2022       Chemotherapy       gemcitabine (GEMZAR) 2,260 mg in sodium chloride 0.9% 250 mL chemo infusion       CISplatin (PLATINOL) 25 mg/m2 = 57 mg in sodium chloride 0.9% 500 mL chemo infusion       Pre-Hydration       sodium chloride 0.9% 500 mL with magnesium sulfate 2 g, potassium chloride 20 mEq infusion       Post-Hydration       magnesium sulfate 2g in water 50mL IVPB (premix)       sodium chloride 0.9% bolus 500 mL

## 2022-03-02 NOTE — PLAN OF CARE
Pt admitted for C4D1 Gemzar/Cisplatin/IVF. Pt was to received Imfinzi for first time but not Auth yet. Labs reviewed and assessment performed, Pt offered no complaints. Pt tolerated treatment well. Plan of care reviewed and Pt discharged @ 14:20

## 2022-03-04 ENCOUNTER — OFFICE VISIT (OUTPATIENT)
Dept: UROLOGY | Facility: CLINIC | Age: 72
End: 2022-03-04
Payer: MEDICARE

## 2022-03-04 VITALS
SYSTOLIC BLOOD PRESSURE: 137 MMHG | HEIGHT: 68 IN | BODY MASS INDEX: 33.19 KG/M2 | WEIGHT: 219 LBS | HEART RATE: 71 BPM | DIASTOLIC BLOOD PRESSURE: 74 MMHG

## 2022-03-04 DIAGNOSIS — R31.0 GROSS HEMATURIA: Primary | ICD-10-CM

## 2022-03-04 DIAGNOSIS — R35.0 URINARY FREQUENCY: ICD-10-CM

## 2022-03-04 DIAGNOSIS — N40.1 BPH WITH URINARY OBSTRUCTION: ICD-10-CM

## 2022-03-04 DIAGNOSIS — N13.8 BPH WITH URINARY OBSTRUCTION: ICD-10-CM

## 2022-03-04 LAB
BILIRUB SERPL-MCNC: ABNORMAL MG/DL
BLOOD URINE, POC: 250
CLARITY, POC UA: ABNORMAL
COLOR, POC UA: ABNORMAL
GLUCOSE UR QL STRIP: ABNORMAL
KETONES UR QL STRIP: ABNORMAL
LEUKOCYTE ESTERASE URINE, POC: ABNORMAL
NITRITE, POC UA: ABNORMAL
PH, POC UA: 5
POC RESIDUAL URINE VOLUME: 96 ML (ref 0–100)
PROTEIN, POC: 30
SPECIFIC GRAVITY, POC UA: 1.02
UROBILINOGEN, POC UA: ABNORMAL

## 2022-03-04 PROCEDURE — 1159F MED LIST DOCD IN RCRD: CPT | Mod: CPTII,S$GLB,, | Performed by: NURSE PRACTITIONER

## 2022-03-04 PROCEDURE — 3008F BODY MASS INDEX DOCD: CPT | Mod: CPTII,S$GLB,, | Performed by: NURSE PRACTITIONER

## 2022-03-04 PROCEDURE — 87086 URINE CULTURE/COLONY COUNT: CPT | Performed by: NURSE PRACTITIONER

## 2022-03-04 PROCEDURE — 3288F FALL RISK ASSESSMENT DOCD: CPT | Mod: CPTII,S$GLB,, | Performed by: NURSE PRACTITIONER

## 2022-03-04 PROCEDURE — 99215 PR OFFICE/OUTPT VISIT, EST, LEVL V, 40-54 MIN: ICD-10-PCS | Mod: S$GLB,,, | Performed by: NURSE PRACTITIONER

## 2022-03-04 PROCEDURE — 99999 PR PBB SHADOW E&M-EST. PATIENT-LVL V: ICD-10-PCS | Mod: PBBFAC,,, | Performed by: NURSE PRACTITIONER

## 2022-03-04 PROCEDURE — 1160F RVW MEDS BY RX/DR IN RCRD: CPT | Mod: CPTII,S$GLB,, | Performed by: NURSE PRACTITIONER

## 2022-03-04 PROCEDURE — 1126F PR PAIN SEVERITY QUANTIFIED, NO PAIN PRESENT: ICD-10-PCS | Mod: CPTII,S$GLB,, | Performed by: NURSE PRACTITIONER

## 2022-03-04 PROCEDURE — 51798 US URINE CAPACITY MEASURE: CPT | Mod: S$GLB,,, | Performed by: NURSE PRACTITIONER

## 2022-03-04 PROCEDURE — 3078F DIAST BP <80 MM HG: CPT | Mod: CPTII,S$GLB,, | Performed by: NURSE PRACTITIONER

## 2022-03-04 PROCEDURE — 88112 CYTOPATH CELL ENHANCE TECH: CPT | Mod: 26,,, | Performed by: PATHOLOGY

## 2022-03-04 PROCEDURE — 51798 POCT BLADDER SCAN: ICD-10-PCS | Mod: S$GLB,,, | Performed by: NURSE PRACTITIONER

## 2022-03-04 PROCEDURE — 99215 OFFICE O/P EST HI 40 MIN: CPT | Mod: S$GLB,,, | Performed by: NURSE PRACTITIONER

## 2022-03-04 PROCEDURE — 1101F PR PT FALLS ASSESS DOC 0-1 FALLS W/OUT INJ PAST YR: ICD-10-PCS | Mod: CPTII,S$GLB,, | Performed by: NURSE PRACTITIONER

## 2022-03-04 PROCEDURE — 3008F PR BODY MASS INDEX (BMI) DOCUMENTED: ICD-10-PCS | Mod: CPTII,S$GLB,, | Performed by: NURSE PRACTITIONER

## 2022-03-04 PROCEDURE — 3288F PR FALLS RISK ASSESSMENT DOCUMENTED: ICD-10-PCS | Mod: CPTII,S$GLB,, | Performed by: NURSE PRACTITIONER

## 2022-03-04 PROCEDURE — 1101F PT FALLS ASSESS-DOCD LE1/YR: CPT | Mod: CPTII,S$GLB,, | Performed by: NURSE PRACTITIONER

## 2022-03-04 PROCEDURE — 3078F PR MOST RECENT DIASTOLIC BLOOD PRESSURE < 80 MM HG: ICD-10-PCS | Mod: CPTII,S$GLB,, | Performed by: NURSE PRACTITIONER

## 2022-03-04 PROCEDURE — 81002 POCT URINE DIPSTICK WITHOUT MICROSCOPE: ICD-10-PCS | Mod: S$GLB,,, | Performed by: NURSE PRACTITIONER

## 2022-03-04 PROCEDURE — 81002 URINALYSIS NONAUTO W/O SCOPE: CPT | Mod: S$GLB,,, | Performed by: NURSE PRACTITIONER

## 2022-03-04 PROCEDURE — 3075F SYST BP GE 130 - 139MM HG: CPT | Mod: CPTII,S$GLB,, | Performed by: NURSE PRACTITIONER

## 2022-03-04 PROCEDURE — 88112 CYTOPATH CELL ENHANCE TECH: CPT | Performed by: PATHOLOGY

## 2022-03-04 PROCEDURE — 1160F PR REVIEW ALL MEDS BY PRESCRIBER/CLIN PHARMACIST DOCUMENTED: ICD-10-PCS | Mod: CPTII,S$GLB,, | Performed by: NURSE PRACTITIONER

## 2022-03-04 PROCEDURE — 88112 PR  CYTOPATH, CELL ENHANCE TECH: ICD-10-PCS | Mod: 26,,, | Performed by: PATHOLOGY

## 2022-03-04 PROCEDURE — 1126F AMNT PAIN NOTED NONE PRSNT: CPT | Mod: CPTII,S$GLB,, | Performed by: NURSE PRACTITIONER

## 2022-03-04 PROCEDURE — 3075F PR MOST RECENT SYSTOLIC BLOOD PRESS GE 130-139MM HG: ICD-10-PCS | Mod: CPTII,S$GLB,, | Performed by: NURSE PRACTITIONER

## 2022-03-04 PROCEDURE — 99999 PR PBB SHADOW E&M-EST. PATIENT-LVL V: CPT | Mod: PBBFAC,,, | Performed by: NURSE PRACTITIONER

## 2022-03-04 PROCEDURE — 1159F PR MEDICATION LIST DOCUMENTED IN MEDICAL RECORD: ICD-10-PCS | Mod: CPTII,S$GLB,, | Performed by: NURSE PRACTITIONER

## 2022-03-04 RX ORDER — LIDOCAINE HYDROCHLORIDE 20 MG/ML
JELLY TOPICAL ONCE
Status: CANCELLED | OUTPATIENT
Start: 2022-03-04 | End: 2022-03-04

## 2022-03-04 RX ORDER — CIPROFLOXACIN 500 MG/1
500 TABLET ORAL ONCE
Status: CANCELLED | OUTPATIENT
Start: 2022-03-04 | End: 2022-03-04

## 2022-03-04 NOTE — PROGRESS NOTES
CHIEF COMPLAINT:    Domonique Kellogg is a 71 y.o. male presents today for Hematuria.    HISTORY OF PRESENTING ILLINESS:    Domonique Kellogg is a 71 y.o. male with a history of BPH. He takes Flomax for his LUTS. He was last seen in our clinic 11/15/2019.  11/22/2021 he was diagnosed with Advanced intrahepatic cholangiocarcinoma.   He is being followed by [facility].    He is here today due to seeing blood when he urinated. He reports that he had no urinary changes or complaints when he was seeing the blood. FOS was good.   He already experiences urinary frequency; nocturia 2-3x.   There was no dysuria, abdominal/pelvic pain.     Due to [place] Thrombus, he had started taking Eliquis just prior to seeing the blood.  Decreased the dosage the daily visible blood had stopped; only occurs every so often.   02/25/2022, his PCP checked his urine and it was yellow/clear of infection but > 100RBC's          REVIEW OF SYSTEMS:  Review of Systems   Constitutional: Negative.  Negative for chills and fever.   Eyes: Negative for double vision.   Respiratory: Negative for cough and shortness of breath.    Cardiovascular: Negative for chest pain and palpitations.   Gastrointestinal: Positive for diarrhea. Negative for nausea and vomiting.   Genitourinary: Positive for frequency, hematuria and urgency. Negative for dysuria and flank pain.   Neurological: Negative for dizziness.         PATIENT HISTORY:    Past Medical History:   Diagnosis Date    Adjustment disorder     Anticoagulant long-term use     Anxiety     Arthritis     CAD (coronary artery disease) 3/2/2015    non[facility]obstruct[facility]     Cataract     Dry eye syndrome     Hypertension     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/2021    Obesity     Seizures     2011    Sleep apnea     + CPAP    Sleep difficulties        Past Surgical History:   Procedure Laterality Date    ANTERIOR CERVICAL DISCECTOMY W/ FUSION N/A 7/6/2020    Procedure: DISCECTOMY,  SPINE, CERVICAL, ANTERIOR APPROACH, WITH FUSION C3-4, C4-5;  Surgeon: Fabiola Vides MD;  Location: [facility] OR 2ND FLR;  Service: Neurosurgery;  Laterality: N/A;  [place] III, ASA III, BLOOD TYPE & SCREEN, NEUROMONITORING EMG-MEP-SEP, SUPINE POSITION,BRACE [place],REGULAR BED, HEADREST [place], POSITION, MAP>85, RADIOLOGY C-ARM, SPECIAL EQUIPMENT [facility]    COLONOSCOPY N/A 10/1/2021    Procedure: COLONOSCOPY;  Surgeon: Nicolas Alvarado MD;  Location: [facility] (4TH FLR);  Service: Endoscopy;  Laterality: N/A;  EGD and colonoscopy for diarrhea and weight loss and Enlarged, heterogeneous appearance of the liver likely due to multiple underlying hepatic lesions largest measuring 4.8 cm.  Findings concerning for metastatic versus less likely primary hepatic neoplasm.  Recommend [facility]    ESOPHAGOGASTRODUODENOSCOPY N/A 10/1/2021    Procedure: EGD (ESOPHAGOGASTRODUODENOSCOPY);  Surgeon: Nicolas Alvarado MD;  Location: [facility] (4TH FLR);  Service: Endoscopy;  Laterality: N/A;  EGD and colonoscopy for diarrhea and weight loss and Enlarged, heterogeneous appearance of the liver likely due to multiple underlying hepatic lesions largest measuring 4.8 cm.  Findings concerning for metastatic versus less likely primary hepatic neopl    EYE SURGERY      INSERTION OF VENOUS ACCESS PORT N/A 12/3/2021    Procedure: INSERTION, VENOUS ACCESS PORT;  Surgeon: Saurav Garcia MD;  Location: [facility] OR 2ND FLR;  Service: General;  Laterality: N/A;    INTRAOCULAR PROSTHESES INSERTION Right 7/16/2018    Procedure: INSERTION-INTRAOCULAR LENS (IOL);  Surgeon: Priscilla aHys MD;  Location: [facility] OR;  Service: Ophthalmology;  Laterality: Right;    INTRAOCULAR PROSTHESES INSERTION Left 8/13/2018    Procedure: INSERTION, INTRAOCULAR LENS PROSTHESIS;  Surgeon: Priscilla Hays MD;  Location: [facility] OR;  Service: Ophthalmology;  Laterality: Left;    KNEE SURGERY      PHACOEMULSIFICATION OF CATARACT Right 7/16/2018    Procedure:  PHACOEMULSIFICATION-ASPIRATION-CATARACT;  Surgeon: Priscilla Hays MD;  Location: [facility];  Service: Ophthalmology;  Laterality: Right;    PHACOEMULSIFICATION OF CATARACT Left 2018    Procedure: PHACOEMULSIFICATION, CATARACT;  Surgeon: Priscilla Hays MD;  Location: [facility];  Service: Ophthalmology;  Laterality: Left;    WISDOM TOOTH EXTRACTION         Family History   Problem Relation Age of Onset    Diabetes Mother     Hypertension Mother     Kidney cancer Mother 61    Cancer Mother         renal & pancreatic    Heart disease Father     No Known Problems Sister     Cancer Maternal Grandmother     Liver disease Maternal Grandmother     Heart disease Paternal Grandfather     Heart disease Brother     No Known Problems Daughter     No Known Problems Son     No Known Problems Sister     No Known Problems Sister     No Known Problems Sister     No Known Problems Daughter     Blindness Neg Hx     Macular degeneration Neg Hx     Retinal detachment Neg Hx     Glaucoma Neg Hx     Melanoma Neg Hx     Pancreatic cancer Neg Hx     Bladder Cancer Neg Hx     Uterine cancer Neg Hx     Ovarian cancer Neg Hx     Celiac disease Neg Hx     Cirrhosis Neg Hx     Colon cancer Neg Hx     Colon polyps Neg Hx     Crohn's disease Neg Hx     Esophageal cancer Neg Hx     Inflammatory bowel disease Neg Hx     Liver cancer Neg Hx     Rectal cancer Neg Hx     Stomach cancer Neg Hx     Ulcerative colitis Neg Hx        Social History     Socioeconomic History    Marital status:      Spouse name: Estrellita    Number of children: 3   Occupational History     Employer: [employer]   Tobacco Use    Smoking status: Former Smoker     Packs/day: 1.00     Years: 20.00     Pack years: 20.00     Quit date: 1987     Years since quittin.1    Smokeless tobacco: Never Used    Tobacco comment: quit    Substance and Sexual Activity    Alcohol use: Not Currently     Alcohol/week: 1.0 - 2.0  standard drink     Types: 1 - 2 Glasses of wine per week     Comment: 1-2 glasses wine several times weekly    Drug use: No    Sexual activity: Yes     Partners: Female   Social History Narrative     since 1976     Social Determinants of Health     Financial Resource Strain: Low Risk     Difficulty of Paying Living Expenses: Not very hard   Food Insecurity: Food Insecurity Present    Worried About Running Out of Food in the Last Year: Sometimes true    Ran Out of Food in the Last Year: Never true   Transportation Needs: No Transportation Needs    Lack of Transportation (Medical): No    Lack of Transportation (Non-Medical): No   Physical Activity: Insufficiently Active    Days of Exercise per Week: 7 days    Minutes of Exercise per Session: 20 min   Stress: No Stress Concern Present    Feeling of Stress : Not at all   Social Connections: Unknown    Frequency of Communication with Friends and Family: Once a week    Frequency of Social Gatherings with Friends and Family: Never    Active Member of Clubs or Organizations: Yes    Attends Club or Organization Meetings: 1 to 4 times per year    Marital Status:    Housing Stability: Low Risk     Unable to Pay for Housing in the Last Year: No    Number of Places Lived in the Last Year: 1    Unstable Housing in the Last Year: No       Allergies:  Indomethacin and Adhesive    Medications:    Current Outpatient Medications:     acetaminophen (TYLENOL) 650 MG TbSR, Take by mouth daily as needed., Disp: , Rfl:     apixaban (ELIQUIS) 5 mg Tab, Take 1 tablet (5 mg total) by mouth 2 (two) times daily., Disp: 120 tablet, Rfl: 3    diltiaZEM 2% Lidocaine 5% Cream, Apply pea size amount topically 3 (three) times daily., Disp: 30 g, Rfl: 2    hydroCHLOROthiazide (HYDRODIURIL) 25 MG tablet, TAKE 1 TABLET (25 MG TOTAL) BY MOUTH ONCE DAILY., Disp: 90 tablet, Rfl: 3    magnesium oxide (MAG-OX) 400 mg (241.3 mg magnesium) tablet, Take 400 mg by mouth every  evening., Disp: , Rfl:     multivitamin with minerals tablet, Take 1 tablet by mouth every morning. , Disp: , Rfl:     ondansetron (ZOFRAN) 4 MG tablet, Take 1 tablet (4 mg total) by mouth every 6 (six) hours as needed for Nausea., Disp: 20 tablet, Rfl: 0    oxyCODONE (ROXICODONE) 5 MG immediate release tablet, Take 1 tablet (5 mg total) by mouth every 6 (six) hours as needed for Pain., Disp: 20 tablet, Rfl: 0    potassium chloride SA (K-DUR,KLOR-CON) 20 MEQ tablet, Take 2 tablets (40 mEq total) by mouth once daily., Disp: 180 tablet, Rfl: 3    pravastatin (PRAVACHOL) 40 MG tablet, Take 1 tablet (40 mg total) by mouth once daily. (Patient taking differently: Take 40 mg by mouth every evening.), Disp: 90 tablet, Rfl: 3    prochlorperazine (COMPAZINE) 10 MG tablet, Take 1 tablet (10 mg total) by mouth every 6 (six) hours as needed (nausea)., Disp: 60 tablet, Rfl: 1    promethazine (PHENERGAN) 12.5 MG Tab, Take 1 tablet (12.5 mg total) by mouth every 6 (six) hours as needed (nausea)., Disp: 60 tablet, Rfl: 2    RABEprazole (ACIPHEX) 20 mg tablet, Take 1 tablet (20 mg total) by mouth once daily., Disp: 90 tablet, Rfl: 3    tamsulosin (FLOMAX) 0.4 mg Cap, TAKE 1 CAPSULE BY MOUTH EVERY DAY, Disp: 30 capsule, Rfl: 0    PHYSICAL EXAMINATION:  Physical Exam  Vitals and nursing note reviewed.   Constitutional:       General: He is awake.      Appearance: Normal appearance.   HENT:      Head: Normocephalic.      Right Ear: External ear normal.      Left Ear: External ear normal.      Nose: Nose normal.   Cardiovascular:      Rate and Rhythm: Normal rate.   Pulmonary:      Effort: Pulmonary effort is normal. No respiratory distress.   Abdominal:      Tenderness: There is no abdominal tenderness. There is no right CVA tenderness or left CVA tenderness.   Genitourinary:     Penis: Normal and uncircumcised. No phimosis, paraphimosis or hypospadias.       Testes: Normal.         Right: Tenderness or swelling not present.          Left: Tenderness or swelling not present.      Prostate: Enlarged. Not tender and no nodules present.      Rectum: Normal.   Musculoskeletal:         General: Normal range of motion.      Cervical back: Normal range of motion.   Lymphadenopathy:      Lower Body: No right inguinal adenopathy. No left inguinal adenopathy.   Skin:     General: Skin is warm and dry.   Neurological:      General: No focal deficit present.      Mental Status: He is alert and oriented to person, place, and time.   Psychiatric:         Mood and Affect: Mood normal.         Behavior: Behavior is cooperative.           LABS:      In office UA today was clear of infection; (+) gross hematuria.     The PVR in the office today done immediately after urination by the nurse was 96          Lab Results   Component Value Date    PSA 1.4 11/15/2021    PSA 1.6 10/30/2020    PSA 1.7 10/03/2019             IMPRESSION:    Encounter Diagnoses   Name Primary?    Gross hematuria Yes    Urinary frequency     BPH with urinary obstruction          Assessment:       1. Gross hematuria    2. Urinary frequency    3. BPH with urinary obstruction        Plan:         I spent 45 minutes with the patient of which more than half was spent in direct consultation with the patient in regards to our treatment and plan.  We addressed the office findings and recent labs.   Education and recommendations of today's plan of care including home remedies and needed follow up with PCP.   We discussed his LUTS and hematuria.  Discussed possible contributory factors and recommendations  He voiced understanding  Urine was sent for cytology  Ct Urogram  Cystoscopy: reviewed the purpose, expectations, risks.   Good water intake; decrease PM fluids  F/u based on the results.

## 2022-03-06 LAB — BACTERIA UR CULT: NO GROWTH

## 2022-03-10 ENCOUNTER — PATIENT MESSAGE (OUTPATIENT)
Dept: UROLOGY | Facility: CLINIC | Age: 72
End: 2022-03-10
Payer: MEDICARE

## 2022-03-10 LAB
FINAL PATHOLOGIC DIAGNOSIS: ABNORMAL
Lab: ABNORMAL

## 2022-03-14 ENCOUNTER — HOSPITAL ENCOUNTER (OUTPATIENT)
Dept: RADIOLOGY | Facility: OTHER | Age: 72
Discharge: HOME | End: 2022-03-14
Attending: INTERNAL MEDICINE
Payer: MEDICARE

## 2022-03-14 ENCOUNTER — TELEPHONE (OUTPATIENT)
Dept: INTERVENTIONAL RADIOLOGY/VASCULAR | Facility: CLINIC | Age: 72
End: 2022-03-14
Payer: MEDICARE

## 2022-03-14 ENCOUNTER — HOSPITAL ENCOUNTER (OUTPATIENT)
Dept: RADIOLOGY | Facility: OTHER | Age: 72
Discharge: HOME | End: 2022-03-14
Attending: NURSE PRACTITIONER
Payer: MEDICARE

## 2022-03-14 ENCOUNTER — OFFICE VISIT (OUTPATIENT)
Dept: PSYCHIATRY | Facility: CLINIC | Age: 72
End: 2022-03-14
Payer: MEDICARE

## 2022-03-14 ENCOUNTER — HOSPITAL ENCOUNTER (OUTPATIENT)
Dept: RADIOLOGY | Facility: HOSPITAL | Age: 72
Discharge: HOME | End: 2022-03-14
Attending: PHYSICIAN ASSISTANT
Payer: MEDICARE

## 2022-03-14 DIAGNOSIS — R31.0 GROSS HEMATURIA: ICD-10-CM

## 2022-03-14 DIAGNOSIS — C22.1 INTRAHEPATIC CHOLANGIOCARCINOMA: ICD-10-CM

## 2022-03-14 DIAGNOSIS — F43.22 ADJUSTMENT DISORDER WITH ANXIETY: Primary | ICD-10-CM

## 2022-03-14 DIAGNOSIS — C78.7 SECONDARY MALIGNANT NEOPLASM OF LIVER: ICD-10-CM

## 2022-03-14 DIAGNOSIS — C22.1 CHOLANGIOCARCINOMA: ICD-10-CM

## 2022-03-14 PROCEDURE — 99999 PR PBB SHADOW E&M-EST. PATIENT-LVL I: CPT | Mod: PBBFAC,,, | Performed by: PSYCHOLOGIST

## 2022-03-14 PROCEDURE — 1159F MED LIST DOCD IN RCRD: CPT | Mod: CPTII,S$GLB,, | Performed by: PSYCHOLOGIST

## 2022-03-14 PROCEDURE — 71250 CT THORAX DX C-: CPT | Mod: TC

## 2022-03-14 PROCEDURE — 74178 CT ABD&PLV WO CNTR FLWD CNTR: CPT | Mod: 26,,, | Performed by: RADIOLOGY

## 2022-03-14 PROCEDURE — 99999 PR PBB SHADOW E&M-EST. PATIENT-LVL I: ICD-10-PCS | Mod: PBBFAC,,, | Performed by: PSYCHOLOGIST

## 2022-03-14 PROCEDURE — 90837 PSYTX W PT 60 MINUTES: CPT | Mod: S$GLB,,, | Performed by: PSYCHOLOGIST

## 2022-03-14 PROCEDURE — 71250 CT CHEST WITHOUT CONTRAST: ICD-10-PCS | Mod: 26,,, | Performed by: RADIOLOGY

## 2022-03-14 PROCEDURE — 74178 CT ABD&PLV WO CNTR FLWD CNTR: CPT | Mod: TC

## 2022-03-14 PROCEDURE — 74183 MRI ABD W/O CNTR FLWD CNTR: CPT | Mod: 26,,, | Performed by: RADIOLOGY

## 2022-03-14 PROCEDURE — 74183 MRI ABD W/O CNTR FLWD CNTR: CPT | Mod: TC

## 2022-03-14 PROCEDURE — 25500020 PHARM REV CODE 255: Performed by: PHYSICIAN ASSISTANT

## 2022-03-14 PROCEDURE — 90837 PR PSYCHOTHERAPY W/PATIENT, 60 MIN: ICD-10-PCS | Mod: S$GLB,,, | Performed by: PSYCHOLOGIST

## 2022-03-14 PROCEDURE — 74178 CT UROGRAM ABD PELVIS W WO: ICD-10-PCS | Mod: 26,,, | Performed by: RADIOLOGY

## 2022-03-14 PROCEDURE — 71250 CT THORAX DX C-: CPT | Mod: 26,,, | Performed by: RADIOLOGY

## 2022-03-14 PROCEDURE — A9585 GADOBUTROL INJECTION: HCPCS | Performed by: PHYSICIAN ASSISTANT

## 2022-03-14 PROCEDURE — 25500020 PHARM REV CODE 255: Performed by: NURSE PRACTITIONER

## 2022-03-14 PROCEDURE — 1159F PR MEDICATION LIST DOCUMENTED IN MEDICAL RECORD: ICD-10-PCS | Mod: CPTII,S$GLB,, | Performed by: PSYCHOLOGIST

## 2022-03-14 PROCEDURE — 74183 MRI ABDOMEN W WO CONTRAST: ICD-10-PCS | Mod: 26,,, | Performed by: RADIOLOGY

## 2022-03-14 RX ORDER — GADOBUTROL 604.72 MG/ML
10 INJECTION INTRAVENOUS
Status: COMPLETED | OUTPATIENT
Start: 2022-03-14 | End: 2022-03-14

## 2022-03-14 RX ADMIN — GADOBUTROL 10 ML: 604.72 INJECTION INTRAVENOUS at 08:03

## 2022-03-14 RX ADMIN — IOHEXOL 125 ML: 350 INJECTION, SOLUTION INTRAVENOUS at 11:03

## 2022-03-14 NOTE — PROGRESS NOTES
INFORMED CONSENT: Domonique Kellogg   is known to this provider and identity was confirmed via NAME and .  The patient has been informed of the risks and benefits associated with engaging in psychotherapy, the handling of protected health information, the rights of privacy and the limits of confidentiality. The patient has also been informed of the importance of reporting any suicidal or homicidal ideation to this or any provider to ensure safety of all parties, and the Domonique Kellogg expressed understanding. The patient was agreeable to these terms and freely participates in individual psychotherapy.    PSYCHO-ONCOLOGY NOTE/ Individual Psychotherapy     Date: 3/14/2022   Site:  [facility]        Therapeutic Intervention: Met with patient and spouse.  Outpatient - Behavior modifying psychotherapy 60 min - CPT code 80689      Patient was last seen by me on 2022    Problem list  Patient Active Problem List   Diagnosis    CTS (carpal tunnel syndrome)    Class 2 severe obesity due to excess calories with serious comorbidity and body mass index (BMI) of 38.0 to 38.9 in adult    H/O syncope    RAHEEL (obstructive sleep apnea)    CAD in native artery    Essential hypertension    Hyperlipidemia    Chronic pulmonary heart disease    Bilateral carotid artery disease    Primary osteoarthritis of right knee    Lateral femoral cutaneous entrapment syndrome    Cervical spondylosis    Decreased ROM of intervertebral discs of cervical spine    Cervical disc disease with myelopathy    Cervical stenosis of spinal canal    Prediabetes    Infection due to Strongyloides    Intrahepatic cholangiocarcinoma    Secondary malignant neoplasm of liver    Post-procedural fever    Transaminitis    Elevated troponin    Immunodeficiency secondary to neoplasm    Hyperbilirubinemia    Portal vein thrombosis    Cancer related pain    Immunodeficiency secondary to chemotherapy    Benign prostatic hyperplasia without  lower urinary tract symptoms    Weight loss       Chief complaint/reason for encounter: anxiety and interpersonal   Met with patient to evaluate psychosocial adaptation to diagnosis/treatment/survivorship of Cholangioma    Current Medications  Current Outpatient Medications   Medication    acetaminophen (TYLENOL) 650 MG TbSR    apixaban (ELIQUIS) 5 mg Tab    diltiaZEM 2% Lidocaine 5% Cream    hydroCHLOROthiazide (HYDRODIURIL) 25 MG tablet    magnesium oxide (MAG-OX) 400 mg (241.3 mg magnesium) tablet    multivitamin with minerals tablet    ondansetron (ZOFRAN) 4 MG tablet    oxyCODONE (ROXICODONE) 5 MG immediate release tablet    potassium chloride SA (K-DUR,KLOR-CON) 20 MEQ tablet    pravastatin (PRAVACHOL) 40 MG tablet    prochlorperazine (COMPAZINE) 10 MG tablet    promethazine (PHENERGAN) 12.5 MG Tab    RABEprazole (ACIPHEX) 20 mg tablet    tamsulosin (FLOMAX) 0.4 mg Cap     No current facility-administered medications for this visit.       Objective:  Domonique Kellogg arrived  promptly for the session. His was also present during the interview, with the consent of Domonique Kellogg.   Mr. Kellogg was independently ambulatory at the time of session. The patient was fully cooperative throughout the session.  Appearance: age appropriate, appropriately  dressed, adequately  groomed  Behavior/Cooperation: friendly and cooperative  Speech: normal in rate, volume, and tone and appropriate quality, quantity and organization of sentences  Mood: anxious, irritable  Affect: mood congruent  Thought Process: goal-directed, logical  Thought Content: normal,  No delusions or paranoia; did not appear to be responding to internal stimuli during the session  Orientation: grossly intact  Memory: Grossly intact  Attention Span/Concentration: Attends to session without distraction; reports no difficulty  Fund of Knowledge: average  Estimate of Intelligence: average from verbal skills and history  Cognition: grossly  intact  Insight: patient has awareness of illness; good insight into own behavior and behavior of others  Judgment: the patient's behavior is adequate to circumstances    Interval history and content of current session: Discussed communication deficit between couple. Patient reported feeling bombarded, wife feel ignored. Discussed importance of individual self-care time.   Discussed diagnosis, treatment, prognosis, current adaptation to disease and treatment status and family's adaptation to disease and treatment status. Reports to be coping with great difficulty. Evaluated cognitive response, paying particular attention to negative intrusive thoughts of a persistent and detrimental nature. Thoughts of this type are in evidence with mild distress. Provided cognitive behavioral therapy to address negative cognitions. Identified and evaluated psychosocial and environmental stressors secondary to diagnosis and treatment.  Examined proactive behaviors that may be implemented to minimize or ameliorate psychosocial stressors secondary to diagnosis and treatment.     Risk parameters:   Patient reports no suicidal ideation  Patient reports no homicidal ideation  Patient reports no self-injurious behavior  Patient reports no violent behavior   Safety needs:  None at this time      Verbal deficits: None     Patient's response to intervention:The patient's response to intervention is accepting.     Progress toward goals and other mental status changes:  The patient's progress toward goals is good.      Progress to date:Progress as Expected      Goals from last visit: Met     Patient reported outcomes:    Distress Thermometer:   Distress Score    Distress Score: 0        Practical Problems Physical Problems   : No Appearance: No   Housing: No Bathing / Dressing: No   Insurance / Financial: No Breathing: No    Transportation: No  Changes in Urination: No    Work / School: No  Constipation: No   Treatment Decisions: No   Diarrhea: No     Eating: No    Family Problems Fatigue: No    Dealing with Children: No Feeling Swollen: No    Dealing with Partner: No Fevers: No    Ability to Have Children: No  Getting Around: No       Indigestion: No     Memory / Concentration: No   Emotional Problems Mouth Sores: No    Depression: No  Nausea: No    Fears: No  Nose Dry / Congested: No    Nervousness: No  Pain: No    Sadness: No Sexual: No    Worry: No Skin Dry / Itchy: No    Loss of Interest in Usual Activities: No Sleep: No     Substance Abuse: No    Spiritual/Religions Concerns Tingling in Hands / Feet: No   Spritual / Bahai Concerns: No         Other Problems              PHQ ANSWERS    Q1. Little interest or pleasure in doing things: (P) Not at all (03/08/22 1309)  Q2. Feeling down, depressed, or hopeless: (P) Not at all (03/08/22 1309)  Q3. Trouble falling or staying asleep, or sleeping too much: (P) Not at all (03/08/22 1309)  Q4. Feeling tired or having little energy: (P) Several days (03/08/22 1309)  Q5. Poor appetite or overeating: (P) Several days (03/08/22 1309)  Q6. Feeling bad about yourself - or that you are a failure or have let yourself or your family down: (P) Not at all (03/08/22 1309)  Q7. Trouble concentrating on things, such as reading the newspaper or watching television: (P) Not at all (03/08/22 1309)  Q8. Moving or speaking so slowly that other people could have noticed. Or the opposite - being so fidgety or restless that you have been moving around a lot more than usual: (P) Not at all (03/08/22 1309)  Q9.  NO SI    PHQ8 Score : (P) 2 (03/08/22 1309)  PHQ-9 Total Score: (P) 2 (03/08/22 1309)     CAMILO-7 Answers    GAD7 3/8/2022   1. Feeling nervous, anxious, or on edge? 0   2. Not being able to stop or control worrying? 0   3. Worrying too much about different things? 1   4. Trouble relaxing? 0   5. Being so restless that it is hard to sit still? 0   6. Becoming easily annoyed or irritable? 1   7. Feeling afraid as if  something awful might happen? 0   CAMILO-7 Score 2     CAMILO-7 Score: (P) 2  Interpretation: (P) Normal     Client Strengths: verbal, intelligent, successful, good social support, good insight, commitment to wellness, strong ottoniel, strong cultural traditions     Diagnosis:     ICD-10-CM ICD-9-CM   1. Adjustment disorder with anxiety  F43.22 309.24   2. Intrahepatic cholangiocarcinoma  C22.1 155.1   3. Secondary malignant neoplasm of liver  C78.7 197.7       Treatment Plan:individual psychotherapy  · Target symptoms: anxiety , marital discord  · Why chosen therapy is appropriate versus another modality: relevant to diagnosis, patient responds to this modality, evidence based practice  · Outcome monitoring methods: self-report, observation, checklist/rating scale  · Therapeutic intervention type: behavior modifying psychotherapy  · Prognosis: Good      Behavioral goals:    Exercise:   Stress management: 2 days per week of individual time; write down specifics of discord   Social engagement:   Nutrition:   Smoking Cessation:   Therapy:  Increase daily self-care and attention to health management  Pleasant events scheduling and increased social interaction    Return to clinic: 3 weeks       Length of Service (minutes direct face-to-face contact): 60    Katie Ledesma, PhD  Clinical Psychologist  [place] License #[id number]  [place] License #[id number]

## 2022-03-14 NOTE — TELEPHONE ENCOUNTER
Call patient by phone to schedule lab before his IR clinic appointment. No answer/Left message to return call to [phone]. Please forward call.

## 2022-03-15 ENCOUNTER — OFFICE VISIT (OUTPATIENT)
Dept: INTERVENTIONAL RADIOLOGY/VASCULAR | Facility: CLINIC | Age: 72
End: 2022-03-15
Payer: MEDICARE

## 2022-03-15 ENCOUNTER — PATIENT MESSAGE (OUTPATIENT)
Dept: HEMATOLOGY/ONCOLOGY | Facility: CLINIC | Age: 72
End: 2022-03-15
Payer: MEDICARE

## 2022-03-15 VITALS
SYSTOLIC BLOOD PRESSURE: 131 MMHG | WEIGHT: 222.25 LBS | BODY MASS INDEX: 33.68 KG/M2 | HEART RATE: 74 BPM | HEIGHT: 68 IN | DIASTOLIC BLOOD PRESSURE: 74 MMHG

## 2022-03-15 DIAGNOSIS — C22.1 CHOLANGIOCARCINOMA: Primary | ICD-10-CM

## 2022-03-15 PROCEDURE — 3078F DIAST BP <80 MM HG: CPT | Mod: CPTII,S$GLB,, | Performed by: FAMILY MEDICINE

## 2022-03-15 PROCEDURE — 99999 PR PBB SHADOW E&M-EST. PATIENT-LVL IV: ICD-10-PCS | Mod: PBBFAC,,, | Performed by: FAMILY MEDICINE

## 2022-03-15 PROCEDURE — 3075F SYST BP GE 130 - 139MM HG: CPT | Mod: CPTII,S$GLB,, | Performed by: FAMILY MEDICINE

## 2022-03-15 PROCEDURE — 1159F PR MEDICATION LIST DOCUMENTED IN MEDICAL RECORD: ICD-10-PCS | Mod: CPTII,S$GLB,, | Performed by: FAMILY MEDICINE

## 2022-03-15 PROCEDURE — 3075F PR MOST RECENT SYSTOLIC BLOOD PRESS GE 130-139MM HG: ICD-10-PCS | Mod: CPTII,S$GLB,, | Performed by: FAMILY MEDICINE

## 2022-03-15 PROCEDURE — 99999 PR PBB SHADOW E&M-EST. PATIENT-LVL IV: CPT | Mod: PBBFAC,,, | Performed by: FAMILY MEDICINE

## 2022-03-15 PROCEDURE — 1159F MED LIST DOCD IN RCRD: CPT | Mod: CPTII,S$GLB,, | Performed by: FAMILY MEDICINE

## 2022-03-15 PROCEDURE — 1160F PR REVIEW ALL MEDS BY PRESCRIBER/CLIN PHARMACIST DOCUMENTED: ICD-10-PCS | Mod: CPTII,S$GLB,, | Performed by: FAMILY MEDICINE

## 2022-03-15 PROCEDURE — 1160F RVW MEDS BY RX/DR IN RCRD: CPT | Mod: CPTII,S$GLB,, | Performed by: FAMILY MEDICINE

## 2022-03-15 PROCEDURE — 99213 PR OFFICE/OUTPT VISIT, EST, LEVL III, 20-29 MIN: ICD-10-PCS | Mod: S$GLB,,, | Performed by: FAMILY MEDICINE

## 2022-03-15 PROCEDURE — 3078F PR MOST RECENT DIASTOLIC BLOOD PRESSURE < 80 MM HG: ICD-10-PCS | Mod: CPTII,S$GLB,, | Performed by: FAMILY MEDICINE

## 2022-03-15 PROCEDURE — 3008F BODY MASS INDEX DOCD: CPT | Mod: CPTII,S$GLB,, | Performed by: FAMILY MEDICINE

## 2022-03-15 PROCEDURE — 99213 OFFICE O/P EST LOW 20 MIN: CPT | Mod: S$GLB,,, | Performed by: FAMILY MEDICINE

## 2022-03-15 PROCEDURE — 3008F PR BODY MASS INDEX (BMI) DOCUMENTED: ICD-10-PCS | Mod: CPTII,S$GLB,, | Performed by: FAMILY MEDICINE

## 2022-03-15 NOTE — PROGRESS NOTES
Subjective:       Patient ID: Domonique Kellogg is a 71 y.o. male.    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 Patient reports feeling well. He denies any abdominal distention. He admits to intermittent RUQ pain associated with deep inspiration. He has complaints of intermittent fatigue associated with chemotherapy. He had an MRI yesterday.      Review of Systems   Constitutional: Positive for fatigue. Negative for activity change, appetite change, chills and fever.   Respiratory: Negative for cough, shortness of breath, wheezing and stridor.    Cardiovascular: Negative for chest pain, palpitations and leg swelling.   Gastrointestinal: Positive for abdominal pain. Negative for abdominal distention, constipation, diarrhea, nausea and vomiting.         Objective:      Physical Exam  Vitals reviewed.   Constitutional:       General: He is not in acute distress.     Appearance: He is well-developed. He is not diaphoretic.   Cardiovascular:      Rate and Rhythm: Normal rate and regular rhythm.      Heart sounds: Normal heart sounds. No murmur heard.    No friction rub. No gallop.   Pulmonary:      Effort: Pulmonary effort is normal. No respiratory distress.      Breath sounds: Normal breath sounds. No stridor. No wheezing or rales.   Abdominal:      General: Bowel sounds are normal. There is no distension.      Palpations: Abdomen is soft. There is no mass.      Tenderness: There is no abdominal tenderness. There is no guarding.   Skin:     General: Skin is warm and dry.   Neurological:      Mental Status: He is alert and oriented to person, place, and time.      MRI 3/15/2022   Impression:     Interval postprocedural changes status post TACE with slight improvement of tumor burden at treatment sites.  Extensive residual disease throughout both hepatic lobes.  Index lesions as above.     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     Stable appearance of central hepatic veins, underlying thrombosis not excluded.  Consider further evaluation with Doppler ultrasound if clinically warranted.     Stable prominent periportal lymph node.     Right adrenal adenoma.  Assessment:       Problem List Items Addressed This Visit    None     Visit Diagnoses     Cholangiocarcinoma    -  Primary    Relevant Orders    IR Embolization for Tumor_Organ Ischemia    Comprehensive Metabolic Panel    Bilirubin, Direct    Protime-INR    CBC Auto Differential    MRI Abdomen W WO Contrast          Plan:         Reviewed MRI with Dr. Fitzgerald. Explained to patient improvement noted in liver lesions. Recommendation is to repeat treatment with chemoembolization. Chemoembolization procedure discussed in detail with the patient including risks (including, but not limited to, pain, bleeding, infection, damage to nearby structures, and the need for additional procedures), benefits, potential complications, usual pre and post procedure course, as well as potential to develop post-embolization syndrome. The patient voices understanding and and familiarity with procedure. All questions have been answered.  The patient agrees to proceed as planned. Patient scheduled for TACE on 4/19/2022. Pre-procedure handout with clinic phone number provided. Patient scheduled for pre-procedure labs the day of his procedure. Post-procedure MRI scheduled for 5/19/2022.

## 2022-03-15 NOTE — LETTER
March 15, 2022    Domonique RUSSO [id number]  [facility] [address]     [facility] [address]  [address]  [facility] [address]  Phone: [phone] PRE-PROCEDURE INSTRUCTIONS    Your procedure with Interventional Radiology is scheduled for 4/19/2022. Please arrive by 11:30am. Please note your appointment time in [facility] will be different.    You must check-in and receive a wristband before going to your procedure. We will call you the day before to let you know your check in location.    **Do not eat or drink anything between midnight and the time of your procedure. This includes gum, mints, and candy lemon drops.    **Do not smoke or drink alcoholic beverages 24 hours prior to your procedure.    **If you wear contact lenses, dentures, hearing aids, or glasses, bring a container to put them in during the procedure and give them to a family member for safekeeping.    **If you have been diagnosed with sleep apnea please bring your CPAP machine.    **If your doctor has scheduled you for an overnight stay, bring a small overnight bag with any personal items that you may need.    **Make arrangements in advance for transportation home by a responsible adult. It is not safe to drive a vehicle during the 24 hours following the procedure.    **All [facility] facilities and properties are tobacco free. Smoking is NOT allowed.    PLEASE NOTE: The procedure schedule has many variables which affect the time of your procedure. Family members should be available if your surgery time changes.    If you have any questions about these instructions call Interventional Radiology at [phone] Monday - Friday between 8:00am and 4:00pm or [phone] (ask for interventional radiology resident) for after hours.

## 2022-03-16 ENCOUNTER — PATIENT MESSAGE (OUTPATIENT)
Dept: SURGERY | Facility: CLINIC | Age: 72
End: 2022-03-16
Payer: MEDICARE

## 2022-03-21 ENCOUNTER — PATIENT MESSAGE (OUTPATIENT)
Dept: INTERNAL MEDICINE | Facility: CLINIC | Age: 72
End: 2022-03-21
Payer: MEDICARE

## 2022-03-21 ENCOUNTER — LAB VISIT (OUTPATIENT)
Dept: LAB | Facility: HOSPITAL | Age: 72
End: 2022-03-21
Attending: INTERNAL MEDICINE
Payer: MEDICARE

## 2022-03-21 ENCOUNTER — PATIENT MESSAGE (OUTPATIENT)
Dept: UROLOGY | Facility: CLINIC | Age: 72
End: 2022-03-21
Payer: MEDICARE

## 2022-03-21 DIAGNOSIS — C22.1 INTRAHEPATIC CHOLANGIOCARCINOMA: ICD-10-CM

## 2022-03-21 LAB
ALBUMIN SERPL BCP-MCNC: 2.9 G/DL (ref 3.5–5.2)
ALP SERPL-CCNC: 254 U/L (ref 55–135)
ALT SERPL W/O P-5'-P-CCNC: 18 U/L (ref 10–44)
ANION GAP SERPL CALC-SCNC: 10 MMOL/L (ref 8–16)
AST SERPL-CCNC: 30 U/L (ref 10–40)
BASOPHILS # BLD AUTO: 0.07 K/UL (ref 0–0.2)
BASOPHILS NFR BLD: 1.1 % (ref 0–1.9)
BILIRUB SERPL-MCNC: 0.6 MG/DL (ref 0.1–1)
BUN SERPL-MCNC: 11 MG/DL (ref 8–23)
CALCIUM SERPL-MCNC: 10.3 MG/DL (ref 8.7–10.5)
CANCER AG19-9 SERPL-ACNC: 270 U/ML (ref 0–40)
CHLORIDE SERPL-SCNC: 106 MMOL/L (ref 95–110)
CO2 SERPL-SCNC: 26 MMOL/L (ref 23–29)
CREAT SERPL-MCNC: 0.8 MG/DL (ref 0.5–1.4)
DIFFERENTIAL METHOD: ABNORMAL
EOSINOPHIL # BLD AUTO: 0.1 K/UL (ref 0–0.5)
EOSINOPHIL NFR BLD: 1.6 % (ref 0–8)
ERYTHROCYTE [DISTWIDTH] IN BLOOD BY AUTOMATED COUNT: 19.5 % (ref 11.5–14.5)
EST. GFR  (AFRICAN AMERICAN): >60 ML/MIN/1.73 M^2
EST. GFR  (NON AFRICAN AMERICAN): >60 ML/MIN/1.73 M^2
GLUCOSE SERPL-MCNC: 149 MG/DL (ref 70–110)
HCT VFR BLD AUTO: 26.5 % (ref 40–54)
HGB BLD-MCNC: 8.2 G/DL (ref 14–18)
IMM GRANULOCYTES # BLD AUTO: 0.12 K/UL (ref 0–0.04)
IMM GRANULOCYTES NFR BLD AUTO: 1.9 % (ref 0–0.5)
LYMPHOCYTES # BLD AUTO: 1.5 K/UL (ref 1–4.8)
LYMPHOCYTES NFR BLD: 23.3 % (ref 18–48)
MCH RBC QN AUTO: 32 PG (ref 27–31)
MCHC RBC AUTO-ENTMCNC: 30.9 G/DL (ref 32–36)
MCV RBC AUTO: 104 FL (ref 82–98)
MONOCYTES # BLD AUTO: 0.9 K/UL (ref 0.3–1)
MONOCYTES NFR BLD: 14.7 % (ref 4–15)
NEUTROPHILS # BLD AUTO: 3.6 K/UL (ref 1.8–7.7)
NEUTROPHILS NFR BLD: 57.4 % (ref 38–73)
NRBC BLD-RTO: 0 /100 WBC
PLATELET # BLD AUTO: 406 K/UL (ref 150–450)
PMV BLD AUTO: 10 FL (ref 9.2–12.9)
POTASSIUM SERPL-SCNC: 4 MMOL/L (ref 3.5–5.1)
PROT SERPL-MCNC: 6.9 G/DL (ref 6–8.4)
RBC # BLD AUTO: 2.56 M/UL (ref 4.6–6.2)
SODIUM SERPL-SCNC: 142 MMOL/L (ref 136–145)
WBC # BLD AUTO: 6.26 K/UL (ref 3.9–12.7)

## 2022-03-21 PROCEDURE — 80053 COMPREHEN METABOLIC PANEL: CPT | Performed by: INTERNAL MEDICINE

## 2022-03-21 PROCEDURE — 36415 COLL VENOUS BLD VENIPUNCTURE: CPT | Mod: PN | Performed by: INTERNAL MEDICINE

## 2022-03-21 PROCEDURE — 85025 COMPLETE CBC W/AUTO DIFF WBC: CPT | Performed by: INTERNAL MEDICINE

## 2022-03-21 PROCEDURE — 86301 IMMUNOASSAY TUMOR CA 19-9: CPT | Performed by: INTERNAL MEDICINE

## 2022-03-22 ENCOUNTER — INFUSION (OUTPATIENT)
Dept: INFUSION THERAPY | Facility: HOSPITAL | Age: 72
End: 2022-03-22
Payer: MEDICARE

## 2022-03-22 ENCOUNTER — OFFICE VISIT (OUTPATIENT)
Dept: HEMATOLOGY/ONCOLOGY | Facility: CLINIC | Age: 72
End: 2022-03-22
Payer: MEDICARE

## 2022-03-22 VITALS
BODY MASS INDEX: 33.31 KG/M2 | OXYGEN SATURATION: 96 % | TEMPERATURE: 98 F | DIASTOLIC BLOOD PRESSURE: 66 MMHG | SYSTOLIC BLOOD PRESSURE: 137 MMHG | WEIGHT: 219.81 LBS | HEIGHT: 68 IN | HEART RATE: 71 BPM

## 2022-03-22 VITALS
WEIGHT: 219.81 LBS | SYSTOLIC BLOOD PRESSURE: 123 MMHG | RESPIRATION RATE: 18 BRPM | BODY MASS INDEX: 33.31 KG/M2 | HEART RATE: 61 BPM | TEMPERATURE: 98 F | DIASTOLIC BLOOD PRESSURE: 63 MMHG | HEIGHT: 68 IN

## 2022-03-22 DIAGNOSIS — D49.9 IMMUNODEFICIENCY SECONDARY TO NEOPLASM: ICD-10-CM

## 2022-03-22 DIAGNOSIS — R74.01 TRANSAMINITIS: ICD-10-CM

## 2022-03-22 DIAGNOSIS — Z79.899 IMMUNODEFICIENCY SECONDARY TO CHEMOTHERAPY: ICD-10-CM

## 2022-03-22 DIAGNOSIS — I10 ESSENTIAL HYPERTENSION: ICD-10-CM

## 2022-03-22 DIAGNOSIS — C22.1 INTRAHEPATIC CHOLANGIOCARCINOMA: Primary | ICD-10-CM

## 2022-03-22 DIAGNOSIS — D84.821 IMMUNODEFICIENCY SECONDARY TO CHEMOTHERAPY: ICD-10-CM

## 2022-03-22 DIAGNOSIS — I25.10 CAD IN NATIVE ARTERY: ICD-10-CM

## 2022-03-22 DIAGNOSIS — T45.1X5A IMMUNODEFICIENCY SECONDARY TO CHEMOTHERAPY: ICD-10-CM

## 2022-03-22 DIAGNOSIS — I81 PORTAL VEIN THROMBOSIS: ICD-10-CM

## 2022-03-22 DIAGNOSIS — D84.81 IMMUNODEFICIENCY SECONDARY TO NEOPLASM: ICD-10-CM

## 2022-03-22 DIAGNOSIS — R31.9 HEMATURIA, UNSPECIFIED TYPE: ICD-10-CM

## 2022-03-22 PROCEDURE — 1159F MED LIST DOCD IN RCRD: CPT | Mod: CPTII,S$GLB,, | Performed by: PHYSICIAN ASSISTANT

## 2022-03-22 PROCEDURE — 3008F PR BODY MASS INDEX (BMI) DOCUMENTED: ICD-10-PCS | Mod: CPTII,S$GLB,, | Performed by: PHYSICIAN ASSISTANT

## 2022-03-22 PROCEDURE — 99499 RISK ADDL DX/OHS AUDIT: ICD-10-PCS | Mod: S$GLB,,, | Performed by: PHYSICIAN ASSISTANT

## 2022-03-22 PROCEDURE — 96417 CHEMO IV INFUS EACH ADDL SEQ: CPT

## 2022-03-22 PROCEDURE — 1160F RVW MEDS BY RX/DR IN RCRD: CPT | Mod: CPTII,S$GLB,, | Performed by: PHYSICIAN ASSISTANT

## 2022-03-22 PROCEDURE — 1125F PR PAIN SEVERITY QUANTIFIED, PAIN PRESENT: ICD-10-PCS | Mod: CPTII,S$GLB,, | Performed by: PHYSICIAN ASSISTANT

## 2022-03-22 PROCEDURE — 1101F PR PT FALLS ASSESS DOC 0-1 FALLS W/OUT INJ PAST YR: ICD-10-PCS | Mod: CPTII,S$GLB,, | Performed by: PHYSICIAN ASSISTANT

## 2022-03-22 PROCEDURE — 1101F PT FALLS ASSESS-DOCD LE1/YR: CPT | Mod: CPTII,S$GLB,, | Performed by: PHYSICIAN ASSISTANT

## 2022-03-22 PROCEDURE — 63600175 PHARM REV CODE 636 W HCPCS: Performed by: INTERNAL MEDICINE

## 2022-03-22 PROCEDURE — 3288F FALL RISK ASSESSMENT DOCD: CPT | Mod: CPTII,S$GLB,, | Performed by: PHYSICIAN ASSISTANT

## 2022-03-22 PROCEDURE — 1125F AMNT PAIN NOTED PAIN PRSNT: CPT | Mod: CPTII,S$GLB,, | Performed by: PHYSICIAN ASSISTANT

## 2022-03-22 PROCEDURE — 99499 UNLISTED E&M SERVICE: CPT | Mod: S$GLB,,, | Performed by: PHYSICIAN ASSISTANT

## 2022-03-22 PROCEDURE — 3075F SYST BP GE 130 - 139MM HG: CPT | Mod: CPTII,S$GLB,, | Performed by: PHYSICIAN ASSISTANT

## 2022-03-22 PROCEDURE — 96413 CHEMO IV INFUSION 1 HR: CPT

## 2022-03-22 PROCEDURE — 1159F PR MEDICATION LIST DOCUMENTED IN MEDICAL RECORD: ICD-10-PCS | Mod: CPTII,S$GLB,, | Performed by: PHYSICIAN ASSISTANT

## 2022-03-22 PROCEDURE — 99215 OFFICE O/P EST HI 40 MIN: CPT | Mod: S$GLB,,, | Performed by: PHYSICIAN ASSISTANT

## 2022-03-22 PROCEDURE — 3008F BODY MASS INDEX DOCD: CPT | Mod: CPTII,S$GLB,, | Performed by: PHYSICIAN ASSISTANT

## 2022-03-22 PROCEDURE — 1160F PR REVIEW ALL MEDS BY PRESCRIBER/CLIN PHARMACIST DOCUMENTED: ICD-10-PCS | Mod: CPTII,S$GLB,, | Performed by: PHYSICIAN ASSISTANT

## 2022-03-22 PROCEDURE — 99999 PR PBB SHADOW E&M-EST. PATIENT-LVL V: ICD-10-PCS | Mod: PBBFAC,,, | Performed by: PHYSICIAN ASSISTANT

## 2022-03-22 PROCEDURE — 96367 TX/PROPH/DG ADDL SEQ IV INF: CPT

## 2022-03-22 PROCEDURE — 96375 TX/PRO/DX INJ NEW DRUG ADDON: CPT

## 2022-03-22 PROCEDURE — 99215 PR OFFICE/OUTPT VISIT, EST, LEVL V, 40-54 MIN: ICD-10-PCS | Mod: S$GLB,,, | Performed by: PHYSICIAN ASSISTANT

## 2022-03-22 PROCEDURE — 3288F PR FALLS RISK ASSESSMENT DOCUMENTED: ICD-10-PCS | Mod: CPTII,S$GLB,, | Performed by: PHYSICIAN ASSISTANT

## 2022-03-22 PROCEDURE — 99999 PR PBB SHADOW E&M-EST. PATIENT-LVL V: CPT | Mod: PBBFAC,,, | Performed by: PHYSICIAN ASSISTANT

## 2022-03-22 PROCEDURE — 3078F DIAST BP <80 MM HG: CPT | Mod: CPTII,S$GLB,, | Performed by: PHYSICIAN ASSISTANT

## 2022-03-22 PROCEDURE — 3078F PR MOST RECENT DIASTOLIC BLOOD PRESSURE < 80 MM HG: ICD-10-PCS | Mod: CPTII,S$GLB,, | Performed by: PHYSICIAN ASSISTANT

## 2022-03-22 PROCEDURE — 96361 HYDRATE IV INFUSION ADD-ON: CPT

## 2022-03-22 PROCEDURE — A4216 STERILE WATER/SALINE, 10 ML: HCPCS | Performed by: INTERNAL MEDICINE

## 2022-03-22 PROCEDURE — 3075F PR MOST RECENT SYSTOLIC BLOOD PRESS GE 130-139MM HG: ICD-10-PCS | Mod: CPTII,S$GLB,, | Performed by: PHYSICIAN ASSISTANT

## 2022-03-22 PROCEDURE — 25000003 PHARM REV CODE 250: Performed by: INTERNAL MEDICINE

## 2022-03-22 RX ORDER — HEPARIN 100 UNIT/ML
500 SYRINGE INTRAVENOUS
Status: CANCELLED | OUTPATIENT
Start: 2022-03-22

## 2022-03-22 RX ORDER — SODIUM CHLORIDE 0.9 % (FLUSH) 0.9 %
10 SYRINGE (ML) INJECTION
Status: CANCELLED | OUTPATIENT
Start: 2022-03-22

## 2022-03-22 RX ORDER — HEPARIN 100 UNIT/ML
500 SYRINGE INTRAVENOUS
Status: DISCONTINUED | OUTPATIENT
Start: 2022-03-22 | End: 2022-03-22 | Stop reason: HOSPADM

## 2022-03-22 RX ORDER — SODIUM CHLORIDE 0.9 % (FLUSH) 0.9 %
10 SYRINGE (ML) INJECTION
Status: DISCONTINUED | OUTPATIENT
Start: 2022-03-22 | End: 2022-03-22 | Stop reason: HOSPADM

## 2022-03-22 RX ADMIN — MAGNESIUM SULFATE HEPTAHYDRATE 250 ML/HR: 500 INJECTION, SOLUTION INTRAMUSCULAR; INTRAVENOUS at 10:03

## 2022-03-22 RX ADMIN — SODIUM CHLORIDE: 0.9 INJECTION, SOLUTION INTRAVENOUS at 09:03

## 2022-03-22 RX ADMIN — Medication 10 ML: at 02:03

## 2022-03-22 RX ADMIN — PALONOSETRON HYDROCHLORIDE 0.25 MG: 0.25 INJECTION, SOLUTION INTRAVENOUS at 12:03

## 2022-03-22 RX ADMIN — GEMCITABINE HYDROCHLORIDE 2200 MG: 1 INJECTION, SOLUTION INTRAVENOUS at 12:03

## 2022-03-22 RX ADMIN — HEPARIN 500 UNITS: 100 SYRINGE at 02:03

## 2022-03-22 RX ADMIN — SODIUM CHLORIDE 500 ML: 0.9 INJECTION, SOLUTION INTRAVENOUS at 01:03

## 2022-03-22 RX ADMIN — APREPITANT 130 MG: 130 INJECTION, EMULSION INTRAVENOUS at 12:03

## 2022-03-22 RX ADMIN — CISPLATIN 57 MG: 1 INJECTION INTRAVENOUS at 01:03

## 2022-03-22 NOTE — PLAN OF CARE
Patient tolerated Gemzar, Cisplatin, and IVFs with no complications. VSS. Pt instructed to call MD with any problems. NAD. Pt discharged home independently.

## 2022-03-22 NOTE — PROGRESS NOTES
"                                                         PROGRESS NOTE    Subjective:       Patient ID: Domonique Kellogg is a 71 y.o. male.    Chief Complaint: follow up for cholangiocarcinoma    Diagnosis:  Advanced intrahepatic cholangiocarcinoma, MSI-low, negative for FGFR2 fusion or IDH1/IDH2 mutations, diagnosed on 11/22/2021.     Molecular Profile:  Guardant 360 11/29/21: +CCND1 amplification. VUS: CATRACHITO [id number]. MSI-high not detected.     Oncologic History copied from medical chart:  1. Mr Kellogg is a 72 yo man with CAD, HTN, seizures in 2011 who initially saw me on 11/29/21 for further management of cholangiocarcinoma. He presented with weight loss and diarrhea since July. US 9/22/21 showed "Enlarged, heterogeneous appearance of the liver likely due to multiple underlying hepatic lesions largest measuring 4.8 cm."  MRI abdomen w/wo contrast 10/4/21: "Multiple liver lesions measuring up to 13.3 cm in the right hepatic lobe.  Differential diagnosis includes metastases, fibrolamellar hepatocellular carcinoma, or atypical focal nodular hyperplasia.  Consider biopsy for further evaluation. Thrombosis of the anterior branch of the right portal vein and left hepatic vein.  Nonvisualization of the middle and right hepatic veins, which may be thrombosed as well. Prominent periportal lymph node, which is nonspecific."  EGD and colonoscopy on 10/11/21 were negative for primary malignancies.   He underwent liver lesion biopsy and Y90 mapping on 11/22/21. Pathology showed adenocarcinoma: "Biopsy of the liver mass shows a malignant neoplasm in a fibrotic stroma. Glandular architecture is readily identified with several foci of intraluminal necrosis. Scattered mitotic figures are seen. Neoplastic cells show the following immunophenotype:   Positive: AE1/AE3, CK7, CDX2   Negative: Chromogranin, Synaptophysin, TTF, CK5/6, CK20, HepPar1   The morphology and immunophenotype are compatible with adenocarcinoma. Considerations for " "a primary site include pancreaticobiliary (including intrahepatic cholangiocarcinoma) as well as upper gastrointestinal. Clinical and radiologic correlation is suggested."  Patient presents today with wife for further management. No pain. Initial weight loss has somewhat stabilized. Still has diarrhea. Has not tried imodium yet. +nervous  Mother had kidney cancer at age 64. Maternal grandmother had liver cancer in her 60s. Patient has three children, two daughters and one son.   Discussed palliative chemo with cis/gem  2. PET scan 21 did not show extrahepatic metastases.   3. Cis/gem started on 21, s/p 3 cycles  4. S/p TACE on 2021 then again on 22    Interval History:   Mr Kellogg returns for cycle 5 cis/gem. He continues to feel fairly well and tolerating treatment without significant complication. He states that he is eating and has a good appetite. He also states that he has not noticed any further blood in his urine since stopping the Eliquis. He has been off of it since 3/15/2022. He has not noted any chest pain or shortness of breath. His hgb continues to remain fairly stable. Had TACE on 22. Repeat MRI and CT chest that was performed on 3/14/2022 that displayed a stable single pulmonary lesion and improvement within the liver lesions after TACE. Also had a CT urogram of the abdomen and pelvis that was stable. Mild fever after TACE which has resolved. Scheduled for repeat TACE on 2022. Overall, doing well today and does not have any new concerns. Presents with his wife.     ECO    ROS:   A ten-point system review is obtained and negative except for what was stated in the Interval History.     Physical Examination:   Vital signs reviewed.   General: well hydrated, well developed, in no acute distress  HEENT: normocephalic, PERRLA, EOMI, anicteric sclerae, oropharynx clear  Neck: supple, no JVD, thyromegaly, cervical or supraclavicular lymphadenopathy  Lungs: clear breath " sounds bilaterally, no wheezing, rales, or rhonchi  Heart: RRR, no M/R/G  Abdomen: soft, no tenderness, non-distended, no mass, or hernia. BS present  Extremities: no clubbing, cyanosis, or edema  Skin: no rash, ulcer, or open wounds  Neuro: alert and oriented x 4, no focal neuro deficit  Psych: pleasant and appropriate mood and affect    Objective:     Laboratory Data:  Labs reviewed. CA 19-9 270    Imaging Data:  PET 12/6/21:  In this patient with known intrahepatic cholangiocarcinoma, there are multifocal hypermetabolic hepatic lesions in both hepatic lobes.  No evidence of distant metastasis.     Lipomatosis of the ileocecal valve and proximal cecum.     Fat containing umbilical hernia.    MRI Abdomen 1/18/22:  1. Patient with reported cholangiocarcinoma.  Interval progression of hepatic tumor burden with multiple enlarging and new hepatic lesions.  2. Progression of portal venous thrombosis involving the left, right, and main portal veins.  Persistent filling defect within the central hepatic veins concerning for thrombosis.  3. Stable prominent periportal lymph node.    MRI abdomen/pelvis 3/14/2022:  Impression:     Interval postprocedural changes status post TACE with slight improvement of tumor burden at treatment sites.  Extensive residual disease throughout both hepatic lobes.  Index lesions as above.     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     Stable appearance of central hepatic veins, underlying thrombosis not excluded.  Consider further evaluation with Doppler ultrasound if clinically warranted.     Stable prominent periportal lymph node.     Right adrenal adenoma.     CT chest 3/14/2022: Impression:     Right pulmonary nodules, unchanged.  Recommend continued follow-up as per clinical protocol    CT Urogram 3/14/2022:    Impression:     Prostatomegaly which demonstrates mass effect on the posterior bladder.  There is mild diffuse wall thickening of the  bladder which may relate to outlet obstruction.     Postprocedural changes of the liver in keeping with recent chemoembolization.  Protocol is not optimized for evaluation of tumor response.  Dedicated multiphase liver CT would be necessary to evaluate liver directed therapy response    Assessment and Plan:     1. 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 Immunodeficiency secondary to neoplasm    4. Transaminitis    5. Portal vein thrombosis    6. Essential hypertension    7. CAD in native artery    8. Hematuria, unspecified type      1-3  - Mr Kellogg is a 70 yo man with advanced intrahepatic cholangiocarcinoma with multiple liver lesions. MSI-low, FGFR2 fusion and IDH1/2 mutation negative. Started on cis/gem on 12/7/21, s/p 2 cycles. S/p TACE on 12/16/2021 and 2/14/2022. Continues to have good radiological response to treatment per his most recent MRI and continued decline in his CA 19-9. Given his good treatment response, the IR team feels that he would benefit from another TACE procedure. Patient scheduled for TACE on 4/19/2022. We recc to continue with chemotherapy as well.   - doing well. Continue with treatment. Cycle 5 day 1 cis/gem today  - We spent a long time discussing recent TOPAZ trial during his previous visit, showing survival benefit with additional durvalumab added to cis/gem. The side effects of durvalumab were discussed with patient.  Handouts were provided to the patient last visit. Patient understands and agrees with getting durvalumab. Consent was also signed. Will start with cycle 5 day 8  - will add durvalumab to day 8 of each cycle.   -Proceed with cycle 5 today. RTC in 1 week for day 8    4.  - Resolved. Was most likely due to TACE  - monitor    5.  - reviewed with radiology. [place] thrombus. Was on Eliquis but discontinued due to hematuria and increasing anemia. Hgb stable today at 8.2 without further report of overt bleeding.   - However, he is  scheduled for a cystoscopy this week with Urology, so he will continue to hold the Eliquis for now.   - Has stopped the Eliquis due to hematuria on 3/15/2022. Will repeat a UA today to evaluate for worsening or resolved hematuria    6.  - BP controlled  - c/w current medication    7,8.  - on eliquis and provachol. Eliquis being held  - will repeat UA today. Pt advised to keep cystoscopy appointment with Urology this week, especially if UA displays hematuria. Pt is agreeable.     Follow-up:     RTC next week for cycle 5 day 8. Add durvalumab if approved by then  Knows to call in the interval if any problems arise.    Route Chart for Scheduling    Med Onc Chart Routing      Follow up with physician 1 week. Please schedule cbc, cmp, clinic visit with Dr. Young and cycle 5 day 8 of Deuel/Cis adding Imfinzi. Thank you. Please start prior auth for Imfinzi. Thank you   Follow up with [facility]    Labs CBC and CMP   Lab interval:     Imaging    Pharmacy appointment    Other referrals          Treatment Plan Information   OP GEMCITABINE CISPLATIN Q3W + DURVALUMAB D8 C4+   Claudia Young MD   Upcoming Treatment Dates - OP GEMCITABINE CISPLATIN Q3W + DURVALUMAB D8 C4+    3/23/2022       Chemotherapy       gemcitabine (GEMZAR) 2,260 mg in sodium chloride 0.9% 250 mL chemo infusion       CISplatin (PLATINOL) 25 mg/m2 = 57 mg in sodium chloride 0.9% 500 mL chemo infusion       Pre-Hydration       sodium chloride 0.9% 500 mL with magnesium sulfate 2 g, potassium chloride 20 mEq infusion       Post-Hydration       magnesium sulfate 2g in water 50mL IVPB (premix)       sodium chloride 0.9% bolus 500 mL  4/6/2022       Chemotherapy       gemcitabine (GEMZAR) 2,260 mg in sodium chloride 0.9% 250 mL chemo infusion       CISplatin (PLATINOL) 25 mg/m2 = 57 mg in sodium chloride 0.9% 500 mL chemo infusion       Pre-Hydration       sodium chloride 0.9% 500 mL with magnesium sulfate 2 g, potassium chloride 20 mEq infusion       Post-Hydration        magnesium sulfate 2g in water 50mL IVPB (premix)       sodium chloride 0.9% bolus 500 mL  4/13/2022       Chemotherapy       gemcitabine (GEMZAR) 2,260 mg in sodium chloride 0.9% 250 mL chemo infusion       CISplatin (PLATINOL) 25 mg/m2 = 57 mg in sodium chloride 0.9% 500 mL chemo infusion       Pre-Hydration       sodium chloride 0.9% 500 mL with magnesium sulfate 2 g, potassium chloride 20 mEq infusion       Post-Hydration       magnesium sulfate 2g in water 50mL IVPB (premix)       sodium chloride 0.9% bolus 500 mL  4/27/2022       Chemotherapy       gemcitabine (GEMZAR) 2,260 mg in sodium chloride 0.9% 250 mL chemo infusion       CISplatin (PLATINOL) 25 mg/m2 = 57 mg in sodium chloride 0.9% 500 mL chemo infusion       Pre-Hydration       sodium chloride 0.9% 500 mL with magnesium sulfate 2 g, potassium chloride 20 mEq infusion       Post-Hydration       magnesium sulfate 2g in water 50mL IVPB (premix)       sodium chloride 0.9% bolus 500 mL    Pte and family members displayed understanding of the above encounter and treatment plan. All thoughtful questions were answered to their satisfaction. Pte was advised to notify the care team or proceed to the ER if signs and symptoms worsen.     NEO Ervin, PA-C  Physician Assistant Certified  Dept of Hematology/Oncology  PA-C to Dr. Young, Dr. Lane and Dr. Bourgeois

## 2022-03-24 ENCOUNTER — PATIENT MESSAGE (OUTPATIENT)
Dept: HEMATOLOGY/ONCOLOGY | Facility: CLINIC | Age: 72
End: 2022-03-24
Payer: MEDICARE

## 2022-03-24 ENCOUNTER — PROCEDURE VISIT (OUTPATIENT)
Dept: UROLOGY | Facility: CLINIC | Age: 72
End: 2022-03-24
Payer: MEDICARE

## 2022-03-24 ENCOUNTER — PATIENT MESSAGE (OUTPATIENT)
Dept: UROLOGY | Facility: CLINIC | Age: 72
End: 2022-03-24

## 2022-03-24 VITALS
DIASTOLIC BLOOD PRESSURE: 69 MMHG | WEIGHT: 224 LBS | TEMPERATURE: 98 F | BODY MASS INDEX: 33.95 KG/M2 | RESPIRATION RATE: 16 BRPM | HEIGHT: 68 IN | HEART RATE: 64 BPM | SYSTOLIC BLOOD PRESSURE: 135 MMHG

## 2022-03-24 DIAGNOSIS — R31.0 GROSS HEMATURIA: ICD-10-CM

## 2022-03-24 DIAGNOSIS — N13.8 BPH WITH URINARY OBSTRUCTION: ICD-10-CM

## 2022-03-24 DIAGNOSIS — N40.1 BPH WITH URINARY OBSTRUCTION: ICD-10-CM

## 2022-03-24 PROCEDURE — 52000 CYSTOURETHROSCOPY: CPT | Mod: S$GLB,,, | Performed by: UROLOGY

## 2022-03-24 PROCEDURE — 52000 CYSTOSCOPY: ICD-10-PCS | Mod: S$GLB,,, | Performed by: UROLOGY

## 2022-03-24 RX ORDER — FINASTERIDE 5 MG/1
5 TABLET, FILM COATED ORAL DAILY
Qty: 90 TABLET | Refills: 3 | Status: SHIPPED | OUTPATIENT
Start: 2022-03-24 | End: 2023-01-01

## 2022-03-24 RX ORDER — LIDOCAINE HYDROCHLORIDE 20 MG/ML
JELLY TOPICAL ONCE
Status: COMPLETED | OUTPATIENT
Start: 2022-03-24 | End: 2022-03-24

## 2022-03-24 RX ORDER — CIPROFLOXACIN 500 MG/1
500 TABLET ORAL ONCE
Status: COMPLETED | OUTPATIENT
Start: 2022-03-24 | End: 2022-03-24

## 2022-03-24 RX ADMIN — LIDOCAINE HYDROCHLORIDE: 20 JELLY TOPICAL at 08:03

## 2022-03-24 RX ADMIN — CIPROFLOXACIN 500 MG: 500 TABLET ORAL at 08:03

## 2022-03-24 NOTE — PROCEDURES
Cystoscopy    Date/Time: 3/24/2022 8:45 AM  Performed by: Messi Stark MD  Authorized by: Missy Watts NP        Procedure Date:  03/24/2022      Procedure:  Male Diagnostic Cystourethroscopy    Pre-op diagnosis: gross hematuria, BPH with obstruction, urgency  Post-op diagnosis: same  Anesthesia: Local  Surgeon:  Messi Stark MD    Findings:  Urethra:  Normal urethra.   Sphincter: competent.  Prostate: Estimated Length Prostatic Urethra: 6 cm with trilobar obstruction, moderate size intravesical lobe obstruction  Bladder neck: patent with no stricture  Bladder:  Normal bladder. No tumors or stones.  Normal ureteral orifices bilaterally.   Moderate trabeculation with early sacculation.     Description of Procedure:                                                         Informed Consent:                                                            - Risks, benefits and alternatives of procedure discussed with               patient and informed consent obtained.       Patient Position:   - Supine. --- Bladder ---   Prep and Drape:   - Patient prepped and draped in usual sterile fashion using povidone     iodine (Betadine).   Instruments:   - 16 Fr flexible cystoscope with 0 degree lens.   Procedure Details:   - Cystoscope passed under vision into bladder.   - Bladder and urethra examined in their entirety with findings as     above.     Conclusion:  1. BPH with obstruction  2. On eliquis for blood clots in the liver    May consider bipolar TURP to improve his LUTS.  Add finasteride in addition to flomax.  Follow up with Missy Watts with PSA in 6 months.    Plan:  Patient was discharged home in a stable condition.  Medications: cipro  Follow up:  6 months.     Gross hematuria   Cystoscopy   finasteride (PROSCAR) 5 mg tablet; Take 1 tablet (5 mg total) by mouth once daily.  Dispense: 90 tablet; Refill: 3     BPH with urinary obstruction   Cystoscopy   finasteride (PROSCAR) 5 mg tablet; Take 1 tablet (5 mg total)  by mouth once daily.  Dispense: 90 tablet; Refill: 3     Other orders   ciprofloxacin HCl tablet 500 mg   LIDOcaine HCl 2% urojet

## 2022-03-28 ENCOUNTER — HOSPITAL ENCOUNTER (OUTPATIENT)
Dept: RADIOLOGY | Facility: HOSPITAL | Age: 72
Discharge: HOME | End: 2022-03-28
Attending: NEUROLOGICAL SURGERY
Payer: MEDICARE

## 2022-03-28 DIAGNOSIS — Z98.1 STATUS POST CERVICAL SPINAL FUSION: ICD-10-CM

## 2022-03-28 PROCEDURE — 72040 X-RAY EXAM NECK SPINE 2-3 VW: CPT | Mod: 26,,, | Performed by: RADIOLOGY

## 2022-03-28 PROCEDURE — 72040 XR CERVICAL SPINE AP LATERAL: ICD-10-PCS | Mod: 26,,, | Performed by: RADIOLOGY

## 2022-03-28 PROCEDURE — 72040 X-RAY EXAM NECK SPINE 2-3 VW: CPT | Mod: TC,PN

## 2022-03-28 NOTE — PROGRESS NOTES
"                                                         PROGRESS NOTE    Subjective:       Patient ID: Domonique Kellogg is a 71 y.o. male.    Chief Complaint: follow up for cholangiocarcinoma    Diagnosis:  Advanced intrahepatic cholangiocarcinoma, MSI-low, negative for FGFR2 fusion or IDH1/IDH2 mutations, diagnosed on 11/22/2021.     Molecular Profile:  Guardant 360 11/29/21: +CCND1 amplification. VUS: CATRACHITO [id number]. MSI-high not detected.     Oncologic History:  1. Mr Kellogg is a 72 yo man with CAD, HTN, seizures in 2011 who initially saw me on 11/29/21 for further management of cholangiocarcinoma. He presented with weight loss and diarrhea since July. US 9/22/21 showed "Enlarged, heterogeneous appearance of the liver likely due to multiple underlying hepatic lesions largest measuring 4.8 cm."  MRI abdomen w/wo contrast 10/4/21: "Multiple liver lesions measuring up to 13.3 cm in the right hepatic lobe.  Differential diagnosis includes metastases, fibrolamellar hepatocellular carcinoma, or atypical focal nodular hyperplasia.  Consider biopsy for further evaluation. Thrombosis of the anterior branch of the right portal vein and left hepatic vein.  Nonvisualization of the middle and right hepatic veins, which may be thrombosed as well. Prominent periportal lymph node, which is nonspecific."  EGD and colonoscopy on 10/11/21 were negative for primary malignancies.   He underwent liver lesion biopsy and Y90 mapping on 11/22/21. Pathology showed adenocarcinoma: "Biopsy of the liver mass shows a malignant neoplasm in a fibrotic stroma. Glandular architecture is readily identified with several foci of intraluminal necrosis. Scattered mitotic figures are seen. Neoplastic cells show the following immunophenotype:   Positive: AE1/AE3, CK7, CDX2   Negative: Chromogranin, Synaptophysin, TTF, CK5/6, CK20, HepPar1   The morphology and immunophenotype are compatible with adenocarcinoma. Considerations for a primary site include " "pancreaticobiliary (including intrahepatic cholangiocarcinoma) as well as upper gastrointestinal. Clinical and radiologic correlation is suggested."  Patient presents today with wife for further management. No pain. Initial weight loss has somewhat stabilized. Still has diarrhea. Has not tried imodium yet. +nervous  Mother had kidney cancer at age 64. Maternal grandmother had liver cancer in her 60s. Patient has three children, two daughters and one son.   Discussed palliative chemo with cis/gem  2. PET scan 21 did not show extrahepatic metastases.   3. Cis/gem started on 21, s/p cycle 5 day 1  4. S/p TACE on 2021 then again on 22    Interval History:   Mr Kellogg returns for cycle 5 day 8 cis/gem. Tolerated it well overall. Scheduled for TACE on 22. Seen by urology for hematuria. CT urogram 3/14/22 showed "prostatomegaly which demonstrates mass effect on the posterior bladder.  There is mild diffuse wall thickening of the bladder which may relate to outlet obstruction." Started on finesteride for BPH. Patient received it this morning. Resumed eliquis at 2.5 mg bid but developed hematuria again the next morning. Stopped eliquis.     ECO    ROS:   A ten-point system review is obtained and negative except for what was stated in the Interval History.     Physical Examination:   Vital signs reviewed.   General: well hydrated, well developed, in no acute distress  HEENT: normocephalic, PERRLA, EOMI, anicteric sclerae, oropharynx clear  Neck: supple, no JVD, thyromegaly, cervical or supraclavicular lymphadenopathy  Lungs: clear breath sounds bilaterally, no wheezing, rales, or rhonchi  Heart: RRR, no M/R/G  Abdomen: soft, no tenderness, non-distended, no hepatosplenomegaly, mass, or hernia. BS present  Extremities: no clubbing, cyanosis, or edema  Skin: no rash, ulcer, or open wounds  Neuro: alert and oriented x 4, no focal neuro deficit  Psych: pleasant and appropriate mood and " affect    Objective:     Laboratory Data:  Labs reviewed. CA 19-9 down to 270    Imaging Data:  PET 12/6/21:  In this patient with known intrahepatic cholangiocarcinoma, there are multifocal hypermetabolic hepatic lesions in both hepatic lobes.  No evidence of distant metastasis.     Lipomatosis of the ileocecal valve and proximal cecum.     Fat containing umbilical hernia.    MRI Abdomen 1/18/22:  1. Patient with reported cholangiocarcinoma.  Interval progression of hepatic tumor burden with multiple enlarging and new hepatic lesions.  2. Progression of portal venous thrombosis involving the left, right, and main portal veins.  Persistent filling defect within the central hepatic veins concerning for thrombosis.  3. Stable prominent periportal lymph node.    CT chest 3/4/22:  Right pulmonary nodules, unchanged.  Recommend continued follow-up as per clinical protocol.    MRI abdomen 3/14/22:     Interval postprocedural changes status post TACE with slight improvement of tumor burden at treatment sites.  Extensive residual disease throughout both hepatic lobes.  Index lesions as above.     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     Stable appearance of central hepatic veins, underlying thrombosis not excluded.  Consider further evaluation with Doppler ultrasound if clinically warranted.     Stable prominent periportal lymph node.     Right adrenal adenoma.    CT urogram 3/14/22:  Prostatomegaly which demonstrates mass effect on the posterior bladder.  There is mild diffuse wall thickening of the bladder which may relate to outlet obstruction.     Postprocedural changes of the liver in keeping with recent chemoembolization.  Protocol is not optimized for evaluation of tumor response.  Dedicated multiphase liver CT would be necessary to evaluate liver directed therapy response.    Assessment and Plan:     1. Intrahepatic cholangiocarcinoma    2. 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 Immunodeficiency secondary to neoplasm    5. Portal vein thrombosis    6. Hematuria, unspecified type    7. Benign prostatic hyperplasia, unspecified whether lower urinary tract symptoms present    8. Essential hypertension    9. CAD in native artery      1-4  - Mr Kellogg is a 70 yo man with advanced intrahepatic cholangiocarcinoma with multiple liver lesions. MSI-low, FGFR2 fusion and IDH1/2 mutation negative. Started on cis/gem on 12/7/21, s/p cycle 5 day 1. S/p TACE on 12/16/2021 and 2/14/22  - doing well. Continue with treatment. Cycle 5 day 8 cis/gem today  - added durvalumab after TOPAZ trial presented at [facility] in Jan 2022. Had [id number] with insurance company. Insurance company does not cover given lack of peer-reviewed article. Discussed with patient. Reviewed benefits vs risks. Discussed we can apply for patient assistance. Patient wants to discuss with family further before he decides.   - reviewed test results. Tumor markers coming down. MRI showed some response in the liver. Continue with treatment  - scheduled for TACE on 4/19. We will schedule cycle 6 day 8 of cis/gem the following week  - patient prefers virtual visit if he cannot see provider the same day of chemo  - labs at [facility]    5.  - reviewed risks vs benefits of anticoagulation. He cannot tolerate eliquis even at 2.5 mg bid due to hematuria. Asked patient to try one last time at 2.5 mg daily. If he develops hematuria again then stop it. Patient understands and agrees with the plan.     6.7  - followed by urology. Cystoscopy showed no tumor. +BPH. +mild diffuse wall thickening of the bladder which may relate to outlet obstruction on CT urogram  - f/u with urology. On finesteride    8.  - BP controlled  - c/w current medication    9.  - on pravachol    Follow-up:     RTC in 2 weeks for cycle 6 day 1  Knows to call in the interval if any problems arise.    I  spent 45 minutes reviewing the chart, interpreting laboratory result and imaging data, coordinating patient's care, with at least 50% of the time on face-to-face counseling.       Electronically signed by Claudia Young      Route Chart for Scheduling    Med Onc Chart Routing      Follow up with physician 4 weeks. Labs at [facility]. if cannot see provider the same day of chemo, patient prefers virtual visit. CBC, CMP, CA 19-9, magnesium on 4/12, see BRADLEY on 4/13 then cis/gem. CBC, CMP, CA 19-9, magnesium on 4/25, see me 4/26 then cis/gem   Follow up with BRADLEY 2 weeks.   Labs CBC, CA 19-9, CMP and magnesium   Lab interval: once a week     Imaging    Pharmacy appointment    Other referrals          Treatment Plan Information   OP GEMCITABINE CISPLATIN Q3W + DURVALUMAB D8 C4+   Claudia Young MD   Upcoming Treatment Dates - OP GEMCITABINE CISPLATIN Q3W + DURVALUMAB D8 C4+    3/23/2022       Chemotherapy       gemcitabine (GEMZAR) 2,260 mg in sodium chloride 0.9% 250 mL chemo infusion       CISplatin (PLATINOL) 25 mg/m2 = 57 mg in sodium chloride 0.9% 500 mL chemo infusion       Pre-Hydration       sodium chloride 0.9% 500 mL with magnesium sulfate 2 g, potassium chloride 20 mEq infusion       Post-Hydration       sodium chloride 0.9% bolus 500 mL  4/6/2022       Chemotherapy       gemcitabine (GEMZAR) 2,260 mg in sodium chloride 0.9% 250 mL chemo infusion       CISplatin (PLATINOL) 25 mg/m2 = 57 mg in sodium chloride 0.9% 500 mL chemo infusion       Pre-Hydration       sodium chloride 0.9% 500 mL with magnesium sulfate 2 g, potassium chloride 20 mEq infusion       Post-Hydration       sodium chloride 0.9% bolus 500 mL  4/13/2022       Chemotherapy       gemcitabine (GEMZAR) 2,260 mg in sodium chloride 0.9% 250 mL chemo infusion       CISplatin (PLATINOL) 25 mg/m2 = 57 mg in sodium chloride 0.9% 500 mL chemo infusion       Pre-Hydration       sodium chloride 0.9% 500 mL with magnesium sulfate 2 g, potassium chloride 20 mEq  infusion       Post-Hydration       sodium chloride 0.9% bolus 500 mL  4/27/2022       Chemotherapy       gemcitabine (GEMZAR) 2,260 mg in sodium chloride 0.9% 250 mL chemo infusion       CISplatin (PLATINOL) 25 mg/m2 = 57 mg in sodium chloride 0.9% 500 mL chemo infusion       Pre-Hydration       sodium chloride 0.9% 500 mL with magnesium sulfate 2 g, potassium chloride 20 mEq infusion       Post-Hydration       sodium chloride 0.9% bolus 500 mL

## 2022-03-29 ENCOUNTER — PATIENT MESSAGE (OUTPATIENT)
Dept: UROLOGY | Facility: CLINIC | Age: 72
End: 2022-03-29
Payer: MEDICARE

## 2022-03-29 ENCOUNTER — OFFICE VISIT (OUTPATIENT)
Dept: HEMATOLOGY/ONCOLOGY | Facility: CLINIC | Age: 72
End: 2022-03-29
Payer: MEDICARE

## 2022-03-29 VITALS
TEMPERATURE: 98 F | HEIGHT: 68 IN | DIASTOLIC BLOOD PRESSURE: 65 MMHG | SYSTOLIC BLOOD PRESSURE: 137 MMHG | HEART RATE: 70 BPM | BODY MASS INDEX: 33.5 KG/M2 | OXYGEN SATURATION: 97 % | WEIGHT: 221.06 LBS

## 2022-03-29 DIAGNOSIS — D84.81 IMMUNODEFICIENCY SECONDARY TO NEOPLASM: ICD-10-CM

## 2022-03-29 DIAGNOSIS — C78.7 SECONDARY MALIGNANT NEOPLASM OF LIVER: ICD-10-CM

## 2022-03-29 DIAGNOSIS — Z79.899 IMMUNODEFICIENCY SECONDARY TO CHEMOTHERAPY: ICD-10-CM

## 2022-03-29 DIAGNOSIS — R31.9 HEMATURIA, UNSPECIFIED TYPE: ICD-10-CM

## 2022-03-29 DIAGNOSIS — I81 PORTAL VEIN THROMBOSIS: ICD-10-CM

## 2022-03-29 DIAGNOSIS — N40.0 BENIGN PROSTATIC HYPERPLASIA, UNSPECIFIED WHETHER LOWER URINARY TRACT SYMPTOMS PRESENT: ICD-10-CM

## 2022-03-29 DIAGNOSIS — D49.9 IMMUNODEFICIENCY SECONDARY TO NEOPLASM: ICD-10-CM

## 2022-03-29 DIAGNOSIS — D84.821 IMMUNODEFICIENCY SECONDARY TO CHEMOTHERAPY: ICD-10-CM

## 2022-03-29 DIAGNOSIS — C22.1 INTRAHEPATIC CHOLANGIOCARCINOMA: Primary | ICD-10-CM

## 2022-03-29 DIAGNOSIS — I25.10 CAD IN NATIVE ARTERY: ICD-10-CM

## 2022-03-29 DIAGNOSIS — T45.1X5A IMMUNODEFICIENCY SECONDARY TO CHEMOTHERAPY: ICD-10-CM

## 2022-03-29 DIAGNOSIS — I10 ESSENTIAL HYPERTENSION: ICD-10-CM

## 2022-03-29 PROCEDURE — 3075F SYST BP GE 130 - 139MM HG: CPT | Mod: CPTII,S$GLB,, | Performed by: INTERNAL MEDICINE

## 2022-03-29 PROCEDURE — 99215 PR OFFICE/OUTPT VISIT, EST, LEVL V, 40-54 MIN: ICD-10-PCS | Mod: S$GLB,,, | Performed by: INTERNAL MEDICINE

## 2022-03-29 PROCEDURE — 3008F PR BODY MASS INDEX (BMI) DOCUMENTED: ICD-10-PCS | Mod: CPTII,S$GLB,, | Performed by: INTERNAL MEDICINE

## 2022-03-29 PROCEDURE — 1160F RVW MEDS BY RX/DR IN RCRD: CPT | Mod: CPTII,S$GLB,, | Performed by: INTERNAL MEDICINE

## 2022-03-29 PROCEDURE — 1159F MED LIST DOCD IN RCRD: CPT | Mod: CPTII,S$GLB,, | Performed by: INTERNAL MEDICINE

## 2022-03-29 PROCEDURE — 3288F PR FALLS RISK ASSESSMENT DOCUMENTED: ICD-10-PCS | Mod: CPTII,S$GLB,, | Performed by: INTERNAL MEDICINE

## 2022-03-29 PROCEDURE — 1101F PR PT FALLS ASSESS DOC 0-1 FALLS W/OUT INJ PAST YR: ICD-10-PCS | Mod: CPTII,S$GLB,, | Performed by: INTERNAL MEDICINE

## 2022-03-29 PROCEDURE — 3288F FALL RISK ASSESSMENT DOCD: CPT | Mod: CPTII,S$GLB,, | Performed by: INTERNAL MEDICINE

## 2022-03-29 PROCEDURE — 99999 PR PBB SHADOW E&M-EST. PATIENT-LVL IV: CPT | Mod: PBBFAC,,, | Performed by: INTERNAL MEDICINE

## 2022-03-29 PROCEDURE — 3075F PR MOST RECENT SYSTOLIC BLOOD PRESS GE 130-139MM HG: ICD-10-PCS | Mod: CPTII,S$GLB,, | Performed by: INTERNAL MEDICINE

## 2022-03-29 PROCEDURE — 1159F PR MEDICATION LIST DOCUMENTED IN MEDICAL RECORD: ICD-10-PCS | Mod: CPTII,S$GLB,, | Performed by: INTERNAL MEDICINE

## 2022-03-29 PROCEDURE — 1126F PR PAIN SEVERITY QUANTIFIED, NO PAIN PRESENT: ICD-10-PCS | Mod: CPTII,S$GLB,, | Performed by: INTERNAL MEDICINE

## 2022-03-29 PROCEDURE — 1126F AMNT PAIN NOTED NONE PRSNT: CPT | Mod: CPTII,S$GLB,, | Performed by: INTERNAL MEDICINE

## 2022-03-29 PROCEDURE — 1160F PR REVIEW ALL MEDS BY PRESCRIBER/CLIN PHARMACIST DOCUMENTED: ICD-10-PCS | Mod: CPTII,S$GLB,, | Performed by: INTERNAL MEDICINE

## 2022-03-29 PROCEDURE — 1101F PT FALLS ASSESS-DOCD LE1/YR: CPT | Mod: CPTII,S$GLB,, | Performed by: INTERNAL MEDICINE

## 2022-03-29 PROCEDURE — 3078F PR MOST RECENT DIASTOLIC BLOOD PRESSURE < 80 MM HG: ICD-10-PCS | Mod: CPTII,S$GLB,, | Performed by: INTERNAL MEDICINE

## 2022-03-29 PROCEDURE — 3008F BODY MASS INDEX DOCD: CPT | Mod: CPTII,S$GLB,, | Performed by: INTERNAL MEDICINE

## 2022-03-29 PROCEDURE — 99499 RISK ADDL DX/OHS AUDIT: ICD-10-PCS | Mod: S$GLB,,, | Performed by: INTERNAL MEDICINE

## 2022-03-29 PROCEDURE — 3078F DIAST BP <80 MM HG: CPT | Mod: CPTII,S$GLB,, | Performed by: INTERNAL MEDICINE

## 2022-03-29 PROCEDURE — 99999 PR PBB SHADOW E&M-EST. PATIENT-LVL IV: ICD-10-PCS | Mod: PBBFAC,,, | Performed by: INTERNAL MEDICINE

## 2022-03-29 PROCEDURE — 99499 UNLISTED E&M SERVICE: CPT | Mod: S$GLB,,, | Performed by: INTERNAL MEDICINE

## 2022-03-29 PROCEDURE — 99215 OFFICE O/P EST HI 40 MIN: CPT | Mod: S$GLB,,, | Performed by: INTERNAL MEDICINE

## 2022-03-29 RX ORDER — HEPARIN 100 UNIT/ML
500 SYRINGE INTRAVENOUS
Status: CANCELLED | OUTPATIENT
Start: 2022-03-29

## 2022-03-29 RX ORDER — SODIUM CHLORIDE 0.9 % (FLUSH) 0.9 %
10 SYRINGE (ML) INJECTION
Status: CANCELLED | OUTPATIENT
Start: 2022-03-29

## 2022-03-31 NOTE — PLAN OF CARE
0850 pt here for IVF with Gemzar/Cisplatin D8C5, labs, hx, meds, allergies reviewed, pt with no new complaints at this time, warm blanket provided, reclined in chair, continue to monitor

## 2022-03-31 NOTE — PLAN OF CARE
0357 pt tolerated IVF/Gemzar/Cisplatin infusion without issue, pt to rtc 4/6/22, no distress noted upon d/c to home

## 2022-04-05 NOTE — ANESTHESIA PREPROCEDURE EVALUATION
04/05/2022  Domonique Kellogg is a 71 y.o., male.  Past Medical History:   Diagnosis Date    Adjustment disorder     Anticoagulant long-term use     Anxiety     Arthritis     CAD (coronary artery disease) 3/2/2015    [facility]obstruct[facility] per [facility]     Cataract     Dry eye syndrome     Hypertension     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/2021    Obesity     Seizures     2011    Sleep apnea     + CPAP    Sleep difficulties      Past Surgical History:   Procedure Laterality Date    ANTERIOR CERVICAL DISCECTOMY W/ FUSION N/A 7/6/2020    Procedure: DISCECTOMY, SPINE, CERVICAL, ANTERIOR APPROACH, WITH FUSION C3-4, C4-5;  Surgeon: Fabiola Vides MD;  [facility];  Service: Neurosurgery;  Laterality: N/A;  TORONTO III, ASA III, BLOOD TYPE & SCREEN, NEUROMONITORING EMG-MEP-SEP, SUPINE POSITION,BRACE MIAMI,REGULAR BED, HEADREST CASPAR, POSITION, MAP>85, RADIOLOGY C-ARM, SPECIAL EQUIPMENT [facility]    COLONOSCOPY N/A 10/1/2021    Procedure: COLONOSCOPY;  Surgeon: Nicolas Alvarado MD;  [facility]  Service: Endoscopy;  Laterality: N/A;  EGD and colonoscopy for diarrhea and weight loss and Enlarged, heterogeneous appearance of the liver likely due to multiple underlying hepatic lesions largest measuring 4.8 cm.  Findings concerning for metastatic versus less likely primary hepatic neoplasm.  Recommend [facility]    ESOPHAGOGASTRODUODENOSCOPY N/A 10/1/2021    Procedure: EGD (ESOPHAGOGASTRODUODENOSCOPY);  Surgeon: Nicolas Alvarado MD;  [facility]  Service: Endoscopy;  Laterality: N/A;  EGD and colonoscopy for diarrhea and weight loss and Enlarged, heterogeneous appearance of the liver likely due to multiple underlying hepatic lesions largest measuring 4.8 cm.  Findings concerning for metastatic versus less likely primary hepatic neopl    EYE SURGERY      INSERTION  OF VENOUS ACCESS PORT N/A 12/3/2021    Procedure: INSERTION, VENOUS ACCESS PORT;  Surgeon: Saurav Garcia MD;  Location: [address];  Service: General;  Laterality: N/A;    INTRAOCULAR PROSTHESES INSERTION Right 7/16/2018    Procedure: INSERTION-INTRAOCULAR LENS (IOL);  Surgeon: Priscilla Hays MD;  Location: [facility];  Service: Ophthalmology;  Laterality: Right;    INTRAOCULAR PROSTHESES INSERTION Left 8/13/2018    Procedure: INSERTION, INTRAOCULAR LENS PROSTHESIS;  Surgeon: Priscilla Hays MD;  Location: [facility];  Service: Ophthalmology;  Laterality: Left;    KNEE SURGERY      PHACOEMULSIFICATION OF CATARACT Right 7/16/2018    Procedure: PHACOEMULSIFICATION-ASPIRATION-CATARACT;  Surgeon: Priscilla Hays MD;  Location: [facility];  Service: Ophthalmology;  Laterality: Right;    PHACOEMULSIFICATION OF CATARACT Left 8/13/2018    Procedure: PHACOEMULSIFICATION, CATARACT;  Surgeon: Priscilla Hays MD;  Location: [facility];  Service: Ophthalmology;  Laterality: Left;    WISDOM TOOTH EXTRACTION       TTE 2021  · The left ventricle is normal in size with normal systolic function. The estimated ejection fraction is 60%.  · Normal right ventricular size with normal right ventricular systolic function.  · Indeterminate left ventricular diastolic function.  · Mild left atrial enlargement.  · The estimated PA systolic pressure is 25 mmHg.  · Normal central venous pressure (3 mmHg).          Pre-op Assessment    I have reviewed the Patient Summary Reports.     I have reviewed the Nursing Notes. I have reviewed the NPO Status.   I have reviewed the Medications.     Review of Systems  Anesthesia Hx:  No problems with previous Anesthesia    Social:  No Alcohol Use, Non-Smoker    Hematology/Oncology:  Hematology Normal   Oncology Normal     EENT/Dental:EENT/Dental Normal   Cardiovascular:   Exercise tolerance: good Hypertension, well controlled CAD      Pulmonary:   Sleep Apnea, CPAP    Renal/:  Renal/ Normal      Hepatic/GI:   Liver Disease, Liver cancer   Musculoskeletal:   Arthritis     Neurological:   Neuromuscular Disease, Seizures, well controlled    Endocrine:  Endocrine Normal    Dermatological:  Skin Normal    Psych:   Psychiatric History          Physical Exam  General: Well nourished, Cooperative, Alert and Oriented    Airway:  Mallampati: I   Mouth Opening: Normal  TM Distance: Normal  Tongue: Normal  Neck ROM: Normal ROM    Dental:  Intact        Anesthesia Plan  Type of Anesthesia, risks & benefits discussed:    Anesthesia Type: Gen Natural Airway  Intra-op Monitoring Plan: Standard ASA Monitors  Post Op Pain Control Plan: multimodal analgesia  Induction:  IV  Informed Consent: Informed consent signed with the Patient and all parties understand the risks and agree with anesthesia plan.  All questions answered.   ASA Score: 2    Ready For Surgery From Anesthesia Perspective.     .

## 2022-04-05 NOTE — H&P
Short Stay Endoscopy History and Physical    PCP - Nicolas Marlow MD    Procedure - EGD  ASA - 3  Mallampati - per anesthesia  History of Anesthesia problems - no  Family history Anesthesia problems -  no     HPI:  This is a 71 y.o. male here for evaluation of :     Reflux - yes  Dysphagia - no  Abdominal pain - no  Diarrhea - no  Anemia - no  GI bleeding - no    ROS:  CONSTITUTIONAL: Denies weight change,  fatigue, fevers, chills, night sweats.  CARDIOVASCULAR: Denies chest pain, shortness of breath, orthopnea and edema.  RESPIRATORY: Denies cough, hemoptysis, dyspnea, and wheezing.  GI: See HPI.    Medical History:   Past Medical History:   Diagnosis Date    Adjustment disorder     Anticoagulant long-term use     Anxiety     Arthritis     CAD (coronary artery disease) 3/2/2015    non-obstructive per [facility]     Cataract     Dry eye syndrome     Hypertension     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/2021    Obesity     Seizures     2011    Sleep apnea     + CPAP    Sleep difficulties        Surgical History:   Past Surgical History:   Procedure Laterality Date    ANTERIOR CERVICAL DISCECTOMY W/ FUSION N/A 7/6/2020    Procedure: DISCECTOMY, SPINE, CERVICAL, ANTERIOR APPROACH, WITH FUSION C3-4, C4-5;  Surgeon: Fabiola Vides MD;  Location: [facility] OR [address] [place] [address];  Service: Neurosurgery;  Laterality: N/A;  TORONTO III, ASA III, BLOOD TYPE & SCREEN, NEUROMONITORING EMG-MEP-SEP, SUPINE POSITION,BRACE MIAMI,REGULAR BED, HEADREST CASPAR, POSITION, MAP>85, RADIOLOGY C-ARM, SPECIAL EQUIPMENT [facility]    COLONOSCOPY N/A 10/1/2021    Procedure: COLONOSCOPY;  Surgeon: Nicolas Alvarado MD;  Location: [facility] (4TH FLR);  Service: Endoscopy;  Laterality: N/A;  EGD and colonoscopy for diarrhea and weight loss and Enlarged, heterogeneous appearance of the liver likely due to multiple underlying hepatic lesions largest measuring 4.8 cm.  Findings concerning for metastatic versus less likely primary hepatic neoplasm.   Recommend [facility]    ESOPHAGOGASTRODUODENOSCOPY N/A 10/1/2021    Procedure: EGD (ESOPHAGOGASTRODUODENOSCOPY);  Surgeon: Nicolas Alvarado MD;  Location: [facility] (4TH FLR);  Service: Endoscopy;  Laterality: N/A;  EGD and colonoscopy for diarrhea and weight loss and Enlarged, heterogeneous appearance of the liver likely due to multiple underlying hepatic lesions largest measuring 4.8 cm.  Findings concerning for metastatic versus less likely primary hepatic neopl    EYE SURGERY      INSERTION OF VENOUS ACCESS PORT N/A 12/3/2021    Procedure: INSERTION, VENOUS ACCESS PORT;  Surgeon: Saurav Garcia MD;  [facility] OR 2ND FLR;  Service: General;  Laterality: N/A;    INTRAOCULAR PROSTHESES INSERTION Right 7/16/2018    Procedure: INSERTION-INTRAOCULAR LENS (IOL);  Surgeon: Priscilla Hays MD;  Location: [facility];  Service: Ophthalmology;  Laterality: Right;    INTRAOCULAR PROSTHESES INSERTION Left 8/13/2018    Procedure: INSERTION, INTRAOCULAR LENS PROSTHESIS;  Surgeon: Priscilla Hays MD;  Location: [facility];  Service: Ophthalmology;  Laterality: Left;    KNEE SURGERY      PHACOEMULSIFICATION OF CATARACT Right 7/16/2018    Procedure: PHACOEMULSIFICATION-ASPIRATION-CATARACT;  Surgeon: Priscilla Hays MD;  Location: [facility] OR;  Service: Ophthalmology;  Laterality: Right;    PHACOEMULSIFICATION OF CATARACT Left 8/13/2018    Procedure: PHACOEMULSIFICATION, CATARACT;  Surgeon: Priscilla Hays MD;  Location: [facility];  Service: Ophthalmology;  Laterality: Left;    WISDOM TOOTH EXTRACTION         Family History:  Family History   Problem Relation Age of Onset    Diabetes Mother     Hypertension Mother     Kidney cancer Mother 61    Cancer Mother         renal & pancreatic    Heart disease Father     No Known Problems Sister     Cancer Maternal Grandmother     Liver disease Maternal Grandmother     Heart disease Paternal Grandfather     Heart disease Brother     No Known Problems Daughter     No Known Problems  Son     No Known Problems Sister     No Known Problems Sister     No Known Problems Sister     No Known Problems Daughter     Blindness Neg Hx     Macular degeneration Neg Hx     Retinal detachment Neg Hx     Glaucoma Neg Hx     Melanoma Neg Hx     Pancreatic cancer Neg Hx     Bladder Cancer Neg Hx     Uterine cancer Neg Hx     Ovarian cancer Neg Hx     Celiac disease Neg Hx     Cirrhosis Neg Hx     Colon cancer Neg Hx     Colon polyps Neg Hx     Crohn's disease Neg Hx     Esophageal cancer Neg Hx     Inflammatory bowel disease Neg Hx     Liver cancer Neg Hx     Rectal cancer Neg Hx     Stomach cancer Neg Hx     Ulcerative colitis Neg Hx        Social History:   Social History     Tobacco Use    Smoking status: Former Smoker     Packs/day: 1.00     Years: 20.00     Pack years: 20.00     Quit date: 1987     Years since quittin.2    Smokeless tobacco: Never Used    Tobacco comment: quit    Substance Use Topics    Alcohol use: Not Currently     Alcohol/week: 1.0 - 2.0 standard drink     Types: 1 - 2 Glasses of wine per week     Comment: 1-2 glasses wine several times weekly    Drug use: No       Allergies: Reviewed    Medications:   No current facility-administered medications on file prior to encounter.     Current Outpatient Medications on File Prior to Encounter   Medication Sig Dispense Refill    acetaminophen (TYLENOL) 650 MG TbSR Take by mouth daily as needed.      hydroCHLOROthiazide (HYDRODIURIL) 25 MG tablet TAKE 1 TABLET (25 MG TOTAL) BY MOUTH ONCE DAILY. 90 tablet 3    magnesium oxide (MAG-OX) 400 mg (241.3 mg magnesium) tablet Take 400 mg by mouth every evening.      multivitamin with minerals tablet Take 1 tablet by mouth every morning.       potassium chloride SA (K-DUR,KLOR-CON) 20 MEQ tablet Take 2 tablets (40 mEq total) by mouth once daily. 180 tablet 3    pravastatin (PRAVACHOL) 40 MG tablet Take 1 tablet (40 mg total) by mouth once daily. (Patient  taking differently: Take 40 mg by mouth every evening.) 90 tablet 3    apixaban (ELIQUIS) 5 mg Tab Take 1 tablet (5 mg total) by mouth 2 (two) times daily. 120 tablet 3    diltiaZEM 2% Lidocaine 5% Cream Apply pea size amount topically 3 (three) times daily. 30 g 2    ondansetron (ZOFRAN) 4 MG tablet Take 1 tablet (4 mg total) by mouth every 6 (six) hours as needed for Nausea. 20 tablet 0    oxyCODONE (ROXICODONE) 5 MG immediate release tablet Take 1 tablet (5 mg total) by mouth every 6 (six) hours as needed for Pain. 20 tablet 0    prochlorperazine (COMPAZINE) 10 MG tablet Take 1 tablet (10 mg total) by mouth every 6 (six) hours as needed (nausea). 60 tablet 1    promethazine (PHENERGAN) 12.5 MG Tab Take 1 tablet (12.5 mg total) by mouth every 6 (six) hours as needed (nausea). 60 tablet 2    RABEprazole (ACIPHEX) 20 mg tablet Take 1 tablet (20 mg total) by mouth once daily. 90 tablet 3       Physical Exam:  Vital Signs:   Vitals:    04/05/22 0640   BP: 134/66   Pulse: (!) 58   Resp: 16   Temp: 97.7 °F (36.5 °C)     General Appearance: Well appearing in no acute distress  ENT: OP clear  Chest: CTA B  CV: RRR, no m/r/g  Abd: s/nt/nd/nabs  Ext: no edema    Labs:  Reviewed    Plan:  I have explained the risks and benefits of endoscopy procedures to the patient including but not limited to bleeding, perforation, infection, and death. The patient wishes to proceed.     Cardiac

## 2022-04-05 NOTE — ANESTHESIA POSTPROCEDURE EVALUATION
Anesthesia Post Evaluation    Patient: Domonique Kellogg    Procedure(s) Performed: Procedure(s) (LRB):  EGD (ESOPHAGOGASTRODUODENOSCOPY) (N/A)    Final Anesthesia Type: general      Patient location during evaluation: GI PACU  Patient participation: Yes- Able to Participate  Level of consciousness: awake and alert  Post-procedure vital signs: reviewed and stable  Pain management: adequate  Airway patency: patent    PONV status at discharge: No PONV  Anesthetic complications: no      Cardiovascular status: blood pressure returned to baseline  Respiratory status: spontaneous ventilation  Hydration status: euvolemic  Follow-up not needed.          Vitals Value Taken Time   /67 04/05/22 0740   Temp 36.5 °C (97.7 °F) 04/05/22 0724   Pulse 67 04/05/22 0740   Resp 14 04/05/22 0740   SpO2 99 % 04/05/22 0740         No case tracking events are documented in the log.      Pain/Lalo Score: Lalo Score: 10 (4/5/2022  7:40 AM)

## 2022-04-05 NOTE — PROVATION PATIENT INSTRUCTIONS
Discharge Summary/Instructions after an Endoscopic Procedure  Patient Name: Domonique Kellogg  Patient MRN: [id number]  Patient YOB: 1950 Tuesday, April 5, 2022  Amado Kelsey MD  Dear patient,  As a result of recent federal legislation (The Federal Cures Act), you may   receive lab or pathology results from your procedure in your [facility]   account before your physician is able to contact you. Your physician or   their representative will relay the results to you with their   recommendations at their soonest availability.  Thank you,  RESTRICTIONS:  During your procedure today, you received medications for sedation.  These   medications may affect your judgment, balance and coordination.  Therefore,   for 24 hours, you have the following restrictions:   - DO NOT drive a car, operate machinery, make legal/financial decisions,   sign important papers or drink alcohol.    ACTIVITY:  Today: no heavy lifting, straining or running due to procedural   sedation/anesthesia.  The following day: return to full activity including work.  DIET:  Eat and drink normally unless instructed otherwise.     TREATMENT FOR COMMON SIDE EFFECTS:  - Mild abdominal pain, nausea, belching, bloating or excessive gas:  rest,   eat lightly and use a heating pad.  - Sore Throat: treat with throat lozenges and/or gargle with warm salt   water.  - Because air was used during the procedure, expelling large amounts of air   from your rectum or belching is normal.  - If a bowel prep was taken, you may not have a bowel movement for 1-3 days.    This is normal.  SYMPTOMS TO WATCH FOR AND REPORT TO YOUR PHYSICIAN:  1. Abdominal pain or bloating, other than gas cramps.  2. Chest pain.  3. Back pain.  4. Signs of infection such as: chills or fever occurring within 24 hours   after the procedure.  5. Rectal bleeding, which would show as bright red, maroon, or black stools.   (A tablespoon of blood from the rectum is not serious, especially  if   hemorrhoids are present.)  6. Vomiting.  7. Weakness or dizziness.  GO DIRECTLY TO THE NEAREST EMERGENCY ROOM IF YOU HAVE ANY OF THE FOLLOWING:      Difficulty breathing              Chills and/or fever over 101 F   Persistent vomiting and/or vomiting blood   Severe abdominal pain   Severe chest pain   Black, tarry stools   Bleeding- more than one tablespoon   Any other symptom or condition that you feel may need urgent attention  Your doctor recommends these additional instructions:  If any biopsies were taken, your doctors clinic will contact you in 1 to 2   weeks with any results.  - Discharge patient to home.   - Resume previous diet today.   - Continue present medications.   - Await pathology results.   - Return to referring physician as previously scheduled.   - Resume Eliquis (apixaban) at prior dose tomorrow.  For questions, problems or results please call your physician - Amado Kelsey MD at Work:  [phone].  [facility] [place], EMERGENCY ROOM PHONE NUMBER: [phone]  IF A COMPLICATION OR EMERGENCY SITUATION ARISES AND YOU ARE UNABLE TO REACH   YOUR PHYSICIAN - GO DIRECTLY TO THE EMERGENCY ROOM.  Amado Kelsey MD  4/5/2022 7:21:06 AM  This report has been verified and signed electronically.  Dear patient,  As a result of recent federal legislation (The Federal Cures Act), you may   receive lab or pathology results from your procedure in your [facility]   account before your physician is able to contact you. Your physician or   their representative will relay the results to you with their   recommendations at their soonest availability.  Thank you,  PROVATION

## 2022-04-05 NOTE — TRANSFER OF CARE
"Anesthesia Transfer of Care Note    Patient: Domonique Kellogg    Procedure(s) Performed: Procedure(s) (LRB):  EGD (ESOPHAGOGASTRODUODENOSCOPY) (N/A)    Patient location: GI    Anesthesia Type: general    Transport from OR: Transported from OR on room air with adequate spontaneous ventilation    Post pain: adequate analgesia    Post assessment: no apparent anesthetic complications    Post vital signs: stable    Level of consciousness: awake    Nausea/Vomiting: no nausea/vomiting    Complications: none    Transfer of care protocol was followed      Last vitals:   Visit Vitals  BP (!) 99/57 (BP Location: Left arm, Patient Position: Lying)   Pulse 67   Temp 36.5 °C (97.7 °F) (Temporal)   Resp 14   Ht 5' 8" (1.727 m)   Wt 99.8 kg (220 lb)   SpO2 98%   BMI 33.45 kg/m²     "

## 2022-04-07 NOTE — PROGRESS NOTES
INFORMED CONSENT: Domonique Kellogg   is known to this provider and identity was confirmed via NAME and .  The patient has been informed of the risks and benefits associated with engaging in psychotherapy, the handling of protected health information, the rights of privacy and the limits of confidentiality. The patient has also been informed of the importance of reporting any suicidal or homicidal ideation to this or any provider to ensure safety of all parties, and the Domonique Kellogg expressed understanding. The patient was agreeable to these terms and freely participates in individual psychotherapy.    PSYCHO-ONCOLOGY NOTE/ Individual Psychotherapy     Date: 2022   Site:  [facility]        Therapeutic Intervention: Met with patient and spouse.  Outpatient - Behavior modifying psychotherapy 60 min - CPT code 22571      Patient was last seen by me on 3/14/2022    Problem list  Patient Active Problem List   Diagnosis    CTS (carpal tunnel syndrome)    Class 2 severe obesity due to excess calories with serious comorbidity and body mass index (BMI) of 38.0 to 38.9 in adult    H/O syncope    RAHEEL (obstructive sleep apnea)    CAD in native artery    Essential hypertension    Hyperlipidemia    Chronic pulmonary heart disease    Bilateral carotid artery disease    Primary osteoarthritis of right knee    Lateral femoral cutaneous entrapment syndrome    Cervical spondylosis    Decreased ROM of intervertebral discs of cervical spine    Cervical disc disease with myelopathy    Cervical stenosis of spinal canal    Prediabetes    Infection due to Strongyloides    Intrahepatic cholangiocarcinoma    Secondary malignant neoplasm of liver    Post-procedural fever    Transaminitis    Elevated troponin    Immunodeficiency secondary to neoplasm    Hyperbilirubinemia    Portal vein thrombosis    Cancer related pain    Immunodeficiency secondary to chemotherapy    Benign prostatic hyperplasia without  lower urinary tract symptoms    Weight loss       Chief complaint/reason for encounter: anxiety and interpersonal   Met with patient to evaluate psychosocial adaptation to diagnosis/treatment/survivorship of [place]io    Current Medications  Current Outpatient Medications   Medication    acetaminophen (TYLENOL) 650 MG TbSR    apixaban (ELIQUIS) 2.5 mg Tab    diltiaZEM 2% Lidocaine 5% Cream    finasteride (PROSCAR) 5 mg tablet    hydroCHLOROthiazide (HYDRODIURIL) 25 MG tablet    magnesium oxide (MAG-OX) 400 mg (241.3 mg magnesium) tablet    multivitamin with minerals tablet    ondansetron (ZOFRAN) 4 MG tablet    oxyCODONE (ROXICODONE) 5 MG immediate release tablet    potassium chloride SA (K-DUR,KLOR-CON) 20 MEQ tablet    pravastatin (PRAVACHOL) 40 MG tablet    prochlorperazine (COMPAZINE) 10 MG tablet    promethazine (PHENERGAN) 12.5 MG Tab    RABEprazole (ACIPHEX) 20 mg tablet    tamsulosin (FLOMAX) 0.4 mg Cap     No current facility-administered medications for this visit.       Objective:  Domonique Kellogg arrived promptly for the session. His wife was also present during the interview, with the consent of Domonique Kellogg.   Mr. Kellogg was independently ambulatory at the time of session. The patient was fully cooperative throughout the session.  Appearance: age appropriate, appropriately  dressed, adequately  groomed  Behavior/Cooperation: friendly and cooperative  Speech: normal in rate, volume, and tone and appropriate quality, quantity and organization of sentences  Mood: anxious, irritable  Affect: mood congruent  Thought Process: goal-directed, logical  Thought Content: normal,  No delusions or paranoia; did not appear to be responding to internal stimuli during the session  Orientation: grossly intact  Memory: Grossly intact  Attention Span/Concentration: Attends to session without distraction; reports no difficulty  Fund of Knowledge: average  Estimate of Intelligence: average from verbal  skills and history  Cognition: grossly intact  Insight: patient has awareness of illness; good insight into own behavior and behavior of others  Judgment: the patient's behavior is adequate to circumstances    Interval history and content of current session: Discussed history of trust issues. Reviewed ACT approach to current issues and problem-solving. Patient becomes very withdrawn during argument and wife is distrustful.  Discussed diagnosis, treatment, prognosis, current adaptation to disease and treatment status and family's adaptation to disease and treatment status. Reports to be coping in an adequate manner. Evaluated cognitive response, paying particular attention to negative intrusive thoughts of a persistent and detrimental nature. Thoughts of this type are in evidence with mild distress. Provided cognitive behavioral therapy to address negative cognitions. Identified and evaluated psychosocial and environmental stressors secondary to diagnosis and treatment.  Examined proactive behaviors that may be implemented to minimize or ameliorate psychosocial stressors secondary to diagnosis and treatment.     Risk parameters:   Patient reports no suicidal ideation  Patient reports no homicidal ideation  Patient reports no self-injurious behavior  Patient reports no violent behavior   Safety needs:  None at this time      Verbal deficits: None     Patient's response to intervention:The patient's response to intervention is accepting.     Progress toward goals and other mental status changes:  The patient's progress toward goals is fair .      Progress to date:Progress - Ongoing, but Slow      Goals from last visit: Attempted, partially met     Patient reported outcomes:    Distress Thermometer: 5   PHQ-9=    CAMILO-7=      Client Strengths: verbal, intelligent, successful, good social support, good insight, commitment to wellness, strong ottoniel, strong cultural traditions     Diagnosis:     ICD-10-CM ICD-9-CM   1.  Adjustment disorder with anxiety  F43.22 309.24   2. Intrahepatic cholangiocarcinoma  C22.1 155.1   3. Marital conflict  Z63.0 V61.10     Treatment Plan:individual psychotherapy and medication management by physician  · Target symptoms: anxiety , adjustment  · Why chosen therapy is appropriate versus another modality: relevant to diagnosis, patient responds to this modality, evidence based practice  · Outcome monitoring methods: self-report, observation, feedback from family, checklist/rating scale  · Therapeutic intervention type: behavior modifying psychotherapy  · Prognosis: Good      Behavioral goals:    Exercise:   Stress management:   Social engagement:   Nutrition:   Smoking Cessation:   Therapy:  Learn and utilize emotion management strategies  Identify, implement and maintain healthy personal boundaries  Improve self-regulation through CBT strategies    Return to clinic: 3 weeks       Length of Service (minutes direct face-to-face contact): 60    Katie Ledesma, PhD  Clinical Psychologist  [place] License #[id number]  [place] License #[id number]

## 2022-04-12 NOTE — PROGRESS NOTES
"                                                         PROGRESS NOTE    Subjective:       Patient ID: Domonique Kellogg is a 71 y.o. male.    Chief Complaint: follow up for cholangiocarcinoma    Diagnosis:  Advanced intrahepatic cholangiocarcinoma, MSI-low, negative for FGFR2 fusion or IDH1/IDH2 mutations, diagnosed on 11/22/2021.     Molecular Profile:  Guardant 360 11/29/21: +CCND1 amplification. VUS: CATRACHITO [id number]. MSI-high not detected.     Oncologic History:  1. Mr Kellogg is a 70 yo man with CAD, HTN, seizures in 2011 who initially saw me on 11/29/21 for further management of cholangiocarcinoma. He presented with weight loss and diarrhea since July. US 9/22/21 showed "Enlarged, heterogeneous appearance of the liver likely due to multiple underlying hepatic lesions largest measuring 4.8 cm."  MRI abdomen w/wo contrast 10/4/21: "Multiple liver lesions measuring up to 13.3 cm in the right hepatic lobe.  Differential diagnosis includes metastases, fibrolamellar hepatocellular carcinoma, or atypical focal nodular hyperplasia.  Consider biopsy for further evaluation. Thrombosis of the anterior branch of the right portal vein and left hepatic vein.  Nonvisualization of the middle and right hepatic veins, which may be thrombosed as well. Prominent periportal lymph node, which is nonspecific."  EGD and colonoscopy on 10/11/21 were negative for primary malignancies.   He underwent liver lesion biopsy and Y90 mapping on 11/22/21. Pathology showed adenocarcinoma: "Biopsy of the liver mass shows a malignant neoplasm in a fibrotic stroma. Glandular architecture is readily identified with several foci of intraluminal necrosis. Scattered mitotic figures are seen. Neoplastic cells show the following immunophenotype:   Positive: AE1/AE3, CK7, CDX2   Negative: Chromogranin, Synaptophysin, TTF, CK5/6, CK20, HepPar1   The morphology and immunophenotype are compatible with adenocarcinoma. Considerations for a primary site include " "pancreaticobiliary (including intrahepatic cholangiocarcinoma) as well as upper gastrointestinal. Clinical and radiologic correlation is suggested."  Patient presents today with wife for further management. No pain. Initial weight loss has somewhat stabilized. Still has diarrhea. Has not tried imodium yet. +nervous  Mother had kidney cancer at age 64. Maternal grandmother had liver cancer in her 60s. Patient has three children, two daughters and one son.   Discussed palliative chemo with cis/gem  2. PET scan 21 did not show extrahepatic metastases.   3. Cis/gem started on 21, s/p cycle 5 day 1  4. S/p TACE on 2021 then again on 22    Interval History:   Mr Kellogg returns for cycle 6 day 1 cis/gem. Tolerating well, mild leg cramping last week. Trying increasing water intake. Scheduled for TACE on 22.     ECO    ROS:   A ten-point system review is obtained and negative except for what was stated in the Interval History.     Allergies:  Review of patient's allergies indicates:   Allergen Reactions    Indomethacin      Headache dizziness    Adhesive Rash       Medications:  Current Outpatient Medications   Medication Sig Dispense Refill    acetaminophen (TYLENOL) 650 MG TbSR Take by mouth daily as needed.      apixaban (ELIQUIS) 2.5 mg Tab Take 1 tablet (2.5 mg total) by mouth once daily. 90 tablet 3    diltiaZEM 2% Lidocaine 5% Cream Apply pea size amount topically 3 (three) times daily. 30 g 2    finasteride (PROSCAR) 5 mg tablet Take 1 tablet (5 mg total) by mouth once daily. 90 tablet 3    hydroCHLOROthiazide (HYDRODIURIL) 25 MG tablet TAKE 1 TABLET (25 MG TOTAL) BY MOUTH ONCE DAILY. 90 tablet 3    magnesium oxide (MAG-OX) 400 mg (241.3 mg magnesium) tablet Take 400 mg by mouth every evening.      multivitamin with minerals tablet Take 1 tablet by mouth every morning.       ondansetron (ZOFRAN) 4 MG tablet Take 1 tablet (4 mg total) by mouth every 6 (six) hours as needed for " Nausea. 20 tablet 0    oxyCODONE (ROXICODONE) 5 MG immediate release tablet Take 1 tablet (5 mg total) by mouth every 6 (six) hours as needed for Pain. 20 tablet 0    potassium chloride SA (K-DUR,KLOR-CON) 20 MEQ tablet Take 2 tablets (40 mEq total) by mouth once daily. 180 tablet 3    pravastatin (PRAVACHOL) 40 MG tablet Take 1 tablet (40 mg total) by mouth once daily. (Patient taking differently: Take 40 mg by mouth every evening.) 90 tablet 3    prochlorperazine (COMPAZINE) 10 MG tablet Take 1 tablet (10 mg total) by mouth every 6 (six) hours as needed (nausea). 60 tablet 1    promethazine (PHENERGAN) 12.5 MG Tab Take 1 tablet (12.5 mg total) by mouth every 6 (six) hours as needed (nausea). 60 tablet 2    RABEprazole (ACIPHEX) 20 mg tablet Take 1 tablet (20 mg total) by mouth once daily. 90 tablet 3    tamsulosin (FLOMAX) 0.4 mg Cap Take 1 capsule (0.4 mg total) by mouth once daily. 90 capsule 0     No current facility-administered medications for this visit.       PMH:  Past Medical History:   Diagnosis Date    Adjustment disorder     Anticoagulant long-term use     Anxiety     Arthritis     CAD (coronary artery disease) 3/2/2015    non-obstructive per [facility]     Cataract     Dry eye syndrome     Hypertension     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/2021    Obesity     Seizures     2011    Sleep apnea     + CPAP    Sleep difficulties        PSH:  Past Surgical History:   Procedure Laterality Date    ANTERIOR CERVICAL DISCECTOMY W/ FUSION N/A 7/6/2020    Procedure: DISCECTOMY, SPINE, CERVICAL, ANTERIOR APPROACH, WITH FUSION C3-4, C4-5;  Surgeon: Fabiola Vides MD;  Location: [facility] OR [address] [place] [address];  Service: Neurosurgery;  Laterality: N/A;  TORONTO III, ASA III, BLOOD TYPE & SCREEN, NEUROMONITORING EMG-MEP-SEP, SUPINE POSITION,BRACE MIAMI,REGULAR BED, HEADREST CASPAR, POSITION, MAP>85, RADIOLOGY C-ARM, SPECIAL EQUIPMENT VIRGIL TYLER    COLONOSCOPY N/A 10/1/2021    Procedure: COLONOSCOPY;  Surgeon:  Nicolas Alvarado MD;  Location: [facility] (4TH FLR);  Service: Endoscopy;  Laterality: N/A;  EGD and colonoscopy for diarrhea and weight loss and Enlarged, heterogeneous appearance of the liver likely due to multiple underlying hepatic lesions largest measuring 4.8 cm.  Findings concerning for metastatic versus less likely primary hepatic neoplasm.  Recommend furth    ESOPHAGOGASTRODUODENOSCOPY N/A 10/1/2021    Procedure: EGD (ESOPHAGOGASTRODUODENOSCOPY);  Surgeon: Nicolas Alvarado MD;  Location: [facility] (4TH FLR);  Service: Endoscopy;  Laterality: N/A;  EGD and colonoscopy for diarrhea and weight loss and Enlarged, heterogeneous appearance of the liver likely due to multiple underlying hepatic lesions largest measuring 4.8 cm.  Findings concerning for metastatic versus less likely primary hepatic neopl    ESOPHAGOGASTRODUODENOSCOPY N/A 4/5/2022    Procedure: EGD (ESOPHAGOGASTRODUODENOSCOPY);  Surgeon: Amado Kelsey MD;  Location: [facility] (4TH FLR);  Service: Endoscopy;  Laterality: N/A;  EGD in 8 weeks to check for esophagitis healing patient should be on pantoprazole 40 mg once daily  ok to hold eliquis x2 days per Dr. Young, see telephone encounter  fully vaccinated    EYE SURGERY      INSERTION OF VENOUS ACCESS PORT N/A 12/3/2021    Procedure: INSERTION, VENOUS ACCESS PORT;  Surgeon: Saurav Garcia MD;  Location: [facility] 2ND FLR;  Service: General;  Laterality: N/A;    INTRAOCULAR PROSTHESES INSERTION Right 7/16/2018    Procedure: INSERTION-INTRAOCULAR LENS (IOL);  Surgeon: Priscilla Hays MD;  Location: [facility] [place];  Service: Ophthalmology;  Laterality: Right;    INTRAOCULAR PROSTHESES INSERTION Left 8/13/2018    Procedure: INSERTION, INTRAOCULAR LENS PROSTHESIS;  Surgeon: Priscilla Hays MD;  Location: [facility] [place] OR;  Service: Ophthalmology;  Laterality: Left;    KNEE SURGERY      PHACOEMULSIFICATION OF CATARACT Right 7/16/2018    Procedure: PHACOEMULSIFICATION-ASPIRATION-CATARACT;  Surgeon: Priscilla CARTER  MD Saúl;  Location: [facility];  Service: Ophthalmology;  Laterality: Right;    PHACOEMULSIFICATION OF CATARACT Left 2018    Procedure: PHACOEMULSIFICATION, CATARACT;  Surgeon: Priscilla Hays MD;  Location: [facility];  Service: Ophthalmology;  Laterality: Left;    WISDOM TOOTH EXTRACTION         FamHx:  Family History   Problem Relation Age of Onset    Diabetes Mother     Hypertension Mother     Kidney cancer Mother 61    Cancer Mother         renal & pancreatic    Heart disease Father     No Known Problems Sister     Cancer Maternal Grandmother     Liver disease Maternal Grandmother     Heart disease Paternal Grandfather     Heart disease Brother     No Known Problems Daughter     No Known Problems Son     No Known Problems Sister     No Known Problems Sister     No Known Problems Sister     No Known Problems Daughter     Blindness Neg Hx     Macular degeneration Neg Hx     Retinal detachment Neg Hx     Glaucoma Neg Hx     Melanoma Neg Hx     Pancreatic cancer Neg Hx     Bladder Cancer Neg Hx     Uterine cancer Neg Hx     Ovarian cancer Neg Hx     Celiac disease Neg Hx     Cirrhosis Neg Hx     Colon cancer Neg Hx     Colon polyps Neg Hx     Crohn's disease Neg Hx     Esophageal cancer Neg Hx     Inflammatory bowel disease Neg Hx     Liver cancer Neg Hx     Rectal cancer Neg Hx     Stomach cancer Neg Hx     Ulcerative colitis Neg Hx        SocHx:  Social History     Socioeconomic History    Marital status:      Spouse name: Estrellita    Number of children: 3   Occupational History     Employer: [employer]   Tobacco Use    Smoking status: Former Smoker     Packs/day: 1.00     Years: 20.00     Pack years: 20.00     Quit date: 1987     Years since quittin.3    Smokeless tobacco: Never Used    Tobacco comment: quit    Substance and Sexual Activity    Alcohol use: Not Currently     Alcohol/week: 1.0 - 2.0 standard drink     Types: 1 - 2 Glasses of  wine per week     Comment: 1-2 glasses wine several times weekly    Drug use: No    Sexual activity: Yes     Partners: Female   Social History Narrative     since 1976     Social Determinants of Health     Financial Resource Strain: Low Risk     Difficulty of Paying Living Expenses: Not very hard   Food Insecurity: Food Insecurity Present    Worried About Running Out of Food in the Last Year: Never true    Ran Out of Food in the Last Year: Sometimes true   Transportation Needs: No Transportation Needs    Lack of Transportation (Medical): No    Lack of Transportation (Non-Medical): No   Physical Activity: Insufficiently Active    Days of Exercise per Week: 7 days    Minutes of Exercise per Session: 20 min   Stress: No Stress Concern Present    Feeling of Stress : Not at all   Social Connections: Unknown    Frequency of Communication with Friends and Family: Once a week    Frequency of Social Gatherings with Friends and Family: Never    Active Member of Clubs or Organizations: Yes    Attends Club or Organization Meetings: 1 to 4 times per year    Marital Status:    Housing Stability: High Risk    Unable to Pay for Housing in the Last Year: Yes    Number of Places Lived in the Last Year: 1    Unstable Housing in the Last Year: No       Distress Score    Distress Score: (P) 0        Practical Problems Physical Problems   : (P) No Appearance: (P) No   Housing: (P) No Bathing / Dressing: (P) No   Insurance / Financial: (P) No Breathing: (P) No    Transportation: (P) No  Changes in Urination: (P) No    Work / School: (P) No  Constipation: (P) No   Treatment Decisions: (P) No  Diarrhea: (P) No     Eating: (P) No    Family Problems Fatigue: (P) No    Dealing with Children: (P) No Feeling Swollen: (P) No    Dealing with Partner: (P) No Fevers: (P) No    Ability to Have Children: (P) No  Getting Around: (P) No       Indigestion: (P) No     Memory / Concentration: (P) No   Emotional  Problems Mouth Sores: (P) No    Depression: (P) No  Nausea: (P) No    Fears: (P) No  Nose Dry / Congested: (P) No    Nervousness: (P) No  Pain: (P) No    Sadness: (P) No Sexual: (P) No    Worry: (P) No Skin Dry / Itchy: (P) No    Loss of Interest in Usual Activities: (P) No Sleep: (P) No     Substance Abuse: (P) No    Spiritual/Religions Concerns Tingling in Hands / Feet: (P) No   Spritual / Holiness Concerns: (P) No         Other Problems              Objective:       Physical Examination:   Deferred- virtual visit      Assessment and Plan:     1. Intrahepatic cholangiocarcinoma    2. 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 Anemia due to antineoplastic chemotherapy    5. Portal vein thrombosis    6. Benign prostatic hyperplasia, unspecified whether lower urinary tract symptoms present      1-3  - Mr Kellogg is a 70 yo man with advanced intrahepatic cholangiocarcinoma with multiple liver lesions. MSI-low, FGFR2 fusion and IDH1/2 mutation negative. Started on cis/gem on 12/7/21, s/p cycle 5 day 1. S/p TACE on 12/16/2021 and 2/14/22  - doing well. Continue with treatment. Cycle 5 day 8 cis/gem today  - added durvalumab after TOPAZ trial presented at [facility] in Jan 2022. Had [id number] with insurance company. Insurance company does not cover given lack of peer-reviewed article. Discussed with patient. Reviewed benefits vs risks. Discussed we can apply for patient assistance. Patient wants to discuss with family further before he decides.       Labs reviewed and acceptable to proceed with treatment. Scheduled for TACE on 4/19.     RTC as scheduled.    4- Monitor closely for need for transfusion.    5.  - reviewed risks vs benefits of anticoagulation. He cannot tolerate eliquis even at 2.5 mg bid due to hematuria. Asked patient to try one last time at 2.5 mg daily. If he develops hematuria again then stop it. Patient understands and agrees with the plan.     6  -  followed by urology. Cystoscopy showed no tumor. +BPH. +mild diffuse wall thickening of the bladder which may relate to outlet obstruction on CT urogram  - f/u with urology. On finesteride    The patient location is:home  The chief complaint leading to consultation is: clearance for chemo    Visit type: audiovisual    Face to Face time with patient: 20  15  minutes of total time spent on the encounter, which includes face to face time and non-face to face time preparing to see the patient (eg, review of tests), Obtaining and/or reviewing separately obtained history, Documenting clinical information in the electronic or other health record, Independently interpreting results (not separately reported) and communicating results to the patient/family/caregiver, or Care coordination (not separately reported).     Each patient to whom he or she provides medical services by telemedicine is:  (1) informed of the relationship between the physician and patient and the respective role of any other health care provider with respect to management of the patient; and (2) notified that he or she may decline to receive medical services by telemedicine and may withdraw from such care at any time.    Patient is in agreement with the proposed treatment plan. All questions were answered to the patient's satisfaction. Pt knows to call clinic if anything is needed before the next clinic visit.    Dina Olivo, MSN, APRN, FNP-C  Hematology and Medical Oncology  Nurse Practitioner to Dr. Avery Rahman  Nurse Practitioner, [facility]

## 2022-04-18 NOTE — PRE-PROCEDURE INSTRUCTIONS
PRE-OP INSTRUCTIONS:  Instructed patient to have no food,milk or milk products after midnight   It is ok to take AM medications with a few sips of water   Medication instructions for pm prior to and am of surgery reviewed.  Instructed patient to avoid taking vitamins,supplements,aspirin or ibuprofen the am of surgery.  Shower instructions provided    Patient denies any side effects or issues with anesthesia or sedation.     PT HAS PORT RIGHT CHEST    PT USES CPAP

## 2022-04-19 NOTE — PLAN OF CARE
Procedure complete. Pt tolerated well. Right groin site clean, dry, intact, no bleeding, no hematoma, site soft, non-tender. 5 Fr Exoseal deployed at 1700. HOB to remain flat for 2 hours, until 1900. Pt to recover in Phase 2. Pt will transfer to recover in stretcher in care of RN and CRNA.

## 2022-04-19 NOTE — ANESTHESIA PREPROCEDURE EVALUATION
04/19/2022  Domonique Kellogg is a 71 y.o., male.      Past Medical History:   Diagnosis Date    Adjustment disorder     Anticoagulant long-term use     Anxiety     Arthritis     CAD (coronary artery disease) 3/2/2015    non-obstructive per [facility]     Cataract     Dry eye syndrome     Hypertension     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/2021    Obesity     Seizures     2011    Sleep apnea     + CPAP    Sleep difficulties      Past Surgical History:   Procedure Laterality Date    ANTERIOR CERVICAL DISCECTOMY W/ FUSION N/A 7/6/2020    Procedure: DISCECTOMY, SPINE, CERVICAL, ANTERIOR APPROACH, WITH FUSION C3-4, C4-5;  Surgeon: Fabiola Vides MD;  [facility];  Service: Neurosurgery;  Laterality: N/A;  TORONTO III, ASA III, BLOOD TYPE & SCREEN, NEUROMONITORING EMG-MEP-SEP, SUPINE POSITION,BRACE MIAMI,REGULAR BED, HEADREST CASPAR, POSITION, MAP>85, RADIOLOGY C-ARM, SPECIAL EQUIPMENT [facility]    COLONOSCOPY N/A 10/1/2021    Procedure: COLONOSCOPY;  Surgeon: Nicolas Alvarado MD;  Location: [facility] ([address] [place] [address]);  Service: Endoscopy;  Laterality: N/A;  EGD and colonoscopy for diarrhea and weight loss and Enlarged, heterogeneous appearance of the liver likely due to multiple underlying hepatic lesions largest measuring 4.8 cm.  Findings concerning for metastatic versus less likely primary hepatic neoplasm.  Recommend [facility]    ESOPHAGOGASTRODUODENOSCOPY N/A 10/1/2021    Procedure: EGD (ESOPHAGOGASTRODUODENOSCOPY);  Surgeon: Nicolas Alvarado MD;  [facility]  Service: Endoscopy;  Laterality: N/A;  EGD and colonoscopy for diarrhea and weight loss and Enlarged, heterogeneous appearance of the liver likely due to multiple underlying hepatic lesions largest measuring 4.8 cm.  Findings concerning for metastatic versus less likely primary hepatic neopl     ESOPHAGOGASTRODUODENOSCOPY N/A 4/5/2022    Procedure: EGD (ESOPHAGOGASTRODUODENOSCOPY);  Surgeon: Amado Kelsey MD;  [facility]  Service: Endoscopy;  Laterality: N/A;  EGD in 8 weeks to check for esophagitis healing patient should be on pantoprazole 40 mg once daily  ok to hold eliquis x2 days per Dr. Young, see telephone encounter  fully vaccinated    EYE SURGERY      INSERTION OF VENOUS ACCESS PORT N/A 12/3/2021    Procedure: INSERTION, VENOUS ACCESS PORT;  Surgeon: Saurav Garcia MD;  Location: [facility] 2ND FLR;  Service: General;  Laterality: N/A;    INTRAOCULAR PROSTHESES INSERTION Right 7/16/2018    Procedure: INSERTION-INTRAOCULAR LENS (IOL);  Surgeon: Priscilla Hays MD;  Location: [facility];  Service: Ophthalmology;  Laterality: Right;    INTRAOCULAR PROSTHESES INSERTION Left 8/13/2018    Procedure: INSERTION, INTRAOCULAR LENS PROSTHESIS;  Surgeon: Priscilla Hays MD;  Location: [facility];  Service: Ophthalmology;  Laterality: Left;    KNEE SURGERY      PHACOEMULSIFICATION OF CATARACT Right 7/16/2018    Procedure: PHACOEMULSIFICATION-ASPIRATION-CATARACT;  Surgeon: Priscilla Hays MD;  Location: [facility];  Service: Ophthalmology;  Laterality: Right;    PHACOEMULSIFICATION OF CATARACT Left 8/13/2018    Procedure: PHACOEMULSIFICATION, CATARACT;  Surgeon: Priscilla Hays MD;  Location: [facility];  Service: Ophthalmology;  Laterality: Left;    WISDOM TOOTH EXTRACTION       TTE 2021  · The left ventricle is normal in size with normal systolic function. The estimated ejection fraction is 60%.  · Normal right ventricular size with normal right ventricular systolic function.  · Indeterminate left ventricular diastolic function.  · Mild left atrial enlargement.  · The estimated PA systolic pressure is 25 mmHg.  · Normal central venous pressure (3 mmHg).          Pre-op Assessment    I have reviewed the Patient Summary Reports.     I have reviewed the Nursing Notes. I have reviewed the NPO  Status.   I have reviewed the Medications.     Review of Systems  Anesthesia Hx:  No problems with previous Anesthesia  History of prior surgery of interest to airway management or planning: Denies Family Hx of Anesthesia complications.   Denies Personal Hx of Anesthesia complications.   Social:  No Alcohol Use, Non-Smoker    Hematology/Oncology:  Hematology Normal   Oncology Normal   Oncology Comments: Intrahepatic cholangiocarcinoma     EENT/Dental:EENT/Dental Normal   Cardiovascular:   Exercise tolerance: good Hypertension, well controlled CAD      Pulmonary:   Sleep Apnea, CPAP    Renal/:  Renal/ Normal     Hepatic/GI:   Liver Disease, Liver cancer   Musculoskeletal:   Arthritis     Neurological:   Neuromuscular Disease, Seizures, well controlled    Endocrine:  Endocrine Normal    Dermatological:  Skin Normal    Psych:   Psychiatric History          Physical Exam  General: Well nourished, Cooperative, Alert and Oriented    Airway:  Mallampati: III   Mouth Opening: Normal  TM Distance: Normal  Tongue: Normal  Neck ROM: Normal ROM    Dental:  Intact    Chest/Lungs:  Clear to auscultation, Normal Respiratory Rate    Heart:  Rate: Normal  Rhythm: Regular Rhythm        Anesthesia Plan  Type of Anesthesia, risks & benefits discussed:    Anesthesia Type: Gen ETT  Intra-op Monitoring Plan: Standard ASA Monitors  Post Op Pain Control Plan: multimodal analgesia  Induction:  IV  Airway Plan: Direct  Informed Consent: Informed consent signed with the Patient and all parties understand the risks and agree with anesthesia plan.  All questions answered.   ASA Score: 3  Day of Surgery Review of History & Physical: H&P Update referred to the surgeon/provider.    Ready For Surgery From Anesthesia Perspective.     .

## 2022-04-19 NOTE — H&P
Radiology History & Physical      SUBJECTIVE:     Chief Complaint: cholangiocarcinoma    History of Present Illness:  Domonique Kellogg is a 71 y.o. male w PMH CAD, HTN, seizures in 2011 who presents for TACE.     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    Obesity     Seizures     2011    Sleep apnea     + CPAP    Sleep difficulties      Past Surgical History:   Procedure Laterality Date    ANTERIOR CERVICAL DISCECTOMY W/ FUSION N/A 7/6/2020    Procedure: DISCECTOMY, SPINE, CERVICAL, ANTERIOR APPROACH, WITH FUSION C3-4, C4-5;  Surgeon: Fabiola Vides MD;  [facility];  Service: Neurosurgery;  Laterality: N/A;  TORONTO III, ASA III, BLOOD TYPE & SCREEN, NEUROMONITORING EMG-MEP-SEP, SUPINE POSITION,BRACE [place],REGULAR BED, HEADREST [place], POSITION, MAP>85, RADIOLOGY C-ARM, SPECIAL EQUIPMENT [facility]    COLONOSCOPY N/A 10/1/2021    Procedure: COLONOSCOPY;  Surgeon: Nicolas Alvarado MD;  Location: [facility] (4TH FLR);  Service: Endoscopy;  Laterality: N/A;  EGD and colonoscopy for diarrhea and weight loss and Enlarged, heterogeneous appearance of the liver likely due to multiple underlying hepatic lesions largest measuring 4.8 cm.  Findings concerning for metastatic versus less likely primary hepatic neoplasm.  Recommend fur    ESOPHAGOGASTRODUODENOSCOPY N/A 10/1/2021    Procedure: EGD (ESOPHAGOGASTRODUODENOSCOPY);  Surgeon: Nicolas Alvarado MD;  Location: [facility] ENDO (4TH FLR);  Service: Endoscopy;  Laterality: N/A;  EGD and colonoscopy for diarrhea and weight loss and Enlarged, heterogeneous appearance of the liver likely due to multiple underlying hepatic lesions largest measuring 4.8 cm.  Findings concerning for metastatic versus less likely  primary hepatic neopl    ESOPHAGOGASTRODUODENOSCOPY N/A 4/5/2022    Procedure: EGD (ESOPHAGOGASTRODUODENOSCOPY);  Surgeon: Amado Kelsey MD;  Location: [facility] (4TH FLR);  Service: Endoscopy;  Laterality: N/A;  EGD in 8 weeks to check for esophagitis healing patient should be on pantoprazole 40 mg once daily  ok to hold eliquis x2 days per Dr. Young, see telephone encounter  fully vaccinated    EYE SURGERY      INSERTION OF VENOUS ACCESS PORT N/A 12/3/2021    Procedure: INSERTION, VENOUS ACCESS PORT;  Surgeon: Saurav Garcia MD;  Location: [facility] 2ND FLR;  Service: General;  Laterality: N/A;    INTRAOCULAR PROSTHESES INSERTION Right 7/16/2018    Procedure: INSERTION-INTRAOCULAR LENS (IOL);  Surgeon: Priscilla Hays MD;  Location: [facility];  Service: Ophthalmology;  Laterality: Right;    INTRAOCULAR PROSTHESES INSERTION Left 8/13/2018    Procedure: INSERTION, INTRAOCULAR LENS PROSTHESIS;  Surgeon: Priscilla Hays MD;  Location: [facility];  Service: Ophthalmology;  Laterality: Left;    KNEE SURGERY      PHACOEMULSIFICATION OF CATARACT Right 7/16/2018    Procedure: PHACOEMULSIFICATION-ASPIRATION-CATARACT;  Surgeon: Priscilla Hays MD;  Location: [facility];  Service: Ophthalmology;  Laterality: Right;    PHACOEMULSIFICATION OF CATARACT Left 8/13/2018    Procedure: PHACOEMULSIFICATION, CATARACT;  Surgeon: Priscilla Hays MD;  Location: [facility];  Service: Ophthalmology;  Laterality: Left;    WISDOM TOOTH EXTRACTION         Home Meds:   Prior to Admission medications    Medication Sig Start Date End Date Taking? Authorizing Provider   acetaminophen (TYLENOL) 650 MG TbSR Take by mouth daily as needed. 2/3/20  Yes Historical Provider   finasteride (PROSCAR) 5 mg tablet Take 1 tablet (5 mg total) by mouth once daily.  Patient taking differently: Take 5 mg by mouth every morning. 3/24/22 3/24/23 Yes Messi Stark MD   hydroCHLOROthiazide (HYDRODIURIL) 25 MG tablet TAKE 1 TABLET (25 MG TOTAL) BY MOUTH ONCE  DAILY.  Patient taking differently: Take 25 mg by mouth every morning. 12/10/21  Yes Nicolas Pacheco MD   magnesium oxide (MAG-OX) 400 mg (241.3 mg magnesium) tablet Take 400 mg by mouth every evening. 7/1/19  Yes Historical Provider   multivitamin with minerals tablet Take 1 tablet by mouth every morning.  7/25/18  Yes Historical Provider   potassium chloride SA (K-DUR,KLOR-CON) 20 MEQ tablet Take 2 tablets (40 mEq total) by mouth once daily.  Patient taking differently: Take 40 mEq by mouth every morning. 12/3/21  Yes Nicolas Pacheco MD   pravastatin (PRAVACHOL) 40 MG tablet Take 1 tablet (40 mg total) by mouth once daily.  Patient taking differently: Take 40 mg by mouth every evening. 1/18/22  Yes Nicolas Pacheco MD   RABEprazole (ACIPHEX) 20 mg tablet Take 1 tablet (20 mg total) by mouth once daily.  Patient taking differently: Take 20 mg by mouth every morning. 12/23/21 12/23/22 Yes Mejia Villafuerte MD   tamsulosin (FLOMAX) 0.4 mg Cap Take 1 capsule (0.4 mg total) by mouth once daily.  Patient taking differently: Take 0.4 mg by mouth every morning. 3/16/22  Yes Claudia Young MD   apixaban (ELIQUIS) 2.5 mg Tab Take 1 tablet (2.5 mg total) by mouth once daily. 4/5/22   Claudia Young MD   diltiaZEM 2% Lidocaine 5% Cream Apply pea size amount topically 3 (three) times daily. 12/28/21   Shilpa Ahuja NP   ondansetron (ZOFRAN) 4 MG tablet Take 1 tablet (4 mg total) by mouth every 6 (six) hours as needed for Nausea. 2/14/22   Jose Paige MD   oxyCODONE (ROXICODONE) 5 MG immediate release tablet Take 1 tablet (5 mg total) by mouth every 6 (six) hours as needed for Pain. 2/14/22   Jose Paige MD   prochlorperazine (COMPAZINE) 10 MG tablet Take 1 tablet (10 mg total) by mouth every 6 (six) hours as needed (nausea). 11/29/21 11/29/22  Claudia Young MD   promethazine (PHENERGAN) 12.5 MG Tab Take 1 tablet (12.5 mg total) by mouth every 6 (six) hours as needed (nausea). 11/29/21   Claudia Young MD      Anticoagulants/Antiplatelets: eliquis (held)    Allergies:   Review of patient's allergies indicates:   Allergen Reactions    Indomethacin      Headache dizziness    Adhesive Rash     Sedation History:  no adverse reactions    Review of Systems:   Hematological: no known coagulopathies  Respiratory: no shortness of breath  Cardiovascular: no chest pain  Gastrointestinal: no abdominal pain  Genito-Urinary: no dysuria  Musculoskeletal: negative  Neurological: no TIA or stroke symptoms         OBJECTIVE:     Vital Signs (Most Recent)  Temp: 98.4 °F (36.9 °C) (04/19/22 1239)  Pulse: (!) 53 (04/19/22 1239)  Resp: 20 (04/19/22 1239)  BP: 135/73 (04/19/22 1239)  SpO2: 100 % (04/19/22 1239)    Physical Exam:  ASA: per anes  Mallampati: per anes    General: no acute distress  Mental Status: alert and oriented to person, place and time  HEENT: normocephalic, atraumatic  Chest: unlabored breathing  Heart: regular heart rate  Abdomen: nondistended  Extremity: moves all extremities    Laboratory  Lab Results   Component Value Date    INR 1.1 04/19/2022       Lab Results   Component Value Date    WBC 2.96 (L) 04/19/2022    HGB 7.7 (L) 04/19/2022    HCT 25.0 (L) 04/19/2022     (H) 04/19/2022     04/19/2022      Lab Results   Component Value Date    GLU 76 04/19/2022     04/19/2022    K 3.4 (L) 04/19/2022     04/19/2022    CO2 29 04/19/2022    BUN 19 04/19/2022    CREATININE 0.8 04/19/2022    CALCIUM 10.1 04/19/2022    MG 1.7 04/12/2022    ALT 31 04/19/2022    AST 31 04/19/2022    ALBUMIN 3.2 (L) 04/19/2022    BILITOT 0.9 04/19/2022    BILIDIR 0.4 (H) 04/19/2022       ASSESSMENT/PLAN:     Sedation Plan: per anesthesia  Patient will undergo TACE for cholangiocarcinoma.    Noah Bess MD PGY2  Department of Radiology  [facility]

## 2022-04-19 NOTE — ANESTHESIA PROCEDURE NOTES
Intubation    Date/Time: 4/19/2022 3:15 PM  Performed by: Bridget Babin CRNA  Authorized by: Miguel Llamas MD     Intubation:     Induction:  Intravenous    Intubated:  Postinduction    Mask Ventilation:  Easy with oral airway    Attempts:  1    Attempted By:  CRNA    Method of Intubation:  Video laryngoscopy    Blade:  Bryant 3    Laryngeal View Grade: Grade I - full view of cords      Difficult Airway Encountered?: No      Complications:  None    Airway Device:  Oral endotracheal tube    Airway Device Size:  7.5    Style/Cuff Inflation:  Cuffed (inflated to minimal occlusive pressure)    Tube secured:  22    Secured at:  The lips    Placement Verified By:  Capnometry    Complicating Factors:  Anterior larynx    Findings Post-Intubation:  BS equal bilateral and atraumatic/condition of teeth unchanged

## 2022-04-19 NOTE — PLAN OF CARE
Patient arrived to room. PIV placed, labs sent. Admit assessment completed. Plan of care discussed with patient. Wife at bedside. Nurse call bell within reach. Will monitor

## 2022-04-19 NOTE — TRANSFER OF CARE
"Anesthesia Transfer of Care Note    Patient: Domonique Kellogg    Procedure(s) Performed: * No procedures listed *    Patient location: PACU    Anesthesia Type: general    Transport from OR: Transported from OR on 6-10 L/min O2 by face mask with adequate spontaneous ventilation    Post pain: adequate analgesia    Post assessment: tolerated procedure well and no apparent anesthetic complications    Post vital signs: stable    Level of consciousness: awake, alert and oriented    Nausea/Vomiting: no nausea/vomiting    Complications: none    Transfer of care protocol was followed      Last vitals:   Visit Vitals  /73 (BP Location: Left arm, Patient Position: Lying)   Pulse (!) 53   Temp 36.9 °C (98.4 °F) (Temporal)   Resp 20   Ht 5' 8" (1.727 m)   Wt 99.8 kg (220 lb)   SpO2 100%   BMI 33.45 kg/m²     "

## 2022-04-19 NOTE — ANESTHESIA POSTPROCEDURE EVALUATION
Anesthesia Post Evaluation    Patient: Domonique Kellogg    Procedure(s) Performed: * No procedures listed *    Final Anesthesia Type: general      Patient location during evaluation: PACU  Patient participation: Yes- Able to Participate  Level of consciousness: awake and alert  Post-procedure vital signs: reviewed and stable  Pain management: adequate  Airway patency: patent    PONV status at discharge: No PONV  Anesthetic complications: no      Cardiovascular status: blood pressure returned to baseline  Respiratory status: unassisted  Hydration status: euvolemic  Follow-up not needed.          Vitals Value Taken Time   /84 04/19/22 1832   Temp 36.8 °C (98.2 °F) 04/19/22 1721   Pulse 51 04/19/22 1833   Resp 27 04/19/22 1833   SpO2 94 % 04/19/22 1833   Vitals shown include unvalidated device data.      No case tracking events are documented in the log.      Pain/Lalo Score: Lalo Score: 10 (4/19/2022  5:54 PM)

## 2022-04-20 NOTE — PROGRESS NOTES
Discharge instructions given to patient and wife. Copies provided. Both verbalized understanding. RX for antibiotics and pain medication filled and brought to bedside. Patient and wife declined the zofran. Patient states they have plenty of zofran at home. VSS. No c/o pain or nausea. No s/s of distress noted. Surgical site C/D/I. Patient taken to the vehicle via wc.

## 2022-04-20 NOTE — DISCHARGE SUMMARY
Radiology Discharge Summary      Hospital Course: No complications    Admit Date: 4/19/2022  Discharge Date: 04/19/2022     Instructions Given to Patient: Yes  Diet: Resume prior diet  Activity: activity as tolerated and no driving for today    Description of Condition on Discharge: Stable  Vital Signs (Most Recent): Temp: 98.2 °F (36.8 °C) (04/19/22 1721)  Pulse: (!) 57 (04/19/22 1855)  Resp: 19 (04/19/22 1855)  BP: (!) 163/82 (04/19/22 1855)  SpO2: 96 % (04/19/22 1830)    Discharge Disposition: Home    Discharge Diagnosis: Cholangiocarcinoma s/p embolization    Follow up: As scheduled    Simon Fitzgerald M.D.  Interventional Radiology  Department of Radiology  Pager: [phone]

## 2022-04-20 NOTE — PROCEDURES
Radiology Post-Procedure Note    Pre Op Diagnosis: Cholangiocarcinoma    Post Op Diagnosis: Cholangiocarcinoma    Procedure: Chemoembolization    Procedure Performed by: Simon Fitzgerald MD    Written Informed Consent Obtained: Yes    Specimen Removed: None    Estimated Blood Loss: Minimal    Findings:     After placement of a right femoral artery sheath, a 5-German catheter was inserted and angiography of the celiac artery and superior mesenteric artery for anatomic evaluation and localization of hepatic tumor.  A microcatheter was introduced into feeding arteries of a right hepatic lobe tumor and LC beads coated with doxorubicin were injected until near stagnant flow was achieved.  Post procedural angiography revealed no complications.    Right femoral artery angiogram was performed and the sheath was removed.  Hemostasis was achieved using Exoseal technique.  There was no hematoma at the time of hemostasis.    The patient tolerated procedure well.  Please see Imaging report for further details.    Simon Fitzgerald M.D.  Interventional Radiology  Department of Radiology  Pager: [phone]

## 2022-04-21 NOTE — TELEPHONE ENCOUNTER
Returned patient's phone call. Patient asks about plan of care. Explained plan is to repeat treatment to remaining liver lesions. Explained treating entire liver exponentially increases risk of liver failure, therefore, liver is treated in sections to preserve function. Patient's wife asks how long has this been present since they have always maintained their health visit. Unfortunately it is difficult to know precisely when this developed. Patient tells me he was using one of the CPAP machines that were recalled due to cancer risk. I recommend discussing this with oncology as I am unfamiliary with this. Patient and wife verbalize understanding and agreement with plan of care. No further questions at this time.

## 2022-04-25 NOTE — PROGRESS NOTES
Progress Note  Palliative Care    The patient location is: [place]  The chief complaint leading to consultation is: symptom management    Visit type: audiovisual    Face to Face time with patient: 36 minutes    53 minutes of total time spent on the encounter, which includes face to face time and non-face to face time preparing to see the patient (eg, review of tests), Obtaining and/or reviewing separately obtained history, Documenting clinical information in the electronic or other health record, Independently interpreting results (not separately reported) and communicating results to the patient/family/caregiver, or Care coordination (not separately reported).     Each patient to whom he or she provides medical services by telemedicine is:  (1) informed of the relationship between the physician and patient and the respective role of any other health care provider with respect to management of the patient; and (2) notified that he or she may decline to receive medical services by telemedicine and may withdraw from such care at any time.    Reason for Consult: symptom management and ACP      ASSESSMENT/PLAN:     Plan/Recommendations:  Diagnoses and all orders for this visit:    Intrahepatic cholangiocarcinoma  - followed by Dr. Young  - currently on disease directed therapy  - completed TACE las week    Encounter for palliative care/Advanced care planning  - patient decisional  - patient accompanied by his wife on telemedicine today  - no ACP documents uploaded into EMR  - philosophy of Palliative Medicine reviewed with patient and family at first visit  - new patient folder given to and reviewed with patient and family at first visit  - goals: life prolonging  - he spoke about understanding that the treatment is to control the disease at this time  - ACP booklet given to and reviewed with patient and family today including HCPOA and living will  - code status not specifically discussed today  - will follow up at future  encounters for ACP booklet and code status    Anorexia/Nausea  - patient reports his appetite has improved over the last few days  - patient having significant nausea with emesis and anorexia for about two days post TACE procedure along with moderate to severe abdominal cramping  - previously he and his wife report using the recipes in the binder but he is not able to tolerate some of the protein options  - he has been drinking about one Boost a day; discussed increasing to two a day if tolerated  - previously reached out to nutrition  - no need for pharmacologic intervention at this time  - will continue to monitor    Neoplastic (malignant) related fatigue/Weakness/dyspnea  - patient reporting worsening fatigue and weakness  - he and his wife report good days and bad days at times  - patient finding that he has to rest more in between activities and not able to go as long before rest. He also notices some dyspnea with these activities  - he feels that he is able to recover quickly both his energy and breathing  - discussed possible etiologies of symptom cluster today  - will discuss with oncologist about PT for patient  - patient plays games or puzzles to keep mentally stimulated  - he also walks his dog in the morning though discussed it takes longer to get ready with more frequent breaks prior to start of the walk  - discussed setting goals throughout the day including physical and nutritional  - no need pharmacologic intervention at this time; may start medication in future  - tips for shortness of breath in new patient folder for patient to review  - will continue to monitor    Adjustment disorder with mixed anxiety and depressed mood  - patient reporting today that his mood is good  - patient's wife stating that he has some good days and bad days  - he will become more irritable at times  - patient and wife have spoken with oncology-psychology. They are working on some of the exercises she spoke about;  however, there are some limitations due to patient's increased fatigue/weakness  - emotional support provided today  - no pharmacologic intervention at this time, but low threshold to start at next visit  - followed by onc-psych  - will continue to monitor    Constipation  - patient had some constipation following TACE procedure last week  - he noted increased cramping at that time  - reports bowel movements improved yesterday  - continue adequate hydration for good bowel movements  - constipation tip sheet in new patient folder for patient to review  - will continue to monitor    Understanding of illness/Prognosis: patient and family have good understanding of disease; prognosis is guarded    Goals of care: life prolonging    Follow up: ~ 3-4 weeks    Patient's encounter and above plan of care discussed with patient's oncologist     SUBJECTIVE:     History of Present Illness:  Patient is a 71 y.o. year old male with arthritis, CAD, HTN, seizures in 2011, sleep apnea with CPAP, adjustment disorder, and intrahepatic cholangiocarcinoma presents to Palliative Medicine for symptom management and ACP. Please see oncology notes for full details of oncologic history and treatment course.    04/25/2022:  JAVON BROWER reviewed and summarized:  04/19/2022 Oxycodone 5 mg Disp: 20 for 4 days    Per chart review, patient tolerating treatment of cis/gem well. Today patient states he had TACE last week. He developed cramping, nausea and vomiting for about two days after treatment. He was not able to tolerate oral intake. Patient states that on day three the symptoms resolved. He has been having more fatigue recently as well as weakness. He has to take more breaks to recover. He also has noticed some dyspnea with these symptoms as well. Patient's wife noted some irritability and shortness every now and then. He was also constipated, though this has improved recently.     02/21/2022:  JAVON BROWER reviewed and summarized:  12/14/2021 Oxycodone  5 mg Disp: 90 for 22 days  12/03/2021 Percocet 5-325 mg disp: 6 for 2 days    Patient presents today with his wife. Overall he feels well. Patient is not taking any pain medication at this time. He does have some very intermittent, short lasting pain in his RUQ that occurs with certain actions (eg. Sneezing). Patient had noticed changes in his taste and overall appetite. He has noted improvement recently. He is also having some mild fatigue. Patient and family open to learning about ACP.     Past Medical History:   Diagnosis Date    Adjustment disorder     Anticoagulant long-term use     Anxiety     Arthritis     CAD (coronary artery disease) 3/2/2015    non-obstructive per [facility]     Cataract     Dry eye syndrome     Hypertension     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/2021    Obesity     Seizures     2011    Sleep apnea     + CPAP    Sleep difficulties      Past Surgical History:   Procedure Laterality Date    ANTERIOR CERVICAL DISCECTOMY W/ FUSION N/A 7/6/2020    Procedure: DISCECTOMY, SPINE, CERVICAL, ANTERIOR APPROACH, WITH FUSION C3-4, C4-5;  Surgeon: Fabiola Vides MD;  Location: [facility] OR 2ND FLR;  Service: Neurosurgery;  Laterality: N/A;  TORONTO III, ASA III, BLOOD TYPE & SCREEN, NEUROMONITORING EMG-MEP-SEP, SUPINE POSITION,BRACE [place],REGULAR BED, HEADREST CASPAR, POSITION, MAP>85, RADIOLOGY C-ARM, SPECIAL EQUIPMENT [facility]    COLONOSCOPY N/A 10/1/2021    Procedure: COLONOSCOPY;  Surgeon: Nicolas Alvarado MD;  Location: [facility] (4TH FLR);  Service: Endoscopy;  Laterality: N/A;  EGD and colonoscopy for diarrhea and weight loss and Enlarged, heterogeneous appearance of the liver likely due to multiple underlying hepatic lesions largest measuring 4.8 cm.  Findings concerning for metastatic versus less likely primary hepatic neoplasm.  Recommend fur    ESOPHAGOGASTRODUODENOSCOPY N/A 10/1/2021    Procedure: EGD (ESOPHAGOGASTRODUODENOSCOPY);  Surgeon: Nicolas Alvarado MD;  Location: [facility]  ENDO (4TH FLR);  Service: Endoscopy;  Laterality: N/A;  EGD and colonoscopy for diarrhea and weight loss and Enlarged, heterogeneous appearance of the liver likely due to multiple underlying hepatic lesions largest measuring 4.8 cm.  Findings concerning for metastatic versus less likely primary hepatic neopl    ESOPHAGOGASTRODUODENOSCOPY N/A 4/5/2022    Procedure: EGD (ESOPHAGOGASTRODUODENOSCOPY);  Surgeon: Amado Kelsey MD;  Location: [facility] (4TH FLR);  Service: Endoscopy;  Laterality: N/A;  EGD in 8 weeks to check for esophagitis healing patient should be on pantoprazole 40 mg once daily  ok to hold eliquis x2 days per Dr. Young, see telephone encounter  fully vaccinated    EYE SURGERY      INSERTION OF VENOUS ACCESS PORT N/A 12/3/2021    Procedure: INSERTION, VENOUS ACCESS PORT;  Surgeon: Saurav Garcia MD;  Location: [facility] 2ND FLR;  Service: General;  Laterality: N/A;    INTRAOCULAR PROSTHESES INSERTION Right 7/16/2018    Procedure: INSERTION-INTRAOCULAR LENS (IOL);  Surgeon: Priscilla Hays MD;  Location: [facility];  Service: Ophthalmology;  Laterality: Right;    INTRAOCULAR PROSTHESES INSERTION Left 8/13/2018    Procedure: INSERTION, INTRAOCULAR LENS PROSTHESIS;  Surgeon: Priscilla Hays MD;  Location: [facility];  Service: Ophthalmology;  Laterality: Left;    KNEE SURGERY      PHACOEMULSIFICATION OF CATARACT Right 7/16/2018    Procedure: PHACOEMULSIFICATION-ASPIRATION-CATARACT;  Surgeon: Priscilla Hays MD;  Location: [facility] OR;  Service: Ophthalmology;  Laterality: Right;    PHACOEMULSIFICATION OF CATARACT Left 8/13/2018    Procedure: PHACOEMULSIFICATION, CATARACT;  Surgeon: Priscilla Hays MD;  Location: [facility];  Service: Ophthalmology;  Laterality: Left;    WISDOM TOOTH EXTRACTION       Family History   Problem Relation Age of Onset    Diabetes Mother     Hypertension Mother     Kidney cancer Mother 61    Cancer Mother         renal & pancreatic    Heart disease Father     No Known Problems  Sister     Cancer Maternal Grandmother     Liver disease Maternal Grandmother     Heart disease Paternal Grandfather     Heart disease Brother     No Known Problems Daughter     No Known Problems Son     No Known Problems Sister     No Known Problems Sister     No Known Problems Sister     No Known Problems Daughter     Blindness Neg Hx     Macular degeneration Neg Hx     Retinal detachment Neg Hx     Glaucoma Neg Hx     Melanoma Neg Hx     Pancreatic cancer Neg Hx     Bladder Cancer Neg Hx     Uterine cancer Neg Hx     Ovarian cancer Neg Hx     Celiac disease Neg Hx     Cirrhosis Neg Hx     Colon cancer Neg Hx     Colon polyps Neg Hx     Crohn's disease Neg Hx     Esophageal cancer Neg Hx     Inflammatory bowel disease Neg Hx     Liver cancer Neg Hx     Rectal cancer Neg Hx     Stomach cancer Neg Hx     Ulcerative colitis Neg Hx      Review of patient's allergies indicates:   Allergen Reactions    Indomethacin      Headache dizziness    Adhesive Rash       Medications:    Current Outpatient Medications:     acetaminophen (TYLENOL) 650 MG TbSR, Take by mouth daily as needed., Disp: , Rfl:     amoxicillin-clavulanate 500-125mg (AUGMENTIN) 500-125 mg Tab, Take 1 tablet (500 mg total) by mouth 2 (two) times daily. for 7 days, Disp: 14 tablet, Rfl: 0    amoxicillin-clavulanate 500-125mg (AUGMENTIN) 500-125 mg Tab, Take 1 tablet (500 mg total) by mouth 2 (two) times daily. for 7 days, Disp: 14 tablet, Rfl: 0    apixaban (ELIQUIS) 2.5 mg Tab, Take 1 tablet (2.5 mg total) by mouth once daily., Disp: 90 tablet, Rfl: 3    diltiaZEM 2% Lidocaine 5% Cream, Apply pea size amount topically 3 (three) times daily., Disp: 30 g, Rfl: 2    finasteride (PROSCAR) 5 mg tablet, Take 1 tablet (5 mg total) by mouth once daily. (Patient taking differently: Take 5 mg by mouth every morning.), Disp: 90 tablet, Rfl: 3    hydroCHLOROthiazide (HYDRODIURIL) 25 MG tablet, TAKE 1 TABLET (25 MG TOTAL) BY MOUTH  ONCE DAILY. (Patient taking differently: Take 25 mg by mouth every morning.), Disp: 90 tablet, Rfl: 3    magnesium oxide (MAG-OX) 400 mg (241.3 mg magnesium) tablet, Take 400 mg by mouth every evening., Disp: , Rfl:     multivitamin with minerals tablet, Take 1 tablet by mouth every morning. , Disp: , Rfl:     ondansetron (ZOFRAN) 4 MG tablet, Take 1 tablet (4 mg total) by mouth every 6 (six) hours as needed for Nausea., Disp: 20 tablet, Rfl: 0    ondansetron (ZOFRAN-ODT) 4 MG TbDL, Take 1 tablet (4 mg total) by mouth every 6 (six) hours as needed (nausea vomiting)., Disp: 1 tablet, Rfl: 0    ondansetron (ZOFRAN-ODT) 4 MG TbDL, Take 1 tablet (4 mg total) by mouth every 6 (six) hours as needed (nausea vomiting)., Disp: 28 tablet, Rfl: 0    oxyCODONE (OXY-IR) 5 mg Cap, Take 1 capsule (5 mg total) by mouth every 4 (four) hours as needed for Pain (pain)., Disp: 20 capsule, Rfl: 0    oxyCODONE (ROXICODONE) 5 MG immediate release tablet, Take 1 tablet (5 mg total) by mouth every 6 (six) hours as needed for Pain., Disp: 20 tablet, Rfl: 0    oxyCODONE (ROXICODONE) 5 MG immediate release tablet, Take 1 tablet (5 mg total) by mouth every 4 (four) hours as needed for Pain (pain)., Disp: 20 tablet, Rfl: 0    potassium chloride SA (K-DUR,KLOR-CON) 20 MEQ tablet, Take 2 tablets (40 mEq total) by mouth once daily. (Patient taking differently: Take 40 mEq by mouth every morning.), Disp: 180 tablet, Rfl: 3    pravastatin (PRAVACHOL) 40 MG tablet, Take 1 tablet (40 mg total) by mouth once daily. (Patient taking differently: Take 40 mg by mouth every evening.), Disp: 90 tablet, Rfl: 3    prochlorperazine (COMPAZINE) 10 MG tablet, Take 1 tablet (10 mg total) by mouth every 6 (six) hours as needed (nausea)., Disp: 60 tablet, Rfl: 1    promethazine (PHENERGAN) 12.5 MG Tab, Take 1 tablet (12.5 mg total) by mouth every 6 (six) hours as needed (nausea)., Disp: 60 tablet, Rfl: 2    RABEprazole (ACIPHEX) 20 mg tablet, Take 1  tablet (20 mg total) by mouth once daily. (Patient taking differently: Take 20 mg by mouth every morning.), Disp: 90 tablet, Rfl: 3    tamsulosin (FLOMAX) 0.4 mg Cap, Take 1 capsule (0.4 mg total) by mouth once daily. (Patient taking differently: Take 0.4 mg by mouth every morning.), Disp: 90 capsule, Rfl: 0    OBJECTIVE:       ROS:  Review of Systems   Constitutional: Positive for activity change, appetite change and fatigue.   HENT: Negative.    Eyes: Negative.    Respiratory: Positive for shortness of breath.    Cardiovascular: Negative.    Gastrointestinal: Positive for abdominal pain (intermittent, none present at this time), constipation, nausea and vomiting.   Genitourinary: Negative.    Musculoskeletal: Negative.    Skin: Negative.    Neurological: Positive for weakness.   Psychiatric/Behavioral: Positive for dysphoric mood.   All other systems reviewed and are negative.      Review of Symptoms    Symptom Assessment (ESAS 0-10 Scale)  Pain:  0  Dyspnea:  4  Anxiety:  1  Nausea:  0  Depression:  1  Anorexia:  3  Fatigue:  7  Insomnia:  0  Restlessness:  0  Agitation:  0     CAM / Delirium:  Negative  Constipation:  Positive  Diarrhea:  Negative    Bowel Management Plan (BMP):  No      Pain Assessment:  OME in 24 hours:  0  Location(s): none      Modified Isidoro Scale:  1    ECOG Performance Status ndGndrndanddndend:nd nd2nd Living Arrangements:  Lives with spouse    Psychosocial/Cultural: Patient lives with his wife and eldest daughter. He has a younger daughter that does not live with them anymore. Patient also has a dog named Chance    Spiritual:  F - Annemarie and Belief:  Restorationist  I - Importance:  High  C - Community:  Yes; good support   A - Address in Care:  Needs met at this time      Advance Care Planning   Advance Directives:   Living Will: No    LaPOST: No    Do Not Resuscitate Status: No    Medical Power of : No    Agent's Name:  Estrellita Kellogg   Agent's Contact Number:  [phone]    Decision Making:   Patient answered questions and Family answered questions          Physical Exam: Limited due to telemedicine visit   Vitals:    Physical Exam  Constitutional:       General: He is not in acute distress.     Appearance: He is not toxic-appearing.   HENT:      Head: Normocephalic and atraumatic.      Right Ear: External ear normal.      Left Ear: External ear normal.      Nose: Nose normal.      Mouth/Throat:      Mouth: Mucous membranes are moist.   Eyes:      General: No scleral icterus.        Right eye: No discharge.         Left eye: No discharge.   Neck:      Comments: Trachea midline  Pulmonary:      Effort: Pulmonary effort is normal. No respiratory distress.   Musculoskeletal:      Cervical back: Normal range of motion.      Comments: Sitting up without assistance; able to move upper extremities without limitation   Skin:     General: Skin is dry.      Findings: No erythema or rash.   Neurological:      General: No focal deficit present.      Mental Status: He is alert and oriented to person, place, and time.      Cranial Nerves: No cranial nerve deficit.   Psychiatric:         Behavior: Behavior normal.         Thought Content: Thought content normal.         Judgment: Judgment normal.         Labs:  CBC:   WBC   Date Value Ref Range Status   04/19/2022 2.96 (L) 3.90 - 12.70 K/uL Final     Hemoglobin   Date Value Ref Range Status   04/19/2022 7.7 (L) 14.0 - 18.0 g/dL Final     Hematocrit   Date Value Ref Range Status   04/19/2022 25.0 (L) 40.0 - 54.0 % Final     MCV   Date Value Ref Range Status   04/19/2022 101 (H) 82 - 98 fL Final     Platelets   Date Value Ref Range Status   04/19/2022 233 150 - 450 K/uL Final       LFT:   Lab Results   Component Value Date    AST 31 04/19/2022    GGT 1,292 (H) 09/20/2021    ALKPHOS 208 (H) 04/19/2022    BILITOT 0.9 04/19/2022       Albumin:   Albumin   Date Value Ref Range Status   04/19/2022 3.2 (L) 3.5 - 5.2 g/dL Final     Protein:   Total Protein   Date Value Ref  "Range Status   04/19/2022 6.5 6.0 - 8.4 g/dL Final       Radiology:I have reviewed all pertinent imaging results/findings within the past 24 hours.    03/14/2022 MRI abdomen: "Interval postprocedural changes status post TACE with slight improvement of tumor burden at treatment sites.  Extensive residual disease throughout both hepatic lobes.  Index lesions as above. 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  Stable appearance of central hepatic veins, underlying thrombosis not excluded.  Consider further evaluation with Doppler ultrasound if clinically warranted. Stable prominent periportal lymph node. Right adrenal adenoma.  Additional findings as above."    03/14/2022 CT chest: "Right pulmonary nodules, unchanged.  Recommend continued follow-up as per clinical protocol."    Encounter occurred during period of COVID-19 emergency. Encounter performed under the concurrent guidelines, limitations and protocols.    53 minutes of total time spent on the encounter, which includes face to face time and non-face to face time preparing to see the patient (eg, review of tests), Obtaining and/or reviewing separately obtained history, Documenting clinical information in the electronic or other health record, Independently interpreting results (not separately reported) and communicating results to the patient/family/caregiver, or Care coordination (not separately reported).      Signature: Lidia Gordon MD        "

## 2022-04-25 NOTE — Clinical Note
Good morning,   I saw Mr. Kellogg today. Overall improved from last week. He had cramps, nausea, vomiting, and abdominal pain for 2 days post TACE. His biggest concern is weakness and fatigue. He would like to speak with you more tomorrow about possibility of PT referral. I do think he would benefit from it. Otherwise no changes today.  Thanks, Lidia

## 2022-04-26 NOTE — PLAN OF CARE
Cisplatin gemzar IVF infusion complete and tolerated well. PAC flushed with NS positive blood rtn hep locked and de accessed post infusion. Avs declined scheduled to rtn for labs and f/u 5/3/22. D/c home ambulated away from clinic independently, NAD.

## 2022-04-26 NOTE — PROGRESS NOTES
"  Social Work Consult    Name:Domonique Kellogg  : 1950  MRN: [id number]    Referral:Physical Therapy     SW received consult from Dr Young. Patient requesting outpatient PT at [facility]. SW called location, they saw the order and will create an "appointment [facility]    SW notified patient that [facility] was scheduled and that he would receive a call from central scheduling. Patient appreciative.     SW also confirmed with central scheduling that they had received the referral.  "

## 2022-04-26 NOTE — PROGRESS NOTES
"                                                         PROGRESS NOTE    Subjective:       Patient ID: Domonique Kellogg is a 71 y.o. male.    Chief Complaint: follow up for cholangiocarcinoma    Diagnosis:  Advanced intrahepatic cholangiocarcinoma, MSI-low, negative for FGFR2 fusion or IDH1/IDH2 mutations, diagnosed on 11/22/2021.     Molecular Profile:  Guardant 360 11/29/21: +CCND1 amplification. VUS: CATRACHITO [id number]. MSI-high not detected.     Oncologic History:  1. Mr Kellogg is a 70 yo man with CAD, HTN, seizures in 2011 who initially saw me on 11/29/21 for further management of cholangiocarcinoma. He presented with weight loss and diarrhea since July. US 9/22/21 showed "Enlarged, heterogeneous appearance of the liver likely due to multiple underlying hepatic lesions largest measuring 4.8 cm."  MRI abdomen w/wo contrast 10/4/21: "Multiple liver lesions measuring up to 13.3 cm in the right hepatic lobe.  Differential diagnosis includes metastases, fibrolamellar hepatocellular carcinoma, or atypical focal nodular hyperplasia.  Consider biopsy for further evaluation. Thrombosis of the anterior branch of the right portal vein and left hepatic vein.  Nonvisualization of the middle and right hepatic veins, which may be thrombosed as well. Prominent periportal lymph node, which is nonspecific."  EGD and colonoscopy on 10/11/21 were negative for primary malignancies.   He underwent liver lesion biopsy and Y90 mapping on 11/22/21. Pathology showed adenocarcinoma: "Biopsy of the liver mass shows a malignant neoplasm in a fibrotic stroma. Glandular architecture is readily identified with several foci of intraluminal necrosis. Scattered mitotic figures are seen. Neoplastic cells show the following immunophenotype:   Positive: AE1/AE3, CK7, CDX2   Negative: Chromogranin, Synaptophysin, TTF, CK5/6, CK20, HepPar1   The morphology and immunophenotype are compatible with adenocarcinoma. Considerations for a primary site include " "pancreaticobiliary (including intrahepatic cholangiocarcinoma) as well as upper gastrointestinal. Clinical and radiologic correlation is suggested."  Patient presents today with wife for further management. No pain. Initial weight loss has somewhat stabilized. Still has diarrhea. Has not tried imodium yet. +nervous  Mother had kidney cancer at age 64. Maternal grandmother had liver cancer in her 60s. Patient has three children, two daughters and one son.   Discussed palliative chemo with cis/gem  2. PET scan 21 did not show extrahepatic metastases.   3. Cis/gem started on 21, s/p cycle 6 day 1  4. S/p TACE on 2021, 22, 22.    Interval History:   Mr Kellogg returns for cycle 6 day 8 cis/gem. Tolerating chemo well. S/p TACE on 22. Had more fatigue and nausea this time. Feeling better now. +SOB with exertion. Wants to get some physical therapy as he feels he is getting deconditioned.     ECO    ROS:   A ten-point system review is obtained and negative except for what was stated in the Interval History.     Physical Examination:   Vital signs reviewed.   General: well hydrated, well developed, in no acute distress  HEENT: normocephalic, PERRLA, EOMI, anicteric sclerae, pale conjunctivae  Neck: supple, no JVD, thyromegaly, cervical or supraclavicular lymphadenopathy  Lungs: clear breath sounds bilaterally, no wheezing, rales, or rhonchi  Chest: port site clean  Heart: RRR, no M/R/G  Abdomen: soft, no tenderness, non-distended, no hepatosplenomegaly, mass, or hernia. BS present  Extremities: no clubbing, cyanosis, or edema  Skin: no rash, ulcer, or open wounds  Neuro: alert and oriented x 4, no focal neuro deficit  Psych: pleasant and appropriate mood and affect    Objective:     Laboratory Data:  Labs reviewed. Adequate for chemo    Imaging Data:  PET 21:  In this patient with known intrahepatic cholangiocarcinoma, there are multifocal hypermetabolic hepatic lesions in both hepatic " lobes.  No evidence of distant metastasis.     Lipomatosis of the ileocecal valve and proximal cecum.     Fat containing umbilical hernia.    MRI Abdomen 1/18/22:  1. Patient with reported cholangiocarcinoma.  Interval progression of hepatic tumor burden with multiple enlarging and new hepatic lesions.  2. Progression of portal venous thrombosis involving the left, right, and main portal veins.  Persistent filling defect within the central hepatic veins concerning for thrombosis.  3. Stable prominent periportal lymph node.    CT chest 3/4/22:  Right pulmonary nodules, unchanged.  Recommend continued follow-up as per clinical protocol.    MRI abdomen 3/14/22:     Interval postprocedural changes status post TACE with slight improvement of tumor burden at treatment sites.  Extensive residual disease throughout both hepatic lobes.  Index lesions as above.     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     Stable appearance of central hepatic veins, underlying thrombosis not excluded.  Consider further evaluation with Doppler ultrasound if clinically warranted.     Stable prominent periportal lymph node.     Right adrenal adenoma.    CT urogram 3/14/22:  Prostatomegaly which demonstrates mass effect on the posterior bladder.  There is mild diffuse wall thickening of the bladder which may relate to outlet obstruction.     Postprocedural changes of the liver in keeping with recent chemoembolization.  Protocol is not optimized for evaluation of tumor response.  Dedicated multiphase liver CT would be necessary to evaluate liver directed therapy response.    Assessment and Plan:     1. Intrahepatic cholangiocarcinoma    2. Secondary malignant neoplasm of liver    3. 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 Debility    6. Antineoplastic chemotherapy induced anemia    7. CAD in native artery    8. Shortness of breath on exertion     9. Portal vein thrombosis    10. Essential hypertension      1-4  - Mr Kellogg is a 70 yo man with advanced intrahepatic cholangiocarcinoma with multiple liver lesions. MSI-low, FGFR2 fusion and IDH1/2 mutation negative. Started on cis/gem on 12/7/21. S/p TACE on 12/16/2021, 2/14/22, 4/19/22  - added durvalumab after TOPAZ trial presented [facility] Jan 2022. Had [id number] with insurance company. Insurance company does not cover given lack of peer-reviewed article. Previously discussed applying for patient assistance. pateint wants to think about it.   - doing well. Continue with treatment. Cycle 6 day 8 cis/gem today  - patient prefers virtual visit if he cannot see provider the same day of chemo  - labs at [facility]  - return in 2 weeks for cycle 7 day 1  - restaging CT chest, MRI abdomen/pelvis in mid June 5.  - refer to physical therapy. Messaged     6.  - Hb 8  - discussed with patient that if Hb <8 will give transfusion given his dyspnea with exertion    7.  - on pravachol. Continue    8.  - see #5 and #6    9.  - could not tolerate eliquis 2.5 mg bid due to hematuria.   - eliquis 2.5 mg daily    10.  - BP controlled  - c/w current medication    Follow-up:     RTC in 2 weeks for cycle 7 day 1  Knows to call in the interval if any problems arise.    Electronically signed by Claudia Young      Route Chart for Scheduling    Med Onc Chart Routing      Follow up with physician 3 weeks. Labs (CBC, CMP, Mg, CA 19-9) at [facility]. if cannot see provider the same day of chemo, patient prefers virtual visit. labs on 5/9, see me on 5/10 then get cis/gem. labs on 5/16, see BRADLEY on 5/17 then get cis/gem   Follow up with BRADLEY 2 weeks.   Labs CBC, CA 19-9, CMP and magnesium   Lab interval: once a week     Imaging    Pharmacy appointment    Other referrals          Treatment Plan Information   OP GEMCITABINE CISPLATIN Q3W + DURVALUMAB D8 C4+   Claudia Young MD   Upcoming Treatment Dates - OP  GEMCITABINE CISPLATIN Q3W + DURVALUMAB D8 C4+    4/20/2022       Chemotherapy       gemcitabine (GEMZAR) 2,260 mg in sodium chloride 0.9% 250 mL chemo infusion       CISplatin (PLATINOL) 25 mg/m2 = 57 mg in sodium chloride 0.9% 500 mL chemo infusion       Pre-Hydration       sodium chloride 0.9% 500 mL with magnesium sulfate 2 g, potassium chloride 20 mEq infusion       Post-Hydration       sodium chloride 0.9% bolus 500 mL  5/4/2022       Chemotherapy       gemcitabine (GEMZAR) 2,260 mg in sodium chloride 0.9% 250 mL chemo infusion       CISplatin (PLATINOL) 25 mg/m2 = 57 mg in sodium chloride 0.9% 500 mL chemo infusion       Pre-Hydration       sodium chloride 0.9% 500 mL with magnesium sulfate 2 g, potassium chloride 20 mEq infusion       Post-Hydration       sodium chloride 0.9% bolus 500 mL  5/11/2022       Chemotherapy       gemcitabine (GEMZAR) 2,260 mg in sodium chloride 0.9% 250 mL chemo infusion       CISplatin (PLATINOL) 25 mg/m2 = 57 mg in sodium chloride 0.9% 500 mL chemo infusion       Pre-Hydration       sodium chloride 0.9% 500 mL with magnesium sulfate 2 g, potassium chloride 20 mEq infusion       Post-Hydration       sodium chloride 0.9% bolus 500 mL  5/25/2022       Chemotherapy       gemcitabine (GEMZAR) 2,260 mg in sodium chloride 0.9% 250 mL chemo infusion       CISplatin (PLATINOL) 25 mg/m2 = 57 mg in sodium chloride 0.9% 500 mL chemo infusion       Pre-Hydration       sodium chloride 0.9% 500 mL with magnesium sulfate 2 g, potassium chloride 20 mEq infusion       Post-Hydration       sodium chloride 0.9% bolus 500 mL

## 2022-05-09 NOTE — PROGRESS NOTES
"                                                         PROGRESS NOTE    Subjective:       Patient ID: Domonique Kellogg is a 72 y.o. male.    Chief Complaint: follow up for cholangiocarcinoma    Diagnosis:  Advanced intrahepatic cholangiocarcinoma, MSI-low, negative for FGFR2 fusion or IDH1/IDH2 mutations, diagnosed on 11/22/2021.     Molecular Profile:  Guardant 360 11/29/21: +CCND1 amplification. VUS: CATRACHITO [id number]. MSI-high not detected.     Oncologic History copied from medical chart:  1. Mr Kellogg is a 70 yo man with CAD, HTN, seizures in 2011 who initially saw me on 11/29/21 for further management of cholangiocarcinoma. He presented with weight loss and diarrhea since July. US 9/22/21 showed "Enlarged, heterogeneous appearance of the liver likely due to multiple underlying hepatic lesions largest measuring 4.8 cm."  MRI abdomen w/wo contrast 10/4/21: "Multiple liver lesions measuring up to 13.3 cm in the right hepatic lobe.  Differential diagnosis includes metastases, fibrolamellar hepatocellular carcinoma, or atypical focal nodular hyperplasia.  Consider biopsy for further evaluation. Thrombosis of the anterior branch of the right portal vein and left hepatic vein.  Nonvisualization of the middle and right hepatic veins, which may be thrombosed as well. Prominent periportal lymph node, which is nonspecific."  EGD and colonoscopy on 10/11/21 were negative for primary malignancies.   He underwent liver lesion biopsy and Y90 mapping on 11/22/21. Pathology showed adenocarcinoma: "Biopsy of the liver mass shows a malignant neoplasm in a fibrotic stroma. Glandular architecture is readily identified with several foci of intraluminal necrosis. Scattered mitotic figures are seen. Neoplastic cells show the following immunophenotype:   Positive: AE1/AE3, CK7, CDX2   Negative: Chromogranin, Synaptophysin, TTF, CK5/6, CK20, HepPar1   The morphology and immunophenotype are compatible with adenocarcinoma. Considerations for " "a primary site include pancreaticobiliary (including intrahepatic cholangiocarcinoma) as well as upper gastrointestinal. Clinical and radiologic correlation is suggested."  Patient presents today with wife for further management. No pain. Initial weight loss has somewhat stabilized. Still has diarrhea. Has not tried imodium yet. +nervous  Mother had kidney cancer at age 64. Maternal grandmother had liver cancer in her 60s. Patient has three children, two daughters and one son.   Discussed palliative chemo with cis/gem  2. PET scan 21 did not show extrahepatic metastases.   3. Cis/gem started on 21, s/p cycle 6 day 1  4. S/p TACE on 2021, 22, 22.    Interval History:   Mr Kellogg returns for cycle 7 day 1 cis/gem. Tolerating chemo well. S/p TACE on 22. Had some mild nausea and diarrhea but has improved. He was also having tension headaches and a cough. COVID-19 testing is negative. Feeling better now. "Feeling great" +SOB with exertion this has not worsened. Wanted to get some physical therapy as he feels he is getting deconditioned. He is eating well and has a steady weight. Continued taste changes. No fever, chills, n/v, abdominal pain.     ECO. Presents with caregiver.     ROS:   A ten-point system review is obtained and negative except for what was stated in the Interval History.     Physical Examination:   Vital signs reviewed.   General: well hydrated, well developed, in no acute distress  HEENT: normocephalic, PERRLA, EOMI, anicteric sclerae, pale conjunctivae  Neck: supple, no JVD  Lungs: clear breath sounds bilaterally, no wheezing, rales, or rhonchi  Chest: port site clean  Heart: RRR, no M/R/G  Abdomen: soft, no tenderness, non-distended, no mass, or hernia. BS present  Extremities: no clubbing, cyanosis, or edema  Skin: no rash, ulcer, or open wounds  Neuro: alert and oriented x 4, no focal neuro deficit  Psych: pleasant and appropriate mood and affect    Objective: "     Laboratory Data:  Labs reviewed. Adequate for chemo  Hgb 7.1    Imaging Data:  PET 12/6/21:  In this patient with known intrahepatic cholangiocarcinoma, there are multifocal hypermetabolic hepatic lesions in both hepatic lobes.  No evidence of distant metastasis.     Lipomatosis of the ileocecal valve and proximal cecum.     Fat containing umbilical hernia.    MRI Abdomen 1/18/22:  1. Patient with reported cholangiocarcinoma.  Interval progression of hepatic tumor burden with multiple enlarging and new hepatic lesions.  2. Progression of portal venous thrombosis involving the left, right, and main portal veins.  Persistent filling defect within the central hepatic veins concerning for thrombosis.  3. Stable prominent periportal lymph node.    CT chest 3/4/22:  Right pulmonary nodules, unchanged.  Recommend continued follow-up as per clinical protocol.    MRI abdomen 3/14/22:     Interval postprocedural changes status post TACE with slight improvement of tumor burden at treatment sites.  Extensive residual disease throughout both hepatic lobes.  Index lesions as above.     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     Stable appearance of central hepatic veins, underlying thrombosis not excluded.  Consider further evaluation with Doppler ultrasound if clinically warranted.     Stable prominent periportal lymph node.     Right adrenal adenoma.    CT urogram 3/14/22:  Prostatomegaly which demonstrates mass effect on the posterior bladder.  There is mild diffuse wall thickening of the bladder which may relate to outlet obstruction.     Postprocedural changes of the liver in keeping with recent chemoembolization.  Protocol is not optimized for evaluation of tumor response.  Dedicated multiphase liver CT would be necessary to evaluate liver directed therapy response.    Assessment and Plan:     1. Intrahepatic cholangiocarcinoma    2. Antineoplastic chemotherapy induced  anemia    3. Secondary malignant neoplasm of liver    4. Immunodeficiency secondary to neoplasm    5. Immunodeficiency secondary to chemotherapy    6. Debility    7. CAD in native artery      1-5.  - Mr Kellogg is a 70 yo man with advanced intrahepatic cholangiocarcinoma with multiple liver lesions. MSI-low, FGFR2 fusion and IDH1/2 mutation negative. Started on cis/gem on 12/7/21. S/p TACE on 12/16/2021, 2/14/22, 4/19/22  - added durvalumab after TOPAZ trial presented at [facility] in Jan 2022. Had [id number] with insurance company. Insurance company does not cover given lack of peer-reviewed article. Previously discussed applying for patient assistance. pateint wants to think about it.   - doing very well. Tolerating treatment very well. Continue with treatment. Cycle 7 day 1 cis/gem tomorrow with blood transfusion. Will have TACE procedure next week.   - patient prefers virtual visit if he cannot see provider the same day of chemo  - labs at [facility]  - return in 2 weeks for cycle 8 day 1, due to TACE procedure scheduled for 5/18/2022 with Dr. Fitzgerald  - restaging CT chest, MRI abdomen/pelvis in mid June 5.  - refer to physical therapy. Messaged     6.  - Hb 7.1. will transfuse 1 unit of packed RBCs at earliest availability. Will also perform guaiac stool testing.   - discussed with patient that if Hb <8 will give transfusion given his dyspnea with exertion    7.  - on pravachol. Continue    8.  - see #5 and #6    9.  - could not tolerate eliquis 2.5 mg bid due to hematuria.   - continue with eliquis 2.5 mg daily. Doing well without overt evidence of bleeding.     10.  - BP well controlled  - c/w current medication    Follow-up:     RTC in 2 weeks for cycle 7 day 8 due to repeat TACE procedure on 5/18/2022    Pte and family members displayed understanding of the above encounter and treatment plan. All thoughtful questions were answered to their satisfaction. Pte was advised to notify the care  team or proceed to the ER if signs and symptoms worsen. Discussed with Dr. Reyes, NEO, PA-C  Physician Assistant Certified  Dept of Hematology/Oncology  PA-C to Dr. Young, Dr. Lane and Dr. Bourgeois    Electronically signed by Gertrude Ni      Route Chart for Scheduling    Med Onc Chart Routing      Follow up with physician 2 weeks. Labs (CBC, CMP, Mg, CA 19-9) at [facility]. if cannot see provider the same day of chemo, patient prefers virtual visit. labs on 2 weeks see Dr. Young then get cycle 8 day 1 of cis/gem.    Follow up with BRADLEY 3 weeks. RTC in 3 weeks with lab work, clinic visit with Demi and Jody and cycle 8 day 8 of [place]/Cis   Labs CBC, CA 19-9, CMP and magnesium   Lab interval: once a week     Imaging    Pharmacy appointment    Other referrals          Treatment Plan Information   OP GEMCITABINE CISPLATIN Q3W + DURVALUMAB D8 C4+   Claudia Young MD   Upcoming Treatment Dates - OP GEMCITABINE CISPLATIN Q3W + DURVALUMAB D8 C4+    5/4/2022       Chemotherapy       gemcitabine (GEMZAR) 2,260 mg in sodium chloride 0.9% 250 mL chemo infusion       CISplatin (PLATINOL) 25 mg/m2 = 57 mg in sodium chloride 0.9% 500 mL chemo infusion       Pre-Hydration       sodium chloride 0.9% 500 mL with magnesium sulfate 2 g, potassium chloride 20 mEq infusion       Post-Hydration       sodium chloride 0.9% bolus 500 mL  5/11/2022       Chemotherapy       gemcitabine (GEMZAR) 2,260 mg in sodium chloride 0.9% 250 mL chemo infusion       CISplatin (PLATINOL) 25 mg/m2 = 57 mg in sodium chloride 0.9% 500 mL chemo infusion       Pre-Hydration       sodium chloride 0.9% 500 mL with magnesium sulfate 2 g, potassium chloride 20 mEq infusion       Post-Hydration       sodium chloride 0.9% bolus 500 mL  5/25/2022       Chemotherapy       gemcitabine (GEMZAR) 2,260 mg in sodium chloride 0.9% 250 mL chemo infusion       CISplatin (PLATINOL) 25 mg/m2 = 57 mg in sodium chloride 0.9% 500 mL chemo infusion       Pre-Hydration        sodium chloride 0.9% 500 mL with magnesium sulfate 2 g, potassium chloride 20 mEq infusion       Post-Hydration       sodium chloride 0.9% bolus 500 mL  6/1/2022       Chemotherapy       gemcitabine (GEMZAR) 2,260 mg in sodium chloride 0.9% 250 mL chemo infusion       CISplatin (PLATINOL) 25 mg/m2 = 57 mg in sodium chloride 0.9% 500 mL chemo infusion       Pre-Hydration       sodium chloride 0.9% 500 mL with magnesium sulfate 2 g, potassium chloride 20 mEq infusion       Post-Hydration       sodium chloride 0.9% bolus 500 mL

## 2022-05-09 NOTE — PROGRESS NOTES
INFORMED CONSENT: Domonique Kellogg   is known to this provider and identity was confirmed via NAME and .  The patient has been informed of the risks and benefits associated with engaging in psychotherapy, the handling of protected health information, the rights of privacy and the limits of confidentiality. The patient has also been informed of the importance of reporting any suicidal or homicidal ideation to this or any provider to ensure safety of all parties, and the Domonique Kellogg expressed understanding. The patient was agreeable to these terms and freely participates in individual psychotherapy.    PSYCHO-ONCOLOGY NOTE/ Individual Psychotherapy     Date: 2022   [facility]        Therapeutic Intervention: Met with patient and spouse.  Outpatient - Behavior modifying psychotherapy 30 min - CPT code 33317      Patient was last seen by me on 2022    Problem list  Patient Active Problem List   Diagnosis    CTS (carpal tunnel syndrome)    Class 2 severe obesity due to excess calories with serious comorbidity and body mass index (BMI) of 38.0 to 38.9 in adult    H/O syncope    RAHEEL (obstructive sleep apnea)    CAD in native artery    Essential hypertension    Hyperlipidemia    Chronic pulmonary heart disease    Bilateral carotid artery disease    Primary osteoarthritis of right knee    Lateral femoral cutaneous entrapment syndrome    Cervical spondylosis    Decreased ROM of intervertebral discs of cervical spine    Cervical disc disease with myelopathy    Cervical stenosis of spinal canal    Prediabetes    Infection due to Strongyloides    Intrahepatic cholangiocarcinoma    Secondary malignant neoplasm of liver    Post-procedural fever    Transaminitis    Elevated troponin    Immunodeficiency secondary to neoplasm    Hyperbilirubinemia    Portal vein thrombosis    Cancer related pain    Immunodeficiency secondary to chemotherapy    Benign prostatic hyperplasia without  lower urinary tract symptoms    Weight loss       Chief complaint/reason for encounter: interpersonal   Met with patient to evaluate psychosocial adaptation to diagnosis/treatment/survivorship of Cholangia    Current Medications  Current Outpatient Medications   Medication    acetaminophen (TYLENOL) 650 MG TbSR    apixaban (ELIQUIS) 2.5 mg Tab    diltiaZEM 2% Lidocaine 5% Cream    finasteride (PROSCAR) 5 mg tablet    hydroCHLOROthiazide (HYDRODIURIL) 25 MG tablet    magnesium oxide (MAG-OX) 400 mg (241.3 mg magnesium) tablet    multivitamin with minerals tablet    ondansetron (ZOFRAN) 4 MG tablet    ondansetron (ZOFRAN-ODT) 4 MG TbDL    ondansetron (ZOFRAN-ODT) 4 MG TbDL    oxyCODONE (OXY-IR) 5 mg Cap    oxyCODONE (ROXICODONE) 5 MG immediate release tablet    oxyCODONE (ROXICODONE) 5 MG immediate release tablet    potassium chloride SA (K-DUR,KLOR-CON) 20 MEQ tablet    pravastatin (PRAVACHOL) 40 MG tablet    prochlorperazine (COMPAZINE) 10 MG tablet    promethazine (PHENERGAN) 12.5 MG Tab    RABEprazole (ACIPHEX) 20 mg tablet    tamsulosin (FLOMAX) 0.4 mg Cap     No current facility-administered medications for this visit.       Objective:  Domonique Kellogg arrived promptly for the session. His wife was also present during the interview, with the consent of Domonique Kellogg.   Mr. Kellogg was independently ambulatory at the time of session. The patient was fully cooperative throughout the session.  Appearance: age appropriate, appropriately  dressed, adequately  groomed  Behavior/Cooperation: friendly and cooperative  Speech: normal in rate, volume, and tone and appropriate quality, quantity and organization of sentences  Mood: euthymic  Affect: mood congruent  Thought Process: goal-directed, logical  Thought Content: normal,  No delusions or paranoia; did not appear to be responding to internal stimuli during the session  Orientation: grossly intact  Memory: Grossly intact  Attention  Span/Concentration: Attends to session without distraction; reports no difficulty  Fund of Knowledge: average  Estimate of Intelligence: average from verbal skills and history  Cognition: grossly intact  Insight: patient has awareness of illness; good insight into own behavior and behavior of others  Judgment: the patient's behavior is adequate to circumstances    Interval history and content of current session: Patient and wife rpeorted after events fo last session they are more content with moving forward and enjoying life. Patient reported improvement in communication. Wife reported that she is workign towards being at peace with the past.  Discussed current adaptation to disease and treatment status and family's adaptation to disease and treatment status. Reports to be coping in an adequate manner. Evaluated cognitive response, paying particular attention to negative intrusive thoughts of a persistent and detrimental nature. Thoughts of this type are in evidence with mild distress. Provided cognitive behavioral therapy to address negative cognitions. Identified and evaluated psychosocial and environmental stressors secondary to diagnosis and treatment.  Examined proactive behaviors that may be implemented to minimize or ameliorate psychosocial stressors secondary to diagnosis and treatment.     Risk parameters:   Patient reports no suicidal ideation  Patient reports no homicidal ideation  Patient reports no self-injurious behavior  Patient reports no violent behavior   Safety needs:  None at this time      Verbal deficits: None     Patient's response to intervention:The patient's response to intervention is accepting.     Progress toward goals and other mental status changes:  The patient's progress toward goals is good.      Progress to date:Progress as Expected      Goals from last visit: Met     Patient reported outcomes:    Distress Thermometer:   Distress Score    Distress Score: 0        Practical Problems  Physical Problems   : No Appearance: No   Housing: No Bathing / Dressing: No   Insurance / Financial: No Breathing: No    Transportation: No  Changes in Urination: No    Work / School: No  Constipation: No   Treatment Decisions: No  Diarrhea: Yes     Eating: No    Family Problems Fatigue: Yes    Dealing with Children: No Feeling Swollen: No    Dealing with Partner: Yes Fevers: No    Ability to Have Children: No  Getting Around: No       Indigestion: No     Memory / Concentration: No   Emotional Problems Mouth Sores: No    Depression: No  Nausea: No    Fears: No  Nose Dry / Congested: No    Nervousness: No  Pain: No    Sadness: No Sexual: No    Worry: No Skin Dry / Itchy: No    Loss of Interest in Usual Activities: No Sleep: No     Substance Abuse: No    Spiritual/Religions Concerns Tingling in Hands / Feet: No   Spritual / Mormon Concerns: No         Other Problems              PHQ ANSWERS    Q1. Little interest or pleasure in doing things: (P) Not at all (05/06/22 0714)  Q2. Feeling down, depressed, or hopeless: (P) Not at all (05/06/22 0714)  Q3. Trouble falling or staying asleep, or sleeping too much: (P) Not at all (05/06/22 0714)  Q4. Feeling tired or having little energy: (P) Several days (05/06/22 0714)  Q5. Poor appetite or overeating: (P) Not at all (05/06/22 0714)  Q6. Feeling bad about yourself - or that you are a failure or have let yourself or your family down: (P) Not at all (05/06/22 0714)  Q7. Trouble concentrating on things, such as reading the newspaper or watching television: (P) Not at all (05/06/22 0714)  Q8. Moving or speaking so slowly that other people could have noticed. Or the opposite - being so fidgety or restless that you have been moving around a lot more than usual: (P) Not at all (05/06/22 0714)  Q9.  NO SI    PHQ8 Score : (P) 1 (05/06/22 0714)  PHQ-9 Total Score: (P) 1 (05/06/22 0714)     CAMILO-7 Answers    GAD7 5/6/2022   1. Feeling nervous, anxious, or on edge? 0   2.  Not being able to stop or control worrying? 0   3. Worrying too much about different things? 0   4. Trouble relaxing? 0   5. Being so restless that it is hard to sit still? 0   6. Becoming easily annoyed or irritable? 1   7. Feeling afraid as if something awful might happen? 0   CAMILO-7 Score 1     CAMILO-7 Score: (P) 1  Interpretation: (P) Normal     Client Strengths: verbal, intelligent, successful, good social support, good insight, commitment to wellness, strong ottoniel, strong cultural traditions     Diagnosis:     ICD-10-CM ICD-9-CM   1. Adjustment disorder with anxiety  F43.22 309.24   2. Intrahepatic cholangiocarcinoma  C22.1 155.1   3. Marital conflict  Z63.0 V61.10       Treatment Plan:individual psychotherapy and medication management by physician  · Target symptoms: adjustment  · Why chosen therapy is appropriate versus another modality: relevant to diagnosis, patient responds to this modality, evidence based practice  · Outcome monitoring methods: self-report, observation, feedback from family, checklist/rating scale  · Therapeutic intervention type: behavior modifying psychotherapy  · Prognosis: Good      Behavioral goals:    Exercise:   Stress management:   Social engagement:   Nutrition:   Smoking Cessation:   Therapy:  Increase daily self-care and attention to health management  Pleasant events scheduling and increased social interaction  Monitor stressors in writing and bring to next visit    Return to clinic: 6 weeks       Length of Service (minutes direct face-to-face contact): 30    Katie Ledesma, PhD  Clinical Psychologist  [place] License #[id number]  [place] License #[id number]

## 2022-05-10 NOTE — PLAN OF CARE
1520-Pt tolerated gemzar/Cisplatin infusions and 1 unit PRBC's well, no transfusion reaction noted, no complications or side effects, POC and meds discussed with pt, pt aware of upcoming appts, pt knows to call MD with any questions or concerns. Pt ambulated off unit, no distress noted.

## 2022-05-11 NOTE — TELEPHONE ENCOUNTER
Return call and spoke with patient. Patient confirmed that he was at the Er. MA explained to patient will inform Dr. Villafuerte.     Patient verbalized understanding.

## 2022-05-11 NOTE — DISCHARGE INSTRUCTIONS
Follow-up with gastroenterology for an outpatient colonoscopy. Follow-up with your oncology team for management of your anemia and for repeat blood work.     Return to the emergency room for new, worsening, or concerning symptoms.

## 2022-05-11 NOTE — ED PROVIDER NOTES
Encounter Date: 5/11/2022       History     Chief Complaint   Patient presents with    Rectal Bleeding     From GI     The history is provided by the patient and medical records. No  was used.      Domonique Kellogg is a 72 y.o. male with medical history of CAD, Intrahepatic Cholangiocarcinoma with liver lesions on chemotherapy, HTN, Seizures, Eliquis therapy presenting to the ED with the chief complaint of positive outpatient FOBT.     Patient advised to come to the ED after having a positive FOBT that was obtained after down-trending HBG levels despite receiving blood transfusions. Patient reports feeling at his baseline health otherwise. Denies fever, fatigue, lightheadedness, dizziness, SOB, abdominal pain, rectal pain. Reports having yellow colored stools. No melena. Reports reports noticing hematochezia at times that he contributes to hemorrhoids, but denies having any in the past week. He was instructed to hold his Eliquis today, but has been compliant with it otherwise. Denies history of GI bleed. EGD 4/5/22 with gastritis findings, no source of bleeding. Colonoscopy 10/1/21internal hemorrhoids, few polyps resected, no source of bleeding.     Review of patient's allergies indicates:   Allergen Reactions    Indomethacin      Headache dizziness    Adhesive Rash     Past Medical History:   Diagnosis Date    Adjustment disorder     Anticoagulant long-term use     Anxiety     Arthritis     CAD (coronary artery disease) 3/2/2015    non-obstructive per [facility]     Cataract     Dry eye syndrome     Hypertension     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/2021    Obesity     Seizures     2011    Sleep apnea     + CPAP    Sleep difficulties      Past Surgical History:   Procedure Laterality Date    ANTERIOR CERVICAL DISCECTOMY W/ FUSION N/A 7/6/2020    Procedure: DISCECTOMY, SPINE, CERVICAL, ANTERIOR APPROACH, WITH FUSION C3-4, C4-5;  Surgeon: Fabiola Vides MD;  Location: [facility] OR [address] [place] [address];   Service: Neurosurgery;  Laterality: N/A;  TORONTO III, ASA III, BLOOD TYPE & SCREEN, NEUROMONITORING EMG-MEP-SEP, SUPINE POSITION,BRACE [place],REGULAR BED, HEADREST CASPAR, POSITION, MAP>85, RADIOLOGY C-ARM, SPECIAL EQUIPMENT VIRGIL TYLER    COLONOSCOPY N/A 10/1/2021    Procedure: COLONOSCOPY;  Surgeon: Nicolas Alvarado MD;  Location: [facility] (4TH FLR);  Service: Endoscopy;  Laterality: N/A;  EGD and colonoscopy for diarrhea and weight loss and Enlarged, heterogeneous appearance of the liver likely due to multiple underlying hepatic lesions largest measuring 4.8 cm.  Findings concerning for metastatic versus less likely primary hepatic neoplasm.  Recommend [facility]    ESOPHAGOGASTRODUODENOSCOPY N/A 10/1/2021    Procedure: EGD (ESOPHAGOGASTRODUODENOSCOPY);  Surgeon: Nicolas Alvarado MD;  Location: [facility] (4TH FLR);  Service: Endoscopy;  Laterality: N/A;  EGD and colonoscopy for diarrhea and weight loss and Enlarged, heterogeneous appearance of the liver likely due to multiple underlying hepatic lesions largest measuring 4.8 cm.  Findings concerning for metastatic versus less likely primary hepatic neopl    ESOPHAGOGASTRODUODENOSCOPY N/A 4/5/2022    Procedure: EGD (ESOPHAGOGASTRODUODENOSCOPY);  Surgeon: Amado Kelsey MD;  Location: [facility] (4TH FLR);  Service: Endoscopy;  Laterality: N/A;  EGD in 8 weeks to check for esophagitis healing patient should be on pantoprazole 40 mg once daily  ok to hold eliquis x2 days per Dr. Young, see telephone encounter  fully vaccinated    EYE SURGERY      INSERTION OF VENOUS ACCESS PORT N/A 12/3/2021    Procedure: INSERTION, VENOUS ACCESS PORT;  Surgeon: Saurav Garcia MD;  Location: [facility] 2ND FLR;  Service: General;  Laterality: N/A;    INTRAOCULAR PROSTHESES INSERTION Right 7/16/2018    Procedure: INSERTION-INTRAOCULAR LENS (IOL);  Surgeon: Priscilla Hays MD;  Location: [facility];  Service: Ophthalmology;  Laterality: Right;    INTRAOCULAR PROSTHESES INSERTION Left  2018    Procedure: INSERTION, INTRAOCULAR LENS PROSTHESIS;  Surgeon: Priscilla Hays MD;  Location: [facility] OR;  Service: Ophthalmology;  Laterality: Left;    KNEE SURGERY      PHACOEMULSIFICATION OF CATARACT Right 2018    Procedure: PHACOEMULSIFICATION-ASPIRATION-CATARACT;  Surgeon: Priscilla Hays MD;  Location: [facility];  Service: Ophthalmology;  Laterality: Right;    PHACOEMULSIFICATION OF CATARACT Left 2018    Procedure: PHACOEMULSIFICATION, CATARACT;  Surgeon: Priscilla Hays MD;  Location: [facility];  Service: Ophthalmology;  Laterality: Left;    WISDOM TOOTH EXTRACTION       Family History   Problem Relation Age of Onset    Diabetes Mother     Hypertension Mother     Kidney cancer Mother 61    Cancer Mother         renal & pancreatic    Heart disease Father     No Known Problems Sister     Cancer Maternal Grandmother     Liver disease Maternal Grandmother     Heart disease Paternal Grandfather     Heart disease Brother     No Known Problems Daughter     No Known Problems Son     No Known Problems Sister     No Known Problems Sister     No Known Problems Sister     No Known Problems Daughter     Blindness Neg Hx     Macular degeneration Neg Hx     Retinal detachment Neg Hx     Glaucoma Neg Hx     Melanoma Neg Hx     Pancreatic cancer Neg Hx     Bladder Cancer Neg Hx     Uterine cancer Neg Hx     Ovarian cancer Neg Hx     Celiac disease Neg Hx     Cirrhosis Neg Hx     Colon cancer Neg Hx     Colon polyps Neg Hx     Crohn's disease Neg Hx     Esophageal cancer Neg Hx     Inflammatory bowel disease Neg Hx     Liver cancer Neg Hx     Rectal cancer Neg Hx     Stomach cancer Neg Hx     Ulcerative colitis Neg Hx      Social History     Tobacco Use    Smoking status: Former Smoker     Packs/day: 1.00     Years: 20.00     Pack years: 20.00     Quit date: 1987     Years since quittin.3    Smokeless tobacco: Never Used    Tobacco comment: quit    Substance  Use Topics    Alcohol use: Not Currently     Alcohol/week: 1.0 - 2.0 standard drink     Types: 1 - 2 Glasses of wine per week     Comment: 1-2 glasses wine several times weekly    Drug use: No     Review of Systems   Constitutional: Negative for fever.   HENT: Negative for sore throat.    Eyes: Negative for pain.   Respiratory: Negative for shortness of breath.    Cardiovascular: Negative for chest pain.   Gastrointestinal: Negative for nausea.   Genitourinary: Negative for dysuria.   Musculoskeletal: Negative for back pain.   Skin: Negative for rash.   Allergic/Immunologic: Positive for immunocompromised state.   Neurological: Negative for weakness.   Hematological: Does not bruise/bleed easily.       Physical Exam     Initial Vitals [05/11/22 1138]   BP Pulse Resp Temp SpO2   138/71 65 18 98.5 °F (36.9 °C) 99 %      MAP       --         Physical Exam    Constitutional: He appears well-developed and well-nourished. He is not diaphoretic. He is easily aroused.   HENT:   Head: Normocephalic and atraumatic.   Mouth/Throat: Oropharynx is clear and moist. No oropharyngeal exudate.   Eyes: EOM and lids are normal. Pupils are equal, round, and reactive to light. No scleral icterus.   Neck: Phonation normal. Neck supple. No stridor present.   Normal range of motion.  Cardiovascular: Normal rate and regular rhythm.   Pulmonary/Chest: Breath sounds normal. No stridor. No respiratory distress. He has no wheezes. He has no rales.   Abdominal: Abdomen is soft. He exhibits no distension. There is no abdominal tenderness. There is no rebound.   Genitourinary:    Genitourinary Comments: Rectal - Yellow colored stool on gloved finger. No melena or hematochezia. No visible hemorrhoid or fissure. FOBT positive.     Musculoskeletal:         General: No tenderness or edema. Normal range of motion.      Cervical back: Normal range of motion and neck supple.     Neurological: He is alert, oriented to person, place, and time and easily  aroused. He has normal strength. No sensory deficit.   Skin: Skin is warm and dry. No rash noted. No erythema.   Psychiatric: He has a normal mood and affect. His speech is normal.       ED Course   Procedures  Labs Reviewed   CBC W/ AUTO DIFFERENTIAL - Abnormal; Notable for the following components:       Result Value    RBC 2.78 (*)     Hemoglobin 8.2 (*)     Hematocrit 25.3 (*)     RDW 19.6 (*)     Immature Granulocytes 0.8 (*)     Lymph # 0.4 (*)     Gran % 83.6 (*)     Lymph % 8.5 (*)     All other components within normal limits   COMPREHENSIVE METABOLIC PANEL - Abnormal; Notable for the following components:    Glucose 119 (*)     Albumin 2.9 (*)     Alkaline Phosphatase 352 (*)     All other components within normal limits   PROTIME-INR   APTT   SARS-COV-2 RDRP GENE   TYPE & SCREEN     EKG Readings: (Independently Interpreted)   Initial Reading: No STEMI. Previous EKG: Compared with most recent EKG Rhythm: Sinus Arrhythmia. Heart Rate: 60.     ECG Results          EKG 12-lead (Final result)  Result time 05/11/22 15:47:37    Final result by Interface, Lab In [facility] (05/11/22 15:47:37)                 Narrative:    Test Reason : R19.5,    Vent. Rate : 060 BPM     Atrial Rate : 060 BPM     P-R Int : 112 ms          QRS Dur : 080 ms      QT Int : 410 ms       P-R-T Axes : 040 018 -07 degrees     QTc Int : 410 ms    Sinus rhythm with sinus arrhythmia  Minimal voltage criteria for LVH, may be normal variant  Borderline Abnormal ECG  When compared with ECG of 18-DEC-2021 03:25,  No significant change was found  Confirmed by Phyllis Galaviz MD ([id number]) on 5/11/2022 3:47:26 PM    Referred By: [email or web address]   SELF           Confirmed By:Phyllis Galaviz MD                            Imaging Results    None          Medications - No data to display  Medical Decision Making:   History:   Old Medical Records: I decided to obtain old medical records.  Old Records Summarized: records from clinic visits.  Clinical Tests:   Lab Tests:  Ordered and Reviewed  Other:   I have discussed this case with another health care provider.       <> Summary of the Discussion: GI, Hem-Onc       APC / Resident Notes:   72 y.o. male with medical history of CAD, Intrahepatic Cholangiocarcinoma with liver lesions on chemotherapy, HTN, Seizures, Eliquis therapy presenting to the ED after having outpatient +FOBT in the setting of downtrending HBG. No melena or hematochezia.     DDx includes but not limited to symptomatic anemia, chemotherapy side effect, Eliquis side effect, slow transit GI bleed, malignancy.     Work-up reviewed. HBG 8.2 which is above patient's baseline. Hemodynamically stable. No melena or hematochezia on rectal exam. Discussed with GI, okay for outpatient colonoscopy for further evaluation. No sources of bleeding or other concerning findings on EGD last month. Discussed with hem-onc who is okay with the plan. Patient expresses understanding and agreeable to the plan. Return to ED precautions given for new, worsening, or concerning symptoms. I have discussed the care of this patient with my supervising physician.        Attending Attestation:     Physician Attestation Statement for NP/PA:   I discussed this assessment and plan of this patient with the NP/PA, but I did not personally examine the patient. The face to face encounter was performed by the NP/PA.    Other NP/PA Attestation Additions:      Medical Decision Making: I was available until the end of my shift at 2p, at which time the oncoming attending was present if needed.                        Clinical Impression:   Final diagnoses:  [R19.5] Fecal occult blood test positive (Primary)  [Z92.21] History of chemotherapy          ED Disposition Condition    Discharge Stable        ED Prescriptions     None        Follow-up Information     Follow up With Specialties Details Why Contact Info Additional Information    [facility] [address] G[facility]   [address]  [facility]  [facility] [id number]  [phone] [facility] - Main Building, 4th Floor Please park in [facility] and take Atrium elevator           Jomar Sewell PA-C  05/11/22 1839       Burak Gil MD  05/12/22 [id number]

## 2022-05-11 NOTE — TELEPHONE ENCOUNTER
"----- Message from Kendra Nghia sent at 5/11/2022 11:28 AM CDT -----  "Type:  Patient Call Back    Who Called:SIMONA DALLAS [[id number]]    What is the reqeust in detail:Pt would like doctor to know his is arriving at [facility] ER. Please advise     Can the clinic reply by [facility]?no    Best Call Back Number:[phone]      Additional Information:Pt received an call to go  Er. Pt would like to also confirm that doctor contact ER before he arrives            "

## 2022-05-12 NOTE — PROGRESS NOTES
Outpatient Care Management  Initial Patient Assessment    Patient: Domonique Kellogg  MRN: [id number]  Date of Service: 05/12/2022  Completed by: Guerline Montoya RN  Referral Date: 05/11/2022  Program: High Risk  Status: Ongoing  Effective Dates: 5/12/2022 - present  Responsible Staff: Guerline Montoya RN        Reason for Visit   Patient presents with    OPCM Chart Review     5/12/22    Initial Assessment     5/12/22    OPCM Enrollment Call     5/12/22    OPCM Welcome Letter     5/12/22    Plan Of Care     5/12/22    PHQ-9     5/12/22       Brief Summary:      Contacted patient to complete initial assessment for OPCM. During call, permission received from patient to speak with wife Estrellita Kellogg about patient's care in the future if needed. Referral received from Dr. Gil to assist with HTN, cancer related pain, and intrahepatic cholangiocarcinoma. Patient has been identified as high risk with a risk score of 74%. Patient is in agreement with OPCM enrollment to assist with education on liver cancer. Patient reports that he checks his weight, BP, and temperature once in a while but thinks he should be checking these more often. Discussed the importance of keeping a log to assist with BP/weight monitoring for PCP to review at appointments. Patient verbalized understanding. Discussed with patient signs, symptoms, and treatment of liver cancer. Will mail education materials to assist with increasing knowledge on Liver cancer. Patient lives with his wife. Reports that he is independent with ADLs and IADLs. Denies use of any DME for ambulation. Denies any falls within the past year. Patient still drives so transportation is not an issue. Reports current financial struggles at this time with [facility] chemotherapy bills and says he thought that his insurance covered everything until he started receiving bills in the mail. Patient is requesting assistance with Advanced Care Planning and [facility] medical  bills/financial resources. Referral has been sent to LCSW for assistance with resources. Patient denies any financial struggles with medication costs at this time.     Reviewed [employer] benefits with patient. Patient already uses mail order pharmacy and O[employer] mail order catalog benefits.     Reminded patient of upcoming appointments. Patient verbalized awareness. Discussed completing follow up call with patient, who is in agreement with call in two weeks, on or around 5/26/22. Encouraged patient to call this RN for any questions and/or concerns that may arise prior to next scheduled follow-up call.    Next Steps:  Confirm receipt of resource/educational material  Collaborate with OPCM/LCSW for assistance with Advanced Care Planning & [facility] chemotherapy bills.  Continue education on Liver cancer.      Assessment Documentation     OPCM Initial Assessment    Involvement of Care  Do I have permission to speak with other family members about your care?: Yes (Comment: Estrellita Kellogg, wife)  Assessment completed by: Patient  Identified Areas of Need  Advanced Care Planning: Yes  Housing: no  Medication Adherence: No  *Active medication list was reviewed and reconciled with patient and/or caregiver:   Nutrition: no  Lab Adherence: no  Depression: No  Cognitive/Behavioral Health: no  Communication: no  Fall risk?: No  Equipment/Supplies/Services: no          Problem List and History     Problems Addressed This Visit    Hypertension: Not identified by patient as current problem  Anemia: Not identified by patient as current problem  Hyperlipidemia: Not identified by patient as current problem  Heart Disease: Not identified by patient as current problem         Reviewed medical and social history with patient and/or caregiver. A complex care plan was discussed and completed today, with input from patient and/or caregiver.    Patient Instructions     Instructions were provided via the [employer] patient resources and are available  for the patient to view on the patient portal, if active.       Clinical Reference Documents Added to Patient Instructions       Document    LIVER CANCER (ENGLISH)    LIVER CANCER DISCHARGE INSTRUCTIONS (ENGLISH)        Todays OPCM Self-Management Care Plan was developed with the patients/caregivers input and was based on identified barriers from todays assessment.  Goals were written today with the patient/caregiver and the patient has agreed to work towards these goals to improve his/her overall well-being. Patient verbalized understanding of the care plan, goals, and all of today's instructions. Encouraged patient/caregiver to communicate with his/her physician and health care team about health conditions and the treatment plan.  Provided my contact information today and encouraged patient/caregiver to call me with any questions as needed.

## 2022-05-12 NOTE — TELEPHONE ENCOUNTER
Patient needs to be scheduled for a colonoscopy, possibly on 5/17/22. Is it ok for him to hold Eliquis 2 days prior? Please advise.    Thank you,  Cristine

## 2022-05-12 NOTE — TELEPHONE ENCOUNTER
Yes, he may have stopped the Eliquis already. If he has resumed it, yes, he can hold the Eliquis for 2 days before the procedure. Thank you.

## 2022-05-12 NOTE — LETTER
May 12, 2022           Dear Mr. Le Kellogg,     Welcome to [facility]s Complex Care Management Program.  It was a pleasure talking with you today.  My name is Guerline Montoya, and I look forward to being your Care Manager.  My goal is to help you function at the healthiest and highest level possible.  You can contact me directly at [phone].    As an [facility] patient with [employer], some of the services we may be able to provide include:      Development of an individualized care plan with a Registered Nurse    Connection with a    Connection with available resources and services     Coordinate communication among your care team members    Provide coaching and education    Help you understand your doctors treatment plan   Help you obtain information about your insurance coverage.     All services provided by [facility]s Complex Care Managers and other care team members are coordinated with and communicated to your primary care team.    As part of your enrollment, you will be receiving education materials and more information about these services in your [facility] account, by phone or through the mail.  If you do not wish to participate or receive information, please contact our office at [phone].      Sincerely,        Guerline Montoya RN  [facility]   Out-patient RN Complex Care Manager

## 2022-05-17 NOTE — PRE-PROCEDURE INSTRUCTIONS
PRE-OP INSTRUCTIONS:  Instructed patient to have no food,milk or milk products after midnight   It is ok to take AM medications with a few sips of water   Medication instructions for pm prior to and am of surgery reviewed.  Instructed patient to avoid taking vitamins,supplements,aspirin or ibuprofen the am of surgery.    PT USES CPAP    Patient denies any side effects or issues with anesthesia or sedation.

## 2022-05-18 NOTE — H&P
Radiology History & Physical      SUBJECTIVE:     Chief Complaint: Cholangiocarcinoma    History of Present Illness:  Domonique Kellogg is a 72 y.o. male Domonique Kellogg is a 71 y.o. male w PMH CAD, HTN, seizures in 2011 who presents for TACE.     Past Medical History:   Diagnosis Date    Adjustment disorder     Anticoagulant long-term use     Anxiety     Arthritis     CAD (coronary artery disease) 3/2/2015    non-obstructive per [facility]     Cataract     Dry eye syndrome     Hypertension     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/2021    Obesity     Seizures     2011    Sleep apnea     + CPAP    Sleep difficulties      Past Surgical History:   Procedure Laterality Date    ANTERIOR CERVICAL DISCECTOMY W/ FUSION N/A 7/6/2020    Procedure: DISCECTOMY, SPINE, CERVICAL, ANTERIOR APPROACH, WITH FUSION C3-4, C4-5;  Surgeon: Fabiola Vides MD;  Location: [facility] OR [address] [place] [address];  Service: Neurosurgery;  Laterality: N/A;  TORONTO III, ASA III, BLOOD TYPE & SCREEN, NEUROMONITORING EMG-MEP-SEP, SUPINE POSITION,BRACE MIAMI,REGULAR BED, HEADREST CASPAR, POSITION, MAP>85, RADIOLOGY C-ARM, SPECIAL EQUIPMENT [facility]    COLONOSCOPY N/A 10/1/2021    Procedure: COLONOSCOPY;  Surgeon: Nicolas Alvarado MD;  Location: [facility] ([address] [place] [address]);  Service: Endoscopy;  Laterality: N/A;  EGD and colonoscopy for diarrhea and weight loss and Enlarged, heterogeneous appearance of the liver likely due to multiple underlying hepatic lesions largest measuring 4.8 cm.  Findings concerning for metastatic versus less likely primary hepatic neoplasm.  Recommend fur    ESOPHAGOGASTRODUODENOSCOPY N/A 10/1/2021    Procedure: EGD (ESOPHAGOGASTRODUODENOSCOPY);  Surgeon: Nicolas Alvarado MD;  Location: [address]);  Service: Endoscopy;  Laterality: N/A;  EGD and colonoscopy for diarrhea and weight loss and Enlarged, heterogeneous appearance of the liver likely due to multiple underlying hepatic lesions largest measuring 4.8 cm.  Findings  concerning for metastatic versus less likely primary hepatic neopl    ESOPHAGOGASTRODUODENOSCOPY N/A 4/5/2022    Procedure: EGD (ESOPHAGOGASTRODUODENOSCOPY);  Surgeon: Amado Kelsey MD;  Location: [facility] ENDO (4TH FLR);  Service: Endoscopy;  Laterality: N/A;  EGD in 8 weeks to check for esophagitis healing patient should be on pantoprazole 40 mg once daily  ok to hold eliquis x2 days per Dr. Young, see telephone encounter  fully vaccinated    EYE SURGERY      INSERTION OF VENOUS ACCESS PORT N/A 12/3/2021    Procedure: INSERTION, VENOUS ACCESS PORT;  Surgeon: Saurav Garica MD;  Location: [facility] OR 2ND FLR;  Service: General;  Laterality: N/A;    INTRAOCULAR PROSTHESES INSERTION Right 7/16/2018    Procedure: INSERTION-INTRAOCULAR LENS (IOL);  Surgeon: Priscilla Hays MD;  Location: [facility];  Service: Ophthalmology;  Laterality: Right;    INTRAOCULAR PROSTHESES INSERTION Left 8/13/2018    Procedure: INSERTION, INTRAOCULAR LENS PROSTHESIS;  Surgeon: Priscilla Hays MD;  Location: [facility];  Service: Ophthalmology;  Laterality: Left;    KNEE SURGERY      PHACOEMULSIFICATION OF CATARACT Right 7/16/2018    Procedure: PHACOEMULSIFICATION-ASPIRATION-CATARACT;  Surgeon: Priscilla Hays MD;  Location: [facility];  Service: Ophthalmology;  Laterality: Right;    PHACOEMULSIFICATION OF CATARACT Left 8/13/2018    Procedure: PHACOEMULSIFICATION, CATARACT;  Surgeon: Priscilla Hays MD;  Location: [facility];  Service: Ophthalmology;  Laterality: Left;    WISDOM TOOTH EXTRACTION         Home Meds:   Prior to Admission medications    Medication Sig Start Date End Date Taking? Authorizing Provider   acetaminophen (TYLENOL) 650 MG TbSR Take by mouth daily as needed. 2/3/20  Yes Historical Provider   finasteride (PROSCAR) 5 mg tablet Take 1 tablet (5 mg total) by mouth once daily.  Patient taking differently: Take 5 mg by mouth every evening. 3/24/22 3/24/23 Yes Messi Stark MD   hydroCHLOROthiazide (HYDRODIURIL) 25 MG tablet TAKE 1  TABLET (25 MG TOTAL) BY MOUTH ONCE DAILY.  Patient taking differently: Take 25 mg by mouth every morning. 12/10/21  Yes Nicolas Pacheco MD   multivitamin with minerals tablet Take 1 tablet by mouth every morning.  7/25/18  Yes Historical Provider   potassium chloride SA (K-DUR,KLOR-CON) 20 MEQ tablet Take 2 tablets (40 mEq total) by mouth once daily.  Patient taking differently: Take 40 mEq by mouth every morning. 12/3/21  Yes Nicolas Pacheco MD   pravastatin (PRAVACHOL) 40 MG tablet Take 1 tablet (40 mg total) by mouth once daily.  Patient taking differently: Take 40 mg by mouth every evening. 1/18/22  Yes Nicolas Pacheco MD   RABEprazole (ACIPHEX) 20 mg tablet Take 1 tablet (20 mg total) by mouth once daily.  Patient taking differently: Take 20 mg by mouth every morning. 12/23/21 12/23/22 Yes Mejia Villafuerte MD   tamsulosin (FLOMAX) 0.4 mg Cap Take 1 capsule (0.4 mg total) by mouth once daily.  Patient taking differently: Take 0.4 mg by mouth every morning. 3/16/22  Yes Claudia Young MD   apixaban (ELIQUIS) 2.5 mg Tab Take 1 tablet (2.5 mg total) by mouth once daily. 4/5/22   Claudia Young MD   diltiaZEM 2% Lidocaine 5% Cream Apply pea size amount topically 3 (three) times daily. 12/28/21   Shilpa Ahuja NP   magnesium oxide (MAG-OX) 400 mg (241.3 mg magnesium) tablet Take 400 mg by mouth every evening. 7/1/19   Historical Provider   ondansetron (ZOFRAN) 4 MG tablet Take 1 tablet (4 mg total) by mouth every 6 (six) hours as needed for Nausea. 2/14/22   Jose Paige MD   ondansetron (ZOFRAN-ODT) 4 MG TbDL Take 1 tablet (4 mg total) by mouth every 6 (six) hours as needed (nausea vomiting). 4/19/22   Marine Zepeda MD   ondansetron (ZOFRAN-ODT) 4 MG TbDL Take 1 tablet (4 mg total) by mouth every 6 (six) hours as needed (nausea vomiting). 4/19/22   Naseem Martinez MD   oxyCODONE (ROXICODONE) 5 MG immediate release tablet Take 1 tablet (5 mg total) by mouth every 4 (four) hours as needed for Pain  (pain). 4/19/22   Naseem Martinez MD   prochlorperazine (COMPAZINE) 10 MG tablet Take 1 tablet (10 mg total) by mouth every 6 (six) hours as needed (nausea). 11/29/21 11/29/22  Claudia Young MD   promethazine (PHENERGAN) 12.5 MG Tab Take 1 tablet (12.5 mg total) by mouth every 6 (six) hours as needed (nausea). 11/29/21   Claudia Young MD   sodium,potassium,mag sulfates (SUPREP BOWEL PREP KIT) 17.5-3.13-1.6 gram SolR As Directed 5/12/22   Mejia Villafuerte MD   oxyCODONE (OXY-IR) 5 mg Cap Take 1 capsule (5 mg total) by mouth every 4 (four) hours as needed for Pain (pain). 4/19/22 5/18/22  Marine Zepeda MD   oxyCODONE (ROXICODONE) 5 MG immediate release tablet Take 1 tablet (5 mg total) by mouth every 6 (six) hours as needed for Pain. 2/14/22 5/18/22  Jose Paige MD     Anticoagulants/Antiplatelets: apixaban, held for procedure    Allergies:   Review of patient's allergies indicates:   Allergen Reactions    Indomethacin      Headache dizziness    Adhesive Rash     Sedation History:  no adverse reactions    Review of Systems:   Hematological: no known coagulopathies  Respiratory: no shortness of breath  Cardiovascular: no chest pain  Gastrointestinal: no abdominal pain  Genito-Urinary: no dysuria  Musculoskeletal: negative  Neurological: no TIA or stroke symptoms         OBJECTIVE:     Vital Signs (Most Recent)  Temp: 98.8 °F (37.1 °C) (05/18/22 0959)  Pulse: 62 (05/18/22 0959)  Resp: 16 (05/18/22 0959)  BP: 130/71 (05/18/22 0959)  SpO2: (!) 93 % (05/18/22 0959)    Physical Exam:  ASA: per anesthesia  Mallampati: per anesthesia    General: no acute distress  Mental Status: alert and oriented to person, place and time  HEENT: normocephalic, atraumatic  Chest: unlabored breathing  Heart: regular heart rate  Abdomen: nondistended  Extremity: moves all extremities    Laboratory  Lab Results   Component Value Date    INR 1.2 05/18/2022       Lab Results   Component Value Date    WBC 7.17 05/18/2022    HGB 8.1 (L)  05/18/2022    HCT 24.7 (L) 05/18/2022    MCV 91 05/18/2022     05/11/2022      Lab Results   Component Value Date     (H) 05/11/2022     05/11/2022    K 4.2 05/11/2022     05/11/2022    CO2 25 05/11/2022    BUN 23 05/11/2022    CREATININE 1.0 05/11/2022    CALCIUM 10.2 05/11/2022    MG 1.5 (L) 05/09/2022    ALT 39 05/11/2022    AST 39 05/11/2022    ALBUMIN 2.9 (L) 05/11/2022    BILITOT 0.7 05/11/2022    BILIDIR 0.4 (H) 04/19/2022       ASSESSMENT/PLAN:     Sedation Plan: per anesthesia  Patient will undergo TACE for cholangiocarcinoma.    Tone Lara MD PGI  Interventional Radiology  [facility]

## 2022-05-18 NOTE — DISCHARGE INSTRUCTIONS
For scheduling: Call Deborah at [phone]    For questions or concerns call: LUCA MON-FRI 8 AM- 5PM [phone]. Radiology resident on call [phone].    For immediate concerns that are not emergent, you may call our radiology clinic at: [phone]    Leave bandage on for 24 hours then may remove and shower.  Do not sit in bath water, hot tub,or swim for one week.

## 2022-05-18 NOTE — PROCEDURES
Radiology Post-Procedure Note    Pre Op Diagnosis: Cholangiocarcinoma    Post Op Diagnosis: Cholangiocarcinoma    Procedure: Chemoembolization    Procedure Performed by: Simon Fitzgerald MD    Written Informed Consent Obtained: Yes    Specimen Removed: None    Estimated Blood Loss: Minimal    Findings:     After placement of a right femoral artery sheath, a 5-Slovak catheter was inserted and angiography of the celiac artery and superior mesenteric artery for anatomic evaluation and localization of hepatic tumor.  A microcatheter was introduced into feeding arteries of a right hepatic lobe tumor and LC beads coated with doxorubicin were injected until near stagnant flow was achieved.  Post procedural angiography revealed no complications.    Right femoral artery angiogram was performed and the sheath was removed.  Hemostasis was achieved using Exoseal technique.  There was no hematoma at the time of hemostasis.    The patient tolerated procedure well.  Please see Imaging report for further details.    Simon Fitzgerald M.D.  Interventional Radiology  Department of Radiology  Pager: [phone]

## 2022-05-18 NOTE — ANESTHESIA PREPROCEDURE EVALUATION
05/18/2022  Domonique Kellogg is a 72 y.o., male.      Past Medical History:   Diagnosis Date    Adjustment disorder     Anticoagulant long-term use     Anxiety     Arthritis     CAD (coronary artery disease) 3/2/2015    non-obstructive [facility]     Cataract     Dry eye syndrome     Hypertension     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/2021    Obesity     Seizures     2011    Sleep apnea     + CPAP    Sleep difficulties      Past Surgical History:   Procedure Laterality Date    ANTERIOR CERVICAL DISCECTOMY W/ FUSION N/A 7/6/2020    Procedure: DISCECTOMY, SPINE, CERVICAL, ANTERIOR APPROACH, WITH FUSION C3-4, C4-5;  Surgeon: Fabiola Vides MD;  [facility];  Service: Neurosurgery;  Laterality: N/A;  TORONTO III, ASA III, BLOOD TYPE & SCREEN, NEUROMONITORING EMG-MEP-SEP, SUPINE POSITION,BRACE MIAMI,REGULAR BED, HEADREST CASPAR, POSITION, MAP>85, RADIOLOGY C-ARM, SPECIAL EQUIPMENT [facility]    COLONOSCOPY N/A 10/1/2021    Procedure: COLONOSCOPY;  Surgeon: Nicolas Alvarado MD;  [facility]  Service: Endoscopy;  Laterality: N/A;  EGD and colonoscopy for diarrhea and weight loss and Enlarged, heterogeneous appearance of the liver likely due to multiple underlying hepatic lesions largest measuring 4.8 cm.  Findings concerning for metastatic versus less likely primary hepatic neoplasm.  Recommend [facility]    ESOPHAGOGASTRODUODENOSCOPY N/A 10/1/2021    Procedure: EGD (ESOPHAGOGASTRODUODENOSCOPY);  Surgeon: Nicolas Alvarado MD;  Location: [facility] (4TH FLR);  Service: Endoscopy;  Laterality: N/A;  EGD and colonoscopy for diarrhea and weight loss and Enlarged, heterogeneous appearance of the liver likely due to multiple underlying hepatic lesions largest measuring 4.8 cm.  Findings concerning for metastatic versus less likely primary hepatic neopl     ESOPHAGOGASTRODUODENOSCOPY N/A 4/5/2022    Procedure: EGD (ESOPHAGOGASTRODUODENOSCOPY);  Surgeon: Amado Kelsey MD;  Location: [facility] (4TH FLR);  Service: Endoscopy;  Laterality: N/A;  EGD in 8 weeks to check for esophagitis healing patient should be on pantoprazole 40 mg once daily  ok to hold eliquis x2 days per Dr. Young, see telephone encounter  fully vaccinated    EYE SURGERY      INSERTION OF VENOUS ACCESS PORT N/A 12/3/2021    Procedure: INSERTION, VENOUS ACCESS PORT;  Surgeon: Saurav Garcia MD;  Location: [facility] 2ND FLR;  Service: General;  Laterality: N/A;    INTRAOCULAR PROSTHESES INSERTION Right 7/16/2018    Procedure: INSERTION-INTRAOCULAR LENS (IOL);  Surgeon: Priscilla Hays MD;  Location: [facility];  Service: Ophthalmology;  Laterality: Right;    INTRAOCULAR PROSTHESES INSERTION Left 8/13/2018    Procedure: INSERTION, INTRAOCULAR LENS PROSTHESIS;  Surgeon: Priscilla Hays MD;  Location: [facility];  Service: Ophthalmology;  Laterality: Left;    KNEE SURGERY      PHACOEMULSIFICATION OF CATARACT Right 7/16/2018    Procedure: PHACOEMULSIFICATION-ASPIRATION-CATARACT;  Surgeon: Priscilla Hays MD;  Location: [facility];  Service: Ophthalmology;  Laterality: Right;    PHACOEMULSIFICATION OF CATARACT Left 8/13/2018    Procedure: PHACOEMULSIFICATION, CATARACT;  Surgeon: Priscilla Hays MD;  Location: [facility];  Service: Ophthalmology;  Laterality: Left;    WISDOM TOOTH EXTRACTION       TTE 2021  · The left ventricle is normal in size with normal systolic function. The estimated ejection fraction is 60%.  · Normal right ventricular size with normal right ventricular systolic function.  · Indeterminate left ventricular diastolic function.  · Mild left atrial enlargement.  · The estimated PA systolic pressure is 25 mmHg.  · Normal central venous pressure (3 mmHg).          Pre-op Assessment    I have reviewed the Patient Summary Reports.     I have reviewed the Nursing Notes. I have reviewed the NPO  Status.   I have reviewed the Medications.     Review of Systems  Anesthesia Hx:  No problems with previous Anesthesia  History of prior surgery of interest to airway management or planning: Denies Family Hx of Anesthesia complications.   Denies Personal Hx of Anesthesia complications.   Social:  No Alcohol Use, Non-Smoker    Hematology/Oncology:  Hematology Normal   Oncology Normal   Oncology Comments: Intrahepatic cholangiocarcinoma     EENT/Dental:EENT/Dental Normal   Cardiovascular:   Exercise tolerance: good Hypertension, well controlled CAD      Pulmonary:   Sleep Apnea, CPAP    Renal/:  Renal/ Normal     Hepatic/GI:   Liver Disease, Liver cancer   Musculoskeletal:   Arthritis     Neurological:   Neuromuscular Disease, Seizures, well controlled    Endocrine:  Endocrine Normal    Dermatological:  Skin Normal    Psych:   Psychiatric History          Physical Exam  General: Well nourished, Cooperative, Alert and Oriented    Airway:  Mallampati: III   Mouth Opening: Normal  TM Distance: Normal  Tongue: Normal  Neck ROM: Normal ROM    Dental:  Intact    Chest/Lungs:  Clear to auscultation, Normal Respiratory Rate    Heart:  Rate: Normal  Rhythm: Regular Rhythm        Anesthesia Plan  Type of Anesthesia, risks & benefits discussed:    Anesthesia Type: Gen ETT  Intra-op Monitoring Plan: Standard ASA Monitors  Post Op Pain Control Plan: multimodal analgesia  Induction:  IV  Airway Plan: Direct  Informed Consent: Informed consent signed with the Patient and all parties understand the risks and agree with anesthesia plan.  All questions answered.   ASA Score: 3  Day of Surgery Review of History & Physical: H&P Update referred to the surgeon/provider.    Ready For Surgery From Anesthesia Perspective.     .

## 2022-05-18 NOTE — PLAN OF CARE
Pt arrived to IR room 188 for NAA TACE, no acute distress noted. Orders, consents and labs reviewed on chart. Anesthesia at bedside.

## 2022-05-18 NOTE — ANESTHESIA POSTPROCEDURE EVALUATION
Anesthesia Post Evaluation    Patient: Domonique Kellogg    Procedure(s) Performed: * No procedures listed *    Final Anesthesia Type: general      Patient location during evaluation: PACU  Patient participation: Yes- Able to Participate  Level of consciousness: awake and alert  Post-procedure vital signs: reviewed and stable  Pain control: Pain has been treated.  Airway patency: patent    PONV status: PONV absent or treated.  Anesthetic complications: no      Cardiovascular status: hemodynamically stable  Respiratory status: spontaneous ventilation  Hydration status: euvolemic  Follow-up not needed.          Vitals Value Taken Time   /94 05/18/22 1336   Temp 36.3 °C (97.3 °F) 05/18/22 1430   Pulse 63 05/18/22 1436   Resp 18 05/18/22 1430   SpO2 95 % 05/18/22 1436   Vitals shown include unvalidated device data.      No case tracking events are documented in the log.      Pain/Lalo Score: Lalo Score: 10 (5/18/2022  1:30 PM)

## 2022-05-18 NOTE — DISCHARGE SUMMARY
Radiology Discharge Summary      Hospital Course: No complications    Admit Date: 5/18/2022  Discharge Date: 05/18/2022     Instructions Given to Patient: Yes  Diet: Resume prior diet  Activity: activity as tolerated and no driving for today    Description of Condition on Discharge: Stable  Vital Signs (Most Recent): Temp: 98.8 °F (37.1 °C) (05/18/22 0959)  Pulse: 62 (05/18/22 0959)  Resp: 16 (05/18/22 0959)  BP: 130/71 (05/18/22 0959)  SpO2: (!) 93 % (05/18/22 0959)    Discharge Disposition: Home    Discharge Diagnosis: Cholangiocarcinoma s/p chemoembolization    Follow up: As scheduled    Simon Fitzgerald M.D.  Interventional Radiology  Department of Radiology  Pager: [phone]

## 2022-05-18 NOTE — NURSING
TACE procedure to treat liver tumor complete. Pt tolerated well. VSS. No signs or symptoms of distress noted.Exoseal closure device in place. Hemostasis 1230. Pt to remain flat until 1430. Pt will be transferred to PACU bed after extubation/stabilization escorted by this RN and CRNA who will give report. Dr Fitzgerald notified wife on procedure outcome and questions answered

## 2022-05-18 NOTE — PLAN OF CARE
Patient arrived to room. PIV placed x1. Admit assessment completed. Plan of care discussed with patient. Will monitor. Call light within reach, instructed in use.

## 2022-05-18 NOTE — ANESTHESIA PROCEDURE NOTES
Intubation    Date/Time: 5/18/2022 11:35 AM  Performed by: Dariel Denson CRNA  Authorized by: Jomar Dawson MD     Intubation:     Induction:  Intravenous    Intubated:  Postinduction    Mask Ventilation:  Easy mask    Attempts:  1    Attempted By:  CRNA    Method of Intubation:  Video laryngoscopy    Blade:  Bryant 3    Laryngeal View Grade: Grade I - full view of cords      Difficult Airway Encountered?: No      Complications:  None    Airway Device:  Oral endotracheal tube    Airway Device Size:  7.5    Style/Cuff Inflation:  Cuffed    Inflation Amount (mL):  10    Tube secured:  22    Secured at:  The lips    Placement Verified By:  Capnometry and Revisualization with laryngoscopy    Complicating Factors:  Large/floppy epiglottis    Findings Post-Intubation:  BS equal bilateral and atraumatic/condition of teeth unchanged

## 2022-05-18 NOTE — TRANSFER OF CARE
"Anesthesia Transfer of Care Note    Patient: Domonique Kellogg    Procedure(s) Performed: * No procedures listed *    Patient location: [facility]    Anesthesia Type: general    Transport from OR: Transported from OR on 6-10 L/min O2 by face mask with adequate spontaneous ventilation    Post pain: adequate analgesia    Post assessment: no apparent anesthetic complications and tolerated procedure well    Post vital signs: stable    Level of consciousness: responds to stimulation    Nausea/Vomiting: no vomiting    Complications: none    Transfer of care protocol was followed      Last vitals:   Visit Vitals  /71 (BP Location: Left arm, Patient Position: Lying)   Pulse 62   Temp 37.1 °C (98.8 °F) (Temporal)   Resp 16   Ht 5' 8" (1.727 m)   Wt 99.8 kg (220 lb)   SpO2 (!) 93%   BMI 33.45 kg/m²     "

## 2022-05-20 NOTE — TELEPHONE ENCOUNTER
"----- Message from Shani Rothman sent at 5/20/2022  9:17 AM CDT -----  Name Of Caller: self    Contact Preference?: [phone]    What is the nature of the call?: inquiring about rescheduling lab and EP           Additional Notes:  "Thank you for all that you do for our patients'"     "

## 2022-05-20 NOTE — TELEPHONE ENCOUNTER
Attempted to return call received no answer, LVM and responded via patient portal      ----- Message from Amisha Levine sent at 7/8/2020  4:14 PM CDT -----  Contact: Pt @ [phone]  Pt has questions about the staples in the side of his head and wants to speak to someone.       Not applicable

## 2022-05-23 NOTE — Clinical Note
Good afternoon,   I saw Mr. Kellogg this AM. He was doing fairly well overall but says the days following treatment have gotten harder. He primary c/o anorexia, stomach cramping and some diarrhea. He also said he's not tolerating oxycodone. We started PRN bentyl and PRN morphine IR to try to help improve symptom burden during treatment weeks as much as possible.   Thanks much, Alyssa

## 2022-05-23 NOTE — PROGRESS NOTES
"The patient location is: Home  The chief complaint leading to consultation is: cholangiocarcinoma    Visit type: audiovisual    Face to Face time with patient: 20 mins of total time spent on the encounter, which includes face to face time and non-face to face time preparing to see the patient (eg, review of tests), Obtaining and/or reviewing separately obtained history, Documenting clinical information in the electronic or other health record, Independently interpreting results (not separately reported) and communicating results to the patient/family/caregiver, or Care coordination (not separately reported).     Each patient to whom he or she provides medical services by telemedicine is:  (1) informed of the relationship between the physician and patient and the respective role of any other health care provider with respect to management of the patient; and (2) notified that he or she may decline to receive medical services by telemedicine and may withdraw from such care at any time.    Notes: Video streaming quality was good.                                                               PROGRESS NOTE    Subjective:       Patient ID: Domonique Kellogg is a 72 y.o. male.    Chief Complaint: follow up for cholangiocarcinoma    Diagnosis:  Advanced intrahepatic cholangiocarcinoma, MSI-low, negative for FGFR2 fusion or IDH1/IDH2 mutations, diagnosed on 11/22/2021.     Molecular Profile:  Guardant 360 11/29/21: +CCND1 amplification. VUS: CATRACHITO [id number]. MSI-high not detected.     Oncologic History copied from medical chart:  1. Mr Kellogg is a 70 yo man with CAD, HTN, seizures in 2011 who initially saw me on 11/29/21 for further management of cholangiocarcinoma. He presented with weight loss and diarrhea since July. US 9/22/21 showed "Enlarged, heterogeneous appearance of the liver likely due to multiple underlying hepatic lesions largest measuring 4.8 cm."  MRI abdomen w/wo contrast 10/4/21: "Multiple liver lesions measuring " "up to 13.3 cm in the right hepatic lobe.  Differential diagnosis includes metastases, fibrolamellar hepatocellular carcinoma, or atypical focal nodular hyperplasia.  Consider biopsy for further evaluation. Thrombosis of the anterior branch of the right portal vein and left hepatic vein.  Nonvisualization of the middle and right hepatic veins, which may be thrombosed as well. Prominent periportal lymph node, which is nonspecific."  EGD and colonoscopy on 10/11/21 were negative for primary malignancies.   He underwent liver lesion biopsy and Y90 mapping on 11/22/21. Pathology showed adenocarcinoma: "Biopsy of the liver mass shows a malignant neoplasm in a fibrotic stroma. Glandular architecture is readily identified with several foci of intraluminal necrosis. Scattered mitotic figures are seen. Neoplastic cells show the following immunophenotype:   Positive: AE1/AE3, CK7, CDX2   Negative: Chromogranin, Synaptophysin, TTF, CK5/6, CK20, HepPar1   The morphology and immunophenotype are compatible with adenocarcinoma. Considerations for a primary site include pancreaticobiliary (including intrahepatic cholangiocarcinoma) as well as upper gastrointestinal. Clinical and radiologic correlation is suggested."  Patient presents today with wife for further management. No pain. Initial weight loss has somewhat stabilized. Still has diarrhea. Has not tried imodium yet. +nervous  Mother had kidney cancer at age 64. Maternal grandmother had liver cancer in her 60s. Patient has three children, two daughters and one son.   Discussed palliative chemo with cis/gem  2. PET scan 12/6/21 did not show extrahepatic metastases.   3. Cis/gem started on 12/7/21, s/p cycle 6 day 1  4. S/p TACE on 12/16/2021, 2/14/22, 4/19/22.    Interval History:   Mr Kellogg returns for cycle 8 day 1 cis/gem. Chemotherapy was delayed due to recent IR procedure. Tolerating chemo well. S/p TACE on 4/19/22 and 5/18/2022. He did have increased fatigue and " "abdominal discomfort after this most recent procedure but has recovered well. He delayed his chemotherapy for a few days to recover. He is feeling better today. Had some mild nausea and diarrhea but has improved. He was also having tension headaches and a cough. "Feeling great" +SOB with exertion this has not worsened. Wanted to get some physical therapy as he feels he is getting deconditioned. He is eating well and has a steady weight. Continued taste changes. No fever, chills, n/v, abdominal pain. No changes to his hearing.     ECO. Presents with caregiver by video     ROS:   A ten-point system review is obtained and negative except for what was stated in the Interval History.     Physical Examination:   Vital signs reviewed.   General: well hydrated, well developed, in no acute distress  HEENT: normocephalic, EOMI  Neck: supple, no JVD  Lungs: No respiratory distress  Heart: No report of chest pain or increased heart rate  Extremities: no clubbing, cyanosis, or edema  Skin: no rash, ulcer, or open wounds  Neuro: alert and oriented x 4, no focal neuro deficit  Psych: pleasant and appropriate mood and affect    Objective:     Laboratory Data:  Labs reviewed. Adequate for chemo. Mg normal.   Bili 1.2, mild elevation in LFTs (most likely 2/2 TACE)  Hgb 8.6. CA 19-9 398.6, increased from 264.5    Imaging Data:  PET 21:  In this patient with known intrahepatic cholangiocarcinoma, there are multifocal hypermetabolic hepatic lesions in both hepatic lobes.  No evidence of distant metastasis.     Lipomatosis of the ileocecal valve and proximal cecum.     Fat containing umbilical hernia.    MRI Abdomen 22:  1. Patient with reported cholangiocarcinoma.  Interval progression of hepatic tumor burden with multiple enlarging and new hepatic lesions.  2. Progression of portal venous thrombosis involving the left, right, and main portal veins.  Persistent filling defect within the central hepatic veins concerning for " thrombosis.  3. Stable prominent periportal lymph node.    CT chest 3/4/22:  Right pulmonary nodules, unchanged.  Recommend continued follow-up as per clinical protocol.    MRI abdomen 3/14/22:     Interval postprocedural changes status post TACE with slight improvement of tumor burden at treatment sites.  Extensive residual disease throughout both hepatic lobes.  Index lesions as above.     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     Stable appearance of central hepatic veins, underlying thrombosis not excluded.  Consider further evaluation with Doppler ultrasound if clinically warranted.     Stable prominent periportal lymph node.     Right adrenal adenoma.    CT urogram 3/14/22:  Prostatomegaly which demonstrates mass effect on the posterior bladder.  There is mild diffuse wall thickening of the bladder which may relate to outlet obstruction.     Postprocedural changes of the liver in keeping with recent chemoembolization.  Protocol is not optimized for evaluation of tumor response.  Dedicated multiphase liver CT would be necessary to evaluate liver directed therapy response.    Assessment and Plan:     1. Gastrointestinal hemorrhage, unspecified gastrointestinal hemorrhage type    2. Intrahepatic cholangiocarcinoma    3. Secondary malignant neoplasm of liver    4. Immunodeficiency secondary to neoplasm    5. Immunodeficiency secondary to chemotherapy    6. Debility    7. Antineoplastic chemotherapy induced anemia    8. CAD in native artery    9. Shortness of breath on exertion    10. Portal vein thrombosis    11. Essential hypertension      1-5.  - Mr Kellogg is a 70 yo man with advanced intrahepatic cholangiocarcinoma with multiple liver lesions. MSI-low, FGFR2 fusion and IDH1/2 mutation negative. Started on cis/gem on 12/7/21. S/p TACE on 12/16/2021, 2/14/22, 4/19/22  - added durvalumab after TOPAZ trial presented at [facility] in Jan 2022. Had [id number] with  insurance company. Insurance company does not cover given lack of peer-reviewed article. Previously discussed applying for patient assistance. Pt was think it over at that time. Will discuss it again in the future   - doing very well. Tolerating treatment very well. Lab work is adequate. Continue with treatment. Cycle 8 day 1 cis/gem tomorrow. Had TACE procedure on 5/18/2022.   - patient prefers virtual visit if he cannot see provider the same day of chemo.   - labs at [facility]  - return in 1 week for cycle 8 day 8, as previous cycle was delayed due to TACE procedure performed on 5/18/2022 with Dr. Fitzgerald  - restaging CT chest, MRI abdomen/pelvis in mid June 5.  - referred to physical therapy. Messaged     6.  - Hb 8.6. will transfuse 1 unit of packed RBCs at earliest availability.   - discussed with patient that if Hb <8 will give transfusion given his dyspnea with exertion    7.  - on pravachol. Continue    8.  - see #5 and #6    9.  - could not tolerate eliquis 2.5 mg bid due to hematuria.   - continue with eliquis 2.5 mg daily. Doing well without overt evidence of bleeding.     10.  - BP well controlled per patient and readings within the clinic visit  - c/w current medication    Follow-up:     RTC in 1 week for cycle 8 day 8 of cis/gem    Pte and family members voiced understanding of the above encounter and treatment plan. All thoughtful questions were answered to their satisfaction. Pte was advised to notify the care team or proceed to the ER if signs and symptoms worsen.       NEO Ervin, PA-C  Physician Assistant Certified  Dept of Hematology/Oncology  PAIgnacioC to Dr. Young, Dr. Lane and Dr. Bourgeois    Electronically signed by Gertrude Ni      Route Chart for Scheduling    Med Onc Chart Routing      Follow up with physician 3 weeks. Labs (CBC, CMP, Mg, CA 19-9) at [facility]. if cannot see provider the same day of chemo, patient prefers virtual visit. labs on 3 weeks see   Hector then get cycle 9 day 1 of cis/gem.    Follow up with BRADLEY 1 week. RTC in 1 week with lab work, clinic visit with Demi and Jody and cycle 8 day 8 of [place]/Cis   Labs CBC, CA 19-9, CMP and magnesium   Lab interval: once a week     Imaging    Pharmacy appointment    Other referrals          Treatment Plan Information   OP GEMCITABINE CISPLATIN Q3W + DURVALUMAB D8 C4+   Claudia Young MD   Upcoming Treatment Dates - OP GEMCITABINE CISPLATIN Q3W + DURVALUMAB D8 C4+    5/24/2022       Chemotherapy       gemcitabine (GEMZAR) 2,260 mg in sodium chloride 0.9% 250 mL chemo infusion       CISplatin (PLATINOL) 25 mg/m2 = 57 mg in sodium chloride 0.9% 500 mL chemo infusion       Pre-Hydration       sodium chloride 0.9% 500 mL with magnesium sulfate 2 g, potassium chloride 20 mEq infusion       Post-Hydration       sodium chloride 0.9% bolus 500 mL  6/7/2022       Chemotherapy       gemcitabine (GEMZAR) 2,260 mg in sodium chloride 0.9% 250 mL chemo infusion       CISplatin (PLATINOL) 25 mg/m2 = 57 mg in sodium chloride 0.9% 500 mL chemo infusion       Pre-Hydration       sodium chloride 0.9% 500 mL with magnesium sulfate 2 g, potassium chloride 20 mEq infusion       Post-Hydration       sodium chloride 0.9% bolus 500 mL  6/14/2022       Chemotherapy       gemcitabine (GEMZAR) 2,260 mg in sodium chloride 0.9% 250 mL chemo infusion       CISplatin (PLATINOL) 25 mg/m2 = 57 mg in sodium chloride 0.9% 500 mL chemo infusion       Pre-Hydration       sodium chloride 0.9% 500 mL with magnesium sulfate 2 g, potassium chloride 20 mEq infusion       Post-Hydration       sodium chloride 0.9% bolus 500 mL

## 2022-05-23 NOTE — PROGRESS NOTES
Progress Note  Palliative Care    The [place] location is: [place]  The chief complaint leading to consultation is: symptom management    Visit type: audiovisual    Face to Face time with patient: 21 minutes    31 minutes of total time spent on the encounter, which includes face to face time and non-face to face time preparing to see the patient (eg, review of tests), Obtaining and/or reviewing separately obtained history, Documenting clinical information in the electronic or other health record, Independently interpreting results (not separately reported) and communicating results to the patient/family/caregiver, or Care coordination (not separately reported).     Each patient to whom he or she provides medical services by telemedicine is:  (1) informed of the relationship between the physician and patient and the respective role of any other health care provider with respect to management of the patient; and (2) notified that he or she may decline to receive medical services by telemedicine and may withdraw from such care at any time.    Reason for Consult: symptom-management and ACP      ASSESSMENT/PLAN:     Plan/Recommendations:  Diagnoses and all orders for this visit:    Intrahepatic cholangiocarcinoma  - followed by Dr. Young  - currently on disease-directed therapy  - completed TACE, chemo therapy     Encounter for palliative care/Advanced care planning  - patient decisional  - patient accompanied by his wife on telemedicine today  - no ACP documents uploaded into EMR  - philosophy of Palliative Medicine reviewed with patient and family at first visit  - new patient folder given to and reviewed with patient and family at first visit  - goals: life-prolonging  - he spoke about understanding that the treatment is to control the disease at this time  - ACP booklet given to and reviewed with patient and family today including HCPOA and living will  - code status not specifically discussed today  - will follow up  at future encounters for ACP booklet and code status    Anorexia/Nausea  - overall post treatment symptom burden worsening  - patient with anorexia and moderate to severe stomach cramping lasting 5 days post treatment   - vomiting only with oxycodone, no longer taking  - continue daily Boost   - following with nutrition  - start bentyl PRN for cramping, secondary goal to improve appetite   - will continue to monitor    Neoplastic (malignant) related fatigue/Weakness/dyspnea  - patient reporting worsening fatigue and weakness in the days post treatment  - he and his wife continue to report good days and bad days at times  - today he is feeling closer to baseline, planning to running errands with wife  - scheduled to start PT in July   - patient plays games or puzzles to keep mentally stimulated  - tips for shortness of breath in new patient folder for patient to review  - will continue to monitor    Cancer-related pain  - post/treatment related pain  - intermittent joint pain  - previously taking Oxycodone, causing emesis even w/food, no longer taking  - start morphine IR 15 mg q 6 hr, PRN, instructed to start with half tablet  - will continue to monitor     Adjustment disorder with mixed anxiety and depressed mood  - patient reports his mood has been good overall   - patient following with onc-psych   - reports that wife, daughter and dog offer good emotional support  - emotional support provided today  - will continue to monitor    Constipation/diarrhea   - patient had some diarrhea following TACE procedure last week  - he noted increased cramping at that time  - reports bowel movements improved about 5 days after treatment  - start bentyl 10 mg QID PRN   - continue adequate hydration for good bowel movements  - will continue to monitor    Understanding of illness/Prognosis: patient and family have good understanding of disease; prognosis is guarded    Goals of care: life-prolonging    Follow up: ~ 3-4  "weeks    Patient's encounter and above plan of care discussed with patient's oncologist     SUBJECTIVE:     History of Present Illness:  Patient is a 72 y.o. year old male with arthritis, CAD, HTN, seizures in 2011, sleep apnea with CPAP, adjustment disorder, and intrahepatic cholangiocarcinoma presents to Palliative Medicine for symptom management and ACP. Please see oncology notes for full details of oncologic history and treatment course.     05/23/2022  [place]  reviewed and summarized:  No recent prescriptions reported    Patient presents today via virtual visit, his wife interjects from off screen. Patient reports he is doing well today. However, following treatment his symptom burden is more prolonged, now lasting about 5 days compared to previously lasting only 2 days. He is experiencing intermittent pain, for which he is no longer taking oxycodone due to side-effect of emesis; he requests a different short-acting PRN medication. He reports that his mood is improved, and that he has good support from his wife, daughter and dog. He volunteers that he is feeling very good about his decision to continue treatment and does not plan to "give up". He is following with onc-psych.     04/25/2022:  [place]  reviewed and summarized:  04/19/2022 Oxycodone 5 mg Disp: 20 for 4 days    Per chart review, patient tolerating treatment of cis/gem well. Today patient states he had TACE last week. He developed cramping, nausea and vomiting for about two days after treatment. He was not able to tolerate oral intake. Patient states that on day three the symptoms resolved. He has been having more fatigue recently as well as weakness. He has to take more breaks to recover. He also has noticed some dyspnea with these symptoms as well. Patient's wife noted some irritability and shortness every now and then. He was also constipated, though this has improved recently.     02/21/2022:  [place]  reviewed and summarized:  12/14/2021 Oxycodone 5 " mg Disp: 90 for 22 days  12/03/2021 Percocet 5-325 mg disp: 6 for 2 days    Patient presents today with his wife. Overall he feels well. Patient is not taking any pain medication at this time. He does have some very intermittent, short lasting pain in his RUQ that occurs with certain actions (eg. Sneezing). Patient had noticed changes in his taste and overall appetite. He has noted improvement recently. He is also having some mild fatigue. Patient and family open to learning about ACP.     Past Medical History:   Diagnosis Date    Adjustment disorder     Anticoagulant long-term use     Anxiety     Arthritis     CAD (coronary artery disease) 3/2/2015    non-obstructive per [facility]     Cataract     Dry eye syndrome     Hypertension     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/2021    Obesity     Seizures     2011    Sleep apnea     + CPAP    Sleep difficulties      Past Surgical History:   Procedure Laterality Date    ANTERIOR CERVICAL DISCECTOMY W/ FUSION N/A 7/6/2020    Procedure: DISCECTOMY, SPINE, CERVICAL, ANTERIOR APPROACH, WITH FUSION C3-4, C4-5;  Surgeon: Fabiola Vides MD;  Location: [facility] OR 2ND FLR;  Service: Neurosurgery;  Laterality: N/A;  TORONTO III, ASA III, BLOOD TYPE & SCREEN, NEUROMONITORING EMG-MEP-SEP, SUPINE POSITION,BRACE MIAMI,REGULAR BED, HEADREST CASPAR, POSITION, MAP>85, RADIOLOGY C-ARM, SPECIAL EQUIPMENT [facility]    COLONOSCOPY N/A 10/1/2021    Procedure: COLONOSCOPY;  Surgeon: Nicolas Alvarado MD;  Location: [facility] (4TH FLR);  Service: Endoscopy;  Laterality: N/A;  EGD and colonoscopy for diarrhea and weight loss and Enlarged, heterogeneous appearance of the liver likely due to multiple underlying hepatic lesions largest measuring 4.8 cm.  Findings concerning for metastatic versus less likely primary hepatic neoplasm.  Recommend fur    ESOPHAGOGASTRODUODENOSCOPY N/A 10/1/2021    Procedure: EGD (ESOPHAGOGASTRODUODENOSCOPY);  Surgeon: Nicolas Alvarado MD;  Location: [facility]  (4TH FLR);  Service: Endoscopy;  Laterality: N/A;  EGD and colonoscopy for diarrhea and weight loss and Enlarged, heterogeneous appearance of the liver likely due to multiple underlying hepatic lesions largest measuring 4.8 cm.  Findings concerning for metastatic versus less likely primary hepatic neopl    ESOPHAGOGASTRODUODENOSCOPY N/A 4/5/2022    Procedure: EGD (ESOPHAGOGASTRODUODENOSCOPY);  Surgeon: Amado Kelsey MD;  Location: [facility] (4TH FLR);  Service: Endoscopy;  Laterality: N/A;  EGD in 8 weeks to check for esophagitis healing patient should be on pantoprazole 40 mg once daily  ok to hold eliquis x2 days per Dr. Young, see telephone encounter  fully vaccinated    EYE SURGERY      INSERTION OF VENOUS ACCESS PORT N/A 12/3/2021    Procedure: INSERTION, VENOUS ACCESS PORT;  Surgeon: Saurav Garcia MD;  Location: [facility] 2ND FLR;  Service: General;  Laterality: N/A;    INTRAOCULAR PROSTHESES INSERTION Right 7/16/2018    Procedure: INSERTION-INTRAOCULAR LENS (IOL);  Surgeon: Priscilla Hays MD;  Location: [facility];  Service: Ophthalmology;  Laterality: Right;    INTRAOCULAR PROSTHESES INSERTION Left 8/13/2018    Procedure: INSERTION, INTRAOCULAR LENS PROSTHESIS;  Surgeon: Priscilla Hays MD;  Location: [facility];  Service: Ophthalmology;  Laterality: Left;    KNEE SURGERY      PHACOEMULSIFICATION OF CATARACT Right 7/16/2018    Procedure: PHACOEMULSIFICATION-ASPIRATION-CATARACT;  Surgeon: Priscilla Hays MD;  Location: [facility] [place];  Service: Ophthalmology;  Laterality: Right;    PHACOEMULSIFICATION OF CATARACT Left 8/13/2018    Procedure: PHACOEMULSIFICATION, CATARACT;  Surgeon: Priscilla Hays MD;  Location: [facility];  Service: Ophthalmology;  Laterality: Left;    WISDOM TOOTH EXTRACTION       Family History   Problem Relation Age of Onset    Diabetes Mother     Hypertension Mother     Kidney cancer Mother 61    Cancer Mother         renal & pancreatic    Heart disease Father     No Known Problems  Sister     Cancer Maternal Grandmother     Liver disease Maternal Grandmother     Heart disease Paternal Grandfather     Heart disease Brother     No Known Problems Daughter     No Known Problems Son     No Known Problems Sister     No Known Problems Sister     No Known Problems Sister     No Known Problems Daughter     Blindness Neg Hx     Macular degeneration Neg Hx     Retinal detachment Neg Hx     Glaucoma Neg Hx     Melanoma Neg Hx     Pancreatic cancer Neg Hx     Bladder Cancer Neg Hx     Uterine cancer Neg Hx     Ovarian cancer Neg Hx     Celiac disease Neg Hx     Cirrhosis Neg Hx     Colon cancer Neg Hx     Colon polyps Neg Hx     Crohn's disease Neg Hx     Esophageal cancer Neg Hx     Inflammatory bowel disease Neg Hx     Liver cancer Neg Hx     Rectal cancer Neg Hx     Stomach cancer Neg Hx     Ulcerative colitis Neg Hx      Review of patient's allergies indicates:   Allergen Reactions    Indomethacin      Headache dizziness    Adhesive Rash       Medications:    Current Outpatient Medications:     acetaminophen (TYLENOL) 650 MG TbSR, Take by mouth daily as needed., Disp: , Rfl:     amoxicillin-clavulanate 500-125mg (AUGMENTIN) 500-125 mg Tab, Take 1 tablet (500 mg total) by mouth 2 (two) times daily. for 7 days, Disp: 14 tablet, Rfl: 0    apixaban (ELIQUIS) 2.5 mg Tab, Take 1 tablet (2.5 mg total) by mouth once daily., Disp: 90 tablet, Rfl: 3    diltiaZEM 2% Lidocaine 5% Cream, Apply pea size amount topically 3 (three) times daily., Disp: 30 g, Rfl: 2    finasteride (PROSCAR) 5 mg tablet, Take 1 tablet (5 mg total) by mouth once daily. (Patient taking differently: Take 5 mg by mouth every evening.), Disp: 90 tablet, Rfl: 3    hydroCHLOROthiazide (HYDRODIURIL) 25 MG tablet, TAKE 1 TABLET (25 MG TOTAL) BY MOUTH ONCE DAILY. (Patient taking differently: Take 25 mg by mouth every morning.), Disp: 90 tablet, Rfl: 3    magnesium oxide (MAG-OX) 400 mg (241.3 mg magnesium)  tablet, Take 400 mg by mouth every evening., Disp: , Rfl:     multivitamin with minerals tablet, Take 1 tablet by mouth every morning. , Disp: , Rfl:     ondansetron (ZOFRAN) 4 MG tablet, Take 1 tablet (4 mg total) by mouth every 6 (six) hours as needed for Nausea., Disp: 20 tablet, Rfl: 0    ondansetron (ZOFRAN) 4 MG tablet, Take 1 tablet (4 mg total) by mouth every 6 (six) hours as needed for Nausea., Disp: 20 tablet, Rfl: 0    ondansetron (ZOFRAN-ODT) 4 MG TbDL, Take 1 tablet (4 mg total) by mouth every 6 (six) hours as needed (nausea vomiting)., Disp: 1 tablet, Rfl: 0    ondansetron (ZOFRAN-ODT) 4 MG TbDL, Take 1 tablet (4 mg total) by mouth every 6 (six) hours as needed (nausea vomiting)., Disp: 28 tablet, Rfl: 0    oxyCODONE (ROXICODONE) 5 MG immediate release tablet, Take 1 tablet (5 mg total) by mouth every 4 (four) hours as needed for Pain (pain)., Disp: 20 tablet, Rfl: 0    oxyCODONE-acetaminophen (PERCOCET) 5-325 mg per tablet, Take 1 tablet by mouth every 6 (six) hours as needed for Pain., Disp: 20 tablet, Rfl: 0    potassium chloride SA (K-DUR,KLOR-CON) 20 MEQ tablet, Take 2 tablets (40 mEq total) by mouth once daily. (Patient taking differently: Take 40 mEq by mouth every morning.), Disp: 180 tablet, Rfl: 3    pravastatin (PRAVACHOL) 40 MG tablet, Take 1 tablet (40 mg total) by mouth once daily. (Patient taking differently: Take 40 mg by mouth every evening.), Disp: 90 tablet, Rfl: 3    prochlorperazine (COMPAZINE) 10 MG tablet, Take 1 tablet (10 mg total) by mouth every 6 (six) hours as needed (nausea)., Disp: 60 tablet, Rfl: 1    promethazine (PHENERGAN) 12.5 MG Tab, Take 1 tablet (12.5 mg total) by mouth every 6 (six) hours as needed (nausea)., Disp: 60 tablet, Rfl: 2    RABEprazole (ACIPHEX) 20 mg tablet, Take 1 tablet (20 mg total) by mouth once daily. (Patient taking differently: Take 20 mg by mouth every morning.), Disp: 90 tablet, Rfl: 3    sodium,potassium,mag sulfates (SUPREP  BOWEL PREP KIT) 17.5-3.13-1.6 gram SolR, As Directed, Disp: 1 kit, Rfl: 0    tamsulosin (FLOMAX) 0.4 mg Cap, Take 1 capsule (0.4 mg total) by mouth once daily. (Patient taking differently: Take 0.4 mg by mouth every morning.), Disp: 90 capsule, Rfl: 0    OBJECTIVE:       ROS:  Review of Systems   Constitutional: Positive for activity change, appetite change and fatigue.   HENT: Negative.    Eyes: Negative.    Respiratory: Positive for shortness of breath.    Cardiovascular: Negative.    Gastrointestinal: Positive for abdominal pain (intermittent), diarrhea, nausea and vomiting.   Genitourinary: Negative.    Musculoskeletal: Negative.    Skin: Negative.    Neurological: Positive for weakness.   All other systems reviewed and are negative.      Review of Symptoms    Symptom Assessment (ESAS 0-10 Scale)  Pain:  6  Dyspnea:  1  Anxiety:  1  Nausea:  2  Depression:  1  Anorexia:  5  Fatigue:  8  Insomnia:  0  Restlessness:  0  Agitation:  0     CAM / Delirium:  Negative  Constipation:  Negative  Diarrhea:  Positive (after treatment)    Bowel Management Plan (BMP):  No      Pain Assessment:  OME in 24 hours:  0  Location(s): none      Modified Isidoro Scale:  1    ECOG Performance Status ndGndrndanddndend:nd nd2nd Living Arrangements:  Lives with spouse    Psychosocial/Cultural: Patient lives with his wife and eldest daughter. He has a younger daughter that does not live with them anymore. Patient also has a dog named Chance    Spiritual:  F - Annemarie and Belief:  Scientologist  I - Importance:  High  C - Community:  Yes; good support   A - Address in Care:  Needs met at this time      Advance Care Planning   Advance Directives:   Living Will: No    LaPOST: No    Do Not Resuscitate Status: No    Medical Power of : No    Agent's Name:  Estrellita Kellogg   Agent's Contact Number:  [phone]    Decision Making:  Patient answered questions and Family answered questions          Physical Exam: Limited due to telemedicine visit   Vitals:  no  vitals obtained, virtual visit   Physical Exam  Constitutional:       General: He is not in acute distress.     Appearance: He is not toxic-appearing.   HENT:      Head: Normocephalic and atraumatic.      Right Ear: External ear normal.      Left Ear: External ear normal.      Nose: Nose normal.      Mouth/Throat:      Mouth: Mucous membranes are moist.   Eyes:      General: No scleral icterus.        Right eye: No discharge.         Left eye: No discharge.   Neck:      Comments: Trachea midline  Pulmonary:      Effort: Pulmonary effort is normal. No respiratory distress.   Musculoskeletal:      Cervical back: Normal range of motion.      Comments: Sitting up without assistance; able to move upper extremities without limitation   Skin:     General: Skin is dry.      Findings: No erythema or rash.   Neurological:      General: No focal deficit present.      Mental Status: He is alert and oriented to person, place, and time.      Cranial Nerves: No cranial nerve deficit.   Psychiatric:         Behavior: Behavior normal.         Thought Content: Thought content normal.         Judgment: Judgment normal.         Labs:  CBC:   WBC   Date Value Ref Range Status   05/18/2022 7.17 3.90 - 12.70 K/uL Final     Hemoglobin   Date Value Ref Range Status   05/18/2022 8.1 (L) 14.0 - 18.0 g/dL Final     Hematocrit   Date Value Ref Range Status   05/18/2022 24.7 (L) 40.0 - 54.0 % Final     MCV   Date Value Ref Range Status   05/18/2022 91 82 - 98 fL Final     Platelets   Date Value Ref Range Status   05/18/2022 166 150 - 450 K/uL Final     Comment:     Platelets are clumped on smear.Platelet count may be affected.       LFT:   Lab Results   Component Value Date    AST 57 (H) 05/18/2022    GGT 1,292 (H) 09/20/2021    ALKPHOS 332 (H) 05/18/2022    BILITOT 0.8 05/18/2022       Albumin:   Albumin   Date Value Ref Range Status   05/18/2022 2.8 (L) 3.5 - 5.2 g/dL Final     Protein:   Total Protein   Date Value Ref Range Status  "  05/18/2022 6.3 6.0 - 8.4 g/dL Final       Radiology:I have reviewed all pertinent imaging results/findings within the past 24 hours.    03/14/2022 MRI abdomen: "Interval postprocedural changes status post TACE with slight improvement of tumor burden at treatment sites.  Extensive residual disease throughout both hepatic lobes.  Index lesions as above. 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  Stable appearance of central hepatic veins, underlying thrombosis not excluded.  Consider further evaluation with Doppler ultrasound if clinically warranted. Stable prominent periportal lymph node. Right adrenal adenoma.  Additional findings as above."    03/14/2022 CT chest: "Right pulmonary nodules, unchanged.  Recommend continued follow-up as per clinical protocol."    Encounter occurred during period of COVID-19 emergency. Encounter performed under the concurrent guidelines, limitations and protocols.    31 minutes of total time spent on the encounter, which includes face to face time and non-face to face time preparing to see the patient (eg, review of tests), Obtaining and/or reviewing separately obtained history, Documenting clinical information in the electronic or other health record, Independently interpreting results (not separately reported) and communicating results to the patient/family/caregiver, or Care coordination (not separately reported).      Signature: Alyssa Mooney DNP      "

## 2022-05-24 NOTE — PLAN OF CARE
Patient tolerated cinvanti, aloxi, Gemzar, cisplatin and IV fluids with mag and K+. Port was flushed and heparin locked. Port deaccessed. Instructed patient to call MD for any concerns. Ambulated out with wife at side.

## 2022-05-25 NOTE — PROGRESS NOTES
Outpatient Care Management   - High Risk Patient Assessment    Patient: Domonique Kellogg  MRN:  [id number]  Date of Service:  5/25/2022  Completed by:  Kathie Arndt LCSW  Referral Date: 05/11/2022  Program:     Reason for Visit   Patient presents with    OPCM Chart Review     5/23/22    OPCM SW First Assessment Attempt     5/25/22    Social Work Assessment - High Risk     5/25/22    Plan Of Care     5/25/22       Brief Summary:  received a referral from OPCM RN Guerline Montoya for the following patient identified psycho-social needs: medical/chemo bills, Advance Care Planning (ACP). Pt also reports difficulty affording some medications. OPCM RN mailed ACP packet and pt has received. No questions at this time. Care plan was created in collaboration with patient/caregiver input.     Patient Summary     OPCM Social Work Assessment (High Risk)    Involvement of Care  Do I have permission to speak with other family members about your care?: Yes (Comment: wife)  Assessment completed by: Patient  Cognitive  Which of the following can you state?: Name, Date of birth  Cognitive barriers?: No  General  Marital status:   Current employment status: Retired and not working  Support  Level of Caregiver support: Member independent and does not need caregiver assistance (Comment: Lives with wife in their home. Pt is ambulatory, indep, drives)  Support system: Spouse or partner, Children, Friends, [employer]ology organization (Comment: 2 daughters)  Is the caregiver reporting burnout?: No  Support Barriers?: No  Advanced Care Planning  Do you have any of the following?: None  If yes, do we have a copy?: N/A  If no, would you like Advance Directive resources?: No (Comment: OPCM RN mailed and pt received)  Advance Care Planning resources provided?: No  Is Advance Care Planning an area of need?: No  Financial  Current medical coverage: Medicare Advantage  Have you applied for government  assistance programs?: No  Are you unable to pay any of the following?: Medical Care, Medication  Gross monthly income: 5000  Other assets: home, car  Financial Support Barriers?: Yes  Safety  Safety barriers?: No   History  Do you or your spouse currently or formerly serve in the ?: No  Disaster Plan  Established evacuation plan?: Yes  [place] residence: [place]  Evacuation location: pt and wife would leave the area, can go to [place] where daughter lives  Ability to evacuate: N/A  Mental Health Status  Emotional status: Stable  Have you recenetly lost a loved one?: Yes (Comment: brother  from COVID)  Psychiatric diagnosis: denies  Current mental health treatment: No  Would you like mental health resources?: No  Current symptoms: None  Mental/Behavioral/Environmental risk: None  Mental Health Barriers?: No  Addictive Behaviors  Current alcohol consumption?: No  Current substance abuse?: No  Gambling habits?: No  Was the PHQ depression screening completed?: No  Was the CAMILO-7 completed?: No         Complex Care Plan     Care plan was discussed and completed today with input from patient and/or caregiver.    Patient Instructions     Follow up in about 10 days (around 2022) for call with KIRSTY.    [facility] OPC Self-Management Care Plan was developed with the patients/caregivers input and was based on identified barriers from todays assessment.  Goals were written today with the patient/caregiver and the patient has agreed to work towards these goals to improve his/her overall well-being. Patient verbalized understanding of the care plan, goals, and all of today's instructions. Encouraged patient/caregiver to communicate with his/her physician and health care team about health conditions and the treatment plan.  Provided my contact information today and encouraged patient/caregiver to call me with any questions as needed.

## 2022-05-26 NOTE — LETTER
June 1, 2022    Domonique Kellogg  [id number]  [facility] [address]             [facility]  [address]  [facility] [id number] Dear Mr. Kellogg,    I work with [facility] Outpatient Case Management Department. I have been unsuccessful at reaching you to follow up to see how you have been doing. Please call me back at your earliest convenience to discuss your health care needs.    I can be reached at [phone] from 8:00 AM to 4:30 PM on Monday through Friday. [facility] is a program offered through [facility] where a nurse is available 24/7 to answer questions or provide medical advice. Their number is [phone].      Kind Regards,    Guerline Montoya RN  Outpatient Case Management

## 2022-05-30 NOTE — PROGRESS NOTES
"The patient location is: Car  The chief complaint leading to consultation is: cholangiocarcinoma    Visit type: audiovisual    Face to Face time with patient: 20 mins of total time spent on the encounter, which includes face to face time and non-face to face time preparing to see the patient (eg, review of tests), Obtaining and/or reviewing separately obtained history, Documenting clinical information in the electronic or other health record, Independently interpreting results (not separately reported) and communicating results to the patient/family/caregiver, or Care coordination (not separately reported).     Each patient to whom he or she provides medical services by telemedicine is:  (1) informed of the relationship between the physician and patient and the respective role of any other health care provider with respect to management of the patient; and (2) notified that he or she may decline to receive medical services by telemedicine and may withdraw from such care at any time.    Notes: Video streaming quality was good.                                                               PROGRESS NOTE    Subjective:       Patient ID: Domonique Kellogg is a 72 y.o. male.    Chief Complaint: follow up for cholangiocarcinoma    Diagnosis:  Advanced intrahepatic cholangiocarcinoma, MSI-low, negative for FGFR2 fusion or IDH1/IDH2 mutations, diagnosed on 11/22/2021.     Molecular Profile:  Guardant 360 11/29/21: +CCND1 amplification. VUS: CATRACHITO [id number]. MSI-high not detected.     Oncologic History copied from medical chart:  1. Mr Kellogg is a 70 yo man with CAD, HTN, seizures in 2011 who initially saw me on 11/29/21 for further management of cholangiocarcinoma. He presented with weight loss and diarrhea since July. US 9/22/21 showed "Enlarged, heterogeneous appearance of the liver likely due to multiple underlying hepatic lesions largest measuring 4.8 cm."  MRI abdomen w/wo contrast 10/4/21: "Multiple liver lesions measuring " "up to 13.3 cm in the right hepatic lobe.  Differential diagnosis includes metastases, fibrolamellar hepatocellular carcinoma, or atypical focal nodular hyperplasia.  Consider biopsy for further evaluation. Thrombosis of the anterior branch of the right portal vein and left hepatic vein.  Nonvisualization of the middle and right hepatic veins, which may be thrombosed as well. Prominent periportal lymph node, which is nonspecific."  EGD and colonoscopy on 10/11/21 were negative for primary malignancies.   He underwent liver lesion biopsy and Y90 mapping on 11/22/21. Pathology showed adenocarcinoma: "Biopsy of the liver mass shows a malignant neoplasm in a fibrotic stroma. Glandular architecture is readily identified with several foci of intraluminal necrosis. Scattered mitotic figures are seen. Neoplastic cells show the following immunophenotype:   Positive: AE1/AE3, CK7, CDX2   Negative: Chromogranin, Synaptophysin, TTF, CK5/6, CK20, HepPar1   The morphology and immunophenotype are compatible with adenocarcinoma. Considerations for a primary site include pancreaticobiliary (including intrahepatic cholangiocarcinoma) as well as upper gastrointestinal. Clinical and radiologic correlation is suggested."  Patient presents today with wife for further management. No pain. Initial weight loss has somewhat stabilized. Still has diarrhea. Has not tried imodium yet. +nervous  Mother had kidney cancer at age 64. Maternal grandmother had liver cancer in her 60s. Patient has three children, two daughters and one son.   Discussed palliative chemo with cis/gem  2. PET scan 12/6/21 did not show extrahepatic metastases.   3. Cis/gem started on 12/7/21, s/p cycle 6 day 1  4. S/p TACE on 12/16/2021, 2/14/22, 4/19/22.    Interval History:   Mr Kellogg returns for cycle 8 day 8 cis/gem. Chemotherapy was delayed due to recent IR procedure. Tolerating chemo well but has increased fatigue. He feels that this is new. S/p TACE on 4/19/22 and " 2022. He was recently seen in the ER in [facility] on 2022 for atypical chest pain. Physical exam and cardiology work up was fairly negative. Chest x-ray displayed cardiomegaly but no other findings to explain chest pain. He has not felt this pain since the ER visit.    No headaches recently. +SOB with exertion this has not worsened. No cough to report today. Wanted to get some physical therapy as he feels he is getting deconditioned. He is scheduled in July. He is eating well and has a steady weight. Continued taste changes. No fever, chills, n/v, abdominal pain. No changes to his hearing.     ECO. Presents with caregiver by video     ROS:   A ten-point system review is obtained and negative except for what was stated in the Interval History.     Physical Examination:   Vital signs reviewed.   General: well hydrated, well developed, in no acute distress  HEENT: normocephalic, EOMI  Neck: supple, no JVD  Lungs: No respiratory distress  Heart: No report of chest pain or increased heart rate  Extremities: no clubbing, cyanosis, or edema  Skin: no rash, ulcer, or open wounds  Neuro: alert and oriented x 4, no focal neuro deficit  Psych: pleasant and appropriate mood and affect    Objective:     Laboratory Data:  Labs from today reviewed. Adequate for chemo. Mg 1.4   Bili 0.8, mild elevation in LFTs (most likely 2/2 TACE) but improved  Hgb 7.1. CA 19-9 590.2 from 398.6, increased from 264.5    Imaging Data:  PET 21:  In this patient with known intrahepatic cholangiocarcinoma, there are multifocal hypermetabolic hepatic lesions in both hepatic lobes.  No evidence of distant metastasis.     Lipomatosis of the ileocecal valve and proximal cecum.     Fat containing umbilical hernia.    MRI Abdomen 22:  1. Patient with reported cholangiocarcinoma.  Interval progression of hepatic tumor burden with multiple enlarging and new hepatic lesions.  2. Progression of portal venous thrombosis involving the left,  right, and main portal veins.  Persistent filling defect within the central hepatic veins concerning for thrombosis.  3. Stable prominent periportal lymph node.    CT chest 3/4/22:  Right pulmonary nodules, unchanged.  Recommend continued follow-up as per clinical protocol.    MRI abdomen 3/14/22:     Interval postprocedural changes status post TACE with slight improvement of tumor burden at treatment sites.  Extensive residual disease throughout both hepatic lobes.  Index lesions as above.     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     Stable appearance of central hepatic veins, underlying thrombosis not excluded.  Consider further evaluation with Doppler ultrasound if clinically warranted.     Stable prominent periportal lymph node.     Right adrenal adenoma.    CT urogram 3/14/22:  Prostatomegaly which demonstrates mass effect on the posterior bladder.  There is mild diffuse wall thickening of the bladder which may relate to outlet obstruction.     Postprocedural changes of the liver in keeping with recent chemoembolization.  Protocol is not optimized for evaluation of tumor response.  Dedicated multiphase liver CT would be necessary to evaluate liver directed therapy response.    Assessment and Plan:     1. Intrahepatic cholangiocarcinoma    2. Secondary malignant neoplasm of liver    3. 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 Antineoplastic chemotherapy induced anemia    6. Debility    7. Gastrointestinal hemorrhage, unspecified gastrointestinal hemorrhage type    8. CAD in native artery    9. Shortness of breath on exertion    10. Portal vein thrombosis    11. Essential hypertension      1-5.  - Mr Kellogg is a 70 yo man with advanced intrahepatic cholangiocarcinoma with multiple liver lesions. MSI-low, FGFR2 fusion and IDH1/2 mutation negative. Started on cis/gem on 12/7/21. S/p TACE on 12/16/2021, 2/14/22,  4/19/22  - added durvalumab after TOPAZ trial presented at [facility] in Jan 2022. Had [id number] with insurance company. Insurance company does not cover given lack of peer-reviewed article. Previously discussed applying for patient assistance. Pt was think it over at that time. Will discuss it again in the future   - doing very well. Has some increased fatigue after this past chemotherapy. Lab work is adequate for treatment.   - Cycle 8 day 8 cis/gem tomorrow. Had TACE procedure on 5/18/2022.   - patient prefers virtual visit if he cannot see provider the same day of chemo.   - labs at [facility]  - return in 2 weeks for cycle 9 day 1, as previous cycle was delayed due to TACE procedure performed on 5/18/2022 with Dr. Fitzgerald  - restaging CT chest, MRI abdomen/pelvis in mid June. Will order and schedule    6.  - Scheduled with physical therapy in July.    7.  - Hb from today 7.1. will transfuse 1 unit of packed RBCs at earliest availability.   - discussed with patient that if Hb <8 will give transfusion given his dyspnea with exertion    8.  - on pravachol. Continue    9.  - see #5 and #6    10.  - could not tolerate eliquis 2.5 mg bid due to hematuria.   - continue with eliquis 2.5 mg daily. Doing well without overt evidence of bleeding.     11.  - BP well controlled per patient and readings within the clinic visit  - c/w current medication    Follow-up:     RTC in 2 weeks for cycle 9 day 1 of cis/gem    Pte and family members voiced understanding of the above encounter and treatment plan. All thoughtful questions were answered to their satisfaction. Pte was advised to notify the care team or proceed to the ER if signs and symptoms worsen.       NEO Ervin, PA-C  Physician Assistant Certified  Dept of Hematology/Oncology  PAIgnacioC to Dr. Young, Dr. Lane and Dr. Bourgeois    Electronically signed by Gertrude Ni      Route Chart for Scheduling    Med Onc Chart Routing      Follow up with physician 2 weeks.  Labs (CBC, CMP, Mg, CA 19-9) at [facility], CT chest non-contrast and MRI abdomen/pelvis. if cannot see provider the same day of chemo, patient prefers virtual visit. labs in 2 weeks see Dr. Young then get cycle 9 day 1 of cis/gem.    Follow up with BRADLEY 3 weeks. RTC in 3 weeks with lab work, clinic visit with Demi and Jody and cycle 9 day 8 of [place]/Cis   Labs CBC, CA 19-9, CMP and magnesium   Lab interval: once a week  Please repeat CBC and cmp only in 1 week. Monitor Hgb. Please schedule 1 unit of packed RBCs as well. Orders have been placed.    Imaging MRI   CT chest non-contrast   Pharmacy appointment    Other referrals          Treatment Plan Information   OP GEMCITABINE CISPLATIN Q3W + DURVALUMAB D8 C4+   Claudia Young MD   Upcoming Treatment Dates - OP GEMCITABINE CISPLATIN Q3W + DURVALUMAB D8 C4+    6/7/2022       Chemotherapy       gemcitabine (GEMZAR) 2,260 mg in sodium chloride 0.9% 250 mL chemo infusion       CISplatin (PLATINOL) 25 mg/m2 = 57 mg in sodium chloride 0.9% 500 mL chemo infusion       Pre-Hydration       sodium chloride 0.9% 500 mL with magnesium sulfate 2 g, potassium chloride 20 mEq infusion       Post-Hydration       sodium chloride 0.9% bolus 500 mL  6/14/2022       Chemotherapy       gemcitabine (GEMZAR) 2,260 mg in sodium chloride 0.9% 250 mL chemo infusion       CISplatin (PLATINOL) 25 mg/m2 = 57 mg in sodium chloride 0.9% 500 mL chemo infusion       Pre-Hydration       sodium chloride 0.9% 500 mL with magnesium sulfate 2 g, potassium chloride 20 mEq infusion       Post-Hydration       sodium chloride 0.9% bolus 500 mL

## 2022-05-31 NOTE — PLAN OF CARE
Patient tolerated cisplatin/gemzar/ivfs and 1 unit of prbcs well today. Port + blood return present, flushed, hep locked and deaccessed. NAD noted upon discharge. Verbalized understanding to call MD for any questions/concerns. Discharged home, ambulated independently with wife by side.

## 2022-06-01 NOTE — PROGRESS NOTES
Outpatient Care Management  Plan of Care Follow Up Visit    Patient: Domonique Kellogg  MRN: [id number]  Date of Service: 05/26/2022  Completed by: Guerline Montoya RN  Referral Date: 05/11/2022  Program:     Reason for Visit   Patient presents with    OPCM Chart Review     5/26/22, 6/1/22    OPCM RN First Follow-Up Attempt     6/1/22    Update Plan Of Care     6/6/22       Brief Summary:     OPCM follow up complete. Continued education on Cancer. Patient is in agreement with follow up call on or around 6/20/22.    Patient Summary     Involvement of Care:  Do I have permission to speak with other family members about your care?  Yes    Patient Reported Labs & Vitals:  1.  Any Patient Reported Labs & Vitals?  No  2.  Patient Reported Blood Pressure:     3.  Patient Reported Pulse:     4.  Patient Reported Weight (Kg):     5.  Patient Reported Blood Glucose (mg/dl):       Medical and social history was reviewed with patient and/or caregiver.     Clinical Assessment     Reviewed and provided basic information on available community resources for mental health, transportation, wellness resources, and palliative care programs with patient and/or caregiver.     Complex Care Plan     Care plan was discussed and completed today with input from patient and/or caregiver.    Patient Instructions     Instructions were provided via the [employer] patient resources and are available for the patient to view on the patient portal.    [facility] Self-Management Care Plan was developed with the patients/caregivers input and was based on identified barriers from [facility] assessment.  Goals were written today with the patient/caregiver and the patient has agreed to work towards these goals to improve his/her overall well-being. Patient verbalized understanding of the care plan, goals, and all of today's instructions. Encouraged patient/caregiver to communicate with his/her physician and health care team about health conditions and  the treatment plan.  Provided my contact information today and encouraged patient/caregiver to call me with any questions as needed.    6/1/22 1st attempt to complete follow-up for Outpatient Care Management: Left message for patient requesting a return call. OPCM letter has been sent. Will attempt to contact patient again at a later date.

## 2022-06-07 NOTE — PROVATION PATIENT INSTRUCTIONS
Discharge Summary/Instructions after an Endoscopic Procedure  Patient Name: Domonique Kellogg  Patient MRN: [id number]  Patient YOB: 1950 Tuesday, June 7, 2022  Mejia Villafuerte MD  Dear patient,  As a result of recent federal legislation (The Federal Cures Act), you may   receive lab or pathology results from your procedure in your [facility]   account before your physician is able to contact you. Your physician or   their representative will relay the results to you with their   recommendations at their soonest availability.  Thank you,  RESTRICTIONS:  During your procedure today, you received medications for sedation.  These   medications may affect your judgment, balance and coordination.  Therefore,   for 24 hours, you have the following restrictions:   - DO NOT drive a car, operate machinery, make legal/financial decisions,   sign important papers or drink alcohol.    ACTIVITY:  Today: no heavy lifting, straining or running due to procedural   sedation/anesthesia.  The following day: return to full activity including work.  DIET:  Eat and drink normally unless instructed otherwise.     TREATMENT FOR COMMON SIDE EFFECTS:  - Mild abdominal pain, nausea, belching, bloating or excessive gas:  rest,   eat lightly and use a heating pad.  - Sore Throat: treat with throat lozenges and/or gargle with warm salt   water.  - Because air was used during the procedure, expelling large amounts of air   from your rectum or belching is normal.  - If a bowel prep was taken, you may not have a bowel movement for 1-3 days.    This is normal.  SYMPTOMS TO WATCH FOR AND REPORT TO YOUR PHYSICIAN:  1. Abdominal pain or bloating, other than gas cramps.  2. Chest pain.  3. Back pain.  4. Signs of infection such as: chills or fever occurring within 24 hours   after the procedure.  5. Rectal bleeding, which would show as bright red, maroon, or black stools.   (A tablespoon of blood from the rectum is not serious, especially  if   hemorrhoids are present.)  6. Vomiting.  7. Weakness or dizziness.  GO DIRECTLY TO THE NEAREST EMERGENCY ROOM IF YOU HAVE ANY OF THE FOLLOWING:      Difficulty breathing              Chills and/or fever over 101 F   Persistent vomiting and/or vomiting blood   Severe abdominal pain   Severe chest pain   Black, tarry stools   Bleeding- more than one tablespoon   Any other symptom or condition that you feel may need urgent attention  Your doctor recommends these additional instructions:  If any biopsies were taken, your doctors clinic will contact you in 1 to 2   weeks with any results.  - Discharge patient to home.   - Repeat colonoscopy in 3 years for surveillance for history of multiple   polyps recently.   - Return to referring physician.   - Resume Eliquis (apixaban) at prior dose today.   - The findings and recommendations were discussed with the patient.  For questions, problems or results please call your physician - Mejia Villafuerte MD at Work:  [phone].  [facility], EMERGENCY ROOM PHONE NUMBER: [phone]  IF A COMPLICATION OR EMERGENCY SITUATION ARISES AND YOU ARE UNABLE TO REACH   YOUR PHYSICIAN - GO DIRECTLY TO THE EMERGENCY ROOM.  Mejia Villafuerte MD  6/7/2022 1:26:53 PM  This report has been verified and signed electronically.  Dear patient,  As a result of recent federal legislation (The Federal Cures Act), you may   receive lab or pathology results from your procedure in your [facility]   account before your physician is able to contact you. Your physician or   their representative will relay the results to you with their   recommendations at their soonest availability.  Thank you,  PROVATION

## 2022-06-07 NOTE — ANESTHESIA POSTPROCEDURE EVALUATION
Anesthesia Post Evaluation    Patient: Domonique Kellogg    Procedure(s) Performed: Procedure(s) (LRB):  COLONOSCOPY (N/A)    Final Anesthesia Type: general      Patient location during evaluation: GI PACU  Patient participation: Yes- Able to Participate  Level of consciousness: awake and alert and oriented  Post-procedure vital signs: reviewed and stable  Pain management: adequate  Airway patency: patent  RAHEEL mitigation strategies: Intraoperative administration of CPAP, nasopharyngeal airway, or oral appliance during sedation and Multimodal analgesia  PONV status at discharge: No PONV  Anesthetic complications: no      Cardiovascular status: hemodynamically stable  Respiratory status: unassisted  Hydration status: euvolemic  Follow-up not needed.          Vitals Value Taken Time   /75 06/07/22 1350   Temp 36.9 °C (98.5 °F) 06/07/22 1328   Pulse 56 06/07/22 1350   Resp 13 06/07/22 1350   SpO2 97 % 06/07/22 1350         Event Time   Out of Recovery 13:59:01         Pain/Lalo Score: Lalo Score: 10 (6/7/2022  1:28 PM)

## 2022-06-07 NOTE — H&P
[facility] 4[facility]  History & Physical    Subjective:      Chief Complaint/Reason for Admission:     Colonoscopy    Domonique Kellogg is a 72 y.o. male.    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 OR [address] [place] [address];  Service: Neurosurgery;  Laterality: N/A;  TORONTO III, ASA III, BLOOD TYPE & SCREEN, NEUROMONITORING EMG-MEP-SEP, SUPINE POSITION,BRACE MIAMI,REGULAR BED, HEADREST CASPAR, POSITION, MAP>85, RADIOLOGY C-ARM, SPECIAL EQUIPMENT [facility]    COLONOSCOPY N/A 10/1/2021    Procedure: COLONOSCOPY;  Surgeon: Nicolas Alvarado MD;  Location: [address]);  Service: Endoscopy;  Laterality: N/A;  EGD and colonoscopy for diarrhea and weight loss and Enlarged, heterogeneous appearance of the liver likely due to multiple underlying hepatic lesions largest measuring 4.8 cm.  Findings concerning for metastatic versus less likely primary hepatic neoplasm.  Recommend fur    ESOPHAGOGASTRODUODENOSCOPY N/A 10/1/2021    Procedure: EGD (ESOPHAGOGASTRODUODENOSCOPY);  Surgeon: Nicolas Alvarado MD;  [facility]  Service: Endoscopy;  Laterality: N/A;  EGD and colonoscopy for diarrhea and weight loss and Enlarged, heterogeneous appearance of the liver likely due to multiple underlying hepatic lesions largest measuring 4.8 cm.  Findings concerning for metastatic versus less likely primary hepatic neopl     ESOPHAGOGASTRODUODENOSCOPY N/A 4/5/2022    Procedure: EGD (ESOPHAGOGASTRODUODENOSCOPY);  Surgeon: Amado Kelsey MD;  Location: [facility] (4TH FLR);  Service: Endoscopy;  Laterality: N/A;  EGD in 8 weeks to check for esophagitis healing patient should be on pantoprazole 40 mg once daily  ok to hold eliquis x2 days per Dr. Young, see telephone encounter  fully vaccinated    EYE SURGERY      INSERTION OF VENOUS ACCESS PORT N/A 12/3/2021    Procedure: INSERTION, VENOUS ACCESS PORT;  Surgeon: Saurav Garcia MD;  Location: [facility] 2ND FLR;  Service: General;  Laterality: N/A;    INTRAOCULAR PROSTHESES INSERTION Right 7/16/2018    Procedure: INSERTION-INTRAOCULAR LENS (IOL);  Surgeon: Priscilla Hays MD;  Location: [facility];  Service: Ophthalmology;  Laterality: Right;    INTRAOCULAR PROSTHESES INSERTION Left 8/13/2018    Procedure: INSERTION, INTRAOCULAR LENS PROSTHESIS;  Surgeon: Priscilla Hays MD;  Location: [facility];  Service: Ophthalmology;  Laterality: Left;    KNEE SURGERY      PHACOEMULSIFICATION OF CATARACT Right 7/16/2018    Procedure: PHACOEMULSIFICATION-ASPIRATION-CATARACT;  Surgeon: Priscilla Hays MD;  Location: [facility];  Service: Ophthalmology;  Laterality: Right;    PHACOEMULSIFICATION OF CATARACT Left 8/13/2018    Procedure: PHACOEMULSIFICATION, CATARACT;  Surgeon: Priscilla Hays MD;  Location: [facility];  Service: Ophthalmology;  Laterality: Left;    WISDOM TOOTH EXTRACTION       Family History   Problem Relation Age of Onset    Diabetes Mother     Hypertension Mother     Kidney cancer Mother 61    Cancer Mother         renal & pancreatic    Heart disease Father     No Known Problems Sister     Cancer Maternal Grandmother     Liver disease Maternal Grandmother     Heart disease Paternal Grandfather     Heart disease Brother     No Known Problems Daughter     No Known Problems Son     No Known Problems Sister     No Known Problems Sister     No Known Problems Sister     No Known  Problems Daughter     Blindness Neg Hx     Macular degeneration Neg Hx     Retinal detachment Neg Hx     Glaucoma Neg Hx     Melanoma Neg Hx     Pancreatic cancer Neg Hx     Bladder Cancer Neg Hx     Uterine cancer Neg Hx     Ovarian cancer Neg Hx     Celiac disease Neg Hx     Cirrhosis Neg Hx     Colon cancer Neg Hx     Colon polyps Neg Hx     Crohn's disease Neg Hx     Esophageal cancer Neg Hx     Inflammatory bowel disease Neg Hx     Liver cancer Neg Hx     Rectal cancer Neg Hx     Stomach cancer Neg Hx     Ulcerative colitis Neg Hx      Social History     Tobacco Use    Smoking status: Former Smoker     Packs/day: 1.00     Years: 20.00     Pack years: 20.00     Quit date: 1987     Years since quittin.4    Smokeless tobacco: Never Used    Tobacco comment: quit    Substance Use Topics    Alcohol use: Not Currently     Alcohol/week: 1.0 - 2.0 standard drink     Types: 1 - 2 Glasses of wine per week     Comment: not since liver diagnosis    Drug use: No       PTA Medications   Medication Sig    acetaminophen (TYLENOL) 650 MG TbSR Take by mouth daily as needed.    finasteride (PROSCAR) 5 mg tablet Take 1 tablet (5 mg total) by mouth once daily. (Patient taking differently: Take 5 mg by mouth every evening.)    hydroCHLOROthiazide (HYDRODIURIL) 25 MG tablet TAKE 1 TABLET (25 MG TOTAL) BY MOUTH ONCE DAILY. (Patient taking differently: Take 25 mg by mouth every morning.)    magnesium oxide (MAG-OX) 400 mg (241.3 mg magnesium) tablet Take 400 mg by mouth every evening.    multivitamin with minerals tablet Take 1 tablet by mouth every morning.     ondansetron (ZOFRAN) 4 MG tablet Take 1 tablet (4 mg total) by mouth every 6 (six) hours as needed for Nausea.    ondansetron (ZOFRAN) 4 MG tablet Take 1 tablet (4 mg total) by mouth every 6 (six) hours as needed for Nausea.    ondansetron (ZOFRAN-ODT) 4 MG TbDL Take 1 tablet (4 mg total) by mouth every 6 (six) hours as needed  (nausea vomiting).    ondansetron (ZOFRAN-ODT) 4 MG TbDL Take 1 tablet (4 mg total) by mouth every 6 (six) hours as needed (nausea vomiting).    potassium chloride SA (K-DUR,KLOR-CON) 20 MEQ tablet Take 2 tablets (40 mEq total) by mouth once daily. (Patient taking differently: Take 40 mEq by mouth every morning.)    pravastatin (PRAVACHOL) 40 MG tablet Take 1 tablet (40 mg total) by mouth once daily. (Patient taking differently: Take 40 mg by mouth every evening.)    RABEprazole (ACIPHEX) 20 mg tablet Take 1 tablet (20 mg total) by mouth once daily. (Patient taking differently: Take 20 mg by mouth every morning.)    sodium,potassium,mag sulfates (SUPREP BOWEL PREP KIT) 17.5-3.13-1.6 gram SolR As Directed    tamsulosin (FLOMAX) 0.4 mg Cap TAKE 1 CAPSULE EVERY DAY    apixaban (ELIQUIS) 2.5 mg Tab Take 1 tablet (2.5 mg total) by mouth once daily.    dicyclomine (BENTYL) 10 MG capsule Take 1 capsule (10 mg total) by mouth 4 (four) times daily before meals and nightly.    diltiaZEM 2% Lidocaine 5% Cream Apply pea size amount topically 3 (three) times daily.    morphine (MSIR) 15 MG tablet Take 1 tablet (15 mg total) by mouth every 6 (six) hours as needed for Pain.    prochlorperazine (COMPAZINE) 10 MG tablet Take 1 tablet (10 mg total) by mouth every 6 (six) hours as needed (nausea).    promethazine (PHENERGAN) 12.5 MG Tab Take 1 tablet (12.5 mg total) by mouth every 6 (six) hours as needed (nausea).     Review of patient's allergies indicates:   Allergen Reactions    Indomethacin      Headache dizziness    Adhesive Rash        Review of Systems   Constitutional: Negative for fever.   Respiratory: Negative for shortness of breath.    Cardiovascular: Negative for chest pain.   Gastrointestinal: Negative for abdominal pain.       Objective:      Vital Signs (Most Recent)  Temp: 97.5 °F (36.4 °C) (06/07/22 1207)  Pulse: 77 (06/07/22 1207)  Resp: 16 (06/07/22 1207)  BP: (!) 147/81 (06/07/22 1207)  SpO2: 99 %  (06/07/22 1207)    Vital Signs Range (Last 24H):  Temp:  [97.5 °F (36.4 °C)]   Pulse:  [77]   Resp:  [16]   BP: (147)/(81)   SpO2:  [99 %]     Physical Exam  Cardiovascular:      Rate and Rhythm: Normal rate.   Pulmonary:      Effort: Pulmonary effort is normal.   Neurological:      Mental Status: He is alert and oriented to person, place, and time.       Assessment:      There are no hospital problems to display for this patient.      Plan:    Colonoscopy

## 2022-06-07 NOTE — ANESTHESIA PREPROCEDURE EVALUATION
06/07/2022  Pre-operative evaluation for Procedure(s) (LRB):  COLONOSCOPY (N/A)    Domonique Kellogg is a 72 y.o. male non obstructive CAD, HTN    Patient Active Problem List   Diagnosis    CTS (carpal tunnel syndrome)    Class 2 severe obesity due to excess calories with serious comorbidity and body mass index (BMI) of 38.0 to 38.9 in adult    H/O syncope    RAHEEL (obstructive sleep apnea)    CAD in native artery    Essential hypertension    Hyperlipidemia    Chronic pulmonary heart disease    Bilateral carotid artery disease    Primary osteoarthritis of right knee    Lateral femoral cutaneous entrapment syndrome    Cervical spondylosis    Decreased ROM of intervertebral discs of cervical spine    Cervical disc disease with myelopathy    Cervical stenosis of spinal canal    Prediabetes    Infection due to Strongyloides    Intrahepatic cholangiocarcinoma    Secondary malignant neoplasm of liver    Post-procedural fever    Transaminitis    Elevated troponin    Immunodeficiency secondary to neoplasm    Hyperbilirubinemia    Portal vein thrombosis    Cancer related pain    Immunodeficiency secondary to chemotherapy    Benign prostatic hyperplasia without lower urinary tract symptoms    Weight loss       Review of patient's allergies indicates:   Allergen Reactions    Indomethacin      Headache dizziness    Adhesive Rash       No current facility-administered medications on file prior to encounter.     Current Outpatient Medications on File Prior to Encounter   Medication Sig Dispense Refill    acetaminophen (TYLENOL) 650 MG TbSR Take by mouth daily as needed.      apixaban (ELIQUIS) 2.5 mg Tab Take 1 tablet (2.5 mg total) by mouth once daily. 90 tablet 3    diltiaZEM 2% Lidocaine 5% Cream Apply pea size amount topically 3 (three) times daily. 30 g 2    finasteride (PROSCAR) 5 mg  tablet Take 1 tablet (5 mg total) by mouth once daily. (Patient taking differently: Take 5 mg by mouth every evening.) 90 tablet 3    hydroCHLOROthiazide (HYDRODIURIL) 25 MG tablet TAKE 1 TABLET (25 MG TOTAL) BY MOUTH ONCE DAILY. (Patient taking differently: Take 25 mg by mouth every morning.) 90 tablet 3    magnesium oxide (MAG-OX) 400 mg (241.3 mg magnesium) tablet Take 400 mg by mouth every evening.      multivitamin with minerals tablet Take 1 tablet by mouth every morning.       ondansetron (ZOFRAN) 4 MG tablet Take 1 tablet (4 mg total) by mouth every 6 (six) hours as needed for Nausea. 20 tablet 0    ondansetron (ZOFRAN-ODT) 4 MG TbDL Take 1 tablet (4 mg total) by mouth every 6 (six) hours as needed (nausea vomiting). 1 tablet 0    ondansetron (ZOFRAN-ODT) 4 MG TbDL Take 1 tablet (4 mg total) by mouth every 6 (six) hours as needed (nausea vomiting). 28 tablet 0    potassium chloride SA (K-DUR,KLOR-CON) 20 MEQ tablet Take 2 tablets (40 mEq total) by mouth once daily. (Patient taking differently: Take 40 mEq by mouth every morning.) 180 tablet 3    pravastatin (PRAVACHOL) 40 MG tablet Take 1 tablet (40 mg total) by mouth once daily. (Patient taking differently: Take 40 mg by mouth every evening.) 90 tablet 3    prochlorperazine (COMPAZINE) 10 MG tablet Take 1 tablet (10 mg total) by mouth every 6 (six) hours as needed (nausea). 60 tablet 1    promethazine (PHENERGAN) 12.5 MG Tab Take 1 tablet (12.5 mg total) by mouth every 6 (six) hours as needed (nausea). 60 tablet 2    RABEprazole (ACIPHEX) 20 mg tablet Take 1 tablet (20 mg total) by mouth once daily. (Patient taking differently: Take 20 mg by mouth every morning.) 90 tablet 3       Past Surgical History:   Procedure Laterality Date    ANTERIOR CERVICAL DISCECTOMY W/ FUSION N/A 7/6/2020    Procedure: DISCECTOMY, SPINE, CERVICAL, ANTERIOR APPROACH, WITH FUSION C3-4, C4-5;  Surgeon: Fabiola Vides MD;  Location: [facility] OR [address] [place] [address];  Service:  Neurosurgery;  Laterality: N/A;  TORONTO III, ASA III, BLOOD TYPE & SCREEN, NEUROMONITORING EMG-MEP-SEP, SUPINE POSITION,BRACE MIAMI,REGULAR BED, HEADREST CASPAR, POSITION, MAP>85, RADIOLOGY C-ARM, SPECIAL EQUIPMENT VIRGIL TYLER    COLONOSCOPY N/A 10/1/2021    Procedure: COLONOSCOPY;  Surgeon: Nicolas Alvarado MD;  Location: [facility] (4TH FLR);  Service: Endoscopy;  Laterality: N/A;  EGD and colonoscopy for diarrhea and weight loss and Enlarged, heterogeneous appearance of the liver likely due to multiple underlying hepatic lesions largest measuring 4.8 cm.  Findings concerning for metastatic versus less likely primary hepatic neoplasm.  Recommend fur    ESOPHAGOGASTRODUODENOSCOPY N/A 10/1/2021    Procedure: EGD (ESOPHAGOGASTRODUODENOSCOPY);  Surgeon: Nicolas Alvarado MD;  Location: [facility] (4TH FLR);  Service: Endoscopy;  Laterality: N/A;  EGD and colonoscopy for diarrhea and weight loss and Enlarged, heterogeneous appearance of the liver likely due to multiple underlying hepatic lesions largest measuring 4.8 cm.  Findings concerning for metastatic versus less likely primary hepatic neopl    ESOPHAGOGASTRODUODENOSCOPY N/A 4/5/2022    Procedure: EGD (ESOPHAGOGASTRODUODENOSCOPY);  Surgeon: Amado Kelsey MD;  Location: [facility] (4TH FLR);  Service: Endoscopy;  Laterality: N/A;  EGD in 8 weeks to check for esophagitis healing patient should be on pantoprazole 40 mg once daily  ok to hold eliquis x2 days per Dr. Young, see telephone encounter  fully vaccinated    EYE SURGERY      INSERTION OF VENOUS ACCESS PORT N/A 12/3/2021    Procedure: INSERTION, VENOUS ACCESS PORT;  Surgeon: Saurav Garcia MD;  Location: [facility] 2ND FLR;  Service: General;  Laterality: N/A;    INTRAOCULAR PROSTHESES INSERTION Right 7/16/2018    Procedure: INSERTION-INTRAOCULAR LENS (IOL);  Surgeon: Priscilla Hays MD;  Location: [facility];  Service: Ophthalmology;  Laterality: Right;    INTRAOCULAR PROSTHESES INSERTION Left 8/13/2018     Procedure: INSERTION, INTRAOCULAR LENS PROSTHESIS;  Surgeon: Priscilla Hays MD;  Location: [facility];  Service: Ophthalmology;  Laterality: Left;    KNEE SURGERY      PHACOEMULSIFICATION OF CATARACT Right 2018    Procedure: PHACOEMULSIFICATION-ASPIRATION-CATARACT;  Surgeon: Priscilla Hays MD;  Location: [facility];  Service: Ophthalmology;  Laterality: Right;    PHACOEMULSIFICATION OF CATARACT Left 2018    Procedure: PHACOEMULSIFICATION, CATARACT;  Surgeon: Priscilla Hays MD;  Location: [facility];  Service: Ophthalmology;  Laterality: Left;    WISDOM TOOTH EXTRACTION         Social History     Socioeconomic History    Marital status:      Spouse name: Estrellita    Number of children: 3   Occupational History     Employer: [employer]   Tobacco Use    Smoking status: Former Smoker     Packs/day: 1.00     Years: 20.00     Pack years: 20.00     Quit date: 1987     Years since quittin.4    Smokeless tobacco: Never Used    Tobacco comment: quit    Substance and Sexual Activity    Alcohol use: Not Currently     Alcohol/week: 1.0 - 2.0 standard drink     Types: 1 - 2 Glasses of wine per week     Comment: 1-2 glasses wine several times weekly    Drug use: No    Sexual activity: Yes     Partners: Female   Social History Narrative     since      Social Determinants of Health     Financial Resource Strain: Low Risk     Difficulty of Paying Living Expenses: Not very hard   Food Insecurity: No Food Insecurity    Worried About Running Out of Food in the Last Year: Never true    Ran Out of Food in the Last Year: Never true   Transportation Needs: No Transportation Needs    Lack of Transportation (Medical): No    Lack of Transportation (Non-Medical): No   Physical Activity: Insufficiently Active    Days of Exercise per Week: 7 days    Minutes of Exercise per Session: 10 min   Stress: No Stress Concern Present    Feeling of Stress : Not at all   Social Connections:  Unknown    Frequency of Communication with Friends and Family: Three times a week    Frequency of Social Gatherings with Friends and Family: Never    Active Member of Clubs or Organizations: Yes    Attends Club or Organization Meetings: More than 4 times per year    Marital Status:    Housing Stability: Low Risk     Unable to Pay for Housing in the Last Year: No    Number of Places Lived in the Last Year: 1    Unstable Housing in the Last Year: No         CBC: No results for input(s): WBC, RBC, HGB, HCT, PLT, MCV, MCH, MCHC in the last 72 hours.    CMP: No results for input(s): NA, K, CL, CO2, BUN, CREATININE, GLU, MG, PHOS, CALCIUM, ALBUMIN, PROT, ALKPHOS, ALT, AST, BILITOT in the last 72 hours.    INR  No results for input(s): PT, INR, PROTIME, APTT in the last 72 hours.        Diagnostic Studies:      EKD Echo:  No results found. However, due to the size of the patient record, not all encounters were searched. Please check Results Review for a complete set of results.        Pre-op Assessment    I have reviewed the Patient Summary Reports.    I have reviewed the NPO Status.   I have reviewed the Medications.     Review of Systems  Anesthesia Hx:  History of prior surgery of interest to airway management or planning: Denies Family Hx of Anesthesia complications.   Denies Personal Hx of Anesthesia complications.       Physical Exam  General: Well nourished, Cooperative, Alert and Oriented    Airway:  Mallampati: II   Mouth Opening: Normal  TM Distance: Normal  Tongue: Normal  Neck ROM: Normal ROM    Dental:  Intact    Chest/Lungs:  Clear to auscultation, Normal Respiratory Rate    Heart:  Rate: Normal  Rhythm: Regular Rhythm        Anesthesia Plan  Type of Anesthesia, risks & benefits discussed:    Anesthesia Type: Gen Natural Airway  Intra-op Monitoring Plan: Standard ASA Monitors  Post Op Pain Control Plan: multimodal analgesia  Induction:  IV  Informed Consent: Informed consent signed  with the Patient and all parties understand the risks and agree with anesthesia plan.  All questions answered.   ASA Score: 3  Day of Surgery Review of History & Physical: H&P Update referred to the surgeon/provider.    Ready For Surgery From Anesthesia Perspective.     .

## 2022-06-07 NOTE — TRANSFER OF CARE
"Anesthesia Transfer of Care Note    Patient: Domonique Kellogg    Procedure(s) Performed: Procedure(s) (LRB):  COLONOSCOPY (N/A)    Patient location: PACU    Anesthesia Type: general    Transport from OR: Transported from OR on room air with adequate spontaneous ventilation    Post pain: adequate analgesia    Post assessment: no apparent anesthetic complications    Post vital signs: stable    Level of consciousness: awake    Nausea/Vomiting: no nausea/vomiting    Complications: none    Transfer of care protocol was followed      Last vitals:   Visit Vitals  BP 95/50   Pulse 67   Temp 36.9 °C (98.5 °F)   Resp 15   Ht 5' 8" (1.727 m)   Wt 99.8 kg (220 lb)   SpO2 98%   BMI 33.45 kg/m²     "

## 2022-06-08 NOTE — PROGRESS NOTES
Outpatient Care Management   - Care Plan Follow Up    Patient: Domonique Kellogg  MRN:  [id number]  Date of Service:  6/8/2022  Completed by:  Kathie Arndt LCSW  Referral Date: 05/11/2022  Program:     Reason for Visit   Patient presents with    OPCM SW First Follow-up Attempt     6/2/22    Update Plan Of Care     6/8/22    Case Closure     6/8/22     Complex Care Plan     Care plan was discussed and completed today with input from patient and/or caregiver.    Patient Instructions     No follow-ups on file.    Todays OPCM Self-Management Care Plan was developed with the patients/caregivers input and was based on identified barriers from todays assessment.  Goals were written today with the patient/caregiver and the patient has agreed to work towards these goals to improve his/her overall well-being. Patient verbalized understanding of the care plan, goals, and all of today's instructions. Encouraged patient/caregiver to communicate with his/her physician and health care team about health conditions and the treatment plan.  Provided my contact information today and encouraged patient/caregiver to call me with any questions as needed.

## 2022-06-13 NOTE — PROGRESS NOTES
"The patient location is: home  The chief complaint leading to consultation is: follow up for intrahepatic cholangiocarcinoma    Visit type: audiovisual    Face to Face time with patient: 25 min  45 minutes of total time spent on the encounter, which includes face to face time and non-face to face time preparing to see the patient (eg, review of tests), Obtaining and/or reviewing separately obtained history, Documenting clinical information in the electronic or other health record, Independently interpreting results (not separately reported) and communicating results to the patient/family/caregiver, or Care coordination (not separately reported).         Each patient to whom he or she provides medical services by telemedicine is:  (1) informed of the relationship between the physician and patient and the respective role of any other health care provider with respect to management of the patient; and (2) notified that he or she may decline to receive medical services by telemedicine and may withdraw from such care at any time.    Notes:                                                              PROGRESS NOTE    Subjective:       Patient ID: Domonique Kellogg is a 72 y.o. male.    Chief Complaint: follow up for cholangiocarcinoma    Diagnosis:  Advanced intrahepatic cholangiocarcinoma, MSI-low, negative for FGFR2 fusion or IDH1/IDH2 mutations, diagnosed on 11/22/2021.     Molecular Profile:  Guardant 360 11/29/21: +CCND1 amplification. VUS: CATRACHITO [id number]. MSI-high not detected.     Oncologic History:  1. Mr Kellogg is a 70 yo man with CAD, HTN, seizures in 2011 who initially saw me on 11/29/21 for further management of cholangiocarcinoma. He presented with weight loss and diarrhea since July. US 9/22/21 showed "Enlarged, heterogeneous appearance of the liver likely due to multiple underlying hepatic lesions largest measuring 4.8 cm."  MRI abdomen w/wo contrast 10/4/21: "Multiple liver lesions measuring up to 13.3 cm in " "the right hepatic lobe.  Differential diagnosis includes metastases, fibrolamellar hepatocellular carcinoma, or atypical focal nodular hyperplasia.  Consider biopsy for further evaluation. Thrombosis of the anterior branch of the right portal vein and left hepatic vein.  Nonvisualization of the middle and right hepatic veins, which may be thrombosed as well. Prominent periportal lymph node, which is nonspecific."  EGD and colonoscopy on 10/11/21 were negative for primary malignancies.   He underwent liver lesion biopsy and Y90 mapping on 21. Pathology showed adenocarcinoma: "Biopsy of the liver mass shows a malignant neoplasm in a fibrotic stroma. Glandular architecture is readily identified with several foci of intraluminal necrosis. Scattered mitotic figures are seen. Neoplastic cells show the following immunophenotype:   Positive: AE1/AE3, CK7, CDX2   Negative: Chromogranin, Synaptophysin, TTF, CK5/6, CK20, HepPar1   The morphology and immunophenotype are compatible with adenocarcinoma. Considerations for a primary site include pancreaticobiliary (including intrahepatic cholangiocarcinoma) as well as upper gastrointestinal. Clinical and radiologic correlation is suggested."  Patient presents today with wife for further management. No pain. Initial weight loss has somewhat stabilized. Still has diarrhea. Has not tried imodium yet. +nervous  Mother had kidney cancer at age 64. Maternal grandmother had liver cancer in her 60s. Patient has three children, two daughters and one son.   Discussed palliative chemo with cis/gem  2. PET scan 21 did not show extrahepatic metastases.   3. Cis/gem started on 21  4. S/p TACE on 2021, 22, 22, 22    Interval History:   Mr Kellogg returns for continued cis/gem. Tolerating chemo well. S/p TACE on 22. Noted recurrent numbness in the right thigh above the right knee.     ECO    ROS:   A ten-point system review is obtained and negative " except for what was stated in the Interval History.     Physical Examination:   General: well hydrated, well developed, in no acute distress  HEENT: normocephalic, EOMI, anicteric sclerae  Neuro: alert and oriented x 4  Psych: pleasant and appropriate mood and affect    Objective:     Laboratory Data:  Labs reviewed. Adequate for chemo    Imaging Data:  PET 12/6/21:  In this patient with known intrahepatic cholangiocarcinoma, there are multifocal hypermetabolic hepatic lesions in both hepatic lobes.  No evidence of distant metastasis.     Lipomatosis of the ileocecal valve and proximal cecum.     Fat containing umbilical hernia.    MRI Abdomen 1/18/22:  1. Patient with reported cholangiocarcinoma.  Interval progression of hepatic tumor burden with multiple enlarging and new hepatic lesions.  2. Progression of portal venous thrombosis involving the left, right, and main portal veins.  Persistent filling defect within the central hepatic veins concerning for thrombosis.  3. Stable prominent periportal lymph node.    CT chest 3/4/22:  Right pulmonary nodules, unchanged.  Recommend continued follow-up as per clinical protocol.    MRI abdomen 3/14/22:     Interval postprocedural changes status post TACE with slight improvement of tumor burden at treatment sites.  Extensive residual disease throughout both hepatic lobes.  Index lesions as above.     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     Stable appearance of central hepatic veins, underlying thrombosis not excluded.  Consider further evaluation with Doppler ultrasound if clinically warranted.     Stable prominent periportal lymph node.     Right adrenal adenoma.    CT urogram 3/14/22:  Prostatomegaly which demonstrates mass effect on the posterior bladder.  There is mild diffuse wall thickening of the bladder which may relate to outlet obstruction.     Postprocedural changes of the liver in keeping with recent  chemoembolization.  Protocol is not optimized for evaluation of tumor response.  Dedicated multiphase liver CT would be necessary to evaluate liver directed therapy response.    Assessment and Plan:     1. Intrahepatic cholangiocarcinoma    2. 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 Immunodeficiency secondary to neoplasm    5. Acute deep vein thrombosis (DVT) of left femoral vein    6. Portal vein thrombosis    7. Benign prostatic hyperplasia, unspecified whether lower urinary tract symptoms present    8. Essential hypertension    9. Neuropathy      1-4  - Mr Kellogg is a 70 yo man with advanced intrahepatic cholangiocarcinoma with multiple liver lesions. MSI-low, FGFR2 fusion and IDH1/2 mutation negative. Started on cis/gem on 12/7/21. S/p TACE on 12/16/2021, 2/14/22, 4/19/22, 5/18/22  - added durvalumab after TOPAZ trial presented [facility] Jan 2022. Had [id number] with insurance company. Insurance company does not cover given lack of peer-reviewed article. Previously discussed applying for patient assistance. pateint wants to think about it.   - doing well. Continue with treatment. Cycle 9 day 1 gem/cis tomorrow  - given neuropathy, will reduce cisplatin to 20 mg/m2  - scheduled for restaging scan later this week. Will see him next Monday to review results  - given labs at [facility] takes a while for results to come back, they prefer labs at [facility] in the future when he gets virtual visit, prefers virtual visit if he cannot see provider the same day of chemo    5.6.  - was on eliquis 5 mg bid for portal vein thrombosis noted at diagnosis. However could not tolerate even 2.5 mg bid due to hematuria. Was on 2.5 mg daily  - US 6/9 showed new left femoral DVT  - has been on eliquis 2.5 mg bid without issue. Discussed increasing to 5 mg bid and see if he can tolerate. Patient understands and agrees with the plan.     7.  - cystoscopy showed BPH  - f/u with  urology    8.  - BP controlled  - c/w current medication    9.  - mild. Discussed gabapentin. Patient would like to hold off for now    Follow-up:     RTC in 1 week for cycle 9 day 8  Knows to call in the interval if any problems arise.    Electronically signed by Claudia Young      Route Chart for Scheduling    Med Onc Chart Routing  Urgent    Follow up with physician . Please change patient's lab appt on 6/20 to main campus, see me at 11:30. please cancel appt with BRADLEY on 6/22 (seeing me on 6/20)   Follow up with BRADLEY    Labs CBC, CA 19-9, CMP and magnesium   Lab interval: once a week     Imaging    Pharmacy appointment    Other referrals          Treatment Plan Information   OP GEMCITABINE CISPLATIN Q3W + DURVALUMAB D8 C4+   Claudia Young MD   Upcoming Treatment Dates - OP GEMCITABINE CISPLATIN Q3W + DURVALUMAB D8 C4+    6/8/2022       Chemotherapy       gemcitabine (GEMZAR) 2,260 mg in sodium chloride 0.9% 250 mL chemo infusion       CISplatin (PLATINOL) 25 mg/m2 = 57 mg in sodium chloride 0.9% 500 mL chemo infusion       Pre-Hydration       sodium chloride 0.9% 500 mL with magnesium sulfate 2 g, potassium chloride 20 mEq infusion       Post-Hydration       sodium chloride 0.9% bolus 500 mL

## 2022-06-14 NOTE — PLAN OF CARE
Patient tolerated Cisplatin/gemzar/ivfs well today. NAD noted upon discharge. Port + blood return present, flushed, hep locked and deaccessed. Discharged home, ambulated independently with wife by side.

## 2022-06-14 NOTE — PLAN OF CARE
Problem: Adult Inpatient Plan of Care  Goal: Optimal Comfort and Wellbeing  Intervention: Provide Person-Centered Care  Flowsheets (Taken 6/14/2022 5740)  Trust Relationship/Rapport:   care explained   questions answered   choices provided   questions encouraged   emotional support provided   reassurance provided   empathic listening provided   thoughts/feelings acknowledged

## 2022-06-17 NOTE — PROGRESS NOTES
"                                                PROGRESS NOTE    Subjective:       Patient ID: Domonique Kellogg is a 72 y.o. male.    Chief Complaint: follow up for cholangiocarcinoma    Diagnosis:  Advanced intrahepatic cholangiocarcinoma, MSI-low, negative for FGFR2 fusion or IDH1/IDH2 mutations, diagnosed on 11/22/2021.     Molecular Profile:  Guardant 360 11/29/21: +CCND1 amplification. VUS: CATRACHITO [id number]. MSI-high not detected.     Oncologic History:  1. Mr Kellogg is a 70 yo man with CAD, HTN, seizures in 2011 who initially saw me on 11/29/21 for further management of cholangiocarcinoma. He presented with weight loss and diarrhea since July. US 9/22/21 showed "Enlarged, heterogeneous appearance of the liver likely due to multiple underlying hepatic lesions largest measuring 4.8 cm."  MRI abdomen w/wo contrast 10/4/21: "Multiple liver lesions measuring up to 13.3 cm in the right hepatic lobe.  Differential diagnosis includes metastases, fibrolamellar hepatocellular carcinoma, or atypical focal nodular hyperplasia.  Consider biopsy for further evaluation. Thrombosis of the anterior branch of the right portal vein and left hepatic vein.  Nonvisualization of the middle and right hepatic veins, which may be thrombosed as well. Prominent periportal lymph node, which is nonspecific."  EGD and colonoscopy on 10/11/21 were negative for primary malignancies.   He underwent liver lesion biopsy and Y90 mapping on 11/22/21. Pathology showed adenocarcinoma: "Biopsy of the liver mass shows a malignant neoplasm in a fibrotic stroma. Glandular architecture is readily identified with several foci of intraluminal necrosis. Scattered mitotic figures are seen. Neoplastic cells show the following immunophenotype:   Positive: AE1/AE3, CK7, CDX2   Negative: Chromogranin, Synaptophysin, TTF, CK5/6, CK20, HepPar1   The morphology and immunophenotype are compatible with adenocarcinoma. Considerations for a primary site include " "pancreaticobiliary (including intrahepatic cholangiocarcinoma) as well as upper gastrointestinal. Clinical and radiologic correlation is suggested."  Patient presents today with wife for further management. No pain. Initial weight loss has somewhat stabilized. Still has diarrhea. Has not tried imodium yet. +nervous  Mother had kidney cancer at age 64. Maternal grandmother had liver cancer in her 60s. Patient has three children, two daughters and one son.   Discussed palliative chemo with cis/gem  2. PET scan 21 did not show extrahepatic metastases.   3. Cis/gem started on 21  4. S/p TACE on 2021, 22, 22, 22    Interval History:   Mr Kellogg returns for cycle 9 day 8 cis/gem. Feeling well. Does feel tired over the past 3 days. Still has tingling/numbness over the right knee. Sometimes feels his right leg is about to give out. No bleeding. taking eliquis 5 mg bid.  Scheduled to see IR this Thursday.     ECO    ROS:   A ten-point system review is obtained and negative except for what was stated in the Interval History.     Physical Examination:   Vital signs reviewed.   General: well hydrated, well developed, in no acute distress  HEENT: normocephalic, EOMI, anicteric sclerae  Neck: supple, no cervical lymphadenopathy, no thyromegaly or JVD  Lungs: clear breath sounds bilaterally, no wheezing/rales/rhonchi  Heart: RRR, no M/R/G  Abdomen: soft, no tenderness, nondistended  Ext: no clubbing, cyanosis, edema  Skin: no open wound, ulcers, rash  Neuro: alert and oriented x 4  Psych: pleasant and appropriate mood and affect    Objective:     Laboratory Data:  Labs reviewed. Adequate for chemo    Imaging Data:  PET 21:  In this patient with known intrahepatic cholangiocarcinoma, there are multifocal hypermetabolic hepatic lesions in both hepatic lobes.  No evidence of distant metastasis.     Lipomatosis of the ileocecal valve and proximal cecum.     Fat containing umbilical " hernia.    MRI Abdomen 1/18/22:  1. Patient with reported cholangiocarcinoma.  Interval progression of hepatic tumor burden with multiple enlarging and new hepatic lesions.  2. Progression of portal venous thrombosis involving the left, right, and main portal veins.  Persistent filling defect within the central hepatic veins concerning for thrombosis.  3. Stable prominent periportal lymph node.    CT chest 3/4/22:  Right pulmonary nodules, unchanged.  Recommend continued follow-up as per clinical protocol.    MRI abdomen 3/14/22:     Interval postprocedural changes status post TACE with slight improvement of tumor burden at treatment sites.  Extensive residual disease throughout both hepatic lobes.  Index lesions as above.     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     Stable appearance of central hepatic veins, underlying thrombosis not excluded.  Consider further evaluation with Doppler ultrasound if clinically warranted.     Stable prominent periportal lymph node.     Right adrenal adenoma.    CT urogram 3/14/22:  Prostatomegaly which demonstrates mass effect on the posterior bladder.  There is mild diffuse wall thickening of the bladder which may relate to outlet obstruction.     Postprocedural changes of the liver in keeping with recent chemoembolization.  Protocol is not optimized for evaluation of tumor response.  Dedicated multiphase liver CT would be necessary to evaluate liver directed therapy response.    MRI A/P 6/19/22:  Evolving posttreatment changes in the liver for multifocal intrahepatic cholangiocarcinoma.  No new focal hepatic lesions.     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     No abdominopelvic metastases.     CT chest 6/15/22:  1. Stable pulmonary nodules measuring up to 0.4 cm.  No definite new or enlarging pulmonary nodules.  2. Scattered regions of opacification within the right lung base with  air bronchograms, favored to represent atelectasis.  However, early consolidative changes cannot be completely excluded.     US bilateral legs 6/9/22:  Partially occlusive deep venous thrombosis of the left femoral vein.    Assessment and Plan:     1. Intrahepatic cholangiocarcinoma    2. Secondary malignant neoplasm of liver    3. 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 Portal vein thrombosis    6. Acute deep vein thrombosis (DVT) of left femoral vein    7. Essential hypertension    8. CAD in native artery    9. Antineoplastic chemotherapy induced anemia    10. Hypomagnesemia    11. Neuropathy      1-4  - Mr Kellogg is a 72 yo man with advanced intrahepatic cholangiocarcinoma with multiple liver lesions. MSI-low, FGFR2 fusion and IDH1/2 mutation negative. Started on cis/gem on 12/7/21. S/p TACE on 12/16/2021, 2/14/22, 4/19/22, 5/18/22  - added durvalumab after TOPAZ trial presented at [facility] in Jan 2022. Had [id number] with insurance company. Insurance company does not cover given lack of peer-reviewed article. Previously discussed applying for patient assistance. pateint wants to think about it.   - reviewed scan results. I have personally reviewed it with Dr Fitzgerald. One lesion in the left liver grew slightly, others are responding. Will c/w cis/gem. Dr Fitzgerald will do TACE to the left liver  - doing well. Continue with treatment. Cycle 9 day 8 gem/cis this Thursday. Reduce cisplatin to 20 mg/m2 given neuropathy  - return in 2 weeks for cycle 10 day 1. They prefer labs at [facility] on days of virtual visit. Prefer virtual visit if not getting chemo on the same day.     5.6.  - was on eliquis 5 mg bid for portal vein thrombosis noted at diagnosis. However could not tolerate even 2.5 mg bid due to hematuria. Was on 2.5 mg daily  - US 6/9 showed new left femoral DVT  - doing well. C/w eliquis 5 mg bid    7.  - BP controlled  - c/w current medication    8.  - c/w  pravachol    9.  - +fatigue. Will transfuse  - EGD in 4/2022 showed congested mucosa in esophagus and gastritis. Colonoscopy 6/7/22 neg for bleeding sites.    10.  - getting IV Mg with pretreatment fluids. Add IV Mg to posttreatment fluids    11.  - monitor. Cisplatin dose reduced    Follow-up:     RTC in 2 weeks for cycle 10 day 1  Knows to call in the interval if any problems arise.    Electronically signed by Claudia Young      Route Chart for Scheduling    Med Onc Chart Routing  Urgent    Follow up with physician 2 weeks. Patient needs 1 unit of blood ASAP. getting chemo this Wed. can we schedule patient for blood transfusion and draw type and screen before transfusion. return in 2 weeks with CBC, CMP, Mg, CA 19-9, see me then get cis/gem. if cannot get chemo on the same day, patient prefers virtual visit and labs at [facility]. return in 3 weeks with CBC, CMP, CA 19-9, mg, see BRADLEY then get cis/gem   Follow up with BRADLEY    Labs CBC, CA 19-9, CMP and magnesium   Lab interval: once a week     Imaging    Pharmacy appointment    Other referrals          Treatment Plan Information   OP GEMCITABINE CISPLATIN Q3W + DURVALUMAB D8 C4+   Claudia Young MD   Upcoming Treatment Dates - OP GEMCITABINE CISPLATIN Q3W + DURVALUMAB D8 C4+    6/30/2022       Chemotherapy       gemcitabine (GEMZAR) 2,200 mg in sodium chloride 0.9% 250 mL chemo infusion       CISplatin (PLATINOL) 20 mg/m2 = 45 mg in sodium chloride 0.9% 500 mL chemo infusion       Pre-Hydration       sodium chloride 0.9% 500 mL with magnesium sulfate 2 g, potassium chloride 20 mEq infusion       Post-Hydration       sodium chloride 0.9% bolus 500 mL  7/14/2022       Chemotherapy       gemcitabine (GEMZAR) 2,200 mg in sodium chloride 0.9% 250 mL chemo infusion       CISplatin (PLATINOL) 20 mg/m2 = 45 mg in sodium chloride 0.9% 500 mL chemo infusion       Pre-Hydration       sodium chloride 0.9% 500 mL with magnesium sulfate 2 g, potassium chloride 20 mEq infusion        Post-Hydration       sodium chloride 0.9% bolus 500 mL  7/22/2022       Chemotherapy       gemcitabine (GEMZAR) 2,200 mg in sodium chloride 0.9% 250 mL chemo infusion       CISplatin (PLATINOL) 20 mg/m2 = 45 mg in sodium chloride 0.9% 500 mL chemo infusion       Pre-Hydration       sodium chloride 0.9% 500 mL with magnesium sulfate 2 g, potassium chloride 20 mEq infusion       Post-Hydration       sodium chloride 0.9% bolus 500 mL

## 2022-06-21 NOTE — Clinical Note
Good morning,   Had a virtual with Mr. Kellogg yesterday. He is doing well, overall. No changes made.   Of note, he spoke at length about how happy he is with the care he receives from your team. Dr. Young, he says he appreciates how inquisitive and thorough you are with your assessments. Gertrude, he says they feel both feel a connection/rapport with you that exceeds your role as a HCP. He feels that you both go above and beyond to deliver compassionate care. Just wanted to share that with you.   Best, Alyssa

## 2022-06-21 NOTE — PROGRESS NOTES
Progress Note  Palliative Care    The [place] is: [place]  The chief complaint leading to consultation is: symptom management    Visit type: audiovisual    Face to Face time with patient: 21 minutes    31 minutes of total time spent on the encounter, which includes face to face time and non-face to face time preparing to see the patient (eg, review of tests), Obtaining and/or reviewing separately obtained history, Documenting clinical information in the electronic or other health record, Independently interpreting results (not separately reported) and communicating results to the patient/family/caregiver, or Care coordination (not separately reported).     Each patient to whom he or she provides medical services by telemedicine is:  (1) informed of the relationship between the physician and patient and the respective role of any other health care provider with respect to management of the patient; and (2) notified that he or she may decline to receive medical services by telemedicine and may withdraw from such care at any time.    Reason for Consult: symptom-management and ACP      ASSESSMENT/PLAN:     Plan/Recommendations:  Diagnoses and all orders for this visit:    Intrahepatic cholangiocarcinoma  - followed by Dr. Young  - currently on disease-directed therapy  - completed TACE, chemo therapy     Encounter for palliative care/Advanced care planning  - patient decisional  - patient accompanied by his wife on telemedicine today  - no ACP documents uploaded into EMR  - philosophy of Palliative Medicine reviewed with patient and family at first visit  - new patient folder given to and reviewed with patient and family at first visit  - goals: life-prolonging  - he previously spoke about understanding that the treatment is to control the disease at this time  - ACP booklet given to and reviewed with patient and family today including HCPOA and living will  - code status not specifically discussed today  - will  "follow up at future encounters for ACP booklet and code status    Anorexia/Nausea  - reports overall symptom improvement   - of late, his appetite has been very good ("the dog has to hide his food from me")   - he has had full resolution of his nausea, has not required PRN anti-emetic recently  - patient with anorexia and moderate to severe stomach cramping lasting 5 days post treatment   - has bentyl PRN for post treatment cramping  - previously prescribed morphine IR for post procedure abd pain, but would like to see if bentyl will provide good relief w out opioid   - previously reported emesis w oxycodone, no longer taking  - following with nutrition  - continue PRN bentyl   - will continue to monitor    Neoplastic (malignant) related fatigue/Weakness/dyspnea  - reports that his legs "felt like rubber" last weekend, he was still able to ambulate but not without some difficulty, this is much improved today, no associated symptoms, specifically had difficulty with transfers, in/out of car, up stairs; of note was recently found to have L fem DVT but does not have localized symptoms  - continues to report intermittent fatigue and frequent naps  - states that he naps are possibly attributable to boredom  - fatigue is not activity-limiting, spends his time watching tv series, playing board games, crossword puzzles, etc   - fatigue and weakness are the worst in the days post treatment  - he and his wife continue to report good days and bad days at times  - looking forward to starting PT in July   - will continue to monitor    Cancer-related pain  - previously reported post/treatment related pain & intermittent joint pain  - previously taking Oxycodone, causing emesis even w/food, no longer taking  - morphine IR prescribed at previous visit, has not filled, requesting to hold off for now  - will continue to monitor     Adjustment disorder with mixed anxiety and depressed mood  - patient reports his mood has been good " overall, he has good days and bad days  - recently enjoyed father's day with family   - continues to find enjoyment in word games and puzzles    - reports that fishing is on his near-term bucket list  - patient following with onc-psych (Dr. Anderson), he finds this helpful   - reports that wife, daughter and dog offer good emotional support  - emotional support provided today  - will continue to monitor    Constipation/diarrhea   - patient typically has diarrhea following TACE procedure   - he notes increased cramping at that time  - reports bowel movements improve about 5 days after treatment  - next treatment scheduled for later this week  - continue bentyl 10 mg QID PRN   - continue adequate hydration for good bowel movements  - will continue to monitor    Understanding of illness/Prognosis: patient and family have good understanding of disease; prognosis is guarded    Goals of care: life-prolonging    Follow up: ~ 8  weeks    Patient's encounter and above plan of care discussed with patient's oncologist     SUBJECTIVE:     History of Present Illness:  Patient is a 72 y.o. year old male with arthritis, CAD, HTN, seizures in 2011, sleep apnea with CPAP, adjustment disorder, and intrahepatic cholangiocarcinoma presents to Palliative Medicine for symptom management and ACP. Please see oncology notes for full details of oncologic history and treatment course.     06/21/2022:  [place]  reviewed and summarized:  05/27/2022 Alprazolam 0.5 mg tabs Disp: 10 for 10 days    Patient presents via virtual visit today with his wife, Estrlelita. He reports that he is doing well, overall. His primary complaint is significant leg weakness last weekend which has now resolved. He looks forward to starting PT in July. He reports his mood has been overall good, he continues to see Dr. Anderson which he finds helpful. His symptoms are most concentrated in the days following treatment. He hopes using the bentyl after next procedure will  "alleviate most of his discomfort.  He speaks at length about how happy he is with his cancer care at [facility]. He continues to be able to enjoy time with his family as well as activities that bring him paul.    05/23/2022  JAVON BROWER reviewed and summarized:  No recent prescriptions reported    Patient presents today via virtual visit, his wife interjects from off screen. Patient reports he is doing well today. However, following treatment his symptom burden is more prolonged, now lasting about 5 days compared to previously lasting only 2 days. He is experiencing intermittent pain, for which he is no longer taking oxycodone due to side-effect of emesis; he requests a different short-acting PRN medication. He reports that his mood is improved, and that he has good support from his wife, daughter and dog. He volunteers that he is feeling very good about his decision to continue treatment and does not plan to "give up". He is following with onc-psych.     04/25/2022:  JAVON BROWER reviewed and summarized:  04/19/2022 Oxycodone 5 mg Disp: 20 for 4 days    Per chart review, patient tolerating treatment of cis/gem well. Today patient states he had TACE last week. He developed cramping, nausea and vomiting for about two days after treatment. He was not able to tolerate oral intake. Patient states that on day three the symptoms resolved. He has been having more fatigue recently as well as weakness. He has to take more breaks to recover. He also has noticed some dyspnea with these symptoms as well. Patient's wife noted some irritability and shortness every now and then. He was also constipated, though this has improved recently.     02/21/2022:  JAVON BROWER reviewed and summarized:  12/14/2021 Oxycodone 5 mg Disp: 90 for 22 days  12/03/2021 Percocet 5-325 mg disp: 6 for 2 days    Patient presents today with his wife. Overall he feels well. Patient is not taking any pain medication at this time. He does have some very intermittent, short " lasting pain in his RUQ that occurs with certain actions (eg. Sneezing). Patient had noticed changes in his taste and overall appetite. He has noted improvement recently. He is also having some mild fatigue. Patient and family open to learning about ACP.     Past Medical History:   Diagnosis Date    Adjustment disorder     Anticoagulant long-term use     Anxiety     Arthritis     CAD (coronary artery disease) 3/2/2015    non-obstructive per [facility]     Cataract     Dry eye syndrome     Hypertension     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/2021    Obesity     Seizures     2011    Sleep apnea     + CPAP    Sleep difficulties      Past Surgical History:   Procedure Laterality Date    ANTERIOR CERVICAL DISCECTOMY W/ FUSION N/A 7/6/2020    Procedure: DISCECTOMY, SPINE, CERVICAL, ANTERIOR APPROACH, WITH FUSION C3-4, C4-5;  Surgeon: Fabiola Vides MD;  Location: [facility] OR 2ND FLR;  Service: Neurosurgery;  Laterality: N/A;  TORONTO III, ASA III, BLOOD TYPE & SCREEN, NEUROMONITORING EMG-MEP-SEP, SUPINE POSITION,BRACE [place],REGULAR BED, HEADREST CASPAR, POSITION, MAP>85, RADIOLOGY C-ARM, SPECIAL EQUIPMENT [facility]    COLONOSCOPY N/A 10/1/2021    Procedure: COLONOSCOPY;  Surgeon: Nicolas Alvarado MD;  Location: [facility]ENN[facility] (4TH FLR);  Service: Endoscopy;  Laterality: N/A;  EGD and colonoscopy for diarrhea and weight loss and Enlarged, heterogeneous appearance of the liver likely due to multiple underlying hepatic lesions largest measuring 4.8 cm.  Findings concerning for metastatic versus less likely primary hepatic neoplasm.  Recommend fur    COLONOSCOPY N/A 6/7/2022    Procedure: COLONOSCOPY;  Surgeon: Mejia Villafuerte MD;  Location: [facility] (4TH FLR);  Service: Endoscopy;  Laterality: N/A;  For evaluation of positive out-patient FOBT.   medically urgent  ok to hold Eliquis per CAROLINA Edward/RB  fully vaccinated  hx of seizures- last one 2014    ESOPHAGOGASTRODUODENOSCOPY N/A 10/1/2021    Procedure: EGD  (ESOPHAGOGASTRODUODENOSCOPY);  Surgeon: Nicolas Alvarado MD;  Location: [facility] (4TH FLR);  Service: Endoscopy;  Laterality: N/A;  EGD and colonoscopy for diarrhea and weight loss and Enlarged, heterogeneous appearance of the liver likely due to multiple underlying hepatic lesions largest measuring 4.8 cm.  Findings concerning for metastatic versus less likely primary hepatic neopl    ESOPHAGOGASTRODUODENOSCOPY N/A 4/5/2022    Procedure: EGD (ESOPHAGOGASTRODUODENOSCOPY);  Surgeon: Amado eKlsey MD;  Location: [facility] ENDO (4TH FLR);  Service: Endoscopy;  Laterality: N/A;  EGD in 8 weeks to check for esophagitis healing patient should be on pantoprazole 40 mg once daily  ok to hold eliquis x2 days per Dr. Young, see telephone encounter  fully vaccinated    EYE SURGERY      INSERTION OF VENOUS ACCESS PORT N/A 12/3/2021    Procedure: INSERTION, VENOUS ACCESS PORT;  Surgeon: Saurav Garcia MD;  Location: [facility] 2ND FLR;  Service: General;  Laterality: N/A;    INTRAOCULAR PROSTHESES INSERTION Right 7/16/2018    Procedure: INSERTION-INTRAOCULAR LENS (IOL);  Surgeon: Priscilla Hays MD;  Location: [facility] OR;  Service: Ophthalmology;  Laterality: Right;    INTRAOCULAR PROSTHESES INSERTION Left 8/13/2018    Procedure: INSERTION, INTRAOCULAR LENS PROSTHESIS;  Surgeon: Priscilla Hays MD;  Location: [facility] OR;  Service: Ophthalmology;  Laterality: Left;    KNEE SURGERY      PHACOEMULSIFICATION OF CATARACT Right 7/16/2018    Procedure: PHACOEMULSIFICATION-ASPIRATION-CATARACT;  Surgeon: Priscilla Hays MD;  Location: [facility] OR;  Service: Ophthalmology;  Laterality: Right;    PHACOEMULSIFICATION OF CATARACT Left 8/13/2018    Procedure: PHACOEMULSIFICATION, CATARACT;  Surgeon: Priscilla Hays MD;  Location: [facility] OR;  Service: Ophthalmology;  Laterality: Left;    WISDOM TOOTH EXTRACTION       Family History   Problem Relation Age of Onset    Diabetes Mother     Hypertension Mother     Kidney cancer Mother 61    Cancer  Mother         renal & pancreatic    Heart disease Father     No Known Problems Sister     Cancer Maternal Grandmother     Liver disease Maternal Grandmother     Heart disease Paternal Grandfather     Heart disease Brother     No Known Problems Daughter     No Known Problems Son     No Known Problems Sister     No Known Problems Sister     No Known Problems Sister     No Known Problems Daughter     Blindness Neg Hx     Macular degeneration Neg Hx     Retinal detachment Neg Hx     Glaucoma Neg Hx     Melanoma Neg Hx     Pancreatic cancer Neg Hx     Bladder Cancer Neg Hx     Uterine cancer Neg Hx     Ovarian cancer Neg Hx     Celiac disease Neg Hx     Cirrhosis Neg Hx     Colon cancer Neg Hx     Colon polyps Neg Hx     Crohn's disease Neg Hx     Esophageal cancer Neg Hx     Inflammatory bowel disease Neg Hx     Liver cancer Neg Hx     Rectal cancer Neg Hx     Stomach cancer Neg Hx     Ulcerative colitis Neg Hx      Review of patient's allergies indicates:   Allergen Reactions    Indomethacin      Headache dizziness    Adhesive Rash       Medications:    Current Outpatient Medications:     acetaminophen (TYLENOL) 650 MG TbSR, Take by mouth daily as needed., Disp: , Rfl:     apixaban (ELIQUIS) 2.5 mg Tab, Take 1 tablet (2.5 mg total) by mouth once daily., Disp: 90 tablet, Rfl: 3    apixaban (ELIQUIS) 2.5 mg Tab, Take 1 tablet (2.5 mg total) by mouth 2 (two) times daily., Disp: 60 tablet, Rfl: 3    dicyclomine (BENTYL) 10 MG capsule, Take 1 capsule (10 mg total) by mouth 4 (four) times daily before meals and nightly., Disp: 120 capsule, Rfl: 0    diltiaZEM 2% Lidocaine 5% Cream, Apply pea size amount topically 3 (three) times daily., Disp: 30 g, Rfl: 2    finasteride (PROSCAR) 5 mg tablet, Take 1 tablet (5 mg total) by mouth once daily. (Patient taking differently: Take 5 mg by mouth every evening.), Disp: 90 tablet, Rfl: 3    hydroCHLOROthiazide (HYDRODIURIL) 25 MG tablet, TAKE 1  TABLET (25 MG TOTAL) BY MOUTH ONCE DAILY. (Patient taking differently: Take 25 mg by mouth every morning.), Disp: 90 tablet, Rfl: 3    magnesium oxide (MAG-OX) 400 mg (241.3 mg magnesium) tablet, Take 400 mg by mouth every evening., Disp: , Rfl:     morphine (MSIR) 15 MG tablet, Take 1 tablet (15 mg total) by mouth every 6 (six) hours as needed for Pain., Disp: 120 tablet, Rfl: 0    multivitamin with minerals tablet, Take 1 tablet by mouth every morning. , Disp: , Rfl:     ondansetron (ZOFRAN) 4 MG tablet, Take 1 tablet (4 mg total) by mouth every 6 (six) hours as needed for Nausea., Disp: 20 tablet, Rfl: 0    ondansetron (ZOFRAN) 4 MG tablet, Take 1 tablet (4 mg total) by mouth every 6 (six) hours as needed for Nausea., Disp: 20 tablet, Rfl: 0    ondansetron (ZOFRAN-ODT) 4 MG TbDL, Take 1 tablet (4 mg total) by mouth every 6 (six) hours as needed (nausea vomiting)., Disp: 1 tablet, Rfl: 0    ondansetron (ZOFRAN-ODT) 4 MG TbDL, Take 1 tablet (4 mg total) by mouth every 6 (six) hours as needed (nausea vomiting)., Disp: 28 tablet, Rfl: 0    potassium chloride SA (K-DUR,KLOR-CON) 20 MEQ tablet, Take 2 tablets (40 mEq total) by mouth once daily. (Patient taking differently: Take 40 mEq by mouth every morning.), Disp: 180 tablet, Rfl: 3    pravastatin (PRAVACHOL) 40 MG tablet, Take 1 tablet (40 mg total) by mouth once daily. (Patient taking differently: Take 40 mg by mouth every evening.), Disp: 90 tablet, Rfl: 3    prochlorperazine (COMPAZINE) 10 MG tablet, Take 1 tablet (10 mg total) by mouth every 6 (six) hours as needed (nausea)., Disp: 60 tablet, Rfl: 1    promethazine (PHENERGAN) 12.5 MG Tab, Take 1 tablet (12.5 mg total) by mouth every 6 (six) hours as needed (nausea)., Disp: 60 tablet, Rfl: 2    RABEprazole (ACIPHEX) 20 mg tablet, Take 1 tablet (20 mg total) by mouth once daily. (Patient taking differently: Take 20 mg by mouth every morning.), Disp: 90 tablet, Rfl: 3    sodium,potassium,mag sulfates  (SUPREP BOWEL PREP KIT) 17.5-3.13-1.6 gram SolR, As Directed, Disp: 1 kit, Rfl: 0    tamsulosin (FLOMAX) 0.4 mg Cap, TAKE 1 CAPSULE EVERY DAY, Disp: 90 capsule, Rfl: 0    OBJECTIVE:       ROS:  Review of Systems   Constitutional: Positive for activity change, appetite change and fatigue.   HENT: Negative.    Eyes: Negative.    Respiratory: Positive for shortness of breath.    Cardiovascular: Negative.    Gastrointestinal: Positive for abdominal pain (intermittent).   Genitourinary: Negative.    Musculoskeletal: Negative.    Skin: Negative.    Neurological: Positive for weakness (bi-lat, legs).   All other systems reviewed and are negative.      Review of Symptoms    Symptom Assessment (ESAS 0-10 Scale)  Pain:  0  Dyspnea:  0  Anxiety:  2  Nausea:  1  Depression:  1  Anorexia:  0  Fatigue:  5  Insomnia:  0  Restlessness:  0  Agitation:  0     CAM / Delirium:  Negative  Constipation:  Negative  Diarrhea:  Negative (after treatment)    Bowel Management Plan (BMP):  No      Pain Assessment:  OME in 24 hours:  0  Location(s): abdomen    ABDOMEN Location: post TACE.    Modified Isidoro Scale:  1    ECOG Performance Status ndGndrndanddndend:nd nd2nd Living Arrangements:  Lives with spouse    Psychosocial/Cultural: Patient lives with his wife and eldest daughter. He has a younger daughter that does not live with them anymore. Patient also has a dog named Chance    Spiritual:  F - Annemarie and Belief:  Rastafari  I - Importance:  High  C - Community:  Yes; good support   A - Address in Care:  Needs met at this time      Advance Care Planning   Advance Directives:   Living Will: No    LaPOST: No    Do Not Resuscitate Status: No    Medical Power of : No    Agent's Name:  Estrellita Kellogg   Agent's Contact Number:  [phone]    Decision Making:  Patient answered questions and Family answered questions          Physical Exam: Limited due to telemedicine visit   Vitals:  no vitals obtained, virtual visit   Physical Exam  Constitutional:        General: He is not in acute distress.     Appearance: He is not toxic-appearing.   HENT:      Head: Normocephalic and atraumatic.      Right Ear: External ear normal.      Left Ear: External ear normal.      Nose: Nose normal.      Mouth/Throat:      Mouth: Mucous membranes are moist.   Eyes:      General: No scleral icterus.        Right eye: No discharge.         Left eye: No discharge.   Neck:      Comments: Trachea midline  Pulmonary:      Effort: Pulmonary effort is normal. No respiratory distress.   Musculoskeletal:      Cervical back: Normal range of motion.      Comments: Sitting up without assistance; able to move upper extremities without limitation   Skin:     General: Skin is dry.      Findings: No erythema or rash.   Neurological:      General: No focal deficit present.      Mental Status: He is alert and oriented to person, place, and time.      Cranial Nerves: No cranial nerve deficit.   Psychiatric:         Behavior: Behavior normal.         Thought Content: Thought content normal.         Judgment: Judgment normal.         Labs:  CBC:   WBC   Date Value Ref Range Status   06/20/2022 5.21 3.90 - 12.70 K/uL Final     Hemoglobin   Date Value Ref Range Status   06/20/2022 7.9 (L) 14.0 - 18.0 g/dL Final     Hematocrit   Date Value Ref Range Status   06/20/2022 24.2 (L) 40.0 - 54.0 % Final     MCV   Date Value Ref Range Status   06/20/2022 92 82 - 98 fL Final     Platelets   Date Value Ref Range Status   06/20/2022 258 150 - 450 K/uL Final       LFT:   Lab Results   Component Value Date    AST 51 (H) 06/20/2022    GGT 1,292 (H) 09/20/2021    ALKPHOS 308 (H) 06/20/2022    BILITOT 0.8 06/20/2022       Albumin:   Albumin   Date Value Ref Range Status   06/20/2022 2.7 (L) 3.5 - 5.2 g/dL Final     Protein:   Total Protein   Date Value Ref Range Status   06/20/2022 6.4 6.0 - 8.4 g/dL Final       Radiology:I have reviewed all pertinent imaging results/findings within the past 24 hours.      06/06/2022  lower  "extremities vein bi-lateral "Impression: Partially occlusive deep venous thrombosis of the left femoral vein."     06/19/2022 MRI abd pelvis "Impression: Evolving posttreatment changes in the liver for multifocal intrahepatic cholangiocarcinoma.  No new focal hepatic lesions. 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 No abdominopelvic metastases. Additional findings as described."       06/15/2022 CT chest "Impression:     1. Stable pulmonary nodules measuring up to 0.4 cm.  No definite new or enlarging pulmonary nodules.  2. Scattered regions of opacification within the right lung base with air bronchograms, favored to represent atelectasis.  However, early consolidative changes cannot be completely excluded."      39 minutes of total time spent on the encounter, which includes face to face time and non-face to face time preparing to see the patient (eg, review of tests), Obtaining and/or reviewing separately obtained history, Documenting clinical information in the electronic or other health record, Independently interpreting results (not separately reported) and communicating results to the patient/family/caregiver, or Care coordination (not separately reported).      Signature: Alyssa Mooney DNP        "

## 2022-06-22 NOTE — PLAN OF CARE
Patient tolerated treatment with no complications. Will RTC 6/23 for blood transfusion. Patient discharged, ambulated away from clinic in NAD accompanied by wife.

## 2022-06-22 NOTE — PLAN OF CARE
0900- Patient arrived to clinic for IVF/Cisplatin/gemzar infusion. Labs and assessment appropriate for treatment. Orders reviewed and signed by MD Hector. Patient will require blood transfusion this week, t&s collected from port and sent to lab. IVF infusing.

## 2022-06-23 NOTE — LETTER
June 23, 2022    Domonique RUSSO [address]  [facility] [address]     [facility] [address]  [address]  [facility] [address]  Phone: [phone] PRE-PROCEDURE INSTRUCTIONS    Your procedure with Interventional Radiology is scheduled for 7/15/2022. Please arrive by 8:30am. Please note your appointment time in [facility] will be different.    You must check-in and receive a wristband before going to your procedure. We will call you the day before to let you know your check-in location.    DO NOT take eliquis for 3 days before your procedure.    **Do not eat or drink anything between midnight and the time of your procedure. This includes gum, mints, and candy lemon drops.    **Do not smoke or drink alcoholic beverages 24 hours prior to your procedure.    **If you wear contact lenses, dentures, hearing aids, or glasses, bring a container to put them in during the procedure and give them to a family member for safekeeping.    **If you have been diagnosed with sleep apnea please bring your CPAP machine.    **If your doctor has scheduled you for an overnight stay, bring a small overnight bag with any personal items that you may need.    **Make arrangements in advance for transportation home by a responsible adult. It is not safe to drive a vehicle during the 24 hours following the procedure.    **All [facility] facilities and properties are tobacco free. Smoking is NOT allowed.    PLEASE NOTE: The procedure schedule has many variables which affect the time of your procedure. Family members should be available if your surgery time changes.    If you have any questions about these instructions call Interventional Radiology at [phone] Monday - Friday between 8:00am and 4:00pm or [phone] (ask for interventional radiology resident) for after hours.

## 2022-06-23 NOTE — PLAN OF CARE
1120  Patient completed transfusion, tolerated well, VSS. Port de accessed without issue, flushed, heparin locked.  RTC 7/5/22  Patient ambulated off floor NAD, escorted by wife.

## 2022-06-23 NOTE — LETTER
July 6, 2022    Domonique Kellogg  [id number]  [facility] [address]             [facility]  [address]  [facility] [address] Dear Mr. Kellogg,    I work with [facility] Outpatient Case Management Department. I have been unsuccessful at reaching you to follow up to see how you have been doing. Please call me back at your earliest convenience to discuss your health care needs.    I can be reached at [phone] from 8:00 AM to 4:30 PM on Monday through Friday. [facility] is a program offered through [facility] where a nurse is available 24/7 to answer questions or provide medical advice. Their number is [phone].      Kind Regards,    Guerline Montoya RN  Outpatient Case Management

## 2022-06-23 NOTE — PROGRESS NOTES
Subjective:       Patient ID: Domonique Kellogg is a 72 y.o. male.    Chief Complaint: Cancer    Patient here for follow up of cholangiocarcinoma. He is in care and receiving systemic therapy with oncology. He has also been treated with chemoembolization; his last dose was 5/18/2022.  He reports feeling well. He denies any abdominal pain or abdominal distention and feels his activity level is improving. He is accompanied by his wife. He had an MRI on 6/19/2022 and labs on 6/20/2022.    Review of Systems   Constitutional: Negative for activity change, appetite change, chills, fatigue and fever.   Respiratory: Negative for cough, shortness of breath, wheezing and stridor.    Cardiovascular: Negative for chest pain, palpitations and leg swelling.   Gastrointestinal: Negative for abdominal distention, abdominal pain, constipation, diarrhea, nausea and vomiting.         Objective:      Physical Exam  Vitals reviewed.   Constitutional:       General: He is not in acute distress.     Appearance: He is well-developed. He is not diaphoretic.   Cardiovascular:      Rate and Rhythm: Normal rate and regular rhythm.      Heart sounds: Normal heart sounds. No murmur heard.    No friction rub. No gallop.   Pulmonary:      Effort: Pulmonary effort is normal. No respiratory distress.      Breath sounds: Normal breath sounds. No stridor. No wheezing or rales.   Abdominal:      General: Bowel sounds are normal. There is no distension.      Palpations: Abdomen is soft. There is no mass.      Tenderness: There is no abdominal tenderness. There is no guarding.   Skin:     General: Skin is warm and dry.   Neurological:      Mental Status: He is alert and oriented to person, place, and time.       MRI 6/19/2022  Impression:     Evolving posttreatment changes in the liver for multifocal intrahepatic cholangiocarcinoma.  No new focal hepatic lesions.     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     No abdominopelvic metastases.    Labs 6/20/2022  Component Ref Range & Units 3 d ago   (6/20/22) 10 d ago   (6/13/22) 3 wk ago   (5/30/22) 1 mo ago   (5/23/22) 1 mo ago   (5/9/22) 1 mo ago   (5/3/22) 1 mo ago   (4/25/22)   CA 19-9 0.0 - 40.0 U/mL 453.4 High   472.4 High  CM  590.2 High  CM  398.6 High   264.5 High   301.9 High   324.0 High       Component Ref Range & Units 3 d ago   (6/20/22) 10 d ago   (6/13/22) 3 wk ago   (5/30/22) 1 mo ago   (5/23/22) 1 mo ago   (5/18/22) 1 mo ago   (5/11/22) 1 mo ago   (5/9/22)   Sodium 136 - 145 mmol/L 138  136  139  139  140  140  141    Potassium 3.5 - 5.1 mmol/L 3.9  3.4 Low   3.5  3.5  3.6  4.2  3.7    Chloride 95 - 110 mmol/L 101  102  101  105  104  107  107    CO2 23 - 29 mmol/L 31 High   24  29  24  29  25  25    Glucose 70 - 110 mg/dL 109  175 High   112 High   154 High   85  119 High   152 High     BUN 8 - 23 mg/dL 27 High   15  28 High   20  18  23  15    Creatinine 0.5 - 1.4 mg/dL 1.2  1.0  0.9  1.0  0.9  1.0  0.9    Calcium 8.7 - 10.5 mg/dL 9.8  9.5  9.6  10.4  9.8  10.2  9.8    Total Protein 6.0 - 8.4 g/dL 6.4  6.8  6.3  6.8  6.3  6.9  6.4    Albumin 3.5 - 5.2 g/dL 2.7 Low   2.7 Low   2.5 Low   2.6 Low   2.8 Low   2.9 Low   2.7 Low     Total Bilirubin 0.1 - 1.0 mg/dL 0.8  0.7 CM  0.8 CM  1.2 High  CM  0.8 CM  0.7 CM  0.6 CM    Comment: For infants and newborns, interpretation of results should be based   on gestational age, weight and in agreement with clinical   observations.     Premature Infant recommended reference ranges:   Up to 24 hours.............<8.0 mg/dL   Up to 48 hours............<12.0 mg/dL   3-5 days..................<15.0 mg/dL   6-29 days.................<15.0 mg/dL    Alkaline Phosphatase 55 - 135 U/L 308 High   400 High   386 High   355 High   332 High   352 High   382 High     AST 10 - 40 U/L 51 High   34  68 High   69 High   57 High   39  31    ALT 10 - 44 U/L 54 High   31  86 High   90 High   82 High   39  44    Anion Gap  8 - 16 mmol/L 6 Low   10  9  10  7 Low   8  9    eGFR if African American >60 mL/min/1.73 m^2 >60.0  >60.0  >60.0  >60.0  >60.0  >60.0  >60.0    eGFR if non African American >60 mL/min/1.73 m^2 59.6 Abnormal   >60.0 CM  >60.0 CM  >60.0 CM  >60.0 CM  >60.0 CM  >60.0 CM    Comment: Calculation used to obtain the estimated glomerular filtration   rate (eGFR) is the CKD-EPI equation.      Assessment:       Problem List Items Addressed This Visit    None     Visit Diagnoses     Cholangiocarcinoma    -  Primary    Relevant Orders    Protime-INR    IR RF Ablation Liver Tumors    IR Embolization for Tumor_Organ Ischemia          Plan:         Reviewed MRI and labs with Dr. Fitzgerald. Discussed with Dr. Young. Explained to patient locoregional therapy has been focused on right lobe since that has the bulk of disease. Recommendation is to treat lesion in left lobe with microwave ablation. Discussed how the procedure will be performed, risks (including, but not limited to, pain, bleeding, infection, damage to nearby structures, and the need for additional procedures), benefits, possible complications, pre-post procedure expectations, and alternatives.     Approximately 2 weeks after microwave ablation of left lobe lesion, repeat TACE will be performed on right lobe. Chemoembolization procedure discussed in detail with the patient including risks (including, but not limited to, pain, bleeding, infection, damage to nearby structures, and the need for additional procedures), benefits, potential complications, usual pre and post procedure course, as well as potential to develop post-embolization syndrome.     The patient voices understanding and all questions have been answered.  The patient agrees to proceed as planned. Patient scheduled for microwave ablation on 7/19/2022. Pre-procedure handout with clinic phone number provided. Pre-procedure labs linked to lab appointment on 7/5/2022.

## 2022-06-23 NOTE — PROGRESS NOTES
Outpatient Care Management  Plan of Care Follow Up Visit    Patient: Domonique Kellogg  MRN: [id number]  Date of Service: 06/23/2022  Completed by: Guerline Montoya RN  Referral Date: 05/11/2022  Program:     Reason for Visit   Patient presents with    OPCM Chart Review     6/23/22    OPCM RN First Follow-Up Attempt     7/6/22    OPCM RN Second Follow-Up Attempt     7/15/22    Update Plan Of Care     7/15/22       Brief Summary:     OPCM follow up complete. Continued education on Cancer. Patient is in agreement with follow up call on or around 8/3/22.     Patient Summary     Involvement of Care:  Do I have permission to speak with other family members about your care?  Yes    Patient Reported Labs & Vitals:  1.  Any Patient Reported Labs & Vitals?  No  2.  Patient Reported Blood Pressure:     3.  Patient Reported Pulse:     4.  Patient Reported Weight (Kg):     5.  Patient Reported Blood Glucose (mg/dl):       Medical and social history was reviewed with patient and/or caregiver.     Clinical Assessment     Reviewed and provided basic information on available community resources for mental health, transportation, wellness resources, and palliative care programs with patient and/or caregiver.     Complex Care Plan     Care plan was discussed and completed today with input from patient and/or caregiver.    Patient Instructions     Instructions were provided via the [employer] patient resources and are available for the patient to view on the patient portal.    [facility] OPCM Self-Management Care Plan was developed with the patients/caregivers input and was based on identified barriers from todays assessment.  Goals were written today with the patient/caregiver and the patient has agreed to work towards these goals to improve his/her overall well-being. Patient verbalized understanding of the care plan, goals, and all of today's instructions. Encouraged patient/caregiver to communicate with his/her physician and  health care team about health conditions and the treatment plan.  Provided my contact information today and encouraged patient/caregiver to call me with any questions as needed.    7/15/22 2nd attempt to complete follow-up for Outpatient Care Management: Left message for patient requesting a return call. Will attempt to contact patient again at a later date.    7/6/22 1st attempt to complete follow-up for Outpatient Care Management: Left message for patient requesting a return call. OPCM letter has been sent. Will attempt to contact patient again at a later date.    6/23/22 Patient has infusion appointment today. Will attempt to contact at a later date.

## 2022-06-23 NOTE — LETTER
June 23, 2022    Domonique RUSSO [id number]  [facility] [address]     [facility]  [address]  [facility] [address]  Phone: [phone] PRE-PROCEDURE INSTRUCTIONS    Your procedure with Interventional Radiology is scheduled for 7/19/2022. Please arrive by 10:30am. Please note your appointment time in [facility] will be different.    You must check-in and receive a wristband before going to your procedure. We will call you the day before to let you know your check-in location.    DO NOT take eliquis for 3 days before your procedure.    **Do not eat or drink anything between midnight and the time of your procedure. This includes gum, mints, and candy lemon drops.    **Do not smoke or drink alcoholic beverages 24 hours prior to your procedure.    **If you wear contact lenses, dentures, hearing aids, or glasses, bring a container to put them in during the procedure and give them to a family member for safekeeping.    **If you have been diagnosed with sleep apnea please bring your CPAP machine.    **If your doctor has scheduled you for an overnight stay, bring a small overnight bag with any personal items that you may need.    **Make arrangements in advance for transportation home by a responsible adult. It is not safe to drive a vehicle during the 24 hours following the procedure.    **All [facility] facilities and properties are tobacco free. Smoking is NOT allowed.    PLEASE NOTE: The procedure schedule has many variables which affect the time of your procedure. Family members should be available if your surgery time changes.    If you have any questions about these instructions call Interventional Radiology at [phone] Monday - Friday between 8:00am and 4:00pm or [phone] (ask for interventional radiology resident) for after hours.

## 2022-06-23 NOTE — PLAN OF CARE
0900  Patient seated in chair.  Waited a few minutes for patient to regain breath.  Patient SOB on exertion and patient walked fast to infusion center from IR appt.   After patient gained composure, VSS, Assessment done.  Port accessed without issue, flushed, blood return noted.  Started on NS @ 25 cc/hr KVO while waiting for Blood from Blood Bank.  No pre medications ordered.  TM for safety

## 2022-06-27 NOTE — PROGRESS NOTES
INFORMED CONSENT: Domonique Kellogg   is known to this provider and identity was confirmed via NAME and .  The patient has been informed of the risks and benefits associated with engaging in psychotherapy, the handling of protected health information, the rights of privacy and the limits of confidentiality. The patient has also been informed of the importance of reporting any suicidal or homicidal ideation to this or any provider to ensure safety of all parties, and the Domonique Kellogg expressed understanding. The patient was agreeable to these terms and freely participates in individual psychotherapy.    PSYCHO-ONCOLOGY NOTE/ Individual Psychotherapy     Date: 2022   Site:  [facility]        Therapeutic Intervention: Met with patient.  Outpatient - Behavior modifying psychotherapy 30 min - CPT code 88429      Patient was last seen by me on 2022    Problem list  Patient Active Problem List   Diagnosis    CTS (carpal tunnel syndrome)    Class 2 severe obesity due to excess calories with serious comorbidity and body mass index (BMI) of 38.0 to 38.9 in adult    H/O syncope    RAHEEL (obstructive sleep apnea)    CAD in native artery    Essential hypertension    Hyperlipidemia    Chronic pulmonary heart disease    Bilateral carotid artery disease    Primary osteoarthritis of right knee    Lateral femoral cutaneous entrapment syndrome    Cervical spondylosis    Decreased ROM of intervertebral discs of cervical spine    Cervical disc disease with myelopathy    Cervical stenosis of spinal canal    Prediabetes    Infection due to Strongyloides    Intrahepatic cholangiocarcinoma    Secondary malignant neoplasm of liver    Post-procedural fever    Transaminitis    Elevated troponin    Immunodeficiency secondary to neoplasm    Hyperbilirubinemia    Portal vein thrombosis    Cancer related pain    Immunodeficiency secondary to chemotherapy    Benign prostatic hyperplasia without lower  urinary tract symptoms    Weight loss       Chief complaint/reason for encounter: adjustment   Met with patient to evaluate psychosocial adaptation to diagnosis/treatment/survivorship of Cholangio    Current Medications  Current Outpatient Medications   Medication    acetaminophen (TYLENOL) 650 MG TbSR    apixaban (ELIQUIS) 2.5 mg Tab    apixaban (ELIQUIS) 2.5 mg Tab    diltiaZEM 2% Lidocaine 5% Cream    finasteride (PROSCAR) 5 mg tablet    hydroCHLOROthiazide (HYDRODIURIL) 25 MG tablet    magnesium oxide (MAG-OX) 400 mg (241.3 mg magnesium) tablet    multivitamin with minerals tablet    ondansetron (ZOFRAN) 4 MG tablet    ondansetron (ZOFRAN) 4 MG tablet    ondansetron (ZOFRAN-ODT) 4 MG TbDL    ondansetron (ZOFRAN-ODT) 4 MG TbDL    potassium chloride SA (K-DUR,KLOR-CON) 20 MEQ tablet    pravastatin (PRAVACHOL) 40 MG tablet    prochlorperazine (COMPAZINE) 10 MG tablet    promethazine (PHENERGAN) 12.5 MG Tab    RABEprazole (ACIPHEX) 20 mg tablet    sodium,potassium,mag sulfates (SUPREP BOWEL PREP KIT) 17.5-3.13-1.6 gram SolR    tamsulosin (FLOMAX) 0.4 mg Cap     No current facility-administered medications for this visit.       Objective:  Domonique Kellogg arrived promptly for the session. His wife was also present during the interview, with the consent of Domonique Kellogg.   Mr. Kellogg was independently ambulatory at the time of session. The patient was fully cooperative throughout the session.  Appearance: age appropriate, appropriately  dressed, adequately  groomed  Behavior/Cooperation: friendly and cooperative  Speech: normal in rate, volume, and tone and appropriate quality, quantity and organization of sentences  Mood: euthymic  Affect: mood congruent  Thought Process: goal-directed, logical  Thought Content: normal,  No delusions or paranoia; did not appear to be responding to internal stimuli during the session  Orientation: grossly intact  Memory: Grossly intact  Attention  Span/Concentration: Attends to session without distraction; reports no difficulty  Fund of Knowledge: average  Estimate of Intelligence: average from verbal skills and history  Cognition: grossly intact  Insight: patient has awareness of illness; good insight into own behavior and behavior of others  Judgment: the patient's behavior is adequate to circumstances    Interval history and content of current session: 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 Since then  Feeling well. Minimal marital discord.  Discussed diagnosis, treatment, prognosis, current adaptation to disease and treatment status and family's adaptation to disease and treatment status. Reports to be coping in an adequate manner. Evaluated cognitive response, paying particular attention to negative intrusive thoughts of a persistent and detrimental nature. Thoughts of this type are in evidence with no distress. Provided cognitive behavioral therapy to address negative cognitions. Identified and evaluated psychosocial and environmental stressors secondary to diagnosis and treatment.  Examined proactive behaviors that may be implemented to minimize or ameliorate psychosocial stressors secondary to diagnosis and treatment.     Risk parameters:   Patient reports no suicidal ideation  Patient reports no homicidal ideation  Patient reports no self-injurious behavior  Patient reports no violent behavior   Safety needs:  None at this time      Verbal deficits: None     Patient's response to intervention:The patient's response to intervention is accepting.     Progress toward goals and other mental status changes:  The patient's progress toward goals is good.      Progress to date:Progress as Expected      Goals from last visit: Met     Patient reported outcomes:    Distress Thermometer:   Distress Score    Distress Score: 0 - No Distress        Practical Problems Physical Problems                                                    Family Problems                                         Emotional Problems                                                         Spiritual/Religions Concerns     Spritual / Mandaen Concerns: No         Other Problems              PHQ ANSWERS    Q1. Little interest or pleasure in doing things: (P) Not at all (06/24/22 2214)  Q2. Feeling down, depressed, or hopeless: (P) Not at all (06/24/22 2214)  Q3. Trouble falling or staying asleep, or sleeping too much: (P) Not at all (06/24/22 2214)  Q4. Feeling tired or having little energy: (P) Several days (06/24/22 2214)  Q5. Poor appetite or overeating: (P) Not at all (06/24/22 2214)  Q6. Feeling bad about yourself - or that you are a failure or have let yourself or your family down: (P) Not at all (06/24/22 2214)  Q7. Trouble concentrating on things, such as reading the newspaper or watching television: (P) Several days (06/24/22 2214)  Q8. Moving or speaking so slowly that other people could have noticed. Or the opposite - being so fidgety or restless that you have been moving around a lot more than usual: (P) Not at all (06/24/22 2214)  Q9.  No SI    PHQ8 Score : (P) 2 (06/24/22 2214)  PHQ-9 Total Score: (P) 2 (06/24/22 2214)     CAMILO-7 Answers    GAD7 6/24/2022   1. Feeling nervous, anxious, or on edge? 0   2. Not being able to stop or control worrying? 0   3. Worrying too much about different things? 0   4. Trouble relaxing? 0   5. Being so restless that it is hard to sit still? 0   6. Becoming easily annoyed or irritable? 1   7. Feeling afraid as if something awful might happen? 0   CAMILO-7 Score 1     CAMILO-7 Score: (P) 1  Interpretation: (P) Normal     Client Strengths: verbal, intelligent, successful, good social support, good insight, commitment to wellness, strong ottoniel, strong cultural traditions     Diagnosis:     ICD-10-CM ICD-9-CM   1. Adjustment disorder with anxiety  F43.22 309.24   2. Intrahepatic cholangiocarcinoma  C22.1 155.1   3. Marital  conflict  Z63.0 V61.10       Treatment Plan:individual psychotherapy and medication management by physician  · Target symptoms: adjustment  · Why chosen therapy is appropriate versus another modality: relevant to diagnosis, patient responds to this modality, evidence based practice  · Outcome monitoring methods: self-report, observation, feedback from family, checklist/rating scale  · Therapeutic intervention type: behavior modifying psychotherapy  · Prognosis: Good      Behavioral goals:    Exercise:   Stress management:   Social engagement:   Nutrition:   Smoking Cessation:   Therapy:  Increase daily self-care and attention to health management  Pleasant events scheduling and increased social interaction  Monitor stressors in writing and bring to next visit    Return to clinic: as needed       Length of Service (minutes direct face-to-face contact): 30    Katie Ledesma, PhD  Clinical Psychologist  [place] License #[id number]  [place] License #[id number]

## 2022-06-28 PROBLEM — R35.0 BENIGN PROSTATIC HYPERPLASIA WITH URINARY FREQUENCY: Status: ACTIVE | Noted: 2022-01-13

## 2022-06-28 PROBLEM — R50.82 POST-PROCEDURAL FEVER: Status: RESOLVED | Noted: 2021-12-18 | Resolved: 2022-01-01

## 2022-06-28 PROBLEM — N40.1 BENIGN PROSTATIC HYPERPLASIA WITH URINARY FREQUENCY: Status: ACTIVE | Noted: 2022-01-13

## 2022-06-28 PROBLEM — M53.82 DECREASED ROM OF INTERVERTEBRAL DISCS OF CERVICAL SPINE: Status: RESOLVED | Noted: 2019-10-21 | Resolved: 2022-01-01

## 2022-06-28 NOTE — PROGRESS NOTES
Answers for HPI/ROS submitted by the patient on 6/24/2022  activity change: No  unexpected weight change: No  neck pain: Yes  hearing loss: No  rhinorrhea: No  trouble swallowing: No  eye discharge: No  visual disturbance: No  chest tightness: No  wheezing: No  chest pain: No  palpitations: No  blood in stool: Yes  constipation: Yes  vomiting: No  diarrhea: Yes  polydipsia: No  polyuria: Yes  difficulty urinating: No  urgency: No  hematuria: No  joint swelling: No  arthralgias: No  headaches: No  weakness: Yes  confusion: No  dysphoric mood: No    Subjective:       Patient ID: Domonique Kellogg is a 72 y.o. male.    Chief Complaint: No chief complaint on file.    The patient location is: home  The chief complaint leading to consultation is: follow up    Visit type: audiovisual    Face to Face time with patient: 19  25+ minutes of total time spent on the encounter, which includes face to face time and non-face to face time preparing to see the patient (eg, review of tests), Obtaining and/or reviewing separately obtained history, Documenting clinical information in the electronic or other health record, Independently interpreting results (not separately reported) and communicating results to the patient/family/caregiver, or Care coordination (not separately reported).         Each patient to whom he or she provides medical services by telemedicine is:  (1) informed of the relationship between the physician and patient and the respective role of any other health care provider with respect to management of the patient; and (2) notified that he or she may decline to receive medical services by telemedicine and may withdraw from such care at any time.    Notes:  Virtual visit to update patient's conditions.  He states he is doing pretty good at this time.  Having ongoing TACE procedures.  His care is generally driven by Hematology-Oncology.  Episode chest discomfort went to [facility] in May.  Care  everywhere results briefly reviewed.  Negative troponin and EKG results were unremarkable.  No recurrence.  Some fatigue reported.  States he is eating okay.  No change of medications recently.  Most her provided through his oncologist.  He is significantly anemic.  This appears to be stable for the time being.  He has laboratory pending in a week and an appointment with them usually weekly or more frequently.  Some nocturia that keeps him awake some nights.    Review of Systems   Constitutional: Negative for activity change and unexpected weight change.   HENT: Negative for hearing loss, rhinorrhea and trouble swallowing.    Eyes: Negative for discharge and visual disturbance.   Respiratory: Negative for chest tightness and wheezing.    Cardiovascular: Negative for chest pain and palpitations.   Gastrointestinal: Positive for blood in stool, constipation and diarrhea. Negative for vomiting.   Endocrine: Positive for polyuria. Negative for polydipsia.   Genitourinary: Negative for difficulty urinating, hematuria and urgency.   Musculoskeletal: Positive for neck pain. Negative for arthralgias and joint swelling.   Neurological: Positive for weakness. Negative for headaches.   Psychiatric/Behavioral: Negative for confusion and dysphoric mood.       Objective:      Physical Exam  Constitutional:       General: He is not in acute distress.     Appearance: He is well-developed.   Neurological:      Mental Status: He is alert and oriented to person, place, and time.   Psychiatric:         Speech: Speech normal.         Behavior: Behavior normal.         Assessment:       1. Intrahepatic cholangiocarcinoma    2. CAD in native artery    3. Hypertension, essential    4. Mixed hyperlipidemia    5. Prediabetes        Plan:     Medication List with Changes/Refills   Current Medications    ACETAMINOPHEN (TYLENOL) 650 MG TBSR    Take by mouth daily as needed.    ALPRAZOLAM (XANAX) 0.5 MG TABLET    TAKE 1 TABLET NIGHTLY AS NEEDED  BY MOUTH FOR SLEEP FOR UP TO 5 DAYS    APIXABAN (ELIQUIS) 2.5 MG TAB    Take 1 tablet (2.5 mg total) by mouth once daily.    APIXABAN (ELIQUIS) 2.5 MG TAB    Take 1 tablet (2.5 mg total) by mouth 2 (two) times daily.    DILTIAZEM 2% LIDOCAINE 5% CREAM    Apply pea size amount topically 3 (three) times daily.    FINASTERIDE (PROSCAR) 5 MG TABLET    Take 1 tablet (5 mg total) by mouth once daily.    HYDROCHLOROTHIAZIDE (HYDRODIURIL) 25 MG TABLET    TAKE 1 TABLET (25 MG TOTAL) BY MOUTH ONCE DAILY.    MAGNESIUM OXIDE (MAG-OX) 400 MG (241.3 MG MAGNESIUM) TABLET    Take 400 mg by mouth every evening.    MULTIVITAMIN WITH MINERALS TABLET    Take 1 tablet by mouth every morning.     ONDANSETRON (ZOFRAN) 4 MG TABLET    Take 1 tablet (4 mg total) by mouth every 6 (six) hours as needed for Nausea.    POTASSIUM CHLORIDE SA (K-DUR,KLOR-CON) 20 MEQ TABLET    Take 2 tablets (40 mEq total) by mouth once daily.    PRAVASTATIN (PRAVACHOL) 40 MG TABLET    Take 1 tablet (40 mg total) by mouth once daily.    PROCHLORPERAZINE (COMPAZINE) 10 MG TABLET    Take 1 tablet (10 mg total) by mouth every 6 (six) hours as needed (nausea).    PROMETHAZINE (PHENERGAN) 12.5 MG TAB    Take 1 tablet (12.5 mg total) by mouth every 6 (six) hours as needed (nausea).    RABEPRAZOLE (ACIPHEX) 20 MG TABLET    Take 1 tablet (20 mg total) by mouth once daily.    SODIUM,POTASSIUM,MAG SULFATES (SUPREP BOWEL PREP KIT) 17.5-3.13-1.6 GRAM SOLR    As Directed    TAMSULOSIN (FLOMAX) 0.4 MG CAP    TAKE 1 CAPSULE EVERY DAY   Discontinued Medications    ONDANSETRON (ZOFRAN) 4 MG TABLET    Take 1 tablet (4 mg total) by mouth every 6 (six) hours as needed for Nausea.    ONDANSETRON (ZOFRAN-ODT) 4 MG TBDL    Take 1 tablet (4 mg total) by mouth every 6 (six) hours as needed (nausea vomiting).    ONDANSETRON (ZOFRAN-ODT) 4 MG TBDL    Take 1 tablet (4 mg total) by mouth every 6 (six) hours as needed (nausea vomiting).     Diagnoses and all orders for this  visit:    Intrahepatic cholangiocarcinoma    CAD in native artery    Hypertension, essential    Mixed hyperlipidemia    Prediabetes  -     Hemoglobin A1C; Future      See meds, orders, follow up, routing and instructions sections of encounter and AVS. Discussed with patient and provided on AVS.    Diabetes Management Status    Statin: Taking  ACE/ARB: Not taking    Screening or Prevention Patient's value Goal Complete/Controlled?   HgA1C Testing and Control   Lab Results   Component Value Date    HGBA1C 6.0 (H) 11/18/2021      Annually/Less than 8% Yes   Lipid profile : 11/15/2021 Annually Yes   LDL control Lab Results   Component Value Date    LDLCALC 92.6 11/15/2021    Annually/Less than 100 mg/dl  Yes   Nephropathy screening No results found for: LABMICR  Lab Results   Component Value Date    PROTEINUA Negative 03/22/2022     No results found for: UTPCR   Annually Yes   Blood pressure BP Readings from Last 1 Encounters:   06/23/22 138/70    Less than 140/90 Yes   Dilated retinal exam Most Recent Eye Exam Date: Not Found Annually No   Foot exam   Most Recent Foot Exam Date: Not Found Annually No     Lab Results   Component Value Date     06/20/2022    K 3.9 06/20/2022     06/20/2022    BUN 27 (H) 06/20/2022    CREATININE 1.2 06/20/2022     06/20/2022    HGBA1C 6.0 (H) 11/18/2021    MG 1.4 (L) 06/20/2022    AST 51 (H) 06/20/2022    ALT 54 (H) 06/20/2022    ALBUMIN 2.7 (L) 06/20/2022    PROT 6.4 06/20/2022    BILITOT 0.8 06/20/2022    CHOL 139 11/15/2021    HDL 36 (L) 11/15/2021    LDLCALC 92.6 11/15/2021    TRIG 52 11/15/2021    WBC 5.21 06/20/2022    HGB 7.9 (L) 06/20/2022    HCT 24.2 (L) 06/20/2022     06/20/2022    PSA 1.4 11/15/2021    TSH 2.483 10/21/2016    URICACID 8.2 (H) 03/18/2015       Patient appears to be about status quo at this time.  He has some upcoming appointments with laboratory, Hematology and is in close contact with Psychology and palliative Medicine.  His treatment  is currently outlined in Dr. Young's clinic notes.  Follow-up with me in about 6 weeks to go over his ongoing care and update any changing conditions.    Today's appointment was >25 minutes including chart review (such as review of consult notes, op notes, ER notes, labs, imaging, path, cultures, etc.), medication reconciliation and adjustment, and in some cases >50% time spent in counseling.

## 2022-07-01 PROBLEM — R53.1 WEAKNESS GENERALIZED: Status: ACTIVE | Noted: 2022-01-01

## 2022-07-01 NOTE — PLAN OF CARE
[facility]  Physical Therapy Initial Evaluation    Name: Domonique Kellogg  Clinic Number: [id number]    Therapy Diagnosis:   Encounter Diagnoses   Name Primary?    Intrahepatic cholangiocarcinoma     Debility     Weakness generalized      Physician: Claudia Young MD    Physician Orders: PT Eval and Treat  Medical Diagnosis:   Intrahepatic cholangiocarcinoma   Debility     Evaluation Date: 7/1/2022  Authorization Period Expiration: 7/1/23  Plan of Care Certification Period: 9/22/22  Visit # / Visits authorized: 1/ 1  Date of Surgery: NA  Precautions: Standard, Diabetes and Immunosuppression, Cancer, Anti-coagulant long term, HTN    Time In: 1040a  Time Out: 1125a  Total Billable Time: 45 minutes    Subjective     Date of onset:   History of current condition - Domonique reports: that he is concerned that he has lost muscle mass since he began chemo and is much less active.  He states that he walks the dog 3-4 blocks every morning.  He states that chemotherapy began in December.  He states that he fatigues quickly and has a hard time doing things he used to do.  He states that he initially had a lot of energy despite chemo, but the last month he has noticed a significant decrease in muscle.  He fatigues with going up stairs at home, trying to do his morning walks.      Past Medical History:   Diagnosis Date    Adjustment disorder     Anticoagulant long-term use     Anxiety     Arthritis     CAD (coronary artery disease) 3/2/2015    non-obstructive per [facility]     Cataract     Dry eye syndrome     Hypertension     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/2021    Obesity     Post-procedural fever 12/18/2021    Seizures     2011    Sleep apnea     + CPAP    Sleep difficulties      Domonique Kellogg  has a past surgical history that includes Knee surgery; [place] tooth extraction; Phacoemulsification of cataract (Right, 7/16/2018); Intraocular prosthesis insertion (Right, 7/16/2018);  Phacoemulsification of cataract (Left, 8/13/2018); Intraocular prosthesis insertion (Left, 8/13/2018); Eye surgery; Anterior cervical discectomy w/ fusion (N/A, 7/6/2020); Esophagogastroduodenoscopy (N/A, 10/1/2021); Colonoscopy (N/A, 10/1/2021); Insertion of venous access port (N/A, 12/3/2021); Esophagogastroduodenoscopy (N/A, 4/5/2022); and Colonoscopy (N/A, 6/7/2022).    Domonique has a current medication list which includes the following prescription(s): acetaminophen, alprazolam, apixaban, apixaban, diltiaZEM 2% Lidocaine 5% Cream, finasteride, hydrochlorothiazide, magnesium oxide, multivitamin with minerals, ondansetron, potassium chloride sa, pravastatin, prochlorperazine, promethazine, rabeprazole, suprep bowel prep kit, and tamsulosin.    Review of patient's allergies indicates:   Allergen Reactions    Indomethacin Other (See Comments)     Headache dizziness  Headache dizziness    Adhesive Rash        Prior Therapy: Yes  Social History: Lives in a home with wife  Occupation: Retired  Prior Level of Function: No limitation  Current Level of Function: Limited energy and strength for walking, stairs, exercise    Pain:  Current 0/10, worst 3/10, best 0/10   Location: NA (pain at times with sitting over a puzzle)  Description: Aching    Pts goals: To have more energy, walk further, gain muscle    Objective     Observation: No assistive device    The patient has no ROM limitations in UEs, LEs, lumbar spine aside from right knee flexion and extension secondary to previous knee surgery    Lower Extremity Strength  Right LE  Left LE    Quadriceps: 4/5 Quadriceps: 5/5   Hamstrings: 4/5 Hamstrings: 5/5   Iliospoas: 4+/5 Iliospoas: 5/5   Hip extension:  4/5 Hip extension: 4/5   Glut Med 3/5 Glut Med: 3+/5       UE Strength:  Shoulder Right Left   Flexion 5/5 5/5   Abduction 5/5 5/5   ER 4/5 4/5   IR 5/5 5/5       TREATMENT     Treatment Time In: 1100a  Treatment Time Out: 1125a  Total Treatment time separate from  Evaluation time:25    Domonique received therapeutic exercises to develop strength, endurance, flexibility and core stabilization for 25 minutes including:  Bridge - 10x  SLR - 10x  DL HR - 10x  Pull apart - 10x, green  Bilateral ER - 10x, green    Domonique received the following manual therapy techniques: for 0 minutes, including:    Home Exercises and Patient Education Provided    Education provided re: therapeutic diagnosis and plan of care    Written Home Exercises Provided: Yes.  Exercises were reviewed and Domonique was able to demonstrate them prior to the end of the session.   Pt received a written copy of exercises to perform at home. Domonique demonstrated good  understanding of the education provided.     See EMR under patient instructions for exercises given.     Assessment     Domonique is a 72 y.o. male referred to outpatient Physical Therapy with a medical diagnosis of hepatic cholangiocarcinoma and debility (chemo began in December 2021). Pt presents with objective deficits in R greater than L LE strength and general weakness that limits functional ability and energy with ADLs, exercise.    Pt prognosis is Good.   Pt will benefit from skilled outpatient Physical Therapy to address the deficits stated above and in the chart below, provide pt/family education, and to maximize pt's level of independence.     Plan of care discussed with patient: Yes  Pt's spiritual, cultural and educational needs considered and patient is agreeable to the plan of care and goals as stated below:     Anticipated Barriers for therapy: None    Medical Necessity is demonstrated by the following  History  Co-morbidities and personal factors that may impact the plan of care Co-morbidities:   See chart    Personal Factors:   no deficits     low   Examination  Body Structures and Functions, activity limitations and participation restrictions that may impact the plan of care Body Regions:   lower extremities  upper  extremities  trunk    Body Systems:    strength  gait    Participation Restrictions:   None    Activity limitations:   Learning and applying knowledge  no deficits    General Tasks and Commands  no deficits    Communication  no deficits    Mobility  walking    Self care  no deficits    Domestic Life  no deficits    Interactions/Relationships  no deficits    Life Areas  no deficits    Community and Social Life  no deficits         low   Clinical Presentation stable and uncomplicated low   Decision Making/ Complexity Score: low       GOALS:  Short Term Goals:  4 weeks  1. Pt to increase right LE quadriceps strength to 4+/5 to increase walking endurance.  3. Pt to demonstrate 4/5 strength of bilateral gluteus medius to increase ease with sit to stand, walking.    Long Term Goals: 12 weeks  1. Pt will report ability to walk 5 blocks without fatigue or limitation.  2. Pt will demonstrate 5/5 strength of bilateral LE quadriceps and hamstrings to increase ease with squatting, walking, stairs.  3. Patient goal: TO resume regular exercise    Plan   Certification Period/Plan of care expiration: 7/1/2022 to 9/22/22.    Outpatient Physical Therapy 2 times weekly for 12 weeks to include the following interventions: manual therapy as needed, therapeutic exercises to address functional deficits, modalities prn, wound care prn, dry needling prn, treatment by a skilled PTA at times.    Jayy Leach, PT

## 2022-07-05 NOTE — PROGRESS NOTES
"                                                PROGRESS NOTE    Subjective:       Patient ID: Domonique Kellogg is a 72 y.o. male.    Chief Complaint: follow up for cholangiocarcinoma    Diagnosis:  Advanced intrahepatic cholangiocarcinoma, MSI-low, negative for FGFR2 fusion or IDH1/IDH2 mutations, diagnosed on 11/22/2021.     Molecular Profile:  Guardant 360 11/29/21: +CCND1 amplification. VUS: CATRACHITO [id number]. MSI-high not detected.     Oncologic History:  1. Mr Kellogg is a 70 yo man with CAD, HTN, seizures in 2011 who initially saw me on 11/29/21 for further management of cholangiocarcinoma. He presented with weight loss and diarrhea since July. US 9/22/21 showed "Enlarged, heterogeneous appearance of the liver likely due to multiple underlying hepatic lesions largest measuring 4.8 cm."  MRI abdomen w/wo contrast 10/4/21: "Multiple liver lesions measuring up to 13.3 cm in the right hepatic lobe.  Differential diagnosis includes metastases, fibrolamellar hepatocellular carcinoma, or atypical focal nodular hyperplasia.  Consider biopsy for further evaluation. Thrombosis of the anterior branch of the right portal vein and left hepatic vein.  Nonvisualization of the middle and right hepatic veins, which may be thrombosed as well. Prominent periportal lymph node, which is nonspecific."  EGD and colonoscopy on 10/11/21 were negative for primary malignancies.   He underwent liver lesion biopsy and Y90 mapping on 11/22/21. Pathology showed adenocarcinoma: "Biopsy of the liver mass shows a malignant neoplasm in a fibrotic stroma. Glandular architecture is readily identified with several foci of intraluminal necrosis. Scattered mitotic figures are seen. Neoplastic cells show the following immunophenotype:   Positive: AE1/AE3, CK7, CDX2   Negative: Chromogranin, Synaptophysin, TTF, CK5/6, CK20, HepPar1   The morphology and immunophenotype are compatible with adenocarcinoma. Considerations for a primary site include " "pancreaticobiliary (including intrahepatic cholangiocarcinoma) as well as upper gastrointestinal. Clinical and radiologic correlation is suggested."  Patient presents today with wife for further management. No pain. Initial weight loss has somewhat stabilized. Still has diarrhea. Has not tried imodium yet. +nervous  Mother had kidney cancer at age 64. Maternal grandmother had liver cancer in her 60s. Patient has three children, two daughters and one son.   Discussed palliative chemo with cis/gem  2. PET scan 21 did not show extrahepatic metastases.   3. Cis/gem started on 21  4. S/p TACE on 2021, 22, 22, 22    Interval History:   Mr Kellogg returns for cycle 10 day 1 cis/gem. Scheduled for MWA on  and TACE on Aug 2. He is feeling well. Taking eliquis 5 mg bid. No bleeding. Noted bruising of the left inner forearm from putting his arm over the edge of the table.     ECO    ROS:   A ten-point system review is obtained and negative except for what was stated in the Interval History.     Physical Examination:   Vital signs reviewed.   General: well hydrated, well developed, in no acute distress  HEENT: normocephalic, EOMI, anicteric sclerae  Neck: supple, no cervical lymphadenopathy, no thyromegaly or JVD  Chest: right chest port site clean  Lungs: clear breath sounds bilaterally, no wheezing/rales/rhonchi  Heart: RRR, no M/R/G  Abdomen: soft, no tenderness, nondistended  Ext: no clubbing, cyanosis, edema  Skin: no open wound, ulcers, rash. +healing bruise over the inner side of the left forearm  Neuro: alert and oriented x 4  Psych: pleasant and appropriate mood and affect    Objective:     Laboratory Data:  Labs reviewed. Adequate for chemo. plt count 108    Imaging Data:  PET 21:  In this patient with known intrahepatic cholangiocarcinoma, there are multifocal hypermetabolic hepatic lesions in both hepatic lobes.  No evidence of distant metastasis.     Lipomatosis of the " ileocecal valve and proximal cecum.     Fat containing umbilical hernia.    MRI Abdomen 1/18/22:  1. Patient with reported cholangiocarcinoma.  Interval progression of hepatic tumor burden with multiple enlarging and new hepatic lesions.  2. Progression of portal venous thrombosis involving the left, right, and main portal veins.  Persistent filling defect within the central hepatic veins concerning for thrombosis.  3. Stable prominent periportal lymph node.    CT chest 3/4/22:  Right pulmonary nodules, unchanged.  Recommend continued follow-up as per clinical protocol.    MRI abdomen 3/14/22:     Interval postprocedural changes status post TACE with slight improvement of tumor burden at treatment sites.  Extensive residual disease throughout both hepatic lobes.  Index lesions as above.     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     Stable appearance of central hepatic veins, underlying thrombosis not excluded.  Consider further evaluation with Doppler ultrasound if clinically warranted.     Stable prominent periportal lymph node.     Right adrenal adenoma.    CT urogram 3/14/22:  Prostatomegaly which demonstrates mass effect on the posterior bladder.  There is mild diffuse wall thickening of the bladder which may relate to outlet obstruction.     Postprocedural changes of the liver in keeping with recent chemoembolization.  Protocol is not optimized for evaluation of tumor response.  Dedicated multiphase liver CT would be necessary to evaluate liver directed therapy response.    MRI A/P 6/19/22:  Evolving posttreatment changes in the liver for multifocal intrahepatic cholangiocarcinoma.  No new focal hepatic lesions.     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     No abdominopelvic metastases.     CT chest 6/15/22:  1. Stable pulmonary nodules measuring up to 0.4 cm.  No definite new or enlarging pulmonary nodules.  2.  Scattered regions of opacification within the right lung base with air bronchograms, favored to represent atelectasis.  However, early consolidative changes cannot be completely excluded.     US bilateral legs 6/9/22:  Partially occlusive deep venous thrombosis of the left femoral vein.    Assessment and Plan:     1. Intrahepatic cholangiocarcinoma    2. Secondary malignant neoplasm of liver    3. 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 Acute deep vein thrombosis (DVT) of left femoral vein    6. Portal vein thrombosis    7. Chronic anticoagulation    8. Hypertension, essential    9. Antineoplastic chemotherapy induced anemia    10. Thrombocytopenia      1-4  - Mr Kellogg is a 73 yo man with advanced intrahepatic cholangiocarcinoma with multiple liver lesions. MSI-low, FGFR2 fusion and IDH1/2 mutation negative. Started on cis/gem on 12/7/21. S/p TACE on 12/16/2021, 2/14/22, 4/19/22, 5/18/22  - added durvalumab after TOPAZ trial presented [facility] Jan 2022. Had [id number] with insurance company. Insurance company does not cover given lack of peer-reviewed article. Previously discussed applying for patient assistance. pateint wants to think about it.   - doing well. Continue with treatment. Cycle 10 day 1 gem/cis today. Reduced cisplatin to 20 mg/m2 given neuropathy  - return next week for C10D8. Scheduled for MWA on July 19 and TACE on Aug 2. Will give C11D1 the week of July 29 and C11D8 the week of Aug 9  - Patient prefers virtual visit and labs at [facility] if not getting chemo on the same day.     5-7  - was on eliquis 5 mg bid for portal vein thrombosis noted at diagnosis. However could not tolerate even 2.5 mg bid due to hematuria. Was on 2.5 mg daily  - US 6/9/22 showed new left femoral DVT. Eliquis was increased to 5 mg bid  - doing well. C/w eliquis 5 mg bid    8.  - BP controlled  - c/w current medication    9.  - s/p transfusion.   - EGD in 4/2022  showed congested mucosa in esophagus and gastritis. Colonoscopy 6/7/22 neg for bleeding sites.    10.  - Mild. adequate for chemo    Follow-up:     RTC next week for cycle 11 day 8  Knows to call in the interval if any problems arise.    Electronically signed by Claudia Young      Route Chart for Scheduling    Med Onc Chart Routing      Follow up with physician 4 weeks. See me on 7/26 with CBC, CMP, CA 19-9, magnesium, see me then get cis/gem. See BRADLEY on 8/9 with CBC, CMP, CA 19-9, magnesium, then get cis/gem   Follow up with BRADLEY    Infusion scheduling note    Injection scheduling note    Labs CBC, CA 19-9, CMP and magnesium   Lab interval: once a week     Imaging    Pharmacy appointment    Other referrals          Treatment Plan Information   OP GEMCITABINE CISPLATIN Q3W + DURVALUMAB D8 C4+   Claudia Young MD   Upcoming Treatment Dates - OP GEMCITABINE CISPLATIN Q3W + DURVALUMAB D8 C4+    7/14/2022       Chemotherapy       gemcitabine (GEMZAR) 2,200 mg in sodium chloride 0.9% 250 mL chemo infusion       CISplatin (PLATINOL) 20 mg/m2 = 45 mg in sodium chloride 0.9% 500 mL chemo infusion       Pre-Hydration       sodium chloride 0.9% 500 mL with magnesium sulfate 2 g, potassium chloride 20 mEq infusion       Post-Hydration       sodium chloride 0.9% bolus 500 mL       Antiemetics       aprepitant (CINVANTI) injection 130 mg       palonosetron (ALOXI) 0.25 mg with Dexamethasone (DECADRON) 12 mg in NS 50 mL IVPB  7/22/2022       Chemotherapy       gemcitabine (GEMZAR) 2,200 mg in sodium chloride 0.9% 250 mL chemo infusion       CISplatin (PLATINOL) 20 mg/m2 = 45 mg in sodium chloride 0.9% 500 mL chemo infusion       Pre-Hydration       sodium chloride 0.9% 500 mL with magnesium sulfate 2 g, potassium chloride 20 mEq infusion       Post-Hydration       sodium chloride 0.9% bolus 500 mL       Antiemetics       aprepitant (CINVANTI) injection 130 mg       palonosetron (ALOXI) 0.25 mg with Dexamethasone (DECADRON) 12 mg in NS  50 mL IVPB

## 2022-07-12 NOTE — PROGRESS NOTES
"                                                PROGRESS NOTE    Subjective:       Patient ID: Domonique Kellogg is a 72 y.o. male.    Chief Complaint: follow up for cholangiocarcinoma    Diagnosis:  Advanced intrahepatic cholangiocarcinoma, MSI-low, negative for FGFR2 fusion or IDH1/IDH2 mutations, diagnosed on 11/22/2021.     Molecular Profile:  Guardant 360 11/29/21: +CCND1 amplification. VUS: CATRACHITO [id number]. MSI-high not detected.     Oncologic History:  1. Mr Kellogg is a 72 yo man with CAD, HTN, seizures in 2011 who initially saw me on 11/29/21 for further management of cholangiocarcinoma. He presented with weight loss and diarrhea since July. US 9/22/21 showed "Enlarged, heterogeneous appearance of the liver likely due to multiple underlying hepatic lesions largest measuring 4.8 cm."  MRI abdomen w/wo contrast 10/4/21: "Multiple liver lesions measuring up to 13.3 cm in the right hepatic lobe.  Differential diagnosis includes metastases, fibrolamellar hepatocellular carcinoma, or atypical focal nodular hyperplasia.  Consider biopsy for further evaluation. Thrombosis of the anterior branch of the right portal vein and left hepatic vein.  Nonvisualization of the middle and right hepatic veins, which may be thrombosed as well. Prominent periportal lymph node, which is nonspecific."  EGD and colonoscopy on 10/11/21 were negative for primary malignancies.   He underwent liver lesion biopsy and Y90 mapping on 11/22/21. Pathology showed adenocarcinoma: "Biopsy of the liver mass shows a malignant neoplasm in a fibrotic stroma. Glandular architecture is readily identified with several foci of intraluminal necrosis. Scattered mitotic figures are seen. Neoplastic cells show the following immunophenotype:   Positive: AE1/AE3, CK7, CDX2   Negative: Chromogranin, Synaptophysin, TTF, CK5/6, CK20, HepPar1   The morphology and immunophenotype are compatible with adenocarcinoma. Considerations for a primary site include " "pancreaticobiliary (including intrahepatic cholangiocarcinoma) as well as upper gastrointestinal. Clinical and radiologic correlation is suggested."  Patient presents today with wife for further management. No pain. Initial weight loss has somewhat stabilized. Still has diarrhea. Has not tried imodium yet. +nervous  Mother had kidney cancer at age 64. Maternal grandmother had liver cancer in her 60s. Patient has three children, two daughters and one son.   Discussed palliative chemo with cis/gem  2. PET scan 21 did not show extrahepatic metastases.   3. Cis/gem started on 21  4. S/p TACE on 2021, 22, 22, 22    Interval History:   Mr Kellogg returns for cycle 10 day 8 cis/gem. Scheduled for MWA on  and TACE on Aug 2.     He is feeling well overall. Notes mild fatigue the week after chemo. He is staying active throughout treatment. His appetite is increased, though weight is down 3 lbs. He reports taste changes. States he is staying hydrated with adequate fluid intake. Reports new intermittent numbness to L pointer finger that does not interfere with ADLs at this time. +nocturia almost hourly causing insomnia. Denies N/V, C/D, CP, pains, SOB, cough, fever/chills. Continues to take eliquis 5 mg BID without evidence of bleeding. Left arm bruising has resolved. No other concerns.      Presents to clinic with his wife, Estrellita. ECO    ROS:   A ten-point system review is obtained and negative except for what was stated in the Interval History.     Physical Examination:   Vital signs reviewed.   General: well hydrated, well developed, in no acute distress  HEENT: normocephalic, EOMI, anicteric sclerae  Neck: supple, no cervical lymphadenopathy, no thyromegaly or JVD  Chest: right chest port site clean  Lungs: clear breath sounds bilaterally, no wheezing/rales/rhonchi  Heart: RRR, no M/R/G  Abdomen: soft, no tenderness, nondistended  Ext: no clubbing, cyanosis, edema  Skin: no open " wound, ulcers, rash. +healing bruise over the inner side of the left forearm  Neuro: alert and oriented x 4  Psych: pleasant and appropriate mood and affect    Objective:     Laboratory Data:  Labs reviewed. Adequate for chemo. Stable anemia. Plt count 152.    Imaging Data:  PET 12/6/21:  In this patient with known intrahepatic cholangiocarcinoma, there are multifocal hypermetabolic hepatic lesions in both hepatic lobes.  No evidence of distant metastasis.     Lipomatosis of the ileocecal valve and proximal cecum.     Fat containing umbilical hernia.    MRI Abdomen 1/18/22:  1. Patient with reported cholangiocarcinoma.  Interval progression of hepatic tumor burden with multiple enlarging and new hepatic lesions.  2. Progression of portal venous thrombosis involving the left, right, and main portal veins.  Persistent filling defect within the central hepatic veins concerning for thrombosis.  3. Stable prominent periportal lymph node.    CT chest 3/4/22:  Right pulmonary nodules, unchanged.  Recommend continued follow-up as per clinical protocol.    MRI abdomen 3/14/22:     Interval postprocedural changes status post TACE with slight improvement of tumor burden at treatment sites.  Extensive residual disease throughout both hepatic lobes.  Index lesions as above.     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     Stable appearance of central hepatic veins, underlying thrombosis not excluded.  Consider further evaluation with Doppler ultrasound if clinically warranted.     Stable prominent periportal lymph node.     Right adrenal adenoma.    CT urogram 3/14/22:  Prostatomegaly which demonstrates mass effect on the posterior bladder.  There is mild diffuse wall thickening of the bladder which may relate to outlet obstruction.     Postprocedural changes of the liver in keeping with recent chemoembolization.  Protocol is not optimized for evaluation of tumor response.  Dedicated  multiphase liver CT would be necessary to evaluate liver directed therapy response.    MRI A/P 6/19/22:  - Evolving posttreatment changes in the liver for multifocal intrahepatic cholangiocarcinoma.  No new focal hepatic lesions.  - Persistent portal vein thrombosis, likely tumor thrombus, with cavernous transformation.  - No abdominopelvic metastases.     CT chest 6/15/22:  1. Stable pulmonary nodules measuring up to 0.4 cm.  No definite new or enlarging pulmonary nodules.  2. Scattered regions of opacification within the right lung base with air bronchograms, favored to represent atelectasis.  However, early consolidative changes cannot be completely excluded.    US bilateral legs 6/9/22:  Partially occlusive deep venous thrombosis of the left femoral vein.    Assessment and Plan:     1. Intrahepatic cholangiocarcinoma    2. Secondary malignant neoplasm of liver    3. 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 Acute deep vein thrombosis (DVT) of left femoral vein    6. Portal vein thrombosis    7. Chronic anticoagulation    8. Hypertension, essential    9. Antineoplastic chemotherapy induced anemia    10. Thrombocytopenia      1-4  - Mr Kellogg is a 71 yo man with advanced intrahepatic cholangiocarcinoma with multiple liver lesions. MSI-low, FGFR2 fusion and IDH1/2 mutation negative. Started on cis/gem on 12/7/21. S/p TACE on 12/16/2021, 2/14/22, 4/19/22, 5/18/22  - added durvalumab after TOPAZ trial presented [facility] Jan 2022. Had [id number] with insurance company. Insurance company does not cover given lack of peer-reviewed article. Previously discussed applying for patient assistance. patient wants to think about it.   - doing well. Continue with treatment. Cycle 10 day 8 gem/cis today. Reduced cisplatin to 20 mg/m2 given neuropathy  - return 2 weeks for C11D1. Scheduled for MWA on July 19 and TACE on Aug 2. Will give C11D1 the week of July 29 and C11D8  the week of Aug 9  - Patient prefers virtual visit and labs at [facility] [place] if not getting chemo on the same day.     5-7  - was on eliquis 5 mg bid for portal vein thrombosis noted at diagnosis. However could not tolerate even 2.5 mg bid due to hematuria. Was on 2.5 mg daily  - US 6/9/22 showed new left femoral DVT. Eliquis was increased to 5 mg bid  - doing well. C/w eliquis 5 mg bid    8.  - BP controlled  - c/w current medication    9.  - s/p transfusion. Stable.   - EGD in 4/2022 showed congested mucosa in esophagus and gastritis. Colonoscopy 6/7/22 neg for bleeding sites.    10.  - Mild. Adequate for chemo  - No evidence of bleeding. Monitor.     Follow-up:     RTC next week for cycle 11 day 1  Knows to call in the interval if any problems arise.    Patient is in agreement with the proposed treatment plan. All questions were answered to the patient's satisfaction. Pt knows to call clinic if anything is needed before the next clinic visit.      Jody Doran, MSN, APRN, [facility]  Hematology and Medical Oncology  Clinical Nurse Specialist to Dr. Lane, Dr. Silva & Dr. Young      Route Chart for Scheduling    Med Onc Chart Routing      Follow up with physician 2 weeks. See Dr. Young on 7/26 with CBC, CMP, CA 19-9, magnesium, see MD then get cis/gem.   Follow up with BRADLEY 4 weeks. See BRADLEY on 8/9 with CBC, CMP, CA 19-9, magnesium, then get cis/gem   Infusion scheduling note    Injection scheduling note    Labs CBC, CA 19-9, CMP and magnesium   Lab interval: once a week     Imaging    Pharmacy appointment    Other referrals          Treatment Plan Information   OP GEMCITABINE CISPLATIN Q3W + DURVALUMAB D8 C4+   Claudia Young MD   Upcoming Treatment Dates - OP GEMCITABINE CISPLATIN Q3W + DURVALUMAB D8 C4+    7/22/2022       Chemotherapy       gemcitabine (GEMZAR) 2,200 mg in sodium chloride 0.9% 250 mL chemo infusion       CISplatin (PLATINOL) 20 mg/m2 = 45 mg in sodium chloride 0.9% 500 mL chemo infusion        Pre-Hydration       sodium chloride 0.9% 500 mL with magnesium sulfate 2 g, potassium chloride 20 mEq infusion       Post-Hydration       sodium chloride 0.9% bolus 500 mL       Antiemetics       aprepitant (CINVANTI) injection 130 mg       palonosetron (ALOXI) 0.25 mg with Dexamethasone (DECADRON) 12 mg in NS 50 mL IVPB

## 2022-07-15 NOTE — PRE-PROCEDURE INSTRUCTIONS
PRE-OP INSTRUCTIONS:  Instructed patient to have no food,milk or milk products after midnight   It is ok to take AM medications with a few sips of water   Medication instructions for pm prior to and am of surgery reviewed.  Instructed patient to avoid taking vitamins,supplements,aspirin or ibuprofen the am of surgery.  Shower instructions provided    Patient denies any side effects or issues with anesthesia or sedation.     PT USES CPAP

## 2022-07-18 NOTE — TELEPHONE ENCOUNTER
Domonique Kellogg was trying to return a call to Deedee regarding procedure instructions. I was unsure what he was asking about, but told him I would look into it and try to get back to him.

## 2022-07-19 NOTE — DISCHARGE SUMMARY
Radiology Discharge Summary      Hospital Course: No complications    Admit Date: 7/19/2022  Discharge Date: 07/19/2022     Instructions Given to Patient: Yes  Diet: Resume prior diet  Activity: activity as tolerated and no driving for today    Description of Condition on Discharge: Stable  Vital Signs (Most Recent): Temp: 97.3 °F (36.3 °C) (07/19/22 1529)  Pulse: 64 (07/19/22 1600)  Resp: 16 (07/19/22 1600)  BP: (!) 148/76 (07/19/22 1600)  SpO2: 95 % (07/19/22 1600)    Discharge Disposition: Home    Discharge Diagnosis: Cholangiocarcinoma s/p microwave ablation    Follow up: As scheduled    Simon Fitzgerald M.D.  Interventional Radiology  Department of Radiology  Pager: [phone]

## 2022-07-19 NOTE — TRANSFER OF CARE
"Anesthesia Transfer of Care Note    Patient: Domonique Kellogg    Procedure(s) Performed: * No procedures listed *    Patient location: PACU    Anesthesia Type: general    Transport from OR: Transported from OR on 6-10 L/min O2 by face mask with adequate spontaneous ventilation    Post pain: adequate analgesia    Post assessment: no apparent anesthetic complications and tolerated procedure well    Post vital signs: stable    Level of consciousness: awake and alert    Nausea/Vomiting: no nausea/vomiting    Complications: none    Transfer of care protocol was followed      Last vitals:   Visit Vitals  BP (!) 154/81   Pulse 67   Temp 36.6 °C (97.9 °F)   Resp 16   Ht 5' 8" (1.727 m)   Wt 99.8 kg (220 lb)   SpO2 99%   BMI 33.45 kg/m²     "

## 2022-07-19 NOTE — ANESTHESIA PREPROCEDURE EVALUATION
07/19/2022  Domonique Kellogg is a 72 y.o., male.    2021 TTE  · The left ventricle is normal in size with normal systolic function. The estimated ejection fraction is 60%.  · Normal right ventricular size with normal right ventricular systolic function.  · Indeterminate left ventricular diastolic function.  · Mild left atrial enlargement.  · The estimated PA systolic pressure is 25 mmHg.  · Normal central venous pressure (3 mmHg).        Pre-operative evaluation for IR liver tumor ablation    Patient Active Problem List   Diagnosis    CTS (carpal tunnel syndrome)    Class 2 severe obesity due to excess calories with serious comorbidity and body mass index (BMI) of 38.0 to 38.9 in adult    H/O syncope    RAHEEL (obstructive sleep apnea)    CAD in native artery    Hypertension, essential    Hyperlipidemia    Chronic pulmonary heart disease    Bilateral carotid artery disease    Primary osteoarthritis of right knee    Lateral femoral cutaneous entrapment syndrome    Cervical spondylosis    Cervical disc disease with myelopathy    Cervical stenosis of spinal canal    Prediabetes    Infection due to Strongyloides    Intrahepatic cholangiocarcinoma    Secondary malignant neoplasm of liver    Transaminitis    Elevated troponin    Immunodeficiency secondary to neoplasm    Hyperbilirubinemia    Portal vein thrombosis    Cancer related pain    Immunodeficiency secondary to chemotherapy    Benign prostatic hyperplasia with urinary frequency    Weight loss    Weakness generalized       Port A Cath Single Lumen 12/03/21 1539 right internal jugular (Active)   Number of days: 227       (Not in a hospital admission)      Review of patient's allergies indicates:   Allergen Reactions    Indomethacin Other (See Comments)     Headache dizziness      Adhesive Rash       Past Medical History:   Diagnosis  Date    Adjustment disorder     Anticoagulant long-term use     Anxiety     Arthritis     CAD (coronary artery disease) 3/2/2015    [facility]obstruct[facility] per [facility]     Cataract     Dry eye syndrome     Hypertension     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/2021    Obesity     Post-procedural fever 12/18/2021    Seizures     2011    Sleep apnea     + CPAP    Sleep difficulties      Past Surgical History:   Procedure Laterality Date    ANTERIOR CERVICAL DISCECTOMY W/ FUSION N/A 7/6/2020    Procedure: DISCECTOMY, SPINE, CERVICAL, ANTERIOR APPROACH, WITH FUSION C3-4, C4-5;  Surgeon: Fabiola Vides MD;  Location: [facility] OR 2ND FLR;  Service: Neurosurgery;  Laterality: N/A;  TORONTO III, ASA III, BLOOD TYPE & SCREEN, NEUROMONITORING EMG-MEP-SEP, SUPINE POSITION,BRACE MIAMI,REGULAR BED, HEADREST CASPAR, POSITION, MAP>85, RADIOLOGY C-ARM, SPECIAL EQUIPMENT [facility]    COLONOSCOPY N/A 10/1/2021    Procedure: COLONOSCOPY;  Surgeon: Nicolas Alvarado MD;  Location: [facility] (4TH FLR);  Service: Endoscopy;  Laterality: N/A;  EGD and colonoscopy for diarrhea and weight loss and Enlarged, heterogeneous appearance of the liver likely due to multiple underlying hepatic lesions largest measuring 4.8 cm.  Findings concerning for metastatic versus less likely primary hepatic neoplasm.  Recommend fur    COLONOSCOPY N/A 6/7/2022    Procedure: COLONOSCOPY;  Surgeon: Mejia Villafuerte MD;  Location: [facility] (4TH FLR);  Service: Endoscopy;  Laterality: N/A;  For evaluation of positive out-patient FOBT.   medically urgent  ok to hold Eliquis per CAR[place] Edward/RB  fully vaccinated  hx of seizures- last one 2014    ESOPHAGOGASTRODUODENOSCOPY N/A 10/1/2021    Procedure: EGD (ESOPHAGOGASTRODUODENOSCOPY);  Surgeon: Nicolas Alvarado MD;  Location: [facility] (4TH FLR);  Service: Endoscopy;  Laterality: N/A;  EGD and colonoscopy for diarrhea and weight loss and Enlarged, heterogeneous appearance of the liver likely due to multiple  underlying hepatic lesions largest measuring 4.8 cm.  Findings concerning for metastatic versus less likely primary hepatic neopl    ESOPHAGOGASTRODUODENOSCOPY N/A 2022    Procedure: EGD (ESOPHAGOGASTRODUODENOSCOPY);  Surgeon: Amado Kelsey MD;  Location: [facility] (4TH FLR);  Service: Endoscopy;  Laterality: N/A;  EGD in 8 weeks to check for esophagitis healing patient should be on pantoprazole 40 mg once daily  ok to hold eliquis x2 days per Dr. Young, see telephone encounter  fully vaccinated    EYE SURGERY      INSERTION OF VENOUS ACCESS PORT N/A 12/3/2021    Procedure: INSERTION, VENOUS ACCESS PORT;  Surgeon: Saurav Garcia MD;  Location: [facility] 2ND FLR;  Service: General;  Laterality: N/A;    INTRAOCULAR PROSTHESES INSERTION Right 2018    Procedure: INSERTION-INTRAOCULAR LENS (IOL);  Surgeon: Priscilla Hays MD;  Location: [facility];  Service: Ophthalmology;  Laterality: Right;    INTRAOCULAR PROSTHESES INSERTION Left 2018    Procedure: INSERTION, INTRAOCULAR LENS PROSTHESIS;  Surgeon: Priscilla Hays MD;  Location: [facility];  Service: Ophthalmology;  Laterality: Left;    KNEE SURGERY      PHACOEMULSIFICATION OF CATARACT Right 2018    Procedure: PHACOEMULSIFICATION-ASPIRATION-CATARACT;  Surgeon: Priscilla Hays MD;  Location: [facility];  Service: Ophthalmology;  Laterality: Right;    PHACOEMULSIFICATION OF CATARACT Left 2018    Procedure: PHACOEMULSIFICATION, CATARACT;  Surgeon: Priscilla Hays MD;  Location: [facility];  Service: Ophthalmology;  Laterality: Left;    WISDOM TOOTH EXTRACTION       Tobacco Use    Smoking status: Former Smoker     Packs/day: 1.00     Years: 20.00     Pack years: 20.00     Quit date: 1987     Years since quittin.5    Smokeless tobacco: Never Used    Tobacco comment: quit    Substance and Sexual Activity    Alcohol use: Not Currently     Alcohol/week: 1.0 - 2.0 standard drink     Types: 1 - 2 Glasses of wine per week     Comment: not  since liver diagnosis    Drug use: No    Sexual activity: Yes     Partners: Female       Objective:     Vital Signs (Most Recent):    Vital Signs (24h Range):           There is no height or weight on file to calculate BMI.        Significant Labs:  All pertinent labs from the last 24 hours have been reviewed.    CBC: No results for input(s): WBC, RBC, HGB, HCT, PLT, MCV, MCH, MCHC in the last 72 hours.    CMP: No results for input(s): NA, K, CL, CO2, BUN, CREATININE, GLU, MG, PHOS, CALCIUM, ALBUMIN, PROT, ALKPHOS, ALT, AST, BILITOT in the last 72 hours.    INR  No results for input(s): PT, INR, PROTIME, APTT in the last 72 hours.      Pre-op Assessment    I have reviewed the Patient Summary Reports.     I have reviewed the Nursing Notes. I have reviewed the NPO Status.   I have reviewed the Medications.     Review of Systems  Anesthesia Hx:  Denies Family Hx of Anesthesia complications.   Denies Personal Hx of Anesthesia complications.       Physical Exam  General: Well nourished    Airway:  Mallampati: II   Mouth Opening: Normal  TM Distance: Normal  Tongue: Normal  Neck ROM: Normal ROM    Dental:Any loose and/or missing teeth verified with patient   Chest/Lungs:  Clear to auscultation    Heart:  Rate: Normal  Rhythm: Regular Rhythm  Sounds: Normal    Abdomen:  Normal        Anesthesia Plan  Type of Anesthesia, risks & benefits discussed:    Anesthesia Type: Gen ETT, Gen Supraglottic Airway, Gen Natural Airway  Intra-op Monitoring Plan: Standard ASA Monitors  Post Op Pain Control Plan: multimodal analgesia and IV/PO Opioids PRN  Induction:  IV  Informed Consent: Informed consent signed with the Patient and all parties understand the risks and agree with anesthesia plan.  All questions answered.   ASA Score: 3    Ready For Surgery From Anesthesia Perspective.     .

## 2022-07-19 NOTE — PLAN OF CARE
Pt arrived to IR room 173 for microwave ablation, no acute distress noted. Orders, consents and labs reviewed on chart. Anesthesia at bedside.

## 2022-07-19 NOTE — H&P
Radiology History & Physical      SUBJECTIVE:     Chief Complaint: Cholangiocarcinoma    History of Present Illness:  Domonique Kellogg is a 72 y.o. male who presents for microwave ablation.    Past Medical History:   Diagnosis Date    Adjustment disorder     Anticoagulant long-term use     Anxiety     Arthritis     CAD (coronary artery disease) 3/2/2015    non-obstructive per [facility]     Cataract     Dry eye syndrome     Hypertension     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/2021    Obesity     Post-procedural fever 12/18/2021    Seizures     2011    Sleep apnea     + CPAP    Sleep difficulties      Past Surgical History:   Procedure Laterality Date    ANTERIOR CERVICAL DISCECTOMY W/ FUSION N/A 7/6/2020    Procedure: DISCECTOMY, SPINE, CERVICAL, ANTERIOR APPROACH, WITH FUSION C3-4, C4-5;  Surgeon: Fabiola Vides MD;  [facility];  Service: Neurosurgery;  Laterality: N/A;  TORONTO III, ASA III, BLOOD TYPE & SCREEN, NEUROMONITORING EMG-MEP-SEP, SUPINE POSITION,BRACE MIAMI,REGULAR BED, HEADREST CASPAR, POSITION, MAP>85, RADIOLOGY C-ARM, SPECIAL EQUIPMENT [facility]    COLONOSCOPY N/A 10/1/2021    Procedure: COLONOSCOPY;  Surgeon: Nicolas Alvarado MD;  Location: [facility] (4TH FLR);  Service: Endoscopy;  Laterality: N/A;  EGD and colonoscopy for diarrhea and weight loss and Enlarged, heterogeneous appearance of the liver likely due to multiple underlying hepatic lesions largest measuring 4.8 cm.  Findings concerning for metastatic versus less likely primary hepatic neoplasm.  Recommend fur    COLONOSCOPY N/A 6/7/2022    Procedure: COLONOSCOPY;  Surgeon: Mejia Villafuerte MD;  Location: [facility] (4TH FLR);  Service: Endoscopy;  Laterality: N/A;  For evaluation of positive out-patient FOBT.   medically urgent  ok to hold Eliquis per [place] Edward/RB  fully vaccinated  hx of seizures- last one 2014    ESOPHAGOGASTRODUODENOSCOPY N/A 10/1/2021    Procedure: EGD (ESOPHAGOGASTRODUODENOSCOPY);   Surgeon: Nicolas Alvarado MD;  Location: [facility] (4TH FLR);  Service: Endoscopy;  Laterality: N/A;  EGD and colonoscopy for diarrhea and weight loss and Enlarged, heterogeneous appearance of the liver likely due to multiple underlying hepatic lesions largest measuring 4.8 cm.  Findings concerning for metastatic versus less likely primary hepatic neopl    ESOPHAGOGASTRODUODENOSCOPY N/A 4/5/2022    Procedure: EGD (ESOPHAGOGASTRODUODENOSCOPY);  Surgeon: Amado Kelsey MD;  Location: [facility] ENDO (4TH FLR);  Service: Endoscopy;  Laterality: N/A;  EGD in 8 weeks to check for esophagitis healing patient should be on pantoprazole 40 mg once daily  ok to hold eliquis x2 days per Dr. Young, see telephone encounter  fully vaccinated    EYE SURGERY      INSERTION OF VENOUS ACCESS PORT N/A 12/3/2021    Procedure: INSERTION, VENOUS ACCESS PORT;  Surgeon: Saurav Garcia MD;  Location: [facility] 2ND FLR;  Service: General;  Laterality: N/A;    INTRAOCULAR PROSTHESES INSERTION Right 7/16/2018    Procedure: INSERTION-INTRAOCULAR LENS (IOL);  Surgeon: Priscilla Hays MD;  [facility];  Service: Ophthalmology;  Laterality: Right;    INTRAOCULAR PROSTHESES INSERTION Left 8/13/2018    Procedure: INSERTION, INTRAOCULAR LENS PROSTHESIS;  Surgeon: Priscilla Hays MD;  Location: [facility];  Service: Ophthalmology;  Laterality: Left;    KNEE SURGERY      PHACOEMULSIFICATION OF CATARACT Right 7/16/2018    Procedure: PHACOEMULSIFICATION-ASPIRATION-CATARACT;  Surgeon: Priscilla Hays MD;  Location: [facility];  Service: Ophthalmology;  Laterality: Right;    PHACOEMULSIFICATION OF CATARACT Left 8/13/2018    Procedure: PHACOEMULSIFICATION, CATARACT;  Surgeon: Priscilla Hays MD;  Location: [facility];  Service: Ophthalmology;  Laterality: Left;    WISDOM TOOTH EXTRACTION         Home Meds:   Prior to Admission medications    Medication Sig Start Date End Date Taking? Authorizing Provider   acetaminophen (TYLENOL) 650 MG TbSR Take by mouth  daily as needed. 2/3/20  Yes Historical Provider   diltiaZEM 2% Lidocaine 5% Cream Apply pea size amount topically 3 (three) times daily. 12/28/21  Yes Shilpa Ahuja NP   finasteride (PROSCAR) 5 mg tablet Take 1 tablet (5 mg total) by mouth once daily.  Patient taking differently: Take 5 mg by mouth every morning. 3/24/22 3/24/23 Yes Messi Stark MD   hydroCHLOROthiazide (HYDRODIURIL) 25 MG tablet TAKE 1 TABLET (25 MG TOTAL) BY MOUTH ONCE DAILY.  Patient taking differently: Take 25 mg by mouth every morning. 12/10/21  Yes Nicolas Pacheco MD   pravastatin (PRAVACHOL) 40 MG tablet Take 1 tablet (40 mg total) by mouth once daily.  Patient taking differently: Take 40 mg by mouth every evening. 1/18/22  Yes Nicolas Pacheco MD   RABEprazole (ACIPHEX) 20 mg tablet Take 1 tablet (20 mg total) by mouth once daily.  Patient taking differently: Take 20 mg by mouth every morning. 12/23/21 12/23/22 Yes Mejia Villafuerte MD   tamsulosin (FLOMAX) 0.4 mg Cap TAKE 1 CAPSULE EVERY DAY  Patient taking differently: Take by mouth every morning. 5/25/22  Yes Claudia Young MD   ALPRAZolam (XANAX) 0.5 MG tablet TAKE 1 TABLET NIGHTLY AS NEEDED BY MOUTH FOR SLEEP FOR UP TO 5 DAYS 5/27/22   Historical Provider   apixaban (ELIQUIS) 5 mg Tab Take 1 tablet (5 mg total) by mouth 2 (two) times daily. 7/5/22   Claudia Young MD   magnesium oxide (MAG-OX) 400 mg (241.3 mg magnesium) tablet Take 400 mg by mouth every evening. 7/1/19   Historical Provider   multivitamin with minerals tablet Take 1 tablet by mouth every morning.  7/25/18   Historical Provider   ondansetron (ZOFRAN) 4 MG tablet Take 1 tablet (4 mg total) by mouth every 6 (six) hours as needed for Nausea. 5/18/22   Tone Lara MD   potassium chloride SA (K-DUR,KLOR-CON) 20 MEQ tablet Take 2 tablets (40 mEq total) by mouth once daily.  Patient taking differently: Take 40 mEq by mouth every morning. 12/3/21   Nicolas Pacheco MD   prochlorperazine (COMPAZINE) 10 MG  tablet Take 1 tablet (10 mg total) by mouth every 6 (six) hours as needed (nausea). 11/29/21 11/29/22  Claudia Young MD   promethazine (PHENERGAN) 12.5 MG Tab Take 1 tablet (12.5 mg total) by mouth every 6 (six) hours as needed (nausea). 11/29/21   Claudia Young MD   sodium,potassium,mag sulfates (SUPREP BOWEL PREP KIT) 17.5-3.13-1.6 gram SolR As Directed 5/12/22   Mejia Villafuerte MD     Anticoagulants/Antiplatelets: no anticoagulation    Allergies:   Review of patient's allergies indicates:   Allergen Reactions    Indomethacin Other (See Comments)     Headache dizziness      Adhesive Rash     Sedation History:  no adverse reactions    Review of Systems:   Hematological: no known coagulopathies  Respiratory: no shortness of breath  Cardiovascular: no chest pain  Gastrointestinal: no abdominal pain  Genito-Urinary: no dysuria  Musculoskeletal: negative  Neurological: no TIA or stroke symptoms         OBJECTIVE:     Vital Signs (Most Recent)  Temp: 97.9 °F (36.6 °C) (07/19/22 1214)  Pulse: 67 (07/19/22 1214)  Resp: 16 (07/19/22 1214)  BP: (!) 154/81 (07/19/22 1220)  SpO2: 99 % (07/19/22 1214)    Physical Exam:  ASA: per anesthesia  Mallampati: per anesthesia    General: no acute distress  Mental Status: alert and oriented to person, place and time  HEENT: normocephalic, atraumatic  Chest: unlabored breathing  Heart: regular heart rate  Abdomen: nondistended  Extremity: moves all extremities    Laboratory  Lab Results   Component Value Date    INR 1.2 07/05/2022       Lab Results   Component Value Date    WBC 7.37 07/13/2022    HGB 8.5 (L) 07/13/2022    HCT 26.5 (L) 07/13/2022    MCV 92 07/13/2022     07/13/2022      Lab Results   Component Value Date     (H) 07/13/2022     07/13/2022    K 3.4 (L) 07/13/2022     07/13/2022    CO2 28 07/13/2022    BUN 26 (H) 07/13/2022    CREATININE 1.1 07/13/2022    CALCIUM 10.4 07/13/2022    MG 1.5 (L) 07/13/2022    ALT 44 07/13/2022    AST 42 (H)  07/13/2022    ALBUMIN 3.0 (L) 07/13/2022    BILITOT 0.9 07/13/2022    BILIDIR 0.5 (H) 05/18/2022       ASSESSMENT/PLAN:     Sedation Plan: deep sedation per anesthesia  Patient will undergo microwave ablation.    Tone Lara MD PGYIII  Interventional Radiology  [facility]

## 2022-07-19 NOTE — PROCEDURES
Radiology Post-Procedure Note    Pre Op Diagnosis: Cholangiocarcinoma    Post Op Diagnosis: Same    Procedure: MWA    Procedure performed by: Simon Fitzgerald MD    Written Informed Consent Obtained: Yes  Specimen Removed: NO  Estimated Blood Loss: Minimal    Findings:   Under US and CT guidance, 15 gauge SC XT probes advanced to three left hepatic lobe lesions and ablation carried out according to protocol. Tract ablated. No complications. See dictation.    Patient tolerated procedure well.    Simon Fitzgerald M.D.  Interventional Radiology  Department of Radiology  Pager: [phone]

## 2022-07-19 NOTE — ANESTHESIA PROCEDURE NOTES
Intubation    Date/Time: 7/19/2022 2:15 PM  Performed by: Jayda Wilson CRNA  Authorized by: Neel Duque MD     Intubation:     Induction:  Intravenous    Intubated:  Postinduction    Mask Ventilation:  Easy mask    Attempts:  1    Attempted By:  CRNA    Method of Intubation:  Video laryngoscopy    Blade:  Bryant 3    Laryngeal View Grade: Grade I - full view of cords      Difficult Airway Encountered?: No      Complications:  None    Airway Device:  Oral endotracheal tube    Airway Device Size:  7.5    Style/Cuff Inflation:  Cuffed (inflated to minimal occlusive pressure)    Tube secured:  23    Secured at:  The lips    Placement Verified By:  Capnometry    Complicating Factors:  None    Findings Post-Intubation:  BS equal bilateral and atraumatic/condition of teeth unchanged

## 2022-07-19 NOTE — PLAN OF CARE
Microwave ablation completed, pt tolerated well. No apparent distress noted. Dressing applied CDI. Patient transferred to PACU 5 for 4 hours recovery, per Dr. Fitzgerald. Patient accompanied by IR and anesthesia team. Report given at bedside.

## 2022-07-20 NOTE — ANESTHESIA POSTPROCEDURE EVALUATION
Anesthesia Post Evaluation    Patient: Domonique Kellogg    Procedure(s) Performed: * No procedures listed *    Final Anesthesia Type: general      Patient location during evaluation: PACU  Patient participation: Yes- Able to Participate  Level of consciousness: awake and alert  Post-procedure vital signs: reviewed and stable  Pain management: adequate  Airway patency: patent  RAHEEL mitigation strategies: Extubation while patient is awake  PONV status at discharge: No PONV  Anesthetic complications: no      Cardiovascular status: stable  Respiratory status: spontaneous ventilation and face mask  Hydration status: euvolemic  Follow-up not needed.          Vitals Value Taken Time   /77 07/19/22 1831   Temp 36.7 °C (98 °F) 07/19/22 1900   Pulse 75 07/19/22 1900   Resp 20 07/19/22 1900   SpO2 96 % 07/19/22 1900         Event Time   Out of Recovery 17:09:26         Pain/Lalo Score: Pain Rating Prior to Med Admin: 6 (7/19/2022  5:42 PM)  Lalo Score: 10 (7/19/2022  5:15 PM)

## 2022-07-20 NOTE — NURSING
Called pt to follow up after MWA procedure.  Pt denies SOB, nausea, vomiting, fever & pain.   Pt verbalized he has IR clinic # and after hours #.

## 2022-07-25 NOTE — PROGRESS NOTES
Patient not FOBT due/eligible ([facility] Payor Report). Patient has history of colon polyps colonoscopy 10/1/21 due 10/2024.

## 2022-07-26 NOTE — PROGRESS NOTES
"                                                PROGRESS NOTE    Subjective:       Patient ID: Domonique Kellogg is a 72 y.o. male.    Chief Complaint: follow up for cholangiocarcinoma    Diagnosis:  Advanced intrahepatic cholangiocarcinoma, MSI-low, negative for FGFR2 fusion or IDH1/IDH2 mutations, diagnosed on 11/22/2021.     Molecular Profile:  Guardant 360 11/29/21: +CCND1 amplification. VUS: CATRACHITO [id number]. MSI-high not detected.   Strata: ASXL1 mutation, CCND1 amplification, MSI-low, TMB low.     Oncologic History:  1. Mr Kellogg is a 70 yo man with CAD, HTN, seizures in 2011 who initially saw me on 11/29/21 for further management of cholangiocarcinoma. He presented with weight loss and diarrhea since July. US 9/22/21 showed "Enlarged, heterogeneous appearance of the liver likely due to multiple underlying hepatic lesions largest measuring 4.8 cm."  MRI abdomen w/wo contrast 10/4/21: "Multiple liver lesions measuring up to 13.3 cm in the right hepatic lobe.  Differential diagnosis includes metastases, fibrolamellar hepatocellular carcinoma, or atypical focal nodular hyperplasia.  Consider biopsy for further evaluation. Thrombosis of the anterior branch of the right portal vein and left hepatic vein.  Nonvisualization of the middle and right hepatic veins, which may be thrombosed as well. Prominent periportal lymph node, which is nonspecific."  EGD and colonoscopy on 10/11/21 were negative for primary malignancies.   He underwent liver lesion biopsy and Y90 mapping on 11/22/21. Pathology showed adenocarcinoma: "Biopsy of the liver mass shows a malignant neoplasm in a fibrotic stroma. Glandular architecture is readily identified with several foci of intraluminal necrosis. Scattered mitotic figures are seen. Neoplastic cells show the following immunophenotype:   Positive: AE1/AE3, CK7, CDX2   Negative: Chromogranin, Synaptophysin, TTF, CK5/6, CK20, HepPar1   The morphology and immunophenotype are compatible with " "adenocarcinoma. Considerations for a primary site include pancreaticobiliary (including intrahepatic cholangiocarcinoma) as well as upper gastrointestinal. Clinical and radiologic correlation is suggested."  Patient presents today with wife for further management. No pain. Initial weight loss has somewhat stabilized. Still has diarrhea. Has not tried imodium yet. +nervous  Mother had kidney cancer at age 64. Maternal grandmother had liver cancer in her 60s. Patient has three children, two daughters and one son.   Discussed palliative chemo with cis/gem  2. PET scan 21 did not show extrahepatic metastases.   3. Cis/gem started on 21  4. S/p TACE on 2021, 22, 22, 22  5. S/p RF Ablation on 22 with no complications    Interval History:   Mr Kellogg returns for cycle 11 of gem/cis. He reports doing well since his last follow-up visit other than some expected fatigue and mild abdominal pain following his RF Ablation on 22 that he otherwise tolerated well. We discussed his continued therapy with gem/cis with the order placed for Durvalumab     Presents to clinic with his wife, Estrellita. ECO    ROS:   A ten-point system review is obtained and negative except for what was stated in the Interval History.     Physical Examination:   Vital signs reviewed.   General: well hydrated, well developed, in no acute distress  HEENT: normocephalic, EOMI, anicteric sclerae  Neck: supple, no cervical lymphadenopathy, no thyromegaly or JVD  Chest: right chest port site clean  Lungs: clear breath sounds bilaterally, no wheezing/rales/rhonchi  Heart: RRR, no M/R/G  Abdomen: soft, no tenderness, nondistended  Ext: no clubbing, cyanosis, edema  Skin: no open wound, ulcers, rash. +healing bruise over the inner side of the left forearm  Neuro: alert and oriented x 4  Psych: pleasant and appropriate mood and affect    Objective:     Laboratory Data:  Labs reviewed. Adequate for chemo. Continue to " monitor hemoglobin at 7.6 today.     Imaging Data:  PET 12/6/21:  In this patient with known intrahepatic cholangiocarcinoma, there are multifocal hypermetabolic hepatic lesions in both hepatic lobes.  No evidence of distant metastasis.     Lipomatosis of the ileocecal valve and proximal cecum.     Fat containing umbilical hernia.    MRI Abdomen 1/18/22:  1. Patient with reported cholangiocarcinoma.  Interval progression of hepatic tumor burden with multiple enlarging and new hepatic lesions.  2. Progression of portal venous thrombosis involving the left, right, and main portal veins.  Persistent filling defect within the central hepatic veins concerning for thrombosis.  3. Stable prominent periportal lymph node.    CT chest 3/4/22:  Right pulmonary nodules, unchanged.  Recommend continued follow-up as per clinical protocol.    MRI abdomen 3/14/22:     Interval postprocedural changes status post TACE with slight improvement of tumor burden at treatment sites.  Extensive residual disease throughout both hepatic lobes.  Index lesions as above.     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     Stable appearance of central hepatic veins, underlying thrombosis not excluded.  Consider further evaluation with Doppler ultrasound if clinically warranted.     Stable prominent periportal lymph node.     Right adrenal adenoma.    CT urogram 3/14/22:  Prostatomegaly which demonstrates mass effect on the posterior bladder.  There is mild diffuse wall thickening of the bladder which may relate to outlet obstruction.     Postprocedural changes of the liver in keeping with recent chemoembolization.  Protocol is not optimized for evaluation of tumor response.  Dedicated multiphase liver CT would be necessary to evaluate liver directed therapy response.    MRI A/P 6/19/22:  - Evolving posttreatment changes in the liver for multifocal intrahepatic cholangiocarcinoma.  No new focal hepatic  lesions.  - Persistent portal vein thrombosis, likely tumor thrombus, with cavernous transformation.  - No abdominopelvic metastases.     CT chest 6/15/22:  1. Stable pulmonary nodules measuring up to 0.4 cm.  No definite new or enlarging pulmonary nodules.  2. Scattered regions of opacification within the right lung base with air bronchograms, favored to represent atelectasis.  However, early consolidative changes cannot be completely excluded.    US bilateral legs 6/9/22:  Partially occlusive deep venous thrombosis of the left femoral vein.    IR RF Ablation 7/19/22    Impression:     Percutaneous high-frequency radiofrequency ablation of multiple left lobe liver lesions.    Assessment and Plan:     1. Intrahepatic cholangiocarcinoma    2. 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 Immunodeficiency secondary to neoplasm    5. Nutritional anemia    6. Antineoplastic chemotherapy induced anemia    7. Portal vein thrombosis    8. Acute deep vein thrombosis (DVT) of left femoral vein    9. Chronic anticoagulation    10. Hypertension, essential    11. CAD in native artery      1-4  - Mr Kellogg is a 73 yo man with advanced intrahepatic cholangiocarcinoma with multiple liver lesions. MSI-low, FGFR2 fusion and IDH1/2 mutation negative. Started on cis/gem on 12/7/21. S/p TACE on 12/16/2021, 2/14/22, 4/19/22, 5/18/22  - added durvalumab after TOPAZ trial presented at [facility] in Jan 2022. Had [id number] with insurance company. Insurance company does not cover given lack of peer-reviewed article. Previously discussed applying for patient assistance. patient wants to think about it.   - Doing well. Continue with treatment today with Gim/Cis C11D1, C11D8 on 8/9/22  - s/p RF ablation on 7/19/22, scheduled for TACE 8/2  - Follow-up with IR in one month for evaluation of continued local regional therapy options    5.6.  - EGD in 4/2022 showed congested mucosa in esophagus and  gastritis. Colonoscopy 6/7/22 neg for bleeding sites.  - Hb 7.6. check vit B12  - asymptomatic right now. Hold off on transfusion. Asked patient to call us if he feels very tired    7-9  - was on eliquis 5 mg bid for portal vein thrombosis noted at diagnosis. However could not tolerate even 2.5 mg bid due to hematuria. Was on 2.5 mg daily  - US 6/9/22 showed new left femoral DVT. Eliquis was increased to 5 mg bid  - doing well. C/w eliquis 5 mg bid    10.  - BP controlled  - c/w current medication    11.  - c/w pravachol       Follow-up:     RTC in 2 weeks.    Jake Villafana D.O.  Hematology/Oncology Fellow, PGY-IV    ATTESTATION:    I have personally seen, examined and talked to the patient. I have reviewed and edited Dr Villafana's note and agreed with the findings, assessment and plan.     Doing well. C/w ablation. C/w cis/gem. I am not sure if his right thigh numbness is from cisplatin. Increase cisplatin back to 25 mg/m2. TOPAZ trial has been published in NEJM evidence. FDA recommended cis/gem/durv as frontline treatment for biliary tract cancer. Will add durvalumab to cycle 11 day 8 to see if insurance approves it. Getting ablation next week. Return in 2 weeks for cycle 11 day 8.     Claudia Young MD  Hematology and Medical Oncology  [facility]      Route Chart for Scheduling    Med Onc Chart Routing  Urgent    Follow up with physician . Adding durvalumab to next cis/gem on 8/10. verify with PA. add TSH to labs on 8/9. get CBC, CMP, CA 19-9, magnesium, TSH at [facility] on 8/22, see me on 8/23 then get cis/gem on the same day, get CBC, CMP, CA 19-9, mg, TSH at [facility] on 8/29, see [facility] on 8/30 then get cis/gem/durv on the same day   Follow up with [facility]    Infusion scheduling note    Injection scheduling note    Labs CBC, CMP, CA 19-9, TSH and magnesium   Lab interval: once a week     Imaging    Pharmacy appointment    Other referrals          Treatment Plan Information   OP GEMCITABINE CISPLATIN Q3W +  DURVALUMAB D8 C11+   Claudia Young MD   Upcoming Treatment Dates - OP GEMCITABINE CISPLATIN Q3W + DURVALUMAB D8 C11+    8/4/2022       Chemotherapy       gemcitabine (GEMZAR) 2,200 mg in sodium chloride 0.9% 250 mL chemo infusion       CISplatin (PLATINOL) 25 mg/m2 = 57 mg in sodium chloride 0.9% 500 mL chemo infusion       Pre-Hydration       sodium chloride 0.9% 500 mL with magnesium sulfate 2 g, potassium chloride 20 mEq infusion       Post-Hydration       sodium chloride 0.9% bolus 500 mL       Antiemetics       aprepitant (CINVANTI) injection 130 mg       palonosetron (ALOXI) 0.25 mg with Dexamethasone (DECADRON) 12 mg in NS 50 mL IVPB  8/12/2022       Chemotherapy       gemcitabine (GEMZAR) 2,200 mg in sodium chloride 0.9% 250 mL chemo infusion       CISplatin (PLATINOL) 25 mg/m2 = 57 mg in sodium chloride 0.9% 500 mL chemo infusion       Pre-Hydration       sodium chloride 0.9% 500 mL with magnesium sulfate 2 g, potassium chloride 20 mEq infusion       Post-Hydration       magnesium sulfate 2g in water 50mL IVPB (premix)       sodium chloride 0.9% bolus 500 mL       Antiemetics       aprepitant (CINVANTI) injection 130 mg       palonosetron 0.25mg/dexamethasone 12mg in NS IVPB 0.25 mg       Immunotherapy       durvalumab (IMFINZI) 1,500 mg in sodium chloride 0.9% 280 mL chemo infusion  8/25/2022       Chemotherapy       gemcitabine (GEMZAR) 2,200 mg in sodium chloride 0.9% 250 mL chemo infusion       CISplatin (PLATINOL) 25 mg/m2 = 57 mg in sodium chloride 0.9% 500 mL chemo infusion       Pre-Hydration       sodium chloride 0.9% 500 mL with magnesium sulfate 2 g, potassium chloride 20 mEq infusion       Post-Hydration       sodium chloride 0.9% bolus 500 mL       Antiemetics       aprepitant (CINVANTI) injection 130 mg       palonosetron (ALOXI) 0.25 mg with Dexamethasone (DECADRON) 12 mg in NS 50 mL IVPB  9/2/2022       Chemotherapy       gemcitabine (GEMZAR) 2,200 mg in sodium chloride 0.9% 250 mL chemo  infusion       CISplatin (PLATINOL) 25 mg/m2 = 57 mg in sodium chloride 0.9% 500 mL chemo infusion       Pre-Hydration       sodium chloride 0.9% 500 mL with magnesium sulfate 2 g, potassium chloride 20 mEq infusion       Post-Hydration       magnesium sulfate 2g in water 50mL IVPB (premix)       sodium chloride 0.9% bolus 500 mL       Antiemetics       aprepitant (CINVANTI) injection 130 mg       palonosetron 0.25mg/dexamethasone 12mg in NS IVPB 0.25 mg       Immunotherapy       durvalumab (IMFINZI) 1,500 mg in sodium chloride 0.9% 280 mL chemo infusion

## 2022-07-27 NOTE — PLAN OF CARE
Pt tolerated gemzar, cisplatin to completion. PAC flushed with NS, hep locked and de accessed. D/c home accompanied by family member, no concerns. AVS given.

## 2022-08-01 NOTE — PRE-PROCEDURE INSTRUCTIONS
PREOP INSTRUCTIONS:  No food,milk or milk products after midnight  Morning medications may be taken with a few sips of water.  Instructed to follow the surgeon's instructions if they differ from these.  Shower instructions as well as directions to the Surgery Center were given.  Encouraged to wear loose fitting,comfortable clothing.  Medication instructions for pm prior to and am of procedure reviewed.  Instructed to avoid taking vitamins,supplements,aspirin and ibuprofen the morning of surgery.    Patient denies any side effects or issues with anesthesia or sedation.

## 2022-08-02 NOTE — PROGRESS NOTES
Pt progressing towards baseline. VSS. Dressing to surgical sites CDI. Oriented x4 and following commands. Moving all extremities with equal strength. Pain well-managed post-operatively. BLE pulses intact. Approaching readiness for transfer for discharge when slot available. Flat bed rest until 1235.

## 2022-08-02 NOTE — H&P
Radiology Pre-procedure Note    History of Present Illness:  Domonique Kellogg is a 72 y.o. male who presents for TACE 2/2 cholangiocarcinoma. S/P TACE of seg. 4 and 8 on 5/18/2022 and ablation of multiple left hepatic lobe lesions in 7/19/2022.    Past Medical History:   Diagnosis Date    Adjustment disorder     Anticoagulant long-term use     Anxiety     Arthritis     CAD (coronary artery disease) 3/2/2015    non-obstructive per [facility]     Cataract     Dry eye syndrome     Hypertension     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/2021    Obesity     Post-procedural fever 12/18/2021    Seizures     2011    Sleep apnea     + CPAP    Sleep difficulties      Past Surgical History:   Procedure Laterality Date    ANTERIOR CERVICAL DISCECTOMY W/ FUSION N/A 7/6/2020    Procedure: DISCECTOMY, SPINE, CERVICAL, ANTERIOR APPROACH, WITH FUSION C3-4, C4-5;  Surgeon: Fabiola Vides MD;  Location: [facility] OR 2ND FLR;  Service: Neurosurgery;  Laterality: N/A;  TORONTO III, ASA III, BLOOD TYPE & SCREEN, NEUROMONITORING EMG-MEP-SEP, SUPINE POSITION,BRACE [place],REGULAR BED, HEADREST CASPAR, POSITION, MAP>85, RADIOLOGY C-ARM, SPECIAL EQUIPMENT [facility]    COLONOSCOPY N/A 10/1/2021    Procedure: COLONOSCOPY;  Surgeon: Nicolas Alvarado MD;  Location: [facility] (4TH FLR);  Service: Endoscopy;  Laterality: N/A;  EGD and colonoscopy for diarrhea and weight loss and Enlarged, heterogeneous appearance of the liver likely due to multiple underlying hepatic lesions largest measuring 4.8 cm.  Findings concerning for metastatic versus less likely primary hepatic neoplasm.  Recommend [facility]    COLONOSCOPY N/A 6/7/2022    Procedure: COLONOSCOPY;  Surgeon: Mejia Villafuerte MD;  Location: [facility] (4TH FLR);  Service: Endoscopy;  Laterality: N/A;  For evaluation of positive out-patient FOBT.   medically urgent  ok to hold Eliquis per [place] Edward/RB  fully vaccinated  hx of seizures- last one 2014    ESOPHAGOGASTRODUODENOSCOPY N/A  10/1/2021    Procedure: EGD (ESOPHAGOGASTRODUODENOSCOPY);  Surgeon: Nicolas Alvarado MD;  Location: [facility] (4TH FLR);  Service: Endoscopy;  Laterality: N/A;  EGD and colonoscopy for diarrhea and weight loss and Enlarged, heterogeneous appearance of the liver likely due to multiple underlying hepatic lesions largest measuring 4.8 cm.  Findings concerning for metastatic versus less likely primary hepatic neopl    ESOPHAGOGASTRODUODENOSCOPY N/A 4/5/2022    Procedure: EGD (ESOPHAGOGASTRODUODENOSCOPY);  Surgeon: Amado Kelsey MD;  Location: [facility] ENDO (4TH FLR);  Service: Endoscopy;  Laterality: N/A;  EGD in 8 weeks to check for esophagitis healing patient should be on pantoprazole 40 mg once daily  ok to hold eliquis x2 days per Dr. Young, see telephone encounter  fully vaccinated    EYE SURGERY      INSERTION OF VENOUS ACCESS PORT N/A 12/3/2021    Procedure: INSERTION, VENOUS ACCESS PORT;  Surgeon: Saurav Garcia MD;  Location: [facility] 2ND FLR;  Service: General;  Laterality: N/A;    INTRAOCULAR PROSTHESES INSERTION Right 7/16/2018    Procedure: INSERTION-INTRAOCULAR LENS (IOL);  Surgeon: Priscilla Hays MD;  Location: [facility];  Service: Ophthalmology;  Laterality: Right;    INTRAOCULAR PROSTHESES INSERTION Left 8/13/2018    Procedure: INSERTION, INTRAOCULAR LENS PROSTHESIS;  Surgeon: Priscilla Hays MD;  Location: [facility];  Service: Ophthalmology;  Laterality: Left;    KNEE SURGERY      PHACOEMULSIFICATION OF CATARACT Right 7/16/2018    Procedure: PHACOEMULSIFICATION-ASPIRATION-CATARACT;  Surgeon: Priscilla Hays MD;  Location: [facility];  Service: Ophthalmology;  Laterality: Right;    PHACOEMULSIFICATION OF CATARACT Left 8/13/2018    Procedure: PHACOEMULSIFICATION, CATARACT;  Surgeon: Priscilla Hays MD;  Location: [facility];  Service: Ophthalmology;  Laterality: Left;    WISDOM TOOTH EXTRACTION         Review of Systems:   Negative unless stated in HPI    Home Meds:   Prior to Admission medications     Medication Sig Start Date End Date Taking? Authorizing Provider   apixaban (ELIQUIS) 5 mg Tab Take 1 tablet (5 mg total) by mouth 2 (two) times daily. 7/5/22  Yes Claudia Young MD   diltiaZEM 2% Lidocaine 5% Cream Apply pea size amount topically 3 (three) times daily. 12/28/21  Yes Shilpa Ahuja NP   finasteride (PROSCAR) 5 mg tablet Take 1 tablet (5 mg total) by mouth once daily.  Patient taking differently: Take 5 mg by mouth every evening. 3/24/22 3/24/23 Yes Messi Stark MD   hydroCHLOROthiazide (HYDRODIURIL) 25 MG tablet TAKE 1 TABLET (25 MG TOTAL) BY MOUTH ONCE DAILY.  Patient taking differently: Take 25 mg by mouth every morning. 12/10/21  Yes Nicolas Pacheco MD   pravastatin (PRAVACHOL) 40 MG tablet Take 1 tablet (40 mg total) by mouth once daily.  Patient taking differently: Take 40 mg by mouth every evening. 1/18/22  Yes Nicolas Pacheco MD   RABEprazole (ACIPHEX) 20 mg tablet Take 1 tablet (20 mg total) by mouth once daily.  Patient taking differently: Take 20 mg by mouth every morning. 12/23/21 12/23/22 Yes Mejia Villafuerte MD   sodium,potassium,mag sulfates (SUPREP BOWEL PREP KIT) 17.5-3.13-1.6 gram SolR As Directed 5/12/22  Yes Mejia Villafuerte MD   tamsulosin (FLOMAX) 0.4 mg Cap TAKE 1 CAPSULE EVERY DAY  Patient taking differently: Take by mouth every evening. 5/25/22  Yes Claudia Young MD   acetaminophen (TYLENOL) 650 MG TbSR Take by mouth daily as needed. 2/3/20   Historical Provider   magnesium oxide (MAG-OX) 400 mg (241.3 mg magnesium) tablet Take 400 mg by mouth every evening. 7/1/19   Historical Provider   multivitamin with minerals tablet Take 1 tablet by mouth every morning.  7/25/18   Historical Provider   ondansetron (ZOFRAN) 4 MG tablet Take 1 tablet (4 mg total) by mouth every 6 (six) hours as needed for Nausea. 5/18/22   Tone Lara MD   potassium chloride SA (K-DUR,KLOR-CON) 20 MEQ tablet Take 2 tablets (40 mEq total) by mouth once daily.  Patient taking  differently: Take 40 mEq by mouth every morning. 12/3/21   Nicolas Pacheco MD   prochlorperazine (COMPAZINE) 10 MG tablet Take 1 tablet (10 mg total) by mouth every 6 (six) hours as needed (nausea). 11/29/21 11/29/22  Claudia Young MD   promethazine (PHENERGAN) 12.5 MG Tab Take 1 tablet (12.5 mg total) by mouth every 6 (six) hours as needed (nausea). 11/29/21   Claudia Young MD     Scheduled Meds:    DOXOrubicin with LC beads chemoembolization  50 mg Intra-arterial Once    And    DOXOrubicin with LC beads chemoembolization  50 mg Intra-arterial Once    hydrocortisone sodium succinate  200 mg Intravenous Once    LIDOcaine (PF) 10 mg/ml (1%)  1 mL Intradermal Once     Continuous Infusions:    sodium chloride 0.9% 75 mL/hr at 08/02/22 0748    heparin (porcine)       PRN Meds:ampicillin-sulbactim (UNASYN) IVPB, LIDOcaine (PF) 10 mg/ml (1%)  Anticoagulants/Antiplatelets: Eliquis     Allergies:   Review of patient's allergies indicates:   Allergen Reactions    Indomethacin Other (See Comments)     Headache dizziness      Adhesive Rash     Sedation Hx: have not been any systemic reactions    Labs:  Recent Labs   Lab 08/01/22  0810   INR 1.2       Recent Labs   Lab 08/01/22  0810   WBC 3.79*   HGB 7.5*   HCT 23.8*   MCV 98         Recent Labs   Lab 08/01/22  0810   *      K 3.6      CO2 29   BUN 37*   CREATININE 1.2   CALCIUM 10.1   ALT 96*   AST 92*   ALBUMIN 2.9*   BILITOT 1.3*   BILIDIR 0.8*         Vitals:  Temp: 97.2 °F (36.2 °C) (08/02/22 0716)  Pulse: 78 (08/02/22 0716)  Resp: 18 (08/02/22 0716)  BP: (!) 143/73 (08/02/22 0724)  SpO2: 100 % (08/02/22 0716)     Physical Exam:  ASA: Per anesthesia   Mallampati: Per anesthesia     General: no acute distress  Mental Status: alert and oriented to person, place and time  HEENT: normocephalic, atraumatic  Chest: unlabored breathing  Heart: regular heart rate  Abdomen: nondistended  Extremity: moves all extremities    Plan: Will proceed  with TACE  Sedation Plan: Per anesthesia     Benny Dawkins DO

## 2022-08-02 NOTE — PROCEDURES
Interventional Radiology brief postop note    Pre Op Diagnosis: Cholangiocarcinoma   Post Op Diagnosis: Same    Procedure: Angiogram and TACE    Procedure performed by: Allan    Written Informed Consent Obtained: Yes  Specimen Removed: NO  Estimated Blood Loss: Minimal    Findings:   Successful TACE at right hepatic artery and seg. 4a.  No acute complication.    Access RCFA 5Fr sheath, with exoseal closure.    Will DC with Augmentin, Zofran, percocet      Patient tolerated procedure well.    Benny Dawkins, DO  Interventional Radiology

## 2022-08-02 NOTE — PLAN OF CARE
Pt arrived to IR room 188 for TACE, no acute distress noted. Orders, consents and labs reviewed on chart. Anesthesia at bedside.

## 2022-08-02 NOTE — DISCHARGE INSTRUCTIONS
[facility] [phone] (MON-FRI 8 AM- 5PM). Radiology Resident on call [phone].

## 2022-08-02 NOTE — PLAN OF CARE
TACE completed, 5F exo-seal deployed, hemostasis achieved to R groin at 1035, elevate HOB at 1235, pt tolerated well. No apparent distress noted. Dressing applied CDI. Right DP 2+. Patient transferred to PACU slot 19 for 2 hours recovery, accompanied by IR and anesthesia team. Report given at bedside.

## 2022-08-02 NOTE — TRANSFER OF CARE
"Anesthesia Transfer of Care Note    Patient: Domonique Kellogg    Procedure(s) Performed: * No procedures listed *    Patient location: PACU    Anesthesia Type: general    Transport from OR: Transported from OR on 6-10 L/min O2 by face mask with adequate spontaneous ventilation    Post pain: adequate analgesia    Post assessment: no apparent anesthetic complications    Post vital signs: stable    Level of consciousness: awake and lethargic    Nausea/Vomiting: no nausea/vomiting    Complications: none    Transfer of care protocol was followed      Last vitals:   Visit Vitals  BP (!) 143/73   Pulse 78   Temp 36.2 °C (97.2 °F) (Skin)   Resp 18   Ht 5' 8" (1.727 m)   Wt 100.7 kg (222 lb)   SpO2 100%   BMI 33.75 kg/m²     "

## 2022-08-02 NOTE — ANESTHESIA PROCEDURE NOTES
Intubation    Date/Time: 8/2/2022 8:55 AM  Performed by: Brigida Yan CRNA  Authorized by: Juliana Torres MD     Intubation:     Induction:  Intravenous    Intubated:  Postinduction    Mask Ventilation:  Easy mask    Attempts:  1    Attempted By:  CRNA    Method of Intubation:  Video laryngoscopy    Blade:  Kothari 3    Laryngeal View Grade: Grade IIA - cords partially seen      Difficult Airway Encountered?: No      Complications:  None    Airway Device:  Oral endotracheal tube    Airway Device Size:  7.5    Style/Cuff Inflation:  Cuffed (inflated to minimal occlusive pressure)    Tube secured:  23    Secured at:  The lips    Placement Verified By:  Capnometry    Complicating Factors:  Anterior larynx, obesity and oropharyngeal edema or fat    Findings Post-Intubation:  BS equal bilateral and atraumatic/condition of teeth unchanged

## 2022-08-02 NOTE — ANESTHESIA PREPROCEDURE EVALUATION
08/02/2022  Domonique Kellogg is a 72 y.o., male.    Echo 2021  · The left ventricle is normal in size with normal systolic function. The estimated ejection fraction is 60%.  · Normal right ventricular size with normal right ventricular systolic function.  · Indeterminate left ventricular diastolic function.  · Mild left atrial enlargement.  · The estimated PA systolic pressure is 25 mmHg.  · Normal central venous pressure (3 mmHg).          Patient Active Problem List   Diagnosis    CTS (carpal tunnel syndrome)    Class 2 severe obesity due to excess calories with serious comorbidity and body mass index (BMI) of 38.0 to 38.9 in adult    H/O syncope    RAHEEL (obstructive sleep apnea)    CAD in native artery    Hypertension, essential    Hyperlipidemia    Chronic pulmonary heart disease    Bilateral carotid artery disease    Primary osteoarthritis of right knee    Lateral femoral cutaneous entrapment syndrome    Cervical spondylosis    Cervical disc disease with myelopathy    Cervical stenosis of spinal canal    Prediabetes    Infection due to Strongyloides    Intrahepatic cholangiocarcinoma    Secondary malignant neoplasm of liver    Transaminitis    Elevated troponin    Immunodeficiency secondary to neoplasm    Hyperbilirubinemia    Portal vein thrombosis    Cancer related pain    Immunodeficiency secondary to chemotherapy    Benign prostatic hyperplasia with urinary frequency    Weight loss    Weakness generalized     Past Surgical History:   Procedure Laterality Date    ANTERIOR CERVICAL DISCECTOMY W/ FUSION N/A 7/6/2020    Procedure: DISCECTOMY, SPINE, CERVICAL, ANTERIOR APPROACH, WITH FUSION C3-4, C4-5;  Surgeon: Fabiola Vides MD;  Location: [facility] OR [address] [place] [address];  Service: Neurosurgery;  Laterality: N/A;  TORONTO III, ASA III, BLOOD TYPE & SCREEN, NEUROMONITORING EMG-MEP-SEP, SUPINE  POSITION,BRACE MIAMI,REGULAR BED, HEADREST CASPAR, POSITION, MAP>85, RADIOLOGY C-ARM, SPECIAL EQUIPMENT VIRGIL TYLER    COLONOSCOPY N/A 10/1/2021    Procedure: COLONOSCOPY;  Surgeon: Nicolas Alvarado MD;  Location: [facility] (4TH FLR);  Service: Endoscopy;  Laterality: N/A;  EGD and colonoscopy for diarrhea and weight loss and Enlarged, heterogeneous appearance of the liver likely due to multiple underlying hepatic lesions largest measuring 4.8 cm.  Findings concerning for metastatic versus less likely primary hepatic neoplasm.  Recommend furth    COLONOSCOPY N/A 6/7/2022    Procedure: COLONOSCOPY;  Surgeon: Mejia Villafuerte MD;  Location: [facility] (4TH FLR);  Service: Endoscopy;  Laterality: N/A;  For evaluation of positive out-patient FOBT.   medically urgent  ok to hold Eliquis per CAROLINA Edward/RB  fully vaccinated  hx of seizures- last one 2014    ESOPHAGOGASTRODUODENOSCOPY N/A 10/1/2021    Procedure: EGD (ESOPHAGOGASTRODUODENOSCOPY);  Surgeon: Nicolas Alvarado MD;  Location: [facility] (4TH FLR);  Service: Endoscopy;  Laterality: N/A;  EGD and colonoscopy for diarrhea and weight loss and Enlarged, heterogeneous appearance of the liver likely due to multiple underlying hepatic lesions largest measuring 4.8 cm.  Findings concerning for metastatic versus less likely primary hepatic neopl    ESOPHAGOGASTRODUODENOSCOPY N/A 4/5/2022    Procedure: EGD (ESOPHAGOGASTRODUODENOSCOPY);  Surgeon: Amado Kelsey MD;  Location: [facility] (4TH FLR);  Service: Endoscopy;  Laterality: N/A;  EGD in 8 weeks to check for esophagitis healing patient should be on pantoprazole 40 mg once daily  ok to hold eliquis x2 days per Dr. Young, see telephone encounter  fully vaccinated    EYE SURGERY      INSERTION OF VENOUS ACCESS PORT N/A 12/3/2021    Procedure: INSERTION, VENOUS ACCESS PORT;  Surgeon: Saurav Garcia MD;  [facility];  Service: General;  Laterality: N/A;    INTRAOCULAR PROSTHESES INSERTION Right  7/16/2018    Procedure: INSERTION-INTRAOCULAR LENS (IOL);  Surgeon: Priscilla Hays MD;  Location: [facility] OR;  Service: Ophthalmology;  Laterality: Right;    INTRAOCULAR PROSTHESES INSERTION Left 8/13/2018    Procedure: INSERTION, INTRAOCULAR LENS PROSTHESIS;  Surgeon: Priscilla Hays MD;  Location: [facility] OR;  Service: Ophthalmology;  Laterality: Left;    KNEE SURGERY      PHACOEMULSIFICATION OF CATARACT Right 7/16/2018    Procedure: PHACOEMULSIFICATION-ASPIRATION-CATARACT;  Surgeon: Priscilla Hays MD;  Location: [facility] OR;  Service: Ophthalmology;  Laterality: Right;    PHACOEMULSIFICATION OF CATARACT Left 8/13/2018    Procedure: PHACOEMULSIFICATION, CATARACT;  Surgeon: Priscilla Hays MD;  Location: [facility] OR;  Service: Ophthalmology;  Laterality: Left;    WISDOM TOOTH EXTRACTION         Pre-op Assessment    I have reviewed the Patient Summary Reports.     I have reviewed the Nursing Notes. I have reviewed the NPO Status.      Review of Systems      Physical Exam  General: Well nourished    Airway:  Mallampati: III / II/ III  Mouth Opening: Normal  TM Distance: Normal  Tongue: Normal  Neck ROM: Normal ROM    Chest/Lungs:  Clear to auscultation    Heart:  Rate: Normal  Rhythm: Regular Rhythm        Anesthesia Plan  Type of Anesthesia, risks & benefits discussed:    Anesthesia Type: Gen ETT, MAC  Intra-op Monitoring Plan: Standard ASA Monitors  Post Op Pain Control Plan: IV/PO Opioids PRN and multimodal analgesia  Induction:  IV  Airway Plan: Direct  Informed Consent: Informed consent signed with the Patient and all parties understand the risks and agree with anesthesia plan.  All questions answered.   ASA Score: 3  Day of Surgery Review of History & Physical: H&P Update referred to the surgeon/provider.    Ready For Surgery From Anesthesia Perspective.     .

## 2022-08-02 NOTE — DISCHARGE SUMMARY
Radiology Discharge Summary      Hospital Course: No complications    Admit Date: 8/2/2022  Discharge Date: 08/02/2022     Instructions Given to Patient: Yes  Diet: Resume prior diet  Activity: activity as tolerated    Description of Condition on Discharge: Stable  Vital Signs (Most Recent): Temp: 97.2 °F (36.2 °C) (08/02/22 0716)  Pulse: 78 (08/02/22 0716)  Resp: 18 (08/02/22 0716)  BP: (!) 143/73 (08/02/22 0724)  SpO2: 100 % (08/02/22 0716)    Discharge Disposition: Home    Discharge Diagnosis: Cholangiocarcinoma     Benny Dawkins DO

## 2022-08-02 NOTE — PROGRESS NOTES
Discharge instructions given to patient and spouse. Copies provided. Both verbalized understanding. VSS. No c/o pain or nausea. No s/s of distress noted. Surgical site C/D/I. RX for antibiotics given. Patient and spouse states they did not need any pain or nausea medication that they had plenty at home. RX for pain and nausea medication shredded. Patient meets criteria for discharge. Patient taken to vehicle via wc.

## 2022-08-08 NOTE — LETTER
August 8, 2022    Domonique Kellogg  [id number]  [facility] [address]             [facility]  [address]  [facility] [address] Dear Mr. Kellogg,    I work with [facility] Outpatient Case Management Department. I have been unsuccessful at reaching you to follow up to see how you have been doing. Please call me back at your earliest convenience to discuss your health care needs.    I can be reached at [phone] from 8:00 AM to 4:30 PM on Monday through Friday. [facility] is a program offered through [facility] where a nurse is available 24/7 to answer questions or provide medical advice. Their number is [phone].      Kind Regards,    Guerline Montoya RN  Outpatient Case Management

## 2022-08-09 NOTE — ANESTHESIA POSTPROCEDURE EVALUATION
Anesthesia Post Evaluation    Patient: Domonique Kellogg    Procedure(s) Performed: * No procedures listed *    Final Anesthesia Type: general      Patient location during evaluation: PACU  Patient participation: Yes- Able to Participate  Level of consciousness: awake and alert and oriented  Pain management: adequate  Airway patency: patent    PONV status at discharge: No PONV  Anesthetic complications: no      Cardiovascular status: blood pressure returned to baseline and hemodynamically stable  Respiratory status: unassisted  Hydration status: euvolemic  Follow-up not needed.          Vitals Value Taken Time   /58 08/09/22 0802   Temp 36.6 °C (97.9 °F) 08/09/22 0802   Pulse 89 08/09/22 0802   Resp 18 08/09/22 0802   SpO2 98 % 08/09/22 0802         No case tracking events are documented in the log.      Pain/Lalo Score: No data recorded

## 2022-08-09 NOTE — PLAN OF CARE
7903 Pt tolerated [facility]'s infusion well today, no complaints or complications,. VSS through duration of treatment. Pt aware to call provider with any questions or concerns and is aware of upcoming appts. Pt ambulatory from clinic with steady gait, no distress noted.

## 2022-08-09 NOTE — PLAN OF CARE
1315 Labs, hx, and medications reviewed, pt meets parameters for treatment today. Assessment completed and plan of care reviewed. Pt verbalized understanding. PAC accessed with no complications. Pt voices no new complaints or concerns, will continue to monitor for safety.

## 2022-08-09 NOTE — Clinical Note
Please schedule CBC and Vitamin B12 only on Friday, 8/12. Orders have been placed. Pt does not need a clinic visit. Thank you

## 2022-08-09 NOTE — PROGRESS NOTES
"                                                PROGRESS NOTE    Subjective:       Patient ID: Domonique Kellogg is a 72 y.o. male.    Chief Complaint: follow up for cholangiocarcinoma    Diagnosis:  Advanced intrahepatic cholangiocarcinoma, MSI-low, negative for FGFR2 fusion or IDH1/IDH2 mutations, diagnosed on 11/22/2021.     Molecular Profile:  Guardant 360 11/29/21: +CCND1 amplification. VUS: CATRACHITO [id number]. MSI-high not detected.   Strata: ASXL1 mutation, CCND1 amplification, MSI-low, TMB low.     Oncologic History copied from medical chart:  1. Mr Kellogg is a 70 yo man with CAD, HTN, seizures in 2011 who initially saw me on 11/29/21 for further management of cholangiocarcinoma. He presented with weight loss and diarrhea since July. US 9/22/21 showed "Enlarged, heterogeneous appearance of the liver likely due to multiple underlying hepatic lesions largest measuring 4.8 cm."  MRI abdomen w/wo contrast 10/4/21: "Multiple liver lesions measuring up to 13.3 cm in the right hepatic lobe.  Differential diagnosis includes metastases, fibrolamellar hepatocellular carcinoma, or atypical focal nodular hyperplasia.  Consider biopsy for further evaluation. Thrombosis of the anterior branch of the right portal vein and left hepatic vein.  Nonvisualization of the middle and right hepatic veins, which may be thrombosed as well. Prominent periportal lymph node, which is nonspecific."  EGD and colonoscopy on 10/11/21 were negative for primary malignancies.   He underwent liver lesion biopsy and Y90 mapping on 11/22/21. Pathology showed adenocarcinoma: "Biopsy of the liver mass shows a malignant neoplasm in a fibrotic stroma. Glandular architecture is readily identified with several foci of intraluminal necrosis. Scattered mitotic figures are seen. Neoplastic cells show the following immunophenotype:   Positive: AE1/AE3, CK7, CDX2   Negative: Chromogranin, Synaptophysin, TTF, CK5/6, CK20, HepPar1   The morphology and immunophenotype " "are compatible with adenocarcinoma. Considerations for a primary site include pancreaticobiliary (including intrahepatic cholangiocarcinoma) as well as upper gastrointestinal. Clinical and radiologic correlation is suggested."  Patient presents today with wife for further management. No pain. Initial weight loss has somewhat stabilized. Still has diarrhea. Has not tried imodium yet. +nervous  Mother had kidney cancer at age 64. Maternal grandmother had liver cancer in her 60s. Patient has three children, two daughters and one son.   Discussed palliative chemo with cis/gem  2. PET scan 21 did not show extrahepatic metastases.   3. Cis/gem started on 21  4. S/p TACE on 2021, 22, 22, 22  5. S/p RF Ablation on 22 with no complications  6. S/p TACE on 2022    Interval History:   Mr Kellogg returns for cycle 11 day 8 of gem/cis. He had a rough week after his TACE procedure on 2022. He was having abdominal pain as well as nausea and vomiting after the procedure. This lasted approx. 2-3 days after the procedure, but was able to eat and keep things down around day 4. He felt weak, tired and without energy. He found that his energy level slowly improved over the weekend. He was able to run errands but still feels somewhat fatigued. He had significant feeling of weakness this morning as well. No falls but did need a wheelchair today. He is eating better now and appetite is improving. Continued taste changes due to chemotherapy. He is drinking plenty of water. He does feel that he improving clinically, but very slowly. Bowel movements are stable.     Had RF Ablation on 22 and TACE on 2022.     We discussed his continued therapy with gem/cis with the order placed for Durvalumab     Presents to clinic with his wife, Estrellita. ECO    ROS:   A ten-point system review is obtained and negative except for what was stated in the Interval History.     Physical Examination:   Vital " signs reviewed.   General: well hydrated, well developed, in no acute distress  HEENT: normocephalic, EOMI, anicteric sclerae  Neck: supple  Chest: right chest port site clean  Lungs: clear breath sounds bilaterally, no wheezing/rales/rhonchi  Heart: RRR, no M/R/G  Abdomen: soft, no tenderness, nondistended  Ext: no clubbing, cyanosis, edema  Skin: no open wound, ulcers, rash. +healing bruise over the inner side of the left forearm  Neuro: alert and oriented x 4  Psych: pleasant and appropriate mood and affect    Objective:     Laboratory Data:  Labs reviewed. Adequate for chemo. Hb 6.8, platelet 118K; Bilirubin 1.9, Improved LFTs.   CA 19-9 551, slightly improved from 588     Imaging Data:  PET 12/6/21:  In this patient with known intrahepatic cholangiocarcinoma, there are multifocal hypermetabolic hepatic lesions in both hepatic lobes.  No evidence of distant metastasis.     Lipomatosis of the ileocecal valve and proximal cecum.     Fat containing umbilical hernia.    MRI Abdomen 1/18/22:  1. Patient with reported cholangiocarcinoma.  Interval progression of hepatic tumor burden with multiple enlarging and new hepatic lesions.  2. Progression of portal venous thrombosis involving the left, right, and main portal veins.  Persistent filling defect within the central hepatic veins concerning for thrombosis.  3. Stable prominent periportal lymph node.    CT chest 3/4/22:  Right pulmonary nodules, unchanged.  Recommend continued follow-up as per clinical protocol.    MRI abdomen 3/14/22:     Interval postprocedural changes status post TACE with slight improvement of tumor burden at treatment sites.  Extensive residual disease throughout both hepatic lobes.  Index lesions as above.     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     Stable appearance of central hepatic veins, underlying thrombosis not excluded.  Consider further evaluation with Doppler ultrasound if clinically  warranted.     Stable prominent periportal lymph node.     Right adrenal adenoma.    CT urogram 3/14/22:  Prostatomegaly which demonstrates mass effect on the posterior bladder.  There is mild diffuse wall thickening of the bladder which may relate to outlet obstruction.     Postprocedural changes of the liver in keeping with recent chemoembolization.  Protocol is not optimized for evaluation of tumor response.  Dedicated multiphase liver CT would be necessary to evaluate liver directed therapy response.    MRI A/P 6/19/22:  - Evolving posttreatment changes in the liver for multifocal intrahepatic cholangiocarcinoma.  No new focal hepatic lesions.  - Persistent portal vein thrombosis, likely tumor thrombus, with cavernous transformation.  - No abdominopelvic metastases.     CT chest 6/15/22:  1. Stable pulmonary nodules measuring up to 0.4 cm.  No definite new or enlarging pulmonary nodules.  2. Scattered regions of opacification within the right lung base with air bronchograms, favored to represent atelectasis.  However, early consolidative changes cannot be completely excluded.    US bilateral legs 6/9/22:  Partially occlusive deep venous thrombosis of the left femoral vein.    IR RF Ablation 7/19/22    Impression:     Percutaneous high-frequency radiofrequency ablation of multiple left lobe liver lesions.    Assessment and Plan:     1. Anemia in neoplastic disease    2. Intrahepatic cholangiocarcinoma    3. Secondary malignant neoplasm of liver    4. Immunodeficiency secondary to chemotherapy    5. Immunodeficiency secondary to neoplasm    6. Antineoplastic chemotherapy induced anemia    7. Portal vein thrombosis    8. Acute deep vein thrombosis (DVT) of left femoral vein    9. Chronic anticoagulation    10. Hypertension, essential    11. CAD in native artery      1-6  - Mr Kellogg is a 71 yo man with advanced intrahepatic cholangiocarcinoma with multiple liver lesions. MSI-low, FGFR2 fusion and IDH1/2 mutation  negative. Started on cis/gem on 12/7/21. S/p TACE on 12/16/2021, 2/14/22, 4/19/22, 5/18/22  - added durvalumab after TOPAZ trial presented at [facility] in Jan 2022. Had [id number] with insurance company. Insurance company does not cover given lack of peer-reviewed article. Previously discussed applying for patient assistance. patient wants to think about it.   -   - s/p RF ablation on 7/19/22 and TACE on 8/2. Had a rough time after this last procedure but is slowly recovering. Continues to have fatigue, but Hb 6.8 today. He is able to eat small meals and has not vomited today. He does want to continue with chemotherapy. I have discussed with him that we are adding the immunotherapy to his chemotherapy regimen as previously discussed with Dr. Young. He is agreeable.   - Will give blood transfusion today for Hb of 6.8. Will proceed with cycle 11 day 8 of chemotherapy with Gim/cis adding Durvalumab this Thursday as well. Side effects of the Durvalumab were reviewed again with the patient and his wife. Cisplatin dose will continue at 25 mg/m2.  - RTC in 2 weeks for cycle 12 day 1 of Leland/Cis.     - Follow-up with IR in one month for evaluation of continued local regional therapy options, has f/u with IR on 8/23/2022. Will have repeat MRI on 9/2/2022    5,6.  - See above as well. Clinically, he is doing fairly. Has continued fatigue.   - EGD in 4/2022 showed congested mucosa in esophagus and gastritis. Colonoscopy 6/7/22 neg for bleeding sites.  - Hb 6.8. Will transfuse 1 unit of packed RBCs today. Will check vit B12 prior to next visit  - Will continue to monitor symptoms. Asked patient to call us if he feels worse or notices bleeding    7-9  - was on eliquis 5 mg bid for portal vein thrombosis noted at diagnosis. However could not tolerate even 2.5 mg bid due to hematuria. Was on 2.5 mg daily  - US 6/9/22 showed new left femoral DVT. Eliquis was increased to 5 mg bid  - doing well. C/w eliquis 5 mg bid    10.  - BP  controlled  - c/w current medication    11.  - c/w pravachol       Follow-up:     RTC in 2 weeks.    Pte and family members displayed understanding of the above encounter and treatment plan. All thoughtful questions were answered to their satisfaction. Pte was advised to notify the care team or proceed to the ER if signs and symptoms worsen.       NEO Ervin, PA-C  Physician Assistant Certified  Dept of Hematology/Oncology  PA-C to Dr. Young, Dr. Laen and Dr. Bourgeois    Route Chart for Scheduling    Med Onc Chart Routing      Follow up with physician 2 weeks. Get CBC, CMP, CA 19-9, magnesium, TSH at [facility], [place], operated by [facility] on 8/22, see Dr. Young on 8/23 then get cis/gem on the same day,    Follow up with BR[facility]LEY 3 weeks. Get CBC, CMP, CA 19-9, mg, TSH at [facility], [place], operated by [facility] on 8/29, see BRADLEY on 8/30 then get cis/gem/durv on the same day   Infusion scheduling note    Injection scheduling note    Labs CBC, CMP, CA 19-9, TSH and magnesium   Lab interval: every 2 weeks     Imaging    Pharmacy appointment    Other referrals          Treatment Plan Information   OP GEMCITABINE CISPLATIN Q3W + DURVALUMAB D8 C11+   Claudia Young MD   Upcoming Treatment Dates - OP GEMCITABINE CISPLATIN Q3W + DURVALUMAB D8 C11+    8/10/2022       Chemotherapy       gemcitabine (GEMZAR) 1,800 mg in sodium chloride 0.9% 250 mL chemo infusion       CISplatin (PLATINOL) 25 mg/m2 = 57 mg in sodium chloride 0.9% 500 mL chemo infusion       Pre-Hydration       sodium chloride 0.9% 500 mL with magnesium sulfate 2 g, potassium chloride 20 mEq infusion       Post-Hydration       magnesium sulfate 2g in water 50mL IVPB (premix)       sodium chloride 0.9% bolus 500 mL       Antiemetics       aprepitant (CINVANTI) injection 130 mg       palonosetron 0.25mg/dexamethasone 12mg in NS IVPB 0.25 mg       Immunotherapy       durvalumab (IMFINZI) 1,500 mg in sodium chloride 0.9% 280 mL chemo infusion  8/23/2022       Chemotherapy       gemcitabine (GEMZAR) 2,200 mg in sodium chloride 0.9%  250 mL chemo infusion       CISplatin (PLATINOL) 25 mg/m2 = 57 mg in sodium chloride 0.9% 500 mL chemo infusion       Pre-Hydration       sodium chloride 0.9% 500 mL with magnesium sulfate 2 g, potassium chloride 20 mEq infusion       Post-Hydration       sodium chloride 0.9% bolus 500 mL       Antiemetics       aprepitant (CINVANTI) injection 130 mg       palonosetron (ALOXI) 0.25 mg with Dexamethasone (DECADRON) 12 mg in NS 50 mL IVPB  8/31/2022       Chemotherapy       gemcitabine (GEMZAR) 2,200 mg in sodium chloride 0.9% 250 mL chemo infusion       CISplatin (PLATINOL) 25 mg/m2 = 57 mg in sodium chloride 0.9% 500 mL chemo infusion       Pre-Hydration       sodium chloride 0.9% 500 mL with magnesium sulfate 2 g, potassium chloride 20 mEq infusion       Post-Hydration       magnesium sulfate 2g in water 50mL IVPB (premix)       sodium chloride 0.9% bolus 500 mL       Antiemetics       aprepitant (CINVANTI) injection 130 mg       palonosetron 0.25mg/dexamethasone 12mg in NS IVPB 0.25 mg       Immunotherapy       durvalumab (IMFINZI) 1,500 mg in sodium chloride 0.9% 280 mL chemo infusion

## 2022-08-10 NOTE — PLAN OF CARE
0800- Patient arrived to clinic for IVF/imfinzi/gemzar/cisplatin infusion. Labs and assessment appropriate for treatment. Patient received 1u PRBCs yesterday. Orders reviewed and signed by MD Peter. Port accessed, + blood return noted, flushes easily, NS infusing.

## 2022-08-10 NOTE — PLAN OF CARE
Patient tolerated treatment with no complications. Port flushed and deaccessed. Patient was discharged in NAD accompanied by wife. Aware of upcoming appts.

## 2022-08-12 NOTE — PROGRESS NOTES
Outpatient Care Management  Plan of Care Follow Up Visit    Patient: Domonique Kellogg  MRN: [id number]  Date of Service: 08/08/2022  Completed by: Guerline Montoya RN  Referral Date: 05/11/2022  Program:     Reason for Visit   Patient presents with    OPCM Chart Review     8/8/22    OPCM RN First Follow-Up Attempt     8/8/22    Update Plan Of Care     8/12/22       Brief Summary:     OPCM follow up complete. Continued education on Cancer. Patient is in agreement with follow up call on or around 8/25/22.    Patient Summary     Involvement of Care:  Do I have permission to speak with other family members about your care?  Yes    Patient Reported Labs & Vitals:  1.  Any Patient Reported Labs & Vitals?  No  2.  Patient Reported Blood Pressure:     3.  Patient Reported Pulse:     4.  Patient Reported Weight (Kg):     5.  Patient Reported Blood Glucose (mg/dl):       Medical and social history was reviewed with patient and/or caregiver.     Clinical Assessment     Reviewed and provided basic information on available community resources for mental health, transportation, wellness resources, and palliative care programs with patient and/or caregiver.     Complex Care Plan     Care plan was discussed and completed today with input from patient and/or caregiver.    Patient Instructions     Instructions were provided via the [facility] patient resources and are available for the patient to view on the patient portal.    Next Steps:     Follow up in about 17 days (around 8/25/2022).    Todays OPCM Self-Management Care Plan was developed with the patients/caregivers input and was based on identified barriers from todays assessment.  Goals were written today with the patient/caregiver and the patient has agreed to work towards these goals to improve his/her overall well-being. Patient verbalized understanding of the care plan, goals, and all of today's instructions. Encouraged patient/caregiver to communicate with his/her  physician and health care team about health conditions and the treatment plan.  Provided my contact information today and encouraged patient/caregiver to call me with any questions as needed.

## 2022-08-16 PROBLEM — I82.412 DEEP VEIN THROMBOSIS (DVT) OF FEMORAL VEIN OF LEFT LOWER EXTREMITY: Status: ACTIVE | Noted: 2022-01-01

## 2022-08-16 PROBLEM — Z79.01 LONG TERM (CURRENT) USE OF ANTICOAGULANTS: Status: ACTIVE | Noted: 2022-01-01

## 2022-08-16 PROBLEM — I82.409 DVT (DEEP VENOUS THROMBOSIS): Status: ACTIVE | Noted: 2022-01-01

## 2022-08-16 PROBLEM — R29.898 WEAKNESS OF LOWER EXTREMITY: Status: ACTIVE | Noted: 2022-01-01

## 2022-08-16 PROBLEM — D64.9 ANEMIA: Status: ACTIVE | Noted: 2022-01-01

## 2022-08-16 NOTE — Clinical Note
Patient reporting some progressive weakness and numbness in his leg.  He had previous neuropathy, radiculopathy.  MRI was in 2014.  He will discuss with you next week.  Unclear whether this could be a chemotherapeutic side effect, thank you

## 2022-08-16 NOTE — PROGRESS NOTES
Answers for HPI/ROS submitted by the patient on 8/10/2022  activity change: No  unexpected weight change: No  neck pain: No  hearing loss: No  rhinorrhea: No  trouble swallowing: No  eye discharge: Yes  visual disturbance: No  chest tightness: No  wheezing: No  chest pain: No  palpitations: No  blood in stool: No  constipation: Yes  vomiting: No  diarrhea: Yes  polydipsia: No  polyuria: No  difficulty urinating: No  urgency: No  hematuria: No  joint swelling: No  arthralgias: No  headaches: No  weakness: Yes  confusion: No  dysphoric mood: No    Subjective:       Patient ID: Domonique Kellogg is a 72 y.o. male.    Chief Complaint: No chief complaint on file.    The patient location is: home  The chief complaint leading to consultation is: periodic follow up    Visit type: audiovisual    Face to Face time with patient: 24  25-30 minutes of total time spent on the encounter, which includes face to face time and non-face to face time preparing to see the patient (eg, review of tests), Obtaining and/or reviewing separately obtained history, Documenting clinical information in the electronic or other health record, Independently interpreting results (not separately reported) and communicating results to the patient/family/caregiver, or Care coordination (not separately reported).         Each patient to whom he or she provides medical services by telemedicine is:  (1) informed of the relationship between the physician and patient and the respective role of any other health care provider with respect to management of the patient; and (2) notified that he or she may decline to receive medical services by telemedicine and may withdraw from such care at any time.    Notes:  All conversation with patient today.  Undergoing chemotherapy, new agents currently being used.  I reviewed his recent CMP and CBC.  He has a low blood count, has received transfusions, has had interventional radiology embolizations x2.  Recent MRI scan  noted.  Patient is complaining of numbness and weakness in the leg.  He had a previous surgery was some appropriate dysesthesia however he states this area has expanded and that and having some weakness with stairs.  He had neck surgery in 2020, MRI was reviewed.  His upper extremity radiculopathy more or less resolved.  In the distant past he had meralgia paresthetica.  He had EMG nerve conduction study buried in the medical record in 2014 and again 2020.  Lower and upper respectively.  MRI in 2014 lumbar spine did not show any foraminal or spinal stenosis.    Also was some fatigue and malaise.  Compliance with medications and follow-up visits has been excellent.  Having some difficulty walking his dog at times.  His spirits Seems good today.    Review of Systems   Constitutional: Positive for fatigue. Negative for activity change and unexpected weight change.   HENT: Negative for hearing loss, rhinorrhea and trouble swallowing.    Eyes: Positive for discharge. Negative for visual disturbance.   Respiratory: Negative for chest tightness and wheezing.    Cardiovascular: Negative for chest pain and palpitations.   Gastrointestinal: Positive for constipation and diarrhea. Negative for blood in stool and vomiting.   Endocrine: Negative for polydipsia and polyuria.   Genitourinary: Negative for difficulty urinating, hematuria and urgency.   Musculoskeletal: Negative for arthralgias, joint swelling and neck pain.   Neurological: Positive for weakness and numbness. Negative for headaches.   Psychiatric/Behavioral: Negative for confusion and dysphoric mood.       Objective:      Physical Exam  Constitutional:       General: He is not in acute distress.     Appearance: He is well-developed.   Neurological:      Mental Status: He is alert and oriented to person, place, and time.   Psychiatric:         Speech: Speech normal.         Behavior: Behavior normal.         Assessment:       1. Intrahepatic cholangiocarcinoma    2.  Weakness generalized    3. Weakness of lower extremity, unspecified laterality    4. Elevated blood sugar    5. Cervical stenosis of spinal canal    6. Anemia, unspecified type    7. Hypertension, essential    8. Hyperlipidemia, unspecified hyperlipidemia type    9. Meralgia paresthetica of right side    10. Prediabetes    11. Cervical disc disease with myelopathy    12. Cervical spondylosis    13. Portal vein thrombosis    14. Acute deep vein thrombosis (DVT) of femoral vein of left lower extremity    15. Long term (current) use of anticoagulants        Plan:     Medication List with Changes/Refills   Current Medications    ACETAMINOPHEN (TYLENOL) 650 MG TBSR    Take by mouth daily as needed.    APIXABAN (ELIQUIS) 5 MG TAB    Take 1 tablet (5 mg total) by mouth 2 (two) times daily.    DILTIAZEM 2% LIDOCAINE 5% CREAM    Apply pea size amount topically 3 (three) times daily.    FINASTERIDE (PROSCAR) 5 MG TABLET    Take 1 tablet (5 mg total) by mouth once daily.    HYDROCHLOROTHIAZIDE (HYDRODIURIL) 25 MG TABLET    TAKE 1 TABLET (25 MG TOTAL) BY MOUTH ONCE DAILY.    MAGNESIUM OXIDE (MAG-OX) 400 MG (241.3 MG MAGNESIUM) TABLET    Take 400 mg by mouth every evening.    MULTIVITAMIN WITH MINERALS TABLET    Take 1 tablet by mouth every morning.     OXYCODONE-ACETAMINOPHEN (PERCOCET) 5-325 MG PER TABLET    Take 1 tablet by mouth every 4 (four) hours as needed for Pain.    POTASSIUM CHLORIDE SA (K-DUR,KLOR-CON) 20 MEQ TABLET    Take 2 tablets (40 mEq total) by mouth once daily.    PRAVASTATIN (PRAVACHOL) 40 MG TABLET    Take 1 tablet (40 mg total) by mouth once daily.    PROCHLORPERAZINE (COMPAZINE) 10 MG TABLET    Take 1 tablet (10 mg total) by mouth every 6 (six) hours as needed (nausea).    PROMETHAZINE (PHENERGAN) 12.5 MG TAB    Take 1 tablet (12.5 mg total) by mouth every 6 (six) hours as needed (nausea).    RABEPRAZOLE (ACIPHEX) 20 MG TABLET    Take 1 tablet (20 mg total) by mouth once daily.    TAMSULOSIN (FLOMAX) 0.4  MG CAP    TAKE 1 CAPSULE EVERY DAY   Discontinued Medications    SODIUM,POTASSIUM,MAG SULFATES (SUPREP BOWEL PREP KIT) 17.5-3.13-1.6 GRAM SOLR    As Directed     Diagnoses and all orders for this visit:    Intrahepatic cholangiocarcinoma    Weakness generalized    Weakness of lower extremity, unspecified laterality  -     Ambulatory referral/consult to Neurology; Future    Elevated blood sugar  -     Hemoglobin A1C; Future    Cervical stenosis of spinal canal    Anemia, unspecified type    Hypertension, essential    Hyperlipidemia, unspecified hyperlipidemia type    Meralgia paresthetica of right side    Prediabetes    Cervical disc disease with myelopathy    Cervical spondylosis    Portal vein thrombosis    Acute deep vein thrombosis (DVT) of femoral vein of left lower extremity  Comments:  6/9/2022    Long term (current) use of anticoagulants  Comments:  heme onc managing      See meds, orders, follow up, routing and instructions sections of encounter and AVS. Discussed with patient and provided on AVS.    Lab Results   Component Value Date     08/09/2022    K 4.1 08/09/2022     08/09/2022    BUN 30 (H) 08/09/2022    CREATININE 1.4 08/09/2022     (H) 08/09/2022    HGBA1C 6.1 (H) 07/05/2022    MG 1.5 (L) 07/13/2022    AST 64 (H) 08/09/2022    ALT 77 (H) 08/09/2022    ALBUMIN 2.6 (L) 08/09/2022    PROT 6.5 08/09/2022    BILITOT 1.9 (H) 08/09/2022    CHOL 139 11/15/2021    HDL 36 (L) 11/15/2021    LDLCALC 92.6 11/15/2021    TRIG 52 11/15/2021    WBC 9.07 08/12/2022    HGB 8.0 (L) 08/12/2022    HCT 25.3 (L) 08/12/2022     08/12/2022    PSA 1.4 11/15/2021    TSH 1.781 08/09/2022    URICACID 8.2 (H) 03/18/2015       I will go ahead and place an order for Neurology consult.  Explain it is easy year to cancel appointment that is made rather than make 1.  He will discuss with his oncologist next week.  Consider EMG nerve conduction study repeat or MRI lumbar spine if necessary.  He will discuss  with her whether this is a chemotherapy side effect.    Follow-up with me 6 weeks.  I added an A1c to his next set of laboratory, his blood sugars have been higher more recently.

## 2022-08-17 NOTE — TELEPHONE ENCOUNTER
----- Message from Nicolas Pacheco MD sent at 8/16/2022  4:48 PM CDT -----  Please schedule patient for neurology visit

## 2022-08-18 NOTE — PROGRESS NOTES
Progress Note  Palliative Care    The [place] is: [place]  The chief complaint leading to consultation is: symptom management    Visit type: audiovisual    Face to Face time with patient: 40 minutes    47 minutes of total time spent on the encounter, which includes face to face time and non-face to face time preparing to see the patient (eg, review of tests), Obtaining and/or reviewing separately obtained history, Documenting clinical information in the electronic or other health record, Independently interpreting results (not separately reported) and communicating results to the patient/family/caregiver, or Care coordination (not separately reported).     Each patient to whom he or she provides medical services by telemedicine is:  (1) informed of the relationship between the physician and patient and the respective role of any other health care provider with respect to management of the patient; and (2) notified that he or she may decline to receive medical services by telemedicine and may withdraw from such care at any time.    Reason for Consult: symptom-management and ACP      ASSESSMENT/PLAN:     Plan/Recommendations:  Diagnoses and all orders for this visit:    Intrahepatic cholangiocarcinoma  - followed by Dr. Young  - currently on disease-directed therapy  - TACE, chemo therapy     Encounter for palliative care/Advanced care planning  - patient decisional  - patient alone on telemedicine today  - no ACP documents uploaded into EMR  - philosophy of Palliative Medicine reviewed with patient and family at first visit  - new patient folder given to and reviewed with patient and family at first visit  - goals: life-prolonging  - he previously spoke about understanding that the treatment is to control the disease at this time  - ACP booklet given to and reviewed with patient and family today including HCPOA and living will  - code status not specifically discussed today  - will follow up at future  "encounters for ACP booklet and code status    Anorexia/Nausea  - severe cramping, n/v after TACE, bloated, "like stomach filled with air" for ~ 24 hours, eats very little during this period    - continues to report excellent appetitie   - has bentyl PRN for post treatment cramping, has not used, prefers to rest and wait it out   - previously prescribed morphine IR for post procedure abd pain, has not used, prefers to rest and let it pass  - previously reported emesis w oxycodone, no longer taking  - PRN bentyl, morphine IR available    - will continue to monitor    Neoplastic (malignant) related fatigue/Weakness/dyspnea  - reports some worsening of weakness, particularly in his legs, this is more pronounce for ~ 5 days post-treatment  - previously referred to PT, wants to wait, feels like he cant tolerate it right now  - additionally reports numbness in right thigh, this is known to providers, possible future nerve conduction study, had work-up for something very similar in 2014  - known L leg DVT, no symptoms, anticoagulated   - reduced activity tolerance, requires frequent rest, for example during cooking he sits down between adding ingredients   - rest and naps throughout the day   - continues to try to walk the dog with his wife and run small errands, enjoying time with his family mostly at home  - goal to go fishing when weather cools down   - discussed balancing activity with rest   - will continue to monitor   - fatigue and weakness are worst in the days post treatment  - will continue to monitor    Cancer-related pain  - previously reported post/treatment related pain & intermittent joint pain  - previously taking Oxycodone, causing emesis even w/food, no longer taking  - morphine IR prescribed at previous visit, has not filled, requesting to hold off for now  - will continue to monitor     Adjustment disorder with mixed anxiety and depressed mood  - patient reports his mood has been good overall, he has " good days and bad days  - he and a friend recently got coffee drinks and drove to [place] to see where they used to fish, he plans to go back and fish in the fall  - continues to find enjoyment in word games and puzzles    - patient following with onc-psych (Dr. Anderson), he finds this helpful   - expresses gratitude for family   - emotional support provided today  - will continue to monitor    Constipation/diarrhea   - denies constipation and diarrhea   - reports frequent BM, formed   - continue adequate hydration and gentle activity  for good bowel movements  - will continue to monitor    Understanding of illness/Prognosis: patient and family have good understanding of disease; prognosis is guarded    Goals of care: life-prolonging    Follow up: ~ 6-8  weeks    Patient's encounter and above plan of care discussed with patient's oncologist     SUBJECTIVE:     History of Present Illness:  Patient is a 72 y.o. year old male with arthritis, CAD, HTN, seizures in 2011, sleep apnea with CPAP, adjustment disorder, and intrahepatic cholangiocarcinoma presents to Palliative Medicine for symptom management and ACP. Please see oncology notes for full details of oncologic history and treatment course.     08/19/2022:  JAVON BROWER reviewed and summarized:  No new entries     Patient presents via virtual visit alone. His biggest concern is increased fatigue and weakness, accompanied by localized numbness to his right thigh (numbness is newer). This is something he's been experiencing about 4 days after treatment, but recently is more pronounced. He requires frequent breaks while cooking, for example. He rests and naps throughout the day. He's still able to get out occasionally and do routine activities with his wife, but he finds his tolerance for their usual activities is lessoning and finds he is more reliant on his family members, which troubles him. He continues to tolerate post-treatment abdominal pain and cramping without  "medications. He denies anorexia, anxiety, depression, diarrhea and constipation.    06/21/2022:  JAVON BROWER reviewed and summarized:  05/27/2022 Alprazolam 0.5 mg tabs Disp: 10 for 10 days    Patient presents via virtual visit today with his wife, Estrellita. He reports that he is doing well, overall. His primary complaint is significant leg weakness last weekend which has now resolved. He looks forward to starting PT in July. He reports his mood has been overall good, he continues to see Dr. Anderson which he finds helpful. His symptoms are most concentrated in the days following treatment. He hopes using the bentyl after next procedure will alleviate most of his discomfort.  He speaks at length about how happy he is with his cancer care at [facility]. He continues to be able to enjoy time with his family as well as activities that bring him paul.    05/23/2022  JAVON BROWER reviewed and summarized:  No recent prescriptions reported    Patient presents today via virtual visit, his wife interjects from off screen. Patient reports he is doing well today. However, following treatment his symptom burden is more prolonged, now lasting about 5 days compared to previously lasting only 2 days. He is experiencing intermittent pain, for which he is no longer taking oxycodone due to side-effect of emesis; he requests a different short-acting PRN medication. He reports that his mood is improved, and that he has good support from his wife, daughter and dog. He volunteers that he is feeling very good about his decision to continue treatment and does not plan to "give up". He is following with onc-psych.     04/25/2022:  JAVON BROWER reviewed and summarized:  04/19/2022 Oxycodone 5 mg Disp: 20 for 4 days    Per chart review, patient tolerating treatment of cis/gem well. Today patient states he had TACE last week. He developed cramping, nausea and vomiting for about two days after treatment. He was not able to tolerate oral intake. Patient states that on " day three the symptoms resolved. He has been having more fatigue recently as well as weakness. He has to take more breaks to recover. He also has noticed some dyspnea with these symptoms as well. Patient's wife noted some irritability and shortness every now and then. He was also constipated, though this has improved recently.     02/21/2022:  [place]  reviewed and summarized:  12/14/2021 Oxycodone 5 mg Disp: 90 for 22 days  12/03/2021 Percocet 5-325 mg disp: 6 for 2 days    Patient presents today with his wife. Overall he feels well. Patient is not taking any pain medication at this time. He does have some very intermittent, short lasting pain in his RUQ that occurs with certain actions (eg. Sneezing). Patient had noticed changes in his taste and overall appetite. He has noted improvement recently. He is also having some mild fatigue. Patient and family open to learning about ACP.     Past Medical History:   Diagnosis Date    Adjustment disorder     Anticoagulant long-term use     Anxiety     Arthritis     CAD (coronary artery disease) 3/2/2015    non-obstructive per [facility]     Cataract     Dry eye syndrome     Hypertension     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/2021    Obesity     Post-procedural fever 12/18/2021    Seizures     2011    Sleep apnea     + CPAP    Sleep difficulties      Past Surgical History:   Procedure Laterality Date    ANTERIOR CERVICAL DISCECTOMY W/ FUSION N/A 7/6/2020    Procedure: DISCECTOMY, SPINE, CERVICAL, ANTERIOR APPROACH, WITH FUSION C3-4, C4-5;  Surgeon: Fabiola Vides MD;  Location: [facility] OR [address] [place] [address];  Service: Neurosurgery;  Laterality: N/A;  TORONTO III, ASA III, BLOOD TYPE & SCREEN, NEUROMONITORING EMG-MEP-SEP, SUPINE POSITION,BRACE [place],REGULAR BED, HEADREST CASPAR, POSITION, MAP>85, RADIOLOGY C-ARM, SPECIAL EQUIPMENT VIRGIL TYLER    COLONOSCOPY N/A 10/1/2021    Procedure: COLONOSCOPY;  Surgeon: Nicolas Alvarado MD;  Location: [facility] (4TH FLR);  Service: Endoscopy;   Laterality: N/A;  EGD and colonoscopy for diarrhea and weight loss and Enlarged, heterogeneous appearance of the liver likely due to multiple underlying hepatic lesions largest measuring 4.8 cm.  Findings concerning for metastatic versus less likely primary hepatic neoplasm.  Recommend furth    COLONOSCOPY N/A 6/7/2022    Procedure: COLONOSCOPY;  Surgeon: Mejia Villafuerte MD;  Location: [facility] (4TH FLR);  Service: Endoscopy;  Laterality: N/A;  For evaluation of positive out-patient FOBT.   medically urgent  ok to hold Eliquis per CAROLINA Edward/RB  fully vaccinated  hx of seizures- last one 2014    ESOPHAGOGASTRODUODENOSCOPY N/A 10/1/2021    Procedure: EGD (ESOPHAGOGASTRODUODENOSCOPY);  Surgeon: Nicolas Alvarado MD;  Location: [facility] (4TH FLR);  Service: Endoscopy;  Laterality: N/A;  EGD and colonoscopy for diarrhea and weight loss and Enlarged, heterogeneous appearance of the liver likely due to multiple underlying hepatic lesions largest measuring 4.8 cm.  Findings concerning for metastatic versus less likely primary hepatic neopl    ESOPHAGOGASTRODUODENOSCOPY N/A 4/5/2022    Procedure: EGD (ESOPHAGOGASTRODUODENOSCOPY);  Surgeon: Amado Kelsey MD;  Location: [facility] (4TH FLR);  Service: Endoscopy;  Laterality: N/A;  EGD in 8 weeks to check for esophagitis healing patient should be on pantoprazole 40 mg once daily  ok to hold eliquis x2 days per Dr. Young, see telephone encounter  fully vaccinated    EYE SURGERY      INSERTION OF VENOUS ACCESS PORT N/A 12/3/2021    Procedure: INSERTION, VENOUS ACCESS PORT;  Surgeon: Saurav Garcia MD;  Location: [facility] 2ND FLR;  Service: General;  Laterality: N/A;    INTRAOCULAR PROSTHESES INSERTION Right 7/16/2018    Procedure: INSERTION-INTRAOCULAR LENS (IOL);  Surgeon: Priscilla Hays MD;  Location: [facility];  Service: Ophthalmology;  Laterality: Right;    INTRAOCULAR PROSTHESES INSERTION Left 8/13/2018    Procedure: INSERTION, INTRAOCULAR LENS PROSTHESIS;   Surgeon: Priscilla Hays MD;  Location: [facility];  Service: Ophthalmology;  Laterality: Left;    KNEE SURGERY      PHACOEMULSIFICATION OF CATARACT Right 7/16/2018    Procedure: PHACOEMULSIFICATION-ASPIRATION-CATARACT;  Surgeon: Priscilla Hays MD;  Location: [facility];  Service: Ophthalmology;  Laterality: Right;    PHACOEMULSIFICATION OF CATARACT Left 8/13/2018    Procedure: PHACOEMULSIFICATION, CATARACT;  Surgeon: Priscilla Hays MD;  Location: [facility];  Service: Ophthalmology;  Laterality: Left;    WISDOM TOOTH EXTRACTION       Family History   Problem Relation Age of Onset    Diabetes Mother     Hypertension Mother     Kidney cancer Mother 61    Cancer Mother         renal & pancreatic    Heart disease Father     No Known Problems Sister     Cancer Maternal Grandmother     Liver disease Maternal Grandmother     Heart disease Paternal Grandfather     Heart disease Brother     No Known Problems Daughter     No Known Problems Son     No Known Problems Sister     No Known Problems Sister     No Known Problems Sister     No Known Problems Daughter     Blindness Neg Hx     Macular degeneration Neg Hx     Retinal detachment Neg Hx     Glaucoma Neg Hx     Melanoma Neg Hx     Pancreatic cancer Neg Hx     Bladder Cancer Neg Hx     Uterine cancer Neg Hx     Ovarian cancer Neg Hx     Celiac disease Neg Hx     Cirrhosis Neg Hx     Colon cancer Neg Hx     Colon polyps Neg Hx     Crohn's disease Neg Hx     Esophageal cancer Neg Hx     Inflammatory bowel disease Neg Hx     Liver cancer Neg Hx     Rectal cancer Neg Hx     Stomach cancer Neg Hx     Ulcerative colitis Neg Hx      Review of patient's allergies indicates:   Allergen Reactions    Indomethacin Other (See Comments)     Headache dizziness      Adhesive Rash       Medications:    Current Outpatient Medications:     acetaminophen (TYLENOL) 650 MG TbSR, Take by mouth daily as needed., Disp: , Rfl:     apixaban (ELIQUIS) 5 mg Tab, Take  1 tablet (5 mg total) by mouth 2 (two) times daily., Disp: 180 tablet, Rfl: 3    diltiaZEM 2% Lidocaine 5% Cream, Apply pea size amount topically 3 (three) times daily., Disp: 30 g, Rfl: 2    finasteride (PROSCAR) 5 mg tablet, Take 1 tablet (5 mg total) by mouth once daily. (Patient taking differently: Take 5 mg by mouth every evening.), Disp: 90 tablet, Rfl: 3    hydroCHLOROthiazide (HYDRODIURIL) 25 MG tablet, TAKE 1 TABLET (25 MG TOTAL) BY MOUTH ONCE DAILY. (Patient taking differently: Take 25 mg by mouth every morning.), Disp: 90 tablet, Rfl: 3    magnesium oxide (MAG-OX) 400 mg (241.3 mg magnesium) tablet, Take 400 mg by mouth every evening., Disp: , Rfl:     multivitamin with minerals tablet, Take 1 tablet by mouth every morning. , Disp: , Rfl:     oxyCODONE-acetaminophen (PERCOCET) 5-325 mg per tablet, Take 1 tablet by mouth every 4 (four) hours as needed for Pain., Disp: 20 tablet, Rfl: 0    potassium chloride SA (K-DUR,KLOR-CON) 20 MEQ tablet, Take 2 tablets (40 mEq total) by mouth once daily. (Patient taking differently: Take 40 mEq by mouth every morning.), Disp: 180 tablet, Rfl: 3    pravastatin (PRAVACHOL) 40 MG tablet, Take 1 tablet (40 mg total) by mouth once daily. (Patient taking differently: Take 40 mg by mouth every evening.), Disp: 90 tablet, Rfl: 3    prochlorperazine (COMPAZINE) 10 MG tablet, Take 1 tablet (10 mg total) by mouth every 6 (six) hours as needed (nausea)., Disp: 60 tablet, Rfl: 1    promethazine (PHENERGAN) 12.5 MG Tab, Take 1 tablet (12.5 mg total) by mouth every 6 (six) hours as needed (nausea)., Disp: 60 tablet, Rfl: 2    RABEprazole (ACIPHEX) 20 mg tablet, Take 1 tablet (20 mg total) by mouth once daily. (Patient taking differently: Take 20 mg by mouth every morning.), Disp: 90 tablet, Rfl: 3    tamsulosin (FLOMAX) 0.4 mg Cap, TAKE 1 CAPSULE EVERY DAY (Patient taking differently: Take by mouth every evening.), Disp: 90 capsule, Rfl: 0    OBJECTIVE:       ROS:  Review  of Systems   Constitutional: Positive for activity change, appetite change and fatigue.   HENT: Negative.    Eyes: Negative.    Respiratory: Positive for shortness of breath.    Cardiovascular: Negative.    Gastrointestinal: Positive for abdominal pain (intermittent) and nausea.   Genitourinary: Positive for frequency.   Musculoskeletal: Negative.    Skin: Negative.    Neurological: Positive for weakness (bi-lat, legs) and numbness (right thigh).   All other systems reviewed and are negative.      Review of Symptoms    Symptom Assessment (ESAS 0-10 Scale)  Pain:  0  Dyspnea:  0  Anxiety:  1  Nausea:  1  Depression:  0  Anorexia:  0  Fatigue:  7  Insomnia:  0  Restlessness:  0  Agitation:  0     CAM / Delirium:  Negative  Constipation:  Negative  Diarrhea:  Negative (after treatment)      Bowel Management Plan (BMP):  No      Pain Assessment:  OME in 24 hours:  0  Location(s): abdomen    ABDOMEN Location: post TACE. month(s) ago, stable    Modified Isidoro Scale:  1    ECOG Performance Status ndGndrndanddndend:nd nd2nd Living Arrangements:  Lives with spouse    Psychosocial/Cultural: Patient lives with his wife and eldest daughter. He has a younger daughter that does not live with them anymore. Patient also has a dog named Chance    Spiritual:  F - Annemarie and Belief:  Adventism  I - Importance:  High  C - Community:  Yes; good support   A - Address in Care:  Needs met at this time      Advance Care Planning   Advance Directives:   Living Will: No    LaPOST: No    Do Not Resuscitate Status: No    Medical Power of : No    Agent's Name:  Estrellita Kellogg   Agent's Contact Number:  [phone]    Decision Making:  Patient answered questions and Family answered questions          Physical Exam: Limited due to telemedicine visit   Vitals:  no vitals obtained, virtual visit   Physical Exam  Constitutional:       General: He is not in acute distress.     Appearance: He is not toxic-appearing.   HENT:      Head: Normocephalic and  "atraumatic.      Right Ear: External ear normal.      Left Ear: External ear normal.      Nose: Nose normal.      Mouth/Throat:      Mouth: Mucous membranes are moist.   Eyes:      General: No scleral icterus.        Right eye: No discharge.         Left eye: No discharge.   Neck:      Comments: Trachea midline  Pulmonary:      Effort: Pulmonary effort is normal. No respiratory distress.   Musculoskeletal:      Cervical back: Normal range of motion.      Comments: Sitting up without assistance; able to move upper extremities without limitation   Skin:     General: Skin is dry.      Findings: No erythema or rash.   Neurological:      General: No focal deficit present.      Mental Status: He is alert and oriented to person, place, and time.      Cranial Nerves: No cranial nerve deficit.   Psychiatric:         Behavior: Behavior normal.         Thought Content: Thought content normal.         Judgment: Judgment normal.         Labs:  CBC:   WBC   Date Value Ref Range Status   08/12/2022 9.07 3.90 - 12.70 K/uL Final     Hemoglobin   Date Value Ref Range Status   08/12/2022 8.0 (L) 14.0 - 18.0 g/dL Final     Hematocrit   Date Value Ref Range Status   08/12/2022 25.3 (L) 40.0 - 54.0 % Final     MCV   Date Value Ref Range Status   08/12/2022 98 82 - 98 fL Final     Platelets   Date Value Ref Range Status   08/12/2022 188 150 - 450 K/uL Final       LFT:   Lab Results   Component Value Date    AST 64 (H) 08/09/2022    GGT 1,292 (H) 09/20/2021    ALKPHOS 443 (H) 08/09/2022    BILITOT 1.9 (H) 08/09/2022       Albumin:   Albumin   Date Value Ref Range Status   08/09/2022 2.6 (L) 3.5 - 5.2 g/dL Final     Protein:   Total Protein   Date Value Ref Range Status   08/09/2022 6.5 6.0 - 8.4 g/dL Final       Radiology:I have reviewed all pertinent imaging results/findings within the past 24 hours.      06/09/2022 US lower extremities vein bi-lateral "Impression: Partially occlusive deep venous thrombosis of the left femoral " "vein."     06/19/2022 MRI abd pelvis "Impression: Evolving posttreatment changes in the liver for multifocal intrahepatic cholangiocarcinoma.  No new focal hepatic lesions. 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 No abdominopelvic metastases. Additional findings as described."       06/15/2022 CT chest "Impression:     1. Stable pulmonary nodules measuring up to 0.4 cm.  No definite new or enlarging pulmonary nodules.  2. Scattered regions of opacification within the right lung base with air bronchograms, favored to represent atelectasis.  However, early consolidative changes cannot be completely excluded."    Signature: Alyssa Mooney DNP          "

## 2022-08-18 NOTE — Clinical Note
Hello,   I had a virtual visit with Mr. Kellogg yesterday. His primary complaint was the weakness and fatigue that he reported to you at your last visit. Overall, sounds like he's not quite bouncing back after treatment like he was before. His activity tolerance is lessoning, he's noticed that he's a little more reliant on his family than previous. All other symptoms are stable. No changes made.   Thanks, Alyssa

## 2022-08-23 NOTE — PLAN OF CARE
Pt arrived from Dr. Clayton.  Treatment plan changed and reviewed with pt, states understanding.  Pt will be getting. Mag 2 grams, Gemzar, 1 unit blood.  Port accessed with good blood return, type and screen sent.  Pt tolerated all infusions without issue.  Port de accessed. Pt discharged to home.

## 2022-08-23 NOTE — PROGRESS NOTES
"                                                PROGRESS NOTE    Subjective:       Patient ID: Domonique Kellogg is a 72 y.o. male.    Chief Complaint: follow up for cholangiocarcinoma    Diagnosis:  Advanced intrahepatic cholangiocarcinoma, MSI-low, negative for FGFR2 fusion or IDH1/IDH2 mutations, diagnosed on 11/22/2021.     Molecular Profile:  Guardant 360 11/29/21: +CCND1 amplification. VUS: CATRACHITO [id number]. MSI-high not detected.   Strata: ASXL1 mutation, CCND1 amplification, MSI-low, TMB low.     Oncologic History:  1. Mr Kellogg is a 70 yo man with CAD, HTN, seizures in 2011 who initially saw me on 11/29/21 for further management of cholangiocarcinoma. He presented with weight loss and diarrhea since July. US 9/22/21 showed "Enlarged, heterogeneous appearance of the liver likely due to multiple underlying hepatic lesions largest measuring 4.8 cm."  MRI abdomen w/wo contrast 10/4/21: "Multiple liver lesions measuring up to 13.3 cm in the right hepatic lobe.  Differential diagnosis includes metastases, fibrolamellar hepatocellular carcinoma, or atypical focal nodular hyperplasia.  Consider biopsy for further evaluation. Thrombosis of the anterior branch of the right portal vein and left hepatic vein.  Nonvisualization of the middle and right hepatic veins, which may be thrombosed as well. Prominent periportal lymph node, which is nonspecific."  EGD and colonoscopy on 10/11/21 were negative for primary malignancies.   He underwent liver lesion biopsy and Y90 mapping on 11/22/21. Pathology showed adenocarcinoma: "Biopsy of the liver mass shows a malignant neoplasm in a fibrotic stroma. Glandular architecture is readily identified with several foci of intraluminal necrosis. Scattered mitotic figures are seen. Neoplastic cells show the following immunophenotype:   Positive: AE1/AE3, CK7, CDX2   Negative: Chromogranin, Synaptophysin, TTF, CK5/6, CK20, HepPar1   The morphology and immunophenotype are compatible with " "adenocarcinoma. Considerations for a primary site include pancreaticobiliary (including intrahepatic cholangiocarcinoma) as well as upper gastrointestinal. Clinical and radiologic correlation is suggested."  Patient presents today with wife for further management. No pain. Initial weight loss has somewhat stabilized. Still has diarrhea. Has not tried imodium yet. +nervous  Mother had kidney cancer at age 64. Maternal grandmother had liver cancer in her 60s. Patient has three children, two daughters and one son.   Discussed palliative chemo with cis/gem  2. PET scan 21 did not show extrahepatic metastases.   3. Cis/gem started on 21. Durvalumab was initially denied by insurance company after GI [facility], but approved after FDA approval, added 8/10/22.   4. S/p TACE on 2021, 22, 22, 22, 22  5. S/p RF Ablation on 22     Interval History:   Mr Kellogg returns for cycle 12 of gem/cis/duvalumab. Feeling well. Tolerated last ablation on 22 well. Does feel very tired over the past week.     ECO    ROS:   A ten-point system review is obtained and negative except for what was stated in the Interval History.     Physical Examination:   Vital signs reviewed.   General: well hydrated, well developed, in no acute distress  HEENT: normocephalic, EOMI, anicteric sclerae, pale conjunctivae  Neck: supple, no cervical lymphadenopathy, no thyromegaly or JVD  Chest: right chest port site clean  Lungs: clear breath sounds bilaterally, no wheezing/rales/rhonchi  Heart: RRR, no M/R/G  Abdomen: soft, no tenderness, nondistended  Ext: no clubbing, cyanosis, edema  Skin: no open wound, ulcers, rash.   Neuro: alert and oriented x 4  Psych: pleasant and appropriate mood and affect    Objective:     Laboratory Data:  Labs reviewed. Adequate for chemo. Platelet count 85. Hb 7.6. Mg 1.3    Imaging Data:  PET 21:  In this patient with known intrahepatic cholangiocarcinoma, there are multifocal " hypermetabolic hepatic lesions in both hepatic lobes.  No evidence of distant metastasis.     Lipomatosis of the ileocecal valve and proximal cecum.     Fat containing umbilical hernia.    MRI Abdomen 1/18/22:  1. Patient with reported cholangiocarcinoma.  Interval progression of hepatic tumor burden with multiple enlarging and new hepatic lesions.  2. Progression of portal venous thrombosis involving the left, right, and main portal veins.  Persistent filling defect within the central hepatic veins concerning for thrombosis.  3. Stable prominent periportal lymph node.    CT chest 3/4/22:  Right pulmonary nodules, unchanged.  Recommend continued follow-up as per clinical protocol.    MRI abdomen 3/14/22:     Interval postprocedural changes status post TACE with slight improvement of tumor burden at treatment sites.  Extensive residual disease throughout both hepatic lobes.  Index lesions as above.     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     Stable appearance of central hepatic veins, underlying thrombosis not excluded.  Consider further evaluation with Doppler ultrasound if clinically warranted.     Stable prominent periportal lymph node.     Right adrenal adenoma.    CT urogram 3/14/22:  Prostatomegaly which demonstrates mass effect on the posterior bladder.  There is mild diffuse wall thickening of the bladder which may relate to outlet obstruction.     Postprocedural changes of the liver in keeping with recent chemoembolization.  Protocol is not optimized for evaluation of tumor response.  Dedicated multiphase liver CT would be necessary to evaluate liver directed therapy response.    MRI A/P 6/19/22:  - Evolving posttreatment changes in the liver for multifocal intrahepatic cholangiocarcinoma.  No new focal hepatic lesions.  - Persistent portal vein thrombosis, likely tumor thrombus, with cavernous transformation.  - No abdominopelvic metastases.     CT chest  6/15/22:  1. Stable pulmonary nodules measuring up to 0.4 cm.  No definite new or enlarging pulmonary nodules.  2. Scattered regions of opacification within the right lung base with air bronchograms, favored to represent atelectasis.  However, early consolidative changes cannot be completely excluded.    US bilateral legs 6/9/22:  Partially occlusive deep venous thrombosis of the left femoral vein.    IR RF Ablation 7/19/22    Impression:     Percutaneous high-frequency radiofrequency ablation of multiple left lobe liver lesions.    Assessment and Plan:     1. Intrahepatic cholangiocarcinoma    2. Secondary malignant neoplasm of liver    3. 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 Anemia due to chronic blood loss    6. Chemotherapy-induced thrombocytopenia    7. Hypertension, essential    8. Acute deep vein thrombosis (DVT) of left femoral vein      1-4  - Mr Kellogg is a 71 yo man with advanced intrahepatic cholangiocarcinoma with multiple liver lesions. MSI-low, FGFR2 fusion and IDH1/2 mutation negative. Started on cis/gem on 12/7/21. Durvalumab added on 8/10/22 after FDA approval  - S/p TACE on 12/16/2021, 2/14/22, 4/19/22, 5/18/22, 8/2/22. RF ablation on 7/19/22   - reviewed test results. plt count 85. Will give gemcitabine at 750 mg/m2 today. Hold cisplatin  - will give cis/gem/durv every 2 weeks given thrombocytopenia  - return in 2 weeks for cycle 12 day 15 of cis/gem/durv  - scheduled for MRI by IR in September 5.  - guaiac stool was positive in the past. EGD in 4/2022 showed congested mucosa in esophagus and gastritis. Colonoscopy 6/7/22 neg for bleeding sites.  - getting transfusion every month. Messaged Dr Villafuerte re getting small bowel capsule  - give blood transfusion today    6.  - see above. Hold cisplatin. Dose reduced gemcitabine  - chemo every 2 weeks instead of 2 weeks on 1 week off    7.  - BP controlled  - c/w current medication    8.  -  was on eliquis 5 mg bid for portal vein thrombosis noted at diagnosis. However could not tolerate even 2.5 mg bid due to hematuria. Was on 2.5 mg daily  - US 6/9/22 showed new left femoral DVT. Eliquis was increased to 5 mg bid  - doing well. C/w eliquis 5 mg bid    Follow-up:     RTC in 2 weeks.      Route Chart for Scheduling    Med Onc Chart Routing  Urgent    Follow up with physician 4 weeks. Change chemo to every 2 weeks. no chemo next week. return in 2 weeks with CBC, CMP, CA 19-9, Mg, TSH 1 day prior, see BRADLEY then get cis/gem/durv. see me in 4 weeks with CBC, CMP, CA 19-9, Mg, TSH 1 day prior, see me then get cis/gem   Follow up with BRADLEY 2 weeks.   Infusion scheduling note gemcitabine/cisplatin/durvalumab every 2 weeks   Injection scheduling note    Labs CBC, CMP, CA 19-9, TSH and magnesium   Lab interval: every 2 weeks     Imaging    Pharmacy appointment    Other referrals          Treatment Plan Information   OP GEMCITABINE CISPLATIN Q3W + DURVALUMAB D8 C11+   Claudia Young MD   Upcoming Treatment Dates - OP GEMCITABINE CISPLATIN Q3W + DURVALUMAB D8 C11+    8/23/2022       Chemotherapy       gemcitabine (GEMZAR) 2,200 mg in sodium chloride 0.9% 250 mL chemo infusion       CISplatin (PLATINOL) 25 mg/m2 = 57 mg in sodium chloride 0.9% 500 mL chemo infusion       Pre-Hydration       sodium chloride 0.9% 500 mL with magnesium sulfate 2 g, potassium chloride 20 mEq infusion       Post-Hydration       sodium chloride 0.9% bolus 500 mL       Antiemetics       aprepitant (CINVANTI) injection 130 mg       palonosetron (ALOXI) 0.25 mg with Dexamethasone (DECADRON) 12 mg in NS 50 mL IVPB  8/31/2022       Chemotherapy       gemcitabine (GEMZAR) 2,200 mg in sodium chloride 0.9% 250 mL chemo infusion       CISplatin (PLATINOL) 25 mg/m2 = 57 mg in sodium chloride 0.9% 500 mL chemo infusion       Pre-Hydration       sodium chloride 0.9% 500 mL with magnesium sulfate 2 g, potassium chloride 20 mEq infusion        Post-Hydration       magnesium sulfate 2g in water 50mL IVPB (premix)       sodium chloride 0.9% bolus 500 mL       Antiemetics       aprepitant (CINVANTI) injection 130 mg       palonosetron 0.25mg/dexamethasone 12mg in NS IVPB 0.25 mg       Immunotherapy       durvalumab (IMFINZI) 1,500 mg in sodium chloride 0.9% 280 mL chemo infusion

## 2022-08-29 NOTE — PATIENT INSTRUCTIONS
Hemorrhoids:  Hemorrhoids are swollen veins that develop in the anal canal. Bleeding during bowel movements, itching, and rectal pain are the most common symptoms. Hemorrhoids can be uncomfortable at times, but rarely are they a serious problem.    Most of the time, you can treat them with simple changes to your diet and bowel habits. These changes include eating more fiber and not straining to pass stools. Most hemorrhoids don't need surgery or other treatment unless they are very large and painful or bleed a lot.    Follow-up care is a key part of your treatment and safety. Be sure to make and go to all appointments, and call your doctor if you are having problems. It's also a good idea to know your test results and keep a list of the medicines you take.    How can you care for yourself at home?  Sit in a few inches of warm water (sitz bath) 3 times a day and after bowel movements. The warm water helps with pain and itching.  Put ice on your anal area several times a day for 10 minutes at a time. Put a thin cloth between the ice and your skin. Follow this by placing a warm, wet towel on the area for another 10 to 20 minutes.  Take pain medicines exactly as directed.  If the doctor gave you a prescription medicine for pain, take it as prescribed.  If you are not taking a prescription pain medicine, ask your doctor if you can take an over-the-counter medicine.  Keep the anal area clean, but be gentle. Use water and a fragrance-free soap, or use baby wipes or medicated pads such as Tucks.  Wear cotton underwear and loose clothing to decrease moisture in the anal area.  Eat more fiber. Include foods such as whole-grain breads and cereals, raw vegetables, raw and dried fruits, and beans.  Drink plenty of fluids. If you have kidney, heart, or liver disease and have to limit fluids, talk with your doctor before you increase the amount of fluids you drink.  Use a stool softener that contains bran or psyllium. You can save  money by buying bran or psyllium (available in bulk at most health food stores) and sprinkling it on foods or stirring it into fruit juice. Or you can use a product such as Metamucil or Hydrocil.    Practice healthy bowel habits.  Go to the bathroom as soon as you have the urge.  Avoid straining to pass stools. Relax and give yourself time to let things happen naturally.  Do not hold your breath while passing stools.  Do not read while sitting on the toilet. Get off the toilet as soon as you have finished.  Take your medicines exactly as prescribed. Call your doctor if you think you are having a problem with your medicine.    When should you call for help?     Call 911 anytime you think you may need emergency care. For example, call if:    You pass maroon or very bloody stools.    Call your doctor now or seek immediate medical care if:    You have increased pain.  You have increased bleeding.    Watch closely for changes in your health, and be sure to contact your doctor if:    Your symptoms have not improved after 3 or 4 days

## 2022-08-29 NOTE — PROGRESS NOTES
INTERNAL MEDICINE URGENT CARE NOTE    CHIEF COMPLAINT     Chief Complaint   Patient presents with    Diarrhea         HPI     Domonique Kellogg is a 72 y.o. male who presents for an urgent visit today. He is a current patient of Dr. Pacheco, last seen in clinic 08/16/2022 for intrahepatic cholangiocarcinoma.    Today, he reports increased urinary frequency that began yesterday and associated pain with initiating urine stream. He woke every 30 minutes last night due to urgency.     Last chemotherapy was 08/23/2022. Reports diarrhea that began yesterday, normally his BM are solid. When he wiped his buttocks after having diarrhea, he noticed bright red blood. He wiped later yesterday night and noticed light pink on the toilet paper. He does have a history of hemorrhoids anal fissures in the past. Denies fever, chills, abdominal pain, recent travel, recent antibiotic use.     Past Medical History:  Past Medical History:   Diagnosis Date    Adjustment disorder     Anticoagulant long-term use     Anxiety     Arthritis     CAD (coronary artery disease) 3/2/2015    non-obstructive per [facility]     Cataract     Dry eye syndrome     Hypertension     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/2021    Obesity     Post-procedural fever 12/18/2021    Seizures     2011    Sleep apnea     + CPAP    Sleep difficulties        Home Medications:  Prior to Admission medications    Medication Sig Start Date End Date Taking? Authorizing Provider   acetaminophen (TYLENOL) 650 MG TbSR Take by mouth daily as needed. 2/3/20   Historical Provider   apixaban (ELIQUIS) 5 mg Tab Take 1 tablet (5 mg total) by mouth 2 (two) times daily. 7/5/22   Claudia Young MD   diltiaZEM 2% Lidocaine 5% Cream Apply pea size amount topically 3 (three) times daily. 12/28/21   Shilpa Ahuja NP   finasteride (PROSCAR) 5 mg tablet Take 1 tablet (5 mg total) by mouth once daily.  Patient taking differently: Take 5 mg by mouth every evening. 3/24/22 3/24/23  Messi Stark,  MD   hydroCHLOROthiazide (HYDRODIURIL) 25 MG tablet TAKE 1 TABLET (25 MG TOTAL) BY MOUTH ONCE DAILY.  Patient taking differently: Take 25 mg by mouth every morning. 12/10/21   Nicolas Pacheco MD   magnesium oxide (MAG-OX) 400 mg (241.3 mg magnesium) tablet Take 400 mg by mouth every evening. 7/1/19   Historical Provider   multivitamin with minerals tablet Take 1 tablet by mouth every morning.  7/25/18   Historical Provider   oxyCODONE-acetaminophen (PERCOCET) 5-325 mg per tablet Take 1 tablet by mouth every 4 (four) hours as needed for Pain. 8/2/22   Benny Dawkins DO   potassium chloride SA (K-DUR,KLOR-CON) 20 MEQ tablet Take 2 tablets (40 mEq total) by mouth once daily.  Patient taking differently: Take 40 mEq by mouth every morning. 12/3/21   Nicolas Pacheco MD   pravastatin (PRAVACHOL) 40 MG tablet Take 1 tablet (40 mg total) by mouth once daily.  Patient taking differently: Take 40 mg by mouth every evening. 1/18/22   Nicolas Pacheco MD   prochlorperazine (COMPAZINE) 10 MG tablet Take 1 tablet (10 mg total) by mouth every 6 (six) hours as needed (nausea). 11/29/21 11/29/22  Claudia Young MD   promethazine (PHENERGAN) 12.5 MG Tab Take 1 tablet (12.5 mg total) by mouth every 6 (six) hours as needed (nausea). 11/29/21   Claudia Young MD   RABEprazole (ACIPHEX) 20 mg tablet Take 1 tablet (20 mg total) by mouth once daily.  Patient taking differently: Take 20 mg by mouth every morning. 12/23/21 12/23/22  Mejia Villafuerte MD   tamsulosin (FLOMAX) 0.4 mg Cap TAKE 1 CAPSULE EVERY DAY  Patient taking differently: Take by mouth every evening. 5/25/22   Claudia Young MD       Review of Systems:  Review of Systems    As per HPI and below:   General: No fever. No unintended weight loss   HENT: No facial pain. No difficulty swallowing.  Eyes: No eye pain. No visual disturbances.  Cardiovascular: No chest pain. No leg swelling. No palpitations.  Respiratory: No cough. No dyspnea.   GI: No abdominal pain. Notes  "diarrhea. No vomiting  : No flank pain. Notes dysuria.   Skin: No rashes. No lesions.  Neuro: No headaches. No syncope.    Musculoskeletal: No extremity pain. No swelling.    All other systems negative.     Health Maintainence:   Immunizations:  Health Maintenance         Date Due Completion Date    Influenza Vaccine (1) 09/01/2022 10/12/2021    High Dose Statin 08/02/2023 8/2/2022    Colorectal Cancer Screening 06/07/2025 6/7/2022    TETANUS VACCINE 03/03/2026 3/3/2016    Lipid Panel 11/15/2026 11/15/2021             PHYSICAL EXAM     /82 (BP Location: Left arm, Patient Position: Sitting, BP Method: Large (Manual))   Pulse 80   Temp 98.8 °F (37.1 °C) (Oral)   Resp 18   Ht 5' 8" (1.727 m)   Wt 101.1 kg (222 lb 15.9 oz)   SpO2 99%   BMI 33.91 kg/m²     Physical Exam  Vitals and nursing note reviewed.   Constitutional:       General: He is not in acute distress.     Appearance: Normal appearance. He is not toxic-appearing or diaphoretic.   HENT:      Head: Normocephalic and atraumatic.   Eyes:      General: No scleral icterus.     Extraocular Movements: Extraocular movements intact.      Conjunctiva/sclera: Conjunctivae normal.   Cardiovascular:      Rate and Rhythm: Normal rate and regular rhythm.      Pulses:           Radial pulses are 2+ on the right side and 2+ on the left side.      Heart sounds: Normal heart sounds.   Pulmonary:      Effort: Pulmonary effort is normal.      Breath sounds: Normal breath sounds. No wheezing, rhonchi or rales.   Genitourinary:     Rectum: External hemorrhoid (9'oclock, not thrombosed, no active bleeding) present. No tenderness or anal fissure.   Musculoskeletal:         General: Normal range of motion.      Cervical back: Normal range of motion and neck supple.      Right lower leg: No edema.      Left lower leg: No edema.   Skin:     General: Skin is warm and dry.      Coloration: Skin is not pale.   Neurological:      General: No focal deficit present.      Mental " Status: He is alert and oriented to person, place, and time.      Coordination: Coordination normal.      Gait: Gait normal.   Psychiatric:         Mood and Affect: Mood normal.         Behavior: Behavior normal.       LABS     Lab Results   Component Value Date    HGBA1C 5.6 08/22/2022     CMP  Sodium   Date Value Ref Range Status   08/22/2022 141 136 - 145 mmol/L Final     Potassium   Date Value Ref Range Status   08/22/2022 3.6 3.5 - 5.1 mmol/L Final     Chloride   Date Value Ref Range Status   08/22/2022 106 95 - 110 mmol/L Final     CO2   Date Value Ref Range Status   08/22/2022 27 23 - 29 mmol/L Final     Glucose   Date Value Ref Range Status   08/22/2022 177 (H) 70 - 110 mg/dL Final     BUN   Date Value Ref Range Status   08/22/2022 20 8 - 23 mg/dL Final     Creatinine   Date Value Ref Range Status   08/22/2022 1.2 0.5 - 1.4 mg/dL Final     Calcium   Date Value Ref Range Status   08/22/2022 9.4 8.7 - 10.5 mg/dL Final     Total Protein   Date Value Ref Range Status   08/22/2022 5.6 (L) 6.0 - 8.4 g/dL Final     Albumin   Date Value Ref Range Status   08/22/2022 2.8 (L) 3.5 - 5.2 g/dL Final     Total Bilirubin   Date Value Ref Range Status   08/22/2022 1.6 (H) 0.1 - 1.0 mg/dL Final     Comment:     For infants and newborns, interpretation of results should be based  on gestational age, weight and in agreement with clinical  observations.    Premature Infant recommended reference ranges:  Up to 24 hours.............<8.0 mg/dL  Up to 48 hours............<12.0 mg/dL  3-5 days..................<15.0 mg/dL  6-29 days.................<15.0 mg/dL       Alkaline Phosphatase   Date Value Ref Range Status   08/22/2022 488 (H) 55 - 135 U/L Final     AST   Date Value Ref Range Status   08/22/2022 40 10 - 40 U/L Final     ALT   Date Value Ref Range Status   08/22/2022 43 10 - 44 U/L Final     Anion Gap   Date Value Ref Range Status   08/22/2022 8 8 - 16 mmol/L Final     eGFR if    Date Value Ref Range Status    07/26/2022 >60.0 >60 mL/min/1.73 m^2 Final     eGFR if non    Date Value Ref Range Status   07/26/2022 59.6 (A) >60 mL/min/1.73 m^2 Final     Comment:     Calculation used to obtain the estimated glomerular filtration  rate (eGFR) is the CKD-EPI equation.        Lab Results   Component Value Date    WBC 2.39 (L) 08/29/2022    HGB 8.8 (L) 08/29/2022    HCT 27.2 (L) 08/29/2022    MCV 98 08/29/2022     08/29/2022     Lab Results   Component Value Date    CHOL 139 11/15/2021    CHOL 141 10/30/2020    CHOL 148 10/03/2019     Lab Results   Component Value Date    HDL 36 (L) 11/15/2021    HDL 47 10/30/2020    HDL 49 10/03/2019     Lab Results   Component Value Date    LDLCALC 92.6 11/15/2021    LDLCALC 83.2 10/30/2020    LDLCALC 88.0 10/03/2019     Lab Results   Component Value Date    TRIG 52 11/15/2021    TRIG 54 10/30/2020    TRIG 55 10/03/2019     Lab Results   Component Value Date    CHOLHDL 25.9 11/15/2021    CHOLHDL 33.3 10/30/2020    CHOLHDL 33.1 10/03/2019     Lab Results   Component Value Date    TSH 2.116 08/22/2022       ASSESSMENT/PLAN     Domonique Kellogg is a 72 y.o. male     Domonique was seen today for diarrhea.    Diagnoses and all orders for this visit:    Rectal bleeding- rectal bleeding likely due to frequent loose bowel movements and hemorrhoid. Given his history, will recheck CBC today. Advised to perform warm compresses/sitz baths, apply topical hydrocortisone BID x 7 days, and lidocaine jelly for pain control.   -     LIDOcaine HCl 2% (XYLOCAINE) 2 % Soln; Place around the anus every 6 (six) hours. for 7 days    External hemorrhoid  Comments:  at 9 o'clock position  Orders:  -     hydrocortisone 1 % cream; Apply topically 2 (two) times daily. for 7 days  -     LIDOcaine HCl 2% (XYLOCAINE) 2 % Soln; Place around the anus every 6 (six) hours. for 7 days    Diarrhea, unspecified type    Increased urinary frequency- will obtain UA today prior to treatment, may be due to BPH vs  infection.   -     Urinalysis, Reflex to Urine Culture Urine, Clean Catch    Hypertension, essential- BP at goal, continue current treatment plan.    Benign prostatic hyperplasia with urinary frequency  -     Urinalysis, Reflex to Urine Culture Urine, Clean Catch    Anemia, unspecified type  -     CBC W/ AUTO DIFFERENTIAL; Future         Follow up with PCP.    Patient education provided from [facility]. Patient was counseled on when and how to seek emergent care.     Sharmila Crain, MSN, APRN FNP-BC  Department of Internal Medicine - [facility]  11:59 AM

## 2022-08-30 NOTE — LETTER
September 14, 2022    Domonique Kellogg  [address]  [facility] [address]             [facility]  [address]  [facility] [id number] Dear Mr. Kellogg,    I work with [facility] Outpatient Case Management Department. I have been unsuccessful at reaching you to follow up to see how you have been doing. Please call me back at your earliest convenience to discuss your health care needs.    I can be reached at [phone] from 8:00 AM to 4:30 PM on Monday through Friday. [facility] is a program offered through [facility] where a nurse is available 24/7 to answer questions or provide medical advice. Their number is [phone].      Kind Regards,    Guerline Montoya RN  Outpatient Case Management

## 2022-09-02 NOTE — PROGRESS NOTES
[facility] Established Patient Visit    Reason for Visit:    Chief Complaint   Patient presents with    Anemia       PCP: Nicolas Marlow      HPI:  Domonique Kellogg is a 72 y.o. male here for follow-up of anemia and blood in stool.  He is a patient of Dr. Villafuerte, last seen in October 2021 for liver lesion and weight loss, subsequently found to have cholangiocarcinoma.  I performed EGD and colonoscopy for him in October 2021 to evaluate for the liver lesion.  He has been referred to me now for evaluation of anemia requiring blood transfusion and heme positive stool.  He is on chemotherapy and followed closely by oncology.  He was seen recently by his PCP for constipation and bleeding hemorrhoids.  Repeat EGD and colonoscopy were unrevealing for a source of GI bleeding.  He reports that the blood he sees is very small, usually just a pink color.      EGD and colonoscopy procedure reports reviewed in detail.        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    PSHX:  has a past surgical history that includes Knee surgery; [place] tooth extraction; Phacoemulsification of cataract (Right, 7/16/2018); Intraocular prosthesis insertion (Right, 7/16/2018); Phacoemulsification of cataract (Left, 8/13/2018); Intraocular prosthesis insertion (Left, 8/13/2018); Eye surgery; Anterior cervical discectomy w/ fusion (N/A, 7/6/2020); Esophagogastroduodenoscopy (N/A, 10/1/2021); Colonoscopy (N/A, 10/1/2021); Insertion of venous access port (N/A, 12/3/2021); Esophagogastroduodenoscopy (N/A, 4/5/2022); and Colonoscopy (N/A,  6/7/2022).    The patient's social and family histories were reviewed by me and updated in the appropriate section of the electronic medical record.    Review of patient's allergies indicates:   Allergen Reactions    Indomethacin Other (See Comments)     Headache dizziness      Adhesive Rash       Prior to Admission medications    Medication Sig Start Date End Date Taking? Authorizing Provider   acetaminophen (TYLENOL) 650 MG TbSR Take by mouth daily as needed. 2/3/20  Yes Historical Provider   apixaban (ELIQUIS) 5 mg Tab Take 1 tablet (5 mg total) by mouth 2 (two) times daily. 7/5/22  Yes Claudia Young MD   diltiaZEM 2% Lidocaine 5% Cream Apply pea size amount topically 3 (three) times daily. 12/28/21  Yes Shilpa Ahuja NP   finasteride (PROSCAR) 5 mg tablet Take 1 tablet (5 mg total) by mouth once daily.  Patient taking differently: Take 5 mg by mouth every evening. 3/24/22 3/24/23 Yes Messi Stark MD   hydroCHLOROthiazide (HYDRODIURIL) 25 MG tablet TAKE 1 TABLET (25 MG TOTAL) BY MOUTH ONCE DAILY.  Patient taking differently: Take 25 mg by mouth every morning. 12/10/21  Yes Nicolas Pacheco MD   hydrocortisone 1 % cream Apply topically 2 (two) times daily. for 7 days 8/29/22 9/5/22 Yes Sharmila Crain NP   LIDOcaine HCl 2% (XYLOCAINE) 2 % Soln Place around the anus every 6 (six) hours. for 7 days 8/29/22 9/5/22 Yes Sharmila Crain NP   magnesium oxide (MAG-OX) 400 mg (241.3 mg magnesium) tablet Take 400 mg by mouth every evening. 7/1/19  Yes Historical Provider   multivitamin with minerals tablet Take 1 tablet by mouth every morning.  7/25/18  Yes Historical Provider   potassium chloride SA (K-DUR,KLOR-CON) 20 MEQ tablet Take 2 tablets (40 mEq total) by mouth once daily.  Patient taking differently: Take 40 mEq by mouth every morning. 12/3/21  Yes Nicolas Pacheco MD   pravastatin (PRAVACHOL) 40 MG tablet Take 1 tablet (40 mg total) by mouth once daily.  Patient taking differently: Take 40 mg by mouth  "every evening. 1/18/22  Yes Nicolas Pacheco MD   RABEprazole (ACIPHEX) 20 mg tablet Take 1 tablet (20 mg total) by mouth once daily.  Patient taking differently: Take 20 mg by mouth every morning. 12/23/21 12/23/22 Yes Mejia Villafuerte MD   tamsulosin (FLOMAX) 0.4 mg Cap TAKE 1 CAPSULE EVERY DAY  Patient taking differently: Take by mouth every evening. 5/25/22  Yes Claudia Young MD   oxyCODONE-acetaminophen (PERCOCET) 5-325 mg per tablet Take 1 tablet by mouth every 4 (four) hours as needed for Pain.  Patient not taking: No sig reported 8/2/22   Benny Dawkins DO   prochlorperazine (COMPAZINE) 10 MG tablet Take 1 tablet (10 mg total) by mouth every 6 (six) hours as needed (nausea).  Patient not taking: No sig reported 11/29/21 11/29/22  Claudia Young MD   promethazine (PHENERGAN) 12.5 MG Tab Take 1 tablet (12.5 mg total) by mouth every 6 (six) hours as needed (nausea).  Patient not taking: No sig reported 11/29/21   Claudia Young MD         Objective Findings:  Vital Signs:  /74   Pulse 70   Ht 5' 8" (1.727 m)   Wt 102.2 kg (225 lb 4.8 oz)   BMI 34.26 kg/m²  Body mass index is 34.26 kg/m².      Physical Exam:  General Appearance:  Well appearing in no acute distress, appears stated age  Head:  Normocephalic, atraumatic  Eyes:  No scleral icterus or pallor, EOMI          Labs:  Lab Results   Component Value Date    WBC 2.39 (L) 08/29/2022    HGB 8.8 (L) 08/29/2022    HCT 27.2 (L) 08/29/2022    MCV 98 08/29/2022    RDW 19.9 (H) 08/29/2022     08/29/2022    GRAN 37.0 (L) 08/29/2022    LYMPH 44.0 08/29/2022    MONO 8.0 08/29/2022    EOS 0.0 08/22/2022    BASO 0.01 08/22/2022     Lab Results   Component Value Date     08/22/2022    K 3.6 08/22/2022     08/22/2022    CO2 27 08/22/2022     (H) 08/22/2022    BUN 20 08/22/2022    CREATININE 1.2 08/22/2022    CALCIUM 9.4 08/22/2022    PROT 5.6 (L) 08/22/2022    ALBUMIN 2.8 (L) 08/22/2022    BILITOT 1.6 (H) 08/22/2022    ALKPHOS 488 " (H) 08/22/2022    AST 40 08/22/2022    ALT 43 08/22/2022                     Assessment:  Domonique Kellogg is a 72 y.o. male here with:  1. Blood in stool    2. Bleeding internal hemorrhoids    3. Anemia, unspecified type    4. Long term (current) use of anticoagulants    5. Adverse effect of chemotherapy, initial encounter      Anemia requiring blood transfusions in the setting of chemotherapy and reported heme positive stools.  The clinical significance of heme positive stool is unclear as this test was not designed to confirm or document GI bleeding.  He has intermittent bleeding from internal hemorrhoids, which may account for heme positive stool.  No significant sources of bleeding found during EGD and colonoscopy.  I do not believe this is related to diverticulosis.        Recommendations:  I will arrange for wireless capsule endoscopy to assess further for sources of GI bleeding in the small bowel.  We discussed the test in detail together, and I obtained written consent in clinic.      Follow-up as needed in GI clinic.      Order summary:  Orders Placed This Encounter    Capsule Video Endoscopy           Nicolas Alvarado MD

## 2022-09-07 NOTE — PROGRESS NOTES
OUTPATIENT INTERVENTIONAL RADIOLOGY FOLLOW-UP    PATIENT:  Domonique Kellogg  MRN:  [id number]  :  1950  DATE OF SERVICE:  2022    HISTORY OF PRESENT ILLNESS: Domonique Kellogg is a 72 y.o. male with history of cholangiocarcinoma s/p recent MWA on 22 and TACE on 22 who presents to IR clinic for follow-up imaging and treatment discussion.    Recent MRI from 22 suggests favorable treatment response with dramatic reduction in enhancement of the large tumor and multiple ablated lesions throughout the liver.  Persistent portal vein tumor thrombus noted as well as several additional intrahepatic tumors.    Patient states that he is doing well.  No complaints.     PAST MEDICAL HISTORY:  Past Medical History:   Diagnosis Date    Adjustment disorder     Anticoagulant long-term use     Anxiety     Arthritis     CAD (coronary artery disease) 3/2/2015    non-obstructive per [facility]     Cataract     Dry eye syndrome     Hypertension     Intrahepatic cholangiocarcinoma 2021    Obesity     Post-procedural fever 2021    Seizures     2011    Sleep apnea     + CPAP    Sleep difficulties         PAST SURGICAL HISTORY:  Past Surgical History:   Procedure Laterality Date    ANTERIOR CERVICAL DISCECTOMY W/ FUSION N/A 2020    Procedure: DISCECTOMY, SPINE, CERVICAL, ANTERIOR APPROACH, WITH FUSION C3-4, C4-5;  Surgeon: Fabiola Vides MD;  Location: [facility] OR [address] [place] [address];  Service: Neurosurgery;  Laterality: N/A;  TORONTO III, ASA III, BLOOD TYPE & SCREEN, NEUROMONITORING EMG-MEP-SEP, SUPINE POSITION,BRACE [place],REGULAR BED, HEADREST CASPAR, POSITION, MAP>85, RADIOLOGY C-ARM, SPECIAL EQUIPMENT VIRGIL NAYELI    COLONOSCOPY N/A 10/1/2021    Procedure: COLONOSCOPY;  Surgeon: Nicolas Alvarado MD;  Location: [facility] ENDO (4TH FLR);  Service: Endoscopy;  Laterality: N/A;  EGD and colonoscopy for diarrhea and weight loss and Enlarged, heterogeneous appearance of the liver likely due to multiple underlying hepatic lesions largest  measuring 4.8 cm.  Findings concerning for metastatic versus less likely primary hepatic neoplasm.  Recommend furth    COLONOSCOPY N/A 6/7/2022    Procedure: COLONOSCOPY;  Surgeon: Mejia Villafuerte MD;  Location: [facility] (4TH FLR);  Service: Endoscopy;  Laterality: N/A;  For evaluation of positive out-patient FOBT.   medically urgent  ok to hold Eliquis per [place] Edward/RB  fully vaccinated  hx of seizures- last one 2014    ESOPHAGOGASTRODUODENOSCOPY N/A 10/1/2021    Procedure: EGD (ESOPHAGOGASTRODUODENOSCOPY);  Surgeon: Nicolas Alvarado MD;  Location: [facility] (4TH FLR);  Service: Endoscopy;  Laterality: N/A;  EGD and colonoscopy for diarrhea and weight loss and Enlarged, heterogeneous appearance of the liver likely due to multiple underlying hepatic lesions largest measuring 4.8 cm.  Findings concerning for metastatic versus less likely primary hepatic neopl    ESOPHAGOGASTRODUODENOSCOPY N/A 4/5/2022    Procedure: EGD (ESOPHAGOGASTRODUODENOSCOPY);  Surgeon: Amado Kelsey MD;  Location: [facility] (4TH FLR);  Service: Endoscopy;  Laterality: N/A;  EGD in 8 weeks to check for esophagitis healing patient should be on pantoprazole 40 mg once daily  ok to hold eliquis x2 days per Dr. Young, see telephone encounter  fully vaccinated    EYE SURGERY      INSERTION OF VENOUS ACCESS PORT N/A 12/3/2021    Procedure: INSERTION, VENOUS ACCESS PORT;  Surgeon: Saurav Garcia MD;  Location: [facility] 2ND FLR;  Service: General;  Laterality: N/A;    INTRAOCULAR PROSTHESES INSERTION Right 7/16/2018    Procedure: INSERTION-INTRAOCULAR LENS (IOL);  Surgeon: Priscilla Hays MD;  Location: [facility];  Service: Ophthalmology;  Laterality: Right;    INTRAOCULAR PROSTHESES INSERTION Left 8/13/2018    Procedure: INSERTION, INTRAOCULAR LENS PROSTHESIS;  Surgeon: Priscilla Hays MD;  Location: [facility];  Service: Ophthalmology;  Laterality: Left;    KNEE SURGERY      PHACOEMULSIFICATION OF CATARACT Right 7/16/2018    Procedure:  PHACOEMULSIFICATION-ASPIRATION-CATARACT;  Surgeon: Priscilla Hays MD;  Location: [facility];  Service: Ophthalmology;  Laterality: Right;    PHACOEMULSIFICATION OF CATARACT Left 2018    Procedure: PHACOEMULSIFICATION, CATARACT;  Surgeon: Priscilla Hays MD;  Location: [facility];  Service: Ophthalmology;  Laterality: Left;    WISDOM TOOTH EXTRACTION          FAMILY HISTORY:  Family History   Problem Relation Age of Onset    Diabetes Mother     Hypertension Mother     Kidney cancer Mother 61    Cancer Mother         renal & pancreatic    Heart disease Father     No Known Problems Sister     Cancer Maternal Grandmother     Liver disease Maternal Grandmother     Heart disease Paternal Grandfather     Heart disease Brother     No Known Problems Daughter     No Known Problems Son     No Known Problems Sister     No Known Problems Sister     No Known Problems Sister     No Known Problems Daughter     Blindness Neg Hx     Macular degeneration Neg Hx     Retinal detachment Neg Hx     Glaucoma Neg Hx     Melanoma Neg Hx     Pancreatic cancer Neg Hx     Bladder Cancer Neg Hx     Uterine cancer Neg Hx     Ovarian cancer Neg Hx     Celiac disease Neg Hx     Cirrhosis Neg Hx     Colon cancer Neg Hx     Colon polyps Neg Hx     Crohn's disease Neg Hx     Esophageal cancer Neg Hx     Inflammatory bowel disease Neg Hx     Liver cancer Neg Hx     Rectal cancer Neg Hx     Stomach cancer Neg Hx     Ulcerative colitis Neg Hx         SOCIAL HISTORY:  Social History     Socioeconomic History    Marital status:      Spouse name: Estrellita    Number of children: 3   Occupational History     Employer: [employer]   Tobacco Use    Smoking status: Former     Packs/day: 1.00     Years: 20.00     Pack years: 20.00     Types: Cigarettes     Quit date: 1987     Years since quittin.7    Smokeless tobacco: Never    Tobacco comments:     quit    Substance and Sexual Activity    Alcohol use: Not Currently     Alcohol/week: 1.0  - 2.0 standard drink     Types: 1 - 2 Glasses of wine per week     Comment: not since liver diagnosis    Drug use: No    Sexual activity: Yes     Partners: Female   Social History Narrative     since 1976     Social Determinants of Health     Financial Resource Strain: Low Risk     Difficulty of Paying Living Expenses: Not very hard   Food Insecurity: Food Insecurity Present    Worried About Running Out of Food in the Last Year: Sometimes true    Ran Out of Food in the Last Year: Sometimes true   Transportation Needs: No Transportation Needs    Lack of Transportation (Medical): No    Lack of Transportation (Non-Medical): No   Physical Activity: Insufficiently Active    Days of Exercise per Week: 7 days    Minutes of Exercise per Session: 10 min   Stress: No Stress Concern Present    Feeling of Stress : Not at all   Social Connections: Unknown    Frequency of Communication with Friends and Family: Three times a week    Frequency of Social Gatherings with Friends and Family: Never    Active Member of Clubs or Organizations: Yes    Attends Club or Organization Meetings: More than 4 times per year    Marital Status:    Housing Stability: Low Risk     Unable to Pay for Housing in the Last Year: No    Number of Places Lived in the Last Year: 1    Unstable Housing in the Last Year: No        ALLERGIES:   Review of patient's allergies indicates:   Allergen Reactions    Indomethacin Other (See Comments)     Headache dizziness      Adhesive Rash       MEDICATIONS:     Current Outpatient Medications:     acetaminophen (TYLENOL) 650 MG TbSR, Take by mouth daily as needed., Disp: , Rfl:     apixaban (ELIQUIS) 5 mg Tab, Take 1 tablet (5 mg total) by mouth 2 (two) times daily., Disp: 180 tablet, Rfl: 3    diltiaZEM 2% Lidocaine 5% Cream, Apply pea size amount topically 3 (three) times daily., Disp: 30 g, Rfl: 2    finasteride (PROSCAR) 5 mg tablet, Take 1 tablet (5 mg total) by mouth once daily. (Patient taking  differently: Take 5 mg by mouth every evening.), Disp: 90 tablet, Rfl: 3    hydroCHLOROthiazide (HYDRODIURIL) 25 MG tablet, TAKE 1 TABLET (25 MG TOTAL) BY MOUTH ONCE DAILY. (Patient taking differently: Take 25 mg by mouth every morning.), Disp: 90 tablet, Rfl: 3    hydrocortisone 1 % cream, Apply topically 2 (two) times daily. for 7 days, Disp: 30 g, Rfl: 1    magnesium oxide (MAG-OX) 400 mg (241.3 mg magnesium) tablet, Take 400 mg by mouth every evening., Disp: , Rfl:     multivitamin with minerals tablet, Take 1 tablet by mouth every morning. , Disp: , Rfl:     oxyCODONE-acetaminophen (PERCOCET) 5-325 mg per tablet, Take 1 tablet by mouth every 4 (four) hours as needed for Pain. (Patient not taking: No sig reported), Disp: 20 tablet, Rfl: 0    potassium chloride SA (K-DUR,KLOR-CON) 20 MEQ tablet, Take 2 tablets (40 mEq total) by mouth once daily. (Patient taking differently: Take 40 mEq by mouth every morning.), Disp: 180 tablet, Rfl: 3    pravastatin (PRAVACHOL) 40 MG tablet, Take 1 tablet (40 mg total) by mouth once daily. (Patient taking differently: Take 40 mg by mouth every evening.), Disp: 90 tablet, Rfl: 3    prochlorperazine (COMPAZINE) 10 MG tablet, Take 1 tablet (10 mg total) by mouth every 6 (six) hours as needed (nausea). (Patient not taking: No sig reported), Disp: 60 tablet, Rfl: 1    promethazine (PHENERGAN) 12.5 MG Tab, Take 1 tablet (12.5 mg total) by mouth every 6 (six) hours as needed (nausea). (Patient not taking: No sig reported), Disp: 60 tablet, Rfl: 2    RABEprazole (ACIPHEX) 20 mg tablet, Take 1 tablet (20 mg total) by mouth once daily. (Patient taking differently: Take 20 mg by mouth every morning.), Disp: 90 tablet, Rfl: 3    tamsulosin (FLOMAX) 0.4 mg Cap, TAKE 1 CAPSULE EVERY DAY (Patient taking differently: Take by mouth every evening.), Disp: 90 capsule, Rfl: 0    REVIEW OF SYSTEMS:  [x]14 organ review of systems is otherwise negative unless stated above in HPI.    PHYSICAL  EXAMINATION:    ECOG Score: 0   Child-Weber Class: A    There were no vitals filed for this visit.  [unfilled]    GENERAL:  Well-nourished, in no apparent distress.    HEENT:  Head is normocephalic. Extraocular movements intact.     LABORATORY DATA:    Lab Results   Component Value Date     2022    K 3.8 2022     2022    CO2 27 2022    BUN 23 2022      Lab Results   Component Value Date    CALCIUM 9.3 2022    PHOS 2.4 (L) 2022      Lab Results   Component Value Date    ALKPHOS 450 (H) 2022    AST 43 (H) 2022    ALT 43 2022    BILITOT 1.2 (H) 2022    ALBUMIN 2.9 (L) 2022    GGT 1,292 (H) 2021      Lab Results   Component Value Date    WBC 2.39 (L) 2022    HGB 8.8 (L) 2022    HCT 27.2 (L) 2022    MCV 98 2022     2022        Lab Results   Component Value Date    INR 1.2 2022     Lab Results   Component Value Date    AFP 5962 (H) 2022     No results found for this or any previous visit (from the past 24 hour(s)).  Recent Results (from the past 168 hour(s))   Comprehensive Metabolic Panel    Collection Time: 22  9:28 AM   Result Value Ref Range    Sodium 143 136 - 145 mmol/L    Potassium 3.8 3.5 - 5.1 mmol/L    Chloride 101 95 - 110 mmol/L    CO2 27 23 - 29 mmol/L    Glucose 167 (H) 70 - 110 mg/dL    BUN 23 8 - 23 mg/dL    Creatinine 1.2 0.5 - 1.4 mg/dL    Calcium 9.3 8.7 - 10.5 mg/dL    Total Protein 5.6 (L) 6.0 - 8.4 g/dL    Albumin 2.9 (L) 3.5 - 5.2 g/dL    Total Bilirubin 1.2 (H) 0.1 - 1.0 mg/dL    Alkaline Phosphatase 450 (H) 55 - 135 U/L    AST 43 (H) 10 - 40 U/L    ALT 43 10 - 44 U/L    Anion Gap 15 8 - 16 mmol/L    eGFR >60.0 >60 mL/min/1.73 m^2       IMAGIN2022: Applicable imaging, including MRI 22 personally reviewed and demonstrates favorable treatment response with some areas of residual enhancement involving the large right tumor, multiple ablated lesions  throughout the liver, and persistent portal vein tumor thrombus.    IMPRESSION/PLAN:   Domonique Kellogg is a 72 y.o. male with history of cholangiocarcinoma s/p MWA and TACE with favorable treatment response.    Residual/recurrent tumor noted.  Plan for TACE with general anesthesia.  Procedure ordered.  Scheduled on 9/28/22.    Discussed how the procedure will be performed.  Risks (including, but not limited to, pain, bleeding, infection, damage to nearby structures, failure to completely treat the tumor, and the need for additional procedures), benefits, and alternatives were discussed with the patient.  The patient voices understanding and all questions have been answered.  The patient agrees to proceed as planned.      Thank you for the opportunity to participate in the care of this patient.    I, Joshua Arredondo MD, was personally present for this clinic visit and examined the patient.  I agree with the findings, assessment and plan of care as documented in this note.  I spent a total of 20 minutes reviewing records and imaging, counseling, discussing the examination, therapy, and treatment plan of the patient's condition.      The patient location is: home  The chief complaint leading to consultation is: Cholangiocarcinoma    Visit type: audiovisual    Face to Face time with patient: 15 minutes  20 minutes of total time spent on the encounter, which includes face to face time and non-face to face time preparing to see the patient (eg, review of tests), Obtaining and/or reviewing separately obtained history, Documenting clinical information in the electronic or other health record, Independently interpreting results (not separately reported) and communicating results to the patient/family/caregiver, or Care coordination (not separately reported).     Each patient to whom he or she provides medical services by telemedicine is:  (1) informed of the relationship between the physician and patient and the respective  role of any other health care provider with respect to management of the patient; and (2) notified that he or she may decline to receive medical services by telemedicine and may withdraw from such care at any time.    Notes:        Joshua Arredondo MD  Diagnostic and Interventional Radiologist  Department of Radiology  Pager: [phone]

## 2022-09-09 NOTE — PROGRESS NOTES
"                                                PROGRESS NOTE    Subjective:       Patient ID: Domonique Kellogg is a 72 y.o. male.    Chief Complaint: follow up for cholangiocarcinoma    Diagnosis:  Advanced intrahepatic cholangiocarcinoma, MSI-low, negative for FGFR2 fusion or IDH1/IDH2 mutations, diagnosed on 11/22/2021.     Molecular Profile:  Guardant 360 11/29/21: +CCND1 amplification. VUS: CATRACHITO [id number]. MSI-high not detected.   Strata: ASXL1 mutation, CCND1 amplification, MSI-low, TMB low.     Oncologic History:  1. Mr Kellogg is a 70 yo man with CAD, HTN, seizures in 2011 who initially saw me on 11/29/21 for further management of cholangiocarcinoma. He presented with weight loss and diarrhea since July. US 9/22/21 showed "Enlarged, heterogeneous appearance of the liver likely due to multiple underlying hepatic lesions largest measuring 4.8 cm."  MRI abdomen w/wo contrast 10/4/21: "Multiple liver lesions measuring up to 13.3 cm in the right hepatic lobe.  Differential diagnosis includes metastases, fibrolamellar hepatocellular carcinoma, or atypical focal nodular hyperplasia.  Consider biopsy for further evaluation. Thrombosis of the anterior branch of the right portal vein and left hepatic vein.  Nonvisualization of the middle and right hepatic veins, which may be thrombosed as well. Prominent periportal lymph node, which is nonspecific."  EGD and colonoscopy on 10/11/21 were negative for primary malignancies.   He underwent liver lesion biopsy and Y90 mapping on 11/22/21. Pathology showed adenocarcinoma: "Biopsy of the liver mass shows a malignant neoplasm in a fibrotic stroma. Glandular architecture is readily identified with several foci of intraluminal necrosis. Scattered mitotic figures are seen. Neoplastic cells show the following immunophenotype:   Positive: AE1/AE3, CK7, CDX2   Negative: Chromogranin, Synaptophysin, TTF, CK5/6, CK20, HepPar1   The morphology and immunophenotype are compatible with " "adenocarcinoma. Considerations for a primary site include pancreaticobiliary (including intrahepatic cholangiocarcinoma) as well as upper gastrointestinal. Clinical and radiologic correlation is suggested."  Patient presents today with wife for further management. No pain. Initial weight loss has somewhat stabilized. Still has diarrhea. Has not tried imodium yet. +nervous  Mother had kidney cancer at age 64. Maternal grandmother had liver cancer in her 60s. Patient has three children, two daughters and one son.   Discussed palliative chemo with cis/gem  2. PET scan 21 did not show extrahepatic metastases.   3. Cis/gem started on 21. Durvalumab was initially denied by insurance company after GI [facility], but approved after FDA approval, added 8/10/22.   4. S/p TACE on 2021, 22, 22, 22, 22  5. S/p RF Ablation on 22     Interval History:   Mr Kellogg returns for cycle 12 day 15 of gem/cis/duvalumab. We changed chemo to every 2 weeks due to thrombocytopenia. He is feeling well. Noted very mild numbness in toes. Seen by Dr Alvarado for anemia. Scheduled for small bowel capsule. MRI abdomen 22 showed good response in treated area but new enhancing lesions in segment 2 measuring 1.2 cm (series 11, image 30) and segment 2/3 (series 11, image 34). He is scheduled for TACE on 22.     ECO    ROS:   A ten-point system review is obtained and negative except for what was stated in the Interval History.     Physical Examination:   Vital signs reviewed.   General: well hydrated, well developed, in no acute distress  HEENT: normocephalic, EOMI, anicteric sclerae, pale conjunctivae  Neck: supple, no cervical lymphadenopathy, no thyromegaly or JVD  Chest: right chest port site clean  Lungs: clear breath sounds bilaterally, no wheezing/rales/rhonchi  Heart: RRR, no M/R/G  Abdomen: soft, no tenderness, nondistended  Ext: no clubbing, cyanosis, edema  Skin: no open wound, ulcers, rash. "   Neuro: alert and oriented x 4  Psych: pleasant and appropriate mood and affect    Objective:     Laboratory Data:  Labs reviewed. Adequate for chemo.     Imaging Data:  PET 12/6/21:  In this patient with known intrahepatic cholangiocarcinoma, there are multifocal hypermetabolic hepatic lesions in both hepatic lobes.  No evidence of distant metastasis.     Lipomatosis of the ileocecal valve and proximal cecum.     Fat containing umbilical hernia.    MRI Abdomen 1/18/22:  1. Patient with reported cholangiocarcinoma.  Interval progression of hepatic tumor burden with multiple enlarging and new hepatic lesions.  2. Progression of portal venous thrombosis involving the left, right, and main portal veins.  Persistent filling defect within the central hepatic veins concerning for thrombosis.  3. Stable prominent periportal lymph node.    CT chest 3/4/22:  Right pulmonary nodules, unchanged.  Recommend continued follow-up as per clinical protocol.    MRI abdomen 3/14/22:     Interval postprocedural changes status post TACE with slight improvement of tumor burden at treatment sites.  Extensive residual disease throughout both hepatic lobes.  Index lesions as above.     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     Stable appearance of central hepatic veins, underlying thrombosis not excluded.  Consider further evaluation with Doppler ultrasound if clinically warranted.     Stable prominent periportal lymph node.     Right adrenal adenoma.    CT urogram 3/14/22:  Prostatomegaly which demonstrates mass effect on the posterior bladder.  There is mild diffuse wall thickening of the bladder which may relate to outlet obstruction.     Postprocedural changes of the liver in keeping with recent chemoembolization.  Protocol is not optimized for evaluation of tumor response.  Dedicated multiphase liver CT would be necessary to evaluate liver directed therapy response.    MRI A/P  6/19/22:  - Evolving posttreatment changes in the liver for multifocal intrahepatic cholangiocarcinoma.  No new focal hepatic lesions.  - Persistent portal vein thrombosis, likely tumor thrombus, with cavernous transformation.  - No abdominopelvic metastases.     CT chest 6/15/22:  1. Stable pulmonary nodules measuring up to 0.4 cm.  No definite new or enlarging pulmonary nodules.  2. Scattered regions of opacification within the right lung base with air bronchograms, favored to represent atelectasis.  However, early consolidative changes cannot be completely excluded.    US bilateral legs 6/9/22:  Partially occlusive deep venous thrombosis of the left femoral vein.    IR RF Ablation 7/19/22    Impression:     Percutaneous high-frequency radiofrequency ablation of multiple left lobe liver lesions.    MRI abdomen 9/2/22:  1. Evolving post treatment changes in the liver for multifocal intrahepatic cholangiocarcinoma.  Interval radiofrequency ablation of 3 lesions in the left hepatic lobe without local residual disease.  Interval TACE of segment 4A and right hepatic artery noting residual disease in the right hepatic lobe as detailed above.  2. New enhancing lesions in segment 2/3 as described above.  3. 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  4. Additional stable findings.    Assessment and Plan:     1. Intrahepatic cholangiocarcinoma    2. Secondary malignant neoplasm of liver    3. 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 Chemotherapy-induced thrombocytopenia    6. Anemia due to chronic blood loss    7. Hypertension, essential    8. Acute deep vein thrombosis (DVT) of femoral vein of left lower extremity    9. Neuropathy    10. Hypomagnesemia        1-4  - Mr Kellogg is a 71 yo man with advanced intrahepatic cholangiocarcinoma with multiple liver lesions. MSI-low, FGFR2 fusion and IDH1/2 mutation negative. Started on cis/gem on  12/7/21. Durvalumab added on 8/10/22 after FDA approval  - S/p TACE on 12/16/2021, 2/14/22, 4/19/22, 5/18/22, 8/2/22. RF ablation on 7/19/22   - recent MRI showed good response in treated area but new small lesions in untreated area. Scheduled for TACE on 9/28  - c/w cis/gem/durv as we just added durv and his CA 19-9 has been stable  - due for repeat MRI and CT chest at the end of October. One month after TACE  - reduce chemo to gem 750 mg/m2 and cisplatin 22 mg/m2 for thrombocytopenia and mild neuropathy  - return in 2 weeks for cycle 3    5.  - see above. Chemo dose adjusted    6.  - guaiac stool was positive in the past. EGD in 4/2022 showed congested mucosa in esophagus and gastritis. Colonoscopy 6/7/22 neg for bleeding sites.  - seen by Dr Alvarado. Scheduled for bowel capsule    7.  - BP controlled  - c/w current medication    8.  - was on eliquis 5 mg bid for portal vein thrombosis noted at diagnosis. However could not tolerate even 2.5 mg bid due to hematuria. Was on 2.5 mg daily  - US 6/9/22 showed new left femoral DVT. Eliquis was increased to 5 mg bid  - C/w eliquis 5 mg bid. Monitor Hb    9.  - mild. Monitor. Cisplatin dose reduced    10.  - 2/2 cisplatin  - getting IV Mg 4 gram with chemo    Follow-up:     RTC in 2 weeks.      Route Chart for Scheduling    Med Onc Chart Routing      Follow up with physician 4 weeks. follow up appointment scheduled   Follow up with BRADLEY 2 weeks.   Infusion scheduling note gemcitabine/cisplatin/durvalumab every 2 weeks   Injection scheduling note    Labs CBC, CMP, CA 19-9, TSH and magnesium   Lab interval: every 2 weeks     Imaging    Pharmacy appointment    Other referrals          Treatment Plan Information   OP GEMCITABINE CISPLATIN Q3W + DURVALUMAB D8 C11+   Claudia Young MD   Upcoming Treatment Dates - OP GEMCITABINE CISPLATIN Q3W + DURVALUMAB D8 C11+    8/31/2022       Chemotherapy       gemcitabine (GEMZAR) 1,695 mg in sodium chloride 0.9% 250 mL chemo infusion        CISplatin (PLATINOL) 25 mg/m2 = 57 mg in sodium chloride 0.9% 500 mL chemo infusion       Pre-Hydration       sodium chloride 0.9% 500 mL with magnesium sulfate 2 g, potassium chloride 20 mEq infusion       Post-Hydration       magnesium sulfate 2g in water 50mL IVPB (premix)       sodium chloride 0.9% bolus 500 mL       Antiemetics       aprepitant (CINVANTI) injection 130 mg       palonosetron 0.25mg/dexamethasone 12mg in NS IVPB 0.25 mg       Immunotherapy       durvalumab (IMFINZI) 1,500 mg in sodium chloride 0.9% 280 mL chemo infusion

## 2022-09-09 NOTE — PLAN OF CARE
Pt reacted at the end of Imfinzi, Pt came back from restroom and complained of SOB, Wheezing, and rigors, Demerol 50mg (total) and Solucortef 125mg given IVPush, Notified MD CHAVARRIA and MICHELLE Loredo. (SEE PROGRESS NOTE)Pt tolerated Gemzar, Cisplatin IVFs (Pre and Post hydration with mag and k and IVFs with mag (500ml) today. NAD. Port flushed + blood return present, flushed. Hep lock and deaccesed. declined AVS. Uses my [facility]. Discharged home. Ambulated independently with wife.   Problem: Adult Inpatient Plan of Care  Goal: Optimal Comfort and Wellbeing  Intervention: Provide Person-Centered Care  Flowsheets (Taken 9/9/2022 9799)  Trust Relationship/Rapport:   care explained   thoughts/feelings acknowledged   choices provided   emotional support provided   empathic listening provided   questions answered   questions encouraged   reassurance provided

## 2022-09-09 NOTE — NURSING
"1305- Imfinzi ended, pt returned from restroom complained of wheezing SOB and rigors. /100  O2 92% 2L NC O2 applied to pt. Notified MD CHAVARRIA and MICHELLE Ni.   1306-Demerol 25mg given and Solucortef 125mg IVPush given.   1309- Pt /106  02 99%.   1318- Pt still having Rigors additional 25mg of Demerol given.   1328- /99 Hr 80, no rigors present  1418- BP is 133/69 77 HR 99% RA Pt states " I feel much better"   Per MICHELLE Ni Ok to resume pre meds for chemo (Cinvanti and Pal/Dex IVPB) and Cisplatin, Gemzar IVFs (500ml, and Mag 2g). MD Chavarria pt will not get Imfinzi next treatment.   "

## 2022-09-12 NOTE — TELEPHONE ENCOUNTER
Called and spoke with patient. I have talked to chemo charge nurse and chemo pharmacist. No other patient has gotten allergic reaction to imfinzi. Discussed with patient that risks of continuing with imfinzi outweigh benefits. C/w cis/gem. Will get MRI one month after his next TACE at the end of October.

## 2022-09-14 NOTE — PROGRESS NOTES
Outpatient Care Management  Patient Not Qualified    Patient: Domonique Kellogg  MRN:  [id number]  Date of Service:  9/28/2022  Completed by:  Guerline Montoya RN    Chief Complaint   Patient presents with    OPCM Chart Review     8/30/22    OPCM RN First Follow-Up Attempt     9/14/22    OPCM RN Second Follow-Up Attempt     9/16/22    OPCM RN Third Follow-Up Attempt     9/20/22    Case Closure     9/28/22       Patient Summary                  9/28/22 No response from patient. Closing case.    9/20/22 3rd attempt to complete follow-up for Outpatient Care Management: Left message for patient requesting a return call. Will close case if patient does not respond.     9/16/22 2nd attempt to complete follow-up for Outpatient Care Management: Left message for patient requesting a return call. Will attempt to contact patient again at a later date.     9/14/22 1st attempt to complete follow-up for Outpatient Care Management: Left message for patient requesting a return call. OPCM letter has been sent. Will attempt to contact patient again at a later date.

## 2022-09-16 NOTE — TELEPHONE ENCOUNTER
----- Message from Victoria Smith MA sent at 9/16/2022  2:30 PM CDT -----  Contact: pt  He has a VCE on Monday.   Are you able to call him?  ----- Message -----  From: Kody Goins  Sent: 9/16/2022   2:15 PM CDT  To: [facility]o Valenzuela Staff    Pt requesting call back RE: has questions regarding prep for procedure, please call        Confirmed contact below:  Contact Name:Domonique Kellogg  Phone Number: [phone]

## 2022-09-20 NOTE — TELEPHONE ENCOUNTER
----- Message from Victoria Smith MA sent at 9/20/2022  9:03 AM CDT -----  Contact: @ [phone]    ----- Message -----  From: Heike Espinoza  Sent: 9/20/2022   8:28 AM CDT  To: [facility]o Valenzuela Staff    Patient is requesting to speak with Cary in Dr. Alvarado office. Patient says he has questions about his procedure on tomorrow 9/21.patient says he's unsure on how to prep for his procedure and also has questions with insurance coverage.Please call to discuss further.

## 2022-09-21 NOTE — TELEPHONE ENCOUNTER
instructions reviewed with patient for the day  Patient expressed understanding   All questions answered to patient satisfaction  Patient swallowed capsule without incident 750 am

## 2022-09-22 NOTE — PROGRESS NOTES
Progress Note  Palliative Care    The [place] location is: [place]  The chief complaint leading to consultation is: symptom management    Visit type: audiovisual    Face to Face time with patient: 39 minutes    30 minutes of total time spent on the encounter, which includes face to face time and non-face to face time preparing to see the patient (eg, review of tests), Obtaining and/or reviewing separately obtained history, Documenting clinical information in the electronic or other health record, Independently interpreting results (not separately reported) and communicating results to the patient/family/caregiver, or Care coordination (not separately reported).     Each patient to whom he or she provides medical services by telemedicine is:  (1) informed of the relationship between the physician and patient and the respective role of any other health care provider with respect to management of the patient; and (2) notified that he or she may decline to receive medical services by telemedicine and may withdraw from such care at any time.    Reason for Consult: symptom-management and ACP      ASSESSMENT/PLAN:     Plan/Recommendations:  Diagnoses and all orders for this visit:    Intrahepatic cholangiocarcinoma  - followed by Dr. Young  - currently on disease-directed therapy  - TACE, chemo therapy   - chemo scheduled changed to every 2 weeks     Encounter for palliative care/Advanced care planning  - patient decisional  - patient alone on telemedicine today  - no ACP documents uploaded into EMR  - philosophy of Palliative Medicine reviewed with patient and family at first visit  - new patient folder given to and reviewed with patient and family at first visit  - goals: life-prolonging  - he previously spoke about understanding that the treatment is to control the disease at this time  - ACP booklet given to and reviewed with patient and family today including HCPOA and living will  - code status not specifically  "discussed today  - will follow up at future encounters for ACP booklet and code status    Anorexia/Nausea  - severe cramping, n/v after TACE, bloated, "like stomach filled with air" for ~ 24 hours, eats very little during this period    - continues to report excellent appetitie   - has bentyl PRN for post treatment cramping, has not used, we discuss trial use for his next procedure   - previously prescribed morphine IR for post procedure abd pain, has not used, prefers to rest and let it pass  - previously reported emesis w oxycodone, no longer taking  - PRN bentyl, morphine IR available    - will continue to monitor    Neoplastic (malignant) related fatigue/Weakness/dyspnea  - increased fatigue associated with treatment days, sometimes does not want to get out of bed, or naps frequently   - chronic weakness and numbness to right thigh and knee, occasional sensation that feels like "electric shocks"   - previously referred to PT, patient deferred  - numbness in right thigh is known to providers, possible future nerve conduction study, had work-up for something very similar in 2014  - known L leg DVT, no symptoms, anticoagulated   - reduced activity tolerance, requires frequent rest, for example during cooking he sits down between adding ingredients   - cont rest and naps throughout the day   - continues to try to walk the dog with his wife and run small errands, enjoying time with his family mostly at home    - discussed balancing activity with rest   - will continue to monitor   - fatigue and weakness are worst in the days post treatment  - will continue to monitor    Cancer-related pain  - previously reported post/treatment related pain & intermittent joint pain  - previously taking Oxycodone, causing emesis even w/food, no longer taking  - morphine IR prescribed at previous visit, has not filled, requesting to hold off for now  - will continue to monitor     Adjustment disorder with mixed anxiety and depressed " mood  - patient reports his mood has been good overall, he has good days and bad days  - continues to find enjoyment in word games and puzzles, family provides emotionally supportive environment     - goal to go fishing when weather cools down   - patient following with onc-psych (Dr. Anderson), he finds this helpful   - emotional support provided today  - will continue to monitor    Constipation/diarrhea   - denies constipation and diarrhea   - reports frequent BM, formed   - continue adequate hydration and gentle activity for good bowel movements  - will continue to monitor    Understanding of illness/Prognosis: patient and family have good understanding of disease; prognosis is guarded    Goals of care: life-prolonging    Follow up: ~ 6-8  weeks    Patient's encounter and above plan of care discussed with patient's oncologist     SUBJECTIVE:     History of Present Illness:  Patient is a 72 y.o. year old male with arthritis, CAD, HTN, seizures in 2011, sleep apnea with CPAP, adjustment disorder, and intrahepatic cholangiocarcinoma presents to Palliative Medicine for symptom management and ACP. Please see oncology notes for full details of oncologic history and treatment course.     09/22/2022:  JAVON BROWER reviewed and summarized:  No new entries    Patient presents alone via virtual visit today. He reports some increased fatigue associated with treatment days, his symptoms are otherwise stable. He recently had a reaction from Imfinzi, this was discontinued, additionally his treatment schedule was adjusted to every 2 weeks due to thrombocytopenia. He denies nausea, pain, constipation and depressed mood. He states that he plans to try the bentyl for TACE associated cramps. He continues to enjoy time with his wife and daughter and helps walk the dog when his energy allows.      08/19/2022:  JAVON BROWER reviewed and summarized:  No new entries     Patient presents via virtual visit alone. His biggest concern is increased fatigue  and weakness, accompanied by localized numbness to his right thigh (numbness is newer). This is something he's been experiencing about 4 days after treatment, but recently is more pronounced. He requires frequent breaks while cooking, for example. He rests and naps throughout the day. He's still able to get out occasionally and do routine activities with his wife, but he finds his tolerance for their usual activities is lessoning and finds he is more reliant on his family members, which troubles him. He continues to tolerate post-treatment abdominal pain and cramping without medications. He denies anorexia, anxiety, depression, diarrhea and constipation.    06/21/2022:  JAVON BROWER reviewed and summarized:  05/27/2022 Alprazolam 0.5 mg tabs Disp: 10 for 10 days    Patient presents via virtual visit today with his wife, Estrellita. He reports that he is doing well, overall. His primary complaint is significant leg weakness last weekend which has now resolved. He looks forward to starting PT in July. He reports his mood has been overall good, he continues to see Dr. Anderson which he finds helpful. His symptoms are most concentrated in the days following treatment. He hopes using the bentyl after next procedure will alleviate most of his discomfort.  He speaks at length about how happy he is with his cancer care at [facility]. He continues to be able to enjoy time with his family as well as activities that bring him paul.    05/23/2022  JAVON BROWER reviewed and summarized:  No recent prescriptions reported    Patient presents today via virtual visit, his wife interjects from off screen. Patient reports he is doing well today. However, following treatment his symptom burden is more prolonged, now lasting about 5 days compared to previously lasting only 2 days. He is experiencing intermittent pain, for which he is no longer taking oxycodone due to side-effect of emesis; he requests a different short-acting PRN medication. He reports that  "his mood is improved, and that he has good support from his wife, daughter and dog. He volunteers that he is feeling very good about his decision to continue treatment and does not plan to "give up". He is following with onc-psych.     04/25/2022:  [place]  reviewed and summarized:  04/19/2022 Oxycodone 5 mg Disp: 20 for 4 days    Per chart review, patient tolerating treatment of cis/gem well. Today patient states he had TACE last week. He developed cramping, nausea and vomiting for about two days after treatment. He was not able to tolerate oral intake. Patient states that on day three the symptoms resolved. He has been having more fatigue recently as well as weakness. He has to take more breaks to recover. He also has noticed some dyspnea with these symptoms as well. Patient's wife noted some irritability and shortness every now and then. He was also constipated, though this has improved recently.     02/21/2022:  [place]  reviewed and summarized:  12/14/2021 Oxycodone 5 mg Disp: 90 for 22 days  12/03/2021 Percocet 5-325 mg disp: 6 for 2 days    Patient presents today with his wife. Overall he feels well. Patient is not taking any pain medication at this time. He does have some very intermittent, short lasting pain in his RUQ that occurs with certain actions (eg. Sneezing). Patient had noticed changes in his taste and overall appetite. He has noted improvement recently. He is also having some mild fatigue. Patient and family open to learning about ACP.     Past Medical History:   Diagnosis Date    Adjustment disorder     Anticoagulant long-term use     Anxiety     Arthritis     CAD (coronary artery disease) 3/2/2015    non-obstructive per [facility]     Cataract     Dry eye syndrome     Hypertension     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/2021    Obesity     Post-procedural fever 12/18/2021    Seizures     2011    Sleep apnea     + CPAP    Sleep difficulties      Past Surgical History:   Procedure Laterality Date    " ANTERIOR CERVICAL DISCECTOMY W/ FUSION N/A 7/6/2020    Procedure: DISCECTOMY, SPINE, CERVICAL, ANTERIOR APPROACH, WITH FUSION C3-4, C4-5;  Surgeon: Fabiola Vides MD;  [facility];  Service: Neurosurgery;  Laterality: N/A;  TORONTO III, ASA III, BLOOD TYPE & SCREEN, NEUROMONITORING EMG-MEP-SEP, SUPINE POSITION,BRACE [place],REGULAR BED, HEADREST CAS[place], POSITION, MAP>85, RADIOLOGY C-ARM, SPECIAL EQUIPMENT [facility]    COLONOSCOPY N/A 10/1/2021    Procedure: COLONOSCOPY;  Surgeon: Nicolas Alvarado MD;  Location: [facility] (4TH FLR);  Service: Endoscopy;  Laterality: N/A;  EGD and colonoscopy for diarrhea and weight loss and Enlarged, heterogeneous appearance of the liver likely due to multiple underlying hepatic lesions largest measuring 4.8 cm.  Findings concerning for metastatic versus less likely primary hepatic neoplasm.  Recommend fur    COLONOSCOPY N/A 6/7/2022    Procedure: COLONOSCOPY;  Surgeon: Mejia Villafuerte MD;  Location: [facility] (4TH FLR);  Service: Endoscopy;  Laterality: N/A;  For evaluation of positive out-patient FOBT.   medically urgent  ok to hold Eliquis per [place] Edward/RB  fully vaccinated  hx of seizures- last one 2014    ESOPHAGOGASTRODUODENOSCOPY N/A 10/1/2021    Procedure: EGD (ESOPHAGOGASTRODUODENOSCOPY);  Surgeon: Nicolas Alvarado MD;  Location: [facility] (4TH FLR);  Service: Endoscopy;  Laterality: N/A;  EGD and colonoscopy for diarrhea and weight loss and Enlarged, heterogeneous appearance of the liver likely due to multiple underlying hepatic lesions largest measuring 4.8 cm.  Findings concerning for metastatic versus less likely primary hepatic neopl    ESOPHAGOGASTRODUODENOSCOPY N/A 4/5/2022    Procedure: EGD (ESOPHAGOGASTRODUODENOSCOPY);  Surgeon: Amado Kelsey MD;  Location: [facility] (4TH FLR);  Service: Endoscopy;  Laterality: N/A;  EGD in 8 weeks to check for esophagitis healing patient should be on pantoprazole 40 mg once daily  ok to hold eliquis x2  days per Dr. Young, see telephone encounter  fully vaccinated    EYE SURGERY      INSERTION OF VENOUS ACCESS PORT N/A 12/3/2021    Procedure: INSERTION, VENOUS ACCESS PORT;  Surgeon: Saurav Garcia MD;  Location: [address];  Service: General;  Laterality: N/A;    INTRAOCULAR PROSTHESES INSERTION Right 7/16/2018    Procedure: INSERTION-INTRAOCULAR LENS (IOL);  Surgeon: Priscilla Hays MD;  Location: [facility];  Service: Ophthalmology;  Laterality: Right;    INTRAOCULAR PROSTHESES INSERTION Left 8/13/2018    Procedure: INSERTION, INTRAOCULAR LENS PROSTHESIS;  Surgeon: Priscilla Hays MD;  Location: [facility];  Service: Ophthalmology;  Laterality: Left;    KNEE SURGERY      PHACOEMULSIFICATION OF CATARACT Right 7/16/2018    Procedure: PHACOEMULSIFICATION-ASPIRATION-CATARACT;  Surgeon: Priscilla Hays MD;  Location: [facility];  Service: Ophthalmology;  Laterality: Right;    PHACOEMULSIFICATION OF CATARACT Left 8/13/2018    Procedure: PHACOEMULSIFICATION, CATARACT;  Surgeon: Priscilla Hays MD;  Location: [facility];  Service: Ophthalmology;  Laterality: Left;    WISDOM TOOTH EXTRACTION       Family History   Problem Relation Age of Onset    Diabetes Mother     Hypertension Mother     Kidney cancer Mother 61    Cancer Mother         renal & pancreatic    Heart disease Father     No Known Problems Sister     Cancer Maternal Grandmother     Liver disease Maternal Grandmother     Heart disease Paternal Grandfather     Heart disease Brother     No Known Problems Daughter     No Known Problems Son     No Known Problems Sister     No Known Problems Sister     No Known Problems Sister     No Known Problems Daughter     Blindness Neg Hx     Macular degeneration Neg Hx     Retinal detachment Neg Hx     Glaucoma Neg Hx     Melanoma Neg Hx     Pancreatic cancer Neg Hx     Bladder Cancer Neg Hx     Uterine cancer Neg Hx     Ovarian cancer Neg Hx     Celiac disease Neg Hx     Cirrhosis Neg Hx     Colon cancer Neg Hx     Colon polyps Neg Hx   "   Crohn's disease Neg Hx     Esophageal cancer Neg Hx     Inflammatory bowel disease Neg Hx     Liver cancer Neg Hx     Rectal cancer Neg Hx     Stomach cancer Neg Hx     Ulcerative colitis Neg Hx      Review of patient's allergies indicates:   Allergen Reactions    Imfinzi [durvalumab] Other (See Comments)     1305- Imfinzi ended, pt returned from restroom complained of wheezing SOB and rigors. /100  O2 92% 2L NC O2 applied to pt.   1306-Demerol 25mg given and Solucortef 125mg IVPush given.   1309- Pt /106  02 99%.   1318- Pt still having Rigors additional 25mg of Demerol given.   1418- BP is 133/69 77 HR 99% RA Pt states " I feel much better"       Indomethacin Other (See Comments)     Headache dizziness      Adhesive Rash       Medications:    Current Outpatient Medications:     acetaminophen (TYLENOL) 650 MG TbSR, Take by mouth daily as needed., Disp: , Rfl:     apixaban (ELIQUIS) 5 mg Tab, Take 1 tablet (5 mg total) by mouth 2 (two) times daily., Disp: 180 tablet, Rfl: 3    diltiaZEM 2% Lidocaine 5% Cream, Apply pea size amount topically 3 (three) times daily., Disp: 30 g, Rfl: 2    finasteride (PROSCAR) 5 mg tablet, Take 1 tablet (5 mg total) by mouth once daily. (Patient taking differently: Take 5 mg by mouth every evening.), Disp: 90 tablet, Rfl: 3    hydroCHLOROthiazide (HYDRODIURIL) 25 MG tablet, TAKE 1 TABLET (25 MG TOTAL) BY MOUTH ONCE DAILY. (Patient taking differently: Take 25 mg by mouth every morning.), Disp: 90 tablet, Rfl: 3    hydrocortisone 1 % cream, Apply topically 2 (two) times daily. for 7 days, Disp: 30 g, Rfl: 1    magnesium oxide (MAG-OX) 400 mg (241.3 mg magnesium) tablet, Take 400 mg by mouth every evening., Disp: , Rfl:     multivitamin with minerals tablet, Take 1 tablet by mouth every morning. , Disp: , Rfl:     oxyCODONE-acetaminophen (PERCOCET) 5-325 mg per tablet, Take 1 tablet by mouth every 4 (four) hours as needed for Pain. (Patient not taking: No sig " reported), Disp: 20 tablet, Rfl: 0    potassium chloride SA (K-DUR,KLOR-CON) 20 MEQ tablet, Take 2 tablets (40 mEq total) by mouth once daily. (Patient taking differently: Take 40 mEq by mouth every morning.), Disp: 180 tablet, Rfl: 3    pravastatin (PRAVACHOL) 40 MG tablet, Take 1 tablet (40 mg total) by mouth once daily. (Patient taking differently: Take 40 mg by mouth every evening.), Disp: 90 tablet, Rfl: 3    prochlorperazine (COMPAZINE) 10 MG tablet, Take 1 tablet (10 mg total) by mouth every 6 (six) hours as needed (nausea). (Patient not taking: No sig reported), Disp: 60 tablet, Rfl: 1    promethazine (PHENERGAN) 12.5 MG Tab, Take 1 tablet (12.5 mg total) by mouth every 6 (six) hours as needed (nausea). (Patient not taking: No sig reported), Disp: 60 tablet, Rfl: 2    RABEprazole (ACIPHEX) 20 mg tablet, Take 1 tablet (20 mg total) by mouth once daily. (Patient taking differently: Take 20 mg by mouth every morning.), Disp: 90 tablet, Rfl: 3    tamsulosin (FLOMAX) 0.4 mg Cap, TAKE 1 CAPSULE EVERY DAY (Patient taking differently: Take by mouth every evening.), Disp: 90 capsule, Rfl: 0    OBJECTIVE:       ROS:  Review of Systems   Constitutional:  Positive for activity change, appetite change and fatigue.   HENT: Negative.     Eyes: Negative.    Respiratory:  Positive for shortness of breath.    Cardiovascular: Negative.    Genitourinary:  Positive for frequency.   Musculoskeletal: Negative.    Skin: Negative.    Neurological:  Positive for weakness (bi-lat, legs) and numbness (right thigh, toes (mild)).   All other systems reviewed and are negative.    Review of Symptoms      Symptom Assessment (ESAS 0-10 Scale)  Pain:  0  Dyspnea:  0  Anxiety:  0  Nausea:  0  Depression:  0  Anorexia:  0  Fatigue:  5  Insomnia:  0  Restlessness:  0  Agitation:  0     CAM / Delirium:  Negative  Constipation:  Negative  Diarrhea:  Negative (after treatment)      Bowel Management Plan (BMP):  No      Pain Assessment:  OME in 24  hours:  0  Location(s): abdomen    ABDOMEN Location: post TACE. month(s) ago, stable    Modified Isidoro Scale:  1    ECOG Performance Status ndGndrndanddndend:nd nd2nd Living Arrangements:  Lives with spouse    Psychosocial/Cultural: Patient lives with his wife and eldest daughter. He has a younger daughter that does not live with them anymore. Patient also has a dog named Chance    Spiritual:  F - Annemarie and Belief:  Alevism  I - Importance:  High  C - Community:  Yes; good support   A - Address in Care:  Needs met at this time    Advance Care Planning   Advance Directives:   Living Will: No    LaPOST: No    Do Not Resuscitate Status: No    Medical Power of : No    Agent's Name:  Estrellita Kellogg   Agent's Contact Number:  [phone]    Decision Making:  Patient answered questions and Family answered questions        Physical Exam: Limited due to telemedicine visit   Vitals:  no vitals obtained, virtual visit   Physical Exam  Constitutional:       General: He is not in acute distress.     Appearance: He is not toxic-appearing.   HENT:      Head: Normocephalic and atraumatic.      Right Ear: External ear normal.      Left Ear: External ear normal.      Nose: Nose normal.      Mouth/Throat:      Mouth: Mucous membranes are moist.   Eyes:      General: No scleral icterus.        Right eye: No discharge.         Left eye: No discharge.   Neck:      Comments: Trachea midline  Pulmonary:      Effort: Pulmonary effort is normal. No respiratory distress.   Musculoskeletal:      Cervical back: Normal range of motion.      Comments: Sitting up without assistance; able to move upper extremities without limitation   Skin:     General: Skin is dry.      Findings: No erythema or rash.   Neurological:      General: No focal deficit present.      Mental Status: He is alert and oriented to person, place, and time.      Cranial Nerves: No cranial nerve deficit.   Psychiatric:         Behavior: Behavior normal.         Thought Content:  "Thought content normal.         Judgment: Judgment normal.       Labs:  CBC:   WBC   Date Value Ref Range Status   09/22/2022 6.69 3.90 - 12.70 K/uL Final     Hemoglobin   Date Value Ref Range Status   09/22/2022 7.8 (L) 14.0 - 18.0 g/dL Final     Hematocrit   Date Value Ref Range Status   09/22/2022 25.2 (L) 40.0 - 54.0 % Final     MCV   Date Value Ref Range Status   09/22/2022 102 (H) 82 - 98 fL Final     Platelets   Date Value Ref Range Status   09/22/2022 99 (L) 150 - 450 K/uL Final       LFT:   Lab Results   Component Value Date    AST 41 (H) 09/22/2022    GGT 1,292 (H) 09/20/2021    ALKPHOS 444 (H) 09/22/2022    BILITOT 1.4 (H) 09/22/2022       Albumin:   Albumin   Date Value Ref Range Status   09/22/2022 2.8 (L) 3.5 - 5.2 g/dL Final     Protein:   Total Protein   Date Value Ref Range Status   09/22/2022 6.1 6.0 - 8.4 g/dL Final       Radiology:I have reviewed all pertinent imaging results/findings within the past 24 hours.      06/09/2022 US lower extremities vein bi-lateral "Impression: Partially occlusive deep venous thrombosis of the left femoral vein."     06/19/2022 MRI abd pelvis "Impression: Evolving posttreatment changes in the liver for multifocal intrahepatic cholangiocarcinoma.  No new focal hepatic lesions. 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 No abdominopelvic metastases. Additional findings as described."       06/15/2022 CT chest "Impression:     1. Stable pulmonary nodules measuring up to 0.4 cm.  No definite new or enlarging pulmonary nodules.  2. Scattered regions of opacification within the right lung base with air bronchograms, favored to represent atelectasis.  However, early consolidative changes cannot be completely excluded."    Signature: Alyssa Mooney DNP            "

## 2022-09-22 NOTE — Clinical Note
Hello,   I had a virtual visit with Mr. Kellogg today. He reports some increased fatigue, (although sounds like it's much better this week) and mild numbness in toes, I believe you are aware of both. Otherwise, his symptoms are stable. No changes made.   Thanks, Alyssa

## 2022-09-23 NOTE — PROGRESS NOTES
"                                                PROGRESS NOTE    Subjective:       Patient ID: Domonique Kellogg is a 72 y.o. male.    Chief Complaint: follow up for cholangiocarcinoma    Diagnosis:  Advanced intrahepatic cholangiocarcinoma, MSI-low, negative for FGFR2 fusion or IDH1/IDH2 mutations, diagnosed on 11/22/2021.     Molecular Profile:  Guardant 360 11/29/21: +CCND1 amplification. VUS: CATRACHITO [id number]. MSI-high not detected.   Strata: ASXL1 mutation, CCND1 amplification, MSI-low, TMB low.     Oncologic History:  1. Mr Kellogg is a 70 yo man with CAD, HTN, seizures in 2011 who initially saw me on 11/29/21 for further management of cholangiocarcinoma. He presented with weight loss and diarrhea since July. US 9/22/21 showed "Enlarged, heterogeneous appearance of the liver likely due to multiple underlying hepatic lesions largest measuring 4.8 cm."  MRI abdomen w/wo contrast 10/4/21: "Multiple liver lesions measuring up to 13.3 cm in the right hepatic lobe.  Differential diagnosis includes metastases, fibrolamellar hepatocellular carcinoma, or atypical focal nodular hyperplasia.  Consider biopsy for further evaluation. Thrombosis of the anterior branch of the right portal vein and left hepatic vein.  Nonvisualization of the middle and right hepatic veins, which may be thrombosed as well. Prominent periportal lymph node, which is nonspecific."  EGD and colonoscopy on 10/11/21 were negative for primary malignancies.   He underwent liver lesion biopsy and Y90 mapping on 11/22/21. Pathology showed adenocarcinoma: "Biopsy of the liver mass shows a malignant neoplasm in a fibrotic stroma. Glandular architecture is readily identified with several foci of intraluminal necrosis. Scattered mitotic figures are seen. Neoplastic cells show the following immunophenotype:   Positive: AE1/AE3, CK7, CDX2   Negative: Chromogranin, Synaptophysin, TTF, CK5/6, CK20, HepPar1   The morphology and immunophenotype are compatible with " "adenocarcinoma. Considerations for a primary site include pancreaticobiliary (including intrahepatic cholangiocarcinoma) as well as upper gastrointestinal. Clinical and radiologic correlation is suggested."  Patient presents today with wife for further management. No pain. Initial weight loss has somewhat stabilized. Still has diarrhea. Has not tried imodium yet. +nervous  Mother had kidney cancer at age 64. Maternal grandmother had liver cancer in her 60s. Patient has three children, two daughters and one son.   Discussed palliative chemo with cis/gem  2. PET scan 21 did not show extrahepatic metastases.   3. Cis/gem started on 21. Durvalumab was initially denied by insurance company after GI ASCO, but approved after FDA approval, added 8/10/22. Patient had reaction to durv (wheezing, rigors, shaking, high BP) on . Imfinzi was stopped.   4. S/p TACE on 2021, 22, 22, 22, 22  5. S/p RF Ablation on 22     Interval History:   Mr Kellogg returns for cycle 13 day 1 of gem/cis. Had a reaction to durv (wheezing, rigors, shaking, high BP) on . He felt very tired and slept a lot last week. But is feeling better with good energy level now. Scheduled for TACE on .    ECO    ROS:   A ten-point system review is obtained and negative except for what was stated in the Interval History.     Physical Examination:   Vital signs reviewed.   General: well hydrated, well developed, in no acute distress  HEENT: normocephalic, EOMI, anicteric sclerae, pale conjunctivae  Neck: supple, no cervical lymphadenopathy, no thyromegaly or JVD  Chest: right chest port site clean  Lungs: clear breath sounds bilaterally, no wheezing/rales/rhonchi  Heart: RRR, no M/R/G  Abdomen: soft, no tenderness, nondistended  Ext: no clubbing, cyanosis, edema  Skin: no open wound, ulcers, rash.   Neuro: alert and oriented x 4  Psych: pleasant and appropriate mood and affect    Objective:     Laboratory " Data:  Labs reviewed. Adequate for chemo.     Imaging Data:  PET 12/6/21:  In this patient with known intrahepatic cholangiocarcinoma, there are multifocal hypermetabolic hepatic lesions in both hepatic lobes.  No evidence of distant metastasis.     Lipomatosis of the ileocecal valve and proximal cecum.     Fat containing umbilical hernia.    MRI Abdomen 1/18/22:  1. Patient with reported cholangiocarcinoma.  Interval progression of hepatic tumor burden with multiple enlarging and new hepatic lesions.  2. Progression of portal venous thrombosis involving the left, right, and main portal veins.  Persistent filling defect within the central hepatic veins concerning for thrombosis.  3. Stable prominent periportal lymph node.    CT chest 3/4/22:  Right pulmonary nodules, unchanged.  Recommend continued follow-up as per clinical protocol.    MRI abdomen 3/14/22:     Interval postprocedural changes status post TACE with slight improvement of tumor burden at treatment sites.  Extensive residual disease throughout both hepatic lobes.  Index lesions as above.     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     Stable appearance of central hepatic veins, underlying thrombosis not excluded.  Consider further evaluation with Doppler ultrasound if clinically warranted.     Stable prominent periportal lymph node.     Right adrenal adenoma.    CT urogram 3/14/22:  Prostatomegaly which demonstrates mass effect on the posterior bladder.  There is mild diffuse wall thickening of the bladder which may relate to outlet obstruction.     Postprocedural changes of the liver in keeping with recent chemoembolization.  Protocol is not optimized for evaluation of tumor response.  Dedicated multiphase liver CT would be necessary to evaluate liver directed therapy response.    MRI A/P 6/19/22:  - Evolving posttreatment changes in the liver for multifocal intrahepatic cholangiocarcinoma.  No new focal  hepatic lesions.  - Persistent portal vein thrombosis, likely tumor thrombus, with cavernous transformation.  - No abdominopelvic metastases.     CT chest 6/15/22:  1. Stable pulmonary nodules measuring up to 0.4 cm.  No definite new or enlarging pulmonary nodules.  2. Scattered regions of opacification within the right lung base with air bronchograms, favored to represent atelectasis.  However, early consolidative changes cannot be completely excluded.    US bilateral legs 6/9/22:  Partially occlusive deep venous thrombosis of the left femoral vein.    MRI abdomen 9/2/22:  1. Evolving post treatment changes in the liver for multifocal intrahepatic cholangiocarcinoma.  Interval radiofrequency ablation of 3 lesions in the left hepatic lobe without local residual disease.  Interval TACE of segment 4A and right hepatic artery noting residual disease in the right hepatic lobe as detailed above.  2. New enhancing lesions in segment 2/3 as described above.  3. 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  4. Additional stable findings.    Assessment and Plan:     1. Intrahepatic cholangiocarcinoma    2. Immunodeficiency secondary to neoplasm    3. Immunodeficiency secondary to chemotherapy    4. Acute deep vein thrombosis (DVT) of femoral vein of left lower extremity    5. Portal vein thrombosis    6. Long term (current) use of anticoagulants    7. Chronic anemia    8. Hypertension, essential    9. CAD in native artery    10. Hypomagnesemia    11. Chemotherapy-induced thrombocytopenia      1-3  - Mr Kellogg is a 71 yo man with advanced intrahepatic cholangiocarcinoma with multiple liver lesions. MSI-low, FGFR2 fusion and IDH1/2 mutation negative. Started on cis/gem on 12/7/21. Durvalumab added on 8/10/22 after FDA approval. Stopped after he had a reaction on 9/9/22.   - doing well. Continue with cis/gem. Cycle 13 day 1 today  - S/p TACE on 12/16/2021, 2/14/22, 4/19/22, 5/18/22, 8/2/22. RF  ablation on 7/19/22   - scheduled for TACE on 9/28. Will do restaging scan one month after  - plt 99 adequate for chemo. Keep gem at 750 mg/m2. Reduce cis further from 22 to 20 mg/m2  - return in 2 weeks for cycle 13 day 15    4-6  - on eliquis 5 mg bid. Continue    7.  - worsened after eliquis increased to full dose  - seen by GI. Got small bowel capsule  - monitor. Asymptomatic. Will transfuse for Hb<7 or symptoms    8.  - BP controlled  - c/w current medication    9.  - asymptomatic. On pravachol     10.  - 2/2 cisplatin  - getting IV Mg 4 gram with chemo    11.  - see above. Adequate for chemo today  - dose reduced chemo    Follow-up:     RTC in 2 weeks.      Route Chart for Scheduling    Med Onc Chart Routing      Follow up with physician 4 weeks. get CBC, CMP, Mg, TSH, CA 19-9, CT chest without contrast, MRI abdomen/pelvis on 10/19, see me on 10/21 then get cis/gem   Follow up with [facility] 2 weeks.   Infusion scheduling note gemcitabine/cisplatin every 2 weeks   Injection scheduling note    Labs CBC, CMP, CA 19-9, TSH and magnesium   Lab interval: every 2 weeks     Imaging Other and MRI   CT chest, MRI abdomen/pelvis on 10/19   Pharmacy appointment    Other referrals          Treatment Plan Information   OP GEMCITABINE CISPLATIN Q3W + DURVALUMAB D8 C11+   Claudia Young MD   Upcoming Treatment Dates - OP GEMCITABINE CISPLATIN Q3W + DURVALUMAB D8 C11+    9/18/2022       Chemotherapy       gemcitabine (GEMZAR) 1,695 mg in sodium chloride 0.9% 250 mL chemo infusion       CISplatin (PLATINOL) 22 mg/m2 = 50 mg in sodium chloride 0.9% 500 mL chemo infusion       Pre-Hydration       sodium chloride 0.9% 500 mL with magnesium sulfate 2 g, potassium chloride 20 mEq infusion       Post-Hydration       sodium chloride 0.9% bolus 500 mL       Antiemetics       aprepitant (CINVANTI) injection 130 mg       palonosetron (ALOXI) 0.25 mg with Dexamethasone (DECADRON) 12 mg in NS 50 mL IVPB  9/24/2022       Chemotherapy        gemcitabine (GEMZAR) 1,695 mg in sodium chloride 0.9% 250 mL chemo infusion       CISplatin (PLATINOL) 22 mg/m2 = 50 mg in sodium chloride 0.9% 500 mL chemo infusion       Pre-Hydration       sodium chloride 0.9% 500 mL with magnesium sulfate 2 g, potassium chloride 20 mEq infusion       Post-Hydration       magnesium sulfate 2g in water 50mL IVPB (premix)       sodium chloride 0.9% bolus 500 mL       Antiemetics       aprepitant (CINVANTI) injection 130 mg       palonosetron (ALOXI) 0.25 mg with Dexamethasone (DECADRON) 12 mg in NS 50 mL IVPB  10/15/2022       Chemotherapy       gemcitabine (GEMZAR) 1,695 mg in sodium chloride 0.9% 250 mL chemo infusion       CISplatin (PLATINOL) 22 mg/m2 = 50 mg in sodium chloride 0.9% 500 mL chemo infusion       Pre-Hydration       sodium chloride 0.9% 500 mL with magnesium sulfate 2 g, potassium chloride 20 mEq infusion       Post-Hydration       sodium chloride 0.9% bolus 500 mL       Antiemetics       aprepitant (CINVANTI) injection 130 mg       palonosetron (ALOXI) 0.25 mg with Dexamethasone (DECADRON) 12 mg in NS 50 mL IVPB  10/21/2022       Chemotherapy       gemcitabine (GEMZAR) 1,695 mg in sodium chloride 0.9% 250 mL chemo infusion       CISplatin (PLATINOL) 22 mg/m2 = 50 mg in sodium chloride 0.9% 500 mL chemo infusion       Pre-Hydration       sodium chloride 0.9% 500 mL with magnesium sulfate 2 g, potassium chloride 20 mEq infusion       Post-Hydration       magnesium sulfate 2g in water 50mL IVPB (premix)       sodium chloride 0.9% bolus 500 mL       Antiemetics       aprepitant (CINVANTI) injection 130 mg       palonosetron (ALOXI) 0.25 mg with Dexamethasone (DECADRON) 12 mg in NS 50 mL IVPB

## 2022-09-23 NOTE — PLAN OF CARE
1601 pt tolerated treatment. Pt voiced no new complaints or concerns at this time. NAD noted. Pt d/c home

## 2022-09-27 NOTE — PRE-PROCEDURE INSTRUCTIONS
PRE-OP INSTRUCTIONS:  Instructed patient to have no food,milk or milk products after midnight   It is ok to take AM medications with a few sips of water   Medication instructions for pm prior to and am of surgery reviewed.  Instructed patient to avoid taking vitamins,supplements,aspirin or ibuprofen the am of surgery.  Shower instructions provided    Patient denies any side effects or issues with anesthesia or sedation.        PATIENT USES CPAP

## 2022-09-27 NOTE — PROGRESS NOTES
Subjective:       Patient ID: Domonique Kellogg is a 72 y.o. male.    Chief Complaint: No chief complaint on file.    The patient location is: home  The chief complaint leading to consultation is: periodic follow up    Visit type: audiovisual    Face to Face time with patient: above 25  30 or more minutes of total time spent on the encounter, which includes face to face time and non-face to face time preparing to see the patient (eg, review of tests), Obtaining and/or reviewing separately obtained history, Documenting clinical information in the electronic or other health record, Independently interpreting results (not separately reported) and communicating results to the patient/family/caregiver, or Care coordination (not separately reported).         Each patient to whom he or she provides medical services by telemedicine is:  (1) informed of the relationship between the physician and patient and the respective role of any other health care provider with respect to management of the patient; and (2) notified that he or she may decline to receive medical services by telemedicine and may withdraw from such care at any time.    Notes: main concerns today - loose stools and hemorrhoid - worse w/ spices and pepper.  This is primary complaint today.  States he is getting around at home and walking through the neighborhood pretty well.  Getting Chemo - TACE pending for tomorrow. He has a low blood count, has received transfusions, has had interventional radiology embolizations x 2.  Recent MRI scan noted, another update pending.  He still has numbness and weakness in the R leg.  He had a previous surgery was some appropriate dysesthesia however he states this area has expanded and that and having some weakness with stairs.  He had neck surgery in 2020, MRI was reviewed.  His upper extremity radiculopathy more or less resolved.  In the distant past he had meralgia paresthetica.  He had EMG nerve conduction study buried in  the medical record in 2014 and again 2020.  Lower and upper respectively.  MRI in 2014 lumbar spine did not show any foraminal or spinal stenosis.     Saw Dr. Alvarado a couple of weeks ago.  Awaiting VC E.    Mild fatigue and malaise, had chemo last weed, feels good this week.  Compliance with medications and follow-up visits has been excellent.  Having some difficulty walking his dog at times.  His spirits Seems good today.          Review of Systems   Constitutional:  Positive for fatigue. Negative for activity change, fever and unexpected weight change.   HENT:  Negative for hearing loss, rhinorrhea and trouble swallowing.    Eyes:  Positive for discharge. Negative for visual disturbance.   Respiratory:  Positive for wheezing. Negative for chest tightness.    Cardiovascular:  Negative for chest pain and palpitations.   Gastrointestinal:  Positive for constipation, diarrhea, nausea and vomiting. Negative for blood in stool.        Hemorrohoid   Endocrine: Negative for polydipsia and polyuria.   Genitourinary:  Negative for difficulty urinating, hematuria and urgency.   Musculoskeletal:  Positive for neck pain. Negative for arthralgias and joint swelling.   Neurological:  Positive for weakness. Negative for headaches.   Psychiatric/Behavioral:  Negative for confusion and dysphoric mood.      Objective:      Physical Exam  Constitutional:       General: He is not in acute distress.     Appearance: He is well-developed.   Neurological:      Mental Status: He is alert and oriented to person, place, and time.   Psychiatric:         Speech: Speech normal.         Behavior: Behavior normal.       Assessment:       1. Acute deep vein thrombosis (DVT) of femoral vein of left lower extremity    2. Intrahepatic cholangiocarcinoma    3. Anemia, unspecified type    4. Long term (current) use of anticoagulants    5. Secondary malignant neoplasm of liver    6. Chronic pulmonary heart disease    7. CAD in native artery    8. Class 1  obesity with serious comorbidity and body mass index (BMI) of 33.0 to 33.9 in adult, unspecified obesity type          Plan:     Medication List with Changes/Refills   Current Medications    ACETAMINOPHEN (TYLENOL) 650 MG TBSR    Take by mouth daily as needed.    APIXABAN (ELIQUIS) 5 MG TAB    Take 1 tablet (5 mg total) by mouth 2 (two) times daily.    DILTIAZEM 2% LIDOCAINE 5% CREAM    Apply pea size amount topically 3 (three) times daily.    FINASTERIDE (PROSCAR) 5 MG TABLET    Take 1 tablet (5 mg total) by mouth once daily.    HYDROCHLOROTHIAZIDE (HYDRODIURIL) 25 MG TABLET    TAKE 1 TABLET (25 MG TOTAL) BY MOUTH ONCE DAILY.    HYDROCORTISONE 1 % CREAM    Apply topically 2 (two) times daily. for 7 days    MAGNESIUM OXIDE (MAG-OX) 400 MG (241.3 MG MAGNESIUM) TABLET    Take 400 mg by mouth every evening.    MULTIVITAMIN WITH MINERALS TABLET    Take 1 tablet by mouth every morning.     POTASSIUM CHLORIDE SA (K-DUR,KLOR-CON) 20 MEQ TABLET    Take 2 tablets (40 mEq total) by mouth once daily.    PRAVASTATIN (PRAVACHOL) 40 MG TABLET    Take 1 tablet (40 mg total) by mouth once daily.    PROCHLORPERAZINE (COMPAZINE) 10 MG TABLET    Take 1 tablet (10 mg total) by mouth every 6 (six) hours as needed (nausea).    PROMETHAZINE (PHENERGAN) 12.5 MG TAB    Take 1 tablet (12.5 mg total) by mouth every 6 (six) hours as needed (nausea).    RABEPRAZOLE (ACIPHEX) 20 MG TABLET    Take 1 tablet (20 mg total) by mouth once daily.    TAMSULOSIN (FLOMAX) 0.4 MG CAP    TAKE 1 CAPSULE EVERY DAY   Discontinued Medications    OXYCODONE-ACETAMINOPHEN (PERCOCET) 5-325 MG PER TABLET    Take 1 tablet by mouth every 4 (four) hours as needed for Pain.     1. Acute deep vein thrombosis (DVT) of femoral vein of left lower extremity  Overview:  6/9/2022 - managed by Hematology-Oncology      2. Intrahepatic cholangiocarcinoma  Overview:  1.  -11/29/21 - presented with weight loss and diarrhea.   -US 9/22/21 multiple underlying hepatic lesions.   -MRI  "abdomen w/wo contrast 10/4/21: "Multiple liver lesions measuring up to 13.3 cm in the right hepatic lobe. Thrombosis of the anterior branch of the right portal vein and left hepatic vein.  Nonvisualization of the middle and right hepatic veins, which may be thrombosed as well. Prominent periportal lymph node."   -EGD and colonoscopy on 10/11/21 negative for primary malignancies  -Liver biopsy and Y90 mapping on 11/22/21 - adenocarcinoma: immunophenotype: Positive: AE1/AE3, CK7, CDX2 Negative: Chromogranin, Synaptophysin, TTF, CK5/6, CK20, HepPar1. Morphology and immunophenotype compatible with adenocarcinoma. Deemed by Oncology as primary hepatic or cholangio. Dr. Young discussed palliative chemo with cis/gem with patient.  2. PET scan 12/6/21 did not show extrahepatic metastases.   3. Cis/gem started on 12/7/21. Durvalumab was initially denied by insurance company after GI ASCO, but approved after FDA approval, added 8/10/22. Patient had reaction to durv (wheezing, rigors, shaking, high BP) on 9/9. Imfinzi was stopped.   4. S/p TACE on 12/16/2021, 2/14/22, 4/19/22, 5/18/22, 8/2/22  5. S/p RF Ablation on 7/19/22 9/23/2022 - Dr. Young interval history -cycle 13 day 1 of gem/cis. Had a reaction to durv (wheezing, rigors, shaking, high BP) on 9/9. Scheduled for TACE on 9/28.      3. Anemia, unspecified type  Overview:  -multifactorial and followed in hematology/oncology. Was evaluasted in GI on 9/3/2022 - VCE results pending. EGD 4/2022, C/S 6/2022.      4. Long term (current) use of anticoagulants  Overview:  Hematology-Oncology managing      5. Secondary malignant neoplasm of liver  Overview:  See problem list entry - intrahepatic cholangiocarcinoma      6. Chronic pulmonary heart disease  Overview:  Noted on echocardiogram 02/16/2015      7. CAD in native artery  Overview:  Cath 2015 on-obstructive, last cardiology visit 2018    Assessment & Plan:  On aspirin and statin, no symptoms      8. Class 1 obesity with " serious comorbidity and body mass index (BMI) of 33.0 to 33.9 in adult, unspecified obesity type  Assessment & Plan:  Last noted BMI 33.5      See meds, orders, follow up, routing and instructions sections of encounter and AVS. Discussed with patient and provided on AVS.    Discussed diet and exercise and links provided on AVS for detailed information.    Lab Results   Component Value Date     09/22/2022    K 3.7 09/22/2022     09/22/2022    BUN 17 09/22/2022    CREATININE 1.4 09/22/2022     (H) 09/22/2022    HGBA1C 5.6 08/22/2022    MG 1.4 (L) 09/22/2022    AST 41 (H) 09/22/2022    ALT 32 09/22/2022    ALBUMIN 2.8 (L) 09/22/2022    PROT 6.1 09/22/2022    BILITOT 1.4 (H) 09/22/2022    CHOL 139 11/15/2021    HDL 36 (L) 11/15/2021    LDLCALC 92.6 11/15/2021    TRIG 52 11/15/2021    WBC 6.69 09/22/2022    HGB 7.8 (L) 09/22/2022    HCT 25.2 (L) 09/22/2022    PLT 99 (L) 09/22/2022    PSA 1.4 11/15/2021    TSH 2.036 09/22/2022    URICACID 8.2 (H) 03/18/2015

## 2022-09-28 NOTE — ANESTHESIA POSTPROCEDURE EVALUATION
Anesthesia Post Evaluation    Patient: Domonique Kellogg    Procedure(s) Performed: * No procedures listed *    Final Anesthesia Type: general      Patient location during evaluation: PACU  Patient participation: Yes- Able to Participate  Level of consciousness: awake and alert and oriented  Post-procedure vital signs: reviewed and stable  Pain management: adequate  Airway patency: patent    PONV status at discharge: No PONV  Anesthetic complications: no      Cardiovascular status: blood pressure returned to baseline and hemodynamically stable  Respiratory status: unassisted, spontaneous ventilation and room air  Hydration status: euvolemic  Follow-up not needed.          Vitals Value Taken Time   /60 09/28/22 1517   Temp 36.6 °C (97.9 °F) 09/28/22 1343   Pulse 89 09/28/22 1519   Resp 16 09/28/22 1343   SpO2 100 % 09/28/22 1519   Vitals shown include unvalidated device data.      No case tracking events are documented in the log.      Pain/Lalo Score: Lalo Score: 9 (9/28/2022  3:00 PM)         36.5

## 2022-09-28 NOTE — DISCHARGE INSTRUCTIONS
For scheduling: Call Deedee at [phone]    For questions or concerns call: [facility] 8 AM- 5PM [phone]. Radiology resident on call [phone].    For immediate concerns that are not emergent, you may call our radiology clinic at: [phone]    May remove dressing in 24 hours and shower.   Do Not sit in bath water, hot tub, or swim for 7 days

## 2022-09-28 NOTE — ANESTHESIA PREPROCEDURE EVALUATION
09/28/2022  Domonique Kellogg is a 72 y.o., male.    Echo 2021  · The left ventricle is normal in size with normal systolic function. The estimated ejection fraction is 60%.  · Normal right ventricular size with normal right ventricular systolic function.  · Indeterminate left ventricular diastolic function.  · Mild left atrial enlargement.  · The estimated PA systolic pressure is 25 mmHg.  · Normal central venous pressure (3 mmHg).          Patient Active Problem List   Diagnosis    CTS (carpal tunnel syndrome)    Class 1 obesity with serious comorbidity in adult    H/O syncope    RAHEEL (obstructive sleep apnea)    CAD in native artery    Hypertension, essential    Hyperlipidemia    Chronic pulmonary heart disease    Bilateral carotid artery disease    Primary osteoarthritis of right knee    Lateral femoral cutaneous entrapment syndrome    Cervical spondylosis    Cervical disc disease with myelopathy    Cervical stenosis of spinal canal    Prediabetes    Infection due to Strongyloides    Intrahepatic cholangiocarcinoma    Secondary malignant neoplasm of liver    Transaminitis    Elevated troponin    Immunodeficiency secondary to neoplasm    Hyperbilirubinemia    Portal vein thrombosis    Cancer related pain    Immunodeficiency secondary to chemotherapy    Benign prostatic hyperplasia with urinary frequency    Weight loss    Weakness generalized    Anemia    Weakness of lower extremity    Long term (current) use of anticoagulants    Deep vein thrombosis (DVT) of femoral vein of left lower extremity     Past Surgical History:   Procedure Laterality Date    ANTERIOR CERVICAL DISCECTOMY W/ FUSION N/A 7/6/2020    Procedure: DISCECTOMY, SPINE, CERVICAL, ANTERIOR APPROACH, WITH FUSION C3-4, C4-5;  Surgeon: Fabiola Vides MD;  Location: [facility] OR [address] [place] [address];  Service: Neurosurgery;   Laterality: N/A;  TORONTO III, ASA III, BLOOD TYPE & SCREEN, NEUROMONITORING EMG-MEP-SEP, SUPINE POSITION,BRACE MIAMI,REGULAR BED, HEADREST CASPAR, POSITION, MAP>85, RADIOLOGY C-ARM, SPECIAL EQUIPMENT VIRGIL TYLER    COLONOSCOPY N/A 10/1/2021    Procedure: COLONOSCOPY;  Surgeon: Nicolas Alvarado MD;  Location: [facility] (4TH FLR);  Service: Endoscopy;  Laterality: N/A;  EGD and colonoscopy for diarrhea and weight loss and Enlarged, heterogeneous appearance of the liver likely due to multiple underlying hepatic lesions largest measuring 4.8 cm.  Findings concerning for metastatic versus less likely primary hepatic neoplasm.  Recommend furth    COLONOSCOPY N/A 6/7/2022    Procedure: COLONOSCOPY;  Surgeon: Mejia Villafuerte MD;  Location: [facility] (4TH FLR);  Service: Endoscopy;  Laterality: N/A;  For evaluation of positive out-patient FOBT.   medically urgent  ok to hold Eliquis per [place] Edward/RB  fully vaccinated  hx of seizures- last one 2014    ESOPHAGOGASTRODUODENOSCOPY N/A 10/1/2021    Procedure: EGD (ESOPHAGOGASTRODUODENOSCOPY);  Surgeon: Nicolas Alvarado MD;  Location: [facility] (4TH FLR);  Service: Endoscopy;  Laterality: N/A;  EGD and colonoscopy for diarrhea and weight loss and Enlarged, heterogeneous appearance of the liver likely due to multiple underlying hepatic lesions largest measuring 4.8 cm.  Findings concerning for metastatic versus less likely primary hepatic neopl    ESOPHAGOGASTRODUODENOSCOPY N/A 4/5/2022    Procedure: EGD (ESOPHAGOGASTRODUODENOSCOPY);  Surgeon: Amado Kelsey MD;  Location: [facility] (4TH FLR);  Service: Endoscopy;  Laterality: N/A;  EGD in 8 weeks to check for esophagitis healing patient should be on pantoprazole 40 mg once daily  ok to hold eliquis x2 days per Dr. Young, see telephone encounter  fully vaccinated    EYE SURGERY      INSERTION OF VENOUS ACCESS PORT N/A 12/3/2021    Procedure: INSERTION, VENOUS ACCESS PORT;  Surgeon: Saurav Garcia MD;  Location:  [facility] OR 2ND FLR;  Service: General;  Laterality: N/A;    INTRAOCULAR PROSTHESES INSERTION Right 7/16/2018    Procedure: INSERTION-INTRAOCULAR LENS (IOL);  Surgeon: Priscilla Hays MD;  Location: [facility] OR;  Service: Ophthalmology;  Laterality: Right;    INTRAOCULAR PROSTHESES INSERTION Left 8/13/2018    Procedure: INSERTION, INTRAOCULAR LENS PROSTHESIS;  Surgeon: Priscilla Hays MD;  Location: [facility] OR;  Service: Ophthalmology;  Laterality: Left;    KNEE SURGERY      PHACOEMULSIFICATION OF CATARACT Right 7/16/2018    Procedure: PHACOEMULSIFICATION-ASPIRATION-CATARACT;  Surgeon: Priscilla Hays MD;  Location: [facility] OR;  Service: Ophthalmology;  Laterality: Right;    PHACOEMULSIFICATION OF CATARACT Left 8/13/2018    Procedure: PHACOEMULSIFICATION, CATARACT;  Surgeon: Priscilla Hays MD;  Location: [facility];  Service: Ophthalmology;  Laterality: Left;    WISDOM TOOTH EXTRACTION         Pre-op Assessment    I have reviewed the Patient Summary Reports.     I have reviewed the Nursing Notes. I have reviewed the NPO Status.      Review of Systems      Physical Exam  General: Well nourished    Airway:  Mallampati: III / II/ III  Mouth Opening: Normal  TM Distance: Normal  Tongue: Normal  Neck ROM: Normal ROM    Chest/Lungs:  Clear to auscultation    Heart:  Rate: Normal  Rhythm: Regular Rhythm        Anesthesia Plan  Type of Anesthesia, risks & benefits discussed:    Anesthesia Type: Gen ETT, MAC  Intra-op Monitoring Plan: Standard ASA Monitors  Post Op Pain Control Plan: IV/PO Opioids PRN and multimodal analgesia  Induction:  IV  Airway Plan: Direct  Informed Consent: Informed consent signed with the Patient and all parties understand the risks and agree with anesthesia plan.  All questions answered.   ASA Score: 3  Day of Surgery Review of History & Physical: H&P Update referred to the surgeon/provider.    Ready For Surgery From Anesthesia Perspective.     .

## 2022-09-28 NOTE — PLAN OF CARE
Pt arrived to [facility] for Chemoembolization of liver tumor with anesthesia, escorted to room by this RN and CRNA who will assume care. Pt oriented to unit and staff. Plan of care reviewed with patient, patient verbalizes understanding. Comfort measures utilized. Pt safely transferred from stretcher to procedural table. Fall risk reviewed with patient, fall risk interventions maintained with arm boards and direct  observation/assessment.  positioner pillows utilized to minimize pressure points. Blankets applied. Pt prepped and draped utilizing standard sterile technique. Patient placed on continuous monitoring, as required by sedation policy. Timeouts completed utilizing standard universal time-out, per department and facility policy. RN to remain at bedside, continuous monitoring maintained. Pt resting comfortably. Denies pain/discomfort. Will continue to monitor. See flow sheets for monitoring, medication administration, and updates.

## 2022-09-28 NOTE — PROCEDURES
Interventional Radiology postop note    Pre Op Diagnosis: Cholangiocarcinoma   Post Op Diagnosis: Same    Procedure: Transarterial chemoembolization     Procedure performed by: Allan    Written Informed Consent Obtained: Yes  Specimen Removed: NO  Estimated Blood Loss: Minimal    Findings:   Successful TACE via right inferior phrenic and right hepatic artery with total delivery of 10 cc.   No acute complication.     Access RCFA 5Fr sheath, with Vascade closure.     Will DC with Augmentin and Zofran    Patient tolerated procedure well.    Benny Dawkins,   Interventional Radiology

## 2022-09-28 NOTE — ANESTHESIA PROCEDURE NOTES
Intubation    Date/Time: 9/28/2022 11:30 AM  Performed by: Yady Ramirez CRNA  Authorized by: Mitchel Shaver MD     Intubation:     Induction:  Intravenous    Intubated:  Postinduction    Mask Ventilation:  Easy mask    Attempts:  1    Attempted By:  CRNA    Method of Intubation:  Video laryngoscopy    Blade:  Bryant 3    Laryngeal View Grade: Grade I - full view of cords      Difficult Airway Encountered?: No      Complications:  None    Airway Device:  Oral endotracheal tube    Airway Device Size:  7.5    Style/Cuff Inflation:  Cuffed (inflated to minimal occlusive pressure)    Tube secured:  23    Secured at:  The lips    Placement Verified By:  Capnometry    Complicating Factors:  None    Findings Post-Intubation:  BS equal bilateral and atraumatic/condition of teeth unchanged

## 2022-09-28 NOTE — TRANSFER OF CARE
"Anesthesia Transfer of Care Note    Patient: Domonique Kellogg    Procedure(s) Performed: * No procedures listed *    Patient location: [facility]    Anesthesia Type: general    Transport from OR: Transported from OR on 6-10 L/min O2 by face mask with adequate spontaneous ventilation    Post pain: adequate analgesia    Post assessment: no apparent anesthetic complications    Post vital signs: stable    Level of consciousness: alert, awake and oriented    Nausea/Vomiting: no nausea/vomiting    Complications: none    Transfer of care protocol was followed      Last vitals:   Visit Vitals  BP (!) 140/78 (BP Location: Left arm, Patient Position: Lying)   Pulse 67   Temp 36.6 °C (97.9 °F) (Temporal)   Resp 16   Ht 5' 8" (1.727 m)   Wt 99.8 kg (220 lb)   SpO2 99%   BMI 33.45 kg/m²     "

## 2022-09-28 NOTE — H&P
Radiology History & Physical      SUBJECTIVE:     Chief Complaint: Cholangiocarcinoma     History of Present Illness:  Domonique Kellogg is a 72 y.o. male who presents for TACE 2/2 cholangiocarcinoma. S/P TACE of the right hepatic artery and seg 4a on 8/2/2022 and seg. 4 and 8 on 5/18/2022 as well as ablation of multiple left hepatic lobe lesions in 7/19/2022.    Past Medical History:   Diagnosis Date    Adjustment disorder     Anticoagulant long-term use     Anxiety     Arthritis     CAD (coronary artery disease) 03/02/2015    non-obstructive per [facility]     Cataract     Dry eye syndrome     Hypertension     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/2021    Obesity     Post-procedural fever 12/18/2021    Seizures     2011    Sleep apnea     + CPAP    Sleep difficulties      Past Surgical History:   Procedure Laterality Date    ANTERIOR CERVICAL DISCECTOMY W/ FUSION N/A 07/06/2020    Procedure: DISCECTOMY, SPINE, CERVICAL, ANTERIOR APPROACH, WITH FUSION C3-4, C4-5;  Surgeon: Fabiola Vides MD;  Location: [facility] OR [address] [place] [address];  Service: Neurosurgery;  Laterality: N/A;  TORONTO III, ASA III, BLOOD TYPE & SCREEN, NEUROMONITORING EMG-MEP-SEP, SUPINE POSITION,BRACE MIAMI,REGULAR BED, HEADREST CASPAR, POSITION, MAP>85, RADIOLOGY C-ARM, SPECIAL EQUIPMENT [facility]    COLONOSCOPY N/A 10/01/2021    Procedure: COLONOSCOPY;  Surgeon: Nicolas Alvarado MD;  Location: [facility] (4TH FLR);  Service: Endoscopy;  Laterality: N/A;  EGD and colonoscopy for diarrhea and weight loss and Enlarged, heterogeneous appearance of the liver likely due to multiple underlying hepatic lesions largest measuring 4.8 cm.  Findings concerning for metastatic versus less likely primary hepatic neoplasm.  Recommend [facility]    COLONOSCOPY N/A 06/07/2022    Procedure: COLONOSCOPY;  Surgeon: Mejia Villafuerte MD;  Location: [facility] (4TH FLR);  Service: Endoscopy;  Laterality: N/A;  For evaluation of positive out-patient FOBT.   medically urgent  ok to hold Eliquis per  KATIE EdwardC/RB  fully vaccinated  hx of seizures- last one 2014    ESOPHAGOGASTRODUODENOSCOPY N/A 10/01/2021    Procedure: EGD (ESOPHAGOGASTRODUODENOSCOPY);  Surgeon: Nicolas Alvarado MD;  Location: [facility] (4TH FLR);  Service: Endoscopy;  Laterality: N/A;  EGD and colonoscopy for diarrhea and weight loss and Enlarged, heterogeneous appearance of the liver likely due to multiple underlying hepatic lesions largest measuring 4.8 cm.  Findings concerning for metastatic versus less likely primary hepatic neopl    ESOPHAGOGASTRODUODENOSCOPY N/A 04/05/2022    Procedure: EGD (ESOPHAGOGASTRODUODENOSCOPY);  Surgeon: Amado Kelsey MD;  Location: [facility] (4TH FLR);  Service: Endoscopy;  Laterality: N/A;  EGD in 8 weeks to check for esophagitis healing patient should be on pantoprazole 40 mg once daily  ok to hold eliquis x2 days per Dr. Young, see telephone encounter  fully vaccinated    EYE SURGERY      cataract    INSERTION OF VENOUS ACCESS PORT N/A 12/03/2021    Procedure: INSERTION, VENOUS ACCESS PORT;  Surgeon: Saurav Garcia MD;  Location: [facility] 2ND FLR;  Service: General;  Laterality: N/A;    INTRAOCULAR PROSTHESES INSERTION Right 07/16/2018    Procedure: INSERTION-INTRAOCULAR LENS (IOL);  Surgeon: Priscilla Hays MD;  Location: [facility];  Service: Ophthalmology;  Laterality: Right;    INTRAOCULAR PROSTHESES INSERTION Left 08/13/2018    Procedure: INSERTION, INTRAOCULAR LENS PROSTHESIS;  Surgeon: Priscilla Hays MD;  Location: [facility];  Service: Ophthalmology;  Laterality: Left;    KNEE SURGERY Right     PHACOEMULSIFICATION OF CATARACT Right 07/16/2018    Procedure: PHACOEMULSIFICATION-ASPIRATION-CATARACT;  Surgeon: Priscilla Hays MD;  Location: [facility] OR;  Service: Ophthalmology;  Laterality: Right;    PHACOEMULSIFICATION OF CATARACT Left 08/13/2018    Procedure: PHACOEMULSIFICATION, CATARACT;  Surgeon: Priscilla Hays MD;  Location: [facility];  Service: Ophthalmology;  Laterality: Left;    WISDOM TOOTH  EXTRACTION         Home Meds:   Prior to Admission medications    Medication Sig Start Date End Date Taking? Authorizing Provider   acetaminophen (TYLENOL) 650 MG TbSR Take by mouth daily as needed. 2/3/20  Yes Historical Provider   apixaban (ELIQUIS) 5 mg Tab Take 1 tablet (5 mg total) by mouth 2 (two) times daily. 7/5/22  Yes Claudia Young MD   diltiaZEM 2% Lidocaine 5% Cream Apply pea size amount topically 3 (three) times daily. 12/28/21  Yes Shilpa Ahuja NP   finasteride (PROSCAR) 5 mg tablet Take 1 tablet (5 mg total) by mouth once daily.  Patient taking differently: Take 5 mg by mouth every morning. 3/24/22 3/24/23 Yes Messi Stark MD   hydroCHLOROthiazide (HYDRODIURIL) 25 MG tablet TAKE 1 TABLET (25 MG TOTAL) BY MOUTH ONCE DAILY.  Patient taking differently: Take 25 mg by mouth every morning. 12/10/21  Yes Nicolas Pacheco MD   hydrocortisone 1 % cream Apply topically 2 (two) times daily. for 7 days 8/29/22 9/28/22 Yes Sharmila Crain NP   magnesium oxide (MAG-OX) 400 mg (241.3 mg magnesium) tablet Take 400 mg by mouth every evening. 7/1/19  Yes Historical Provider   multivitamin with minerals tablet Take 1 tablet by mouth every morning.  7/25/18  Yes Historical Provider   potassium chloride SA (K-DUR,KLOR-CON) 20 MEQ tablet Take 2 tablets (40 mEq total) by mouth once daily.  Patient taking differently: Take 40 mEq by mouth every morning. 12/3/21  Yes Nicolas Pacheco MD   pravastatin (PRAVACHOL) 40 MG tablet Take 1 tablet (40 mg total) by mouth once daily.  Patient taking differently: Take 40 mg by mouth every evening. 1/18/22  Yes Nicolas Pacheco MD   RABEprazole (ACIPHEX) 20 mg tablet Take 1 tablet (20 mg total) by mouth once daily.  Patient taking differently: Take 20 mg by mouth every morning. 12/23/21 12/23/22 Yes Mejia Villafuerte MD   tamsulosin (FLOMAX) 0.4 mg Cap TAKE 1 CAPSULE EVERY DAY  Patient taking differently: Take by mouth every evening. 5/25/22  Yes Claudia Young MD  "  prochlorperazine (COMPAZINE) 10 MG tablet Take 1 tablet (10 mg total) by mouth every 6 (six) hours as needed (nausea).  Patient not taking: No sig reported 11/29/21 11/29/22  Claudia Young MD   promethazine (PHENERGAN) 12.5 MG Tab Take 1 tablet (12.5 mg total) by mouth every 6 (six) hours as needed (nausea).  Patient not taking: No sig reported 11/29/21   Claudia Young MD     Anticoagulants/Antiplatelets: no anticoagulation    Allergies:   Review of patient's allergies indicates:   Allergen Reactions    Imfinzi [durvalumab] Other (See Comments)     1305- Imfinzi ended, pt returned from restroom complained of wheezing SOB and rigors. /100  O2 92% 2L NC O2 applied to pt.   1306-Demerol 25mg given and Solucortef 125mg IVPush given.   1309- Pt /106  02 99%.   1318- Pt still having Rigors additional 25mg of Demerol given.   1418- BP is 133/69 77 HR 99% RA Pt states " I feel much better"       Indomethacin Other (See Comments)     Headache dizziness      Adhesive Rash     Sedation History:  have not been any systemic reactions    Review of Systems:   Hematological: negative  no known coagulopathies  Respiratory: no cough, shortness of breath, or wheezing  no shortness of breath  Cardiovascular: no chest pain or dyspnea on exertion  no chest pain  Gastrointestinal: no abdominal pain, change in bowel habits, or black or bloody stools  no abdominal pain  Genito-Urinary: no dysuria, trouble voiding, or hematuria  no dysuria  Musculoskeletal: negative  Neurological: no TIA or stroke symptoms         OBJECTIVE:     Vital Signs (Most Recent)  Temp: 97.9 °F (36.6 °C) (09/28/22 1011)  Pulse: 67 (09/28/22 1011)  Resp: 16 (09/28/22 1011)  BP: (!) 140/78 (09/28/22 1012)  SpO2: 99 % (09/28/22 1011)    Physical Exam:  ASA: 3  Mallampati: Per anaesthesia     General: no acute distress  Mental Status: alert and oriented to person, place and time  HEENT: normocephalic, atraumatic  Chest: unlabored breathing  Heart: " regular heart rate  Abdomen: nondistended  Extremity: moves all extremities    Laboratory  Lab Results   Component Value Date    INR 1.1 09/28/2022       Lab Results   Component Value Date    WBC 2.15 (L) 09/28/2022    HGB 8.5 (L) 09/28/2022    HCT 26.8 (L) 09/28/2022     (H) 09/28/2022     (L) 09/28/2022      Lab Results   Component Value Date    GLU 91 09/28/2022     09/28/2022    K 3.2 (L) 09/28/2022     09/28/2022    CO2 28 09/28/2022    BUN 42 (H) 09/28/2022    CREATININE 1.3 09/28/2022    CALCIUM 10.3 09/28/2022    MG 1.4 (L) 09/22/2022    ALT 79 (H) 09/28/2022    AST 93 (H) 09/28/2022    ALBUMIN 3.1 (L) 09/28/2022    BILITOT 1.4 (H) 09/28/2022    BILIDIR 0.9 (H) 09/28/2022       ASSESSMENT/PLAN:     Sedation Plan: Per anaesthesia   Patient will undergo TACE.    Benny Dawkins DO

## 2022-09-28 NOTE — NURSING
Chemoembolization of liver tumor complete. Pt tolerated well. VSS. No signs or symptoms of distress noted.Vascade closure device in place. Hemostasis 1325. Pt to remain flat until 1525. Pt will be transferred to PACU bed after extubation/stabilization.

## 2022-09-28 NOTE — PLAN OF CARE
Patient arrived to room. PIV placed. Admit assessment completed. Plan of care discussed with patient. VSS. A&Ox4. Pt denies any complaints or pain at this time. Nurse call bell within reach. Will continue to monitor.

## 2022-09-28 NOTE — PLAN OF CARE
Pt meets criteria for discharge. AAO, tolerating po liquids. No s/s of pain. Discharge instructions given and reviewed. Patient discharged to home.

## 2022-10-03 NOTE — TELEPHONE ENCOUNTER
----- Message from German Murillo sent at 10/3/2022 10:45 AM CDT -----  Contact: [phone]  Patient returning a call from the office. Please call and advise, Thanks

## 2022-10-07 NOTE — PLAN OF CARE
[id number] Pt tolerated infusion well today, no complaints or complications,. VSS through duration of treatment. Pt aware to call provider with any questions or concerns and is aware of upcoming appts. Pt ambulatory from clinic with steady gait, no distress noted.

## 2022-10-07 NOTE — PROGRESS NOTES
"                                                PROGRESS NOTE    Subjective:       Patient ID: Domonique Kellogg is a 72 y.o. male.    Chief Complaint: follow up for cholangiocarcinoma    Diagnosis:  Advanced intrahepatic cholangiocarcinoma, MSI-low, negative for FGFR2 fusion or IDH1/IDH2 mutations, diagnosed on 11/22/2021.     Molecular Profile:  Guardant 360 11/29/21: +CCND1 amplification. VUS: CATRACHITO [id number]. MSI-high not detected.   Strata: ASXL1 mutation, CCND1 amplification, MSI-low, TMB low.     Oncologic History:  1. Mr Kellogg is a 70 yo man with CAD, HTN, seizures in 2011 who initially saw me on 11/29/21 for further management of cholangiocarcinoma. He presented with weight loss and diarrhea since July. US 9/22/21 showed "Enlarged, heterogeneous appearance of the liver likely due to multiple underlying hepatic lesions largest measuring 4.8 cm."  MRI abdomen w/wo contrast 10/4/21: "Multiple liver lesions measuring up to 13.3 cm in the right hepatic lobe.  Differential diagnosis includes metastases, fibrolamellar hepatocellular carcinoma, or atypical focal nodular hyperplasia.  Consider biopsy for further evaluation. Thrombosis of the anterior branch of the right portal vein and left hepatic vein.  Nonvisualization of the middle and right hepatic veins, which may be thrombosed as well. Prominent periportal lymph node, which is nonspecific."  EGD and colonoscopy on 10/11/21 were negative for primary malignancies.   He underwent liver lesion biopsy and Y90 mapping on 11/22/21. Pathology showed adenocarcinoma: "Biopsy of the liver mass shows a malignant neoplasm in a fibrotic stroma. Glandular architecture is readily identified with several foci of intraluminal necrosis. Scattered mitotic figures are seen. Neoplastic cells show the following immunophenotype:   Positive: AE1/AE3, CK7, CDX2   Negative: Chromogranin, Synaptophysin, TTF, CK5/6, CK20, HepPar1   The morphology and immunophenotype are compatible with " "adenocarcinoma. Considerations for a primary site include pancreaticobiliary (including intrahepatic cholangiocarcinoma) as well as upper gastrointestinal. Clinical and radiologic correlation is suggested."  Patient presents today with wife for further management. No pain. Initial weight loss has somewhat stabilized. Still has diarrhea. Has not tried imodium yet. +nervous  Mother had kidney cancer at age 64. Maternal grandmother had liver cancer in her 60s. Patient has three children, two daughters and one son.   Discussed palliative chemo with cis/gem  2. PET scan 21 did not show extrahepatic metastases.   3. Cis/gem started on 21. Durvalumab was initially denied by insurance company after GI ASCO, but approved after FDA approval, added 8/10/22. Patient had reaction to durv (wheezing, rigors, shaking, high BP) on . Imfinzi was stopped.   4. S/p TACE on 2021, 22, 22, 22, 22  5. S/p RF Ablation on 22. TACE on 22    Interval History:   Mr Kellogg returns for cycle 13 day 15 of gem/cis. Had TACE on 22. Feeling well. Mild neuropathy in feet.     ECO    ROS:   A ten-point system review is obtained and negative except for what was stated in the Interval History.     Physical Examination:   Vital signs reviewed.   General: well hydrated, well developed, in no acute distress  HEENT: normocephalic, EOMI, anicteric sclerae, pale conjunctivae  Neck: supple, no cervical lymphadenopathy, no thyromegaly or JVD  Chest: right chest port site clean  Lungs: clear breath sounds bilaterally, no wheezing/rales/rhonchi  Heart: RRR, no M/R/G  Abdomen: soft, no tenderness, nondistended  Ext: no clubbing, cyanosis, edema  Skin: no open wound, ulcers, rash.   Neuro: alert and oriented x 4  Psych: pleasant and appropriate mood and affect    Objective:     Laboratory Data:  Labs reviewed. Plt 78. Cr 1.5    Imaging Data:  PET 21:  In this patient with known intrahepatic " cholangiocarcinoma, there are multifocal hypermetabolic hepatic lesions in both hepatic lobes.  No evidence of distant metastasis.     Lipomatosis of the ileocecal valve and proximal cecum.     Fat containing umbilical hernia.    MRI Abdomen 1/18/22:  1. Patient with reported cholangiocarcinoma.  Interval progression of hepatic tumor burden with multiple enlarging and new hepatic lesions.  2. Progression of portal venous thrombosis involving the left, right, and main portal veins.  Persistent filling defect within the central hepatic veins concerning for thrombosis.  3. Stable prominent periportal lymph node.    CT chest 3/4/22:  Right pulmonary nodules, unchanged.  Recommend continued follow-up as per clinical protocol.    MRI abdomen 3/14/22:     Interval postprocedural changes status post TACE with slight improvement of tumor burden at treatment sites.  Extensive residual disease throughout both hepatic lobes.  Index lesions as above.     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     Stable appearance of central hepatic veins, underlying thrombosis not excluded.  Consider further evaluation with Doppler ultrasound if clinically warranted.     Stable prominent periportal lymph node.     Right adrenal adenoma.    CT urogram 3/14/22:  Prostatomegaly which demonstrates mass effect on the posterior bladder.  There is mild diffuse wall thickening of the bladder which may relate to outlet obstruction.     Postprocedural changes of the liver in keeping with recent chemoembolization.  Protocol is not optimized for evaluation of tumor response.  Dedicated multiphase liver CT would be necessary to evaluate liver directed therapy response.    MRI A/P 6/19/22:  - Evolving posttreatment changes in the liver for multifocal intrahepatic cholangiocarcinoma.  No new focal hepatic lesions.  - Persistent portal vein thrombosis, likely tumor thrombus, with cavernous transformation.  - No  abdominopelvic metastases.     CT chest 6/15/22:  1. Stable pulmonary nodules measuring up to 0.4 cm.  No definite new or enlarging pulmonary nodules.  2. Scattered regions of opacification within the right lung base with air bronchograms, favored to represent atelectasis.  However, early consolidative changes cannot be completely excluded.    US bilateral legs 6/9/22:  Partially occlusive deep venous thrombosis of the left femoral vein.    MRI abdomen 9/2/22:  1. Evolving post treatment changes in the liver for multifocal intrahepatic cholangiocarcinoma.  Interval radiofrequency ablation of 3 lesions in the left hepatic lobe without local residual disease.  Interval TACE of segment 4A and right hepatic artery noting residual disease in the right hepatic lobe as detailed above.  2. New enhancing lesions in segment 2/3 as described above.  3. 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  4. Additional stable findings.    Assessment and Plan:     1. Intrahepatic cholangiocarcinoma    2. Secondary malignant neoplasm of liver    3. 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 Chemotherapy-induced thrombocytopenia    6. Antineoplastic chemotherapy induced anemia    7. Neuropathy    8. DAWIT (acute kidney injury)    9. Hypomagnesemia    10. Acute deep vein thrombosis (DVT) of femoral vein of left lower extremity    11. Portal vein thrombosis    12. Long term (current) use of anticoagulants      1-4  - Mr Kellogg is a 73 yo man with advanced intrahepatic cholangiocarcinoma with multiple liver lesions. MSI-low, FGFR2 fusion and IDH1/2 mutation negative. Started on cis/gem on 12/7/21. Durvalumab added on 8/10/22 after FDA approval. Stopped after he had a reaction on 9/9/22.   - doing well.   - reviewed labs results. Discussed Cr 1.5 and plt count 78. Will drop cisplatin for renal function. Gemcitabine 750 mg/m2 is okay with platelet level  - S/p TACE on  12/16/2021, 2/14/22, 4/19/22, 5/18/22, 8/2/22. RF ablation on 7/19/22. TACE on 9/28/22  - patient is concerned about rising CA 19-9. We discussed the survival data in patients with advanced cholangio. He has been on front-line cis/gem for 10 months now. Scheduled for restaging scan in 2 weeks. If it shows progression, we will switch to second-line chemotherapy  - return in 2 weeks with restaging scan    5.  - 2/2 chemo  - see above. Only getting gemcitabine 750 mg/m2    6.  - worsened after eliquis increased to full dose  - seen by GI. Got small bowel capsule  - monitor. Asymptomatic. Will transfuse for Hb<7 or symptoms    7.  - mild. Monitor    8.  - getting 1 liter NS today  - discussed adequate oral hydration    9.  - 4 gram Mg today    10-12  - on eliquis 5 mg bid. Continue      Follow-up:     RTC in 2 weeks.      Route Chart for Scheduling    Med Onc Chart Routing      Follow up with physician . Appointments scheduled   Follow up with [facility] 2 weeks.   Infusion scheduling note gemcitabine/cisplatin every 2 weeks   Injection scheduling note    Labs CBC, CMP, CA 19-9, TSH and magnesium   Lab interval: every 2 weeks     Imaging    Pharmacy appointment    Other referrals          Treatment Plan Information   OP GEMCITABINE CISPLATIN Q3W + DURVALUMAB D8 C11+   Claudia Young MD   Upcoming Treatment Dates - OP GEMCITABINE CISPLATIN Q3W + DURVALUMAB D8 C11+    9/24/2022       Chemotherapy       gemcitabine (GEMZAR) 1,695 mg in sodium chloride 0.9% 250 mL chemo infusion       CISplatin (PLATINOL) 20 mg/m2 = 45 mg in sodium chloride 0.9% 500 mL chemo infusion       Pre-Hydration       sodium chloride 0.9% 500 mL with magnesium sulfate 2 g, potassium chloride 20 mEq infusion       Post-Hydration       magnesium sulfate 2g in water 50mL IVPB (premix)       sodium chloride 0.9% bolus 500 mL       Antiemetics       aprepitant (CINVANTI) injection 130 mg       palonosetron (ALOXI) 0.25 mg with Dexamethasone (DECADRON) 12 mg in NS  50 mL IVPB  10/15/2022       Chemotherapy       gemcitabine (GEMZAR) 1,695 mg in sodium chloride 0.9% 250 mL chemo infusion       CISplatin (PLATINOL) 20 mg/m2 = 45 mg in sodium chloride 0.9% 500 mL chemo infusion       Pre-Hydration       sodium chloride 0.9% 500 mL with magnesium sulfate 2 g, potassium chloride 20 mEq infusion       Post-Hydration       sodium chloride 0.9% bolus 500 mL       Antiemetics       aprepitant (CINVANTI) injection 130 mg       palonosetron (ALOXI) 0.25 mg with Dexamethasone (DECADRON) 12 mg in NS 50 mL IVPB  10/21/2022       Chemotherapy       gemcitabine (GEMZAR) 1,695 mg in sodium chloride 0.9% 250 mL chemo infusion       CISplatin (PLATINOL) 20 mg/m2 = 45 mg in sodium chloride 0.9% 500 mL chemo infusion       Pre-Hydration       sodium chloride 0.9% 500 mL with magnesium sulfate 2 g, potassium chloride 20 mEq infusion       Post-Hydration       magnesium sulfate 2g in water 50mL IVPB (premix)       sodium chloride 0.9% bolus 500 mL       Antiemetics       aprepitant (CINVANTI) injection 130 mg       palonosetron (ALOXI) 0.25 mg with Dexamethasone (DECADRON) 12 mg in NS 50 mL IVPB  11/11/2022       Chemotherapy       gemcitabine (GEMZAR) 1,695 mg in sodium chloride 0.9% 250 mL chemo infusion       CISplatin (PLATINOL) 20 mg/m2 = 45 mg in sodium chloride 0.9% 500 mL chemo infusion       Pre-Hydration       sodium chloride 0.9% 500 mL with magnesium sulfate 2 g, potassium chloride 20 mEq infusion       Post-Hydration       sodium chloride 0.9% bolus 500 mL       Antiemetics       aprepitant (CINVANTI) injection 130 mg       palonosetron (ALOXI) 0.25 mg with Dexamethasone (DECADRON) 12 mg in NS 50 mL IVPB

## 2022-10-18 NOTE — [employer] PATIENT INSTRUCTIONS
Discharge Summary/Instructions for after Video Capsule Endoscopy  Patient Name: Domonique Kellogg  Patient MRN: [id number]  Patient YOB: 1950 Wednesday, September 21, 2022  Nicolas Alvarado MD  This is a 72 year old male.  1.  Do Not eat or drink anything for 1 hour.  Try sips of water first.  If   tolerated, resume your regular diet or one recommended by your physician.  2.  Do not drive, operate machinery, make critical decisions, or do   activities that require coordination or balance for 24 hours.  3.   You may experience a sore throat for 24 to 48 hours.  You may use   throat lozenges or gargle with warm salt water to relieve the discomfort.  4.  Because air was put into your stomach during the procedure, you may   experience some belching.  5.  Do not use any medication containing aspirin for 10 days.  6.  Go directly to the emergency room if you notice any of the following:   Chills and/or fever over 101 F   Persistent vomiting or vomiting with blood/nasal regurgitation   Severe abdominal pain, other than gas cramps   Severe chest pain   Black, tarry stools     Your doctor recommends these additional instructions:  Return to your referring physician.  If you have any questions or problems, please call your physician.  EMERGENCY PHONE NUMBER: [phone]  LAB RESULTS: [phone]  Nicolas Alvarado MD  10/17/2022 11:48:41 PM  This report has been verified and signed electronically.  Dear patient,  As a result of recent federal legislation (The Federal Cures Act), you may   receive lab or pathology results from your procedure in your [facility]   account before your physician is able to contact you. Your physician or   their representative will relay the results to you with their   recommendations at their soonest availability.  Thank you,  [employer]

## 2022-10-21 NOTE — PLAN OF CARE
DISCONTINUE OFF PATHWAY REGIMEN - Other            Gemcitabine (Gemzar)       Cisplatin (Platinol)     **Always confirm dose/schedule in your pharmacy ordering system**    REASON: Disease Progression  PRIOR TREATMENT:   TREATMENT RESPONSE: Stable Disease (SD)    START OFF PATHWAY REGIMEN - Other            Oxaliplatin (Eloxatin)       Leucovorin       Fluorouracil       Fluorouracil     **Always confirm dose/schedule in your pharmacy ordering system**    Patient Characteristics:  Intent of Therapy:  Non-Curative / Palliative Intent, Discussed with Patient

## 2022-10-21 NOTE — NURSING
Pt tolerated infusion well. Received Gemzar,  No signs of extravasation noted.  Good blood return noted prior to start of Gemzar Site remains free of discoloration, pt denies any pain.  Pt updated on plan of care.

## 2022-10-21 NOTE — PROGRESS NOTES
"                                                PROGRESS NOTE    Subjective:       Patient ID: Domonique Kellogg is a 72 y.o. male.    Chief Complaint: follow up for cholangiocarcinoma    Diagnosis:  Advanced intrahepatic cholangiocarcinoma, MSI-low, negative for FGFR2 fusion or IDH1/IDH2 mutations, diagnosed on 11/22/2021.     Molecular Profile:  Guardant 360 11/29/21: +CCND1 amplification. VUS: CATRACHITO [id number]. MSI-high not detected.   Strata: ASXL1 mutation, CCND1 amplification, MSI-low, TMB low.     Oncologic History:  1. Mr Kellogg is a 72 yo man with CAD, HTN, seizures in 2011 who initially saw me on 11/29/21 for further management of cholangiocarcinoma. He presented with weight loss and diarrhea since July. US 9/22/21 showed "Enlarged, heterogeneous appearance of the liver likely due to multiple underlying hepatic lesions largest measuring 4.8 cm."  MRI abdomen w/wo contrast 10/4/21: "Multiple liver lesions measuring up to 13.3 cm in the right hepatic lobe.  Differential diagnosis includes metastases, fibrolamellar hepatocellular carcinoma, or atypical focal nodular hyperplasia.  Consider biopsy for further evaluation. Thrombosis of the anterior branch of the right portal vein and left hepatic vein.  Nonvisualization of the middle and right hepatic veins, which may be thrombosed as well. Prominent periportal lymph node, which is nonspecific."  EGD and colonoscopy on 10/11/21 were negative for primary malignancies.   He underwent liver lesion biopsy and Y90 mapping on 11/22/21. Pathology showed adenocarcinoma: "Biopsy of the liver mass shows a malignant neoplasm in a fibrotic stroma. Glandular architecture is readily identified with several foci of intraluminal necrosis. Scattered mitotic figures are seen. Neoplastic cells show the following immunophenotype:   Positive: AE1/AE3, CK7, CDX2   Negative: Chromogranin, Synaptophysin, TTF, CK5/6, CK20, HepPar1   The morphology and immunophenotype are compatible with " "adenocarcinoma. Considerations for a primary site include pancreaticobiliary (including intrahepatic cholangiocarcinoma) as well as upper gastrointestinal. Clinical and radiologic correlation is suggested."  Patient presents today with wife for further management. No pain. Initial weight loss has somewhat stabilized. Still has diarrhea. Has not tried imodium yet. +nervous  Mother had kidney cancer at age 64. Maternal grandmother had liver cancer in her 60s. Patient has three children, two daughters and one son.   Discussed palliative chemo with cis/gem  2. PET scan 21 did not show extrahepatic metastases.   3. Cis/gem started on 21. Durvalumab was initially denied by insurance company after GI ASC[facility], but approved after FDA approval, added 8/10/22. Patient had reaction to durv (wheezing, rigors, shaking, high BP) on . Imfinzi was stopped.   4. S/p TACE on 2021, 22, 22, 22, 22  5. S/p RF Ablation on 22. TACE on 22    Interval History:   Mr Kellogg returns for cycle 14 day 1 of gem/cis. Feeling well. Mild neuropathy in feet.     ECO    ROS:   A ten-point system review is obtained and negative except for what was stated in the Interval History.     Physical Examination:   Vital signs reviewed.   General: well hydrated, well developed, in no acute distress  HEENT: normocephalic, EOMI, anicteric sclerae, pale conjunctivae  Neck: supple, no cervical lymphadenopathy, no thyromegaly or JVD  Chest: right chest port site clean  Lungs: clear breath sounds bilaterally, no wheezing/rales/rhonchi  Heart: RRR, no M/R/G  Abdomen: soft, no tenderness, nondistended  Ext: no clubbing, cyanosis, edema  Skin: no open wound, ulcers, rash.   Neuro: alert and oriented x 4  Psych: pleasant and appropriate mood and affect    Objective:     Laboratory Data:  Labs reviewed. Plt 88. Cr 1.3    Imaging Data:  PET 21:  In this patient with known intrahepatic cholangiocarcinoma, there are " multifocal hypermetabolic hepatic lesions in both hepatic lobes.  No evidence of distant metastasis.     Lipomatosis of the ileocecal valve and proximal cecum.     Fat containing umbilical hernia.    MRI Abdomen 1/18/22:  1. Patient with reported cholangiocarcinoma.  Interval progression of hepatic tumor burden with multiple enlarging and new hepatic lesions.  2. Progression of portal venous thrombosis involving the left, right, and main portal veins.  Persistent filling defect within the central hepatic veins concerning for thrombosis.  3. Stable prominent periportal lymph node.    CT chest 3/4/22:  Right pulmonary nodules, unchanged.  Recommend continued follow-up as per clinical protocol.    MRI abdomen 3/14/22:     Interval postprocedural changes status post TACE with slight improvement of tumor burden at treatment sites.  Extensive residual disease throughout both hepatic lobes.  Index lesions as above.     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     Stable appearance of central hepatic veins, underlying thrombosis not excluded.  Consider further evaluation with Doppler ultrasound if clinically warranted.     Stable prominent periportal lymph node.     Right adrenal adenoma.    CT urogram 3/14/22:  Prostatomegaly which demonstrates mass effect on the posterior bladder.  There is mild diffuse wall thickening of the bladder which may relate to outlet obstruction.     Postprocedural changes of the liver in keeping with recent chemoembolization.  Protocol is not optimized for evaluation of tumor response.  Dedicated multiphase liver CT would be necessary to evaluate liver directed therapy response.    MRI A/P 6/19/22:  - Evolving posttreatment changes in the liver for multifocal intrahepatic cholangiocarcinoma.  No new focal hepatic lesions.  - Persistent portal vein thrombosis, likely tumor thrombus, with cavernous transformation.  - No abdominopelvic metastases.      CT chest 6/15/22:  1. Stable pulmonary nodules measuring up to 0.4 cm.  No definite new or enlarging pulmonary nodules.  2. Scattered regions of opacification within the right lung base with air bronchograms, favored to represent atelectasis.  However, early consolidative changes cannot be completely excluded.    US bilateral legs 6/9/22:  Partially occlusive deep venous thrombosis of the left femoral vein.    MRI abdomen 9/2/22:  1. Evolving post treatment changes in the liver for multifocal intrahepatic cholangiocarcinoma.  Interval radiofrequency ablation of 3 lesions in the left hepatic lobe without local residual disease.  Interval TACE of segment 4A and right hepatic artery noting residual disease in the right hepatic lobe as detailed above.  2. New enhancing lesions in segment 2/3 as described above.  3. 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  4. Additional stable findings.    MRI 10/19/22:  1. Evolving post treatment changes of multifocal intrahepatic cholangiocarcinoma with residual disease as detailed above.  2. Posttreatment changes of segment 3 from several prior chemoembolizations, two of the ablation cavities have decreased in size and one of the ablation cavities has increased in size while the remaining is stable.  3. Several peripherally enhancing lesions in segment 3 all of which have increased in size compared to most recent study, MRI abdomen pelvis 09/02/2022.  4. Persist enhancing portal venous tumor thrombus with cavernous transformation.  5. Additional findings as detailed above.    CT chest 10/19/22:  1.  Numerous upper lobe predominant centrilobular micro nodules are increased since prior exam.  These may represent areas of infectious bronchiolitis, non infectious bronchiolitis, acute hypersensitivity pneumonitis, pulmonary edema, etc..  The pattern of disease does not appear compatible with hematogenous metastasis (which is expected to be randomly  distributed).  However, attention on short-term follow-up imaging is recommended.       Assessment and Plan:     1. Intrahepatic cholangiocarcinoma    2. Secondary malignant neoplasm of liver    3. 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 Neuropathy    6. Chemotherapy-induced thrombocytopenia    7. Antineoplastic chemotherapy induced anemia    8. Acute deep vein thrombosis (DVT) of femoral vein of left lower extremity    9. Portal vein thrombosis    10. Hypertension, essential      1-4  - Mr Kellogg is a 73 yo man with advanced intrahepatic cholangiocarcinoma with multiple liver lesions. MSI-low, FGFR2 fusion and IDH1/2 mutation negative. Started on cis/gem on 12/7/21. Durvalumab added on 8/10/22 after FDA approval. Stopped after he had a reaction on 9/9/22.   - S/p TACE on 12/16/2021, 2/14/22, 4/19/22, 5/18/22, 8/2/22. RF ablation on 7/19/22. TACE on 9/28/22  - reviewed CT and MRI results with patient and wife. Micronodules in lungs. I am not able to see those. May not be cancer. Cancers have progressed in the liver. Spoke with Dr Fitzgerald. He does not recommend further TACE for now. Will change chemotherapy to FOLFOX  - The side effects of FOLFOX were discussed with patient, which include but are not limited to hair thinning, fatigue, decreased appetite, weight loss, weakness, bone marrow suppression, increased risk of infection, sepsis, anemia that may require blood transfusion, low platelet counts with increased risk of bleeding that may require platelet transfusion, diarrhea, constipation, mucositis, damage to the brain, heart, liver, kidney, damage to the nerves that may not be reversible, severe allergic reaction, damage at the injection site, sterility, secondary cancer, death. Handouts provided to patient. Patient understands and agrees with the plan. Consent signed  - platelet 88. Will give gemcitabine 750 mg/m2 today  - return in 2 weeks to start  FOLFOX    5.  - 2/2 chemo  - mild. Start cymbalta 20 mg daily    6.  - mild. Adequate for reduced dose gemcitabine    7.  - worsened after eliquis increased to full dose  - seen by GI. Small bowel capsule 9/21/22 showed 3 angioectasias without bleeding in the proximal jejunum and in the mid to distal jejunum. A single angioectasia without bleeding in the proximal ileum. Recommend antegrade double-balloon enteroscopy if +signs of GI bleed and drop in Hb  - monitor. Asymptomatic. Will transfuse for Hb<7 or symptoms    8.9  - on eliquis 5 mg bid. Continue    10.  - BP controlled  - c/w current medication    Follow-up:     RTC in 2 weeks.      Route Chart for Scheduling    Med Onc Chart Routing      Follow up with physician 4 weeks. verify with PA re FOLFOX. return in 2 weeks with labs, see BRADLEY then start FOLFOX. see me in 4 weeks with labs, see me then get FOLFOX   Follow up with BRADLEY 2 weeks.   Infusion scheduling note FOLFOX every 2 weeks, remove pump day 3   Injection scheduling note    Labs CBC, CMP, CA 19-9 and magnesium   Lab interval: every 2 weeks     Imaging    Pharmacy appointment    Other referrals        Treatment Plan Information   OP GEMCITABINE CISPLATIN Q3W + DURVALUMAB D8 C11+   Claudia Young MD   Upcoming Treatment Dates - OP GEMCITABINE CISPLATIN Q3W + DURVALUMAB D8 C11+    10/16/2022       Chemotherapy       gemcitabine (GEMZAR) 1,695 mg in sodium chloride 0.9% 250 mL chemo infusion       Pre-Hydration       magnesium sulfate 2g in water 50mL IVPB (premix)       Antiemetics       aprepitant (CINVANTI) injection 130 mg       palonosetron (ALOXI) 0.25 mg with Dexamethasone (DECADRON) 12 mg in NS 50 mL IVPB

## 2022-10-21 NOTE — PLAN OF CARE
PT updated on plan of care. Tolerating infusion well. Verbalized understanding on plan.  Updated on any issues.  All needs met at this time.  Encouraged to notify nurse of needs.

## 2022-10-25 NOTE — PROGRESS NOTES
Subjective:       Patient ID: Domonique Kellogg is a 72 y.o. male.    Chief Complaint: Cancer (Cholangiocarcinoma)    Virtual visit with patient for follow up of cholangiocarcinoma. He is in care and receiving systemic therapy with oncology. He has also been treated with chemoembolization; his last TACE was 9/28/2022.  He reports feeling well. He denies any abdominal pain or abdominal distention. He had an MRI and labs on 10/19/2022.    Review of Systems   Constitutional:  Negative for activity change, appetite change, chills, fatigue and fever.   Respiratory:  Negative for cough, shortness of breath, wheezing and stridor.    Cardiovascular:  Negative for chest pain, palpitations and leg swelling.   Gastrointestinal:  Negative for abdominal distention, abdominal pain, constipation, diarrhea, nausea and vomiting.   Musculoskeletal:  Positive for joint swelling (ankles).       Objective:      Physical Exam  Constitutional:       General: He is not in acute distress.     Appearance: He is well-developed. He is not diaphoretic.   HENT:      Head: Normocephalic and atraumatic.   Pulmonary:      Effort: Pulmonary effort is normal. No respiratory distress.   Neurological:      Mental Status: He is alert and oriented to person, place, and time.   Psychiatric:         Behavior: Behavior normal.         Thought Content: Thought content normal.         Judgment: Judgment normal.       Assessment:       Problem List Items Addressed This Visit    None  Visit Diagnoses       Cholangiocarcinoma    -  Primary            Plan:        Reviewed MRI and labs with Dr. Fitzgerald. Explained to patient MRI and labs support disease progression. Explained TACE did not improve disease burden.Advise to keep oncology appointments. Patient verbalized understanding and agreement.

## 2022-11-01 PROBLEM — K37 APPENDICITIS: Status: ACTIVE | Noted: 2022-01-01

## 2022-11-01 NOTE — ED PROVIDER NOTES
"Encounter Date: 11/1/2022       History     Chief Complaint   Patient presents with    Abdominal Pain     Pt c/o abdominal pain x 1wk.  States had labs yesterday and told to come in for possible "blockage of some sort"     72-year-old male with past medical history of CAD, hypertension, intrahepatic cholangiocarcinoma, seizure disorder, obstructive sleep apnea who presents to the emergency department with chief complaint of abdominal pain.  His pain began about 1 week ago.  It is mostly generalized in nature.  He describes cramping as well as sharp pain.  He has been taking Bentyl and using a heating pad with some improvement.  He had lab work done yesterday and was instructed to come to the ED for further evaluation.  He denies chest pain, shortness of breath, nausea, vomiting.  He did have a few episodes of diarrhea when the pain 1st started, but now he feels more constipated and has to strain to have a bowel movement.  His last bowel movement was this morning.  He does report 1 episode of fever.  He documented his temperature at 100.4° over the weekend.  He denies any other worsening or alleviating factors.    Review of patient's allergies indicates:   Allergen Reactions    Imfinzi [durvalumab] Other (See Comments)     1305- Imfinzi ended, pt returned from restroom complained of wheezing SOB and rigors. /100  O2 92% 2L NC O2 applied to pt.   1306-Demerol 25mg given and Solucortef 125mg IVPush given.   1309- Pt /106  02 99%.   1318- Pt still having Rigors additional 25mg of Demerol given.   1418- BP is 133/69 77 HR 99% RA Pt states " I feel much better"       Indomethacin Other (See Comments)     Headache dizziness      Adhesive Rash     Past Medical History:   Diagnosis Date    Adjustment disorder     Anticoagulant long-term use     Anxiety     Arthritis     CAD (coronary artery disease) 03/02/2015    non-obstructive per [facility]     Cataract     Dry eye syndrome     Hypertension     " 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/2021    Obesity     Post-procedural fever 12/18/2021    Seizures     2011    Sleep apnea     + CPAP    Sleep difficulties      Past Surgical History:   Procedure Laterality Date    ANTERIOR CERVICAL DISCECTOMY W/ FUSION N/A 07/06/2020    Procedure: DISCECTOMY, SPINE, CERVICAL, ANTERIOR APPROACH, WITH FUSION C3-4, C4-5;  Surgeon: Fabiola Vides MD;  [facility];  Service: Neurosurgery;  Laterality: N/A;  TORONTO III, ASA III, BLOOD TYPE & SCREEN, NEUROMONITORING EMG-MEP-SEP, SUPINE POSITION,BRACE [place],REGULAR BED, HEADREST CASPAR, POSITION, MAP>85, RADIOLOGY C-ARM, SPECIAL EQUIPMENT [facility]    COLONOSCOPY N/A 10/01/2021    Procedure: COLONOSCOPY;  Surgeon: Nicolas Alvarado MD;  [facility]  Service: Endoscopy;  Laterality: N/A;  EGD and colonoscopy for diarrhea and weight loss and Enlarged, heterogeneous appearance of the liver likely due to multiple underlying hepatic lesions largest measuring 4.8 cm.  Findings concerning for metastatic versus less likely primary hepatic neoplasm.  Recommend fur    COLONOSCOPY N/A 06/07/2022    Procedure: COLONOSCOPY;  Surgeon: Mejia Villafuerte MD;  Location: [facility] (4TH FLR);  Service: Endoscopy;  Laterality: N/A;  For evaluation of positive out-patient FOBT.   medically urgent  ok to hold Eliquis per [place] Edward/RB  fully vaccinated  hx of seizures- last one 2014    ESOPHAGOGASTRODUODENOSCOPY N/A 10/01/2021    Procedure: EGD (ESOPHAGOGASTRODUODENOSCOPY);  Surgeon: Nicolas Alvarado MD;  [facility]  Service: Endoscopy;  Laterality: N/A;  EGD and colonoscopy for diarrhea and weight loss and Enlarged, heterogeneous appearance of the liver likely due to multiple underlying hepatic lesions largest measuring 4.8 cm.  Findings concerning for metastatic versus less likely primary hepatic neopl    ESOPHAGOGASTRODUODENOSCOPY N/A 04/05/2022    Procedure: EGD (ESOPHAGOGASTRODUODENOSCOPY);   Surgeon: Amado Kelsey MD;  Location: [facility] ENDO (4TH FLR);  Service: Endoscopy;  Laterality: N/A;  EGD in 8 weeks to check for esophagitis healing patient should be on pantoprazole 40 mg once daily  ok to hold eliquis x2 days per Dr. Young, see telephone encounter  fully vaccinated    EYE SURGERY      cataract    INSERTION OF VENOUS ACCESS PORT N/A 12/03/2021    Procedure: INSERTION, VENOUS ACCESS PORT;  Surgeon: Saurav Garcai MD;  Location: [facility] OR 2ND FLR;  Service: General;  Laterality: N/A;    INTRAOCULAR PROSTHESES INSERTION Right 07/16/2018    Procedure: INSERTION-INTRAOCULAR LENS (IOL);  Surgeon: Priscilla Hays MD;  Location: [facility];  Service: Ophthalmology;  Laterality: Right;    INTRAOCULAR PROSTHESES INSERTION Left 08/13/2018    Procedure: INSERTION, INTRAOCULAR LENS PROSTHESIS;  Surgeon: Priscilla Hays MD;  Location: [facility];  Service: Ophthalmology;  Laterality: Left;    KNEE SURGERY Right     PHACOEMULSIFICATION OF CATARACT Right 07/16/2018    Procedure: PHACOEMULSIFICATION-ASPIRATION-CATARACT;  Surgeon: Priscilla Hays MD;  Location: [facility];  Service: Ophthalmology;  Laterality: Right;    PHACOEMULSIFICATION OF CATARACT Left 08/13/2018    Procedure: PHACOEMULSIFICATION, CATARACT;  Surgeon: Priscilla Hays MD;  Location: [facility];  Service: Ophthalmology;  Laterality: Left;    WISDOM TOOTH EXTRACTION       Family History   Problem Relation Age of Onset    Diabetes Mother     Hypertension Mother     Kidney cancer Mother 61    Cancer Mother         renal & pancreatic    Heart disease Father     No Known Problems Sister     Cancer Maternal Grandmother     Liver disease Maternal Grandmother     Heart disease Paternal Grandfather     Heart disease Brother     No Known Problems Daughter     No Known Problems Son     No Known Problems Sister     No Known Problems Sister     No Known Problems Sister     No Known Problems Daughter     Blindness Neg Hx     Macular degeneration Neg Hx     Retinal detachment  Neg Hx     Glaucoma Neg Hx     Melanoma Neg Hx     Pancreatic cancer Neg Hx     Bladder Cancer Neg Hx     Uterine cancer Neg Hx     Ovarian cancer Neg Hx     Celiac disease Neg Hx     Cirrhosis Neg Hx     Colon cancer Neg Hx     Colon polyps Neg Hx     Crohn's disease Neg Hx     Esophageal cancer Neg Hx     Inflammatory bowel disease Neg Hx     Liver cancer Neg Hx     Rectal cancer Neg Hx     Stomach cancer Neg Hx     Ulcerative colitis Neg Hx      Social History     Tobacco Use    Smoking status: Former     Packs/day: 1.00     Years: 20.00     Pack years: 20.00     Types: Cigarettes     Quit date: 1987     Years since quittin.8    Smokeless tobacco: Never    Tobacco comments:     quit    Substance Use Topics    Alcohol use: Not Currently     Alcohol/week: 1.0 - 2.0 standard drink     Types: 1 - 2 Glasses of wine per week     Comment: not since liver diagnosis    Drug use: No     Review of Systems   Constitutional:  Positive for fever.   HENT:  Negative for sore throat.    Respiratory:  Negative for shortness of breath.    Cardiovascular:  Negative for chest pain.   Gastrointestinal:  Positive for abdominal pain, constipation and diarrhea. Negative for nausea.   Genitourinary:  Negative for dysuria.   Musculoskeletal:  Negative for back pain.   Skin:  Negative for rash.   Neurological:  Negative for weakness.   Hematological:  Does not bruise/bleed easily.     Physical Exam     Initial Vitals [22 1215]   BP Pulse Resp Temp SpO2   (!) 100/58 101 20 98.2 °F (36.8 °C) 96 %      MAP       --         Physical Exam    Nursing note and vitals reviewed.  Constitutional: He appears well-developed and well-nourished. He is not diaphoretic. No distress.   HENT:   Head: Normocephalic and atraumatic.   Mouth/Throat: Oropharynx is clear and moist.   Eyes: EOM are normal. Pupils are equal, round, and reactive to light.   Neck: Neck supple.   Normal range of motion.  Cardiovascular:  Normal rate, regular rhythm,  normal heart sounds and intact distal pulses.     Exam reveals no gallop and no friction rub.       No murmur heard.  Pulmonary/Chest: Breath sounds normal. He has no wheezes. He has no rhonchi. He has no rales.   Abdominal: Abdomen is soft. Bowel sounds are normal. There is no abdominal tenderness. A hernia is present. Hernia confirmed positive in the umbilical area. There is no rebound and no guarding.   Genitourinary:    Genitourinary Comments: Rectal exam performed with nursing chaperone present.  Brown stool in vault.  Guaiac negative.     Musculoskeletal:         General: Normal range of motion.      Cervical back: Normal range of motion and neck supple.     Neurological: He is alert and oriented to person, place, and time. He has normal strength. GCS score is 15. GCS eye subscore is 4. GCS verbal subscore is 5. GCS motor subscore is 6.   Skin: Skin is warm and dry. Capillary refill takes less than 2 seconds.   Psychiatric: He has a normal mood and affect. His behavior is normal. Judgment and thought content normal.       ED Course   Procedures  Labs Reviewed   CBC W/ AUTO DIFFERENTIAL - Abnormal; Notable for the following components:       Result Value    WBC 14.53 (*)     RBC 2.07 (*)     Hemoglobin 6.7 (*)     Hematocrit 20.7 (*)      (*)     MCH 32.4 (*)     RDW 21.9 (*)     Platelets 97 (*)     MPV 13.3 (*)     Immature Granulocytes 2.3 (*)     Gran # (ANC) 10.9 (*)     Immature Grans (Abs) 0.34 (*)     Mono # 1.6 (*)     nRBC 1 (*)     Gran % 75.0 (*)     Lymph % 11.1 (*)     Platelet Estimate Decreased (*)     All other components within normal limits   COMPREHENSIVE METABOLIC PANEL - Abnormal; Notable for the following components:    Glucose 169 (*)     BUN 39 (*)     Total Protein 5.8 (*)     Albumin 2.2 (*)     Total Bilirubin 3.0 (*)     Alkaline Phosphatase 383 (*)     AST 59 (*)     eGFR 53.1 (*)     All other components within normal limits   URINALYSIS, REFLEX TO URINE CULTURE - Abnormal;  Notable for the following components:    Specific Gravity, UA >=1.030 (*)     All other components within normal limits    Narrative:     Specimen Source->Urine   ISTAT PROCEDURE - Abnormal; Notable for the following components:    POC Glucose 174 (*)     POC BUN 37 (*)     POC Creatinine 1.6 (*)     POC Hematocrit 19 (*)     All other components within normal limits   HIV 1 / 2 ANTIBODY    Narrative:     Release to patient->Immediate   HEPATITIS C ANTIBODY    Narrative:     Release to patient->Immediate   LIPASE   LACTIC ACID, PLASMA   TYPE & SCREEN   ISTAT CHEM8   PREPARE RBC SOFT          Imaging Results              US Abdomen Limited (Final result)  Result time 11/01/22 16:51:32      Final result by Caesar Garcia IV, MD (11/01/22 16:51:32)                   Impression:      Multiple hepatic hyperechoic lesions consistent with history of intrahepatic cholangiocarcinoma noting prior chemo-embolizations.  Findings better characterized on concurrent CT.    Cholelithiasis.  Gallbladder appears contracted, difficult to accurately assess.  No abnormal biliary duct dilatation.  Recommend clinical correlation.    Small volume ascites.    Mild splenomegaly.    Electronically signed by resident: Jluis Chapa  Date:    11/01/2022  Time:    16:22    Electronically signed by: Caesar Garcia  Date:    11/01/2022  Time:    16:51               Narrative:    EXAMINATION:  US ABDOMEN LIMITED    CLINICAL HISTORY:  hyperbilirubinemia;.    TECHNIQUE:  Limited ultrasound of the right upper quadrant of the abdomen including pancreas, liver, gallbladder, common bile duct was performed.    COMPARISON:  MRI abdomen pelvis 10/19/2022.    FINDINGS:  Liver: Normal in size, measuring 15.3 cm. Homogeneous echotexture. HRI: 0.78 Multiple hepatic hyperechoic lesions consistent with history of intrahepatic cholangiocarcinoma noting reported multiple prior chemo-embolizations.  Largest lesion in the right hepatic lobe measures 7.2 x 7.6  x 5.8 cm.  Findings better characterized on concurrent CT.    Gallbladder: Contracted gallbladder.  5 mm gallstone.  Gallbladder wall measures 7 mm, likely due to nondistention.  No sonographic Dick's sign.    Biliary system: The common duct is not dilated, measuring 1 mm.  No intrahepatic ductal dilatation.    Spleen: Mildly enlarged with homogeneous echotexture, measuring 12.9 x 3.8 cm.    Pancreas: The visualized portions of pancreas appear normal.    Miscellaneous: Small volume ascites.                                        CT Abdomen Pelvis With Contrast (Final result)  Result time 11/01/22 17:59:11      Final result by Walter Walsh MD (11/01/22 17:59:11)                   Impression:      1. The appendix is dilated to approximately 1.3 cm on axial 113. There is mild wall thickening and enhancement. Mild adjacent stranding. Findings are consistent with acute appendicitis.  Recommend surgical consultation and follow-up.  There is an adjacent 3.4 cm fluid collection on axial 122. There are few small foci of air on axial 114 and sagittal 76.  This could be a fluid-filled component of the cecum, diverticulum or possible adjacent early abscess.  Recommend surgical consultation and follow-up  2. History of cholangiocarcinoma with multiple lobular masses throughout the liver most prominent the right lobe consistent with neoplasm.  3. Mild free fluid/ascites in the abdomen and pelvis.  4. Moderate fat containing umbilical hernia.  5. Urinary bladder wall thickening.  This could be associated with chronic bladder outlet obstruction or cystitis.  Follow-up recommended.  6. Prostatomegaly.  7. Diverticulosis of the colon.  Nondistention of the left colon with possible mild wall thickening of the sigmoid colon and descending colon.  Mild acute diverticulitis/colitis of the left colon cannot be excluded.  See above comments.  Follow-up recommended.  8. See above comments also.  9.  This report was flagged in Epic as  abnormal.      Electronically signed by: Walter Marshall  Date:    11/01/2022  Time:    17:59               Narrative:    EXAMINATION:  CT ABDOMEN PELVIS WITH CONTRAST    CLINICAL HISTORY:  Abdominal pain, acute, nonlocalized;  cholangiocarcinoma    TECHNIQUE:  Low dose axial images, sagittal and coronal reformations were obtained from the lung bases to the pubic symphysis following the IV administration of 100 mL of Omnipaque 350 .  Oral contrast was not administered.    Study performed as an emergency procedure.    COMPARISON:  MRI 10/19/2022, CT 03/14/2022    FINDINGS:  Abdomen:    - Lower thorax:    - Lung bases: Mild right basilar atelectasis.    - Liver: Multiple lobular masses in the liver most prominent in the right lobe.  11.4 x 9.5 cm lobular mass in the right lobe on axial 32.  Findings consistent with neoplasm.    Mild free fluid/ascites in the abdomen and pelvis adjacent to the liver and spleen.    Recanalization of the umbilical vein may be associated with venous hypertension.  Mild periumbilical varices.    - Gallbladder: Status post cholecystectomy.    - Bile Ducts: No evidence of intra or extra hepatic biliary ductal dilation.    - Spleen: Negative.    - Kidneys: Small left renal cyst.  No hydronephrosis bilaterally.    - Adrenals: Unremarkable.    - Pancreas: No mass or peripancreatic fat stranding.    - Retroperitoneum:  No significant adenopathy.    - Vascular: No abdominal aortic aneurysm.    - Abdominal wall:  Moderate fat containing umbilical hernia.    Pelvis:    Urinary bladder is nondistended with mild diffuse wall thickening.  Prostate is enlarged measuring 6.9 cm on sagittal 93 with protrusion at the bladder base.  Recommend clinical correlation and correlation with PSA.    Mild free fluid/ascites in the abdomen and pelvis.    Bowel/Mesentery:    There is mild diverticulosis throughout the colon.  Nondistention of the left colon.  Possible mild wall thickening of the sigmoid colon and  descending colon.  Mild acute diverticulitis/colitis of the left colon cannot be excluded.  Mild adjacent free fluid limits characterization.    The appendix is dilated to approximately 1.3 cm on axial 113.  There is mild wall thickening and enhancement.  Mild adjacent stranding.  Findings are consistent with acute appendicitis.  Recommend surgical consultation and follow-up.  There is an adjacent 3.4 cm fluid collection on axial 122.  There are few small foci of air on axial 114 and sagittal 76.  This could be a fluid-filled component of the cecum or possible adjacent early abscess.  Recommend surgical consultation and follow-up.    Bones:  No acute osseous abnormality and no suspicious lytic or blastic lesion.                                       Medications   0.9%  NaCl infusion (for blood administration) (has no administration in time range)   sodium chloride 0.9% bolus 500 mL (0 mLs Intravenous Stopped 11/1/22 1722)   iohexoL (OMNIPAQUE 350) injection 100 mL (100 mLs Intravenous Given 11/1/22 1519)   piperacillin-tazobactam 4.5 g in sodium chloride 0.9% 100 mL IVPB (ready to mix system) (0 g Intravenous Stopped 11/1/22 2038)     Medical Decision Making:   History:   Old Medical Records: I decided to obtain old medical records.  Initial Assessment:   Emergent evaluation of a 72 y.o. male presenting to the emergency department complaining of abdominal pain that began one week ago. Taking bentyl at home and using a heating pad with some relief.  He does report that he saw blood on the toilet paper after he defecated 2 days ago.  Denies hematochezia or melena.  Patient is afebrile, hemodynamically stable, and non toxic appearing.  Will order labs and imaging.    Differential Diagnosis:   Differential diagnosis includes but is not limited to cholangitis, choledocholithiasis, pancreatitis, small-bowel obstruction, colitis.  Clinical Tests:   Lab Tests: Ordered and Reviewed  Radiological Study: Ordered and  Reviewed  ED Management:  UA without infection.  Leukocytosis of 14.5.  Hemoglobin 6.7.  This is decreased from prior.  Patient does report that he had a bowel movement over the weekend and noticed bright red blood on the toilet paper.  He denies melena or hematochezia.  His rectal exam is guaiac negative.  There is no obvious bleeding.  Blood consent obtained.  Will order and transfuse 1 unit PRBCs.  BUN 39.  Creatinine 1.4.  Total bilirubin 3.0.  This is elevated from baseline.  LFTs are also elevated.  Lipase 26.  Lactate 2.1.  CT abdomen pelvis reveals appendicitis with possible abscess.  Will cover with Zosyn and consult General surgery.    General surgery has evaluated the patient.  He will be admitted to their service.  Discussed plan with patient.  All questions answered.  The patient's history, physical exam, and plan of care was discussed with and agreed upon with my supervising physician.                ED Course as of 11/01/22 2039 Tue Nov 01, 2022   1541 WBC(!): 14.53 [JM]   1541 Hemoglobin(!): 6.7 [JM]   1541 Hematocrit(!): 20.7 [JM]   1541 Platelets(!): 97 [JM]   1541 Glucose(!): 169 [JM]   1541 BUN(!): 39 [JM]   1541 Creatinine: 1.4 [JM]   1541 BILIRUBIN TOTAL(!): 3.0 [JM]   1541 Alkaline Phosphatase(!): 383 [JM]   1541 AST(!): 59 [JM]   1541 Lactate, Alber: 2.1 [JM]   1541 Lipase: 26 [JM]   1806 CT Abdomen Pelvis With Contrast(!) [DS]   1815 Serial abdominal exam is benign but CT concerning for appendicitis.  Questionable focus of free air fluid collection.  General surgery on-call paged.  IV antibiotics ordered.  Blood pressure improved.  Transfusion initiated. [DS]      ED Course User Index  [DS] Cale Patel MD  [JM] Rashida Vanegas PA-C                 Clinical Impression:   Final diagnoses:  [K35.21] Acute appendicitis with perforation, generalized peritonitis, and abscess, without gangrene (Primary)  [D64.9] Anemia, unspecified type      ED Disposition Condition    Admit Stable                 Rashida Vanegas PA-C  11/01/22 2031

## 2022-11-02 PROBLEM — G62.9 NEUROPATHY: Status: ACTIVE | Noted: 2022-01-01

## 2022-11-02 PROBLEM — K21.9 GERD (GASTROESOPHAGEAL REFLUX DISEASE): Status: ACTIVE | Noted: 2022-01-01

## 2022-11-02 PROBLEM — K42.9 UMBILICAL HERNIA: Status: ACTIVE | Noted: 2022-01-01

## 2022-11-02 NOTE — TELEPHONE ENCOUNTER
Called and spoke with Mrs Kellogg. They are concerned that he will have a hernia surgery instead of appendix surgery. Unable to reach Dr Hamm, appear offline. Will have our patient team do consult on this patient and communicate with primary surgery team. Wife understands and agrees with the plan.

## 2022-11-02 NOTE — PLAN OF CARE
[facility] - [facility]  Initial Discharge Assessment       Primary Care Provider: Nicolas Marlow MD    Admission Diagnosis: Anemia, unspecified type [D64.9]  Acute appendicitis with perforation, generalized peritonitis, and abscess, without gangrene [K35.21]    Admission Date: 11/1/2022  Expected Discharge Date: 11/4/2022    Discharge Barriers Identified: None    Payor: HU[employer] MANAGED MEDICARE / Plan: HU[employer] TOTAL CARE ADVANTAGE / Product Type: Medicare Advantage /     Extended Emergency Contact Information  Primary Emergency Contact: KelloggEstrellita Reynoso  Address: [address]           [place] [address] [facility]  Home Phone: [phone]  Mobile Phone: [phone]  Relation: Spouse    Discharge Plan A: Home with family  Discharge Plan B: Home Health, Home with family      [facility] [place] [address]. - [place], [place] - [address] [facility]  [address] [facility] [address]  Phone: [phone] Fax: [phone]    [facility] - [place], [place] - [address] [facility] [facility]  [address] [address]  Phone: [phone] Fax: [phone]    [id number] - [place], [place] - [address]  [address]  [facility] [address]  Phone: [phone] Fax: [phone]      Initial Assessment (most recent)       Adult Discharge Assessment - 11/02/22 1114          Discharge Assessment    Assessment Type Discharge Planning Assessment     Confirmed/corrected address, phone number and insurance Yes     Confirmed Demographics Correct on Facesheet     Source of Information patient;family   spouse    Communicated SALVATORE with patient/caregiver Yes     Lives With spouse     Do you expect to return to your current living situation? Yes     Do you have help at home or someone to help you manage your care at home? Yes     Who are your caregiver(s) and their phone number(s)? spouse     Prior to hospitilization cognitive status: Alert/Oriented     Current cognitive  status: Alert/Oriented     Home Layout Able to live on 1st floor     Equipment Currently Used at Home CPAP     Patient currently being followed by outpatient case management? No     Do you currently have service(s) that help you manage your care at home? No     Do you take prescription medications? Yes     Do you have prescription coverage? Yes     Do you have any problems affording any of your prescribed medications? No     Is the patient taking medications as prescribed? yes     How do you get to doctors appointments? family or friend will provide     Are you on dialysis? No     Discharge Plan A Home with family     Discharge Plan B Home Health;Home with family     DME Needed Upon Discharge  none     Discharge Plan discussed with: Patient;Spouse/sig other     Discharge Barriers Identified None                   Patient lives in a two story home with one entry step. Patient does not feel he will need any post acute services upon hospital.  His wife is primary caregiver who will help/assist in patient care once home. She will also provide transportation.

## 2022-11-02 NOTE — ASSESSMENT & PLAN NOTE
- Currently controlled  - Holding home meds in setting of NPO and normotensive  - Continue to monitor with q4 vitals and adjust regimen PRN

## 2022-11-02 NOTE — SUBJECTIVE & OBJECTIVE
"Oncology Treatment Plan:   OP GEMCITABINE CISPLATIN Q3W + DURVALUMAB D8 C11+    Medications:  Continuous Infusions:   lactated ringers 125 mL/hr at 11/02/22 1358     Scheduled Meds:   ceFEPime (MAXIPIME) IVPB  2 g Intravenous Q12H    DULoxetine  20 mg Oral Daily    enoxaparin  40 mg Subcutaneous Daily    finasteride  5 mg Oral Daily    metronidazole  500 mg Intravenous Q8H    pantoprazole  40 mg Oral Daily    tamsulosin  0.4 mg Oral QHS     PRN Meds:sodium chloride, acetaminophen, albuterol-ipratropium, LIDOcaine (PF) 10 mg/ml (1%), melatonin, ondansetron, oxyCODONE, oxyCODONE, prochlorperazine, sodium chloride 0.9%     Review of patient's allergies indicates:   Allergen Reactions    Imfinzi [durvalumab] Other (See Comments)     1305- Imfinzi ended, pt returned from restroom complained of wheezing SOB and rigors. /100  O2 92% 2L NC O2 applied to pt.   1306-Demerol 25mg given and Solucortef 125mg IVPush given.   1309- Pt /106  02 99%.   1318- Pt still having Rigors additional 25mg of Demerol given.   1418- BP is 133/69 77 HR 99% RA Pt states " I feel much better"       Indomethacin Other (See Comments)     Headache dizziness      Adhesive Rash        Past Medical History:   Diagnosis Date    Adjustment disorder     Anticoagulant long-term use     Anxiety     Arthritis     CAD (coronary artery disease) 03/02/2015    non-obstructive per [facility]     Cataract     Dry eye syndrome     Hypertension     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/2021    Obesity     Post-procedural fever 12/18/2021    Seizures     2011    Sleep apnea     + CPAP    Sleep difficulties      Past Surgical History:   Procedure Laterality Date    ANTERIOR CERVICAL DISCECTOMY W/ FUSION N/A 07/06/2020    Procedure: DISCECTOMY, SPINE, CERVICAL, ANTERIOR APPROACH, WITH FUSION C3-4, C4-5;  Surgeon: Fabiola Vides MD;  Location: [facility] OR [address] [place] [address];  Service: Neurosurgery;  Laterality: N/A;  TORONTO III, ASA III, BLOOD TYPE & SCREEN, " NEUROMONITORING EMG-MEP-SEP, SUPINE POSITION,BRACE [place],REGULAR BED, HEADREST CASPAR, POSITION, MAP>85, RADIOLOGY C-ARM, SPECIAL EQUIPMENT VIRGIL TYLER    COLONOSCOPY N/A 10/01/2021    Procedure: COLONOSCOPY;  Surgeon: Nicolas Alvarado MD;  Location: [facility] (4TH FLR);  Service: Endoscopy;  Laterality: N/A;  EGD and colonoscopy for diarrhea and weight loss and Enlarged, heterogeneous appearance of the liver likely due to multiple underlying hepatic lesions largest measuring 4.8 cm.  Findings concerning for metastatic versus less likely primary hepatic neoplasm.  Recommend furth    COLONOSCOPY N/A 06/07/2022    Procedure: COLONOSCOPY;  Surgeon: Mejia Villafuerte MD;  Location: [facility] (4TH FLR);  Service: Endoscopy;  Laterality: N/A;  For evaluation of positive out-patient FOBT.   medically urgent  ok to hold Eliquis per CAROLINA Edward/RB  fully vaccinated  hx of seizures- last one 2014    ESOPHAGOGASTRODUODENOSCOPY N/A 10/01/2021    Procedure: EGD (ESOPHAGOGASTRODUODENOSCOPY);  Surgeon: Nicolas Alvarado MD;  Location: [facility] (4TH FLR);  Service: Endoscopy;  Laterality: N/A;  EGD and colonoscopy for diarrhea and weight loss and Enlarged, heterogeneous appearance of the liver likely due to multiple underlying hepatic lesions largest measuring 4.8 cm.  Findings concerning for metastatic versus less likely primary hepatic neopl    ESOPHAGOGASTRODUODENOSCOPY N/A 04/05/2022    Procedure: EGD (ESOPHAGOGASTRODUODENOSCOPY);  Surgeon: Amado Kelsey MD;  Location: [facility] (4TH FLR);  Service: Endoscopy;  Laterality: N/A;  EGD in 8 weeks to check for esophagitis healing patient should be on pantoprazole 40 mg once daily  ok to hold eliquis x2 days per Dr. Young, see telephone encounter  fully vaccinated    EYE SURGERY      cataract    INSERTION OF VENOUS ACCESS PORT N/A 12/03/2021    Procedure: INSERTION, VENOUS ACCESS PORT;  Surgeon: Saurav Garcia MD;  Location: [facility] OR 2ND FLR;  Service: General;  Laterality:  N/A;    INTRAOCULAR PROSTHESES INSERTION Right 2018    Procedure: INSERTION-INTRAOCULAR LENS (IOL);  Surgeon: Priscilla Hays MD;  Location: [facility] OR;  Service: Ophthalmology;  Laterality: Right;    INTRAOCULAR PROSTHESES INSERTION Left 2018    Procedure: INSERTION, INTRAOCULAR LENS PROSTHESIS;  Surgeon: Priscilla Hays MD;  Location: [facility] OR;  Service: Ophthalmology;  Laterality: Left;    KNEE SURGERY Right     PHACOEMULSIFICATION OF CATARACT Right 2018    Procedure: PHACOEMULSIFICATION-ASPIRATION-CATARACT;  Surgeon: Priscilla Hays MD;  Location: [facility] OR;  Service: Ophthalmology;  Laterality: Right;    PHACOEMULSIFICATION OF CATARACT Left 2018    Procedure: PHACOEMULSIFICATION, CATARACT;  Surgeon: Priscilla Hays MD;  Location: [facility] OR;  Service: Ophthalmology;  Laterality: Left;    WISDOM TOOTH EXTRACTION       Family History       Problem Relation (Age of Onset)    Cancer Mother, Maternal Grandmother    Diabetes Mother    Heart disease Father, Paternal Grandfather, Brother    Hypertension Mother    Kidney cancer Mother (61)    Liver disease Maternal Grandmother    No Known Problems Sister, Daughter, Son, Sister, Sister, Sister, Daughter          Tobacco Use    Smoking status: Former     Packs/day: 1.00     Years: 20.00     Pack years: 20.00     Types: Cigarettes     Quit date: 1987     Years since quittin.8    Smokeless tobacco: Never    Tobacco comments:     quit    Substance and Sexual Activity    Alcohol use: Not Currently     Alcohol/week: 1.0 - 2.0 standard drink     Types: 1 - 2 Glasses of wine per week     Comment: not since liver diagnosis    Drug use: No    Sexual activity: Yes     Partners: Female       Review of Systems   Constitutional:  Negative for chills, fatigue and fever.   HENT:  Negative for congestion and dental problem.    Eyes:  Negative for photophobia and visual disturbance.   Respiratory:  Negative for cough and shortness of breath.    Cardiovascular:   Negative for chest pain and leg swelling.   Gastrointestinal:  Positive for abdominal pain. Negative for nausea and vomiting.   Genitourinary:  Negative for difficulty urinating and dysuria.   Musculoskeletal:  Negative for arthralgias and back pain.   Skin:  Negative for color change and pallor.   Neurological:  Negative for dizziness and facial asymmetry.   Psychiatric/Behavioral:  Negative for agitation and behavioral problems.    Objective:     Vital Signs (Most Recent):  Temp: 99.5 °F (37.5 °C) (11/02/22 0516)  Pulse: 96 (11/02/22 0516)  Resp: 18 (11/02/22 0516)  BP: 134/73 (11/02/22 0516)  SpO2: 95 % (11/02/22 0516) Vital Signs (24h Range):  Temp:  [98.4 °F (36.9 °C)-99.5 °F (37.5 °C)] 99.5 °F (37.5 °C)  Pulse:  [] 96  Resp:  [15-20] 18  SpO2:  [95 %-98 %] 95 %  BP: (121-137)/(68-85) 134/73     Weight: 99.8 kg (220 lb)  Body mass index is 33.45 kg/m².  Body surface area is 2.19 meters squared.      Intake/Output Summary (Last 24 hours) at 11/2/2022 1509  Last data filed at 11/2/2022 0521  Gross per 24 hour   Intake 821.25 ml   Output 275 ml   Net 546.25 ml       Physical Exam  Vitals and nursing note reviewed.   Constitutional:       General: He is not in acute distress.     Appearance: He is not ill-appearing or diaphoretic.   HENT:      Head: Normocephalic and atraumatic.      Nose: Nose normal. No congestion.   Eyes:      Extraocular Movements: Extraocular movements intact.      Conjunctiva/sclera: Conjunctivae normal.   Cardiovascular:      Rate and Rhythm: Normal rate.   Pulmonary:      Effort: Pulmonary effort is normal. No respiratory distress.   Abdominal:      General: There is no distension.      Palpations: Abdomen is soft.      Tenderness: There is no guarding or rebound.      Hernia: A hernia is present.      Comments: Mild RLQ and mid lower abdominal tenderness to palpation   Musculoskeletal:         General: No swelling or deformity.   Skin:     General: Skin is warm and dry.       Coloration: Skin is not jaundiced.   Neurological:      Mental Status: He is alert and oriented to person, place, and time.      Cranial Nerves: No cranial nerve deficit.   Psychiatric:         Mood and Affect: Mood normal.         Behavior: Behavior normal.       Significant Labs:   CBC:   Recent Labs   Lab 10/31/22  1643 11/01/22  1410 11/01/22  1416 11/02/22  0436   WBC 15.70* 14.53*  --  15.39*   HGB 7.7* 6.7*  --  7.9*   HCT 24.0* 20.7* 19* 24.5*   PLT 81* 97*  --  121*    and CMP:   Recent Labs   Lab 10/31/22  1643 11/01/22  1410 11/02/22  0436    138 140   K 4.2 4.0 3.5    105 107   CO2 27 23 26    169* 151*   BUN 41* 39* 30*   CREATININE 1.5* 1.4 1.5*   CALCIUM 11.1* 10.1 10.2   PROT 6.2 5.8* 5.6*   ALBUMIN 2.4* 2.2* 2.1*   BILITOT 3.1* 3.0* 3.7*   ALKPHOS 362* 383* 408*   AST 62* 59* 53*   ALT 42 37 34   ANIONGAP 10 10 7*       Diagnostic Results:  CT abdomen/pelvis (11/01):  1. The appendix is dilated to approximately 1.3 cm on axial 113. There is mild wall thickening and enhancement. Mild adjacent stranding. Findings are consistent with acute appendicitis.  Recommend surgical consultation and follow-up.  There is an adjacent 3.4 cm fluid collection on axial 122. There are few small foci of air on axial 114 and sagittal 76.  This could be a fluid-filled component of the cecum, diverticulum or possible adjacent early abscess.  Recommend surgical consultation and follow-up  2. History of cholangiocarcinoma with multiple lobular masses throughout the liver most prominent the right lobe consistent with neoplasm.  3. Mild free fluid/ascites in the abdomen and pelvis.  4. Moderate fat containing umbilical hernia.  5. Urinary bladder wall thickening.  This could be associated with chronic bladder outlet obstruction or cystitis. Follow-up recommended.  6. Prostatomegaly.  7. Diverticulosis of the colon.  Nondistention of the left colon with possible mild wall thickening of the sigmoid colon and  descending colon.  Mild acute diverticulitis/colitis of the left colon cannot be excluded.  See above comments.  Follow-up recommended.

## 2022-11-02 NOTE — ED NOTES
Assumed care of the patient. Report received from HERMILO Bedoya. Pt on continuous cardiac monitoring, continuous pulse oximetry, and automatic BP cuff cycling Q30min. Pt in hospital gown, side rails up X2, bed low and locked, and call light is placed within reach. 1 family/visitors at bedside at this time. Pt denies any complaints or needs.         LOC: The patient is awake, alert and aware of environment with an appropriate affect, the patient is oriented x 3 and speaking appropriately.   APPEARANCE: Patient appears comfortable and in no acute distress, patient is clean and well groomed.  SKIN: The skin is warm and dry, color consistent with ethnicity.   MUSCULOSKELETAL: Patient moving all extremities spontaneously, no swelling noted.  RESPIRATORY: Airway is open and patent, respirations are spontaneous, patient has a normal effort and rate, no accessory muscle use noted.  CARDIAC: Patient has a normal rate and regular rhythm, no edema noted, capillary refill < 3 seconds.   GASTRO: Soft and non tender to palpation, no distention noted. +abd pain  : Pt denies any pain or frequency with urination.  NEURO: Pt opens eyes spontaneously, behavior appropriate to situation, follows commands.

## 2022-11-02 NOTE — ASSESSMENT & PLAN NOTE
- Holding home eliquis (last dose 10/31/22) due to acute disease and procedures. Will restart when able

## 2022-11-02 NOTE — ANESTHESIA PREPROCEDURE EVALUATION
11/02/2022  Domonique Kellogg is a 72 y.o., male.      Pre-op Assessment    I have reviewed the Patient Summary Reports.       I have reviewed the Medications.     Review of Systems  Anesthesia Hx:  History of prior surgery of interest to airway management or planning:  Denies Personal Hx of Anesthesia complications.   Cardiovascular:   Hypertension CAD   PVD NL BiV Fxn on 12/21 Echo  Carotid artery disease  DVT   Pulmonary:   Sleep Apnea    Hepatic/GI:   GERD Liver Disease,    Musculoskeletal:   Arthritis     Neurological:   Neuromuscular Disease, Seizures    Psych:   Psychiatric History          Physical Exam  General: Well nourished    Airway:  Mallampati: III / II  Mouth Opening: Normal  TM Distance: Normal  Tongue: Normal  Neck ROM: Normal ROM    Chest/Lungs:  Normal Respiratory Rate    Heart:  Rate: Normal    Abdomen:  Nontender  Full but soft and non-tender      Anesthesia Plan  Type of Anesthesia, risks & benefits discussed:    Anesthesia Type: Gen ETT  Intra-op Monitoring Plan: Standard ASA Monitors  Post Op Pain Control Plan: multimodal analgesia  Induction:  IV  Airway Plan: Video  Informed Consent: Informed consent signed with the Patient and all parties understand the risks and agree with anesthesia plan.  All questions answered.   ASA Score: 3  Day of Surgery Review of History & Physical: H&P Update referred to the surgeon/provider.  Anesthesia Plan Notes: NPO confirmed.   Plan RSI for abdominal fullness.  Eliquis for clots seen with IR procedures.  Sleep apnea noted.  Good BiV Fxn on 12/21 Echo    Ready For Surgery From Anesthesia Perspective.     .

## 2022-11-02 NOTE — SUBJECTIVE & OBJECTIVE
"Interval History: NAEON. Afebrile. HDS. Reports that his RLQ pain has improved but now reports pain around umbilicus. Reports not having this pain before, think it started after coughing last night. Pain is controlled with current regimen. BM overnight. No new symptoms or concerns this morning. All questions answered.   WBC 14 > 15    Medications:  Continuous Infusions:   lactated ringers 125 mL/hr at 11/02/22 0015     Scheduled Meds:   DULoxetine  20 mg Oral Daily    enoxaparin  40 mg Subcutaneous Daily    finasteride  5 mg Oral Daily    piperacillin-tazobactam (ZOSYN) IVPB  4.5 g Intravenous Q8H    pravastatin  40 mg Oral Daily    tamsulosin  0.4 mg Oral QHS     PRN Meds:sodium chloride, acetaminophen, albuterol-ipratropium, LIDOcaine (PF) 10 mg/ml (1%), melatonin, ondansetron, oxyCODONE, oxyCODONE, prochlorperazine, sodium chloride 0.9%     Review of patient's allergies indicates:   Allergen Reactions    Imfinzi [durvalumab] Other (See Comments)     1305- Imfinzi ended, pt returned from restroom complained of wheezing SOB and rigors. /100  O2 92% 2L NC O2 applied to pt.   1306-Demerol 25mg given and Solucortef 125mg IVPush given.   1309- Pt /106  02 99%.   1318- Pt still having Rigors additional 25mg of Demerol given.   1418- BP is 133/69 77 HR 99% RA Pt states " I feel much better"       Indomethacin Other (See Comments)     Headache dizziness      Adhesive Rash     Objective:     Vital Signs (Most Recent):  Temp: 99.5 °F (37.5 °C) (11/02/22 0516)  Pulse: 96 (11/02/22 0516)  Resp: 18 (11/02/22 0516)  BP: 134/73 (11/02/22 0516)  SpO2: 95 % (11/02/22 0516) Vital Signs (24h Range):  Temp:  [98.2 °F (36.8 °C)-99.5 °F (37.5 °C)] 99.5 °F (37.5 °C)  Pulse:  [] 96  Resp:  [15-20] 18  SpO2:  [95 %-98 %] 95 %  BP: (100-137)/(58-85) 134/73     Weight: 99.8 kg (220 lb)  Body mass index is 33.45 kg/m².    Intake/Output - Last 3 Shifts         10/31 0700  11/01 0659 11/01 0700 11/02 0659 11/02 " 0700  11/03 0659    Blood  221.3     IV Piggyback  600     Total Intake(mL/kg)  821.3 (8.2)     Urine (mL/kg/hr)  275     Emesis/NG output  0     Stool  0     Total Output  275     Net  +546.3            Stool Occurrence  1 x             Physical Exam  Vitals and nursing note reviewed.   Constitutional:       General: He is not in acute distress.     Appearance: He is not ill-appearing or diaphoretic.      Comments: Room air   HENT:      Head: Normocephalic and atraumatic.      Mouth/Throat:      Mouth: Mucous membranes are moist.      Pharynx: Oropharynx is clear.   Eyes:      Extraocular Movements: Extraocular movements intact.      Conjunctiva/sclera: Conjunctivae normal.   Cardiovascular:      Rate and Rhythm: Normal rate.   Pulmonary:      Effort: Pulmonary effort is normal. No respiratory distress.   Abdominal:      General: There is no distension.      Palpations: Abdomen is soft.      Tenderness: There is no guarding or rebound.      Hernia: A hernia is present.      Comments: Mild TTP in RLQ. Umbilical hernia that is TTP. Some skin darkening noted. Partially reducible. No peritonitic signs   Musculoskeletal:         General: No deformity.   Skin:     General: Skin is warm and dry.      Coloration: Skin is not jaundiced.   Neurological:      Mental Status: He is alert and oriented to person, place, and time.       Significant Labs:  I have reviewed all pertinent lab results within the past 24 hours.    Significant Diagnostics:  I have reviewed all pertinent imaging results/findings within the past 24 hours.

## 2022-11-02 NOTE — PROGRESS NOTES
"[facility]  General Surgery  Progress Note    Subjective:     History of Present Illness:  No notes on file    Post-Op Info:  * No surgery found *         Interval History: NAEON. Afebrile. HDS. Reports that his RLQ pain has improved but now reports pain around umbilicus. Reports not having this pain before, think it started after coughing last night. Pain is controlled with current regimen. BM overnight. No new symptoms or concerns this morning. All questions answered.   WBC 14 > 15    Medications:  Continuous Infusions:   lactated ringers 125 mL/hr at 11/02/22 0015     Scheduled Meds:   DULoxetine  20 mg Oral Daily    enoxaparin  40 mg Subcutaneous Daily    finasteride  5 mg Oral Daily    piperacillin-tazobactam (ZOSYN) IVPB  4.5 g Intravenous Q8H    pravastatin  40 mg Oral Daily    tamsulosin  0.4 mg Oral QHS     PRN Meds:sodium chloride, acetaminophen, albuterol-ipratropium, LIDOcaine (PF) 10 mg/ml (1%), melatonin, ondansetron, oxyCODONE, oxyCODONE, prochlorperazine, sodium chloride 0.9%     Review of patient's allergies indicates:   Allergen Reactions    Imfinzi [durvalumab] Other (See Comments)     1305- Imfinzi ended, pt returned from restroom complained of wheezing SOB and rigors. /100  O2 92% 2L NC O2 applied to pt.   1306-Demerol 25mg given and Solucortef 125mg IVPush given.   1309- Pt /106  02 99%.   1318- Pt still having Rigors additional 25mg of Demerol given.   1418- BP is 133/69 77 HR 99% RA Pt states " I feel much better"       Indomethacin Other (See Comments)     Headache dizziness      Adhesive Rash     Objective:     Vital Signs (Most Recent):  Temp: 99.5 °F (37.5 °C) (11/02/22 0516)  Pulse: 96 (11/02/22 0516)  Resp: 18 (11/02/22 0516)  BP: 134/73 (11/02/22 0516)  SpO2: 95 % (11/02/22 0516) Vital Signs (24h Range):  Temp:  [98.2 °F (36.8 °C)-99.5 °F (37.5 °C)] 99.5 °F (37.5 °C)  Pulse:  [] 96  Resp:  [15-20] 18  SpO2:  [95 %-98 %] 95 %  BP: (100-137)/(58-85) " 134/73     Weight: 99.8 kg (220 lb)  Body mass index is 33.45 kg/m².    Intake/Output - Last 3 Shifts         10/31 0700  11/01 0659 11/01 0700  11/02 0659 11/02 0700 11/03 0659    Blood  221.3     IV Piggyback  600     Total Intake(mL/kg)  821.3 (8.2)     Urine (mL/kg/hr)  275     Emesis/NG output  0     Stool  0     Total Output  275     Net  +546.3            Stool Occurrence  1 x             Physical Exam  Vitals and nursing note reviewed.   Constitutional:       General: He is not in acute distress.     Appearance: He is not ill-appearing or diaphoretic.      Comments: Room air   HENT:      Head: Normocephalic and atraumatic.      Mouth/Throat:      Mouth: Mucous membranes are moist.      Pharynx: Oropharynx is clear.   Eyes:      Extraocular Movements: Extraocular movements intact.      Conjunctiva/sclera: Conjunctivae normal.   Cardiovascular:      Rate and Rhythm: Normal rate.   Pulmonary:      Effort: Pulmonary effort is normal. No respiratory distress.   Abdominal:      General: There is no distension.      Palpations: Abdomen is soft.      Tenderness: There is no guarding or rebound.      Hernia: A hernia is present.      Comments: Mild TTP in RLQ. Umbilical hernia that is TTP. Some skin darkening noted. Partially reducible. No peritonitic signs   Musculoskeletal:         General: No deformity.   Skin:     General: Skin is warm and dry.      Coloration: Skin is not jaundiced.   Neurological:      Mental Status: He is alert and oriented to person, place, and time.       Significant Labs:  I have reviewed all pertinent lab results within the past 24 hours.    Significant Diagnostics:  I have reviewed all pertinent imaging results/findings within the past 24 hours.    Assessment/Plan:     * Appendicitis  Patient is a 72 year old male with h/o BPH, CAD, anemia, DVT, portal vein thrombus (on eliquis, last dose 10/31), HTN, RAHEEL, cholangiocarcinoma (on chemo, with liver lesions) presenting with ruptured  appendicitis. Clinically stable, now with incarcerated umbilical hernia    - NPO  - IR consulted for drain placement, appreciate assistance  - Will discuss umbilical hernia repair this admission with staff. Would have to avoid use of mesh given abdominal infection   - IVF  - Continue antibiotics (cefepime, flagyl)  - PRN pain and nausea medications  - Daily labs  - Replete electrolytes PRN  - DVT prophylaxis (SCDs and lovenox). Holding home eliquis for now  - OOB, ambulate      Dispo: not yet medically stable for dc       GERD (gastroesophageal reflux disease)  - Home PPI converted to PO pantoprazole while inpatient    Neuropathy  - Continue home cymbalta    Deep vein thrombosis (DVT) of femoral vein of left lower extremity  - see portal vein thrombus     Benign prostatic hyperplasia with urinary frequency  - Continue home flomax and finasteride     Portal vein thrombosis  - Holding home eliquis (last dose 10/31/22) due to acute disease and procedures. Will restart when able     Hypertension, essential  - Currently controlled  - Holding home meds in setting of NPO and normotensive  - Continue to monitor with q4 vitals and adjust regimen PRN    Addendum: Adding on for umbilical hernia repair, diagnostic lap, possible appendectomy today. Will obtain consent     Spoke with outpatient hem/onc about case. Discussed operative plan and likely postponement of chemo pending intra-op findings/abdominal infection. They will come see him inpatient. Formal consult placed    Akhil Cartagena PA-C  General Surgery  [facility]

## 2022-11-02 NOTE — CONSULTS
[facility] - Surgery  Hematology/Oncology  Consult Note    Patient Name: Domonique Kellogg  MRN: [id number]  Admission Date: 11/1/2022  Hospital Length of Stay: 0 days  Code Status: Full Code   Attending Provider: Cheo Hamm MD  Consulting Provider: Leah Goldstein MD  Primary Care Physician: Nicolas Marlow MD  Principal Problem:Appendicitis    Inpatient consult to Hematology/Oncology  Consult performed by: Leah Goldstein MD  Consult ordered by: Akhil Cartagena PA-C        Subjective:     HPI:  Mr. Domonique Kellogg is a 72 year old man with intrahepatic cholangiocarcinoma who presented on 11/01 with abdominal pain and found to have acute appendicitis. General Surgery was consulted and he was admitted to their service. He will undergo umbilical hernia repair due to incarcerated umbilical hernia on 11/02.    Medical Oncology was consulted for intrahepatic cholangiocarcinoma.    Oncologic history (per Dr. 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 MSI-low, FGFR2 fusion and IDH1/2 mutation negative. Started on cis/gem on 12/7/21. Durvalumab added on 8/10/22 after FDA approval. Stopped after he had a reaction on 9/9/22.   - S/p TACE on 12/16/2021, 2/14/22, 4/19/22, 5/18/22, 8/2/22. RF ablation on 7/19/22. TACE on 9/28/22  - reviewed CT and MRI results with patient and wife. Micronodules in lungs. I am not able to see those. May not be cancer. Cancers have progressed in the liver. Spoke with Dr Fitzgerald. He does not recommend further TACE for now. Will change chemotherapy to FOLFOX  - The side effects of FOLFOX were discussed with patient, which include but are not limited to hair thinning, fatigue, decreased appetite, weight loss, weakness, bone marrow suppression, increased risk of infection, sepsis, anemia that may require blood transfusion, low platelet counts with increased risk of bleeding that may require platelet transfusion,  "diarrhea, constipation, mucositis, damage to the brain, heart, liver, kidney, damage to the nerves that may not be reversible, severe allergic reaction, damage at the injection site, sterility, secondary cancer, death. Handouts provided to patient. Patient understands and agrees with the plan. Consent signed  - platelet 88. Will give gemcitabine 750 mg/m2 today  - return in 2 weeks to start FOLFOX      Oncology Treatment Plan:   OP GEMCITABINE CISPLATIN Q3W + DURVALUMAB D8 C11+    Medications:  Continuous Infusions:   lactated ringers 125 mL/hr at 11/02/22 1358     Scheduled Meds:   ceFEPime (MAXIPIME) IVPB  2 g Intravenous Q12H    DULoxetine  20 mg Oral Daily    enoxaparin  40 mg Subcutaneous Daily    finasteride  5 mg Oral Daily    metronidazole  500 mg Intravenous Q8H    pantoprazole  40 mg Oral Daily    tamsulosin  0.4 mg Oral QHS     PRN Meds:sodium chloride, acetaminophen, albuterol-ipratropium, LIDOcaine (PF) 10 mg/ml (1%), melatonin, ondansetron, oxyCODONE, oxyCODONE, prochlorperazine, sodium chloride 0.9%     Review of patient's allergies indicates:   Allergen Reactions    Imfinzi [durvalumab] Other (See Comments)     1305- Imfinzi ended, pt returned from restroom complained of wheezing SOB and rigors. /100  O2 92% 2L NC O2 applied to pt.   1306-Demerol 25mg given and Solucortef 125mg IVPush given.   1309- Pt /106  02 99%.   1318- Pt still having Rigors additional 25mg of Demerol given.   1418- BP is 133/69 77 HR 99% RA Pt states " I feel much better"       Indomethacin Other (See Comments)     Headache dizziness      Adhesive Rash        Past Medical History:   Diagnosis Date    Adjustment disorder     Anticoagulant long-term use     Anxiety     Arthritis     CAD (coronary artery disease) 03/02/2015    non-obstructive per [facility]     Cataract     Dry eye syndrome     Hypertension     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/2021    Obesity     Post-procedural fever " 12/18/2021    Seizures     2011    Sleep apnea     + CPAP    Sleep difficulties      Past Surgical History:   Procedure Laterality Date    ANTERIOR CERVICAL DISCECTOMY W/ FUSION N/A 07/06/2020    Procedure: DISCECTOMY, SPINE, CERVICAL, ANTERIOR APPROACH, WITH FUSION C3-4, C4-5;  Surgeon: Fabiola Vides MD;  [facility];  Service: Neurosurgery;  Laterality: N/A;  TORONTO III, ASA III, BLOOD TYPE & SCREEN, NEUROMONITORING EMG-MEP-SEP, SUPINE POSITION,BRACE [place],REGULAR BED, HEADREST [place], POSITION, MAP>85, RADIOLOGY C-ARM, SPECIAL EQUIPMENT [facility]    COLONOSCOPY N/A 10/01/2021    Procedure: COLONOSCOPY;  Surgeon: Nicolas Alvarado MD;  Location: [facility] (4TH FLR);  Service: Endoscopy;  Laterality: N/A;  EGD and colonoscopy for diarrhea and weight loss and Enlarged, heterogeneous appearance of the liver likely due to multiple underlying hepatic lesions largest measuring 4.8 cm.  Findings concerning for metastatic versus less likely primary hepatic neoplasm.  Recommend [facility]    COLONOSCOPY N/A 06/07/2022    Procedure: COLONOSCOPY;  Surgeon: Mejia Villafuerte MD;  Location: [facility] (4TH FLR);  Service: Endoscopy;  Laterality: N/A;  For evaluation of positive out-patient FOBT.   medically urgent  ok to hold Eliquis per [place] Edward/RB  fully vaccinated  hx of seizures- last one 2014    ESOPHAGOGASTRODUODENOSCOPY N/A 10/01/2021    Procedure: EGD (ESOPHAGOGASTRODUODENOSCOPY);  Surgeon: Nicolas Alvarado MD;  Location: [facility] (4TH FLR);  Service: Endoscopy;  Laterality: N/A;  EGD and colonoscopy for diarrhea and weight loss and Enlarged, heterogeneous appearance of the liver likely due to multiple underlying hepatic lesions largest measuring 4.8 cm.  Findings concerning for metastatic versus less likely primary hepatic neopl    ESOPHAGOGASTRODUODENOSCOPY N/A 04/05/2022    Procedure: EGD (ESOPHAGOGASTRODUODENOSCOPY);  Surgeon: Amado Kelsey MD;  Location: [facility] (4TH FLR);  Service:  Endoscopy;  Laterality: N/A;  EGD in 8 weeks to check for esophagitis healing patient should be on pantoprazole 40 mg once daily  ok to hold eliquis x2 days per Dr. Young, see telephone encounter  fully vaccinated    EYE SURGERY      cataract    INSERTION OF VENOUS ACCESS PORT N/A 2021    Procedure: INSERTION, VENOUS ACCESS PORT;  Surgeon: Saurav Garcia MD;  Location: [address];  Service: General;  Laterality: N/A;    INTRAOCULAR PROSTHESES INSERTION Right 2018    Procedure: INSERTION-INTRAOCULAR LENS (IOL);  Surgeon: Priscilla Hays MD;  Location: [facility];  Service: Ophthalmology;  Laterality: Right;    INTRAOCULAR PROSTHESES INSERTION Left 2018    Procedure: INSERTION, INTRAOCULAR LENS PROSTHESIS;  Surgeon: Priscilla Hays MD;  Location: [facility];  Service: Ophthalmology;  Laterality: Left;    KNEE SURGERY Right     PHACOEMULSIFICATION OF CATARACT Right 2018    Procedure: PHACOEMULSIFICATION-ASPIRATION-CATARACT;  Surgeon: Priscilla Hays MD;  Location: [facility];  Service: Ophthalmology;  Laterality: Right;    PHACOEMULSIFICATION OF CATARACT Left 2018    Procedure: PHACOEMULSIFICATION, CATARACT;  Surgeon: Priscilla Hays MD;  Location: [facility];  Service: Ophthalmology;  Laterality: Left;    WISDOM TOOTH EXTRACTION       Family History       Problem Relation (Age of Onset)    Cancer Mother, Maternal Grandmother    Diabetes Mother    Heart disease Father, Paternal Grandfather, Brother    Hypertension Mother    Kidney cancer Mother (61)    Liver disease Maternal Grandmother    No Known Problems Sister, Daughter, Son, Sister, Sister, Sister, Daughter          Tobacco Use    Smoking status: Former     Packs/day: 1.00     Years: 20.00     Pack years: 20.00     Types: Cigarettes     Quit date: 1987     Years since quittin.8    Smokeless tobacco: Never    Tobacco comments:     quit    Substance and Sexual Activity    Alcohol use: Not Currently     Alcohol/week: 1.0 -  2.0 standard drink     Types: 1 - 2 Glasses of wine per week     Comment: not since liver diagnosis    Drug use: No    Sexual activity: Yes     Partners: Female       Review of Systems   Constitutional:  Negative for chills, fatigue and fever.   HENT:  Negative for congestion and dental problem.    Eyes:  Negative for photophobia and visual disturbance.   Respiratory:  Negative for cough and shortness of breath.    Cardiovascular:  Negative for chest pain and leg swelling.   Gastrointestinal:  Positive for abdominal pain. Negative for nausea and vomiting.   Genitourinary:  Negative for difficulty urinating and dysuria.   Musculoskeletal:  Negative for arthralgias and back pain.   Skin:  Negative for color change and pallor.   Neurological:  Negative for dizziness and facial asymmetry.   Psychiatric/Behavioral:  Negative for agitation and behavioral problems.    Objective:     Vital Signs (Most Recent):  Temp: 99.5 °F (37.5 °C) (11/02/22 0516)  Pulse: 96 (11/02/22 0516)  Resp: 18 (11/02/22 0516)  BP: 134/73 (11/02/22 0516)  SpO2: 95 % (11/02/22 0516) Vital Signs (24h Range):  Temp:  [98.4 °F (36.9 °C)-99.5 °F (37.5 °C)] 99.5 °F (37.5 °C)  Pulse:  [] 96  Resp:  [15-20] 18  SpO2:  [95 %-98 %] 95 %  BP: (121-137)/(68-85) 134/73     Weight: 99.8 kg (220 lb)  Body mass index is 33.45 kg/m².  Body surface area is 2.19 meters squared.      Intake/Output Summary (Last 24 hours) at 11/2/2022 1509  Last data filed at 11/2/2022 0521  Gross per 24 hour   Intake 821.25 ml   Output 275 ml   Net 546.25 ml       Physical Exam  Vitals and nursing note reviewed.   Constitutional:       General: He is not in acute distress.     Appearance: He is not ill-appearing or diaphoretic.   HENT:      Head: Normocephalic and atraumatic.      Nose: Nose normal. No congestion.   Eyes:      Extraocular Movements: Extraocular movements intact.      Conjunctiva/sclera: Conjunctivae normal.   Cardiovascular:      Rate and Rhythm: Normal rate.    Pulmonary:      Effort: Pulmonary effort is normal. No respiratory distress.   Abdominal:      General: There is no distension.      Palpations: Abdomen is soft.      Tenderness: There is no guarding or rebound.      Hernia: A hernia is present.      Comments: Mild RLQ and mid lower abdominal tenderness to palpation   Musculoskeletal:         General: No swelling or deformity.   Skin:     General: Skin is warm and dry.      Coloration: Skin is not jaundiced.   Neurological:      Mental Status: He is alert and oriented to person, place, and time.      Cranial Nerves: No cranial nerve deficit.   Psychiatric:         Mood and Affect: Mood normal.         Behavior: Behavior normal.       Significant Labs:   CBC:   Recent Labs   Lab 10/31/22  1643 11/01/22  1410 11/01/22  1416 11/02/22  0436   WBC 15.70* 14.53*  --  15.39*   HGB 7.7* 6.7*  --  7.9*   HCT 24.0* 20.7* 19* 24.5*   PLT 81* 97*  --  121*    and CMP:   Recent Labs   Lab 10/31/22  1643 11/01/22  1410 11/02/22  0436    138 140   K 4.2 4.0 3.5    105 107   CO2 27 23 26    169* 151*   BUN 41* 39* 30*   CREATININE 1.5* 1.4 1.5*   CALCIUM 11.1* 10.1 10.2   PROT 6.2 5.8* 5.6*   ALBUMIN 2.4* 2.2* 2.1*   BILITOT 3.1* 3.0* 3.7*   ALKPHOS 362* 383* 408*   AST 62* 59* 53*   ALT 42 37 34   ANIONGAP 10 10 7*       Diagnostic Results:  CT abdomen/pelvis (11/01):  1. The appendix is dilated to approximately 1.3 cm on axial 113. There is mild wall thickening and enhancement. Mild adjacent stranding. Findings are consistent with acute appendicitis.  Recommend surgical consultation and follow-up.  There is an adjacent 3.4 cm fluid collection on axial 122. There are few small foci of air on axial 114 and sagittal 76.  This could be a fluid-filled component of the cecum, diverticulum or possible adjacent early abscess.  Recommend surgical consultation and follow-up  2. History of cholangiocarcinoma with multiple lobular masses throughout the liver most  prominent the right lobe consistent with neoplasm.  3. Mild free fluid/ascites in the abdomen and pelvis.  4. Moderate fat containing umbilical hernia.  5. Urinary bladder wall thickening.  This could be associated with chronic bladder outlet obstruction or cystitis. Follow-up recommended.  6. Prostatomegaly.  7. Diverticulosis of the colon.  Nondistention of the left colon with possible mild wall thickening of the sigmoid colon and descending colon.  Mild acute diverticulitis/colitis of the left colon cannot be excluded.  See above comments.  Follow-up recommended.    Assessment/Plan:     Intrahepatic cholangiocarcinoma  Mr. 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 At recent visit on 10/21/2022, it was discussed to change to FOLFOX given progression in the liver.    Will hold off on chemotherapy until incarcerated hernia and ruputured appendicitis addressed surgically and antibiotics are completed. Currently on cefepime. Plan for hernia repair today 11/02 per General Surgery.    Please notify Oncology when patient is being discharged so that we can arrange follow up for him.       Discussed with Dr. Green.    Thank you for your consult. I will follow-up with patient. Please contact us if you have any additional questions.    Leah Goldstein MD  Hematology/Oncology  [facility] - Surgery

## 2022-11-02 NOTE — ASSESSMENT & PLAN NOTE
Mr. 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 At recent visit on 10/21/2022, it was discussed to change to FOLFOX given progression in the liver.    Will hold off on chemotherapy until incarcerated hernia and ruputured appendicitis addressed surgically and antibiotics are completed. Currently on cefepime. Plan for hernia repair today 11/02 per General Surgery.    Please notify Oncology when patient is being discharged so that we can arrange follow up for him.

## 2022-11-02 NOTE — CONSULTS
[facility] - Emergency Dept  General Surgery  Consult Note    Inpatient consult to General Surgery  Consult performed by: Bin Fu MD  Consult ordered by: Rashida Vanegas PA-C  Reason for consult: appendicitis      Subjective:     Chief Complaint/Reason for Admission: Abdominal Pain    History of Present Illness: Mr. Kellogg is our 71yo male with CAD, arthritis, on eliquis, with cholangiocarcinoma undergoing chemotherapy and has had multiple TACE procedures and RF ablation on 7/19/22.  He presents to the ED with complaints of abdominal pain for over a week.  States that it was much worse but has recently improved some however since it hasn't gone away wanted to be evaluated.  Denies fevers or chills at this time however did have one fever at home over the last couple of days.  No chest pain, SOB, nausea, or emesis.  Having normal bowel movements without blood.  No pain with urination or blood in urine.  In the ED WBC is 14.5 and H/H is 6.7/20.7 s/p 1U of blood after labs.  Renal labs relatively unremarkable with stable creatinine at 1.4.  Bilirubin elevated at 2.2 and mild transaminitis.  CT imaging obtained showed a dilated appendix with wall thickening and a periapendiceal fluid collection around 3cm in size.  Again showing evidence of his cholangiocarcinoma in his liver.  Some mild ascites in his pelvis likely related to his cholangiocarcinoma diagnosis.      No current facility-administered medications on file prior to encounter.     Current Outpatient Medications on File Prior to Encounter   Medication Sig    acetaminophen (TYLENOL) 650 MG TbSR Take by mouth daily as needed.    apixaban (ELIQUIS) 5 mg Tab Take 1 tablet (5 mg total) by mouth 2 (two) times daily.    diltiaZEM 2% Lidocaine 5% Cream Apply pea size amount topically 3 (three) times daily.    DULoxetine (CYMBALTA) 20 MG capsule Take 1 capsule (20 mg total) by mouth once daily.    finasteride (PROSCAR) 5 mg tablet Take 1 tablet (5 mg total)  "by mouth once daily. (Patient taking differently: Take 5 mg by mouth every morning.)    hydroCHLOROthiazide (HYDRODIURIL) 25 MG tablet TAKE 1 TABLET (25 MG TOTAL) BY MOUTH ONCE DAILY. (Patient taking differently: Take 25 mg by mouth every morning.)    hydrocortisone 1 % cream Apply topically 2 (two) times daily. for 7 days    magnesium oxide (MAG-OX) 400 mg (241.3 mg magnesium) tablet Take 400 mg by mouth every evening.    multivitamin with minerals tablet Take 1 tablet by mouth every morning.     ondansetron (ZOFRAN) 4 MG tablet Take 1 tablet (4 mg total) by mouth every 8 (eight) hours as needed for Nausea.    potassium chloride SA (K-DUR,KLOR-CON) 20 MEQ tablet Take 2 tablets (40 mEq total) by mouth once daily. (Patient taking differently: Take 40 mEq by mouth every morning.)    pravastatin (PRAVACHOL) 40 MG tablet Take 1 tablet (40 mg total) by mouth once daily. (Patient taking differently: Take 40 mg by mouth every evening.)    prochlorperazine (COMPAZINE) 10 MG tablet Take 1 tablet (10 mg total) by mouth every 6 (six) hours as needed (nausea).    promethazine (PHENERGAN) 12.5 MG Tab Take 1 tablet (12.5 mg total) by mouth every 6 (six) hours as needed (nausea).    RABEprazole (ACIPHEX) 20 mg tablet Take 1 tablet (20 mg total) by mouth once daily. (Patient taking differently: Take 20 mg by mouth every morning.)    tamsulosin (FLOMAX) 0.4 mg Cap TAKE 1 CAPSULE EVERY DAY (Patient taking differently: Take by mouth every evening.)       Review of patient's allergies indicates:   Allergen Reactions    Imfinzi [durvalumab] Other (See Comments)     1305- Imfinzi ended, pt returned from restroom complained of wheezing SOB and rigors. /100  O2 92% 2L NC O2 applied to pt.   1306-Demerol 25mg given and Solucortef 125mg IVPush given.   1309- Pt /106  02 99%.   1318- Pt still having Rigors additional 25mg of Demerol given.   1418- BP is 133/69 77 HR 99% RA Pt states " I feel much better"       " Indomethacin Other (See Comments)     Headache dizziness      Adhesive Rash       Past Medical History:   Diagnosis Date    Adjustment disorder     Anticoagulant long-term use     Anxiety     Arthritis     CAD (coronary artery disease) 03/02/2015    non-obstructive per [facility]     Cataract     Dry eye syndrome     Hypertension     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/2021    Obesity     Post-procedural fever 12/18/2021    Seizures     2011    Sleep apnea     + CPAP    Sleep difficulties      Past Surgical History:   Procedure Laterality Date    ANTERIOR CERVICAL DISCECTOMY W/ FUSION N/A 07/06/2020    Procedure: DISCECTOMY, SPINE, CERVICAL, ANTERIOR APPROACH, WITH FUSION C3-4, C4-5;  Surgeon: Fabiola Vides MD;  Location: [facility] OR 2ND FLR;  Service: Neurosurgery;  Laterality: N/A;  TORONTO III, ASA III, BLOOD TYPE & SCREEN, NEUROMONITORING EMG-MEP-SEP, SUPINE POSITION,BRACE [place],REGULAR BED, HEADREST [place], POSITION, MAP>85, RADIOLOGY C-ARM, SPECIAL EQUIPMENT [facility]    COLONOSCOPY N/A 10/01/2021    Procedure: COLONOSCOPY;  Surgeon: Nicolas Alvarado MD;  Location: [facility] (4TH FLR);  Service: Endoscopy;  Laterality: N/A;  EGD and colonoscopy for diarrhea and weight loss and Enlarged, heterogeneous appearance of the liver likely due to multiple underlying hepatic lesions largest measuring 4.8 cm.  Findings concerning for metastatic versus less likely primary hepatic neoplasm.  Recommend fur    COLONOSCOPY N/A 06/07/2022    Procedure: COLONOSCOPY;  Surgeon: Mejia Villafuerte MD;  Location: [facility] (4TH FLR);  Service: Endoscopy;  Laterality: N/A;  For evaluation of positive out-patient FOBT.   medically urgent  ok to hold Eliquis per [place] Edward/RB  fully vaccinated  hx of seizures- last one 2014    ESOPHAGOGASTRODUODENOSCOPY N/A 10/01/2021    Procedure: EGD (ESOPHAGOGASTRODUODENOSCOPY);  Surgeon: Nicolas Alvarado MD;  Location: [facility] (4TH FLR);  Service: Endoscopy;  Laterality: N/A;  EGD and colonoscopy  for diarrhea and weight loss and Enlarged, heterogeneous appearance of the liver likely due to multiple underlying hepatic lesions largest measuring 4.8 cm.  Findings concerning for metastatic versus less likely primary hepatic neopl    ESOPHAGOGASTRODUODENOSCOPY N/A 04/05/2022    Procedure: EGD (ESOPHAGOGASTRODUODENOSCOPY);  Surgeon: Amado Kelsey MD;  Location: [facility] (4TH FLR);  Service: Endoscopy;  Laterality: N/A;  EGD in 8 weeks to check for esophagitis healing patient should be on pantoprazole 40 mg once daily  ok to hold eliquis x2 days per Dr. Young, see telephone encounter  fully vaccinated    EYE SURGERY      cataract    INSERTION OF VENOUS ACCESS PORT N/A 12/03/2021    Procedure: INSERTION, VENOUS ACCESS PORT;  Surgeon: Saurav Garcia MD;  Location: [facility] 2ND FLR;  Service: General;  Laterality: N/A;    INTRAOCULAR PROSTHESES INSERTION Right 07/16/2018    Procedure: INSERTION-INTRAOCULAR LENS (IOL);  Surgeon: Priscilla Hays MD;  Location: [facility];  Service: Ophthalmology;  Laterality: Right;    INTRAOCULAR PROSTHESES INSERTION Left 08/13/2018    Procedure: INSERTION, INTRAOCULAR LENS PROSTHESIS;  Surgeon: Priscilla Hays MD;  Location: [facility];  Service: Ophthalmology;  Laterality: Left;    KNEE SURGERY Right     PHACOEMULSIFICATION OF CATARACT Right 07/16/2018    Procedure: PHACOEMULSIFICATION-ASPIRATION-CATARACT;  Surgeon: Priscilla Hays MD;  Location: [facility];  Service: Ophthalmology;  Laterality: Right;    PHACOEMULSIFICATION OF CATARACT Left 08/13/2018    Procedure: PHACOEMULSIFICATION, CATARACT;  Surgeon: Priscilla Hays MD;  Location: [facility];  Service: Ophthalmology;  Laterality: Left;    WISDOM TOOTH EXTRACTION       Family History       Problem Relation (Age of Onset)    Cancer Mother, Maternal Grandmother    Diabetes Mother    Heart disease Father, Paternal Grandfather, Brother    Hypertension Mother    Kidney cancer Mother (61)    Liver disease Maternal Grandmother    No Known  Problems Sister, Daughter, Son, Sister, Sister, Sister, Daughter          Tobacco Use    Smoking status: Former     Packs/day: 1.00     Years: 20.00     Pack years: 20.00     Types: Cigarettes     Quit date: 1987     Years since quittin.8    Smokeless tobacco: Never    Tobacco comments:     quit    Substance and Sexual Activity    Alcohol use: Not Currently     Alcohol/week: 1.0 - 2.0 standard drink     Types: 1 - 2 Glasses of wine per week     Comment: not since liver diagnosis    Drug use: No    Sexual activity: Yes     Partners: Female     Review of Systems   Constitutional:  Negative for chills and fever.   HENT: Negative.     Respiratory:  Negative for cough, chest tightness and shortness of breath.    Cardiovascular:  Negative for chest pain.   Gastrointestinal:  Positive for abdominal pain. Negative for blood in stool, constipation, diarrhea, nausea and vomiting.   Genitourinary:  Negative for dysuria and hematuria.   Musculoskeletal: Negative.    Skin:  Negative for color change.   Neurological:  Negative for weakness and numbness.   Psychiatric/Behavioral:  Negative for confusion.    Objective:     Vital Signs (Most Recent):  Temp: 99.5 °F (37.5 °C) (22)  Pulse: 94 (22)  Resp: 15 (22)  BP: 131/78 (22)  SpO2: 95 % (22) Vital Signs (24h Range):  Temp:  [98.2 °F (36.8 °C)-99.5 °F (37.5 °C)] 99.5 °F (37.5 °C)  Pulse:  [] 94  Resp:  [15-20] 15  SpO2:  [95 %-96 %] 95 %  BP: (100-137)/(58-85) 131/78     Weight: 99.8 kg (220 lb)  Body mass index is 33.45 kg/m².      Intake/Output Summary (Last 24 hours) at 2022 2315  Last data filed at 2022 2245  Gross per 24 hour   Intake 821.25 ml   Output --   Net 821.25 ml       Physical Exam  Constitutional:       General: He is not in acute distress.     Appearance: Normal appearance.   HENT:      Head: Normocephalic and atraumatic.   Eyes:      Extraocular Movements: Extraocular movements  intact.   Cardiovascular:      Rate and Rhythm: Normal rate and regular rhythm.      Pulses: Normal pulses.   Pulmonary:      Effort: Pulmonary effort is normal. No respiratory distress.   Abdominal:      General: There is no distension.      Palpations: Abdomen is soft.      Tenderness: There is abdominal tenderness (slight tenderness to RLQ). There is no guarding.   Musculoskeletal:         General: Normal range of motion.      Cervical back: Normal range of motion.   Skin:     General: Skin is warm and dry.   Neurological:      General: No focal deficit present.      Mental Status: He is alert and oriented to person, place, and time.   Psychiatric:         Mood and Affect: Mood normal.         Behavior: Behavior normal.       Significant Labs:  Recent Lab Results         11/01/22  1736   11/01/22  1722   11/01/22  1416   11/01/22  1410        Unit Blood Type Code 7300  [P]             Unit Expiration [id number]  [P]             Unit Blood Type B POS  [P]             Albumin       2.2       Alkaline Phosphatase       383       ALT       37       Anion Gap       10       Aniso       Slight       Appearance, UA   Clear           AST       59       Baso #       0.01       Basophil %       0.1       Bilirubin (UA)   Negative           BILIRUBIN TOTAL       3.0  Comment: For infants and newborns, interpretation of results should be based  on gestational age, weight and in agreement with clinical  observations.    Premature Infant recommended reference ranges:  Up to 24 hours.............<8.0 mg/dL  Up to 48 hours............<12.0 mg/dL  3-5 days..................<15.0 mg/dL  6-29 days.................<15.0 mg/dL         BUN       39       Calcium       10.1       Chloride       105       CO2       23       CODING SYSTEM IVPU196  [P]             Color, UA   Yellow           Creatinine       1.4       Differential Method       Automated       DISPENSE STATUS ISSUED  [P]             eGFR       53.1       Eos #        0.1       Eosinophil %       0.4       Fragmented Cells       Occasional       Glucose       169       Glucose, UA   Negative           Gran # (ANC)       10.9       Gran %       75.0       Group & Rh B POS             Hematocrit       20.7       Hemoglobin       6.7       Hepatitis C Ab       Non-reactive       HIV 1/2 Ag/Ab       Non-reactive       Hypo       Occasional       Immature Grans (Abs)       0.34  Comment: Mild elevation in immature granulocytes is non specific and   can be seen in a variety of conditions including stress response,   acute inflammation, trauma and pregnancy. Correlation with other   laboratory and clinical findings is essential.         Immature Granulocytes       2.3       INDIRECT ALEXYS NEG             Ketones, UA   Negative           Lactate, Alber       2.1  Comment: Falsely low lactic acid results can be found in samples   containing >=13.0 mg/dL total bilirubin and/or >=3.5 mg/dL   direct bilirubin.         Leukocytes, UA   Negative           Lipase       26       Lymph #       1.6       Lymph %       11.1       MCH       32.4       MCHC       32.4       MCV       100       Mono #       1.6       Mono %       11.1       MPV       13.3       NITRITE UA   Negative           nRBC       1       Occult Blood UA   Negative           Ovalocytes       Occasional       pH, UA   5.0           Platelet Estimate       Decreased       Platelets       97       POC BUN     37         POC Chloride     105         POC Creatinine     1.6         POC Glucose     174         POC Hematocrit     19         POC Ionized Calcium     1.32         POC Potassium     4.0         POC Sodium     141         POC TCO2 (MEASURED)     25         Poikilocytosis       Slight       Poly       Occasional       Potassium       4.0       Product Code [id number]  [P]             PROTEIN TOTAL       5.8       Protein, UA   Negative  Comment: Recommend a 24 hour urine protein or a urine   protein/creatinine ratio if globulin  induced proteinuria is  clinically suspected.             RBC       2.07       RDW       21.9       Sample     JANNET         Sodium       138       Specific Gravity, UA   >=1.030           Specimen UA   Urine, Clean Catch           Target Cells       Occasional       UNIT NUMBER [id number]  [P]             Vacuolated Granulocytes       CANCELED  Comment: Result canceled by the ancillary.       WBC       14.53                [P] - Preliminary Result               Significant Diagnostics:  I have reviewed all pertinent imaging results/findings within the past 24 hours.  Narrative & Impression  EXAMINATION:  CT ABDOMEN PELVIS WITH CONTRAST     CLINICAL HISTORY:  Abdominal pain, acute, nonlocalized;  cholangiocarcinoma     TECHNIQUE:  Low dose axial images, sagittal and coronal reformations were obtained from the lung bases to the pubic symphysis following the IV administration of 100 mL of Omnipaque 350 .  Oral contrast was not administered.     Study performed as an emergency procedure.     COMPARISON:  MRI 10/19/2022, CT 03/14/2022     FINDINGS:  Abdomen:     - Lower thorax:     - Lung bases: Mild right basilar atelectasis.     - Liver: Multiple lobular masses in the liver most prominent in the right lobe.  11.4 x 9.5 cm lobular mass in the right lobe on axial 32.  Findings consistent with neoplasm.     Mild free fluid/ascites in the abdomen and pelvis adjacent to the liver and spleen.     Recanalization of the umbilical vein may be associated with venous hypertension.  Mild periumbilical varices.     - Gallbladder: Status post cholecystectomy.     - Bile Ducts: No evidence of intra or extra hepatic biliary ductal dilation.     - Spleen: Negative.     - Kidneys: Small left renal cyst.  No hydronephrosis bilaterally.     - Adrenals: Unremarkable.     - Pancreas: No mass or peripancreatic fat stranding.     - Retroperitoneum:  No significant adenopathy.     - Vascular: No abdominal aortic aneurysm.     - Abdominal  wall:  Moderate fat containing umbilical hernia.     Pelvis:     Urinary bladder is nondistended with mild diffuse wall thickening.  Prostate is enlarged measuring 6.9 cm on sagittal 93 with protrusion at the bladder base.  Recommend clinical correlation and correlation with PSA.     Mild free fluid/ascites in the abdomen and pelvis.     Bowel/Mesentery:     There is mild diverticulosis throughout the colon.  Nondistention of the left colon.  Possible mild wall thickening of the sigmoid colon and descending colon.  Mild acute diverticulitis/colitis of the left colon cannot be excluded.  Mild adjacent free fluid limits characterization.     The appendix is dilated to approximately 1.3 cm on axial 113.  There is mild wall thickening and enhancement.  Mild adjacent stranding.  Findings are consistent with acute appendicitis.  Recommend surgical consultation and follow-up.  There is an adjacent 3.4 cm fluid collection on axial 122.  There are few small foci of air on axial 114 and sagittal 76.  This could be a fluid-filled component of the cecum or possible adjacent early abscess.  Recommend surgical consultation and follow-up.     Bones:  No acute osseous abnormality and no suspicious lytic or blastic lesion.     Impression:     1. The appendix is dilated to approximately 1.3 cm on axial 113. There is mild wall thickening and enhancement. Mild adjacent stranding. Findings are consistent with acute appendicitis.  Recommend surgical consultation and follow-up.  There is an adjacent 3.4 cm fluid collection on axial 122. There are few small foci of air on axial 114 and sagittal 76.  This could be a fluid-filled component of the cecum, diverticulum or possible adjacent early abscess.  Recommend surgical consultation and follow-up  2. History of cholangiocarcinoma with multiple lobular masses throughout the liver most prominent the right lobe consistent with neoplasm.  3. Mild free fluid/ascites in the abdomen and  pelvis.  4. Moderate fat containing umbilical hernia.  5. Urinary bladder wall thickening.  This could be associated with chronic bladder outlet obstruction or cystitis.  Follow-up recommended.  6. Prostatomegaly.  7. Diverticulosis of the colon.  Nondistention of the left colon with possible mild wall thickening of the sigmoid colon and descending colon.  Mild acute diverticulitis/colitis of the left colon cannot be excluded.  See above comments.  Follow-up recommended.  8. See above comments also.    Assessment/Plan:     Active Diagnoses:    Diagnosis Date Noted POA    PRINCIPAL PROBLEM:  Appendicitis [K37] 11/01/2022 Yes      Problems Resolved During this Admission:     Mr. Kellogg is our 71yo male who presents with ruptured appendicitis with periappendiceal abscess.    Plan:  - Will admit to general surgery service  - Ok for a diet but will make NPO at midnight in case of IR drain placement for abscess  - IV abx: zosyn  - PRN pain/nausea meds  - mIV fluids  - DVT ppx: lovenox  - Will hold eliquis at this time in case of IR procedure.  Last dose 10/31  - Will plan to try and manage this patients appendicitis conservatively with IV abx and possible drainage.    Thank you for your consult. I will follow-up with patient. Please contact us if you have any additional questions.    Bin Fu MD  General Surgery  [facility] Dept

## 2022-11-02 NOTE — ASSESSMENT & PLAN NOTE
Patient is a 72 year old male with h/o BPH, CAD, anemia, DVT, portal vein thrombus (on eliquis, last dose 10/31), HTN, RAHEEL, cholangiocarcinoma (on chemo, with liver lesions) presenting with ruptured appendicitis. Clinically stable, now with incarcerated umbilical hernia    - NPO  - IR consulted for drain placement, appreciate assistance  - Will discuss umbilical hernia repair this admission with staff. Would have to avoid use of mesh given abdominal infection   - IVF  - Continue antibiotics (cefepime, flagyl)  - PRN pain and nausea medications  - Daily labs  - Replete electrolytes PRN  - DVT prophylaxis (SCDs and lovenox). Holding home eliquis for now  - OOB, ambulate      Dispo: not yet medically stable for dc

## 2022-11-02 NOTE — HPI
Mr. Domonique Kellogg is a 72 year old man with intrahepatic cholangiocarcinoma who presented on 11/01 with abdominal pain and found to have acute appendicitis. General Surgery was consulted and he was admitted to their service. He will undergo umbilical hernia repair due to incarcerated umbilical hernia on 11/02.    Medical Oncology was consulted for intrahepatic cholangiocarcinoma.    Oncologic history (per Dr. 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 MSI-low, FGFR2 fusion and IDH1/2 mutation negative. Started on cis/gem on 12/7/21. Durvalumab added on 8/10/22 after FDA approval. Stopped after he had a reaction on 9/9/22.   - S/p TACE on 12/16/2021, 2/14/22, 4/19/22, 5/18/22, 8/2/22. RF ablation on 7/19/22. TACE on 9/28/22  - reviewed CT and MRI results with patient and wife. Micronodules in lungs. I am not able to see those. May not be cancer. Cancers have progressed in the liver. Spoke with Dr Fitzgerald. He does not recommend further TACE for now. Will change chemotherapy to FOLFOX  - The side effects of FOLFOX were discussed with patient, which include but are not limited to hair thinning, fatigue, decreased appetite, weight loss, weakness, bone marrow suppression, increased risk of infection, sepsis, anemia that may require blood transfusion, low platelet counts with increased risk of bleeding that may require platelet transfusion, diarrhea, constipation, mucositis, damage to the brain, heart, liver, kidney, damage to the nerves that may not be reversible, severe allergic reaction, damage at the injection site, sterility, secondary cancer, death. Handouts provided to patient. Patient understands and agrees with the plan. Consent signed  - platelet 88. Will give gemcitabine 750 mg/m2 today  - return in 2 weeks to start FOLFOX

## 2022-11-03 NOTE — SUBJECTIVE & OBJECTIVE
"Interval History:   AF, VSS  POD1 s/p lap appy, abscess washout w/ drain placement and umbilical hernia repair  Pain under control  Drain output thin and SS  On 2L NC    Medications:  Continuous Infusions:   lactated ringers 125 mL/hr at 11/02/22 1358     Scheduled Meds:   ceFEPime (MAXIPIME) IVPB  2 g Intravenous Q12H    DULoxetine  20 mg Oral Daily    enoxaparin  40 mg Subcutaneous Daily    finasteride  5 mg Oral Daily    metronidazole  500 mg Intravenous Q8H    pantoprazole  40 mg Oral Daily    tamsulosin  0.4 mg Oral QHS     PRN Meds:sodium chloride, acetaminophen, albuterol-ipratropium, LIDOcaine (PF) 10 mg/ml (1%), melatonin, ondansetron, oxyCODONE, oxyCODONE, prochlorperazine, sodium chloride 0.9%     Review of patient's allergies indicates:   Allergen Reactions    Imfinzi [durvalumab] Other (See Comments)     1305- Imfinzi ended, pt returned from restroom complained of wheezing SOB and rigors. /100  O2 92% 2L NC O2 applied to pt.   1306-Demerol 25mg given and Solucortef 125mg IVPush given.   1309- Pt /106  02 99%.   1318- Pt still having Rigors additional 25mg of Demerol given.   1418- BP is 133/69 77 HR 99% RA Pt states " I feel much better"       Indomethacin Other (See Comments)     Headache dizziness      Adhesive Rash     Objective:     Vital Signs (Most Recent):  Temp: 97.4 °F (36.3 °C) (11/03/22 0402)  Pulse: 78 (11/03/22 0402)  Resp: 16 (11/03/22 0402)  BP: 115/75 (11/03/22 0402)  SpO2: 96 % (11/03/22 0402) Vital Signs (24h Range):  Temp:  [96 °F (35.6 °C)-98.6 °F (37 °C)] 97.4 °F (36.3 °C)  Pulse:  [69-96] 78  Resp:  [14-22] 16  SpO2:  [94 %-100 %] 96 %  BP: (115-143)/(70-86) 115/75     Weight: 99.8 kg (220 lb)  Body mass index is 33.45 kg/m².    Intake/Output - Last 3 Shifts         11/01 0700 11/02 0659 11/02 0700 11/03 0659 11/03 0700 11/04 0659    P.O.  120     Blood 221.3      IV Piggyback 600 2000     Total Intake(mL/kg) 821.3 (8.2) 2120 (21.2)     Urine (mL/kg/hr) " 275 525 (0.2) 400 (4.6)    Emesis/NG output 0      Drains  250 180    Other  50     Stool 0      Blood  25     Total Output 275 850 580    Net +546.3 +1270 -580           Urine Occurrence  3 x     Stool Occurrence 1 x 3 x             Physical Exam  Constitutional:       Appearance: Normal appearance.   HENT:      Head: Normocephalic and atraumatic.      Mouth/Throat:      Mouth: Mucous membranes are moist.   Cardiovascular:      Rate and Rhythm: Normal rate.      Pulses: Normal pulses.   Pulmonary:      Effort: Pulmonary effort is normal. No respiratory distress.   Abdominal:      General: Abdomen is flat. There is no distension.      Palpations: Abdomen is soft.      Tenderness: There is no abdominal tenderness.      Comments: Abdominal Incision CDI. Appropriately Tender. Drain output thin, SS.    Musculoskeletal:      Cervical back: Normal range of motion.   Skin:     General: Skin is warm and dry.   Neurological:      General: No focal deficit present.      Mental Status: He is alert and oriented to person, place, and time.   Psychiatric:         Mood and Affect: Mood normal.         Behavior: Behavior normal.       Significant Labs:  I have reviewed all pertinent lab results within the past 24 hours.    Significant Diagnostics:  I have reviewed all pertinent imaging results/findings within the past 24 hours.

## 2022-11-03 NOTE — PT/OT/SLP EVAL
"Physical Therapy  Evaluation and Treatment    Domonique Kellogg   [id number]    Time Tracking:     PT Received On: 11/03/22   PT Start Time: 0926   PT Stop Time: 0956   PT Total Time (min): 30 min    Billable Minutes: Evaluation 18 and Gait Training 12 minutes       Recommendations:     Discharge recommendations: Home with family and HH PT/OT     Equipment recommendations: Rolling Walker (may progress to not needing but does as of today)    Barriers to Discharge: None (2 story home but bed/bath downstairs)    Patient Information:     Recent Surgery: Procedure(s) (LRB):  APPENDECTOMY, LAPAROSCOPIC (N/A)  REPAIR, HERNIA, UMBILICAL, INCARCERATED, AGE 4 YEARS OR OLDER 1 Day Post-Op    Diagnosis: Appendicitis    Length of Stay: 1 days    General Precautions: Standard, fall  Orthopedic Precautions: None  Brace: None    Assessment:     Domonique Kellogg is a 72 y.o. male admitted to [facility] on 11/1/2022 for Appendicitis, s/p appendectomy with umbilical hernia repair. Domonique Kellogg tolerated evaluation well today. Spouse at bedside reports a 2 week history of progressive weakness prior to this admission, typically independent but now moving slower with more rest breaks and increased abdominal pain (pre-operatively). He reports no pain throughout session but endorses "fullness" at area of his bladder. He's able to stand from bed and toilet today with rolling walker and contact-guard assistance. Ambulates 20 ft x 2 trials (to/from bathroom) with rolling walker and stand-by assistance; gait is steady without loss of balance, no pain with ambulation. Seated up in chair with family present at end of session; left a C&D rolling walker in room so he can continue to mobilize with nursing. Discussed PT role, POC, goals and recommendations (Home with family and HH PT, rolling walker) with patient; verbalized understanding. Domonique Kellogg would benefit from acute PT services to promote mobility during this admission and improve return to " "PLOF.    Problem List: weakness, decreased endurance, impaired self-care skills, impaired mobility, decreased sitting or standing balance, gait instability, and impaired cardiopulmonary response to activity    Rehab Prognosis: Good; patient would benefit from acute skilled PT services to address these deficits and reach maximum level of function.    Plan:     Patient to be seen 3 x/week to address the above listed problems via gait training, therapeutic activities, therapeutic exercises, neuromuscular re-education    Plan of Care Expires: 12/03/22  Plan of Care reviewed with: patient    Subjective:     Communicated with RN prior to evaluation, appropriate to see for evaluation.    Pt found supine in bed (HOB elevated) upon PT entry to room, agreeable to evaluation.    Patient commenting: "I don't have any pain but I feel very full in the area of my bladder, like I need to urinate but I can't."    Does this patient have any cultural, spiritual, Syn[employer] conflicts given the current situation? Patient has no barriers to learning. Patient verbalizes understanding of his/her program and goals and demonstrates them correctly. No cultural, spiritual, or educational needs identified.    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    Obesity     Post-procedural fever 12/18/2021    Seizures     2011    Sleep apnea     + CPAP    Sleep difficulties       Past Surgical History:   Procedure Laterality Date    ANTERIOR CERVICAL DISCECTOMY W/ FUSION N/A 07/06/2020    Procedure: DISCECTOMY, SPINE, CERVICAL, ANTERIOR APPROACH, WITH FUSION C3-4, C4-5;  Surgeon: Fabiola Vides MD;  Location: [facility] OR [address] [place] [address];  Service: Neurosurgery;  Laterality: N/A;  TORONTO III, ASA III, BLOOD TYPE & SCREEN, " NEUROMONITORING EMG-MEP-SEP, SUPINE POSITION,BRACE [place],REGULAR BED, HEADREST CASPAR, POSITION, MAP>85, RADIOLOGY C-ARM, SPECIAL EQUIPMENT VIRGIL TYLER    COLONOSCOPY N/A 10/01/2021    Procedure: COLONOSCOPY;  Surgeon: Nicolas Alvarado MD;  Location: [facility] (4TH FLR);  Service: Endoscopy;  Laterality: N/A;  EGD and colonoscopy for diarrhea and weight loss and Enlarged, heterogeneous appearance of the liver likely due to multiple underlying hepatic lesions largest measuring 4.8 cm.  Findings concerning for metastatic versus less likely primary hepatic neoplasm.  Recommend furth    COLONOSCOPY N/A 06/07/2022    Procedure: COLONOSCOPY;  Surgeon: Mejia Villafuerte MD;  Location: [facility] (4TH FLR);  Service: Endoscopy;  Laterality: N/A;  For evaluation of positive out-patient FOBT.   medically urgent  ok to hold Eliquis per [place] Edward/RB  fully vaccinated  hx of seizures- last one 2014    ESOPHAGOGASTRODUODENOSCOPY N/A 10/01/2021    Procedure: EGD (ESOPHAGOGASTRODUODENOSCOPY);  Surgeon: Nicolas Alvarado MD;  Location: [facility] (4TH FLR);  Service: Endoscopy;  Laterality: N/A;  EGD and colonoscopy for diarrhea and weight loss and Enlarged, heterogeneous appearance of the liver likely due to multiple underlying hepatic lesions largest measuring 4.8 cm.  Findings concerning for metastatic versus less likely primary hepatic neopl    ESOPHAGOGASTRODUODENOSCOPY N/A 04/05/2022    Procedure: EGD (ESOPHAGOGASTRODUODENOSCOPY);  Surgeon: Amado Kelsey MD;  Location: [facility] (4TH FLR);  Service: Endoscopy;  Laterality: N/A;  EGD in 8 weeks to check for esophagitis healing patient should be on pantoprazole 40 mg once daily  ok to hold eliquis x2 days per Dr. Young, see telephone encounter  fully vaccinated    EYE SURGERY      cataract    INSERTION OF VENOUS ACCESS PORT N/A 12/03/2021    Procedure: INSERTION, VENOUS ACCESS PORT;  Surgeon: Saurav Garcia MD;  Location: [facility] OR 2ND FLR;  Service: General;  Laterality:  N/A;    INTRAOCULAR PROSTHESES INSERTION Right 07/16/2018    Procedure: INSERTION-INTRAOCULAR LENS (IOL);  Surgeon: Priscilla Hays MD;  Location: [facility] OR;  Service: Ophthalmology;  Laterality: Right;    INTRAOCULAR PROSTHESES INSERTION Left 08/13/2018    Procedure: INSERTION, INTRAOCULAR LENS PROSTHESIS;  Surgeon: Priscilla Hays MD;  Location: [facility] OR;  Service: Ophthalmology;  Laterality: Left;    KNEE SURGERY Right     LAPAROSCOPIC APPENDECTOMY N/A 11/2/2022    Procedure: APPENDECTOMY, LAPAROSCOPIC;  Surgeon: Cheo Hamm MD;  Location: [facility] OR [facility] FLR;  Service: General;  Laterality: N/A;    PHACOEMULSIFICATION OF CATARACT Right 07/16/2018    Procedure: PHACOEMULSIFICATION-ASPIRATION-CATARACT;  Surgeon: Priscilla Hays MD;  Location: [facility] OR;  Service: Ophthalmology;  Laterality: Right;    PHACOEMULSIFICATION OF CATARACT Left 08/13/2018    Procedure: PHACOEMULSIFICATION, CATARACT;  Surgeon: Priscilla Hays MD;  Location: [facility] OR;  Service: Ophthalmology;  Laterality: Left;    REPAIR OF INCARCERATED UMBILICAL HERNIA  11/2/2022    Procedure: REPAIR, HERNIA, UMBILICAL, INCARCERATED, AGE 5 YEARS OR OLDER;  Surgeon: Cheo Hamm MD;  Location: [facility] OR 2ND FLR;  Service: General;;    WISDOM TOOTH EXTRACTION         Living Environment:  Pt lives with his spouse in a 2  with 0 ANGELA; bed/bath downstairs, walk-in shower with a shower chair established.    PLOF:  Prior to admission, patient was independent with mobility and self-care. Spouse reports he is typically very independent, running errands but progressive weakness with abdominal pain the past two weeks. He is retired from work, driving at baseline.    DME:  Patient owns or has access to the following DME: Shower Chair    Upon discharge, patient will have assistance from spouse.    Objective:     Patient found with: telemetry, peripheral IV, PAO drain, oxygen (2L)    Pain:  Pain Rating 1: 0/10  Pain Rating Post-Intervention 1: 0/10    Cognitive  "Exam:  Patient is oriented to Person, Place, Time, and Situation.  Patient follows 100% of single-step commands.    Sensation:   Intact at BLE to LT    Lower Extremity Range of Motion:  Right Lower Extremity: WFL actively  Left Lower Extremity: WFL actively    Lower Extremity Strength:  Right Lower Extremity: grossly 4/5 via MMT  Left Lower Extremity: grossly 4/5 via MMT    Functional Mobility:    Bed Mobility:  Rolling to L: stand-by assistance  Supine to Sitting: stand-by assistance via L SL    Transfers:  Sit to Stand: contact-guard assistance from EOB with RW x 1 trial    Toilet Transfer: contact-guard assistance on/off toilet with RW x 1 trial    Gait:  20 feet ft x 2 trials (to/from bathroom) on room air with rolling walker and stand-by assistance; gait is steady without loss of balance, no pain with ambulation.    Assist level: Stand-By Assist  Device: Rolling walker    Balance:  Static Sit: Independent at EOB  Pulse ox 100% on 2L o2 at EOB; removed o2 and he was satting 97% on RA    Static Stand: Stand-By Assist with no AD; supervision with RW    Additional Therapeutic Activity/Exercises:     1. Spouse at bedside reports a 2 week history of progressive weakness prior to this admission, typically independent but now moving slower with more rest breaks and increased abdominal pain (pre-operatively). He reports no pain throughout session but endorses "fullness" at area of his bladder.    2. He's able to stand from bed and toilet today with rolling walker and contact-guard assistance. Ambulates 20 ft x 2 trials (to/from bathroom) with rolling walker and stand-by assistance; gait is steady without loss of balance, no pain with ambulation.    3. Seated up in chair with family present at end of session; left a C&D rolling walker in room so he can continue to mobilize with nursing.    4. Discussed PT role, POC, goals and recommendations (Home with family and HH PT, rolling walker) with patient; verbalized " understanding.    AM-PAC 6 CLICK MOBILITY  Turning over in bed (including adjusting bedclothes, sheets and blankets)?: 4  Sitting down on and standing up from a chair with arms (e.g., wheelchair, bedside commode, etc.): 4  Moving from lying on back to sitting on the side of the bed?: 4  Moving to and from a bed to a chair (including a wheelchair)?: 3  Need to walk in hospital room?: 3  Climbing 3-5 steps with a railing?: 2  Basic Mobility Total Score: 20    Patient was left sitting up in bedside chair with all lines intact, call button in reach, and spouse present.    Clinical Decision Making for Evaluation Complexity:  1. Body System(s) Examination: 1-2  2. Clinical Presentation: Evolving  3. Evaluation Complexity: Low    GOALS:   Multidisciplinary Problems       Physical Therapy Goals          Problem: Physical Therapy    Goal Priority Disciplines Outcome Goal Variances Interventions   Physical Therapy Goal     PT, PT/OT      Description: Goals to be met by: 11/10/22     Patient will increase functional independence with mobility by performin. Supine to sit with Supervision - Not met  2. Sit to stand transfer with Supervision with or without RW - Not met  3. Gait  x 200 feet with Stand-by Assistance with or without Rolling Walker - Not met                     Michele Leiva, PT  11/3/2022

## 2022-11-03 NOTE — ANESTHESIA PROCEDURE NOTES
Intubation    Date/Time: 11/2/2022 6:45 PM  Performed by: Maurice Menendez CRNA  Authorized by: Kiera Rose MD     Intubation:     Induction:  Intravenous    Intubated:  Postinduction    Mask Ventilation:  Easy mask    Attempts:  1    Attempted By:  CRNA    Method of Intubation:  Video laryngoscopy    Blade:  Bryant 3    Laryngeal View Grade: Grade I - full view of cords      Difficult Airway Encountered?: No      Complications:  None    Airway Device:  Oral endotracheal tube    Airway Device Size:  7.5    Style/Cuff Inflation:  Cuffed (inflated to minimal occlusive pressure)    Tube secured:  22    Secured at:  The lips    Placement Verified By:  Capnometry and Fiber optic visualization    Complicating Factors:  None    Findings Post-Intubation:  BS equal bilateral and atraumatic/condition of teeth unchanged

## 2022-11-03 NOTE — PROGRESS NOTES
"[facility]  General Surgery  Progress Note    Subjective:     History of Present Illness:  No notes on file    Post-Op Info:  Procedure(s) (LRB):  APPENDECTOMY, LAPAROSCOPIC (N/A)  REPAIR, HERNIA, UMBILICAL, INCARCERATED, AGE 5 YEARS OR OLDER   1 Day Post-Op     Interval History:   AF, VSS  POD1 s/p lap appy, abscess washout w/ drain placement and umbilical hernia repair  Pain under control  Drain output thin and SS  On 2L NC    Medications:  Continuous Infusions:   lactated ringers 125 mL/hr at 11/02/22 1358     Scheduled Meds:   ceFEPime (MAXIPIME) IVPB  2 g Intravenous Q12H    DULoxetine  20 mg Oral Daily    enoxaparin  40 mg Subcutaneous Daily    finasteride  5 mg Oral Daily    metronidazole  500 mg Intravenous Q8H    pantoprazole  40 mg Oral Daily    tamsulosin  0.4 mg Oral QHS     PRN Meds:sodium chloride, acetaminophen, albuterol-ipratropium, LIDOcaine (PF) 10 mg/ml (1%), melatonin, ondansetron, oxyCODONE, oxyCODONE, prochlorperazine, sodium chloride 0.9%     Review of patient's allergies indicates:   Allergen Reactions    Imfinzi [durvalumab] Other (See Comments)     1305- Imfinzi ended, pt returned from restroom complained of wheezing SOB and rigors. /100  O2 92% 2L NC O2 applied to pt.   1306-Demerol 25mg given and Solucortef 125mg IVPush given.   1309- Pt /106  02 99%.   1318- Pt still having Rigors additional 25mg of Demerol given.   1418- BP is 133/69 77 HR 99% RA Pt states " I feel much better"       Indomethacin Other (See Comments)     Headache dizziness      Adhesive Rash     Objective:     Vital Signs (Most Recent):  Temp: 97.4 °F (36.3 °C) (11/03/22 0402)  Pulse: 78 (11/03/22 0402)  Resp: 16 (11/03/22 0402)  BP: 115/75 (11/03/22 0402)  SpO2: 96 % (11/03/22 0402) Vital Signs (24h Range):  Temp:  [96 °F (35.6 °C)-98.6 °F (37 °C)] 97.4 °F (36.3 °C)  Pulse:  [69-96] 78  Resp:  [14-22] 16  SpO2:  [94 %-100 %] 96 %  BP: (115-143)/(70-86) 115/75     Weight: 99.8 " kg (220 lb)  Body mass index is 33.45 kg/m².    Intake/Output - Last 3 Shifts         11/01 0700  11/02 0659 11/02 0700  11/03 0659 11/03 0700  11/04 0659    P.O.  120     Blood 221.3      IV Piggyback 600 2000     Total Intake(mL/kg) 821.3 (8.2) 2120 (21.2)     Urine (mL/kg/hr) 275 525 (0.2) 400 (4.6)    Emesis/NG output 0      Drains  250 180    Other  50     Stool 0      Blood  25     Total Output 275 850 580    Net +546.3 +1270 -580           Urine Occurrence  3 x     Stool Occurrence 1 x 3 x             Physical Exam  Constitutional:       Appearance: Normal appearance.   HENT:      Head: Normocephalic and atraumatic.      Mouth/Throat:      Mouth: Mucous membranes are moist.   Cardiovascular:      Rate and Rhythm: Normal rate.      Pulses: Normal pulses.   Pulmonary:      Effort: Pulmonary effort is normal. No respiratory distress.   Abdominal:      General: Abdomen is flat. There is no distension.      Palpations: Abdomen is soft.      Tenderness: There is no abdominal tenderness.      Comments: Abdominal Incision CDI. Appropriately Tender. Drain output thin, SS.    Musculoskeletal:      Cervical back: Normal range of motion.   Skin:     General: Skin is warm and dry.   Neurological:      General: No focal deficit present.      Mental Status: He is alert and oriented to person, place, and time.   Psychiatric:         Mood and Affect: Mood normal.         Behavior: Behavior normal.       Significant Labs:  I have reviewed all pertinent lab results within the past 24 hours.    Significant Diagnostics:  I have reviewed all pertinent imaging results/findings within the past 24 hours.    Assessment/Plan:     * Appendicitis  Patient is a 72 year old male with h/o BPH, CAD, anemia, DVT, portal vein thrombus (on eliquis, last dose 10/31), HTN, RAHEEL, cholangiocarcinoma (on chemo, with liver lesions) presenting with ruptured appendicitis. Clinically stable, now with incarcerated umbilical hernia    - Diet: clear  liquid  - Drain care - empty Q4hrs  - d/c IVF  - IV abx, d/c  - PRN pain and nausea medications  - Daily labs  - Replete electrolytes PRN  - DVT prophylaxis (SCDs and lovenox). Holding home eliquis for now  - OOB, ambulate      Dispo: not yet medically stable for dc       Umbilical hernia  - see principle problem     GERD (gastroesophageal reflux disease)  - Home PPI converted to PO pantoprazole while inpatient    Neuropathy  - Continue home cymbalta    Deep vein thrombosis (DVT) of femoral vein of left lower extremity  - see portal vein thrombus     Benign prostatic hyperplasia with urinary frequency  - Continue home flomax and finasteride     Portal vein thrombosis  - Holding home eliquis (last dose 10/31/22) due to acute disease and procedures. Will restart when able     Hypertension, essential  - Currently controlled  - Holding home meds in setting of NPO and normotensive  - Continue to monitor with q4 vitals and adjust regimen PRN        Brigida Romero MD  General Surgery  [facility]

## 2022-11-03 NOTE — ASSESSMENT & PLAN NOTE
Patient is a 72 year old male with h/o BPH, CAD, anemia, DVT, portal vein thrombus (on eliquis, last dose 10/31), HTN, RAHEEL, cholangiocarcinoma (on chemo, with liver lesions) presenting with ruptured appendicitis. Clinically stable, now with incarcerated umbilical hernia    - Diet: clear liquid  - Drain care - empty Q4hrs  - d/c IVF  - IV abx, d/c  - PRN pain and nausea medications  - Daily labs  - Replete electrolytes PRN  - DVT prophylaxis (SCDs and lovenox). Holding home eliquis for now  - OOB, ambulate      Dispo: not yet medically stable for dc

## 2022-11-03 NOTE — ANESTHESIA POSTPROCEDURE EVALUATION
Anesthesia Post Evaluation    Patient: Domonique Kellogg    Procedure(s) Performed: Procedure(s) (LRB):  APPENDECTOMY, LAPAROSCOPIC (N/A)  REPAIR, HERNIA, UMBILICAL, INCARCERATED, AGE 5 YEARS OR OLDER    Final Anesthesia Type: general      Patient location during evaluation: PACU  Patient participation: Yes- Able to Participate  Level of consciousness: awake and alert  Post-procedure vital signs: reviewed and stable  Pain management: adequate  Airway patency: patent    PONV status at discharge: No PONV  Anesthetic complications: no      Cardiovascular status: stable  Respiratory status: unassisted and spontaneous ventilation  Hydration status: euvolemic  Follow-up not needed.          Vitals Value Taken Time   /71 11/02/22 2301   Temp 36.4 °C (97.5 °F) 11/02/22 2301   Pulse 72 11/02/22 2301   Resp 18 11/02/22 2301   SpO2 97 % 11/02/22 2301         Event Time   Out of Recovery 11/02/2022 22:00:00         Pain/Lalo Score: Lalo Score: 9 (11/2/2022 10:00 PM)

## 2022-11-03 NOTE — OP NOTE
DATE OF PROCEDURE: 11/02/2022.     PREOPERATIVE DIAGNOSIS:   Strangulated umbilical hernia.  Acute appendicitis.     POSTOPERATIVE DIAGNOSIS:   Strangulated umbilical hernia.  Acute appendicitis with perforation and gangrene.     PROCEDURE PERFORMED:   Strangulated umbilical hernia repair.  Drainage of intraabdominal abscess.  Laparoscopic appendectomy.     ATTENDING SURGEON: Cheo Hamm MD.     HOUSESTAFF SURGEON: Yenifer Astorga MD (RES).     ANESTHESIA: General endotracheal.     INDICATIONS: Domonique Kellogg is a 72 y.o.male who presented to the Emergency Department with lower abdominal pain. The history and exam were consistent with acute appendicitis, which was confirmed by laboratory studies and a CT scan.  We initially proceeded with non operative management given his advanced liver malignancy, however he acutely incarcerated and had early signs of strangulation of an umbilical hernia.  We recommended umbilical hernia repair and laparoscopic appendectomy and the patient agreed to proceed. The patient signed informed consent and expressed understanding of the risks and benefits of surgery.     PROCEDURE IN DETAIL: The patient was taken to the operating room and placed supine. After induction of general endotracheal tube anesthesia, the abdomen was prepped and draped in the standard fashion. Timeout was performed.  Transfers infraumbilical incision was made and carried down to the fascia hernia sac was bluntly encircled.  We carefully divided the hernia sac at the level of the fascia with LigaSure device.  Hernia sac was opened and contained incarcerated omentum with early signs of strangulation.  This was divided at the level of the fascia with LigaSure and reduced.  Strangulated hernia contents was passed off as specimen.  12 mm trocar was placed and CO2 insufflation was begun scope was introduced and the abdomen was inspected. There was no evidence of injury related to entry.  Malignant appearing ascites  was present.  Multiple liver lesions were seen, as well as extrahepatic peritoneal disease over the liver consistent with peritoneal metastasis.  Under direct vision, a 5-mm trocars were placed in the lower midline and left lower quadrant. The right lower quadrant was inspected.  Dense inflammatory change was present about the cecum and terminal ileum.  This was carefully mobilized with blunt dissection.  A moderate-sized abscess was encountered and was evacuated.  We continued our dissection and the appendix was identified and noted to have severe significant inflammatory change with evidence of perforation.  We carefully mobilized the appendix from its adhesions to the retroperitoneum with blunt dissection.  The appendix was elevated. A mesenteric defect was made at the base of the appendix using the [place] dissector. The appendix was divided at the base with an Endo-BEBE stapler. The mesoappendix was divided with LigaSure. The appendix was placed into an Endocatch bag, was removed, and sent to Pathology. The staple lines on the mesoappendix and cecum were examined and were well sealed, viable, and without bleeding or leak. Hemostasis was confirmed. Trocars were removed under direct vision and pneumoperitoneum was evacuated.  Umbilical hernia defect was closed with interrupted 0 Ethibond suture.  The umbilicus was recreated by tacking the umbilical stalk to the investing fascia.  Skin was closed with subcuticular 4-0 Monocryl and Dermabond was applied. The patient was awakened from anesthesia and transferred to the PACU in good condition. All needle and sponge counts were correct at the conclusion of the case. I was present for the case in its entirety.    ESTIMATED BLOOD LOSS: 10 mL.     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    SPECIMEN: Appendix, strangulated umbilical  hernia contents.

## 2022-11-03 NOTE — NURSING
Nurses Note -- 4 Eyes      11/3/2022   5:48 PM      Skin assessed during: Admit      [x] No Pressure Injuries Present    []Prevention Measures Documented      [] Yes- Altered Skin Integrity Present or Discovered   [] LDA Added if Not in Epic (Describe Wound)   [] New Altered Skin Integrity was Present on Admit and Documented in LDA   [] Wound Image Taken    Wound Care Consulted? No    Attending Nurse:  Anh Carrillo RN     Second RN/Staff Member:  Mica Villafana RN

## 2022-11-03 NOTE — NURSING TRANSFER
Nursing Transfer Note      11/2/2022     Reason patient is being transferred: s/p hernia repair    Transfer To: 556    Transfer via stretcher    Transfer with 2L NC    Transported by PCT    Medicines sent: none    Any special needs or follow-up needed: routine    Chart send with patient: Yes    Notified: spouse    Patient reassessed at: 11/2/22 @0032    Report given to HERMILO Wang

## 2022-11-03 NOTE — PLAN OF CARE
"Domonique Kellogg is a 72 y.o. male admitted to [facility] on 2022 for Appendicitis, s/p appendectomy with umbilical hernia repair. Domonique Kellogg tolerated evaluation well today. Spouse at bedside reports a 2 week history of progressive weakness prior to this admission, typically independent but now moving slower with more rest breaks and increased abdominal pain (pre-operatively). He reports no pain throughout session but endorses "fullness" at area of his bladder. He's able to stand from bed and toilet today with rolling walker and contact-guard assistance. Ambulates 20 ft x 2 trials (to/from bathroom) with rolling walker and stand-by assistance; gait is steady without loss of balance, no pain with ambulation. Seated up in chair with family present at end of session; left a C&D rolling walker in room so he can continue to mobilize with nursing. Discussed PT role, POC, goals and recommendations (Home with family and HH PT, rolling walker) with patient; verbalized understanding. Domonique Kellogg would benefit from acute PT services to promote mobility during this admission and improve return to PLOF.    Problem: Physical Therapy  Goal: Physical Therapy Goal  Description: Goals to be met by: 11/10/22     Patient will increase functional independence with mobility by performin. Supine to sit with Supervision - Not met  2. Sit to stand transfer with Supervision with or without RW - Not met  3. Gait  x 200 feet with Stand-by Assistance with or without Rolling Walker - Not met  Outcome: Ongoing, Progressing    Michele Leiva, PT  11/3/2022  "

## 2022-11-03 NOTE — TRANSFER OF CARE
"Anesthesia Transfer of Care Note    Patient: Domonique Kellogg    Procedure(s) Performed: Procedure(s) (LRB):  APPENDECTOMY, LAPAROSCOPIC (N/A)  REPAIR, HERNIA, UMBILICAL, INCARCERATED, AGE 5 YEARS OR OLDER    Patient location: PACU    Anesthesia Type: general    Transport from OR: Transported from OR on 6-10 L/min O2 by face mask with adequate spontaneous ventilation    Post pain: adequate analgesia    Post assessment: no apparent anesthetic complications    Post vital signs: stable    Level of consciousness: awake    Nausea/Vomiting: no nausea/vomiting    Complications: none    Transfer of care protocol was followed      Last vitals:   Visit Vitals  /72 (BP Location: Right arm, Patient Position: Lying)   Pulse 92   Temp 37 °C (98.6 °F)   Resp 18   Ht 5' 8" (1.727 m)   Wt 99.8 kg (220 lb)   SpO2 100%   BMI 33.45 kg/m²     "

## 2022-11-03 NOTE — NURSING
Received to room 556, awake, alert and oriented x4, vital signs stable, moves all extremities with no diff, left forearm #20 with IVF infusing without diff, denies pain at present, oriented to hospital and floor, will continue to monitor.

## 2022-11-04 NOTE — ASSESSMENT & PLAN NOTE
Patient is a 72 year old male with h/o BPH, CAD, anemia, DVT, portal vein thrombus (on eliquis, last dose 10/31), HTN, RAHEEL, cholangiocarcinoma (on chemo, with liver lesions) presenting with ruptured appendicitis. Clinically stable, now with incarcerated umbilical hernia    - Diet: regular  - Bowel regimen added   - Drain care - empty Q4hrs  - Continue IV abx (cefepime, flagyl)  - PRN pain and nausea medications  - Daily labs. Not yet collected this AM. Re-ordered as STAT, reached out to nursing   - Replete electrolytes PRN  - DVT prophylaxis (SCDs and lovenox). Holding home eliquis for now, tentatively plan to restart 11/5  - OOB, ambulate  - PT/OT  - IS    Dispo: not yet medically stable for dc. Evaluated by PT/OT and determined to require home health at discharge. Orders placed. SW/CM involved, appreciate assistance.

## 2022-11-04 NOTE — SUBJECTIVE & OBJECTIVE
"Interval History: NAEON. Afebrile. HDS. Reports feeling good this morning. Tolerating CLD without n/v. Drain with 395cc out in 24 hours. BM yesterday. Pain is controlled with current regimen. Adequate UOP. No new symptoms or concerns this morning. All questions answered.   Labs not yet collected this AM  PT determined HH     Medications:  Continuous Infusions:   lactated ringers 50 mL/hr at 11/03/22 2229     Scheduled Meds:   ceFEPime (MAXIPIME) IVPB  2 g Intravenous Q12H    DULoxetine  20 mg Oral Daily    enoxaparin  40 mg Subcutaneous Daily    finasteride  5 mg Oral Daily    metronidazole  500 mg Intravenous Q8H    pantoprazole  40 mg Oral Daily    tamsulosin  0.4 mg Oral QHS     PRN Meds:sodium chloride, acetaminophen, albuterol-ipratropium, LIDOcaine (PF) 10 mg/ml (1%), melatonin, ondansetron, oxyCODONE, oxyCODONE, prochlorperazine, sodium chloride 0.9%     Review of patient's allergies indicates:   Allergen Reactions    Imfinzi [durvalumab] Other (See Comments)     1305- Imfinzi ended, pt returned from restroom complained of wheezing SOB and rigors. /100  O2 92% 2L NC O2 applied to pt.   1306-Demerol 25mg given and Solucortef 125mg IVPush given.   1309- Pt /106  02 99%.   1318- Pt still having Rigors additional 25mg of Demerol given.   1418- BP is 133/69 77 HR 99% RA Pt states " I feel much better"       Indomethacin Other (See Comments)     Headache dizziness      Adhesive Rash     Objective:     Vital Signs (Most Recent):  Temp: 97.4 °F (36.3 °C) (11/04/22 0441)  Pulse: 64 (11/04/22 0441)  Resp: 16 (11/04/22 0441)  BP: 109/65 (11/04/22 0441)  SpO2: 98 % (11/04/22 0441) Vital Signs (24h Range):  Temp:  [97 °F (36.1 °C)-99.7 °F (37.6 °C)] 97.4 °F (36.3 °C)  Pulse:  [64-81] 64  Resp:  [16-18] 16  SpO2:  [95 %-98 %] 98 %  BP: (101-115)/(58-76) 109/65     Weight: 99.8 kg (220 lb)  Body mass index is 33.45 kg/m².    Intake/Output - Last 3 Shifts         11/02 0700  11/03 0659 11/03 " 0700  11/04 0659 11/04 0700  11/05 0659    P.O. 120      Blood       IV Piggyback 2000      Total Intake(mL/kg) 2120 (21.2)      Urine (mL/kg/hr) 525 (0.2) 525 (0.2)     Emesis/NG output       Drains 250 575 55    Other 50      Stool  0     Blood 25      Total Output 850 1100 55    Net +1270 -1100 -55           Urine Occurrence 3 x 1 x     Stool Occurrence 3 x 1 x             Physical Exam  Vitals and nursing note reviewed.   Constitutional:       General: He is not in acute distress.     Appearance: Normal appearance. He is not ill-appearing or diaphoretic.      Comments: Room air   HENT:      Head: Normocephalic and atraumatic.      Nose: Nose normal.      Mouth/Throat:      Mouth: Mucous membranes are moist.      Pharynx: Oropharynx is clear.   Eyes:      Extraocular Movements: Extraocular movements intact.      Conjunctiva/sclera: Conjunctivae normal.   Cardiovascular:      Rate and Rhythm: Normal rate.   Pulmonary:      Effort: Pulmonary effort is normal. No respiratory distress.   Abdominal:      General: There is no distension.      Palpations: Abdomen is soft.      Tenderness: There is no abdominal tenderness. There is no guarding or rebound.      Comments: Incisions are clean, dry, and intact without drainage, erythema, or increased warmth. Appropriately TTP.  Drain output appears like ascites    Musculoskeletal:         General: No deformity.   Skin:     General: Skin is warm and dry.      Coloration: Skin is not jaundiced.   Neurological:      General: No focal deficit present.      Mental Status: He is alert and oriented to person, place, and time.   Psychiatric:         Mood and Affect: Mood normal.         Behavior: Behavior normal.       Significant Labs:  I have reviewed all pertinent lab results within the past 24 hours.    Significant Diagnostics:  I have reviewed all pertinent imaging results/findings within the past 24 hours.

## 2022-11-04 NOTE — PLAN OF CARE
"[facility] - [facility]      HOME HEALTH ORDERS  FACE TO FACE ENCOUNTER    Patient Name: Domonique Kellogg  YOB: 1950    PCP: Nicolas Marlow MD   PCP Address: [address] / [place] [address]  PCP Phone Number: [phone]  PCP Fax: [phone]    Encounter Date: 11/1/22    Admit to Home Health    Diagnoses:  Active Hospital Problems    Diagnosis  POA    *Appendicitis [K37]  Yes    Neuropathy [G62.9]  Yes    GERD (gastroesophageal reflux disease) [K21.9]  Yes    Umbilical hernia [K42.9]  Yes    Deep vein thrombosis (DVT) of femoral vein of left lower extremity [I82.412]  Yes     6/9/2022 - managed by Hematology-Oncology      Benign prostatic hyperplasia with urinary frequency [N40.1, R35.0]  Yes    Portal vein thrombosis [I81]  Yes    Intrahepatic cholangiocarcinoma [C22.1]  Yes     1.  -11/29/21 - presented with weight loss and diarrhea.   -US 9/22/21 multiple underlying hepatic lesions.   -MRI abdomen w/wo contrast 10/4/21: "Multiple liver lesions measuring up to 13.3 cm in the right hepatic lobe. Thrombosis of the anterior branch of the right portal vein and left hepatic vein.  Nonvisualization of the middle and right hepatic veins, which may be thrombosed as well. Prominent periportal lymph node."   -EGD and colonoscopy on 10/11/21 negative for primary malignancies  -Liver biopsy and Y90 mapping on 11/22/21 - adenocarcinoma: immunophenotype: Positive: AE1/AE3, CK7, CDX2 Negative: Chromogranin, Synaptophysin, TTF, CK5/6, CK20, HepPar1. Morphology and immunophenotype compatible with adenocarcinoma. Deemed by Oncology as primary hepatic or cholangio. Dr. Young discussed palliative chemo with cis/gem with patient.  2. PET scan 12/6/21 did not show extrahepatic metastases.   3. Cis/gem started on 12/7/21. Durvalumab was initially denied by insurance company after GI ASCO, but approved after FDA approval, added 8/10/22. Patient had reaction to durv (wheezing, rigors, shaking, high BP) on 9/9. " "Imfinzi was stopped.   4. S/p TACE on 12/16/2021, 2/14/22, 4/19/22, 5/18/22, 8/2/22  5. S/p RF Ablation on 7/19/22 9/23/2022 - Dr. Young interval history -cycle 13 day 1 of gem/cis. Had a reaction to durv (wheezing, rigors, shaking, high BP) on 9/9. Scheduled for TACE on 9/28.      Hypertension, essential [I10]  Yes      Resolved Hospital Problems   No resolved problems to display.       Follow Up Appointments:  Future Appointments   Date Time Provider Department Center   11/7/2022  7:10 AM LAB, [facility] CANCER [facility] LAB HO [facility]   11/7/2022  8:30 AM SABINA Hobson [facility] HEMONC2 [facility]   11/7/2022  9:00 AM NURSE 8, [facility] CHE[facility] CHEMO [facility]   11/8/2022  2:00 PM Alyssa Mooney DNP [facility] PLMDBEBRITTANIE [facility]   11/9/2022  6:00 PM NURSE 6, [facility]   11/18/2022  3:00 PM Blanquita Beard PA-C [facility] SHAKIRA [facility]   11/21/2022  8:50 AM LAB, [facility] CANCER [facility] LAB HO [facility] C[facility]   11/21/2022 10:00 AM Claudia Young MD [facility] HEMONC2 [facility]   11/21/2022 11:00 AM NURSE 8, [facility] CHEMO [facility] CHEMO [facility]   11/23/2022  6:00 PM CHEMO 1 [facility] CHEMO [facility]       Allergies:  Review of patient's allergies indicates:   Allergen Reactions    Imfinzi [durvalumab] Other (See Comments)     1305- Imfinzi ended, pt returned from restroom complained of wheezing SOB and rigors. /100  O2 92% 2L NC O2 applied to pt.   1306-Demerol 25mg given and Solucortef 125mg IVPush given.   1309- Pt /106  02 99%.   1318- Pt still having Rigors additional 25mg of Demerol given.   1418- BP is 133/69 77 HR 99% RA Pt states " I feel much better"       Indomethacin Other (See Comments)     Headache dizziness      Adhesive Rash       Medications: Review discharge medications with patient and family and provide education.    Current Facility-Administered Medications   Medication Dose Route Frequency Provider Last Rate Last Admin    0.9%  NaCl infusion (for blood " administration)   Intravenous Q24H PRN Yenifer Astorga MD        acetaminophen tablet 650 mg  650 mg Oral Q8H PRN Yenifer Astorga MD        albuterol-ipratropium 2.5 mg-0.5 mg/3 mL nebulizer solution 3 mL  3 mL Nebulization Q6H PRN Yenifer Astorga MD        cefepime in dextrose 5 % IVPB 2 g  2 g Intravenous Q12H Yenifer Astorga  mL/hr at 11/04/22 0912 2 g at 11/04/22 0912    DULoxetine DR capsule 20 mg  20 mg Oral Daily Yenifer Astorga MD   20 mg at 11/04/22 0923    enoxaparin injection 40 mg  40 mg Subcutaneous Daily Yenifer Astorga MD   40 mg at 11/03/22 1624    finasteride tablet 5 mg  5 mg Oral Daily Yenifer Astorga MD   5 mg at 11/04/22 0922    lactated ringers infusion   Intravenous Continuous Brigida Romero MD 50 mL/hr at 11/03/22 2229 New Bag at 11/03/22 2229    LIDOcaine (PF) 10 mg/ml (1%) injection 10 mg  1 mL Intradermal Once PRN Yenifer Astorga MD        melatonin tablet 6 mg  6 mg Oral Nightly PRN Yenifer Astorga MD        metronidazole IVPB 500 mg  500 mg Intravenous Q8H Yenifer Astorga MD   Stopped at 11/04/22 0228    ondansetron injection 4 mg  4 mg Intravenous Q6H PRN Yenifer Astorga MD        oxyCODONE immediate release tablet 5 mg  5 mg Oral Q4H PRN Yenifer Astorga MD        oxyCODONE immediate release tablet Tab 10 mg  10 mg Oral Q4H PRN Yenifer Astorga MD        pantoprazole EC tablet 40 mg  40 mg Oral Daily Yenifer Astorga MD   40 mg at 11/04/22 0922    polyethylene glycol packet 17 g  17 g Oral Daily Akhil Cartagena PA-C        prochlorperazine injection Soln 5 mg  5 mg Intravenous Q6H PRN Yenifer Astorga MD        senna-docusate 8.6-50 mg per tablet 1 tablet  1 tablet Oral Daily Akhil L. Hepting, PA-C        sodium chloride 0.9% flush 10 mL  10 mL Intravenous PRN Yenifer Astorga MD        tamsulosin 24 hr capsule 0.4 mg  0.4 mg Oral QHS Yenifer Astorga MD   0.4 mg at 11/03/22 6541     Current Discharge Medication List        CONTINUE these medications which have NOT CHANGED     Details   acetaminophen (TYLENOL) 650 MG TbSR Take by mouth daily as needed.      apixaban (ELIQUIS) 5 mg Tab Take 1 tablet (5 mg total) by mouth 2 (two) times daily.  Qty: 180 tablet, Refills: 3    Associated Diagnoses: Intrahepatic cholangiocarcinoma; Acute deep vein thrombosis (DVT) of left femoral vein      diltiaZEM 2% Lidocaine 5% Cream Apply pea size amount topically 3 (three) times daily.  Qty: 30 g, Refills: 2      DULoxetine (CYMBALTA) 20 MG capsule Take 1 capsule (20 mg total) by mouth once daily.  Qty: 90 capsule, Refills: 3    Associated Diagnoses: Neuropathy      finasteride (PROSCAR) 5 mg tablet Take 1 tablet (5 mg total) by mouth once daily.  Qty: 90 tablet, Refills: 3    Associated Diagnoses: Gross hematuria; BPH with urinary obstruction      hydroCHLOROthiazide (HYDRODIURIL) 25 MG tablet TAKE 1 TABLET (25 MG TOTAL) BY MOUTH ONCE DAILY.  Qty: 90 tablet, Refills: 3    Comments: Please inactivate all prior scripts with same name and strength including any scripts on hold.  Associated Diagnoses: Hypertension, essential      hydrocortisone 1 % cream Apply topically 2 (two) times daily. for 7 days  Qty: 30 g, Refills: 1    Associated Diagnoses: External hemorrhoid      magnesium oxide (MAG-OX) 400 mg (241.3 mg magnesium) tablet Take 400 mg by mouth every evening.      multivitamin with minerals tablet Take 1 tablet by mouth every morning.       ondansetron (ZOFRAN) 4 MG tablet Take 1 tablet (4 mg total) by mouth every 8 (eight) hours as needed for Nausea.  Qty: 20 tablet, Refills: 0      potassium chloride SA (K-DUR,KLOR-CON) 20 MEQ tablet Take 2 tablets (40 mEq total) by mouth once daily.  Qty: 180 tablet, Refills: 3    Associated Diagnoses: Hypertension, essential      pravastatin (PRAVACHOL) 40 MG tablet Take 1 tablet (40 mg total) by mouth once daily.  Qty: 90 tablet, Refills: 3    Associated Diagnoses: Hyperlipidemia, unspecified hyperlipidemia type      prochlorperazine (COMPAZINE) 10 MG tablet  Take 1 tablet (10 mg total) by mouth every 6 (six) hours as needed (nausea).  Qty: 60 tablet, Refills: 1    Associated Diagnoses: Intrahepatic cholangiocarcinoma      promethazine (PHENERGAN) 12.5 MG Tab Take 1 tablet (12.5 mg total) by mouth every 6 (six) hours as needed (nausea).  Qty: 60 tablet, Refills: 2    Associated Diagnoses: Intrahepatic cholangiocarcinoma      RABEprazole (ACIPHEX) 20 mg tablet Take 1 tablet (20 mg total) by mouth once daily.  Qty: 90 tablet, Refills: 3    Associated Diagnoses: Gastroesophageal reflux disease, unspecified whether esophagitis present      tamsulosin (FLOMAX) 0.4 mg Cap TAKE 1 CAPSULE EVERY DAY  Qty: 90 capsule, Refills: 0    Associated Diagnoses: Benign prostatic hyperplasia without lower urinary tract symptoms               I have seen and examined this patient within the last 30 days. My clinical findings that support the need for the home health skilled services and home bound status are the following:no   Weakness/numbness causing balance and gait disturbance due to Weakness/Debility and Surgery making it taxing to leave home.     Diet:   regular diet    Referrals/ Consults  Physical Therapy to evaluate and treat. Evaluate for home safety and equipment needs; Establish/upgrade home exercise program. Perform / instruct on therapeutic exercises, gait training, transfer training, and Range of Motion.  Occupational Therapy to evaluate and treat. Evaluate home environment for safety and equipment needs. Perform/Instruct on transfers, ADL training, ROM, and therapeutic exercises.    Activities:   activity as tolerated, ambulate in house , no driving while on analgesics, no strenuous exercise for 6 weeks post op, and no heavy lifting for 6 weeks post op    Nursing:   Agency to admit patient within 24 hours of hospital discharge unless specified on physician order or at patient request    SN to complete comprehensive assessment including routine vital signs. Instruct on disease  process and s/s of complications to report to MD. Review/verify medication list sent home with the patient at time of discharge  and instruct patient/caregiver as needed. Frequency may be adjusted depending on start of care date.     Skilled nurse to perform up to 3 visits PRN for symptoms related to diagnosis    Notify MD if SBP > 160 or < 90; DBP > 90 or < 50; HR > 120 or < 50; Temp > 101; O2 < 88%    Ok to schedule additional visits based on staff availability and patient request on consecutive days within the home health episode.    When multiple disciplines ordered:    Start of Care occurs on Sunday - Wednesday schedule remaining discipline evaluations as ordered on separate consecutive days following the start of care.    Thursday SOC -schedule subsequent evaluations Friday and Monday the following week.     Friday - Saturday SOC - schedule subsequent discipline evaluations on consecutive days starting Monday of the following week.    For all post-discharge communication and subsequent orders please contact patient's primary care physician. If unable to reach primary care physician or do not receive response within 30 minutes, please contact general surgery clinic for clinical staff order clarification    Miscellaneous   Drain Care:  Please empty drain every day and record its output on a sheet of paper. Bring paper log to next clinic visit. Please keep to suction.     Home Health Aide:  Nursing Twice weekly and Physical Therapy Twice weekly    Wound Care Orders  yes:  Surgical Wound:  Location: abdomen  Keep clean and dry. Monitor for skin changes and signs of infection including but not limited to erythema, drainage, increased warmth.       I certify that this patient is confined to his home and needs intermittent skilled nursing care and physical therapy.

## 2022-11-04 NOTE — NURSING
Paged resident on call for Dr zapata patient c/o of dizziness, when walking to bathroom .encourage patient to drink fluids

## 2022-11-04 NOTE — PROGRESS NOTES
"[facility] - [facility]  General Surgery  Progress Note    Subjective:     History of Present Illness:  No notes on file    Post-Op Info:  Procedure(s) (LRB):  APPENDECTOMY, LAPAROSCOPIC (N/A)  REPAIR, HERNIA, UMBILICAL, INCARCERATED, AGE 5 YEARS OR OLDER   2 Days Post-Op     Interval History: NAEON. Afebrile. HDS. Reports feeling good this morning. Tolerating CLD without n/v. Drain with 395cc out in 24 hours. BM yesterday. Pain is controlled with current regimen. Adequate UOP. No new symptoms or concerns this morning. All questions answered.   Labs not yet collected this AM  PT determined HH     Medications:  Continuous Infusions:   lactated ringers 50 mL/hr at 11/03/22 2229     Scheduled Meds:   ceFEPime (MAXIPIME) IVPB  2 g Intravenous Q12H    DULoxetine  20 mg Oral Daily    enoxaparin  40 mg Subcutaneous Daily    finasteride  5 mg Oral Daily    metronidazole  500 mg Intravenous Q8H    pantoprazole  40 mg Oral Daily    tamsulosin  0.4 mg Oral QHS     PRN Meds:sodium chloride, acetaminophen, albuterol-ipratropium, LIDOcaine (PF) 10 mg/ml (1%), melatonin, ondansetron, oxyCODONE, oxyCODONE, prochlorperazine, sodium chloride 0.9%     Review of patient's allergies indicates:   Allergen Reactions    Imfinzi [durvalumab] Other (See Comments)     1305- Imfinzi ended, pt returned from restroom complained of wheezing SOB and rigors. /100  O2 92% 2L NC O2 applied to pt.   1306-Demerol 25mg given and Solucortef 125mg IVPush given.   1309- Pt /106  02 99%.   1318- Pt still having Rigors additional 25mg of Demerol given.   1418- BP is 133/69 77 HR 99% RA Pt states " I feel much better"       Indomethacin Other (See Comments)     Headache dizziness      Adhesive Rash     Objective:     Vital Signs (Most Recent):  Temp: 97.4 °F (36.3 °C) (11/04/22 0441)  Pulse: 64 (11/04/22 0441)  Resp: 16 (11/04/22 0441)  BP: 109/65 (11/04/22 0441)  SpO2: 98 % (11/04/22 0441) Vital Signs (24h Range):  Temp:  [97 °F " (36.1 °C)-99.7 °F (37.6 °C)] 97.4 °F (36.3 °C)  Pulse:  [64-81] 64  Resp:  [16-18] 16  SpO2:  [95 %-98 %] 98 %  BP: (101-115)/(58-76) 109/65     Weight: 99.8 kg (220 lb)  Body mass index is 33.45 kg/m².    Intake/Output - Last 3 Shifts         11/02 0700 11/03 0659 11/03 0700 11/04 0659 11/04 0700 11/05 0659    P.O. 120      Blood       IV Piggyback 2000      Total Intake(mL/kg) 2120 (21.2)      Urine (mL/kg/hr) 525 (0.2) 525 (0.2)     Emesis/NG output       Drains 250 575 55    Other 50      Stool  0     Blood 25      Total Output 850 1100 55    Net +1270 -1100 -55           Urine Occurrence 3 x 1 x     Stool Occurrence 3 x 1 x             Physical Exam  Vitals and nursing note reviewed.   Constitutional:       General: He is not in acute distress.     Appearance: Normal appearance. He is not ill-appearing or diaphoretic.      Comments: Room air   HENT:      Head: Normocephalic and atraumatic.      Nose: Nose normal.      Mouth/Throat:      Mouth: Mucous membranes are moist.      Pharynx: Oropharynx is clear.   Eyes:      Extraocular Movements: Extraocular movements intact.      Conjunctiva/sclera: Conjunctivae normal.   Cardiovascular:      Rate and Rhythm: Normal rate.   Pulmonary:      Effort: Pulmonary effort is normal. No respiratory distress.   Abdominal:      General: There is no distension.      Palpations: Abdomen is soft.      Tenderness: There is no abdominal tenderness. There is no guarding or rebound.      Comments: Incisions are clean, dry, and intact without drainage, erythema, or increased warmth. Appropriately TTP.  Drain output appears like ascites    Musculoskeletal:         General: No deformity.   Skin:     General: Skin is warm and dry.      Coloration: Skin is not jaundiced.   Neurological:      General: No focal deficit present.      Mental Status: He is alert and oriented to person, place, and time.   Psychiatric:         Mood and Affect: Mood normal.         Behavior: Behavior normal.        Significant Labs:  I have reviewed all pertinent lab results within the past 24 hours.    Significant Diagnostics:  I have reviewed all pertinent imaging results/findings within the past 24 hours.    Assessment/Plan:     * Appendicitis  Patient is a 72 year old male with h/o BPH, CAD, anemia, DVT, portal vein thrombus (on eliquis, last dose 10/31), HTN, RAHEEL, cholangiocarcinoma (on chemo, with liver lesions) presenting with ruptured appendicitis. Clinically stable, now with incarcerated umbilical hernia    - Diet: regular  - Bowel regimen added   - Drain care - empty Q4hrs  - Continue IV abx (cefepime, flagyl)  - PRN pain and nausea medications  - Daily labs. Not yet collected this AM. Re-ordered as STAT, reached out to nursing   - Replete electrolytes PRN  - DVT prophylaxis (SCDs and lovenox). Holding home eliquis for now, tentatively plan to restart 11/5  - OOB, ambulate  - PT/OT  - IS    Dispo: not yet medically stable for dc. Evaluated by PT/OT and determined to require home health at discharge. Orders placed. SW/CM involved, appreciate assistance.        Umbilical hernia  - see principle problem     GERD (gastroesophageal reflux disease)  - Home PPI converted to PO pantoprazole while inpatient  - Home PPI not on formulary     Neuropathy  - Continue home cymbalta    Deep vein thrombosis (DVT) of femoral vein of left lower extremity  - see portal vein thrombus     Benign prostatic hyperplasia with urinary frequency  - Continue home flomax and finasteride     Portal vein thrombosis  - Holding home eliquis (last dose 10/31/22) due to acute disease and procedures. Will restart when able     Intrahepatic cholangiocarcinoma  - Hem/onc consulted, appreciate assistance  - Holding chemo     Hypertension, essential  - Currently controlled  - Holding home meds in setting of NPO and normotensive  - Continue to monitor with q4 vitals and adjust regimen PRN    Case discussed with general surgery staff    Akhil NASSAR  CAROLNIA Cartagena  General Surgery  [facility] - Surgery

## 2022-11-04 NOTE — NURSING
"Paged resident on call for Dr Hamm reference patient c/o of dizziness , when ambulating to bathroom . Patient stated, " he had 4-6 loose stool last night ."  "

## 2022-11-05 NOTE — PROGRESS NOTES
"[facility] - Surgery  General Surgery  Progress Note    Subjective:     History of Present Illness:  No notes on file    Post-Op Info:  Procedure(s) (LRB):  APPENDECTOMY, LAPAROSCOPIC (N/A)  REPAIR, HERNIA, UMBILICAL, INCARCERATED, AGE 5 YEARS OR OLDER   3 Days Post-Op     Interval History:   No acute events overnight. AFVSS. Some dizziness over the last 24h. Denies any LOC. Tolerating diet. No nausea and vomiting. Continues to have bowel function. Drain removed this am.     Medications:  Continuous Infusions:   lactated ringers 50 mL/hr at 11/03/22 2229     Scheduled Meds:   ceFEPime (MAXIPIME) IVPB  2 g Intravenous Q12H    DULoxetine  20 mg Oral Daily    enoxaparin  40 mg Subcutaneous Daily    finasteride  5 mg Oral Daily    metronidazole  500 mg Intravenous Q8H    pantoprazole  40 mg Oral Daily    polyethylene glycol  17 g Oral Daily    senna-docusate 8.6-50 mg  1 tablet Oral Daily    tamsulosin  0.4 mg Oral QHS     PRN Meds:sodium chloride, acetaminophen, albuterol-ipratropium, LIDOcaine (PF) 10 mg/ml (1%), melatonin, ondansetron, oxyCODONE, oxyCODONE, prochlorperazine, sodium chloride 0.9%     Review of patient's allergies indicates:   Allergen Reactions    Imfinzi [durvalumab] Other (See Comments)     1305- Imfinzi ended, pt returned from restroom complained of wheezing SOB and rigors. /100  O2 92% 2L NC O2 applied to pt.   1306-Demerol 25mg given and Solucortef 125mg IVPush given.   1309- Pt /106  02 99%.   1318- Pt still having Rigors additional 25mg of Demerol given.   1418- BP is 133/69 77 HR 99% RA Pt states " I feel much better"       Indomethacin Other (See Comments)     Headache dizziness      Adhesive Rash     Objective:     Vital Signs (Most Recent):  Temp: 97.3 °F (36.3 °C) (11/05/22 0736)  Pulse: 74 (11/05/22 0736)  Resp: 16 (11/05/22 0736)  BP: 110/62 (11/05/22 0736)  SpO2: 95 % (11/05/22 0736) Vital Signs (24h Range):  Temp:  [96.6 °F (35.9 °C)-97.8 °F (36.6 " °C)] 97.3 °F (36.3 °C)  Pulse:  [63-86] 74  Resp:  [16-20] 16  SpO2:  [95 %-99 %] 95 %  BP: ()/(55-70) 110/62     Weight: 99.8 kg (220 lb)  Body mass index is 33.45 kg/m².    Intake/Output - Last 3 Shifts         11/03 0700 11/04 0659 11/04 0700 11/05 0659 11/05 0700 11/06 0659    P.O.  600     IV Piggyback       Total Intake(mL/kg)  600 (6)     Urine (mL/kg/hr) 525 (0.2) 0 (0)     Drains 575 545     Other       Stool 0 0     Blood       Total Output 1100 545     Net -1100 +55            Urine Occurrence 1 x 5 x     Stool Occurrence 1 x 4 x             Physical Exam  Vitals and nursing note reviewed.   Constitutional:       General: He is not in acute distress.     Appearance: Normal appearance. He is not ill-appearing or diaphoretic.      Comments: Room air   HENT:      Head: Normocephalic and atraumatic.      Nose: Nose normal.      Mouth/Throat:      Mouth: Mucous membranes are moist.      Pharynx: Oropharynx is clear.   Eyes:      Extraocular Movements: Extraocular movements intact.      Conjunctiva/sclera: Conjunctivae normal.   Cardiovascular:      Rate and Rhythm: Normal rate.   Pulmonary:      Effort: Pulmonary effort is normal. No respiratory distress.   Abdominal:      General: There is no distension.      Palpations: Abdomen is soft.      Tenderness: There is no abdominal tenderness. There is no guarding or rebound.      Comments: Incisions are clean, dry, and intact without drainage, erythema, or increased warmth. Appropriately TTP.  Drain removed   Musculoskeletal:         General: No deformity.   Skin:     General: Skin is warm and dry.      Coloration: Skin is not jaundiced.   Neurological:      General: No focal deficit present.      Mental Status: He is alert and oriented to person, place, and time.   Psychiatric:         Mood and Affect: Mood normal.         Behavior: Behavior normal.       Significant Labs:  I have reviewed all pertinent lab results within the past 24 hours.  CBC:    Recent Labs   Lab 11/05/22  0449   WBC 13.79*   RBC 2.57*   HGB 8.5*   HCT 26.5*      *   MCH 33.1*   MCHC 32.1     CMP:   Recent Labs   Lab 11/05/22  0806   GLU 86   CALCIUM 9.6   ALBUMIN 2.0*   PROT 5.6*      K 3.7   CO2 19*      BUN 36*   CREATININE 1.7*   ALKPHOS 445*   ALT 38   AST 70*   BILITOT 2.0*       Significant Diagnostics:  I have reviewed all pertinent imaging results/findings within the past 24 hours.    Assessment/Plan:     * Appendicitis  Patient is a 72 year old male with h/o BPH, CAD, anemia, DVT, portal vein thrombus (on eliquis, last dose 10/31), HTN, RAHEEL, cholangiocarcinoma (on chemo, with liver lesions) presenting with ruptured appendicitis. Clinically stable, now with incarcerated umbilical hernia    - Diet: regular  - Bowel regimen added   - Drain removed this am  - Continue IV abx (cefepime, flagyl)  - PRN pain and nausea medications  - Daily labs. WBC continues to downtrend  - Replete electrolytes PRN  - DVT prophylaxis (SCDs and lovenox). Holding home eliquis for now, tentatively plan to restart 11/5  - OOB, ambulate  - PT/OT  - IS    Dispo: nearing discharge, continue inpatient care        Umbilical hernia  - see principle problem     GERD (gastroesophageal reflux disease)  - Home PPI converted to PO pantoprazole while inpatient    Neuropathy  - Continue home cymbalta    Deep vein thrombosis (DVT) of femoral vein of left lower extremity  - see portal vein thrombus     Benign prostatic hyperplasia with urinary frequency  - Continue home flomax and finasteride     Portal vein thrombosis  - Holding home eliquis (last dose 10/31/22) due to acute disease and procedures. Will restart when able     Intrahepatic cholangiocarcinoma  - Hem/onc consulted, appreciate assistance  - Holding chemo     Hypertension, essential  - Currently controlled  - Holding home meds in setting of NPO and normotensive  - Continue to monitor with q4 vitals and adjust regimen PRN        Dina  MD Jean-Claude  General Surgery  [facility] - Surgery

## 2022-11-05 NOTE — PLAN OF CARE
Pt is Aox4, VSS. Pain assessed and managed with PRN medication. Pt is progressing towards goals. SCD's in place with frequent checks for skin breakdown. PAO drain in place, output documented.  Fall precautions in place, no reported falls. IV site CDI. Elimination schedule in place. Ambulates with 1x assist, walker at bedside. Safety precautions in place bed in lowest position,wheels locked, call light within reach,  id band and allergy band on, and clutter free environment.

## 2022-11-05 NOTE — PT/OT/SLP EVAL
"Occupational Therapy   Evaluation    Name: Domonique Kellogg  MRN: [id number]  Admitting Diagnosis:  Appendicitis  Recent Surgery: Procedure(s) (LRB):  APPENDECTOMY, LAPAROSCOPIC (N/A)  REPAIR, HERNIA, UMBILICAL, INCARCERATED, AGE 5 YEARS OR OLDER 3 Days Post-Op    Recommendations:     Discharge Recommendations: home health OT  Discharge Equipment Recommendations:  walker, rolling  Barriers to discharge:  None    Assessment:     Domonique Kellogg is a 72 y.o. male with a medical diagnosis of Appendicitis.  He presents with appendicitis. Performance deficits affecting function: weakness, impaired endurance, impaired self care skills, impaired functional mobility, gait instability, impaired balance.      Rehab Prognosis: Good; patient would benefit from acute skilled OT services to address these deficits and reach maximum level of function.       Plan:     Patient to be seen 3 x/week to address the above listed problems via self-care/home management, therapeutic activities, therapeutic exercises, neuromuscular re-education  Plan of Care Expires: 12/05/22  Plan of Care Reviewed with: patient, spouse    Subjective     Chief Complaint: appendicitis  Patient/Family Comments/goals: "We can go for a walk"    Occupational Profile:  Living Environment: Pt lives with wife in a 2 story house, pt can live on 1st floor, walk in shower with shower chair  Previous level of function: Pt was independent prior to admission  Roles and Routines: Pt enjoys cooking  Equipment Used at Home:  CPAP  Assistance upon Discharge: HHOT recommended upon d/c    Pain/Comfort:  Pain Rating 1: 0/10    Patients cultural, spiritual, Gnosticist conflicts given the current situation: no    Objective:     Communicated with: RN prior to session.  Patient found HOB elevated with telemetry, oxygen, peripheral IV upon OT entry to room.    General Precautions: Standard, fall   Orthopedic Precautions:N/A   Braces: N/A  Respiratory Status: Nasal cannula, flow 1 " "L/min    Occupational Performance:    Bed Mobility:    Patient completed Supine to Sit with stand by assistance  Patient completed Sit to Supine with stand by assistance    Functional Mobility/Transfers:  Patient completed Sit <> Stand Transfer with contact guard assistance  with  rolling walker   Functional Mobility: completed for household distances in preparation for ADLs with SBA for safety    Activities of Daily Living:  Lower Body Dressing: moderate assistance for donning socks EOB 2* to pt reported feeling "weak"    Cognitive/Visual Perceptual:  Cognitive/Psychosocial Skills:     -       Oriented to: Person, Place, Time, and Situation     Physical Exam:  Upper Extremity Range of Motion:     -       Right Upper Extremity: WFL  -       Left Upper Extremity: WFL  Upper Extremity Strength:    -       Right Upper Extremity: WFL  -       Left Upper Extremity: WFL   Strength:    -       Right Upper Extremity: WFL  -       Left Upper Extremity: WFL    AMPAC 6 Click ADL:  AMPAC Total Score: 20    Treatment & Education:  Pt educated on OT POC  Pt educated on using call bell to request assistance for fxnl ambulation  All questions within OT scope of practice answered to satisfaction  Pt left with all lines intact and all needs met         Patient left HOB elevated with all lines intact, call button in reach, RN notified, and wife present    GOALS:   Multidisciplinary Problems       Occupational Therapy Goals          Problem: Occupational Therapy    Goal Priority Disciplines Outcome Interventions   Occupational Therapy Goal     OT, PT/OT Ongoing, Progressing    Description: Goals to be met by: 11/19/22     Patient will increase functional independence with ADLs by performing:    UE Dressing with Modified [place].  LE Dressing with Modified [place].  Grooming while standing at sink with Modified [place].  Toileting from bedside commode with Modified [place] for hygiene and clothing management. "   Toilet transfer to bedside commode with Modified [place].                         History:     Past Medical History:   Diagnosis Date    Adjustment disorder     Anticoagulant long-term use     Anxiety     Arthritis     CAD (coronary artery disease) 03/02/2015    non-obstructive per [facility]     Cataract     Dry eye syndrome     Hypertension     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/2021    Obesity     Post-procedural fever 12/18/2021    Seizures     2011    Sleep apnea     + CPAP    Sleep difficulties          Past Surgical History:   Procedure Laterality Date    ANTERIOR CERVICAL DISCECTOMY W/ FUSION N/A 07/06/2020    Procedure: DISCECTOMY, SPINE, CERVICAL, ANTERIOR APPROACH, WITH FUSION C3-4, C4-5;  Surgeon: Fabiola Vides MD;  [facility];  Service: Neurosurgery;  Laterality: N/A;  TORONTO III, ASA III, BLOOD TYPE & SCREEN, NEUROMONITORING EMG-MEP-SEP, SUPINE POSITION,BRACE [place],REGULAR BED, HEADREST [place], POSITION, MAP>85, RADIOLOGY C-ARM, SPECIAL EQUIPMENT [facility]    COLONOSCOPY N/A 10/01/2021    Procedure: COLONOSCOPY;  Surgeon: Nicolas Alvarado MD;  Location: [facility] (4TH FLR);  Service: Endoscopy;  Laterality: N/A;  EGD and colonoscopy for diarrhea and weight loss and Enlarged, heterogeneous appearance of the liver likely due to multiple underlying hepatic lesions largest measuring 4.8 cm.  Findings concerning for metastatic versus less likely primary hepatic neoplasm.  Recommend [facility]    COLONOSCOPY N/A 06/07/2022    Procedure: COLONOSCOPY;  Surgeon: Mejia Villafuerte MD;  Location: [facility] (4TH FLR);  Service: Endoscopy;  Laterality: N/A;  For evaluation of positive out-patient FOBT.   medically urgent  ok to hold Eliquis per CAROLINA Edward/RB  fully vaccinated  hx of seizures- last one 2014    ESOPHAGOGASTRODUODENOSCOPY N/A 10/01/2021    Procedure: EGD (ESOPHAGOGASTRODUODENOSCOPY);  Surgeon: Nicolas Alvarado MD;  Location: [facility] (4TH FLR);  Service: Endoscopy;  Laterality:  N/A;  EGD and colonoscopy for diarrhea and weight loss and Enlarged, heterogeneous appearance of the liver likely due to multiple underlying hepatic lesions largest measuring 4.8 cm.  Findings concerning for metastatic versus less likely primary hepatic neopl    ESOPHAGOGASTRODUODENOSCOPY N/A 04/05/2022    Procedure: EGD (ESOPHAGOGASTRODUODENOSCOPY);  Surgeon: Amado Kelsey MD;  Location: [facility] (4TH FLR);  Service: Endoscopy;  Laterality: N/A;  EGD in 8 weeks to check for esophagitis healing patient should be on pantoprazole 40 mg once daily  ok to hold eliquis x2 days per Dr. Young, see telephone encounter  fully vaccinated    EYE SURGERY      cataract    INSERTION OF VENOUS ACCESS PORT N/A 12/03/2021    Procedure: INSERTION, VENOUS ACCESS PORT;  Surgeon: Saurav Garcia MD;  Location: [facility] 2ND FLR;  Service: General;  Laterality: N/A;    INTRAOCULAR PROSTHESES INSERTION Right 07/16/2018    Procedure: INSERTION-INTRAOCULAR LENS (IOL);  Surgeon: Priscilla Hays MD;  Location: [facility];  Service: Ophthalmology;  Laterality: Right;    INTRAOCULAR PROSTHESES INSERTION Left 08/13/2018    Procedure: INSERTION, INTRAOCULAR LENS PROSTHESIS;  Surgeon: Priscilla Hays MD;  Location: [facility];  Service: Ophthalmology;  Laterality: Left;    KNEE SURGERY Right     LAPAROSCOPIC APPENDECTOMY N/A 11/2/2022    Procedure: APPENDECTOMY, LAPAROSCOPIC;  Surgeon: Cheo Hamm MD;  Location: [facility] 2ND FLR;  Service: General;  Laterality: N/A;    PHACOEMULSIFICATION OF CATARACT Right 07/16/2018    Procedure: PHACOEMULSIFICATION-ASPIRATION-CATARACT;  Surgeon: Priscilla Hays MD;  Location: [facility];  Service: Ophthalmology;  Laterality: Right;    PHACOEMULSIFICATION OF CATARACT Left 08/13/2018    Procedure: PHACOEMULSIFICATION, CATARACT;  Surgeon: Priscilla Hays MD;  Location: [facility];  Service: Ophthalmology;  Laterality: Left;    REPAIR OF INCARCERATED UMBILICAL HERNIA  11/2/2022    Procedure: REPAIR, HERNIA, UMBILICAL,  INCARCERATED, AGE 5 YEARS OR OLDER;  Surgeon: Cheo Hamm MD;  Location: [facility] OR [address] [place] [address];  Service: General;;    WISDOM TOOTH EXTRACTION         Time Tracking:     OT Date of Treatment: 11/05/22  OT Start Time: 1138  OT Stop Time: 1203  OT Total Time (min): 25 min    Billable Minutes:Evaluation 10  Therapeutic Activity 15    11/5/2022

## 2022-11-05 NOTE — ASSESSMENT & PLAN NOTE
Patient is a 72 year old male with h/o BPH, CAD, anemia, DVT, portal vein thrombus (on eliquis, last dose 10/31), HTN, RAHEEL, cholangiocarcinoma (on chemo, with liver lesions) presenting with ruptured appendicitis. Clinically stable, now with incarcerated umbilical hernia    - Diet: regular  - Bowel regimen added   - Drain removed this am  - Continue IV abx (cefepime, flagyl)  - PRN pain and nausea medications  - Daily labs. WBC continues to downtrend  - Replete electrolytes PRN  - DVT prophylaxis (SCDs and lovenox). Holding home eliquis for now, tentatively plan to restart 11/5  - OOB, ambulate  - PT/OT  - IS    Dispo: nearing discharge, continue inpatient care

## 2022-11-05 NOTE — SUBJECTIVE & OBJECTIVE
"Interval History:   No acute events overnight. AFVSS. Some dizziness over the last 24h. Denies any LOC. Tolerating diet. No nausea and vomiting. Continues to have bowel function. Drain removed this am.     Medications:  Continuous Infusions:   lactated ringers 50 mL/hr at 11/03/22 2229     Scheduled Meds:   ceFEPime (MAXIPIME) IVPB  2 g Intravenous Q12H    DULoxetine  20 mg Oral Daily    enoxaparin  40 mg Subcutaneous Daily    finasteride  5 mg Oral Daily    metronidazole  500 mg Intravenous Q8H    pantoprazole  40 mg Oral Daily    polyethylene glycol  17 g Oral Daily    senna-docusate 8.6-50 mg  1 tablet Oral Daily    tamsulosin  0.4 mg Oral QHS     PRN Meds:sodium chloride, acetaminophen, albuterol-ipratropium, LIDOcaine (PF) 10 mg/ml (1%), melatonin, ondansetron, oxyCODONE, oxyCODONE, prochlorperazine, sodium chloride 0.9%     Review of patient's allergies indicates:   Allergen Reactions    Imfinzi [durvalumab] Other (See Comments)     1305- Imfinzi ended, pt returned from restroom complained of wheezing SOB and rigors. /100  O2 92% 2L NC O2 applied to pt.   1306-Demerol 25mg given and Solucortef 125mg IVPush given.   1309- Pt /106  02 99%.   1318- Pt still having Rigors additional 25mg of Demerol given.   1418- BP is 133/69 77 HR 99% RA Pt states " I feel much better"       Indomethacin Other (See Comments)     Headache dizziness      Adhesive Rash     Objective:     Vital Signs (Most Recent):  Temp: 97.3 °F (36.3 °C) (11/05/22 0736)  Pulse: 74 (11/05/22 0736)  Resp: 16 (11/05/22 0736)  BP: 110/62 (11/05/22 0736)  SpO2: 95 % (11/05/22 0736) Vital Signs (24h Range):  Temp:  [96.6 °F (35.9 °C)-97.8 °F (36.6 °C)] 97.3 °F (36.3 °C)  Pulse:  [63-86] 74  Resp:  [16-20] 16  SpO2:  [95 %-99 %] 95 %  BP: ()/(55-70) 110/62     Weight: 99.8 kg (220 lb)  Body mass index is 33.45 kg/m².    Intake/Output - Last 3 Shifts         11/03 0700 11/04 0659 11/04 0700 11/05 0659 11/05 0700 11/06 0659 "    P.O.  600     IV Piggyback       Total Intake(mL/kg)  600 (6)     Urine (mL/kg/hr) 525 (0.2) 0 (0)     Drains 575 545     Other       Stool 0 0     Blood       Total Output 1100 545     Net -1100 +55            Urine Occurrence 1 x 5 x     Stool Occurrence 1 x 4 x             Physical Exam  Vitals and nursing note reviewed.   Constitutional:       General: He is not in acute distress.     Appearance: Normal appearance. He is not ill-appearing or diaphoretic.      Comments: Room air   HENT:      Head: Normocephalic and atraumatic.      Nose: Nose normal.      Mouth/Throat:      Mouth: Mucous membranes are moist.      Pharynx: Oropharynx is clear.   Eyes:      Extraocular Movements: Extraocular movements intact.      Conjunctiva/sclera: Conjunctivae normal.   Cardiovascular:      Rate and Rhythm: Normal rate.   Pulmonary:      Effort: Pulmonary effort is normal. No respiratory distress.   Abdominal:      General: There is no distension.      Palpations: Abdomen is soft.      Tenderness: There is no abdominal tenderness. There is no guarding or rebound.      Comments: Incisions are clean, dry, and intact without drainage, erythema, or increased warmth. Appropriately TTP.  Drain removed   Musculoskeletal:         General: No deformity.   Skin:     General: Skin is warm and dry.      Coloration: Skin is not jaundiced.   Neurological:      General: No focal deficit present.      Mental Status: He is alert and oriented to person, place, and time.   Psychiatric:         Mood and Affect: Mood normal.         Behavior: Behavior normal.       Significant Labs:  I have reviewed all pertinent lab results within the past 24 hours.  CBC:   Recent Labs   Lab 11/05/22  0449   WBC 13.79*   RBC 2.57*   HGB 8.5*   HCT 26.5*      *   MCH 33.1*   MCHC 32.1     CMP:   Recent Labs   Lab 11/05/22  0806   GLU 86   CALCIUM 9.6   ALBUMIN 2.0*   PROT 5.6*      K 3.7   CO2 19*      BUN 36*   CREATININE 1.7*   ALKPHOS  445*   ALT 38   AST 70*   BILITOT 2.0*       Significant Diagnostics:  I have reviewed all pertinent imaging results/findings within the past 24 hours.

## 2022-11-05 NOTE — PLAN OF CARE
Problem: Occupational Therapy  Goal: Occupational Therapy Goal  Description: Goals to be met by: 11/19/22     Patient will increase functional independence with ADLs by performing:    UE Dressing with Modified [place].  LE Dressing with Modified [place].  Grooming while standing at sink with Modified [place].  Toileting from bedside commode with Modified [place] for hygiene and clothing management.   Toilet transfer to bedside commode with Modified [place].    Outcome: Ongoing, Progressing

## 2022-11-06 PROBLEM — K37 APPENDICITIS: Status: RESOLVED | Noted: 2022-01-01 | Resolved: 2022-01-01

## 2022-11-06 NOTE — NURSING
Discharge instructions reviewed with patient including signs and symptoms to call physician or seek emergency care, lifting restrictions, surgical site care, medication directions, patient and wife verbalized understanding.  Hard copy of discharge summary given.

## 2022-11-06 NOTE — PT/OT/SLP PROGRESS
"Physical Therapy Treatment    Patient Name:  Domonique Kellogg   MRN:  [id number]    Recommendations:     Discharge Recommendations:  home health OT   Discharge Equipment Recommendations: walker, rolling   Barriers to discharge: None    Assessment:     Domonique Kellogg is a 72 y.o. male admitted with a medical diagnosis of Appendicitis.  He presents with the following impairments/functional limitations:  weakness, impaired endurance, impaired self care skills, impaired functional mobility, gait instability, impaired balance . Pt agreed to perform LE exercises to increase strength during ambulation. Pt demonstrated good strength to perform some exercises independently in bed and required min A after second set due muscle fatigue. Pt will cont to benefit from exercises to improve strength and mobility to BLE.    Rehab Prognosis: Good; patient would benefit from acute skilled PT services to address these deficits and reach maximum level of function.    Recent Surgery: Procedure(s) (LRB):  APPENDECTOMY, LAPAROSCOPIC (N/A)  REPAIR, HERNIA, UMBILICAL, INCARCERATED, AGE 4 YEARS OR OLDER 4 Days Post-Op    Plan:     During this hospitalization, patient to be seen 3 x/week to address the identified rehab impairments via gait training, therapeutic activities, therapeutic exercises, neuromuscular re-education and progress toward the following goals:    Plan of Care Expires:  12/03/22    Subjective     Chief Complaint: None  Patient/Family Comments/goals: "I walked earlier using the RW"  Pain/Comfort:  Pain Rating 1: 0/10      Objective:     Communicated with HERMILO Bingham prior to session.  Patient found HOB elevated with telemetry, oxygen, peripheral IV upon PT entry to room.     General Precautions: Standard, fall   Orthopedic Precautions:N/A   Braces: N/A  Respiratory Status: Nasal cannula, flow 1 L/min     Functional Mobility:  Bed Mobility:     Scooting: modified independence  Bridging: modified independence      AM-PAC 6 " CLICK MOBILITY  Turning over in bed (including adjusting bedclothes, sheets and blankets)?: 4  Sitting down on and standing up from a chair with arms (e.g., wheelchair, bedside commode, etc.): 4  Moving from lying on back to sitting on the side of the bed?: 4  Moving to and from a bed to a chair (including a wheelchair)?: 3  Need to walk in hospital room?: 3  Climbing 3-5 steps with a railing?: 1  Basic Mobility Total Score: 19       Treatment & Education:  Pt was educated on performing exercises in bed or seated at the EOB to improve strength and mobility.    Patient left HOB elevated with all lines intact, call button in reach, bed alarm on, nurse notified, and daughter present..    GOALS:   Multidisciplinary Problems       Physical Therapy Goals          Problem: Physical Therapy    Goal Priority Disciplines Outcome Goal Variances Interventions   Physical Therapy Goal     PT, PT/OT Ongoing, Progressing     Description: Goals to be met by: 11/10/22     Patient will increase functional independence with mobility by performin. Supine to sit with Supervision - Not met  2. Sit to stand transfer with Supervision with or without RW - Not met  3. Gait  x 200 feet with Stand-by Assistance with or without Rolling Walker - Not met                       Time Tracking:     PT Received On: 22  PT Start Time: 1316     PT Stop Time: 1336  PT Total Time (min): 20 min     Billable Minutes: Therapeutic Exercise 20    Treatment Type: Treatment  PT/PTA: PTA     PTA Visit Number: 2     2022

## 2022-11-06 NOTE — PLAN OF CARE
Pt is Aox4, VSS. No report of pain, pt slept through the night. Pt is progressing towards goals. SCD's in place with frequent checks for skin breakdown. Fall precautions in place, no reported falls. IV site CDI. Elimination schedule in place. Ambulates with 1x assist, walker at bedside.  Safety precautions in place bed in lowest position,wheels locked, call light within reach, id band and allergy band on, and clutter free environment.

## 2022-11-06 NOTE — HOSPITAL COURSE
Domonique Kellogg underwent repair of a fat-containing incarcerated umbilical hernia and appendectomy on Wednesday 11/2. A drain was left in the appendectomy operative bed because there was a purulent fluid collection. He was treated with IV abx postop with appropriate resolution of his leukocytosis. His drain was removed on POD3 after only putting out serous drainage. He was tolerating a regular diet, ambulating independently, and pain was controlled with prn pain meds. He will follow up with dr zapata in two weeks for a postop check. Postop instructions were provided to the patient and his wife.

## 2022-11-06 NOTE — PROGRESS NOTES
Pharmacist Intervention IV to PO Note    Domonique Kellogg is a 72 y.o. male being treated with IV medication metronidazole    Patient Data:    Vital Signs (Most Recent):  Temp: 96.6 °F (35.9 °C) (11/06/22 1149)  Pulse: 104 (11/06/22 1149)  Resp: 18 (11/06/22 1149)  BP: 120/63 (11/06/22 1149)  SpO2: (!) 92 % (11/06/22 1149)   Vital Signs (72h Range):  Temp:  [96.4 °F (35.8 °C)-99.7 °F (37.6 °C)]   Pulse:  []   Resp:  [16-20]   BP: ()/(55-78)   SpO2:  [92 %-99 %]      CBC:  Recent Labs   Lab 11/04/22  0906 11/05/22  0449 11/06/22  0652   WBC 16.23* 13.79* 12.48   RBC 2.59* 2.57* 2.45*   HGB 8.4* 8.5* 8.0*   HCT 25.3* 26.5* 23.8*    162 191   MCV 98 103* 97   MCH 32.4* 33.1* 32.7*   MCHC 33.2 32.1 33.6     CMP:     Recent Labs   Lab 11/03/22  0855 11/04/22  0906 11/05/22  0806   * 121* 86   CALCIUM 10.2 9.8 9.6   ALBUMIN 2.2* 2.1* 2.0*   PROT 6.2 5.8* 5.6*    139 137   K 3.4* 3.6 3.7   CO2 24 19* 19*    108 107   BUN 32* 38* 36*   CREATININE 1.5* 1.8* 1.7*   ALKPHOS 418* 366* 445*   ALT 33 30 38   AST 46* 42* 70*   BILITOT 3.6* 2.3* 2.0*       Dietary Orders:  Diet Orders            Diet Adult Regular (IDDSI Level 7): Regular starting at 11/04 0641            Based on the following criteria, this patient qualifies for intravenous to oral conversion:  [x] The patients gastrointestinal tract is functioning (tolerating medications via oral or enteral route for 24 hours and tolerating food or enteral feeds for 24 hours).  [x] The patient is hemodynamically stable for 24 hours (heart rate <100 beats per minute, systolic blood pressure >99 mm Hg, and respiratory rate <20 breaths per minute).      IV medication metronidazole will be changed to oral medication metronidazole    Pharmacist's Name: Lashon Barnes  Pharmacist's Extension: [phone]

## 2022-11-15 NOTE — PROGRESS NOTES
"Subjective:       Domonique Kellogg presents to the clinic 2 weeks following umbilical hernia repair and appendectomy. Eating a regular diet without difficulty. Bowel movements are Normal.  The patient is not having any pain..      Objective:      /74 (BP Location: Left arm, Patient Position: Sitting)   Pulse 75   Ht 5' 8" (1.727 m)   Wt 104.7 kg (230 lb 13.2 oz)   SpO2 97%   BMI 35.10 kg/m²     General:  alert, appears stated age, and cooperative   Abdomen: soft, bowel sounds active, non-tender   Incision:   healing well, no drainage, no erythema, no hernia, no seroma, no swelling, no dehiscence, incision well approximated       Assessment:      Doing well postoperatively.      Plan:      1. Continue any current medications.  2. Wound care discussed.  3. Pt is to increase activities as tolerated.  4. Follow up: as needed    Cheo Hamm MD  Acute Care Surgery  [facility] – [place], [place] Department of Surgery      "

## 2022-11-17 NOTE — TELEPHONE ENCOUNTER
Called patient back to discuss  parking option in which he would leave his car in  parking and someone would call for him to be escorted to his appointment. Patient stated that he would need to bring his dog and he would not be able to leave his dog anywhere. I told patient if someone cancels their 11/18 appointment I would notify him, but also offered to reschedule at a later date. Patient concluded that he would be able to make his original 11/18 3 PM appointment.

## 2022-11-17 NOTE — TELEPHONE ENCOUNTER
Called patient to tell him that no earlier appointments for 11/18 were available. Patient expressed that he would not be able to reschedule his 3:00 PM 11/18 appointment, but he would be able to show up at his appointment if someone arrives at his car to bring him in. Patient expressed that in the past, someone on the first floor stated that this would not be possible. I told patient that I believe this could be arranged and that I will double-check and get back to him.

## 2022-11-18 NOTE — PROGRESS NOTES
[facility] - NEUROLOGY 7TH FL [facility], [place]    Date: 11/18/22  Patient Name: Domonique Kellogg   MRN: [id number]   PCP: Nicolas Pacheco  Referring Provider: Nicolas Pacheco MD    Chief Complaint: Right thigh numbness/weakness  Subjective:        HPI:   Mr. Domonique Kellogg is a 72 y.o. 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 He states that initially he believed his symptoms were due to his chemotherapy but now he is unsure. He states that his bilateral legs are weak R>L and this increased following his most recent surgery, appendectomy and hernia repair on 11/02/2022.  He reports that his weakness is more so at his knees rather than hips. He does endorse some recent difficulty with climbing stairs. He notes that prior to the surgery he was having some weakness but states it was very minimal. He notes that before the surgery he was able to take his dog for a walk for 2-3 blocks however now he is unable to. He denies any joint pain, myalgias, or falls. He denies any changes from baseline of his urinary or bowel habits but does state his chemotherapy seems to have made him have somewhat increased urinary frequency.      PAST MEDICAL HISTORY:  Past Medical History:   Diagnosis Date    Adjustment disorder     Anticoagulant long-term use     Anxiety     Arthritis     CAD (coronary artery disease) 03/02/2015    non-obstructive per [facility]     Cataract     Dry eye syndrome     Hypertension     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/2021    Obesity     Post-procedural fever 12/18/2021    Seizures     2011    Sleep apnea     + CPAP    Sleep difficulties        PAST SURGICAL HISTORY:  Past Surgical History:   Procedure Laterality Date    ANTERIOR CERVICAL DISCECTOMY W/ FUSION N/A 07/06/2020    Procedure: DISCECTOMY,  SPINE, CERVICAL, ANTERIOR APPROACH, WITH FUSION C3-4, C4-5;  Surgeon: Fabiola Vides MD;  Location: [facility] OR 2ND FLR;  Service: Neurosurgery;  Laterality: N/A;  TORONTO III, ASA III, BLOOD TYPE & SCREEN, NEUROMONITORING EMG-MEP-SEP, SUPINE POSITION,BRACE MIAMI,REGULAR BED, HEADREST CASPAR, POSITION, MAP>85, RADIOLOGY C-ARM, SPECIAL EQUIPMENT [facility]    COLONOSCOPY N/A 10/01/2021    Procedure: COLONOSCOPY;  Surgeon: Nicolas Alvarado MD;  Location: [facility] (4TH FLR);  Service: Endoscopy;  Laterality: N/A;  EGD and colonoscopy for diarrhea and weight loss and Enlarged, heterogeneous appearance of the liver likely due to multiple underlying hepatic lesions largest measuring 4.8 cm.  Findings concerning for metastatic versus less likely primary hepatic neoplasm.  Recommend fur    COLONOSCOPY N/A 06/07/2022    Procedure: COLONOSCOPY;  Surgeon: Mejia Villafuerte MD;  Location: [facility] (4TH FLR);  Service: Endoscopy;  Laterality: N/A;  For evaluation of positive out-patient FOBT.   medically urgent  ok to hold Eliquis per [place] Edward/RB  fully vaccinated  hx of seizures- last one 2014    ESOPHAGOGASTRODUODENOSCOPY N/A 10/01/2021    Procedure: EGD (ESOPHAGOGASTRODUODENOSCOPY);  Surgeon: Nicolas lAvarado MD;  Location: [facility] (4TH FLR);  Service: Endoscopy;  Laterality: N/A;  EGD and colonoscopy for diarrhea and weight loss and Enlarged, heterogeneous appearance of the liver likely due to multiple underlying hepatic lesions largest measuring 4.8 cm.  Findings concerning for metastatic versus less likely primary hepatic neopl    ESOPHAGOGASTRODUODENOSCOPY N/A 04/05/2022    Procedure: EGD (ESOPHAGOGASTRODUODENOSCOPY);  Surgeon: Amado Kelsey MD;  Location: [facility] (4TH FLR);  Service: Endoscopy;  Laterality: N/A;  EGD in 8 weeks to check for esophagitis healing patient should be on pantoprazole 40 mg once daily  ok to hold eliquis x2 days per Dr. Young, see telephone encounter  fully vaccinated    EYE SURGERY       cataract    INSERTION OF VENOUS ACCESS PORT N/A 12/03/2021    Procedure: INSERTION, VENOUS ACCESS PORT;  Surgeon: Saurav Garcia MD;  Location: [facility] OR 2ND FLR;  Service: General;  Laterality: N/A;    INTRAOCULAR PROSTHESES INSERTION Right 07/16/2018    Procedure: INSERTION-INTRAOCULAR LENS (IOL);  Surgeon: Priscilla Hays MD;  Location: [facility] OR;  Service: Ophthalmology;  Laterality: Right;    INTRAOCULAR PROSTHESES INSERTION Left 08/13/2018    Procedure: INSERTION, INTRAOCULAR LENS PROSTHESIS;  Surgeon: Priscilla Hays MD;  Location: [facility] OR;  Service: Ophthalmology;  Laterality: Left;    KNEE SURGERY Right     LAPAROSCOPIC APPENDECTOMY N/A 11/2/2022    Procedure: APPENDECTOMY, LAPAROSCOPIC;  Surgeon: Cheo Hamm MD;  Location: [facility] OR 2ND FLR;  Service: General;  Laterality: N/A;    PHACOEMULSIFICATION OF CATARACT Right 07/16/2018    Procedure: PHACOEMULSIFICATION-ASPIRATION-CATARACT;  Surgeon: Priscilla Hays MD;  Location: [facility];  Service: Ophthalmology;  Laterality: Right;    PHACOEMULSIFICATION OF CATARACT Left 08/13/2018    Procedure: PHACOEMULSIFICATION, CATARACT;  Surgeon: Priscilla Hays MD;  Location: [facility] OR;  Service: Ophthalmology;  Laterality: Left;    REPAIR OF INCARCERATED UMBILICAL HERNIA  11/2/2022    Procedure: REPAIR, HERNIA, UMBILICAL, INCARCERATED, AGE 5 YEARS OR OLDER;  Surgeon: Cheo Hamm MD;  Location: [facility] OR 2ND FLR;  Service: General;;    WISDOM TOOTH EXTRACTION         CURRENT MEDS:  Current Outpatient Medications   Medication Sig Dispense Refill    acetaminophen (TYLENOL) 650 MG TbSR Take by mouth daily as needed.      apixaban (ELIQUIS) 5 mg Tab Take 1 tablet (5 mg total) by mouth 2 (two) times daily. 180 tablet 3    diltiaZEM 2% Lidocaine 5% Cream Apply pea size amount topically 3 (three) times daily. 30 g 2    DULoxetine (CYMBALTA) 20 MG capsule Take 1 capsule (20 mg total) by mouth once daily. 90 capsule 3    finasteride (PROSCAR) 5 mg tablet Take 1 tablet (5 mg  "total) by mouth once daily. (Patient taking differently: Take 5 mg by mouth every morning.) 90 tablet 3    hydroCHLOROthiazide (HYDRODIURIL) 25 MG tablet TAKE 1 TABLET (25 MG TOTAL) BY MOUTH ONCE DAILY. (Patient taking differently: Take 25 mg by mouth every morning.) 90 tablet 3    magnesium oxide (MAG-OX) 400 mg (241.3 mg magnesium) tablet Take 400 mg by mouth every evening.      multivitamin with minerals tablet Take 1 tablet by mouth every morning.       polyethylene glycol (GLYCOLAX) 17 gram PwPk Take 17 g by mouth once daily. for 24 days  0    potassium chloride SA (K-DUR,KLOR-CON) 20 MEQ tablet Take 2 tablets (40 mEq total) by mouth once daily. (Patient taking differently: Take 40 mEq by mouth every morning.) 180 tablet 3    pravastatin (PRAVACHOL) 40 MG tablet Take 1 tablet (40 mg total) by mouth once daily. (Patient taking differently: Take 40 mg by mouth every evening.) 90 tablet 3    RABEprazole (ACIPHEX) 20 mg tablet Take 1 tablet (20 mg total) by mouth once daily. (Patient taking differently: Take 20 mg by mouth every morning.) 90 tablet 3    tamsulosin (FLOMAX) 0.4 mg Cap TAKE 1 CAPSULE EVERY DAY (Patient taking differently: Take by mouth every evening.) 90 capsule 0    gabapentin (NEURONTIN) 300 MG capsule Take 1 capsule (300 mg total) by mouth every evening. 30 capsule 11    hydrocortisone 1 % cream Apply topically 2 (two) times daily. for 7 days 30 g 1     No current facility-administered medications for this visit.       ALLERGIES:  Review of patient's allergies indicates:   Allergen Reactions    Imfinzi [durvalumab] Other (See Comments)     1305- Imfinzi ended, pt returned from restroom complained of wheezing SOB and rigors. /100  O2 92% 2L NC O2 applied to pt.   1306-Demerol 25mg given and Solucortef 125mg IVPush given.   1309- Pt /106  02 99%.   1318- Pt still having Rigors additional 25mg of Demerol given.   1418- BP is 133/69 77 HR 99% RA Pt states " I feel much better" "       Indomethacin Other (See Comments)     Headache dizziness      Adhesive Rash       FAMILY HISTORY:  Family History   Problem Relation Age of Onset    Diabetes Mother     Hypertension Mother     Kidney cancer Mother 61    Cancer Mother         renal & pancreatic    Heart disease Father     No Known Problems Sister     Cancer Maternal Grandmother     Liver disease Maternal Grandmother     Heart disease Paternal Grandfather     Heart disease Brother     No Known Problems Daughter     No Known Problems Son     No Known Problems Sister     No Known Problems Sister     No Known Problems Sister     No Known Problems Daughter     Blindness Neg Hx     Macular degeneration Neg Hx     Retinal detachment Neg Hx     Glaucoma Neg Hx     Melanoma Neg Hx     Pancreatic cancer Neg Hx     Bladder Cancer Neg Hx     Uterine cancer Neg Hx     Ovarian cancer Neg Hx     Celiac disease Neg Hx     Cirrhosis Neg Hx     Colon cancer Neg Hx     Colon polyps Neg Hx     Crohn's disease Neg Hx     Esophageal cancer Neg Hx     Inflammatory bowel disease Neg Hx     Liver cancer Neg Hx     Rectal cancer Neg Hx     Stomach cancer Neg Hx     Ulcerative colitis Neg Hx        SOCIAL HISTORY:  Social History     Tobacco Use    Smoking status: Former     Packs/day: 1.00     Years: 20.00     Pack years: 20.00     Types: Cigarettes     Quit date: 1987     Years since quittin.9    Smokeless tobacco: Never    Tobacco comments:     quit    Substance Use Topics    Alcohol use: Not Currently     Alcohol/week: 1.0 - 2.0 standard drink     Types: 1 - 2 Glasses of wine per week     Comment: not since liver diagnosis    Drug use: No       Review of Systems:  Review of Systems   Constitutional:  Negative for chills, fever and weight loss.   HENT:  Negative for ear discharge, ear pain, hearing loss, sinus pain, sore throat and tinnitus.    Eyes:  Negative for blurred vision, double vision and photophobia.   Respiratory:  Negative for cough, shortness  "of breath and wheezing.    Cardiovascular:  Negative for chest pain, palpitations and orthopnea.   Gastrointestinal:  Negative for constipation, diarrhea, nausea and vomiting.   Genitourinary:  Negative for dysuria, frequency and urgency.   Musculoskeletal:  Negative for back pain, falls, joint pain, myalgias and neck pain.   Neurological:  Positive for tingling and focal weakness. Negative for seizures, loss of consciousness, weakness and headaches.          Objective:     Vitals:    11/18/22 1414   BP: 118/78   Pulse: 83   Weight: 103.9 kg (229 lb)   Height: 5' 8" (1.727 m)         Lab Results   Component Value Date    WBC 12.48 11/06/2022    HGB 8.0 (L) 11/06/2022    HCT 23.8 (L) 11/06/2022     11/06/2022    CHOL 139 11/15/2021    TRIG 52 11/15/2021    HDL 36 (L) 11/15/2021    ALT 38 11/05/2022    AST 70 (H) 11/05/2022     11/05/2022    K 3.7 11/05/2022     11/05/2022    CREATININE 1.7 (H) 11/05/2022    BUN 36 (H) 11/05/2022    CO2 19 (L) 11/05/2022    TSH 3.238 10/31/2022    HGBA1C 5.6 08/22/2022    [id number] 1188 (H) 08/12/2022       NEUROLOGICAL EXAMINATION:     MOTOR EXAM     Strength   Strength 5/5 except as noted.        Bilateral thenar wasting noted     R Iliopsoas 4/5  L Iliopsoas 4+/5    R knee extension 4+/5  R knee flexion 5/5         REFLEXES     Reflexes   Right brachioradialis: 1+  Left brachioradialis: 1+  Right biceps: 1+  Left biceps: 1+  Right triceps: 1+  Left triceps: 1+  Right patellar: 1+  Left patellar: 1+  Right achilles: 0  Left achilles: 0  Right plantar: normal  Left plantar: normal  Right Schofield: absent  Left Schofield: absent  Right ankle clonus: absent  Left ankle clonus: absent    SENSORY EXAM   Light touch normal.   Right leg vibration: decreased from ankle  Left leg vibration: decreased from ankle       Bilateral feet are swollen      GAIT AND COORDINATION        Antalgic gait but patient able to ambulate    ? ?        Assessment:   Domonique Kellogg is a 72 " y.o. male presenting for evaluation of R>L lower extremity weakness.     Plan:   On chart review the patient is currently taking low dose of Cymbalta, will tennatively add on gabapentin as the patients renal function is WNL and gabapentin is not metabolized in the liver (patient with active liver CA).     Risks and benefits were discussed with the patient and he was amenable to this plan.     Will get the patient evaluated with EMG to determine underlying etiology.     Labs were ordered to evaluate if there is an underlying metabolic cause of the patients neuropathy complaint.       Problem List Items Addressed This Visit          Neuro    Lateral femoral cutaneous entrapment syndrome - Primary    Overview     Compressive neuropathy. Needs check for diabetes. Discussed need to loose weight.         Relevant Orders    Vitamin B1    Vitamin B12 Deficiency Panel    Vitamin B2    Vitamin B6       Orthopedic    Weakness of lower extremity    Relevant Orders    EMG W/ ULTRASOUND AND NERVE CONDUCTION TEST 2 Extremities    Vitamin B1    Vitamin B12 Deficiency Panel    Vitamin B2    Vitamin B6     Other Visit Diagnoses       Chemotherapy-induced neuropathy        Relevant Medications    gabapentin (NEURONTIN) 300 MG capsule    Hereditary and idiopathic neuropathy, unspecified        Relevant Orders    Vitamin B12 Deficiency Panel    Vitamin B6              I spent a total of 45 minutes on the day of the visit. This includes face to face time and non-face to face time preparing to see the patient (eg, review of tests), obtaining and/or reviewing separately obtained history, documenting clinical information in the electronic or other health record, independently interpreting results and communicating results to the patient/family/caregiver, or care coordinator.    Blanquita Beard PA-C  Supervising physician Navid Decker MD was available for all questions during this exam.  [facility]

## 2022-11-21 NOTE — TELEPHONE ENCOUNTER
----- Message from Brigida Jacobs MA sent at 11/18/2022  3:45 PM CST -----  Regarding: EMG  Shun Maldonado,    Would you be able to assist in getting this patient scheduled an EMG?    Best,    GAYATHRI Allred

## 2022-11-21 NOTE — PLAN OF CARE
1540 pt tolerated folfox infusion and Mg 4 gm riders without issue, pt with cadd pump infusing at 2.2 ml/hr without issue prior to d/c, pt to rtc Wednesday around 1330 for pump d/c, pt aware, pt assisted to WC, no distress noted upon d/c to home with spouse

## 2022-11-21 NOTE — TELEPHONE ENCOUNTER
Lvm requesting a return call so that we can get patient's EMG Procedure scheduled.  I provided my return contact information.

## 2022-11-21 NOTE — PROGRESS NOTES
"                                                   PROGRESS NOTE    Subjective:       Patient ID: Domonique Kellogg is a 72 y.o. male.    Chief Complaint: follow up for cholangiocarcinoma    Diagnosis:  Advanced intrahepatic cholangiocarcinoma, MSI-low, negative for FGFR2 fusion or IDH1/IDH2 mutations, diagnosed on 11/22/2021.     Molecular Profile:  Guardant 360 11/29/21: +CCND1 amplification. VUS: CATRACHITO [id number]. MSI-high not detected.   Strata: ASXL1 mutation, CCND1 amplification, MSI-low, TMB low.     Oncologic History:  1. Mr Kellogg is a 72 yo man with CAD, HTN, seizures in 2011 who initially saw me on 11/29/21 for further management of cholangiocarcinoma. He presented with weight loss and diarrhea since July. US 9/22/21 showed "Enlarged, heterogeneous appearance of the liver likely due to multiple underlying hepatic lesions largest measuring 4.8 cm."  MRI abdomen w/wo contrast 10/4/21: "Multiple liver lesions measuring up to 13.3 cm in the right hepatic lobe.  Differential diagnosis includes metastases, fibrolamellar hepatocellular carcinoma, or atypical focal nodular hyperplasia.  Consider biopsy for further evaluation. Thrombosis of the anterior branch of the right portal vein and left hepatic vein.  Nonvisualization of the middle and right hepatic veins, which may be thrombosed as well. Prominent periportal lymph node, which is nonspecific."  EGD and colonoscopy on 10/11/21 were negative for primary malignancies.   He underwent liver lesion biopsy and Y90 mapping on 11/22/21. Pathology showed adenocarcinoma: "Biopsy of the liver mass shows a malignant neoplasm in a fibrotic stroma. Glandular architecture is readily identified with several foci of intraluminal necrosis. Scattered mitotic figures are seen. Neoplastic cells show the following immunophenotype:   Positive: AE1/AE3, CK7, CDX2   Negative: Chromogranin, Synaptophysin, TTF, CK5/6, CK20, HepPar1   The morphology and immunophenotype are compatible with " "adenocarcinoma. Considerations for a primary site include pancreaticobiliary (including intrahepatic cholangiocarcinoma) as well as upper gastrointestinal. Clinical and radiologic correlation is suggested."  Patient presents today with wife for further management. No pain. Initial weight loss has somewhat stabilized. Still has diarrhea. Has not tried imodium yet. +nervous  Mother had kidney cancer at age 64. Maternal grandmother had liver cancer in her 60s. Patient has three children, two daughters and one son.   Discussed palliative chemo with cis/gem  2. PET scan 21 did not show extrahepatic metastases.   3. Cis/gem started on 21. Durvalumab was initially denied by insurance company after GI ASCO, but approved after FDA approval, added 8/10/22. Patient had reaction to durv (wheezing, rigors, shaking, high BP) on . Imfinzi was stopped. Last chemo on 10/21/22. MRI 10/19/22 showed progression. Plan was to switched for FOLFOX. Patient was hospitalized for acute appendicitis and strangulated umbilical hernia s/p surgery on 2022.   4. S/p TACE on 2021, 22, 22, 22, 22  5. S/p RF Ablation on 22. TACE on 22    Interval History:   Mr Kellogg returns for cycle 1 FOLFOX. Since last seen, he was hospitalized for acute appendicitis and strangulated umbilical hernia s/p surgery on 2022. He has been feeling very tired since he had appendicitis. But feels good enough for chemo today.     ECO    ROS:   A ten-point system review is obtained and negative except for what was stated in the Interval History.     Physical Examination:   Vital signs reviewed.   General: well hydrated, well developed, in no acute distress  HEENT: normocephalic, EOMI, anicteric sclerae, pale conjunctivae  Neck: supple, no cervical lymphadenopathy, no thyromegaly or JVD  Chest: right chest port site clean  Lungs: clear breath sounds bilaterally, no wheezing/rales/rhonchi  Heart: RRR, no " M/R/G  Abdomen: soft, no tenderness, nondistended. No hepatosplenomegaly, surgical wound healed.   Ext: no clubbing, cyanosis, edema  Skin: no open wound, ulcers, rash.   Neuro: alert and oriented x 4  Psych: pleasant and appropriate mood and affect    Objective:     Laboratory Data:  Labs reviewed. Hb 7.8. platelet count 126    Imaging Data:  PET 12/6/21:  In this patient with known intrahepatic cholangiocarcinoma, there are multifocal hypermetabolic hepatic lesions in both hepatic lobes.  No evidence of distant metastasis.     Lipomatosis of the ileocecal valve and proximal cecum.     Fat containing umbilical hernia.    MRI Abdomen 1/18/22:  1. Patient with reported cholangiocarcinoma.  Interval progression of hepatic tumor burden with multiple enlarging and new hepatic lesions.  2. Progression of portal venous thrombosis involving the left, right, and main portal veins.  Persistent filling defect within the central hepatic veins concerning for thrombosis.  3. Stable prominent periportal lymph node.    CT chest 3/4/22:  Right pulmonary nodules, unchanged.  Recommend continued follow-up as per clinical protocol.    MRI abdomen 3/14/22:     Interval postprocedural changes status post TACE with slight improvement of tumor burden at treatment sites.  Extensive residual disease throughout both hepatic lobes.  Index lesions as above.     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     Stable appearance of central hepatic veins, underlying thrombosis not excluded.  Consider further evaluation with Doppler ultrasound if clinically warranted.     Stable prominent periportal lymph node.     Right adrenal adenoma.    CT urogram 3/14/22:  Prostatomegaly which demonstrates mass effect on the posterior bladder.  There is mild diffuse wall thickening of the bladder which may relate to outlet obstruction.     Postprocedural changes of the liver in keeping with recent chemoembolization.   Protocol is not optimized for evaluation of tumor response.  Dedicated multiphase liver CT would be necessary to evaluate liver directed therapy response.    MRI A/P 6/19/22:  - Evolving posttreatment changes in the liver for multifocal intrahepatic cholangiocarcinoma.  No new focal hepatic lesions.  - Persistent portal vein thrombosis, likely tumor thrombus, with cavernous transformation.  - No abdominopelvic metastases.     CT chest 6/15/22:  1. Stable pulmonary nodules measuring up to 0.4 cm.  No definite new or enlarging pulmonary nodules.  2. Scattered regions of opacification within the right lung base with air bronchograms, favored to represent atelectasis.  However, early consolidative changes cannot be completely excluded.    US bilateral legs 6/9/22:  Partially occlusive deep venous thrombosis of the left femoral vein.    MRI abdomen 9/2/22:  1. Evolving post treatment changes in the liver for multifocal intrahepatic cholangiocarcinoma.  Interval radiofrequency ablation of 3 lesions in the left hepatic lobe without local residual disease.  Interval TACE of segment 4A and right hepatic artery noting residual disease in the right hepatic lobe as detailed above.  2. New enhancing lesions in segment 2/3 as described above.  3. 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  4. Additional stable findings.    MRI 10/19/22:  1. Evolving post treatment changes of multifocal intrahepatic cholangiocarcinoma with residual disease as detailed above.  2. Posttreatment changes of segment 3 from several prior chemoembolizations, two of the ablation cavities have decreased in size and one of the ablation cavities has increased in size while the remaining is stable.  3. Several peripherally enhancing lesions in segment 3 all of which have increased in size compared to most recent study, MRI abdomen pelvis 09/02/2022.  4. Persist enhancing portal venous tumor thrombus with cavernous  "transformation.  5. Additional findings as detailed above.    CT chest 10/19/22:  1.  Numerous upper lobe predominant centrilobular micro nodules are increased since prior exam.  These may represent areas of infectious bronchiolitis, non infectious bronchiolitis, acute hypersensitivity pneumonitis, pulmonary edema, etc..  The pattern of disease does not appear compatible with hematogenous metastasis (which is expected to be randomly distributed).  However, attention on short-term follow-up imaging is recommended.       Assessment and Plan:     1. Intrahepatic cholangiocarcinoma    2. Secondary malignant neoplasm of liver    3. 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 DAWIT (acute kidney injury)    6. Hyperbilirubinemia    7. Acute deep vein thrombosis (DVT) of femoral vein of left lower extremity    8. Hypertension, essential    9. Anemia, unspecified type    10. Hypomagnesemia        1-4  - Mr Kellogg is a 71 yo man with advanced intrahepatic cholangiocarcinoma with multiple liver lesions. MSI-low, FGFR2 fusion and IDH1/2 mutation negative. Started on cis/gem on 12/7/21. Durvalumab added on 8/10/22 after FDA approval. Stopped after he had a reaction on 9/9/22.   - S/p TACE on 12/16/2021, 2/14/22, 4/19/22, 5/18/22, 8/2/22. RF ablation on 7/19/22. TACE on 9/28/22  - MRI 10/19/22 showed progression. Plan was to switched for FOLFOX. Patient was hospitalized for acute appendicitis and strangulated umbilical hernia s/p surgery on 11/2/2022.   - He is doing better today and feels ready for chemo. Will start FOLFOX cycle 1 today. Noted bilirubin. Reduce 5FU to 2000 mg/m2 with cycle 1 to see if he can tolerate. Can go up to 2400 mg/2 with cycle 2 if he tolerates it well  - RTC in 2 weeks for cycle 2    5.  - Cr improved  - encouraged oral hydration    6.  - had US when hospitalized which showed "Cholelithiasis.  Gallbladder appears contracted, difficult to accurately " "assess.  No abnormal biliary duct dilatation.  Recommend clinical correlation."  - will get direct bilirubin on return. He just had surgery and is on eliquis. Can be from indirect bilirubin as well    7.  - c/w eliquis 5 mg bid    8.  - BP controlled  - c/w current medication    9.  - worsened after eliquis increased to full dose  - seen by GI. Small bowel capsule 9/21/22 showed 3 angioectasias without bleeding in the proximal jejunum and in the mid to distal jejunum. A single angioectasia without bleeding in the proximal ileum. Recommend antegrade double-balloon enteroscopy if +signs of GI bleed and drop in Hb  - He is very tired. Will give blood transfusion    10.  - from prior cisplatin  - IV Mg 4 gram today    Follow-up:     RTC in 2 weeks.      Route Chart for Scheduling    Med Onc Chart Routing  Urgent    Follow up with physician 2 weeks. need urgent blood transfusion. prefer [facility] but okay with [facility] if not available (1 unit). see me in 2 weeks with CBC, CMP, direct bilirubin, CA 19-9, then chemo. see BR[facility]LEY in 4 weeks with CBC, CMP, CA 19-9 then chemo.   Follow up with [facility] 4 weeks.   Infusion scheduling note ugent blood transfusion 1 unit. main [facility] or [facility]. FOLFOX every 2 weeks, remove pump day 3   Injection scheduling note    Labs CBC, CMP and CA 19-9   Lab interval:  add direct bilirubin on return in 2 weeks   Imaging    Pharmacy appointment    Other referrals                                  "

## 2022-11-21 NOTE — PLAN OF CARE
1100 pt here for D1C1 folfox infusion, labs, hx, meds, allergies reviewed, pt with no new complaints at this time, reviewed tx plan with pt and wife, new chemo today, pt reclined in chair, continue to monitor

## 2022-11-23 NOTE — PLAN OF CARE
Pt to clinic via [facility]lc with wife for pump d/c. Pump stopped pta. Pt denies any sig compalints. Port deaccesed after flushing. From clinic in [facility].    Performed By: LEIGH ANN Urine Pregnancy Test Result: negative Detail Level: None

## 2022-12-07 NOTE — TELEPHONE ENCOUNTER
Shun Bernstein. Thoughts for the message that was sent? Is it possible for Dr. Mendoza to fit him in for the morning? He has chemotherapy at 1pm. So sorry...

## 2022-12-07 NOTE — TELEPHONE ENCOUNTER
"Called pt for clarification on reason for his efforts to get in sooner with Neurology. Pt denies pain or dizziness, complains of weakness in right leg. Reports lateral right leg numbness and tingling. Pt has hx of a right knee scope in 1997 with known osteoarthritis; states his knee "gives out." Pt to present to oncology Friday for labs, MD visit and transfusion. Pt denies having possession of assistive device.    "

## 2022-12-09 PROBLEM — E83.52 HYPERCALCEMIA OF MALIGNANCY: Status: ACTIVE | Noted: 2022-01-01

## 2022-12-09 NOTE — TELEPHONE ENCOUNTER
"----- Message from Sherlyn Christiansen RN sent at 12/8/2022  8:48 AM CST -----    ----- Message -----  From: Sandie Escobar RN  Sent: 12/8/2022   8:45 AM CST  To: HERMILO Knapp,  This message needs to go to Dr. Young's staff.  Karla is no longer here.  Burak Ascencio is working with Dr Young.    ThanksSandie  ----- Message -----  From: Sherlyn Christiansen RN  Sent: 12/8/2022   8:39 AM CST  To: Sandie Escobar RN      ----- Message -----  From: Romeo Kirkpatrick  Sent: 12/7/2022   4:41 PM CST  To: [facility] Staff    Consult/Advisory:          Name Of Caller: Self      Contact Preference?:  [phone]      Provider Name: Hector      Does patient feel the need to be seen today? No      What is the nature of the call?: Calling to speak w/ Karla about upcoming infusions          Additional Notes:  "Thank you for all that you do for our patients"            "

## 2022-12-09 NOTE — TELEPHONE ENCOUNTER
Attempted to reach paitent to discuss. Reviewed appointment schedule. Most likely in clinic with provider.

## 2022-12-09 NOTE — PROGRESS NOTES
"                                                   PROGRESS NOTE    Subjective:       Patient ID: Domonique Kellogg is a 72 y.o. male.    Chief Complaint: follow up for cholangiocarcinoma    Diagnosis:  Advanced intrahepatic cholangiocarcinoma, MSI-low, negative for FGFR2 fusion or IDH1/IDH2 mutations, diagnosed on 11/22/2021.     Molecular Profile:  Guardant 360 11/29/21: +CCND1 amplification. VUS: CATRACHITO [id number]. MSI-high not detected.   Strata: ASXL1 mutation, CCND1 amplification, MSI-low, TMB low.     Oncologic History:  1. Mr Kellogg is a 72 yo man with CAD, HTN, seizures in 2011 who initially saw me on 11/29/21 for further management of cholangiocarcinoma. He presented with weight loss and diarrhea since July. US 9/22/21 showed "Enlarged, heterogeneous appearance of the liver likely due to multiple underlying hepatic lesions largest measuring 4.8 cm."  MRI abdomen w/wo contrast 10/4/21: "Multiple liver lesions measuring up to 13.3 cm in the right hepatic lobe.  Differential diagnosis includes metastases, fibrolamellar hepatocellular carcinoma, or atypical focal nodular hyperplasia.  Consider biopsy for further evaluation. Thrombosis of the anterior branch of the right portal vein and left hepatic vein.  Nonvisualization of the middle and right hepatic veins, which may be thrombosed as well. Prominent periportal lymph node, which is nonspecific."  EGD and colonoscopy on 10/11/21 were negative for primary malignancies.   He underwent liver lesion biopsy and Y90 mapping on 11/22/21. Pathology showed adenocarcinoma: "Biopsy of the liver mass shows a malignant neoplasm in a fibrotic stroma. Glandular architecture is readily identified with several foci of intraluminal necrosis. Scattered mitotic figures are seen. Neoplastic cells show the following immunophenotype:   Positive: AE1/AE3, CK7, CDX2   Negative: Chromogranin, Synaptophysin, TTF, CK5/6, CK20, HepPar1   The morphology and immunophenotype are compatible with " "adenocarcinoma. Considerations for a primary site include pancreaticobiliary (including intrahepatic cholangiocarcinoma) as well as upper gastrointestinal. Clinical and radiologic correlation is suggested."  Patient presents today with wife for further management. No pain. Initial weight loss has somewhat stabilized. Still has diarrhea. Has not tried imodium yet. +nervous  Mother had kidney cancer at age 64. Maternal grandmother had liver cancer in her 60s. Patient has three children, two daughters and one son.   Discussed palliative chemo with cis/gem  2. PET scan 21 did not show extrahepatic metastases.   3. Cis/gem started on 21. Durvalumab was initially denied by insurance company after GI [facility], but approved after FDA approval, added 8/10/22. Patient had reaction to durv (wheezing, rigors, shaking, high BP) on . Imfinzi was stopped. Last chemo on 10/21/22. MRI 10/19/22 showed progression. Plan was to switched for FOLFOX. Patient was hospitalized for acute appendicitis and strangulated umbilical hernia s/p surgery on 2022.   4. S/p TACE on 2021, 22, 22, 22, 22  5. S/p RF Ablation on 22. TACE on 22  6. FOLFOX started on 22, s/p 1 cycle    Interval History:   Mr Kellogg returns for cycle 2 FOLFOX. He was very weak and hardly able to walk after chemo, sleeping a lot. Received blood transfusion yesterday with significant improvement of symptoms. Scheduled to see neurology in  for his chronic neuropathy that started before chemo.     ECO    ROS:   A ten-point system review is obtained and negative except for what was stated in the Interval History.     Physical Examination:   Vital signs reviewed.   General: well hydrated, well developed, in no acute distress  HEENT: normocephalic, EOMI, +icteric sclerae, pale conjunctivae  Neck: supple, no cervical lymphadenopathy, no thyromegaly or JVD  Chest: right chest port site clean  Lungs: clear breath sounds " bilaterally, no wheezing/rales/rhonchi  Heart: RRR, no M/R/G  Abdomen: soft, no tenderness, nondistended. No hepatosplenomegaly, surgical wound healed.   Ext: no clubbing, cyanosis, edema  Skin: no open wound, ulcers, rash.   Neuro: alert and oriented x 4  Psych: pleasant and appropriate mood and affect    Objective:     Laboratory Data:  Labs reviewed. Hb 9.1. Cr 1.4. corrected calcium 12. Bilirubin 3.1    Imaging Data:  PET 12/6/21:  In this patient with known intrahepatic cholangiocarcinoma, there are multifocal hypermetabolic hepatic lesions in both hepatic lobes.  No evidence of distant metastasis.     Lipomatosis of the ileocecal valve and proximal cecum.     Fat containing umbilical hernia.    MRI Abdomen 1/18/22:  1. Patient with reported cholangiocarcinoma.  Interval progression of hepatic tumor burden with multiple enlarging and new hepatic lesions.  2. Progression of portal venous thrombosis involving the left, right, and main portal veins.  Persistent filling defect within the central hepatic veins concerning for thrombosis.  3. Stable prominent periportal lymph node.    CT chest 3/4/22:  Right pulmonary nodules, unchanged.  Recommend continued follow-up as per clinical protocol.    MRI abdomen 3/14/22:     Interval postprocedural changes status post TACE with slight improvement of tumor burden at treatment sites.  Extensive residual disease throughout both hepatic lobes.  Index lesions as above.     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     Stable appearance of central hepatic veins, underlying thrombosis not excluded.  Consider further evaluation with Doppler ultrasound if clinically warranted.     Stable prominent periportal lymph node.     Right adrenal adenoma.    CT urogram 3/14/22:  Prostatomegaly which demonstrates mass effect on the posterior bladder.  There is mild diffuse wall thickening of the bladder which may relate to outlet obstruction.      Postprocedural changes of the liver in keeping with recent chemoembolization.  Protocol is not optimized for evaluation of tumor response.  Dedicated multiphase liver CT would be necessary to evaluate liver directed therapy response.    MRI A/P 6/19/22:  - Evolving posttreatment changes in the liver for multifocal intrahepatic cholangiocarcinoma.  No new focal hepatic lesions.  - Persistent portal vein thrombosis, likely tumor thrombus, with cavernous transformation.  - No abdominopelvic metastases.     CT chest 6/15/22:  1. Stable pulmonary nodules measuring up to 0.4 cm.  No definite new or enlarging pulmonary nodules.  2. Scattered regions of opacification within the right lung base with air bronchograms, favored to represent atelectasis.  However, early consolidative changes cannot be completely excluded.    US bilateral legs 6/9/22:  Partially occlusive deep venous thrombosis of the left femoral vein.    MRI abdomen 9/2/22:  1. Evolving post treatment changes in the liver for multifocal intrahepatic cholangiocarcinoma.  Interval radiofrequency ablation of 3 lesions in the left hepatic lobe without local residual disease.  Interval TACE of segment 4A and right hepatic artery noting residual disease in the right hepatic lobe as detailed above.  2. New enhancing lesions in segment 2/3 as described above.  3. 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  4. Additional stable findings.    MRI 10/19/22:  1. Evolving post treatment changes of multifocal intrahepatic cholangiocarcinoma with residual disease as detailed above.  2. Posttreatment changes of segment 3 from several prior chemoembolizations, two of the ablation cavities have decreased in size and one of the ablation cavities has increased in size while the remaining is stable.  3. Several peripherally enhancing lesions in segment 3 all of which have increased in size compared to most recent study, MRI abdomen pelvis  09/02/2022.  4. Persist enhancing portal venous tumor thrombus with cavernous transformation.  5. Additional findings as detailed above.    CT chest 10/19/22:  1.  Numerous upper lobe predominant centrilobular micro nodules are increased since prior exam.  These may represent areas of infectious bronchiolitis, non infectious bronchiolitis, acute hypersensitivity pneumonitis, pulmonary edema, etc..  The pattern of disease does not appear compatible with hematogenous metastasis (which is expected to be randomly distributed).  However, attention on short-term follow-up imaging is recommended.       Assessment and Plan:     1. Intrahepatic cholangiocarcinoma    2. Secondary malignant neoplasm of liver    3. 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 Hypercalcemia of malignancy    6. Severe anemia    7. Stage 3a chronic kidney disease    8. Essential hypertension    9. Neuropathy    10. Hyperbilirubinemia      1-4  - Mr Kellogg is a 73 yo man with advanced intrahepatic cholangiocarcinoma with multiple liver lesions. MSI-low, FGFR2 fusion and IDH1/2 mutation negative. Started on cis/gem on 12/7/21. Durvalumab added on 8/10/22 after FDA approval. Stopped after he had a reaction on 9/9/22.   - S/p TACE on 12/16/2021, 2/14/22, 4/19/22, 5/18/22, 8/2/22. RF ablation on 7/19/22. TACE on 9/28/22  - MRI 10/19/22 showed progression. Plan was to switched for FOLFOX. Patient was hospitalized for acute appendicitis and strangulated umbilical hernia s/p surgery on 11/2/2022.   - started FOLFOX on 11/21/22. S/p 1 cycle  - did well with FOLFOX. Symptoms significantly improved after transfusion, likely from anemia  - cycle 2 today. Keep 5FU 2000 mg/m2 for hyperbilirubinemia. He would like to keep oxaliplatin at current dose  - RTC in 2 weeks for cycle 3    5.  - reviewed Ca of 12. Asked patient to stop multivitamin  - IVF today  - encouraged oral hydration  - get zometa x 1 on return.  "Reviewed side effects and he agrees. He thinks his teeth are fine. Zometa 3.5 mg, dose adjusted for renal function    6.  - likely some GI blood loss from blood thinners which he needs for DVT  - transfuse if he develops symptoms again. Hb is good today    7.  - Cr stable  - encouraged oral hydration    8.  - BP controlled  - c/w current medication    9.  - started before chemo  - scheduled with neurology    10.  - had US when hospitalized which showed "Cholelithiasis.  Gallbladder appears contracted, difficult to accurately assess.  No abnormal biliary duct dilatation.  Recommend clinical correlation."  - bilirubin slightly improved. monitor    Follow-up:     RTC in 2 weeks.    I spent 45 minutes reviewing the chart, interpreting laboratory result and imaging data, coordinating patient's care, with at least 50% of the time on face-to-face counseling.     Route Chart for Scheduling    Med Onc Chart Routing      Follow up with physician 4 weeks. verify with MICHELLE whitaker zometa. when he returns for FOLFOX, give him one dose of zometa. add type and screen to every lab appt before chemo. see BRADLEY in 2 weeks with labs then chemo. see me in 4 weeks with labs then chemo.   Follow up with BRADLEY 2 weeks.   Infusion scheduling note FOLFOX every 2 weeks, remove pump day 3. get zometa x 1 dose with the next FOLFOX   Injection scheduling note    Labs CBC, CMP and CA 19-9   Lab interval: every 2 weeks  type and screen with each chemo lab   Imaging    Pharmacy appointment    Other referrals                                    "

## 2022-12-09 NOTE — PLAN OF CARE
Patient tolerated IVFs with no complications. Patient currently receiving Oxaliplatin with no  complications. VSS. Hand off given to Citlali Osei RN.

## 2022-12-10 NOTE — PLAN OF CARE
1720-report received. From EMMA DaleRN, pt in NAD, no change in status.  1805-pt tolerated FOLFOX infusion well, no complications or side effects, POC and meds discussed with pt,5 FU CADD connected to pt, site secured, infusing w/out issue; pt instructed to remain well hydration;pt to report for pump d/c on Mon 12/12/22 at 0800am in injections, pt aware of upcoming appts, pt knows to call MD with any questions or concerns. Pt ambulated off unit, no distress noted.

## 2022-12-14 NOTE — TELEPHONE ENCOUNTER
----- Message from Khushbu Das sent at 12/14/2022 10:55 AM CST -----  Regarding: patient call back  Type: Patient Call Back    Who called: Self     What is the request in detail: Would like a nurse to call about his apt on 1/13/23    Can the clinic reply by [facility]? No     Would the patient rather a call back or a response via [facility]? Call     Best call back number: .[phone]         Appointment for emg rescheduled

## 2022-12-28 NOTE — PROGRESS NOTES
"                                                   PROGRESS NOTE    Subjective:       Patient ID: Domonique Kellogg is a 72 y.o. male.    Chief Complaint: follow up for cholangiocarcinoma    Diagnosis:  Advanced intrahepatic cholangiocarcinoma, MSI-low, negative for FGFR2 fusion or IDH1/IDH2 mutations, diagnosed on 11/22/2021.     Molecular Profile:  Guardant 360 11/29/21: +CCND1 amplification. VUS: CATRACHITO [id number]. MSI-high not detected.   Strata: ASXL1 mutation, CCND1 amplification, MSI-low, TMB low.     Oncologic History copied from medical chart:  1. Mr Kellogg is a 72 yo man with CAD, HTN, seizures in 2011 who initially saw me on 11/29/21 for further management of cholangiocarcinoma. He presented with weight loss and diarrhea since July. US 9/22/21 showed "Enlarged, heterogeneous appearance of the liver likely due to multiple underlying hepatic lesions largest measuring 4.8 cm."  MRI abdomen w/wo contrast 10/4/21: "Multiple liver lesions measuring up to 13.3 cm in the right hepatic lobe.  Differential diagnosis includes metastases, fibrolamellar hepatocellular carcinoma, or atypical focal nodular hyperplasia.  Consider biopsy for further evaluation. Thrombosis of the anterior branch of the right portal vein and left hepatic vein.  Nonvisualization of the middle and right hepatic veins, which may be thrombosed as well. Prominent periportal lymph node, which is nonspecific."  EGD and colonoscopy on 10/11/21 were negative for primary malignancies.   He underwent liver lesion biopsy and Y90 mapping on 11/22/21. Pathology showed adenocarcinoma: "Biopsy of the liver mass shows a malignant neoplasm in a fibrotic stroma. Glandular architecture is readily identified with several foci of intraluminal necrosis. Scattered mitotic figures are seen. Neoplastic cells show the following immunophenotype:   Positive: AE1/AE3, CK7, CDX2   Negative: Chromogranin, Synaptophysin, TTF, CK5/6, CK20, HepPar1   The morphology and " "immunophenotype are compatible with adenocarcinoma. Considerations for a primary site include pancreaticobiliary (including intrahepatic cholangiocarcinoma) as well as upper gastrointestinal. Clinical and radiologic correlation is suggested."  Patient presents today with wife for further management. No pain. Initial weight loss has somewhat stabilized. Still has diarrhea. Has not tried imodium yet. +nervous  Mother had kidney cancer at age 64. Maternal grandmother had liver cancer in her 60s. Patient has three children, two daughters and one son.   Discussed palliative chemo with cis/gem  2. PET scan 12/6/21 did not show extrahepatic metastases.   3. Cis/gem started on 12/7/21. Durvalumab was initially denied by insurance company after GI ASC[facility], but approved after FDA approval, added 8/10/22. Patient had reaction to durv (wheezing, rigors, shaking, high BP) on 9/9. Imfinzi was stopped. Last chemo on 10/21/22. MRI 10/19/22 showed progression. Plan was to switched for FOLFOX. Patient was hospitalized for acute appendicitis and strangulated umbilical hernia s/p surgery on 11/2/2022.   4. S/p TACE on 12/16/2021, 2/14/22, 4/19/22, 5/18/22, 8/2/22  5. S/p RF Ablation on 7/19/22. TACE on 9/28/22  6. FOLFOX started on 11/21/22, s/p 1 cycle    Interval History:   Mr Kellogg returns for cycle 3 FOLFOX. He continues to have concerns with weakness, fatigue, difficulty ambulating. He was very weak and hardly able to walk after chemo, sleeping a lot. Scheduled to see neurology in Jan for his chronic neuropathy that started before chemo. However, his wife reports that he while walking he sometimes presents with foot dragging of the L side. She states that he sometimes drags his leg during walking. He has not fallen. He is not dropping things and continues to function well with his hands. He does find that the weakness is occurring more often but performs moderate daily functions without significant complications. He is eating and " has a good appetite. He does state that he is feeling very good today. No mouth sores or chills. Most recently received a blood transfusion and felt better afterwards.     ECO. Presents with his wife today.     ROS:   A ten-point system review is obtained and negative except for what was stated in the Interval History.     Physical Examination:   Vital signs reviewed.   General: well hydrated, well developed, in no acute distress  HEENT: normocephalic, EOMI, +icteric sclerae, pale conjunctivae  Neck: supple, no JVD  Chest: right chest port site clean  Lungs: clear breath sounds bilaterally, no wheezing/rales/rhonchi  Heart: RRR, no M/R/G  Abdomen: soft, no tenderness, nondistended. Surgical wound healed.   Ext: no clubbing, cyanosis, edema  Skin: no open wound, ulcers, rash. Small bony lesion to the tibial tuberosity of the R knee. No signs of infection.   Neuro: alert and oriented x 4. Reports moderate tingling and paresthesia to the legs. Decreased overall tactile sensation to the LE, bilateral  Psych: pleasant and appropriate mood and affect    Objective:     Laboratory Data:  Labs reviewed. Hb 8.3. Cr 1.6. Calcium improved, 9.7. Bilirubin 2.0    Imaging Data:  PET 21:  In this patient with known intrahepatic cholangiocarcinoma, there are multifocal hypermetabolic hepatic lesions in both hepatic lobes.  No evidence of distant metastasis.     Lipomatosis of the ileocecal valve and proximal cecum.     Fat containing umbilical hernia.    MRI Abdomen 22:  1. Patient with reported cholangiocarcinoma.  Interval progression of hepatic tumor burden with multiple enlarging and new hepatic lesions.  2. Progression of portal venous thrombosis involving the left, right, and main portal veins.  Persistent filling defect within the central hepatic veins concerning for thrombosis.  3. Stable prominent periportal lymph node.    CT chest 3/4/22:  Right pulmonary nodules, unchanged.  Recommend continued follow-up as  per clinical protocol.    MRI abdomen 3/14/22:     Interval postprocedural changes status post TACE with slight improvement of tumor burden at treatment sites.  Extensive residual disease throughout both hepatic lobes.  Index lesions as above.     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     Stable appearance of central hepatic veins, underlying thrombosis not excluded.  Consider further evaluation with Doppler ultrasound if clinically warranted.     Stable prominent periportal lymph node.     Right adrenal adenoma.    CT urogram 3/14/22:  Prostatomegaly which demonstrates mass effect on the posterior bladder.  There is mild diffuse wall thickening of the bladder which may relate to outlet obstruction.     Postprocedural changes of the liver in keeping with recent chemoembolization.  Protocol is not optimized for evaluation of tumor response.  Dedicated multiphase liver CT would be necessary to evaluate liver directed therapy response.    MRI A/P 6/19/22:  - Evolving posttreatment changes in the liver for multifocal intrahepatic cholangiocarcinoma.  No new focal hepatic lesions.  - Persistent portal vein thrombosis, likely tumor thrombus, with cavernous transformation.  - No abdominopelvic metastases.     CT chest 6/15/22:  1. Stable pulmonary nodules measuring up to 0.4 cm.  No definite new or enlarging pulmonary nodules.  2. Scattered regions of opacification within the right lung base with air bronchograms, favored to represent atelectasis.  However, early consolidative changes cannot be completely excluded.    US bilateral legs 6/9/22:  Partially occlusive deep venous thrombosis of the left femoral vein.    MRI abdomen 9/2/22:  1. Evolving post treatment changes in the liver for multifocal intrahepatic cholangiocarcinoma.  Interval radiofrequency ablation of 3 lesions in the left hepatic lobe without local residual disease.  Interval TACE of segment 4A and right  hepatic artery noting residual disease in the right hepatic lobe as detailed above.  2. New enhancing lesions in segment 2/3 as described above.  3. 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  4. Additional stable findings.    MRI 10/19/22:  1. Evolving post treatment changes of multifocal intrahepatic cholangiocarcinoma with residual disease as detailed above.  2. Posttreatment changes of segment 3 from several prior chemoembolizations, two of the ablation cavities have decreased in size and one of the ablation cavities has increased in size while the remaining is stable.  3. Several peripherally enhancing lesions in segment 3 all of which have increased in size compared to most recent study, MRI abdomen pelvis 09/02/2022.  4. Persist enhancing portal venous tumor thrombus with cavernous transformation.  5. Additional findings as detailed above.    CT chest 10/19/22:  1.  Numerous upper lobe predominant centrilobular micro nodules are increased since prior exam.  These may represent areas of infectious bronchiolitis, non infectious bronchiolitis, acute hypersensitivity pneumonitis, pulmonary edema, etc..  The pattern of disease does not appear compatible with hematogenous metastasis (which is expected to be randomly distributed).  However, attention on short-term follow-up imaging is recommended.       Assessment and Plan:     1. Intrahepatic cholangiocarcinoma    2. Secondary malignant neoplasm of liver    3. 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 Hypercalcemia of malignancy    6. Severe anemia    7. Stage 3a chronic kidney disease    8. Essential hypertension    9. Neuropathy    10. Hyperbilirubinemia        1-4  - Mr Kellogg is a 71 yo man with advanced intrahepatic cholangiocarcinoma with multiple liver lesions. MSI-low, FGFR2 fusion and IDH1/2 mutation negative. Started on cis/gem on 12/7/21. Durvalumab added on 8/10/22 after  FDA approval. Stopped after he had a reaction on 9/9/22.   - S/p TACE on 12/16/2021, 2/14/22, 4/19/22, 5/18/22, 8/2/22. RF ablation on 7/19/22. TACE on 9/28/22  - MRI 10/19/22 showed progression. Plan was to switched for FOLFOX. Patient was hospitalized for acute appendicitis and strangulated umbilical hernia s/p surgery on 11/2/2022.   - started FOLFOX on 11/21/22. S/p 2 cycles  - did fairly well with FOLFOX. However, he is starting to display some significant concerns in regards to neuropathy and difficulty ambulating. The wife reports that he may be developing mild foot drop of the R side, as he has to drag his leg sometimes. Although, some of his neuropathy was present prior to chemotherapy, there is a concern that his neuropathy is worsening 2/2 chemotherapy and may become irreversible. Hence, I have discussed with him that I would like to reduce the dose of Oxaliplatin. In addition, if the neuropathy continues to worsen, we may have to adjust his chemotherapy all together. Pt and wife displayed understanding and agreement to the above plan  - cycle 3 today. Keep 5FU 2000 mg/m2 for hyperbilirubinemia. Will reduce the dose of the Oxaliplatin to 65 mg/m2 this cycle due to worsening neuropathy and possible foot drop. Will re-evaluate after he sees Neurology.   - RTC in 2 weeks for cycle 4    5.  - reviewed Ca 9.7 today. Asked patient to continue to hold multivitamin  - IVF today  - encouraged oral hydration  - get zometa x 1 on return. Reviewed side effects and he agrees. He thinks his teeth are fine. Zometa 3.5 mg, dose adjusted for renal function    6.  - likely some GI blood loss from blood thinners which he needs for DVT  - transfuse if he develops symptoms again. Hb is good today    7.  - Cr increased this visit. Will give IVF today with chemotherapy  - encouraged oral hydration    8.  - BP well controlled  - c/w current medication    9.  - started before chemo; however, appears to have worsened since  "starting the Oxaliplatin.   - Given that there is concern for possible foot drop, we will lower the dose of the Oxaliplatin this visit. We may also have to consider an alternative to the Oxaliplatin if the neuropathy continues to worsen. I did offer holding chemotherapy all together today but patient wants to continue and discuss this further with Dr. Young during his next visit. I think this is reasonable.   - scheduled with neurology    10.  - had US when hospitalized which showed "Cholelithiasis.  Gallbladder appears contracted, difficult to accurately assess.  No abnormal biliary duct dilatation.  Recommend clinical correlation."  - bilirubin slightly improved. monitor    Follow-up:     RTC in 2 weeks.    I spent 45 minutes reviewing the chart, interpreting laboratory result and imaging data, coordinating patient's care, with at least 50% of the time on face-to-face counseling.     Route Chart for Scheduling    Med Onc Chart Routing      Follow up with physician 2 weeks. See Dr Young in 2 weeks as scheduled with lab work and cycle 4 of FOLFOX with pump d/c on day 3.   Follow up with [facility] 4 weeks. See BR[facility] in 4 weeks with lab work and next cycle of chemotherapy   Infusion scheduling note    Injection scheduling note    Labs CBC, CMP and CA 19-9   Lab interval: every 2 weeks     Imaging    Pharmacy appointment    Other referrals                                      "

## 2022-12-28 NOTE — PLAN OF CARE
Pt ambulatory to clinic with wife for Folfox treatment today. Denies any sig complaints. Port accessed without difficulty, pt tolerated well. Good blood return. Tolerated treatment well. 5FU infusing via CADD pump with RUN noted on screen and Volume decreasing. RTC on Friday at 1415 for pump d/c. Ambulatory from clinic in [facility].

## 2023-01-01 ENCOUNTER — PATIENT MESSAGE (OUTPATIENT)
Dept: HEMATOLOGY/ONCOLOGY | Facility: CLINIC | Age: 73
End: 2023-01-01
Payer: MEDICARE

## 2023-01-01 ENCOUNTER — HOSPITAL ENCOUNTER (OUTPATIENT)
Dept: RADIOLOGY | Facility: OTHER | Age: 73
Discharge: HOME | End: 2023-03-17
Attending: PHYSICIAN ASSISTANT
Payer: MEDICARE

## 2023-01-01 ENCOUNTER — OFFICE VISIT (OUTPATIENT)
Dept: HEMATOLOGY/ONCOLOGY | Facility: CLINIC | Age: 73
End: 2023-01-01
Payer: MEDICARE

## 2023-01-01 ENCOUNTER — PES CALL (OUTPATIENT)
Dept: ADMINISTRATIVE | Facility: CLINIC | Age: 73
End: 2023-01-01
Payer: MEDICARE

## 2023-01-01 ENCOUNTER — TELEPHONE (OUTPATIENT)
Dept: HEMATOLOGY/ONCOLOGY | Facility: CLINIC | Age: 73
End: 2023-01-01
Payer: MEDICARE

## 2023-01-01 ENCOUNTER — INFUSION (OUTPATIENT)
Dept: INFUSION THERAPY | Facility: HOSPITAL | Age: 73
End: 2023-01-01
Payer: MEDICARE

## 2023-01-01 ENCOUNTER — PATIENT MESSAGE (OUTPATIENT)
Dept: INTERNAL MEDICINE | Facility: CLINIC | Age: 73
End: 2023-01-01
Payer: MEDICARE

## 2023-01-01 ENCOUNTER — PATIENT MESSAGE (OUTPATIENT)
Dept: SURGERY | Facility: CLINIC | Age: 73
End: 2023-01-01
Payer: MEDICARE

## 2023-01-01 ENCOUNTER — DOCUMENTATION ONLY (OUTPATIENT)
Dept: HEMATOLOGY/ONCOLOGY | Facility: CLINIC | Age: 73
End: 2023-01-01
Payer: MEDICARE

## 2023-01-01 ENCOUNTER — LAB VISIT (OUTPATIENT)
Dept: LAB | Facility: HOSPITAL | Age: 73
End: 2023-01-01
Attending: INTERNAL MEDICINE
Payer: MEDICARE

## 2023-01-01 ENCOUNTER — HOSPITAL ENCOUNTER (OUTPATIENT)
Dept: RADIOLOGY | Facility: HOSPITAL | Age: 73
Discharge: HOME | End: 2023-03-07
Attending: INTERNAL MEDICINE
Payer: MEDICARE

## 2023-01-01 ENCOUNTER — TELEPHONE (OUTPATIENT)
Dept: PODIATRY | Facility: CLINIC | Age: 73
End: 2023-01-01
Payer: MEDICARE

## 2023-01-01 ENCOUNTER — PATIENT MESSAGE (OUTPATIENT)
Dept: PODIATRY | Facility: CLINIC | Age: 73
End: 2023-01-01
Payer: MEDICARE

## 2023-01-01 ENCOUNTER — HOSPITAL ENCOUNTER (INPATIENT)
Facility: HOSPITAL | Age: 73
LOS: 2 days | DRG: 951 | End: 2023-03-30
Attending: INTERNAL MEDICINE | Admitting: INTERNAL MEDICINE
Payer: OTHER MISCELLANEOUS

## 2023-01-01 ENCOUNTER — PATIENT MESSAGE (OUTPATIENT)
Dept: PODIATRY | Facility: CLINIC | Age: 73
End: 2023-01-01

## 2023-01-01 ENCOUNTER — OFFICE VISIT (OUTPATIENT)
Dept: DERMATOLOGY | Facility: CLINIC | Age: 73
End: 2023-01-01
Payer: MEDICARE

## 2023-01-01 ENCOUNTER — INFUSION (OUTPATIENT)
Dept: INFUSION THERAPY | Facility: HOSPITAL | Age: 73
DRG: 871 | End: 2023-01-01
Payer: MEDICARE

## 2023-01-01 ENCOUNTER — HOSPITAL ENCOUNTER (OUTPATIENT)
Dept: RADIOLOGY | Facility: HOSPITAL | Age: 73
Discharge: HOME | End: 2023-03-13
Attending: STUDENT IN AN ORGANIZED HEALTH CARE EDUCATION/TRAINING PROGRAM
Payer: MEDICARE

## 2023-01-01 ENCOUNTER — OFFICE VISIT (OUTPATIENT)
Dept: PODIATRY | Facility: CLINIC | Age: 73
End: 2023-01-01
Payer: MEDICARE

## 2023-01-01 ENCOUNTER — PROCEDURE VISIT (OUTPATIENT)
Dept: NEUROLOGY | Facility: CLINIC | Age: 73
End: 2023-01-01
Payer: MEDICARE

## 2023-01-01 ENCOUNTER — TELEPHONE (OUTPATIENT)
Dept: NEUROLOGY | Facility: CLINIC | Age: 73
End: 2023-01-01
Payer: MEDICARE

## 2023-01-01 ENCOUNTER — HOSPITAL ENCOUNTER (OUTPATIENT)
Dept: RADIOLOGY | Facility: HOSPITAL | Age: 73
Discharge: HOME | End: 2023-02-07
Attending: PODIATRIST
Payer: MEDICARE

## 2023-01-01 ENCOUNTER — TELEPHONE (OUTPATIENT)
Dept: HEMATOLOGY/ONCOLOGY | Facility: CLINIC | Age: 73
End: 2023-01-01
Payer: OTHER MISCELLANEOUS

## 2023-01-01 ENCOUNTER — INFUSION (OUTPATIENT)
Dept: INFUSION THERAPY | Facility: OTHER | Age: 73
End: 2023-01-01
Payer: MEDICARE

## 2023-01-01 ENCOUNTER — TELEPHONE (OUTPATIENT)
Dept: INTERNAL MEDICINE | Facility: CLINIC | Age: 73
End: 2023-01-01
Payer: MEDICARE

## 2023-01-01 ENCOUNTER — PATIENT MESSAGE (OUTPATIENT)
Dept: HEMATOLOGY/ONCOLOGY | Facility: CLINIC | Age: 73
End: 2023-01-01
Payer: OTHER MISCELLANEOUS

## 2023-01-01 ENCOUNTER — HOSPITAL ENCOUNTER (OUTPATIENT)
Dept: CARDIOLOGY | Facility: HOSPITAL | Age: 73
Discharge: HOME | DRG: 871 | End: 2023-03-23
Attending: INTERNAL MEDICINE
Payer: MEDICARE

## 2023-01-01 ENCOUNTER — PATIENT MESSAGE (OUTPATIENT)
Dept: HEMATOLOGY/ONCOLOGY | Facility: CLINIC | Age: 73
End: 2023-01-01

## 2023-01-01 ENCOUNTER — PATIENT MESSAGE (OUTPATIENT)
Dept: DERMATOLOGY | Facility: CLINIC | Age: 73
End: 2023-01-01
Payer: MEDICARE

## 2023-01-01 ENCOUNTER — OFFICE VISIT (OUTPATIENT)
Dept: INTERNAL MEDICINE | Facility: CLINIC | Age: 73
End: 2023-01-01
Attending: FAMILY MEDICINE
Payer: MEDICARE

## 2023-01-01 ENCOUNTER — OFFICE VISIT (OUTPATIENT)
Dept: CARDIOLOGY | Facility: CLINIC | Age: 73
End: 2023-01-01
Attending: FAMILY MEDICINE
Payer: MEDICARE

## 2023-01-01 ENCOUNTER — HOSPITAL ENCOUNTER (INPATIENT)
Facility: HOSPITAL | Age: 73
LOS: 4 days | Discharge: HOSPICE | DRG: 871 | End: 2023-03-28
Attending: EMERGENCY MEDICINE | Admitting: HOSPITALIST
Payer: MEDICARE

## 2023-01-01 ENCOUNTER — TELEPHONE (OUTPATIENT)
Dept: INTERNAL MEDICINE | Facility: CLINIC | Age: 73
End: 2023-01-01

## 2023-01-01 VITALS
BODY MASS INDEX: 33.28 KG/M2 | DIASTOLIC BLOOD PRESSURE: 70 MMHG | WEIGHT: 219.56 LBS | RESPIRATION RATE: 18 BRPM | HEART RATE: 59 BPM | OXYGEN SATURATION: 99 % | HEIGHT: 68 IN | TEMPERATURE: 98 F | SYSTOLIC BLOOD PRESSURE: 136 MMHG

## 2023-01-01 VITALS
HEIGHT: 68 IN | DIASTOLIC BLOOD PRESSURE: 71 MMHG | WEIGHT: 234.56 LBS | HEART RATE: 73 BPM | OXYGEN SATURATION: 99 % | SYSTOLIC BLOOD PRESSURE: 133 MMHG | BODY MASS INDEX: 35.55 KG/M2

## 2023-01-01 VITALS
DIASTOLIC BLOOD PRESSURE: 66 MMHG | BODY MASS INDEX: 33.26 KG/M2 | HEIGHT: 68 IN | WEIGHT: 219.44 LBS | RESPIRATION RATE: 18 BRPM | OXYGEN SATURATION: 97 % | SYSTOLIC BLOOD PRESSURE: 118 MMHG | HEART RATE: 71 BPM | TEMPERATURE: 98 F

## 2023-01-01 VITALS
HEIGHT: 68 IN | DIASTOLIC BLOOD PRESSURE: 49 MMHG | SYSTOLIC BLOOD PRESSURE: 88 MMHG | WEIGHT: 266 LBS | RESPIRATION RATE: 18 BRPM | HEART RATE: 107 BPM | TEMPERATURE: 98 F | BODY MASS INDEX: 40.32 KG/M2 | OXYGEN SATURATION: 97 %

## 2023-01-01 VITALS
HEIGHT: 68 IN | BODY MASS INDEX: 37.15 KG/M2 | SYSTOLIC BLOOD PRESSURE: 128 MMHG | DIASTOLIC BLOOD PRESSURE: 71 MMHG | HEART RATE: 86 BPM | DIASTOLIC BLOOD PRESSURE: 74 MMHG | HEART RATE: 71 BPM | OXYGEN SATURATION: 98 % | RESPIRATION RATE: 18 BRPM | TEMPERATURE: 98 F | HEIGHT: 68 IN | BODY MASS INDEX: 37.96 KG/M2 | WEIGHT: 236.88 LBS | TEMPERATURE: 99 F | BODY MASS INDEX: 35.9 KG/M2 | TEMPERATURE: 98 F | WEIGHT: 250.44 LBS | HEART RATE: 76 BPM | RESPIRATION RATE: 18 BRPM | OXYGEN SATURATION: 100 % | RESPIRATION RATE: 18 BRPM | DIASTOLIC BLOOD PRESSURE: 56 MMHG | SYSTOLIC BLOOD PRESSURE: 106 MMHG | OXYGEN SATURATION: 96 % | SYSTOLIC BLOOD PRESSURE: 123 MMHG | WEIGHT: 245.13 LBS | HEIGHT: 68 IN

## 2023-01-01 VITALS
DIASTOLIC BLOOD PRESSURE: 61 MMHG | RESPIRATION RATE: 18 BRPM | TEMPERATURE: 99 F | SYSTOLIC BLOOD PRESSURE: 119 MMHG | HEART RATE: 81 BPM

## 2023-01-01 VITALS
WEIGHT: 219.56 LBS | HEART RATE: 78 BPM | DIASTOLIC BLOOD PRESSURE: 82 MMHG | SYSTOLIC BLOOD PRESSURE: 132 MMHG | HEIGHT: 68 IN | TEMPERATURE: 98 F | RESPIRATION RATE: 18 BRPM | BODY MASS INDEX: 33.28 KG/M2

## 2023-01-01 VITALS
SYSTOLIC BLOOD PRESSURE: 112 MMHG | HEIGHT: 68 IN | OXYGEN SATURATION: 100 % | WEIGHT: 227.38 LBS | OXYGEN SATURATION: 99 % | BODY MASS INDEX: 32.11 KG/M2 | TEMPERATURE: 98 F | HEIGHT: 68 IN | RESPIRATION RATE: 18 BRPM | HEART RATE: 67 BPM | DIASTOLIC BLOOD PRESSURE: 62 MMHG | SYSTOLIC BLOOD PRESSURE: 113 MMHG | DIASTOLIC BLOOD PRESSURE: 66 MMHG | WEIGHT: 211.88 LBS | HEART RATE: 76 BPM | BODY MASS INDEX: 34.46 KG/M2

## 2023-01-01 VITALS
TEMPERATURE: 98 F | RESPIRATION RATE: 18 BRPM | HEART RATE: 68 BPM | SYSTOLIC BLOOD PRESSURE: 142 MMHG | DIASTOLIC BLOOD PRESSURE: 83 MMHG | OXYGEN SATURATION: 98 %

## 2023-01-01 VITALS
RESPIRATION RATE: 16 BRPM | SYSTOLIC BLOOD PRESSURE: 124 MMHG | DIASTOLIC BLOOD PRESSURE: 71 MMHG | HEART RATE: 81 BPM | OXYGEN SATURATION: 98 % | TEMPERATURE: 99 F

## 2023-01-01 VITALS
SYSTOLIC BLOOD PRESSURE: 127 MMHG | DIASTOLIC BLOOD PRESSURE: 79 MMHG | RESPIRATION RATE: 18 BRPM | HEART RATE: 77 BPM | TEMPERATURE: 98 F

## 2023-01-01 VITALS — OXYGEN SATURATION: 93 % | DIASTOLIC BLOOD PRESSURE: 44 MMHG | TEMPERATURE: 96 F | SYSTOLIC BLOOD PRESSURE: 87 MMHG

## 2023-01-01 VITALS
BODY MASS INDEX: 33.37 KG/M2 | SYSTOLIC BLOOD PRESSURE: 140 MMHG | OXYGEN SATURATION: 98 % | WEIGHT: 219.44 LBS | DIASTOLIC BLOOD PRESSURE: 67 MMHG | HEART RATE: 63 BPM | RESPIRATION RATE: 16 BRPM | TEMPERATURE: 98 F

## 2023-01-01 VITALS
RESPIRATION RATE: 18 BRPM | BODY MASS INDEX: 33.71 KG/M2 | DIASTOLIC BLOOD PRESSURE: 86 MMHG | HEART RATE: 62 BPM | SYSTOLIC BLOOD PRESSURE: 155 MMHG | WEIGHT: 222.44 LBS | HEIGHT: 68 IN | OXYGEN SATURATION: 99 % | TEMPERATURE: 98 F

## 2023-01-01 VITALS
SYSTOLIC BLOOD PRESSURE: 139 MMHG | WEIGHT: 222.44 LBS | OXYGEN SATURATION: 98 % | HEART RATE: 81 BPM | TEMPERATURE: 98 F | DIASTOLIC BLOOD PRESSURE: 76 MMHG | HEIGHT: 68 IN | BODY MASS INDEX: 33.71 KG/M2 | RESPIRATION RATE: 18 BRPM

## 2023-01-01 VITALS — DIASTOLIC BLOOD PRESSURE: 63 MMHG | HEART RATE: 63 BPM | SYSTOLIC BLOOD PRESSURE: 136 MMHG | RESPIRATION RATE: 18 BRPM

## 2023-01-01 DIAGNOSIS — E80.6 HYPERBILIRUBINEMIA: ICD-10-CM

## 2023-01-01 DIAGNOSIS — I82.412 ACUTE DEEP VEIN THROMBOSIS (DVT) OF FEMORAL VEIN OF LEFT LOWER EXTREMITY: ICD-10-CM

## 2023-01-01 DIAGNOSIS — R11.2 CHEMOTHERAPY INDUCED NAUSEA AND VOMITING: ICD-10-CM

## 2023-01-01 DIAGNOSIS — I27.9 CHRONIC PULMONARY HEART DISEASE: ICD-10-CM

## 2023-01-01 DIAGNOSIS — Z79.899 IMMUNODEFICIENCY SECONDARY TO CHEMOTHERAPY: ICD-10-CM

## 2023-01-01 DIAGNOSIS — A41.9 SEPSIS: ICD-10-CM

## 2023-01-01 DIAGNOSIS — K76.82 HEPATIC ENCEPHALOPATHY: ICD-10-CM

## 2023-01-01 DIAGNOSIS — D84.821 IMMUNODEFICIENCY SECONDARY TO CHEMOTHERAPY: ICD-10-CM

## 2023-01-01 DIAGNOSIS — I81 PORTAL VEIN THROMBOSIS: ICD-10-CM

## 2023-01-01 DIAGNOSIS — C22.1 INTRAHEPATIC CHOLANGIOCARCINOMA: ICD-10-CM

## 2023-01-01 DIAGNOSIS — D70.1 CHEMOTHERAPY-INDUCED NEUTROPENIA: ICD-10-CM

## 2023-01-01 DIAGNOSIS — E78.2 MIXED HYPERLIPIDEMIA: ICD-10-CM

## 2023-01-01 DIAGNOSIS — T45.1X5A IMMUNODEFICIENCY SECONDARY TO CHEMOTHERAPY: ICD-10-CM

## 2023-01-01 DIAGNOSIS — I25.10 CAD IN NATIVE ARTERY: ICD-10-CM

## 2023-01-01 DIAGNOSIS — D84.81 IMMUNODEFICIENCY SECONDARY TO NEOPLASM: ICD-10-CM

## 2023-01-01 DIAGNOSIS — D64.9 SEVERE ANEMIA: Primary | ICD-10-CM

## 2023-01-01 DIAGNOSIS — I10 HYPERTENSION, ESSENTIAL: ICD-10-CM

## 2023-01-01 DIAGNOSIS — E83.52 HYPERCALCEMIA OF MALIGNANCY: ICD-10-CM

## 2023-01-01 DIAGNOSIS — M21.619 BUNION: ICD-10-CM

## 2023-01-01 DIAGNOSIS — C22.1 INTRAHEPATIC CHOLANGIOCARCINOMA: Primary | ICD-10-CM

## 2023-01-01 DIAGNOSIS — J96.01 ACUTE HYPOXEMIC RESPIRATORY FAILURE: ICD-10-CM

## 2023-01-01 DIAGNOSIS — Z79.01 LONG TERM (CURRENT) USE OF ANTICOAGULANTS: ICD-10-CM

## 2023-01-01 DIAGNOSIS — T45.1X5A CHEMOTHERAPY INDUCED NAUSEA AND VOMITING: ICD-10-CM

## 2023-01-01 DIAGNOSIS — D49.9 IMMUNODEFICIENCY SECONDARY TO NEOPLASM: ICD-10-CM

## 2023-01-01 DIAGNOSIS — L29.9 PRURITUS: ICD-10-CM

## 2023-01-01 DIAGNOSIS — R73.03 PREDIABETES: ICD-10-CM

## 2023-01-01 DIAGNOSIS — C78.7 SECONDARY MALIGNANT NEOPLASM OF LIVER: ICD-10-CM

## 2023-01-01 DIAGNOSIS — G62.9 NEUROPATHY: ICD-10-CM

## 2023-01-01 DIAGNOSIS — D69.6 THROMBOCYTOPENIA: ICD-10-CM

## 2023-01-01 DIAGNOSIS — M10.9 ACUTE GOUT INVOLVING TOE OF RIGHT FOOT, UNSPECIFIED CAUSE: ICD-10-CM

## 2023-01-01 DIAGNOSIS — I82.412 DEEP VEIN THROMBOSIS (DVT) OF FEMORAL VEIN OF LEFT LOWER EXTREMITY, UNSPECIFIED CHRONICITY: ICD-10-CM

## 2023-01-01 DIAGNOSIS — R27.8 DYSGRAPHIA: ICD-10-CM

## 2023-01-01 DIAGNOSIS — L30.9 DERMATITIS: Primary | ICD-10-CM

## 2023-01-01 DIAGNOSIS — R06.02 SOB (SHORTNESS OF BREATH): Primary | ICD-10-CM

## 2023-01-01 DIAGNOSIS — T45.1X5A CHEMOTHERAPY-INDUCED NEUROPATHY: ICD-10-CM

## 2023-01-01 DIAGNOSIS — N18.31 STAGE 3A CHRONIC KIDNEY DISEASE: ICD-10-CM

## 2023-01-01 DIAGNOSIS — R29.898 WEAKNESS OF LOWER EXTREMITY, UNSPECIFIED LATERALITY: ICD-10-CM

## 2023-01-01 DIAGNOSIS — R00.0 SINUS TACHYCARDIA: ICD-10-CM

## 2023-01-01 DIAGNOSIS — T45.1X5A CHEMOTHERAPY-INDUCED NEUTROPENIA: ICD-10-CM

## 2023-01-01 DIAGNOSIS — Z87.898 H/O SYNCOPE: ICD-10-CM

## 2023-01-01 DIAGNOSIS — G62.0 CHEMOTHERAPY-INDUCED NEUROPATHY: ICD-10-CM

## 2023-01-01 DIAGNOSIS — L81.0 POST-INFLAMMATORY HYPERPIGMENTATION: ICD-10-CM

## 2023-01-01 DIAGNOSIS — N17.9 ACUTE KIDNEY INJURY: ICD-10-CM

## 2023-01-01 DIAGNOSIS — D64.9 SEVERE ANEMIA: ICD-10-CM

## 2023-01-01 DIAGNOSIS — M1A.9XX0 CHRONIC GOUT INVOLVING TOE OF RIGHT FOOT WITHOUT TOPHUS, UNSPECIFIED CAUSE: ICD-10-CM

## 2023-01-01 DIAGNOSIS — R00.0 TACHYCARDIA: ICD-10-CM

## 2023-01-01 DIAGNOSIS — Z00.00 ENCOUNTER FOR MEDICARE ANNUAL WELLNESS EXAM: ICD-10-CM

## 2023-01-01 DIAGNOSIS — N40.1 BENIGN PROSTATIC HYPERPLASIA WITH URINARY FREQUENCY: ICD-10-CM

## 2023-01-01 DIAGNOSIS — I25.84 CALCIFICATION OF CORONARY ARTERY: Primary | ICD-10-CM

## 2023-01-01 DIAGNOSIS — M25.571 PAIN, JOINT, FOOT, RIGHT: Primary | ICD-10-CM

## 2023-01-01 DIAGNOSIS — R41.3 MEMORY CHANGES: ICD-10-CM

## 2023-01-01 DIAGNOSIS — R35.0 BENIGN PROSTATIC HYPERPLASIA WITH URINARY FREQUENCY: ICD-10-CM

## 2023-01-01 DIAGNOSIS — I25.10 CALCIFICATION OF CORONARY ARTERY: Primary | ICD-10-CM

## 2023-01-01 DIAGNOSIS — Z51.5 PALLIATIVE CARE ENCOUNTER: ICD-10-CM

## 2023-01-01 DIAGNOSIS — R53.1 WEAKNESS: ICD-10-CM

## 2023-01-01 DIAGNOSIS — R53.1 WEAKNESS GENERALIZED: ICD-10-CM

## 2023-01-01 DIAGNOSIS — N17.9 ACUTE KIDNEY INJURY: Primary | ICD-10-CM

## 2023-01-01 DIAGNOSIS — R60.9 EDEMA, UNSPECIFIED TYPE: ICD-10-CM

## 2023-01-01 DIAGNOSIS — M79.89 SWELLING OF BOTH LOWER EXTREMITIES: ICD-10-CM

## 2023-01-01 DIAGNOSIS — C22.1 CHOLANGIOCARCINOMA: ICD-10-CM

## 2023-01-01 DIAGNOSIS — I70.0 AORTIC ATHEROSCLEROSIS: ICD-10-CM

## 2023-01-01 DIAGNOSIS — I65.23 BILATERAL CAROTID ARTERY STENOSIS: ICD-10-CM

## 2023-01-01 DIAGNOSIS — M10.9 GOUT, UNSPECIFIED CAUSE, UNSPECIFIED CHRONICITY, UNSPECIFIED SITE: Primary | ICD-10-CM

## 2023-01-01 DIAGNOSIS — G47.33 OSA (OBSTRUCTIVE SLEEP APNEA): ICD-10-CM

## 2023-01-01 DIAGNOSIS — N17.9 AKI (ACUTE KIDNEY INJURY): ICD-10-CM

## 2023-01-01 DIAGNOSIS — C78.7 SECONDARY MALIGNANT NEOPLASM OF LIVER: Primary | ICD-10-CM

## 2023-01-01 DIAGNOSIS — R07.9 CHEST PAIN: ICD-10-CM

## 2023-01-01 DIAGNOSIS — R60.0 LOWER LEG EDEMA: ICD-10-CM

## 2023-01-01 DIAGNOSIS — M21.619 BUNION: Primary | ICD-10-CM

## 2023-01-01 LAB
ABO + RH BLD: NORMAL
ALBUMIN SERPL BCP-MCNC: 1.6 G/DL (ref 3.5–5.2)
ALBUMIN SERPL BCP-MCNC: 1.7 G/DL (ref 3.5–5.2)
ALBUMIN SERPL BCP-MCNC: 2.1 G/DL (ref 3.5–5.2)
ALBUMIN SERPL BCP-MCNC: 2.3 G/DL (ref 3.5–5.2)
ALBUMIN SERPL BCP-MCNC: 2.3 G/DL (ref 3.5–5.2)
ALBUMIN SERPL BCP-MCNC: 2.4 G/DL (ref 3.5–5.2)
ALBUMIN SERPL BCP-MCNC: 2.7 G/DL (ref 3.5–5.2)
ALLENS TEST: ABNORMAL
ALLENS TEST: NORMAL
ALP SERPL-CCNC: 202 U/L (ref 55–135)
ALP SERPL-CCNC: 253 U/L (ref 55–135)
ALP SERPL-CCNC: 375 U/L (ref 55–135)
ALP SERPL-CCNC: 495 U/L (ref 55–135)
ALP SERPL-CCNC: 500 U/L (ref 55–135)
ALP SERPL-CCNC: 504 U/L (ref 55–135)
ALT SERPL W/O P-5'-P-CCNC: 24 U/L (ref 10–44)
ALT SERPL W/O P-5'-P-CCNC: 27 U/L (ref 10–44)
ALT SERPL W/O P-5'-P-CCNC: 48 U/L (ref 10–44)
ALT SERPL W/O P-5'-P-CCNC: 54 U/L (ref 10–44)
ALT SERPL W/O P-5'-P-CCNC: 57 U/L (ref 10–44)
ALT SERPL W/O P-5'-P-CCNC: 61 U/L (ref 10–44)
AMMONIA PLAS-SCNC: 63 UMOL/L (ref 10–50)
AMORPH CRY UR QL COMP ASSIST: ABNORMAL
ANION GAP SERPL CALC-SCNC: 10 MMOL/L (ref 8–16)
ANION GAP SERPL CALC-SCNC: 12 MMOL/L (ref 8–16)
ANION GAP SERPL CALC-SCNC: 14 MMOL/L (ref 8–16)
ANION GAP SERPL CALC-SCNC: 14 MMOL/L (ref 8–16)
ANION GAP SERPL CALC-SCNC: 7 MMOL/L (ref 8–16)
ANISOCYTOSIS BLD QL SMEAR: SLIGHT
ANISOCYTOSIS BLD QL SMEAR: SLIGHT
ASCENDING AORTA: 3.05 CM
AST SERPL-CCNC: 156 U/L (ref 10–40)
AST SERPL-CCNC: 156 U/L (ref 10–40)
AST SERPL-CCNC: 165 U/L (ref 10–40)
AST SERPL-CCNC: 204 U/L (ref 10–40)
AST SERPL-CCNC: 51 U/L (ref 10–40)
AST SERPL-CCNC: 70 U/L (ref 10–40)
AV INDEX (PROSTH): 0.61
AV MEAN GRADIENT: 9 MMHG
AV PEAK GRADIENT: 16 MMHG
AV VALVE AREA: 1.82 CM2
AV VELOCITY RATIO: 0.67
BACTERIA #/AREA URNS AUTO: ABNORMAL /HPF
BACTERIA BLD CULT: NORMAL
BACTERIA BLD CULT: NORMAL
BASOPHILS # BLD AUTO: 0.01 K/UL (ref 0–0.2)
BASOPHILS # BLD AUTO: 0.01 K/UL (ref 0–0.2)
BASOPHILS # BLD AUTO: 0.02 K/UL (ref 0–0.2)
BASOPHILS # BLD AUTO: 0.03 K/UL (ref 0–0.2)
BASOPHILS # BLD AUTO: 0.04 K/UL (ref 0–0.2)
BASOPHILS # BLD AUTO: 0.06 K/UL (ref 0–0.2)
BASOPHILS # BLD AUTO: 0.09 K/UL (ref 0–0.2)
BASOPHILS NFR BLD: 0.1 % (ref 0–1.9)
BASOPHILS NFR BLD: 0.2 % (ref 0–1.9)
BASOPHILS NFR BLD: 0.3 % (ref 0–1.9)
BASOPHILS NFR BLD: 0.3 % (ref 0–1.9)
BASOPHILS NFR BLD: 0.6 % (ref 0–1.9)
BASOPHILS NFR BLD: 0.7 % (ref 0–1.9)
BASOPHILS NFR BLD: 1 % (ref 0–1.9)
BILIRUB SERPL-MCNC: 2 MG/DL (ref 0.1–1)
BILIRUB SERPL-MCNC: 2.4 MG/DL (ref 0.1–1)
BILIRUB SERPL-MCNC: 6.5 MG/DL (ref 0.1–1)
BILIRUB SERPL-MCNC: 7 MG/DL (ref 0.1–1)
BILIRUB SERPL-MCNC: 7.7 MG/DL (ref 0.1–1)
BILIRUB SERPL-MCNC: 8.2 MG/DL (ref 0.1–1)
BILIRUB UR QL STRIP: ABNORMAL
BLD GP AB SCN CELLS X3 SERPL QL: NORMAL
BLD PROD TYP BPU: NORMAL
BLD PROD TYP BPU: NORMAL
BLOOD UNIT EXPIRATION DATE: NORMAL
BLOOD UNIT EXPIRATION DATE: NORMAL
BLOOD UNIT TYPE CODE: 7300
BLOOD UNIT TYPE CODE: 7300
BLOOD UNIT TYPE: NORMAL
BLOOD UNIT TYPE: NORMAL
BNP SERPL-MCNC: 100 PG/ML (ref 0–99)
BSA FOR ECHO PROCEDURE: 2.41 M2
BUN SERPL-MCNC: 21 MG/DL (ref 8–23)
BUN SERPL-MCNC: 21 MG/DL (ref 8–23)
BUN SERPL-MCNC: 24 MG/DL (ref 8–23)
BUN SERPL-MCNC: 26 MG/DL (ref 8–23)
BUN SERPL-MCNC: 27 MG/DL (ref 8–23)
BUN SERPL-MCNC: 32 MG/DL (ref 8–23)
BUN SERPL-MCNC: 42 MG/DL (ref 8–23)
BURR CELLS BLD QL SMEAR: ABNORMAL
CALCIUM SERPL-MCNC: 10 MG/DL (ref 8.7–10.5)
CALCIUM SERPL-MCNC: 8.3 MG/DL (ref 8.7–10.5)
CALCIUM SERPL-MCNC: 8.6 MG/DL (ref 8.7–10.5)
CALCIUM SERPL-MCNC: 8.6 MG/DL (ref 8.7–10.5)
CALCIUM SERPL-MCNC: 9.1 MG/DL (ref 8.7–10.5)
CALCIUM SERPL-MCNC: 9.3 MG/DL (ref 8.7–10.5)
CALCIUM SERPL-MCNC: 9.7 MG/DL (ref 8.7–10.5)
CANCER AG19-9 SERPL-ACNC: 632.5 U/ML (ref 0–40)
CANCER AG19-9 SERPL-ACNC: 642.5 U/ML (ref 0–40)
CHLORIDE SERPL-SCNC: 104 MMOL/L (ref 95–110)
CHLORIDE SERPL-SCNC: 109 MMOL/L (ref 95–110)
CHLORIDE SERPL-SCNC: 110 MMOL/L (ref 95–110)
CHLORIDE SERPL-SCNC: 112 MMOL/L (ref 95–110)
CHLORIDE SERPL-SCNC: 113 MMOL/L (ref 95–110)
CHLORIDE SERPL-SCNC: 113 MMOL/L (ref 95–110)
CHLORIDE SERPL-SCNC: 114 MMOL/L (ref 95–110)
CK SERPL-CCNC: 160 U/L (ref 20–200)
CLARITY UR REFRACT.AUTO: ABNORMAL
CO2 SERPL-SCNC: 16 MMOL/L (ref 23–29)
CO2 SERPL-SCNC: 17 MMOL/L (ref 23–29)
CO2 SERPL-SCNC: 18 MMOL/L (ref 23–29)
CO2 SERPL-SCNC: 18 MMOL/L (ref 23–29)
CO2 SERPL-SCNC: 21 MMOL/L (ref 23–29)
CO2 SERPL-SCNC: 24 MMOL/L (ref 23–29)
CO2 SERPL-SCNC: 24 MMOL/L (ref 23–29)
CODING SYSTEM: NORMAL
CODING SYSTEM: NORMAL
COLOR UR AUTO: YELLOW
CREAT SERPL-MCNC: 1.5 MG/DL (ref 0.5–1.4)
CREAT SERPL-MCNC: 1.6 MG/DL (ref 0.5–1.4)
CREAT SERPL-MCNC: 2.1 MG/DL (ref 0.5–1.4)
CREAT SERPL-MCNC: 2.3 MG/DL (ref 0.5–1.4)
CREAT SERPL-MCNC: 2.5 MG/DL (ref 0.5–1.4)
CREAT SERPL-MCNC: 2.9 MG/DL (ref 0.5–1.4)
CREAT SERPL-MCNC: 4.1 MG/DL (ref 0.5–1.4)
CREAT UR-MCNC: 103 MG/DL (ref 23–375)
CROSSMATCH INTERPRETATION: NORMAL
CROSSMATCH INTERPRETATION: NORMAL
CV ECHO LV RWT: 0.37 CM
DIFFERENTIAL METHOD: ABNORMAL
DISPENSE STATUS: NORMAL
DISPENSE STATUS: NORMAL
DOP CALC AO PEAK VEL: 1.99 M/S
DOP CALC AO VTI: 32.24 CM
DOP CALC LVOT AREA: 3 CM2
DOP CALC LVOT DIAMETER: 1.95 CM
DOP CALC LVOT PEAK VEL: 1.33 M/S
DOP CALC LVOT STROKE VOLUME: 58.77 CM3
DOP CALCLVOT PEAK VEL VTI: 19.69 CM
E WAVE DECELERATION TIME: 165.36 MSEC
E/A RATIO: 0.84
E/E' RATIO: 5.91 M/S
ECHO LV POSTERIOR WALL: 0.92 CM (ref 0.6–1.1)
EJECTION FRACTION: 60 %
EOSINOPHIL # BLD AUTO: 0.1 K/UL (ref 0–0.5)
EOSINOPHIL # BLD AUTO: 0.2 K/UL (ref 0–0.5)
EOSINOPHIL # BLD AUTO: 0.2 K/UL (ref 0–0.5)
EOSINOPHIL # BLD AUTO: 0.3 K/UL (ref 0–0.5)
EOSINOPHIL NFR BLD: 0.4 % (ref 0–8)
EOSINOPHIL NFR BLD: 0.5 % (ref 0–8)
EOSINOPHIL NFR BLD: 0.9 % (ref 0–8)
EOSINOPHIL NFR BLD: 1.8 % (ref 0–8)
EOSINOPHIL NFR BLD: 2.2 % (ref 0–8)
EOSINOPHIL NFR BLD: 2.4 % (ref 0–8)
EOSINOPHIL NFR BLD: 7.2 % (ref 0–8)
ERYTHROCYTE [DISTWIDTH] IN BLOOD BY AUTOMATED COUNT: 18.2 % (ref 11.5–14.5)
ERYTHROCYTE [DISTWIDTH] IN BLOOD BY AUTOMATED COUNT: 18.6 % (ref 11.5–14.5)
ERYTHROCYTE [DISTWIDTH] IN BLOOD BY AUTOMATED COUNT: 19.9 % (ref 11.5–14.5)
ERYTHROCYTE [DISTWIDTH] IN BLOOD BY AUTOMATED COUNT: 20.8 % (ref 11.5–14.5)
ERYTHROCYTE [DISTWIDTH] IN BLOOD BY AUTOMATED COUNT: 21.3 % (ref 11.5–14.5)
ERYTHROCYTE [DISTWIDTH] IN BLOOD BY AUTOMATED COUNT: 21.7 % (ref 11.5–14.5)
ERYTHROCYTE [DISTWIDTH] IN BLOOD BY AUTOMATED COUNT: 21.7 % (ref 11.5–14.5)
EST. GFR  (NO RACE VARIABLE): 14.6 ML/MIN/1.73 M^2
EST. GFR  (NO RACE VARIABLE): 22.1 ML/MIN/1.73 M^2
EST. GFR  (NO RACE VARIABLE): 26.5 ML/MIN/1.73 M^2
EST. GFR  (NO RACE VARIABLE): 29.3 ML/MIN/1.73 M^2
EST. GFR  (NO RACE VARIABLE): 32.6 ML/MIN/1.73 M^2
EST. GFR  (NO RACE VARIABLE): 45.2 ML/MIN/1.73 M^2
EST. GFR  (NO RACE VARIABLE): 48.9 ML/MIN/1.73 M^2
FRACTIONAL SHORTENING: 36 % (ref 28–44)
GLUCOSE SERPL-MCNC: 109 MG/DL (ref 70–110)
GLUCOSE SERPL-MCNC: 154 MG/DL (ref 70–110)
GLUCOSE SERPL-MCNC: 74 MG/DL (ref 70–110)
GLUCOSE SERPL-MCNC: 82 MG/DL (ref 70–110)
GLUCOSE SERPL-MCNC: 88 MG/DL (ref 70–110)
GLUCOSE SERPL-MCNC: 97 MG/DL (ref 70–110)
GLUCOSE SERPL-MCNC: 99 MG/DL (ref 70–110)
GLUCOSE UR QL STRIP: NEGATIVE
HCO3 UR-SCNC: 21.4 MMOL/L (ref 24–28)
HCO3 UR-SCNC: 21.8 MMOL/L (ref 24–28)
HCT VFR BLD AUTO: 21.5 % (ref 40–54)
HCT VFR BLD AUTO: 22.1 % (ref 40–54)
HCT VFR BLD AUTO: 23.6 % (ref 40–54)
HCT VFR BLD AUTO: 25.6 % (ref 40–54)
HCT VFR BLD AUTO: 27.6 % (ref 40–54)
HCT VFR BLD AUTO: 27.8 % (ref 40–54)
HCT VFR BLD AUTO: 28.3 % (ref 40–54)
HCV AB SERPL QL IA: NORMAL
HGB BLD-MCNC: 6.6 G/DL (ref 14–18)
HGB BLD-MCNC: 6.6 G/DL (ref 14–18)
HGB BLD-MCNC: 7.3 G/DL (ref 14–18)
HGB BLD-MCNC: 7.8 G/DL (ref 14–18)
HGB BLD-MCNC: 8.3 G/DL (ref 14–18)
HGB BLD-MCNC: 8.8 G/DL (ref 14–18)
HGB BLD-MCNC: 8.8 G/DL (ref 14–18)
HGB BLD-MCNC: 8.9 G/DL (ref 14–18)
HGB UR QL STRIP: ABNORMAL
HIV 1+2 AB+HIV1 P24 AG SERPL QL IA: NORMAL
HYALINE CASTS UR QL AUTO: 0 /LPF
HYPOCHROMIA BLD QL SMEAR: ABNORMAL
HYPOCHROMIA BLD QL SMEAR: ABNORMAL
IMM GRANULOCYTES # BLD AUTO: 0.02 K/UL (ref 0–0.04)
IMM GRANULOCYTES # BLD AUTO: 0.02 K/UL (ref 0–0.04)
IMM GRANULOCYTES # BLD AUTO: 0.05 K/UL (ref 0–0.04)
IMM GRANULOCYTES # BLD AUTO: 0.14 K/UL (ref 0–0.04)
IMM GRANULOCYTES # BLD AUTO: 0.15 K/UL (ref 0–0.04)
IMM GRANULOCYTES # BLD AUTO: 0.17 K/UL (ref 0–0.04)
IMM GRANULOCYTES # BLD AUTO: 0.26 K/UL (ref 0–0.04)
IMM GRANULOCYTES NFR BLD AUTO: 0.5 % (ref 0–0.5)
IMM GRANULOCYTES NFR BLD AUTO: 0.5 % (ref 0–0.5)
IMM GRANULOCYTES NFR BLD AUTO: 0.6 % (ref 0–0.5)
IMM GRANULOCYTES NFR BLD AUTO: 0.9 % (ref 0–0.5)
IMM GRANULOCYTES NFR BLD AUTO: 1.8 % (ref 0–0.5)
IMM GRANULOCYTES NFR BLD AUTO: 1.9 % (ref 0–0.5)
IMM GRANULOCYTES NFR BLD AUTO: 2.2 % (ref 0–0.5)
INR PPP: 1.5 (ref 0.8–1.2)
INTERVENTRICULAR SEPTUM: 0.86 CM (ref 0.6–1.1)
IVRT: 51.38 MSEC
KETONES UR QL STRIP: NEGATIVE
LA MAJOR: 5.77 CM
LA MINOR: 5.29 CM
LA WIDTH: 4.55 CM
LACTATE SERPL-SCNC: 2.1 MMOL/L (ref 0.5–2.2)
LDH SERPL L TO P-CCNC: 1.88 MMOL/L (ref 0.5–2.2)
LDH SERPL L TO P-CCNC: 2.25 MMOL/L (ref 0.5–2.2)
LEFT ATRIUM SIZE: 3.9 CM
LEFT ATRIUM VOLUME INDEX MOD: 31.3 ML/M2
LEFT ATRIUM VOLUME INDEX: 36 ML/M2
LEFT ATRIUM VOLUME MOD: 72.22 CM3
LEFT ATRIUM VOLUME: 83.25 CM3
LEFT INTERNAL DIMENSION IN SYSTOLE: 3.22 CM (ref 2.1–4)
LEFT VENTRICLE DIASTOLIC VOLUME INDEX: 51.97 ML/M2
LEFT VENTRICLE DIASTOLIC VOLUME: 120.04 ML
LEFT VENTRICLE MASS INDEX: 68 G/M2
LEFT VENTRICLE SYSTOLIC VOLUME INDEX: 17.9 ML/M2
LEFT VENTRICLE SYSTOLIC VOLUME: 41.46 ML
LEFT VENTRICULAR INTERNAL DIMENSION IN DIASTOLE: 5.03 CM (ref 3.5–6)
LEFT VENTRICULAR MASS: 157.48 G
LEUKOCYTE ESTERASE UR QL STRIP: NEGATIVE
LV LATERAL E/E' RATIO: 5.91 M/S
LV SEPTAL E/E' RATIO: 5.91 M/S
LYMPHOCYTES # BLD AUTO: 0.3 K/UL (ref 1–4.8)
LYMPHOCYTES # BLD AUTO: 0.4 K/UL (ref 1–4.8)
LYMPHOCYTES # BLD AUTO: 0.6 K/UL (ref 1–4.8)
LYMPHOCYTES # BLD AUTO: 0.7 K/UL (ref 1–4.8)
LYMPHOCYTES # BLD AUTO: 0.8 K/UL (ref 1–4.8)
LYMPHOCYTES # BLD AUTO: 0.9 K/UL (ref 1–4.8)
LYMPHOCYTES # BLD AUTO: 1 K/UL (ref 1–4.8)
LYMPHOCYTES NFR BLD: 11 % (ref 18–48)
LYMPHOCYTES NFR BLD: 2.6 % (ref 18–48)
LYMPHOCYTES NFR BLD: 21.2 % (ref 18–48)
LYMPHOCYTES NFR BLD: 23.3 % (ref 18–48)
LYMPHOCYTES NFR BLD: 4 % (ref 18–48)
LYMPHOCYTES NFR BLD: 4.6 % (ref 18–48)
LYMPHOCYTES NFR BLD: 6.5 % (ref 18–48)
MAGNESIUM SERPL-MCNC: 1.1 MG/DL (ref 1.6–2.6)
MAGNESIUM SERPL-MCNC: 1.3 MG/DL (ref 1.6–2.6)
MAGNESIUM SERPL-MCNC: 2 MG/DL (ref 1.6–2.6)
MCH RBC QN AUTO: 31.6 PG (ref 27–31)
MCH RBC QN AUTO: 31.9 PG (ref 27–31)
MCH RBC QN AUTO: 32.2 PG (ref 27–31)
MCH RBC QN AUTO: 32.4 PG (ref 27–31)
MCH RBC QN AUTO: 32.4 PG (ref 27–31)
MCH RBC QN AUTO: 32.8 PG (ref 27–31)
MCH RBC QN AUTO: 32.9 PG (ref 27–31)
MCHC RBC AUTO-ENTMCNC: 30.7 G/DL (ref 32–36)
MCHC RBC AUTO-ENTMCNC: 31.1 G/DL (ref 32–36)
MCHC RBC AUTO-ENTMCNC: 31.9 G/DL (ref 32–36)
MCHC RBC AUTO-ENTMCNC: 32 G/DL (ref 32–36)
MCHC RBC AUTO-ENTMCNC: 32.4 G/DL (ref 32–36)
MCHC RBC AUTO-ENTMCNC: 33 G/DL (ref 32–36)
MCHC RBC AUTO-ENTMCNC: 33.1 G/DL (ref 32–36)
MCV RBC AUTO: 100 FL (ref 82–98)
MCV RBC AUTO: 101 FL (ref 82–98)
MCV RBC AUTO: 102 FL (ref 82–98)
MCV RBC AUTO: 104 FL (ref 82–98)
MCV RBC AUTO: 105 FL (ref 82–98)
MCV RBC AUTO: 96 FL (ref 82–98)
MCV RBC AUTO: 99 FL (ref 82–98)
MICROSCOPIC COMMENT: ABNORMAL
MONOCYTES # BLD AUTO: 0.1 K/UL (ref 0.3–1)
MONOCYTES # BLD AUTO: 0.2 K/UL (ref 0.3–1)
MONOCYTES # BLD AUTO: 0.2 K/UL (ref 0.3–1)
MONOCYTES # BLD AUTO: 0.5 K/UL (ref 0.3–1)
MONOCYTES # BLD AUTO: 0.9 K/UL (ref 0.3–1)
MONOCYTES # BLD AUTO: 1.1 K/UL (ref 0.3–1)
MONOCYTES # BLD AUTO: 1.3 K/UL (ref 0.3–1)
MONOCYTES NFR BLD: 1.8 % (ref 4–15)
MONOCYTES NFR BLD: 12.1 % (ref 4–15)
MONOCYTES NFR BLD: 2.1 % (ref 4–15)
MONOCYTES NFR BLD: 21.8 % (ref 4–15)
MONOCYTES NFR BLD: 4.4 % (ref 4–15)
MONOCYTES NFR BLD: 4.7 % (ref 4–15)
MONOCYTES NFR BLD: 8.3 % (ref 4–15)
MV PEAK A VEL: 0.77 M/S
MV PEAK E VEL: 0.65 M/S
MV STENOSIS PRESSURE HALF TIME: 47.95 MS
MV VALVE AREA P 1/2 METHOD: 4.59 CM2
NEUTROPHILS # BLD AUTO: 1.8 K/UL (ref 1.8–7.7)
NEUTROPHILS # BLD AUTO: 12.5 K/UL (ref 1.8–7.7)
NEUTROPHILS # BLD AUTO: 13.9 K/UL (ref 1.8–7.7)
NEUTROPHILS # BLD AUTO: 2.6 K/UL (ref 1.8–7.7)
NEUTROPHILS # BLD AUTO: 5.7 K/UL (ref 1.8–7.7)
NEUTROPHILS # BLD AUTO: 6.6 K/UL (ref 1.8–7.7)
NEUTROPHILS # BLD AUTO: 8.9 K/UL (ref 1.8–7.7)
NEUTROPHILS NFR BLD: 46.2 % (ref 38–73)
NEUTROPHILS NFR BLD: 71 % (ref 38–73)
NEUTROPHILS NFR BLD: 72 % (ref 38–73)
NEUTROPHILS NFR BLD: 85.7 % (ref 38–73)
NEUTROPHILS NFR BLD: 86.5 % (ref 38–73)
NEUTROPHILS NFR BLD: 90 % (ref 38–73)
NEUTROPHILS NFR BLD: 93.2 % (ref 38–73)
NITRITE UR QL STRIP: NEGATIVE
NRBC BLD-RTO: 0 /100 WBC
NUM UNITS TRANS PACKED RBC: NORMAL
NUM UNITS TRANS PACKED RBC: NORMAL
OSMOLALITY UR: 378 MOSM/KG (ref 50–1200)
OVALOCYTES BLD QL SMEAR: ABNORMAL
OVALOCYTES BLD QL SMEAR: ABNORMAL
PCO2 BLDA: 39.8 MMHG (ref 35–45)
PCO2 BLDA: 40.6 MMHG (ref 35–45)
PH SMN: 7.34 [PH] (ref 7.35–7.45)
PH SMN: 7.34 [PH] (ref 7.35–7.45)
PH UR STRIP: 6 [PH] (ref 5–8)
PHOSPHATE SERPL-MCNC: 2.7 MG/DL (ref 2.7–4.5)
PHOSPHATE SERPL-MCNC: 2.8 MG/DL (ref 2.7–4.5)
PHOSPHATE SERPL-MCNC: 3.2 MG/DL (ref 2.7–4.5)
PHOSPHATE SERPL-MCNC: 3.5 MG/DL (ref 2.7–4.5)
PISA TR MAX VEL: 2.81 M/S
PLATELET # BLD AUTO: 126 K/UL (ref 150–450)
PLATELET # BLD AUTO: 140 K/UL (ref 150–450)
PLATELET # BLD AUTO: 164 K/UL (ref 150–450)
PLATELET # BLD AUTO: 168 K/UL (ref 150–450)
PLATELET # BLD AUTO: 64 K/UL (ref 150–450)
PLATELET # BLD AUTO: 83 K/UL (ref 150–450)
PLATELET # BLD AUTO: 96 K/UL (ref 150–450)
PLATELET BLD QL SMEAR: ABNORMAL
PMV BLD AUTO: 10.5 FL (ref 9.2–12.9)
PMV BLD AUTO: 10.6 FL (ref 9.2–12.9)
PMV BLD AUTO: 10.8 FL (ref 9.2–12.9)
PMV BLD AUTO: 11.1 FL (ref 9.2–12.9)
PMV BLD AUTO: 11.2 FL (ref 9.2–12.9)
PMV BLD AUTO: 11.2 FL (ref 9.2–12.9)
PMV BLD AUTO: 11.3 FL (ref 9.2–12.9)
PO2 BLDA: 24 MMHG (ref 40–60)
PO2 BLDA: 36 MMHG (ref 40–60)
POC BE: -4 MMOL/L
POC BE: -4 MMOL/L
POC SATURATED O2: 40 % (ref 95–100)
POC SATURATED O2: 65 % (ref 95–100)
POC TCO2: 23 MMOL/L (ref 24–29)
POC TCO2: 23 MMOL/L (ref 24–29)
POCT GLUCOSE: 99 MG/DL (ref 70–110)
POIKILOCYTOSIS BLD QL SMEAR: SLIGHT
POIKILOCYTOSIS BLD QL SMEAR: SLIGHT
POLYCHROMASIA BLD QL SMEAR: ABNORMAL
POTASSIUM SERPL-SCNC: 3.2 MMOL/L (ref 3.5–5.1)
POTASSIUM SERPL-SCNC: 3.6 MMOL/L (ref 3.5–5.1)
POTASSIUM SERPL-SCNC: 3.6 MMOL/L (ref 3.5–5.1)
POTASSIUM SERPL-SCNC: 3.8 MMOL/L (ref 3.5–5.1)
POTASSIUM SERPL-SCNC: 3.8 MMOL/L (ref 3.5–5.1)
POTASSIUM SERPL-SCNC: 4 MMOL/L (ref 3.5–5.1)
POTASSIUM SERPL-SCNC: 4 MMOL/L (ref 3.5–5.1)
PROT SERPL-MCNC: 5 G/DL (ref 6–8.4)
PROT SERPL-MCNC: 5.2 G/DL (ref 6–8.4)
PROT SERPL-MCNC: 5.3 G/DL (ref 6–8.4)
PROT SERPL-MCNC: 5.9 G/DL (ref 6–8.4)
PROT SERPL-MCNC: 6.5 G/DL (ref 6–8.4)
PROT SERPL-MCNC: 6.5 G/DL (ref 6–8.4)
PROT UR QL STRIP: ABNORMAL
PROTHROMBIN TIME: 15.3 SEC (ref 9–12.5)
RA MAJOR: 4.6 CM
RA PRESSURE: 3 MMHG
RA WIDTH: 3.37 CM
RBC # BLD AUTO: 2.05 M/UL (ref 4.6–6.2)
RBC # BLD AUTO: 2.31 M/UL (ref 4.6–6.2)
RBC # BLD AUTO: 2.38 M/UL (ref 4.6–6.2)
RBC # BLD AUTO: 2.52 M/UL (ref 4.6–6.2)
RBC # BLD AUTO: 2.72 M/UL (ref 4.6–6.2)
RBC # BLD AUTO: 2.75 M/UL (ref 4.6–6.2)
RBC # BLD AUTO: 2.76 M/UL (ref 4.6–6.2)
RBC #/AREA URNS AUTO: 5 /HPF (ref 0–4)
RIGHT VENTRICULAR END-DIASTOLIC DIMENSION: 3.1 CM
RV TISSUE DOPPLER FREE WALL SYSTOLIC VELOCITY 1 (APICAL 4 CHAMBER VIEW): 17.22 CM/S
SAMPLE: ABNORMAL
SAMPLE: NORMAL
SARS-COV-2 RDRP RESP QL NAA+PROBE: NEGATIVE
SINUS: 2.95 CM
SITE: ABNORMAL
SITE: NORMAL
SODIUM SERPL-SCNC: 138 MMOL/L (ref 136–145)
SODIUM SERPL-SCNC: 141 MMOL/L (ref 136–145)
SODIUM SERPL-SCNC: 142 MMOL/L (ref 136–145)
SODIUM SERPL-SCNC: 143 MMOL/L (ref 136–145)
SODIUM SERPL-SCNC: 144 MMOL/L (ref 136–145)
SODIUM UR-SCNC: 12 MMOL/L (ref 20–250)
SP GR UR STRIP: 1.02 (ref 1–1.03)
SPECIMEN OUTDATE: NORMAL
SPHEROCYTES BLD QL SMEAR: ABNORMAL
SQUAMOUS #/AREA URNS AUTO: 1 /HPF
STJ: 2.62 CM
TARGETS BLD QL SMEAR: ABNORMAL
TARGETS BLD QL SMEAR: ABNORMAL
TDI LATERAL: 0.11 M/S
TDI SEPTAL: 0.11 M/S
TDI: 0.11 M/S
TR MAX PG: 32 MMHG
TRICUSPID ANNULAR PLANE SYSTOLIC EXCURSION: 2.19 CM
TROPONIN I SERPL DL<=0.01 NG/ML-MCNC: 0.04 NG/ML (ref 0–0.03)
TROPONIN I SERPL DL<=0.01 NG/ML-MCNC: 0.04 NG/ML (ref 0–0.03)
TV REST PULMONARY ARTERY PRESSURE: 35 MMHG
URATE SERPL-MCNC: 8.9 MG/DL (ref 3.4–7)
URN SPEC COLLECT METH UR: ABNORMAL
UUN UR-MCNC: 548 MG/DL (ref 140–1050)
WBC # BLD AUTO: 10.26 K/UL (ref 3.9–12.7)
WBC # BLD AUTO: 13.44 K/UL (ref 3.9–12.7)
WBC # BLD AUTO: 16.15 K/UL (ref 3.9–12.7)
WBC # BLD AUTO: 3.63 K/UL (ref 3.9–12.7)
WBC # BLD AUTO: 3.9 K/UL (ref 3.9–12.7)
WBC # BLD AUTO: 6.33 K/UL (ref 3.9–12.7)
WBC # BLD AUTO: 9.19 K/UL (ref 3.9–12.7)
WBC #/AREA URNS AUTO: 4 /HPF (ref 0–5)

## 2023-01-01 PROCEDURE — 95911 NRV CNDJ TEST 9-10 STUDIES: CPT | Mod: HCNC,S$GLB,, | Performed by: PSYCHIATRY & NEUROLOGY

## 2023-01-01 PROCEDURE — 96413 CHEMO IV INFUSION 1 HR: CPT | Mod: HCNC

## 2023-01-01 PROCEDURE — 3074F PR MOST RECENT SYSTOLIC BLOOD PRESSURE < 130 MM HG: ICD-10-PCS | Mod: HCNC,CPTII,S$GLB, | Performed by: PHYSICIAN ASSISTANT

## 2023-01-01 PROCEDURE — 96361 HYDRATE IV INFUSION ADD-ON: CPT | Mod: HCNC

## 2023-01-01 PROCEDURE — 63600175 PHARM REV CODE 636 W HCPCS: Mod: HCNC

## 2023-01-01 PROCEDURE — 99999 PR PBB SHADOW E&M-EST. PATIENT-LVL IV: ICD-10-PCS | Mod: PBBFAC,HCNC,, | Performed by: PHYSICIAN ASSISTANT

## 2023-01-01 PROCEDURE — 99497 ADVNCD CARE PLAN 30 MIN: CPT | Mod: HCNC,,, | Performed by: STUDENT IN AN ORGANIZED HEALTH CARE EDUCATION/TRAINING PROGRAM

## 2023-01-01 PROCEDURE — 3288F PR FALLS RISK ASSESSMENT DOCUMENTED: ICD-10-PCS | Mod: HCNC,CPTII,S$GLB, | Performed by: INTERNAL MEDICINE

## 2023-01-01 PROCEDURE — 85025 COMPLETE CBC W/AUTO DIFF WBC: CPT | Mod: HCNC | Performed by: STUDENT IN AN ORGANIZED HEALTH CARE EDUCATION/TRAINING PROGRAM

## 2023-01-01 PROCEDURE — 99499 UNLISTED E&M SERVICE: CPT | Mod: S$GLB,,, | Performed by: PHYSICIAN ASSISTANT

## 2023-01-01 PROCEDURE — 1125F PR PAIN SEVERITY QUANTIFIED, PAIN PRESENT: ICD-10-PCS | Mod: HCNC,CPTII,S$GLB, | Performed by: INTERNAL MEDICINE

## 2023-01-01 PROCEDURE — 82550 ASSAY OF CK (CPK): CPT | Mod: HCNC | Performed by: STUDENT IN AN ORGANIZED HEALTH CARE EDUCATION/TRAINING PROGRAM

## 2023-01-01 PROCEDURE — 93970 CV US DOPPLER VENOUS LEGS BILATERAL (CUPID ONLY): ICD-10-PCS | Mod: 26,HCNC,, | Performed by: INTERNAL MEDICINE

## 2023-01-01 PROCEDURE — 83880 ASSAY OF NATRIURETIC PEPTIDE: CPT | Mod: HCNC | Performed by: EMERGENCY MEDICINE

## 2023-01-01 PROCEDURE — 27000221 HC OXYGEN, UP TO 24 HOURS: Mod: HCNC

## 2023-01-01 PROCEDURE — 99499 RISK ADDL DX/OHS AUDIT: ICD-10-PCS | Mod: S$GLB,,, | Performed by: INTERNAL MEDICINE

## 2023-01-01 PROCEDURE — 93010 EKG 12-LEAD: ICD-10-PCS | Mod: HCNC,,, | Performed by: INTERNAL MEDICINE

## 2023-01-01 PROCEDURE — 99223 PR INITIAL HOSPITAL CARE,LEVL III: ICD-10-PCS | Mod: AI,HCNC,GC, | Performed by: HOSPITALIST

## 2023-01-01 PROCEDURE — 3288F FALL RISK ASSESSMENT DOCD: CPT | Mod: HCNC,CPTII,S$GLB, | Performed by: INTERNAL MEDICINE

## 2023-01-01 PROCEDURE — 80053 COMPREHEN METABOLIC PANEL: CPT | Mod: HCNC | Performed by: STUDENT IN AN ORGANIZED HEALTH CARE EDUCATION/TRAINING PROGRAM

## 2023-01-01 PROCEDURE — 36430 TRANSFUSION BLD/BLD COMPNT: CPT | Mod: HCNC

## 2023-01-01 PROCEDURE — 99215 OFFICE O/P EST HI 40 MIN: CPT | Mod: HCNC,S$GLB,, | Performed by: PHYSICIAN ASSISTANT

## 2023-01-01 PROCEDURE — 3008F PR BODY MASS INDEX (BMI) DOCUMENTED: ICD-10-PCS | Mod: HCNC,CPTII,S$GLB, | Performed by: INTERNAL MEDICINE

## 2023-01-01 PROCEDURE — 96415 CHEMO IV INFUSION ADDL HR: CPT | Mod: HCNC

## 2023-01-01 PROCEDURE — 63600175 PHARM REV CODE 636 W HCPCS: Mod: HCNC | Performed by: INTERNAL MEDICINE

## 2023-01-01 PROCEDURE — 76705 US ABDOMEN LIMITED: ICD-10-PCS | Mod: 26,HCNC,, | Performed by: RADIOLOGY

## 2023-01-01 PROCEDURE — 93005 ELECTROCARDIOGRAM TRACING: CPT | Mod: HCNC

## 2023-01-01 PROCEDURE — 36415 COLL VENOUS BLD VENIPUNCTURE: CPT | Mod: HCNC

## 2023-01-01 PROCEDURE — 3008F PR BODY MASS INDEX (BMI) DOCUMENTED: ICD-10-PCS | Mod: HCNC,CPTII,S$GLB, | Performed by: FAMILY MEDICINE

## 2023-01-01 PROCEDURE — 63600175 PHARM REV CODE 636 W HCPCS: Mod: HCNC | Performed by: STUDENT IN AN ORGANIZED HEALTH CARE EDUCATION/TRAINING PROGRAM

## 2023-01-01 PROCEDURE — 94799 UNLISTED PULMONARY SVC/PX: CPT | Mod: HCNC

## 2023-01-01 PROCEDURE — 1160F RVW MEDS BY RX/DR IN RCRD: CPT | Mod: HCNC,CPTII,S$GLB, | Performed by: PHYSICIAN ASSISTANT

## 2023-01-01 PROCEDURE — 1160F PR REVIEW ALL MEDS BY PRESCRIBER/CLIN PHARMACIST DOCUMENTED: ICD-10-PCS | Mod: HCNC,CPTII,S$GLB, | Performed by: PHYSICIAN ASSISTANT

## 2023-01-01 PROCEDURE — 83735 ASSAY OF MAGNESIUM: CPT | Mod: HCNC | Performed by: STUDENT IN AN ORGANIZED HEALTH CARE EDUCATION/TRAINING PROGRAM

## 2023-01-01 PROCEDURE — 25000003 PHARM REV CODE 250: Mod: HCNC

## 2023-01-01 PROCEDURE — 1159F PR MEDICATION LIST DOCUMENTED IN MEDICAL RECORD: ICD-10-PCS | Mod: HCNC,CPTII,S$GLB, | Performed by: FAMILY MEDICINE

## 2023-01-01 PROCEDURE — 1159F MED LIST DOCD IN RCRD: CPT | Mod: HCNC,CPTII,S$GLB, | Performed by: PHYSICIAN ASSISTANT

## 2023-01-01 PROCEDURE — 94640 AIRWAY INHALATION TREATMENT: CPT | Mod: HCNC

## 2023-01-01 PROCEDURE — 99999 PR PBB SHADOW E&M-EST. PATIENT-LVL IV: ICD-10-PCS | Mod: PBBFAC,HCNC,, | Performed by: INTERNAL MEDICINE

## 2023-01-01 PROCEDURE — 3078F DIAST BP <80 MM HG: CPT | Mod: HCNC,CPTII,S$GLB, | Performed by: INTERNAL MEDICINE

## 2023-01-01 PROCEDURE — 3078F PR MOST RECENT DIASTOLIC BLOOD PRESSURE < 80 MM HG: ICD-10-PCS | Mod: HCNC,CPTII,S$GLB, | Performed by: PHYSICIAN ASSISTANT

## 2023-01-01 PROCEDURE — 84484 ASSAY OF TROPONIN QUANT: CPT | Mod: 91,HCNC | Performed by: STUDENT IN AN ORGANIZED HEALTH CARE EDUCATION/TRAINING PROGRAM

## 2023-01-01 PROCEDURE — 96365 THER/PROPH/DIAG IV INF INIT: CPT | Mod: HCNC

## 2023-01-01 PROCEDURE — 71250 CT THORAX DX C-: CPT | Mod: TC,HCNC

## 2023-01-01 PROCEDURE — 99214 PR OFFICE/OUTPT VISIT, EST, LEVL IV, 30-39 MIN: ICD-10-PCS | Mod: 25,HCNC,S$GLB, | Performed by: PODIATRIST

## 2023-01-01 PROCEDURE — 3074F PR MOST RECENT SYSTOLIC BLOOD PRESSURE < 130 MM HG: ICD-10-PCS | Mod: HCNC,CPTII,S$GLB, | Performed by: INTERNAL MEDICINE

## 2023-01-01 PROCEDURE — 93971 US LOWER EXTREMITY VEINS LEFT: ICD-10-PCS | Mod: 26,HCNC,LT, | Performed by: RADIOLOGY

## 2023-01-01 PROCEDURE — 99999 PR PBB SHADOW E&M-EST. PATIENT-LVL V: CPT | Mod: PBBFAC,HCNC,, | Performed by: FAMILY MEDICINE

## 2023-01-01 PROCEDURE — 1101F PR PT FALLS ASSESS DOC 0-1 FALLS W/OUT INJ PAST YR: ICD-10-PCS | Mod: HCNC,CPTII,S$GLB, | Performed by: PHYSICIAN ASSISTANT

## 2023-01-01 PROCEDURE — 99214 PR OFFICE/OUTPT VISIT, EST, LEVL IV, 30-39 MIN: ICD-10-PCS | Mod: HCNC,S$GLB,, | Performed by: DERMATOLOGY

## 2023-01-01 PROCEDURE — 71250 CT THORAX DX C-: CPT | Mod: 26,HCNC,, | Performed by: RADIOLOGY

## 2023-01-01 PROCEDURE — 87389 HIV-1 AG W/HIV-1&-2 AB AG IA: CPT | Mod: HCNC | Performed by: PHYSICIAN ASSISTANT

## 2023-01-01 PROCEDURE — 20605 DRAIN/INJ JOINT/BURSA W/O US: CPT | Mod: HCNC,RT,S$GLB, | Performed by: PODIATRIST

## 2023-01-01 PROCEDURE — 3288F PR FALLS RISK ASSESSMENT DOCUMENTED: ICD-10-PCS | Mod: HCNC,CPTII,S$GLB, | Performed by: PHYSICIAN ASSISTANT

## 2023-01-01 PROCEDURE — 1160F PR REVIEW ALL MEDS BY PRESCRIBER/CLIN PHARMACIST DOCUMENTED: ICD-10-PCS | Mod: HCNC,CPTII,GC,S$GLB | Performed by: INTERNAL MEDICINE

## 2023-01-01 PROCEDURE — 25000003 PHARM REV CODE 250: Mod: HCNC | Performed by: STUDENT IN AN ORGANIZED HEALTH CARE EDUCATION/TRAINING PROGRAM

## 2023-01-01 PROCEDURE — 1126F PR PAIN SEVERITY QUANTIFIED, NO PAIN PRESENT: ICD-10-PCS | Mod: HCNC,CPTII,S$GLB, | Performed by: FAMILY MEDICINE

## 2023-01-01 PROCEDURE — 3074F PR MOST RECENT SYSTOLIC BLOOD PRESSURE < 130 MM HG: ICD-10-PCS | Mod: HCNC,CPTII,S$GLB, | Performed by: FAMILY MEDICINE

## 2023-01-01 PROCEDURE — 93971 EXTREMITY STUDY: CPT | Mod: 26,HCNC,LT, | Performed by: RADIOLOGY

## 2023-01-01 PROCEDURE — 3074F SYST BP LT 130 MM HG: CPT | Mod: HCNC,CPTII,S$GLB, | Performed by: PHYSICIAN ASSISTANT

## 2023-01-01 PROCEDURE — 25000003 PHARM REV CODE 250: Mod: HCNC | Performed by: PHYSICIAN ASSISTANT

## 2023-01-01 PROCEDURE — 97116 GAIT TRAINING THERAPY: CPT | Mod: HCNC

## 2023-01-01 PROCEDURE — 99223 1ST HOSP IP/OBS HIGH 75: CPT | Mod: HCNC,,, | Performed by: NURSE PRACTITIONER

## 2023-01-01 PROCEDURE — 99215 PR OFFICE/OUTPT VISIT, EST, LEVL V, 40-54 MIN: ICD-10-PCS | Mod: HCNC,S$GLB,, | Performed by: PHYSICIAN ASSISTANT

## 2023-01-01 PROCEDURE — 3078F PR MOST RECENT DIASTOLIC BLOOD PRESSURE < 80 MM HG: ICD-10-PCS | Mod: HCNC,CPTII,S$GLB, | Performed by: INTERNAL MEDICINE

## 2023-01-01 PROCEDURE — 36415 COLL VENOUS BLD VENIPUNCTURE: CPT | Mod: HCNC | Performed by: STUDENT IN AN ORGANIZED HEALTH CARE EDUCATION/TRAINING PROGRAM

## 2023-01-01 PROCEDURE — 25000242 PHARM REV CODE 250 ALT 637 W/ HCPCS: Mod: HCNC

## 2023-01-01 PROCEDURE — 99900035 HC TECH TIME PER 15 MIN (STAT): Mod: HCNC

## 2023-01-01 PROCEDURE — 86920 COMPATIBILITY TEST SPIN: CPT | Mod: HCNC | Performed by: INTERNAL MEDICINE

## 2023-01-01 PROCEDURE — 96360 HYDRATION IV INFUSION INIT: CPT | Mod: HCNC

## 2023-01-01 PROCEDURE — 20600001 HC STEP DOWN PRIVATE ROOM: Mod: HCNC

## 2023-01-01 PROCEDURE — 99999 PR PBB SHADOW E&M-EST. PATIENT-LVL IV: CPT | Mod: PBBFAC,HCNC,, | Performed by: INTERNAL MEDICINE

## 2023-01-01 PROCEDURE — 99223 1ST HOSP IP/OBS HIGH 75: CPT | Mod: AI,HCNC,GC, | Performed by: HOSPITALIST

## 2023-01-01 PROCEDURE — 99204 OFFICE O/P NEW MOD 45 MIN: CPT | Mod: HCNC,GC,S$GLB, | Performed by: INTERNAL MEDICINE

## 2023-01-01 PROCEDURE — A9585 GADOBUTROL INJECTION: HCPCS | Mod: HCNC | Performed by: INTERNAL MEDICINE

## 2023-01-01 PROCEDURE — 1101F PT FALLS ASSESS-DOCD LE1/YR: CPT | Mod: HCNC,CPTII,S$GLB, | Performed by: PHYSICIAN ASSISTANT

## 2023-01-01 PROCEDURE — 1126F AMNT PAIN NOTED NONE PRSNT: CPT | Mod: HCNC,CPTII,GC,S$GLB | Performed by: INTERNAL MEDICINE

## 2023-01-01 PROCEDURE — 1125F AMNT PAIN NOTED PAIN PRSNT: CPT | Mod: HCNC,CPTII,S$GLB, | Performed by: INTERNAL MEDICINE

## 2023-01-01 PROCEDURE — 96416 CHEMO PROLONG INFUSE W/PUMP: CPT | Mod: HCNC

## 2023-01-01 PROCEDURE — 97530 THERAPEUTIC ACTIVITIES: CPT | Mod: HCNC

## 2023-01-01 PROCEDURE — 25000003 PHARM REV CODE 250: Mod: HCNC | Performed by: EMERGENCY MEDICINE

## 2023-01-01 PROCEDURE — A4216 STERILE WATER/SALINE, 10 ML: HCPCS | Mod: HCNC | Performed by: INTERNAL MEDICINE

## 2023-01-01 PROCEDURE — 99285 EMERGENCY DEPT VISIT HI MDM: CPT | Mod: 25,HCNC

## 2023-01-01 PROCEDURE — 1160F PR REVIEW ALL MEDS BY PRESCRIBER/CLIN PHARMACIST DOCUMENTED: ICD-10-PCS | Mod: HCNC,CPTII,S$GLB, | Performed by: INTERNAL MEDICINE

## 2023-01-01 PROCEDURE — 1126F AMNT PAIN NOTED NONE PRSNT: CPT | Mod: HCNC,CPTII,S$GLB, | Performed by: FAMILY MEDICINE

## 2023-01-01 PROCEDURE — 25000003 PHARM REV CODE 250: Mod: HCNC | Performed by: INTERNAL MEDICINE

## 2023-01-01 PROCEDURE — 99232 PR SUBSEQUENT HOSPITAL CARE,LEVL II: ICD-10-PCS | Mod: HCNC,,, | Performed by: INTERNAL MEDICINE

## 2023-01-01 PROCEDURE — 96368 THER/DIAG CONCURRENT INF: CPT | Mod: HCNC

## 2023-01-01 PROCEDURE — 99999 PR PBB SHADOW E&M-EST. PATIENT-LVL V: ICD-10-PCS | Mod: PBBFAC,HCNC,, | Performed by: PHYSICIAN ASSISTANT

## 2023-01-01 PROCEDURE — 36430 TRANSFUSION BLD/BLD COMPNT: CPT

## 2023-01-01 PROCEDURE — U0002 COVID-19 LAB TEST NON-CDC: HCPCS | Mod: HCNC | Performed by: EMERGENCY MEDICINE

## 2023-01-01 PROCEDURE — P9040 RBC LEUKOREDUCED IRRADIATED: HCPCS | Mod: HCNC

## 2023-01-01 PROCEDURE — 96367 TX/PROPH/DG ADDL SEQ IV INF: CPT | Mod: HCNC

## 2023-01-01 PROCEDURE — 85025 COMPLETE CBC W/AUTO DIFF WBC: CPT | Mod: HCNC | Performed by: INTERNAL MEDICINE

## 2023-01-01 PROCEDURE — 1159F PR MEDICATION LIST DOCUMENTED IN MEDICAL RECORD: ICD-10-PCS | Mod: HCNC,CPTII,S$GLB, | Performed by: PHYSICIAN ASSISTANT

## 2023-01-01 PROCEDURE — 3008F BODY MASS INDEX DOCD: CPT | Mod: HCNC,CPTII,S$GLB, | Performed by: PHYSICIAN ASSISTANT

## 2023-01-01 PROCEDURE — 1159F MED LIST DOCD IN RCRD: CPT | Mod: HCNC,CPTII,S$GLB, | Performed by: INTERNAL MEDICINE

## 2023-01-01 PROCEDURE — 80053 COMPREHEN METABOLIC PANEL: CPT | Mod: HCNC | Performed by: EMERGENCY MEDICINE

## 2023-01-01 PROCEDURE — 96375 TX/PRO/DX INJ NEW DRUG ADDON: CPT | Mod: HCNC

## 2023-01-01 PROCEDURE — 1101F PR PT FALLS ASSESS DOC 0-1 FALLS W/OUT INJ PAST YR: ICD-10-PCS | Mod: HCNC,CPTII,S$GLB, | Performed by: INTERNAL MEDICINE

## 2023-01-01 PROCEDURE — 1101F PR PT FALLS ASSESS DOC 0-1 FALLS W/OUT INJ PAST YR: ICD-10-PCS | Mod: HCNC,CPTII,GC,S$GLB | Performed by: INTERNAL MEDICINE

## 2023-01-01 PROCEDURE — 99215 OFFICE O/P EST HI 40 MIN: CPT | Mod: HCNC,S$GLB,, | Performed by: INTERNAL MEDICINE

## 2023-01-01 PROCEDURE — 99222 1ST HOSP IP/OBS MODERATE 55: CPT | Mod: AI,HCNC,, | Performed by: INTERNAL MEDICINE

## 2023-01-01 PROCEDURE — 1125F PR PAIN SEVERITY QUANTIFIED, PAIN PRESENT: ICD-10-PCS | Mod: HCNC,CPTII,S$GLB, | Performed by: PHYSICIAN ASSISTANT

## 2023-01-01 PROCEDURE — 99232 SBSQ HOSP IP/OBS MODERATE 35: CPT | Mod: HCNC,GC,, | Performed by: INTERNAL MEDICINE

## 2023-01-01 PROCEDURE — 1126F PR PAIN SEVERITY QUANTIFIED, NO PAIN PRESENT: ICD-10-PCS | Mod: HCNC,CPTII,S$GLB, | Performed by: INTERNAL MEDICINE

## 2023-01-01 PROCEDURE — 1159F PR MEDICATION LIST DOCUMENTED IN MEDICAL RECORD: ICD-10-PCS | Mod: HCNC,CPTII,GC,S$GLB | Performed by: INTERNAL MEDICINE

## 2023-01-01 PROCEDURE — 99222 PR INITIAL HOSPITAL CARE,LEVL II: ICD-10-PCS | Mod: AI,HCNC,, | Performed by: INTERNAL MEDICINE

## 2023-01-01 PROCEDURE — 3078F DIAST BP <80 MM HG: CPT | Mod: HCNC,CPTII,S$GLB, | Performed by: PHYSICIAN ASSISTANT

## 2023-01-01 PROCEDURE — 94761 N-INVAS EAR/PLS OXIMETRY MLT: CPT | Mod: HCNC

## 2023-01-01 PROCEDURE — 3075F PR MOST RECENT SYSTOLIC BLOOD PRESS GE 130-139MM HG: ICD-10-PCS | Mod: HCNC,CPTII,S$GLB, | Performed by: INTERNAL MEDICINE

## 2023-01-01 PROCEDURE — 1160F RVW MEDS BY RX/DR IN RCRD: CPT | Mod: HCNC,CPTII,S$GLB, | Performed by: FAMILY MEDICINE

## 2023-01-01 PROCEDURE — 99999 PR PBB SHADOW E&M-EST. PATIENT-LVL IV: CPT | Mod: PBBFAC,HCNC,, | Performed by: PHYSICIAN ASSISTANT

## 2023-01-01 PROCEDURE — A4216 STERILE WATER/SALINE, 10 ML: HCPCS | Mod: HCNC | Performed by: PHYSICIAN ASSISTANT

## 2023-01-01 PROCEDURE — 93970 EXTREMITY STUDY: CPT | Mod: HCNC

## 2023-01-01 PROCEDURE — 1101F PT FALLS ASSESS-DOCD LE1/YR: CPT | Mod: HCNC,CPTII,S$GLB, | Performed by: INTERNAL MEDICINE

## 2023-01-01 PROCEDURE — 94660 CPAP INITIATION&MGMT: CPT | Mod: HCNC

## 2023-01-01 PROCEDURE — 1126F PR PAIN SEVERITY QUANTIFIED, NO PAIN PRESENT: ICD-10-PCS | Mod: HCNC,CPTII,S$GLB, | Performed by: PHYSICIAN ASSISTANT

## 2023-01-01 PROCEDURE — 74183 MRI ABD W/O CNTR FLWD CNTR: CPT | Mod: 26,HCNC,, | Performed by: RADIOLOGY

## 2023-01-01 PROCEDURE — 96366 THER/PROPH/DIAG IV INF ADDON: CPT | Mod: HCNC

## 2023-01-01 PROCEDURE — 80053 COMPREHEN METABOLIC PANEL: CPT | Mod: HCNC | Performed by: INTERNAL MEDICINE

## 2023-01-01 PROCEDURE — 3075F PR MOST RECENT SYSTOLIC BLOOD PRESS GE 130-139MM HG: ICD-10-PCS | Mod: HCNC,CPTII,S$GLB, | Performed by: PHYSICIAN ASSISTANT

## 2023-01-01 PROCEDURE — 1159F PR MEDICATION LIST DOCUMENTED IN MEDICAL RECORD: ICD-10-PCS | Mod: HCNC,CPTII,S$GLB, | Performed by: INTERNAL MEDICINE

## 2023-01-01 PROCEDURE — 63600175 PHARM REV CODE 636 W HCPCS: Mod: JZ,JG,HCNC

## 2023-01-01 PROCEDURE — 99214 PR OFFICE/OUTPT VISIT, EST, LEVL IV, 30-39 MIN: ICD-10-PCS | Mod: HCNC,S$GLB,, | Performed by: FAMILY MEDICINE

## 2023-01-01 PROCEDURE — 71250 CT CHEST WITHOUT CONTRAST: ICD-10-PCS | Mod: 26,HCNC,, | Performed by: RADIOLOGY

## 2023-01-01 PROCEDURE — 99499 RISK ADDL DX/OHS AUDIT: ICD-10-PCS | Mod: S$GLB,,, | Performed by: PHYSICIAN ASSISTANT

## 2023-01-01 PROCEDURE — 99223 PR INITIAL HOSPITAL CARE,LEVL III: ICD-10-PCS | Mod: HCNC,,, | Performed by: NURSE PRACTITIONER

## 2023-01-01 PROCEDURE — 74183 MRI ABDOMEN-PELVIS W W/O CONTRAST (XPD): ICD-10-PCS | Mod: 26,HCNC,, | Performed by: RADIOLOGY

## 2023-01-01 PROCEDURE — 3288F PR FALLS RISK ASSESSMENT DOCUMENTED: ICD-10-PCS | Mod: HCNC,CPTII,S$GLB, | Performed by: FAMILY MEDICINE

## 2023-01-01 PROCEDURE — 93010 ELECTROCARDIOGRAM REPORT: CPT | Mod: HCNC,,, | Performed by: INTERNAL MEDICINE

## 2023-01-01 PROCEDURE — 99223 1ST HOSP IP/OBS HIGH 75: CPT | Mod: HCNC,,, | Performed by: PHYSICIAN ASSISTANT

## 2023-01-01 PROCEDURE — 3008F PR BODY MASS INDEX (BMI) DOCUMENTED: ICD-10-PCS | Mod: HCNC,CPTII,S$GLB, | Performed by: PHYSICIAN ASSISTANT

## 2023-01-01 PROCEDURE — 99999 PR PBB SHADOW E&M-EST. PATIENT-LVL V: CPT | Mod: PBBFAC,HCNC,, | Performed by: PHYSICIAN ASSISTANT

## 2023-01-01 PROCEDURE — 87040 BLOOD CULTURE FOR BACTERIA: CPT | Mod: 59,HCNC | Performed by: EMERGENCY MEDICINE

## 2023-01-01 PROCEDURE — 99215 PR OFFICE/OUTPT VISIT, EST, LEVL V, 40-54 MIN: ICD-10-PCS | Mod: HCNC,S$GLB,, | Performed by: INTERNAL MEDICINE

## 2023-01-01 PROCEDURE — 99291 CRITICAL CARE FIRST HOUR: CPT | Mod: HCNC,CS,, | Performed by: EMERGENCY MEDICINE

## 2023-01-01 PROCEDURE — 63600175 PHARM REV CODE 636 W HCPCS: Mod: HCNC | Performed by: EMERGENCY MEDICINE

## 2023-01-01 PROCEDURE — 86920 COMPATIBILITY TEST SPIN: CPT | Mod: HCNC

## 2023-01-01 PROCEDURE — 3008F BODY MASS INDEX DOCD: CPT | Mod: HCNC,CPTII,S$GLB, | Performed by: PODIATRIST

## 2023-01-01 PROCEDURE — 99999 PR PBB SHADOW E&M-EST. PATIENT-LVL II: ICD-10-PCS | Mod: PBBFAC,HCNC,, | Performed by: PODIATRIST

## 2023-01-01 PROCEDURE — 1160F RVW MEDS BY RX/DR IN RCRD: CPT | Mod: HCNC,CPTII,S$GLB, | Performed by: INTERNAL MEDICINE

## 2023-01-01 PROCEDURE — 3075F SYST BP GE 130 - 139MM HG: CPT | Mod: HCNC,CPTII,S$GLB, | Performed by: PHYSICIAN ASSISTANT

## 2023-01-01 PROCEDURE — 86301 IMMUNOASSAY TUMOR CA 19-9: CPT | Mod: HCNC | Performed by: INTERNAL MEDICINE

## 2023-01-01 PROCEDURE — 76705 ECHO EXAM OF ABDOMEN: CPT | Mod: TC,HCNC

## 2023-01-01 PROCEDURE — 82570 ASSAY OF URINE CREATININE: CPT | Mod: HCNC | Performed by: STUDENT IN AN ORGANIZED HEALTH CARE EDUCATION/TRAINING PROGRAM

## 2023-01-01 PROCEDURE — 83605 ASSAY OF LACTIC ACID: CPT | Mod: HCNC

## 2023-01-01 PROCEDURE — P9040 RBC LEUKOREDUCED IRRADIATED: HCPCS | Mod: HCNC | Performed by: INTERNAL MEDICINE

## 2023-01-01 PROCEDURE — 3074F SYST BP LT 130 MM HG: CPT | Mod: HCNC,CPTII,S$GLB, | Performed by: INTERNAL MEDICINE

## 2023-01-01 PROCEDURE — 73630 XR FOOT COMPLETE 3 VIEW RIGHT: ICD-10-PCS | Mod: 26,HCNC,RT, | Performed by: RADIOLOGY

## 2023-01-01 PROCEDURE — 99497 PR ADVNCD CARE PLAN 30 MIN: ICD-10-PCS | Mod: HCNC,,, | Performed by: STUDENT IN AN ORGANIZED HEALTH CARE EDUCATION/TRAINING PROGRAM

## 2023-01-01 PROCEDURE — 99999 PR PBB SHADOW E&M-EST. PATIENT-LVL V: ICD-10-PCS | Mod: PBBFAC,HCNC,, | Performed by: FAMILY MEDICINE

## 2023-01-01 PROCEDURE — 63600175 PHARM REV CODE 636 W HCPCS: Mod: HCNC | Performed by: PHYSICIAN ASSISTANT

## 2023-01-01 PROCEDURE — 81001 URINALYSIS AUTO W/SCOPE: CPT | Mod: HCNC | Performed by: EMERGENCY MEDICINE

## 2023-01-01 PROCEDURE — 73630 X-RAY EXAM OF FOOT: CPT | Mod: TC,HCNC,RT

## 2023-01-01 PROCEDURE — 99233 PR SUBSEQUENT HOSPITAL CARE,LEVL III: ICD-10-PCS | Mod: HCNC,,, | Performed by: STUDENT IN AN ORGANIZED HEALTH CARE EDUCATION/TRAINING PROGRAM

## 2023-01-01 PROCEDURE — 82140 ASSAY OF AMMONIA: CPT | Mod: HCNC | Performed by: EMERGENCY MEDICINE

## 2023-01-01 PROCEDURE — 99499 UNLISTED E&M SERVICE: CPT | Mod: S$GLB,,, | Performed by: INTERNAL MEDICINE

## 2023-01-01 PROCEDURE — 3078F PR MOST RECENT DIASTOLIC BLOOD PRESSURE < 80 MM HG: ICD-10-PCS | Mod: HCNC,CPTII,GC,S$GLB | Performed by: INTERNAL MEDICINE

## 2023-01-01 PROCEDURE — 3288F FALL RISK ASSESSMENT DOCD: CPT | Mod: HCNC,CPTII,S$GLB, | Performed by: PHYSICIAN ASSISTANT

## 2023-01-01 PROCEDURE — 3075F SYST BP GE 130 - 139MM HG: CPT | Mod: HCNC,CPTII,S$GLB, | Performed by: INTERNAL MEDICINE

## 2023-01-01 PROCEDURE — 72197 MRI PELVIS W/O & W/DYE: CPT | Mod: TC,HCNC

## 2023-01-01 PROCEDURE — 3008F PR BODY MASS INDEX (BMI) DOCUMENTED: ICD-10-PCS | Mod: HCNC,CPTII,S$GLB, | Performed by: PODIATRIST

## 2023-01-01 PROCEDURE — 3008F PR BODY MASS INDEX (BMI) DOCUMENTED: ICD-10-PCS | Mod: HCNC,CPTII,GC,S$GLB | Performed by: INTERNAL MEDICINE

## 2023-01-01 PROCEDURE — 83935 ASSAY OF URINE OSMOLALITY: CPT | Mod: HCNC | Performed by: STUDENT IN AN ORGANIZED HEALTH CARE EDUCATION/TRAINING PROGRAM

## 2023-01-01 PROCEDURE — 1125F AMNT PAIN NOTED PAIN PRSNT: CPT | Mod: HCNC,CPTII,S$GLB, | Performed by: PHYSICIAN ASSISTANT

## 2023-01-01 PROCEDURE — 20605 PR DRAIN/INJECT INTERMEDIATE JOINT/BURSA: ICD-10-PCS | Mod: HCNC,RT,S$GLB, | Performed by: PODIATRIST

## 2023-01-01 PROCEDURE — 97535 SELF CARE MNGMENT TRAINING: CPT | Mod: HCNC

## 2023-01-01 PROCEDURE — 84484 ASSAY OF TROPONIN QUANT: CPT | Mod: HCNC | Performed by: EMERGENCY MEDICINE

## 2023-01-01 PROCEDURE — 73630 X-RAY EXAM OF FOOT: CPT | Mod: 26,HCNC,RT, | Performed by: RADIOLOGY

## 2023-01-01 PROCEDURE — 3075F SYST BP GE 130 - 139MM HG: CPT | Mod: HCNC,CPTII,GC,S$GLB | Performed by: INTERNAL MEDICINE

## 2023-01-01 PROCEDURE — 85610 PROTHROMBIN TIME: CPT | Mod: HCNC | Performed by: STUDENT IN AN ORGANIZED HEALTH CARE EDUCATION/TRAINING PROGRAM

## 2023-01-01 PROCEDURE — 1126F AMNT PAIN NOTED NONE PRSNT: CPT | Mod: HCNC,CPTII,S$GLB, | Performed by: INTERNAL MEDICINE

## 2023-01-01 PROCEDURE — 3078F DIAST BP <80 MM HG: CPT | Mod: HCNC,CPTII,S$GLB, | Performed by: FAMILY MEDICINE

## 2023-01-01 PROCEDURE — 1126F AMNT PAIN NOTED NONE PRSNT: CPT | Mod: HCNC,CPTII,S$GLB, | Performed by: PHYSICIAN ASSISTANT

## 2023-01-01 PROCEDURE — 99223 PR INITIAL HOSPITAL CARE,LEVL III: ICD-10-PCS | Mod: HCNC,GC,, | Performed by: INTERNAL MEDICINE

## 2023-01-01 PROCEDURE — 93970 EXTREMITY STUDY: CPT | Mod: 26,HCNC,, | Performed by: INTERNAL MEDICINE

## 2023-01-01 PROCEDURE — 99999 PR PBB SHADOW E&M-EST. PATIENT-LVL V: ICD-10-PCS | Mod: PBBFAC,HCNC,GC, | Performed by: INTERNAL MEDICINE

## 2023-01-01 PROCEDURE — 3008F BODY MASS INDEX DOCD: CPT | Mod: HCNC,CPTII,GC,S$GLB | Performed by: INTERNAL MEDICINE

## 2023-01-01 PROCEDURE — 84300 ASSAY OF URINE SODIUM: CPT | Mod: HCNC | Performed by: STUDENT IN AN ORGANIZED HEALTH CARE EDUCATION/TRAINING PROGRAM

## 2023-01-01 PROCEDURE — 1101F PT FALLS ASSESS-DOCD LE1/YR: CPT | Mod: HCNC,CPTII,S$GLB, | Performed by: FAMILY MEDICINE

## 2023-01-01 PROCEDURE — 99223 PR INITIAL HOSPITAL CARE,LEVL III: ICD-10-PCS | Mod: HCNC,,, | Performed by: PHYSICIAN ASSISTANT

## 2023-01-01 PROCEDURE — 3288F PR FALLS RISK ASSESSMENT DOCUMENTED: ICD-10-PCS | Mod: HCNC,CPTII,GC,S$GLB | Performed by: INTERNAL MEDICINE

## 2023-01-01 PROCEDURE — 97162 PT EVAL MOD COMPLEX 30 MIN: CPT | Mod: HCNC

## 2023-01-01 PROCEDURE — 84100 ASSAY OF PHOSPHORUS: CPT | Mod: HCNC | Performed by: STUDENT IN AN ORGANIZED HEALTH CARE EDUCATION/TRAINING PROGRAM

## 2023-01-01 PROCEDURE — 86900 BLOOD TYPING SEROLOGIC ABO: CPT | Mod: HCNC | Performed by: PHYSICIAN ASSISTANT

## 2023-01-01 PROCEDURE — 3075F PR MOST RECENT SYSTOLIC BLOOD PRESS GE 130-139MM HG: ICD-10-PCS | Mod: HCNC,CPTII,GC,S$GLB | Performed by: INTERNAL MEDICINE

## 2023-01-01 PROCEDURE — 1159F MED LIST DOCD IN RCRD: CPT | Mod: HCNC,CPTII,S$GLB, | Performed by: FAMILY MEDICINE

## 2023-01-01 PROCEDURE — 36415 COLL VENOUS BLD VENIPUNCTURE: CPT | Mod: HCNC | Performed by: INTERNAL MEDICINE

## 2023-01-01 PROCEDURE — 72197 MRI ABDOMEN-PELVIS W W/O CONTRAST (XPD): ICD-10-PCS | Mod: 26,HCNC,, | Performed by: RADIOLOGY

## 2023-01-01 PROCEDURE — 86900 BLOOD TYPING SEROLOGIC ABO: CPT | Mod: HCNC | Performed by: INTERNAL MEDICINE

## 2023-01-01 PROCEDURE — 76705 ECHO EXAM OF ABDOMEN: CPT | Mod: 26,HCNC,, | Performed by: RADIOLOGY

## 2023-01-01 PROCEDURE — 99214 OFFICE O/P EST MOD 30 MIN: CPT | Mod: HCNC,S$GLB,, | Performed by: DERMATOLOGY

## 2023-01-01 PROCEDURE — 83605 ASSAY OF LACTIC ACID: CPT | Mod: HCNC | Performed by: STUDENT IN AN ORGANIZED HEALTH CARE EDUCATION/TRAINING PROGRAM

## 2023-01-01 PROCEDURE — 1160F PR REVIEW ALL MEDS BY PRESCRIBER/CLIN PHARMACIST DOCUMENTED: ICD-10-PCS | Mod: HCNC,CPTII,S$GLB, | Performed by: FAMILY MEDICINE

## 2023-01-01 PROCEDURE — 99233 SBSQ HOSP IP/OBS HIGH 50: CPT | Mod: HCNC,,, | Performed by: STUDENT IN AN ORGANIZED HEALTH CARE EDUCATION/TRAINING PROGRAM

## 2023-01-01 PROCEDURE — 80069 RENAL FUNCTION PANEL: CPT | Mod: HCNC | Performed by: STUDENT IN AN ORGANIZED HEALTH CARE EDUCATION/TRAINING PROGRAM

## 2023-01-01 PROCEDURE — 3008F BODY MASS INDEX DOCD: CPT | Mod: HCNC,CPTII,S$GLB, | Performed by: INTERNAL MEDICINE

## 2023-01-01 PROCEDURE — 96417 CHEMO IV INFUS EACH ADDL SEQ: CPT | Mod: HCNC

## 2023-01-01 PROCEDURE — 85025 COMPLETE CBC W/AUTO DIFF WBC: CPT | Mod: HCNC | Performed by: PHYSICIAN ASSISTANT

## 2023-01-01 PROCEDURE — 82803 BLOOD GASES ANY COMBINATION: CPT | Mod: HCNC

## 2023-01-01 PROCEDURE — 1125F AMNT PAIN NOTED PAIN PRSNT: CPT | Mod: HCNC,CPTII,S$GLB, | Performed by: PODIATRIST

## 2023-01-01 PROCEDURE — 25500020 PHARM REV CODE 255: Mod: HCNC | Performed by: INTERNAL MEDICINE

## 2023-01-01 PROCEDURE — 86900 BLOOD TYPING SEROLOGIC ABO: CPT | Mod: HCNC

## 2023-01-01 PROCEDURE — 1101F PT FALLS ASSESS-DOCD LE1/YR: CPT | Mod: HCNC,CPTII,GC,S$GLB | Performed by: INTERNAL MEDICINE

## 2023-01-01 PROCEDURE — 99999 PR PBB SHADOW E&M-EST. PATIENT-LVL II: CPT | Mod: PBBFAC,HCNC,, | Performed by: PODIATRIST

## 2023-01-01 PROCEDURE — 99291 PR CRITICAL CARE, E/M 30-74 MINUTES: ICD-10-PCS | Mod: HCNC,CS,, | Performed by: EMERGENCY MEDICINE

## 2023-01-01 PROCEDURE — 3078F PR MOST RECENT DIASTOLIC BLOOD PRESSURE < 80 MM HG: ICD-10-PCS | Mod: HCNC,CPTII,S$GLB, | Performed by: FAMILY MEDICINE

## 2023-01-01 PROCEDURE — 97165 OT EVAL LOW COMPLEX 30 MIN: CPT | Mod: HCNC

## 2023-01-01 PROCEDURE — 1125F PR PAIN SEVERITY QUANTIFIED, PAIN PRESENT: ICD-10-PCS | Mod: HCNC,CPTII,S$GLB, | Performed by: PODIATRIST

## 2023-01-01 PROCEDURE — 84540 ASSAY OF URINE/UREA-N: CPT | Mod: HCNC | Performed by: STUDENT IN AN ORGANIZED HEALTH CARE EDUCATION/TRAINING PROGRAM

## 2023-01-01 PROCEDURE — 92610 EVALUATE SWALLOWING FUNCTION: CPT | Mod: HCNC

## 2023-01-01 PROCEDURE — P9047 ALBUMIN (HUMAN), 25%, 50ML: HCPCS | Mod: JZ,JG,HCNC

## 2023-01-01 PROCEDURE — 1101F PR PT FALLS ASSESS DOC 0-1 FALLS W/OUT INJ PAST YR: ICD-10-PCS | Mod: HCNC,CPTII,S$GLB, | Performed by: FAMILY MEDICINE

## 2023-01-01 PROCEDURE — 95911 PR NERVE CONDUCTION STUDY; 9-10 STUDIES: ICD-10-PCS | Mod: HCNC,S$GLB,, | Performed by: PSYCHIATRY & NEUROLOGY

## 2023-01-01 PROCEDURE — 99214 OFFICE O/P EST MOD 30 MIN: CPT | Mod: HCNC,S$GLB,, | Performed by: FAMILY MEDICINE

## 2023-01-01 PROCEDURE — 3008F BODY MASS INDEX DOCD: CPT | Mod: HCNC,CPTII,S$GLB, | Performed by: FAMILY MEDICINE

## 2023-01-01 PROCEDURE — 99214 OFFICE O/P EST MOD 30 MIN: CPT | Mod: 25,HCNC,S$GLB, | Performed by: PODIATRIST

## 2023-01-01 PROCEDURE — 84550 ASSAY OF BLOOD/URIC ACID: CPT | Mod: HCNC | Performed by: STUDENT IN AN ORGANIZED HEALTH CARE EDUCATION/TRAINING PROGRAM

## 2023-01-01 PROCEDURE — 3074F SYST BP LT 130 MM HG: CPT | Mod: HCNC,CPTII,S$GLB, | Performed by: FAMILY MEDICINE

## 2023-01-01 PROCEDURE — 86803 HEPATITIS C AB TEST: CPT | Mod: HCNC | Performed by: PHYSICIAN ASSISTANT

## 2023-01-01 PROCEDURE — 99232 PR SUBSEQUENT HOSPITAL CARE,LEVL II: ICD-10-PCS | Mod: HCNC,GC,, | Performed by: INTERNAL MEDICINE

## 2023-01-01 PROCEDURE — 3288F FALL RISK ASSESSMENT DOCD: CPT | Mod: HCNC,CPTII,S$GLB, | Performed by: FAMILY MEDICINE

## 2023-01-01 PROCEDURE — 3288F FALL RISK ASSESSMENT DOCD: CPT | Mod: HCNC,CPTII,GC,S$GLB | Performed by: INTERNAL MEDICINE

## 2023-01-01 PROCEDURE — 99204 PR OFFICE/OUTPT VISIT, NEW, LEVL IV, 45-59 MIN: ICD-10-PCS | Mod: HCNC,GC,S$GLB, | Performed by: INTERNAL MEDICINE

## 2023-01-01 PROCEDURE — 85018 HEMOGLOBIN: CPT | Mod: HCNC

## 2023-01-01 PROCEDURE — 99223 1ST HOSP IP/OBS HIGH 75: CPT | Mod: HCNC,GC,, | Performed by: INTERNAL MEDICINE

## 2023-01-01 PROCEDURE — 3078F DIAST BP <80 MM HG: CPT | Mod: HCNC,CPTII,GC,S$GLB | Performed by: INTERNAL MEDICINE

## 2023-01-01 PROCEDURE — 99999 PR PBB SHADOW E&M-EST. PATIENT-LVL V: CPT | Mod: PBBFAC,HCNC,GC, | Performed by: INTERNAL MEDICINE

## 2023-01-01 PROCEDURE — 85025 COMPLETE CBC W/AUTO DIFF WBC: CPT | Mod: HCNC | Performed by: EMERGENCY MEDICINE

## 2023-01-01 PROCEDURE — 1126F PR PAIN SEVERITY QUANTIFIED, NO PAIN PRESENT: ICD-10-PCS | Mod: HCNC,CPTII,GC,S$GLB | Performed by: INTERNAL MEDICINE

## 2023-01-01 PROCEDURE — 93971 EXTREMITY STUDY: CPT | Mod: TC,HCNC,LT

## 2023-01-01 PROCEDURE — 72197 MRI PELVIS W/O & W/DYE: CPT | Mod: 26,HCNC,, | Performed by: RADIOLOGY

## 2023-01-01 PROCEDURE — 1159F MED LIST DOCD IN RCRD: CPT | Mod: HCNC,CPTII,GC,S$GLB | Performed by: INTERNAL MEDICINE

## 2023-01-01 PROCEDURE — 1160F RVW MEDS BY RX/DR IN RCRD: CPT | Mod: HCNC,CPTII,GC,S$GLB | Performed by: INTERNAL MEDICINE

## 2023-01-01 PROCEDURE — 99232 SBSQ HOSP IP/OBS MODERATE 35: CPT | Mod: HCNC,,, | Performed by: INTERNAL MEDICINE

## 2023-01-01 RX ORDER — HYDROMORPHONE HYDROCHLORIDE 1 MG/ML
0.2 INJECTION, SOLUTION INTRAMUSCULAR; INTRAVENOUS; SUBCUTANEOUS
Status: DISCONTINUED | OUTPATIENT
Start: 2023-01-01 | End: 2023-01-01

## 2023-01-01 RX ORDER — POTASSIUM CHLORIDE 20 MEQ/1
20 TABLET, EXTENDED RELEASE ORAL DAILY
Qty: 90 TABLET | Refills: 0 | Status: ON HOLD | OUTPATIENT
Start: 2023-01-01 | End: 2023-01-01 | Stop reason: HOSPADM

## 2023-01-01 RX ORDER — LACTULOSE 10 G/15ML
20 SOLUTION ORAL 3 TIMES DAILY
Status: DISCONTINUED | OUTPATIENT
Start: 2023-01-01 | End: 2023-01-01

## 2023-01-01 RX ORDER — SODIUM CHLORIDE 0.9 % (FLUSH) 0.9 %
10 SYRINGE (ML) INJECTION
Status: DISCONTINUED | OUTPATIENT
Start: 2023-01-01 | End: 2023-01-01 | Stop reason: HOSPADM

## 2023-01-01 RX ORDER — SODIUM CHLORIDE 0.9 % (FLUSH) 0.9 %
10 SYRINGE (ML) INJECTION
Status: CANCELLED | OUTPATIENT
Start: 2023-01-01

## 2023-01-01 RX ORDER — HEPARIN SODIUM 5000 [USP'U]/ML
5000 INJECTION, SOLUTION INTRAVENOUS; SUBCUTANEOUS EVERY 8 HOURS
Status: DISCONTINUED | OUTPATIENT
Start: 2023-01-01 | End: 2023-01-01

## 2023-01-01 RX ORDER — LORAZEPAM 1 MG/1
1 TABLET ORAL EVERY 30 MIN PRN
Status: DISCONTINUED | OUTPATIENT
Start: 2023-01-01 | End: 2023-01-01

## 2023-01-01 RX ORDER — EPINEPHRINE 0.3 MG/.3ML
0.3 INJECTION SUBCUTANEOUS ONCE AS NEEDED
Status: CANCELLED | OUTPATIENT
Start: 2023-01-01

## 2023-01-01 RX ORDER — PROCHLORPERAZINE EDISYLATE 5 MG/ML
10 INJECTION INTRAMUSCULAR; INTRAVENOUS EVERY 6 HOURS PRN
Status: DISCONTINUED | OUTPATIENT
Start: 2023-01-01 | End: 2023-01-01

## 2023-01-01 RX ORDER — DEXTROSE 40 %
30 GEL (GRAM) ORAL
Status: DISCONTINUED | OUTPATIENT
Start: 2023-01-01 | End: 2023-01-01

## 2023-01-01 RX ORDER — MORPHINE SULFATE 2 MG/ML
2 INJECTION, SOLUTION INTRAMUSCULAR; INTRAVENOUS
Status: DISCONTINUED | OUTPATIENT
Start: 2023-01-01 | End: 2023-01-01

## 2023-01-01 RX ORDER — HEPARIN 100 UNIT/ML
500 SYRINGE INTRAVENOUS
Status: CANCELLED | OUTPATIENT
Start: 2023-01-01

## 2023-01-01 RX ORDER — FUROSEMIDE 10 MG/ML
120 INJECTION INTRAMUSCULAR; INTRAVENOUS ONCE
Status: COMPLETED | OUTPATIENT
Start: 2023-01-01 | End: 2023-01-01

## 2023-01-01 RX ORDER — FUROSEMIDE 20 MG/1
20 TABLET ORAL DAILY
Qty: 30 TABLET | Refills: 2 | Status: ON HOLD | OUTPATIENT
Start: 2023-01-01 | End: 2023-01-01 | Stop reason: HOSPADM

## 2023-01-01 RX ORDER — DIPHENHYDRAMINE HYDROCHLORIDE 50 MG/ML
50 INJECTION INTRAMUSCULAR; INTRAVENOUS ONCE AS NEEDED
Status: CANCELLED | OUTPATIENT
Start: 2023-01-01

## 2023-01-01 RX ORDER — PROCHLORPERAZINE EDISYLATE 5 MG/ML
10 INJECTION INTRAMUSCULAR; INTRAVENOUS EVERY 6 HOURS PRN
Status: DISCONTINUED | OUTPATIENT
Start: 2023-01-01 | End: 2023-01-01 | Stop reason: HOSPADM

## 2023-01-01 RX ORDER — DIPHENHYDRAMINE HYDROCHLORIDE 50 MG/ML
50 INJECTION INTRAMUSCULAR; INTRAVENOUS ONCE AS NEEDED
Status: DISCONTINUED | OUTPATIENT
Start: 2023-01-01 | End: 2023-01-01 | Stop reason: HOSPADM

## 2023-01-01 RX ORDER — MAGNESIUM SULFATE HEPTAHYDRATE 40 MG/ML
2 INJECTION, SOLUTION INTRAVENOUS
Status: COMPLETED | OUTPATIENT
Start: 2023-01-01 | End: 2023-01-01

## 2023-01-01 RX ORDER — EPINEPHRINE 0.3 MG/.3ML
0.3 INJECTION SUBCUTANEOUS ONCE AS NEEDED
Status: DISCONTINUED | OUTPATIENT
Start: 2023-01-01 | End: 2023-01-01 | Stop reason: HOSPADM

## 2023-01-01 RX ORDER — ACETAMINOPHEN 325 MG/1
650 TABLET ORAL EVERY 8 HOURS PRN
Status: DISCONTINUED | OUTPATIENT
Start: 2023-01-01 | End: 2023-01-01 | Stop reason: HOSPADM

## 2023-01-01 RX ORDER — NALOXONE HCL 0.4 MG/ML
0.02 VIAL (ML) INJECTION
Status: DISCONTINUED | OUTPATIENT
Start: 2023-01-01 | End: 2023-01-01 | Stop reason: HOSPADM

## 2023-01-01 RX ORDER — HYDROXYZINE HYDROCHLORIDE 10 MG/1
10 TABLET, FILM COATED ORAL 3 TIMES DAILY PRN
Qty: 90 TABLET | Refills: 2 | Status: SHIPPED | OUTPATIENT
Start: 2023-01-01

## 2023-01-01 RX ORDER — TAMSULOSIN HYDROCHLORIDE 0.4 MG/1
1 CAPSULE ORAL DAILY
Status: DISCONTINUED | OUTPATIENT
Start: 2023-01-01 | End: 2023-01-01 | Stop reason: HOSPADM

## 2023-01-01 RX ORDER — ACETAMINOPHEN 325 MG/1
650 TABLET ORAL
Status: CANCELLED | OUTPATIENT
Start: 2023-01-01

## 2023-01-01 RX ORDER — HEPARIN 100 UNIT/ML
500 SYRINGE INTRAVENOUS
Status: DISCONTINUED | OUTPATIENT
Start: 2023-01-01 | End: 2023-01-01 | Stop reason: HOSPADM

## 2023-01-01 RX ORDER — MIDODRINE HYDROCHLORIDE 5 MG/1
10 TABLET ORAL EVERY 8 HOURS
Status: DISCONTINUED | OUTPATIENT
Start: 2023-01-01 | End: 2023-01-01

## 2023-01-01 RX ORDER — HYDROMORPHONE HYDROCHLORIDE 1 MG/ML
1 INJECTION, SOLUTION INTRAMUSCULAR; INTRAVENOUS; SUBCUTANEOUS EVERY 4 HOURS PRN
Status: DISCONTINUED | OUTPATIENT
Start: 2023-01-01 | End: 2023-01-01 | Stop reason: HOSPADM

## 2023-01-01 RX ORDER — GADOBUTROL 604.72 MG/ML
10 INJECTION INTRAVENOUS
Status: COMPLETED | OUTPATIENT
Start: 2023-01-01 | End: 2023-01-01

## 2023-01-01 RX ORDER — TALC
6 POWDER (GRAM) TOPICAL NIGHTLY PRN
Status: DISCONTINUED | OUTPATIENT
Start: 2023-01-01 | End: 2023-01-01 | Stop reason: HOSPADM

## 2023-01-01 RX ORDER — HYDROCODONE BITARTRATE AND ACETAMINOPHEN 500; 5 MG/1; MG/1
TABLET ORAL
Status: DISCONTINUED | OUTPATIENT
Start: 2023-01-01 | End: 2023-01-01 | Stop reason: HOSPADM

## 2023-01-01 RX ORDER — DEXAMETHASONE 4 MG/1
8 TABLET ORAL DAILY
Qty: 120 TABLET | Refills: 0 | Status: CANCELLED | OUTPATIENT
Start: 2023-01-01 | End: 2024-02-24

## 2023-01-01 RX ORDER — DULOXETIN HYDROCHLORIDE 20 MG/1
20 CAPSULE, DELAYED RELEASE ORAL DAILY
Status: DISCONTINUED | OUTPATIENT
Start: 2023-01-01 | End: 2023-01-01

## 2023-01-01 RX ORDER — ATROPINE SULFATE 10 MG/ML
2 SOLUTION/ DROPS OPHTHALMIC EVERY 4 HOURS PRN
Status: DISCONTINUED | OUTPATIENT
Start: 2023-01-01 | End: 2023-01-01 | Stop reason: HOSPADM

## 2023-01-01 RX ORDER — IPRATROPIUM BROMIDE AND ALBUTEROL SULFATE 2.5; .5 MG/3ML; MG/3ML
3 SOLUTION RESPIRATORY (INHALATION) EVERY 4 HOURS
Status: DISCONTINUED | OUTPATIENT
Start: 2023-01-01 | End: 2023-01-01 | Stop reason: HOSPADM

## 2023-01-01 RX ORDER — LORAZEPAM 2 MG/ML
1 INJECTION INTRAMUSCULAR
Status: DISCONTINUED | OUTPATIENT
Start: 2023-01-01 | End: 2023-01-01

## 2023-01-01 RX ORDER — ONDANSETRON 2 MG/ML
4 INJECTION INTRAMUSCULAR; INTRAVENOUS EVERY 8 HOURS PRN
Status: DISCONTINUED | OUTPATIENT
Start: 2023-01-01 | End: 2023-01-01 | Stop reason: HOSPADM

## 2023-01-01 RX ORDER — SCOLOPAMINE TRANSDERMAL SYSTEM 1 MG/1
1 PATCH, EXTENDED RELEASE TRANSDERMAL
Status: DISCONTINUED | OUTPATIENT
Start: 2023-01-01 | End: 2023-01-01 | Stop reason: HOSPADM

## 2023-01-01 RX ORDER — DEXTROSE 40 %
15 GEL (GRAM) ORAL
Status: DISCONTINUED | OUTPATIENT
Start: 2023-01-01 | End: 2023-01-01

## 2023-01-01 RX ORDER — COLCHICINE 0.6 MG/1
0.6 TABLET ORAL SEE ADMIN INSTRUCTIONS
Qty: 10 TABLET | Refills: 0 | Status: SHIPPED | OUTPATIENT
Start: 2023-01-01 | End: 2023-01-01 | Stop reason: SINTOL

## 2023-01-01 RX ORDER — SODIUM BICARBONATE 650 MG/1
1300 TABLET ORAL ONCE
Status: COMPLETED | OUTPATIENT
Start: 2023-01-01 | End: 2023-01-01

## 2023-01-01 RX ORDER — ONDANSETRON 2 MG/ML
8 INJECTION INTRAMUSCULAR; INTRAVENOUS
Status: CANCELLED | OUTPATIENT
Start: 2023-01-01

## 2023-01-01 RX ORDER — LIDOCAINE HYDROCHLORIDE 40 MG/ML
SOLUTION TOPICAL
Status: DISCONTINUED | OUTPATIENT
Start: 2023-01-01 | End: 2023-01-01

## 2023-01-01 RX ORDER — FINASTERIDE 5 MG/1
5 TABLET, FILM COATED ORAL DAILY
Status: DISCONTINUED | OUTPATIENT
Start: 2023-01-01 | End: 2023-01-01 | Stop reason: HOSPADM

## 2023-01-01 RX ORDER — TRIAMCINOLONE ACETONIDE 1 MG/G
CREAM TOPICAL 2 TIMES DAILY
Qty: 80 G | Refills: 0 | Status: SHIPPED | OUTPATIENT
Start: 2023-01-01

## 2023-01-01 RX ORDER — FUROSEMIDE 10 MG/ML
20 INJECTION INTRAMUSCULAR; INTRAVENOUS ONCE
Status: COMPLETED | OUTPATIENT
Start: 2023-01-01 | End: 2023-01-01

## 2023-01-01 RX ORDER — HYDROCODONE BITARTRATE AND ACETAMINOPHEN 500; 5 MG/1; MG/1
TABLET ORAL ONCE
Status: COMPLETED | OUTPATIENT
Start: 2023-01-01 | End: 2023-01-01

## 2023-01-01 RX ORDER — MIDODRINE HYDROCHLORIDE 5 MG/1
15 TABLET ORAL EVERY 8 HOURS
Status: DISCONTINUED | OUTPATIENT
Start: 2023-01-01 | End: 2023-01-01

## 2023-01-01 RX ORDER — ZOLEDRONIC ACID 0.04 MG/ML
4 INJECTION, SOLUTION INTRAVENOUS
Status: DISCONTINUED | OUTPATIENT
Start: 2023-01-01 | End: 2023-01-01 | Stop reason: HOSPADM

## 2023-01-01 RX ORDER — LIDOCAINE AND PRILOCAINE 25; 25 MG/G; MG/G
CREAM TOPICAL
Status: DISCONTINUED | OUTPATIENT
Start: 2023-01-01 | End: 2023-01-01 | Stop reason: HOSPADM

## 2023-01-01 RX ORDER — MIDODRINE HYDROCHLORIDE 5 MG/1
5 TABLET ORAL 3 TIMES DAILY
Status: DISCONTINUED | OUTPATIENT
Start: 2023-01-01 | End: 2023-01-01

## 2023-01-01 RX ORDER — SODIUM CHLORIDE 0.9 % (FLUSH) 0.9 %
10 SYRINGE (ML) INJECTION EVERY 12 HOURS PRN
Status: DISCONTINUED | OUTPATIENT
Start: 2023-01-01 | End: 2023-01-01 | Stop reason: HOSPADM

## 2023-01-01 RX ORDER — BACITRACIN ZINC AND POLYMYXIN B SULFATE 500; 1000 [USP'U]/G; [USP'U]/G
OINTMENT TOPICAL 2 TIMES DAILY
Status: DISCONTINUED | OUTPATIENT
Start: 2023-01-01 | End: 2023-01-01 | Stop reason: HOSPADM

## 2023-01-01 RX ORDER — FUROSEMIDE 10 MG/ML
120 INJECTION INTRAMUSCULAR; INTRAVENOUS 2 TIMES DAILY
Status: DISCONTINUED | OUTPATIENT
Start: 2023-01-01 | End: 2023-01-01 | Stop reason: HOSPADM

## 2023-01-01 RX ORDER — ACETAMINOPHEN 325 MG/1
650 TABLET ORAL
Status: DISCONTINUED | OUTPATIENT
Start: 2023-01-01 | End: 2023-01-01 | Stop reason: HOSPADM

## 2023-01-01 RX ORDER — HYDROCODONE BITARTRATE AND ACETAMINOPHEN 500; 5 MG/1; MG/1
TABLET ORAL ONCE
Status: CANCELLED | OUTPATIENT
Start: 2023-01-01 | End: 2023-01-01

## 2023-01-01 RX ORDER — LORAZEPAM 2 MG/ML
1 INJECTION INTRAMUSCULAR EVERY 30 MIN PRN
Status: DISCONTINUED | OUTPATIENT
Start: 2023-01-01 | End: 2023-01-01 | Stop reason: HOSPADM

## 2023-01-01 RX ORDER — IPRATROPIUM BROMIDE AND ALBUTEROL SULFATE 2.5; .5 MG/3ML; MG/3ML
3 SOLUTION RESPIRATORY (INHALATION) EVERY 4 HOURS
Status: DISCONTINUED | OUTPATIENT
Start: 2023-01-01 | End: 2023-01-01

## 2023-01-01 RX ORDER — TORSEMIDE 20 MG/1
80 TABLET ORAL 2 TIMES DAILY
Status: DISCONTINUED | OUTPATIENT
Start: 2023-01-01 | End: 2023-01-01

## 2023-01-01 RX ORDER — ALBUMIN HUMAN 250 G/1000ML
100 SOLUTION INTRAVENOUS ONCE
Status: COMPLETED | OUTPATIENT
Start: 2023-01-01 | End: 2023-01-01

## 2023-01-01 RX ORDER — ONDANSETRON 2 MG/ML
8 INJECTION INTRAMUSCULAR; INTRAVENOUS
Status: COMPLETED | OUTPATIENT
Start: 2023-01-01 | End: 2023-01-01

## 2023-01-01 RX ORDER — GLUCAGON 1 MG
1 KIT INJECTION
Status: DISCONTINUED | OUTPATIENT
Start: 2023-01-01 | End: 2023-01-01

## 2023-01-01 RX ORDER — MIDODRINE HYDROCHLORIDE 5 MG/1
5 TABLET ORAL ONCE
Status: COMPLETED | OUTPATIENT
Start: 2023-01-01 | End: 2023-01-01

## 2023-01-01 RX ORDER — DEXAMETHASONE 4 MG/1
TABLET ORAL
Qty: 120 TABLET | Refills: 0 | Status: ON HOLD | OUTPATIENT
Start: 2023-01-01 | End: 2023-01-01 | Stop reason: HOSPADM

## 2023-01-01 RX ADMIN — Medication 10 ML: at 09:02

## 2023-01-01 RX ADMIN — PIPERACILLIN AND TAZOBACTAM 4.5 G: 4; .5 INJECTION, POWDER, LYOPHILIZED, FOR SOLUTION INTRAVENOUS; PARENTERAL at 04:03

## 2023-01-01 RX ADMIN — RIFAXIMIN 550 MG: 550 TABLET ORAL at 08:03

## 2023-01-01 RX ADMIN — OXALIPLATIN 134 MG: 5 INJECTION, SOLUTION INTRAVENOUS at 03:02

## 2023-01-01 RX ADMIN — PIPERACILLIN SODIUM AND TAZOBACTAM SODIUM 4.5 G: 4; .5 INJECTION, POWDER, LYOPHILIZED, FOR SOLUTION INTRAVENOUS at 08:03

## 2023-01-01 RX ADMIN — POTASSIUM BICARBONATE 40 MEQ: 391 TABLET, EFFERVESCENT ORAL at 09:03

## 2023-01-01 RX ADMIN — LIDOCAINE HYDROCHLORIDE: 40 SOLUTION TOPICAL at 12:03

## 2023-01-01 RX ADMIN — IPRATROPIUM BROMIDE AND ALBUTEROL SULFATE 3 ML: 2.5; .5 SOLUTION RESPIRATORY (INHALATION) at 11:03

## 2023-01-01 RX ADMIN — SODIUM CHLORIDE 500 ML: 0.9 INJECTION, SOLUTION INTRAVENOUS at 02:03

## 2023-01-01 RX ADMIN — Medication 10 ML: at 02:02

## 2023-01-01 RX ADMIN — SODIUM CHLORIDE, SODIUM LACTATE, POTASSIUM CHLORIDE, AND CALCIUM CHLORIDE 500 ML: .6; .31; .03; .02 INJECTION, SOLUTION INTRAVENOUS at 01:03

## 2023-01-01 RX ADMIN — ALBUMIN (HUMAN) 100 G: 12.5 SOLUTION INTRAVENOUS at 06:03

## 2023-01-01 RX ADMIN — DEXTROSE: 50 INJECTION, SOLUTION INTRAVENOUS at 10:02

## 2023-01-01 RX ADMIN — PIPERACILLIN SODIUM AND TAZOBACTAM SODIUM 4.5 G: 4; .5 INJECTION, POWDER, LYOPHILIZED, FOR SOLUTION INTRAVENOUS at 04:03

## 2023-01-01 RX ADMIN — OXALIPLATIN 134 MG: 5 INJECTION, SOLUTION INTRAVENOUS at 11:02

## 2023-01-01 RX ADMIN — LORAZEPAM 1 MG: 2 INJECTION INTRAMUSCULAR; INTRAVENOUS at 11:03

## 2023-01-01 RX ADMIN — IPRATROPIUM BROMIDE AND ALBUTEROL SULFATE 3 ML: 2.5; .5 SOLUTION RESPIRATORY (INHALATION) at 07:03

## 2023-01-01 RX ADMIN — VANCOMYCIN HYDROCHLORIDE 2000 MG: 10 INJECTION, POWDER, LYOPHILIZED, FOR SOLUTION INTRAVENOUS at 03:03

## 2023-01-01 RX ADMIN — HEPARIN SODIUM (PORCINE) LOCK FLUSH IV SOLN 100 UNIT/ML 500 UNITS: 100 SOLUTION at 06:03

## 2023-01-01 RX ADMIN — Medication 10 ML: at 02:01

## 2023-01-01 RX ADMIN — LEUCOVORIN CALCIUM 890 MG: 200 INJECTION, POWDER, LYOPHILIZED, FOR SUSPENSION INTRAMUSCULAR; INTRAVENOUS at 02:01

## 2023-01-01 RX ADMIN — RIFAXIMIN 550 MG: 550 TABLET ORAL at 11:03

## 2023-01-01 RX ADMIN — HEPARIN 500 UNITS: 100 SYRINGE at 02:02

## 2023-01-01 RX ADMIN — APIXABAN 5 MG: 5 TABLET, FILM COATED ORAL at 08:03

## 2023-01-01 RX ADMIN — SODIUM CHLORIDE: 9 INJECTION, SOLUTION INTRAVENOUS at 03:03

## 2023-01-01 RX ADMIN — LORAZEPAM 1 MG: 2 INJECTION INTRAMUSCULAR; INTRAVENOUS at 12:03

## 2023-01-01 RX ADMIN — HEPARIN 500 UNITS: 100 SYRINGE at 09:02

## 2023-01-01 RX ADMIN — DEXAMETHASONE SODIUM PHOSPHATE 0.25 MG: 4 INJECTION, SOLUTION INTRA-ARTICULAR; INTRALESIONAL; INTRAMUSCULAR; INTRAVENOUS; SOFT TISSUE at 02:02

## 2023-01-01 RX ADMIN — SODIUM BICARBONATE 650 MG TABLET 1300 MG: at 08:03

## 2023-01-01 RX ADMIN — DEXTROSE: 50 INJECTION, SOLUTION INTRAVENOUS at 02:02

## 2023-01-01 RX ADMIN — DULOXETINE HYDROCHLORIDE 20 MG: 20 CAPSULE, DELAYED RELEASE ORAL at 11:03

## 2023-01-01 RX ADMIN — OXALIPLATIN 145 MG: 5 INJECTION, SOLUTION INTRAVENOUS at 02:01

## 2023-01-01 RX ADMIN — RIFAXIMIN 550 MG: 550 TABLET ORAL at 09:03

## 2023-01-01 RX ADMIN — APIXABAN 5 MG: 5 TABLET, FILM COATED ORAL at 11:03

## 2023-01-01 RX ADMIN — CEFTRIAXONE 1 G: 1 INJECTION, POWDER, FOR SOLUTION INTRAMUSCULAR; INTRAVENOUS at 03:03

## 2023-01-01 RX ADMIN — FINASTERIDE 5 MG: 5 TABLET, FILM COATED ORAL at 11:03

## 2023-01-01 RX ADMIN — FINASTERIDE 5 MG: 5 TABLET, FILM COATED ORAL at 08:03

## 2023-01-01 RX ADMIN — MIDODRINE HYDROCHLORIDE 10 MG: 5 TABLET ORAL at 09:03

## 2023-01-01 RX ADMIN — MIDODRINE HYDROCHLORIDE 10 MG: 5 TABLET ORAL at 05:03

## 2023-01-01 RX ADMIN — TAMSULOSIN HYDROCHLORIDE 0.4 MG: 0.4 CAPSULE ORAL at 08:03

## 2023-01-01 RX ADMIN — DULOXETINE HYDROCHLORIDE 20 MG: 20 CAPSULE, DELAYED RELEASE ORAL at 09:03

## 2023-01-01 RX ADMIN — SODIUM CHLORIDE 1000 ML: 0.9 INJECTION, SOLUTION INTRAVENOUS at 12:01

## 2023-01-01 RX ADMIN — HEPARIN 500 UNITS: 100 SYRINGE at 02:01

## 2023-01-01 RX ADMIN — ACETAMINOPHEN 650 MG: 325 TABLET ORAL at 11:03

## 2023-01-01 RX ADMIN — DULOXETINE HYDROCHLORIDE 20 MG: 20 CAPSULE, DELAYED RELEASE ORAL at 08:03

## 2023-01-01 RX ADMIN — SODIUM CHLORIDE: 9 INJECTION, SOLUTION INTRAVENOUS at 01:02

## 2023-01-01 RX ADMIN — APIXABAN 5 MG: 5 TABLET, FILM COATED ORAL at 09:03

## 2023-01-01 RX ADMIN — Medication 10 ML: at 06:03

## 2023-01-01 RX ADMIN — SODIUM CHLORIDE 1000 ML: 9 INJECTION, SOLUTION INTRAVENOUS at 03:01

## 2023-01-01 RX ADMIN — MAGNESIUM SULFATE 2 G: 2 INJECTION INTRAVENOUS at 10:03

## 2023-01-01 RX ADMIN — SODIUM CHLORIDE 500 ML: 9 INJECTION, SOLUTION INTRAVENOUS at 03:03

## 2023-01-01 RX ADMIN — SODIUM CHLORIDE 1000 ML: 9 INJECTION, SOLUTION INTRAVENOUS at 03:03

## 2023-01-01 RX ADMIN — LORAZEPAM 1 MG: 2 INJECTION INTRAMUSCULAR; INTRAVENOUS at 06:03

## 2023-01-01 RX ADMIN — PIPERACILLIN SODIUM AND TAZOBACTAM SODIUM 4.5 G: 4; .5 INJECTION, POWDER, LYOPHILIZED, FOR SOLUTION INTRAVENOUS at 12:03

## 2023-01-01 RX ADMIN — DEXTROSE: 50 INJECTION, SOLUTION INTRAVENOUS at 01:01

## 2023-01-01 RX ADMIN — SODIUM CHLORIDE 1000 ML: 9 INJECTION, SOLUTION INTRAVENOUS at 04:03

## 2023-01-01 RX ADMIN — LEUCOVORIN CALCIUM 890 MG: 500 INJECTION, POWDER, LYOPHILIZED, FOR SOLUTION INTRAMUSCULAR; INTRAVENOUS at 11:02

## 2023-01-01 RX ADMIN — HYDROMORPHONE HYDROCHLORIDE 0.1 MG/HR: 10 INJECTION INTRAMUSCULAR; INTRAVENOUS; SUBCUTANEOUS at 04:03

## 2023-01-01 RX ADMIN — FLUOROURACIL 4000 MG: 50 INJECTION, SOLUTION INTRAVENOUS at 03:02

## 2023-01-01 RX ADMIN — FLUOROURACIL 4460 MG: 50 INJECTION, SOLUTION INTRAVENOUS at 04:01

## 2023-01-01 RX ADMIN — LORAZEPAM 1 MG: 2 INJECTION INTRAMUSCULAR; INTRAVENOUS at 02:03

## 2023-01-01 RX ADMIN — MIDODRINE HYDROCHLORIDE 5 MG: 5 TABLET ORAL at 03:03

## 2023-01-01 RX ADMIN — GEMCITABINE HYDROCHLORIDE 1800 MG: 1 INJECTION, SOLUTION INTRAVENOUS at 05:03

## 2023-01-01 RX ADMIN — MAGNESIUM SULFATE 2 G: 2 INJECTION INTRAVENOUS at 12:03

## 2023-01-01 RX ADMIN — FLUOROURACIL 4000 MG: 50 INJECTION, SOLUTION INTRAVENOUS at 05:02

## 2023-01-01 RX ADMIN — LEUCOVORIN CALCIUM 890 MG: 500 INJECTION, POWDER, LYOPHILIZED, FOR SOLUTION INTRAMUSCULAR; INTRAVENOUS at 03:02

## 2023-01-01 RX ADMIN — HYDROMORPHONE HYDROCHLORIDE 0.2 MG: 1 INJECTION, SOLUTION INTRAMUSCULAR; INTRAVENOUS; SUBCUTANEOUS at 04:03

## 2023-01-01 RX ADMIN — HYDROMORPHONE HYDROCHLORIDE 0.2 MG: 1 INJECTION, SOLUTION INTRAMUSCULAR; INTRAVENOUS; SUBCUTANEOUS at 08:03

## 2023-01-01 RX ADMIN — FUROSEMIDE 120 MG: 10 INJECTION, SOLUTION INTRAVENOUS at 04:03

## 2023-01-01 RX ADMIN — PIPERACILLIN SODIUM AND TAZOBACTAM SODIUM 4.5 G: 4; .5 INJECTION, POWDER, LYOPHILIZED, FOR SOLUTION INTRAVENOUS at 07:03

## 2023-01-01 RX ADMIN — ONDANSETRON 8 MG: 2 INJECTION INTRAMUSCULAR; INTRAVENOUS at 04:03

## 2023-01-01 RX ADMIN — DEXAMETHASONE SODIUM PHOSPHATE 0.25 MG: 4 INJECTION, SOLUTION INTRA-ARTICULAR; INTRALESIONAL; INTRAMUSCULAR; INTRAVENOUS; SOFT TISSUE at 10:02

## 2023-01-01 RX ADMIN — IPRATROPIUM BROMIDE AND ALBUTEROL SULFATE 3 ML: 2.5; .5 SOLUTION RESPIRATORY (INHALATION) at 03:03

## 2023-01-01 RX ADMIN — FUROSEMIDE 20 MG: 10 INJECTION, SOLUTION INTRAVENOUS at 08:03

## 2023-01-01 RX ADMIN — MIDODRINE HYDROCHLORIDE 15 MG: 5 TABLET ORAL at 09:03

## 2023-01-01 RX ADMIN — IPRATROPIUM BROMIDE AND ALBUTEROL SULFATE 3 ML: 2.5; .5 SOLUTION RESPIRATORY (INHALATION) at 08:03

## 2023-01-01 RX ADMIN — HYDROMORPHONE HYDROCHLORIDE 0.2 MG: 1 INJECTION, SOLUTION INTRAMUSCULAR; INTRAVENOUS; SUBCUTANEOUS at 12:03

## 2023-01-01 RX ADMIN — DEXAMETHASONE SODIUM PHOSPHATE 0.25 MG: 4 INJECTION, SOLUTION INTRA-ARTICULAR; INTRALESIONAL; INTRAMUSCULAR; INTRAVENOUS; SOFT TISSUE at 01:01

## 2023-01-01 RX ADMIN — HYDROMORPHONE HYDROCHLORIDE 0.2 MG: 1 INJECTION, SOLUTION INTRAMUSCULAR; INTRAVENOUS; SUBCUTANEOUS at 02:03

## 2023-01-01 RX ADMIN — PACLITAXEL 230 MG: 100 INJECTION, POWDER, LYOPHILIZED, FOR SUSPENSION INTRAVENOUS at 04:03

## 2023-01-01 RX ADMIN — FINASTERIDE 5 MG: 5 TABLET, FILM COATED ORAL at 09:03

## 2023-01-01 RX ADMIN — MAGNESIUM SULFATE 2 G: 2 INJECTION INTRAVENOUS at 07:03

## 2023-01-01 RX ADMIN — MIDODRINE HYDROCHLORIDE 10 MG: 5 TABLET ORAL at 01:03

## 2023-01-01 RX ADMIN — SODIUM CHLORIDE 1000 ML: 0.9 INJECTION, SOLUTION INTRAVENOUS at 01:01

## 2023-01-01 RX ADMIN — ALUMINUM HYDROXIDE, MAGNESIUM HYDROXIDE, AND DIMETHICONE 10 ML: 400; 400; 40 SUSPENSION ORAL at 09:03

## 2023-01-01 RX ADMIN — TAMSULOSIN HYDROCHLORIDE 0.4 MG: 0.4 CAPSULE ORAL at 11:03

## 2023-01-01 RX ADMIN — PIPERACILLIN SODIUM AND TAZOBACTAM SODIUM 4.5 G: 4; .5 INJECTION, POWDER, LYOPHILIZED, FOR SOLUTION INTRAVENOUS at 01:03

## 2023-01-01 RX ADMIN — POTASSIUM BICARBONATE 40 MEQ: 391 TABLET, EFFERVESCENT ORAL at 08:03

## 2023-01-01 RX ADMIN — MIDODRINE HYDROCHLORIDE 15 MG: 5 TABLET ORAL at 06:03

## 2023-01-01 RX ADMIN — HEPARIN 500 UNITS: 100 SYRINGE at 03:03

## 2023-01-01 RX ADMIN — GADOBUTROL 10 ML: 604.72 INJECTION INTRAVENOUS at 01:03

## 2023-01-01 RX ADMIN — TAMSULOSIN HYDROCHLORIDE 0.4 MG: 0.4 CAPSULE ORAL at 09:03

## 2023-01-01 RX ADMIN — HYDROMORPHONE HYDROCHLORIDE 0.2 MG: 1 INJECTION, SOLUTION INTRAMUSCULAR; INTRAVENOUS; SUBCUTANEOUS at 11:03

## 2023-01-01 RX ADMIN — MORPHINE SULFATE 2 MG: 2 INJECTION, SOLUTION INTRAMUSCULAR; INTRAVENOUS at 09:03

## 2023-01-01 RX ADMIN — ALUMINUM HYDROXIDE, MAGNESIUM HYDROXIDE, AND DIMETHICONE 10 ML: 400; 400; 40 SUSPENSION ORAL at 08:03

## 2023-01-01 RX ADMIN — PROCHLORPERAZINE EDISYLATE 10 MG: 5 INJECTION INTRAMUSCULAR; INTRAVENOUS at 10:03

## 2023-01-11 PROBLEM — N18.31 STAGE 3A CHRONIC KIDNEY DISEASE: Status: ACTIVE | Noted: 2023-01-01

## 2023-01-11 PROBLEM — D69.6 THROMBOCYTOPENIA: Status: ACTIVE | Noted: 2023-01-01

## 2023-01-11 NOTE — PLAN OF CARE
Pt here for Folfox/IVF. Assessment complete and labs reviewed. VSS. PAC accessed using sterile technique. Pt tolerated treatment well; no reaction suspected. Placed pt on 5 FU CADD pump; pump infusing without difficulty prior to d/c. Pt to RTC 1/13 at 2 pm for pump d/c. No questions or concerns. Pt ambulated out of unit unassisted.

## 2023-01-11 NOTE — PROGRESS NOTES
"                                                     PROGRESS NOTE    Subjective:       Patient ID: Domonique Kellogg is a 72 y.o. male.    Chief Complaint: follow up for cholangiocarcinoma    Diagnosis:  Advanced intrahepatic cholangiocarcinoma, MSI-low, negative for FGFR2 fusion or IDH1/IDH2 mutations, diagnosed on 11/22/2021.     Molecular Profile:  Guardant 360 11/29/21: +CCND1 amplification. VUS: CATRACHITO [id number]. MSI-high not detected.   Strata: ASXL1 mutation, CCND1 amplification, MSI-low, TMB low.     Oncologic History copied from medical chart:  1. Mr Kellogg is a 70 yo man with CAD, HTN, seizures in 2011 who initially saw me on 11/29/21 for further management of cholangiocarcinoma. He presented with weight loss and diarrhea since July. US 9/22/21 showed "Enlarged, heterogeneous appearance of the liver likely due to multiple underlying hepatic lesions largest measuring 4.8 cm."  MRI abdomen w/wo contrast 10/4/21: "Multiple liver lesions measuring up to 13.3 cm in the right hepatic lobe.  Differential diagnosis includes metastases, fibrolamellar hepatocellular carcinoma, or atypical focal nodular hyperplasia.  Consider biopsy for further evaluation. Thrombosis of the anterior branch of the right portal vein and left hepatic vein.  Nonvisualization of the middle and right hepatic veins, which may be thrombosed as well. Prominent periportal lymph node, which is nonspecific."  EGD and colonoscopy on 10/11/21 were negative for primary malignancies.   He underwent liver lesion biopsy and Y90 mapping on 11/22/21. Pathology showed adenocarcinoma: "Biopsy of the liver mass shows a malignant neoplasm in a fibrotic stroma. Glandular architecture is readily identified with several foci of intraluminal necrosis. Scattered mitotic figures are seen. Neoplastic cells show the following immunophenotype:   Positive: AE1/AE3, CK7, CDX2   Negative: Chromogranin, Synaptophysin, TTF, CK5/6, CK20, HepPar1   The morphology and " "immunophenotype are compatible with adenocarcinoma. Considerations for a primary site include pancreaticobiliary (including intrahepatic cholangiocarcinoma) as well as upper gastrointestinal. Clinical and radiologic correlation is suggested."  Patient presents today with wife for further management. No pain. Initial weight loss has somewhat stabilized. Still has diarrhea. Has not tried imodium yet. +nervous  Mother had kidney cancer at age 64. Maternal grandmother had liver cancer in her 60s. Patient has three children, two daughters and one son.   Discussed palliative chemo with cis/gem  2. PET scan 21 did not show extrahepatic metastases.   3. Cis/gem started on 21. Durvalumab was initially denied by insurance company after GI [facility], but approved after FDA approval, added 8/10/22. Patient had reaction to durv (wheezing, rigors, shaking, high BP) on . Imfinzi was stopped. Last chemo on 10/21/22. MRI 10/19/22 showed progression. Plan was to switched for FOLFOX. Patient was hospitalized for acute appendicitis and strangulated umbilical hernia s/p surgery on 2022.   4. S/p TACE on 2021, 22, 22, 22, 22  5. S/p RF Ablation on 22. TACE on 22  6. FOLFOX started on 22, s/p 3 cycles    Interval History:   Mr Kellogg returns for cycle 4 FOLFOX. He is concerned about color changes to the right great toe, with some darkening noted on the nail. He also reports fatigue and pruritis. His neuropathy is stable but he still notices some dragging of his left foot occasionally. He continues to have a good appetite. He has no fevers/chills.     ECO. Presents alone    ROS:   A ten-point system review is obtained and negative except for what was stated in the Interval History.     Physical Examination:   Vital signs reviewed.   General: well hydrated, well developed, in no acute distress  HEENT: normocephalic, EOMI, +icteric sclerae, pale conjunctivae  Neck: supple, no " JVD  Chest: right chest port site clean  Lungs: clear breath sounds bilaterally, no wheezing/rales/rhonchi  Heart: RRR, no M/R/G  Abdomen: soft, no tenderness, nondistended. Surgical wound healed.   Ext: no clubbing, cyanosis, edema  Skin: no open wound, ulcers, rash. Small bony lesion to the tibial tuberosity of the R knee. No signs of infection.   Neuro: alert and oriented x 4. Reports moderate tingling and paresthesia to the legs. Decreased overall tactile sensation to the LE, bilateral  Psych: pleasant and appropriate mood and affect    Objective:     Laboratory Data:  Labs reviewed. Hb 8.3. Cr 1.5. Bili 2.0    Imaging Data:  PET 12/6/21:  In this patient with known intrahepatic cholangiocarcinoma, there are multifocal hypermetabolic hepatic lesions in both hepatic lobes.  No evidence of distant metastasis.     Lipomatosis of the ileocecal valve and proximal cecum.     Fat containing umbilical hernia.    MRI Abdomen 1/18/22:  1. Patient with reported cholangiocarcinoma.  Interval progression of hepatic tumor burden with multiple enlarging and new hepatic lesions.  2. Progression of portal venous thrombosis involving the left, right, and main portal veins.  Persistent filling defect within the central hepatic veins concerning for thrombosis.  3. Stable prominent periportal lymph node.    CT chest 3/4/22:  Right pulmonary nodules, unchanged.  Recommend continued follow-up as per clinical protocol.    MRI abdomen 3/14/22:     Interval postprocedural changes status post TACE with slight improvement of tumor burden at treatment sites.  Extensive residual disease throughout both hepatic lobes.  Index lesions as above.     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     Stable appearance of central hepatic veins, underlying thrombosis not excluded.  Consider further evaluation with Doppler ultrasound if clinically warranted.     Stable prominent periportal lymph node.     Right  adrenal adenoma.    CT urogram 3/14/22:  Prostatomegaly which demonstrates mass effect on the posterior bladder.  There is mild diffuse wall thickening of the bladder which may relate to outlet obstruction.     Postprocedural changes of the liver in keeping with recent chemoembolization.  Protocol is not optimized for evaluation of tumor response.  Dedicated multiphase liver CT would be necessary to evaluate liver directed therapy response.    MRI A/P 6/19/22:  - Evolving posttreatment changes in the liver for multifocal intrahepatic cholangiocarcinoma.  No new focal hepatic lesions.  - Persistent portal vein thrombosis, likely tumor thrombus, with cavernous transformation.  - No abdominopelvic metastases.     CT chest 6/15/22:  1. Stable pulmonary nodules measuring up to 0.4 cm.  No definite new or enlarging pulmonary nodules.  2. Scattered regions of opacification within the right lung base with air bronchograms, favored to represent atelectasis.  However, early consolidative changes cannot be completely excluded.    US bilateral legs 6/9/22:  Partially occlusive deep venous thrombosis of the left femoral vein.    MRI abdomen 9/2/22:  1. Evolving post treatment changes in the liver for multifocal intrahepatic cholangiocarcinoma.  Interval radiofrequency ablation of 3 lesions in the left hepatic lobe without local residual disease.  Interval TACE of segment 4A and right hepatic artery noting residual disease in the right hepatic lobe as detailed above.  2. New enhancing lesions in segment 2/3 as described above.  3. 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  4. Additional stable findings.    MRI 10/19/22:  1. Evolving post treatment changes of multifocal intrahepatic cholangiocarcinoma with residual disease as detailed above.  2. Posttreatment changes of segment 3 from several prior chemoembolizations, two of the ablation cavities have decreased in size and one of the ablation cavities  has increased in size while the remaining is stable.  3. Several peripherally enhancing lesions in segment 3 all of which have increased in size compared to most recent study, MRI abdomen pelvis 09/02/2022.  4. Persist enhancing portal venous tumor thrombus with cavernous transformation.  5. Additional findings as detailed above.    CT chest 10/19/22:  1.  Numerous upper lobe predominant centrilobular micro nodules are increased since prior exam.  These may represent areas of infectious bronchiolitis, non infectious bronchiolitis, acute hypersensitivity pneumonitis, pulmonary edema, etc..  The pattern of disease does not appear compatible with hematogenous metastasis (which is expected to be randomly distributed).  However, attention on short-term follow-up imaging is recommended.       Assessment and Plan:     1. Intrahepatic cholangiocarcinoma    2. Secondary malignant neoplasm of liver    3. 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 Acute deep vein thrombosis (DVT) of femoral vein of left lower extremity    6. Long term (current) use of anticoagulants    7. Stage 3a chronic kidney disease    8. Benign prostatic hyperplasia with urinary frequency    9. Hypertension, essential    10. Neuropathy    11. Thrombocytopenia    12. Pruritus          1-4  - Mr Kellogg is a 73 yo man with advanced intrahepatic cholangiocarcinoma with multiple liver lesions. MSI-low, FGFR2 fusion and IDH1/2 mutation negative. Started on cis/gem on 12/7/21. Durvalumab added on 8/10/22 after FDA approval. Stopped after he had a reaction on 9/9/22.   - S/p TACE on 12/16/2021, 2/14/22, 4/19/22, 5/18/22, 8/2/22. RF ablation on 7/19/22. TACE on 9/28/22  - MRI 10/19/22 showed progression. Plan was to switched for FOLFOX. Patient was hospitalized for acute appendicitis and strangulated umbilical hernia s/p surgery on 11/2/2022.   - started FOLFOX on 11/21/22. S/p 3 cycles  - doing well. Cycle 4  today. CA 19-9 came down. Keep oxaliplatin at 65 mg/m2 for occasional foot drop. Although that might be related to prior surgery.   - RTC in 2 weeks for cycle 5    5, 6.  - continue apixaban    7.  - Cr stable    8.  - continue flomax, proscar    9.  - BP well controlled  - c/w current medication    10.  - started before chemo; however, appears to have worsened since starting the Oxaliplatin. Dose was reduced for last cycle  - scheduled with neurology 1/13    11.   - secondary to chemo  - continue to monitor    12.  - may be due to hyperbilirubinemia though this is stable at 2.0  - prescribed atarax prn itching    Follow-up:     RTC in 2 weeks.    Discussed with Dr. Eddy,   PGY-V  Hematology/Oncology Fellow    ATTESTATION:    I have personally seen, examined and talked to the patient. I have reviewed Dr Cho's note and agreed with the findings, assessment and plan.     Doing well. Cycle 3 FOLFOX today. Keep 5FU at 2 g/m2 for elevated bilirubin. Keep oxaliplatin at 65 mg/m2 for occasional foot drop. Atarax prn for pruritus.     Addendum:  Zometa finally approved for hypercalcemia of malignancy. Ca was 12 one month ago when we ordered zometa. It is down to 11 today. Will hold zometa and monitor calcium level.     Claudia Young MD  Hematology and Medical Oncology  [facility]      Route Chart for Scheduling    Med Onc Chart Routing  Urgent    Follow up with physician 4 weeks and 2 months. labs (CBC, CMP, CA 19-9) on 1/24, see BRADLEY on 1/25 then chemo. labs on 2/7, see me on 2/8 then chemo. labs on 2/20, see BRADLEY on 2/22 then chemo. labs and CT C/A/P on 3/7, see me on 3/8 then chemo   Follow up with BRADLEY 2 weeks and 6 weeks.   Infusion scheduling note FOLFOX every 2 weeks, remove pump day 3   Injection scheduling note    Labs CBC, CMP and CA 19-9   Lab interval: every 2 weeks     Imaging CT chest abdomen pelvis   on 3/7   Pharmacy appointment    Other referrals

## 2023-01-12 NOTE — TELEPHONE ENCOUNTER
"----- Message from Hodan Wahl sent at 1/12/2023 12:42 PM CST -----  Regarding: Consult/Advisory    Name Of Caller:Self    Contact Preference?:[phone]           Provider Name:   Does patient feel the need to be seen today?no           What is the nature of the call?: Pt is calling to cancel appt 1/13/23 and would like to r/s appt for 1/20/23 at [facility]           Additional Notes:  "Thank you for all that you do for our patients'"     "

## 2023-01-13 NOTE — PLAN OF CARE
Patient tolerated IVFs and pump dc with no complications. VSS. Pt instructed to call MD with any problems. Pt discharged home independently.

## 2023-01-24 NOTE — PROGRESS NOTES
Subjective:       Patient ID:  Domonique Kellogg is a 72 y.o. male who presents for   Chief Complaint   Patient presents with    Rash     Domonique is a 72-year-old male presenting for evaluation of a 3 week history of rash. The patient reports that the only triggering factor which he can recall is the use of an old cologne to the body prior to the eruption. He also reports that he developed itching and then dark spots all over his body. Denies any other changes in medication or exposures. Denies any history of environmental allergies. Denies history of sensitive skin. Reports he was prescribed Atarax, which has helped alleviate the itch. He washes with dove soaps and denies any regular moisturizer. Reports his rash is slowly improving, but he intermittently still experiences itching.     Rash - Initial  Affected locations: torso and chest  Duration: 3 weeks  Signs / symptoms: itching  Severity: moderate  Timing: intermittent  Aggravated by: nothing  Relieving factors/Treatments tried: antihistamines  Improvement on treatment: mild    Review of Systems   Constitutional:  Positive for fatigue. Negative for fever and chills.   Skin:  Positive for itching, rash and dry skin.      Objective:    Physical Exam   Constitutional: He appears well-developed and well-nourished. No distress.   Psychiatric: He has a normal mood and affect.   Skin:   Areas Examined (abnormalities noted in diagram):   Head / Face Inspection Performed  Neck Inspection Performed  Chest / Axilla Inspection Performed  Abdomen Inspection Performed  Back Inspection Performed            Diagram Legend     Erythematous scaling macule/papule c/w actinic keratosis       Vascular papule c/w angioma      Pigmented verrucoid papule/plaque c/w seborrheic keratosis      Yellow umbilicated papule c/w sebaceous hyperplasia      Irregularly shaped tan macule c/w lentigo     1-2 mm smooth white papules consistent with Milia      Movable subcutaneous cyst with  punctum c/w epidermal inclusion cyst      Subcutaneous movable cyst c/w pilar cyst      Firm pink to brown papule c/w dermatofibroma      Pedunculated fleshy papule(s) c/w skin tag(s)      Evenly pigmented macule c/w junctional nevus     Mildly variegated pigmented, slightly irregular-bordered macule c/w mildly atypical nevus      Flesh colored to evenly pigmented papule c/w intradermal nevus       Pink pearly papule/plaque c/w basal cell carcinoma      Erythematous hyperkeratotic cursted plaque c/w SCC      Surgical scar with no sign of skin cancer recurrence      Open and closed comedones      Inflammatory papules and pustules      Verrucoid papule consistent consistent with wart     Erythematous eczematous patches and plaques     Dystrophic onycholytic nail with subungual debris c/w onychomycosis     Umbilicated papule    Erythematous-base heme-crusted tan verrucoid plaque consistent with inflamed seborrheic keratosis     Erythematous Silvery Scaling Plaque c/w Psoriasis     See annotation      Assessment / Plan:        Dermatitis, NOS  Post-inflammatory hyperpigmentation  - Difficult to diagnose the inciting cause of patient's hyperpigmentation. Low suspicion for chemotherapy related eruption. Potentially 2/2 ACD vs ICD.  - Continue Atarax PRN. May start TAC 0.1% c BID for 2 weeks. Patient counseled that the prolonged use of topical steroids can result in the increased appearance superficial blood vessels (telangiectasias) lightening (hypopigmentation), and   thinning of the skin ( atrophy).  Patient understands to avoid using high potency steroids in skin folds, the groin or the face.  The patient verbalized understanding of proper use and possible adverse effects of topical steroids.  All patient's questions and concerns were addressed.  - Gentle skin care reiterated, including limiting irritants, limiting hot showers, using dove soap to cleanse, and moisturizing daily with a thick cream. Samples provided.      -     triamcinolone acetonide 0.1% (KENALOG) 0.1 % cream; Apply topically 2 (two) times daily. Apply to the body twice daily for 2 weeks. Do not apply to the face, groin, or folds.  Dispense: 80 g; Refill: 0      Follow up if symptoms worsen or fail to improve.

## 2023-01-25 NOTE — PLAN OF CARE
1700-Pt tolerated IVF infusion well today, no complaints or complications. VSS through duration of treatment. Pt aware to call provider with any questions or concerns and is aware of upcoming appts. Pt ambulatory from clinic with steady gait, no distress noted.

## 2023-01-25 NOTE — Clinical Note
Hi team. Pt is a cancel for chemotherapy today. Can we please schedule him for repeat lab work and chemotherapy next week, at main campus? Thank you so much

## 2023-01-25 NOTE — Clinical Note
Please reschedule in 1-2 weeks for earliest availability for clinic visit and chemotherapy. Thank avery elizalde

## 2023-01-25 NOTE — PROGRESS NOTES
"                                                     PROGRESS NOTE    Subjective:       Patient ID: Domonique Kellogg is a 72 y.o. male.    Chief Complaint: follow up for cholangiocarcinoma    Diagnosis:  Advanced intrahepatic cholangiocarcinoma, MSI-low, negative for FGFR2 fusion or IDH1/IDH2 mutations, diagnosed on 11/22/2021.     Molecular Profile:  Guardant 360 11/29/21: +CCND1 amplification. VUS: CATRACHITO [id number]. MSI-high not detected.   Strata: ASXL1 mutation, CCND1 amplification, MSI-low, TMB low.     Oncologic History copied from medical chart:  1. Mr Kellogg is a 72 yo man with CAD, HTN, seizures in 2011 who initially saw me on 11/29/21 for further management of cholangiocarcinoma. He presented with weight loss and diarrhea since July. US 9/22/21 showed "Enlarged, heterogeneous appearance of the liver likely due to multiple underlying hepatic lesions largest measuring 4.8 cm."  MRI abdomen w/wo contrast 10/4/21: "Multiple liver lesions measuring up to 13.3 cm in the right hepatic lobe.  Differential diagnosis includes metastases, fibrolamellar hepatocellular carcinoma, or atypical focal nodular hyperplasia.  Consider biopsy for further evaluation. Thrombosis of the anterior branch of the right portal vein and left hepatic vein.  Nonvisualization of the middle and right hepatic veins, which may be thrombosed as well. Prominent periportal lymph node, which is nonspecific."  EGD and colonoscopy on 10/11/21 were negative for primary malignancies.   He underwent liver lesion biopsy and Y90 mapping on 11/22/21. Pathology showed adenocarcinoma: "Biopsy of the liver mass shows a malignant neoplasm in a fibrotic stroma. Glandular architecture is readily identified with several foci of intraluminal necrosis. Scattered mitotic figures are seen. Neoplastic cells show the following immunophenotype:   Positive: AE1/AE3, CK7, CDX2   Negative: Chromogranin, Synaptophysin, TTF, CK5/6, CK20, HepPar1   The morphology and " "immunophenotype are compatible with adenocarcinoma. Considerations for a primary site include pancreaticobiliary (including intrahepatic cholangiocarcinoma) as well as upper gastrointestinal. Clinical and radiologic correlation is suggested."  Patient presents today with wife for further management. No pain. Initial weight loss has somewhat stabilized. Still has diarrhea. Has not tried imodium yet. +nervous  Mother had kidney cancer at age 64. Maternal grandmother had liver cancer in her 60s. Patient has three children, two daughters and one son.   Discussed palliative chemo with cis/gem  2. PET scan 12/6/21 did not show extrahepatic metastases.   3. Cis/gem started on 12/7/21. Durvalumab was initially denied by insurance company after GI [facility], but approved after FDA approval, added 8/10/22. Patient had reaction to durv (wheezing, rigors, shaking, high BP) on 9/9. Imfinzi was stopped. Last chemo on 10/21/22. MRI 10/19/22 showed progression. Plan was to switched for FOLFOX. Patient was hospitalized for acute appendicitis and strangulated umbilical hernia s/p surgery on 11/2/2022.   4. S/p TACE on 12/16/2021, 2/14/22, 4/19/22, 5/18/22, 8/2/22  5. S/p RF Ablation on 7/19/22. TACE on 9/28/22  6. FOLFOX started on 11/21/22, s/p 3 cycles    Interval History:   Mr Kellogg returns for cycle 5 of FOLFOX. He is doing fairly today. He continues to have general weakness and fatigue of the legs. He has not fallen but finds that he legs continue to feel weak and fatigued around day 3 after chemotherapy. Neuropathy to the lower extremities and intermittent foot drop of the R foot remains stable. We did decrease the dose of the Oxaliplatin previously due to this concern. He has not seen Neurology yet. He is trying to remain fairly active as well but gets tired. He stopped taking his multi-vitamin due to hypercalcemia. No fever or chills. No nausea, vomiting, diarrhea or constipation. He did have a concern with discoloration to his " nails and a rash to the back. He was seen by Dermatology and given Kenalog cream. This has improved. Overall, he is doing fairly.     ECO. Presents with his wife today.     ROS:   A ten-point system review is obtained and negative except for what was stated in the Interval History.     Physical Examination:   Vital signs reviewed.   General: well hydrated, well developed, in no acute distress  HEENT: normocephalic, EOMI, +icteric sclerae, pale conjunctivae  Neck: supple, no JVD  Chest: right chest port site clean  Lungs: clear breath sounds bilaterally, no wheezing/rales/rhonchi  Heart: RRR, no M/R/G  Abdomen: soft, no tenderness, nondistended. Surgical wound healed.   Ext: no clubbing, cyanosis, edema  Skin: no open wound, ulcers, rash. Small bony lesion to the tibial tuberosity of the R knee. No signs of infection.   Neuro: alert and oriented x 4. Reports moderate tingling and paresthesia to the legs. Decreased overall tactile sensation to the LE, bilateral  Psych: pleasant and appropriate mood and affect    Objective:     Laboratory Data:  Labs reviewed. Hb 8.8. Cr 1.5. Bili 1.7  CA 19-9 804.6 from 632.5    Imaging Data:  PET 21:  In this patient with known intrahepatic cholangiocarcinoma, there are multifocal hypermetabolic hepatic lesions in both hepatic lobes.  No evidence of distant metastasis.     Lipomatosis of the ileocecal valve and proximal cecum.     Fat containing umbilical hernia.    MRI Abdomen 22:  1. Patient with reported cholangiocarcinoma.  Interval progression of hepatic tumor burden with multiple enlarging and new hepatic lesions.  2. Progression of portal venous thrombosis involving the left, right, and main portal veins.  Persistent filling defect within the central hepatic veins concerning for thrombosis.  3. Stable prominent periportal lymph node.    CT chest 3/4/22:  Right pulmonary nodules, unchanged.  Recommend continued follow-up as per clinical protocol.    MRI abdomen  3/14/22:     Interval postprocedural changes status post TACE with slight improvement of tumor burden at treatment sites.  Extensive residual disease throughout both hepatic lobes.  Index lesions as above.     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     Stable appearance of central hepatic veins, underlying thrombosis not excluded.  Consider further evaluation with Doppler ultrasound if clinically warranted.     Stable prominent periportal lymph node.     Right adrenal adenoma.    CT urogram 3/14/22:  Prostatomegaly which demonstrates mass effect on the posterior bladder.  There is mild diffuse wall thickening of the bladder which may relate to outlet obstruction.     Postprocedural changes of the liver in keeping with recent chemoembolization.  Protocol is not optimized for evaluation of tumor response.  Dedicated multiphase liver CT would be necessary to evaluate liver directed therapy response.    MRI A/P 6/19/22:  - Evolving posttreatment changes in the liver for multifocal intrahepatic cholangiocarcinoma.  No new focal hepatic lesions.  - Persistent portal vein thrombosis, likely tumor thrombus, with cavernous transformation.  - No abdominopelvic metastases.     CT chest 6/15/22:  1. Stable pulmonary nodules measuring up to 0.4 cm.  No definite new or enlarging pulmonary nodules.  2. Scattered regions of opacification within the right lung base with air bronchograms, favored to represent atelectasis.  However, early consolidative changes cannot be completely excluded.    US bilateral legs 6/9/22:  Partially occlusive deep venous thrombosis of the left femoral vein.    MRI abdomen 9/2/22:  1. Evolving post treatment changes in the liver for multifocal intrahepatic cholangiocarcinoma.  Interval radiofrequency ablation of 3 lesions in the left hepatic lobe without local residual disease.  Interval TACE of segment 4A and right hepatic artery noting residual disease in  the right hepatic lobe as detailed above.  2. New enhancing lesions in segment 2/3 as described above.  3. 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  4. Additional stable findings.    MRI 10/19/22:  1. Evolving post treatment changes of multifocal intrahepatic cholangiocarcinoma with residual disease as detailed above.  2. Posttreatment changes of segment 3 from several prior chemoembolizations, two of the ablation cavities have decreased in size and one of the ablation cavities has increased in size while the remaining is stable.  3. Several peripherally enhancing lesions in segment 3 all of which have increased in size compared to most recent study, MRI abdomen pelvis 09/02/2022.  4. Persist enhancing portal venous tumor thrombus with cavernous transformation.  5. Additional findings as detailed above.    CT chest 10/19/22:  1.  Numerous upper lobe predominant centrilobular micro nodules are increased since prior exam.  These may represent areas of infectious bronchiolitis, non infectious bronchiolitis, acute hypersensitivity pneumonitis, pulmonary edema, etc..  The pattern of disease does not appear compatible with hematogenous metastasis (which is expected to be randomly distributed).  However, attention on short-term follow-up imaging is recommended.       Assessment and Plan:     1. Intrahepatic cholangiocarcinoma    2. Secondary malignant neoplasm of liver    3. 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 Acute deep vein thrombosis (DVT) of femoral vein of left lower extremity    6. Stage 3a chronic kidney disease    7. Benign prostatic hyperplasia with urinary frequency    8. Hypertension, essential    9. Neuropathy    10. Hypercalcemia of malignancy    11. Severe anemia    12. Weakness            1-4  - Mr Kellogg is a 73 yo man with advanced intrahepatic cholangiocarcinoma with multiple liver lesions. MSI-low, FGFR2 fusion and  IDH1/2 mutation negative. Started on cis/gem on 12/7/21. Durvalumab added on 8/10/22 after FDA approval. Stopped after he had a reaction on 9/9/22.   - S/p TACE on 12/16/2021, 2/14/22, 4/19/22, 5/18/22, 8/2/22. RF ablation on 7/19/22. TACE on 9/28/22  - MRI 10/19/22 showed progression. Plan was to switched for FOLFOX. Patient was hospitalized for acute appendicitis and strangulated umbilical hernia s/p surgery on 11/2/2022.   - started FOLFOX on 11/21/22. S/p 4 cycles. He has tolerated this fairly well. However, he continues to have sporadic weakness and fatigue to his legs. He is walking but continues to have moderate neuropathy to his feet. He is eating and has a good appetite.   - doing fairly well from a tolerance standpoint; however, ANC today is 900. CA 19-9 came down during previous visit but has increased this visit to 804. He did have a decrease in the dose of the Oxaliplatin which could account for this fluctuation. However, we will continue to monitor  - Once we resume chemotherapy we will keep oxaliplatin at 65 mg/m2 for occasional foot drop. Although that might be related to prior surgery. In addition, we will keep 5FU at 2 g/m2 for elevated bilirubin. Will consider increasing this in the future if needed.    - Zometa finally approved for hypercalcemia of malignancy. Ca was 12 one month ago when we ordered zometa. It is down to 9.4 today. Will continue to hold zometa and monitor calcium level. Pt is agreeable. He will also refrain from taking any multivitamins with calcium and vitamin D  - Hold chemotherapy today due to . Will return in 1 week to resume or earliest availability  - RTC in 1-2 weeks for cycle 5    5, 6.  - continue apixaban  - Hb stable. No evidence for overt bleeding.     7.  - Cr stable, 1.5  - Will give IVF today.    8.  - continue flomax, proscar    9.  - BP well controlled  - c/w current medication    10.  - started before chemo; however, appears to have worsened since  starting the Oxaliplatin. Dose was reduced for last cycle. He tolerated this well. Will most likely continue with this dose reduction.   - Still has to see Neurology. Will have them reach out to him to reschedule.     11.   - secondary to chemo  - continue to monitor    12.  - may be due to hyperbilirubinemia, but bili has improved to 1.7 today  - continue atarax prn itching and will see Dermatology again if needed.   - Would like to revisit sessions with physical therapy. Will place referral.     Follow-up:     RTC in 1-2 weeks.    Pte and family members displayed understanding of the above encounter and treatment plan. All thoughtful questions were answered to their satisfaction. Pte was advised to notify the care team or proceed to the ER if signs and symptoms worsen. Pt was discussed with Dr. Reyes, NEO, PA-C  Physician Assistant Certified  Dept of Hematology/Oncology  PA-C to Dr. Young, Dr. Lane and Dr. Christelle Ni      Route Chart for Scheduling    Med Onc Chart Routing      Follow up with physician 1 week and 2 weeks. Pt is a chemotherapy cancel today. He will need to be rescheduled. Please reschedule patient in 1-2 weeks with repeat lab work and consideration to resume chemotherapy.   Follow up with [facility] . Schedule with Demi or Dr Young in 1-2 weeks at earliest availability for chemotherapy   Infusion scheduling note    Injection scheduling note    Labs CBC, CMP and CA 19-9   Lab interval: every 2 weeks  Repeat in 1-2 weeks prior to next clinic visit and chemotherapy   Imaging    Pharmacy appointment    Other referrals

## 2023-01-30 NOTE — TELEPHONE ENCOUNTER
----- Message from Shani Rothman sent at 1/30/2023 11:19 AM CST -----  Regarding: Pharmacy calling for assistance  Reason for Call: need clinical notes related to genetic testing     Name of caller: Ayesha    Pharmacy name and phone number: [facility]  [phone]   [phone] (FAX)    Ref#  [id number]

## 2023-02-07 NOTE — TELEPHONE ENCOUNTER
Left vm message for patient requesting to be seen today on 02/07/2023, informed pt. that we do have an availability this afternoon @1:15pm, informed him to give us a call back or send us a message through the web portal          ----- Message from Kassy Reynoso sent at 2/7/2023  9:01 AM CST -----  Regarding: Appt Access  Contact: [phone]  Pt is requesting to be seen today for Bunion Pain. Right foot is swollen and red on top.  Offered next avail 02/0/23, but pt states he will be at [facility] today.  Please call.

## 2023-02-07 NOTE — TELEPHONE ENCOUNTER
----- Message from Allie Spencer sent at 2/7/2023  8:34 AM CST -----  Regarding: Consult/Advisory      Name Of Caller: Domonique Kellogg      Contact Preference?:  [phone]        Nature of Call? Patient calling with concerns of a swelling bunion on rt foot and inquiring if it has something to do with chemo treatments.

## 2023-02-07 NOTE — TELEPHONE ENCOUNTER
"Spoke with patient.  He notes a "swelling and painful" bunion on right foot for the past few days.  He is looking for referral to podiatry.      Message routed to nelson (may I enter referral?)  "

## 2023-02-07 NOTE — TELEPHONE ENCOUNTER
Spoke with patient concerning been seen today and informed him we have an availability today @1:15pm w/ Dr. Conley, thanked me and said that he will be in this afternoon          ----- Message from Emperatriz Swenson sent at 2/7/2023  9:24 AM CST -----  SIMONA DALLAS calling regarding Appointment Access PT is scheduled for 02/09/23 but is asking that he be seen today because PT has a bunion on the right foot, he says it's swollen, red and can't put shoes on and trouble walking as well, call back if possible [phone]

## 2023-02-08 NOTE — PROGRESS NOTES
"                                  PROGRESS NOTE    Subjective:       Patient ID: Domonique Kellogg is a 72 y.o. male.    Chief Complaint: follow up for cholangiocarcinoma    Diagnosis:  Advanced intrahepatic cholangiocarcinoma, MSI-low, negative for FGFR2 fusion or IDH1/IDH2 mutations, diagnosed on 11/22/2021.     Molecular Profile:  Guardant 360 11/29/21: +CCND1 amplification. VUS: CATRACHITO [id number]. MSI-high not detected.   Strata: ASXL1 mutation, CCND1 amplification, MSI-low, TMB low.     Oncologic History copied from medical chart:  1. Mr Kellogg is a 72 yo man with CAD, HTN, seizures in 2011 who initially saw me on 11/29/21 for further management of cholangiocarcinoma. He presented with weight loss and diarrhea since July. US 9/22/21 showed "Enlarged, heterogeneous appearance of the liver likely due to multiple underlying hepatic lesions largest measuring 4.8 cm."  MRI abdomen w/wo contrast 10/4/21: "Multiple liver lesions measuring up to 13.3 cm in the right hepatic lobe.  Differential diagnosis includes metastases, fibrolamellar hepatocellular carcinoma, or atypical focal nodular hyperplasia.  Consider biopsy for further evaluation. Thrombosis of the anterior branch of the right portal vein and left hepatic vein.  Nonvisualization of the middle and right hepatic veins, which may be thrombosed as well. Prominent periportal lymph node, which is nonspecific."  EGD and colonoscopy on 10/11/21 were negative for primary malignancies.   He underwent liver lesion biopsy and Y90 mapping on 11/22/21. Pathology showed adenocarcinoma: "Biopsy of the liver mass shows a malignant neoplasm in a fibrotic stroma. Glandular architecture is readily identified with several foci of intraluminal necrosis. Scattered mitotic figures are seen. Neoplastic cells show the following immunophenotype:   Positive: AE1/AE3, CK7, CDX2   Negative: Chromogranin, Synaptophysin, TTF, CK5/6, CK20, HepPar1   The morphology and immunophenotype are " "compatible with adenocarcinoma. Considerations for a primary site include pancreaticobiliary (including intrahepatic cholangiocarcinoma) as well as upper gastrointestinal. Clinical and radiologic correlation is suggested."  Patient presents today with wife for further management. No pain. Initial weight loss has somewhat stabilized. Still has diarrhea. Has not tried imodium yet. +nervous  Mother had kidney cancer at age 64. Maternal grandmother had liver cancer in her 60s. Patient has three children, two daughters and one son.   Discussed palliative chemo with cis/gem  2. PET scan 21 did not show extrahepatic metastases.   3. Cis/gem started on 21. Durvalumab was initially denied by insurance company after GI ASCO, but approved after FDA approval, added 8/10/22. Patient had reaction to durv (wheezing, rigors, shaking, high BP) on . Imfinzi was stopped. Last chemo on 10/21/22. MRI 10/19/22 showed progression. Plan was to switched for FOLFOX. Patient was hospitalized for acute appendicitis and strangulated umbilical hernia s/p surgery on 2022.   4. S/p TACE on 2021, 22, 22, 22, 22  5. S/p RF Ablation on 22. TACE on 22  6. FOLFOX started on 22, s/p 4 cycles    Interval History:   Mr Kellogg returns for cycle 5 FOLFOX. Did not get FOLFOX two weeks ago due to ANC of 900. He had redness of the right great toe yesterday. Went to see podiatry. Got steroids injection which resolved the symptoms. But his right toe is a little red and tender again today.     ECO.     ROS:   A ten-point system review is obtained and negative except for what was stated in the Interval History.     Physical Examination:   Vital signs reviewed.   General: well hydrated, well developed, in no acute distress  HEENT: normocephalic, EOMI, +icteric sclerae, pale conjunctivae  Neck: supple, no JVD  Chest: right chest port site clean  Lungs: clear breath sounds bilaterally, no " wheezing/rales/rhonchi  Heart: RRR, no M/R/G  Abdomen: soft, no tenderness, nondistended. Surgical wound healed.   Ext: no clubbing, cyanosis, edema  Skin: no open wound, ulcers, rash. Small bony lesion to the tibial tuberosity of the R knee. No signs of infection.   Neuro: alert and oriented x 4. Reports moderate tingling and paresthesia to the legs. Decreased overall tactile sensation to the LE, bilateral  Psych: pleasant and appropriate mood and affect    Objective:     Laboratory Data:  Labs reviewed. Hb 8.8. Cr 1.6. Bili 2.4. calculated CrCl 59 mL/min    Imaging Data:  PET 12/6/21:  In this patient with known intrahepatic cholangiocarcinoma, there are multifocal hypermetabolic hepatic lesions in both hepatic lobes.  No evidence of distant metastasis.     Lipomatosis of the ileocecal valve and proximal cecum.     Fat containing umbilical hernia.    MRI Abdomen 1/18/22:  1. Patient with reported cholangiocarcinoma.  Interval progression of hepatic tumor burden with multiple enlarging and new hepatic lesions.  2. Progression of portal venous thrombosis involving the left, right, and main portal veins.  Persistent filling defect within the central hepatic veins concerning for thrombosis.  3. Stable prominent periportal lymph node.    CT chest 3/4/22:  Right pulmonary nodules, unchanged.  Recommend continued follow-up as per clinical protocol.    MRI abdomen 3/14/22:     Interval postprocedural changes status post TACE with slight improvement of tumor burden at treatment sites.  Extensive residual disease throughout both hepatic lobes.  Index lesions as above.     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     Stable appearance of central hepatic veins, underlying thrombosis not excluded.  Consider further evaluation with Doppler ultrasound if clinically warranted.     Stable prominent periportal lymph node.     Right adrenal adenoma.    CT urogram 3/14/22:  Prostatomegaly  which demonstrates mass effect on the posterior bladder.  There is mild diffuse wall thickening of the bladder which may relate to outlet obstruction.     Postprocedural changes of the liver in keeping with recent chemoembolization.  Protocol is not optimized for evaluation of tumor response.  Dedicated multiphase liver CT would be necessary to evaluate liver directed therapy response.    MRI A/P 6/19/22:  - Evolving posttreatment changes in the liver for multifocal intrahepatic cholangiocarcinoma.  No new focal hepatic lesions.  - Persistent portal vein thrombosis, likely tumor thrombus, with cavernous transformation.  - No abdominopelvic metastases.     CT chest 6/15/22:  1. Stable pulmonary nodules measuring up to 0.4 cm.  No definite new or enlarging pulmonary nodules.  2. Scattered regions of opacification within the right lung base with air bronchograms, favored to represent atelectasis.  However, early consolidative changes cannot be completely excluded.    US bilateral legs 6/9/22:  Partially occlusive deep venous thrombosis of the left femoral vein.    MRI abdomen 9/2/22:  1. Evolving post treatment changes in the liver for multifocal intrahepatic cholangiocarcinoma.  Interval radiofrequency ablation of 3 lesions in the left hepatic lobe without local residual disease.  Interval TACE of segment 4A and right hepatic artery noting residual disease in the right hepatic lobe as detailed above.  2. New enhancing lesions in segment 2/3 as described above.  3. 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  4. Additional stable findings.    MRI 10/19/22:  1. Evolving post treatment changes of multifocal intrahepatic cholangiocarcinoma with residual disease as detailed above.  2. Posttreatment changes of segment 3 from several prior chemoembolizations, two of the ablation cavities have decreased in size and one of the ablation cavities has increased in size while the remaining is stable.  3.  Several peripherally enhancing lesions in segment 3 all of which have increased in size compared to most recent study, MRI abdomen pelvis 09/02/2022.  4. Persist enhancing portal venous tumor thrombus with cavernous transformation.  5. Additional findings as detailed above.    CT chest 10/19/22:  1.  Numerous upper lobe predominant centrilobular micro nodules are increased since prior exam.  These may represent areas of infectious bronchiolitis, non infectious bronchiolitis, acute hypersensitivity pneumonitis, pulmonary edema, etc..  The pattern of disease does not appear compatible with hematogenous metastasis (which is expected to be randomly distributed).  However, attention on short-term follow-up imaging is recommended.     Assessment and Plan:     1. Intrahepatic cholangiocarcinoma    2. Secondary malignant neoplasm of liver    3. 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 Acute gout involving toe of right foot, unspecified cause    6. CAD in native artery    7. Hypertension, essential    8. Chemotherapy-induced neutropenia    9. Portal vein thrombosis    10. Acute deep vein thrombosis (DVT) of femoral vein of left lower extremity    11. Neuropathy    12. Stage 3a chronic kidney disease      1-4  - Mr Kellogg is a 71 yo man with advanced intrahepatic cholangiocarcinoma with multiple liver lesions. MSI-low, FGFR2 fusion and IDH1/2 mutation negative. Started on cis/gem on 12/7/21. Durvalumab added on 8/10/22 after FDA approval. Stopped after he had a reaction on 9/9/22.   - S/p TACE on 12/16/2021, 2/14/22, 4/19/22, 5/18/22, 8/2/22. RF ablation on 7/19/22. TACE on 9/28/22  - MRI 10/19/22 showed progression. Plan was to switched for FOLFOX. Patient was hospitalized for acute appendicitis and strangulated umbilical hernia s/p surgery on 11/2/2022.   - started FOLFOX on 11/21/22. S/p 4 cycles. Was getting oxaliplatin at 65 mg/m2 for occasional foot drop and 5FU at  2000 mg/m2 for hyperbilirubinemia  - doing well. Cycle 5 today. Decrease oxaliplatin to 60 mg/m2 and 5FU to 1800 mg/m2 for neutropenia causing treatment dealy  - return in 2 weeks for cycle 6  - restaging in 4 weeks    5.  - having acute gout  - CrCl 59 mL/min  - colchicine 1.2 mg x 1 followed by 0.6 mg one hour later today, followed by 0.6 mg daily starting tomorrow until symptoms resolve  - f/u with PCP re allopurinol for prophylaxis. Messaged Dr Pacheco.     6.  - on pravachol. Continue    7.  - Cr 1.6. messaged Dr Pacheco to see if patient can be switched from HCTZ to other BP meds    8.  - dose reduced chemo    9. 10.  - continue apixaban    11.  - started before chemo; however, appears to have worsened since starting the Oxaliplatin. Dose was reduced  - on cymbalta and gabapentin    12.  - see #7. Messaged Dr Pacheco  Follow-up:     RTC in 2 weeks.    Claudia Young MD  Hematology and Medical Oncology  [facility]      Route Chart for Scheduling    Med Onc Chart Routing      Follow up with physician 4 weeks and 2 months. keep scheduled appointments. see BRADLEY in 6 weeks with labs 1 day prior (local lab) then chemo   Follow up with BRADLEY 2 weeks and 6 weeks.   Infusion scheduling note FOLFOX every 2 weeks, remove pump day 3   Injection scheduling note    Labs CBC, CMP and CA 19-9   Lab interval: every 2 weeks     Imaging    Pharmacy appointment    Other referrals

## 2023-02-08 NOTE — PLAN OF CARE
1420  Patient seated in chair, vss, assessment done.  Port accessed without issue, flushed, blood return noted.  Started D5W @ 25 cc/hr KVO while waiting for leucovorin/ oxaliplatin, and 5FU from pharmacy.  This is D1C6, Dr. Young adjusted dosage for today.  [facility] for safety

## 2023-02-08 NOTE — NURSING
2320  Patient has time left on Oxaliplatin/leucovorin infusion .  Patient also needs to have CADD pump connected to port after infusion completed. Handing patient off to HERMILO Wellington to complete patient's treatment.

## 2023-02-09 NOTE — PLAN OF CARE
"1730 accepted pt care  Pt received ending Oxaliplatin & Leucovorin / me and 5FU pump applied. Tolerated well. Pt educated about ending Oxaliplatin & Leucovorin / me and 5FU pump (trouble shooting, not getting pump wet and handling pump w/ care. Pt instructed to check pump twice a day to ensure that pump says "RUN" and volume is counting down. Pt verbalized and demonstrated understanding. Aware of phone # for [employer] pump company. VSS. 5FU pump connected and infusing without difficulty to established ACW port, blood return noted. Port site secured. Pat to return to clinic 2-10-23 for 1600 for pump DC. Pt discharged with no distress noted and ambulated off unit, via self, w/o event, pleased.  "

## 2023-02-09 NOTE — TELEPHONE ENCOUNTER
----- Message from Nicolas Pacheco MD sent at 2/9/2023 12:43 PM CST -----  Will try to see him soon.    Jun[facility] Staff - Please call patient and schedule patient for an appointment with me. Thank you.        ----- Message -----  From: Claudia Young MD  Sent: 2/8/2023   2:09 PM CST  To: Nicolas Pacheco MD, #    Hi Dr Pacheco,     Mr Kellogg is doing well from cholangiocarcinoma perspective. His Cr is at 1.5-1.6 range. He is on HCTZ 25 mg daily. Are you able to switch to other BP meds that are not diuretics? Also I gave him colchicine for gout attack today. Can you f/u with him and see if you can give him something for prophylaxis? thanks

## 2023-02-09 NOTE — TELEPHONE ENCOUNTER
----- Message from Claudia Young MD sent at 2/8/2023  2:07 PM CST -----  Hi Dr aPcheco,     Mr Kellogg is doing well from cholangiocarcinoma perspective. His Cr is at 1.5-1.6 range. He is on HCTZ 25 mg daily. Are you able to switch to other BP meds that are not diuretics? Also I gave him colchicine for gout attack today. Can you f/u with him and see if you can give him something for prophylaxis? thanks

## 2023-02-10 NOTE — TELEPHONE ENCOUNTER
----- Message from Nicolas Pacheco MD sent at 2/9/2023 12:43 PM CST -----  Will try to see him soon.    Jun[facility] Staff - Please call patient and schedule patient for an appointment with me. Thank you.        ----- Message -----  From: Caludia Young MD  Sent: 2/8/2023   2:09 PM CST  To: Nicolas Pacheco MD, #    Hi Dr Pacheco,     Mr Kellogg is doing well from cholangiocarcinoma perspective. His Cr is at 1.5-1.6 range. He is on HCTZ 25 mg daily. Are you able to switch to other BP meds that are not diuretics? Also I gave him colchicine for gout attack today. Can you f/u with him and see if you can give him something for prophylaxis? thanks

## 2023-02-13 NOTE — TELEPHONE ENCOUNTER
Called pt to offer UC appt for 4pm tomorrow. I left the pt a VM asking for a call back.    [employer] message will be sent as well

## 2023-02-15 NOTE — TELEPHONE ENCOUNTER
----- Message from Nadiya Mccormick sent at 2/15/2023  9:29 AM CST -----  Contact: [phone]  Pt is calling he states he got a message in regards to an appt for yesterday and he states he needs to speak to someone about that and also a prescription please give return call

## 2023-02-15 NOTE — TELEPHONE ENCOUNTER
Called and spoke to pt. Pt trying to schedule appt for medication changes. Pt not tolerating Colchicine. Oncology suggested contacting PCP to switch to Allopurinol. Next available appt isn't until March 21. Pt states he cannot wait until then. Can pt do VV or prefer to see a different provider?

## 2023-02-15 NOTE — TELEPHONE ENCOUNTER
----- Message from Eros Santacruz sent at 2/15/2023  9:43 AM CST -----  Contact: pt  Type:  Sooner Appointment Request    Caller is requesting a sooner appointment.  Caller declined first available appointment listed below.  Caller will not accept being placed on the waitlist and is requesting a message be sent to doctor.  Name of Caller:pt   When is the first available appointment?03/03  Symptoms: Gout concerns  Would the patient rather a call back or a response via [employer]? [facility]  Best Call Back Number:[phone]  Additional Information:   Pt requesting to be seen tomorrow 02/16  Pt is schedule for 03/03

## 2023-02-16 PROBLEM — M10.9 GOUT: Status: ACTIVE | Noted: 2023-01-01

## 2023-02-16 PROBLEM — T45.1X5A CHEMOTHERAPY-INDUCED NEUROPATHY: Status: ACTIVE | Noted: 2023-01-01

## 2023-02-16 PROBLEM — G62.0 CHEMOTHERAPY-INDUCED NEUROPATHY: Status: ACTIVE | Noted: 2023-01-01

## 2023-02-16 PROBLEM — R29.898 WEAKNESS OF LOWER EXTREMITY: Status: RESOLVED | Noted: 2022-01-01 | Resolved: 2023-01-01

## 2023-02-16 PROBLEM — E80.6 HYPERBILIRUBINEMIA: Status: RESOLVED | Noted: 2021-12-21 | Resolved: 2023-01-01

## 2023-02-16 NOTE — PROGRESS NOTES
Subjective:       Patient ID: Domonique Kellogg is a 72 y.o. male.    Chief Complaint: gout concerns and medication issue     patient, same day urgent care presents - concerns for gout in the right foot, medications, medication intolerance.  Developed podagra few days ago.  Seen by his hematologist oncologist and also podiatrist.  Uric acid 9.9.  Symptoms have subsequently resolved.  Was prescribed colchicine but did not tolerate due to nausea vomiting diarrhea.  History of CKD currently 45 EGFR.  Currently in chemotherapy, some fatigue reported.  Last week, his oncologist reached out to me with concerns for 'Cr is at 1.5-1.6 range. He is on HCTZ 25 mg daily. Are you able to switch to other BP meds that are not diuretics? Also I gave him colchicine for gout attack today. Can you f/u with him and see if you can give him something for prophylaxis?'    In general he has lost some weight, diabetes index currently normal.  No longer obesity weight.  Has not seen cardiologist any time recently but no cardiopulmonary symptoms.    Review of Systems   Constitutional:  Positive for fatigue. Negative for appetite change, chills, diaphoresis and fever.   HENT:  Negative for congestion, postnasal drip, rhinorrhea, sore throat and trouble swallowing.    Eyes:  Negative for visual disturbance.   Respiratory:  Negative for cough, choking, chest tightness, shortness of breath and wheezing.    Cardiovascular:  Negative for chest pain and leg swelling.   Gastrointestinal:  Negative for abdominal distention, abdominal pain, diarrhea, nausea and vomiting.   Genitourinary:  Negative for difficulty urinating and hematuria.   Musculoskeletal:  Positive for arthralgias and gait problem. Negative for myalgias.   Skin:  Negative for rash.   Neurological:  Positive for weakness. Negative for light-headedness and headaches.   Psychiatric/Behavioral:  Negative for confusion and dysphoric mood.      Objective:      Physical Exam  Vitals and  nursing note reviewed.   Constitutional:       Appearance: He is well-developed. He is not diaphoretic.   HENT:      Head: Normocephalic and atraumatic.   Eyes:      General: Scleral icterus present.      Conjunctiva/sclera: Conjunctivae normal.   Neck:      Vascular: No carotid bruit.   Cardiovascular:      Rate and Rhythm: Normal rate and regular rhythm.      Heart sounds: Heart sounds are distant. No murmur heard.    No friction rub. No gallop.   Pulmonary:      Effort: Pulmonary effort is normal. No respiratory distress.      Breath sounds: Decreased breath sounds present. No wheezing or rales.   Abdominal:      General: There is no distension.      Tenderness: There is no abdominal tenderness.   Musculoskeletal:      Cervical back: Normal range of motion and neck supple.      Right foot: Decreased range of motion. Deformity present.   Feet:      Right foot:      Skin integrity: No erythema or warmth.      Comments: No evidence of acute gout flare at this time  Skin:     General: Skin is warm and dry.      Findings: No erythema or rash.   Neurological:      Mental Status: He is alert and oriented to person, place, and time.      Cranial Nerves: No cranial nerve deficit.      Motor: No tremor.      Coordination: Coordination normal.      Gait: Gait normal.   Psychiatric:         Behavior: Behavior normal.         Thought Content: Thought content normal.         Judgment: Judgment normal.       Assessment:       1. Gout, unspecified cause, unspecified chronicity, unspecified site    2. Hypertension, essential    3. Stage 3a chronic kidney disease    4. Intrahepatic cholangiocarcinoma    5. Chemotherapy-induced neuropathy    6. CAD in native artery    7. Chronic pulmonary heart disease    8. H/O syncope    9. 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 Acute deep vein thrombosis (DVT) of femoral vein of left lower extremity    12. Thrombocytopenia    13. Long term (current) use of  anticoagulants          Plan:     Medication List with Changes/Refills   Current Medications    ACETAMINOPHEN (TYLENOL) 650 MG TBSR    Take by mouth daily as needed.    APIXABAN (ELIQUIS) 5 MG TAB    Take 1 tablet (5 mg total) by mouth 2 (two) times daily.    DILTIAZEM 2% LIDOCAINE 5% CREAM    Apply pea size amount topically 3 (three) times daily.    DULOXETINE (CYMBALTA) 20 MG CAPSULE    Take 1 capsule (20 mg total) by mouth once daily.    FINASTERIDE (PROSCAR) 5 MG TABLET    Take 1 tablet (5 mg total) by mouth once daily.    GABAPENTIN (NEURONTIN) 300 MG CAPSULE    Take 1 capsule (300 mg total) by mouth every evening.    HYDROCORTISONE 1 % CREAM    Apply topically 2 (two) times daily. for 7 days    HYDROXYZINE HCL (ATARAX) 10 MG TAB    Take 1 tablet (10 mg total) by mouth 3 (three) times daily as needed (itching).    MAGNESIUM OXIDE (MAG-OX) 400 MG (241.3 MG MAGNESIUM) TABLET    Take 400 mg by mouth every evening.    MULTIVITAMIN WITH MINERALS TABLET    Take 1 tablet by mouth every morning.     PRAVASTATIN (PRAVACHOL) 40 MG TABLET    Take 1 tablet (40 mg total) by mouth once daily.    RABEPRAZOLE (ACIPHEX) 20 MG TABLET    Take 1 tablet (20 mg total) by mouth before breakfast.    TAMSULOSIN (FLOMAX) 0.4 MG CAP    TAKE 1 CAPSULE EVERY DAY    TRIAMCINOLONE ACETONIDE 0.1% (KENALOG) 0.1 % CREAM    Apply topically 2 (two) times daily. Apply to the body twice daily for 2 weeks. Do not apply to the face, groin, or folds.   Changed and/or Refilled Medications    Modified Medication Previous Medication    POTASSIUM CHLORIDE SA (K-DUR,KLOR-CON) 20 MEQ TABLET potassium chloride SA (K-DUR,KLOR-CON) 20 MEQ tablet       Take 1 tablet (20 mEq total) by mouth once daily.    Take 2 tablets (40 mEq total) by mouth once daily.   Discontinued Medications    COLCHICINE (COLCRYS) 0.6 MG TABLET    Take 1 tablet (0.6 mg total) by mouth As instructed (take 2 tab (1.2 mg) once, followed by 1 tab (0.6 mg) one hour later today. starting  "tomorrow, take 1 tab daily until symptoms resolve).    HYDROCHLOROTHIAZIDE (HYDRODIURIL) 25 MG TABLET    TAKE 1 TABLET (25 MG TOTAL) BY MOUTH ONCE DAILY.     1. Gout, unspecified cause, unspecified chronicity, unspecified site  Assessment & Plan:  States this is his 1st attack, right MTP.  Did not tolerate colchicine.    Discontinue hydrochlorothiazide and monitor for recurrence.  Will hold off on any additional medications at this time.  Avoid NSAIDs due to anticoagulation and other concurrent disease states.  Could consider prednisone for future attack.    Orders:  -     Uric Acid; Future; Expected date: 02/16/2023    2. Hypertension, essential  Overview:  -HCTZ stopped 2/15/2023 d/t gout attack - monitor and consider other agents    Orders:  -     potassium chloride SA (K-DUR,KLOR-CON) 20 MEQ tablet; Take 1 tablet (20 mEq total) by mouth once daily.  Dispense: 90 tablet; Refill: 0  -     Renal Function Panel; Future; Expected date: 02/16/2023    3. Stage 3a chronic kidney disease  -     Renal Function Panel; Future; Expected date: 02/16/2023    4. Intrahepatic cholangiocarcinoma  Overview:  1.  -11/29/21 - presented with weight loss and diarrhea.   -US 9/22/21 multiple underlying hepatic lesions.   -MRI abdomen w/wo contrast 10/4/21: "Multiple liver lesions measuring up to 13.3 cm in the right hepatic lobe. Thrombosis of the anterior branch of the right portal vein and left hepatic vein.  Nonvisualization of the middle and right hepatic veins, which may be thrombosed as well. Prominent periportal lymph node."   -EGD and colonoscopy on 10/11/21 negative for primary malignancies  -Liver biopsy and Y90 mapping on 11/22/21 - adenocarcinoma: immunophenotype: Positive: AE1/AE3, CK7, CDX2 Negative: Chromogranin, Synaptophysin, TTF, CK5/6, CK20, HepPar1. Morphology and immunophenotype compatible with adenocarcinoma. Deemed by Oncology as primary hepatic or cholangio. Dr. Young discussed palliative chemo with cis/gem with " patient.  2. PET scan 12/6/21 did not show extrahepatic metastases.   3. Cis/gem started on 12/7/21. Durvalumab was initially denied by insurance company after [facility], but approved after FDA approval, added 8/10/22. Patient had reaction to durv (wheezing, rigors, shaking, high BP) on 9/9. Imfinzi was stopped.   4. S/p TACE on 12/16/2021, 2/14/22, 4/19/22, 5/18/22, 8/2/22  5. S/p RF Ablation on 7/19/22 9/23/2022 - Dr. Young interval history -cycle 13 day 1 of gem/cis. Had a reaction to durv (wheezing, rigors, shaking, high BP) on 9/9. Scheduled for TACE on 9/28.      5. Chemotherapy-induced neuropathy    6. CAD in native artery  Overview:  -Cath 2015 on-obstructive, last cardiology visit 10/26/2018  -on pravastatin    Orders:  -     Lipid Panel; Future; Expected date: 02/16/2023  -     Ambulatory referral/consult to Cardiology; Future; Expected date: 02/23/2023    7. Chronic pulmonary heart disease  Overview:  -Noted on echocardiogram 02/16/2015 (PAP 41)  -Echo 12/13/2021 (Heme Onc) PAP 25 nml    Orders:  -     Ambulatory referral/consult to Cardiology; Future; Expected date: 02/23/2023    8. H/O syncope  Overview:  -2015, evaluated by cardiology, no recurrences      9. 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 Acute deep vein thrombosis (DVT) of femoral vein of left lower extremity  Overview:  6/9/2022 - managed by Hematology-Oncology      12. Thrombocytopenia  Overview:  -multifactorial, followed by heme onc      13. Long term (current) use of anticoagulants  Overview:  -DVT, portal vein thrombosis  -Hematology-Oncology managing        See meds, orders, follow up, routing and instructions sections of encounter and AVS. Discussed with patient and provided on AVS.    Lab Results   Component Value Date     02/07/2023    K 3.8 02/07/2023     02/07/2023    BUN 21 02/07/2023     CREATININE 1.6 (H) 02/07/2023     (H) 02/07/2023    HGBA1C 5.6 08/22/2022    MG 1.4 (L) 11/21/2022    AST 70 (H) 02/07/2023    ALT 24 02/07/2023    ALBUMIN 2.3 (L) 02/07/2023    PROT 6.5 02/07/2023    BILITOT 2.4 (H) 02/07/2023    CHOL 139 11/15/2021    HDL 36 (L) 11/15/2021    LDLCALC 92.6 11/15/2021    TRIG 52 11/15/2021    WBC 9.19 02/07/2023    HGB 8.8 (L) 02/07/2023    HCT 28.3 (L) 02/07/2023    HCT 19 (LL) 11/01/2022     02/07/2023    PSA 1.4 11/15/2021    TSH 3.238 10/31/2022    URICACID 9.9 (H) 02/07/2023       Labs in 6 weeks, courtesy copy Dr. Young

## 2023-02-16 NOTE — PATIENT INSTRUCTIONS
See standing or future lab orders and please draw Labs ordered in this encounter - in 6 weeks, thank you.     Information about cholesterol, high blood pressure and healthy diet and activity recommendations can be found at the following links on the Internet:    http://www.nhlbi.nih.gov/health/health-topics/topics/hbc  http://www.nhlbi.nih.gov/health/educational/lose_wt/index.htm  Http://www.nhlbi.nih.gov/files/docs/public/heart/hbp_low.pdf  http://www.heart.org/HEARTORG/  http://diabetes.org/  https://www.cdc.gov/  Https://healthfinder.gov/  https://health.gov/dietaryguidelines/2015/guidelines/  https://health.gov/paguidelines/second-edition/pdf/Physical_Activity_Guidelines_2nd_edition.pdf

## 2023-02-16 NOTE — Clinical Note
Will stop hydrochlorothiazide.  Intolerant to colchicine will stop that as well.  Will see if he has any future attacks, hopefully discontinuing diuretic will help.  Thank you for sending me a message so we could coordinate, Julio

## 2023-02-16 NOTE — ASSESSMENT & PLAN NOTE
States this is his 1st attack, right MTP.  Did not tolerate colchicine.    Discontinue hydrochlorothiazide and monitor for recurrence.  Will hold off on any additional medications at this time.  Avoid NSAIDs due to anticoagulation and other concurrent disease states.  Could consider prednisone for future attack.

## 2023-02-18 NOTE — PROGRESS NOTES
Subjective:      Patient ID: Domonique Kellogg is a 72 y.o. male.    Chief Complaint: Bunions (R bunion area swelling and redness)    Domonique is a 72 y.o. male who presents to the podiatry clinic  with complaint of  right foot pain. Onset of the symptoms was several days ago. Precipitating event: none known. Current symptoms include: ability to bear weight, but with some pain. Aggravating factors: any weight bearing. Symptoms have waxed and waned but are worse overall. Patient has had no prior foot problems. Evaluation to date: none. Treatment to date: none. Patients rates pain 7/10 on pain scale.    Review of Systems   Constitutional: Negative for chills, fever and malaise/fatigue.   HENT:  Negative for hearing loss.    Cardiovascular:  Negative for claudication.   Respiratory:  Negative for shortness of breath.    Skin:  Negative for flushing and rash.   Musculoskeletal:  Positive for arthritis, joint pain and joint swelling. Negative for myalgias.   Neurological:  Negative for loss of balance, numbness, paresthesias and sensory change.   Psychiatric/Behavioral:  Negative for altered mental status.          Objective:      Physical Exam  Vitals reviewed.   Cardiovascular:      Pulses:           Dorsalis pedis pulses are 2+ on the right side and 2+ on the left side.        Posterior tibial pulses are 2+ on the right side and 2+ on the left side.      Comments: No edema noted b/L  Musculoskeletal:         General: Tenderness present.      Comments:   Bunion deformity noted right  Right 1st MPJ swollen and warm  ROM deferred due to pain     Feet:      Right foot:      Protective Sensation: 5 sites tested.  5 sites sensed.      Left foot:      Protective Sensation: 5 sites tested.  5 sites sensed.   Skin:     Comments: Normal skin tugor noted.   No open lesion noted b/L  Skin temp is warm to warm from proximal to distal b/L.  Webspaces clean, dry, and intact     Neurological:      Mental Status: He is alert.       Comments: Gross sensation intact b/L           Assessment:       Encounter Diagnoses   Name Primary?    Bunion     Pain, joint, foot, right Yes         Plan:       Domonique was seen today for bunions.    Diagnoses and all orders for this visit:    Pain, joint, foot, right  -     Uric acid; Future    Bunion  -     Ambulatory referral/consult to Podiatry  -     X-Ray Foot Complete 3 view Right; Future      I counseled the patient on his conditions, their implications and medical management.  X-rays taken and reviewed, no fx or dislocations noted.   Pt advised that pain could be due to inflammation associated with joint arthritis or gout  Injection consisting of a 1:1 mixture of 1% lidocaine plain and dexamethasone given into the right 1st MPJ . Pt tolerated injection well and voiced relief afterwards.   Uric acid levels ordered to help r/o gout  RTC in 2 weeks or sooner if any new pedal problems should arise or if condition worsens.         .

## 2023-02-23 NOTE — PROGRESS NOTES
"                                  PROGRESS NOTE    Subjective:       Patient ID: Domonique Kellogg is a 72 y.o. male.    Chief Complaint: follow up for cholangiocarcinoma    Diagnosis:  Advanced intrahepatic cholangiocarcinoma, MSI-low, negative for FGFR2 fusion or IDH1/IDH2 mutations, diagnosed on 11/22/2021.     Molecular Profile:  Guardant 360 11/29/21: +CCND1 amplification. VUS: CATRACHITO [id number]. MSI-high not detected.   Strata: ASXL1 mutation, CCND1 amplification, MSI-low, TMB low.     Oncologic History copied from medical chart:  1. Mr Kellogg is a 72 yo man with CAD, HTN, seizures in 2011 who initially saw me on 11/29/21 for further management of cholangiocarcinoma. He presented with weight loss and diarrhea since July. US 9/22/21 showed "Enlarged, heterogeneous appearance of the liver likely due to multiple underlying hepatic lesions largest measuring 4.8 cm."  MRI abdomen w/wo contrast 10/4/21: "Multiple liver lesions measuring up to 13.3 cm in the right hepatic lobe.  Differential diagnosis includes metastases, fibrolamellar hepatocellular carcinoma, or atypical focal nodular hyperplasia.  Consider biopsy for further evaluation. Thrombosis of the anterior branch of the right portal vein and left hepatic vein.  Nonvisualization of the middle and right hepatic veins, which may be thrombosed as well. Prominent periportal lymph node, which is nonspecific."  EGD and colonoscopy on 10/11/21 were negative for primary malignancies.   He underwent liver lesion biopsy and Y90 mapping on 11/22/21. Pathology showed adenocarcinoma: "Biopsy of the liver mass shows a malignant neoplasm in a fibrotic stroma. Glandular architecture is readily identified with several foci of intraluminal necrosis. Scattered mitotic figures are seen. Neoplastic cells show the following immunophenotype:   Positive: AE1/AE3, CK7, CDX2   Negative: Chromogranin, Synaptophysin, TTF, CK5/6, CK20, HepPar1   The morphology and immunophenotype are " "compatible with adenocarcinoma. Considerations for a primary site include pancreaticobiliary (including intrahepatic cholangiocarcinoma) as well as upper gastrointestinal. Clinical and radiologic correlation is suggested."  Patient presents today with wife for further management. No pain. Initial weight loss has somewhat stabilized. Still has diarrhea. Has not tried imodium yet. +nervous  Mother had kidney cancer at age 64. Maternal grandmother had liver cancer in her 60s. Patient has three children, two daughters and one son.   Discussed palliative chemo with cis/gem  2. PET scan 12/6/21 did not show extrahepatic metastases.   3. Cis/gem started on 12/7/21. Durvalumab was initially denied by insurance company after GI [facility], but approved after FDA approval, added 8/10/22. Patient had reaction to durv (wheezing, rigors, shaking, high BP) on 9/9. Imfinzi was stopped. Last chemo on 10/21/22. MRI 10/19/22 showed progression. Plan was to switched for FOLFOX. Patient was hospitalized for acute appendicitis and strangulated umbilical hernia s/p surgery on 11/2/2022.   4. S/p TACE on 12/16/2021, 2/14/22, 4/19/22, 5/18/22, 8/2/22  5. S/p RF Ablation on 7/19/22. TACE on 9/28/22  6. FOLFOX started on 11/21/22, s/p 5 cycles    Interval History:   Mr Kellogg returns for cycle 6 FOLFOX. Had a delay in receiving FOLFOX previously due to ANC of 900. We then reduced the dose of chemotherapy, which he has tolerated well and his ANC has recovered. He had redness of the right great toe, for which he saw Podiatry. Got steroids injection which resolved the symptoms. But his right toe was still a little red and tender. This has improved. He continues to have weakness of the lower legs as well but has not fallen. He is eating very well and has a very good appetite. He did have some constipation with some inflammation of his hemorrhoid but this has resolved. His main concern is the weakness of his legs. He is due to have an EMG on 3/23. " Neuropathy is fairly stable.     He is not having worsening pain of the abdomen, fever or chills.     ECO. Presents with his wife today.     ROS:   A ten-point system review is obtained and negative except for what was stated in the Interval History.     Physical Examination:   Vital signs reviewed.   General: well hydrated, well developed, in no acute distress  HEENT: normocephalic, EOMI, +icteric sclerae, pale conjunctivae  Neck: supple, no JVD  Chest: right chest port site clean  Lungs: clear breath sounds bilaterally, no wheezing/rales/rhonchi  Heart: RRR, no M/R/G  Abdomen: soft, no tenderness, nondistended. Surgical wound healed.   Ext: no clubbing, cyanosis, edema  Skin: no open wound, ulcers, rash. Small bony lesion to the tibial tuberosity of the R knee. No signs of infection.   Neuro: alert and oriented x 4. Reports moderate tingling and paresthesia to the legs. Decreased overall tactile sensation to the LE, bilateral  Psych: pleasant and appropriate mood and affect    Objective:     Laboratory Data:  Labs reviewed. Hb 7.9. Cr 1.4. Bili 2.2. calculated CrCl 68.44 mL/min  CA 19-9 836 from 642    Imaging Data:  PET 21:  In this patient with known intrahepatic cholangiocarcinoma, there are multifocal hypermetabolic hepatic lesions in both hepatic lobes.  No evidence of distant metastasis.     Lipomatosis of the ileocecal valve and proximal cecum.     Fat containing umbilical hernia.    MRI Abdomen 22:  1. Patient with reported cholangiocarcinoma.  Interval progression of hepatic tumor burden with multiple enlarging and new hepatic lesions.  2. Progression of portal venous thrombosis involving the left, right, and main portal veins.  Persistent filling defect within the central hepatic veins concerning for thrombosis.  3. Stable prominent periportal lymph node.    CT chest 3/4/22:  Right pulmonary nodules, unchanged.  Recommend continued follow-up as per clinical protocol.    MRI abdomen  3/14/22:     Interval postprocedural changes status post TACE with slight improvement of tumor burden at treatment sites.  Extensive residual disease throughout both hepatic lobes.  Index lesions as above.     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     Stable appearance of central hepatic veins, underlying thrombosis not excluded.  Consider further evaluation with Doppler ultrasound if clinically warranted.     Stable prominent periportal lymph node.     Right adrenal adenoma.    CT urogram 3/14/22:  Prostatomegaly which demonstrates mass effect on the posterior bladder.  There is mild diffuse wall thickening of the bladder which may relate to outlet obstruction.     Postprocedural changes of the liver in keeping with recent chemoembolization.  Protocol is not optimized for evaluation of tumor response.  Dedicated multiphase liver CT would be necessary to evaluate liver directed therapy response.    MRI A/P 6/19/22:  - Evolving posttreatment changes in the liver for multifocal intrahepatic cholangiocarcinoma.  No new focal hepatic lesions.  - Persistent portal vein thrombosis, likely tumor thrombus, with cavernous transformation.  - No abdominopelvic metastases.     CT chest 6/15/22:  1. Stable pulmonary nodules measuring up to 0.4 cm.  No definite new or enlarging pulmonary nodules.  2. Scattered regions of opacification within the right lung base with air bronchograms, favored to represent atelectasis.  However, early consolidative changes cannot be completely excluded.    US bilateral legs 6/9/22:  Partially occlusive deep venous thrombosis of the left femoral vein.    MRI abdomen 9/2/22:  1. Evolving post treatment changes in the liver for multifocal intrahepatic cholangiocarcinoma.  Interval radiofrequency ablation of 3 lesions in the left hepatic lobe without local residual disease.  Interval TACE of segment 4A and right hepatic artery noting residual disease in  the right hepatic lobe as detailed above.  2. New enhancing lesions in segment 2/3 as described above.  3. 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  4. Additional stable findings.    MRI 10/19/22:  1. Evolving post treatment changes of multifocal intrahepatic cholangiocarcinoma with residual disease as detailed above.  2. Posttreatment changes of segment 3 from several prior chemoembolizations, two of the ablation cavities have decreased in size and one of the ablation cavities has increased in size while the remaining is stable.  3. Several peripherally enhancing lesions in segment 3 all of which have increased in size compared to most recent study, MRI abdomen pelvis 09/02/2022.  4. Persist enhancing portal venous tumor thrombus with cavernous transformation.  5. Additional findings as detailed above.    CT chest 10/19/22:  1.  Numerous upper lobe predominant centrilobular micro nodules are increased since prior exam.  These may represent areas of infectious bronchiolitis, non infectious bronchiolitis, acute hypersensitivity pneumonitis, pulmonary edema, etc..  The pattern of disease does not appear compatible with hematogenous metastasis (which is expected to be randomly distributed).  However, attention on short-term follow-up imaging is recommended.     Assessment and Plan:     1. Intrahepatic cholangiocarcinoma    2. Secondary malignant neoplasm of liver    3. 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 Acute gout involving toe of right foot, unspecified cause    6. CAD in native artery    7. Hypertension, essential    8. Chemotherapy-induced neutropenia    9. Portal vein thrombosis    10. Neuropathy    11. Acute deep vein thrombosis (DVT) of femoral vein of left lower extremity    12. Stage 3a chronic kidney disease    13. Severe anemia        1-4, 13.   - Mr Kellogg is a 73 yo man with advanced intrahepatic cholangiocarcinoma  with multiple liver lesions. MSI-low, FGFR2 fusion and IDH1/2 mutation negative. Started on cis/gem on 12/7/21. Durvalumab added on 8/10/22 after FDA approval. Stopped after he had a reaction on 9/9/22.   - S/p TACE on 12/16/2021, 2/14/22, 4/19/22, 5/18/22, 8/2/22. RF ablation on 7/19/22. TACE on 9/28/22  - MRI 10/19/22 showed progression. Plan was to switched for FOLFOX. Patient was hospitalized for acute appendicitis and strangulated umbilical hernia s/p surgery on 11/2/2022.     - started FOLFOX on 11/21/22. S/p 4 cycles. Was getting oxaliplatin at 65 mg/m2 for occasional foot drop and 5FU at 2000 mg/m2 for hyperbilirubinemia  - doing fairly well. Lab work is adequate to continue. CA 19-9 has increased this visit. Will do scans in 2 weeks. Feels fairly but has some weakness and fatigue.   - Given Hgb of 7.9 in the setting of fatigue, will transfuse 1 unit of packed RBCs today. He felt well after his transfusion previously.   - Cycle 5 today. Continue with decreased dose of oxaliplatin to 60 mg/m2 and 5FU to 1800 mg/m2 for neutropenia causing treatment dealy.   - return in 2 weeks for cycle 6  - restaging in 2 weeks    5.  - had acute gout that improved with steroid injections from Podiatry  - CrCl 68 mL/min today.   - Received colchicine 1.2 mg x 1 followed by 0.6 mg one hour later during previous visit, followed by 0.6 mg daily. Symptoms have improved.   - f/u with PCP re allopurinol for prophylaxis, Dr Pacheco.     6.  - on pravachol. Continue    7.  - Cr 1.4, improved. Will continue to f/u with Dr Pacheco  - C/w medication management  - BP well controlled today.     8.  - dose reduced chemo. Will continue today    9, 10.  - continue apixaban  - No overt evidence for bleeding. Hb 7.9 today, will transfuse given his feeling of fatigue and weakness.     11.  - started before chemo; however, appears to have worsened since starting the Oxaliplatin. Dose was reduced. Will continue with this.   - on cymbalta and  gabapentin  - Has EMG scheduled on 3/23    12.  - Cr improved today. Continue to f/u with Dr Pacheco  Follow-up:     RTC in 2 weeks.        Route Chart for Scheduling    Med Onc Chart Routing      Follow up with physician 2 weeks. See Dr. Young in 2 weeks with lab work, scans and next cycle of chemotherapy as scheduled. Thank you   Follow up with BRADLEY 4 weeks. See BRADLEY in 4 weeks with lab work and chemotherapy   Infusion scheduling note    Injection scheduling note    Labs CBC, CMP and CA 19-9   Lab interval: every 2 weeks  Will repeat cbc next week only in preparation for blood transfusion. Please perform cbc and type and screen   Imaging MRI   CT chest already scheduled. Thank you   Pharmacy appointment    Other referrals

## 2023-02-23 NOTE — PLAN OF CARE
1000: Pt arrived FOLFOX/PRBC. Pt A&Ox4. VSS. Labs reviewed. Chemo consent found in chart. All medications review and verbal understanding noted. Port accessed with sterile technique. Positive blood return noted prior to infusion. All premeds given. Will continue to monitor.

## 2023-02-23 NOTE — Clinical Note
Hi team. Can we please order a cbc and type/screen next week only? Will plan for blood transfusion next week if needed. He does not need a clinic  visit next week. Thank you so much

## 2023-02-24 NOTE — TELEPHONE ENCOUNTER
----- Message from Gertrude Ni PA-C sent at 2/23/2023 10:03 AM CST -----  Hi team. Can we please order a cbc and type/screen next week only? Will plan for blood transfusion next week if needed. He does not need a clinic  visit next week. Thank you so much

## 2023-02-25 NOTE — NURSING
0908 pt completed ambulatory infusion, tolerated well. PAC with +blood returned, flushed and hep locked. Pt d/c home.

## 2023-03-06 NOTE — PATIENT INSTRUCTIONS
No changes from a cardiac standpoint    Will get lower extremity ultrasound to look for clot     Follow up in 6 months

## 2023-03-06 NOTE — PROGRESS NOTES
PCP - Nicolas Marlow MD  Subjective:     Domonique Kellogg is a 72 y.o. y.o. male with PMH of non obstructive CAD, HTN, CKD, h/o DVT on apixaban, cholangiocarcinoma on active chemotherapy who presents to clinic to re-establish care with cardiology. He was previously seen by Dr. Montgomery in 2018 at which time he was asymptomatic. It was recommended he continue ASA and statin therapy. No history of MI/ACS/revascularizations. Last echo from  was largely unremarkable. Today he states he has chronic fatigue and mobility impairment he attributes to his chemo and neuropathy. Denies overt dyspnea or chest pain.       ---------------  Cardiac Studies ---------------  TTE 2021:   The left ventricle is normal in size with normal systolic function. The estimated ejection fraction is 60%.  Normal right ventricular size with normal right ventricular systolic function.  Indeterminate left ventricular diastolic function.  Mild left atrial enlargement.  The estimated PA systolic pressure is 25 mmHg.  Normal central venous pressure (3 mmHg).      History:     Social History     Tobacco Use    Smoking status: Former     Packs/day: 1.00     Years: 20.00     Pack years: 20.00     Types: Cigarettes     Quit date: 1987     Years since quittin.2    Smokeless tobacco: Never    Tobacco comments:     quit    Substance Use Topics    Alcohol use: Not Currently     Alcohol/week: 1.0 - 2.0 standard drink     Types: 1 - 2 Glasses of wine per week     Comment: not since liver diagnosis     Family History   Problem Relation Age of Onset    Diabetes Mother     Hypertension Mother     Kidney cancer Mother 61    Cancer Mother         renal & pancreatic    Heart disease Father     No Known Problems Sister     Cancer Maternal Grandmother     Liver disease Maternal Grandmother     Heart disease Paternal Grandfather     Heart disease Brother     No Known Problems Daughter     No Known Problems Son     No Known Problems Sister      "No Known Problems Sister     No Known Problems Sister     No Known Problems Daughter     Blindness Neg Hx     Macular degeneration Neg Hx     Retinal detachment Neg Hx     Glaucoma Neg Hx     Melanoma Neg Hx     Pancreatic cancer Neg Hx     Bladder Cancer Neg Hx     Uterine cancer Neg Hx     Ovarian cancer Neg Hx     Celiac disease Neg Hx     Cirrhosis Neg Hx     Colon cancer Neg Hx     Colon polyps Neg Hx     Crohn's disease Neg Hx     Esophageal cancer Neg Hx     Inflammatory bowel disease Neg Hx     Liver cancer Neg Hx     Rectal cancer Neg Hx     Stomach cancer Neg Hx     Ulcerative colitis Neg Hx        Meds:     Review of patient's allergies indicates:   Allergen Reactions    Imfinzi [durvalumab] Other (See Comments)     1305- Imfinzi ended, pt returned from restroom complained of wheezing SOB and rigors. /100  O2 92% 2L NC O2 applied to pt.   1306-Demerol 25mg given and Solucortef 125mg IVPush given.   1309- Pt /106  02 99%.   1318- Pt still having Rigors additional 25mg of Demerol given.   1418- BP is 133/69 77 HR 99% RA Pt states " I feel much better"       Indomethacin Other (See Comments)     Headache dizziness      Adhesive Rash    Colchicine analogues Nausea And Vomiting     NVD       Current Outpatient Medications:     acetaminophen (TYLENOL) 650 MG TbSR, Take by mouth daily as needed., Disp: , Rfl:     apixaban (ELIQUIS) 5 mg Tab, Take 1 tablet (5 mg total) by mouth 2 (two) times daily., Disp: 180 tablet, Rfl: 3    dexAMETHasone (DECADRON) 4 MG Tab, Take 2 tablets (8 mg total) by mouth once daily. Take on days 2 and 3 after chemo, with food, Disp: 120 tablet, Rfl: 0    diltiaZEM 2% Lidocaine 5% Cream, Apply pea size amount topically 3 (three) times daily., Disp: 30 g, Rfl: 2    DULoxetine (CYMBALTA) 20 MG capsule, Take 1 capsule (20 mg total) by mouth once daily., Disp: 90 capsule, Rfl: 3    finasteride (PROSCAR) 5 mg tablet, Take 1 tablet (5 mg total) by mouth once daily. " "(Patient taking differently: Take 5 mg by mouth every morning.), Disp: 90 tablet, Rfl: 3    furosemide (LASIX) 20 MG tablet, Take 1 tablet (20 mg total) by mouth once daily., Disp: 30 tablet, Rfl: 2    gabapentin (NEURONTIN) 300 MG capsule, Take 1 capsule (300 mg total) by mouth every evening., Disp: 30 capsule, Rfl: 11    hydrOXYzine HCL (ATARAX) 10 MG Tab, Take 1 tablet (10 mg total) by mouth 3 (three) times daily as needed (itching)., Disp: 90 tablet, Rfl: 2    magnesium oxide (MAG-OX) 400 mg (241.3 mg magnesium) tablet, Take 400 mg by mouth every evening., Disp: , Rfl:     multivitamin with minerals tablet, Take 1 tablet by mouth every morning. , Disp: , Rfl:     potassium chloride SA (K-DUR,KLOR-CON) 20 MEQ tablet, Take 1 tablet (20 mEq total) by mouth once daily., Disp: 90 tablet, Rfl: 0    pravastatin (PRAVACHOL) 40 MG tablet, TAKE 1 TABLET EVERY DAY, Disp: 90 tablet, Rfl: 3    RABEprazole (ACIPHEX) 20 mg tablet, Take 1 tablet (20 mg total) by mouth before breakfast., Disp: 90 tablet, Rfl: 3    tamsulosin (FLOMAX) 0.4 mg Cap, TAKE 1 CAPSULE EVERY DAY, Disp: 90 capsule, Rfl: 0    triamcinolone acetonide 0.1% (KENALOG) 0.1 % cream, Apply topically 2 (two) times daily. Apply to the body twice daily for 2 weeks. Do not apply to the face, groin, or folds., Disp: 80 g, Rfl: 0    hydrocortisone 1 % cream, Apply topically 2 (two) times daily. for 7 days, Disp: 30 g, Rfl: 1    10 point ROS performed and negative except as stated in HPI     Objective:   /71 (BP Location: Left arm, Patient Position: Sitting, BP Method: Medium (Automatic))   Pulse 73   Ht 5' 8" (1.727 m)   Wt 106.4 kg (234 lb 9.1 oz)   SpO2 99%   BMI 35.67 kg/m²     Physical Exam:   Constitutional: no acute distress  HEENT: NCAT, EOMI, no scleral icterus  Cardiovascular: Regular rate and rhythm, no murmurs appreciated. 2+ carotid, radial, DP pulses bilaterally    Pulmonary: Clear to auscultation bilaterally   Abdomen: nontender, non-distended "   Neuro: alert and oriented, no focal deficits  Extremities: warm, no edema   MSK: no deformities  Integument: intact, no rashes         Labs:     Lab Results   Component Value Date     02/22/2023    K 4.2 02/22/2023     (H) 02/22/2023    CO2 24 02/22/2023    BUN 25 (H) 02/22/2023    CREATININE 1.4 02/22/2023    ANIONGAP 7 (L) 02/22/2023     Lab Results   Component Value Date    HGBA1C 5.6 08/22/2022     No results found for: BNP, BNPTRIAGEBLO    Lab Results   Component Value Date    WBC 3.63 (L) 03/01/2023    HGB 8.8 (L) 03/01/2023    HCT 27.6 (L) 03/01/2023    HCT 19 (LL) 11/01/2022    PLT 96 (L) 03/01/2023    GRAN 2.6 03/01/2023    GRAN 71.0 03/01/2023     Lab Results   Component Value Date    CHOL 139 11/15/2021    HDL 36 (L) 11/15/2021    LDLCALC 92.6 11/15/2021    TRIG 52 11/15/2021       Lab Results   Component Value Date     02/22/2023    K 4.2 02/22/2023     (H) 02/22/2023    CO2 24 02/22/2023    BUN 25 (H) 02/22/2023    CREATININE 1.4 02/22/2023    ANIONGAP 7 (L) 02/22/2023     Lab Results   Component Value Date    HGBA1C 5.6 08/22/2022     No results found for: BNP, BNPTRIAGEBLO Lab Results   Component Value Date    WBC 3.63 (L) 03/01/2023    HGB 8.8 (L) 03/01/2023    HCT 27.6 (L) 03/01/2023    HCT 19 (LL) 11/01/2022    PLT 96 (L) 03/01/2023    GRAN 2.6 03/01/2023    GRAN 71.0 03/01/2023     Lab Results   Component Value Date    CHOL 139 11/15/2021    HDL 36 (L) 11/15/2021    LDLCALC 92.6 11/15/2021    TRIG 52 11/15/2021            Assessment     1. Calcification of coronary artery    2. CAD in native artery    3. Hypertension, essential    4. Mixed hyperlipidemia    5. Chronic pulmonary heart disease    6. Bilateral carotid artery stenosis    7. Deep vein thrombosis (DVT) of femoral vein of left lower extremity, unspecified chronicity    8. Thrombocytopenia    9. Long term (current) use of anticoagulants    10. Stage 3a chronic kidney disease    11. RAHEEL (obstructive sleep apnea)     12. Aortic atherosclerosis        Plan:   72 y.o. y.o. male with PMH of non obstructive CAD, HTN, CKD, h/o DVT on apixaban, cholangiocarcinoma on active chemotherapy who presents to clinic to re-establish care with cardiology.      Coronary artery disease: Only luminal irregularities on previous cath. No new anginal symptoms. Given his comorbid cancer diagnosis, anemia and already on anticoagulation with apixaban, do not think it would be of much benefit to restart aspirin therapy at this time. Additionally his last LDL was >70, however given his hepatobiliary malignancy and pre existing transaminitis will not intensify his statin therapy at this time.     Hypertension: Reasonable control at this time.     Lower extremity swelling: Unilateral, same leg as prior DVT. Will repeat lower extremity ultrasound.     Follow up in 6 months.       Buzz Dick MD  [facility]  Cardiovascular Disease, PGY-V

## 2023-03-07 NOTE — PROGRESS NOTES
I have reviewed the notes, assessments, and/or procedures performed by fellow, I concur with her/his documentation of Domonique Kellogg.

## 2023-03-08 NOTE — PROGRESS NOTES
"                                  PROGRESS NOTE    Subjective:       Patient ID: Domonique Kellogg is a 72 y.o. male.    Chief Complaint: follow up for cholangiocarcinoma    Diagnosis:  Advanced intrahepatic cholangiocarcinoma, MSI-low, negative for FGFR2 fusion or IDH1/IDH2 mutations, diagnosed on 11/22/2021.     Molecular Profile:  Guardant 360 11/29/21: +CCND1 amplification. VUS: CATRACHITO [id number]. MSI-high not detected.   Strata: ASXL1 mutation, CCND1 amplification, MSI-low, TMB low.     Oncologic History copied from medical chart:  1. Mr Kellogg is a 70 yo man with CAD, HTN, seizures in 2011 who initially saw me on 11/29/21 for further management of cholangiocarcinoma. He presented with weight loss and diarrhea since July. US 9/22/21 showed "Enlarged, heterogeneous appearance of the liver likely due to multiple underlying hepatic lesions largest measuring 4.8 cm."  MRI abdomen w/wo contrast 10/4/21: "Multiple liver lesions measuring up to 13.3 cm in the right hepatic lobe.  Differential diagnosis includes metastases, fibrolamellar hepatocellular carcinoma, or atypical focal nodular hyperplasia.  Consider biopsy for further evaluation. Thrombosis of the anterior branch of the right portal vein and left hepatic vein.  Nonvisualization of the middle and right hepatic veins, which may be thrombosed as well. Prominent periportal lymph node, which is nonspecific."  EGD and colonoscopy on 10/11/21 were negative for primary malignancies.   He underwent liver lesion biopsy and Y90 mapping on 11/22/21. Pathology showed adenocarcinoma: "Biopsy of the liver mass shows a malignant neoplasm in a fibrotic stroma. Glandular architecture is readily identified with several foci of intraluminal necrosis. Scattered mitotic figures are seen. Neoplastic cells show the following immunophenotype:   Positive: AE1/AE3, CK7, CDX2   Negative: Chromogranin, Synaptophysin, TTF, CK5/6, CK20, HepPar1   The morphology and immunophenotype are " "compatible with adenocarcinoma. Considerations for a primary site include pancreaticobiliary (including intrahepatic cholangiocarcinoma) as well as upper gastrointestinal. Clinical and radiologic correlation is suggested."  Patient presents today with wife for further management. No pain. Initial weight loss has somewhat stabilized. Still has diarrhea. Has not tried imodium yet. +nervous  Mother had kidney cancer at age 64. Maternal grandmother had liver cancer in her 60s. Patient has three children, two daughters and one son.   Discussed palliative chemo with cis/gem  2. PET scan 21 did not show extrahepatic metastases.   3. Cis/gem started on 21. Durvalumab was initially denied by insurance company after GI ASC[facility], but approved after FDA approval, added 8/10/22. Patient had reaction to durv (wheezing, rigors, shaking, high BP) on . Imfinzi was stopped. Last chemo on 10/21/22. MRI 10/19/22 showed progression. Plan was to switched for FOLFOX. Patient was hospitalized for acute appendicitis and strangulated umbilical hernia s/p surgery on 2022.   4. S/p TACE on 2021, 22, 22, 22, 22  5. S/p RF Ablation on 22. TACE on 22  6. FOLFOX started on 22, s/p 6 cycles  7. Restaging MRI showed progression.    Interval History:   Mr Kellogg returns for follow up. Since last visit, he had restaging MRI which unfortunately showed progression of disease. He reports worsening fatigue as well as new left leg swelling. He is otherwise doing well, neuropathy is stable.     ECO. Presents with his wife today.     ROS:   A ten-point system review is obtained and negative except for what was stated in the Interval History.     Physical Examination:   Vital signs reviewed.   General: well hydrated, well developed, in no acute distress  HEENT: normocephalic, EOMI, +icteric sclerae, pale conjunctivae  Neck: supple, no JVD  Chest: right chest port site clean  Lungs: clear breath " sounds bilaterally, no wheezing/rales/rhonchi  Heart: RRR, no M/R/G  Abdomen: soft, no tenderness, nondistended. Surgical wound healed.   Ext: no clubbing, cyanosis. Left lower leg edema, no tenderness  Skin: no open wound, ulcers, rash. Small bony lesion to the tibial tuberosity of the R knee. No signs of infection.   Neuro: alert and oriented x 4. Reports moderate tingling and paresthesia to the legs. Decreased overall tactile sensation to the LE, bilateral  Psych: pleasant and appropriate mood and affect    Objective:     Laboratory Data:  Labs reviewed.Hgb 8.7, bili 2.3  CA 19-9 674 from 836     Imaging Data:  PET 12/6/21:  In this patient with known intrahepatic cholangiocarcinoma, there are multifocal hypermetabolic hepatic lesions in both hepatic lobes.  No evidence of distant metastasis.     Lipomatosis of the ileocecal valve and proximal cecum.     Fat containing umbilical hernia.    MRI Abdomen 1/18/22:  1. Patient with reported cholangiocarcinoma.  Interval progression of hepatic tumor burden with multiple enlarging and new hepatic lesions.  2. Progression of portal venous thrombosis involving the left, right, and main portal veins.  Persistent filling defect within the central hepatic veins concerning for thrombosis.  3. Stable prominent periportal lymph node.    CT chest 3/4/22:  Right pulmonary nodules, unchanged.  Recommend continued follow-up as per clinical protocol.    MRI abdomen 3/14/22:     Interval postprocedural changes status post TACE with slight improvement of tumor burden at treatment sites.  Extensive residual disease throughout both hepatic lobes.  Index lesions as above.     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     Stable appearance of central hepatic veins, underlying thrombosis not excluded.  Consider further evaluation with Doppler ultrasound if clinically warranted.     Stable prominent periportal lymph node.     Right adrenal  adenoma.    CT urogram 3/14/22:  Prostatomegaly which demonstrates mass effect on the posterior bladder.  There is mild diffuse wall thickening of the bladder which may relate to outlet obstruction.     Postprocedural changes of the liver in keeping with recent chemoembolization.  Protocol is not optimized for evaluation of tumor response.  Dedicated multiphase liver CT would be necessary to evaluate liver directed therapy response.    MRI A/P 6/19/22:  - Evolving posttreatment changes in the liver for multifocal intrahepatic cholangiocarcinoma.  No new focal hepatic lesions.  - Persistent portal vein thrombosis, likely tumor thrombus, with cavernous transformation.  - No abdominopelvic metastases.     CT chest 6/15/22:  1. Stable pulmonary nodules measuring up to 0.4 cm.  No definite new or enlarging pulmonary nodules.  2. Scattered regions of opacification within the right lung base with air bronchograms, favored to represent atelectasis.  However, early consolidative changes cannot be completely excluded.    US bilateral legs 6/9/22:  Partially occlusive deep venous thrombosis of the left femoral vein.    MRI abdomen 9/2/22:  1. Evolving post treatment changes in the liver for multifocal intrahepatic cholangiocarcinoma.  Interval radiofrequency ablation of 3 lesions in the left hepatic lobe without local residual disease.  Interval TACE of segment 4A and right hepatic artery noting residual disease in the right hepatic lobe as detailed above.  2. New enhancing lesions in segment 2/3 as described above.  3. 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  4. Additional stable findings.    MRI 10/19/22:  1. Evolving post treatment changes of multifocal intrahepatic cholangiocarcinoma with residual disease as detailed above.  2. Posttreatment changes of segment 3 from several prior chemoembolizations, two of the ablation cavities have decreased in size and one of the ablation cavities has  increased in size while the remaining is stable.  3. Several peripherally enhancing lesions in segment 3 all of which have increased in size compared to most recent study, MRI abdomen pelvis 09/02/2022.  4. Persist enhancing portal venous tumor thrombus with cavernous transformation.  5. Additional findings as detailed above.    CT chest 10/19/22:  1.  Numerous upper lobe predominant centrilobular micro nodules are increased since prior exam.  These may represent areas of infectious bronchiolitis, non infectious bronchiolitis, acute hypersensitivity pneumonitis, pulmonary edema, etc..  The pattern of disease does not appear compatible with hematogenous metastasis (which is expected to be randomly distributed).  However, attention on short-term follow-up imaging is recommended.    MRI Abdomen 3/7/23:  EXAMINATION:  MRI ABDOMEN-PELVIS W W/O CONTRAST (XPD)     CLINICAL HISTORY:  Metastatic disease evaluation;  Intrahepatic bile duct carcinoma     TECHNIQUE:  Multiplanar multisequence images of the abdomen before and after administration of 10 mL Gadavist intravenous contrast.     COMPARISON:  CT abdomen pelvis with contrast 11/01/2022 and MRI abdomen pelvis with without contrast 10/19/2022.     FINDINGS:  Inferior Thorax: Cardiomegaly.     Liver: Normal size of liver.  Post treatment change of multiple prior chemoembolization of intrahepatic cholangiocarcinoma.  Interval increase in number and size of multiple liver lesions.  Index lesions as detailed below.     Index lesion 1: Post ablation change of segment 3 lesion with interval decrease in size of the ablation cavity measuring 1.7 cm (series 19, image 29), previously 2.2 cm.  Increased size of enhancing peripheral nodules.     Index lesion 2: Post ablation change of segment 3 lesion with stable size of the ablation cavity measuring 2.4 cm (series 19, image 39), previously 2.4 cm.     Index lesion 3: Postop ablation change of segment 3 lesion with interval decreased  size of the ablation cavity measuring 2.5 cm (series 19, image 40), previously 2.8 cm.     Index lesion 4: Increased size of subcentimeter enhancing nodule in hepatic segment 8 measuring 0.9 cm (series 19, image 33), previously 0.6 cm.     Index lesion 5: Increased size of peripherally enhancing segment 4 lesion measuring 4.8 cm (series 19, image 47), previously 4.3 cm.  Interval increased thickness and nodularity of the regions of peripheral enhancement.     Index lesion 6: Increased size of heterogeneous enhancing lesion in segment 3 measuring 4.7 cm (series 19, image 43), previously 3.3 cm.     Index lesions 7: Increased size of hepatic segment 2 heterogeneously enhancing lesion with interval capsular retraction measuring 4.8 cm (series 19, image 29), previously 3.0 cm.     Persistent enhancing tumor thrombus in the main and right portal veins.  The umbilical vein is patent.     Gallbladder: The gallbladder is contracted.     Bile Ducts: No dilatation.     Pancreas: No mass. No peripancreatic fat stranding.     Spleen: Normal.     Adrenals: Stable right adrenal gland adenoma.  Left adrenal gland is unremarkable.     Kidneys/Ureters: Normal enhancement.  No mass or hydroureteronephrosis.  Few bilateral subcentimeter T2 hyperintense foci, likely representing renal cysts.     Bladder: There is mild circumferential bladder wall thickening.  The prostate is enlarged and indents into the inferior aspect of the bladder, similar to prior.     GI Tract/Mesentery (imaged portion): No evidence of bowel obstruction or inflammation.     Peritoneal Space: No free air.  Small volume abdominopelvic free fluid.     Retroperitoneum: No pathologically enlarged nodes.     Abdominal wall: Mild body wall edema, most pronounced over the left flank.     Vasculature: No aneurysm.     Bones: Normal marrow signal.     Impression:     1. Evolving treatment changes is patient with multifocal cholangiocarcinoma.  2. Postop change of segment 3  prior chemoembolizations with interval increased size of peripheral enhancing nodules of one ablation cavity consistent with residual disease.  Remaining ablation cavities are stable or decreased in size compared to most recent MRI from 10/19/2022.  3. Interval increase in size and number of multiple additional liver lesions, consistent with worsening metastatic disease.  4. Enhancing portal venous tumor thrombus, similar to prior.  5. Prostatomegaly and circumferential bladder wall thickening, likely relating to chronic bladder outlet obstruction.  6. Small volume of abdominopelvic ascites.  This report was flagged in Epic as abnormal.     Electronically signed by resident: Sadaf Finch  Date:                                            03/07/2023  Time:                                           13:47     Assessment and Plan:     1. Intrahepatic cholangiocarcinoma    2. Secondary malignant neoplasm of liver    3. 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 Chronic gout involving toe of right foot without tophus, unspecified cause    6. CAD in native artery    7. Hypertension, essential    8. Chemotherapy-induced neutropenia    9. Portal vein thrombosis    10. Acute deep vein thrombosis (DVT) of femoral vein of left lower extremity    11. Neuropathy    12. Stage 3a chronic kidney disease          1-4, 13.   - Mr Kellogg is a 73 yo man with advanced intrahepatic cholangiocarcinoma with multiple liver lesions. MSI-low, FGFR2 fusion and IDH1/2 mutation negative. Started on cis/gem on 12/7/21. Durvalumab added on 8/10/22 after FDA approval. Stopped after he had a reaction on 9/9/22.   - S/p TACE on 12/16/2021, 2/14/22, 4/19/22, 5/18/22, 8/2/22. RF ablation on 7/19/22. TACE on 9/28/22  - MRI 10/19/22 showed progression. Plan was to switched for FOLFOX. Patient was hospitalized for acute appendicitis and strangulated umbilical hernia s/p surgery on 11/2/2022.   - started  FOLFOX on 11/21/22. S/p 6 cycles.   - restaging MRI Abd on 3/7/23 showed progression of disease  - liver lesions have not drastically increased in size, will proceed with dose reduced oxaliplatin today but then plan to transition to next line of treatment  - considered irinotecan for third-line, however he is a UGT1A1 poor metabolizer and bilirubin is already 2.3  - will submit for gemcitabine-abraxane. [facility] information sheets provided and consent signed in clinic today.    5.  - had acute gout that improved with steroid injections from Podiatry  - f/u with PCP re allopurinol for prophylaxis, Dr Pacheco.     6.  - on pravachol. Continue    7.  - Cr 1.3, improved. Will continue to f/u with Dr Pacheco  - C/w medication management  - BP well controlled today.     8.  - dose reduced chemo. Will continue today    9, 10.  - He has worsening left leg edema, concern for progression of known DVT  - will check US, if DVT has worsened, will transition to lovenox     11.  - started before chemo; however, appears to have worsened since starting the Oxaliplatin. Dose was reduced.   - on cymbalta and gabapentin  - Has EMG scheduled on 3/23    12.  - Cr improved today. Continue to f/u with Dr Pacheco  Follow-up:     RTC in 2 weeks.    Discussed with Dr. Eddy,   PGY-V  Hematology/Oncology Fellow    ATTESTATION:    I have personally seen, examined and talked to the patient. I have reviewed Dr Cho's note and agreed with the findings, assessment and plan.     Discussed progression of disease. Will change to gem/abraxane. We discussed if gem/abraxane does not work, irinotecan remains as an option but I don't recommend it due to his elevated bilirubin and he is a poor metabolizer. We had a very zan discussion re the prognosis. They understand. We will continue with chemo. Return in 2 weeks to start gem/abraxane. He is sleeping a lot. Discussed checking ammonia to see if he may benefit from lactulose. He is  having diarrhea now hence would not want to take lactulose even if ammonia is high. Will get US of L leg. If new DVT need to switch from eliquis to lovenox. Will get him a cane.     Claudia Young MD  Hematology and Medical Oncology  [facility]    Route Chart for Scheduling    Med Onc Chart Routing  Urgent    Follow up with physician 4 weeks and 2 months. urgent left leg US. please schedule and let patient know. verify with PA re gem/abraxane. labs (CBC, CMP, CA 19-9) 1 day prior to visit/chemo. see BRADLEY in 2 weeks with labs then gem/abraxane. see me in 4 weeks with labs then chemo   Follow up with BRADLEY 2 weeks and 6 weeks.   Infusion scheduling note gem/abraxane every 2 weeks   Injection scheduling note    Labs CBC, CMP and CA 19-9   Lab interval: every 2 weeks     Imaging Other   stat US leg   Pharmacy appointment    Other referrals

## 2023-03-08 NOTE — PLAN OF CARE
DISCONTINUE OFF PATHWAY REGIMEN - Other            Oxaliplatin (Eloxatin)       Leucovorin       Fluorouracil       Fluorouracil     **Always confirm dose/schedule in your pharmacy ordering system**    REASON: Disease Progression  PRIOR TREATMENT:   TREATMENT RESPONSE: Progressive Disease (PD)    START OFF PATHWAY REGIMEN - Other            Nab-paclitaxel (protein bound) (Abraxane)       Gemcitabine     **Always confirm dose/schedule in your pharmacy ordering system**    Patient Characteristics:  Intent of Therapy:  Non-Curative / Palliative Intent, Discussed with Patient

## 2023-03-08 NOTE — PROGRESS NOTES
Social Work Consult    Name:Domonique Kellogg  : 1950  MRN: [id number]    Referral:COLTON LOFTON received consult from Dr. Yougn. Order for cane is in. KIRSTY called patient, wife answered. She had no preference in agencies. KIRSTY faxed order and paperwork to [facility].    KIRSTY will follow up. KIRSTY direct number provided to patients wife, Estrellita.

## 2023-03-09 NOTE — TELEPHONE ENCOUNTER
"----- Message from Romeo Kirkpatrick sent at 3/9/2023  1:17 PM CST -----  Reschedule Existing Appointment        Appt Date: 3/23    Type of appt: F/u    Physician: Junior    Reason for rescheduling?  Requesting to r/s for 3/22 or 3/24    Caller: Self    Contact Preference: [phone] (home)               Additional Information:  "Thank you for all that you do for our patients"     "

## 2023-03-16 PROBLEM — N17.9 ACUTE KIDNEY INJURY: Status: ACTIVE | Noted: 2023-01-01

## 2023-03-16 NOTE — PROGRESS NOTES
"Urgent Care Oncology Visit    Date and Time: 03/16/2023 9:38 AM   Patient MRN: [id number]   Chief Complaint:   Chief Complaint   Patient presents with    Intrahepatic cholangiocarcinoma     Reason for Urgent Care Visit: fatigue, weakness, edema  Intervention: additional referrals  Dispo: return to clinic as previous  UrgOnc appointment addressed via: [facility] message  This UrgOnc visit was in person  Time spent handling UC issue:  50 mins    Was this virtual or in person UrgOnc visit appropriate? no this has been a chronic concern for this patient, as established prior to his cancer diagnosis. Could most likely have had a similar result through standard f/u visit.                                        PROGRESS NOTE    Subjective:       Patient ID: Domonique Kellogg is a 72 y.o. male.    Chief Complaint: follow up for cholangiocarcinoma    Diagnosis:  Advanced intrahepatic cholangiocarcinoma, MSI-low, negative for FGFR2 fusion or IDH1/IDH2 mutations, diagnosed on 11/22/2021.     Molecular Profile:  Guardant 360 11/29/21: +CCND1 amplification. VUS: CATRACHITO [id number]. MSI-high not detected.   Strata: ASXL1 mutation, CCND1 amplification, MSI-low, TMB low.     Oncologic History copied from medical chart:  1. Mr Kellogg is a 72 yo man with CAD, HTN, seizures in 2011 who initially saw me on 11/29/21 for further management of cholangiocarcinoma. He presented with weight loss and diarrhea since July. US 9/22/21 showed "Enlarged, heterogeneous appearance of the liver likely due to multiple underlying hepatic lesions largest measuring 4.8 cm."  MRI abdomen w/wo contrast 10/4/21: "Multiple liver lesions measuring up to 13.3 cm in the right hepatic lobe.  Differential diagnosis includes metastases, fibrolamellar hepatocellular carcinoma, or atypical focal nodular hyperplasia.  Consider biopsy for further evaluation. Thrombosis of the anterior branch of the right portal vein and left hepatic vein.  Nonvisualization of the middle and right " "hepatic veins, which may be thrombosed as well. Prominent periportal lymph node, which is nonspecific."  EGD and colonoscopy on 10/11/21 were negative for primary malignancies.   He underwent liver lesion biopsy and Y90 mapping on 11/22/21. Pathology showed adenocarcinoma: "Biopsy of the liver mass shows a malignant neoplasm in a fibrotic stroma. Glandular architecture is readily identified with several foci of intraluminal necrosis. Scattered mitotic figures are seen. Neoplastic cells show the following immunophenotype:   Positive: AE1/AE3, CK7, CDX2   Negative: Chromogranin, Synaptophysin, TTF, CK5/6, CK20, HepPar1   The morphology and immunophenotype are compatible with adenocarcinoma. Considerations for a primary site include pancreaticobiliary (including intrahepatic cholangiocarcinoma) as well as upper gastrointestinal. Clinical and radiologic correlation is suggested."  Patient presents today with wife for further management. No pain. Initial weight loss has somewhat stabilized. Still has diarrhea. Has not tried imodium yet. +nervous  Mother had kidney cancer at age 64. Maternal grandmother had liver cancer in her 60s. Patient has three children, two daughters and one son.   Discussed palliative chemo with cis/gem  2. PET scan 12/6/21 did not show extrahepatic metastases.   3. Cis/gem started on 12/7/21. Durvalumab was initially denied by insurance company after GI ASCO, but approved after FDA approval, added 8/10/22. Patient had reaction to durv (wheezing, rigors, shaking, high BP) on 9/9. Imfinzi was stopped. Last chemo on 10/21/22. MRI 10/19/22 showed progression. Plan was to switched for FOLFOX. Patient was hospitalized for acute appendicitis and strangulated umbilical hernia s/p surgery on 11/2/2022.   4. S/p TACE on 12/16/2021, 2/14/22, 4/19/22, 5/18/22, 8/2/22  5. S/p RF Ablation on 7/19/22. TACE on 9/28/22  6. FOLFOX started on 11/21/22, s/p 6 cycles  7. Restaging MRI showed progression.    Interval " History:   Mr Kellogg returns for follow up. Since last visit, he had restaging MRI which unfortunately showed progression of disease. He reports worsening fatigue as well as new left leg swelling. He is otherwise doing well, neuropathy is stable.     ECO. Presents with his wife today.     ROS:   A ten-point system review is obtained and negative except for what was stated in the Interval History.     Physical Examination:   Vital signs reviewed.   General: well hydrated, well developed, in no acute distress  HEENT: normocephalic, EOMI, +icteric sclerae, pale conjunctivae  Neck: supple, no JVD  Chest: right chest port site clean  Lungs: clear breath sounds bilaterally, no wheezing/rales/rhonchi  Heart: RRR, no M/R/G  Abdomen: soft, mild distension to the abdomen. Surgical wound healed.   Ext: no clubbing, cyanosis. Bilateral lower leg edema, no tenderness.  Skin: no open wound, ulcers, rash. No signs of infection.   Neuro: alert and oriented x 4. Reports moderate tingling and paresthesia to the legs. Decreased overall tactile sensation to the LE, bilateral  Psych: pleasant and appropriate mood and affect    Objective:     Laboratory Data:  Lab work pending from today.       Imaging Data:  PET 21:  In this patient with known intrahepatic cholangiocarcinoma, there are multifocal hypermetabolic hepatic lesions in both hepatic lobes.  No evidence of distant metastasis.     Lipomatosis of the ileocecal valve and proximal cecum.     Fat containing umbilical hernia.    MRI Abdomen 22:  1. Patient with reported cholangiocarcinoma.  Interval progression of hepatic tumor burden with multiple enlarging and new hepatic lesions.  2. Progression of portal venous thrombosis involving the left, right, and main portal veins.  Persistent filling defect within the central hepatic veins concerning for thrombosis.  3. Stable prominent periportal lymph node.    CT chest 3/4/22:  Right pulmonary nodules, unchanged.   Recommend continued follow-up as per clinical protocol.    MRI abdomen 3/14/22:     Interval postprocedural changes status post TACE with slight improvement of tumor burden at treatment sites.  Extensive residual disease throughout both hepatic lobes.  Index lesions as above.     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     Stable appearance of central hepatic veins, underlying thrombosis not excluded.  Consider further evaluation with Doppler ultrasound if clinically warranted.     Stable prominent periportal lymph node.     Right adrenal adenoma.    CT urogram 3/14/22:  Prostatomegaly which demonstrates mass effect on the posterior bladder.  There is mild diffuse wall thickening of the bladder which may relate to outlet obstruction.     Postprocedural changes of the liver in keeping with recent chemoembolization.  Protocol is not optimized for evaluation of tumor response.  Dedicated multiphase liver CT would be necessary to evaluate liver directed therapy response.    MRI A/P 6/19/22:  - Evolving posttreatment changes in the liver for multifocal intrahepatic cholangiocarcinoma.  No new focal hepatic lesions.  - Persistent portal vein thrombosis, likely tumor thrombus, with cavernous transformation.  - No abdominopelvic metastases.     CT chest 6/15/22:  1. Stable pulmonary nodules measuring up to 0.4 cm.  No definite new or enlarging pulmonary nodules.  2. Scattered regions of opacification within the right lung base with air bronchograms, favored to represent atelectasis.  However, early consolidative changes cannot be completely excluded.    US bilateral legs 6/9/22:  Partially occlusive deep venous thrombosis of the left femoral vein.    MRI abdomen 9/2/22:  1. Evolving post treatment changes in the liver for multifocal intrahepatic cholangiocarcinoma.  Interval radiofrequency ablation of 3 lesions in the left hepatic lobe without local residual disease.  Interval  TACE of segment 4A and right hepatic artery noting residual disease in the right hepatic lobe as detailed above.  2. New enhancing lesions in segment 2/3 as described above.  3. 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  4. Additional stable findings.    MRI 10/19/22:  1. Evolving post treatment changes of multifocal intrahepatic cholangiocarcinoma with residual disease as detailed above.  2. Posttreatment changes of segment 3 from several prior chemoembolizations, two of the ablation cavities have decreased in size and one of the ablation cavities has increased in size while the remaining is stable.  3. Several peripherally enhancing lesions in segment 3 all of which have increased in size compared to most recent study, MRI abdomen pelvis 09/02/2022.  4. Persist enhancing portal venous tumor thrombus with cavernous transformation.  5. Additional findings as detailed above.    CT chest 10/19/22:  1.  Numerous upper lobe predominant centrilobular micro nodules are increased since prior exam.  These may represent areas of infectious bronchiolitis, non infectious bronchiolitis, acute hypersensitivity pneumonitis, pulmonary edema, etc..  The pattern of disease does not appear compatible with hematogenous metastasis (which is expected to be randomly distributed).  However, attention on short-term follow-up imaging is recommended.    MRI Abdomen 3/7/23:  EXAMINATION:  MRI ABDOMEN-PELVIS W W/O CONTRAST (XPD)     CLINICAL HISTORY:  Metastatic disease evaluation;  Intrahepatic bile duct carcinoma     TECHNIQUE:  Multiplanar multisequence images of the abdomen before and after administration of 10 mL Gadavist intravenous contrast.     COMPARISON:  CT abdomen pelvis with contrast 11/01/2022 and MRI abdomen pelvis with without contrast 10/19/2022.     FINDINGS:  Inferior Thorax: Cardiomegaly.     Liver: Normal size of liver.  Post treatment change of multiple prior chemoembolization of intrahepatic  cholangiocarcinoma.  Interval increase in number and size of multiple liver lesions.  Index lesions as detailed below.     Index lesion 1: Post ablation change of segment 3 lesion with interval decrease in size of the ablation cavity measuring 1.7 cm (series 19, image 29), previously 2.2 cm.  Increased size of enhancing peripheral nodules.     Index lesion 2: Post ablation change of segment 3 lesion with stable size of the ablation cavity measuring 2.4 cm (series 19, image 39), previously 2.4 cm.     Index lesion 3: Postop ablation change of segment 3 lesion with interval decreased size of the ablation cavity measuring 2.5 cm (series 19, image 40), previously 2.8 cm.     Index lesion 4: Increased size of subcentimeter enhancing nodule in hepatic segment 8 measuring 0.9 cm (series 19, image 33), previously 0.6 cm.     Index lesion 5: Increased size of peripherally enhancing segment 4 lesion measuring 4.8 cm (series 19, image 47), previously 4.3 cm.  Interval increased thickness and nodularity of the regions of peripheral enhancement.     Index lesion 6: Increased size of heterogeneous enhancing lesion in segment 3 measuring 4.7 cm (series 19, image 43), previously 3.3 cm.     Index lesions 7: Increased size of hepatic segment 2 heterogeneously enhancing lesion with interval capsular retraction measuring 4.8 cm (series 19, image 29), previously 3.0 cm.     Persistent enhancing tumor thrombus in the main and right portal veins.  The umbilical vein is patent.     Gallbladder: The gallbladder is contracted.     Bile Ducts: No dilatation.     Pancreas: No mass. No peripancreatic fat stranding.     Spleen: Normal.     Adrenals: Stable right adrenal gland adenoma.  Left adrenal gland is unremarkable.     Kidneys/Ureters: Normal enhancement.  No mass or hydroureteronephrosis.  Few bilateral subcentimeter T2 hyperintense foci, likely representing renal cysts.     Bladder: There is mild circumferential bladder wall thickening.   The prostate is enlarged and indents into the inferior aspect of the bladder, similar to prior.     GI Tract/Mesentery (imaged portion): No evidence of bowel obstruction or inflammation.     Peritoneal Space: No free air.  Small volume abdominopelvic free fluid.     Retroperitoneum: No pathologically enlarged nodes.     Abdominal wall: Mild body wall edema, most pronounced over the left flank.     Vasculature: No aneurysm.     Bones: Normal marrow signal.     Impression:     1. Evolving treatment changes is patient with multifocal cholangiocarcinoma.  2. Postop change of segment 3 prior chemoembolizations with interval increased size of peripheral enhancing nodules of one ablation cavity consistent with residual disease.  Remaining ablation cavities are stable or decreased in size compared to most recent MRI from 10/19/2022.  3. Interval increase in size and number of multiple additional liver lesions, consistent with worsening metastatic disease.  4. Enhancing portal venous tumor thrombus, similar to prior.  5. Prostatomegaly and circumferential bladder wall thickening, likely relating to chronic bladder outlet obstruction.  6. Small volume of abdominopelvic ascites.  This report was flagged in Epic as abnormal.     Electronically signed by resident: Sadaf Finch  Date:                                            03/07/2023  Time:                                           13:47     Assessment and Plan:     1. Intrahepatic cholangiocarcinoma    2. Weakness    3. Edema, unspecified type    4. Hyperbilirubinemia    5. Acute kidney injury    2-5. Clinically, he is having concerns with LE swelling, weakness. This has been an ongoing concern as well. He has not see neurology yet but is scheduled to see them on 3/23 for the EMG. Cause of his symptoms is most likely due to multiple factors, including pre-existing condition, prior chemotherapy, worsening of the cancer. He is eating well and his kidney function has been  fairly stable. It think it will be good to get an ECHO to make sure that he is not having new onset HF. He is also due to see Cardiology soon as well.     Given the cause is not definitive, we will perform some tests and place some referrals to help support him and try to gauge how to help. Pt is agreeable.     Will give 500mL of IVF to help with increased Cr. Will have patient hold the Lasix as well.   Will order RUQ US due to elevated bilirubin. Pt advised to monitor for fever or chills over the weekend and proceed to the ER if needed. Could most likely be due to the cancer but will get an US to evaluate  Will place  orders for weekly lab work and physical therapy. Will see Neurology as well  Will order an ECHO    1.  - Mr Kellogg is a 73 yo man with advanced intrahepatic cholangiocarcinoma with multiple liver lesions. MSI-low, FGFR2 fusion and IDH1/2 mutation negative. Started on cis/gem on 12/7/21. Durvalumab added on 8/10/22 after FDA approval. Stopped after he had a reaction on 9/9/22.   - S/p TACE on 12/16/2021, 2/14/22, 4/19/22, 5/18/22, 8/2/22. RF ablation on 7/19/22. TACE on 9/28/22  - MRI 10/19/22 showed progression. Plan was to switched for FOLFOX. Patient was hospitalized for acute appendicitis and strangulated umbilical hernia s/p surgery on 11/2/2022.   - started FOLFOX on 11/21/22. S/p 6 cycles.   - restaging MRI Abd on 3/7/23 showed progression of disease  - liver lesions have not drastically increased in size, will proceed with dose reduced oxaliplatin today but then plan to transition to next line of treatment  - considered irinotecan for third-line, however he is a UGT1A1 poor metabolizer and bilirubin was already 2.3 at the time and has increased to 3.7   - awating PA for gemcitabine-abraxane. [facility] information sheets provided and consent signed in clinic.  - Will schedule him for clinic visit with Dr. Young prior to cycle 1 for treatment discussion    5.    - Cr worsened today. Continue to  f/u with Dr Pacheco  - Advised patient to hold the Lasix after review of his lab work. Will give 500mL of IVF. Will only give 1/2 due to worsening edema  Follow-up:     RTC in 1 week for UC f/u. Will start cycle 1 of chemotherapy with [place]/Abraxane on 3/29, after confirmation of prior auth.    Discussed with Dr. Tarango,    I spent 50 minutes reviewing the chart, interpreting laboratory result and imaging data, coordinating patient's care, with at least 50% of the time on face-to-face counseling.      Route Chart for Scheduling    Med Onc Chart Routing      Follow up with physician . See Dr. Young on 3/29 @ 11am with lab work and cycle 1 day 1 of [place]/Abraxane. Treatment is authoriized. Thank you   Follow up with [facility] . F/u as scheduled with Demi on 3/22 for UC clinic visit   Infusion scheduling note    Injection scheduling note    Labs CBC, CMP and CA 19-9   Scheduling:  Preferred lab:  Lab interval: every 2 weeks  Please schedule lab work prior to clinic visit and  cycle 1 on 3/29   Imaging    Pharmacy appointment    Other referrals

## 2023-03-16 NOTE — Clinical Note
Hi team. Can we please schedule an ECHO and RUQ US for the above patient? Please schedule 1/2 L of IVF with normal saline over an hour tomorrow at [facility] or [facility] if possible. I have placed the orders. Thank you so much  Sebastien, I have placed HH orders for weekly lab work and physical therapy. Can you please let me know if we need anything else for this? Thank you so much

## 2023-03-16 NOTE — Clinical Note
Hi team. Can we check on the prior auth for [place]/Abraxane, so he can start treatment? Thank you so much

## 2023-03-17 NOTE — TELEPHONE ENCOUNTER
Reschedule appointment for this afternoon. Unable to leave a message. Phone kept ringing.         ---- Message from Iggy Munoz sent at 3/17/2023 11:19 AM CDT -----  Regarding: Reschedule  Name of Who is Calling: SIMONA DALLAS [[id number]]           What is the request in detail: Patient is requesting a call back to reschedule infusion.  Please assist.           Can the clinic reply by [facility]: No           [facility] [phone]

## 2023-03-17 NOTE — TELEPHONE ENCOUNTER
----- Message from Gertrude Ni PA-C sent at 3/16/2023  9:18 PM CDT -----  Hi team. Can we check on the prior auth for [place]/Abraxane, so he can start treatment? Thank you so much

## 2023-03-17 NOTE — PLAN OF CARE
500 mL of 0.9% Sodium Chloride Bolus administered, no reaction. Patient tolerated well. Patient accompanied by spouse for discharge home. Port A Cath 20 gauge 3/4 inch needle deaccessed/ removed. No apparent distress noted. Discharge instructions given to patient. Patient understands instructions.

## 2023-03-20 NOTE — PROGRESS NOTES
Social Work Consult    Name:Domonique Kellogg  : 1950  MRN: [id number]    Referral:Home Health     SW received consult from Gertrude Ni PA-C. Internal HH orders for patient are in. SW contacted patient who confirmed [facility] was their choice. SW reached out to [facility], referral was not received. SW faxed orders and documentation to [facility].

## 2023-03-22 NOTE — PROGRESS NOTES
"Urgent Care Oncology Visit    Date and Time: 03/22/2023 9:38 AM   Patient MRN: [id number]   Chief Complaint:   Chief Complaint   Patient presents with    Intrahepatic cholangiocarcinoma     Reason for Urgent Care Visit: fatigue, weakness, edema  Intervention: additional referrals  Dispo: return to clinic as previous  UrgOnc appointment addressed via: [facility] message  This UrgOnc visit was in person  Time spent handling UC issue:  50 mins    Was this virtual or in person UrgOnc visit appropriate? no this has been a chronic concern for this patient, as established prior to his cancer diagnosis. Could most likely have had a similar result through standard f/u visit.                                        PROGRESS NOTE    Subjective:       Patient ID: Domonique Kellogg is a 72 y.o. male.    Chief Complaint: follow up for cholangiocarcinoma    Diagnosis:  Advanced intrahepatic cholangiocarcinoma, MSI-low, negative for FGFR2 fusion or IDH1/IDH2 mutations, diagnosed on 11/22/2021.     Molecular Profile:  Guardant 360 11/29/21: +CCND1 amplification. VUS: CATRACHITO D1637H. MSI-high not detected.   Strata: ASXL1 mutation, CCND1 amplification, MSI-low, TMB low.     Oncologic History copied from medical chart:  1. Mr Kellogg is a 72 yo man with CAD, HTN, seizures in 2011 who initially saw me on 11/29/21 for further management of cholangiocarcinoma. He presented with weight loss and diarrhea since July. US 9/22/21 showed "Enlarged, heterogeneous appearance of the liver likely due to multiple underlying hepatic lesions largest measuring 4.8 cm."  MRI abdomen w/wo contrast 10/4/21: "Multiple liver lesions measuring up to 13.3 cm in the right hepatic lobe.  Differential diagnosis includes metastases, fibrolamellar hepatocellular carcinoma, or atypical focal nodular hyperplasia.  Consider biopsy for further evaluation. Thrombosis of the anterior branch of the right portal vein and left hepatic vein.  Nonvisualization of the middle and right " "hepatic veins, which may be thrombosed as well. Prominent periportal lymph node, which is nonspecific."  EGD and colonoscopy on 10/11/21 were negative for primary malignancies.   He underwent liver lesion biopsy and Y90 mapping on 11/22/21. Pathology showed adenocarcinoma: "Biopsy of the liver mass shows a malignant neoplasm in a fibrotic stroma. Glandular architecture is readily identified with several foci of intraluminal necrosis. Scattered mitotic figures are seen. Neoplastic cells show the following immunophenotype:   Positive: AE1/AE3, CK7, CDX2   Negative: Chromogranin, Synaptophysin, TTF, CK5/6, CK20, HepPar1   The morphology and immunophenotype are compatible with adenocarcinoma. Considerations for a primary site include pancreaticobiliary (including intrahepatic cholangiocarcinoma) as well as upper gastrointestinal. Clinical and radiologic correlation is suggested."  Patient presents today with wife for further management. No pain. Initial weight loss has somewhat stabilized. Still has diarrhea. Has not tried imodium yet. +nervous  Mother had kidney cancer at age 64. Maternal grandmother had liver cancer in her 60s. Patient has three children, two daughters and one son.   Discussed palliative chemo with cis/gem  2. PET scan 12/6/21 did not show extrahepatic metastases.   3. Cis/gem started on 12/7/21. Durvalumab was initially denied by insurance company after GI [facility], but approved after FDA approval, added 8/10/22. Patient had reaction to durv (wheezing, rigors, shaking, high BP) on 9/9. Imfinzi was stopped. Last chemo on 10/21/22. MRI 10/19/22 showed progression. Plan was to switched for FOLFOX. Patient was hospitalized for acute appendicitis and strangulated umbilical hernia s/p surgery on 11/2/2022.   4. S/p TACE on 12/16/2021, 2/14/22, 4/19/22, 5/18/22, 8/2/22  5. S/p RF Ablation on 7/19/22. TACE on 9/28/22  6. FOLFOX started on 11/21/22, s/p 6 cycles  7. Restaging MRI showed progression.    Interval " History:   Mr Kellogg returns for follow up. Since last visit, he had restaging MRI which unfortunately showed progression of disease. He reports worsening fatigue as well as new left leg swelling. He is otherwise doing ok, neuropathy is stable. He also has concerns with shakiness with writing and forgetfulness, which is new for him. He states that when he goes to write something, his hands begin to shake; however, without a deliberate movement, he has complete stability of his hands. No slurred speech or facial droop but wife does report that he is starting to forget things. No fever, chills, worsening abdominal pain, diarrhea, nausea or vomiting. He is eating and has a fair appetite. He has not started the new chemotherapy regimen, as we are awaiting prior auth.     ECO. Presents with his wife today.     ROS:   A ten-point system review is obtained and negative except for what was stated in the Interval History.     Physical Examination:   Vital signs reviewed.   General: well hydrated, well developed, in no acute distress. In wheelchair today  HEENT: normocephalic, EOMI, +icteric sclerae, pale conjunctivae  Neck: supple, no JVD  Chest: right chest port site clean  Lungs: clear breath sounds bilaterally, no wheezing/rales/rhonchi  Heart: RRR, no M/R/G  Abdomen: soft, mild distension to the abdomen. Surgical wound healed.   Ext: no clubbing, cyanosis. Bilateral lower leg edema, no tenderness.  Skin: no open wound, ulcers, rash. No signs of infection.   Neuro: alert and oriented x 4. Reports moderate tingling and paresthesia to the legs. Decreased overall tactile sensation to the LE, bilateral. No significant signs for asterixis. Has increased tremor with writing.  movements without deliberate movements.   Psych: pleasant and appropriate mood and affect    Objective:     Laboratory Data:  Lab work reviewed from today.   Bili 5.7, Cr 1.8      Imaging Data:  Abdominal US: 3/17/2023    Impression:      Stones and sludge in the gallbladder with no sonographic evidence for acute cholecystitis.     Multifocal hepatic masses in this patient with known cholangiocarcinoma.     Scattered ascites.    PET 12/6/21:  In this patient with known intrahepatic cholangiocarcinoma, there are multifocal hypermetabolic hepatic lesions in both hepatic lobes.  No evidence of distant metastasis.     Lipomatosis of the ileocecal valve and proximal cecum.     Fat containing umbilical hernia.    MRI Abdomen 1/18/22:  1. Patient with reported cholangiocarcinoma.  Interval progression of hepatic tumor burden with multiple enlarging and new hepatic lesions.  2. Progression of portal venous thrombosis involving the left, right, and main portal veins.  Persistent filling defect within the central hepatic veins concerning for thrombosis.  3. Stable prominent periportal lymph node.    CT chest 3/4/22:  Right pulmonary nodules, unchanged.  Recommend continued follow-up as per clinical protocol.    MRI abdomen 3/14/22:     Interval postprocedural changes status post TACE with slight improvement of tumor burden at treatment sites.  Extensive residual disease throughout both hepatic lobes.  Index lesions as above.     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     Stable appearance of central hepatic veins, underlying thrombosis not excluded.  Consider further evaluation with Doppler ultrasound if clinically warranted.     Stable prominent periportal lymph node.     Right adrenal adenoma.    CT urogram 3/14/22:  Prostatomegaly which demonstrates mass effect on the posterior bladder.  There is mild diffuse wall thickening of the bladder which may relate to outlet obstruction.     Postprocedural changes of the liver in keeping with recent chemoembolization.  Protocol is not optimized for evaluation of tumor response.  Dedicated multiphase liver CT would be necessary to evaluate liver directed therapy  response.    MRI A/P 6/19/22:  - Evolving posttreatment changes in the liver for multifocal intrahepatic cholangiocarcinoma.  No new focal hepatic lesions.  - Persistent portal vein thrombosis, likely tumor thrombus, with cavernous transformation.  - No abdominopelvic metastases.     CT chest 6/15/22:  1. Stable pulmonary nodules measuring up to 0.4 cm.  No definite new or enlarging pulmonary nodules.  2. Scattered regions of opacification within the right lung base with air bronchograms, favored to represent atelectasis.  However, early consolidative changes cannot be completely excluded.    US bilateral legs 6/9/22:  Partially occlusive deep venous thrombosis of the left femoral vein.    MRI abdomen 9/2/22:  1. Evolving post treatment changes in the liver for multifocal intrahepatic cholangiocarcinoma.  Interval radiofrequency ablation of 3 lesions in the left hepatic lobe without local residual disease.  Interval TACE of segment 4A and right hepatic artery noting residual disease in the right hepatic lobe as detailed above.  2. New enhancing lesions in segment 2/3 as described above.  3. 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  4. Additional stable findings.    MRI 10/19/22:  1. Evolving post treatment changes of multifocal intrahepatic cholangiocarcinoma with residual disease as detailed above.  2. Posttreatment changes of segment 3 from several prior chemoembolizations, two of the ablation cavities have decreased in size and one of the ablation cavities has increased in size while the remaining is stable.  3. Several peripherally enhancing lesions in segment 3 all of which have increased in size compared to most recent study, MRI abdomen pelvis 09/02/2022.  4. Persist enhancing portal venous tumor thrombus with cavernous transformation.  5. Additional findings as detailed above.    CT chest 10/19/22:  1.  Numerous upper lobe predominant centrilobular micro nodules are increased  since prior exam.  These may represent areas of infectious bronchiolitis, non infectious bronchiolitis, acute hypersensitivity pneumonitis, pulmonary edema, etc..  The pattern of disease does not appear compatible with hematogenous metastasis (which is expected to be randomly distributed).  However, attention on short-term follow-up imaging is recommended.    MRI Abdomen 3/7/23:  EXAMINATION:  MRI ABDOMEN-PELVIS W W/O CONTRAST (XPD)     CLINICAL HISTORY:  Metastatic disease evaluation;  Intrahepatic bile duct carcinoma     TECHNIQUE:  Multiplanar multisequence images of the abdomen before and after administration of 10 mL Gadavist intravenous contrast.     COMPARISON:  CT abdomen pelvis with contrast 11/01/2022 and MRI abdomen pelvis with without contrast 10/19/2022.     FINDINGS:  Inferior Thorax: Cardiomegaly.     Liver: Normal size of liver.  Post treatment change of multiple prior chemoembolization of intrahepatic cholangiocarcinoma.  Interval increase in number and size of multiple liver lesions.  Index lesions as detailed below.     Index lesion 1: Post ablation change of segment 3 lesion with interval decrease in size of the ablation cavity measuring 1.7 cm (series 19, image 29), previously 2.2 cm.  Increased size of enhancing peripheral nodules.     Index lesion 2: Post ablation change of segment 3 lesion with stable size of the ablation cavity measuring 2.4 cm (series 19, image 39), previously 2.4 cm.     Index lesion 3: Postop ablation change of segment 3 lesion with interval decreased size of the ablation cavity measuring 2.5 cm (series 19, image 40), previously 2.8 cm.     Index lesion 4: Increased size of subcentimeter enhancing nodule in hepatic segment 8 measuring 0.9 cm (series 19, image 33), previously 0.6 cm.     Index lesion 5: Increased size of peripherally enhancing segment 4 lesion measuring 4.8 cm (series 19, image 47), previously 4.3 cm.  Interval increased thickness and nodularity of the  regions of peripheral enhancement.     Index lesion 6: Increased size of heterogeneous enhancing lesion in segment 3 measuring 4.7 cm (series 19, image 43), previously 3.3 cm.     Index lesions 7: Increased size of hepatic segment 2 heterogeneously enhancing lesion with interval capsular retraction measuring 4.8 cm (series 19, image 29), previously 3.0 cm.     Persistent enhancing tumor thrombus in the main and right portal veins.  The umbilical vein is patent.     Gallbladder: The gallbladder is contracted.     Bile Ducts: No dilatation.     Pancreas: No mass. No peripancreatic fat stranding.     Spleen: Normal.     Adrenals: Stable right adrenal gland adenoma.  Left adrenal gland is unremarkable.     Kidneys/Ureters: Normal enhancement.  No mass or hydroureteronephrosis.  Few bilateral subcentimeter T2 hyperintense foci, likely representing renal cysts.     Bladder: There is mild circumferential bladder wall thickening.  The prostate is enlarged and indents into the inferior aspect of the bladder, similar to prior.     GI Tract/Mesentery (imaged portion): No evidence of bowel obstruction or inflammation.     Peritoneal Space: No free air.  Small volume abdominopelvic free fluid.     Retroperitoneum: No pathologically enlarged nodes.     Abdominal wall: Mild body wall edema, most pronounced over the left flank.     Vasculature: No aneurysm.     Bones: Normal marrow signal.     Impression:     1. Evolving treatment changes is patient with multifocal cholangiocarcinoma.  2. Postop change of segment 3 prior chemoembolizations with interval increased size of peripheral enhancing nodules of one ablation cavity consistent with residual disease.  Remaining ablation cavities are stable or decreased in size compared to most recent MRI from 10/19/2022.  3. Interval increase in size and number of multiple additional liver lesions, consistent with worsening metastatic disease.  4. Enhancing portal venous tumor thrombus,  similar to prior.  5. Prostatomegaly and circumferential bladder wall thickening, likely relating to chronic bladder outlet obstruction.  6. Small volume of abdominopelvic ascites.  This report was flagged in Epic as abnormal.     Electronically signed by resident: Sadaf Finch  Date:                                            03/07/2023  Time:                                           13:47     Assessment and Plan:     1. Intrahepatic cholangiocarcinoma    2. Weakness    3. Memory changes    4. Dysgraphia    5. Secondary malignant neoplasm of liver    6. 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 Hyperbilirubinemia    9. Edema, unspecified type    10. Acute kidney injury    11. CAD in native artery    12. Hypertension, essential      Clinically, he is having concerns with LE swelling, weakness. This has been an ongoing concern as well. He has not see neurology yet but is scheduled to see them on 3/23 for the EMG. Cause of his symptoms is most likely due to multiple factors, including pre-existing condition, prior chemotherapy, worsening of the cancer. He is eating well and his kidney function has been fairly stable. It think it will be good to get an ECHO to make sure that he is not having new onset HF. He is also due to see Cardiology soon as well.     Given the cause is not definitive, we will perform some tests and place some referrals to help support him and try to gauge how to help. Pt is agreeable.     A long discussion was conducted with the patient and wife. His overall clinical status and prognosis are concerning. He continues to decline physically and is having new onset dysgraphia with increasing forgetfulness. Although he does appear alert and oriented today with fluent speech and he is eating well, I am concerned that we are losing our window to effectively treat the cancer. In addition, his lab work is also worsening. His bilirubin has increased to 5.7  today with a Cr of 1.8. An RUQ US was performed that displayed gallstones/sludge without evidence for cholecystitis. However, his most recent CT did not display significant evidence for biliary dilation that would suggest a significant blockage within the biliary tree. It is most likely that the rise in his bilirubin is due to worsening tumor burden, to hopefully get treated with chemotherapy. There are concerns with starting this as well, given that he continues to have swelling of his legs with lingering neuropathy from previous chemotherapy. We have discussed trying him [place]/abraxane to see if we are able to get some control over the cancer, understanding that this regimen can also worsen the edema and neuropathy. However, given the aggressiveness of his cancer, we are running out of options to treat him. Understanding the risks and benefits, we would like to give 1 cycle and see how he does. We will lower the dose of the chemotherapy due to the rise in his bilirubin; however, if his bili continues to trend upward, we will not be able to proceed with further chemotherapy and will then have to discuss possible Hospice.     I have discussed his case in-depth with Dr. Young and the patient. He would like to try the chemotherapy once we gain PA. Other lab work is stable.     1, 5-8.  - Mr Kellogg is a 71 yo man with advanced intrahepatic cholangiocarcinoma with multiple liver lesions. MSI-low, FGFR2 fusion and IDH1/2 mutation negative. Started on cis/gem on 12/7/21. Durvalumab added on 8/10/22 after FDA approval. Stopped after he had a reaction on 9/9/22.   - S/p TACE on 12/16/2021, 2/14/22, 4/19/22, 5/18/22, 8/2/22. RF ablation on 7/19/22. TACE on 9/28/22  - MRI 10/19/22 showed progression. Plan was to switched for FOLFOX. Patient was hospitalized for acute appendicitis and strangulated umbilical hernia s/p surgery on 11/2/2022.   - started FOLFOX on 11/21/22. S/p 6 cycles.   - restaging MRI Abd on 3/7/23 showed  progression of disease  - liver lesions have not drastically increased in size, will proceed with dose reduced oxaliplatin today but then plan to transition to next line of treatment  - considered irinotecan for third-line, however he is a UGT1A1 poor metabolizer and bilirubin was already 2.3 at the time and increased to 3.7 Today, his bili is 5.7    - Doing fairly. Continues to have weakness, swelling and numbness to the lower extremities. Now he has concerns with difficulty writing and forgetfulness.   - Lab work repeated today displays an increased bili to 5.7. No fever or chills. RUQ US displays gallstones and sludge without evidence for cholecystitis. However, there is significant concern that this represents cancer progression. Given the current status of his cancer and prognosis, we would like to try a cycle of chemotherapy to see if we can gain some acute control. Pt is agreeable.   - Gained PA for gem/abraxane q2 weeks. [facility] information sheets provided and consent signed in clinic.  - Scheduled for cycle 1 tomorrow, on 3/23/2023  - Will see Neurology on 3/23/2023 for weakness and neuropathy as well    Proceed with cycle 1 tomorrow and RTC in 2 weeks for cycle 2, if his tolerance is good and his lab work improves. Again, if his bili continues to trend up and his PS continues to decline, we will discuss hospice.     10.  - Cr worsened today. Continue to f/u with Dr Pacheco  - Advised patient to continue to hold the Lasix after review of his lab work.     2-4.   - Will order an MRI brain  Follow-up:     RTC in 2 weeks for cycle 2 of chemotherapy with [place]/Abraxane. Will f/u in 1 week for toxicity check. Pt was strongly advised to watch for fever, chills, nausea, vomiting or worsening abdominal pain, he needs to go to the ER right away.     Discussed with Dr. Tarango,    I spent 50 minutes reviewing the chart, interpreting laboratory result and imaging data, coordinating patient's care, with at  least 50% of the time on face-to-face counseling.      Route Chart for Scheduling    Med Onc Chart Routing      Follow up with physician 2 weeks. See Dr Young in 2 weeks with lab work and cycle 2 of [place]/Abraxane   Follow up with [facility] 4 weeks. See BRADLEY in 4 weeks with lab work and cycle 3 of [place]/Abraxane   Infusion scheduling note    Injection scheduling note    Labs CBC, CMP and CA 19-9   Scheduling:  Preferred lab:  Lab interval: every 2 weeks     Imaging    Pharmacy appointment    Other referrals

## 2023-03-23 NOTE — PROGRESS NOTES
As Dr. Fisher requested me to do is to inform Mr. Kellogg about his test results. I called and informed Mr. Kellogg that his US of the leg is normal. He verbalized and understood.

## 2023-03-23 NOTE — PLAN OF CARE
1522-Labs, hx, and medications reviewed, pt meets parameters for treatment today. Assessment completed and plan of care reviewed. Pt verbalized understanding. PAC accessed with no complications. Pt voices no new complaints or concerns, will continue to monitor for safety.

## 2023-03-23 NOTE — PLAN OF CARE
1815-Pt tolerated Abraxane and Gemzar infusions well today, no complaints or complications. VSS through duration of treatment. Pt aware to call provider with any questions or concerns and is aware of upcoming appts. Pt ambulatory from clinic with WC accompanied by wife, no distress noted.

## 2023-03-24 PROBLEM — Z51.5 PALLIATIVE CARE ENCOUNTER: Status: ACTIVE | Noted: 2023-01-01

## 2023-03-24 PROBLEM — A41.9 SEPSIS: Status: ACTIVE | Noted: 2023-01-01

## 2023-03-24 PROBLEM — R60.0 LOWER LEG EDEMA: Status: ACTIVE | Noted: 2023-01-01

## 2023-03-24 PROBLEM — E72.20 HYPERAMMONEMIA: Status: ACTIVE | Noted: 2023-01-01

## 2023-03-24 NOTE — PT/OT/SLP PROGRESS
Occupational Therapy      Patient Name:  Domonique Kellogg   MRN:  [id number]    Patient not seen today secondary to Other (Comment) (Pt attempted this afternoon however on bed pan. Writing OT unable to return for additional attempt at eval.). Will follow-up for OT evaluation.    3/24/2023

## 2023-03-24 NOTE — ASSESSMENT & PLAN NOTE
Ammonia 62 on admission   Asterixis present on exam     - Rifaximin BID   - Will hold off on lactulose as patient had 3 loose stools since yesterday

## 2023-03-24 NOTE — PROGRESS NOTES
"Pharmacokinetic Initial Assessment: IV Vancomycin    Assessment/Plan:    Initiate intravenous vancomycin with loading dose of 2000 mg once with subsequent doses when random concentrations are less than 20 mcg/mL  Desired empiric serum trough concentration is 15 to 20 mcg/mL  Draw vancomycin random level on 03/25/23 at 0400.  Pharmacy will continue to follow and monitor vancomycin.      Please contact pharmacy at extension [phone] with any questions regarding this assessment.     Thank you for the consult,   Anu Navas       Patient brief summary:  Domonique Kellogg is a 72 y.o. male initiated on antimicrobial therapy with IV Vancomycin for treatment of suspected sepsis    Drug Allergies:   Review of patient's allergies indicates:   Allergen Reactions    Imfinzi [durvalumab] Other (See Comments)     1305- Imfinzi ended, pt returned from restroom complained of wheezing SOB and rigors. /100  O2 92% 2L NC O2 applied to pt.   1306-Demerol 25mg given and Solucortef 125mg IVPush given.   1309- Pt /106  02 99%.   1318- Pt still having Rigors additional 25mg of Demerol given.   1418- BP is 133/69 77 HR 99% RA Pt states " I feel much better"       Indomethacin Other (See Comments)     Headache dizziness      Adhesive Rash    Colchicine analogues Nausea And Vomiting     NVD       Actual Body Weight:   113.4 kg    Renal Function:   Estimated Creatinine Clearance: 38.3 mL/min (A) (based on SCr of 2.1 mg/dL (H)).,     Dialysis Method (if applicable):  N/A    CBC (last 72 hours):  Recent Labs   Lab Result Units 03/22/23  0729 03/24/23  0219   WBC K/uL 10.21 16.15*   Hemoglobin g/dL 8.8* 8.9*   Hematocrit % 27.7* 27.8*   Platelets K/uL 150 164   Gran % % 69.8 85.7*   Lymph % % 9.9* 4.0*   Mono % % 15.3* 8.3   Eosinophil % % 2.3 0.5   Basophil % % 0.9 0.6   Differential Method  Automated Automated       Metabolic Panel (last 72 hours):  Recent Labs   Lab Result Units 03/22/23  0729 03/24/23  0219   Sodium " mmol/L 140 144   Potassium mmol/L 3.7 3.8   Chloride mmol/L 109 112*   CO2 mmol/L 21* 18*   Glucose mg/dL 143* 74   BUN mg/dL 20 21   Creatinine mg/dL 1.8* 2.1*   Albumin g/dL 2.0* 2.1*   Total Bilirubin mg/dL 5.9* 7.7*   Alkaline Phosphatase U/L 515* 495*   AST U/L 140* 156*   ALT U/L 49* 57*       Drug levels (last 3 results):  No results for input(s): VANCOMYCINRA, VANCORANDOM, VANCOMYCINPE, VANCOPEAK, VANCOMYCINTR, VANCOTROUGH in the last 72 hours.    Microbiologic Results:  Microbiology Results (last 7 days)       Procedure Component Value Units Date/Time    Blood culture x two cultures. Draw prior to antibiotics. [[id number]] Collected: 03/24/23 0307    Order Status: Sent Specimen: Blood from Peripheral, Hand, Left Updated: 03/24/23 0320    Blood culture x two cultures. Draw prior to antibiotics. [[id number]] Collected: 03/24/23 0307    Order Status: Sent Specimen: Blood from Peripheral, Forearm, Right Updated: 03/24/23 0320    Culture, Body Fluid - Bactec [[id number]]     Order Status: Canceled Specimen: Body Fluid from Peritoneal Fluid     Gram stain [[id number]]     Order Status: Canceled Specimen: Body Fluid from Peritoneal Fluid

## 2023-03-24 NOTE — ASSESSMENT & PLAN NOTE
Could be due to patients low albumin   EF 60% in December 2021  U/S on 3/13 negative for DVT   Was on lasix outpatient but it was held due to rising creatinine     - FU TTE   - Will hold lasix in setting of infection and low blood pressures

## 2023-03-24 NOTE — H&P
"[facility]  Hematology/Oncology  H&P    Patient Name: Domonique Kellogg  MRN: [id number]  Admission Date: 3/24/2023  Code Status: Full Code   Attending Provider: Vanessa Horvath MD  Primary Care Physician: Nicolas Marlow MD  Principal Problem:Sepsis    Subjective:     HPI: Mr. Domonique Kellogg is a 72 y.o. 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 Patients wife states that patient was having difficulty breathing while on his CPAP last night so she woke him up. Patient was a little confused about where he was upon waking. Patient states he has had fatigue/weakness for weeks that has been progressively worsening. He also reports lower leg/abdominal swelling recently that has been worsening. He is on lasix at home but that has been held for almost a week due to a rising creatinine on outside labs. Has some RUQ pain, U/S on 3/17 revealed stones and sludge in the gallbladder with no sonographic evidence for acute cholecystitis, as well as multifocal hepatic masses. He also developed jaundice about two-weeks ago. Denies chest pain, nausea, dysuria or headaches. He was dry heaving last night. Also reports 3 loose stools last night but no diarrhea.     On admission to the ED patient was febrile to 100.9, , /56, and on RA. Cr 2.1 (baseline 1.3-1.5). Tbili 7.7, Alk phos 495, , ALT 57, ammonia 63. WBC 16.2 and lactate 2.25. CXR with no acute findings. VBG with pH 7.34 and pCO2 39.8. Trop 0.035, . Patient given vanc/zosyn and IVF. CT abdomen ordered.     Oncology history per last clinic note:  1. Mr Kellogg is a 70 yo man with CAD, HTN, seizures in 2011 who initially saw me on 11/29/21 for further management of cholangiocarcinoma. He presented with weight loss and diarrhea since July. US 9/22/21 showed "Enlarged, heterogeneous appearance of the liver " "likely due to multiple underlying hepatic lesions largest measuring 4.8 cm."  MRI abdomen w/wo contrast 10/4/21: "Multiple liver lesions measuring up to 13.3 cm in the right hepatic lobe.  Differential diagnosis includes metastases, fibrolamellar hepatocellular carcinoma, or atypical focal nodular hyperplasia.  Consider biopsy for further evaluation. Thrombosis of the anterior branch of the right portal vein and left hepatic vein.  Nonvisualization of the middle and right hepatic veins, which may be thrombosed as well. Prominent periportal lymph node, which is nonspecific."  EGD and colonoscopy on 10/11/21 were negative for primary malignancies.   He underwent liver lesion biopsy and Y90 mapping on 11/22/21. Pathology showed adenocarcinoma: "Biopsy of the liver mass shows a malignant neoplasm in a fibrotic stroma. Glandular architecture is readily identified with several foci of intraluminal necrosis. Scattered mitotic figures are seen. Neoplastic cells show the following immunophenotype:   Positive: AE1/AE3, CK7, CDX2   Negative: Chromogranin, Synaptophysin, TTF, CK5/6, CK20, HepPar1   The morphology and immunophenotype are compatible with adenocarcinoma. Considerations for a primary site include pancreaticobiliary (including intrahepatic cholangiocarcinoma) as well as upper gastrointestinal. Clinical and radiologic correlation is suggested."  Patient presents today with wife for further management. No pain. Initial weight loss has somewhat stabilized. Still has diarrhea. Has not tried imodium yet. +nervous  Mother had kidney cancer at age 64. Maternal grandmother had liver cancer in her 60s. Patient has three children, two daughters and one son.   Discussed palliative chemo with cis/gem  2. PET scan 12/6/21 did not show extrahepatic metastases.   3. Cis/gem started on 12/7/21. Durvalumab was initially denied by insurance company after GI [facility], but approved after FDA approval, added 8/10/22. Patient had reaction to " "durv (wheezing, rigors, shaking, high BP) on 9/9. Imfinzi was stopped. Last chemo on 10/21/22. MRI 10/19/22 showed progression. Plan was to switched for FOLFOX. Patient was hospitalized for acute appendicitis and strangulated umbilical hernia s/p surgery on 11/2/2022.   4. S/p TACE on 12/16/2021, 2/14/22, 4/19/22, 5/18/22, 8/2/22  5. S/p RF Ablation on 7/19/22. TACE on 9/28/22  6. FOLFOX started on 11/21/22, s/p 6 cycles  7. Restaging MRI showed progression.             Oncology Treatment Plan:   OP PANC NAB-PACLITAXEL + GEMCITABINE Q4W    Medications:  Continuous Infusions:  Scheduled Meds:   apixaban  5 mg Oral BID    DULoxetine  20 mg Oral Daily    finasteride  5 mg Oral Daily    piperacillin-tazobactam (ZOSYN) IVPB  4.5 g Intravenous Q8H    rifAXImin  550 mg Oral BID    tamsulosin  1 capsule Oral Daily    vancomycin (VANCOCIN) IVPB  2,000 mg Intravenous ED 1 Time     PRN Meds:dextrose 10%, dextrose 10%, dextrose, dextrose, glucagon (human recombinant), melatonin, naloxone, ondansetron, sodium chloride 0.9%, Pharmacy to dose Vancomycin consult **AND** vancomycin - pharmacy to dose     Review of patient's allergies indicates:   Allergen Reactions    Imfinzi [durvalumab] Other (See Comments)     1305- Imfinzi ended, pt returned from restroom complained of wheezing SOB and rigors. /100  O2 92% 2L NC O2 applied to pt.   1306-Demerol 25mg given and Solucortef 125mg IVPush given.   1309- Pt /106  02 99%.   1318- Pt still having Rigors additional 25mg of Demerol given.   1418- BP is 133/69 77 HR 99% RA Pt states " I feel much better"       Indomethacin Other (See Comments)     Headache dizziness      Adhesive Rash    Colchicine analogues Nausea And Vomiting     NVD        Past Medical History:   Diagnosis Date    Acute kidney injury 3/16/2023    Adjustment disorder     Anticoagulant long-term use     Anxiety     Arthritis     CAD (coronary artery disease) 03/02/2015    " non-obstructive per [facility]     Cataract     Class 1 obesity with serious comorbidity in adult 3/17/2014    Dry eye syndrome     Hypertension     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/2021    Obesity     Post-procedural fever 12/18/2021    Seizures     2011    Sleep apnea     + CPAP    Sleep difficulties      Past Surgical History:   Procedure Laterality Date    ANTERIOR CERVICAL DISCECTOMY W/ FUSION N/A 07/06/2020    Procedure: DISCECTOMY, SPINE, CERVICAL, ANTERIOR APPROACH, WITH FUSION C3-4, C4-5;  Surgeon: Fabiola Vides MD;  Location: [facility] OR 2ND FLR;  Service: Neurosurgery;  Laterality: N/A;  TORONTO III, ASA III, BLOOD TYPE & SCREEN, NEUROMONITORING EMG-MEP-SEP, SUPINE POSITION,BRACE [place],REGULAR BED, HEADREST [place], POSITION, MAP>85, RADIOLOGY C-ARM, SPECIAL EQUIPMENT [facility]    COLONOSCOPY N/A 10/01/2021    Procedure: COLONOSCOPY;  Surgeon: Nicolas Alvarado MD;  Location: [facility] (4TH FLR);  Service: Endoscopy;  Laterality: N/A;  EGD and colonoscopy for diarrhea and weight loss and Enlarged, heterogeneous appearance of the liver likely due to multiple underlying hepatic lesions largest measuring 4.8 cm.  Findings concerning for metastatic versus less likely primary hepatic neoplasm.  Recommend fur    COLONOSCOPY N/A 06/07/2022    Procedure: COLONOSCOPY;  Surgeon: Mejia Villafuerte MD;  Location: [facility] (4TH FLR);  Service: Endoscopy;  Laterality: N/A;  For evaluation of positive out-patient FOBT.   medically urgent  ok to hold Eliquis per [place] Edward/RB  fully vaccinated  hx of seizures- last one 2014    ESOPHAGOGASTRODUODENOSCOPY N/A 10/01/2021    Procedure: EGD (ESOPHAGOGASTRODUODENOSCOPY);  Surgeon: Nicolas Alvarado MD;  Location: [facility] ENDO (4TH FLR);  Service: Endoscopy;  Laterality: N/A;  EGD and colonoscopy for diarrhea and weight loss and Enlarged, heterogeneous appearance of the liver likely due to multiple underlying hepatic lesions largest measuring 4.8 cm.  Findings  concerning for metastatic versus less likely primary hepatic neopl    ESOPHAGOGASTRODUODENOSCOPY N/A 04/05/2022    Procedure: EGD (ESOPHAGOGASTRODUODENOSCOPY);  Surgeon: Amado Kelsey MD;  Location: [facility] (4TH FLR);  Service: Endoscopy;  Laterality: N/A;  EGD in 8 weeks to check for esophagitis healing patient should be on pantoprazole 40 mg once daily  ok to hold eliquis x2 days per Dr. Young, see telephone encounter  fully vaccinated    EYE SURGERY      cataract    INSERTION OF VENOUS ACCESS PORT N/A 12/03/2021    Procedure: INSERTION, VENOUS ACCESS PORT;  Surgeon: Saurav Garcia MD;  Location: [facility] 2ND FLR;  Service: General;  Laterality: N/A;    INTRAOCULAR PROSTHESES INSERTION Right 07/16/2018    Procedure: INSERTION-INTRAOCULAR LENS (IOL);  Surgeon: Priscilla Hays MD;  Location: [facility];  Service: Ophthalmology;  Laterality: Right;    INTRAOCULAR PROSTHESES INSERTION Left 08/13/2018    Procedure: INSERTION, INTRAOCULAR LENS PROSTHESIS;  Surgeon: Priscilla Hays MD;  Location: [facility];  Service: Ophthalmology;  Laterality: Left;    KNEE SURGERY Right     LAPAROSCOPIC APPENDECTOMY N/A 11/2/2022    Procedure: APPENDECTOMY, LAPAROSCOPIC;  Surgeon: Cheo Hamm MD;  Location: [facility] 2ND FLR;  Service: General;  Laterality: N/A;    PHACOEMULSIFICATION OF CATARACT Right 07/16/2018    Procedure: PHACOEMULSIFICATION-ASPIRATION-CATARACT;  Surgeon: Priscilla Hays MD;  Location: [facility];  Service: Ophthalmology;  Laterality: Right;    PHACOEMULSIFICATION OF CATARACT Left 08/13/2018    Procedure: PHACOEMULSIFICATION, CATARACT;  Surgeon: Priscilla Hays MD;  Location: [facility];  Service: Ophthalmology;  Laterality: Left;    REPAIR OF INCARCERATED UMBILICAL HERNIA  11/2/2022    Procedure: REPAIR, HERNIA, UMBILICAL, INCARCERATED, AGE 5 YEARS OR OLDER;  Surgeon: Cheo Hamm MD;  Location: [facility] OR 2ND FLR;  Service: General;;    WISDOM TOOTH EXTRACTION       Family History       Problem  Relation (Age of Onset)    Cancer Mother, Maternal Grandmother    Diabetes Mother    Heart disease Father, Paternal Grandfather, Brother    Hypertension Mother    Kidney cancer Mother (61)    Liver disease Maternal Grandmother    No Known Problems Sister, Daughter, Son, Sister, Sister, Sister, Daughter          Tobacco Use    Smoking status: Former     Packs/day: 1.00     Years: 20.00     Pack years: 20.00     Types: Cigarettes     Quit date: 1987     Years since quittin.2    Smokeless tobacco: Never    Tobacco comments:     quit    Substance and Sexual Activity    Alcohol use: Not Currently     Alcohol/week: 1.0 - 2.0 standard drink     Types: 1 - 2 Glasses of wine per week     Comment: not since liver diagnosis    Drug use: No    Sexual activity: Yes     Partners: Female       Review of Systems   Constitutional:  Positive for activity change, fatigue and fever. Negative for chills.   HENT:  Negative for congestion and sore throat.    Eyes:  Negative for pain.   Respiratory:  Negative for cough and shortness of breath.    Cardiovascular:  Negative for chest pain, palpitations and leg swelling.   Gastrointestinal:  Positive for abdominal distention, abdominal pain and vomiting. Negative for constipation, diarrhea and nausea.   Genitourinary:  Negative for difficulty urinating, dysuria and hematuria.   Musculoskeletal:  Negative for back pain.   Skin:  Negative for rash.   Neurological:  Negative for light-headedness and headaches.   Hematological:  Does not bruise/bleed easily.   Psychiatric/Behavioral:  Negative for agitation.    Objective:     Vital Signs (Most Recent):  Temp: 99.3 °F (37.4 °C) (23 0440)  Pulse: 101 (23 045)  Resp: 20 (23)  BP: (!) 102/57 (23 045)  SpO2: 97 % (23)   Vital Signs (24h Range):  Temp:  [98.7 °F (37.1 °C)-100.9 °F (38.3 °C)] 99.3 °F (37.4 °C)  Pulse:  [] 101  Resp:  [18-20] 20  SpO2:  [94 %-97 %] 97 %  BP:  ()/(49-64) 102/57     Weight: 113.4 kg (250 lb)  Body mass index is 38.01 kg/m².  Body surface area is 2.33 meters squared.    No intake or output data in the 24 hours ending 03/24/23 0514    Physical Exam  Constitutional:       Appearance: Normal appearance.   HENT:      Head: Normocephalic and atraumatic.      Mouth/Throat:      Mouth: Mucous membranes are moist.      Pharynx: Oropharynx is clear.   Eyes:      General: Scleral icterus present.      Extraocular Movements: Extraocular movements intact.      Pupils: Pupils are equal, round, and reactive to light.   Cardiovascular:      Rate and Rhythm: Normal rate and regular rhythm.      Pulses: Normal pulses.      Heart sounds: Normal heart sounds.   Pulmonary:      Effort: Pulmonary effort is normal.      Breath sounds: Normal breath sounds. No rales.   Abdominal:      General: Abdomen is flat. There is distension.      Palpations: Abdomen is soft.      Tenderness: There is abdominal tenderness (mild RUQ tenderness).   Musculoskeletal:         General: Normal range of motion.      Cervical back: Normal range of motion and neck supple.      Right lower leg: Edema present.      Left lower leg: Edema present.   Skin:     General: Skin is warm and dry.   Neurological:      General: No focal deficit present.      Mental Status: He is alert and oriented to person, place, and time.      Comments: Asterixis present   Psychiatric:         Mood and Affect: Mood normal.         Behavior: Behavior normal.       Significant Labs:   All pertinent labs from the last 24 hours have been reviewed.    Diagnostic Results:  I have reviewed all pertinent imaging results/findings within the past 24 hours.    Assessment/Plan:     * Sepsis  Patient with intrahepatic cholangiocarcinoma here with SOB and weakness   Meets sepsis criteria (Temperature 100.9, WBC 16.2, )  Concern for abdominal source given RUQ pain  CXR with no acute findings   WBC 16.2 and lactate 2.25  No history  of ascites or requiring paracentesis, ED unable to find fluid pocket to tap    - Continue vanz/zosyn   - FU cultures   - FU CT abdomen and abdominal U/S   - Got 500mL IV in ED, getting another 1L, overloaded on exam so cautious with fluids       Hyperammonemia  Ammonia 63 on admission   Asterixis present on exam     - Rifaximin BID   - Will hold off on lactulose as patient had 3 loose stools since yesterday     Lower leg edema  Could be due to patients low albumin   EF 60% in December 2021  U/S on 3/13 negative for DVT   Was on lasix outpatient but it was held due to rising creatinine     - FU TTE   - Will hold lasix in setting of infection and low blood pressures     DAWIT (acute kidney injury)  Cr 2.1 on admission, baseline around 1.3-1.5   His lasix dose was held in the last week due to rising Cr but it continues to increase   Blood pressure soft on admission, possible pre-renal etiology      - FU urine labs   - FU renal U/S  - Nephrology consulted   - Check renal function daily   - Fluid overloaded on exam but concern for infection and blood pressure is low, so will give 1L IVF     Deep vein thrombosis (DVT) of femoral vein of left lower extremity  Diagnosed June 2022    - Continue home apixaban     Weakness generalized  Likely secondary to malignancy and possible infection     - PT/OT consulted     Hyperbilirubinemia  History of intrahepatic cholangiocarcinoma   Last chemo on 3/23  On admission Tbili 7.7, was 2.3 earlier this month   U/S on 3/17 revealed stones and sludge in the gallbladder with no sonographic evidence for acute cholecystitis, as well as multifocal hepatic masses  Concern for possible obstruction vs worsening metastatic disease     - FU CT abdomen and abdominal U/S   - Will consult hepatology and/or AES depending on imaging results   - CMP daily   - NPO for possible ERCP depending on imaging results     Elevated troponin  Troponin 0.035 on admission   ECG with no ischemic changes  Likely demand  in setting of infection   No chest pain     - Trend until peak     Intrahepatic cholangiocarcinoma  - Current Regimen: PANC NAB-PACLITAXEL + GEMCITABINE Q4W  - Last chemo on 3/23  - MRI on 3/7 revealed interval increase in size and number of multiple additional liver lesions, consistent with worsening metastatic disease        CAD in native artery  - Hold statin in setting of elevated Tbili and liver enzymes     RAHEEL (obstructive sleep apnea)  - CPAP nightly       Yosvany Adhikari MD  Hematology/Oncology  [facility] Dept

## 2023-03-24 NOTE — HPI
"Domonique Kellogg is a 72 y.o. 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 [place] like "someone standing on chest." On chemo for liver cancer. Wife reports he is disoriented.      Chemotherapy was stated on 12/2021 with Cis/gem, durvalumab was added on 8/10 but was stopped on 9/9/2022 d/t reaction.   S/p TACE12/21 to 9/28/2022, he had RF ablation 7/19/2022, and 11/21/2022 FOLFOX x6 cycles (please refer to oncology outpatient notes for full hx of chemotherapy)  Pt was seen as an urgent care visit on 3/16/2023 for weakness, LE swelling and he was treated with IVF 500mls, lasix was held (previously ordered for LE edema), pt was asked to f/u with cardiology and neurology.   Pts last chemo infusion was on 3/23/2023: PACLITAXEL & GEMCITABINE      Pt's family noticed pt having difficulty breathing, he was short of breath, he does use a CPAP but despite that he was feeling short of breath. He did not have chills or fever, he also did not take his blood pressure at home and so unable to tell if his blood pressure was low. He reports overall good appetite and was starting to increase hydration since he received chemotherapy.     In the ED, pt presented with the following VS:   Initial Vitals [03/24/23 0111]   BP Pulse Resp Temp SpO2   (!) 107/56 (!) 123 20 (!) 100.9 °F (38.3 °C) 95 %      MAP       --         His labs showed WBC 10.2, hgb 8.8, plts 150, bicarb 21, calcium 9.7, alk phos 515, albumin 2, t. Bili 5.9, , ALT 49, ammonia " "63, and CA 19-9 was 907  A CT abd and pelvis w/o contrast showed:   1. Moderate wall thickening of the cecum and proximal ascending colon, possibly infectious/inflammatory colitis.  Neoplasm not excluded.  No organized fluid collections.  2. No definite evidence of cholangitis noting limitations without the use of intravenous contrast.  3. Prominent right hepatic lobe mass with multiple satellite lesions in keeping with known cholangiocarcinoma.  4. Stable 1.1 cm teresa hepatis lymph node.  No new lymphadenopathy.  5. Small volume ascites.  Pt was started on vancomycin, ceftriaxone, and also received 1.5L of IVF    Nephrology was consulted for: "History of intrahepatic cholangiocarcinoma on chemo. Here with concerns of infection and has a DAWIT. Lasix was held outpatient and Cr increasing. BP soft on admission"  Baseline sCr: 1.2-1.4  Admission sCr: 1.8    Pt does not endorse a history of kidney stones, chronic NSAID use, trauma to the kidneys or bladder.   As for a family history, pt denies family history of kidney diease including family members on dialysis, autoimmune diseases, kidney stones or cancer.    Creatinine   Date Value Ref Range Status   03/24/2023 2.1 (H) 0.5 - 1.4 mg/dL Final   03/22/2023 1.8 (H) 0.5 - 1.4 mg/dL Final   03/16/2023 1.8 (H) 0.5 - 1.4 mg/dL Final   03/07/2023 1.3 0.5 - 1.4 mg/dL Final   02/22/2023 1.4 0.5 - 1.4 mg/dL Final     BUN   Date Value Ref Range Status   03/24/2023 21 8 - 23 mg/dL Final   03/22/2023 20 8 - 23 mg/dL Final   03/16/2023 16 8 - 23 mg/dL Final   03/07/2023 20 8 - 23 mg/dL Final   02/22/2023 25 (H) 8 - 23 mg/dL Final     "

## 2023-03-24 NOTE — ASSESSMENT & PLAN NOTE
Troponin 0.035 on admission   ECG with no ischemic changes  Likely demand in setting of infection   No chest pain     - Trend until peak

## 2023-03-24 NOTE — ED NOTES
Telemetry Verification   Patient placed on Telemetry Box  Verified with War Room  Box # [id number]   Monitor Tech    Rate 109   Rhythm Sinus tach

## 2023-03-24 NOTE — ASSESSMENT & PLAN NOTE
Patient with intrahepatic cholangiocarcinoma here with SOB and weakness   Meets sepsis criteria (Temperature 100.9, WBC 16.2, )  Concern for abdominal source given RUQ pain  CXR with no acute findings   WBC 16.2 and lactate 2.25  No history of ascites or requiring paracentesis, ED unable to find fluid pocket to tap    - Continue vanz/zosyn   - FU cultures   - FU CT abdomen and abdominal U/S   - Got 500mL IV in ED, getting another 1L, overloaded on exam so cautious with fluids

## 2023-03-24 NOTE — SUBJECTIVE & OBJECTIVE
Hospital problems/plan of care as per primary team:   Sepsis  Patient with intrahepatic cholangiocarcinoma here with SOB and weakness   Meets sepsis criteria (Temperature 100.9, WBC 16.2, )  Concern for abdominal source given RUQ pain  CXR with no acute findings   WBC 16.2 and lactate 2.25  No history of ascites or requiring paracentesis, ED unable to find fluid pocket to tap     - Continue vanz/zosyn   - FU cultures   - FU CT abdomen and abdominal U/S   - Got 500mL IV in ED, getting another 1L, overloaded on exam so cautious with fluids         Hyperammonemia  Ammonia 63 on admission   Asterixis present on exam      - Rifaximin BID   - Will hold off on lactulose as patient had 3 loose stools since yesterday      Lower leg edema  Could be due to patients low albumin   EF 60% in December 2021  U/S on 3/13 negative for DVT   Was on lasix outpatient but it was held due to rising creatinine      - FU TTE   - Will hold lasix in setting of infection and low blood pressures      DAWIT (acute kidney injury)  Cr 2.1 on admission, baseline around 1.3-1.5   His lasix dose was held in the last week due to rising Cr but it continues to increase   Blood pressure soft on admission, possible pre-renal etiology       - FU urine labs   - FU renal U/S  - Nephrology consulted   - Check renal function daily   - Fluid overloaded on exam but concern for infection and blood pressure is low, so will give 1L IVF      Deep vein thrombosis (DVT) of femoral vein of left lower extremity  Diagnosed June 2022     - Continue home apixaban      Weakness generalized  Likely secondary to malignancy and possible infection      - PT/OT consulted      Hyperbilirubinemia  History of intrahepatic cholangiocarcinoma   Last chemo on 3/23  On admission Tbili 7.7, was 2.3 earlier this month   U/S on 3/17 revealed stones and sludge in the gallbladder with no sonographic evidence for acute cholecystitis, as well as multifocal hepatic masses  Concern for  possible obstruction vs worsening metastatic disease      - FU CT abdomen and abdominal U/S   - Will consult hepatology and/or AES depending on imaging results   - CMP daily   - NPO for possible ERCP depending on imaging results      Elevated troponin  Troponin 0.035 on admission   ECG with no ischemic changes  Likely demand in setting of infection   No chest pain      - Trend until peak      Intrahepatic cholangiocarcinoma  - Current Regimen: PANC NAB-PACLITAXEL + GEMCITABINE Q4W  - Last chemo on 3/23  - MRI on 3/7 revealed interval increase in size and number of multiple additional liver lesions, consistent with worsening metastatic disease           CAD in native artery  - Hold statin in setting of elevated Tbili and liver enzymes      RAHEEL (obstructive sleep apnea)  - CPAP nightly        Past Medical History:   Diagnosis Date    Acute kidney injury 3/16/2023    Adjustment disorder     Anticoagulant long-term use     Anxiety     Arthritis     CAD (coronary artery disease) 03/02/2015    non-obstructive per [facility]     Cataract     Class 1 obesity with serious comorbidity in adult 3/17/2014    Dry eye syndrome     Hypertension     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/2021    Obesity     Post-procedural fever 12/18/2021    Seizures     2011    Sleep apnea     + CPAP    Sleep difficulties        Past Surgical History:   Procedure Laterality Date    ANTERIOR CERVICAL DISCECTOMY W/ FUSION N/A 07/06/2020    Procedure: DISCECTOMY, SPINE, CERVICAL, ANTERIOR APPROACH, WITH FUSION C3-4, C4-5;  Surgeon: Fabiola Vides MD;  [facility];  Service: Neurosurgery;  Laterality: N/A;  TORONTO III, ASA III, BLOOD TYPE & SCREEN, NEUROMONITORING EMG-MEP-SEP, SUPINE POSITION,BRACE [place],REGULAR BED, HEADREST CAS[place], POSITION, MAP>85, RADIOLOGY C-ARM, SPECIAL EQUIPMENT VIRGIL TYLER    COLONOSCOPY N/A 10/01/2021    Procedure: COLONOSCOPY;  Surgeon: Nicolas Alvarado MD;  Location: [facility] ENDO (4TH FLR);  Service: Endoscopy;   Laterality: N/A;  EGD and colonoscopy for diarrhea and weight loss and Enlarged, heterogeneous appearance of the liver likely due to multiple underlying hepatic lesions largest measuring 4.8 cm.  Findings concerning for metastatic versus less likely primary hepatic neoplasm.  Recommend furth    COLONOSCOPY N/A 06/07/2022    Procedure: COLONOSCOPY;  Surgeon: Mejia Villafuerte MD;  Location: [facility] (4TH FLR);  Service: Endoscopy;  Laterality: N/A;  For evaluation of positive out-patient FOBT.   medically urgent  ok to hold Eliquis per [place] Edward/RB  fully vaccinated  hx of seizures- last one 2014    ESOPHAGOGASTRODUODENOSCOPY N/A 10/01/2021    Procedure: EGD (ESOPHAGOGASTRODUODENOSCOPY);  Surgeon: Nicolas Alvarado MD;  Location: [facility] (4TH FLR);  Service: Endoscopy;  Laterality: N/A;  EGD and colonoscopy for diarrhea and weight loss and Enlarged, heterogeneous appearance of the liver likely due to multiple underlying hepatic lesions largest measuring 4.8 cm.  Findings concerning for metastatic versus less likely primary hepatic neopl    ESOPHAGOGASTRODUODENOSCOPY N/A 04/05/2022    Procedure: EGD (ESOPHAGOGASTRODUODENOSCOPY);  Surgeon: Amado Kelsey MD;  Location: [facility] (4TH FLR);  Service: Endoscopy;  Laterality: N/A;  EGD in 8 weeks to check for esophagitis healing patient should be on pantoprazole 40 mg once daily  ok to hold eliquis x2 days per Dr. Young, see telephone encounter  fully vaccinated    EYE SURGERY      cataract    INSERTION OF VENOUS ACCESS PORT N/A 12/03/2021    Procedure: INSERTION, VENOUS ACCESS PORT;  Surgeon: Saurav Garcia MD;  Location: [facility] 2ND FLR;  Service: General;  Laterality: N/A;    INTRAOCULAR PROSTHESES INSERTION Right 07/16/2018    Procedure: INSERTION-INTRAOCULAR LENS (IOL);  Surgeon: Priscilla Hays MD;  [facility];  Service: Ophthalmology;  Laterality: Right;    INTRAOCULAR PROSTHESES INSERTION Left 08/13/2018    Procedure: INSERTION, INTRAOCULAR LENS  "PROSTHESIS;  Surgeon: Priscilla Hays MD;  Location: [facility];  Service: Ophthalmology;  Laterality: Left;    KNEE SURGERY Right     LAPAROSCOPIC APPENDECTOMY N/A 11/2/2022    Procedure: APPENDECTOMY, LAPAROSCOPIC;  Surgeon: Cheo Hamm MD;  [facility];  Service: General;  Laterality: N/A;    PHACOEMULSIFICATION OF CATARACT Right 07/16/2018    Procedure: PHACOEMULSIFICATION-ASPIRATION-CATARACT;  Surgeon: Priscilla Hays MD;  [facility];  Service: Ophthalmology;  Laterality: Right;    PHACOEMULSIFICATION OF CATARACT Left 08/13/2018    Procedure: PHACOEMULSIFICATION, CATARACT;  Surgeon: Priscilla Hays MD;  Location: [facility];  Service: Ophthalmology;  Laterality: Left;    REPAIR OF INCARCERATED UMBILICAL HERNIA  11/2/2022    Procedure: REPAIR, HERNIA, UMBILICAL, INCARCERATED, AGE 5 YEARS OR OLDER;  Surgeon: Cheo Hamm MD;  [facility];  Service: General;;    WISDOM TOOTH EXTRACTION         Review of patient's allergies indicates:   Allergen Reactions    Imfinzi [durvalumab] Other (See Comments)     1305- Imfinzi ended, pt returned from restroom complained of wheezing SOB and rigors. /100  O2 92% 2L NC O2 applied to pt.   1306-Demerol 25mg given and Solucortef 125mg IVPush given.   1309- Pt /106  02 99%.   1318- Pt still having Rigors additional 25mg of Demerol given.   1418- BP is 133/69 77 HR 99% RA Pt states " I feel much better"       Indomethacin Other (See Comments)     Headache dizziness      Adhesive Rash    Colchicine analogues Nausea And Vomiting     NVD       Medications:  Continuous Infusions:  Scheduled Meds:   apixaban  5 mg Oral BID    DULoxetine  20 mg Oral Daily    finasteride  5 mg Oral Daily    piperacillin-tazobactam (ZOSYN) IVPB  4.5 g Intravenous Q8H    rifAXImin  550 mg Oral BID    tamsulosin  1 capsule Oral Daily     PRN Meds:dextrose 10%, dextrose 10%, dextrose, dextrose, glucagon (human recombinant), melatonin, naloxone, " ondansetron, sodium chloride 0.9%    Family History       Problem Relation (Age of Onset)    Cancer Mother, Maternal Grandmother    Diabetes Mother    Heart disease Father, Paternal Grandfather, Brother    Hypertension Mother    Kidney cancer Mother (61)    Liver disease Maternal Grandmother    No Known Problems Sister, Daughter, Son, Sister, Sister, Sister, Daughter          Tobacco Use    Smoking status: Former     Packs/day: 1.00     Years: 20.00     Pack years: 20.00     Types: Cigarettes     Quit date: 1987     Years since quittin.2    Smokeless tobacco: Never    Tobacco comments:     quit    Substance and Sexual Activity    Alcohol use: Not Currently     Alcohol/week: 1.0 - 2.0 standard drink     Types: 1 - 2 Glasses of wine per week     Comment: not since liver diagnosis    Drug use: No    Sexual activity: Yes     Partners: Female       Review of Systems   Constitutional:  Positive for activity change and fatigue.   HENT:  Negative for congestion, rhinorrhea, sinus pressure and sinus pain.    Eyes:  Negative for discharge and itching.   Respiratory:  Negative for wheezing and stridor.    Gastrointestinal:  Positive for abdominal distention.   Genitourinary:  Negative for flank pain.   Musculoskeletal:  Negative for neck pain and neck stiffness.   Skin:  Positive for rash.   Allergic/Immunologic: Negative for environmental allergies and food allergies.   Neurological:  Positive for weakness. Negative for seizures.   Objective:     Vital Signs (Most Recent):  Temp: 98.1 °F (36.7 °C) (23 0650)  Pulse: 98 (23 0650)  Resp: 16 (23 0650)  BP: 119/62 (23 0650)  SpO2: (!) 93 % (23 0650)   Vital Signs (24h Range):  Temp:  [98.1 °F (36.7 °C)-100.9 °F (38.3 °C)] 98.1 °F (36.7 °C)  Pulse:  [] 98  Resp:  [16-20] 16  SpO2:  [93 %-97 %] 93 %  BP: ()/(49-64) 119/62     Weight: 113.4 kg (250 lb)  Body mass index is 38.01 kg/m².    Physical Exam  Vitals and nursing note  reviewed.   Constitutional:       General: He is not in acute distress.     Appearance: He is ill-appearing.   HENT:      Head: Normocephalic and atraumatic.      Right Ear: External ear normal.      Left Ear: External ear normal.      Nose: Nose normal. No congestion or rhinorrhea.   Eyes:      General: Scleral icterus present.   Cardiovascular:      Rate and Rhythm: Normal rate.   Abdominal:      General: There is distension.   Musculoskeletal:      Cervical back: Normal range of motion.   Skin:     General: Skin is warm and dry.   Neurological:      Mental Status: He is alert.      Motor: Weakness present.   Psychiatric:         Mood and Affect: Mood normal.         Behavior: Behavior normal.         Thought Content: Thought content normal.         Judgment: Judgment normal.       Review of Symptoms      Symptom Assessment (ESAS 0-10 Scale)  Pain:  0  Dyspnea:  0  Anxiety:  0  Nausea:  0  Depression:  0  Anorexia:  0  Fatigue:  0  Insomnia:  0  Restlessness:  0  Agitation:  0         Living Arrangements:  Lives with spouse    Psychosocial/Cultural:   See Palliative Psychosocial Note: No   (46 years), 3 children (one son, two daughters)  **Primary  to Follow**  Palliative Care  Consult: No      Advance Care Planning   Advance Directives:     Decision Making:  Family answered questions and Patient answered questions       Significant Labs: All pertinent labs within the past 24 hours have been reviewed.  CBC:   Recent Labs   Lab 03/24/23 0219   WBC 16.15*   HGB 8.9*   HCT 27.8*   *        BMP:  Recent Labs   Lab 03/24/23 0219   GLU 74      K 3.8   *   CO2 18*   BUN 21   CREATININE 2.1*   CALCIUM 9.3     LFT:  Lab Results   Component Value Date     (H) 03/24/2023    GGT 1,292 (H) 09/20/2021    ALKPHOS 495 (H) 03/24/2023    BILITOT 7.7 (H) 03/24/2023     Albumin:   Albumin   Date Value Ref Range Status   03/24/2023 2.1 (L) 3.5 - 5.2 g/dL Final      Protein:   Total Protein   Date Value Ref Range Status   03/24/2023 5.9 (L) 6.0 - 8.4 g/dL Final     Lactic acid:   Lab Results   Component Value Date    LACTATE 2.1 03/24/2023    LACTATE 2.1 11/01/2022       Significant Imaging: I have reviewed all pertinent imaging results/findings within the past 24 hours.    CT Abdomen Pelvis  Without Contrast  Narrative: EXAMINATION:  CT ABDOMEN PELVIS WITHOUT CONTRAST    CLINICAL HISTORY:  Sepsis;cocnern for cholestasis, cholangitis;    TECHNIQUE:  Axial images of the abdomen and pelvis were acquired without the use of IV contrast. No oral contrast was administered.  Coronal and sagittal reconstructions were also obtained.    COMPARISON:  MR 03/07/2020; CT 11/01/2022    FINDINGS:  HEART: Normal in size. No pericardial effusion.    LUNGS: Well aerated.  Minimal bibasilar atelectasis.  No large consolidation or pleural effusion.    LIVER: Heterogeneous attenuation.  Prominent right hepatic lobe mass in keeping with known cholangiocarcinoma, measurement unreliable without the use of contrast.  Multiple additional satellite lesions.    GALLBLADDER/BILE DUCTS: Gallbladder appears to be contracted with sludge.  Mild gallbladder wall thickening versus small amount of pericholecystic fluid.  No evidence of dilated ducts.    PANCREAS: No mass or peripancreatic fat stranding.    SPLEEN: No splenomegaly.    ADRENALS: Mild nodular thickening of the right adrenal gland.  Left adrenal gland is unremarkable.    KIDNEYS/URETERS: Normal in size and location.  Stable left kidney simple cyst.  No hydronephrosis or nephrolithiasis. No ureteral dilatation.    BLADDER: No evidence of wall thickening.    REPRODUCTIVE ORGANS: Enlarged prostate.    STOMACH/DUODENUM: Unremarkable.    BOWEL/MESENTERY: Small bowel is normal in caliber with no evidence of obstruction. No evidence of inflammation or wall thickening.  Moderate wall thickening about the cecum and proximal ascending colon with no local  inflammatory changes.  Appendix is surgically absent.  Scattered diverticula throughout the colon.  No organized fluid collections.    PERITONEUM: Small volume ascites.  No pneumoperitoneum.    LYMPH NODES: Enlarged 1.1 cm teresa hepatis node (axial 02:52), previously 1.0 cm.  No new abdominal or pelvic lymphadenopathy.    VASCULATURE: Moderate aortoiliac calcific atherosclerosis.  No aneurysm.    ABDOMINAL WALL:  Small fat containing umbilical hernia.  Diffuse subcutaneous edema.    BONES: Stable degenerative changes of the visualized spine.  No acute fracture. No suspicious osseous lesions.  Impression: 1. Moderate wall thickening of the cecum and proximal ascending colon, possibly infectious/inflammatory colitis.  Neoplasm not excluded.  No organized fluid collections.  2. No definite evidence of cholangitis noting limitations without the use of intravenous contrast.  3. Prominent right hepatic lobe mass with multiple satellite lesions in keeping with known cholangiocarcinoma.  4. Stable 1.1 cm teresa hepatis lymph node.  No new lymphadenopathy.  5. Small volume ascites.  6. Other findings above.  This report was flagged in Epic as abnormal.    Electronically signed by resident: Avery Noonan  Date:    03/24/2023  Time:    08:49    Electronically signed by: Srikanth Montelongo MD  Date:    03/24/2023  Time:    10:00  X-Ray Chest 1 View  Narrative: EXAMINATION:  XR CHEST 1 VIEW    CLINICAL HISTORY:  Chest pain, unspecified    TECHNIQUE:  Single frontal view of the chest was performed.    COMPARISON:  12/18/2021.    FINDINGS:  Right-sided port catheter tip overlies the SVC.    Underinflated lungs with hypoventilatory change and crowding of markings.    Heart and lungs  appear unchanged when allowing for differences in technique and positioning.  Impression: Underinflated lungs with hypoventilatory change and crowding of markings.    No significant change from prior study.    Electronically signed by: Amish Holly  MD  Date:    03/24/2023  Time:    02:27    Results for orders placed or performed during the hospital encounter of 03/24/23   EKG 12-lead    Collection Time: 03/24/23  1:14 AM    Narrative    Test Reason : R07.9,    Vent. Rate : 124 BPM     Atrial Rate : 124 BPM     P-R Int : 120 ms          QRS Dur : 068 ms      QT Int : 314 ms       P-R-T Axes : 039 036 027 degrees     QTc Int : 451 ms    Sinus tachycardia  Otherwise normal ECG  When compared with ECG of 11-MAY-2022 13:50,  Vent. rate has increased BY  64 BPM  ST no longer elevated in Lateral leads  Nonspecific T wave abnormality has replaced inverted T waves in Inferior  leads  T wave amplitude has decreased in Lateral leads  Confirmed by Roel CHICAS MD ([id number]) on 3/24/2023 10:27:40 AM    Referred By: [email or web address]   SELF           Confirmed By:Roel CHICAS MD

## 2023-03-24 NOTE — MEDICAL/APP STUDENT
"Abdiel Babb - Oncology ([facility])  [facility] Medicine  Consult Note    Patient Name: Domonique Kellogg  MRN: [id number]  Admission Date: 3/24/2023  Hospital Length of Stay: 0 days  Attending Physician: {ATTENDING PROVIDER:[id number]}  Primary Care Provider: Nicolas Marlow MD           Patient information was obtained from {info source:[id number]::"ER records"}.     Consults  Subjective:     Principal Problem:Sepsis    Chief Complaint:   Chief Complaint   Patient presents with    Shortness of Breath     Sob/CP woke him out sleep. [place] like "someone standing on chest." On chemo for liver cancer. Wife reports he is disoriented.         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  Patient reports that he felt shortness of breath yest afternoon, felt like a pressure in his chest.  His wife states that patient seemed confused,  forgot where he was.  They state that patient also have worsening bilateral lower extremity swelling and abdominal swelling and distension. Distension began in December of 2022 according to the pt. Two weeks ago he began to feel pain in his left epigastric region, that he then felt in his back. On the 22nd of march told he had irreg kidney labs.  His oncologist recommended holding Lasix for the last 2 days due to elevated creatinine level, baseline is .8-.9. They also reports that patient developed jaundice for the last 2 weeks, they found that he have biliary sludge and stone on ultrasound, but no cholecystitis or choledocholithiasis.  Last chemo was yesterday    Pale stools and reduced urinary output.    Past Medical History:   Diagnosis Date    Acute kidney injury 3/16/2023    Adjustment disorder     Anticoagulant long-term use     Anxiety     Arthritis     CAD (coronary artery disease) 03/02/2015    non-obstructive per [facility]     Cataract     Class 1 obesity with serious comorbidity in adult 3/17/2014    Dry eye " syndrome     Hypertension     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/2021    Obesity     Post-procedural fever 12/18/2021    Seizures     2011    Sleep apnea     + CPAP    Sleep difficulties        Past Surgical History:   Procedure Laterality Date    ANTERIOR CERVICAL DISCECTOMY W/ FUSION N/A 07/06/2020    Procedure: DISCECTOMY, SPINE, CERVICAL, ANTERIOR APPROACH, WITH FUSION C3-4, C4-5;  Surgeon: Fabiola Vides MD;  [facility];  Service: Neurosurgery;  Laterality: N/A;  TORONTO III, ASA III, BLOOD TYPE & SCREEN, NEUROMONITORING EMG-MEP-SEP, SUPINE POSITION,BRACE [place],REGULAR BED, HEADREST CAS[place], POSITION, MAP>85, RADIOLOGY C-ARM, SPECIAL EQUIPMENT [facility]    COLONOSCOPY N/A 10/01/2021    Procedure: COLONOSCOPY;  Surgeon: Nicolas Alvarado MD;  Location: [facility] (4TH FLR);  Service: Endoscopy;  Laterality: N/A;  EGD and colonoscopy for diarrhea and weight loss and Enlarged, heterogeneous appearance of the liver likely due to multiple underlying hepatic lesions largest measuring 4.8 cm.  Findings concerning for metastatic versus less likely primary hepatic neoplasm.  Recommend fur    COLONOSCOPY N/A 06/07/2022    Procedure: COLONOSCOPY;  Surgeon: Mejia Villafuerte MD;  Location: [facility] (4TH FLR);  Service: Endoscopy;  Laterality: N/A;  For evaluation of positive out-patient FOBT.   medically urgent  ok to hold Eliquis per [place] Edward/RB  fully vaccinated  hx of seizures- last one 2014    ESOPHAGOGASTRODUODENOSCOPY N/A 10/01/2021    Procedure: EGD (ESOPHAGOGASTRODUODENOSCOPY);  Surgeon: Nicolas Alvarado MD;  Location: [facility] (4TH FLR);  Service: Endoscopy;  Laterality: N/A;  EGD and colonoscopy for diarrhea and weight loss and Enlarged, heterogeneous appearance of the liver likely due to multiple underlying hepatic lesions largest measuring 4.8 cm.  Findings concerning for metastatic versus less likely primary hepatic neopl    ESOPHAGOGASTRODUODENOSCOPY N/A 04/05/2022    Procedure: EGD  (ESOPHAGOGASTRODUODENOSCOPY);  Surgeon: Amado Kelsey MD;  Location: [facility] (4TH FLR);  Service: Endoscopy;  Laterality: N/A;  EGD in 8 weeks to check for esophagitis healing patient should be on pantoprazole 40 mg once daily  ok to hold eliquis x2 days per Dr. Young, see telephone encounter  fully vaccinated    EYE SURGERY      cataract    INSERTION OF VENOUS ACCESS PORT N/A 12/03/2021    Procedure: INSERTION, VENOUS ACCESS PORT;  Surgeon: Saurav Garcia MD;  Location: [facility] OR 2ND FLR;  Service: General;  Laterality: N/A;    INTRAOCULAR PROSTHESES INSERTION Right 07/16/2018    Procedure: INSERTION-INTRAOCULAR LENS (IOL);  Surgeon: Priscilla Hays MD;  Location: [facility];  Service: Ophthalmology;  Laterality: Right;    INTRAOCULAR PROSTHESES INSERTION Left 08/13/2018    Procedure: INSERTION, INTRAOCULAR LENS PROSTHESIS;  Surgeon: Priscilla Hays MD;  Location: [facility];  Service: Ophthalmology;  Laterality: Left;    KNEE SURGERY Right     LAPAROSCOPIC APPENDECTOMY N/A 11/2/2022    Procedure: APPENDECTOMY, LAPAROSCOPIC;  Surgeon: Cheo Hamm MD;  Location: [facility] OR 2ND FLR;  Service: General;  Laterality: N/A;    PHACOEMULSIFICATION OF CATARACT Right 07/16/2018    Procedure: PHACOEMULSIFICATION-ASPIRATION-CATARACT;  Surgeon: Priscilla Hays MD;  Location: [facility];  Service: Ophthalmology;  Laterality: Right;    PHACOEMULSIFICATION OF CATARACT Left 08/13/2018    Procedure: PHACOEMULSIFICATION, CATARACT;  Surgeon: Priscilla Hays MD;  Location: [facility];  Service: Ophthalmology;  Laterality: Left;    REPAIR OF INCARCERATED UMBILICAL HERNIA  11/2/2022    Procedure: REPAIR, HERNIA, UMBILICAL, INCARCERATED, AGE 5 YEARS OR OLDER;  Surgeon: Cheo Hamm MD;  Location: [facility] OR 2ND FLR;  Service: General;;    WISDOM TOOTH EXTRACTION         Review of patient's allergies indicates:   Allergen Reactions    Imfinzi [durvalumab] Other (See Comments)     1915- Imfinzi ended, pt returned from restroom complained of  "wheezing SOB and rigors. /100  O2 92% 2L NC O2 applied to pt.   1306-Demerol 25mg given and Solucortef 125mg IVPush given.   1309- Pt /106  02 99%.   1318- Pt still having Rigors additional 25mg of Demerol given.   1418- BP is 133/69 77 HR 99% RA Pt states " I feel much better"       Indomethacin Other (See Comments)     Headache dizziness      Adhesive Rash    Colchicine analogues Nausea And Vomiting     NVD       Current Facility-Administered Medications on File Prior to Encounter   Medication    [COMPLETED] gemcitabine (GEMZAR) 1,800 mg in sodium chloride 0.9% SolP 332.34 mL chemo infusion    [COMPLETED] ondansetron injection 8 mg    [COMPLETED] PACLitaxeL protein-bound (ABRAXANE) 100 mg/m2 = 230 mg in 53 mL infusion    [COMPLETED] sodium chloride 0.9% 250 mL flush bag    [DISCONTINUED] alteplase injection 2 mg    [DISCONTINUED] heparin, porcine (PF) 100 unit/mL injection flush 500 Units    [DISCONTINUED] sodium chloride 0.9% flush 10 mL     Current Outpatient Medications on File Prior to Encounter   Medication Sig    acetaminophen (TYLENOL) 650 MG TbSR Take by mouth daily as needed.    apixaban (ELIQUIS) 5 mg Tab Take 1 tablet (5 mg total) by mouth 2 (two) times daily.    dexAMETHasone (DECADRON) 4 MG Tab Take 2 tablets (8 mg total) by mouth once daily. Take on days 2 and 3 after chemo, with food    diltiaZEM 2% Lidocaine 5% Cream Apply pea size amount topically 3 (three) times daily.    DULoxetine (CYMBALTA) 20 MG capsule Take 1 capsule (20 mg total) by mouth once daily.    finasteride (PROSCAR) 5 mg tablet TAKE 1 TABLET EVERY DAY    furosemide (LASIX) 20 MG tablet Take 1 tablet (20 mg total) by mouth once daily.    gabapentin (NEURONTIN) 300 MG capsule Take 1 capsule (300 mg total) by mouth every evening.    hydrocortisone 1 % cream Apply topically 2 (two) times daily. for 7 days    hydrOXYzine HCL (ATARAX) 10 MG Tab Take 1 tablet (10 mg total) by mouth 3 (three) times daily as needed " (itching).    magnesium oxide (MAG-OX) 400 mg (241.3 mg magnesium) tablet Take 400 mg by mouth every evening.    multivitamin with minerals tablet Take 1 tablet by mouth every morning.     potassium chloride SA (K-DUR,KLOR-CON) 20 MEQ tablet Take 1 tablet (20 mEq total) by mouth once daily.    pravastatin (PRAVACHOL) 40 MG tablet TAKE 1 TABLET EVERY DAY    RABEprazole (ACIPHEX) 20 mg tablet Take 1 tablet (20 mg total) by mouth before breakfast.    tamsulosin (FLOMAX) 0.4 mg Cap TAKE 1 CAPSULE EVERY DAY    triamcinolone acetonide 0.1% (KENALOG) 0.1 % cream Apply topically 2 (two) times daily. Apply to the body twice daily for 2 weeks. Do not apply to the face, groin, or folds.     Family History       Problem Relation (Age of Onset)    Cancer Mother, Maternal Grandmother    Diabetes Mother    Heart disease Father, Paternal Grandfather, Brother    Hypertension Mother    Kidney cancer Mother (61)    Liver disease Maternal Grandmother    No Known Problems Sister, Daughter, Son, Sister, Sister, Sister, Daughter          Tobacco Use    Smoking status: Former     Packs/day: 1.00     Years: 20.00     Pack years: 20.00     Types: Cigarettes     Quit date: 1987     Years since quittin.2    Smokeless tobacco: Never    Tobacco comments:     quit    Substance and Sexual Activity    Alcohol use: Not Currently     Alcohol/week: 1.0 - 2.0 standard drink     Types: 1 - 2 Glasses of wine per week     Comment: not since liver diagnosis    Drug use: No    Sexual activity: Yes     Partners: Female     Review of Systems  Objective:     Vital Signs (Most Recent):  Temp: 97.5 °F (36.4 °C) (23 1154)  Pulse: 86 (23 1154)  Resp: 18 (23 1154)  BP: 109/66 (23 1154)  SpO2: (!) 94 % (23 1154)   Vital Signs (24h Range):  Temp:  [97.5 °F (36.4 °C)-100.9 °F (38.3 °C)] 97.5 °F (36.4 °C)  Pulse:  [] 86  Resp:  [16-20] 18  SpO2:  [93 %-97 %] 94 %  BP: ()/(49-66) 109/66     Weight: 121.1 kg (266 lb  15.6 oz)  Body mass index is 40.59 kg/m².    Physical Exam    Significant Labs: All pertinent labs within the past 24 hours have been reviewed.  ABGs:   Recent Labs   Lab 03/24/23  0325 03/24/23  0350   PH 7.338* 7.339*   PCO2 40.6 39.8   HCO3 21.8* 21.4*   POCSATURATED 40* 65*   BE -4 -4   PO2 24* 36*     Bilirubin:   Recent Labs   Lab 03/07/23  1103 03/16/23  1045 03/22/23  0729 03/24/23  0219   BILITOT 2.3* 3.7* 5.9* 7.7*     CBC:   Recent Labs   Lab 03/24/23  0219   WBC 16.15*   HGB 8.9*   HCT 27.8*        CMP:   Recent Labs   Lab 03/24/23  0219      K 3.8   *   CO2 18*   GLU 74   BUN 21   CREATININE 2.1*   CALCIUM 9.3   PROT 5.9*   ALBUMIN 2.1*   BILITOT 7.7*   ALKPHOS 495*   *   ALT 57*   ANIONGAP 14     Lactic Acid:   Recent Labs   Lab 03/24/23  0908   LACTATE 2.1     Urine Culture: No results for input(s): LABURIN in the last 48 hours.  Urine Studies: No results for input(s): COLORU, APPEARANCEUA, PHUR, SPECGRAV, PROTEINUA, GLUCUA, KETONESU, BILIRUBINUA, OCCULTUA, NITRITE, UROBILINOGEN, LEUKOCYTESUR, RBCUA, WBCUA, BACTERIA, SQUAMEPITHEL, HYALINECASTS in the last 48 hours.    Invalid input(s): WRIGHTSUR    Significant Imaging: I have reviewed all pertinent imaging results/findings within the past 24 hours.    Assessment/Plan:   Sepsis  Patient with intrahepatic cholangiocarcinoma here with SOB and weakness   Meets sepsis criteria (Temperature 100.9, WBC 16.2, )  Concern for abdominal source given RUQ pain  CXR with no acute findings   WBC 16.2 and lactate 2.25  No history of ascites or requiring paracentesis, ED unable to find fluid pocket to tap     - Continue vanz/zosyn   - FU cultures   - FU CT abdomen and abdominal U/S   - Got 500mL IV in ED, getting another 1L, overloaded on exam so cautious with fluids         Hyperammonemia  Ammonia 63 on admission   Asterixis present on exam      - Rifaximin BID   - Will hold off on lactulose as patient had 3 loose stools since  yesterday      Lower leg edema  Could be due to patients low albumin   EF 60% in December 2021  U/S on 3/13 negative for DVT   Was on lasix outpatient but it was held due to rising creatinine      - FU TTE   - Will hold lasix in setting of infection and low blood pressures      DAWIT (acute kidney injury)  Cr 2.1 on admission, baseline around 1.3-1.5   His lasix dose was held in the last week due to rising Cr but it continues to increase   Blood pressure soft on admission, possible pre-renal etiology       - FU urine labs   - FU renal U/S  - Nephrology consulted   - Check renal function daily   - Fluid overloaded on exam but concern for infection and blood pressure is low, so will give 1L IVF      Deep vein thrombosis (DVT) of femoral vein of left lower extremity  Diagnosed June 2022     - Continue home apixaban      Weakness generalized  Likely secondary to malignancy and possible infection      - PT/OT consulted      Hyperbilirubinemia  History of intrahepatic cholangiocarcinoma   Last chemo on 3/23  On admission Tbili 7.7, was 2.3 earlier this month   U/S on 3/17 revealed stones and sludge in the gallbladder with no sonographic evidence for acute cholecystitis, as well as multifocal hepatic masses  Concern for possible obstruction vs worsening metastatic disease      - FU CT abdomen and abdominal U/S   - Will consult hepatology and/or AES depending on imaging results   - CMP daily   - NPO for possible ERCP depending on imaging results      Elevated troponin  Troponin 0.035 on admission   ECG with no ischemic changes  Likely demand in setting of infection   No chest pain      - Trend until peak      Intrahepatic cholangiocarcinoma  - Current Regimen: PANC NAB-PACLITAXEL + GEMCITABINE Q4W  - Last chemo on 3/23  - MRI on 3/7 revealed interval increase in size and number of multiple additional liver lesions, consistent with worsening metastatic disease  Active Diagnoses:    Diagnosis Date Noted POA    PRINCIPAL  PROBLEM:  Sepsis [A41.9] 03/24/2023 Unknown    Lower leg edema [R60.0] 03/24/2023 Unknown    Hyperammonemia [E72.20] 03/24/2023 Unknown    DAWIT (acute kidney injury) [N17.9] 03/16/2023 Unknown    Deep vein thrombosis (DVT) of femoral vein of left lower extremity [I82.412] 08/16/2022 Yes    Weakness generalized [R53.1] 07/01/2022 Yes    Hyperbilirubinemia [E80.6] 12/21/2021 Unknown    Elevated troponin [R77.8] 12/18/2021 Yes    Intrahepatic cholangiocarcinoma [C22.1] 11/29/2021 Yes    CAD in native artery [I25.10] 03/26/2015 Yes    RAHEEL (obstructive sleep apnea) [G47.33] 02/18/2015 Yes      Problems Resolved During this Admission:     VTE Risk Mitigation (From admission, onward)           Ordered     apixaban tablet 5 mg  2 times daily         03/24/23 0455     IP VTE HIGH RISK PATIENT  Once         03/24/23 0451     Place sequential compression device  Until discontinued         03/24/23 0451                    HOS POC IP DISCHARGE SUMMARY    Thank you for your consult. {SIGN OFF/FOLLOW-UP:45479}    Mandy Garcia  Department of Hospital Medicine   [facility] - Oncology ([facility])

## 2023-03-24 NOTE — ED NOTES
"Domonique Kellogg, an 72 y.o. male presents to the ED with c/o sob. Wife states she woke up to pt "ga      Review of patient's allergies indicates:   Allergen Reactions    Imfinzi [durvalumab] Other (See Comments)     1305- Imfinzi ended, pt returned from restroom complained of wheezing SOB and rigors. /100  O2 92% 2L NC O2 applied to pt.   1306-Demerol 25mg given and Solucortef 125mg IVPush given.   1309- Pt /106  02 99%.   1318- Pt still having Rigors additional 25mg of Demerol given.   1418- BP is 133/69 77 HR 99% RA Pt states " I feel much better"       Indomethacin Other (See Comments)     Headache dizziness      Adhesive Rash    Colchicine analogues Nausea And Vomiting     NVD     Chief Complaint   Patient presents with    Shortness of Breath     Sob/CP woke him out sleep. [place] like "someone standing on chest." On chemo for liver cancer. Wife reports he is disoriented.      Past Medical History:   Diagnosis Date    Acute kidney injury 3/16/2023    Adjustment disorder     Anticoagulant long-term use     Anxiety     Arthritis     CAD (coronary artery disease) 03/02/2015    non-obstructive per [facility]     Cataract     Class 1 obesity with serious comorbidity in adult 3/17/2014    Dry eye syndrome     Hypertension     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/2021    Obesity     Post-procedural fever 12/18/2021    Seizures     2011    Sleep apnea     + CPAP    Sleep difficulties       "

## 2023-03-24 NOTE — PT/OT/SLP PROGRESS
Physical Therapy      Patient Name:  Domonique Kellogg   MRN:  [id number]    Patient not seen today secondary to: during PM attempt, Pt on bed pan  . Will follow-up when medically appropriate.    3/24/2023

## 2023-03-24 NOTE — PLAN OF CARE
Problem: SLP  Goal: SLP Goal  Outcome: Met  Bedside Swallow Study completed. Recommend: regular diet, thin liquids, standard aspiration precautions. No further skilled ST services warranted at this time. Please re-consult as needed.

## 2023-03-24 NOTE — SUBJECTIVE & OBJECTIVE
"Oncology Treatment Plan:   OP PANC NAB-PACLITAXEL + GEMCITABINE Q4W    Medications:  Continuous Infusions:  Scheduled Meds:   apixaban  5 mg Oral BID    DULoxetine  20 mg Oral Daily    finasteride  5 mg Oral Daily    piperacillin-tazobactam (ZOSYN) IVPB  4.5 g Intravenous Q8H    rifAXImin  550 mg Oral BID    tamsulosin  1 capsule Oral Daily    vancomycin (VANCOCIN) IVPB  2,000 mg Intravenous ED 1 Time     PRN Meds:dextrose 10%, dextrose 10%, dextrose, dextrose, glucagon (human recombinant), melatonin, naloxone, ondansetron, sodium chloride 0.9%, Pharmacy to dose Vancomycin consult **AND** vancomycin - pharmacy to dose     Review of patient's allergies indicates:   Allergen Reactions    Imfinzi [durvalumab] Other (See Comments)     1305- Imfinzi ended, pt returned from restroom complained of wheezing SOB and rigors. /100  O2 92% 2L NC O2 applied to pt.   1306-Demerol 25mg given and Solucortef 125mg IVPush given.   1309- Pt /106  02 99%.   1318- Pt still having Rigors additional 25mg of Demerol given.   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    Obesity     Post-procedural fever 12/18/2021    Seizures     2011    Sleep apnea     + CPAP    Sleep difficulties      Past Surgical History:   Procedure Laterality Date    ANTERIOR CERVICAL DISCECTOMY W/ FUSION N/A 07/06/2020    Procedure: DISCECTOMY, SPINE, CERVICAL, ANTERIOR APPROACH, WITH FUSION C3-4, C4-5;  " Surgeon: Fabiola Vides MD;  Location: [facility] OR 2ND FLR;  Service: Neurosurgery;  Laterality: N/A;  TORONTO III, ASA III, BLOOD TYPE & SCREEN, NEUROMONITORING EMG-MEP-SEP, SUPINE POSITION,BRACE MIAMI,REGULAR BED, HEADREST CASPAR, POSITION, MAP>85, RADIOLOGY C-ARM, SPECIAL EQUIPMENT VIRGIL TYLER    COLONOSCOPY N/A 10/01/2021    Procedure: COLONOSCOPY;  Surgeon: Nicolas Alvarado MD;  Location: [facility] (4TH FLR);  Service: Endoscopy;  Laterality: N/A;  EGD and colonoscopy for diarrhea and weight loss and Enlarged, heterogeneous appearance of the liver likely due to multiple underlying hepatic lesions largest measuring 4.8 cm.  Findings concerning for metastatic versus less likely primary hepatic neoplasm.  Recommend furth    COLONOSCOPY N/A 06/07/2022    Procedure: COLONOSCOPY;  Surgeon: Mejia Villafuerte MD;  Location: [facility] (4TH FLR);  Service: Endoscopy;  Laterality: N/A;  For evaluation of positive out-patient FOBT.   medically urgent  ok to hold Eliquis per CAROLINA Edward/RB  fully vaccinated  hx of seizures- last one 2014    ESOPHAGOGASTRODUODENOSCOPY N/A 10/01/2021    Procedure: EGD (ESOPHAGOGASTRODUODENOSCOPY);  Surgeon: Nicolas Alvarado MD;  Location: [facility] (4TH FLR);  Service: Endoscopy;  Laterality: N/A;  EGD and colonoscopy for diarrhea and weight loss and Enlarged, heterogeneous appearance of the liver likely due to multiple underlying hepatic lesions largest measuring 4.8 cm.  Findings concerning for metastatic versus less likely primary hepatic neopl    ESOPHAGOGASTRODUODENOSCOPY N/A 04/05/2022    Procedure: EGD (ESOPHAGOGASTRODUODENOSCOPY);  Surgeon: Amado Kelsey MD;  Location: [facility] (4TH FLR);  Service: Endoscopy;  Laterality: N/A;  EGD in 8 weeks to check for esophagitis healing patient should be on pantoprazole 40 mg once daily  ok to hold eliquis x2 days per Dr. Young, see telephone encounter  fully vaccinated    EYE SURGERY      cataract    INSERTION OF VENOUS ACCESS PORT N/A  2021    Procedure: INSERTION, VENOUS ACCESS PORT;  Surgeon: Saurav Garcia MD;  Location: [facility] OR 2ND FLR;  Service: General;  Laterality: N/A;    INTRAOCULAR PROSTHESES INSERTION Right 2018    Procedure: INSERTION-INTRAOCULAR LENS (IOL);  Surgeon: Priscilla Hays MD;  Location: [facility] OR;  Service: Ophthalmology;  Laterality: Right;    INTRAOCULAR PROSTHESES INSERTION Left 2018    Procedure: INSERTION, INTRAOCULAR LENS PROSTHESIS;  Surgeon: Priscilla Hays MD;  Location: [facility] OR;  Service: Ophthalmology;  Laterality: Left;    KNEE SURGERY Right     LAPAROSCOPIC APPENDECTOMY N/A 2022    Procedure: APPENDECTOMY, LAPAROSCOPIC;  Surgeon: Cheo Hamm MD;  Location: [facility] OR 2ND FLR;  Service: General;  Laterality: N/A;    PHACOEMULSIFICATION OF CATARACT Right 2018    Procedure: PHACOEMULSIFICATION-ASPIRATION-CATARACT;  Surgeon: Priscilla Hays MD;  Location: [facility] OR;  Service: Ophthalmology;  Laterality: Right;    PHACOEMULSIFICATION OF CATARACT Left 2018    Procedure: PHACOEMULSIFICATION, CATARACT;  Surgeon: Priscilla Hays MD;  Location: [facility] OR;  Service: Ophthalmology;  Laterality: Left;    REPAIR OF INCARCERATED UMBILICAL HERNIA  2022    Procedure: REPAIR, HERNIA, UMBILICAL, INCARCERATED, AGE 5 YEARS OR OLDER;  Surgeon: Cheo Hamm MD;  Location: [facility] OR 2ND FLR;  Service: General;;    WISDOM TOOTH EXTRACTION       Family History       Problem Relation (Age of Onset)    Cancer Mother, Maternal Grandmother    Diabetes Mother    Heart disease Father, Paternal Grandfather, Brother    Hypertension Mother    Kidney cancer Mother (61)    Liver disease Maternal Grandmother    No Known Problems Sister, Daughter, Son, Sister, Sister, Sister, Daughter          Tobacco Use    Smoking status: Former     Packs/day: 1.00     Years: 20.00     Pack years: 20.00     Types: Cigarettes     Quit date: 1987     Years since quittin.2    Smokeless tobacco: Never    Tobacco  comments:     quit 1987   Substance and Sexual Activity    Alcohol use: Not Currently     Alcohol/week: 1.0 - 2.0 standard drink     Types: 1 - 2 Glasses of wine per week     Comment: not since liver diagnosis    Drug use: No    Sexual activity: Yes     Partners: Female       Review of Systems   Constitutional:  Positive for activity change, fatigue and fever. Negative for chills.   HENT:  Negative for congestion and sore throat.    Eyes:  Negative for pain.   Respiratory:  Negative for cough and shortness of breath.    Cardiovascular:  Negative for chest pain, palpitations and leg swelling.   Gastrointestinal:  Positive for abdominal distention, abdominal pain and vomiting. Negative for constipation, diarrhea and nausea.   Genitourinary:  Negative for difficulty urinating, dysuria and hematuria.   Musculoskeletal:  Negative for back pain.   Skin:  Negative for rash.   Neurological:  Negative for light-headedness and headaches.   Hematological:  Does not bruise/bleed easily.   Psychiatric/Behavioral:  Negative for agitation.    Objective:     Vital Signs (Most Recent):  Temp: 99.3 °F (37.4 °C) (03/24/23 0440)  Pulse: 101 (03/24/23 0451)  Resp: 20 (03/24/23 0451)  BP: (!) 102/57 (03/24/23 0451)  SpO2: 97 % (03/24/23 0451)   Vital Signs (24h Range):  Temp:  [98.7 °F (37.1 °C)-100.9 °F (38.3 °C)] 99.3 °F (37.4 °C)  Pulse:  [] 101  Resp:  [18-20] 20  SpO2:  [94 %-97 %] 97 %  BP: ()/(49-64) 102/57     Weight: 113.4 kg (250 lb)  Body mass index is 38.01 kg/m².  Body surface area is 2.33 meters squared.    No intake or output data in the 24 hours ending 03/24/23 0514    Physical Exam  Constitutional:       Appearance: Normal appearance.   HENT:      Head: Normocephalic and atraumatic.      Mouth/Throat:      Mouth: Mucous membranes are moist.      Pharynx: Oropharynx is clear.   Eyes:      General: Scleral icterus present.      Extraocular Movements: Extraocular movements intact.      Pupils: Pupils are equal,  round, and reactive to light.   Cardiovascular:      Rate and Rhythm: Normal rate and regular rhythm.      Pulses: Normal pulses.      Heart sounds: Normal heart sounds.   Pulmonary:      Effort: Pulmonary effort is normal.      Breath sounds: Normal breath sounds. No rales.   Abdominal:      General: Abdomen is flat. There is distension.      Palpations: Abdomen is soft.      Tenderness: There is abdominal tenderness (mild RUQ tenderness).   Musculoskeletal:         General: Normal range of motion.      Cervical back: Normal range of motion and neck supple.      Right lower leg: Edema present.      Left lower leg: Edema present.   Skin:     General: Skin is warm and dry.   Neurological:      General: No focal deficit present.      Mental Status: He is alert and oriented to person, place, and time.      Comments: Asterixis present   Psychiatric:         Mood and Affect: Mood normal.         Behavior: Behavior normal.       Significant Labs:   All pertinent labs from the last 24 hours have been reviewed.    Diagnostic Results:  I have reviewed all pertinent imaging results/findings within the past 24 hours.

## 2023-03-24 NOTE — PT/OT/SLP EVAL
Speech Language Pathology Evaluation  Bedside Swallow  Discharge    Patient Name:  Domonique Kellogg   MRN:  [id number]  806/806 A    Admitting Diagnosis: Sepsis    Recommendations:                 General Recommendations:  Follow-up not indicated  Diet recommendations:  Regular, Thin   Aspiration Precautions: Standard aspiration precautions   General Precautions: Standard,    Communication strategies:  none    History:     Past Medical History:   Diagnosis Date    Acute kidney injury 3/16/2023    Adjustment disorder     Anticoagulant long-term use     Anxiety     Arthritis     CAD (coronary artery disease) 03/02/2015    non-obstructive per [facility]     Cataract     Class 1 obesity with serious comorbidity in adult 3/17/2014    Dry eye syndrome     Hypertension     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/2021    Obesity     Post-procedural fever 12/18/2021    Seizures     2011    Sleep apnea     + CPAP    Sleep difficulties        Past Surgical History:   Procedure Laterality Date    ANTERIOR CERVICAL DISCECTOMY W/ FUSION N/A 07/06/2020    Procedure: DISCECTOMY, SPINE, CERVICAL, ANTERIOR APPROACH, WITH FUSION C3-4, C4-5;  Surgeon: Fabiola Vides MD;  Location: [facility] OR [address] [place] [address];  Service: Neurosurgery;  Laterality: N/A;  TORONTO III, ASA III, BLOOD TYPE & SCREEN, NEUROMONITORING EMG-MEP-SEP, SUPINE POSITION,BRACE MIAMI,REGULAR BED, HEADREST CASPAR, POSITION, MAP>85, RADIOLOGY C-ARM, SPECIAL EQUIPMENT VIRGIL NAYELI    COLONOSCOPY N/A 10/01/2021    Procedure: COLONOSCOPY;  Surgeon: Nicolas Alvarado MD;  Location: [facility] (4TH [facility]);  Service: Endoscopy;  Laterality: N/A;  EGD and colonoscopy for diarrhea and weight loss and Enlarged, heterogeneous appearance of the liver likely due to multiple underlying hepatic lesions largest measuring 4.8 cm.  Findings concerning for metastatic versus less likely primary hepatic neoplasm.  Recommend furth    COLONOSCOPY N/A 06/07/2022    Procedure: COLONOSCOPY;  Surgeon: Mejia Villafuerte MD;   Location: [facility] (4TH FLR);  Service: Endoscopy;  Laterality: N/A;  For evaluation of positive out-patient FOBT.   medically urgent  ok to hold Eliquis per CAROL[place] Edward/RB  fully vaccinated  hx of seizures- last one 2014    ESOPHAGOGASTRODUODENOSCOPY N/A 10/01/2021    Procedure: EGD (ESOPHAGOGASTRODUODENOSCOPY);  Surgeon: Nicolas Alvarado MD;  Location: [facility] (4TH FLR);  Service: Endoscopy;  Laterality: N/A;  EGD and colonoscopy for diarrhea and weight loss and Enlarged, heterogeneous appearance of the liver likely due to multiple underlying hepatic lesions largest measuring 4.8 cm.  Findings concerning for metastatic versus less likely primary hepatic neopl    ESOPHAGOGASTRODUODENOSCOPY N/A 04/05/2022    Procedure: EGD (ESOPHAGOGASTRODUODENOSCOPY);  Surgeon: Amado Kelsey MD;  Location: [facility] (4TH FLR);  Service: Endoscopy;  Laterality: N/A;  EGD in 8 weeks to check for esophagitis healing patient should be on pantoprazole 40 mg once daily  ok to hold eliquis x2 days per Dr. Young, see telephone encounter  fully vaccinated    EYE SURGERY      cataract    INSERTION OF VENOUS ACCESS PORT N/A 12/03/2021    Procedure: INSERTION, VENOUS ACCESS PORT;  Surgeon: Saurav Garcia MD;  Location: [facility] 2ND FLR;  Service: General;  Laterality: N/A;    INTRAOCULAR PROSTHESES INSERTION Right 07/16/2018    Procedure: INSERTION-INTRAOCULAR LENS (IOL);  Surgeon: Priscilla Hays MD;  Location: [facility];  Service: Ophthalmology;  Laterality: Right;    INTRAOCULAR PROSTHESES INSERTION Left 08/13/2018    Procedure: INSERTION, INTRAOCULAR LENS PROSTHESIS;  Surgeon: Priscilla Hays MD;  Location: [facility];  Service: Ophthalmology;  Laterality: Left;    KNEE SURGERY Right     LAPAROSCOPIC APPENDECTOMY N/A 11/2/2022    Procedure: APPENDECTOMY, LAPAROSCOPIC;  Surgeon: Cheo Hamm MD;  Location: [facility] 2ND FLR;  Service: General;  Laterality: N/A;    PHACOEMULSIFICATION OF CATARACT Right 07/16/2018    Procedure:  PHACOEMULSIFICATION-ASPIRATION-CATARACT;  Surgeon: Priscilla Hays MD;  Location: [facility];  Service: Ophthalmology;  Laterality: Right;    PHACOEMULSIFICATION OF CATARACT Left 08/13/2018    Procedure: PHACOEMULSIFICATION, CATARACT;  Surgeon: Priscilla Hays MD;  Location: [facility];  Service: Ophthalmology;  Laterality: Left;    REPAIR OF INCARCERATED UMBILICAL HERNIA  11/2/2022    Procedure: REPAIR, HERNIA, UMBILICAL, INCARCERATED, AGE 5 YEARS OR OLDER;  Surgeon: Cheo Hamm MD;  Location: [address];  Service: General;;    WISDOM TOOTH EXTRACTION       MD note 3/24: HPI: Mr. Domonique Kellogg is a 72 y.o. 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 Patients wife states that patient was having difficulty breathing while on his CPAP last night so she woke him up. Patient was a little confused about where he was upon waking. Patient states he has had fatigue/weakness for weeks that has been progressively worsening. He also reports lower leg/abdominal swelling recently that has been worsening. He is on lasix at home but that has been held for almost a week due to a rising creatinine on outside labs. Has some RUQ pain, U/S on 3/17 revealed stones and sludge in the gallbladder with no sonographic evidence for acute cholecystitis, as well as multifocal hepatic masses. He also developed jaundice about two-weeks ago. Denies chest pain, nausea, dysuria or headaches. He was dry heaving last night. Also reports 3 loose stools last night but no diarrhea.   On admission to the ED patient was febrile to 100.9, , /56, and on RA. Cr 2.1 (baseline 1.3-1.5). Tbili 7.7, Alk phos 495, , ALT 57, ammonia 63. WBC 16.2 and lactate 2.25. CXR with no acute findings. VBG with pH 7.34 and pCO2 39.8. Trop 0.035, . Patient given vanc/zosyn and IVF. CT abdomen ordered.   Oncology  "history per last clinic note:  1. Mr Kellogg is a 70 yo man with CAD, HTN, seizures in 2011 who initially saw me on 11/29/21 for further management of cholangiocarcinoma. He presented with weight loss and diarrhea since July. US 9/22/21 showed "Enlarged, heterogeneous appearance of the liver likely due to multiple underlying hepatic lesions largest measuring 4.8 cm."  MRI abdomen w/wo contrast 10/4/21: "Multiple liver lesions measuring up to 13.3 cm in the right hepatic lobe.  Differential diagnosis includes metastases, fibrolamellar hepatocellular carcinoma, or atypical focal nodular hyperplasia.  Consider biopsy for further evaluation. Thrombosis of the anterior branch of the right portal vein and left hepatic vein.  Nonvisualization of the middle and right hepatic veins, which may be thrombosed as well. Prominent periportal lymph node, which is nonspecific."  EGD and colonoscopy on 10/11/21 were negative for primary malignancies.   He underwent liver lesion biopsy and Y90 mapping on 11/22/21. Pathology showed adenocarcinoma: "Biopsy of the liver mass shows a malignant neoplasm in a fibrotic stroma. Glandular architecture is readily identified with several foci of intraluminal necrosis. Scattered mitotic figures are seen. Neoplastic cells show the following immunophenotype:   Positive: AE1/AE3, CK7, CDX2   Negative: Chromogranin, Synaptophysin, TTF, CK5/6, CK20, HepPar1   The morphology and immunophenotype are compatible with adenocarcinoma. Considerations for a primary site include pancreaticobiliary (including intrahepatic cholangiocarcinoma) as well as upper gastrointestinal. Clinical and radiologic correlation is suggested."  Patient presents today with wife for further management. No pain. Initial weight loss has somewhat stabilized. Still has diarrhea. Has not tried imodium yet. +nervous  Mother had kidney cancer at age 64. Maternal grandmother had liver cancer in her 60s. Patient has three children, two " daughters and one son.   Discussed palliative chemo with cis/gem  2. PET scan 12/6/21 did not show extrahepatic metastases.   3. Cis/gem started on 12/7/21. Durvalumab was initially denied by insurance company after GI ASCO, but approved after FDA approval, added 8/10/22. Patient had reaction to durv (wheezing, rigors, shaking, high BP) on 9/9. Imfinzi was stopped. Last chemo on 10/21/22. MRI 10/19/22 showed progression. Plan was to switched for FOLFOX. Patient was hospitalized for acute appendicitis and strangulated umbilical hernia s/p surgery on 11/2/2022.   4. S/p TACE on 12/16/2021, 2/14/22, 4/19/22, 5/18/22, 8/2/22  5. S/p RF Ablation on 7/19/22. TACE on 9/28/22  6. FOLFOX started on 11/21/22, s/p 6 cycles  7. Restaging MRI showed progression.    Chest X-Rays: Underinflated lungs with hypoventilatory change and crowding of markings.  No significant change from prior study.    Prior diet: reg/thin    Subjective     Patient awake and cooperative.   Family member at bedside.   Communicated with RN prior to entry.       Objective:     Oral Musculature Evaluation  Oral Musculature: WFL  Dentition: present and adequate  Mucosal Quality: dry  Oral Labial Strength and Mobility: WFL  Volitional Cough: elicited  Volitional Swallow: timely  Voice Prior to PO Intake: clear    Bedside Swallow Eval:   Consistencies Assessed:  Thin liquid sips of water via cup and straw  Solids bites of jose manuel cracker      Oral Phase:   WFL     Pharyngeal Phase:   no overt clinical signs/symptoms of aspiration  no overt clinical signs/symptoms of pharyngeal dysphagia     Compensatory Strategies  None     Treatment: SLP provided patient education on SLP role, s/s and risks of aspiraiton, safe swallow precautions, and POC. Patient v/u of all discussed.     Assessment:     Domonique Kellogg is a 72 y.o. male with adequate tolerance of regular solids and thin liquids. No further skilled acute Speech Therapy services warranted at this time. Please  re-consult as needed.       Goals:   Multidisciplinary Problems       SLP Goals       Not on file              Multidisciplinary Problems (Resolved)          Problem: SLP    Goal Priority Disciplines Outcome   SLP Goal   (Resolved)     SLP Met                       Plan:       Plan of Care reviewed with:  patient, family   SLP Follow-Up:  No       Discharge recommendations:   (no ST needs)   Barriers to Discharge:  None    Time Tracking:     SLP Treatment Date:   03/24/23  Speech Start Time:  1342  Speech Stop Time:  1355     Speech Total Time (min):  13 min    Billable Minutes: Eval Swallow and Oral Function 13    03/24/2023

## 2023-03-24 NOTE — ASSESSMENT & PLAN NOTE
Cr 2.1 on admission, baseline around 1.3-1.5   His lasix dose was held in the last week due to rising Cr but it continues to increase   Blood pressure soft on admission, possible pre-renal etiology      - FU urine labs   - FU renal U/S  - Nephrology consulted   - Check renal function daily   - Fluid overloaded on exam but concern for infection and blood pressure is low, so will give 1L IVF

## 2023-03-24 NOTE — CONSULTS
Abdiel [facility]abb - Oncology ([facility])  Palliative Medicine  Consult Note    Patient Name: Domonique Kellogg  MRN: [id number]  Admission Date: 3/24/2023  Hospital Length of Stay: 0 days  Code Status: Full Code   Attending Provider: Vanessa Horvath MD  Consulting Provider: Luz Elena Evans NP  Primary Care Physician: Nicolas Marlow MD  Principal Problem:Sepsis    Patient information was obtained from patient, spouse/SO, past medical records and primary team.      Inpatient consult to Palliative Care  Consult performed by: Luz Elena Evans NP  Consult ordered by: Leah Goldstein MD  Reason for consult: goals of care/advance care planning        Assessment/Plan:     Palliative Care  Palliative care encounter  Palliative medicine consulted for goals of care/advance care planning.  Mr. Kellogg is a 72-year-old male admitted with intrahepatic cholangiocarcinoma, sepsis, hyperammoniemia, lower leg edema, DAWIT, DVT of femoral vein, generalized weakness, CAD in native artery, and obstructive sleep apnea.  Per chart review, patient is currently full code status.  No advance directives on file.      At the time of my visit, patient was awake and alert, appears to be in good spirits.  His wife, Estrellita, was at bedside.  Palliative services were introduced and both were receptive to visit.  Patient denies any current complaints.  We discussed patient as an individual and his role and the family.  Patient and Estrellita have been  for 47 years and were friends for years before their marriage.  Patient has one son from another marriage. Patient and Estrellita have two daughters together.  Both patient and Estrellita laughed and seemed to enjoy re-telling the story of their meeting.  Conversation was brief as nephrology visited at this time.  Palliative will follow up for goals of care/advance care planning.         Thank you for your consult. I will follow-up with patient. Please contact us if you have any additional questions.    Subjective:  "    HPI:   HPI per chart review:  Mr. Kellogg is a 72 y.o. 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 Patients wife stated that patient was having difficulty breathing while on his CPAP last night so she woke him up. Patient was a little confused about where he was upon waking. Patient states he has had fatigue/weakness for weeks that has been progressively worsening. He also reports lower leg/abdominal swelling recently that has been worsening. He is on lasix at home but that has been held for almost a week due to a rising creatinine on outside labs. Has some RUQ pain, U/S on 3/17 revealed stones and sludge in the gallbladder with no sonographic evidence for acute cholecystitis, as well as multifocal hepatic masses. He also developed jaundice about two-weeks ago. Denies chest pain, nausea, dysuria or headaches. He was dry heaving last night. Also reports 3 loose stools last night but no diarrhea. On admission to the ED patient was febrile to 100.9, , /56, and on RA. Cr 2.1 (baseline 1.3-1.5). Tbili 7.7, Alk phos 495, , ALT 57, ammonia 63. WBC 16.2 and lactate 2.25. CXR with no acute findings. VBG with pH 7.34 and pCO2 39.8. Trop 0.035, . Patient given vanc/zosyn and IVF. CT abdomen ordered.      Oncology history per last clinic note (Gertrude Ni PA-C 3/22/23:  1. Mr Kellogg is a 70 yo man with CAD, HTN, seizures in 2011 who initially saw me on 11/29/21 for further management of cholangiocarcinoma. He presented with weight loss and diarrhea since July. US 9/22/21 showed "Enlarged, heterogeneous appearance of the liver likely due to multiple underlying hepatic lesions largest measuring 4.8 cm."  MRI abdomen w/wo contrast 10/4/21: "Multiple liver lesions measuring up to 13.3 cm in the right hepatic lobe.  Differential diagnosis includes metastases, " "fibrolamellar hepatocellular carcinoma, or atypical focal nodular hyperplasia.  Consider biopsy for further evaluation. Thrombosis of the anterior branch of the right portal vein and left hepatic vein.  Nonvisualization of the middle and right hepatic veins, which may be thrombosed as well. Prominent periportal lymph node, which is nonspecific."  EGD and colonoscopy on 10/11/21 were negative for primary malignancies.   He underwent liver lesion biopsy and Y90 mapping on 11/22/21. Pathology showed adenocarcinoma: "Biopsy of the liver mass shows a malignant neoplasm in a fibrotic stroma. Glandular architecture is readily identified with several foci of intraluminal necrosis. Scattered mitotic figures are seen. Neoplastic cells show the following immunophenotype:   Positive: AE1/AE3, CK7, CDX2   Negative: Chromogranin, Synaptophysin, TTF, CK5/6, CK20, HepPar1   The morphology and immunophenotype are compatible with adenocarcinoma. Considerations for a primary site include pancreaticobiliary (including intrahepatic cholangiocarcinoma) as well as upper gastrointestinal. Clinical and radiologic correlation is suggested."  Patient presents today with wife for further management. No pain. Initial weight loss has somewhat stabilized. Still has diarrhea. Has not tried imodium yet. +nervous  Mother had kidney cancer at age 64. Maternal grandmother had liver cancer in her 60s. Patient has three children, two daughters and one son.   Discussed palliative chemo with cis/gem  2. PET scan 12/6/21 did not show extrahepatic metastases.   3. Cis/gem started on 12/7/21. Durvalumab was initially denied by insurance company after GI ASC[facility], but approved after FDA approval, added 8/10/22. Patient had reaction to durv (wheezing, rigors, shaking, high BP) on 9/9. Imfinzi was stopped. Last chemo on 10/21/22. MRI 10/19/22 showed progression. Plan was to switched for FOLFOX. Patient was hospitalized for acute appendicitis and strangulated " umbilical hernia s/p surgery on 11/2/2022.   4. S/p TACE on 12/16/2021, 2/14/22, 4/19/22, 5/18/22, 8/2/22  5. S/p RF Ablation on 7/19/22. TACE on 9/28/22  6. FOLFOX started on 11/21/22, s/p 6 cycles  7. Restaging MRI showed progression.      Hospital Course:  No notes on file    Hospital problems/plan of care as per primary team:   Sepsis  Patient with intrahepatic cholangiocarcinoma here with SOB and weakness   Meets sepsis criteria (Temperature 100.9, WBC 16.2, )  Concern for abdominal source given RUQ pain  CXR with no acute findings   WBC 16.2 and lactate 2.25  No history of ascites or requiring paracentesis, ED unable to find fluid pocket to tap     - Continue vanz/zosyn   - FU cultures   - FU CT abdomen and abdominal U/S   - Got 500mL IV in ED, getting another 1L, overloaded on exam so cautious with fluids         Hyperammonemia  Ammonia 63 on admission   Asterixis present on exam      - Rifaximin BID   - Will hold off on lactulose as patient had 3 loose stools since yesterday      Lower leg edema  Could be due to patients low albumin   EF 60% in December 2021  U/S on 3/13 negative for DVT   Was on lasix outpatient but it was held due to rising creatinine      - FU TTE   - Will hold lasix in setting of infection and low blood pressures      DAWIT (acute kidney injury)  Cr 2.1 on admission, baseline around 1.3-1.5   His lasix dose was held in the last week due to rising Cr but it continues to increase   Blood pressure soft on admission, possible pre-renal etiology       - FU urine labs   - FU renal U/S  - Nephrology consulted   - Check renal function daily   - Fluid overloaded on exam but concern for infection and blood pressure is low, so will give 1L IVF      Deep vein thrombosis (DVT) of femoral vein of left lower extremity  Diagnosed June 2022     - Continue home apixaban      Weakness generalized  Likely secondary to malignancy and possible infection      - PT/OT consulted       Hyperbilirubinemia  History of intrahepatic cholangiocarcinoma   Last chemo on 3/23  On admission Tbili 7.7, was 2.3 earlier this month   U/S on 3/17 revealed stones and sludge in the gallbladder with no sonographic evidence for acute cholecystitis, as well as multifocal hepatic masses  Concern for possible obstruction vs worsening metastatic disease      - FU CT abdomen and abdominal U/S   - Will consult hepatology and/or AES depending on imaging results   - CMP daily   - NPO for possible ERCP depending on imaging results      Elevated troponin  Troponin 0.035 on admission   ECG with no ischemic changes  Likely demand in setting of infection   No chest pain      - Trend until peak      Intrahepatic cholangiocarcinoma  - Current Regimen: PANC NAB-PACLITAXEL + GEMCITABINE Q4W  - Last chemo on 3/23  - MRI on 3/7 revealed interval increase in size and number of multiple additional liver lesions, consistent with worsening metastatic disease           CAD in native artery  - Hold statin in setting of elevated Tbili and liver enzymes      RAHEEL (obstructive sleep apnea)  - CPAP nightly        Past Medical History:   Diagnosis Date    Acute kidney injury 3/16/2023    Adjustment disorder     Anticoagulant long-term use     Anxiety     Arthritis     CAD (coronary artery disease) 03/02/2015    non-obstructive per [facility]     Cataract     Class 1 obesity with serious comorbidity in adult 3/17/2014    Dry eye syndrome     Hypertension     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/2021    Obesity     Post-procedural fever 12/18/2021    Seizures     2011    Sleep apnea     + CPAP    Sleep difficulties        Past Surgical History:   Procedure Laterality Date    ANTERIOR CERVICAL DISCECTOMY W/ FUSION N/A 07/06/2020    Procedure: DISCECTOMY, SPINE, CERVICAL, ANTERIOR APPROACH, WITH FUSION C3-4, C4-5;  Surgeon: Fabiola Vides MD;  Location: [facility] OR [address] [place] [address];  Service: Neurosurgery;  Laterality: N/A;  TORONTO III, ASA III,  BLOOD TYPE & SCREEN, NEUROMONITORING EMG-MEP-SEP, SUPINE POSITION,BRACE MIAMI,REGULAR BED, HEADREST CASPAR, POSITION, MAP>85, RADIOLOGY C-ARM, SPECIAL EQUIPMENT VIRGIL TYLER    COLONOSCOPY N/A 10/01/2021    Procedure: COLONOSCOPY;  Surgeon: Nicolas Alvarado MD;  Location: [facility] (4TH FLR);  Service: Endoscopy;  Laterality: N/A;  EGD and colonoscopy for diarrhea and weight loss and Enlarged, heterogeneous appearance of the liver likely due to multiple underlying hepatic lesions largest measuring 4.8 cm.  Findings concerning for metastatic versus less likely primary hepatic neoplasm.  Recommend furth    COLONOSCOPY N/A 06/07/2022    Procedure: COLONOSCOPY;  Surgeon: Mejia Villafuerte MD;  Location: [facility] (4TH FLR);  Service: Endoscopy;  Laterality: N/A;  For evaluation of positive out-patient FOBT.   medically urgent  ok to hold Eliquis per [place] Edward/RB  fully vaccinated  hx of seizures- last one 2014    ESOPHAGOGASTRODUODENOSCOPY N/A 10/01/2021    Procedure: EGD (ESOPHAGOGASTRODUODENOSCOPY);  Surgeon: Nicolas Alvarado MD;  Location: [facility] (4TH FLR);  Service: Endoscopy;  Laterality: N/A;  EGD and colonoscopy for diarrhea and weight loss and Enlarged, heterogeneous appearance of the liver likely due to multiple underlying hepatic lesions largest measuring 4.8 cm.  Findings concerning for metastatic versus less likely primary hepatic neopl    ESOPHAGOGASTRODUODENOSCOPY N/A 04/05/2022    Procedure: EGD (ESOPHAGOGASTRODUODENOSCOPY);  Surgeon: Amado Kelsey MD;  Location: [facility] (4TH FLR);  Service: Endoscopy;  Laterality: N/A;  EGD in 8 weeks to check for esophagitis healing patient should be on pantoprazole 40 mg once daily  ok to hold eliquis x2 days per Dr. Young, see telephone encounter  fully vaccinated    EYE SURGERY      cataract    INSERTION OF VENOUS ACCESS PORT N/A 12/03/2021    Procedure: INSERTION, VENOUS ACCESS PORT;  Surgeon: Saurav Garcia MD;  Location: [facility] OR 2ND FLR;   "Service: General;  Laterality: N/A;    INTRAOCULAR PROSTHESES INSERTION Right 07/16/2018    Procedure: INSERTION-INTRAOCULAR LENS (IOL);  Surgeon: Priscilla Hays MD;  Location: [facility];  Service: Ophthalmology;  Laterality: Right;    INTRAOCULAR PROSTHESES INSERTION Left 08/13/2018    Procedure: INSERTION, INTRAOCULAR LENS PROSTHESIS;  Surgeon: Priscilla Hays MD;  Location: [facility];  Service: Ophthalmology;  Laterality: Left;    KNEE SURGERY Right     LAPAROSCOPIC APPENDECTOMY N/A 11/2/2022    Procedure: APPENDECTOMY, LAPAROSCOPIC;  Surgeon: Cheo Hamm MD;  [facility];  Service: General;  Laterality: N/A;    PHACOEMULSIFICATION OF CATARACT Right 07/16/2018    Procedure: PHACOEMULSIFICATION-ASPIRATION-CATARACT;  Surgeon: Priscilla Hays MD;  Location: [facility];  Service: Ophthalmology;  Laterality: Right;    PHACOEMULSIFICATION OF CATARACT Left 08/13/2018    Procedure: PHACOEMULSIFICATION, CATARACT;  Surgeon: Priscilla Hays MD;  Location: [facility];  Service: Ophthalmology;  Laterality: Left;    REPAIR OF INCARCERATED UMBILICAL HERNIA  11/2/2022    Procedure: REPAIR, HERNIA, UMBILICAL, INCARCERATED, AGE 5 YEARS OR OLDER;  Surgeon: Cheo Hamm MD;  [facility];  Service: General;;    WISDOM TOOTH EXTRACTION         Review of patient's allergies indicates:   Allergen Reactions    Imfinzi [durvalumab] Other (See Comments)     1305- Imfinzi ended, pt returned from restroom complained of wheezing SOB and rigors. /100  O2 92% 2L NC O2 applied to pt.   1306-Demerol 25mg given and Solucortef 125mg IVPush given.   1309- Pt /106  02 99%.   1318- Pt still having Rigors additional 25mg of Demerol given.   1418- BP is 133/69 77 HR 99% RA Pt states " I feel much better"       Indomethacin Other (See Comments)     Headache dizziness      Adhesive Rash    Colchicine analogues Nausea And Vomiting     NVD       Medications:  Continuous Infusions:  Scheduled " Meds:   apixaban  5 mg Oral BID    DULoxetine  20 mg Oral Daily    finasteride  5 mg Oral Daily    piperacillin-tazobactam (ZOSYN) IVPB  4.5 g Intravenous Q8H    rifAXImin  550 mg Oral BID    tamsulosin  1 capsule Oral Daily     PRN Meds:dextrose 10%, dextrose 10%, dextrose, dextrose, glucagon (human recombinant), melatonin, naloxone, ondansetron, sodium chloride 0.9%    Family History       Problem Relation (Age of Onset)    Cancer Mother, Maternal Grandmother    Diabetes Mother    Heart disease Father, Paternal Grandfather, Brother    Hypertension Mother    Kidney cancer Mother (61)    Liver disease Maternal Grandmother    No Known Problems Sister, Daughter, Son, Sister, Sister, Sister, Daughter          Tobacco Use    Smoking status: Former     Packs/day: 1.00     Years: 20.00     Pack years: 20.00     Types: Cigarettes     Quit date: 1987     Years since quittin.2    Smokeless tobacco: Never    Tobacco comments:     quit    Substance and Sexual Activity    Alcohol use: Not Currently     Alcohol/week: 1.0 - 2.0 standard drink     Types: 1 - 2 Glasses of wine per week     Comment: not since liver diagnosis    Drug use: No    Sexual activity: Yes     Partners: Female       Review of Systems   Constitutional:  Positive for activity change and fatigue.   HENT:  Negative for congestion, rhinorrhea, sinus pressure and sinus pain.    Eyes:  Negative for discharge and itching.   Respiratory:  Negative for wheezing and stridor.    Gastrointestinal:  Positive for abdominal distention.   Genitourinary:  Negative for flank pain.   Musculoskeletal:  Negative for neck pain and neck stiffness.   Skin:  Positive for rash.   Allergic/Immunologic: Negative for environmental allergies and food allergies.   Neurological:  Positive for weakness. Negative for seizures.   Objective:     Vital Signs (Most Recent):  Temp: 98.1 °F (36.7 °C) (23 0650)  Pulse: 98 (23 0650)  Resp: 16 (23 0650)  BP:  119/62 (03/24/23 0650)  SpO2: (!) 93 % (03/24/23 0650)   Vital Signs (24h Range):  Temp:  [98.1 °F (36.7 °C)-100.9 °F (38.3 °C)] 98.1 °F (36.7 °C)  Pulse:  [] 98  Resp:  [16-20] 16  SpO2:  [93 %-97 %] 93 %  BP: ()/(49-64) 119/62     Weight: 113.4 kg (250 lb)  Body mass index is 38.01 kg/m².    Physical Exam  Vitals and nursing note reviewed.   Constitutional:       General: He is not in acute distress.     Appearance: He is ill-appearing.   HENT:      Head: Normocephalic and atraumatic.      Right Ear: External ear normal.      Left Ear: External ear normal.      Nose: Nose normal. No congestion or rhinorrhea.   Eyes:      General: Scleral icterus present.   Cardiovascular:      Rate and Rhythm: Normal rate.   Abdominal:      General: There is distension.   Musculoskeletal:      Cervical back: Normal range of motion.   Skin:     General: Skin is warm and dry.   Neurological:      Mental Status: He is alert.      Motor: Weakness present.   Psychiatric:         Mood and Affect: Mood normal.         Behavior: Behavior normal.         Thought Content: Thought content normal.         Judgment: Judgment normal.       Review of Symptoms      Symptom Assessment (ESAS 0-10 Scale)  Pain:  0  Dyspnea:  0  Anxiety:  0  Nausea:  0  Depression:  0  Anorexia:  0  Fatigue:  0  Insomnia:  0  Restlessness:  0  Agitation:  0         Living Arrangements:  Lives with spouse    Psychosocial/Cultural:   See Palliative Psychosocial Note: No   (46 years), 3 children (one son, two daughters)  **Primary  to Follow**  Palliative Care  Consult: No      Advance Care Planning   Advance Directives:     Decision Making:  Family answered questions and Patient answered questions       Significant Labs: All pertinent labs within the past 24 hours have been reviewed.  CBC:   Recent Labs   Lab 03/24/23 0219   WBC 16.15*   HGB 8.9*   HCT 27.8*   *        BMP:  Recent Labs   Lab 03/24/23 0219    GLU 74      K 3.8   *   CO2 18*   BUN 21   CREATININE 2.1*   CALCIUM 9.3     LFT:  Lab Results   Component Value Date     (H) 03/24/2023    GGT 1,292 (H) 09/20/2021    ALKPHOS 495 (H) 03/24/2023    BILITOT 7.7 (H) 03/24/2023     Albumin:   Albumin   Date Value Ref Range Status   03/24/2023 2.1 (L) 3.5 - 5.2 g/dL Final     Protein:   Total Protein   Date Value Ref Range Status   03/24/2023 5.9 (L) 6.0 - 8.4 g/dL Final     Lactic acid:   Lab Results   Component Value Date    LACTATE 2.1 03/24/2023    LACTATE 2.1 11/01/2022       Significant Imaging: I have reviewed all pertinent imaging results/findings within the past 24 hours.    CT Abdomen Pelvis  Without Contrast  Narrative: EXAMINATION:  CT ABDOMEN PELVIS WITHOUT CONTRAST    CLINICAL HISTORY:  Sepsis;cocnern for cholestasis, cholangitis;    TECHNIQUE:  Axial images of the abdomen and pelvis were acquired without the use of IV contrast. No oral contrast was administered.  Coronal and sagittal reconstructions were also obtained.    COMPARISON:  MR 03/07/2020; CT 11/01/2022    FINDINGS:  HEART: Normal in size. No pericardial effusion.    LUNGS: Well aerated.  Minimal bibasilar atelectasis.  No large consolidation or pleural effusion.    LIVER: Heterogeneous attenuation.  Prominent right hepatic lobe mass in keeping with known cholangiocarcinoma, measurement unreliable without the use of contrast.  Multiple additional satellite lesions.    GALLBLADDER/BILE DUCTS: Gallbladder appears to be contracted with sludge.  Mild gallbladder wall thickening versus small amount of pericholecystic fluid.  No evidence of dilated ducts.    PANCREAS: No mass or peripancreatic fat stranding.    SPLEEN: No splenomegaly.    ADRENALS: Mild nodular thickening of the right adrenal gland.  Left adrenal gland is unremarkable.    KIDNEYS/URETERS: Normal in size and location.  Stable left kidney simple cyst.  No hydronephrosis or nephrolithiasis. No ureteral  dilatation.    BLADDER: No evidence of wall thickening.    REPRODUCTIVE ORGANS: Enlarged prostate.    STOMACH/DUODENUM: Unremarkable.    BOWEL/MESENTERY: Small bowel is normal in caliber with no evidence of obstruction. No evidence of inflammation or wall thickening.  Moderate wall thickening about the cecum and proximal ascending colon with no local inflammatory changes.  Appendix is surgically absent.  Scattered diverticula throughout the colon.  No organized fluid collections.    PERITONEUM: Small volume ascites.  No pneumoperitoneum.    LYMPH NODES: Enlarged 1.1 cm teresa hepatis node (axial 02:52), previously 1.0 cm.  No new abdominal or pelvic lymphadenopathy.    VASCULATURE: Moderate aortoiliac calcific atherosclerosis.  No aneurysm.    ABDOMINAL WALL:  Small fat containing umbilical hernia.  Diffuse subcutaneous edema.    BONES: Stable degenerative changes of the visualized spine.  No acute fracture. No suspicious osseous lesions.  Impression: 1. Moderate wall thickening of the cecum and proximal ascending colon, possibly infectious/inflammatory colitis.  Neoplasm not excluded.  No organized fluid collections.  2. No definite evidence of cholangitis noting limitations without the use of intravenous contrast.  3. Prominent right hepatic lobe mass with multiple satellite lesions in keeping with known cholangiocarcinoma.  4. Stable 1.1 cm teresa hepatis lymph node.  No new lymphadenopathy.  5. Small volume ascites.  6. Other findings above.  This report was flagged in Epic as abnormal.    Electronically signed by resident: Avery Noonan  Date:    03/24/2023  Time:    08:49    Electronically signed by: Srikanth Montelongo MD  Date:    03/24/2023  Time:    10:00  X-Ray Chest 1 View  Narrative: EXAMINATION:  XR CHEST 1 VIEW    CLINICAL HISTORY:  Chest pain, unspecified    TECHNIQUE:  Single frontal view of the chest was performed.    COMPARISON:  12/18/2021.    FINDINGS:  Right-sided port catheter tip overlies the  SVC.    Underinflated lungs with hypoventilatory change and crowding of markings.    Heart and lungs  appear unchanged when allowing for differences in technique and positioning.  Impression: Underinflated lungs with hypoventilatory change and crowding of markings.    No significant change from prior study.    Electronically signed by: Amish Holly MD  Date:    03/24/2023  Time:    02:27    Results for orders placed or performed during the hospital encounter of 03/24/23   EKG 12-lead    Collection Time: 03/24/23  1:14 AM    Narrative    Test Reason : R07.9,    Vent. Rate : 124 BPM     Atrial Rate : 124 BPM     P-R Int : 120 ms          QRS Dur : 068 ms      QT Int : 314 ms       P-R-T Axes : 039 036 027 degrees     QTc Int : 451 ms    Sinus tachycardia  Otherwise normal ECG  When compared with ECG of 11-MAY-2022 13:50,  Vent. rate has increased BY  64 BPM  ST no longer elevated in Lateral leads  Nonspecific T wave abnormality has replaced inverted T waves in Inferior  leads  T wave amplitude has decreased in Lateral leads  Confirmed by Roel CHICAS MD ([id number]) on 3/24/2023 10:27:40 AM    Referred By: [email or web address]   SELF           Confirmed By:Roel Evans NP  Palliative Medicine  [facility] - Oncology ([facility])

## 2023-03-24 NOTE — ASSESSMENT & PLAN NOTE
History of intrahepatic cholangiocarcinoma   Last chemo on 3/23  On admission Tbili 7.7, was 2.3 earlier this month   U/S on 3/17 revealed stones and sludge in the gallbladder with no sonographic evidence for acute cholecystitis, as well as multifocal hepatic masses  Concern for possible obstruction vs worsening metastatic disease     - FU CT abdomen and abdominal U/S   - Will consult hepatology and/or AES depending on imaging results   - CMP daily   - NPO for possible ERCP depending on imaging results

## 2023-03-24 NOTE — HPI
"Mr. Domonique Kellogg is a 72 y.o. 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 Patients wife states that patient was having difficulty breathing while on his CPAP last night so she woke him up. Patient was a little confused about where he was upon waking. Patient states he has had fatigue/weakness for weeks that has been progressively worsening. He also reports lower leg/abdominal swelling recently that has been worsening. He is on lasix at home but that has been held for almost a week due to a rising creatinine on outside labs. Has some RUQ pain, U/S on 3/17 revealed stones and sludge in the gallbladder with no sonographic evidence for acute cholecystitis, as well as multifocal hepatic masses. He also developed jaundice about two-weeks ago. Denies chest pain, nausea, dysuria or headaches. He was dry heaving last night. Also reports 3 loose stools last night but no diarrhea.     On admission to the ED patient was febrile to 100.9, , /56, and on RA. Cr 2.1 (baseline 1.3-1.5). Tbili 7.7, Alk phos 495, , ALT 57, ammonia 63. WBC 16.2 and lactate 2.25. CXR with no acute findings. VBG with pH 7.34 and pCO2 39.8. Trop 0.035, . Patient given vanc/zosyn and IVF. CT abdomen ordered.     Oncology history per last clinic note:  1. Mr Kellogg is a 72 yo man with CAD, HTN, seizures in 2011 who initially saw me on 11/29/21 for further management of cholangiocarcinoma. He presented with weight loss and diarrhea since July. US 9/22/21 showed "Enlarged, heterogeneous appearance of the liver likely due to multiple underlying hepatic lesions largest measuring 4.8 cm."  MRI abdomen w/wo contrast 10/4/21: "Multiple liver lesions measuring up to 13.3 cm in the right hepatic lobe.  Differential diagnosis includes metastases, fibrolamellar hepatocellular carcinoma, or atypical focal " "nodular hyperplasia.  Consider biopsy for further evaluation. Thrombosis of the anterior branch of the right portal vein and left hepatic vein.  Nonvisualization of the middle and right hepatic veins, which may be thrombosed as well. Prominent periportal lymph node, which is nonspecific."  EGD and colonoscopy on 10/11/21 were negative for primary malignancies.   He underwent liver lesion biopsy and Y90 mapping on 11/22/21. Pathology showed adenocarcinoma: "Biopsy of the liver mass shows a malignant neoplasm in a fibrotic stroma. Glandular architecture is readily identified with several foci of intraluminal necrosis. Scattered mitotic figures are seen. Neoplastic cells show the following immunophenotype:   Positive: AE1/AE3, CK7, CDX2   Negative: Chromogranin, Synaptophysin, TTF, CK5/6, CK20, HepPar1   The morphology and immunophenotype are compatible with adenocarcinoma. Considerations for a primary site include pancreaticobiliary (including intrahepatic cholangiocarcinoma) as well as upper gastrointestinal. Clinical and radiologic correlation is suggested."  Patient presents today with wife for further management. No pain. Initial weight loss has somewhat stabilized. Still has diarrhea. Has not tried imodium yet. +nervous  Mother had kidney cancer at age 64. Maternal grandmother had liver cancer in her 60s. Patient has three children, two daughters and one son.   Discussed palliative chemo with cis/gem  2. PET scan 12/6/21 did not show extrahepatic metastases.   3. Cis/gem started on 12/7/21. Durvalumab was initially denied by insurance company after GI [facility], but approved after FDA approval, added 8/10/22. Patient had reaction to durv (wheezing, rigors, shaking, high BP) on 9/9. Imfinzi was stopped. Last chemo on 10/21/22. MRI 10/19/22 showed progression. Plan was to switched for FOLFOX. Patient was hospitalized for acute appendicitis and strangulated umbilical hernia s/p surgery on 11/2/2022.   4. S/p TACE on " 12/16/2021, 2/14/22, 4/19/22, 5/18/22, 8/2/22  5. S/p RF Ablation on 7/19/22. TACE on 9/28/22  6. FOLFOX started on 11/21/22, s/p 6 cycles  7. Restaging MRI showed progression.

## 2023-03-24 NOTE — SUBJECTIVE & OBJECTIVE
Past Medical History:   Diagnosis Date    Acute kidney injury 3/16/2023    Adjustment disorder     Anticoagulant long-term use     Anxiety     Arthritis     CAD (coronary artery disease) 03/02/2015    non-obstructive per [facility]     Cataract     Class 1 obesity with serious comorbidity in adult 3/17/2014    Dry eye syndrome     Hypertension     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/2021    Obesity     Post-procedural fever 12/18/2021    Seizures     2011    Sleep apnea     + CPAP    Sleep difficulties        Past Surgical History:   Procedure Laterality Date    ANTERIOR CERVICAL DISCECTOMY W/ FUSION N/A 07/06/2020    Procedure: DISCECTOMY, SPINE, CERVICAL, ANTERIOR APPROACH, WITH FUSION C3-4, C4-5;  Surgeon: Fabiola Vides MD;  [facility];  Service: Neurosurgery;  Laterality: N/A;  TORONTO III, ASA III, BLOOD TYPE & SCREEN, NEUROMONITORING EMG-MEP-SEP, SUPINE POSITION,BRACE [place],REGULAR BED, HEADREST [place], POSITION, MAP>85, RADIOLOGY C-ARM, SPECIAL EQUIPMENT [facility]    COLONOSCOPY N/A 10/01/2021    Procedure: COLONOSCOPY;  Surgeon: Nicolas Alvarado MD;  Location: [facility] (4TH FLR);  Service: Endoscopy;  Laterality: N/A;  EGD and colonoscopy for diarrhea and weight loss and Enlarged, heterogeneous appearance of the liver likely due to multiple underlying hepatic lesions largest measuring 4.8 cm.  Findings concerning for metastatic versus less likely primary hepatic neoplasm.  Recommend [facility]    COLONOSCOPY N/A 06/07/2022    Procedure: COLONOSCOPY;  Surgeon: Mejia Villafuerte MD;  Location: [facility] (4TH FLR);  Service: Endoscopy;  Laterality: N/A;  For evaluation of positive out-patient FOBT.   medically urgent  ok to hold Eliquis per [place] Edward/RB  fully vaccinated  hx of seizures- last one 2014    ESOPHAGOGASTRODUODENOSCOPY N/A 10/01/2021    Procedure: EGD (ESOPHAGOGASTRODUODENOSCOPY);  Surgeon: Nicolas Alvarado MD;  Location: [facility] (4TH FLR);  Service: Endoscopy;  Laterality: N/A;  EGD  and colonoscopy for diarrhea and weight loss and Enlarged, heterogeneous appearance of the liver likely due to multiple underlying hepatic lesions largest measuring 4.8 cm.  Findings concerning for metastatic versus less likely primary hepatic neopl    ESOPHAGOGASTRODUODENOSCOPY N/A 04/05/2022    Procedure: EGD (ESOPHAGOGASTRODUODENOSCOPY);  Surgeon: Amado Kelsey MD;  [facility]: [facility]);  Service: Endoscopy;  Laterality: N/A;  EGD in 8 weeks to check for esophagitis healing patient should be on pantoprazole 40 mg once daily  ok to hold eliquis x2 days per Dr. Young, see telephone encounter  fully vaccinated    EYE SURGERY      cataract    INSERTION OF VENOUS ACCESS PORT N/A 12/03/2021    Procedure: INSERTION, VENOUS ACCESS PORT;  Surgeon: Saurav Garcia MD;  Location: [facility] 2ND FLR;  Service: General;  Laterality: N/A;    INTRAOCULAR PROSTHESES INSERTION Right 07/16/2018    Procedure: INSERTION-INTRAOCULAR LENS (IOL);  Surgeon: Priscilla Hays MD;  Location: [facility];  Service: Ophthalmology;  Laterality: Right;    INTRAOCULAR PROSTHESES INSERTION Left 08/13/2018    Procedure: INSERTION, INTRAOCULAR LENS PROSTHESIS;  Surgeon: Priscilla Hays MD;  Location: [facility];  Service: Ophthalmology;  Laterality: Left;    KNEE SURGERY Right     LAPAROSCOPIC APPENDECTOMY N/A 11/2/2022    Procedure: APPENDECTOMY, LAPAROSCOPIC;  Surgeon: Cheo Hamm MD;  Location: [facility] 2ND FLR;  Service: General;  Laterality: N/A;    PHACOEMULSIFICATION OF CATARACT Right 07/16/2018    Procedure: PHACOEMULSIFICATION-ASPIRATION-CATARACT;  Surgeon: Priscilla Hays MD;  Location: [facility];  Service: Ophthalmology;  Laterality: Right;    PHACOEMULSIFICATION OF CATARACT Left 08/13/2018    Procedure: PHACOEMULSIFICATION, CATARACT;  Surgeon: Priscilla Hays MD;  Location: [facility];  Service: Ophthalmology;  Laterality: Left;    REPAIR OF INCARCERATED UMBILICAL HERNIA  11/2/2022    Procedure: REPAIR, HERNIA, UMBILICAL, INCARCERATED,  "AGE 5 YEARS OR OLDER;  Surgeon: Cheo Hamm MD;  Location: [facility] OR [address] [place] [address];  Service: General;;    WISDOM TOOTH EXTRACTION         Review of patient's allergies indicates:   Allergen Reactions    Imfinzi [durvalumab] Other (See Comments)     1305- Imfinzi ended, pt returned from restroom complained of wheezing SOB and rigors. /100  O2 92% 2L NC O2 applied to pt.   1306-Demerol 25mg given and Solucortef 125mg IVPush given.   1309- Pt /106  02 99%.   1318- Pt still having Rigors additional 25mg of Demerol given.   1418- BP is 133/69 77 HR 99% RA Pt states " I feel much better"       Indomethacin Other (See Comments)     Headache dizziness      Adhesive Rash    Colchicine analogues Nausea And Vomiting     NVD     Current Facility-Administered Medications   Medication Frequency    apixaban tablet 5 mg BID    dextrose 10% bolus 125 mL 125 mL PRN    dextrose 10% bolus 250 mL 250 mL PRN    dextrose 40 % gel 15,000 mg PRN    dextrose 40 % gel 30,000 mg PRN    DULoxetine DR capsule 20 mg Daily    finasteride tablet 5 mg Daily    glucagon (human recombinant) injection 1 mg PRN    melatonin tablet 6 mg Nightly PRN    naloxone 0.4 mg/mL injection 0.02 mg PRN    ondansetron injection 4 mg Q8H PRN    piperacillin-tazobactam (ZOSYN) 4.5 g in dextrose 5 % in water (D5W) 5 % 100 mL IVPB (MB+) Q8H    rifAXIMin tablet 550 mg BID    sodium chloride 0.9% flush 10 mL Q12H PRN    tamsulosin 24 hr capsule 0.4 mg Daily     Family History       Problem Relation (Age of Onset)    Cancer Mother, Maternal Grandmother    Diabetes Mother    Heart disease Father, Paternal Grandfather, Brother    Hypertension Mother    Kidney cancer Mother (61)    Liver disease Maternal Grandmother    No Known Problems Sister, Daughter, Son, Sister, Sister, Sister, Daughter          Tobacco Use    Smoking status: Former     Packs/day: 1.00     Years: 20.00     Pack years: 20.00     Types: Cigarettes     Quit date: 1/1/1987     Years " since quittin.2    Smokeless tobacco: Never    Tobacco comments:     quit    Substance and Sexual Activity    Alcohol use: Not Currently     Alcohol/week: 1.0 - 2.0 standard drink     Types: 1 - 2 Glasses of wine per week     Comment: not since liver diagnosis    Drug use: No    Sexual activity: Yes     Partners: Female     Review of Systems  Objective:     Vital Signs (Most Recent):  Temp: 97.6 °F (36.4 °C) (23 1538)  Pulse: 78 (23 1645)  Resp: 18 (23 1538)  BP: 105/64 (23 1538)  SpO2: (!) 94 % (23 1538)   Vital Signs (24h Range):  Temp:  [97.5 °F (36.4 °C)-100.9 °F (38.3 °C)] 97.6 °F (36.4 °C)  Pulse:  [] 78  Resp:  [16-20] 18  SpO2:  [93 %-97 %] 94 %  BP: ()/(49-66) 105/64     Weight: 121.1 kg (266 lb 15.6 oz) (23 0720)  Body mass index is 40.59 kg/m².  Body surface area is 2.41 meters squared.    I/O last 3 completed shifts:  In: 1500 [IV Piggyback:1500]  Out: -     Physical Exam  Vitals and nursing note reviewed.   Constitutional:       General: He is not in acute distress.     Appearance: Normal appearance. He is ill-appearing. He is not toxic-appearing or diaphoretic.   HENT:      Mouth/Throat:      Mouth: Mucous membranes are moist.   Cardiovascular:      Rate and Rhythm: Normal rate and regular rhythm.      Pulses: Normal pulses.      Heart sounds: Murmur heard.   Pulmonary:      Effort: Pulmonary effort is normal. No respiratory distress.      Breath sounds: No stridor. Rales present. No wheezing or rhonchi.   Abdominal:      General: Bowel sounds are normal. There is distension.      Palpations: Abdomen is soft. There is no mass.      Tenderness: There is no abdominal tenderness. There is no guarding or rebound.      Hernia: No hernia is present.   Musculoskeletal:         General: Swelling present. No tenderness, deformity or signs of injury.      Right lower leg: Edema present.      Left lower leg: Edema present.   Skin:     General: Skin is warm.       Capillary Refill: Capillary refill takes 2 to 3 seconds.      Coloration: Skin is jaundiced. Skin is not pale.      Findings: No bruising, erythema, lesion or rash.   Neurological:      Mental Status: He is alert and oriented to person, place, and time.   Psychiatric:         Mood and Affect: Mood normal.         Behavior: Behavior normal.         Thought Content: Thought content normal.         Judgment: Judgment normal.       Significant Labs:  CBC:   Recent Labs   Lab 03/24/23 0219   WBC 16.15*   RBC 2.75*   HGB 8.9*   HCT 27.8*      *   MCH 32.4*   MCHC 32.0     CMP:   Recent Labs   Lab 03/24/23 0219 03/24/23  1714   GLU 74 99   CALCIUM 9.3 8.3*   ALBUMIN 2.1* 1.6*   PROT 5.9*  --     142   K 3.8 3.6   CO2 18* 18*   * 114*   BUN 21 26*   CREATININE 2.1* 2.3*   ALKPHOS 495*  --    ALT 57*  --    *  --    BILITOT 7.7*  --      All labs within the past 24 hours have been reviewed.    Significant Imaging:  Labs: Reviewed  X-Ray: Reviewed  CT: Reviewed

## 2023-03-24 NOTE — ASSESSMENT & PLAN NOTE
- Current Regimen: PANC NAB-PACLITAXEL + GEMCITABINE Q4W  - Last chemo on 3/23  - MRI on 3/7 revealed interval increase in size and number of multiple additional liver lesions, consistent with worsening metastatic disease

## 2023-03-24 NOTE — ED NOTES
"Domonique Kellogg, an 72 y.o. male presents to the ED with c/o sob. Wife stated she was woken up by pt "gasping for breath" while wearing nightly CPAP. Pt recently received chemo for liver cancer. Denies any other significant hx. Wife states pt has had periods of confusion. Pt placed on cardiac monitor, changed into hospital gown, and instructed on call light use.       Review of patient's allergies indicates:   Allergen Reactions    Imfinzi [durvalumab] Other (See Comments)     1305- Imfinzi ended, pt returned from restroom complained of wheezing SOB and rigors. /100  O2 92% 2L NC O2 applied to pt.   1306-Demerol 25mg given and Solucortef 125mg IVPush given.   1309- Pt /106  02 99%.   1318- Pt still having Rigors additional 25mg of Demerol given.   1418- BP is 133/69 77 HR 99% RA Pt states " I feel much better"       Indomethacin Other (See Comments)     Headache dizziness      Adhesive Rash    Colchicine analogues Nausea And Vomiting     NVD     Chief Complaint   Patient presents with    Shortness of Breath     Sob/CP woke him out sleep. [place] like "someone standing on chest." On chemo for liver cancer. Wife reports he is disoriented.      Past Medical History:   Diagnosis Date    Acute kidney injury 3/16/2023    Adjustment disorder     Anticoagulant long-term use     Anxiety     Arthritis     CAD (coronary artery disease) 03/02/2015    non-obstructive per [facility]     Cataract     Class 1 obesity with serious comorbidity in adult 3/17/2014    Dry eye syndrome     Hypertension     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/2021    Obesity     Post-procedural fever 12/18/2021    Seizures     2011    Sleep apnea     + CPAP    Sleep difficulties        "

## 2023-03-24 NOTE — HPI
HPI per chart review:    Mr. Kellogg is a 72 y.o. male with a medical history of intrahepatic cholangiocarcinoma (last chemo on 3/23/23), CAD, RAHEEL, and left leg DVT (on Apixaban) who presented to the ED for shortness of breath, he was febrile to 100.9, , /56, and on RA. Cr 2.1 (baseline 1.3-1.5). Tbili 7.7, Alk phos 495, , ALT 57, ammonia 63. WBC 16.2 and lactate 2.25. CXR with no acute findings. VBG with pH 7.34 and pCO2 39.8. Trop 0.035, . Patient given vanc/zosyn and IVF. CT abdomen ordered. Now concern for multiorgan failure with possible ICU admission if he continues to deteriorate now w/ liver failure, DAWIT, low BP and sepsis from possible intraabd source as well as anemia requiring 1u PRBC.  Palliative care consulted for goals of care/hospice discussion

## 2023-03-24 NOTE — ASSESSMENT & PLAN NOTE
Palliative medicine consulted for goals of care/advance care planning.  Mr. Kellogg is a 72-year-old male admitted with intrahepatic cholangiocarcinoma, sepsis, hyperammoniemia, lower leg edema, DAWIT, DVT of femoral vein, generalized weakness, CAD in native artery, and obstructive sleep apnea.  Per chart review, patient is currently full code status.  No advance directives on file.      At the time of my visit, patient was awake and alert, appears to be in good spirits.  His wife, Estrellita, was at bedside.  Palliative services were introduced and both were receptive to visit.  Patient denies any current complaints.  We discussed patient as an individual and his role and the family.  Patient and Estrellita have been  for 47 years and were friends for years before their marriage.  Patient has one son from another marriage. Patient and Estrellita have two daughters together.  Both patient and Estrellita laughed and seemed to enjoy re-telling the story of their meeting.  Conversation was brief as nephrology visited at this time.  Palliative will follow up for goals of care/advance care planning.

## 2023-03-24 NOTE — ASSESSMENT & PLAN NOTE
Multifactorial etiology for DAWIT: nephrotoxicity from chemotherapy agents (3/23), hypotensive episodes (probably prior to admission) and maybe related to sepsis as pt was febrile, in addition pt was exposed to vancomycin all this in the background of an enlarged prostate compressing the bladder.   Pt does have h/o CKD stage 2 since approx 7/2022    Baseline sCr: 1.2-1.4  Admission sCr: 1.8  Lab Results   Component Value Date    CREATININE 2.3 (H) 03/24/2023    CREATININE 2.1 (H) 03/24/2023    CREATININE 1.8 (H) 03/22/2023       Recommendations:   -needs a riojas catheter to ensure bladder is emptying well as he has compression from an enlarged prostate (per imaging) also need strict I&O's  -hold IV fluids and diuretics for today and will reassess in AM  -urine microscopy when able  -Electrolytes:    K, goal >4, page nephrology if K >5.5    Mg, goal >2   Phos , goal >3    -Acid/base: AGMA, start sodium bicarb tabs 1300mg po TID   -Fluid balance: fluid overloaded   -Anemia: Hgb 8.9 , send anemia panel labs, transfuse for Hgb <7.0  -Strict I/O's and daily weights  -Renal diet if not NPO  -Renal function panel Q12H   -Check uric acid, CK level, lactic acid  -Renal ultrasound reviewed: no hydronephrosis or masses, +layering of debris within th bladder  -Maintain MAP >65 for renal perfusion    There is no immediate indication for KRT at this time

## 2023-03-24 NOTE — ASSESSMENT & PLAN NOTE
Antibiotics given-   Antibiotics (72h ago, onward)    Start     Stop Route Frequency Ordered    03/24/23 1200  piperacillin-tazobactam (ZOSYN) 4.5 g in dextrose 5 % in water (D5W) 5 % 100 mL IVPB (MB+)         -- IV Every 8 hours (non-standard times) 03/24/23 0500    03/24/23 0900  rifAXIMin tablet 550 mg         -- Oral 2 times daily 03/24/23 7130        -Plan per primary team

## 2023-03-24 NOTE — ED PROVIDER NOTES
"Encounter Date: 3/24/2023       History     Chief Complaint   Patient presents with    Shortness of Breath     Sob/CP woke him out sleep. [place] like "someone standing on chest." On chemo for liver cancer. Wife reports he is disoriented.      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  Patient reports that he feels shortness of breath this afternoon that woke him up from sleep, felt like a pressure in his chest.  His wife states that patient seemed to it confused at home, briefly forgot where he was.  They state that patient also have worsening bilateral lower extremity swelling and abdominal swelling.  His oncologist recommended holding Lasix for the last 2 days due to elevated creatinine level.  They also reports that patient developed jaundice for the last 2 weeks, they found that he have biliary sludge and stone on ultrasound, but no cholecystitis or choledocholithiasis.  Last chemo was yesterday.      Review of patient's allergies indicates:   Allergen Reactions    Imfinzi [durvalumab] Other (See Comments)     1305- Imfinzi ended, pt returned from restroom complained of wheezing SOB and rigors. /100  O2 92% 2L NC O2 applied to pt.   1306-Demerol 25mg given and Solucortef 125mg IVPush given.   1309- Pt /106  02 99%.   1318- Pt still having Rigors additional 25mg of Demerol given.   1418- BP is 133/69 77 HR 99% RA Pt states " I feel much better"       Indomethacin Other (See Comments)     Headache dizziness      Adhesive Rash    Colchicine analogues Nausea And Vomiting     NVD     Past Medical History:   Diagnosis Date    Acute kidney injury 3/16/2023    Adjustment disorder     Anticoagulant long-term use     Anxiety     Arthritis     CAD (coronary artery disease) 03/02/2015    non-obstructive per [facility]     Cataract     Class 1 obesity with serious comorbidity in adult 3/17/2014    Dry eye syndrome     " Hypertension     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/2021    Obesity     Post-procedural fever 12/18/2021    Seizures     2011    Sleep apnea     + CPAP    Sleep difficulties      Past Surgical History:   Procedure Laterality Date    ANTERIOR CERVICAL DISCECTOMY W/ FUSION N/A 07/06/2020    Procedure: DISCECTOMY, SPINE, CERVICAL, ANTERIOR APPROACH, WITH FUSION C3-4, C4-5;  Surgeon: Fabiola Vides MD;  [facility];  Service: Neurosurgery;  Laterality: N/A;  TORONTO III, ASA III, BLOOD TYPE & SCREEN, NEUROMONITORING EMG-MEP-SEP, SUPINE POSITION,BRACE [place],REGULAR BED, HEADREST [place], POSITION, MAP>85, RADIOLOGY C-ARM, SPECIAL EQUIPMENT [facility]    COLONOSCOPY N/A 10/01/2021    Procedure: COLONOSCOPY;  Surgeon: Nicolas Alvarado MD;  Location: [facility] (4TH FLR);  Service: Endoscopy;  Laterality: N/A;  EGD and colonoscopy for diarrhea and weight loss and Enlarged, heterogeneous appearance of the liver likely due to multiple underlying hepatic lesions largest measuring 4.8 cm.  Findings concerning for metastatic versus less likely primary hepatic neoplasm.  Recommend fur    COLONOSCOPY N/A 06/07/2022    Procedure: COLONOSCOPY;  Surgeon: Mejia Villafuerte MD;  Location: [facility] (4TH FLR);  Service: Endoscopy;  Laterality: N/A;  For evaluation of positive out-patient FOBT.   medically urgent  ok to hold Eliquis per [place] Edward/RB  fully vaccinated  hx of seizures- last one 2014    ESOPHAGOGASTRODUODENOSCOPY N/A 10/01/2021    Procedure: EGD (ESOPHAGOGASTRODUODENOSCOPY);  Surgeon: Nicolas Alvarado MD;  [facility]  Service: Endoscopy;  Laterality: N/A;  EGD and colonoscopy for diarrhea and weight loss and Enlarged, heterogeneous appearance of the liver likely due to multiple underlying hepatic lesions largest measuring 4.8 cm.  Findings concerning for metastatic versus less likely primary hepatic neopl    ESOPHAGOGASTRODUODENOSCOPY N/A 04/05/2022    Procedure: EGD  (ESOPHAGOGASTRODUODENOSCOPY);  Surgeon: Amado Kelsey MD;  Location: [facility] (4TH FLR);  Service: Endoscopy;  Laterality: N/A;  EGD in 8 weeks to check for esophagitis healing patient should be on pantoprazole 40 mg once daily  ok to hold eliquis x2 days per Dr. Young, see telephone encounter  fully vaccinated    EYE SURGERY      cataract    INSERTION OF VENOUS ACCESS PORT N/A 12/03/2021    Procedure: INSERTION, VENOUS ACCESS PORT;  Surgeon: Saurav Garcia MD;  Location: [facility] OR 2ND FLR;  Service: General;  Laterality: N/A;    INTRAOCULAR PROSTHESES INSERTION Right 07/16/2018    Procedure: INSERTION-INTRAOCULAR LENS (IOL);  Surgeon: Priscilla Hays MD;  Location: [facility];  Service: Ophthalmology;  Laterality: Right;    INTRAOCULAR PROSTHESES INSERTION Left 08/13/2018    Procedure: INSERTION, INTRAOCULAR LENS PROSTHESIS;  Surgeon: Priscilla Hays MD;  Location: [facility];  Service: Ophthalmology;  Laterality: Left;    KNEE SURGERY Right     LAPAROSCOPIC APPENDECTOMY N/A 11/2/2022    Procedure: APPENDECTOMY, LAPAROSCOPIC;  Surgeon: Cheo Hamm MD;  Location: [facility] OR 2ND FLR;  Service: General;  Laterality: N/A;    PHACOEMULSIFICATION OF CATARACT Right 07/16/2018    Procedure: PHACOEMULSIFICATION-ASPIRATION-CATARACT;  Surgeon: Priscilla Hays MD;  Location: [facility];  Service: Ophthalmology;  Laterality: Right;    PHACOEMULSIFICATION OF CATARACT Left 08/13/2018    Procedure: PHACOEMULSIFICATION, CATARACT;  Surgeon: Priscilla Hays MD;  Location: [facility];  Service: Ophthalmology;  Laterality: Left;    REPAIR OF INCARCERATED UMBILICAL HERNIA  11/2/2022    Procedure: REPAIR, HERNIA, UMBILICAL, INCARCERATED, AGE 5 YEARS OR OLDER;  Surgeon: Cheo Hamm MD;  Location: [facility] OR 2ND FLR;  Service: General;;    WISDOM TOOTH EXTRACTION       Family History   Problem Relation Age of Onset    Diabetes Mother     Hypertension Mother     Kidney cancer Mother 61    Cancer Mother         renal & pancreatic    Heart  disease Father     No Known Problems Sister     Cancer Maternal Grandmother     Liver disease Maternal Grandmother     Heart disease Paternal Grandfather     Heart disease Brother     No Known Problems Daughter     No Known Problems Son     No Known Problems Sister     No Known Problems Sister     No Known Problems Sister     No Known Problems Daughter     Blindness Neg Hx     Macular degeneration Neg Hx     Retinal detachment Neg Hx     Glaucoma Neg Hx     Melanoma Neg Hx     Pancreatic cancer Neg Hx     Bladder Cancer Neg Hx     Uterine cancer Neg Hx     Ovarian cancer Neg Hx     Celiac disease Neg Hx     Cirrhosis Neg Hx     Colon cancer Neg Hx     Colon polyps Neg Hx     Crohn's disease Neg Hx     Esophageal cancer Neg Hx     Inflammatory bowel disease Neg Hx     Liver cancer Neg Hx     Rectal cancer Neg Hx     Stomach cancer Neg Hx     Ulcerative colitis Neg Hx      Social History     Tobacco Use    Smoking status: Former     Packs/day: 1.00     Years: 20.00     Pack years: 20.00     Types: Cigarettes     Quit date: 1987     Years since quittin.2    Smokeless tobacco: Never    Tobacco comments:     quit    Substance Use Topics    Alcohol use: Not Currently     Alcohol/week: 1.0 - 2.0 standard drink     Types: 1 - 2 Glasses of wine per week     Comment: not since liver diagnosis    Drug use: No     Review of Systems   Constitutional:  Positive for fever. Negative for chills.   Eyes:  Negative for pain and visual disturbance.   Respiratory:  Positive for shortness of breath.    Cardiovascular:  Positive for chest pain. Negative for leg swelling.   Gastrointestinal:  Positive for abdominal distention. Negative for abdominal pain, nausea and vomiting.   Genitourinary:  Negative for dysuria and flank pain.   Musculoskeletal:  Negative for back pain and neck pain.   Skin:  Positive for color change (Jaundice). Negative for rash.   Neurological:  Positive for headaches. Negative for weakness.    Psychiatric/Behavioral:  Negative for confusion and decreased concentration.      Physical Exam     Initial Vitals [03/24/23 0111]   BP Pulse Resp Temp SpO2   (!) 107/56 (!) 123 20 (!) 100.9 °F (38.3 °C) 95 %      MAP       --         Physical Exam    Nursing note and vitals reviewed.  Constitutional: He appears well-developed. No distress.   HENT:   Head: Normocephalic and atraumatic.   Nose: Nose normal.   Eyes: Conjunctivae and EOM are normal. Pupils are equal, round, and reactive to light. Scleral icterus is present.   Neck: No JVD present.   Normal range of motion.  Cardiovascular:  Normal rate, regular rhythm and normal heart sounds.           Pulmonary/Chest: Breath sounds normal. No respiratory distress.   Abdominal: Abdomen is soft. He exhibits distension. There is no abdominal tenderness.   Musculoskeletal:         General: Edema (Bilateral lower extremities) present. No tenderness. Normal range of motion.      Cervical back: Normal range of motion.     Neurological: He is alert and oriented to person, place, and time. He has normal strength. No cranial nerve deficit.   Asterixis   Skin: Skin is warm and dry. Capillary refill takes less than 2 seconds.   Jaundice       ED Course   Procedures  Labs Reviewed   CBC W/ AUTO DIFFERENTIAL - Abnormal; Notable for the following components:       Result Value    WBC 16.15 (*)     RBC 2.75 (*)     Hemoglobin 8.9 (*)     Hematocrit 27.8 (*)      (*)     MCH 32.4 (*)     RDW 21.3 (*)     Immature Granulocytes 0.9 (*)     Gran # (ANC) 13.9 (*)     Immature Grans (Abs) 0.15 (*)     Lymph # 0.6 (*)     Mono # 1.3 (*)     Gran % 85.7 (*)     Lymph % 4.0 (*)     All other components within normal limits   COMPREHENSIVE METABOLIC PANEL - Abnormal; Notable for the following components:    Chloride 112 (*)     CO2 18 (*)     Creatinine 2.1 (*)     Total Protein 5.9 (*)     Albumin 2.1 (*)     Total Bilirubin 7.7 (*)     Alkaline Phosphatase 495 (*)      (*)      ALT 57 (*)     eGFR 32.6 (*)     All other components within normal limits   TROPONIN I - Abnormal; Notable for the following components:    Troponin I 0.035 (*)     All other components within normal limits   B-TYPE NATRIURETIC PEPTIDE - Abnormal; Notable for the following components:     (*)     All other components within normal limits   AMMONIA - Abnormal; Notable for the following components:    Ammonia 63 (*)     All other components within normal limits   ISTAT PROCEDURE - Abnormal; Notable for the following components:    POC PH 7.338 (*)     POC PO2 24 (*)     POC HCO3 21.8 (*)     POC SATURATED O2 40 (*)     POC TCO2 23 (*)     All other components within normal limits   ISTAT PROCEDURE - Abnormal; Notable for the following components:    POC PH 7.339 (*)     POC PO2 36 (*)     POC HCO3 21.4 (*)     POC SATURATED O2 65 (*)     POC TCO2 23 (*)     All other components within normal limits   ISTAT LACTATE - Abnormal; Notable for the following components:    POC Lactate 2.25 (*)     All other components within normal limits   CULTURE, BLOOD   CULTURE, BLOOD   HIV 1 / 2 ANTIBODY    Narrative:     Release to patient->Immediate   HEPATITIS C ANTIBODY    Narrative:     Release to patient->Immediate   SARS-COV-2 RNA AMPLIFICATION, QUAL   PROTIME-INR   URINALYSIS, REFLEX TO URINE CULTURE   SODIUM, URINE, RANDOM   OSMOLALITY, URINE RANDOM   CREATININE, URINE, RANDOM   UREA NITROGEN, URINE, RANDOM   LACTIC ACID, PLASMA   TROPONIN I   PROTIME-INR   ISTAT LACTATE     EKG Readings: (Independently Interpreted)   Initial Reading: No STEMI. Rhythm: Sinus Tachycardia. Heart Rate: 124. Ectopy: No Ectopy. Conduction: Normal. ST Segments: Normal ST Segments. T Waves: Normal. Axis: Normal. Clinical Impression: Sinus Tachycardia     Imaging Results              X-Ray Chest 1 View (Final result)  Result time 03/24/23 02:27:00      Final result by Amish Holly MD (03/24/23 02:27:00)                   Impression:       Underinflated lungs with hypoventilatory change and crowding of markings.    No significant change from prior study.      Electronically signed by: Amish Holly MD  Date:    03/24/2023  Time:    02:27               Narrative:    EXAMINATION:  XR CHEST 1 VIEW    CLINICAL HISTORY:  Chest pain, unspecified    TECHNIQUE:  Single frontal view of the chest was performed.    COMPARISON:  12/18/2021.    FINDINGS:  Right-sided port catheter tip overlies the SVC.    Underinflated lungs with hypoventilatory change and crowding of markings.    Heart and lungs  appear unchanged when allowing for differences in technique and positioning.                                    X-Rays:   Independently Interpreted Readings:   Chest X-Ray: Normal heart size.  No infiltrates.  No acute abnormalities.   Medications   vancomycin 2 g in dextrose 5 % 500 mL IVPB (2,000 mg Intravenous New Bag 3/24/23 0347)   sodium chloride 0.9% flush 10 mL (has no administration in time range)   naloxone 0.4 mg/mL injection 0.02 mg (has no administration in time range)   glucagon (human recombinant) injection 1 mg (has no administration in time range)   dextrose 10% bolus 125 mL 125 mL (has no administration in time range)   dextrose 10% bolus 250 mL 250 mL (has no administration in time range)   dextrose 40 % gel 15,000 mg (has no administration in time range)   dextrose 40 % gel 30,000 mg (has no administration in time range)   ondansetron injection 4 mg (has no administration in time range)   melatonin tablet 6 mg (has no administration in time range)   apixaban tablet 5 mg (has no administration in time range)   DULoxetine DR capsule 20 mg (has no administration in time range)   finasteride tablet 5 mg (has no administration in time range)   tamsulosin 24 hr capsule 0.4 mg (has no administration in time range)   rifAXIMin tablet 550 mg (has no administration in time range)   piperacillin-tazobactam (ZOSYN) 4.5 g in dextrose 5 % in water (D5W) 5 % 100  mL IVPB (MB+) (has no administration in time range)   vancomycin - pharmacy to dose (has no administration in time range)   cefTRIAXone (ROCEPHIN) 1 g in dextrose 5 % in water (D5W) 5 % 50 mL IVPB (MB+) (0 g Intravenous Stopped 3/24/23 0341)   sodium chloride 0.9% bolus 500 mL 500 mL (0 mLs Intravenous Stopped 3/24/23 0408)   piperacillin-tazobactam (ZOSYN) 4.5 g in dextrose 5 % in water (D5W) 5 % 100 mL IVPB (MB+) (0 g Intravenous Stopped 3/24/23 0449)   sodium chloride 0.9% bolus 1,000 mL 1,000 mL (1,000 mLs Intravenous New Bag 3/24/23 8439)     Medical Decision Making:   Initial Assessment:   72-year-old male with 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  Patient appear jaundice with asterixis, but alert and oriented, febrile 100.9 status post chemo yesterday, tachycardia.  No respiratory distress, no hypoxia.  Differential Diagnosis:   Neutropenic fever, sepsis, SBP, pneumonia, decompensated liver failure, anasarca  Clinical Tests:   Lab Tests: Ordered and Reviewed  Radiological Study: Ordered and Reviewed  Medical Tests: Ordered and Reviewed  ED Management:  Will start septic workup, concerning for fluid overload, will only give 500 cc resuscitation for tachycardia. Will start broad spectrum abx.  Attempted paracentesis at bedside, however, no set pocket to aspirate.  Sounds to my attending, pending lab results, anticipate oncology consultation and admission          Attending Attestation:   Physician Attestation Statement for Resident:  As the supervising MD   Physician Attestation Statement: I have personally seen and examined this patient.   I agree with the above history.  -:   As the supervising MD I agree with the above PE.     As the supervising MD I agree with the above treatment, course, plan, and disposition.            Attending Critical Care:   Critical Care Times:    ==============================================================  Total Critical Care Time - exclusive of procedural time: 30 minutes.  ==============================================================  Critical care was necessary to treat or prevent imminent or life-threatening deterioration of the following conditions: sepsis.   Critical care was time spent personally by me on the following activities: examination of patient, obtaining history from patient or relative, review of old charts, ordering lab, x-rays, and/or EKG, development of treatment plan with patient or relative, ordering and performing treatments and interventions, evaluation of patient's response to treatment, discussion with consultants, interpretation of cardiac measurements and re-evaluation of patient's conition.   Critical Care Condition: life-threatening       Attending ED Notes:   72yM with cholangiocarcinoma here with septic shock, concern for bacteremia and possibly cholangitis.     Labs with worsening cholestasis.  Low BP is fluid responsive.    Given broad spectrum abx after cultures. No sufficient fluid for paracentesis.     I discussed the patient with oncology who has accepted him for admission for further care.                  Clinical Impression:   Final diagnoses:  [R07.9] Chest pain  [R00.0] Tachycardia  [R06.02] SOB (shortness of breath) (Primary)        ED Disposition Condition    Admit Stable                Betito Nolen MD  Resident  03/24/23 0256       Sadaf Parker MD  03/24/23 4042

## 2023-03-25 NOTE — ASSESSMENT & PLAN NOTE
Patient with intrahepatic cholangiocarcinoma here with SOB and weakness   Meets sepsis criteria (Temperature 100.9, WBC 16.2, )  Concern for abdominal source given RUQ pain  CXR with no acute findings   WBC 16.2 and lactate 2.25  No history of ascites or requiring paracentesis, ED unable to find fluid pocket to tap    - Continue vanz/zosyn   - FU cultures   - Got 500mL IV in ED, getting another 1L, overloaded on exam so cautious with fluids

## 2023-03-25 NOTE — PT/OT/SLP EVAL
"Occupational Therapy   Evaluation    Name: Domonique Kellogg  MRN: [id number]  Admitting Diagnosis: Sepsis  Recent Surgery: * No surgery found *      Recommendations:     Discharge Recommendations: other (see comments)  Discharge Equipment Recommendations:  walker, rolling  Barriers to discharge:       Assessment:     Domonique Kellogg is a 72 y.o. male with a medical diagnosis of Sepsis. Performance deficits affecting function: weakness, impaired self care skills, impaired functional mobility, impaired endurance, gait instability, impaired balance, decreased upper extremity function, decreased lower extremity function, impaired cardiopulmonary response to activity, edema.      Patient agreeable to OT evaluation this date, though limited by fatigue and fair tolerance to OT session. Patient is currently a fall risk and is below baseline LOF. Pt would benefit from continued skilled acute OT services in order to maximize independence and safety with ADLs and functional mobility to ensure safe return to PLOF in the least restrictive environment.     Rehab Prognosis: Good; patient would benefit from acute skilled OT services to address these deficits and reach maximum level of function.       Plan:     Patient to be seen 3 x/week to address the above listed problems via self-care/home management, therapeutic activities, therapeutic exercises  Plan of Care Expires:    Plan of Care Reviewed with: patient, spouse    Subjective     Chief Complaint: weakness  Patient/Family Comments/goals: "Sorry I couldn't do much today, but you're welcome to try again another time"    Occupational Profile:  Living Environment: Pt lives with wife in 2 SH (lives on first floor with full bed/bath, daughter lives upstairs), 0 ANGELA; walk in shower with shower chair  Previous level of function: Independent with ADLs and mobility, experiencing a recent decline in function and attempted to use SPC but was too unstable  Roles and Routines: recently " stopped driving, retired  Equipment Used at Home: cane, straight, shower chair  Assistance upon Discharge: spouse will be able to provide assist upon d/c    Pain/Comfort:  Pain Rating 1: 0/10    Patients cultural, spiritual, Buddhism conflicts given the current situation: no    Objective:     Communicated with: RN prior to session.  Patient found HOB elevated with peripheral IV upon OT entry to room.    General Precautions: Standard, fall  Orthopedic Precautions: N/A  Braces: N/A  Respiratory Status: Room air    Occupational Performance:    Bed Mobility:    Patient completed Scooting/Bridging with stand by assistance  Patient completed Supine to Sit with stand by assistance  Patient completed Sit to Supine with stand by assistance    Functional Mobility/Transfers:  Patient completed Sit <> Stand Transfer with contact guard assistance  with  rolling walker   Functional Mobility: Pt engaged in functional mobility to simulate household/community distances (to/from door of patient's room) with CGA and utilizing RW in order to maximize functional activity tolerance and standing balance required for engagement in occupations of choice. Requires min verbal cues for walker management    Activities of Daily Living:  Lower Body Dressing: maximal assistance to pull up socks    Cognitive/Visual Perceptual:  Cognitive/Psychosocial Skills:     -       Oriented to: Person, Place, Time, and Situation   -       Follows Commands/attention:Follows two-step commands  -       Safety awareness/insight to disability: intact   -       Mood/Affect/Coping skills/emotional control: Cooperative    Physical Exam:   Left UE Right UE   UE Edema None noted None noted   UE ROM AROM WFL AROM WFL   UE Strength 4-/5 4-/5    Strength grasp WFL grasp WFL   Sensation LUE INTACT:WFL RUE INTACT: WFL   Fine Motor Coordination:  LUE INTACT: WFL RUE INTACT: WFL   Gross Motor Coordination: LUE INTACT: WFL RUE INTACT:WFL         AMPAC 6 Click ADL:  AMPAC  Total Score: 17    Treatment & Education:  Therapist provided facilitation and instruction of proper body mechanics and fall prevention strategies during tasks listed above.  Instructed patient to sit in bedside chair daily to increase OOB/activity tolerance.  Instructed patient to use call light to have nursing staff assist with needs/transfers.  Discussed OT POC and answered all questions within OT scope of practice.    Patient left HOB elevated with all lines intact, call button in reach, and spouse present    GOALS:   Multidisciplinary Problems       Occupational Therapy Goals          Problem: Occupational Therapy    Goal Priority Disciplines Outcome Interventions   Occupational Therapy Goal     OT, PT/OT Ongoing, Progressing    Description: Goals to be met by: 4/15/23     Patient will increase functional independence with ADLs by performing:    UE Dressing with [place].  LE Dressing with Minimal Assistance.  Grooming while standing at sink with Stand-by Assistance.  Toileting from toilet with Stand-by Assistance for hygiene and clothing management.   Toilet transfer to toilet with Stand-by Assistance.  Upper extremity exercise program 12 reps per handout, with supervision.                         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    Obesity     Post-procedural fever 12/18/2021    Seizures     2011    Sleep apnea     + CPAP    Sleep difficulties          Past Surgical History:   Procedure Laterality Date    ANTERIOR CERVICAL DISCECTOMY W/ FUSION N/A 07/06/2020    Procedure: DISCECTOMY, SPINE, CERVICAL, ANTERIOR APPROACH, WITH FUSION C3-4, C4-5;   Surgeon: Fabiola Vides MD;  Location: [facility] OR 2ND FLR;  Service: Neurosurgery;  Laterality: N/A;  TORONTO III, ASA III, BLOOD TYPE & SCREEN, NEUROMONITORING EMG-MEP-SEP, SUPINE POSITION,BRACE [place],REGULAR BED, HEADREST [place], POSITION, MAP>85, RADIOLOGY C-ARM, SPECIAL EQUIPMENT VIRGIL TYLER    COLONOSCOPY N/A 10/01/2021    Procedure: COLONOSCOPY;  Surgeon: Nicolas Alvarado MD;  Location: [facility] (4TH FLR);  Service: Endoscopy;  Laterality: N/A;  EGD and colonoscopy for diarrhea and weight loss and Enlarged, heterogeneous appearance of the liver likely due to multiple underlying hepatic lesions largest measuring 4.8 cm.  Findings concerning for metastatic versus less likely primary hepatic neoplasm.  Recommend furth    COLONOSCOPY N/A 06/07/2022    Procedure: COLONOSCOPY;  Surgeon: Mejia Villafuerte MD;  Location: [facility] (4TH FLR);  Service: Endoscopy;  Laterality: N/A;  For evaluation of positive out-patient FOBT.   medically urgent  ok to hold Eliquis per [place] Edward/RB  fully vaccinated  hx of seizures- last one 2014    ESOPHAGOGASTRODUODENOSCOPY N/A 10/01/2021    Procedure: EGD (ESOPHAGOGASTRODUODENOSCOPY);  Surgeon: Nicolas Alvarado MD;  Location: [facility] (4TH FLR);  Service: Endoscopy;  Laterality: N/A;  EGD and colonoscopy for diarrhea and weight loss and Enlarged, heterogeneous appearance of the liver likely due to multiple underlying hepatic lesions largest measuring 4.8 cm.  Findings concerning for metastatic versus less likely primary hepatic neopl    ESOPHAGOGASTRODUODENOSCOPY N/A 04/05/2022    Procedure: EGD (ESOPHAGOGASTRODUODENOSCOPY);  Surgeon: Amado Kelsey MD;  Location: [facility] (4TH FLR);  Service: Endoscopy;  Laterality: N/A;  EGD in 8 weeks to check for esophagitis healing patient should be on pantoprazole 40 mg once daily  ok to hold eliquis x2 days per Dr. Young, see telephone encounter  fully vaccinated    EYE SURGERY      cataract    INSERTION OF VENOUS ACCESS PORT N/A  12/03/2021    Procedure: INSERTION, VENOUS ACCESS PORT;  Surgeon: Saurav Garcia MD;  [facility];  Service: General;  Laterality: N/A;    INTRAOCULAR PROSTHESES INSERTION Right 07/16/2018    Procedure: INSERTION-INTRAOCULAR LENS (IOL);  Surgeon: Priscilla Hays MD;  [facility]: [facility];  Service: Ophthalmology;  Laterality: Right;    INTRAOCULAR PROSTHESES INSERTION Left 08/13/2018    Procedure: INSERTION, INTRAOCULAR LENS PROSTHESIS;  Surgeon: Priscilla Hays MD;  [facility];  Service: Ophthalmology;  Laterality: Left;    KNEE SURGERY Right     LAPAROSCOPIC APPENDECTOMY N/A 11/2/2022    Procedure: APPENDECTOMY, LAPAROSCOPIC;  Surgeon: Cheo Hamm MD;  [facility];  Service: General;  Laterality: N/A;    PHACOEMULSIFICATION OF CATARACT Right 07/16/2018    Procedure: PHACOEMULSIFICATION-ASPIRATION-CATARACT;  Surgeon: Priscilla Hays MD;  Location: [facility];  Service: Ophthalmology;  Laterality: Right;    PHACOEMULSIFICATION OF CATARACT Left 08/13/2018    Procedure: PHACOEMULSIFICATION, CATARACT;  Surgeon: Priscilla Hays MD;  Location: [facility];  Service: Ophthalmology;  Laterality: Left;    REPAIR OF INCARCERATED UMBILICAL HERNIA  11/2/2022    Procedure: REPAIR, HERNIA, UMBILICAL, INCARCERATED, AGE 5 YEARS OR OLDER;  Surgeon: Cheo Hamm MD;  [facility];  Service: General;;    WISDOM TOOTH EXTRACTION         Time Tracking:     OT Date of Treatment: 03/25/23  OT Start Time: 1121  OT Stop Time: 1140  OT Total Time (min): 19 min    Billable Minutes:Evaluation 10  Therapeutic Activity 9    3/25/2023

## 2023-03-25 NOTE — ASSESSMENT & PLAN NOTE
Could be due to patients low albumin   EF 60% in December 2021  U/S on 3/13 negative for DVT   Was on lasix outpatient but it was held due to rising creatinine     - FU TTE

## 2023-03-25 NOTE — PLAN OF CARE
Plan of care reviewed with the patient at the beginning of shift. The patient is alert and oriented. GCS 15. Denying complaints at this time. NAEON. Remained free from falls and injuries throughout shift. IV ABX administered. VSS. Bed in low locked position. Call bell and personal items within reach. Will continue to monitor.

## 2023-03-25 NOTE — CARE UPDATE
RAPID RESPONSE NURSE ROUND       Rounding completed with charge RNCitlali for chart review reports no concerns. Instructed to call [phone] for further concerns or assistance.

## 2023-03-25 NOTE — ASSESSMENT & PLAN NOTE
Cr 2.1 on admission, baseline around 1.3-1.5   His lasix dose was held in the last week due to rising Cr but it continues to increase   Blood pressure soft on admission, possible pre-renal etiology, renal US without hydronephrosis, severely fluid overloaded on exam    - FU urine labs   - Nephrology consulted, current recommendation is to hold off on diuresis at this time given borderline BP   - Check renal function daily

## 2023-03-25 NOTE — SUBJECTIVE & OBJECTIVE
Interval History: Abdominal pain resolved. Patient remains AF. DAWIT continues to worsen, nephrology on board.  BP remains soft.       Oncology Treatment Plan:   OP PANC NAB-PACLITAXEL + GEMCITABINE Q4W    Medications:  Continuous Infusions:  Scheduled Meds:   apixaban  5 mg Oral BID    DULoxetine  20 mg Oral Daily    finasteride  5 mg Oral Daily    piperacillin-tazobactam (ZOSYN) IVPB  4.5 g Intravenous Q8H    rifAXImin  550 mg Oral BID    tamsulosin  1 capsule Oral Daily     PRN Meds:dextrose 10%, dextrose 10%, dextrose, dextrose, glucagon (human recombinant), melatonin, naloxone, ondansetron, sodium chloride 0.9%       Review of Systems   Constitutional:  Positive for activity change and fatigue. Negative for chills and fever.   HENT:  Negative for congestion and sore throat.    Eyes:  Negative for pain.   Respiratory:  Negative for cough and shortness of breath.    Cardiovascular:  Negative for chest pain, palpitations and leg swelling.   Gastrointestinal:  Positive for abdominal distention. Negative for abdominal pain, constipation, diarrhea, nausea and vomiting.   Genitourinary:  Negative for difficulty urinating, dysuria and hematuria.   Musculoskeletal:  Negative for back pain.   Skin:  Negative for rash.   Neurological:  Negative for light-headedness and headaches.   Hematological:  Does not bruise/bleed easily.   Psychiatric/Behavioral:  Negative for agitation.    Objective:     Vital Signs (Most Recent):  Temp: 98.2 °F (36.8 °C) (03/25/23 0754)  Pulse: 92 (03/25/23 0754)  Resp: 20 (03/25/23 0754)  BP: (!) 94/50 (03/25/23 0754)  SpO2: (!) 93 % (03/25/23 0754)   Vital Signs (24h Range):  Temp:  [97.6 °F (36.4 °C)-98.2 °F (36.8 °C)] 98.2 °F (36.8 °C)  Pulse:  [] 92  Resp:  [16-22] 20  SpO2:  [93 %-95 %] 93 %  BP: ()/(50-68) 94/50     Weight: 121.1 kg (266 lb 15.6 oz)  Body mass index is 40.59 kg/m².  Body surface area is 2.41 meters squared.      Intake/Output Summary (Last 24 hours) at 3/25/2023  1308  Last data filed at 3/25/2023 1053  Gross per 24 hour   Intake 467.95 ml   Output 160 ml   Net 307.95 ml       Physical Exam  Constitutional:       Appearance: Normal appearance.   HENT:      Head: Normocephalic and atraumatic.      Mouth/Throat:      Mouth: Mucous membranes are moist.      Pharynx: Oropharynx is clear.   Eyes:      General: Scleral icterus present.      Extraocular Movements: Extraocular movements intact.      Pupils: Pupils are equal, round, and reactive to light.   Cardiovascular:      Rate and Rhythm: Normal rate and regular rhythm.      Pulses: Normal pulses.      Heart sounds: Normal heart sounds.   Pulmonary:      Effort: Pulmonary effort is normal.      Breath sounds: Normal breath sounds. No rales.   Abdominal:      General: Abdomen is flat. There is distension.      Palpations: Abdomen is soft.      Tenderness: There is no abdominal tenderness.   Musculoskeletal:         General: Normal range of motion.      Cervical back: Normal range of motion and neck supple.      Right lower leg: Edema present.      Left lower leg: Edema present.      Comments: 3+ edema    Skin:     General: Skin is warm and dry.   Neurological:      General: No focal deficit present.      Mental Status: He is alert and oriented to person, place, and time.      Comments: Asterixis present   Psychiatric:         Mood and Affect: Mood normal.         Behavior: Behavior normal.       Significant Labs:   All pertinent labs from the last 24 hours have been reviewed.    Diagnostic Results:  I have reviewed all pertinent imaging results/findings within the past 24 hours.

## 2023-03-25 NOTE — ASSESSMENT & PLAN NOTE
History of intrahepatic cholangiocarcinoma   Last chemo on 3/23  On admission Tbili 7.7, was 2.3 earlier this month   U/S on 3/17 revealed stones and sludge in the gallbladder with no sonographic evidence for acute cholecystitis, as well as multifocal hepatic masses  CT without obstruction , concerning for worsening metastatic disease     - CMP daily

## 2023-03-25 NOTE — CONSULTS
"[address] - Oncology ([facility])  Nephrology  Consult Note    Patient Name: Domonique Kellogg  MRN: [id number]  Admission Date: 3/24/2023  Hospital Length of Stay: 0 days  Attending Provider: Vanessa Horvath MD   Primary Care Physician: Nicolas Marlow MD  Principal Problem:Sepsis    Inpatient consult to Nephrology  Consult performed by: Anisha Hernandez MD  Consult ordered by: Yosvany Adhikari MD  Reason for consult:  "History of intrahepatic cholangiocarcinoma on chemo. Here with concerns of infection and has a DAWIT. Lasix was held outpatient and Cr increasing. BP soft on admission"  Assessment/Recommendations: Please see consult note and staff attestation for full recommendations. Thank you!         Subjective:     HPI:   Domonique Kellogg is a 72 y.o. 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 [place] like "someone standing on chest." On chemo for liver cancer. Wife reports he is disoriented.      Chemotherapy was stated on 12/2021 with Cis/gem, durvalumab was added on 8/10 but was stopped on 9/9/2022 d/t reaction.   S/p TACE12/21 to 9/28/2022, he had RF ablation 7/19/2022, and 11/21/2022 FOLFOX x6 cycles (please refer to oncology outpatient notes for full hx of chemotherapy)  Pt was seen as an urgent care visit on 3/16/2023 for weakness, LE swelling and he was treated with IVF 500mls, lasix was held (previously ordered for LE edema), pt was asked to f/u with cardiology and neurology.   Pts last chemo infusion was on 3/23/2023: " "PACLITAXEL & GEMCITABINE      Pt's family noticed pt having difficulty breathing, he was short of breath, he does use a CPAP but despite that he was feeling short of breath. He did not have chills or fever, he also did not take his blood pressure at home and so unable to tell if his blood pressure was low. He reports overall good appetite and was starting to increase hydration since he received chemotherapy.     In the ED, pt presented with the following VS:   Initial Vitals [03/24/23 0111]   BP Pulse Resp Temp SpO2   (!) 107/56 (!) 123 20 (!) 100.9 °F (38.3 °C) 95 %      MAP       --         His labs showed WBC 10.2, hgb 8.8, plts 150, bicarb 21, calcium 9.7, alk phos 515, albumin 2, t. Bili 5.9, , ALT 49, ammonia 63, and CA 19-9 was 907  A CT abd and pelvis w/o contrast showed:   1. Moderate wall thickening of the cecum and proximal ascending colon, possibly infectious/inflammatory colitis.  Neoplasm not excluded.  No organized fluid collections.  2. No definite evidence of cholangitis noting limitations without the use of intravenous contrast.  3. Prominent right hepatic lobe mass with multiple satellite lesions in keeping with known cholangiocarcinoma.  4. Stable 1.1 cm teresa hepatis lymph node.  No new lymphadenopathy.  5. Small volume ascites.  Pt was started on vancomycin, ceftriaxone, and also received 1.5L of IVF    Nephrology was consulted for: "History of intrahepatic cholangiocarcinoma on chemo. Here with concerns of infection and has a DAWIT. Lasix was held outpatient and Cr increasing. BP soft on admission"  Baseline sCr: 1.2-1.4  Admission sCr: 1.8    Pt does not endorse a history of kidney stones, chronic NSAID use, trauma to the kidneys or bladder.   As for a family history, pt denies family history of kidney diease including family members on dialysis, autoimmune diseases, kidney stones or cancer.    Creatinine   Date Value Ref Range Status   03/24/2023 2.1 (H) 0.5 - 1.4 mg/dL Final "   03/22/2023 1.8 (H) 0.5 - 1.4 mg/dL Final   03/16/2023 1.8 (H) 0.5 - 1.4 mg/dL Final   03/07/2023 1.3 0.5 - 1.4 mg/dL Final   02/22/2023 1.4 0.5 - 1.4 mg/dL Final     BUN   Date Value Ref Range Status   03/24/2023 21 8 - 23 mg/dL Final   03/22/2023 20 8 - 23 mg/dL Final   03/16/2023 16 8 - 23 mg/dL Final   03/07/2023 20 8 - 23 mg/dL Final   02/22/2023 25 (H) 8 - 23 mg/dL Final       Past Medical History:   Diagnosis Date    Acute kidney injury 3/16/2023    Adjustment disorder     Anticoagulant long-term use     Anxiety     Arthritis     CAD (coronary artery disease) 03/02/2015    non-obstructive per [facility]     Cataract     Class 1 obesity with serious comorbidity in adult 3/17/2014    Dry eye syndrome     Hypertension     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/2021    Obesity     Post-procedural fever 12/18/2021    Seizures     2011    Sleep apnea     + CPAP    Sleep difficulties        Past Surgical History:   Procedure Laterality Date    ANTERIOR CERVICAL DISCECTOMY W/ FUSION N/A 07/06/2020    Procedure: DISCECTOMY, SPINE, CERVICAL, ANTERIOR APPROACH, WITH FUSION C3-4, C4-5;  Surgeon: Fabiola Vides MD;  Location: [facility] OR 2ND FLR;  Service: Neurosurgery;  Laterality: N/A;  TORONTO III, ASA III, BLOOD TYPE & SCREEN, NEUROMONITORING EMG-MEP-SEP, SUPINE POSITION,BRACE [place],REGULAR BED, HEADREST [place], POSITION, MAP>85, RADIOLOGY C-ARM, SPECIAL EQUIPMENT VIRGIL NAYELI    COLONOSCOPY N/A 10/01/2021    Procedure: COLONOSCOPY;  Surgeon: Nicolas Alvarado MD;  Location: [facility] (4TH FLR);  Service: Endoscopy;  Laterality: N/A;  EGD and colonoscopy for diarrhea and weight loss and Enlarged, heterogeneous appearance of the liver likely due to multiple underlying hepatic lesions largest measuring 4.8 cm.  Findings concerning for metastatic versus less likely primary hepatic neoplasm.  Recommend fur    COLONOSCOPY N/A 06/07/2022    Procedure: COLONOSCOPY;  Surgeon: Mejia Villafuerte MD;  Location: [facility]  (4TH FLR);  Service: Endoscopy;  Laterality: N/A;  For evaluation of positive out-patient FOBT.   medically urgent  ok to hold Eliquis per [place] Edward/RB  fully vaccinated  hx of seizures- last one 2014    ESOPHAGOGASTRODUODENOSCOPY N/A 10/01/2021    Procedure: EGD (ESOPHAGOGASTRODUODENOSCOPY);  Surgeon: Nicolas Alvarado MD;  Location: [facility] (4TH FLR);  Service: Endoscopy;  Laterality: N/A;  EGD and colonoscopy for diarrhea and weight loss and Enlarged, heterogeneous appearance of the liver likely due to multiple underlying hepatic lesions largest measuring 4.8 cm.  Findings concerning for metastatic versus less likely primary hepatic neopl    ESOPHAGOGASTRODUODENOSCOPY N/A 04/05/2022    Procedure: EGD (ESOPHAGOGASTRODUODENOSCOPY);  Surgeon: Amado Kelsey MD;  Location: [facility] (4TH FLR);  Service: Endoscopy;  Laterality: N/A;  EGD in 8 weeks to check for esophagitis healing patient should be on pantoprazole 40 mg once daily  ok to hold eliquis x2 days per Dr. Young, see telephone encounter  fully vaccinated    EYE SURGERY      cataract    INSERTION OF VENOUS ACCESS PORT N/A 12/03/2021    Procedure: INSERTION, VENOUS ACCESS PORT;  Surgeon: Saurav Garcia MD;  Location: [facility] 2ND FLR;  Service: General;  Laterality: N/A;    INTRAOCULAR PROSTHESES INSERTION Right 07/16/2018    Procedure: INSERTION-INTRAOCULAR LENS (IOL);  Surgeon: Priscilla Hays MD;  Location: [facility];  Service: Ophthalmology;  Laterality: Right;    INTRAOCULAR PROSTHESES INSERTION Left 08/13/2018    Procedure: INSERTION, INTRAOCULAR LENS PROSTHESIS;  Surgeon: Priscilla Hays MD;  Location: [facility];  Service: Ophthalmology;  Laterality: Left;    KNEE SURGERY Right     LAPAROSCOPIC APPENDECTOMY N/A 11/2/2022    Procedure: APPENDECTOMY, LAPAROSCOPIC;  Surgeon: hCeo Hamm MD;  Location: [facility] 2ND FLR;  Service: General;  Laterality: N/A;    PHACOEMULSIFICATION OF CATARACT Right 07/16/2018    Procedure:  "PHACOEMULSIFICATION-ASPIRATION-CATARACT;  Surgeon: Priscilla Hays MD;  Location: [facility];  Service: Ophthalmology;  Laterality: Right;    PHACOEMULSIFICATION OF CATARACT Left 08/13/2018    Procedure: PHACOEMULSIFICATION, CATARACT;  Surgeon: Priscilla Hays MD;  Location: [facility];  Service: Ophthalmology;  Laterality: Left;    REPAIR OF INCARCERATED UMBILICAL HERNIA  11/2/2022    Procedure: REPAIR, HERNIA, UMBILICAL, INCARCERATED, AGE 5 YEARS OR OLDER;  Surgeon: Cheo Hamm MD;  Location: [address];  Service: General;;    WISDOM TOOTH EXTRACTION         Review of patient's allergies indicates:   Allergen Reactions    Imfinzi [durvalumab] Other (See Comments)     1305- Imfinzi ended, pt returned from restroom complained of wheezing SOB and rigors. /100  O2 92% 2L NC O2 applied to pt.   1306-Demerol 25mg given and Solucortef 125mg IVPush given.   1309- Pt /106  02 99%.   1318- Pt still having Rigors additional 25mg of Demerol given.   1418- BP is 133/69 77 HR 99% RA Pt states " I feel much better"       Indomethacin Other (See Comments)     Headache dizziness      Adhesive Rash    Colchicine analogues Nausea And Vomiting     NVD     Current Facility-Administered Medications   Medication Frequency    apixaban tablet 5 mg BID    dextrose 10% bolus 125 mL 125 mL PRN    dextrose 10% bolus 250 mL 250 mL PRN    dextrose 40 % gel 15,000 mg PRN    dextrose 40 % gel 30,000 mg PRN    DULoxetine DR capsule 20 mg Daily    finasteride tablet 5 mg Daily    glucagon (human recombinant) injection 1 mg PRN    melatonin tablet 6 mg Nightly PRN    naloxone 0.4 mg/mL injection 0.02 mg PRN    ondansetron injection 4 mg Q8H PRN    piperacillin-tazobactam (ZOSYN) 4.5 g in dextrose 5 % in water (D5W) 5 % 100 mL IVPB (MB+) Q8H    rifAXIMin tablet 550 mg BID    sodium chloride 0.9% flush 10 mL Q12H PRN    tamsulosin 24 hr capsule 0.4 mg Daily     Family History       Problem Relation " (Age of Onset)    Cancer Mother, Maternal Grandmother    Diabetes Mother    Heart disease Father, Paternal Grandfather, Brother    Hypertension Mother    Kidney cancer Mother (61)    Liver disease Maternal Grandmother    No Known Problems Sister, Daughter, Son, Sister, Sister, Sister, Daughter          Tobacco Use    Smoking status: Former     Packs/day: 1.00     Years: 20.00     Pack years: 20.00     Types: Cigarettes     Quit date: 1987     Years since quittin.2    Smokeless tobacco: Never    Tobacco comments:     quit    Substance and Sexual Activity    Alcohol use: Not Currently     Alcohol/week: 1.0 - 2.0 standard drink     Types: 1 - 2 Glasses of wine per week     Comment: not since liver diagnosis    Drug use: No    Sexual activity: Yes     Partners: Female     Review of Systems  Objective:     Vital Signs (Most Recent):  Temp: 97.6 °F (36.4 °C) (23 1538)  Pulse: 78 (23 1645)  Resp: 18 (23 1538)  BP: 105/64 (23 1538)  SpO2: (!) 94 % (23 1538)   Vital Signs (24h Range):  Temp:  [97.5 °F (36.4 °C)-100.9 °F (38.3 °C)] 97.6 °F (36.4 °C)  Pulse:  [] 78  Resp:  [16-20] 18  SpO2:  [93 %-97 %] 94 %  BP: ()/(49-66) 105/64     Weight: 121.1 kg (266 lb 15.6 oz) (23 0720)  Body mass index is 40.59 kg/m².  Body surface area is 2.41 meters squared.    I/O last 3 completed shifts:  In: 1500 [IV Piggyback:1500]  Out: -     Physical Exam  Vitals and nursing note reviewed.   Constitutional:       General: He is not in acute distress.     Appearance: Normal appearance. He is ill-appearing. He is not toxic-appearing or diaphoretic.   HENT:      Mouth/Throat:      Mouth: Mucous membranes are moist.   Cardiovascular:      Rate and Rhythm: Normal rate and regular rhythm.      Pulses: Normal pulses.      Heart sounds: Murmur heard.   Pulmonary:      Effort: Pulmonary effort is normal. No respiratory distress.      Breath sounds: No stridor. Rales present. No wheezing  or rhonchi.   Abdominal:      General: Bowel sounds are normal. There is distension.      Palpations: Abdomen is soft. There is no mass.      Tenderness: There is no abdominal tenderness. There is no guarding or rebound.      Hernia: No hernia is present.   Musculoskeletal:         General: Swelling present. No tenderness, deformity or signs of injury.      Right lower leg: Edema present.      Left lower leg: Edema present.   Skin:     General: Skin is warm.      Capillary Refill: Capillary refill takes 2 to 3 seconds.      Coloration: Skin is jaundiced. Skin is not pale.      Findings: No bruising, erythema, lesion or rash.   Neurological:      Mental Status: He is alert and oriented to person, place, and time.   Psychiatric:         Mood and Affect: Mood normal.         Behavior: Behavior normal.         Thought Content: Thought content normal.         Judgment: Judgment normal.       Significant Labs:  CBC:   Recent Labs   Lab 03/24/23 0219   WBC 16.15*   RBC 2.75*   HGB 8.9*   HCT 27.8*      *   MCH 32.4*   MCHC 32.0     CMP:   Recent Labs   Lab 03/24/23 0219 03/24/23  1714   GLU 74 99   CALCIUM 9.3 8.3*   ALBUMIN 2.1* 1.6*   PROT 5.9*  --     142   K 3.8 3.6   CO2 18* 18*   * 114*   BUN 21 26*   CREATININE 2.1* 2.3*   ALKPHOS 495*  --    ALT 57*  --    *  --    BILITOT 7.7*  --      All labs within the past 24 hours have been reviewed.    Significant Imaging:  Labs: Reviewed  X-Ray: Reviewed  CT: Reviewed    Assessment/Plan:     Renal/  DAWIT (acute kidney injury)  Multifactorial etiology for DAWIT: nephrotoxicity from chemotherapy agents (3/23), hypotensive episodes (probably prior to admission) and maybe related to sepsis as pt was febrile, in addition pt was exposed to vancomycin all this in the background of an enlarged prostate compressing the bladder.   Pt does have h/o CKD stage 2 since approx 7/2022    Baseline sCr: 1.2-1.4  Admission sCr: 1.8  Lab Results   Component  Value Date    CREATININE 2.3 (H) 03/24/2023    CREATININE 2.1 (H) 03/24/2023    CREATININE 1.8 (H) 03/22/2023       Recommendations:   -needs a riojas catheter to ensure bladder is emptying well as he has compression from an enlarged prostate (per imaging) also need strict I&O's  -hold IV fluids and diuretics for today and will reassess in AM  -urine microscopy when able  -Electrolytes:    K, goal >4, page nephrology if K >5.5    Mg, goal >2   Phos , goal >3    -Acid/base: AGMA, start sodium bicarb tabs 1300mg po TID   -Fluid balance: fluid overloaded   -Anemia: Hgb 8.9 , send anemia panel labs, transfuse for Hgb <7.0  -Strict I/O's and daily weights  -Renal diet if not NPO  -Renal function panel Q12H   -Check uric acid, CK level, lactic acid  -Renal ultrasound reviewed: no hydronephrosis or masses, +layering of debris within th bladder  -Maintain MAP >65 for renal perfusion    There is no immediate indication for KRT at this time     ID  * Sepsis  Antibiotics given-   Antibiotics (72h ago, onward)    Start     Stop Route Frequency Ordered    03/24/23 1200  piperacillin-tazobactam (ZOSYN) 4.5 g in dextrose 5 % in water (D5W) 5 % 100 mL IVPB (MB+)         -- IV Every 8 hours (non-standard times) 03/24/23 0500    03/24/23 0900  rifAXIMin tablet 550 mg         -- Oral 2 times daily 03/24/23 0459        -Plan per primary team       Hematology  Deep vein thrombosis (DVT) of femoral vein of left lower extremity  On Eliquis    Oncology  Intrahepatic cholangiocarcinoma  Plant per heme/onc          Thank you for your consult. I will follow-up with patient. Please contact us if you have any additional questions.    Anisha Hernandez MD  Nephrology  [facility] - Oncology ([facility])

## 2023-03-25 NOTE — PROGRESS NOTES
"[facility] - Oncology ([facility])  Hematology/Oncology  Progress Note    Patient Name: Domonique Kellogg  Admission Date: 3/24/2023  Hospital Length of Stay: 1 days  Code Status: Full Code     Subjective:     HPI:  Mr. Domonique Kellogg is a 72 y.o. 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 Patients wife states that patient was having difficulty breathing while on his CPAP last night so she woke him up. Patient was a little confused about where he was upon waking. Patient states he has had fatigue/weakness for weeks that has been progressively worsening. He also reports lower leg/abdominal swelling recently that has been worsening. He is on lasix at home but that has been held for almost a week due to a rising creatinine on outside labs. Has some RUQ pain, U/S on 3/17 revealed stones and sludge in the gallbladder with no sonographic evidence for acute cholecystitis, as well as multifocal hepatic masses. He also developed jaundice about two-weeks ago. Denies chest pain, nausea, dysuria or headaches. He was dry heaving last night. Also reports 3 loose stools last night but no diarrhea.     On admission to the ED patient was febrile to 100.9, , /56, and on RA. Cr 2.1 (baseline 1.3-1.5). Tbili 7.7, Alk phos 495, , ALT 57, ammonia 63. WBC 16.2 and lactate 2.25. CXR with no acute findings. VBG with pH 7.34 and pCO2 39.8. Trop 0.035, . Patient given vanc/zosyn and IVF. CT abdomen ordered.     Oncology history per last clinic note:  1. Mr Kellogg is a 70 yo man with CAD, HTN, seizures in 2011 who initially saw me on 11/29/21 for further management of cholangiocarcinoma. He presented with weight loss and diarrhea since July. US 9/22/21 showed "Enlarged, heterogeneous appearance of the liver likely due to multiple underlying hepatic lesions largest measuring 4.8 cm."  MRI " "abdomen w/wo contrast 10/4/21: "Multiple liver lesions measuring up to 13.3 cm in the right hepatic lobe.  Differential diagnosis includes metastases, fibrolamellar hepatocellular carcinoma, or atypical focal nodular hyperplasia.  Consider biopsy for further evaluation. Thrombosis of the anterior branch of the right portal vein and left hepatic vein.  Nonvisualization of the middle and right hepatic veins, which may be thrombosed as well. Prominent periportal lymph node, which is nonspecific."  EGD and colonoscopy on 10/11/21 were negative for primary malignancies.   He underwent liver lesion biopsy and Y90 mapping on 11/22/21. Pathology showed adenocarcinoma: "Biopsy of the liver mass shows a malignant neoplasm in a fibrotic stroma. Glandular architecture is readily identified with several foci of intraluminal necrosis. Scattered mitotic figures are seen. Neoplastic cells show the following immunophenotype:   Positive: AE1/AE3, CK7, CDX2   Negative: Chromogranin, Synaptophysin, TTF, CK5/6, CK20, HepPar1   The morphology and immunophenotype are compatible with adenocarcinoma. Considerations for a primary site include pancreaticobiliary (including intrahepatic cholangiocarcinoma) as well as upper gastrointestinal. Clinical and radiologic correlation is suggested."  Patient presents today with wife for further management. No pain. Initial weight loss has somewhat stabilized. Still has diarrhea. Has not tried imodium yet. +nervous  Mother had kidney cancer at age 64. Maternal grandmother had liver cancer in her 60s. Patient has three children, two daughters and one son.   Discussed palliative chemo with cis/gem  2. PET scan 12/6/21 did not show extrahepatic metastases.   3. Cis/gem started on 12/7/21. Durvalumab was initially denied by insurance company after GI ASCO, but approved after FDA approval, added 8/10/22. Patient had reaction to durv (wheezing, rigors, shaking, high BP) on 9/9. Imfinzi was stopped. Last chemo " on 10/21/22. MRI 10/19/22 showed progression. Plan was to switched for FOLFOX. Patient was hospitalized for acute appendicitis and strangulated umbilical hernia s/p surgery on 11/2/2022.   4. S/p TACE on 12/16/2021, 2/14/22, 4/19/22, 5/18/22, 8/2/22  5. S/p RF Ablation on 7/19/22. TACE on 9/28/22  6. FOLFOX started on 11/21/22, s/p 6 cycles  7. Restaging MRI showed progression.            Interval History: Abdominal pain resolved. Patient remains AF. DAWIT continues to worsen, nephrology on board.  BP remains soft.       Oncology Treatment Plan:   OP PANC NAB-PACLITAXEL + GEMCITABINE Q4W    Medications:  Continuous Infusions:  Scheduled Meds:   apixaban  5 mg Oral BID    DULoxetine  20 mg Oral Daily    finasteride  5 mg Oral Daily    piperacillin-tazobactam (ZOSYN) IVPB  4.5 g Intravenous Q8H    rifAXImin  550 mg Oral BID    tamsulosin  1 capsule Oral Daily     PRN Meds:dextrose 10%, dextrose 10%, dextrose, dextrose, glucagon (human recombinant), melatonin, naloxone, ondansetron, sodium chloride 0.9%       Review of Systems   Constitutional:  Positive for activity change and fatigue. Negative for chills and fever.   HENT:  Negative for congestion and sore throat.    Eyes:  Negative for pain.   Respiratory:  Negative for cough and shortness of breath.    Cardiovascular:  Negative for chest pain, palpitations and leg swelling.   Gastrointestinal:  Positive for abdominal distention. Negative for abdominal pain, constipation, diarrhea, nausea and vomiting.   Genitourinary:  Negative for difficulty urinating, dysuria and hematuria.   Musculoskeletal:  Negative for back pain.   Skin:  Negative for rash.   Neurological:  Negative for light-headedness and headaches.   Hematological:  Does not bruise/bleed easily.   Psychiatric/Behavioral:  Negative for agitation.    Objective:     Vital Signs (Most Recent):  Temp: 98.2 °F (36.8 °C) (03/25/23 0754)  Pulse: 92 (03/25/23 0754)  Resp: 20 (03/25/23 0754)  BP: (!) 94/50  (03/25/23 0754)  SpO2: (!) 93 % (03/25/23 0754)   Vital Signs (24h Range):  Temp:  [97.6 °F (36.4 °C)-98.2 °F (36.8 °C)] 98.2 °F (36.8 °C)  Pulse:  [] 92  Resp:  [16-22] 20  SpO2:  [93 %-95 %] 93 %  BP: ()/(50-68) 94/50     Weight: 121.1 kg (266 lb 15.6 oz)  Body mass index is 40.59 kg/m².  Body surface area is 2.41 meters squared.      Intake/Output Summary (Last 24 hours) at 3/25/2023 1308  Last data filed at 3/25/2023 1053  Gross per 24 hour   Intake 467.95 ml   Output 160 ml   Net 307.95 ml       Physical Exam  Constitutional:       Appearance: Normal appearance.   HENT:      Head: Normocephalic and atraumatic.      Mouth/Throat:      Mouth: Mucous membranes are moist.      Pharynx: Oropharynx is clear.   Eyes:      General: Scleral icterus present.      Extraocular Movements: Extraocular movements intact.      Pupils: Pupils are equal, round, and reactive to light.   Cardiovascular:      Rate and Rhythm: Normal rate and regular rhythm.      Pulses: Normal pulses.      Heart sounds: Normal heart sounds.   Pulmonary:      Effort: Pulmonary effort is normal.      Breath sounds: Normal breath sounds. No rales.   Abdominal:      General: Abdomen is flat. There is distension.      Palpations: Abdomen is soft.      Tenderness: There is no abdominal tenderness.   Musculoskeletal:         General: Normal range of motion.      Cervical back: Normal range of motion and neck supple.      Right lower leg: Edema present.      Left lower leg: Edema present.      Comments: 3+ edema    Skin:     General: Skin is warm and dry.   Neurological:      General: No focal deficit present.      Mental Status: He is alert and oriented to person, place, and time.      Comments: Asterixis present   Psychiatric:         Mood and Affect: Mood normal.         Behavior: Behavior normal.       Significant Labs:   All pertinent labs from the last 24 hours have been reviewed.    Diagnostic Results:  I have reviewed all pertinent  imaging results/findings within the past 24 hours.    Assessment/Plan:     * Sepsis  Patient with intrahepatic cholangiocarcinoma here with SOB and weakness   Meets sepsis criteria (Temperature 100.9, WBC 16.2, )  Concern for abdominal source given RUQ pain  CXR with no acute findings   WBC 16.2 and lactate 2.25  No history of ascites or requiring paracentesis, ED unable to find fluid pocket to tap    - Continue vanz/zosyn   - FU cultures   - Got 500mL IV in ED, getting another 1L, overloaded on exam so cautious with fluids       Hyperammonemia  Ammonia 63 on admission   Asterixis present on exam     - Rifaximin BID   - Will hold off on lactulose as patient had 3 loose stools since yesterday     Lower leg edema  Could be due to patients low albumin   EF 60% in December 2021  U/S on 3/13 negative for DVT   Was on lasix outpatient but it was held due to rising creatinine     - FU TTE     DAWIT (acute kidney injury)  Cr 2.1 on admission, baseline around 1.3-1.5   His lasix dose was held in the last week due to rising Cr but it continues to increase   Blood pressure soft on admission, possible pre-renal etiology, renal US without hydronephrosis, severely fluid overloaded on exam    - FU urine labs   - Nephrology consulted, current recommendation is to hold off on diuresis at this time given borderline BP   - Check renal function daily     Deep vein thrombosis (DVT) of femoral vein of left lower extremity  Diagnosed June 2022    - Continue home apixaban     Weakness generalized  Likely secondary to malignancy and possible infection     - PT/OT consulted     Hyperbilirubinemia  History of intrahepatic cholangiocarcinoma   Last chemo on 3/23  On admission Tbili 7.7, was 2.3 earlier this month   U/S on 3/17 revealed stones and sludge in the gallbladder with no sonographic evidence for acute cholecystitis, as well as multifocal hepatic masses  CT without obstruction , concerning for worsening metastatic disease     - CMP  daily     Elevated troponin  Troponin 0.035 on admission   ECG with no ischemic changes  Likely demand in setting of infection   No chest pain     - Trend until peak     Intrahepatic cholangiocarcinoma  - Current Regimen: PANC NAB-PACLITAXEL + GEMCITABINE Q4W  - Last chemo on 3/23  - MRI on 3/7 revealed interval increase in size and number of multiple additional liver lesions, consistent with worsening metastatic disease        CAD in native artery  - Hold statin in setting of elevated Tbili and liver enzymes     RAHEEL (obstructive sleep apnea)  - CPAP nightly            Maura Seaman MD  Hematology/Oncology  Abdiel rebekah - Oncology ([facility])

## 2023-03-25 NOTE — PLAN OF CARE
Problem: Occupational Therapy  Goal: Occupational Therapy Goal  Description: Goals to be met by: 4/15/23     Patient will increase functional independence with ADLs by performing:    UE Dressing with [place].  LE Dressing with Minimal Assistance.  Grooming while standing at sink with Stand-by Assistance.  Toileting from toilet with Stand-by Assistance for hygiene and clothing management.   Toilet transfer to toilet with Stand-by Assistance.  Upper extremity exercise program 12 reps per handout, with supervision.    Outcome: Ongoing, Progressing     Evaluated pt and established OT POC. Continue OT as tolerated.    Lauren Traore, OTR/L

## 2023-03-25 NOTE — PLAN OF CARE
IV abx administered this shift. Pt voids via urinal. Pt tachycardic toward end of shift sustaining 110s-120s. MD notified. Stat EKG ordered. Albumin and Lasix ordered. POC reviewed with patient; understanding verbalized. Pt. with nonskid footwear on, bed in lowest position, and locked with bed rails up x2.  Pt. instructed to call prior to getting OOB.  Pt. has call light and personal items within reach. Patient ambulates in room with assist. Urinal at bedside. VSS and afebrile this shift. All questions and concerns addressed at this time. Will continue to monitor.

## 2023-03-25 NOTE — NURSING
Head CT, CTAP, abdominal US and retroperitoneal US done this morning, see results. Generalized +3 edema. Remains on bed rest per orders. Minimal urine output but with minimal PO intake. Continued with Zosyn Q8. No other complaints at this time. Bed alarm on, call light within reach. Family at bedside.

## 2023-03-26 PROBLEM — J96.01 ACUTE HYPOXEMIC RESPIRATORY FAILURE: Status: ACTIVE | Noted: 2023-01-01

## 2023-03-26 NOTE — ASSESSMENT & PLAN NOTE
Multifactorial.  Nephrology following.   Would likely need higher dose of lasix due to degree of renal dysfunction.  Monitor UOP

## 2023-03-26 NOTE — HPI
Domonique Kellogg is a 72 y.o. male with a medical history of intrahepatic cholangiocarcinoma (last chemo on 3/23/23), CAD, RAHEEL, and left leg DVT (on Apixaban) who was admitted on 3/24/23 to oncology service for acute hypoxemic respiratory failure, DAWIT, and concerns for sepsis. Per chart review, patient recently told to hold furosemide due to increase in serum creatinine. Patient complaining of inability to tolerate home CPAP machine which prompted presentation. Wife reports patient has had noticeable swelling in abdomen and feet with worsening shortness of breath.     Patient received 1.5L of IVF, cultures obtained and patient started on broad spectrum abx. CT abd/pelvis concerning for moderate wall thickening of cecum and proximal ascending colon with possible inflammatory colitis. Concern for volume overload and he was given 20mg of furosemide. Shortly after became hypotensive requiring IVF bolus. Critical care medicine consulted for hypotension and need for diuresis.

## 2023-03-26 NOTE — ASSESSMENT & PLAN NOTE
Cr 2.1 on admission, baseline around 1.3-1.5   His lasix dose was held in the last week due to rising Cr but it continues to increase   Blood pressure soft on admission, possible pre-renal etiology, renal US without hydronephrosis, severely fluid overloaded on exam. Urine studies consistent with pre-renal physiology    - Nephrology consulted,  - Check renal function daily

## 2023-03-26 NOTE — ASSESSMENT & PLAN NOTE
Patient with intrahepatic cholangiocarcinoma here with SOB and weakness   Meets sepsis criteria (Temperature 100.9, WBC 16.2, )  Concern for abdominal source given RUQ pain  CXR with no acute findings   WBC 16.2 and lactate 2.25  No history of ascites or requiring paracentesis, ED unable to find fluid pocket to tap    - Continue zosyn   - BC NGTD  - Got 500mL IV in ED, getting another 1L, overloaded on exam so cautious with fluids

## 2023-03-26 NOTE — SUBJECTIVE & OBJECTIVE
Interval History:   Patient on CPAP during my encounter.  Denies pain or shortness of breath.    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    Obesity     Post-procedural fever 12/18/2021    Seizures     2011    Sleep apnea     + CPAP    Sleep difficulties        Past Surgical History:   Procedure Laterality Date    ANTERIOR CERVICAL DISCECTOMY W/ FUSION N/A 07/06/2020    Procedure: DISCECTOMY, SPINE, CERVICAL, ANTERIOR APPROACH, WITH FUSION C3-4, C4-5;  Surgeon: Fabiola Vides MD;  Location: [facility] OR 2ND FLR;  Service: Neurosurgery;  Laterality: N/A;  TORONTO III, ASA III, BLOOD TYPE & SCREEN, NEUROMONITORING EMG-MEP-SEP, SUPINE POSITION,BRACE [place],REGULAR BED, HEADREST [place], POSITION, MAP>85, RADIOLOGY C-ARM, SPECIAL EQUIPMENT [facility]    COLONOSCOPY N/A 10/01/2021    Procedure: COLONOSCOPY;  Surgeon: Nicolas Alvarado MD;  Location: [facility] (4TH FLR);  Service: Endoscopy;  Laterality: N/A;  EGD and colonoscopy for diarrhea and weight loss and Enlarged, heterogeneous appearance of the liver likely due to multiple underlying hepatic lesions largest measuring 4.8 cm.  Findings concerning for metastatic versus less likely primary hepatic neoplasm.  Recommend fur    COLONOSCOPY N/A 06/07/2022    Procedure: COLONOSCOPY;  Surgeon: Mejia Villafuerte MD;  Location: [facility] (4TH FLR);  Service: Endoscopy;  Laterality: N/A;  For evaluation of positive out-patient FOBT.   medically urgent  ok to hold Eliquis per [place] Edward/RB  fully vaccinated  hx of seizures- last one 2014    ESOPHAGOGASTRODUODENOSCOPY N/A 10/01/2021    Procedure: EGD (ESOPHAGOGASTRODUODENOSCOPY);  Surgeon:  Nicolas Alvarado MD;  Location: [facility] (4TH FLR);  Service: Endoscopy;  Laterality: N/A;  EGD and colonoscopy for diarrhea and weight loss and Enlarged, heterogeneous appearance of the liver likely due to multiple underlying hepatic lesions largest measuring 4.8 cm.  Findings concerning for metastatic versus less likely primary hepatic neopl    ESOPHAGOGASTRODUODENOSCOPY N/A 04/05/2022    Procedure: EGD (ESOPHAGOGASTRODUODENOSCOPY);  Surgeon: Amado Kelsey MD;  Location: [facility] ENDO (4TH FLR);  Service: Endoscopy;  Laterality: N/A;  EGD in 8 weeks to check for esophagitis healing patient should be on pantoprazole 40 mg once daily  ok to hold eliquis x2 days per Dr. Young, see telephone encounter  fully vaccinated    EYE SURGERY      cataract    INSERTION OF VENOUS ACCESS PORT N/A 12/03/2021    Procedure: INSERTION, VENOUS ACCESS PORT;  Surgeon: Saurav Garcia MD;  Location: [facility] 2ND FLR;  Service: General;  Laterality: N/A;    INTRAOCULAR PROSTHESES INSERTION Right 07/16/2018    Procedure: INSERTION-INTRAOCULAR LENS (IOL);  Surgeon: Priscilla Hays MD;  Location: [facility];  Service: Ophthalmology;  Laterality: Right;    INTRAOCULAR PROSTHESES INSERTION Left 08/13/2018    Procedure: INSERTION, INTRAOCULAR LENS PROSTHESIS;  Surgeon: Priscilla Hays MD;  Location: [facility];  Service: Ophthalmology;  Laterality: Left;    KNEE SURGERY Right     LAPAROSCOPIC APPENDECTOMY N/A 11/2/2022    Procedure: APPENDECTOMY, LAPAROSCOPIC;  Surgeon: Cheo Hamm MD;  Location: [facility] 2ND FLR;  Service: General;  Laterality: N/A;    PHACOEMULSIFICATION OF CATARACT Right 07/16/2018    Procedure: PHACOEMULSIFICATION-ASPIRATION-CATARACT;  Surgeon: Priscilla Hays MD;  Location: [facility] OR;  Service: Ophthalmology;  Laterality: Right;    PHACOEMULSIFICATION OF CATARACT Left 08/13/2018    Procedure: PHACOEMULSIFICATION, CATARACT;  Surgeon: Priscilla Hays MD;  Location: [facility];  Service: Ophthalmology;  Laterality: Left;    REPAIR  "OF INCARCERATED UMBILICAL HERNIA  2022    Procedure: REPAIR, HERNIA, UMBILICAL, INCARCERATED, AGE 5 YEARS OR OLDER;  Surgeon: Cheo Hamm MD;  Location: [facility] OR [address] [place] [address];  Service: General;;    WISDOM TOOTH EXTRACTION         Review of patient's allergies indicates:   Allergen Reactions    Imfinzi [durvalumab] Other (See Comments)     1305- Imfinzi ended, pt returned from restroom complained of wheezing SOB and rigors. /100  O2 92% 2L NC O2 applied to pt.   1306-Demerol 25mg given and Solucortef 125mg IVPush given.   1309- Pt /106  02 99%.   1318- Pt still having Rigors additional 25mg of Demerol given.   1418- BP is 133/69 77 HR 99% RA Pt states " I feel much better"       Indomethacin Other (See Comments)     Headache dizziness      Adhesive Rash    Colchicine analogues Nausea And Vomiting     NVD       Medications:  Continuous Infusions:  Scheduled Meds:   albuterol-ipratropium  3 mL Nebulization Q4H    apixaban  5 mg Oral BID    DULoxetine  20 mg Oral Daily    finasteride  5 mg Oral Daily    magnesium sulfate IVPB  2 g Intravenous Q2H    piperacillin-tazobactam (ZOSYN) IVPB  4.5 g Intravenous Q8H    rifAXImin  550 mg Oral BID    tamsulosin  1 capsule Oral Daily     PRN Meds:sodium chloride, dextrose 10%, dextrose 10%, dextrose, dextrose, glucagon (human recombinant), melatonin, naloxone, ondansetron, sodium chloride 0.9%    Family History       Problem Relation (Age of Onset)    Cancer Mother, Maternal Grandmother    Diabetes Mother    Heart disease Father, Paternal Grandfather, Brother    Hypertension Mother    Kidney cancer Mother (61)    Liver disease Maternal Grandmother    No Known Problems Sister, Daughter, Son, Sister, Sister, Sister, Daughter          Tobacco Use    Smoking status: Former     Packs/day: 1.00     Years: 20.00     Pack years: 20.00     Types: Cigarettes     Quit date: 1987     Years since quittin.2    Smokeless tobacco: Never    Tobacco comments: "     quit 1987   Substance and Sexual Activity    Alcohol use: Not Currently     Alcohol/week: 1.0 - 2.0 standard drink     Types: 1 - 2 Glasses of wine per week     Comment: not since liver diagnosis    Drug use: No    Sexual activity: Yes     Partners: Female       Review of Systems   Constitutional:  Positive for fatigue. Negative for appetite change.   Respiratory:  Negative for shortness of breath.    Cardiovascular:  Negative for chest pain.   Gastrointestinal:  Negative for abdominal pain.   Psychiatric/Behavioral:  The patient is not nervous/anxious.    Objective:     Vital Signs (Most Recent):  Temp: 99 °F (37.2 °C) (03/26/23 1222)  Pulse: 110 (03/26/23 1222)  Resp: 19 (03/26/23 1222)  BP: (!) 100/59 (03/26/23 1222)  SpO2: 98 % (03/26/23 1222)   Vital Signs (24h Range):  Temp:  [98.9 °F (37.2 °C)-100 °F (37.8 °C)] 99 °F (37.2 °C)  Pulse:  [103-141] 110  Resp:  [16-28] 19  SpO2:  [91 %-98 %] 98 %  BP: ()/(49-61) 100/59     Weight: 121.1 kg (266 lb 15.6 oz)  Body mass index is 40.59 kg/m².    Physical Exam  Vitals reviewed.   Constitutional:       General: He is not in acute distress.     Appearance: He is ill-appearing. He is not toxic-appearing.   HENT:      Head: Normocephalic and atraumatic.      Nose: Nose normal.      Mouth/Throat:      Comments: BIPAP mask on   Eyes:      General: Scleral icterus present.      Conjunctiva/sclera: Conjunctivae normal.   Pulmonary:      Effort: Pulmonary effort is normal. No respiratory distress.   Abdominal:      General: There is distension.      Tenderness: There is no abdominal tenderness.   Musculoskeletal:         General: No swelling. Normal range of motion.      Cervical back: Neck supple.   Skin:     Coloration: Skin is not pale.   Neurological:      General: No focal deficit present.      Mental Status: He is alert and oriented to person, place, and time.   Psychiatric:         Mood and Affect: Mood normal.       Review of Symptoms      Symptom Assessment  (ESAS 0-10 Scale)  Pain:  0  Dyspnea:  2  Anxiety:  0  Nausea:  0  Depression:  0  Anorexia:  0  Fatigue:  0  Insomnia:  0  Restlessness:  0  Agitation:  0     CAM / Delirium:  Negative  Constipation:  Negative    Anxiety:  Is not nervous/anxious    Modified Isidoro Scale:  2    Performance Status:  70    Living Arrangements:  Lives with spouse    Psychosocial/Cultural:   See Palliative Psychosocial Note: Yes   (46 years), 3 children (one son, two daughters)  **Primary  to Follow**  Palliative Care  Consult: Yes    Spiritual:  F - Annemarie and Belief:  Mosque      Advance Care Planning   Advance Directives:   Living Will: No    LaPOST: No    Do Not Resuscitate Status: No    Medical Power of : No      Decision Making:  Patient answered questions and Family answered questions  Goals of Care: What is most important right now is to focus on spending time at home, remaining as independent as possible, symptom/pain control, comfort and QoL and spending time at home. Accordingly, we have decided that the best plan to meet the patient's goals includes continuing with treatment.       Significant Labs: All pertinent labs within the past 24 hours have been reviewed.  CBC:   Recent Labs   Lab 03/26/23  0612 03/26/23  0854   WBC 13.44*  --    HGB 6.6* 6.6*   HCT 21.5*  --    *  --    PLT 83*  --      BMP:  Recent Labs   Lab 03/26/23  0612   GLU 82      K 4.0   *   CO2 16*   BUN 32*   CREATININE 2.9*   CALCIUM 8.6*   MG 1.1*     LFT:  Lab Results   Component Value Date     (H) 03/26/2023    GGT 1,292 (H) 09/20/2021    ALKPHOS 253 (H) 03/26/2023    BILITOT 7.0 (H) 03/26/2023     Albumin:   Albumin   Date Value Ref Range Status   03/26/2023 2.7 (L) 3.5 - 5.2 g/dL Final     Protein:   Total Protein   Date Value Ref Range Status   03/26/2023 5.3 (L) 6.0 - 8.4 g/dL Final     Lactic acid:   Lab Results   Component Value Date    LACTATE 2.1 03/24/2023    LACTATE 2.1  11/01/2022       Significant Imaging: I have reviewed all pertinent imaging results/findings within the past 24 hours.    Results for orders placed or performed during the hospital encounter of 03/24/23 (from the past 2160 hour(s))   CT Abdomen Pelvis  Without Contrast    Narrative    EXAMINATION:  CT ABDOMEN PELVIS WITHOUT CONTRAST    CLINICAL HISTORY:  Sepsis;cocnern for cholestasis, cholangitis;    TECHNIQUE:  Axial images of the abdomen and pelvis were acquired without the use of IV contrast. No oral contrast was administered.  Coronal and sagittal reconstructions were also obtained.    COMPARISON:  MR 03/07/2020; CT 11/01/2022    FINDINGS:  HEART: Normal in size. No pericardial effusion.    LUNGS: Well aerated.  Minimal bibasilar atelectasis.  No large consolidation or pleural effusion.    LIVER: Heterogeneous attenuation.  Prominent right hepatic lobe mass in keeping with known cholangiocarcinoma, measurement unreliable without the use of contrast.  Multiple additional satellite lesions.    GALLBLADDER/BILE DUCTS: Gallbladder appears to be contracted with sludge.  Mild gallbladder wall thickening versus small amount of pericholecystic fluid.  No evidence of dilated ducts.    PANCREAS: No mass or peripancreatic fat stranding.    SPLEEN: No splenomegaly.    ADRENALS: Mild nodular thickening of the right adrenal gland.  Left adrenal gland is unremarkable.    KIDNEYS/URETERS: Normal in size and location.  Stable left kidney simple cyst.  No hydronephrosis or nephrolithiasis. No ureteral dilatation.    BLADDER: No evidence of wall thickening.    REPRODUCTIVE ORGANS: Enlarged prostate.    STOMACH/DUODENUM: Unremarkable.    BOWEL/MESENTERY: Small bowel is normal in caliber with no evidence of obstruction. No evidence of inflammation or wall thickening.  Moderate wall thickening about the cecum and proximal ascending colon with no local inflammatory changes.  Appendix is surgically absent.  Scattered diverticula  throughout the colon.  No organized fluid collections.    PERITONEUM: Small volume ascites.  No pneumoperitoneum.    LYMPH NODES: Enlarged 1.1 cm teresa hepatis node (axial 02:52), previously 1.0 cm.  No new abdominal or pelvic lymphadenopathy.    VASCULATURE: Moderate aortoiliac calcific atherosclerosis.  No aneurysm.    ABDOMINAL WALL:  Small fat containing umbilical hernia.  Diffuse subcutaneous edema.    BONES: Stable degenerative changes of the visualized spine.  No acute fracture. No suspicious osseous lesions.      Impression    1. Moderate wall thickening of the cecum and proximal ascending colon, possibly infectious/inflammatory colitis.  Neoplasm not excluded.  No organized fluid collections.  2. No definite evidence of cholangitis noting limitations without the use of intravenous contrast.  3. Prominent right hepatic lobe mass with multiple satellite lesions in keeping with known cholangiocarcinoma.  4. Stable 1.1 cm teresa hepatis lymph node.  No new lymphadenopathy.  5. Small volume ascites.  6. Other findings above.  This report was flagged in Epic as abnormal.    Electronically signed by resident: Avery Noonan  Date:    03/24/2023  Time:    08:49    Electronically signed by: Srikanth Montelongo MD  Date:    03/24/2023  Time:    10:00   CT Head Without Contrast    Narrative    EXAMINATION:  CT HEAD WITHOUT CONTRAST    CLINICAL HISTORY:  Mental status change, unknown cause;    TECHNIQUE:  Low dose axial CT images obtained throughout the head without the use of intravenous contrast.  Axial, sagittal and coronal reconstructions were performed.    COMPARISON:  MRI 01/07/2015    FINDINGS:  Intracranial compartment:    Pattern of mild generalized cerebral volume loss, without evidence of hydrocephalus.    Mild patchy hypoattenuation in the supratentorial white matter, nonspecific but most likely reflecting chronic small vessel ischemic changes. No recent or remote major vascular distribution infarct. No acute  hemorrhage.  No mass effect or midline shift.    No extra-axial blood or fluid collections.  0.8 cm rounded hyperattenuating focus along the anterior falx (series 2, image 15).  No significant mass effect on the brain parenchyma.    Skull/extracranial contents (limited evaluation):    No displaced calvarial fracture.    The mastoid air cells and visualized paranasal sinuses are essentially clear.    Bilateral pseudophakia.      Impression    No evidence of acute hemorrhage or major vascular distribution infarct.    Mild chronic small vessel ischemic change and generalized cerebral volume loss.    0.8 cm hyperattenuating focus along the anterior falx, could reflect a benign dural calcification or partially calcified meningioma.  Contrast enhanced MRI could provide more definitive characterization if warranted clinically.      Electronically signed by: Gutierrez Lopez MD  Date:    03/24/2023  Time:    10:57   Results for orders placed or performed during the hospital encounter of 03/07/23 (from the past 2160 hour(s))   CT Chest Without Contrast    Narrative    EXAMINATION:  CT CHEST WITHOUT CONTRAST    CLINICAL HISTORY:  Musculoskeletal neoplasm, assess treatment response; Intrahepatic bile duct carcinoma    TECHNIQUE:  Low dose axial images, sagittal and coronal reformations were obtained from the thoracic inlet to the lung bases. Contrast was not administered.    All CT scans at this facility use dose modulation, iterative reconstruction and/or weight based dosing when appropriate to reduce radiation dose to as low as reasonably achievable.    COMPARISON:  CT of the chest 10/19/2022    FINDINGS:  No intravenous contrast was administered for this examination.  Therefore, it may have diminished sensitivity for detection of certain abnormalities.    The tip of a catheter is seen at the cavoatrial junction.    The heart size is mildly enlarged.  Again noted are mild coronary artery and aortic calcifications.  The blood  pool attenuation is diminished, which may be suggestive of anemia.    There is new right apical interlobular septal thickening.  The previous tree-in-bud nodules are less conspicuous.    Again seen are several ill-defined hepatic hypodense lesions.  Please see separately reported MRI of the abdomen performed on the same day.  There is increased upper abdominal ascites.    There is scattered areas of heterogeneous bone attenuation, with there appears to be areas of chronic increased lucency without obvious overlying cortical erosion.  Unclear whether this is merely related to demineralization.  Continued attention on follow-up imaging.      Impression    1. Decreased in conspicuity of small airways disease in the apical regions.  However, there is new right apical interlobular septal thickening.  This may merely reflect mildly asymmetric pulmonary edema.  Continued attention on surveillance imaging recommended.  2. New small ascites.  Again seen, numerous hepatic low-density lesions.  Please see separately reported MRI of the abdomen 03/07/2023 date.  3. Other findings as above      Electronically signed by: Anisha Barboza  Date:    03/07/2023  Time:    15:39     *Note: Due to a large number of results and/or encounters for the requested time period, some results have not been displayed. A complete set of results can be found in Results Review.

## 2023-03-26 NOTE — CARE UPDATE
RAPID RESPONSE NURSE ROUND       Rounding completed with charge RNCheo for low BP reports 500 mL bolus and 1 unit PRBC ordered for low hemoglobin. No additional concerns verbalized at this time. Instructed to call [phone] for further concerns or assistance.

## 2023-03-26 NOTE — SUBJECTIVE & OBJECTIVE
Interval History: Patient became tachycardiac overnight, EKG sinus tachycardia. Urine studies returned with concern for prerenal disease. Gave albumin/lasix which increased BP initially. Patient then became hypotensive overnight, minimally responsive to 500cc fluid bolus. MAPS remain >65 but continues to be borderline. sCr increasing, continues to make urine. Discussed with wife concern for multiorgan failure and concern for ICU admission for pressor support if patient continues to deteriorate. She is amenable to discuss options with palliative care, consulted.       Oncology Treatment Plan:   OP PANC NAB-PACLITAXEL + GEMCITABINE Q4W    Medications:  Continuous Infusions:  Scheduled Meds:   apixaban  5 mg Oral BID    DULoxetine  20 mg Oral Daily    finasteride  5 mg Oral Daily    magnesium sulfate IVPB  2 g Intravenous Q2H    piperacillin-tazobactam (ZOSYN) IVPB  4.5 g Intravenous Q8H    rifAXImin  550 mg Oral BID    tamsulosin  1 capsule Oral Daily     PRN Meds:sodium chloride, dextrose 10%, dextrose 10%, dextrose, dextrose, glucagon (human recombinant), melatonin, naloxone, ondansetron, sodium chloride 0.9%       Review of Systems   Constitutional:  Positive for activity change and fatigue. Negative for chills and fever.   HENT:  Negative for congestion and sore throat.    Eyes:  Negative for pain.   Respiratory:  Negative for cough and shortness of breath.    Cardiovascular:  Negative for chest pain, palpitations and leg swelling.   Gastrointestinal:  Positive for abdominal distention. Negative for abdominal pain, constipation, diarrhea, nausea and vomiting.   Genitourinary:  Negative for difficulty urinating, dysuria and hematuria.   Musculoskeletal:  Negative for back pain.   Skin:  Negative for rash.   Neurological:  Negative for light-headedness and headaches.   Hematological:  Does not bruise/bleed easily.   Psychiatric/Behavioral:  Negative for agitation.    Objective:     Vital Signs (Most Recent):  Temp:  99 °F (37.2 °C) (03/26/23 0737)  Pulse: 103 (03/26/23 0737)  Resp: 20 (03/26/23 0737)  BP: (!) 92/51 (03/26/23 0737)  SpO2: (!) 94 % (03/26/23 0737)   Vital Signs (24h Range):  Temp:  [98.4 °F (36.9 °C)-100 °F (37.8 °C)] 99 °F (37.2 °C)  Pulse:  [103-141] 103  Resp:  [16-20] 20  SpO2:  [91 %-95 %] 94 %  BP: ()/(49-61) 92/51     Weight: 121.1 kg (266 lb 15.6 oz)  Body mass index is 40.59 kg/m².  Body surface area is 2.41 meters squared.      Intake/Output Summary (Last 24 hours) at 3/26/2023 0930  Last data filed at 3/26/2023 0738  Gross per 24 hour   Intake 633.61 ml   Output 300 ml   Net 333.61 ml         Physical Exam  Constitutional:       Appearance: Normal appearance.   HENT:      Head: Normocephalic and atraumatic.      Mouth/Throat:      Mouth: Mucous membranes are moist.      Pharynx: Oropharynx is clear.   Eyes:      General: Scleral icterus present.      Extraocular Movements: Extraocular movements intact.      Pupils: Pupils are equal, round, and reactive to light.   Cardiovascular:      Rate and Rhythm: Normal rate and regular rhythm.      Pulses: Normal pulses.      Heart sounds: Normal heart sounds.   Pulmonary:      Effort: Pulmonary effort is normal.      Breath sounds: Normal breath sounds. No rales.   Abdominal:      General: Abdomen is flat. There is distension.      Palpations: Abdomen is soft.      Tenderness: There is no abdominal tenderness.   Musculoskeletal:         General: Normal range of motion.      Cervical back: Normal range of motion and neck supple.      Right lower leg: Edema present.      Left lower leg: Edema present.      Comments: 3+ edema    Skin:     General: Skin is warm and dry.   Neurological:      General: No focal deficit present.      Mental Status: He is alert and oriented to person, place, and time.      Comments: Asterixis present   Psychiatric:         Mood and Affect: Mood normal.         Behavior: Behavior normal.       Significant Labs:   All pertinent labs  from the last 24 hours have been reviewed.    Diagnostic Results:  I have reviewed all pertinent imaging results/findings within the past 24 hours.

## 2023-03-26 NOTE — PLAN OF CARE
Plan of care reviewed with the patient at the beginning of shift. The patient is alert and oriented. GCS 15. Denying complaints at this time. One episode of hypotension. 500ml LR bolus administered. Pressures remain soft. Pt had multiple bowel movements overnight and did not use urinal for urine, unable to get accurate output. Remained free from falls and injuries throughout shift. VSS. Bed in low locked position. Call bell and personal items within reach. Will continue to monitor.

## 2023-03-26 NOTE — CONSULTS
[facility] - Oncology ([facility])  Palliative Medicine  Consult Note    Patient Name: Domonique Kellogg  MRN: [id number]  Admission Date: 3/24/2023  Hospital Length of Stay: 2 days  Code Status: DNR   Attending Provider: Caesar Ramesh MD  Consulting Provider: Shania Hernandez MD  Primary Care Physician: Nicolas Marlow MD  Principal Problem:Sepsis    Patient information was obtained from patient, spouse/SO and primary team.      Inpatient consult to Palliative Care  Consult performed by: Shania Hernandez MD  Consult ordered by: Maura Seaman MD      Assessment/Plan:     Oncology  Intrahepatic cholangiocarcinoma  -Not a candidate for further treatment per primary team  -patient and wife aware of his prognosis    Palliative Care  Palliative care encounter  Palliative medicine consulted for goals of care/advance care planning.  Mr. Kellogg is a 72-year-old male admitted with intrahepatic cholangiocarcinoma, sepsis, hyperammoniemia, lower leg edema, DAWIT, DVT of femoral vein, generalized weakness, CAD in native artery, and obstructive sleep apnea.  Per chart review, patient is currently full code status.  No advance directives on file.      Advance Care Planning     Date: 03/26/2023    I engaged the patient and wife in a conversation about advance care planning and we specifically addressed what the goals of care would be moving forward, in light of the patient's change in clinical status, specifically multi organ failure.  We did specifically address the patient's likely prognosis, which is poor.  We explored the patient's values and preferences for future care.  The patient endorses that what is most important right now is to focus on spending time at home and comfort and QOL .  Patient shared that for him it is really important to go.  Wife shared that she and her daughter would be able to care for him at.  The had a lot of questions about hospice services. I did explain the role for hospice care at this  stage of the patient's illness, including its ability to help the patient live with the best quality of life possible.  We discussed the philosophy of hospice we discussed the amount of care provided by hospice.  Throughout the conversation it was very clear that it is important for him to be at home to be surrounded by loved ones.  Wife shared that she wants to discuss all of this with her daughter before making any decisions.  We also discussed his code status we discussed that he is currently full code which would mean that he would be resuscitated and placed on life support and that given his prognosis in his debility that would only mainly laying his.  He said he does not think he would want that and wife said that they will discuss this with family before making a final decision.          I spent a total of 18 minutes engaging the patient in this advance care planning discussion.        Thank you for your consult. I will follow-up with patient. Please contact us if you have any additional questions.    Subjective:     HPI:   HPI per chart review:    Mr. Kellogg is a 72 y.o. male with a medical history of intrahepatic cholangiocarcinoma (last chemo on 3/23/23), CAD, RAHEEL, and left leg DVT (on Apixaban) who presented to the ED for shortness of breath, he was febrile to 100.9, , /56, and on RA. Cr 2.1 (baseline 1.3-1.5). Tbili 7.7, Alk phos 495, , ALT 57, ammonia 63. WBC 16.2 and lactate 2.25. CXR with no acute findings. VBG with pH 7.34 and pCO2 39.8. Trop 0.035, . Patient given vanc/zosyn and IVF. CT abdomen ordered. Now concern for multiorgan failure with possible ICU admission if he continues to deteriorate now w/ liver failure, DAWIT, low BP and sepsis from possible intraabd source as well as anemia requiring 1u PRBC.  Palliative care consulted for goals of care/hospice discussion           Hospital Course:  No notes on file    Interval History:   Patient on CPAP during my  encounter.  Denies pain or shortness of breath.    Past Medical History:   Diagnosis Date    Acute kidney injury 3/16/2023    Adjustment disorder     Anticoagulant long-term use     Anxiety     Arthritis     CAD (coronary artery disease) 03/02/2015    non-obstructive per [facility]     Cataract     Class 1 obesity with serious comorbidity in adult 3/17/2014    Dry eye syndrome     Hypertension     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/2021    Obesity     Post-procedural fever 12/18/2021    Seizures     2011    Sleep apnea     + CPAP    Sleep difficulties        Past Surgical History:   Procedure Laterality Date    ANTERIOR CERVICAL DISCECTOMY W/ FUSION N/A 07/06/2020    Procedure: DISCECTOMY, SPINE, CERVICAL, ANTERIOR APPROACH, WITH FUSION C3-4, C4-5;  Surgeon: Fabiola Vides MD;  Location: [facility] OR 2ND FLR;  Service: Neurosurgery;  Laterality: N/A;  TORONTO III, ASA III, BLOOD TYPE & SCREEN, NEUROMONITORING EMG-MEP-SEP, SUPINE POSITION,BRACE MIAMI,REGULAR BED, HEADREST CASPAR, POSITION, MAP>85, RADIOLOGY C-ARM, SPECIAL EQUIPMENT [facility]    COLONOSCOPY N/A 10/01/2021    Procedure: COLONOSCOPY;  Surgeon: Nicolas Alvarado MD;  Location: [facility] (4TH FLR);  Service: Endoscopy;  Laterality: N/A;  EGD and colonoscopy for diarrhea and weight loss and Enlarged, heterogeneous appearance of the liver likely due to multiple underlying hepatic lesions largest measuring 4.8 cm.  Findings concerning for metastatic versus less likely primary hepatic neoplasm.  Recommend [facility]    COLONOSCOPY N/A 06/07/2022    Procedure: COLONOSCOPY;  Surgeon: Mejia Villafuerte MD;  Location: [facility] (4TH FLR);  Service: Endoscopy;  Laterality: N/A;  For evaluation of positive out-patient FOBT.   medically urgent  ok to hold Eliquis per CAROLINA Edward/RB  fully vaccinated  hx of seizures- last one 2014    ESOPHAGOGASTRODUODENOSCOPY N/A 10/01/2021    Procedure: EGD (ESOPHAGOGASTRODUODENOSCOPY);  Surgeon: Nicolas Alvarado MD;  Location: [facility] (4TH  FLR);  Service: Endoscopy;  Laterality: N/A;  EGD and colonoscopy for diarrhea and weight loss and Enlarged, heterogeneous appearance of the liver likely due to multiple underlying hepatic lesions largest measuring 4.8 cm.  Findings concerning for metastatic versus less likely primary hepatic neopl    ESOPHAGOGASTRODUODENOSCOPY N/A 04/05/2022    Procedure: EGD (ESOPHAGOGASTRODUODENOSCOPY);  Surgeon: Amado Kelsey MD;  Location: [facility] (4TH FLR);  Service: Endoscopy;  Laterality: N/A;  EGD in 8 weeks to check for esophagitis healing patient should be on pantoprazole 40 mg once daily  ok to hold eliquis x2 days per Dr. Young, see telephone encounter  fully vaccinated    EYE SURGERY      cataract    INSERTION OF VENOUS ACCESS PORT N/A 12/03/2021    Procedure: INSERTION, VENOUS ACCESS PORT;  Surgeon: Saurav Garcia MD;  Location: [facility] 2ND FLR;  Service: General;  Laterality: N/A;    INTRAOCULAR PROSTHESES INSERTION Right 07/16/2018    Procedure: INSERTION-INTRAOCULAR LENS (IOL);  Surgeon: Priscilla Hays MD;  Location: [facility];  Service: Ophthalmology;  Laterality: Right;    INTRAOCULAR PROSTHESES INSERTION Left 08/13/2018    Procedure: INSERTION, INTRAOCULAR LENS PROSTHESIS;  Surgeon: Priscilla Hays MD;  Location: [facility];  Service: Ophthalmology;  Laterality: Left;    KNEE SURGERY Right     LAPAROSCOPIC APPENDECTOMY N/A 11/2/2022    Procedure: APPENDECTOMY, LAPAROSCOPIC;  Surgeon: Cheo Hamm MD;  Location: [facility] 2ND FLR;  Service: General;  Laterality: N/A;    PHACOEMULSIFICATION OF CATARACT Right 07/16/2018    Procedure: PHACOEMULSIFICATION-ASPIRATION-CATARACT;  Surgeon: Priscilla Hays MD;  Location: [facility];  Service: Ophthalmology;  Laterality: Right;    PHACOEMULSIFICATION OF CATARACT Left 08/13/2018    Procedure: PHACOEMULSIFICATION, CATARACT;  Surgeon: Priscilla Hays MD;  Location: [facility];  Service: Ophthalmology;  Laterality: Left;    REPAIR OF INCARCERATED UMBILICAL HERNIA  11/2/2022     "Procedure: REPAIR, HERNIA, UMBILICAL, INCARCERATED, AGE 5 YEARS OR OLDER;  Surgeon: Cheo Hamm MD;  Location: [facility] OR [address] [place] [address];  Service: General;;    WISDOM TOOTH EXTRACTION         Review of patient's allergies indicates:   Allergen Reactions    Imfinzi [durvalumab] Other (See Comments)     1305- Imfinzi ended, pt returned from restroom complained of wheezing SOB and rigors. /100  O2 92% 2L NC O2 applied to pt.   1306-Demerol 25mg given and Solucortef 125mg IVPush given.   1309- Pt /106  02 99%.   1318- Pt still having Rigors additional 25mg of Demerol given.   1418- BP is 133/69 77 HR 99% RA Pt states " I feel much better"       Indomethacin Other (See Comments)     Headache dizziness      Adhesive Rash    Colchicine analogues Nausea And Vomiting     NVD       Medications:  Continuous Infusions:  Scheduled Meds:   albuterol-ipratropium  3 mL Nebulization Q4H    apixaban  5 mg Oral BID    DULoxetine  20 mg Oral Daily    finasteride  5 mg Oral Daily    magnesium sulfate IVPB  2 g Intravenous Q2H    piperacillin-tazobactam (ZOSYN) IVPB  4.5 g Intravenous Q8H    rifAXImin  550 mg Oral BID    tamsulosin  1 capsule Oral Daily     PRN Meds:sodium chloride, dextrose 10%, dextrose 10%, dextrose, dextrose, glucagon (human recombinant), melatonin, naloxone, ondansetron, sodium chloride 0.9%    Family History       Problem Relation (Age of Onset)    Cancer Mother, Maternal Grandmother    Diabetes Mother    Heart disease Father, Paternal Grandfather, Brother    Hypertension Mother    Kidney cancer Mother (61)    Liver disease Maternal Grandmother    No Known Problems Sister, Daughter, Son, Sister, Sister, Sister, Daughter          Tobacco Use    Smoking status: Former     Packs/day: 1.00     Years: 20.00     Pack years: 20.00     Types: Cigarettes     Quit date: 1987     Years since quittin.2    Smokeless tobacco: Never    Tobacco comments:     quit    Substance and Sexual Activity "    Alcohol use: Not Currently     Alcohol/week: 1.0 - 2.0 standard drink     Types: 1 - 2 Glasses of wine per week     Comment: not since liver diagnosis    Drug use: No    Sexual activity: Yes     Partners: Female       Review of Systems   Constitutional:  Positive for fatigue. Negative for appetite change.   Respiratory:  Negative for shortness of breath.    Cardiovascular:  Negative for chest pain.   Gastrointestinal:  Negative for abdominal pain.   Psychiatric/Behavioral:  The patient is not nervous/anxious.    Objective:     Vital Signs (Most Recent):  Temp: 99 °F (37.2 °C) (03/26/23 1222)  Pulse: 110 (03/26/23 1222)  Resp: 19 (03/26/23 1222)  BP: (!) 100/59 (03/26/23 1222)  SpO2: 98 % (03/26/23 1222)   Vital Signs (24h Range):  Temp:  [98.9 °F (37.2 °C)-100 °F (37.8 °C)] 99 °F (37.2 °C)  Pulse:  [103-141] 110  Resp:  [16-28] 19  SpO2:  [91 %-98 %] 98 %  BP: ()/(49-61) 100/59     Weight: 121.1 kg (266 lb 15.6 oz)  Body mass index is 40.59 kg/m².    Physical Exam  Vitals reviewed.   Constitutional:       General: He is not in acute distress.     Appearance: He is ill-appearing. He is not toxic-appearing.   HENT:      Head: Normocephalic and atraumatic.      Nose: Nose normal.      Mouth/Throat:      Comments: BIPAP mask on   Eyes:      General: Scleral icterus present.      Conjunctiva/sclera: Conjunctivae normal.   Pulmonary:      Effort: Pulmonary effort is normal. No respiratory distress.   Abdominal:      General: There is distension.      Tenderness: There is no abdominal tenderness.   Musculoskeletal:         General: No swelling. Normal range of motion.      Cervical back: Neck supple.   Skin:     Coloration: Skin is not pale.   Neurological:      General: No focal deficit present.      Mental Status: He is alert and oriented to person, place, and time.   Psychiatric:         Mood and Affect: Mood normal.       Review of Symptoms      Symptom Assessment (ESAS 0-10 Scale)  Pain:  0  Dyspnea:   2  Anxiety:  0  Nausea:  0  Depression:  0  Anorexia:  0  Fatigue:  0  Insomnia:  0  Restlessness:  0  Agitation:  0     CAM / Delirium:  Negative  Constipation:  Negative    Anxiety:  Is not nervous/anxious    Modified Isidoro Scale:  2    Performance Status:  70    Living Arrangements:  Lives with spouse    Psychosocial/Cultural:   See Palliative Psychosocial Note: Yes   (46 years), 3 children (one son, two daughters)  **Primary  to Follow**  Palliative Care  Consult: Yes    Spiritual:  F - Annemarie and Belief:  Episcopalian      Advance Care Planning   Advance Directives:   Living Will: No    LaPOST: No    Do Not Resuscitate Status: No    Medical Power of : No      Decision Making:  Patient answered questions and Family answered questions  Goals of Care: What is most important right now is to focus on spending time at home, remaining as independent as possible, symptom/pain control, comfort and QoL and spending time at home. Accordingly, we have decided that the best plan to meet the patient's goals includes continuing with treatment.       Significant Labs: All pertinent labs within the past 24 hours have been reviewed.  CBC:   Recent Labs   Lab 03/26/23  0612 03/26/23  0854   WBC 13.44*  --    HGB 6.6* 6.6*   HCT 21.5*  --    *  --    PLT 83*  --      BMP:  Recent Labs   Lab 03/26/23  0612   GLU 82      K 4.0   *   CO2 16*   BUN 32*   CREATININE 2.9*   CALCIUM 8.6*   MG 1.1*     LFT:  Lab Results   Component Value Date     (H) 03/26/2023    GGT 1,292 (H) 09/20/2021    ALKPHOS 253 (H) 03/26/2023    BILITOT 7.0 (H) 03/26/2023     Albumin:   Albumin   Date Value Ref Range Status   03/26/2023 2.7 (L) 3.5 - 5.2 g/dL Final     Protein:   Total Protein   Date Value Ref Range Status   03/26/2023 5.3 (L) 6.0 - 8.4 g/dL Final     Lactic acid:   Lab Results   Component Value Date    LACTATE 2.1 03/24/2023    LACTATE 2.1 11/01/2022       Significant Imaging: I have  reviewed all pertinent imaging results/findings within the past 24 hours.    Results for orders placed or performed during the hospital encounter of 03/24/23 (from the past 2160 hour(s))   CT Abdomen Pelvis  Without Contrast    Narrative    EXAMINATION:  CT ABDOMEN PELVIS WITHOUT CONTRAST    CLINICAL HISTORY:  Sepsis;cocnern for cholestasis, cholangitis;    TECHNIQUE:  Axial images of the abdomen and pelvis were acquired without the use of IV contrast. No oral contrast was administered.  Coronal and sagittal reconstructions were also obtained.    COMPARISON:  MR 03/07/2020; CT 11/01/2022    FINDINGS:  HEART: Normal in size. No pericardial effusion.    LUNGS: Well aerated.  Minimal bibasilar atelectasis.  No large consolidation or pleural effusion.    LIVER: Heterogeneous attenuation.  Prominent right hepatic lobe mass in keeping with known cholangiocarcinoma, measurement unreliable without the use of contrast.  Multiple additional satellite lesions.    GALLBLADDER/BILE DUCTS: Gallbladder appears to be contracted with sludge.  Mild gallbladder wall thickening versus small amount of pericholecystic fluid.  No evidence of dilated ducts.    PANCREAS: No mass or peripancreatic fat stranding.    SPLEEN: No splenomegaly.    ADRENALS: Mild nodular thickening of the right adrenal gland.  Left adrenal gland is unremarkable.    KIDNEYS/URETERS: Normal in size and location.  Stable left kidney simple cyst.  No hydronephrosis or nephrolithiasis. No ureteral dilatation.    BLADDER: No evidence of wall thickening.    REPRODUCTIVE ORGANS: Enlarged prostate.    STOMACH/DUODENUM: Unremarkable.    BOWEL/MESENTERY: Small bowel is normal in caliber with no evidence of obstruction. No evidence of inflammation or wall thickening.  Moderate wall thickening about the cecum and proximal ascending colon with no local inflammatory changes.  Appendix is surgically absent.  Scattered diverticula throughout the colon.  No organized fluid  collections.    PERITONEUM: Small volume ascites.  No pneumoperitoneum.    LYMPH NODES: Enlarged 1.1 cm teresa hepatis node (axial 02:52), previously 1.0 cm.  No new abdominal or pelvic lymphadenopathy.    VASCULATURE: Moderate aortoiliac calcific atherosclerosis.  No aneurysm.    ABDOMINAL WALL:  Small fat containing umbilical hernia.  Diffuse subcutaneous edema.    BONES: Stable degenerative changes of the visualized spine.  No acute fracture. No suspicious osseous lesions.      Impression    1. Moderate wall thickening of the cecum and proximal ascending colon, possibly infectious/inflammatory colitis.  Neoplasm not excluded.  No organized fluid collections.  2. No definite evidence of cholangitis noting limitations without the use of intravenous contrast.  3. Prominent right hepatic lobe mass with multiple satellite lesions in keeping with known cholangiocarcinoma.  4. Stable 1.1 cm teresa hepatis lymph node.  No new lymphadenopathy.  5. Small volume ascites.  6. Other findings above.  This report was flagged in Epic as abnormal.    Electronically signed by resident: Avery Noonan  Date:    03/24/2023  Time:    08:49    Electronically signed by: Srikanth Montelongo MD  Date:    03/24/2023  Time:    10:00   CT Head Without Contrast    Narrative    EXAMINATION:  CT HEAD WITHOUT CONTRAST    CLINICAL HISTORY:  Mental status change, unknown cause;    TECHNIQUE:  Low dose axial CT images obtained throughout the head without the use of intravenous contrast.  Axial, sagittal and coronal reconstructions were performed.    COMPARISON:  MRI 01/07/2015    FINDINGS:  Intracranial compartment:    Pattern of mild generalized cerebral volume loss, without evidence of hydrocephalus.    Mild patchy hypoattenuation in the supratentorial white matter, nonspecific but most likely reflecting chronic small vessel ischemic changes. No recent or remote major vascular distribution infarct. No acute hemorrhage.  No mass effect or midline shift.    No  extra-axial blood or fluid collections.  0.8 cm rounded hyperattenuating focus along the anterior falx (series 2, image 15).  No significant mass effect on the brain parenchyma.    Skull/extracranial contents (limited evaluation):    No displaced calvarial fracture.    The mastoid air cells and visualized paranasal sinuses are essentially clear.    Bilateral pseudophakia.      Impression    No evidence of acute hemorrhage or major vascular distribution infarct.    Mild chronic small vessel ischemic change and generalized cerebral volume loss.    0.8 cm hyperattenuating focus along the anterior falx, could reflect a benign dural calcification or partially calcified meningioma.  Contrast enhanced MRI could provide more definitive characterization if warranted clinically.      Electronically signed by: Gutierrez Lopez MD  Date:    03/24/2023  Time:    10:57   Results for orders placed or performed during the hospital encounter of 03/07/23 (from the past 2160 hour(s))   CT Chest Without Contrast    Narrative    EXAMINATION:  CT CHEST WITHOUT CONTRAST    CLINICAL HISTORY:  Musculoskeletal neoplasm, assess treatment response; Intrahepatic bile duct carcinoma    TECHNIQUE:  Low dose axial images, sagittal and coronal reformations were obtained from the thoracic inlet to the lung bases. Contrast was not administered.    All CT scans at this facility use dose modulation, iterative reconstruction and/or weight based dosing when appropriate to reduce radiation dose to as low as reasonably achievable.    COMPARISON:  CT of the chest 10/19/2022    FINDINGS:  No intravenous contrast was administered for this examination.  Therefore, it may have diminished sensitivity for detection of certain abnormalities.    The tip of a catheter is seen at the cavoatrial junction.    The heart size is mildly enlarged.  Again noted are mild coronary artery and aortic calcifications.  The blood pool attenuation is diminished, which may be  suggestive of anemia.    There is new right apical interlobular septal thickening.  The previous tree-in-bud nodules are less conspicuous.    Again seen are several ill-defined hepatic hypodense lesions.  Please see separately reported MRI of the abdomen performed on the same day.  There is increased upper abdominal ascites.    There is scattered areas of heterogeneous bone attenuation, with there appears to be areas of chronic increased lucency without obvious overlying cortical erosion.  Unclear whether this is merely related to demineralization.  Continued attention on follow-up imaging.      Impression    1. Decreased in conspicuity of small airways disease in the apical regions.  However, there is new right apical interlobular septal thickening.  This may merely reflect mildly asymmetric pulmonary edema.  Continued attention on surveillance imaging recommended.  2. New small ascites.  Again seen, numerous hepatic low-density lesions.  Please see separately reported MRI of the abdomen 03/07/2023 date.  3. Other findings as above      Electronically signed by: Anisha Barboza  Date:    03/07/2023  Time:    15:39     *Note: Due to a large number of results and/or encounters for the requested time period, some results have not been displayed. A complete set of results can be found in Results Review.               Shania Hernandez MD  Palliative Medicine  [facility] - Oncology ([facility])

## 2023-03-26 NOTE — ASSESSMENT & PLAN NOTE
Patient with Hypoxic Respiratory failure which is Acute.  he is not on home oxygen. Supplemental oxygen was provided and noted- 2L NC  .  Signs/symptoms of respiratory failure include- tachypnea and increased work of breathing. Contributing diagnoses includes - CHF, Pleural effusion and Pneumonia Labs and images were reviewed. Patient Has recent ABG, which has been reviewed. Will treat underlying causes and adjust management of respiratory failure as follows-      Upon my evaluation, patient comfortable on 2L of oxygen without accessory muscle usage. CXR with mild pulmonary edema. No areas of focal consolidation. Patient has bilateral pedal edema and a mildly elevated bnp on admission. TTE pending. Patient likely has a component of volume overload but hard to determine patient's volume status as patient with diarrhea and x8 BM in the last 24 hour period. Suspect patient may also be intravascularly depleted. Less concerned for pneumonia and unlikely PE as patient on chronic anticoagulation.     --could attempt high dose furosemide  mg x1 with albumin   --could also start midodrine  --if patient becomes more hypotensive with diuresis and, would recommend focusing on symptom management with opioids as this would align more with patient and family's goals.   --would not recommend transfer patient to ICU for vasopressors with diuresis. Discussed this with the patient and family who are in agreement.

## 2023-03-26 NOTE — ASSESSMENT & PLAN NOTE
Troponin 0.035 on admission   ECG with no ischemic changes  Likely demand in setting of infection   No chest pain

## 2023-03-26 NOTE — ASSESSMENT & PLAN NOTE
Palliative medicine consulted for goals of care/advance care planning.  Mr. Kellogg is a 72-year-old male admitted with intrahepatic cholangiocarcinoma, sepsis, hyperammoniemia, lower leg edema, DAWIT, DVT of femoral vein, generalized weakness, CAD in native artery, and obstructive sleep apnea.  Per chart review, patient is currently full code status.  No advance directives on file.      Advance Care Planning     Date: 03/26/2023    I engaged the patient and wife in a conversation about advance care planning and we specifically addressed what the goals of care would be moving forward, in light of the patient's change in clinical status, specifically multi organ failure.  We did specifically address the patient's likely prognosis, which is poor.  We explored the patient's values and preferences for future care.  The patient endorses that what is most important right now is to focus on spending time at home and comfort and QOL .  Patient shared that for him it is really important to go.  Wife shared that she and her daughter would be able to care for him at.  The had a lot of questions about hospice services. I did explain the role for hospice care at this stage of the patient's illness, including its ability to help the patient live with the best quality of life possible.  We discussed the philosophy of hospice we discussed the amount of care provided by hospice.  Throughout the conversation it was very clear that it is important for him to be at home to be surrounded by loved ones.  Wife shared that she wants to discuss all of this with her daughter before making any decisions.  We also discussed his code status we discussed that he is currently full code which would mean that he would be resuscitated and placed on life support and that given his prognosis in his debility that would only mainly laying his.  He said he does not think he would want that and wife said that they will discuss this with family before making  a final decision.           I spent a total of 18 minutes engaging the patient in this advance care planning discussion.

## 2023-03-26 NOTE — SUBJECTIVE & OBJECTIVE
Past Medical History:   Diagnosis Date    Acute kidney injury 3/16/2023    Adjustment disorder     Anticoagulant long-term use     Anxiety     Arthritis     CAD (coronary artery disease) 03/02/2015    non-obstructive per [facility]     Cataract     Class 1 obesity with serious comorbidity in adult 3/17/2014    Dry eye syndrome     Hypertension     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/2021    Obesity     Post-procedural fever 12/18/2021    Seizures     2011    Sleep apnea     + CPAP    Sleep difficulties        Past Surgical History:   Procedure Laterality Date    ANTERIOR CERVICAL DISCECTOMY W/ FUSION N/A 07/06/2020    Procedure: DISCECTOMY, SPINE, CERVICAL, ANTERIOR APPROACH, WITH FUSION C3-4, C4-5;  Surgeon: Fabiola Vides MD;  [facility];  Service: Neurosurgery;  Laterality: N/A;  TORONTO III, ASA III, BLOOD TYPE & SCREEN, NEUROMONITORING EMG-MEP-SEP, SUPINE POSITION,BRACE [place],REGULAR BED, HEADREST [place], POSITION, MAP>85, RADIOLOGY C-ARM, SPECIAL EQUIPMENT [facility]    COLONOSCOPY N/A 10/01/2021    Procedure: COLONOSCOPY;  Surgeon: Nicolas Alvarado MD;  Location: [facility] (4TH FLR);  Service: Endoscopy;  Laterality: N/A;  EGD and colonoscopy for diarrhea and weight loss and Enlarged, heterogeneous appearance of the liver likely due to multiple underlying hepatic lesions largest measuring 4.8 cm.  Findings concerning for metastatic versus less likely primary hepatic neoplasm.  Recommend [facility]    COLONOSCOPY N/A 06/07/2022    Procedure: COLONOSCOPY;  Surgeon: Mejia Villafuerte MD;  Location: [facility] (4TH FLR);  Service: Endoscopy;  Laterality: N/A;  For evaluation of positive out-patient FOBT.   medically urgent  ok to hold Eliquis per [place] Edward/RB  fully vaccinated  hx of seizures- last one 2014    ESOPHAGOGASTRODUODENOSCOPY N/A 10/01/2021    Procedure: EGD (ESOPHAGOGASTRODUODENOSCOPY);  Surgeon: Nicolas Alvarado MD;  Location: [facility] (4TH FLR);  Service: Endoscopy;  Laterality: N/A;  EGD  and colonoscopy for diarrhea and weight loss and Enlarged, heterogeneous appearance of the liver likely due to multiple underlying hepatic lesions largest measuring 4.8 cm.  Findings concerning for metastatic versus less likely primary hepatic neopl    ESOPHAGOGASTRODUODENOSCOPY N/A 04/05/2022    Procedure: EGD (ESOPHAGOGASTRODUODENOSCOPY);  Surgeon: Amado Kelsey MD;  [facility]);  Service: Endoscopy;  Laterality: N/A;  EGD in 8 weeks to check for esophagitis healing patient should be on pantoprazole 40 mg once daily  ok to hold eliquis x2 days per Dr. Young, see telephone encounter  fully vaccinated    EYE SURGERY      cataract    INSERTION OF VENOUS ACCESS PORT N/A 12/03/2021    Procedure: INSERTION, VENOUS ACCESS PORT;  Surgeon: Saurav Garcia MD;  Location: [facility] 2[facility] FLR;  Service: General;  Laterality: N/A;    INTRAOCULAR PROSTHESES INSERTION Right 07/16/2018    Procedure: INSERTION-INTRAOCULAR LENS (IOL);  Surgeon: Priscilla Hays MD;  Location: [facility];  Service: Ophthalmology;  Laterality: Right;    INTRAOCULAR PROSTHESES INSERTION Left 08/13/2018    Procedure: INSERTION, INTRAOCULAR LENS PROSTHESIS;  Surgeon: Priscilla Hays MD;  Location: [facility];  Service: Ophthalmology;  Laterality: Left;    KNEE SURGERY Right     LAPAROSCOPIC APPENDECTOMY N/A 11/2/2022    Procedure: APPENDECTOMY, LAPAROSCOPIC;  Surgeon: Cheo Hamm MD;  Location: [facility] 2[facility] FLR;  Service: General;  Laterality: N/A;    PHACOEMULSIFICATION OF CATARACT Right 07/16/2018    Procedure: PHACOEMULSIFICATION-ASPIRATION-CATARACT;  Surgeon: Priscilla Hays MD;  Location: [facility];  Service: Ophthalmology;  Laterality: Right;    PHACOEMULSIFICATION OF CATARACT Left 08/13/2018    Procedure: PHACOEMULSIFICATION, CATARACT;  Surgeon: Priscilla Hays MD;  Location: [facility];  Service: Ophthalmology;  Laterality: Left;    REPAIR OF INCARCERATED UMBILICAL HERNIA  11/2/2022    Procedure: REPAIR, HERNIA, UMBILICAL, INCARCERATED,  "AGE 5 YEARS OR OLDER;  Surgeon: Cheo Hamm MD;  Location: [facility] OR [address] [place] [address];  Service: General;;    WISDOM TOOTH EXTRACTION         Review of patient's allergies indicates:   Allergen Reactions    Imfinzi [durvalumab] Other (See Comments)     1305- Imfinzi ended, pt returned from restroom complained of wheezing SOB and rigors. /100  O2 92% 2L NC O2 applied to pt.   1306-Demerol 25mg given and Solucortef 125mg IVPush given.   1309- Pt /106  02 99%.   1318- Pt still having Rigors additional 25mg of Demerol given.   1418- BP is 133/69 77 HR 99% RA Pt states " I feel much better"       Indomethacin Other (See Comments)     Headache dizziness      Adhesive Rash    Colchicine analogues Nausea And Vomiting     NVD       Family History       Problem Relation (Age of Onset)    Cancer Mother, Maternal Grandmother    Diabetes Mother    Heart disease Father, Paternal Grandfather, Brother    Hypertension Mother    Kidney cancer Mother (61)    Liver disease Maternal Grandmother    No Known Problems Sister, Daughter, Son, Sister, Sister, Sister, Daughter          Tobacco Use    Smoking status: Former     Packs/day: 1.00     Years: 20.00     Pack years: 20.00     Types: Cigarettes     Quit date: 1987     Years since quittin.2    Smokeless tobacco: Never    Tobacco comments:     quit    Substance and Sexual Activity    Alcohol use: Not Currently     Alcohol/week: 1.0 - 2.0 standard drink     Types: 1 - 2 Glasses of wine per week     Comment: not since liver diagnosis    Drug use: No    Sexual activity: Yes     Partners: Female      Review of Systems   Respiratory:  Positive for shortness of breath. Negative for cough.    Cardiovascular:  Positive for leg swelling. Negative for chest pain.   Gastrointestinal:  Positive for diarrhea. Negative for abdominal pain and nausea.   Objective:     Vital Signs (Most Recent):  Temp: 99 °F (37.2 °C) (23 1222)  Pulse: (!) 123 (23 1447)  Resp: " 19 (03/26/23 1222)  BP: (!) 100/59 (03/26/23 1222)  SpO2: 98 % (03/26/23 1222)   Vital Signs (24h Range):  Temp:  [98.9 °F (37.2 °C)-100 °F (37.8 °C)] 99 °F (37.2 °C)  Pulse:  [103-141] 123  Resp:  [16-28] 19  SpO2:  [91 %-98 %] 98 %  BP: ()/(49-61) 100/59   Weight: 121.1 kg (266 lb 15.6 oz)  Body mass index is 40.59 kg/m².      Intake/Output Summary (Last 24 hours) at 3/26/2023 1453  Last data filed at 3/26/2023 1039  Gross per 24 hour   Intake 613.78 ml   Output --   Net 613.78 ml       Physical Exam  Vitals reviewed.   Constitutional:       Appearance: He is well-developed.      Interventions: Nasal cannula in place.   HENT:      Head: Normocephalic and atraumatic.   Eyes:      Pupils: Pupils are equal, round, and reactive to light.   Cardiovascular:      Rate and Rhythm: Normal rate and regular rhythm.      Heart sounds: Normal heart sounds.      Comments: Bilateral pedal edema  Pulmonary:      Effort: Pulmonary effort is normal.      Breath sounds: Normal breath sounds.      Comments: 2L NC  Abdominal:      General: Bowel sounds are normal.      Palpations: Abdomen is soft.      Tenderness: There is no abdominal tenderness.   Musculoskeletal:         General: Normal range of motion.   Skin:     General: Skin is warm and dry.       Vents:     Lines/Drains/Airways       Central Venous Catheter Line  Duration             Port A Cath Single Lumen 12/03/21 1539 right internal jugular 477 days              Peripheral Intravenous Line  Duration                  Peripheral IV - Single Lumen 03/24/23 0400 20 G Anterior;Distal;Right Upper Arm 2 days         Peripheral IV - Single Lumen 03/24/23 0401 20 G Left Antecubital 2 days                  Significant Labs:    CBC/Anemia Profile:  Recent Labs   Lab 03/25/23  0446 03/26/23  0612 03/26/23  0854   WBC 10.26 13.44*  --    HGB 7.8* 6.6* 6.6*   HCT 23.6* 21.5*  --    * 83*  --    MCV 99* 105*  --    RDW 21.7* 21.7*  --         Chemistries:  Recent Labs   Lab  03/24/23  1714 03/25/23  0446 03/26/23  0612    141 141   K 3.6 3.2* 4.0   * 113* 113*   CO2 18* 21* 16*   BUN 26* 27* 32*   CREATININE 2.3* 2.5* 2.9*   CALCIUM 8.3* 8.6* 8.6*   ALBUMIN 1.6* 1.7* 2.7*   PROT  --  5.0* 5.3*   BILITOT  --  6.5* 7.0*   ALKPHOS  --  375* 253*   ALT  --  54* 48*   AST  --  165* 156*   MG  --  1.3* 1.1*   PHOS 2.8 2.7 3.2       All pertinent labs within the past 24 hours have been reviewed.    Significant Imaging: I have reviewed all pertinent imaging results/findings within the past 24 hours.

## 2023-03-26 NOTE — SUBJECTIVE & OBJECTIVE
Interval History: Patient tachycardic overnight, EKG with sinus tachycardia. Urine studies consistent with pre-renal physiology. Albumin and small dose lasix given with initial improvement in HR/BP but required 500cc bolus overnight for MAPS 61. Since, MAPS consistently >65 but remain borderline. Discussed with wife concern for multiorgan failure with possible ICU admission if he continues to deteriorate. Briefly discussed home hospice as well  vs inpatient comfort care. She is amenable to discuss further options with palliative care, consulted. 1U pRBCs pending for hg 6.6, no signs of overt bleeding. Patient does report small amount of blood on tissue paper after bowel movement. No melena noted.      Oncology Treatment Plan:   OP PANC NAB-PACLITAXEL + GEMCITABINE Q4W    Medications:  Continuous Infusions:  Scheduled Meds:   apixaban  5 mg Oral BID    DULoxetine  20 mg Oral Daily    finasteride  5 mg Oral Daily    magnesium sulfate IVPB  2 g Intravenous Q2H    piperacillin-tazobactam (ZOSYN) IVPB  4.5 g Intravenous Q8H    rifAXImin  550 mg Oral BID    tamsulosin  1 capsule Oral Daily     PRN Meds:sodium chloride, dextrose 10%, dextrose 10%, dextrose, dextrose, glucagon (human recombinant), melatonin, naloxone, ondansetron, sodium chloride 0.9%       Review of Systems   Constitutional:  Positive for activity change and fatigue. Negative for chills and fever.   HENT:  Negative for congestion and sore throat.    Eyes:  Negative for pain.   Respiratory:  Negative for cough and shortness of breath.    Cardiovascular:  Negative for chest pain, palpitations and leg swelling.   Gastrointestinal:  Positive for abdominal distention. Negative for abdominal pain, constipation, diarrhea, nausea and vomiting.   Genitourinary:  Negative for difficulty urinating, dysuria and hematuria.   Musculoskeletal:  Negative for back pain.   Skin:  Negative for rash.   Neurological:  Negative for light-headedness and headaches.    Hematological:  Does not bruise/bleed easily.   Psychiatric/Behavioral:  Negative for agitation.    Objective:     Vital Signs (Most Recent):  Temp: 99 °F (37.2 °C) (03/26/23 0737)  Pulse: 103 (03/26/23 0737)  Resp: 20 (03/26/23 0737)  BP: (!) 92/51 (03/26/23 0737)  SpO2: (!) 94 % (03/26/23 0737)   Vital Signs (24h Range):  Temp:  [98.4 °F (36.9 °C)-100 °F (37.8 °C)] 99 °F (37.2 °C)  Pulse:  [103-141] 103  Resp:  [16-20] 20  SpO2:  [91 %-95 %] 94 %  BP: ()/(49-61) 92/51     Weight: 121.1 kg (266 lb 15.6 oz)  Body mass index is 40.59 kg/m².  Body surface area is 2.41 meters squared.      Intake/Output Summary (Last 24 hours) at 3/26/2023 0953  Last data filed at 3/26/2023 0738  Gross per 24 hour   Intake 633.61 ml   Output 300 ml   Net 333.61 ml         Physical Exam  Constitutional:       Appearance: Normal appearance.   HENT:      Head: Normocephalic and atraumatic.      Mouth/Throat:      Mouth: Mucous membranes are moist.      Pharynx: Oropharynx is clear.   Eyes:      General: Scleral icterus present.      Extraocular Movements: Extraocular movements intact.      Pupils: Pupils are equal, round, and reactive to light.   Cardiovascular:      Rate and Rhythm: Normal rate and regular rhythm.      Pulses: Normal pulses.      Heart sounds: Normal heart sounds.   Pulmonary:      Effort: Pulmonary effort is normal.      Breath sounds: Normal breath sounds. No rales.   Abdominal:      General: Abdomen is flat. There is distension.      Palpations: Abdomen is soft.      Tenderness: There is no abdominal tenderness.   Musculoskeletal:         General: Normal range of motion.      Cervical back: Normal range of motion and neck supple.      Right lower leg: Edema present.      Left lower leg: Edema present.      Comments: 3+ edema    Skin:     General: Skin is warm and dry.   Neurological:      General: No focal deficit present.      Mental Status: He is alert and oriented to person, place, and time.      Comments:  Asterixis present   Psychiatric:         Mood and Affect: Mood normal.         Behavior: Behavior normal.       Significant Labs:   All pertinent labs from the last 24 hours have been reviewed.    Diagnostic Results:  I have reviewed all pertinent imaging results/findings within the past 24 hours.

## 2023-03-26 NOTE — PROGRESS NOTES
"[facility] - Oncology ([facility])  Hematology/Oncology  Progress Note    Patient Name: Domonique Kellogg  Admission Date: 3/24/2023  Hospital Length of Stay: 2 days  Code Status: Full Code     Subjective:     HPI:  Mr. Domonique Kellogg is a 72 y.o. 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 Patients wife states that patient was having difficulty breathing while on his CPAP last night so she woke him up. Patient was a little confused about where he was upon waking. Patient states he has had fatigue/weakness for weeks that has been progressively worsening. He also reports lower leg/abdominal swelling recently that has been worsening. He is on lasix at home but that has been held for almost a week due to a rising creatinine on outside labs. Has some RUQ pain, U/S on 3/17 revealed stones and sludge in the gallbladder with no sonographic evidence for acute cholecystitis, as well as multifocal hepatic masses. He also developed jaundice about two-weeks ago. Denies chest pain, nausea, dysuria or headaches. He was dry heaving last night. Also reports 3 loose stools last night but no diarrhea.     On admission to the ED patient was febrile to 100.9, , /56, and on RA. Cr 2.1 (baseline 1.3-1.5). Tbili 7.7, Alk phos 495, , ALT 57, ammonia 63. WBC 16.2 and lactate 2.25. CXR with no acute findings. VBG with pH 7.34 and pCO2 39.8. Trop 0.035, . Patient given vanc/zosyn and IVF. CT abdomen ordered.     Oncology history per last clinic note:  1. Mr Kellogg is a 72 yo man with CAD, HTN, seizures in 2011 who initially saw me on 11/29/21 for further management of cholangiocarcinoma. He presented with weight loss and diarrhea since July. US 9/22/21 showed "Enlarged, heterogeneous appearance of the liver likely due to multiple underlying hepatic lesions largest measuring 4.8 cm."  MRI " "abdomen w/wo contrast 10/4/21: "Multiple liver lesions measuring up to 13.3 cm in the right hepatic lobe.  Differential diagnosis includes metastases, fibrolamellar hepatocellular carcinoma, or atypical focal nodular hyperplasia.  Consider biopsy for further evaluation. Thrombosis of the anterior branch of the right portal vein and left hepatic vein.  Nonvisualization of the middle and right hepatic veins, which may be thrombosed as well. Prominent periportal lymph node, which is nonspecific."  EGD and colonoscopy on 10/11/21 were negative for primary malignancies.   He underwent liver lesion biopsy and Y90 mapping on 11/22/21. Pathology showed adenocarcinoma: "Biopsy of the liver mass shows a malignant neoplasm in a fibrotic stroma. Glandular architecture is readily identified with several foci of intraluminal necrosis. Scattered mitotic figures are seen. Neoplastic cells show the following immunophenotype:   Positive: AE1/AE3, CK7, CDX2   Negative: Chromogranin, Synaptophysin, TTF, CK5/6, CK20, HepPar1   The morphology and immunophenotype are compatible with adenocarcinoma. Considerations for a primary site include pancreaticobiliary (including intrahepatic cholangiocarcinoma) as well as upper gastrointestinal. Clinical and radiologic correlation is suggested."  Patient presents today with wife for further management. No pain. Initial weight loss has somewhat stabilized. Still has diarrhea. Has not tried imodium yet. +nervous  Mother had kidney cancer at age 64. Maternal grandmother had liver cancer in her 60s. Patient has three children, two daughters and one son.   Discussed palliative chemo with cis/gem  2. PET scan 12/6/21 did not show extrahepatic metastases.   3. Cis/gem started on 12/7/21. Durvalumab was initially denied by insurance company after GI ASCO, but approved after FDA approval, added 8/10/22. Patient had reaction to durv (wheezing, rigors, shaking, high BP) on 9/9. Imfinzi was stopped. Last chemo " on 10/21/22. MRI 10/19/22 showed progression. Plan was to switched for FOLFOX. Patient was hospitalized for acute appendicitis and strangulated umbilical hernia s/p surgery on 11/2/2022.   4. S/p TACE on 12/16/2021, 2/14/22, 4/19/22, 5/18/22, 8/2/22  5. S/p RF Ablation on 7/19/22. TACE on 9/28/22  6. FOLFOX started on 11/21/22, s/p 6 cycles  7. Restaging MRI showed progression.            Interval History: Patient tachycardic overnight, EKG with sinus tachycardia. Urine studies consistent with pre-renal physiology. Albumin and small dose lasix given with initial improvement in HR/BP but required 500cc bolus overnight for MAPS 61. Since, MAPS consistently >65 but remain borderline. Discussed with wife concern for multiorgan failure with possible ICU admission if he continues to deteriorate. Briefly discussed home hospice as well  vs inpatient comfort care. She is amenable to discuss further options with palliative care, consulted. 1U pRBCs pending for hg 6.6, no signs of overt bleeding. Patient does report small amount of blood on tissue paper after bowel movement. No melena noted.      Oncology Treatment Plan:   OP PANC NAB-PACLITAXEL + GEMCITABINE Q4W    Medications:  Continuous Infusions:  Scheduled Meds:   apixaban  5 mg Oral BID    DULoxetine  20 mg Oral Daily    finasteride  5 mg Oral Daily    magnesium sulfate IVPB  2 g Intravenous Q2H    piperacillin-tazobactam (ZOSYN) IVPB  4.5 g Intravenous Q8H    rifAXImin  550 mg Oral BID    tamsulosin  1 capsule Oral Daily     PRN Meds:sodium chloride, dextrose 10%, dextrose 10%, dextrose, dextrose, glucagon (human recombinant), melatonin, naloxone, ondansetron, sodium chloride 0.9%       Review of Systems   Constitutional:  Positive for activity change and fatigue. Negative for chills and fever.   HENT:  Negative for congestion and sore throat.    Eyes:  Negative for pain.   Respiratory:  Negative for cough and shortness of breath.    Cardiovascular:  Negative  for chest pain, palpitations and leg swelling.   Gastrointestinal:  Positive for abdominal distention. Negative for abdominal pain, constipation, diarrhea, nausea and vomiting.   Genitourinary:  Negative for difficulty urinating, dysuria and hematuria.   Musculoskeletal:  Negative for back pain.   Skin:  Negative for rash.   Neurological:  Negative for light-headedness and headaches.   Hematological:  Does not bruise/bleed easily.   Psychiatric/Behavioral:  Negative for agitation.    Objective:     Vital Signs (Most Recent):  Temp: 99 °F (37.2 °C) (03/26/23 0737)  Pulse: 103 (03/26/23 0737)  Resp: 20 (03/26/23 0737)  BP: (!) 92/51 (03/26/23 0737)  SpO2: (!) 94 % (03/26/23 0737)   Vital Signs (24h Range):  Temp:  [98.4 °F (36.9 °C)-100 °F (37.8 °C)] 99 °F (37.2 °C)  Pulse:  [103-141] 103  Resp:  [16-20] 20  SpO2:  [91 %-95 %] 94 %  BP: ()/(49-61) 92/51     Weight: 121.1 kg (266 lb 15.6 oz)  Body mass index is 40.59 kg/m².  Body surface area is 2.41 meters squared.      Intake/Output Summary (Last 24 hours) at 3/26/2023 0953  Last data filed at 3/26/2023 0738  Gross per 24 hour   Intake 633.61 ml   Output 300 ml   Net 333.61 ml         Physical Exam  Constitutional:       Appearance: Normal appearance.   HENT:      Head: Normocephalic and atraumatic.      Mouth/Throat:      Mouth: Mucous membranes are moist.      Pharynx: Oropharynx is clear.   Eyes:      General: Scleral icterus present.      Extraocular Movements: Extraocular movements intact.      Pupils: Pupils are equal, round, and reactive to light.   Cardiovascular:      Rate and Rhythm: Normal rate and regular rhythm.      Pulses: Normal pulses.      Heart sounds: Normal heart sounds.   Pulmonary:      Effort: Pulmonary effort is normal.      Breath sounds: Normal breath sounds. No rales.   Abdominal:      General: Abdomen is flat. There is distension.      Palpations: Abdomen is soft.      Tenderness: There is no abdominal tenderness.   Musculoskeletal:          General: Normal range of motion.      Cervical back: Normal range of motion and neck supple.      Right lower leg: Edema present.      Left lower leg: Edema present.      Comments: 3+ edema    Skin:     General: Skin is warm and dry.   Neurological:      General: No focal deficit present.      Mental Status: He is alert and oriented to person, place, and time.      Comments: Asterixis present   Psychiatric:         Mood and Affect: Mood normal.         Behavior: Behavior normal.       Significant Labs:   All pertinent labs from the last 24 hours have been reviewed.    Diagnostic Results:  I have reviewed all pertinent imaging results/findings within the past 24 hours.    Assessment/Plan:     * Sepsis  Patient with intrahepatic cholangiocarcinoma here with SOB and weakness   Meets sepsis criteria (Temperature 100.9, WBC 16.2, )  Concern for abdominal source given RUQ pain  CXR with no acute findings   WBC 16.2 and lactate 2.25  No history of ascites or requiring paracentesis, ED unable to find fluid pocket to tap    - Continue zosyn   - BC NGTD  - Got 500mL IV in ED, getting another 1L, overloaded on exam so cautious with fluids    Hyperammonemia  Ammonia 63 on admission   Asterixis present on exam     - Rifaximin BID   - Will hold off on lactulose as patient had 3 loose stools since yesterday     Lower leg edema  Could be due to patients low albumin   EF 60% in December 2021  U/S on 3/13 negative for DVT   Was on lasix outpatient but it was held due to rising creatinine     - FU TTE     DAWIT (acute kidney injury)  Cr 2.1 on admission, baseline around 1.3-1.5   His lasix dose was held in the last week due to rising Cr but it continues to increase   Blood pressure soft on admission, possible pre-renal etiology, renal US without hydronephrosis, severely fluid overloaded on exam. Urine studies consistent with pre-renal physiology    - Nephrology consulted,  - Check renal function daily     Deep vein  thrombosis (DVT) of femoral vein of left lower extremity  Diagnosed June 2022    - Continue home apixaban     Weakness generalized  Likely secondary to malignancy and possible infection     - PT/OT consulted     Hyperbilirubinemia  History of intrahepatic cholangiocarcinoma   Last chemo on 3/23  On admission Tbili 7.7, was 2.3 earlier this month   U/S on 3/17 revealed stones and sludge in the gallbladder with no sonographic evidence for acute cholecystitis, as well as multifocal hepatic masses  CT without obstruction , concerning for worsening metastatic disease     - CMP daily     Elevated troponin  Troponin 0.035 on admission   ECG with no ischemic changes  Likely demand in setting of infection   No chest pain     Intrahepatic cholangiocarcinoma  - Current Regimen: PANC NAB-PACLITAXEL + GEMCITABINE Q4W  - Last chemo on 3/23  - MRI on 3/7 revealed interval increase in size and number of multiple additional liver lesions, consistent with worsening metastatic disease        CAD in native artery  - Hold statin in setting of elevated Tbili and liver enzymes     RAHEEL (obstructive sleep apnea)  - CPAP nightly              Maura Seaman MD  Hematology/Oncology  [facility]diel rebekah - Oncology ([facility])

## 2023-03-26 NOTE — PLAN OF CARE
IV abx administered as ordered. Mag IV replaced. C/o SOB and wheezing. Chest Xray completed. Albuterol breathing treatments ordered. Pt up to bedside commode. Refuses to use urinal at times. Pt hypotensive during shift. Pt edematous. Pt given Midodrine, 120mg Lasix, and PRBC. Pallaitive care and Critical Care consulted this shift. POC reviewed with patient; understanding verbalized. Pt. with nonskid footwear on, bed in lowest position, and locked with bed rails up x2.  Pt. instructed to call prior to getting OOB.  Pt. has call light and personal items within reach. All questions and concerns addressed at this time. Will continue to monitor. Family at bedside.

## 2023-03-26 NOTE — CONSULTS
Abdiel Babb - Oncology ([facility])  Critical Care Medicine  Consult Note    Patient Name: Domonique Kellogg  MRN: [id number]  Admission Date: 3/24/2023  Hospital Length of Stay: 2 days  Code Status: DNR  Attending Physician: Caesar Ramesh MD   Primary Care Provider: Nicolas Marlow MD   Principal Problem: Sepsis    Inpatient consult to Critical Care Medicine  Consult performed by: Radha Stephens PA-C  Consult ordered by: Maura Seaman MD    Inpatient consult to Critical Care Medicine  Consult performed by: Radha Stephens PA-C  Consult ordered by: Maura Seaman MD        Subjective:     HPI:  Domonique Kellogg is a 72 y.o. male with a medical history of intrahepatic cholangiocarcinoma (last chemo on 3/23/23), CAD, RAHEEL, and left leg DVT (on Apixaban) who was admitted on 3/24/23 to oncology service for acute hypoxemic respiratory failure, DAWIT, and concerns for sepsis. Per chart review, patient recently told to hold furosemide due to increase in serum creatinine. Patient complaining of inability to tolerate home CPAP machine which prompted presentation. Wife reports patient has had noticeable swelling in abdomen and feet with worsening shortness of breath.     Patient received 1.5L of IVF, cultures obtained and patient started on broad spectrum abx. CT abd/pelvis concerning for moderate wall thickening of cecum and proximal ascending colon with possible inflammatory colitis. Concern for volume overload and he was given 20mg of furosemide. Shortly after became hypotensive requiring IVF bolus. Critical care medicine consulted for hypotension and need for diuresis.       Hospital/ICU Course:  No notes on file    Past Medical History:   Diagnosis Date    Acute kidney injury 3/16/2023    Adjustment disorder     Anticoagulant long-term use     Anxiety     Arthritis     CAD (coronary artery disease) 03/02/2015    non-obstructive per [facility]     Cataract     Class 1 obesity with serious comorbidity in adult  3/17/2014    Dry eye syndrome     Hypertension     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/2021    Obesity     Post-procedural fever 12/18/2021    Seizures     2011    Sleep apnea     + CPAP    Sleep difficulties        Past Surgical History:   Procedure Laterality Date    ANTERIOR CERVICAL DISCECTOMY W/ FUSION N/A 07/06/2020    Procedure: DISCECTOMY, SPINE, CERVICAL, ANTERIOR APPROACH, WITH FUSION C3-4, C4-5;  Surgeon: Fabiola Vides MD;  Location: [facility] OR 2ND FLR;  Service: Neurosurgery;  Laterality: N/A;  TORONTO III, ASA III, BLOOD TYPE & SCREEN, NEUROMONITORING EMG-MEP-SEP, SUPINE POSITION,BRACE [place],REGULAR BED, HEADREST [place], POSITION, MAP>85, RADIOLOGY C-ARM, SPECIAL EQUIPMENT [facility]    COLONOSCOPY N/A 10/01/2021    Procedure: COLONOSCOPY;  Surgeon: Nicolas Alvarado MD;  Location: [facility] (4TH FLR);  Service: Endoscopy;  Laterality: N/A;  EGD and colonoscopy for diarrhea and weight loss and Enlarged, heterogeneous appearance of the liver likely due to multiple underlying hepatic lesions largest measuring 4.8 cm.  Findings concerning for metastatic versus less likely primary hepatic neoplasm.  Recommend fur    COLONOSCOPY N/A 06/07/2022    Procedure: COLONOSCOPY;  Surgeon: Mejia Villafuerte MD;  Location: [facility] (4TH FLR);  Service: Endoscopy;  Laterality: N/A;  For evaluation of positive out-patient FOBT.   medically urgent  ok to hold Eliquis per [place] Edward/RB  fully vaccinated  hx of seizures- last one 2014    ESOPHAGOGASTRODUODENOSCOPY N/A 10/01/2021    Procedure: EGD (ESOPHAGOGASTRODUODENOSCOPY);  Surgeon: Nicolas Alvarado MD;  Location: [facility] ENDO (4TH FLR);  Service: Endoscopy;  Laterality: N/A;  EGD and colonoscopy for diarrhea and weight loss and Enlarged, heterogeneous appearance of the liver likely due to multiple underlying hepatic lesions largest measuring 4.8 cm.  Findings concerning for metastatic versus less likely primary hepatic neopl    ESOPHAGOGASTRODUODENOSCOPY  N/A 04/05/2022    Procedure: EGD (ESOPHAGOGASTRODUODENOSCOPY);  Surgeon: Amado Kelsey MD;  Location: [facility] (4TH FLR);  Service: Endoscopy;  Laterality: N/A;  EGD in 8 weeks to check for esophagitis healing patient should be on pantoprazole 40 mg once daily  ok to hold eliquis x2 days per Dr. Young, see telephone encounter  fully vaccinated    EYE SURGERY      cataract    INSERTION OF VENOUS ACCESS PORT N/A 12/03/2021    Procedure: INSERTION, VENOUS ACCESS PORT;  Surgeon: Saurav Garcia MD;  Location: [facility] OR 2ND FLR;  Service: General;  Laterality: N/A;    INTRAOCULAR PROSTHESES INSERTION Right 07/16/2018    Procedure: INSERTION-INTRAOCULAR LENS (IOL);  Surgeon: Priscilla Hays MD;  Location: [facility];  Service: Ophthalmology;  Laterality: Right;    INTRAOCULAR PROSTHESES INSERTION Left 08/13/2018    Procedure: INSERTION, INTRAOCULAR LENS PROSTHESIS;  Surgeon: Priscilla Hays MD;  Location: [facility];  Service: Ophthalmology;  Laterality: Left;    KNEE SURGERY Right     LAPAROSCOPIC APPENDECTOMY N/A 11/2/2022    Procedure: APPENDECTOMY, LAPAROSCOPIC;  Surgeon: Cheo Hamm MD;  Location: [facility] OR 2ND FLR;  Service: General;  Laterality: N/A;    PHACOEMULSIFICATION OF CATARACT Right 07/16/2018    Procedure: PHACOEMULSIFICATION-ASPIRATION-CATARACT;  Surgeon: Priscilla Hays MD;  Location: [facility];  Service: Ophthalmology;  Laterality: Right;    PHACOEMULSIFICATION OF CATARACT Left 08/13/2018    Procedure: PHACOEMULSIFICATION, CATARACT;  Surgeon: Priscilla Hays MD;  Location: [facility];  Service: Ophthalmology;  Laterality: Left;    REPAIR OF INCARCERATED UMBILICAL HERNIA  11/2/2022    Procedure: REPAIR, HERNIA, UMBILICAL, INCARCERATED, AGE 5 YEARS OR OLDER;  Surgeon: Cheo Hamm MD;  Location: [facility] OR 2ND FLR;  Service: General;;    WISDOM TOOTH EXTRACTION         Review of patient's allergies indicates:   Allergen Reactions    Imfinzi [durvalumab] Other (See Comments)     1305- Imfinzi  "ended, pt returned from restroom complained of wheezing SOB and rigors. /100  O2 92% 2L NC O2 applied to pt.   1306-Demerol 25mg given and Solucortef 125mg IVPush given.   1309- Pt /106  02 99%.   1318- Pt still having Rigors additional 25mg of Demerol given.   1418- BP is 133/69 77 HR 99% RA Pt states " I feel much better"       Indomethacin Other (See Comments)     Headache dizziness      Adhesive Rash    Colchicine analogues Nausea And Vomiting     NVD       Family History       Problem Relation (Age of Onset)    Cancer Mother, Maternal Grandmother    Diabetes Mother    Heart disease Father, Paternal Grandfather, Brother    Hypertension Mother    Kidney cancer Mother (61)    Liver disease Maternal Grandmother    No Known Problems Sister, Daughter, Son, Sister, Sister, Sister, Daughter          Tobacco Use    Smoking status: Former     Packs/day: 1.00     Years: 20.00     Pack years: 20.00     Types: Cigarettes     Quit date: 1987     Years since quittin.2    Smokeless tobacco: Never    Tobacco comments:     quit    Substance and Sexual Activity    Alcohol use: Not Currently     Alcohol/week: 1.0 - 2.0 standard drink     Types: 1 - 2 Glasses of wine per week     Comment: not since liver diagnosis    Drug use: No    Sexual activity: Yes     Partners: Female      Review of Systems   Respiratory:  Positive for shortness of breath. Negative for cough.    Cardiovascular:  Positive for leg swelling. Negative for chest pain.   Gastrointestinal:  Positive for diarrhea. Negative for abdominal pain and nausea.   Objective:     Vital Signs (Most Recent):  Temp: 99 °F (37.2 °C) (23 1222)  Pulse: (!) 123 (23 1447)  Resp: 19 (23 1222)  BP: (!) 100/59 (23 1222)  SpO2: 98 % (23 1222)   Vital Signs (24h Range):  Temp:  [98.9 °F (37.2 °C)-100 °F (37.8 °C)] 99 °F (37.2 °C)  Pulse:  [103-141] 123  Resp:  [16-28] 19  SpO2:  [91 %-98 %] 98 %  BP: " ()/(49-61) 100/59   Weight: 121.1 kg (266 lb 15.6 oz)  Body mass index is 40.59 kg/m².      Intake/Output Summary (Last 24 hours) at 3/26/2023 1453  Last data filed at 3/26/2023 1039  Gross per 24 hour   Intake 613.78 ml   Output --   Net 613.78 ml       Physical Exam  Vitals reviewed.   Constitutional:       Appearance: He is well-developed.      Interventions: Nasal cannula in place.   HENT:      Head: Normocephalic and atraumatic.   Eyes:      Pupils: Pupils are equal, round, and reactive to light.   Cardiovascular:      Rate and Rhythm: Normal rate and regular rhythm.      Heart sounds: Normal heart sounds.      Comments: Bilateral pedal edema  Pulmonary:      Effort: Pulmonary effort is normal.      Breath sounds: Normal breath sounds.      Comments: 2L NC  Abdominal:      General: Bowel sounds are normal.      Palpations: Abdomen is soft.      Tenderness: There is no abdominal tenderness.   Musculoskeletal:         General: Normal range of motion.   Skin:     General: Skin is warm and dry.       Vents:     Lines/Drains/Airways       Central Venous Catheter Line  Duration             Port A Cath Single Lumen 12/03/21 1539 right internal jugular 477 days              Peripheral Intravenous Line  Duration                  Peripheral IV - Single Lumen 03/24/23 0400 20 G Anterior;Distal;Right Upper Arm 2 days         Peripheral IV - Single Lumen 03/24/23 0401 20 G Left Antecubital 2 days                  Significant Labs:    CBC/Anemia Profile:  Recent Labs   Lab 03/25/23  0446 03/26/23  0612 03/26/23  0854   WBC 10.26 13.44*  --    HGB 7.8* 6.6* 6.6*   HCT 23.6* 21.5*  --    * 83*  --    MCV 99* 105*  --    RDW 21.7* 21.7*  --         Chemistries:  Recent Labs   Lab 03/24/23  1714 03/25/23  0446 03/26/23  0612    141 141   K 3.6 3.2* 4.0   * 113* 113*   CO2 18* 21* 16*   BUN 26* 27* 32*   CREATININE 2.3* 2.5* 2.9*   CALCIUM 8.3* 8.6* 8.6*   ALBUMIN 1.6* 1.7* 2.7*   PROT  --  5.0* 5.3*    BILITOT  --  6.5* 7.0*   ALKPHOS  --  375* 253*   ALT  --  54* 48*   AST  --  165* 156*   MG  --  1.3* 1.1*   PHOS 2.8 2.7 3.2       All pertinent labs within the past 24 hours have been reviewed.    Significant Imaging: I have reviewed all pertinent imaging results/findings within the past 24 hours.      ABG  Recent Labs   Lab 03/24/23  0350   PH 7.339*   PO2 36*   PCO2 39.8   HCO3 21.4*   BE -4     Assessment/Plan:     Pulmonary  Acute hypoxemic respiratory failure  Patient with Hypoxic Respiratory failure which is Acute.  he is not on home oxygen. Supplemental oxygen was provided and noted- 2L NC  .  Signs/symptoms of respiratory failure include- tachypnea and increased work of breathing. Contributing diagnoses includes - CHF, Pleural effusion and Pneumonia Labs and images were reviewed. Patient Has recent ABG, which has been reviewed. Will treat underlying causes and adjust management of respiratory failure as follows-      Upon my evaluation, patient comfortable on 2L of oxygen without accessory muscle usage. CXR with mild pulmonary edema. No areas of focal consolidation. Patient has bilateral pedal edema and a mildly elevated bnp on admission. TTE pending. Patient likely has a component of volume overload but hard to determine patient's volume status as patient with diarrhea and x8 BM in the last 24 hour period. Suspect patient may also be intravascularly depleted. Less concerned for pneumonia and unlikely PE as patient on chronic anticoagulation.     --could attempt high dose furosemide  mg x1 with albumin   --could also start midodrine  --if patient becomes more hypotensive with diuresis, would recommend focusing on symptom management with opioids as this would align more with patient and family's goals.   --would not recommend transfer patient to ICU for vasopressors with diuresis. Discussed this with the patient and family who are in agreement.       Renal/  DAWIT (acute kidney  injury)  Multifactorial.  Nephrology following.   Would likely need higher dose of lasix due to degree of renal dysfunction.  Monitor UOP     Hematology  Deep vein thrombosis (DVT) of femoral vein of left lower extremity  On chronic anticoagulation    Oncology  Intrahepatic cholangiocarcinoma  Per primary    Other  RAHEEL (obstructive sleep apnea)  cpap qhs     Discussed with PCCM fellow, Dr. Chase.     Thank you for your consult. I will sign off. Please contact us if you have any additional questions.     I have spent >70 min with this patient, with over 50% of this time spent coordinating care and speaking with the family       Radha Stephens PA-C  Critical Care Medicine  [facility] ([facility])

## 2023-03-27 PROBLEM — Z51.5 COMFORT MEASURES ONLY STATUS: Status: ACTIVE | Noted: 2023-01-01

## 2023-03-27 NOTE — PLAN OF CARE
"[facility]  Department of Hospital Medicine  [address] [place] [address]  [phone] after hours  [phone] fax    HOSPICE  ORDERS    03/27/2023    Admit to Hospice:  Home Service    Diagnoses:   Active Hospital Problems    Diagnosis  POA    *Sepsis [A41.9]  Unknown    Acute hypoxemic respiratory failure [J96.01]  Yes    Lower leg edema [R60.0]  Unknown    Hyperammonemia [E72.20]  Unknown    Palliative care encounter [Z51.5]  Not Applicable    DAWIT (acute kidney injury) [N17.9]  Yes    Deep vein thrombosis (DVT) of femoral vein of left lower extremity [I82.412]  Yes     6/9/2022 - managed by Hematology-Oncology      Weakness generalized [R53.1]  Yes    Hyperbilirubinemia [E80.6]  Unknown    Elevated troponin [R77.8]  Yes    Intrahepatic cholangiocarcinoma [C22.1]  Yes     1.  -11/29/21 - presented with weight loss and diarrhea.   -US 9/22/21 multiple underlying hepatic lesions.   -MRI abdomen w/wo contrast 10/4/21: "Multiple liver lesions measuring up to 13.3 cm in the right hepatic lobe. Thrombosis of the anterior branch of the right portal vein and left hepatic vein.  Nonvisualization of the middle and right hepatic veins, which may be thrombosed as well. Prominent periportal lymph node."   -EGD and colonoscopy on 10/11/21 negative for primary malignancies  -Liver biopsy and Y90 mapping on 11/22/21 - adenocarcinoma: immunophenotype: Positive: AE1/AE3, CK7, CDX2 Negative: Chromogranin, Synaptophysin, TTF, CK5/6, CK20, HepPar1. Morphology and immunophenotype compatible with adenocarcinoma. Deemed by Oncology as primary hepatic or cholangio. Dr. Young discussed palliative chemo with cis/gem with patient.  2. PET scan 12/6/21 did not show extrahepatic metastases.   3. Cis/gem started on 12/7/21. Durvalumab was initially denied by insurance company after GI ASCO, but approved after FDA approval, added 8/10/22. Patient had reaction to durv (wheezing, rigors, shaking, " "high BP) on 9/9. Imfinzi was stopped.   4. S/p TACE on 12/16/2021, 2/14/22, 4/19/22, 5/18/22, 8/2/22  5. S/p RF Ablation on 7/19/22 9/23/2022 - Dr. Young interval history -cycle 13 day 1 of gem/cis. Had a reaction to durv (wheezing, rigors, shaking, high BP) on 9/9. Scheduled for TACE on 9/28.      CAD in native artery [I25.10]  Yes     -Cath 2015 on-obstructive, last cardiology visit 10/26/2018  -on pravastatin      RAHEEL (obstructive sleep apnea) [G47.33]  Yes      Resolved Hospital Problems   No resolved problems to display.       Hospice Qualifying Diagnoses:        Patient has a life expectancy < 6 months due to:  Primary Hospice Diagnosis:  Metastatic intrahepatic cholangiocarcinoma  Comorbid Conditions Contributing to Decline:  Acute renal failure, end stage liver disease    Vital Signs: Routine per Hospice Protocol.    Code Status: DNR    Allergies:   Review of patient's allergies indicates:   Allergen Reactions    Imfinzi [durvalumab] Other (See Comments)     1305- Imfinzi ended, pt returned from restroom complained of wheezing SOB and rigors. /100  O2 92% 2L NC O2 applied to pt.   1306-Demerol 25mg given and Solucortef 125mg IVPush given.   1309- Pt /106  02 99%.   1318- Pt still having Rigors additional 25mg of Demerol given.   1418- BP is 133/69 77 HR 99% RA Pt states " I feel much better"       Indomethacin Other (See Comments)     Headache dizziness      Adhesive Rash    Colchicine analogues Nausea And Vomiting     NVD       Diet: Regular diet, thin liquids    Activities: As tolerated    Goals of Care Treatment Preferences:  Code Status: DNR    Health care agent: [facility] care agent number: [phone]          What is most important right now is to focus on spending time at home, comfort and QOL .  Accordingly, we have decided that the best plan to meet the patient's goals includes continuing with treatment.      Nursing: Per Hospice Routine.     Fleming Care: " Empty Fleming bag Q shift and PRN.  Change Fleming every month.    Routine Skin for Bedridden Patients: Apply moisture barrier cream to all skin folds and   wet areas in perineal area daily and after baths and all bowel movements.      Oxygen: 2L NC, CPAP QHS    Medications:        Medication List        CONTINUE taking these medications      acetaminophen 650 MG Tbsr  Commonly known as: TYLENOL  Take by mouth daily as needed.     apixaban 5 mg Tab  Commonly known as: ELIQUIS  Take 1 tablet (5 mg total) by mouth 2 (two) times daily.     diltiaZEM 2% Lidocaine 5% Cream  Apply pea size amount topically 3 (three) times daily.     DULoxetine 20 MG capsule  Commonly known as: CYMBALTA  Take 1 capsule (20 mg total) by mouth once daily.     finasteride 5 mg tablet  Commonly known as: PROSCAR  TAKE 1 TABLET EVERY DAY     gabapentin 300 MG capsule  Commonly known as: NEURONTIN  Take 1 capsule (300 mg total) by mouth every evening.     hydrOXYzine HCL 10 MG Tab  Commonly known as: ATARAX  Take 1 tablet (10 mg total) by mouth 3 (three) times daily as needed (itching).     tamsulosin 0.4 mg Cap  Commonly known as: FLOMAX  TAKE 1 CAPSULE EVERY DAY     triamcinolone acetonide 0.1% 0.1 % cream  Commonly known as: KENALOG  Apply topically 2 (two) times daily. Apply to the body twice daily for 2 weeks. Do not apply to the face, groin, or folds.            STOP taking these medications      dexAMETHasone 4 MG Tab  Commonly known as: DECADRON     furosemide 20 MG tablet  Commonly known as: LASIX     hydrocortisone 1 % cream     magnesium oxide 400 mg (241.3 mg magnesium) tablet  Commonly known as: MAG-OX     multivitamin with minerals tablet     potassium chloride SA 20 MEQ tablet  Commonly known as: K-DUR,KLOR-CON     pravastatin 40 MG tablet  Commonly known as: PRAVACHOL     RABEprazole 20 mg tablet  Commonly known as: ACIPHEX                Future Orders:  Hospice Medical Director may dictate new orders for comfortable care measures &  sign death certificate.      _________________________________  Leah Goldstein MD  03/27/2023

## 2023-03-27 NOTE — PLAN OF CARE
Plan of care reviewed with the patient at the beginning of shift. The patient is alert and oriented. GCS 15. Denying complaints at this time. NAEON. Up with assist to bedside commode. Remained free from falls and injuries throughout shift. VSS. Bed in low locked position. Call bell and personal items within reach. Will continue to monitor.

## 2023-03-27 NOTE — HOSPITAL COURSE
Patient was admitted to Medical Oncology service for sepsis and DAWIT. He was started on vanc and zosyn with vanc being discontinued on 03/24. Nephrology was consulted for DAWIT and recommended observation without IVF or diuresis. Patient received diuresis, however, renal function continued to worsen. Liver function also worsened throughout hospital stay. Palliative care was consulted. It was discussed that as patient's renal and liver function continue to worsen, patient would be not a candidate for any more therapy for metastatic intraheptic cholangiocarcinoma. Patient and family agreed to focus on comfort and quality of life. Patient and family agreed to home hospice, however, patient developed Afib with RVR and associated hypotension. Discussed inpatient hospice as concerns that patient may not be safe to travel in ambulance for home. Patient is being transitioned to inpatient hospice.

## 2023-03-27 NOTE — ASSESSMENT & PLAN NOTE
Patient with intrahepatic cholangiocarcinoma here with SOB and weakness   Meets sepsis criteria (Temperature 100.9, WBC 16.2, )  Concern for abdominal source given RUQ pain  CXR with no acute findings   WBC 16.2 and lactate 2.25  No history of ascites or requiring paracentesis, ED unable to find fluid pocket to tap    As patient with worsening renal and liver dysfunction and not a candidate for any more therapy, patient and family agreed to inpatient vs home hospice.

## 2023-03-27 NOTE — CARE UPDATE
"RAPID RESPONSE NURSE CHART REVIEW        Chart Reviewed: 03/27/2023, 4:48 PM    MRN: [id number]  Bed: 806/806 A    Dx: Sepsis    Domonique Kellogg has a past medical history of Acute kidney injury, Adjustment disorder, Anticoagulant long-term use, Anxiety, Arthritis, CAD (coronary artery disease), Cataract, Class 1 obesity with serious comorbidity in adult, Dry eye syndrome, Hypertension, Intrahepatic cholangiocarcinoma, Obesity, Post-procedural fever, Seizures, Sleep apnea, and Sleep difficulties.    Last VS: BP (!) 95/55 (BP Location: Right arm, Patient Position: Lying)   Pulse (!) 160   Temp 97.9 °F (36.6 °C) (Oral)   Resp (!) 21   Ht 5' 8" (1.727 m)   Wt 120.7 kg (266 lb)   SpO2 96%   BMI 40.45 kg/m²     24H Vital Sign Range:  Temp:  [97.6 °F (36.4 °C)-99.1 °F (37.3 °C)]   Pulse:  []   Resp:  [17-34]   BP: ()/(47-59)   SpO2:  [93 %-97 %]     Level of Consciousness (AVPU): alert    Recent Labs     03/25/23  0446 03/26/23  0612 03/26/23  0854 03/27/23  0936   WBC 10.26 13.44*  --  6.33   HGB 7.8* 6.6* 6.6* 7.3*   HCT 23.6* 21.5*  --  22.1*   * 83*  --  64*       Recent Labs     03/25/23  0446 03/26/23  0612 03/27/23  0936    141 141   K 3.2* 4.0 3.6   * 113* 110   CO2 21* 16* 17*   CREATININE 2.5* 2.9* 4.1*   GLU 97 82 88   PHOS 2.7 3.2 3.5   MG 1.3* 1.1* 2.0        OXYGEN:  Flow (L/min): 2    MEWS score: 6    Bedside Brielle AKHTAR  contacted for hypotension reports patient will be transitioning to inpatient hospice. No additional concerns verbalized at this time. Instructed to call [phone] for further concerns or assistance.    Gail Levy RN        "

## 2023-03-27 NOTE — PLAN OF CARE
Problem: Physical Therapy  Goal: Physical Therapy Goal  Description: Goals to be met by: 2023     Patient will increase functional independence with mobility by performin. Supine to sit with Stand-by Assistance  2. Sit to supine with Stand-by Assistance  3. Rolling to Left and Right with Stand-by Assistance.  4. Sit to stand transfer with Stand-by Assistance  5. Gait  x 75 feet with Stand-by Assistance using Rolling Walker.     Outcome: Ongoing, Progressing     Patient evaluated, goals appropriate

## 2023-03-27 NOTE — ASSESSMENT & PLAN NOTE
"History of intrahepatic cholangiocarcinoma   Last chemo on 3/23  On admission Tbili 7.7, was 2.3 earlier this month   U/S on 3/17 revealed stones and sludge in the gallbladder with no sonographic evidence for acute cholecystitis, as well as multifocal hepatic masses  CT without obstruction , concerning for worsening metastatic disease     See "Sepsis"    "

## 2023-03-27 NOTE — ASSESSMENT & PLAN NOTE
Antibiotics given-   Antibiotics (72h ago, onward)    Start     Stop Route Frequency Ordered    03/24/23 1200  piperacillin-tazobactam (ZOSYN) 4.5 g in dextrose 5 % in water (D5W) 5 % 100 mL IVPB (MB+)         -- IV Every 8 hours (non-standard times) 03/24/23 0500    03/24/23 0900  rifAXIMin tablet 550 mg         -- Oral 2 times daily 03/24/23 9608        -Plan per primary team

## 2023-03-27 NOTE — SUBJECTIVE & OBJECTIVE
"Interval History:   Pt states that he has discussed his care with daughter and rest of family and would like to transition to home hospice, he also wants to continue with diuretics to help his swelling.       Review of patient's allergies indicates:   Allergen Reactions    Imfinzi [durvalumab] Other (See Comments)     1305- Imfinzi ended, pt returned from restroom complained of wheezing SOB and rigors. /100  O2 92% 2L NC O2 applied to pt.   1306-Demerol 25mg given and Solucortef 125mg IVPush given.   1309- Pt /106  02 99%.   1318- Pt still having Rigors additional 25mg of Demerol given.   1418- BP is 133/69 77 HR 99% RA Pt states " I feel much better"       Indomethacin Other (See Comments)     Headache dizziness      Adhesive Rash    Colchicine analogues Nausea And Vomiting     NVD     Current Facility-Administered Medications   Medication Frequency    0.9%  NaCl infusion (for blood administration) Q24H PRN    albuterol-ipratropium 2.5 mg-0.5 mg/3 mL nebulizer solution 3 mL Q4H    apixaban tablet 5 mg BID    dextrose 10% bolus 125 mL 125 mL PRN    dextrose 10% bolus 250 mL 250 mL PRN    dextrose 40 % gel 15,000 mg PRN    dextrose 40 % gel 30,000 mg PRN    DULoxetine DR capsule 20 mg Daily    finasteride tablet 5 mg Daily    glucagon (human recombinant) injection 1 mg PRN    melatonin tablet 6 mg Nightly PRN    midodrine tablet 10 mg Q8H    naloxone 0.4 mg/mL injection 0.02 mg PRN    ondansetron injection 4 mg Q8H PRN    piperacillin-tazobactam (ZOSYN) 4.5 g in dextrose 5 % in water (D5W) 5 % 100 mL IVPB (MB+) Q8H    rifAXIMin tablet 550 mg BID    sodium chloride 0.9% flush 10 mL Q12H PRN    tamsulosin 24 hr capsule 0.4 mg Daily       Objective:     Vital Signs (Most Recent):  Temp: 98.8 °F (37.1 °C) (03/27/23 0740)  Pulse: (!) 114 (03/27/23 0740)  Resp: 20 (03/27/23 0740)  BP: (!) 107/54 (03/27/23 0740)  SpO2: (!) 94 % (03/27/23 0740)   Vital Signs (24h Range):  Temp:  [98.6 °F (37 °C)-99.1 " °F (37.3 °C)] 98.8 °F (37.1 °C)  Pulse:  [] 114  Resp:  [17-28] 20  SpO2:  [93 %-98 %] 94 %  BP: ()/(50-59) 107/54     Weight: 121.1 kg (266 lb 15.6 oz) (03/24/23 0720)  Body mass index is 40.59 kg/m².  Body surface area is 2.41 meters squared.    I/O last 3 completed shifts:  In: 880.4 [P.O.:720; I.V.:60.3; IV Piggyback:100.2]  Out: -     Physical Exam  Vitals and nursing note reviewed.   Constitutional:       General: He is not in acute distress.     Appearance: Normal appearance. He is ill-appearing. He is not toxic-appearing or diaphoretic.   HENT:      Mouth/Throat:      Mouth: Mucous membranes are moist.   Cardiovascular:      Rate and Rhythm: Normal rate and regular rhythm.      Pulses: Normal pulses.      Heart sounds: Murmur heard.   Pulmonary:      Effort: No respiratory distress.      Breath sounds: No stridor. Rales present. No wheezing or rhonchi.      Comments: tachypnea  Abdominal:      General: Bowel sounds are normal. There is distension.      Palpations: Abdomen is soft. There is no mass.      Tenderness: There is no abdominal tenderness. There is no guarding or rebound.      Hernia: No hernia is present.   Musculoskeletal:         General: Swelling present. No tenderness, deformity or signs of injury.      Right lower leg: Edema present.      Left lower leg: Edema present.      Comments: 3+ pitting edema hands/arms & legs    Skin:     General: Skin is warm.      Capillary Refill: Capillary refill takes 2 to 3 seconds.      Coloration: Skin is jaundiced. Skin is not pale.      Findings: No bruising, erythema, lesion or rash.   Neurological:      Mental Status: He is alert and oriented to person, place, and time.   Psychiatric:         Mood and Affect: Mood normal.         Behavior: Behavior normal.         Thought Content: Thought content normal.         Judgment: Judgment normal.       Significant Labs:  CBC:   Recent Labs   Lab 03/26/23  0612 03/26/23  0854   WBC 13.44*  --    RBC  2.05*  --    HGB 6.6* 6.6*   HCT 21.5*  --    PLT 83*  --    *  --    MCH 32.2*  --    MCHC 30.7*  --      CMP:   Recent Labs   Lab 03/26/23  0612   GLU 82   CALCIUM 8.6*   ALBUMIN 2.7*   PROT 5.3*      K 4.0   CO2 16*   *   BUN 32*   CREATININE 2.9*   ALKPHOS 253*   ALT 48*   *   BILITOT 7.0*     All labs within the past 24 hours have been reviewed.     Significant Imaging:  Labs: Reviewed

## 2023-03-27 NOTE — PROGRESS NOTES
03/27/23 1449 03/27/23 1459 03/27/23 1507   Vital Signs   Pulse (!) 164 (!) 148 (!) 154   BP (!) 75/51 (!) 89/58 (!) 91/55   MAP (mmHg) 58 67 68      03/27/23 1522   Vital Signs   Pulse (!) 142   BP (!) 87/55   MAP (mmHg) 65       Received call from tele reporting pt HR in the 160's. Upon entrance into room pt found getting up from toilet with Physical therapy. Pt returned to bed with physical therapy, and RN notified that HR remained >160. Pt  hypotensive (see above). Charge RN and Med ONC resident at bedside. Pt only complaints of mild chest discomfort. EKG ordered and 1L NS bolus ordered. BP and HR monitored on bedside vital machine. Pt remain on tele monitoring and continuous pulse ox. MD verbal to notify him when EKG is complete.  3:19PM: Med ONC resident Valente notified of EKG completion and reading of pt in a. Fib RVR. MD back at bedside to look at EKG. MD spoke to pt and family, gave verbal order to continue to monitor pt and notify of any changes in symptoms. No other orders at this time.   4:05PM: Attending at bedside discussing options of in-patient hospice instead of home hospice given pt's current health. Pt agreeable. Pt states he feels pretty good at this time. Attending verbal to continue to monitor pt and report any changes in symptoms. WCTM. Pt remains on tele monitoring with continuous pulse ox, pt visible on monitor.

## 2023-03-27 NOTE — PT/OT/SLP EVAL
Physical Therapy Evaluation and Co-Treatment    Patient Name:  Domonique Kellogg   MRN:  [id number]    Recommendations:     Discharge Recommendations: other (see comments)   Discharge Equipment Recommendations: walker, rolling   Barriers to discharge: Decreased caregiver support    Assessment:     Domonique Kellogg is a 72 y.o. male admitted with a medical diagnosis of Sepsis.  He presents with the following impairments/functional limitations: weakness, impaired endurance, impaired self care skills, impaired functional mobility, gait instability, impaired balance, decreased upper extremity function, decreased lower extremity function, decreased ROM. Patient agreeable to therapy session today. He was able to perform bed mobility with Yolis x2, and required CGA for static sitting balance. He required min A x2 and RW for sit to stand transfer. He was able to ambulate approx. 20' from EOB to bathroom with mod A for RW and line management. He has a seated rest break, and ambulated approx. 20' back to bed with mod A for RW and line management. He demonstrated decreased reynold, stride length, and step length at this time. He required min A x2 to return back to bed supine. He will continue to benefit from skilled acute PT services during hospital stay in order to improve overall functional mobility and OOB activity tolerance.    ** Co-treatment performed due to patient's multiple deficits requiring two skilled therapists to appropriately and safely assess patient's strength and endurance while facilitating functional tasks in addition to accommodating for patient's activity tolerance.     Rehab Prognosis: Fair; patient would benefit from acute skilled PT services to address these deficits and reach maximum level of function.    Recent Surgery: * No surgery found *      Plan:     During this hospitalization, patient to be seen 3 x/week to address the identified rehab impairments via gait training, therapeutic activities,  "therapeutic exercises, neuromuscular re-education and progress toward the following goals:    Plan of Care Expires:  04/26/23    Subjective     Chief Complaint: "I feel tired"  Patient/Family Comments/goals: to return towards PLOF  Pain/Comfort:  Pain Rating 1: 0/10    Patients cultural, spiritual, Amish conflicts given the current situation: no    Living Environment:  Patient lives in 2SH with wife, with 0 ANGELA. Patient lives on 1st floor of house. He has a WIS with shower chair.  Prior to admission, patients level of function was independent.  Equipment used at home: shower chair, cane, straight. Upon discharge, patient will have assistance from wife and daughter.    Objective:     Communicated with nursing prior to session.  Patient found supine with central line, telemetry  upon PT entry to room.    General Precautions: Standard, fall  Orthopedic Precautions:N/A   Braces: N/A  Respiratory Status: Nasal cannula, flow 2 L/min    Exams:  Cognitive Exam:  Patient is oriented to Person, Place, Time, and Situation  RUE ROM: WFL  RUE Strength: WFL  LUE ROM: WFL  LUE Strength: WFL  RLE ROM: WFL  RLE Strength: WFL  LLE ROM: WFL  LLE Strength: WFL    Functional Mobility:  Bed Mobility:     Rolling Right: minimum assistance and of 2 persons  Scooting: minimum assistance and of 2 persons  Supine to Sit: minimum assistance and of 2 persons  Sit to Supine: minimum assistance and of 2 persons  Transfers:     Sit to Stand:  minimum assistance and of 2 persons with rolling walker  Gait: He was able to ambulate approx. 20' from EOB to bathroom with mod A for RW and line management. He has a seated rest break, and ambulated approx. 20' back to bed with mod A for RW and line management. He demonstrated decreased reynold, stride length, and step length at this time.  Balance: He required CGA for static sitting balance      AM-PAC 6 CLICK MOBILITY  Total Score:16       Treatment & Education:  Patient educated on role of acute " care PT and PT POC, safety while in hospital including calling nurse for mobility, call light usage, breathing technique, fall risk, posture, and benefits of continued PT by explanation.    Patient demonstrates good understanding of education provided this day.       Patient left supine with all lines intact, call button in reach, and wife present.    GOALS:   Multidisciplinary Problems       Physical Therapy Goals          Problem: Physical Therapy    Goal Priority Disciplines Outcome Goal Variances Interventions   Physical Therapy Goal     PT, PT/OT Ongoing, Progressing     Description: Goals to be met by: 2023     Patient will increase functional independence with mobility by performin. Supine to sit with Stand-by Assistance  2. Sit to supine with Stand-by Assistance  3. Rolling to Left and Right with Stand-by Assistance.  4. Sit to stand transfer with Stand-by Assistance  5. Gait  x 75 feet with Stand-by Assistance using Rolling Walker.                          History:     Past Medical History:   Diagnosis Date    Acute kidney injury 3/16/2023    Adjustment disorder     Anticoagulant long-term use     Anxiety     Arthritis     CAD (coronary artery disease) 2015    non-obstructive per [facility]     Cataract     Class 1 obesity with serious comorbidity in adult 3/17/2014    Dry eye syndrome     Hypertension     Intrahepatic cholangiocarcinoma 2021    Obesity     Post-procedural fever 2021    Seizures     2011    Sleep apnea     + CPAP    Sleep difficulties        Past Surgical History:   Procedure Laterality Date    ANTERIOR CERVICAL DISCECTOMY W/ FUSION N/A 2020    Procedure: DISCECTOMY, SPINE, CERVICAL, ANTERIOR APPROACH, WITH FUSION C3-4, C4-5;  Surgeon: Fabiola Vides MD;  Location: [facility] OR [address] [place] [address];  Service: Neurosurgery;  Laterality: N/A;  TORONTO III, ASA III, BLOOD TYPE & SCREEN, NEUROMONITORING EMG-MEP-SEP, SUPINE POSITION,BRACE MIAMI,REGULAR BED, HEADREST CASPAR, POSITION,  MAP>85, RADIOLOGY C-ARM, SPECIAL EQUIPMENT VIRGIL TYLER    COLONOSCOPY N/A 10/01/2021    Procedure: COLONOSCOPY;  Surgeon: Nicoals Alvarado MD;  Location: [facility] (4TH FLR);  Service: Endoscopy;  Laterality: N/A;  EGD and colonoscopy for diarrhea and weight loss and Enlarged, heterogeneous appearance of the liver likely due to multiple underlying hepatic lesions largest measuring 4.8 cm.  Findings concerning for metastatic versus less likely primary hepatic neoplasm.  Recommend furth    COLONOSCOPY N/A 06/07/2022    Procedure: COLONOSCOPY;  Surgeon: Mejia Villafuerte MD;  Location: [facility] (4TH FLR);  Service: Endoscopy;  Laterality: N/A;  For evaluation of positive out-patient FOBT.   medically urgent  ok to hold Eliquis per CAROLINA Edward/NAYELI  fully vaccinated  hx of seizures- last one 2014    ESOPHAGOGASTRODUODENOSCOPY N/A 10/01/2021    Procedure: EGD (ESOPHAGOGASTRODUODENOSCOPY);  Surgeon: Nicolas Alvarado MD;  Location: [facility] (4TH FLR);  Service: Endoscopy;  Laterality: N/A;  EGD and colonoscopy for diarrhea and weight loss and Enlarged, heterogeneous appearance of the liver likely due to multiple underlying hepatic lesions largest measuring 4.8 cm.  Findings concerning for metastatic versus less likely primary hepatic neopl    ESOPHAGOGASTRODUODENOSCOPY N/A 04/05/2022    Procedure: EGD (ESOPHAGOGASTRODUODENOSCOPY);  Surgeon: Amado Kelsey MD;  Location: [facility] (4TH FLR);  Service: Endoscopy;  Laterality: N/A;  EGD in 8 weeks to check for esophagitis healing patient should be on pantoprazole 40 mg once daily  ok to hold eliquis x2 days per Dr. Young, see telephone encounter  fully vaccinated    EYE SURGERY      cataract    INSERTION OF VENOUS ACCESS PORT N/A 12/03/2021    Procedure: INSERTION, VENOUS ACCESS PORT;  Surgeon: Saurav Garcia MD;  [address];  Service: General;  Laterality: N/A;    INTRAOCULAR PROSTHESES INSERTION Right 07/16/2018    Procedure: INSERTION-INTRAOCULAR LENS  (IOL);  Surgeon: Priscilla Hays MD;  Location: [facility];  Service: Ophthalmology;  Laterality: Right;    INTRAOCULAR PROSTHESES INSERTION Left 08/13/2018    Procedure: INSERTION, INTRAOCULAR LENS PROSTHESIS;  Surgeon: Priscilla Hays MD;  Location: [facility];  Service: Ophthalmology;  Laterality: Left;    KNEE SURGERY Right     LAPAROSCOPIC APPENDECTOMY N/A 11/2/2022    Procedure: APPENDECTOMY, LAPAROSCOPIC;  Surgeon: Cheo Hamm MD;  Location: [facility] OR [address] [place] [address];  Service: General;  Laterality: N/A;    PHACOEMULSIFICATION OF CATARACT Right 07/16/2018    Procedure: PHACOEMULSIFICATION-ASPIRATION-CATARACT;  Surgeon: Priscilla Hays MD;  Location: [facility];  Service: Ophthalmology;  Laterality: Right;    PHACOEMULSIFICATION OF CATARACT Left 08/13/2018    Procedure: PHACOEMULSIFICATION, CATARACT;  Surgeon: Priscilla Hays MD;  Location: [facility];  Service: Ophthalmology;  Laterality: Left;    REPAIR OF INCARCERATED UMBILICAL HERNIA  11/2/2022    Procedure: REPAIR, HERNIA, UMBILICAL, INCARCERATED, AGE 5 YEARS OR OLDER;  Surgeon: Cheo Hamm MD;  Location: [facility] OR [address] [place] [address];  Service: General;;    WISDOM TOOTH EXTRACTION         Time Tracking:     PT Received On: 03/27/23  PT Start Time: 1405     PT Stop Time: 1441  PT Total Time (min): 36 min     Billable Minutes: Evaluation 10 minutes, Gait Training 13 minutes, and Therapeutic Activity 13 minutes      03/27/2023

## 2023-03-27 NOTE — ASSESSMENT & PLAN NOTE
Multifactorial etiology for DAWIT: nephrotoxicity from chemotherapy agents (3/23), hypotensive episodes (probably prior to admission) and maybe related to sepsis as pt was febrile, in addition pt was exposed to vancomycin all this in the background of an enlarged prostate compressing the bladder.   Pt does have h/o CKD stage 2 since approx 7/2022    Baseline sCr: 1.2-1.4  Admission sCr: 1.8  Lab Results   Component Value Date    CREATININE 2.9 (H) 03/26/2023    CREATININE 2.5 (H) 03/25/2023    CREATININE 2.3 (H) 03/24/2023       Recommendations:   -severely overload, pt verbalized plans for home hospice after discussing with his daughter and family, he also would like to continue with diuretics  -Electrolytes:    K, goal >4, page nephrology if K >5.5    Mg, goal >2   Phos , goal >3    -Acid/base: AGMA, start sodium bicarb tabs 1300mg po TID   -Fluid balance: fluid overloaded   -Anemia: 6.6 yesterday, s/p blood transfusion  -Strict I/O's and daily weights  -Renal diet if not NPO  -Renal function panel and Mg in AM  -Renal ultrasound reviewed: no hydronephrosis or masses, +layering of debris within th bladder  -Maintain MAP >65 for renal perfusion

## 2023-03-27 NOTE — PROGRESS NOTES
Admit Assessment    Patient Identification  Domonique Kellogg   :  1950  Admit Date:  3/24/2023  Attending Provider:  Caesar Ramesh MD              Referral:   Pt was admitted to  with a diagnosis of Sepsis, and was admitted this hospital stay due to SOB (shortness of breath) [R06.02]  Tachycardia [R00.0]  Chest pain [R07.9].   is involved was referred to the Social Work Department via (Referal).  Patient presents as a 72 y.o. year old  male.    Persons interviewed with patient's permission:  Wife, Estrellita .   Living Situation:      [facility]  [facility] [place] [address] [facility] [address], phone: [phone] (home).      (RETIRED) Functional Status Prior  Ambulation Prior: 3-->assistive equipment and person  Transferring: 3-->assistive equipment and person  Toiletin-->assistive person    Current or Past Agencies and Description of Services/Supplies    DME  Agency Name: [facility]  Agency Phone Number: [phone]  Equipment Currently Used at Home: cane, straight, shower chair    Home Health  Agency Name: [facility]   Agency Phone Number: [phone]  Services: Blood Draw    IV Infusion  Agency Name: None  Agency Phone Number: N/A    Nutrition: Pleasure Feed    Outpatient Pharmacy:     [facility] [facility] [address] [address]  Phone: [phone] Fax: [phone]    [facility] - [facility] [address] [facility]  [address] [facility] [address]  Phone: [phone] Fax: [phone]    [id number] - [place], [place] - [address]  [address]  [facility] [address]  Phone: [phone] Fax: [phone]      Patient Preference of agencies include [facility]    Patient/Caregiver informed of right to choose providers or agencies.  Patient provides permission to release any necessary information to [facility] and to [facility]  agencies as needed to facilitate patient care, treatment planning, and patient discharge planning.  Written and verbal resources provided.      Coping   Patient is in and out of consciousness but wife is currently coping well.       Adjustment to Diagnosis and Treatment  Estrellita feels they are blessed to have extra time with the patient.    Emotional/Behavioral/Cognitive Issues   No concerns at this time.         History/Current Symptoms of Anxiety/Depression: No  History/Current Substance Use: None  Social History     Tobacco Use    Smoking status: Former     Packs/day: 1.00     Years: 20.00     Pack years: 20.00     Types: Cigarettes     Quit date: 1987     Years since quittin.2    Smokeless tobacco: Never    Tobacco comments:     quit    Substance and Sexual Activity    Alcohol use: Not Currently     Alcohol/week: 1.0 - 2.0 standard drink     Types: 1 - 2 Glasses of wine per week     Comment: not since liver diagnosis    Drug use: No    Sexual activity: Yes     Partners: Female       Indications of Abuse/Neglect: No  Abuse Screen (yes response referral indicated)  Feels Unsafe at Home or Work/School: no  Feels Threatened by Someone: no  Does anyone try to keep you from having contact with others or doing things outside your home?: no  Physical Signs of Abuse Present: no    Financial:  Payer/Plan Subscr  Sex Relation Sub. Ins. ID Effective Group Num   1. [employer] MANAGE* SIMONA DALLAS 1950 Male Self [id number]                                   [address] [address]                            Other identified concerns/needs: Home Hospice    Plan: [facility] signed paperwork with Estrellita. KIRSTY met with patient Estrellita, SW informed them that equipment was scheduled to be delivered tonight. Patient will go home tomorrow AM. Dr. Ramesh notified KIRSTY that patient had a cardiac event this afternoon and had concerns about patient transferring anywhere. SW request he and separately, Dr Goldstein, to  notify Estrellita if she should stay with patient overnight.    Interventions/Referrals: [facility].  Patient/caregiver engaged in treatment planning process.     providing psychosocial and supportive counseling, resources, education, assistance and discharge planning as appropriate.  Patient/caregiver state understanding of  available resources,  following, remains available.

## 2023-03-27 NOTE — PT/OT/SLP PROGRESS
"Occupational Therapy   CoTreatment w Pt  CoTx performed to optimize pt participation and assessment of full functional capacity.       Name: Domonique Kellogg  MRN: [id number]  Admitting Diagnosis:  Sepsis       Recommendations:     Discharge Recommendations: home health OT  Discharge Equipment Recommendations:  walker, rolling  Barriers to discharge:  None    Assessment:     Domonique Kellogg is a 72 y.o. male with a medical diagnosis of Sepsis.  He presents with good tolerance to session on this date completing func amb to restroom to complete pericare from toilet level requiring SBA for pericare and Mod A for clothing management. Pt w SOB throughout session and increased HR(180) w activity. Pt noted to have bloody stool w RN notified. Performance deficits affecting function are weakness, impaired endurance, impaired self care skills, gait instability, impaired functional mobility, impaired balance, decreased coordination, decreased lower extremity function, decreased safety awareness, impaired cardiopulmonary response to activity, edema.   Pt motivated to return home however not at baseline for ADL and functional mobility performance in addition to concerns of fall risk and would benefit from continued OT services at this time.    Rehab Prognosis:  Fair; patient would benefit from acute skilled OT services to address these deficits and reach maximum level of function.       Plan:     Patient to be seen 3 x/week to address the above listed problems via self-care/home management, therapeutic activities, therapeutic exercises  Plan of Care Expires: 04/27/23  Plan of Care Reviewed with: patient, spouse    Subjective     Chief Complaint: SOB and sore throat  Patient/Family Comments/goals: "Im running out of air"  Pain/Comfort:  Pain Rating 1: 0/10    Objective:     Communicated with: RN prior to session.  Patient found supine with central line, telemetry upon OT entry to room.    General Precautions: Standard, fall  "   Orthopedic Precautions:N/A  Braces: N/A  Respiratory Status: Nasal cannula, flow 2 L/min     Occupational Performance:     Bed Mobility:    Patient completed Scooting/Bridging with contact guard assistance  Patient completed Supine to Sit with minimum assistance and 2 persons  Patient completed Sit to Supine with minimum assistance and 2 persons   SBA for EOB balance    Functional Mobility/Transfers:  Patient completed Sit <> Stand Transfer with minimum assistance and of 2 persons  with  rolling walker   Functional Mobility: pt completed functional ambulation to restroom to simulate household mobility requiring Min A w Mod vcs for RW management     Activities of Daily Living:  Upper Body Dressing: minimum assistance don gown while seated EOB  Lower Body Dressing: moderate assistance don/doff brief during clothing management from toilet level   Toileting: stand by assistance pericare from toilet level       [facility] 6 Click ADL: 17    Treatment & Education:  Pt educated on scope of practice and importance of daily functional mobility.   Pt educated on safety precautions during transfers  Pt updated on POC     Patient left supine with all lines intact, call button in reach, RN notified, and spouse present    GOALS:   Multidisciplinary Problems       Occupational Therapy Goals          Problem: Occupational Therapy    Goal Priority Disciplines Outcome Interventions   Occupational Therapy Goal     OT, PT/OT Ongoing, Progressing    Description: Goals to be met by: 4/15/23     Patient will increase functional independence with ADLs by performing:    UE Dressing with [place].  LE Dressing with Minimal Assistance.  Grooming while standing at sink with Stand-by Assistance.  Toileting from toilet with Stand-by Assistance for hygiene and clothing management.   Toilet transfer to toilet with Stand-by Assistance.  Upper extremity exercise program 12 reps per handout, with supervision.                         Time Tracking:      OT Date of Treatment: 03/27/23  OT Start Time: 1405  OT Stop Time: 1442  OT Total Time (min): 37 min    Billable Minutes:Self Care/Home Management 37    OT/ANITRA: OT          3/27/2023

## 2023-03-27 NOTE — PLAN OF CARE
Pt with sustained A. Fib RVR (see previous note). Tele ordered d/c per MD as pt is going on hospice. Afebrile & VSS on 2L NC.CPAP on while pt sleeping. No PRNs needed.  Pt turned q2 or more frequently as needed. POC reviewed with patient and family at bedside. All needs addressed. Will continue to monitor with frequent rounds and clustered care to promote rest.

## 2023-03-27 NOTE — ASSESSMENT & PLAN NOTE
"Cr 2.1 on admission, baseline around 1.3-1.5   His lasix dose was held in the last week due to rising Cr but it continues to increase   Blood pressure soft on admission, possible pre-renal etiology, renal US without hydronephrosis, severely fluid overloaded on exam. Urine studies consistent with pre-renal physiology    - Nephrology consulted, appreciate assistance  - See "Sepsis"  "

## 2023-03-27 NOTE — PROGRESS NOTES
"[facility] - Oncology ([facility])  Hematology/Oncology  Progress Note    Patient Name: Domonique Kellogg  Admission Date: 3/24/2023  Hospital Length of Stay: 3 days  Code Status: DNR     Subjective:     HPI:  Mr. Domonique Kellogg is a 72 y.o. 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 Patients wife states that patient was having difficulty breathing while on his CPAP last night so she woke him up. Patient was a little confused about where he was upon waking. Patient states he has had fatigue/weakness for weeks that has been progressively worsening. He also reports lower leg/abdominal swelling recently that has been worsening. He is on lasix at home but that has been held for almost a week due to a rising creatinine on outside labs. Has some RUQ pain, U/S on 3/17 revealed stones and sludge in the gallbladder with no sonographic evidence for acute cholecystitis, as well as multifocal hepatic masses. He also developed jaundice about two-weeks ago. Denies chest pain, nausea, dysuria or headaches. He was dry heaving last night. Also reports 3 loose stools last night but no diarrhea.     On admission to the ED patient was febrile to 100.9, , /56, and on RA. Cr 2.1 (baseline 1.3-1.5). Tbili 7.7, Alk phos 495, , ALT 57, ammonia 63. WBC 16.2 and lactate 2.25. CXR with no acute findings. VBG with pH 7.34 and pCO2 39.8. Trop 0.035, . Patient given vanc/zosyn and IVF. CT abdomen ordered.     Oncology history per last clinic note:  1. Mr Kellogg is a 72 yo man with CAD, HTN, seizures in 2011 who initially saw me on 11/29/21 for further management of cholangiocarcinoma. He presented with weight loss and diarrhea since July. US 9/22/21 showed "Enlarged, heterogeneous appearance of the liver likely due to multiple underlying hepatic lesions largest measuring 4.8 cm."  MRI " "abdomen w/wo contrast 10/4/21: "Multiple liver lesions measuring up to 13.3 cm in the right hepatic lobe.  Differential diagnosis includes metastases, fibrolamellar hepatocellular carcinoma, or atypical focal nodular hyperplasia.  Consider biopsy for further evaluation. Thrombosis of the anterior branch of the right portal vein and left hepatic vein.  Nonvisualization of the middle and right hepatic veins, which may be thrombosed as well. Prominent periportal lymph node, which is nonspecific."  EGD and colonoscopy on 10/11/21 were negative for primary malignancies.   He underwent liver lesion biopsy and Y90 mapping on 11/22/21. Pathology showed adenocarcinoma: "Biopsy of the liver mass shows a malignant neoplasm in a fibrotic stroma. Glandular architecture is readily identified with several foci of intraluminal necrosis. Scattered mitotic figures are seen. Neoplastic cells show the following immunophenotype:   Positive: AE1/AE3, CK7, CDX2   Negative: Chromogranin, Synaptophysin, TTF, CK5/6, CK20, HepPar1   The morphology and immunophenotype are compatible with adenocarcinoma. Considerations for a primary site include pancreaticobiliary (including intrahepatic cholangiocarcinoma) as well as upper gastrointestinal. Clinical and radiologic correlation is suggested."  Patient presents today with wife for further management. No pain. Initial weight loss has somewhat stabilized. Still has diarrhea. Has not tried imodium yet. +nervous  Mother had kidney cancer at age 64. Maternal grandmother had liver cancer in her 60s. Patient has three children, two daughters and one son.   Discussed palliative chemo with cis/gem  2. PET scan 12/6/21 did not show extrahepatic metastases.   3. Cis/gem started on 12/7/21. Durvalumab was initially denied by insurance company after GI ASCO, but approved after FDA approval, added 8/10/22. Patient had reaction to durv (wheezing, rigors, shaking, high BP) on 9/9. Imfinzi was stopped. Last chemo " on 10/21/22. MRI 10/19/22 showed progression. Plan was to switched for FOLFOX. Patient was hospitalized for acute appendicitis and strangulated umbilical hernia s/p surgery on 11/2/2022.   4. S/p TACE on 12/16/2021, 2/14/22, 4/19/22, 5/18/22, 8/2/22  5. S/p RF Ablation on 7/19/22. TACE on 9/28/22  6. FOLFOX started on 11/21/22, s/p 6 cycles  7. Restaging MRI showed progression.            Interval History: No acute overnight events. Patient and his family had agreed to home hospice this morning but later in the day, patient developed Afib with RVR and associated hypotension. Discussed with patient and family inpatient hospice as concerns that patient may not be safe to travel in ambulance for home. Plan for likely inpatient hospice if patient's vitals do not improve. Agreed that not further invasive or aggressive measures (ie ICU transfer, medications, etc) would take place if patient's BP or HR continues to worsen.    Oncology Treatment Plan:   OP PANC NAB-PACLITAXEL + GEMCITABINE Q4W    Medications:  Continuous Infusions:  Scheduled Meds:   albuterol-ipratropium  3 mL Nebulization Q4H    apixaban  5 mg Oral BID    DULoxetine  20 mg Oral Daily    finasteride  5 mg Oral Daily    midodrine  15 mg Oral Q8H    rifAXImin  550 mg Oral BID    tamsulosin  1 capsule Oral Daily     PRN Meds:sodium chloride, acetaminophen, dextrose 10%, dextrose 10%, dextrose, dextrose, glucagon (human recombinant), HYDROmorphone, lorazepam, melatonin, naloxone, ondansetron, sodium chloride 0.9%     Review of Systems   Constitutional:  Positive for activity change and fatigue. Negative for chills and fever.   HENT:  Negative for congestion and sore throat.    Eyes:  Negative for photophobia and pain.   Respiratory:  Negative for cough and shortness of breath.    Cardiovascular:  Positive for leg swelling. Negative for chest pain and palpitations.   Gastrointestinal:  Positive for abdominal distention. Negative for abdominal pain,  constipation, diarrhea, nausea and vomiting.   Genitourinary:  Positive for difficulty urinating. Negative for dysuria and hematuria.   Musculoskeletal:  Negative for back pain.   Skin:  Negative for rash.   Neurological:  Negative for light-headedness and headaches.   Hematological:  Does not bruise/bleed easily.   Psychiatric/Behavioral:  Negative for agitation and behavioral problems.    Objective:     Vital Signs (Most Recent):  Temp: 97.9 °F (36.6 °C) (03/27/23 1628)  Pulse: (!) 160 (03/27/23 1647)  Resp: (!) 21 (03/27/23 1510)  BP: (!) 95/55 (03/27/23 1647)  SpO2: 96 % (03/27/23 1620)   Vital Signs (24h Range):  Temp:  [97.6 °F (36.4 °C)-99.1 °F (37.3 °C)] 97.9 °F (36.6 °C)  Pulse:  [] 160  Resp:  [17-34] 21  SpO2:  [93 %-97 %] 96 %  BP: ()/(47-59) 95/55     Weight: 120.7 kg (266 lb)  Body mass index is 40.45 kg/m².  Body surface area is 2.41 meters squared.      Intake/Output Summary (Last 24 hours) at 3/27/2023 1853  Last data filed at 3/27/2023 0855  Gross per 24 hour   Intake 410 ml   Output --   Net 410 ml       Physical Exam  Constitutional:       Appearance: Normal appearance.   HENT:      Head: Normocephalic and atraumatic.      Nose: Nose normal. No congestion.   Eyes:      General: Scleral icterus present.      Extraocular Movements: Extraocular movements intact.   Cardiovascular:      Rate and Rhythm: Tachycardia present. Rhythm irregular.      Pulses: Normal pulses.      Heart sounds: Normal heart sounds.   Pulmonary:      Effort: Pulmonary effort is normal. No respiratory distress.   Abdominal:      General: There is distension.      Tenderness: There is no abdominal tenderness.   Musculoskeletal:         General: Normal range of motion.      Cervical back: Normal range of motion and neck supple.      Right lower leg: Edema present.      Left lower leg: Edema present.      Comments: 3+ edema    Skin:     General: Skin is warm and dry.   Neurological:      General: No focal deficit  present.      Mental Status: He is alert and oriented to person, place, and time.   Psychiatric:         Mood and Affect: Mood normal.         Behavior: Behavior normal.       Significant Labs:   CBC:   Recent Labs   Lab 03/26/23  0612 03/26/23  0854 03/27/23  0936   WBC 13.44*  --  6.33   HGB 6.6* 6.6* 7.3*   HCT 21.5*  --  22.1*   PLT 83*  --  64*    and CMP:   Recent Labs   Lab 03/26/23  0612 03/27/23  0936    141   K 4.0 3.6   * 110   CO2 16* 17*   GLU 82 88   BUN 32* 42*   CREATININE 2.9* 4.1*   CALCIUM 8.6* 9.1   PROT 5.3* 5.2*   ALBUMIN 2.7* 2.3*   BILITOT 7.0* 8.2*   ALKPHOS 253* 202*   * 204*   ALT 48* 61*   ANIONGAP 12 14       Diagnostic Results:  I have reviewed all pertinent imaging results/findings within the past 24 hours.    Assessment/Plan:     * Sepsis  Patient with intrahepatic cholangiocarcinoma here with SOB and weakness   Meets sepsis criteria (Temperature 100.9, WBC 16.2, )  Concern for abdominal source given RUQ pain  CXR with no acute findings   WBC 16.2 and lactate 2.25  No history of ascites or requiring paracentesis, ED unable to find fluid pocket to tap    As patient with worsening renal and liver dysfunction and not a candidate for any more therapy, patient and family agreed to inpatient vs home hospice.    Hyperammonemia  Ammonia 63 on admission   Asterixis present on exam     - Rifaximin BID   - Will hold off on lactulose as patient had 3 loose stools since yesterday     Lower leg edema  Could be due to patients low albumin   EF 60% in December 2021  U/S on 3/13 negative for DVT   Was on lasix outpatient but it was held due to rising creatinine       Plan for inpatient vs home hospice    DAWIT (acute kidney injury)  Cr 2.1 on admission, baseline around 1.3-1.5   His lasix dose was held in the last week due to rising Cr but it continues to increase   Blood pressure soft on admission, possible pre-renal etiology, renal US without hydronephrosis, severely fluid  "overloaded on exam. Urine studies consistent with pre-renal physiology    - Nephrology consulted, appreciate assistance  - See "Sepsis"    Deep vein thrombosis (DVT) of femoral vein of left lower extremity  Diagnosed June 2022    - Continue home apixaban     Weakness generalized  Likely secondary to malignancy and possible infection     - PT/OT consulted     Hyperbilirubinemia  History of intrahepatic cholangiocarcinoma   Last chemo on 3/23  On admission Tbili 7.7, was 2.3 earlier this month   U/S on 3/17 revealed stones and sludge in the gallbladder with no sonographic evidence for acute cholecystitis, as well as multifocal hepatic masses  CT without obstruction , concerning for worsening metastatic disease     See "Sepsis"      Elevated troponin  Troponin 0.035 on admission   ECG with no ischemic changes  Likely demand in setting of infection   No chest pain     Intrahepatic cholangiocarcinoma  - Current Regimen: PANC NAB-PACLITAXEL + GEMCITABINE Q4W  - Last chemo on 3/23  - MRI on 3/7 revealed interval increase in size and number of multiple additional liver lesions, consistent with worsening metastatic disease    Plan for inpatient vs home hospice due to worsening renal and liver function. Patient not a candidate for any more therapy.      CAD in native artery  - Hold statin in setting of elevated Tbili and liver enzymes     RAHEEL (obstructive sleep apnea)  - CPAP nightly         Leah Goldstein MD  Hematology/Oncology  [facility] ([facility])    "

## 2023-03-27 NOTE — SUBJECTIVE & OBJECTIVE
Interval History: No acute overnight events. Patient and his family had agreed to home hospice this morning but later in the day, patient developed Afib with RVR and associated hypotension. Discussed with patient and family inpatient hospice as concerns that patient may not be safe to travel in ambulance for home. Plan for likely inpatient hospice if patient's vitals do not improve. Agreed that not further invasive or aggressive measures (ie ICU transfer, medications, etc) would take place if patient's BP or HR continues to worsen.    Oncology Treatment Plan:   OP PANC NAB-PACLITAXEL + GEMCITABINE Q4W    Medications:  Continuous Infusions:  Scheduled Meds:   albuterol-ipratropium  3 mL Nebulization Q4H    apixaban  5 mg Oral BID    DULoxetine  20 mg Oral Daily    finasteride  5 mg Oral Daily    midodrine  15 mg Oral Q8H    rifAXImin  550 mg Oral BID    tamsulosin  1 capsule Oral Daily     PRN Meds:sodium chloride, acetaminophen, dextrose 10%, dextrose 10%, dextrose, dextrose, glucagon (human recombinant), HYDROmorphone, lorazepam, melatonin, naloxone, ondansetron, sodium chloride 0.9%     Review of Systems   Constitutional:  Positive for activity change and fatigue. Negative for chills and fever.   HENT:  Negative for congestion and sore throat.    Eyes:  Negative for photophobia and pain.   Respiratory:  Negative for cough and shortness of breath.    Cardiovascular:  Positive for leg swelling. Negative for chest pain and palpitations.   Gastrointestinal:  Positive for abdominal distention. Negative for abdominal pain, constipation, diarrhea, nausea and vomiting.   Genitourinary:  Positive for difficulty urinating. Negative for dysuria and hematuria.   Musculoskeletal:  Negative for back pain.   Skin:  Negative for rash.   Neurological:  Negative for light-headedness and headaches.   Hematological:  Does not bruise/bleed easily.   Psychiatric/Behavioral:  Negative for agitation and behavioral problems.    Objective:      Vital Signs (Most Recent):  Temp: 97.9 °F (36.6 °C) (03/27/23 1628)  Pulse: (!) 160 (03/27/23 1647)  Resp: (!) 21 (03/27/23 1510)  BP: (!) 95/55 (03/27/23 1647)  SpO2: 96 % (03/27/23 1620)   Vital Signs (24h Range):  Temp:  [97.6 °F (36.4 °C)-99.1 °F (37.3 °C)] 97.9 °F (36.6 °C)  Pulse:  [] 160  Resp:  [17-34] 21  SpO2:  [93 %-97 %] 96 %  BP: ()/(47-59) 95/55     Weight: 120.7 kg (266 lb)  Body mass index is 40.45 kg/m².  Body surface area is 2.41 meters squared.      Intake/Output Summary (Last 24 hours) at 3/27/2023 1853  Last data filed at 3/27/2023 0855  Gross per 24 hour   Intake 410 ml   Output --   Net 410 ml       Physical Exam  Constitutional:       Appearance: Normal appearance.   HENT:      Head: Normocephalic and atraumatic.      Nose: Nose normal. No congestion.   Eyes:      General: Scleral icterus present.      Extraocular Movements: Extraocular movements intact.   Cardiovascular:      Rate and Rhythm: Tachycardia present. Rhythm irregular.      Pulses: Normal pulses.      Heart sounds: Normal heart sounds.   Pulmonary:      Effort: Pulmonary effort is normal. No respiratory distress.   Abdominal:      General: There is distension.      Tenderness: There is no abdominal tenderness.   Musculoskeletal:         General: Normal range of motion.      Cervical back: Normal range of motion and neck supple.      Right lower leg: Edema present.      Left lower leg: Edema present.      Comments: 3+ edema    Skin:     General: Skin is warm and dry.   Neurological:      General: No focal deficit present.      Mental Status: He is alert and oriented to person, place, and time.   Psychiatric:         Mood and Affect: Mood normal.         Behavior: Behavior normal.       Significant Labs:   CBC:   Recent Labs   Lab 03/26/23  0612 03/26/23  0854 03/27/23  0936   WBC 13.44*  --  6.33   HGB 6.6* 6.6* 7.3*   HCT 21.5*  --  22.1*   PLT 83*  --  64*    and CMP:   Recent Labs   Lab 03/26/23  0612  03/27/23  0936    141   K 4.0 3.6   * 110   CO2 16* 17*   GLU 82 88   BUN 32* 42*   CREATININE 2.9* 4.1*   CALCIUM 8.6* 9.1   PROT 5.3* 5.2*   ALBUMIN 2.7* 2.3*   BILITOT 7.0* 8.2*   ALKPHOS 253* 202*   * 204*   ALT 48* 61*   ANIONGAP 12 14       Diagnostic Results:  I have reviewed all pertinent imaging results/findings within the past 24 hours.

## 2023-03-27 NOTE — ASSESSMENT & PLAN NOTE
Could be due to patients low albumin   EF 60% in December 2021  U/S on 3/13 negative for DVT   Was on lasix outpatient but it was held due to rising creatinine       Plan for inpatient vs home hospice

## 2023-03-27 NOTE — PROGRESS NOTES
"Abdiel Babb - Oncology ([facility])  Nephrology  Progress Note    Patient Name: Domonique Kellogg  MRN: [id number]  Admission Date: 3/24/2023  Hospital Length of Stay: 3 days  Attending Provider: Caesar Ramesh MD   Primary Care Physician: Nicolas Marlow MD  Principal Problem:Sepsis    Subjective:     HPI:   Domonique Kellogg is a 72 y.o. 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 [place] like "someone standing on chest." On chemo for liver cancer. Wife reports he is disoriented.      Chemotherapy was stated on 12/2021 with Cis/gem, durvalumab was added on 8/10 but was stopped on 9/9/2022 d/t reaction.   S/p TACE12/21 to 9/28/2022, he had RF ablation 7/19/2022, and 11/21/2022 FOLFOX x6 cycles (please refer to oncology outpatient notes for full hx of chemotherapy)  Pt was seen as an urgent care visit on 3/16/2023 for weakness, LE swelling and he was treated with IVF 500mls, lasix was held (previously ordered for LE edema), pt was asked to f/u with cardiology and neurology.   Pts last chemo infusion was on 3/23/2023: PACLITAXEL & GEMCITABINE      Pt's family noticed pt having difficulty breathing, he was short of breath, he does use a CPAP but despite that he was feeling short of breath. He did not have chills or fever, he also did not take his blood pressure at home and so unable to tell if his blood pressure was low. He reports overall good appetite and was starting to increase hydration since he received chemotherapy.     In the " "ED, pt presented with the following VS:   Initial Vitals [03/24/23 0111]   BP Pulse Resp Temp SpO2   (!) 107/56 (!) 123 20 (!) 100.9 °F (38.3 °C) 95 %      MAP       --         His labs showed WBC 10.2, hgb 8.8, plts 150, bicarb 21, calcium 9.7, alk phos 515, albumin 2, t. Bili 5.9, , ALT 49, ammonia 63, and CA 19-9 was 907  A CT abd and pelvis w/o contrast showed:   1. Moderate wall thickening of the cecum and proximal ascending colon, possibly infectious/inflammatory colitis.  Neoplasm not excluded.  No organized fluid collections.  2. No definite evidence of cholangitis noting limitations without the use of intravenous contrast.  3. Prominent right hepatic lobe mass with multiple satellite lesions in keeping with known cholangiocarcinoma.  4. Stable 1.1 cm teresa hepatis lymph node.  No new lymphadenopathy.  5. Small volume ascites.  Pt was started on vancomycin, ceftriaxone, and also received 1.5L of IVF    Nephrology was consulted for: "History of intrahepatic cholangiocarcinoma on chemo. Here with concerns of infection and has a DAWIT. Lasix was held outpatient and Cr increasing. BP soft on admission"  Baseline sCr: 1.2-1.4  Admission sCr: 1.8    Pt does not endorse a history of kidney stones, chronic NSAID use, trauma to the kidneys or bladder.   As for a family history, pt denies family history of kidney diease including family members on dialysis, autoimmune diseases, kidney stones or cancer.    Creatinine   Date Value Ref Range Status   03/24/2023 2.1 (H) 0.5 - 1.4 mg/dL Final   03/22/2023 1.8 (H) 0.5 - 1.4 mg/dL Final   03/16/2023 1.8 (H) 0.5 - 1.4 mg/dL Final   03/07/2023 1.3 0.5 - 1.4 mg/dL Final   02/22/2023 1.4 0.5 - 1.4 mg/dL Final     BUN   Date Value Ref Range Status   03/24/2023 21 8 - 23 mg/dL Final   03/22/2023 20 8 - 23 mg/dL Final   03/16/2023 16 8 - 23 mg/dL Final   03/07/2023 20 8 - 23 mg/dL Final   02/22/2023 25 (H) 8 - 23 mg/dL Final       Interval History:   Pt states that he has " "discussed his care with daughter and rest of family and would like to transition to home hospice, he also wants to continue with diuretics to help his swelling.       Review of patient's allergies indicates:   Allergen Reactions    Imfinzi [durvalumab] Other (See Comments)     1305- Imfinzi ended, pt returned from restroom complained of wheezing SOB and rigors. /100  O2 92% 2L NC O2 applied to pt.   1306-Demerol 25mg given and Solucortef 125mg IVPush given.   1309- Pt /106  02 99%.   1318- Pt still having Rigors additional 25mg of Demerol given.   1418- BP is 133/69 77 HR 99% RA Pt states " I feel much better"       Indomethacin Other (See Comments)     Headache dizziness      Adhesive Rash    Colchicine analogues Nausea And Vomiting     NVD     Current Facility-Administered Medications   Medication Frequency    0.9%  NaCl infusion (for blood administration) Q24H PRN    albuterol-ipratropium 2.5 mg-0.5 mg/3 mL nebulizer solution 3 mL Q4H    apixaban tablet 5 mg BID    dextrose 10% bolus 125 mL 125 mL PRN    dextrose 10% bolus 250 mL 250 mL PRN    dextrose 40 % gel 15,000 mg PRN    dextrose 40 % gel 30,000 mg PRN    DULoxetine DR capsule 20 mg Daily    finasteride tablet 5 mg Daily    glucagon (human recombinant) injection 1 mg PRN    melatonin tablet 6 mg Nightly PRN    midodrine tablet 10 mg Q8H    naloxone 0.4 mg/mL injection 0.02 mg PRN    ondansetron injection 4 mg Q8H PRN    piperacillin-tazobactam (ZOSYN) 4.5 g in dextrose 5 % in water (D5W) 5 % 100 mL IVPB (MB+) Q8H    rifAXIMin tablet 550 mg BID    sodium chloride 0.9% flush 10 mL Q12H PRN    tamsulosin 24 hr capsule 0.4 mg Daily       Objective:     Vital Signs (Most Recent):  Temp: 98.8 °F (37.1 °C) (03/27/23 0740)  Pulse: (!) 114 (03/27/23 0740)  Resp: 20 (03/27/23 0740)  BP: (!) 107/54 (03/27/23 0740)  SpO2: (!) 94 % (03/27/23 5780)   Vital Signs (24h Range):  Temp:  [98.6 °F (37 °C)-99.1 °F (37.3 °C)] 98.8 " °F (37.1 °C)  Pulse:  [] 114  Resp:  [17-28] 20  SpO2:  [93 %-98 %] 94 %  BP: ()/(50-59) 107/54     Weight: 121.1 kg (266 lb 15.6 oz) (03/24/23 0720)  Body mass index is 40.59 kg/m².  Body surface area is 2.41 meters squared.    I/O last 3 completed shifts:  In: 880.4 [P.O.:720; I.V.:60.3; IV Piggyback:100.2]  Out: -     Physical Exam  Vitals and nursing note reviewed.   Constitutional:       General: He is not in acute distress.     Appearance: Normal appearance. He is ill-appearing. He is not toxic-appearing or diaphoretic.   HENT:      Mouth/Throat:      Mouth: Mucous membranes are moist.   Cardiovascular:      Rate and Rhythm: Normal rate and regular rhythm.      Pulses: Normal pulses.      Heart sounds: Murmur heard.   Pulmonary:      Effort: No respiratory distress.      Breath sounds: No stridor. Rales present. No wheezing or rhonchi.      Comments: tachypnea  Abdominal:      General: Bowel sounds are normal. There is distension.      Palpations: Abdomen is soft. There is no mass.      Tenderness: There is no abdominal tenderness. There is no guarding or rebound.      Hernia: No hernia is present.   Musculoskeletal:         General: Swelling present. No tenderness, deformity or signs of injury.      Right lower leg: Edema present.      Left lower leg: Edema present.      Comments: 3+ pitting edema hands/arms & legs    Skin:     General: Skin is warm.      Capillary Refill: Capillary refill takes 2 to 3 seconds.      Coloration: Skin is jaundiced. Skin is not pale.      Findings: No bruising, erythema, lesion or rash.   Neurological:      Mental Status: He is alert and oriented to person, place, and time.   Psychiatric:         Mood and Affect: Mood normal.         Behavior: Behavior normal.         Thought Content: Thought content normal.         Judgment: Judgment normal.       Significant Labs:  CBC:   Recent Labs   Lab 03/26/23  0612 03/26/23  0854   WBC 13.44*  --    RBC 2.05*  --    HGB 6.6*  6.6*   HCT 21.5*  --    PLT 83*  --    *  --    MCH 32.2*  --    MCHC 30.7*  --      CMP:   Recent Labs   Lab 03/26/23  0612   GLU 82   CALCIUM 8.6*   ALBUMIN 2.7*   PROT 5.3*      K 4.0   CO2 16*   *   BUN 32*   CREATININE 2.9*   ALKPHOS 253*   ALT 48*   *   BILITOT 7.0*     All labs within the past 24 hours have been reviewed.     Significant Imaging:  Labs: Reviewed    Assessment/Plan:     Renal/  DAWIT (acute kidney injury)  Multifactorial etiology for DAWIT: nephrotoxicity from chemotherapy agents (3/23), hypotensive episodes (probably prior to admission) and maybe related to sepsis as pt was febrile, in addition pt was exposed to vancomycin all this in the background of an enlarged prostate compressing the bladder.   Pt does have h/o CKD stage 2 since approx 7/2022    Baseline sCr: 1.2-1.4  Admission sCr: 1.8  Lab Results   Component Value Date    CREATININE 2.9 (H) 03/26/2023    CREATININE 2.5 (H) 03/25/2023    CREATININE 2.3 (H) 03/24/2023       Recommendations:   -severely overload, pt verbalized plans for home hospice after discussing with his daughter and family, he also would like to continue with diuretics  -Electrolytes:    K, goal >4, page nephrology if K >5.5    Mg, goal >2   Phos , goal >3    -Acid/base: AGMA, start sodium bicarb tabs 1300mg po TID   -Fluid balance: fluid overloaded   -Anemia: 6.6 yesterday, s/p blood transfusion  -Strict I/O's and daily weights  -Renal diet if not NPO  -Renal function panel and Mg in AM  -Renal ultrasound reviewed: no hydronephrosis or masses, +layering of debris within th bladder  -Maintain MAP >65 for renal perfusion    ID  * Sepsis  Antibiotics given-   Antibiotics (72h ago, onward)    Start     Stop Route Frequency Ordered    03/24/23 1200  piperacillin-tazobactam (ZOSYN) 4.5 g in dextrose 5 % in water (D5W) 5 % 100 mL IVPB (MB+)         -- IV Every 8 hours (non-standard times) 03/24/23 0500    03/24/23 0900  rifAXIMin tablet 550 mg          -- Oral 2 times daily 03/24/23 0459        -Plan per primary team       Hematology  Deep vein thrombosis (DVT) of femoral vein of left lower extremity  On Eliquis    Oncology  Intrahepatic cholangiocarcinoma  Plant per heme/onc      Other  Lower leg edema  Severely fluid overload, cont diuretics, see DAWIT        Thank you for your consult. I will follow-up with patient. Please contact us if you have any additional questions.    Anisha Hernandez MD  Nephrology  [facility] - Oncology ([facility])

## 2023-03-27 NOTE — CHAPLAIN
"Palliative Care     Patient: Domonique Kellogg       MRN: [id number]  : 1950  Age: 72 y.o.  Hospital Length of Stay: 3 days  Code Status: DNR   Attending Provider: Caesar Ramesh MD  Principal Problem: Sepsis    Present during Interview:  Visited pall med pt with wife, Estrellita, bedside.    Non-clinical observations:  Pt was lying in bed, awake, alert; wife on sofa; pt said he was not in any pain.    Facts (Spiritual Perception of Illness):   Per wife, pt has liver CA and came in for swelling.    Feelings (Emotions/Fears/Experiences/Coping):      Provided compassionate presence and listening ear as wife shared the pt's health journey with me as nurse was giving pt his meds. Wife and pt have been  for 47 years and "have been truly blessed."  She said he's so proud of her  who has been dealing with this for a couple years--"it's a terrible disease." She said his pain as been limited though and for that they are grateful. Pt started to go to sleep as wife and I continued. She said pt has not had a lot of pain which is good-- she said he's at peace for "whatever the good Lord has in store; you can never be prepared, but we know what's coming. "     Pt awoke and said he need to use the bathroom, so I helped summon help. We wrapped up our visit with a prayer together. The wife said a prayer after mine as well. Pt and wife appreciative of the visit and prayer.    Annemarie (Beliefs/Meaning/Philosophy):  Yazidism Alevism    Future (Spiritual Care Plan of Action):  Palliative  will continue to follow. Lord, in your mercy.    In Peace,  Rev. Twila Stone MBA, MDiv   "

## 2023-03-28 NOTE — NURSING
Patient being transitioned to hospice care at this time. Will continue to monitor patient progress under new encounter.

## 2023-03-28 NOTE — ASSESSMENT & PLAN NOTE
- Current Regimen: PANC NAB-PACLITAXEL + GEMCITABINE Q4W  - Last chemo on 3/23  - MRI on 3/7 revealed interval increase in size and number of multiple additional liver lesions, consistent with worsening metastatic disease    Plan for inpatient vs home hospice due to worsening renal and liver function. Patient not a candidate for any more therapy.

## 2023-03-28 NOTE — PROGRESS NOTES
1930: RN at bedside speaking with family, about plan of care, Patient assessment completed. Throughout the night patient rounded on q1 and per unit protocol, safety measures maintained. Patient bathed, lines changed.     Provider Communication:  Provider: Rubia Allen MD  Communication Reason: No CMO orders present, as we await pending transition to inpatient hospice  Provider Response: CMO orders placed.

## 2023-03-28 NOTE — TELEPHONE ENCOUNTER
Spoke with wife.  She is calling to speak with dr caballero, as she does not want lasix administered to her , as she feels it is doing more harm than good at this time (boils on his inner thighs).    She would like to speak with dr bowman/modesta alcaraz.

## 2023-03-28 NOTE — ASSESSMENT & PLAN NOTE
Plan for inpatient vs home hospice due to worsening renal and liver function. Patient not a candidate for any more therapy.

## 2023-03-28 NOTE — DISCHARGE SUMMARY
Abdiel Babb - Oncology ([facility])  Hematology/Oncology  Discharge Summary      Patient Name: Domonique Kellogg  MRN: [id number]  Admission Date: 3/24/2023  Hospital Length of Stay: 4 days  Discharge Date and Time:  03/28/2023 5:22 PM  Attending Physician: No att. providers found   Discharging Provider: Alex Olvera DO  Primary Care Provider: Nicolas Marlow MD    HPI: Mr. Domonique Kellogg is a 72 y.o. 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 Patients wife states that patient was having difficulty breathing while on his CPAP last night so she woke him up. Patient was a little confused about where he was upon waking. Patient states he has had fatigue/weakness for weeks that has been progressively worsening. He also reports lower leg/abdominal swelling recently that has been worsening. He is on lasix at home but that has been held for almost a week due to a rising creatinine on outside labs. Has some RUQ pain, U/S on 3/17 revealed stones and sludge in the gallbladder with no sonographic evidence for acute cholecystitis, as well as multifocal hepatic masses. He also developed jaundice about two-weeks ago. Denies chest pain, nausea, dysuria or headaches. He was dry heaving last night. Also reports 3 loose stools last night but no diarrhea.     On admission to the ED patient was febrile to 100.9, , /56, and on RA. Cr 2.1 (baseline 1.3-1.5). Tbili 7.7, Alk phos 495, , ALT 57, ammonia 63. WBC 16.2 and lactate 2.25. CXR with no acute findings. VBG with pH 7.34 and pCO2 39.8. Trop 0.035, . Patient given vanc/zosyn and IVF. CT abdomen ordered.     Oncology history per last clinic note:  1. Mr Kellogg is a 72 yo man with CAD, HTN, seizures in 2011 who initially saw me on 11/29/21 for further management of cholangiocarcinoma. He presented with weight loss and diarrhea  "since July. US 9/22/21 showed "Enlarged, heterogeneous appearance of the liver likely due to multiple underlying hepatic lesions largest measuring 4.8 cm."  MRI abdomen w/wo contrast 10/4/21: "Multiple liver lesions measuring up to 13.3 cm in the right hepatic lobe.  Differential diagnosis includes metastases, fibrolamellar hepatocellular carcinoma, or atypical focal nodular hyperplasia.  Consider biopsy for further evaluation. Thrombosis of the anterior branch of the right portal vein and left hepatic vein.  Nonvisualization of the middle and right hepatic veins, which may be thrombosed as well. Prominent periportal lymph node, which is nonspecific."  EGD and colonoscopy on 10/11/21 were negative for primary malignancies.   He underwent liver lesion biopsy and Y90 mapping on 11/22/21. Pathology showed adenocarcinoma: "Biopsy of the liver mass shows a malignant neoplasm in a fibrotic stroma. Glandular architecture is readily identified with several foci of intraluminal necrosis. Scattered mitotic figures are seen. Neoplastic cells show the following immunophenotype:   Positive: AE1/AE3, CK7, CDX2   Negative: Chromogranin, Synaptophysin, TTF, CK5/6, CK20, HepPar1   The morphology and immunophenotype are compatible with adenocarcinoma. Considerations for a primary site include pancreaticobiliary (including intrahepatic cholangiocarcinoma) as well as upper gastrointestinal. Clinical and radiologic correlation is suggested."  Patient presents today with wife for further management. No pain. Initial weight loss has somewhat stabilized. Still has diarrhea. Has not tried imodium yet. +nervous  Mother had kidney cancer at age 64. Maternal grandmother had liver cancer in her 60s. Patient has three children, two daughters and one son.   Discussed palliative chemo with cis/gem  2. PET scan 12/6/21 did not show extrahepatic metastases.   3. Cis/gem started on 12/7/21. Durvalumab was initially denied by insurance company after " GI ASCO, but approved after FDA approval, added 8/10/22. Patient had reaction to durv (wheezing, rigors, shaking, high BP) on 9/9. Imfinzi was stopped. Last chemo on 10/21/22. MRI 10/19/22 showed progression. Plan was to switched for FOLFOX. Patient was hospitalized for acute appendicitis and strangulated umbilical hernia s/p surgery on 11/2/2022.   4. S/p TACE on 12/16/2021, 2/14/22, 4/19/22, 5/18/22, 8/2/22  5. S/p RF Ablation on 7/19/22. TACE on 9/28/22  6. FOLFOX started on 11/21/22, s/p 6 cycles  7. Restaging MRI showed progression.            * No surgery found *     Hospital Course: Patient was admitted to Medical Oncology service for sepsis and DAWIT. He was started on vanc and zosyn with vanc being discontinued on 03/24. Nephrology was consulted for DAWIT and recommended observation without IVF or diuresis. Patient received diuresis, however, renal function continued to worsen. Liver function also worsened throughout hospital stay. Palliative care was consulted. It was discussed that as patient's renal and liver function continue to worsen, patient would be not a candidate for any more therapy for metastatic intraheptic cholangiocarcinoma. Patient and family agreed to focus on comfort and quality of life. Patient and family agreed to home hospice, however, patient developed Afib with RVR and associated hypotension. Discussed inpatient hospice as concerns that patient may not be safe to travel in ambulance for home. Patient is being transitioned to inpatient hospice.      Goals of Care Treatment Preferences:  Code Status: DNR    Health care agent: Estrellita Freeman [facility] agent number: [phone]          What is most important right now is to focus on spending time at home, comfort and QOL .  Accordingly, we have decided that the best plan to meet the patient's goals includes continuing with treatment.      Consults:   Consults (From admission, onward)        Status Ordering Provider     Inpatient consult  to Critical Care Medicine  Once        Provider:  (Not yet assigned)    Completed MELODIE TOMAS     Inpatient consult to Critical Care Medicine  Once        Provider:  (Not yet assigned)    Completed MEOLDIE TOMAS     Inpatient consult to Palliative Care  Once        Provider:  (Not yet assigned)    Completed MELODIE TOMAS     Inpatient consult to Palliative Care  Once        Provider:  (Not yet assigned)    Completed CHEL SPANGLER     Inpatient consult to Nephrology  Once        Provider:  (Not yet assigned)    Completed PAYAM WARD          Significant Diagnostic Studies: Labs:   CMP   Recent Labs   Lab 03/27/23  0936      K 3.6      CO2 17*   GLU 88   BUN 42*   CREATININE 4.1*   CALCIUM 9.1   PROT 5.2*   ALBUMIN 2.3*   BILITOT 8.2*   ALKPHOS 202*   *   ALT 61*   ANIONGAP 14    and CBC   Recent Labs   Lab 03/27/23  0936   WBC 6.33   HGB 7.3*   HCT 22.1*   PLT 64*       Pending Diagnostic Studies:     None        Final Active Diagnoses:    Diagnosis Date Noted POA    PRINCIPAL PROBLEM:  Sepsis [A41.9] 03/24/2023 Yes    Comfort measures only status [Z51.5] 03/27/2023 Not Applicable    Acute hypoxemic respiratory failure [J96.01] 03/26/2023 Yes    Lower leg edema [R60.0] 03/24/2023 Yes    Hyperammonemia [E72.20] 03/24/2023 Yes    Palliative care encounter [Z51.5] 03/24/2023 Not Applicable    DAWIT (acute kidney injury) [N17.9] 03/16/2023 Yes    Deep vein thrombosis (DVT) of femoral vein of left lower extremity [I82.412] 08/16/2022 Yes    Weakness generalized [R53.1] 07/01/2022 Yes    Hyperbilirubinemia [E80.6] 12/21/2021 Yes    Elevated troponin [R77.8] 12/18/2021 Yes    Intrahepatic cholangiocarcinoma [C22.1] 11/29/2021 Yes    CAD in native artery [I25.10] 03/26/2015 Yes    RAHEEL (obstructive sleep apnea) [G47.33] 02/18/2015 Yes      Problems Resolved During this Admission:      Discharged Condition: stable    Disposition: Hospice/Medical Facility    Follow Up:    Patient Instructions:    No discharge procedures on file.  Medications:  Transfer Medications (for Discharge Readmit only):   No current facility-administered medications for this encounter.     No current outpatient medications on file.     Facility-Administered Medications Ordered in Other Encounters   Medication Dose Route Frequency Provider Last Rate Last Admin    LORazepam tablet 1 mg  1 mg Oral Q30 Min PRN Alex Olvera DO        morphine injection 2 mg  2 mg Intravenous Q1H PRN Alex Olvera DO        prochlorperazine injection Soln 10 mg  10 mg Intravenous Q6H PRN Alex Olvera DO        sodium chloride 0.9% flush 10 mL  10 mL Intravenous Q12H PRN Alex Olvera DO        [START ON 3/29/2023] tamsulosin 24 hr capsule 0.4 mg  1 capsule Oral Daily DO Alex Ayala DO  Hematology/Oncology  Abdiel y - Oncology ([facility])

## 2023-03-28 NOTE — SUBJECTIVE & OBJECTIVE
Interval History: No acute overnight events. Still planning for inpatient hospice. Patient is comfort care. Blisters on legs developed and treated with topical antibiotic. Nephrology recommended torsemide for fluid overload.     Oncology Treatment Plan:   OP PANC NAB-PACLITAXEL + GEMCITABINE Q4W    Medications:  Continuous Infusions:  Scheduled Meds:   albuterol-ipratropium  3 mL Nebulization Q4H    bacitracin zinc-polymyxin B   Topical (Top) BID    duke's soln (benadryl 30 mL, mylanta 30 mL, LIDOcaine 30 mL, nystatin 30 mL) 120 mL  10 mL Oral QID    finasteride  5 mg Oral Daily    rifAXImin  550 mg Oral BID    tamsulosin  1 capsule Oral Daily    torsemide  80 mg Oral BID loop     PRN Meds:sodium chloride, acetaminophen, HYDROmorphone, LIDOcaine HCL 4%, lorazepam, melatonin, naloxone, ondansetron, sodium chloride 0.9%     Review of Systems   Constitutional:  Positive for activity change and fatigue. Negative for chills and fever.   HENT:  Negative for congestion and sore throat.    Eyes:  Negative for photophobia and pain.   Respiratory:  Negative for cough and shortness of breath.    Cardiovascular:  Positive for leg swelling. Negative for chest pain and palpitations.   Gastrointestinal:  Positive for abdominal distention. Negative for abdominal pain, constipation, diarrhea, nausea and vomiting.   Genitourinary:  Positive for difficulty urinating. Negative for dysuria and hematuria.   Musculoskeletal:  Negative for back pain.   Skin:  Negative for rash.   Neurological:  Negative for light-headedness and headaches.   Hematological:  Does not bruise/bleed easily.   Psychiatric/Behavioral:  Negative for agitation and behavioral problems.    Objective:     Vital Signs (Most Recent):  Temp: 98.4 °F (36.9 °C) (03/28/23 0715)  Pulse: 100 (03/28/23 0801)  Resp: (!) 24 (03/28/23 0837)  BP: (!) 88/49 (03/28/23 0715)  SpO2: 97 % (03/28/23 0801)   Vital Signs (24h Range):  Temp:  [97.6 °F (36.4 °C)-98.4 °F (36.9 °C)] 98.4 °F  (36.9 °C)  Pulse:  [] 100  Resp:  [18-34] 24  SpO2:  [93 %-97 %] 97 %  BP: ()/(47-59) 88/49     Weight: 120.7 kg (266 lb)  Body mass index is 40.45 kg/m².  Body surface area is 2.41 meters squared.    No intake or output data in the 24 hours ending 03/28/23 1115      Physical Exam  Constitutional:       Appearance: Normal appearance.   HENT:      Head: Normocephalic and atraumatic.      Nose: Nose normal. No congestion.   Eyes:      General: Scleral icterus present.      Extraocular Movements: Extraocular movements intact.   Cardiovascular:      Rate and Rhythm: Tachycardia present. Rhythm irregular.      Pulses: Normal pulses.      Heart sounds: Normal heart sounds.   Pulmonary:      Effort: Pulmonary effort is normal. No respiratory distress.   Abdominal:      General: There is distension.      Tenderness: There is no abdominal tenderness.   Musculoskeletal:         General: Normal range of motion.      Cervical back: Normal range of motion and neck supple.      Right lower leg: Edema present.      Left lower leg: Edema present.      Comments: 3+ edema    Skin:     General: Skin is warm and dry.   Neurological:      General: No focal deficit present.      Mental Status: He is alert and oriented to person, place, and time.   Psychiatric:         Mood and Affect: Mood normal.         Behavior: Behavior normal.       Significant Labs:   CBC:   Recent Labs   Lab 03/27/23  0936   WBC 6.33   HGB 7.3*   HCT 22.1*   PLT 64*      and CMP:   Recent Labs   Lab 03/27/23  0936      K 3.6      CO2 17*   GLU 88   BUN 42*   CREATININE 4.1*   CALCIUM 9.1   PROT 5.2*   ALBUMIN 2.3*   BILITOT 8.2*   ALKPHOS 202*   *   ALT 61*   ANIONGAP 14         Diagnostic Results:  I have reviewed all pertinent imaging results/findings within the past 24 hours.

## 2023-03-28 NOTE — PROGRESS NOTES
KIRSTY notified by Med/Onc team that patient will qualify for inpatient hospice and is probably not safe to be transported. KIRSTY met with family while patient's wife was on the phone. They had already been notified that patient would be safest to receive inpatient hospice and were in agreement. They were OK with [facility] coming in to provide hospice care. They are hoping to move into a larger room which they're expecting between 11a and 12p.    KIRSTY notified Med/Onc team that fam was in agreement. Ruth with [facility] also notified so the bed can be flipped. KIRSTY left a VM with [employer] explaining the patient would be staying and that the referral to [facility] was no longer needed. KIRSTY also spoke to office staff at [facility] to notify them that referral was no longer needed.    KIRSTY provided patient details to Sharlene at [facility]. Ruth will visit patient and family shortly to complete paperwork.

## 2023-03-28 NOTE — SUBJECTIVE & OBJECTIVE
"Interval History:   Pt alert and oriented x3, wife at bedside.  Pt had afib w/ RVR yesterday, grossly fluid overloaded and has developed blisters on inner thighs 2/2 volume overload. Remains hypotensive with MAPs in the 60s' has increase oxygen requirements of 3L via NC  Awaiting inpatient hospice      Review of patient's allergies indicates:   Allergen Reactions    Imfinzi [durvalumab] Other (See Comments)     1305- Imfinzi ended, pt returned from restroom complained of wheezing SOB and rigors. /100  O2 92% 2L NC O2 applied to pt.   1306-Demerol 25mg given and Solucortef 125mg IVPush given.   1309- Pt /106  02 99%.   1318- Pt still having Rigors additional 25mg of Demerol given.   1418- BP is 133/69 77 HR 99% RA Pt states " I feel much better"       Indomethacin Other (See Comments)     Headache dizziness      Adhesive Rash    Colchicine analogues Nausea And Vomiting     NVD     Current Facility-Administered Medications   Medication Frequency    0.9%  NaCl infusion (for blood administration) Q24H PRN    acetaminophen tablet 650 mg Q8H PRN    albuterol-ipratropium 2.5 mg-0.5 mg/3 mL nebulizer solution 3 mL Q4H    apixaban tablet 5 mg BID    bacitracin zinc-polymyxin B ointment BID    dextrose 10% bolus 125 mL 125 mL PRN    dextrose 10% bolus 250 mL 250 mL PRN    dextrose 40 % gel 15,000 mg PRN    dextrose 40 % gel 30,000 mg PRN    duke's soln (benadryl 30 mL, mylanta 30 mL, LIDOcaine 30 mL, nystatin 30 mL) 120 mL QID    DULoxetine DR capsule 20 mg Daily    finasteride tablet 5 mg Daily    glucagon (human recombinant) injection 1 mg PRN    HYDROmorphone injection 1 mg Q4H PRN    LIDOcaine HCL 4% topical solution PRN    LORazepam injection 1 mg Q2H PRN    melatonin tablet 6 mg Nightly PRN    midodrine tablet 15 mg Q8H    naloxone 0.4 mg/mL injection 0.02 mg PRN    ondansetron injection 4 mg Q8H PRN    rifAXIMin tablet 550 mg BID    sodium chloride 0.9% flush 10 mL Q12H PRN    tamsulosin 24 hr " capsule 0.4 mg Daily       Objective:     Vital Signs (Most Recent):  Temp: 98.4 °F (36.9 °C) (03/28/23 0715)  Pulse: 100 (03/28/23 0801)  Resp: (!) 24 (03/28/23 0837)  BP: (!) 88/49 (03/28/23 0715)  SpO2: 97 % (03/28/23 0801)   Vital Signs (24h Range):  Temp:  [97.6 °F (36.4 °C)-98.4 °F (36.9 °C)] 98.4 °F (36.9 °C)  Pulse:  [] 100  Resp:  [18-34] 24  SpO2:  [93 %-97 %] 97 %  BP: ()/(47-59) 88/49     Weight: 120.7 kg (266 lb) (03/27/23 1320)  Body mass index is 40.45 kg/m².  Body surface area is 2.41 meters squared.    I/O last 3 completed shifts:  In: 410 [P.O.:410]  Out: -     Physical Exam  Vitals and nursing note reviewed.   Constitutional:       General: He is not in acute distress.     Appearance: Normal appearance. He is ill-appearing. He is not toxic-appearing or diaphoretic.   HENT:      Mouth/Throat:      Mouth: Mucous membranes are moist.   Cardiovascular:      Rate and Rhythm: Normal rate and regular rhythm.      Pulses: Normal pulses.      Heart sounds: Murmur heard.   Pulmonary:      Effort: No respiratory distress.      Breath sounds: No stridor. Rales present. No wheezing or rhonchi.      Comments: tachypnea  Abdominal:      General: Bowel sounds are normal. There is distension.      Palpations: Abdomen is soft. There is no mass.      Tenderness: There is no abdominal tenderness. There is no guarding or rebound.      Hernia: No hernia is present.   Musculoskeletal:         General: Swelling present. No tenderness, deformity or signs of injury.      Right lower leg: Edema present.      Left lower leg: Edema present.      Comments: 3+ pitting edema hands/arms & legs    Skin:     General: Skin is warm.      Capillary Refill: Capillary refill takes 2 to 3 seconds.      Coloration: Skin is jaundiced. Skin is not pale.      Findings: Lesion present. No bruising, erythema or rash.      Comments: Fluid filled blisters on thighs   Neurological:      Mental Status: He is alert and oriented to  person, place, and time.   Psychiatric:         Mood and Affect: Mood normal.         Behavior: Behavior normal.         Thought Content: Thought content normal.         Judgment: Judgment normal.       Significant Labs:  CBC:   Recent Labs   Lab 03/27/23  0936   WBC 6.33   RBC 2.31*   HGB 7.3*   HCT 22.1*   PLT 64*   MCV 96   MCH 31.6*   MCHC 33.0       CMP:   Recent Labs   Lab 03/27/23  0936   GLU 88   CALCIUM 9.1   ALBUMIN 2.3*   PROT 5.2*      K 3.6   CO2 17*      BUN 42*   CREATININE 4.1*   ALKPHOS 202*   ALT 61*   *   BILITOT 8.2*       All labs within the past 24 hours have been reviewed.     Significant Imaging:  Labs: Reviewed

## 2023-03-28 NOTE — PROGRESS NOTES
"[facility] ([facility])  Hematology/Oncology  Progress Note    Patient Name: Domonique Kellogg  Admission Date: 3/24/2023  Hospital Length of Stay: 4 days  Code Status: DNR     Subjective:     HPI:  Mr. Domonique Kellogg is a 72 y.o. 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 Patients wife states that patient was having difficulty breathing while on his CPAP last night so she woke him up. Patient was a little confused about where he was upon waking. Patient states he has had fatigue/weakness for weeks that has been progressively worsening. He also reports lower leg/abdominal swelling recently that has been worsening. He is on lasix at home but that has been held for almost a week due to a rising creatinine on outside labs. Has some RUQ pain, U/S on 3/17 revealed stones and sludge in the gallbladder with no sonographic evidence for acute cholecystitis, as well as multifocal hepatic masses. He also developed jaundice about two-weeks ago. Denies chest pain, nausea, dysuria or headaches. He was dry heaving last night. Also reports 3 loose stools last night but no diarrhea.     On admission to the ED patient was febrile to 100.9, , /56, and on RA. Cr 2.1 (baseline 1.3-1.5). Tbili 7.7, Alk phos 495, , ALT 57, ammonia 63. WBC 16.2 and lactate 2.25. CXR with no acute findings. VBG with pH 7.34 and pCO2 39.8. Trop 0.035, . Patient given vanc/zosyn and IVF. CT abdomen ordered.     Oncology history per last clinic note:  1. Mr Kellogg is a 70 yo man with CAD, HTN, seizures in 2011 who initially saw me on 11/29/21 for further management of cholangiocarcinoma. He presented with weight loss and diarrhea since July. US 9/22/21 showed "Enlarged, heterogeneous appearance of the liver likely due to multiple underlying hepatic lesions largest measuring 4.8 cm."  MRI " "abdomen w/wo contrast 10/4/21: "Multiple liver lesions measuring up to 13.3 cm in the right hepatic lobe.  Differential diagnosis includes metastases, fibrolamellar hepatocellular carcinoma, or atypical focal nodular hyperplasia.  Consider biopsy for further evaluation. Thrombosis of the anterior branch of the right portal vein and left hepatic vein.  Nonvisualization of the middle and right hepatic veins, which may be thrombosed as well. Prominent periportal lymph node, which is nonspecific."  EGD and colonoscopy on 10/11/21 were negative for primary malignancies.   He underwent liver lesion biopsy and Y90 mapping on 11/22/21. Pathology showed adenocarcinoma: "Biopsy of the liver mass shows a malignant neoplasm in a fibrotic stroma. Glandular architecture is readily identified with several foci of intraluminal necrosis. Scattered mitotic figures are seen. Neoplastic cells show the following immunophenotype:   Positive: AE1/AE3, CK7, CDX2   Negative: Chromogranin, Synaptophysin, TTF, CK5/6, CK20, HepPar1   The morphology and immunophenotype are compatible with adenocarcinoma. Considerations for a primary site include pancreaticobiliary (including intrahepatic cholangiocarcinoma) as well as upper gastrointestinal. Clinical and radiologic correlation is suggested."  Patient presents today with wife for further management. No pain. Initial weight loss has somewhat stabilized. Still has diarrhea. Has not tried imodium yet. +nervous  Mother had kidney cancer at age 64. Maternal grandmother had liver cancer in her 60s. Patient has three children, two daughters and one son.   Discussed palliative chemo with cis/gem  2. PET scan 12/6/21 did not show extrahepatic metastases.   3. Cis/gem started on 12/7/21. Durvalumab was initially denied by insurance company after GI ASCO, but approved after FDA approval, added 8/10/22. Patient had reaction to durv (wheezing, rigors, shaking, high BP) on 9/9. Imfinzi was stopped. Last chemo " on 10/21/22. MRI 10/19/22 showed progression. Plan was to switched for FOLFOX. Patient was hospitalized for acute appendicitis and strangulated umbilical hernia s/p surgery on 11/2/2022.   4. S/p TACE on 12/16/2021, 2/14/22, 4/19/22, 5/18/22, 8/2/22  5. S/p RF Ablation on 7/19/22. TACE on 9/28/22  6. FOLFOX started on 11/21/22, s/p 6 cycles  7. Restaging MRI showed progression.            Interval History: No acute overnight events. Still planning for inpatient hospice. Patient is comfort care. Blisters on legs developed and treated with topical antibiotic. Nephrology recommended torsemide for fluid overload.     Oncology Treatment Plan:   OP PANC NAB-PACLITAXEL + GEMCITABINE Q4W    Medications:  Continuous Infusions:  Scheduled Meds:   albuterol-ipratropium  3 mL Nebulization Q4H    bacitracin zinc-polymyxin B   Topical (Top) BID    duke's soln (benadryl 30 mL, mylanta 30 mL, LIDOcaine 30 mL, nystatin 30 mL) 120 mL  10 mL Oral QID    finasteride  5 mg Oral Daily    rifAXImin  550 mg Oral BID    tamsulosin  1 capsule Oral Daily    torsemide  80 mg Oral BID loop     PRN Meds:sodium chloride, acetaminophen, HYDROmorphone, LIDOcaine HCL 4%, lorazepam, melatonin, naloxone, ondansetron, sodium chloride 0.9%     Review of Systems   Constitutional:  Positive for activity change and fatigue. Negative for chills and fever.   HENT:  Negative for congestion and sore throat.    Eyes:  Negative for photophobia and pain.   Respiratory:  Negative for cough and shortness of breath.    Cardiovascular:  Positive for leg swelling. Negative for chest pain and palpitations.   Gastrointestinal:  Positive for abdominal distention. Negative for abdominal pain, constipation, diarrhea, nausea and vomiting.   Genitourinary:  Positive for difficulty urinating. Negative for dysuria and hematuria.   Musculoskeletal:  Negative for back pain.   Skin:  Negative for rash.   Neurological:  Negative for light-headedness and headaches.    Hematological:  Does not bruise/bleed easily.   Psychiatric/Behavioral:  Negative for agitation and behavioral problems.    Objective:     Vital Signs (Most Recent):  Temp: 98.4 °F (36.9 °C) (03/28/23 0715)  Pulse: 100 (03/28/23 0801)  Resp: (!) 24 (03/28/23 0837)  BP: (!) 88/49 (03/28/23 0715)  SpO2: 97 % (03/28/23 0801)   Vital Signs (24h Range):  Temp:  [97.6 °F (36.4 °C)-98.4 °F (36.9 °C)] 98.4 °F (36.9 °C)  Pulse:  [] 100  Resp:  [18-34] 24  SpO2:  [93 %-97 %] 97 %  BP: ()/(47-59) 88/49     Weight: 120.7 kg (266 lb)  Body mass index is 40.45 kg/m².  Body surface area is 2.41 meters squared.    No intake or output data in the 24 hours ending 03/28/23 1115      Physical Exam  Constitutional:       Appearance: Normal appearance.   HENT:      Head: Normocephalic and atraumatic.      Nose: Nose normal. No congestion.   Eyes:      General: Scleral icterus present.      Extraocular Movements: Extraocular movements intact.   Cardiovascular:      Rate and Rhythm: Tachycardia present. Rhythm irregular.      Pulses: Normal pulses.      Heart sounds: Normal heart sounds.   Pulmonary:      Effort: Pulmonary effort is normal. No respiratory distress.   Abdominal:      General: There is distension.      Tenderness: There is no abdominal tenderness.   Musculoskeletal:         General: Normal range of motion.      Cervical back: Normal range of motion and neck supple.      Right lower leg: Edema present.      Left lower leg: Edema present.      Comments: 3+ edema    Skin:     General: Skin is warm and dry.   Neurological:      General: No focal deficit present.      Mental Status: He is alert and oriented to person, place, and time.   Psychiatric:         Mood and Affect: Mood normal.         Behavior: Behavior normal.       Significant Labs:   CBC:   Recent Labs   Lab 03/27/23  0936   WBC 6.33   HGB 7.3*   HCT 22.1*   PLT 64*      and CMP:   Recent Labs   Lab 03/27/23  0936      K 3.6      CO2 17*  "  GLU 88   BUN 42*   CREATININE 4.1*   CALCIUM 9.1   PROT 5.2*   ALBUMIN 2.3*   BILITOT 8.2*   ALKPHOS 202*   *   ALT 61*   ANIONGAP 14         Diagnostic Results:  I have reviewed all pertinent imaging results/findings within the past 24 hours.    Assessment/Plan:     * Sepsis  Patient with intrahepatic cholangiocarcinoma here with SOB and weakness   Meets sepsis criteria (Temperature 100.9, WBC 16.2, )  Concern for abdominal source given RUQ pain  CXR with no acute findings   WBC 16.2 and lactate 2.25  No history of ascites or requiring paracentesis, ED unable to find fluid pocket to tap    As patient with worsening renal and liver dysfunction and not a candidate for any more therapy, patient and family agreed to inpatient vs home hospice.    Hyperammonemia  Ammonia 63 on admission   Asterixis present on exam     - Rifaximin BID   - Will hold off on lactulose as patient had 3 loose stools since yesterday     Lower leg edema  Could be due to patients low albumin   EF 60% in December 2021  U/S on 3/13 negative for DVT   Was on lasix outpatient but it was held due to rising creatinine       Plan for inpatient vs home hospice    DAWIT (acute kidney injury)  Cr 2.1 on admission, baseline around 1.3-1.5   His lasix dose was held in the last week due to rising Cr but it continues to increase   Blood pressure soft on admission, possible pre-renal etiology, renal US without hydronephrosis, severely fluid overloaded on exam. Urine studies consistent with pre-renal physiology     - Nephrology consulted, appreciate assistance  - Put on palliative torsemide 80mg BID for fluid overload  - See "Sepsis"    Deep vein thrombosis (DVT) of femoral vein of left lower extremity  Diagnosed June 2022    - Continue home apixaban     Weakness generalized  Likely secondary to malignancy and possible infection     - PT/OT consulted     Hyperbilirubinemia  History of intrahepatic cholangiocarcinoma   Last chemo on 3/23  On " "admission Tbili 7.7, was 2.3 earlier this month   U/S on 3/17 revealed stones and sludge in the gallbladder with no sonographic evidence for acute cholecystitis, as well as multifocal hepatic masses  CT without obstruction , concerning for worsening metastatic disease     See "Sepsis"      Elevated troponin  Troponin 0.035 on admission   ECG with no ischemic changes  Likely demand in setting of infection   No chest pain     Intrahepatic cholangiocarcinoma  - Current Regimen: PANC NAB-PACLITAXEL + GEMCITABINE Q4W  - Last chemo on 3/23  - MRI on 3/7 revealed interval increase in size and number of multiple additional liver lesions, consistent with worsening metastatic disease    Plan for inpatient vs home hospice due to worsening renal and liver function. Patient not a candidate for any more therapy.      CAD in native artery  - Hold statin in setting of elevated Tbili and liver enzymes     RAHEEL (obstructive sleep apnea)  - CPAP nightly              Alex Olvera,   Hematology/Oncology  [facility] (H[facility])    "

## 2023-03-28 NOTE — HPI
Mr. Domonique Kellogg is a 72 y.o. 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 Patients wife states that patient was having difficulty breathing while on his CPAP last night so she woke him up. Patient was a little confused about where he was upon waking. Patient states he has had fatigue/weakness for weeks that has been progressively worsening. He also reports lower leg/abdominal swelling recently that has been worsening. He is on lasix at home but that has been held for almost a week due to a rising creatinine on outside labs. Has some RUQ pain, U/S on 3/17 revealed stones and sludge in the gallbladder with no sonographic evidence for acute cholecystitis, as well as multifocal hepatic masses. He also developed jaundice about two-weeks ago. Denies chest pain, nausea, dysuria or headaches. He was dry heaving last night. Also reports 3 loose stools last night but no diarrhea.      On admission to the ED patient was febrile to 100.9, , /56, and on RA. Cr 2.1 (baseline 1.3-1.5). Tbili 7.7, Alk phos 495, , ALT 57, ammonia 63. WBC 16.2 and lactate 2.25. CXR with no acute findings. VBG with pH 7.34 and pCO2 39.8. Trop 0.035, . Patient given vanc/zosyn and IVF. CT abdomen ordered.     Patient was discharged and readmitted to hospice service.    Previous hospital course:  Patient was admitted to Medical Oncology service for sepsis and DAWIT. He was started on vanc and zosyn with vanc being discontinued on 03/24. Nephrology was consulted for DAWIT and recommended observation without IVF or diuresis. Patient received diuresis, however, renal function continued to worsen. Liver function also worsened throughout hospital stay. Palliative care was consulted. It was discussed that as patient's renal and liver function continue to worsen, patient would be not a candidate for any  more therapy for metastatic intraheptic cholangiocarcinoma. Patient and family agreed to focus on comfort and quality of life. Patient and family agreed to home hospice, however, patient developed Afib with RVR and associated hypotension. Discussed inpatient hospice as concerns that patient may not be safe to travel in ambulance for home. Patient is being transitioned to inpatient hospice.

## 2023-03-28 NOTE — RESPIRATORY THERAPY
RAPID RESPONSE RESPIRATORY CHART CHECK       Chart check completed.  Please call [phone] for further concerns or assistance.

## 2023-03-28 NOTE — H&P
Abdiel Babb - Oncology ([facility])  Hematology/Oncology  H&P    Patient Name: Domonique Kellogg  MRN: [id number]  Admission Date: 3/28/2023  Code Status: DNR   Attending Provider: Caesar Ramesh MD  Primary Care Physician: Nicolas Marlow MD  Principal Problem:Comfort measures only status    Subjective:     HPI: Mr. Domonique Kellogg is a 72 y.o. 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 Patients wife states that patient was having difficulty breathing while on his CPAP last night so she woke him up. Patient was a little confused about where he was upon waking. Patient states he has had fatigue/weakness for weeks that has been progressively worsening. He also reports lower leg/abdominal swelling recently that has been worsening. He is on lasix at home but that has been held for almost a week due to a rising creatinine on outside labs. Has some RUQ pain, U/S on 3/17 revealed stones and sludge in the gallbladder with no sonographic evidence for acute cholecystitis, as well as multifocal hepatic masses. He also developed jaundice about two-weeks ago. Denies chest pain, nausea, dysuria or headaches. He was dry heaving last night. Also reports 3 loose stools last night but no diarrhea.      On admission to the ED patient was febrile to 100.9, , /56, and on RA. Cr 2.1 (baseline 1.3-1.5). Tbili 7.7, Alk phos 495, , ALT 57, ammonia 63. WBC 16.2 and lactate 2.25. CXR with no acute findings. VBG with pH 7.34 and pCO2 39.8. Trop 0.035, . Patient given vanc/zosyn and IVF. CT abdomen ordered.     Patient was discharged and readmitted to hospice service.    Previous hospital course:  Patient was admitted to Medical Oncology service for sepsis and DAWIT. He was started on vanc and zosyn with vanc being discontinued on 03/24. Nephrology was consulted for DAWIT and recommended  "observation without IVF or diuresis. Patient received diuresis, however, renal function continued to worsen. Liver function also worsened throughout hospital stay. Palliative care was consulted. It was discussed that as patient's renal and liver function continue to worsen, patient would be not a candidate for any more therapy for metastatic intraheptic cholangiocarcinoma. Patient and family agreed to focus on comfort and quality of life. Patient and family agreed to home hospice, however, patient developed Afib with RVR and associated hypotension. Discussed inpatient hospice as concerns that patient may not be safe to travel in ambulance for home. Patient is being transitioned to inpatient hospice.       Oncology Treatment Plan:   OP PANC NAB-PACLITAXEL + GEMCITABINE Q4W    Medications:  Continuous Infusions:  Scheduled Meds:   [START ON 3/29/2023] tamsulosin  1 capsule Oral Daily     PRN Meds:LORazepam, morphine, prochlorperazine, sodium chloride 0.9%     Review of patient's allergies indicates:   Allergen Reactions    Imfinzi [durvalumab] Other (See Comments)     1305- Imfinzi ended, pt returned from restroom complained of wheezing SOB and rigors. /100  O2 92% 2L NC O2 applied to pt.   1306-Demerol 25mg given and Solucortef 125mg IVPush given.   1309- Pt /106  02 99%.   1318- Pt still having Rigors additional 25mg of Demerol given.   1418- BP is 133/69 77 HR 99% RA Pt states " I feel much better"       Indomethacin Other (See Comments)     Headache dizziness      Adhesive Rash    Colchicine analogues Nausea And Vomiting     NVD        Past Medical History:   Diagnosis Date    Acute kidney injury 3/16/2023    Adjustment disorder     Anticoagulant long-term use     Anxiety     Arthritis     CAD (coronary artery disease) 03/02/2015    [facility]ob[facility]     Cataract     Class 1 obesity with serious comorbidity in adult 3/17/2014    Dry eye syndrome     Hypertension     " 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/2021    Obesity     Post-procedural fever 12/18/2021    Seizures     2011    Sleep apnea     + CPAP    Sleep difficulties      Past Surgical History:   Procedure Laterality Date    ANTERIOR CERVICAL DISCECTOMY W/ FUSION N/A 07/06/2020    Procedure: DISCECTOMY, SPINE, CERVICAL, ANTERIOR APPROACH, WITH FUSION C3-4, C4-5;  Surgeon: Fabiola Vides MD;  [facility];  Service: Neurosurgery;  Laterality: N/A;  TORONTO III, ASA III, BLOOD TYPE & SCREEN, NEUROMONITORING EMG-MEP-SEP, SUPINE POSITION,BRACE MIAMI,REGULAR BED, HEADREST CASPAR, POSITION, MAP>85, RADIOLOGY C-ARM, SPECIAL EQUIPMENT [facility]    COLONOSCOPY N/A 10/01/2021    Procedure: COLONOSCOPY;  Surgeon: Nicolas Alvarado MD;  Location: [facility] (4TH FLR);  Service: Endoscopy;  Laterality: N/A;  EGD and colonoscopy for diarrhea and weight loss and Enlarged, heterogeneous appearance of the liver likely due to multiple underlying hepatic lesions largest measuring 4.8 cm.  Findings concerning for metastatic versus less likely primary hepatic neoplasm.  Recommend fur    COLONOSCOPY N/A 06/07/2022    Procedure: COLONOSCOPY;  Surgeon: Mejia Villafuerte MD;  Location: [facility] (4TH FLR);  Service: Endoscopy;  Laterality: N/A;  For evaluation of positive out-patient FOBT.   medically urgent  ok to hold Eliquis per CAROL[place] Edward/RB  fully vaccinated  hx of seizures- last one 2014    ESOPHAGOGASTRODUODENOSCOPY N/A 10/01/2021    Procedure: EGD (ESOPHAGOGASTRODUODENOSCOPY);  Surgeon: Nicolas Alvarado MD;  [facility]  Service: Endoscopy;  Laterality: N/A;  EGD and colonoscopy for diarrhea and weight loss and Enlarged, heterogeneous appearance of the liver likely due to multiple underlying hepatic lesions largest measuring 4.8 cm.  Findings concerning for metastatic versus less likely primary hepatic neopl    ESOPHAGOGASTRODUODENOSCOPY N/A 04/05/2022    Procedure: EGD  (ESOPHAGOGASTRODUODENOSCOPY);  Surgeon: Amado Kelsey MD;  Location: [facility] (4TH FLR);  Service: Endoscopy;  Laterality: N/A;  EGD in 8 weeks to check for esophagitis healing patient should be on pantoprazole 40 mg once daily  ok to hold eliquis x2 days per Dr. Young, see telephone encounter  fully vaccinated    EYE SURGERY      cataract    INSERTION OF VENOUS ACCESS PORT N/A 12/03/2021    Procedure: INSERTION, VENOUS ACCESS PORT;  Surgeon: Saurav Garcia MD;  Location: [facility] OR 2ND FLR;  Service: General;  Laterality: N/A;    INTRAOCULAR PROSTHESES INSERTION Right 07/16/2018    Procedure: INSERTION-INTRAOCULAR LENS (IOL);  Surgeon: Priscilla Hyas MD;  Location: [facility];  Service: Ophthalmology;  Laterality: Right;    INTRAOCULAR PROSTHESES INSERTION Left 08/13/2018    Procedure: INSERTION, INTRAOCULAR LENS PROSTHESIS;  Surgeon: Priscilla Hays MD;  Location: [facility];  Service: Ophthalmology;  Laterality: Left;    KNEE SURGERY Right     LAPAROSCOPIC APPENDECTOMY N/A 11/2/2022    Procedure: APPENDECTOMY, LAPAROSCOPIC;  Surgeon: Cheo Hamm MD;  Location: [facility] OR 2ND FLR;  Service: General;  Laterality: N/A;    PHACOEMULSIFICATION OF CATARACT Right 07/16/2018    Procedure: PHACOEMULSIFICATION-ASPIRATION-CATARACT;  Surgeon: Priscilla Hays MD;  Location: [facility];  Service: Ophthalmology;  Laterality: Right;    PHACOEMULSIFICATION OF CATARACT Left 08/13/2018    Procedure: PHACOEMULSIFICATION, CATARACT;  Surgeon: Priscilla Hays MD;  Location: [facility];  Service: Ophthalmology;  Laterality: Left;    REPAIR OF INCARCERATED UMBILICAL HERNIA  11/2/2022    Procedure: REPAIR, HERNIA, UMBILICAL, INCARCERATED, AGE 5 YEARS OR OLDER;  Surgeon: Cheo Hamm MD;  Location: [facility] OR 2ND FLR;  Service: General;;    WISDOM TOOTH EXTRACTION       Family History       Problem Relation (Age of Onset)    Cancer Mother, Maternal Grandmother    Diabetes Mother    Heart disease Father, Paternal Grandfather, Brother     Hypertension Mother    Kidney cancer Mother (61)    Liver disease Maternal Grandmother    No Known Problems Sister, Daughter, Son, Sister, Sister, Sister, Daughter          Tobacco Use    Smoking status: Former     Packs/day: 1.00     Years: 20.00     Pack years: 20.00     Types: Cigarettes     Quit date: 1987     Years since quittin.2    Smokeless tobacco: Never    Tobacco comments:     quit    Substance and Sexual Activity    Alcohol use: Not Currently     Alcohol/week: 1.0 - 2.0 standard drink     Types: 1 - 2 Glasses of wine per week     Comment: not since liver diagnosis    Drug use: No    Sexual activity: Yes     Partners: Female       Review of Systems   Constitutional:  Positive for fatigue.   HENT:  Negative for congestion.    Respiratory:  Positive for shortness of breath.    Gastrointestinal:  Negative for abdominal pain.   Skin:  Positive for wound.   Neurological:  Negative for dizziness and headaches.   Objective:     Vital Signs (Most Recent):    Vital Signs (24h Range):  Temp:  [98 °F (36.7 °C)-98.4 °F (36.9 °C)] 98.4 °F (36.9 °C)  Pulse:  [] 107  Resp:  [18-24] 18  SpO2:  [93 %-97 %] 97 %  BP: (88-93)/(49-52) 88/49        There is no height or weight on file to calculate BMI.  There is no height or weight on file to calculate BSA.    No intake or output data in the 24 hours ending 23 1725    Physical Exam  Constitutional:       Appearance: He is ill-appearing.      Comments: Patient is significantly fluid overloaded with weeping edema.   HENT:      Head: Normocephalic.      Nose: Nose normal.      Mouth/Throat:      Mouth: Mucous membranes are moist.   Eyes:      Extraocular Movements: Extraocular movements intact.      Conjunctiva/sclera: Conjunctivae normal.      Pupils: Pupils are equal, round, and reactive to light.   Cardiovascular:      Rate and Rhythm: Tachycardia present. Rhythm irregular.      Pulses: Normal pulses.      Heart sounds: No murmur  heard.  Pulmonary:      Effort: Respiratory distress present.      Breath sounds: No wheezing, rhonchi or rales.   Abdominal:      General: Abdomen is flat. Bowel sounds are normal. There is distension.      Palpations: Abdomen is soft.      Tenderness: There is no abdominal tenderness.   Musculoskeletal:      Right lower leg: Edema present.      Left lower leg: Edema present.      Comments: Severe weeping edema   Skin:     General: Skin is warm.      Findings: Lesion present.      Comments: Blistering bullae on inner thighs.   Neurological:      Mental Status: He is disoriented.       Significant Labs:   CBC:   Recent Labs   Lab 03/27/23  0936   WBC 6.33   HGB 7.3*   HCT 22.1*   PLT 64*    and CMP:   Recent Labs   Lab 03/27/23  0936      K 3.6      CO2 17*   GLU 88   BUN 42*   CREATININE 4.1*   CALCIUM 9.1   PROT 5.2*   ALBUMIN 2.3*   BILITOT 8.2*   ALKPHOS 202*   *   ALT 61*   ANIONGAP 14       Diagnostic Results:  I have reviewed all pertinent imaging results/findings within the past 24 hours.    Assessment/Plan:     * Comfort measures only status  Plan for inpatient vs home hospice due to worsening renal and liver function. Patient not a candidate for any more therapy.    -Palliative care order sets placed  -Morphine as needed for pain and dyspnea  -Ativan as needed for anxiety   -Supportive care as needed    Acute hypoxemic respiratory failure  -Oxygen as needed  -morphine for dyspnea      Intrahepatic cholangiocarcinoma  Plan for inpatient vs home hospice due to worsening renal and liver function. Patient not a candidate for any more therapy.        Alex Olvera,   Hematology/Oncology  Abdiel Babb - Oncology ([facility])

## 2023-03-28 NOTE — CHAPLAIN
Palliative Care     Patient: Domonique Kellogg       MRN: [id number]  : 1950  Age: 72 y.o.  Hospital Length of Stay: 4 days  Code Status: DNR   Attending Provider: Caesar Ramesh MD  Principal Problem: Sepsis    Present during Interview:  Visited pall me pt again today, he was trying to sleep and his daughter, José Miguel, and his brother, Claude, were beside.    Non-clinical observations:  Pt dozing in and out and on bi-pap; He acknowledged me.    Facts (Spiritual Perception of Illness):   Pt declining and now qualifies for inpatient hospice. Met the wife Estrellita yesterday.    Feelings (Emotions/Fears/Experiences/Coping):      Unable to assess pt since he was on bi-pap and half asleep; it seemed daughter and brother were in deep conversation and they said pt wanted to rest, so I didn't stay long to delve into their anticipatory grief. Daughter asked for my card.     Annemarie (Beliefs/Meaning/Philosophy):  Pt and family have strong [employer] annemarie and I prayed with them yesterday.    Future (Spiritual Care Plan of Action):  Palliative  will return to visit later and most likely pt will be in a new room for inpatient hospice; goal to speak with family more as well as pt, to provide emotional and spiritual support. Lord, in your mercy.    In Peace,  Rev. Twila Stone MBA, MDiv

## 2023-03-28 NOTE — ASSESSMENT & PLAN NOTE
Plan for inpatient vs home hospice due to worsening renal and liver function. Patient not a candidate for any more therapy.    -Palliative care order sets placed  -Morphine as needed for pain and dyspnea  -Ativan as needed for anxiety   -Supportive care as needed

## 2023-03-28 NOTE — ASSESSMENT & PLAN NOTE
Antibiotics given-   Antibiotics (72h ago, onward)    Start     Stop Route Frequency Ordered    03/28/23 0945  bacitracin zinc-polymyxin B ointment         -- Top 2 times daily 03/28/23 0831    03/24/23 0900  rifAXIMin tablet 550 mg         -- Oral 2 times daily 03/24/23 6175        -Plan per primary team

## 2023-03-28 NOTE — SUBJECTIVE & OBJECTIVE
"Oncology Treatment Plan:   OP PANC NAB-PACLITAXEL + GEMCITABINE Q4W    Medications:  Continuous Infusions:  Scheduled Meds:   [START ON 3/29/2023] tamsulosin  1 capsule Oral Daily     PRN Meds:LORazepam, morphine, prochlorperazine, sodium chloride 0.9%     Review of patient's allergies indicates:   Allergen Reactions    Imfinzi [durvalumab] Other (See Comments)     1305- Imfinzi ended, pt returned from restroom complained of wheezing SOB and rigors. /100  O2 92% 2L NC O2 applied to pt.   1306-Demerol 25mg given and Solucortef 125mg IVPush given.   1309- Pt /106  02 99%.   1318- Pt still having Rigors additional 25mg of Demerol given.   1418- BP is 133/69 77 HR 99% RA Pt states " I feel much better"       Indomethacin Other (See Comments)     Headache dizziness      Adhesive Rash    Colchicine analogues Nausea And Vomiting     NVD        Past Medical History:   Diagnosis Date    Acute kidney injury 3/16/2023    Adjustment disorder     Anticoagulant long-term use     Anxiety     Arthritis     CAD (coronary artery disease) 03/02/2015    non-obstructive per [facility]     Cataract     Class 1 obesity with serious comorbidity in adult 3/17/2014    Dry eye syndrome     Hypertension     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/2021    Obesity     Post-procedural fever 12/18/2021    Seizures     2011    Sleep apnea     + CPAP    Sleep difficulties      Past Surgical History:   Procedure Laterality Date    ANTERIOR CERVICAL DISCECTOMY W/ FUSION N/A 07/06/2020    Procedure: DISCECTOMY, SPINE, CERVICAL, ANTERIOR APPROACH, WITH FUSION C3-4, C4-5;  Surgeon: Fabiola Vides MD;  Location: [facility] OR [address] [place] [address];  Service: Neurosurgery;  Laterality: N/A;  TORONTO III, ASA III, BLOOD TYPE & SCREEN, NEUROMONITORING EMG-MEP-SEP, SUPINE POSITION,BRACE MIAMI,REGULAR BED, HEADREST CASPAR, POSITION, MAP>85, RADIOLOGY C-ARM, SPECIAL EQUIPMENT VIRGIL TYLER    COLONOSCOPY N/A 10/01/2021    Procedure: COLONOSCOPY;  Surgeon: Nicolas TSANG" MD Christiano;  Location: [facility] (4TH FLR);  Service: Endoscopy;  Laterality: N/A;  EGD and colonoscopy for diarrhea and weight loss and Enlarged, heterogeneous appearance of the liver likely due to multiple underlying hepatic lesions largest measuring 4.8 cm.  Findings concerning for metastatic versus less likely primary hepatic neoplasm.  Recommend furth    COLONOSCOPY N/A 06/07/2022    Procedure: COLONOSCOPY;  Surgeon: Mejia Villafuerte MD;  Location: [facility] (4TH FLR);  Service: Endoscopy;  Laterality: N/A;  For evaluation of positive out-patient FOBT.   medically urgent  ok to hold Eliquis per [place] Edward/RB  fully vaccinated  hx of seizures- last one 2014    ESOPHAGOGASTRODUODENOSCOPY N/A 10/01/2021    Procedure: EGD (ESOPHAGOGASTRODUODENOSCOPY);  Surgeon: Nicolas Alvarado MD;  Location: [facility] (4TH FLR);  Service: Endoscopy;  Laterality: N/A;  EGD and colonoscopy for diarrhea and weight loss and Enlarged, heterogeneous appearance of the liver likely due to multiple underlying hepatic lesions largest measuring 4.8 cm.  Findings concerning for metastatic versus less likely primary hepatic neopl    ESOPHAGOGASTRODUODENOSCOPY N/A 04/05/2022    Procedure: EGD (ESOPHAGOGASTRODUODENOSCOPY);  Surgeon: Amado Kelsey MD;  Location: [facility] (4TH FLR);  Service: Endoscopy;  Laterality: N/A;  EGD in 8 weeks to check for esophagitis healing patient should be on pantoprazole 40 mg once daily  ok to hold eliquis x2 days per Dr. Young, see telephone encounter  fully vaccinated    EYE SURGERY      cataract    INSERTION OF VENOUS ACCESS PORT N/A 12/03/2021    Procedure: INSERTION, VENOUS ACCESS PORT;  Surgeon: Saurav Garcia MD;  Location: [facility] 2ND FLR;  Service: General;  Laterality: N/A;    INTRAOCULAR PROSTHESES INSERTION Right 07/16/2018    Procedure: INSERTION-INTRAOCULAR LENS (IOL);  Surgeon: Priscilla Hays MD;  [facility];  Service: Ophthalmology;  Laterality: Right;    INTRAOCULAR PROSTHESES  INSERTION Left 2018    Procedure: INSERTION, INTRAOCULAR LENS PROSTHESIS;  Surgeon: Priscilla Hays MD;  Location: [facility] OR;  Service: Ophthalmology;  Laterality: Left;    KNEE SURGERY Right     LAPAROSCOPIC APPENDECTOMY N/A 2022    Procedure: APPENDECTOMY, LAPAROSCOPIC;  Surgeon: Choe Hamm MD;  Location: [facility] OR 2ND FLR;  Service: General;  Laterality: N/A;    PHACOEMULSIFICATION OF CATARACT Right 2018    Procedure: PHACOEMULSIFICATION-ASPIRATION-CATARACT;  Surgeon: Priscilla Hays MD;  Location: [facility] OR;  Service: Ophthalmology;  Laterality: Right;    PHACOEMULSIFICATION OF CATARACT Left 2018    Procedure: PHACOEMULSIFICATION, CATARACT;  Surgeon: Priscilla Hays MD;  Location: [facility] OR;  Service: Ophthalmology;  Laterality: Left;    REPAIR OF INCARCERATED UMBILICAL HERNIA  2022    Procedure: REPAIR, HERNIA, UMBILICAL, INCARCERATED, AGE 5 YEARS OR OLDER;  Surgeon: Cheo Hamm MD;  Location: [facility] OR 2ND FLR;  Service: General;;    WISDOM TOOTH EXTRACTION       Family History       Problem Relation (Age of Onset)    Cancer Mother, Maternal Grandmother    Diabetes Mother    Heart disease Father, Paternal Grandfather, Brother    Hypertension Mother    Kidney cancer Mother (61)    Liver disease Maternal Grandmother    No Known Problems Sister, Daughter, Son, Sister, Sister, Sister, Daughter          Tobacco Use    Smoking status: Former     Packs/day: 1.00     Years: 20.00     Pack years: 20.00     Types: Cigarettes     Quit date: 1987     Years since quittin.2    Smokeless tobacco: Never    Tobacco comments:     quit    Substance and Sexual Activity    Alcohol use: Not Currently     Alcohol/week: 1.0 - 2.0 standard drink     Types: 1 - 2 Glasses of wine per week     Comment: not since liver diagnosis    Drug use: No    Sexual activity: Yes     Partners: Female       Review of Systems   Constitutional:  Positive for fatigue.   HENT:  Negative for congestion.     Respiratory:  Positive for shortness of breath.    Gastrointestinal:  Negative for abdominal pain.   Skin:  Positive for wound.   Neurological:  Negative for dizziness and headaches.   Objective:     Vital Signs (Most Recent):    Vital Signs (24h Range):  Temp:  [98 °F (36.7 °C)-98.4 °F (36.9 °C)] 98.4 °F (36.9 °C)  Pulse:  [] 107  Resp:  [18-24] 18  SpO2:  [93 %-97 %] 97 %  BP: (88-93)/(49-52) 88/49        There is no height or weight on file to calculate BMI.  There is no height or weight on file to calculate BSA.    No intake or output data in the 24 hours ending 03/28/23 1725    Physical Exam  Constitutional:       Appearance: He is ill-appearing.      Comments: Patient is significantly fluid overloaded with weeping edema.   HENT:      Head: Normocephalic.      Nose: Nose normal.      Mouth/Throat:      Mouth: Mucous membranes are moist.   Eyes:      Extraocular Movements: Extraocular movements intact.      Conjunctiva/sclera: Conjunctivae normal.      Pupils: Pupils are equal, round, and reactive to light.   Cardiovascular:      Rate and Rhythm: Tachycardia present. Rhythm irregular.      Pulses: Normal pulses.      Heart sounds: No murmur heard.  Pulmonary:      Effort: Respiratory distress present.      Breath sounds: No wheezing, rhonchi or rales.   Abdominal:      General: Abdomen is flat. Bowel sounds are normal. There is distension.      Palpations: Abdomen is soft.      Tenderness: There is no abdominal tenderness.   Musculoskeletal:      Right lower leg: Edema present.      Left lower leg: Edema present.      Comments: Severe weeping edema   Skin:     General: Skin is warm.      Findings: Lesion present.      Comments: Blistering bullae on inner thighs.   Neurological:      Mental Status: He is disoriented.       Significant Labs:   CBC:   Recent Labs   Lab 03/27/23  0936   WBC 6.33   HGB 7.3*   HCT 22.1*   PLT 64*    and CMP:   Recent Labs   Lab 03/27/23  0936      K 3.6      CO2  17*   GLU 88   BUN 42*   CREATININE 4.1*   CALCIUM 9.1   PROT 5.2*   ALBUMIN 2.3*   BILITOT 8.2*   ALKPHOS 202*   *   ALT 61*   ANIONGAP 14       Diagnostic Results:  I have reviewed all pertinent imaging results/findings within the past 24 hours.

## 2023-03-28 NOTE — ASSESSMENT & PLAN NOTE
"Cr 2.1 on admission, baseline around 1.3-1.5   His lasix dose was held in the last week due to rising Cr but it continues to increase   Blood pressure soft on admission, possible pre-renal etiology, renal US without hydronephrosis, severely fluid overloaded on exam. Urine studies consistent with pre-renal physiology     - Nephrology consulted, appreciate assistance  - Put on palliative torsemide 80mg BID for fluid overload  - See "Sepsis"  "

## 2023-03-28 NOTE — PROGRESS NOTES
"[facility]diel [facility] - Oncology ([facility])  Nephrology  Progress Note    Patient Name: Domonique Kellogg  MRN: [id number]  Admission Date: 3/24/2023  Hospital Length of Stay: 4 days  Attending Provider: Caesar Ramesh MD   Primary Care Physician: Nicolas Marlow MD  Principal Problem:Sepsis    Subjective:     HPI:   Domonique Kellogg is a 72 y.o. 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 [place] like "someone standing on chest." On chemo for liver cancer. Wife reports he is disoriented.      Chemotherapy was stated on 12/2021 with Cis/gem, durvalumab was added on 8/10 but was stopped on 9/9/2022 d/t reaction.   S/p TACE12/21 to 9/28/2022, he had RF ablation 7/19/2022, and 11/21/2022 FOLFOX x6 cycles (please refer to oncology outpatient notes for full hx of chemotherapy)  Pt was seen as an urgent care visit on 3/16/2023 for weakness, LE swelling and he was treated with IVF 500mls, lasix was held (previously ordered for LE edema), pt was asked to f/u with cardiology and neurology.   Pts last chemo infusion was on 3/23/2023: PACLITAXEL & GEMCITABINE      Pt's family noticed pt having difficulty breathing, he was short of breath, he does use a CPAP but despite that he was feeling short of breath. He did not have chills or fever, he also did not take his blood pressure at home and so unable to tell if his blood pressure was low. He reports overall good appetite and was starting to increase hydration since he received chemotherapy.     In the " "ED, pt presented with the following VS:   Initial Vitals [03/24/23 0111]   BP Pulse Resp Temp SpO2   (!) 107/56 (!) 123 20 (!) 100.9 °F (38.3 °C) 95 %      MAP       --         His labs showed WBC 10.2, hgb 8.8, plts 150, bicarb 21, calcium 9.7, alk phos 515, albumin 2, t. Bili 5.9, , ALT 49, ammonia 63, and CA 19-9 was 907  A CT abd and pelvis w/o contrast showed:   1. Moderate wall thickening of the cecum and proximal ascending colon, possibly infectious/inflammatory colitis.  Neoplasm not excluded.  No organized fluid collections.  2. No definite evidence of cholangitis noting limitations without the use of intravenous contrast.  3. Prominent right hepatic lobe mass with multiple satellite lesions in keeping with known cholangiocarcinoma.  4. Stable 1.1 cm teresa hepatis lymph node.  No new lymphadenopathy.  5. Small volume ascites.  Pt was started on vancomycin, ceftriaxone, and also received 1.5L of IVF    Nephrology was consulted for: "History of intrahepatic cholangiocarcinoma on chemo. Here with concerns of infection and has a DAWIT. Lasix was held outpatient and Cr increasing. BP soft on admission"  Baseline sCr: 1.2-1.4  Admission sCr: 1.8    Pt does not endorse a history of kidney stones, chronic NSAID use, trauma to the kidneys or bladder.   As for a family history, pt denies family history of kidney diease including family members on dialysis, autoimmune diseases, kidney stones or cancer.    Creatinine   Date Value Ref Range Status   03/24/2023 2.1 (H) 0.5 - 1.4 mg/dL Final   03/22/2023 1.8 (H) 0.5 - 1.4 mg/dL Final   03/16/2023 1.8 (H) 0.5 - 1.4 mg/dL Final   03/07/2023 1.3 0.5 - 1.4 mg/dL Final   02/22/2023 1.4 0.5 - 1.4 mg/dL Final     BUN   Date Value Ref Range Status   03/24/2023 21 8 - 23 mg/dL Final   03/22/2023 20 8 - 23 mg/dL Final   03/16/2023 16 8 - 23 mg/dL Final   03/07/2023 20 8 - 23 mg/dL Final   02/22/2023 25 (H) 8 - 23 mg/dL Final       Interval History:   Pt alert and oriented x3, " "wife at bedside.  Pt had afib w/ RVR yesterday, grossly fluid overloaded and has developed blisters on inner thighs 2/2 volume overload. Remains hypotensive with MAPs in the 60s' has increase oxygen requirements of 3L via NC  Awaiting inpatient hospice      Review of patient's allergies indicates:   Allergen Reactions    Imfinzi [durvalumab] Other (See Comments)     1305- Imfinzi ended, pt returned from restroom complained of wheezing SOB and rigors. /100  O2 92% 2L NC O2 applied to pt.   1306-Demerol 25mg given and Solucortef 125mg IVPush given.   1309- Pt /106  02 99%.   1318- Pt still having Rigors additional 25mg of Demerol given.   1418- BP is 133/69 77 HR 99% RA Pt states " I feel much better"       Indomethacin Other (See Comments)     Headache dizziness      Adhesive Rash    Colchicine analogues Nausea And Vomiting     NVD     Current Facility-Administered Medications   Medication Frequency    0.9%  NaCl infusion (for blood administration) Q24H PRN    acetaminophen tablet 650 mg Q8H PRN    albuterol-ipratropium 2.5 mg-0.5 mg/3 mL nebulizer solution 3 mL Q4H    apixaban tablet 5 mg BID    bacitracin zinc-polymyxin B ointment BID    dextrose 10% bolus 125 mL 125 mL PRN    dextrose 10% bolus 250 mL 250 mL PRN    dextrose 40 % gel 15,000 mg PRN    dextrose 40 % gel 30,000 mg PRN    duke's soln (benadryl 30 mL, mylanta 30 mL, LIDOcaine 30 mL, nystatin 30 mL) 120 mL QID    DULoxetine DR capsule 20 mg Daily    finasteride tablet 5 mg Daily    glucagon (human recombinant) injection 1 mg PRN    HYDROmorphone injection 1 mg Q4H PRN    LIDOcaine HCL 4% topical solution PRN    LORazepam injection 1 mg Q2H PRN    melatonin tablet 6 mg Nightly PRN    midodrine tablet 15 mg Q8H    naloxone 0.4 mg/mL injection 0.02 mg PRN    ondansetron injection 4 mg Q8H PRN    rifAXIMin tablet 550 mg BID    sodium chloride 0.9% flush 10 mL Q12H PRN    tamsulosin 24 hr capsule 0.4 mg Daily "       Objective:     Vital Signs (Most Recent):  Temp: 98.4 °F (36.9 °C) (03/28/23 0715)  Pulse: 100 (03/28/23 0801)  Resp: (!) 24 (03/28/23 0837)  BP: (!) 88/49 (03/28/23 0715)  SpO2: 97 % (03/28/23 0801)   Vital Signs (24h Range):  Temp:  [97.6 °F (36.4 °C)-98.4 °F (36.9 °C)] 98.4 °F (36.9 °C)  Pulse:  [] 100  Resp:  [18-34] 24  SpO2:  [93 %-97 %] 97 %  BP: ()/(47-59) 88/49     Weight: 120.7 kg (266 lb) (03/27/23 1320)  Body mass index is 40.45 kg/m².  Body surface area is 2.41 meters squared.    I/O last 3 completed shifts:  In: 410 [P.O.:410]  Out: -     Physical Exam  Vitals and nursing note reviewed.   Constitutional:       General: He is not in acute distress.     Appearance: Normal appearance. He is ill-appearing. He is not toxic-appearing or diaphoretic.   HENT:      Mouth/Throat:      Mouth: Mucous membranes are moist.   Cardiovascular:      Rate and Rhythm: Normal rate and regular rhythm.      Pulses: Normal pulses.      Heart sounds: Murmur heard.   Pulmonary:      Effort: No respiratory distress.      Breath sounds: No stridor. Rales present. No wheezing or rhonchi.      Comments: tachypnea  Abdominal:      General: Bowel sounds are normal. There is distension.      Palpations: Abdomen is soft. There is no mass.      Tenderness: There is no abdominal tenderness. There is no guarding or rebound.      Hernia: No hernia is present.   Musculoskeletal:         General: Swelling present. No tenderness, deformity or signs of injury.      Right lower leg: Edema present.      Left lower leg: Edema present.      Comments: 3+ pitting edema hands/arms & legs    Skin:     General: Skin is warm.      Capillary Refill: Capillary refill takes 2 to 3 seconds.      Coloration: Skin is jaundiced. Skin is not pale.      Findings: Lesion present. No bruising, erythema or rash.      Comments: Fluid filled blisters on thighs   Neurological:      Mental Status: He is alert and oriented to person, place, and time.    Psychiatric:         Mood and Affect: Mood normal.         Behavior: Behavior normal.         Thought Content: Thought content normal.         Judgment: Judgment normal.       Significant Labs:  CBC:   Recent Labs   Lab 03/27/23  0936   WBC 6.33   RBC 2.31*   HGB 7.3*   HCT 22.1*   PLT 64*   MCV 96   MCH 31.6*   MCHC 33.0       CMP:   Recent Labs   Lab 03/27/23  0936   GLU 88   CALCIUM 9.1   ALBUMIN 2.3*   PROT 5.2*      K 3.6   CO2 17*      BUN 42*   CREATININE 4.1*   ALKPHOS 202*   ALT 61*   *   BILITOT 8.2*       All labs within the past 24 hours have been reviewed.     Significant Imaging:  Labs: Reviewed    Assessment/Plan:     Renal/  DAWIT (acute kidney injury)  Multifactorial etiology for DAWIT: nephrotoxicity from chemotherapy agents (3/23), hypotensive episodes (probably prior to admission) and maybe related to sepsis as pt was febrile, in addition pt was exposed to vancomycin all this in the background of an enlarged prostate compressing the bladder.   Pt does have h/o CKD stage 2 since approx 7/2022    Baseline sCr: 1.2-1.4  Admission sCr: 1.8  Lab Results   Component Value Date    CREATININE 4.1 (H) 03/27/2023    CREATININE 2.9 (H) 03/26/2023    CREATININE 2.5 (H) 03/25/2023       Recommendations:   -severely overload, start 80mg of torsemide BID with titration as needed   -no labs this morning, awaiting inpatient hospice placement    ID  * Sepsis  Antibiotics given-   Antibiotics (72h ago, onward)    Start     Stop Route Frequency Ordered    03/28/23 0945  bacitracin zinc-polymyxin B ointment         -- Top 2 times daily 03/28/23 0831    03/24/23 0900  rifAXIMin tablet 550 mg         -- Oral 2 times daily 03/24/23 0144        -Plan per primary team       Hematology  Deep vein thrombosis (DVT) of femoral vein of left lower extremity  On Eliquis    Oncology  Intrahepatic cholangiocarcinoma  Plant per heme/onc      Other  Lower leg edema  Severely fluid overload, cont diuretics, see  DAWIT        Thank you for your consult. I will sign off. Please contact us if you have any additional questions.    Anisha Hernandez MD  Nephrology  [facility] - Oncology ([facility])

## 2023-03-28 NOTE — TELEPHONE ENCOUNTER
"----- Message from Romeo Kirkpatrick sent at 3/28/2023 11:17 AM CDT -----  Consult/Advisory:          Name Of Caller: Estrellita Kellogg (Spouse)       Contact Preference?:  [phone] (Mobile)      Provider Name: Hector      Does patient feel the need to be seen today? No      What is the nature of the call?: Calling to speak w/ nurse about Lazek medication being administered. Also inquiring about pt's care before going into hospice        Additional Notes: Stating the matter is very urgent      "Thank you for all that you do for our patients"      "

## 2023-03-28 NOTE — ASSESSMENT & PLAN NOTE
Multifactorial etiology for DAWIT: nephrotoxicity from chemotherapy agents (3/23), hypotensive episodes (probably prior to admission) and maybe related to sepsis as pt was febrile, in addition pt was exposed to vancomycin all this in the background of an enlarged prostate compressing the bladder.   Pt does have h/o CKD stage 2 since approx 7/2022    Baseline sCr: 1.2-1.4  Admission sCr: 1.8  Lab Results   Component Value Date    CREATININE 4.1 (H) 03/27/2023    CREATININE 2.9 (H) 03/26/2023    CREATININE 2.5 (H) 03/25/2023       Recommendations:   -severely overload, start 80mg of torsemide BID with titration as needed   -no labs this morning, awaiting inpatient hospice placement

## 2023-03-29 NOTE — PROGRESS NOTES
Resident physician Rubia CRANE MD, rounded on patient at around 0100, plan of care discussed with son, patient participated in conversation. New orders placed for pain management and nausea.

## 2023-03-29 NOTE — ASSESSMENT & PLAN NOTE
Plan for inpatient vs home hospice due to worsening renal and liver function. Patient not a candidate for any more therapy.    -Palliative care order sets placed  -Dilaudid as needed for pain and dyspnea  -Ativan as needed for anxiety   -Supportive care as needed  -Consider high dose IV lasix for fluid management

## 2023-03-29 NOTE — CHAPLAIN
Palliative Care     Patient: Domonique Kellogg       MRN: [id number]  : 1950  Age: 72 y.o.  Hospital Length of Stay: 1 days  Code Status: DNR   Attending Provider: Caesar Ramesh MD  Principal Problem: Comfort measures only status    Present during Interview/Facts:  Visited palliative med pt who is officially on comfort care measures only/hospice; 4 family members present, including wife Estrellita.    Non-clinical observations:  Pt lying in bed, lights out, pt seemed comfortable, opened his eyes on and off; gospel music playing.    Feelings (Emotions/Fears/Experiences/Coping):      Unable to assess from pt; family appropriately saddened with anticipatory grief, but grounded. Family immediately asked for me to pray as we all circled around pt and held hands. Provided compassionate presence and silence; then gave them space to be together, hugged the mom, told them I'd [place] back later.  Asked nurse to order bereavement bowl for them and gave her my card.     Annemarie (Beliefs/Meaning/Philosophy):  Strong Confucianism annemarie.    Future (Spiritual Care Plan of Action):  Palliative  will continue to follow closely. Lord, in your mercy.  \  In Peace,  Rev. Twila Stone MBA, MDiv

## 2023-03-29 NOTE — CHAPLAIN
"Palliative Care     Patient: Domonique Kellogg       MRN: [id number]  : 1950  Age: 72 y.o.  Hospital Length of Stay: 1 days  Code Status: DNR   Attending Provider: Caesar Ramesh MD  Principal Problem: Comfort measures only status    Present during Interview:  Returned this afternoon to visit pall me/actively dying pt and his beloved wife and two daughters bedside. This visit about 45 minutes.    Non-clinical observations:  Pt resting, after nurses cleaned him up; seems comfortable after cleaning and pain meds. Steady breathing.    Feelings/Annemarie/Family  (Emotions/Fears/Experiences/Coping):      Wife said she's good-- calm-- and said her  told her he's at peace as well-- "I know where I'm going". Pt also told wife glimpses of heaven he's seen- he wanted to type it on the computer and print it out-- how it was amazing. Wife said "tell me!" And that's when pt said no, "He won't let me." Honored wife shared that with me, as well as other out-of-this-world spiritual experiences her other family members have had.    Daughters and wife sharing stories about pt, laughing, tearing up, sharing pictures on their phones. Family very appreciative of the nurse and  a part of their sharing.    Future (Spiritual Care Plan of Action):  Palliative  will continue to follow closely. Lord, in your mercy.    In Peace,  Rev. Twila Stone MBA, MDiv   "

## 2023-03-29 NOTE — PLAN OF CARE
Continuous dilaudid infusion started this afternoon. PRN Ativan administered throughout shift. Pt resting comfortably. Condom catheter in place. POC reviewed with patient; understanding verbalized. Pt. with nonskid footwear on, bed in lowest position, and locked with bed rails up x2.  Comfort measures in place. Family at bedside. All questions and concerns addressed at this time. Will continue to monitor.

## 2023-03-29 NOTE — SUBJECTIVE & OBJECTIVE
"Oncology Treatment Plan:   OP PANC NAB-PACLITAXEL + GEMCITABINE Q4W    Medications:  Continuous Infusions:  Scheduled Meds:   furosemide (LASIX) injection  160 mg Intravenous Q12H    tamsulosin  1 capsule Oral Daily     PRN Meds:HYDROmorphone, lorazepam, prochlorperazine, sodium chloride 0.9%     Review of patient's allergies indicates:   Allergen Reactions    Imfinzi [durvalumab] Other (See Comments)     1305- Imfinzi ended, pt returned from restroom complained of wheezing SOB and rigors. /100  O2 92% 2L NC O2 applied to pt.   1306-Demerol 25mg given and Solucortef 125mg IVPush given.   1309- Pt /106  02 99%.   1318- Pt still having Rigors additional 25mg of Demerol given.   1418- BP is 133/69 77 HR 99% RA Pt states " I feel much better"       Indomethacin Other (See Comments)     Headache dizziness      Adhesive Rash    Colchicine analogues Nausea And Vomiting     NVD        Past Medical History:   Diagnosis Date    Acute kidney injury 3/16/2023    Adjustment disorder     Anticoagulant long-term use     Anxiety     Arthritis     CAD (coronary artery disease) 03/02/2015    non-obstructive per [facility]     Cataract     Class 1 obesity with serious comorbidity in adult 3/17/2014    Dry eye syndrome     Hypertension     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/2021    Obesity     Post-procedural fever 12/18/2021    Seizures     2011    Sleep apnea     + CPAP    Sleep difficulties      Past Surgical History:   Procedure Laterality Date    ANTERIOR CERVICAL DISCECTOMY W/ FUSION N/A 07/06/2020    Procedure: DISCECTOMY, SPINE, CERVICAL, ANTERIOR APPROACH, WITH FUSION C3-4, C4-5;  Surgeon: Fabiola Vides MD;  [facility] OR [address] [place] [address];  Service: Neurosurgery;  Laterality: N/A;  TORONTO III, ASA III, BLOOD TYPE & SCREEN, NEUROMONITORING EMG-MEP-SEP, SUPINE POSITION,BRACE MIAMI,REGULAR BED, HEADREST CASPAR, POSITION, MAP>85, RADIOLOGY C-ARM, SPECIAL EQUIPMENT VIRGIL TYLER    COLONOSCOPY N/A 10/01/2021    " Procedure: COLONOSCOPY;  Surgeon: Nicolas Alvarado MD;  Location: [facility] (4TH FLR);  Service: Endoscopy;  Laterality: N/A;  EGD and colonoscopy for diarrhea and weight loss and Enlarged, heterogeneous appearance of the liver likely due to multiple underlying hepatic lesions largest measuring 4.8 cm.  Findings concerning for metastatic versus less likely primary hepatic neoplasm.  Recommend furth    COLONOSCOPY N/A 06/07/2022    Procedure: COLONOSCOPY;  Surgeon: Mejia Villafuerte MD;  Location: [facility] (4TH FLR);  Service: Endoscopy;  Laterality: N/A;  For evaluation of positive out-patient FOBT.   medically urgent  ok to hold Eliquis per CAROLINA Edward/RB  fully vaccinated  hx of seizures- last one 2014    ESOPHAGOGASTRODUODENOSCOPY N/A 10/01/2021    Procedure: EGD (ESOPHAGOGASTRODUODENOSCOPY);  Surgeon: Nicolas Alvarado MD;  Location: [facility] (4TH FLR);  Service: Endoscopy;  Laterality: N/A;  EGD and colonoscopy for diarrhea and weight loss and Enlarged, heterogeneous appearance of the liver likely due to multiple underlying hepatic lesions largest measuring 4.8 cm.  Findings concerning for metastatic versus less likely primary hepatic neopl    ESOPHAGOGASTRODUODENOSCOPY N/A 04/05/2022    Procedure: EGD (ESOPHAGOGASTRODUODENOSCOPY);  Surgeon: Amado Kelsey MD;  Location: [facility] (4TH FLR);  Service: Endoscopy;  Laterality: N/A;  EGD in 8 weeks to check for esophagitis healing patient should be on pantoprazole 40 mg once daily  ok to hold eliquis x2 days per Dr. Young, see telephone encounter  fully vaccinated    EYE SURGERY      cataract    INSERTION OF VENOUS ACCESS PORT N/A 12/03/2021    Procedure: INSERTION, VENOUS ACCESS PORT;  Surgeon: Saurav Garcia MD;  Location: [facility] 2ND FLR;  Service: General;  Laterality: N/A;    INTRAOCULAR PROSTHESES INSERTION Right 07/16/2018    Procedure: INSERTION-INTRAOCULAR LENS (IOL);  Surgeon: Priscilla Hays MD;  Location: [facility];  Service: Ophthalmology;   Laterality: Right;    INTRAOCULAR PROSTHESES INSERTION Left 2018    Procedure: INSERTION, INTRAOCULAR LENS PROSTHESIS;  Surgeon: Priscilla Hays MD;  Location: [facility] OR;  Service: Ophthalmology;  Laterality: Left;    KNEE SURGERY Right     LAPAROSCOPIC APPENDECTOMY N/A 2022    Procedure: APPENDECTOMY, LAPAROSCOPIC;  Surgeon: Cheo Hamm MD;  Location: [facility] OR 2ND FLR;  Service: General;  Laterality: N/A;    PHACOEMULSIFICATION OF CATARACT Right 2018    Procedure: PHACOEMULSIFICATION-ASPIRATION-CATARACT;  Surgeon: Priscilla Hays MD;  Location: [facility] OR;  Service: Ophthalmology;  Laterality: Right;    PHACOEMULSIFICATION OF CATARACT Left 2018    Procedure: PHACOEMULSIFICATION, CATARACT;  Surgeon: Priscilla Hays MD;  Location: [facility] OR;  Service: Ophthalmology;  Laterality: Left;    REPAIR OF INCARCERATED UMBILICAL HERNIA  2022    Procedure: REPAIR, HERNIA, UMBILICAL, INCARCERATED, AGE 5 YEARS OR OLDER;  Surgeon: Cheo Hamm MD;  Location: [facility] OR 2ND FLR;  Service: General;;    WISDOM TOOTH EXTRACTION       Family History       Problem Relation (Age of Onset)    Cancer Mother, Maternal Grandmother    Diabetes Mother    Heart disease Father, Paternal Grandfather, Brother    Hypertension Mother    Kidney cancer Mother (61)    Liver disease Maternal Grandmother    No Known Problems Sister, Daughter, Son, Sister, Sister, Sister, Daughter          Tobacco Use    Smoking status: Former     Packs/day: 1.00     Years: 20.00     Pack years: 20.00     Types: Cigarettes     Quit date: 1987     Years since quittin.2    Smokeless tobacco: Never    Tobacco comments:     quit    Substance and Sexual Activity    Alcohol use: Not Currently     Alcohol/week: 1.0 - 2.0 standard drink     Types: 1 - 2 Glasses of wine per week     Comment: not since liver diagnosis    Drug use: No    Sexual activity: Yes     Partners: Female       Review of Systems   Constitutional:  Positive for fatigue.    HENT:  Negative for congestion.    Respiratory:  Positive for shortness of breath.    Gastrointestinal:  Negative for abdominal pain.   Skin:  Positive for wound.   Neurological:  Negative for dizziness and headaches.   Objective:     Vital Signs (Most Recent):  Temp: 98 °F (36.7 °C) (03/29/23 0720)  Pulse: (!) 142 (03/29/23 0720)  Resp: 20 (03/29/23 0838)  BP: (!) 79/44 (03/29/23 0720)  SpO2: (!) 88 % (03/29/23 0720) Vital Signs (24h Range):  Temp:  [97.7 °F (36.5 °C)-98 °F (36.7 °C)] 98 °F (36.7 °C)  Pulse:  [107-152] 142  Resp:  [16-20] 20  SpO2:  [88 %-97 %] 88 %  BP: ()/(44-49) 79/44        There is no height or weight on file to calculate BMI.  There is no height or weight on file to calculate BSA.    No intake or output data in the 24 hours ending 03/29/23 1129    Physical Exam  Constitutional:       Appearance: He is ill-appearing.      Comments: Patient is significantly fluid overloaded with weeping edema.   HENT:      Head: Normocephalic.      Nose: Nose normal.      Mouth/Throat:      Mouth: Mucous membranes are moist.   Eyes:      Extraocular Movements: Extraocular movements intact.      Conjunctiva/sclera: Conjunctivae normal.      Pupils: Pupils are equal, round, and reactive to light.   Cardiovascular:      Rate and Rhythm: Tachycardia present. Rhythm irregular.      Pulses: Normal pulses.      Heart sounds: No murmur heard.  Pulmonary:      Effort: Respiratory distress present.      Breath sounds: No wheezing, rhonchi or rales.   Abdominal:      General: Abdomen is flat. Bowel sounds are normal. There is distension.      Palpations: Abdomen is soft.      Tenderness: There is no abdominal tenderness.   Musculoskeletal:      Right lower leg: Edema present.      Left lower leg: Edema present.      Comments: Severe weeping edema   Skin:     General: Skin is warm.      Findings: Lesion present.      Comments: Blistering bullae on inner thighs.   Neurological:      Mental Status: He is disoriented.        Significant Labs:   CBC:   No results for input(s): WBC, HGB, HCT, PLT in the last 48 hours.   and CMP:   No results for input(s): NA, K, CL, CO2, GLU, BUN, CREATININE, CALCIUM, PROT, ALBUMIN, BILITOT, ALKPHOS, AST, ALT, ANIONGAP, EGFRNONAA in the last 48 hours.    Invalid input(s): ESTGFAFR[place]      Diagnostic Results:  I have reviewed all pertinent imaging results/findings within the past 24 hours.

## 2023-03-29 NOTE — PROGRESS NOTES
Abdiel Babb - Oncology ([facility])  Hematology/Oncology  Progress Note    Patient Name: Domonique Kellogg  Admission Date: 3/28/2023  Hospital Length of Stay: 1 days  Code Status: DNR     Subjective:     HPI:  Mr. Domonique Kellogg is a 72 y.o. 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 Patients wife states that patient was having difficulty breathing while on his CPAP last night so she woke him up. Patient was a little confused about where he was upon waking. Patient states he has had fatigue/weakness for weeks that has been progressively worsening. He also reports lower leg/abdominal swelling recently that has been worsening. He is on lasix at home but that has been held for almost a week due to a rising creatinine on outside labs. Has some RUQ pain, U/S on 3/17 revealed stones and sludge in the gallbladder with no sonographic evidence for acute cholecystitis, as well as multifocal hepatic masses. He also developed jaundice about two-weeks ago. Denies chest pain, nausea, dysuria or headaches. He was dry heaving last night. Also reports 3 loose stools last night but no diarrhea.      On admission to the ED patient was febrile to 100.9, , /56, and on RA. Cr 2.1 (baseline 1.3-1.5). Tbili 7.7, Alk phos 495, , ALT 57, ammonia 63. WBC 16.2 and lactate 2.25. CXR with no acute findings. VBG with pH 7.34 and pCO2 39.8. Trop 0.035, . Patient given vanc/zosyn and IVF. CT abdomen ordered.     Patient was discharged and readmitted to hospice service.    Previous hospital course:  Patient was admitted to Medical Oncology service for sepsis and DAWIT. He was started on vanc and zosyn with vanc being discontinued on 03/24. Nephrology was consulted for DAWIT and recommended observation without IVF or diuresis. Patient received diuresis, however, renal function continued to worsen.  "Liver function also worsened throughout hospital stay. Palliative care was consulted. It was discussed that as patient's renal and liver function continue to worsen, patient would be not a candidate for any more therapy for metastatic intraheptic cholangiocarcinoma. Patient and family agreed to focus on comfort and quality of life. Patient and family agreed to home hospice, however, patient developed Afib with RVR and associated hypotension. Discussed inpatient hospice as concerns that patient may not be safe to travel in ambulance for home. Patient is being transitioned to inpatient hospice.      Oncology Treatment Plan:   OP PANC NAB-PACLITAXEL + GEMCITABINE Q4W    Medications:  Continuous Infusions:  Scheduled Meds:   furosemide (LASIX) injection  160 mg Intravenous Q12H    tamsulosin  1 capsule Oral Daily     PRN Meds:HYDROmorphone, lorazepam, prochlorperazine, sodium chloride 0.9%     Review of patient's allergies indicates:   Allergen Reactions    Imfinzi [durvalumab] Other (See Comments)     1305- Imfinzi ended, pt returned from restroom complained of wheezing SOB and rigors. /100  O2 92% 2L NC O2 applied to pt.   1306-Demerol 25mg given and Solucortef 125mg IVPush given.   1309- Pt /106  02 99%.   1318- Pt still having Rigors additional 25mg of Demerol given.   1418- BP is 133/69 77 HR 99% RA Pt states " I feel much better"       Indomethacin Other (See Comments)     Headache dizziness      Adhesive Rash    Colchicine analogues Nausea And Vomiting     NVD        Past Medical History:   Diagnosis Date    Acute kidney injury 3/16/2023    Adjustment disorder     Anticoagulant long-term use     Anxiety     Arthritis     CAD (coronary artery disease) 03/02/2015    non[facility]obstructive [facility]     Cataract     Class 1 obesity with serious comorbidity in adult 3/17/2014    Dry eye syndrome     Hypertension     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/2021    Obesity     " Post-procedural fever 12/18/2021    Seizures     2011    Sleep apnea     + CPAP    Sleep difficulties      Past Surgical History:   Procedure Laterality Date    ANTERIOR CERVICAL DISCECTOMY W/ FUSION N/A 07/06/2020    Procedure: DISCECTOMY, SPINE, CERVICAL, ANTERIOR APPROACH, WITH FUSION C3-4, C4-5;  Surgeon: Fabiola Vides MD;  Location: [facility] OR 2ND FLR;  Service: Neurosurgery;  Laterality: N/A;  TORONTO III, ASA III, BLOOD TYPE & SCREEN, NEUROMONITORING EMG-MEP-SEP, SUPINE POSITION,BRACE MIAMI,REGULAR BED, HEADREST CASPAR, POSITION, MAP>85, RADIOLOGY C-ARM, SPECIAL EQUIPMENT VIRGIL NAYELI    COLONOSCOPY N/A 10/01/2021    Procedure: COLONOSCOPY;  Surgeon: Nicolas Alvarado MD;  Location: [facility] (4TH FLR);  Service: Endoscopy;  Laterality: N/A;  EGD and colonoscopy for diarrhea and weight loss and Enlarged, heterogeneous appearance of the liver likely due to multiple underlying hepatic lesions largest measuring 4.8 cm.  Findings concerning for metastatic versus less likely primary hepatic neoplasm.  Recommend fur    COLONOSCOPY N/A 06/07/2022    Procedure: COLONOSCOPY;  Surgeon: Mejia Villafuerte MD;  Location: [facility] (4TH FLR);  Service: Endoscopy;  Laterality: N/A;  For evaluation of positive out-patient FOBT.   medically urgent  ok to hold Eliquis per [place] Edward/RB  fully vaccinated  hx of seizures- last one 2014    ESOPHAGOGASTRODUODENOSCOPY N/A 10/01/2021    Procedure: EGD (ESOPHAGOGASTRODUODENOSCOPY);  Surgeon: Nicolas Alvarado MD;  Location: [facility] (4TH FLR);  Service: Endoscopy;  Laterality: N/A;  EGD and colonoscopy for diarrhea and weight loss and Enlarged, heterogeneous appearance of the liver likely due to multiple underlying hepatic lesions largest measuring 4.8 cm.  Findings concerning for metastatic versus less likely primary hepatic neopl    ESOPHAGOGASTRODUODENOSCOPY N/A 04/05/2022    Procedure: EGD (ESOPHAGOGASTRODUODENOSCOPY);  Surgeon: Amado Kelsey MD;  Location: [facility]  ENDO (4TH FLR);  Service: Endoscopy;  Laterality: N/A;  EGD in 8 weeks to check for esophagitis healing patient should be on pantoprazole 40 mg once daily  ok to hold eliquis x2 days per Dr. Young, see telephone encounter  fully vaccinated    EYE SURGERY      cataract    INSERTION OF VENOUS ACCESS PORT N/A 12/03/2021    Procedure: INSERTION, VENOUS ACCESS PORT;  Surgeon: Saurav Garcia MD;  Location: [facility] OR 2ND FLR;  Service: General;  Laterality: N/A;    INTRAOCULAR PROSTHESES INSERTION Right 07/16/2018    Procedure: INSERTION-INTRAOCULAR LENS (IOL);  Surgeon: Priscilla Hays MD;  Location: [facility] [place] OR;  Service: Ophthalmology;  Laterality: Right;    INTRAOCULAR PROSTHESES INSERTION Left 08/13/2018    Procedure: INSERTION, INTRAOCULAR LENS PROSTHESIS;  Surgeon: Priscilla Hays MD;  Location: [facility] [place] OR;  Service: Ophthalmology;  Laterality: Left;    KNEE SURGERY Right     LAPAROSCOPIC APPENDECTOMY N/A 11/2/2022    Procedure: APPENDECTOMY, LAPAROSCOPIC;  Surgeon: Cheo Hamm MD;  Location: [facility] OR 2ND FLR;  Service: General;  Laterality: N/A;    PHACOEMULSIFICATION OF CATARACT Right 07/16/2018    Procedure: PHACOEMULSIFICATION-ASPIRATION-CATARACT;  Surgeon: Priscilla Hays MD;  Location: [facility] [place] OR;  Service: Ophthalmology;  Laterality: Right;    PHACOEMULSIFICATION OF CATARACT Left 08/13/2018    Procedure: PHACOEMULSIFICATION, CATARACT;  Surgeon: Priscilla Hays MD;  Location: [facility];  Service: Ophthalmology;  Laterality: Left;    REPAIR OF INCARCERATED UMBILICAL HERNIA  11/2/2022    Procedure: REPAIR, HERNIA, UMBILICAL, INCARCERATED, AGE 5 YEARS OR OLDER;  Surgeon: Cheo Hamm MD;  Location: [facility] OR 2ND FLR;  Service: General;;    WISDOM TOOTH EXTRACTION       Family History       Problem Relation (Age of Onset)    Cancer Mother, Maternal Grandmother    Diabetes Mother    Heart disease Father, Paternal Grandfather, Brother    Hypertension Mother    Kidney cancer Mother (61)    Liver disease Maternal  Grandmother    No Known Problems Sister, Daughter, Son, Sister, Sister, Sister, Daughter          Tobacco Use    Smoking status: Former     Packs/day: 1.00     Years: 20.00     Pack years: 20.00     Types: Cigarettes     Quit date: 1987     Years since quittin.2    Smokeless tobacco: Never    Tobacco comments:     quit    Substance and Sexual Activity    Alcohol use: Not Currently     Alcohol/week: 1.0 - 2.0 standard drink     Types: 1 - 2 Glasses of wine per week     Comment: not since liver diagnosis    Drug use: No    Sexual activity: Yes     Partners: Female       Review of Systems   Constitutional:  Positive for fatigue.   HENT:  Negative for congestion.    Respiratory:  Positive for shortness of breath.    Gastrointestinal:  Negative for abdominal pain.   Skin:  Positive for wound.   Neurological:  Negative for dizziness and headaches.   Objective:     Vital Signs (Most Recent):  Temp: 98 °F (36.7 °C) (23 0720)  Pulse: (!) 142 (23 0720)  Resp: 20 (23 0838)  BP: (!) 79/44 (23 0720)  SpO2: (!) 88 % (23 0720) Vital Signs (24h Range):  Temp:  [97.7 °F (36.5 °C)-98 °F (36.7 °C)] 98 °F (36.7 °C)  Pulse:  [107-152] 142  Resp:  [16-20] 20  SpO2:  [88 %-97 %] 88 %  BP: ()/(44-49) 79/44        There is no height or weight on file to calculate BMI.  There is no height or weight on file to calculate BSA.    No intake or output data in the 24 hours ending 23 1129    Physical Exam  Constitutional:       Appearance: He is ill-appearing.      Comments: Patient is significantly fluid overloaded with weeping edema.   HENT:      Head: Normocephalic.      Nose: Nose normal.      Mouth/Throat:      Mouth: Mucous membranes are moist.   Eyes:      Extraocular Movements: Extraocular movements intact.      Conjunctiva/sclera: Conjunctivae normal.      Pupils: Pupils are equal, round, and reactive to light.   Cardiovascular:      Rate and Rhythm: Tachycardia present. Rhythm  irregular.      Pulses: Normal pulses.      Heart sounds: No murmur heard.  Pulmonary:      Effort: Respiratory distress present.      Breath sounds: No wheezing, rhonchi or rales.   Abdominal:      General: Abdomen is flat. Bowel sounds are normal. There is distension.      Palpations: Abdomen is soft.      Tenderness: There is no abdominal tenderness.   Musculoskeletal:      Right lower leg: Edema present.      Left lower leg: Edema present.      Comments: Severe weeping edema   Skin:     General: Skin is warm.      Findings: Lesion present.      Comments: Blistering bullae on inner thighs.   Neurological:      Mental Status: He is disoriented.       Significant Labs:   CBC:   No results for input(s): WBC, HGB, HCT, PLT in the last 48 hours.   and CMP:   No results for input(s): NA, K, CL, CO2, GLU, BUN, CREATININE, CALCIUM, PROT, ALBUMIN, BILITOT, ALKPHOS, AST, ALT, ANIONGAP, EGFRNONAA in the last 48 hours.    Invalid input(s): ESTGFAFRICA      Diagnostic Results:  I have reviewed all pertinent imaging results/findings within the past 24 hours.    Assessment/Plan:     * Comfort measures only status  Plan for inpatient vs home hospice due to worsening renal and liver function. Patient not a candidate for any more therapy.    -Palliative care order sets placed  -Dilaudid as needed for pain and dyspnea  -Ativan as needed for anxiety   -Supportive care as needed  -Consider high dose IV lasix for fluid management    Acute hypoxemic respiratory failure  -Oxygen as needed        Intrahepatic cholangiocarcinoma  Plan for inpatient vs home hospice due to worsening renal and liver function. Patient not a candidate for any more therapy.             Alex Olvera,   Hematology/Oncology  Abdiel Babb - Oncology ([facility])

## 2023-03-29 NOTE — NURSING
Patient A&Ox4, drowsy throughout the day. Remains on 3 L nasal cannula. Vitals taken Qshift per orders. Generalized +4 edema with developing blisters on BLE. Antibiotic ointment and topical lidocaine cream used on/around blister site. Patient transitioned to hospice care this evening. Will move patient into larger room, 824 when clean. Hourly comfort rounding maintained. Patient turned/repositioned Q2 as tolerated. Family remains at bedside. No other complaints at this time. Bed in low and locked position, call light within reach.

## 2023-03-30 NOTE — HOSPITAL COURSE
Patient transitioned to inpatient hospice on 3/28/23. Was maintained on Dilaudid infusion with PRN Ativan for comfort. Lasix to reduce lower extremity edema complicated by painful bullae. Notified by nursing staff at 6:45AM on 3/30/23 that patient had likely . Patient examined and pronounced time of death at 6:45AM. Family at bedside. All questions answered.

## 2023-03-30 NOTE — PROGRESS NOTES
Provider Communication via Face to Face  Time of communication: 0630  Provider: Valente Yan MD  Communication Reason: Change in patient status, patient needs to be assessed and be pronounced by an MD  Provider response: Provider came to bedside to assess patient pronounced time of death at 0645

## 2023-03-30 NOTE — DISCHARGE SUMMARY
Abdiel Babb - Oncology ([facility])  Hematology/Oncology  Discharge Summary      Patient Name: Domonique Kellogg  MRN: [id number]  Admission Date: 3/28/2023  Hospital Length of Stay: 2 days  Discharge Date and Time:  03/30/2023 7:39 AM  Attending Physician: Caesar Ramesh MD   Discharging Provider: Valente Yan DO  Primary Care Provider: Nicolas Marlow MD    HPI: Mr. Domonique Kellogg is a 72 y.o. 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 Patients wife states that patient was having difficulty breathing while on his CPAP last night so she woke him up. Patient was a little confused about where he was upon waking. Patient states he has had fatigue/weakness for weeks that has been progressively worsening. He also reports lower leg/abdominal swelling recently that has been worsening. He is on lasix at home but that has been held for almost a week due to a rising creatinine on outside labs. Has some RUQ pain, U/S on 3/17 revealed stones and sludge in the gallbladder with no sonographic evidence for acute cholecystitis, as well as multifocal hepatic masses. He also developed jaundice about two-weeks ago. Denies chest pain, nausea, dysuria or headaches. He was dry heaving last night. Also reports 3 loose stools last night but no diarrhea.      On admission to the ED patient was febrile to 100.9, , /56, and on RA. Cr 2.1 (baseline 1.3-1.5). Tbili 7.7, Alk phos 495, , ALT 57, ammonia 63. WBC 16.2 and lactate 2.25. CXR with no acute findings. VBG with pH 7.34 and pCO2 39.8. Trop 0.035, . Patient given vanc/zosyn and IVF. CT abdomen ordered.     Patient was discharged and readmitted to hospice service.    Previous hospital course:  Patient was admitted to Medical Oncology service for sepsis and DAWIT. He was started on vanc and zosyn with vanc being discontinued on 03/24.  Nephrology was consulted for DAWIT and recommended observation without IVF or diuresis. Patient received diuresis, however, renal function continued to worsen. Liver function also worsened throughout hospital stay. Palliative care was consulted. It was discussed that as patient's renal and liver function continue to worsen, patient would be not a candidate for any more therapy for metastatic intraheptic cholangiocarcinoma. Patient and family agreed to focus on comfort and quality of life. Patient and family agreed to home hospice, however, patient developed Afib with RVR and associated hypotension. Discussed inpatient hospice as concerns that patient may not be safe to travel in ambulance for home. Patient is being transitioned to inpatient hospice.      Hospital Course: Patient transitioned to inpatient hospice on 3/28/23. Was maintained on Dilaudid infusion with PRN Ativan for comfort. Lasix to reduce lower extremity edema complicated by painful bullae. Notified by nursing staff at 6:45AM on 3/30/23 that patient had likely . Patient examined and pronounced time of death at 6:45AM. Family at bedside. All questions answered.      Goals of Care Treatment Preferences:  Code Status: DNR    Health care agent: [facility] [place] agent number: [phone]          What is most important right now is to focus on spending time at home, comfort and QOL .  Accordingly, we have decided that the best plan to meet the patient's goals includes continuing with treatment.      Pending Diagnostic Studies:       None          Final Active Diagnoses:    Diagnosis Date Noted POA    PRINCIPAL PROBLEM:  Comfort measures only status [Z51.5] 2023 Not Applicable    Acute hypoxemic respiratory failure [J96.01] 2023 Yes    Intrahepatic cholangiocarcinoma [C22.1] 2021 Yes      Problems Resolved During this Admission:      Discharged Condition:     Disposition:     Valente Yan    Hematology/Oncology  [facility] - Oncology ([facility])

## 2023-03-30 NOTE — CHAPLAIN
Palliative Care     Patient: Domonique Kellogg       MRN: [id number]  : 1950  Age: 72 y.o.  Hospital Length of Stay: 2 days  Code Status: Prior   Attending Provider: No att. providers found  Principal Problem: Comfort measures only status    [place] of pt's passing as soon as I got into work today; called wife Estrellita to offer my condolences. She said he was at peace, so is she and she was very pleased with the care he received at [facility]. Lord, in your  mercy.    In Peace,  Rev. Twila Stone MBA, MDiv

## 2023-03-30 NOTE — SIGNIFICANT EVENT
Critical Care Medicine                                                                                         Death Note      Called to bedside by patient's nurse. Nursing supervisor notified. Family at bedside.    Patient is not responding to verbal or tactile stimuli. Patient does not have a pupillary or corneal reflex. His pupils are fixed and dilated. No heart or breath sounds on auscultation. No respirations. No palpable pulses.     Time of death: 6:45AM     Cause of Death: metastatic cholangiocarcinoma.    [email or web address]

## 2023-04-07 DIAGNOSIS — L29.9 PRURITUS: ICD-10-CM

## 2023-04-07 RX ORDER — HYDROXYZINE HYDROCHLORIDE 10 MG/1
10 TABLET, FILM COATED ORAL 3 TIMES DAILY PRN
Qty: 270 TABLET | OUTPATIENT
Start: 2023-04-07

## 2023-04-18 ENCOUNTER — TELEPHONE (OUTPATIENT)
Dept: INTERNAL MEDICINE | Facility: CLINIC | Age: 73
End: 2023-04-18

## 2023-07-25 NOTE — PRE-PROCEDURE INSTRUCTIONS
Patient stated his medical condition is the same as when last spoke, 3/23. Medication are the same. Has preop and med instructions from Daiana Cruz RN. Reviewed Preop instructions. Hold asa, asa containing products, nsaids, vitamins and supplements one week prior to surgery.Will need updated medical clearance by PCP Dr. Nicolas Marlow. May need labs. Our  will call to set up this appt. Verbalizes understanding.     SPOKE WITH PATIENT,PATIENT STATED UNDERSTANDING.

## 2023-11-01 NOTE — DISCHARGE SUMMARY
Radiology Discharge Summary      Hospital Course: No complications    Admit Date: 9/28/2022  Discharge Date: 09/28/2022     Instructions Given to Patient: Yes  Diet: Resume prior diet  Activity: activity as tolerated    Description of Condition on Discharge: Stable  Vital Signs (Most Recent): Temp: 97.9 °F (36.6 °C) (09/28/22 1011)  Pulse: 67 (09/28/22 1011)  Resp: 16 (09/28/22 1011)  BP: (!) 140/78 (09/28/22 1012)  SpO2: 99 % (09/28/22 1011)    Discharge Disposition: Home    Discharge Diagnosis: Cholangiocarcinoma    Adbry Pregnancy And Lactation Text: It is unknown if this medication will adversely affect pregnancy or breast feeding.

## (undated) DEVICE — APPLICATOR CHLORAPREP ORN 26ML

## (undated) DEVICE — CORD BIPOLAR 12 FOOT

## (undated) DEVICE — ELECTRODE REM PLYHSV RETURN 9

## (undated) DEVICE — MARKER SKIN STND TIP BLUE BARR

## (undated) DEVICE — SCRUB 10% POVIDONE IODINE 4OZ

## (undated) DEVICE — DRESSING TELFA STRL 4X3 LF

## (undated) DEVICE — IRRIGATOR ENDOSCOPY DISP.

## (undated) DEVICE — SPONGE GAUZE 16PLY 4X4

## (undated) DEVICE — STAPLER GIA HANDLE STD

## (undated) DEVICE — ADHESIVE DERMABOND ADVANCED

## (undated) DEVICE — BLADE SURG STAINLESS STEEL #11

## (undated) DEVICE — STAPLE TRI-STAPLE 2.0 CRV TIP

## (undated) DEVICE — TROCAR ENDOPATH XCEL 12X100MM

## (undated) DEVICE — SYR 30CC LUER LOCK

## (undated) DEVICE — SUT ETHIBOND EXCEL 0 CT2 30

## (undated) DEVICE — SOL NACL 0.9% INJ PF/50151

## (undated) DEVICE — DRAPE C ARM 42 X 120 10/BX

## (undated) DEVICE — SUT 2-0 VICRYL / SH (J417)

## (undated) DEVICE — DRAPE STERI INSTRUMENT 1018

## (undated) DEVICE — SOL BETADINE 5%

## (undated) DEVICE — KIT SURGIFLO HEMOSTATIC MATRIX

## (undated) DEVICE — SUT MONOCRYL 4-0 PS-1 UND

## (undated) DEVICE — CARTRIDGE OIL

## (undated) DEVICE — GLOVE GAMMEX SURG LF PI SZ 7.5

## (undated) DEVICE — SUT 0 VICRYL / UR6 (J603)

## (undated) DEVICE — TOWEL OR DISP STRL BLUE 4/PK

## (undated) DEVICE — DRAPE THYROID WITH ARMBOARD

## (undated) DEVICE — DRAPE LAP T SHT W/ INSTR PAD

## (undated) DEVICE — SUT MCRYL PLUS 4-0 PS2 27IN

## (undated) DEVICE — SOL WATER STRL IRR 1000ML

## (undated) DEVICE — GAUZE SPONGE PEANUT STRL

## (undated) DEVICE — Device

## (undated) DEVICE — CASSETTE INFINITI

## (undated) DEVICE — PACK SET UP CONVERTORS

## (undated) DEVICE — DRESSING TRANS 2X2 TEGADERM

## (undated) DEVICE — TROCAR ENDOPATH XCEL 5MM 7.5CM

## (undated) DEVICE — SEE MEDLINE ITEM 157150

## (undated) DEVICE — GLOVE BIOGEL SKINSENSE PI 7.5

## (undated) DEVICE — SEALER LIGASURE MARYLAND 37CM

## (undated) DEVICE — SUT VICRYL PLUS 3-0 SH 18IN

## (undated) DEVICE — SUT CTD VICRYL 3-0 CR/SH

## (undated) DEVICE — GOWN POLY REINF BRTH SLV XL

## (undated) DEVICE — GLOVE BIOGEL SKINSENSE PI 7.0

## (undated) DEVICE — TUBE FRAZIER 5MM 2FT SOFT TIP

## (undated) DEVICE — GOWN FAB REINF SET-IN SLV 2XL

## (undated) DEVICE — DURAPREP SURG SCRUB 26ML

## (undated) DEVICE — SEE MEDLINE ITEM 156905

## (undated) DEVICE — NDL SPINAL 18GX3.5 SPINOCAN

## (undated) DEVICE — TIP YANKAUERS BULB NO VENT

## (undated) DEVICE — BUR BONE CUT MICRO TPS 3X3.8MM

## (undated) DEVICE — TRAY CATH FOL SIL URIMTR 16FR

## (undated) DEVICE — GLASSES EYE PROTECTIVE

## (undated) DEVICE — GLOVE BIOGEL SKINSENSE PI 6.5

## (undated) DEVICE — SUT VICRYL 3-0 27 SH

## (undated) DEVICE — GOWN SURGICAL X-LARGE

## (undated) DEVICE — TRAY MINOR GEN SURG OMC

## (undated) DEVICE — KIT ANTIFOG W/SPONG & FLUID

## (undated) DEVICE — DIFFUSER

## (undated) DEVICE — BAG TISS RETRV MONARCH 10MM

## (undated) DEVICE — DRAPE OPMI STERILE

## (undated) DEVICE — SEE MEDLINE ITEM 157110

## (undated) DEVICE — BLADE SURG CARBON STEEL SZ11

## (undated) DEVICE — TUBING HF INSUFFLATION W/ FLTR

## (undated) DEVICE — DRESSING TRANS 4X4 TEGADERM

## (undated) DEVICE — DRAPE ABDOMINAL TIBURON 14X11

## (undated) DEVICE — SEE MEDLINE ITEM 157131

## (undated) DEVICE — EVACUATOR WOUND BULB 100CC

## (undated) DEVICE — NDL 18GA X1 1/2 REG BEVEL

## (undated) DEVICE — KIT EVACUATOR 3-SPRING 1/8 DRN

## (undated) DEVICE — BOVIE SUCTION

## (undated) DEVICE — DRAPE CORETEMP FLD WRM 56X62IN

## (undated) DEVICE — SUT MONOCRYL 4-0 PS-2

## (undated) DEVICE — NDL INSUF ULTRA VERESS 120MM

## (undated) DEVICE — SUT ENDOLOOP PDSII 18 LIGA

## (undated) DEVICE — TROCAR ENDOPATH XCEL 5X75MM

## (undated) DEVICE — TRAY MINOR GEN SURG

## (undated) DEVICE — SOL NS 1000CC